# Patient Record
Sex: MALE | Race: BLACK OR AFRICAN AMERICAN | NOT HISPANIC OR LATINO | Employment: UNEMPLOYED | URBAN - METROPOLITAN AREA
[De-identification: names, ages, dates, MRNs, and addresses within clinical notes are randomized per-mention and may not be internally consistent; named-entity substitution may affect disease eponyms.]

---

## 2017-01-10 ENCOUNTER — TELEPHONE (OUTPATIENT)
Dept: CARDIOLOGY | Facility: CLINIC | Age: 37
End: 2017-01-10

## 2017-01-10 NOTE — TELEPHONE ENCOUNTER
Patient called and reminded about dse scheduled tomorrow at 1045 and to arrive npo 3 to 4 hours prior to test.

## 2017-01-11 ENCOUNTER — HOSPITAL ENCOUNTER (OUTPATIENT)
Dept: CARDIOLOGY | Facility: CLINIC | Age: 37
Discharge: HOME | End: 2017-01-11
Payer: MEDICARE

## 2017-01-11 DIAGNOSIS — Z76.82 ORGAN TRANSPLANT CANDIDATE: ICD-10-CM

## 2017-01-11 NOTE — PROGRESS NOTES
Pt here for dobutamine stress test for  kidney transplant workup. Pt states that he recently had a stress test at [facility]. Pt has final report stating that a Kadi scan stress test was done 11/30/2016 at [facility]. Yari Cotter from the kidney transplant department notified and she called [facility] to obtain records as well. Pt instructed that a stress test doesn't need to be performed since it was recently done. Pt instructed to take all records from [place] to transplant center.

## 2017-02-23 ENCOUNTER — TELEPHONE (OUTPATIENT)
Dept: TRANSPLANT | Facility: CLINIC | Age: 37
End: 2017-02-23

## 2017-02-23 NOTE — TELEPHONE ENCOUNTER
----- Message from Shea Cordero sent at 2/23/2017 10:38 AM CST -----  Contact: pt  Calling to let Yari know the info requested will be faxed today, and pt wants a call back today please call him @ # [phone].

## 2017-03-01 ENCOUNTER — TELEPHONE (OUTPATIENT)
Dept: TRANSPLANT | Facility: CLINIC | Age: 37
End: 2017-03-01

## 2017-03-01 NOTE — TELEPHONE ENCOUNTER
----- Message from Marta Cruz sent at 2/27/2017  9:58 AM CST -----  Contact: patient   Calling to get a update on his txp . Please call

## 2017-03-10 ENCOUNTER — COMMITTEE REVIEW (OUTPATIENT)
Dept: TRANSPLANT | Facility: CLINIC | Age: 37
End: 2017-03-10

## 2017-03-10 NOTE — COMMITTEE REVIEW
Native Organ Dx: HIV Nephropathy      Unable to determine transplant candidacy at this time due to need for follow up on decreased CD4 counts ( low CD4 increases the risk of infection after transplant) and Cardiology consult for interatrial septal aneursym. CD4 will need to remain >200 for at least 3 months. Dr. Miramontes will discuss with Dr. Reyes in regards to changing medication regime and CD4 counts.       Note written by Yari Cotter RN    ===============================================    I was present at the meeting and attest to the decision of the committee.

## 2017-03-10 NOTE — LETTER
March 10, 2017    Demetrio Aguiar  [address] [address] [address]          Dear Demetrio Aguiar:  MRN: [id number]    Your transplant evaluation was reviewed at the [facility] Selection Committee meeting on 3/10/2017.  It is with regret I inform you that your workup is incomplete and we are unable to determine your transplant candidacy at this time due decreased CD4 counts  and cardiology consult for interatrial septal aneursym. Your CD4 will need to remain greater than 200 for at least 3 months. You will be re-presented to the selection committee once pending studies are completed.  You will be notified of the committees decision once we review the new results.    The [facility] Selection Committee carefully considers each patients transplant candidacy using established selection criteria to determine if it is safe to proceed with transplantation for each and every person evaluated.  Although the selection committee believes your workup is incomplete and we are unable to determine your transplant candidacy, you have the right to be evaluated at other transplant centers.  You may request your [facility] records be sent to any center of your choice by contacting our Medical Records Department at [phone].    Attached is a letter from the [employer] for Organ Sharing (UNOS).  It describes the services and information offered to patients by UNOS and the Organ Procurement and Transplant Network.    Sincerely,      Lucía Munoz MD  Medical Director, Kidney & Kidney/Pancreas Transplantation    Cc: Alexa Holliday MD/[facility]/Carlos Reyes-Sacin, MD    Encl: UNOS Letter          OPTN/UNOS: Your Resource for Organ Transplant Information        If you have a question regarding your own medical care, you always should call your transplant center first. However, for general organ transplant-related information, you can call the [employer] Organ Sharing (UNOS) toll-free patient  services line at [phone].    Anyone, including potential transplant candidates, recipients, family members/friends, living donors, and/or donor family members can call this number to:    · talk about organ donation, living donation, how transplant and donation work, the donation process, transplant policies, and transplant/donor information;  · get a free patient information kit with helpful booklets, waiting list and transplant information, and a list of all transplant centers;  · ask questions about the Organ Procurement and Transplantation Network (OPTN) web site (www.optn.transplant.hrsa.gov); the UNOS Web site (www.unos.org); or the UNOS web site for living donors and transplant recipients (www.transplantliving.org);  · learn how UNOS and the OPTN can help you;  · talk about any concerns that you may have with a transplant center and how they perform    UNOS is a not-for-profit organization that provides all of the administrative services for the national OPTN under federal contract to the Health Resources and Services Administration (HRSA), an agency under the U.S. Department of Health and Human Services (HHS).     UNOS and OPTN responsibilities include:    · writing educational material for patients, the public and professionals;  · helping to make people aware of the need for donated organs and tissue;  · writing organ transplant policy with help from doctors, nurses, transplant patients/candidates, donor families, living donors, and the public;  · coordinating the organ matching and placement process;  · collecting information about every organ transplant and donation that occurs in the [place].    Remember, you should contact your transplant center directly if you have questions or concerns about your own medical care including medical records, work-up progress and test reports. [facility] is not your transplant center, and staff at [facility] will not be able to transfer you to your transplant center, so  keep your transplant centers phone number handy. But, while you research your transplant needs and learn as much as you can about transplantation and donation, we welcome your call to our toll-free patient services line at [phone].

## 2017-03-14 ENCOUNTER — TELEPHONE (OUTPATIENT)
Dept: INFECTIOUS DISEASES | Facility: CLINIC | Age: 37
End: 2017-03-14

## 2017-03-14 NOTE — TELEPHONE ENCOUNTER
ID Plan of Care note    Discussed patient with Dr. Carlos Reyes, pts Primary care HIV doctor and told him there was concern for downtrending Cd4 count and patient will not be listed at this time, but our team will continue to follow for future listing.  Given this, I have discussed with him discontinuing efavirenz and moving to an alternative agent that does not have interaction with tacrolimus.  He understands this and will do so at this time.  He also understands ideally CD4 should be > 200 x 3 months and percentage should be stable prior to listing in future.

## 2017-04-03 ENCOUNTER — OFFICE VISIT (OUTPATIENT)
Dept: CARDIOLOGY | Facility: CLINIC | Age: 37
End: 2017-04-03
Payer: MEDICARE

## 2017-04-03 ENCOUNTER — TELEPHONE (OUTPATIENT)
Dept: CARDIOLOGY | Facility: CLINIC | Age: 37
End: 2017-04-03

## 2017-04-03 VITALS
SYSTOLIC BLOOD PRESSURE: 108 MMHG | WEIGHT: 150.81 LBS | BODY MASS INDEX: 23.67 KG/M2 | DIASTOLIC BLOOD PRESSURE: 68 MMHG | HEART RATE: 93 BPM | HEIGHT: 67 IN

## 2017-04-03 DIAGNOSIS — Z01.818 PREOP EXAMINATION: Primary | ICD-10-CM

## 2017-04-03 PROCEDURE — 99204 OFFICE O/P NEW MOD 45 MIN: CPT | Mod: S$PBB,GC,, | Performed by: INTERNAL MEDICINE

## 2017-04-03 PROCEDURE — 99999 PR PBB SHADOW E&M-EST. PATIENT-LVL III: CPT | Mod: PBBFAC,GC,, | Performed by: INTERNAL MEDICINE

## 2017-04-03 PROCEDURE — 99213 OFFICE O/P EST LOW 20 MIN: CPT | Mod: PBBFAC | Performed by: INTERNAL MEDICINE

## 2017-04-03 RX ORDER — FEXOFENADINE HCL 60 MG
60 TABLET ORAL DAILY
Refills: 5 | Status: ON HOLD | COMMUNITY
Start: 2017-01-09 | End: 2017-08-26 | Stop reason: ALTCHOICE

## 2017-04-03 RX ORDER — CINACALCET HYDROCHLORIDE 60 MG/1
TABLET, COATED ORAL
Refills: 99 | Status: ON HOLD | COMMUNITY
Start: 2017-02-09 | End: 2017-08-26 | Stop reason: DRUGHIGH

## 2017-04-03 RX ORDER — ASPIRIN 81 MG/1
81 TABLET ORAL DAILY
Status: ON HOLD | COMMUNITY
End: 2017-08-26 | Stop reason: ALTCHOICE

## 2017-04-03 NOTE — PROGRESS NOTES
"Subjective:    Patient ID:  Demetrio Aguiar is a 36 y.o. 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 He underwent echocardiogram and Lexiscan at [place]. His echocardiogram showed an EF of 55% with normal wall thickness, trace MR, mild PI, and an interatrial septal aneurysm with no bubble study performed. RV normal in size. Lexiscan was negative for ischemia.    On assessment today, the patient states that he feels well and is without complaints. No cardiac history, states family has a history of HTN. Patient denies stroke. No exertional dyspnea issues. No symptoms of heart failure.    Past Medical History:   Diagnosis Date    Anemia     HIV infection     HIV nephropathy     Hyperlipidemia     TB lung, latent     tx INH 2006       Past Surgical History:   Procedure Laterality Date    EXPLORATORY LAPAROTOMY W/ BOWEL RESECTION      NO BOWEL RESECTION, UNKNOWN DETAILS, "Pancreas Infection"    peritoneal dialysis catheter placement         Family History   Problem Relation Age of Onset    No Known Problems Mother     Cancer Father     Lung cancer Father     No Known Problems Brother     Diabetes Maternal Aunt     Diabetes Maternal Uncle     Hypertension Maternal Grandmother     Hypertension Paternal Grandmother        Social History     Social History    Marital status: Single     Spouse name: N/A    Number of children: N/A    Years of education: N/A     Social History Main Topics    Smoking status: Former Smoker     Packs/day: 0.50    Smokeless tobacco: None      Comment: quit smoking 5 years ago    Alcohol use No    Drug use: Yes     Special: Cocaine, Marijuana      Comment: last use for cocaine at age 17; 2 years ago last use for THC    Sexual activity: Not Currently     Other Topics Concern    None     Social History Narrative       Review of " "patient's allergies indicates:  No Known Allergies      Review of Systems   Constitution: Negative for decreased appetite, fever, weakness and malaise/fatigue.   Cardiovascular: Negative for chest pain, dyspnea on exertion, irregular heartbeat and leg swelling.   Respiratory: Negative for cough, hemoptysis, shortness of breath and wheezing.    Endocrine: Negative for cold intolerance and heat intolerance.   Musculoskeletal: Negative for muscle weakness and myalgias.   Gastrointestinal: Negative for abdominal pain, constipation and diarrhea.   Genitourinary: Negative for bladder incontinence.   Psychiatric/Behavioral: Negative for depression.        Objective:  Vitals:    04/03/17 1005   BP: 108/68   BP Location: Right arm   Patient Position: Sitting   BP Method: Automatic   Pulse: 93   Weight: 68.4 kg (150 lb 12.7 oz)   Height: 5' 7" (1.702 m)       Physical Exam   Constitutional: He is oriented to person, place, and time. He appears well-developed and well-nourished.   HENT:   Head: Normocephalic and atraumatic.   Neck: Normal range of motion. Neck supple. No JVD present.   Cardiovascular: Normal rate, regular rhythm and normal heart sounds.  Exam reveals no gallop and no friction rub.    No murmur heard.  Pulmonary/Chest: Effort normal and breath sounds normal. No respiratory distress. He has no wheezes. He has no rales.   Abdominal: Soft. Bowel sounds are normal. There is no tenderness. There is no rebound and no guarding.   Musculoskeletal: Normal range of motion.   Neurological: He is alert and oriented to person, place, and time.     Current Outpatient Prescriptions on File Prior to Visit   Medication Sig    calcium acetate (PHOSLO) 667 mg capsule Take 2,668 mg by mouth 3 (three) times daily with meals.    efavirenz (SUSTIVA) 600 mg Tab Take 600 mg by mouth every evening.    emtricitabine (EMTRIVA) 200 mg Cap Take 200 mg by mouth twice a week. Tues and Sat    niacin 250 MG Tab Take 250 mg by mouth nightly. "    tenofovir (VIREAD) 300 mg Tab Take 300 mg by mouth once a week. Saturday    [DISCONTINUED] cinacalcet (SENSIPAR) 90 MG Tab Take 90 mg by mouth daily with breakfast.     No current facility-administered medications on file prior to visit.            Assessment and Plan:   #Preoperative Risk Assessment - Prior to Kidney Transplant. Outside hospital stress test negative. Evidence of atrial septal aneurysm on outside hospital echo, no bubble study, but RV normal in size.  - Atrial septal aneurysm should have no consequence on kidney transplant outcome, but there is some evidence that it may increase risk of stroke in the future  - Aspirin 81mg PO Daily for prophylaxis considering aneurysm, can be held if needed perioperatively  - With the above recommendations, the patient is optimized from a cardiac standpoint for his kidney transplant    #Atrial Septal Aneurysm - Asymptomatic. No evidence of RV involvement.  - Aspirin as above    Patient discussed and examined with attending physician, Dr. Peng.      Signed:    Rubén Masters MD  Cardiology Fellow, PGY-5  Pager: [phone]  4/3/2017 10:34 AM

## 2017-04-03 NOTE — MR AVS SNAPSHOT
[facility]  [address]  [facility] [address]  Phone: [phone]                  Demetrio Aguiar   4/3/2017 10:00 AM   Office Visit    Description:  Male : 1980   Provider:  Rubén Masters MD   Department:  [facility]           Reason for Visit     Pre-op Exam     interatrial septal aneurysm           Diagnoses this Visit        Comments    Preop examination    -  Primary            To Do List           Goals (5 Years of Data)     None      Follow-Up and Disposition     Return if symptoms worsen or fail to improve.    Follow-up and Disposition History      [facility]     [facility] On Call Nurse Care Line -  Assistance  Unless otherwise directed by your provider, please contact [facility], our nurse care line that is available for  assistance.     Registered nurses in the [facility] Center provide: appointment scheduling, clinical advisement, health education, and other advisory services.  Call: [phone] (toll free)               Medications           Message regarding Medications     Verify the changes and/or additions to your medication regime listed below are the same as discussed with your clinician today.  If any of these changes or additions are incorrect, please notify your healthcare provider.             Verify that the below list of medications is an accurate representation of the medications you are currently taking.  If none reported, the list may be blank. If incorrect, please contact your healthcare provider. Carry this list with you in case of emergency.           Current Medications     aspirin (ECOTRIN) 81 MG EC tablet Take 81 mg by mouth once daily.    calcium acetate (PHOSLO) 667 mg capsule Take 2,668 mg by mouth 3 (three) times daily with meals.    efavirenz (SUSTIVA) 600 mg Tab Take 600 mg by mouth every evening.    emtricitabine (EMTRIVA) 200 mg Cap Take 200 mg by mouth twice a week. Tues and Sat    niacin 250 MG Tab  "Take 250 mg by mouth nightly.    SENSIPAR 60 mg Tab TAKE 2 TABLETS ( 120MG ) BY MOUTH DAILY WITH EVENING MEAL    tenofovir (VIREAD) 300 mg Tab Take 300 mg by mouth once a week. Saturday    fexofenadine (ALLEGRA) 60 MG tablet Take 60 mg by mouth once daily.           Clinical Reference Information           Your Vitals Were     BP Pulse Height Weight BMI    108/68 (BP Location: Right arm, Patient Position: Sitting, BP Method: Automatic) 93 5' 7" (1.702 m) 68.4 kg (150 lb 12.7 oz) 23.62 kg/m2      Blood Pressure          Most Recent Value    Right Arm BP - Sitting  108/68    Reason for not completing BP on both arms  central line    BP  108/68      Allergies as of 4/3/2017     No Known Allergies      Immunizations Administered on Date of Encounter - 4/3/2017     None      Maintenance Dialysis History     Start End Type Comments Center    4/20/2004  [facility]            Current Dialysis Center Information     [facility] [address] Phone #:  [phone]    Contact:  N/A ALVIN CHATMAN  [address] Fax #:  [phone]            Transplant Information        Txp Date Organ Coordinator Care Team     Kidney Yari Cotter Current Nephrologist:  Alexa Holliday MD         Language Assistance Services     ATTENTION: Language assistance services are available, free of charge. Please call [phone].      ATENCIÓN: Si habla español, tiene a humphrey disposición servicios gratuitos de asistencia lingüística. Llame al [phone].     MARIIA Ý: N?u b?n nói Ti?ng Vi?t, có các d?ch v? h? tr? ngôn ng? mi?n phí dành cho b?n. G?i s? [phone].         [facility] complies with applicable Federal civil rights laws and does not discriminate on the basis of race, color, national origin, age, disability, or sex.        "

## 2017-04-03 NOTE — PROGRESS NOTES
I have personally taken the history and examined this patient and agree with the fellow's note as stated above. Concur the Patient is an acceptable cardiovascular risk for the anticipated transplant surgery
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
## 2017-06-12 ENCOUNTER — TELEPHONE (OUTPATIENT)
Dept: TRANSPLANT | Facility: CLINIC | Age: 37
End: 2017-06-12

## 2017-06-12 NOTE — TELEPHONE ENCOUNTER
----- Message from Marta Cruz sent at 6/12/2017 10:21 AM CDT -----  Contact: pt   Calling to speak with you about his visit ,patient states he needs to discuss a few things. Please call

## 2017-06-12 NOTE — TELEPHONE ENCOUNTER
Spoken with patient. Patient informed that we will request records from Dr. Ulloa office. Patient verbalized understanding.

## 2017-06-19 ENCOUNTER — TELEPHONE (OUTPATIENT)
Dept: TRANSPLANT | Facility: CLINIC | Age: 37
End: 2017-06-19

## 2017-06-19 NOTE — TELEPHONE ENCOUNTER
Spoke to patient, advised that we will request CD4 counts for last 2 months (March received - 148) to assess counts. Pt needs to be >200 for at least 3 months per committee and per Dr. Miramontes. Called Dr. Reyes at [phone] and phone rang unanswered. Will try again later.

## 2017-06-19 NOTE — TELEPHONE ENCOUNTER
----- Message from Mingo Beaver sent at 6/19/2017 11:20 AM CDT -----  Contact: patient   Calling to get status update please call

## 2017-06-21 ENCOUNTER — TELEPHONE (OUTPATIENT)
Dept: TRANSPLANT | Facility: CLINIC | Age: 37
End: 2017-06-21

## 2017-06-21 NOTE — TELEPHONE ENCOUNTER
Unable to contact Dr. Reyes, ID and dialysis unit closed today. Letter faxed to Dr. Holliday, Nephrologist and patient requesting labs for April - June. CD4 counts need to be at least 200 for at least 3 month.

## 2017-08-11 ENCOUNTER — TELEPHONE (OUTPATIENT)
Dept: TRANSPLANT | Facility: CLINIC | Age: 37
End: 2017-08-11

## 2017-08-11 ENCOUNTER — COMMITTEE REVIEW (OUTPATIENT)
Dept: TRANSPLANT | Facility: CLINIC | Age: 37
End: 2017-08-11

## 2017-08-11 DIAGNOSIS — Z01.818 PRE-OP EVALUATION: Primary | ICD-10-CM

## 2017-08-11 NOTE — LETTER
August 11, 2017    Alexa Holliday  [address] [facility] [address]         Dear Dr. Alexa Holliday:    Patient: Demetrio Aguiar   MR Number: [id number]   YOB: 1980     Your patient, Demetrio Aguiar, was recently discussed at the [facility] Selection Committee meeting on 8/11/2017. I am happy to inform you that Demetrio has been approved for transplantation.  He has met selection criteria for a kidney transplant related to ESRD secondary to primary diagnosis of HIV Nephropathy. Your patient will be placed on the cadaveric wait list pending final financial approval from insurance company.     We appreciate your confidence in allowing us to participate in your patients care.  If you have any questions or concerns, please do not hesitate to contact me.    Sincerely,      Lucía Munoz MD  Medical Director, Kidney & Kidney/Pancreas Transplantation    Cc:  Demetrio Aguiar/[facility]

## 2017-08-11 NOTE — COMMITTEE REVIEW
Native Organ Dx: HIV Nephropathy      SELECTION COMMITTEE NOTE    Demetrio Aguiar was presented at selection committee on 8/11/2017.  Patient met selection criteria for kidney transplant related to ESRD due to   HIV Nephropathy.  No absolute contraindications to transplant at this time.  Patient will be placed on the cadaveric wait list pending final financial approval from insurance company.  Patient will return to clinic for routine appointment in 6 months. Patient does not meet criteria for High KDPI kidney offer due to age and time on dialysis.     Will need to obtain updated PTH, clarify frequency of 2D echo monitoring of atrial septal aneurysm; and confirmation that patient is off of efavirenz.       Note written by Jasmyn Patrick RN    ===============================================  I was present at the meeting and attest to the decision of the committee.    Bouchra Javier MD

## 2017-08-11 NOTE — TELEPHONE ENCOUNTER
Spoke to patient, requested 1st sample to be drawn 8/15/17 and requested PTH level for listing from dialysis unit.

## 2017-08-15 ENCOUNTER — TELEPHONE (OUTPATIENT)
Dept: TRANSPLANT | Facility: CLINIC | Age: 37
End: 2017-08-15

## 2017-08-15 DIAGNOSIS — Z76.82 AWAITING TRANSPLANTATION OF KIDNEY: Primary | ICD-10-CM

## 2017-08-15 NOTE — TELEPHONE ENCOUNTER
----- Message from Julian Peng MD sent at 8/15/2017 12:09 AM CDT -----  No more than the usual follow up. Low dose aspirin was recommended.  ----- Message -----  From: Yari Cotter  Sent: 8/14/2017   3:29 PM  To: MD Dr. Danish Yoder,  This patient was seen by you for interatrial septal aneurysm and was cleared for kidney transplant. Per kidney selection committee, how often should this patient have a follow up echo or any other test?    Yari

## 2017-08-15 NOTE — TELEPHONE ENCOUNTER
"  KIDNEY WAIT LISTING NOTE    Date of Financial clearance to list: 17    [facility]:     Organ: Kidney  Name:       Demetrio Aguiar   : 1980          Gender:     male    MRN#: [id number]                                 State of Permanent Residence:  [address] [place], [place] [address]  Ethnicity: /Black   Race:      Black or     CLINICAL INFORMATION   Candidate Medical Urgency Status: Active  Number of Previous Kidney Transplants:   Number of Previous Solid Organ Transplants:   Is this Candidate a Prior Living Donor: no  (If yes, please generate letter to UNOS with patient's date of donation, recipient SSN, signed by Surgical Director after patient is listed in order to receive priority points).      ABO  ABO Blood Group:   A POS     ABO Confirmation: (THESE DATES MUST BE PRIOR TO THE LIST DATE AND SUPPORTED BY SEPARATE LAB REPORTS)    Internal Results    Lab Results   Component Value Date    GROUPTRH A POS 2017    GROUPTRH A POS 2016       External Results    ABO Date 1:    ABO Date 2  Are either of these ABO results based on External Labs? no  (If Yes, STOP and go to source document in Media Tab for verification).    VITALS  Height:  5' 7"   Weight:  149  (Use height from Transplant clinic visits only).  Did you enter height/weight? Yes    HLA    Class I:  Lab Results   Component Value Date    TCPM7OH 66 2016    EXUR2JG 68 2016    SEWR1HR 58 2016    MQPN5SJ XX 2016    WXMQH7QZ 4 2016    LRUGX9HM XX 2016    TIOPA2DO 10 2016    HHUCJ5IK 7 2016       Class II:  Lab Results   Component Value Date    ZMPYJW99KP 13 2016    AJSKMU31TP 14 2016    DHFESD386TB 52 2016    ZSFYXR9787 XX 2016    ZIANE3DM 9 2016    HUFYB5NA 6 2016       Tested for HLA Antibodies: Yes, antibodies detected     If result is "Positive" antibodies are detected     If result is "Negative or questionable" no " antibodies detected    Lab Results   Component Value Date    CIPRAS Positive 11/30/2016    CIIPRAS Negative 11/30/2016       DIALYSIS INFORMATION  Is patient Pre-Dialysis: No     Report GFR being used as the criteria for placement on the kidney list. If not, leave blank  GFR < or = 20 ml/min? n/a  If Yes, Specify value  ___   ml/min     Initial date GFR became 20 or less:   Is GFR obtained from an Outside lab Result? n/a  (If YES verify with source document scanned into media)    If patient on Dialysis:    Is candidate currently on dialysis for ESRD? Yes  If Yes,  Date Chronic Dialysis Started:   4/20/2004  (verify with source document in Media Tab)   Dialysis Unit Name: [address] [place] [address]                        Physician Name:  Dr. Lucía Tello  NPI#: [id number]    DIABETES INFORMATION  Primary Native Kidney Diagnosis: HIV Nephropathy  C-Peptide Value - No results found for: CPEPTIDE  Current Diabetes Status: None    FOR NON-KIDNEY DEPARTMENT USE ONLY:  Additional Organs Registered? none    Maximum Acceptable Number of HLA Mismatches  ABDR:     6      (0-6)               AB:               (0-4)  ADR:   _____  (0-4)              BDR: _____ (0-4)  A:        _____  (0-2)              B:      _____ (0-2)          DR: ______ (0-2)    Will Recipient Accept?   Accept HBcAB Positive Organ:            Yes  Accept HBV MIKE Positive Organ:        no  Accept HCV Antibody Positive Organ: no   Accept HCV MIKE Positive Organ: no  Accept KDPI > 85 Donor ?: No                        Local: No                           Import: No    ### NURSE TO VERIFY CONSENT AND MAKE ANY NECESSARY CHANGES NEEDED IN UNET AT THE TIME OF VERIFICATION ###    Unacceptible Antigens  If yes, list     Lab Results   Component Value Date    IZ9YZNT Cw9,B48,B81,B60,B7 11/30/2016    CIABCLM WEAK CW17, CW2, CW15, B8, B41, B42, CW5 11/30/2016    CIIAB Negative 11/30/2016       ### DO NOT LIST IF ANTIGEN VALUE WEAK  ###    ### DO NOT LIST ANTIGEN AS UNACCEPTABLE IF IT HAS * OR : IN THE RESULT  VALUE ###

## 2017-08-15 NOTE — LETTER
August 15, 2017    Demetrio Aguiar  [id number] [address] [address]    Dear Demetrio Aguiar:  MRN: [id number]    Congratulations! As of 8/15/2017, your name has been placed on the cadaveric kidney waiting list at [facility].  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    If you have any friends or family members interested in donating a kidney to you, please have them call the donor coordinator.  If you are a pre-dialysis patient or if you dialyze at home monthly transplant blood kits will be sent directly to you.  If you dialyze at a dialysis center the kits will be sent to your dialysis unit.      While active on the list, you must keep us notified of any changes in your health status.  Should your medical condition change and transplantation is no longer a viable option, it may be necessary to deactivate your status or to remove you from the UNOS list.    Please notify your coordinator when there are changes to your telephone number, address, insurance coverage, or dialysis unit.  If you have any alternate phone numbers that you would like us to have, please let us know.  Your Listed Transplant Coordinator will be Cristian Manzano RN.  Feel free to call your coordinator at [phone] or [phone] should you have any questions.    The [facility] Medicaid [facility] and the United Network for Organ Sharing requires that we inform any patient placed on our waiting list of information that would impact their ability to receive a transplant.  [facility]s Kidney and Kidney/Pancreas transplant program has a transplant surgeon and physician available 365 days a year, 24 hours a day, and 7 days a week to facilitate organ acceptance, procurement, and implantation, and to address urgent patient issues.  You will be notified in writing of any changes to our key personnel and of any changes to our staffing plan that would impact your ability to receive a  transplant.    Attached is a letter from the United Network for Organ Sharing (UNOS).  It describes the services and information offered to patients by UNOS and the Organ Procurement and Transplant Network.    Again, we congratulate you on your success and look forward to working with you very closely in the future.    Sincerely,    Lucía Munoz M.D.                                                Medical Director, Kidney and Kidney/Pancreas Transplant  [facility] [address] [address]  [phone]    Cc: Alexa Holliday MD/[facility]          OPTN/UNOS: Your Resource for Organ Transplant Information        If you have a question regarding your own medical care, you always should call your transplant center first. However, for general organ transplant-related information, you can call the United Network for Organ Sharing (UNOS) toll-free patient services line at [phone].    Anyone, including potential transplant candidates, recipients, family members/friends, living donors, and/or donor family members can call this number to:    · talk about organ donation, living donation, how transplant and donation work, the donation process, transplant policies, and transplant/donor information;  · get a free patient information kit with helpful booklets, waiting list and transplant information, and a list of all transplant centers;  · ask questions about the Organ Procurement and Transplantation Network (OPTN) web site (www.optn.transplant.hrsa.gov); the UNOS Web site (www.unos.org); or the UNOS web site for living donors and transplant recipients (www.transplantliving.org);  · learn how UNOS and the OPTN can help you;  · talk about any concerns that you may have with a transplant center and how they perform    UNOS is a not-for-profit organization that provides all of the administrative services for the national OPTN under federal contract to the [facility]  Resources and Services Administration (HRSA), an agency under the U.S. Department of Health and Human Services (HHS).     UNOS and OPTN responsibilities include:    · writing educational material for patients, the public and professionals;  · helping to make people aware of the need for donated organs and tissue;  · writing organ transplant policy with help from doctors, nurses, transplant patients/candidates, donor families, living donors, and the public;  · coordinating the organ matching and placement process;  · collecting information about every organ transplant and donation that occurs in the [place].    Remember, you should contact your transplant center directly if you have questions or concerns about your own medical care including medical records, work-up progress and test reports. [facility] is not your transplant center, and staff at [facility] will not be able to transfer you to your transplant center, so keep your transplant centers phone number handy. But, while you research your transplant needs and learn as much as you can about transplantation and donation, we welcome your call to our toll-free patient services line at [phone].

## 2017-08-17 DIAGNOSIS — Z76.82 AWAITING ORGAN TRANSPLANT STATUS: Primary | ICD-10-CM

## 2017-08-17 PROCEDURE — 86977 RBC SERUM PRETX INCUBJ/INHIB: CPT | Mod: 91,PO,TXP

## 2017-08-17 PROCEDURE — 86825 HLA X-MATH NON-CYTOTOXIC: CPT | Mod: 91,PO,TXP

## 2017-08-17 PROCEDURE — 86832 HLA CLASS I HIGH DEFIN QUAL: CPT | Mod: PO,TXP

## 2017-08-17 PROCEDURE — 86833 HLA CLASS II HIGH DEFIN QUAL: CPT | Mod: PO,TXP

## 2017-08-17 PROCEDURE — 86825 HLA X-MATH NON-CYTOTOXIC: CPT | Mod: PO,TXP

## 2017-08-18 ENCOUNTER — ANESTHESIA EVENT (OUTPATIENT)
Dept: SURGERY | Facility: HOSPITAL | Age: 37
DRG: 969 | End: 2017-08-18
Payer: MEDICARE

## 2017-08-18 ENCOUNTER — TELEPHONE (OUTPATIENT)
Dept: TRANSPLANT | Facility: CLINIC | Age: 37
End: 2017-08-18

## 2017-08-18 ENCOUNTER — HOSPITAL ENCOUNTER (INPATIENT)
Facility: HOSPITAL | Age: 37
LOS: 8 days | Discharge: HOME | DRG: 969 | End: 2017-08-26
Attending: TRANSPLANT SURGERY | Admitting: TRANSPLANT SURGERY
Payer: MEDICARE

## 2017-08-18 ENCOUNTER — LAB VISIT (OUTPATIENT)
Dept: LAB | Facility: HOSPITAL | Age: 37
End: 2017-08-18
Payer: MEDICARE

## 2017-08-18 ENCOUNTER — ANESTHESIA (OUTPATIENT)
Dept: SURGERY | Facility: HOSPITAL | Age: 37
DRG: 969 | End: 2017-08-18
Payer: MEDICARE

## 2017-08-18 DIAGNOSIS — B20 HIV (HUMAN IMMUNODEFICIENCY VIRUS INFECTION): Chronic | ICD-10-CM

## 2017-08-18 DIAGNOSIS — Z94.0 STATUS POST DECEASED-DONOR KIDNEY TRANSPLANTATION: Primary | ICD-10-CM

## 2017-08-18 DIAGNOSIS — D63.8 ANEMIA OF CHRONIC DISEASE: ICD-10-CM

## 2017-08-18 DIAGNOSIS — B20 HIV NEPHROPATHY: ICD-10-CM

## 2017-08-18 DIAGNOSIS — E87.20 METABOLIC ACIDOSIS: ICD-10-CM

## 2017-08-18 DIAGNOSIS — D62 ANEMIA DUE TO ACUTE BLOOD LOSS: ICD-10-CM

## 2017-08-18 DIAGNOSIS — E87.5 HYPERKALEMIA: ICD-10-CM

## 2017-08-18 DIAGNOSIS — Z76.82 AWAITING TRANSPLANTATION OF KIDNEY: ICD-10-CM

## 2017-08-18 DIAGNOSIS — Z94.9 TRANSPLANT: ICD-10-CM

## 2017-08-18 DIAGNOSIS — Z91.89 AT RISK FOR OPPORTUNISTIC INFECTIONS: ICD-10-CM

## 2017-08-18 DIAGNOSIS — Z01.818 PRE-OP EXAM: ICD-10-CM

## 2017-08-18 DIAGNOSIS — Z29.89 NEED FOR PROPHYLACTIC IMMUNOTHERAPY: ICD-10-CM

## 2017-08-18 DIAGNOSIS — E83.39 HYPOPHOSPHATEMIA: ICD-10-CM

## 2017-08-18 DIAGNOSIS — Z76.82 AWAITING ORGAN TRANSPLANT STATUS: ICD-10-CM

## 2017-08-18 DIAGNOSIS — N08 HIV NEPHROPATHY: ICD-10-CM

## 2017-08-18 DIAGNOSIS — T86.19 DELAYED GRAFT FUNCTION OF KIDNEY: ICD-10-CM

## 2017-08-18 DIAGNOSIS — N18.6 ESRD (END STAGE RENAL DISEASE): ICD-10-CM

## 2017-08-18 DIAGNOSIS — E83.51 HYPOCALCEMIA: ICD-10-CM

## 2017-08-18 DIAGNOSIS — E78.5 HYPERLIPIDEMIA, UNSPECIFIED HYPERLIPIDEMIA TYPE: Chronic | ICD-10-CM

## 2017-08-18 DIAGNOSIS — Z94.0 STATUS POST DECEASED-DONOR KIDNEY TRANSPLANTATION: ICD-10-CM

## 2017-08-18 LAB
25(OH)D3+25(OH)D2 SERPL-MCNC: 38 NG/ML
ABO + RH BLD: NORMAL
ALBUMIN SERPL BCP-MCNC: 3.9 G/DL
ALP SERPL-CCNC: 678 U/L
ALT SERPL W/O P-5'-P-CCNC: 15 U/L
ANION GAP SERPL CALC-SCNC: 10 MMOL/L
ANION GAP SERPL CALC-SCNC: 11 MMOL/L
APTT BLDCRRT: 23.8 SEC
AST SERPL-CCNC: 21 U/L
B CELL RESULTS - XM ALLO: NEGATIVE
B CELL RESULTS - XM ALLO: POSITIVE
B CELL RESULTS - XM AUTO: POSITIVE
B MCS AVERAGE - XM ALLO: 115.5
B MCS AVERAGE - XM ALLO: 75.5
B MCS AVERAGE - XM AUTO: 142.5
BASOPHILS # BLD AUTO: 0.02 K/UL
BASOPHILS NFR BLD: 0.4 %
BILIRUB SERPL-MCNC: 0.4 MG/DL
BLD GP AB SCN CELLS X3 SERPL QL: NORMAL
BUN SERPL-MCNC: 32 MG/DL
BUN SERPL-MCNC: 37 MG/DL
CALCIUM SERPL-MCNC: 8.3 MG/DL
CALCIUM SERPL-MCNC: 8.9 MG/DL
CHLORIDE SERPL-SCNC: 97 MMOL/L
CHLORIDE SERPL-SCNC: 97 MMOL/L
CHOLEST/HDLC SERPL: 5.1 {RATIO}
CLASS I ANTIBODIES - LUMINEX: NORMAL
CLASS I ANTIBODY COMMENTS - LUMINEX: NORMAL
CLASS II ANTIBODIES - LUMINEX: NEGATIVE
CO2 SERPL-SCNC: 28 MMOL/L
CO2 SERPL-SCNC: 32 MMOL/L
CPRA %: 52
CREAT SERPL-MCNC: 9.4 MG/DL
CREAT SERPL-MCNC: 9.8 MG/DL
DIFFERENTIAL METHOD: ABNORMAL
DONOR MRN: NORMAL
DONOR MRN: NORMAL
EOSINOPHIL # BLD AUTO: 0.1 K/UL
EOSINOPHIL NFR BLD: 1.9 %
ERYTHROCYTE [DISTWIDTH] IN BLOOD BY AUTOMATED COUNT: 17 %
EST. GFR  (AFRICAN AMERICAN): 7 ML/MIN/1.73 M^2
EST. GFR  (AFRICAN AMERICAN): 7.3 ML/MIN/1.73 M^2
EST. GFR  (NON AFRICAN AMERICAN): 6 ML/MIN/1.73 M^2
EST. GFR  (NON AFRICAN AMERICAN): 6.3 ML/MIN/1.73 M^2
FXMAL TESTING DATE: NORMAL
FXMAL TESTING DATE: NORMAL
FXMAU TESTING DATE: NORMAL
GLUCOSE SERPL-MCNC: 222 MG/DL
GLUCOSE SERPL-MCNC: 87 MG/DL
GLUCOSE SERPL-MCNC: 92 MG/DL (ref 70–110)
HCO3 UR-SCNC: 30.2 MMOL/L (ref 24–28)
HCT VFR BLD AUTO: 34.9 %
HCT VFR BLD CALC: 33 %PCV (ref 36–54)
HDL/CHOLESTEROL RATIO: 19.6 %
HDLC SERPL-MCNC: 189 MG/DL
HDLC SERPL-MCNC: 37 MG/DL
HGB BLD-MCNC: 11.2 G/DL
HLA FLOW CROSSMATCH (ALLO) INTERPRETATION: NORMAL
HLA FLOW CROSSMATCH (AUTO) INTERPRETATION: NORMAL
HLA VIRTUAL CROSSMATCH INTERPRETATION: NORMAL
HLVXM TESTING DATE: NORMAL
HPRA INTERPRETATION: NORMAL
INR PPP: 1
LDH SERPL L TO P-CCNC: 206 U/L
LDLC SERPL CALC-MCNC: 86.4 MG/DL
LYMPHOCYTES # BLD AUTO: 0.9 K/UL
LYMPHOCYTES NFR BLD: 17.7 %
MCH RBC QN AUTO: 29.8 PG
MCHC RBC AUTO-ENTMCNC: 32.1 G/DL
MCV RBC AUTO: 93 FL
MONOCYTES # BLD AUTO: 0.3 K/UL
MONOCYTES NFR BLD: 5.7 %
NEUTROPHILS # BLD AUTO: 3.9 K/UL
NEUTROPHILS NFR BLD: 74.1 %
NONHDLC SERPL-MCNC: 152 MG/DL
PCO2 BLDA: 37.8 MMHG (ref 35–45)
PH SMN: 7.51 [PH] (ref 7.35–7.45)
PHOSPHATE SERPL-MCNC: 2.6 MG/DL
PLATELET # BLD AUTO: 195 K/UL
PMV BLD AUTO: 10.1 FL
PO2 BLDA: 58 MMHG (ref 50–70)
POC BE: 7 MMOL/L
POC IONIZED CALCIUM: 0.95 MMOL/L (ref 1.06–1.42)
POC SATURATED O2: 92 % (ref 95–100)
POC TCO2: 31 MMOL/L (ref 23–27)
POTASSIUM BLD-SCNC: 7.5 MMOL/L (ref 3.5–5.1)
POTASSIUM SERPL-SCNC: 5.5 MMOL/L
POTASSIUM SERPL-SCNC: 5.5 MMOL/L
POTASSIUM SERPL-SCNC: 5.6 MMOL/L
PROT SERPL-MCNC: 8.8 G/DL
PROTHROMBIN TIME: 10.4 SEC
PTH-INTACT SERPL-MCNC: 208 PG/ML
RBC # BLD AUTO: 3.76 M/UL
SAMPLE: ABNORMAL
SERUM COLLECTION DT - LUMINEX CLASS I: NORMAL
SERUM COLLECTION DT - LUMINEX CLASS II: NORMAL
SERUM COLLECTION DT - XM ALLO: NORMAL
SERUM COLLECTION DT - XM ALLO: NORMAL
SERUM COLLECTION DT - XM AUTO: NORMAL
SODIUM BLD-SCNC: 138 MMOL/L (ref 136–145)
SODIUM SERPL-SCNC: 136 MMOL/L
SODIUM SERPL-SCNC: 139 MMOL/L
SPCL1 TESTING DATE: NORMAL
SPCL2 TESTING DATE: NORMAL
SPLUA TESTING DATE: NORMAL
T CELL RESULTS - XM ALLO: POSITIVE
T CELL RESULTS - XM ALLO: POSITIVE
T CELL RESULTS - XM AUTO: POSITIVE
T MCS AVERAGE - XM ALLO: 221.5
T MCS AVERAGE - XM ALLO: 223.5
T MCS AVERAGE - XM AUTO: 208.75
TRIGL SERPL-MCNC: 328 MG/DL
URATE SERPL-MCNC: 4.4 MG/DL
WBC # BLD AUTO: 5.24 K/UL

## 2017-08-18 PROCEDURE — 25000003 PHARM REV CODE 250: Mod: NTX | Performed by: INTERNAL MEDICINE

## 2017-08-18 PROCEDURE — D9220A PRA ANESTHESIA: Mod: ANES,,, | Performed by: ANESTHESIOLOGY

## 2017-08-18 PROCEDURE — 83615 LACTATE (LD) (LDH) ENZYME: CPT | Mod: NTX

## 2017-08-18 PROCEDURE — 86850 RBC ANTIBODY SCREEN: CPT | Mod: NTX

## 2017-08-18 PROCEDURE — 12000002 HC ACUTE/MED SURGE SEMI-PRIVATE ROOM: Mod: NTX

## 2017-08-18 PROCEDURE — 85610 PROTHROMBIN TIME: CPT | Mod: NTX

## 2017-08-18 PROCEDURE — 86706 HEP B SURFACE ANTIBODY: CPT | Mod: NTX

## 2017-08-18 PROCEDURE — 86900 BLOOD TYPING SEROLOGIC ABO: CPT | Mod: NTX

## 2017-08-18 PROCEDURE — 86825 HLA X-MATH NON-CYTOTOXIC: CPT | Mod: 91,PO,TXP

## 2017-08-18 PROCEDURE — 25000003 PHARM REV CODE 250: Performed by: NURSE ANESTHETIST, CERTIFIED REGISTERED

## 2017-08-18 PROCEDURE — 36556 INSERT NON-TUNNEL CV CATH: CPT | Mod: 59,,, | Performed by: ANESTHESIOLOGY

## 2017-08-18 PROCEDURE — 25000003 PHARM REV CODE 250: Mod: NTX | Performed by: NURSE PRACTITIONER

## 2017-08-18 PROCEDURE — 86826 HLA X-MATCH NONCYTOTOXC ADDL: CPT | Mod: PO,TXP

## 2017-08-18 PROCEDURE — 27201247 HC HEMODIALYSIS, SET-UP & CANCEL

## 2017-08-18 PROCEDURE — 25000003 PHARM REV CODE 250: Mod: NTX | Performed by: SURGERY

## 2017-08-18 PROCEDURE — 63600175 PHARM REV CODE 636 W HCPCS: Mod: NTX | Performed by: TRANSPLANT SURGERY

## 2017-08-18 PROCEDURE — 36000930 HC OR TIME LEV VII 1ST 15 MIN: Performed by: TRANSPLANT SURGERY

## 2017-08-18 PROCEDURE — 63600175 PHARM REV CODE 636 W HCPCS: Mod: NTX | Performed by: SURGERY

## 2017-08-18 PROCEDURE — 25000003 PHARM REV CODE 250: Mod: NTX | Performed by: TRANSPLANT SURGERY

## 2017-08-18 PROCEDURE — 63600175 PHARM REV CODE 636 W HCPCS: Performed by: TRANSPLANT SURGERY

## 2017-08-18 PROCEDURE — 27800505 HC CATH, RADIAL ARTERY KIT: Mod: NTX | Performed by: NURSE ANESTHETIST, CERTIFIED REGISTERED

## 2017-08-18 PROCEDURE — 84100 ASSAY OF PHOSPHORUS: CPT | Mod: NTX

## 2017-08-18 PROCEDURE — 85730 THROMBOPLASTIN TIME PARTIAL: CPT | Mod: NTX

## 2017-08-18 PROCEDURE — 50360 RNL ALTRNSPLJ W/O RCP NFRCT: CPT | Mod: GC,,, | Performed by: TRANSPLANT SURGERY

## 2017-08-18 PROCEDURE — 27100025 HC TUBING, SET FLUID WARMER: Mod: NTX | Performed by: NURSE ANESTHETIST, CERTIFIED REGISTERED

## 2017-08-18 PROCEDURE — 84550 ASSAY OF BLOOD/URIC ACID: CPT | Mod: NTX

## 2017-08-18 PROCEDURE — C2617 STENT, NON-COR, TEM W/O DEL: HCPCS | Performed by: TRANSPLANT SURGERY

## 2017-08-18 PROCEDURE — 99222 1ST HOSP IP/OBS MODERATE 55: CPT | Mod: NTX,,, | Performed by: NURSE PRACTITIONER

## 2017-08-18 PROCEDURE — 27000191 HC C-V MONITORING

## 2017-08-18 PROCEDURE — 93005 ELECTROCARDIOGRAM TRACING: CPT | Mod: NTX

## 2017-08-18 PROCEDURE — 37000009 HC ANESTHESIA EA ADD 15 MINS: Performed by: TRANSPLANT SURGERY

## 2017-08-18 PROCEDURE — D9220A PRA ANESTHESIA: Mod: CRNA,,, | Performed by: NURSE ANESTHETIST, CERTIFIED REGISTERED

## 2017-08-18 PROCEDURE — 71000039 HC RECOVERY, EACH ADD'L HOUR: Performed by: TRANSPLANT SURGERY

## 2017-08-18 PROCEDURE — 80061 LIPID PANEL: CPT | Mod: NTX

## 2017-08-18 PROCEDURE — 85025 COMPLETE CBC W/AUTO DIFF WBC: CPT | Mod: NTX

## 2017-08-18 PROCEDURE — 80053 COMPREHEN METABOLIC PANEL: CPT | Mod: NTX

## 2017-08-18 PROCEDURE — 86703 HIV-1/HIV-2 1 RESULT ANTBDY: CPT

## 2017-08-18 PROCEDURE — 36620 INSERTION CATHETER ARTERY: CPT | Mod: 59,,, | Performed by: ANESTHESIOLOGY

## 2017-08-18 PROCEDURE — 63600175 PHARM REV CODE 636 W HCPCS: Performed by: NURSE ANESTHETIST, CERTIFIED REGISTERED

## 2017-08-18 PROCEDURE — 87340 HEPATITIS B SURFACE AG IA: CPT

## 2017-08-18 PROCEDURE — 82306 VITAMIN D 25 HYDROXY: CPT | Mod: NTX

## 2017-08-18 PROCEDURE — 37000008 HC ANESTHESIA 1ST 15 MINUTES: Performed by: TRANSPLANT SURGERY

## 2017-08-18 PROCEDURE — 36000931 HC OR TIME LEV VII EA ADD 15 MIN: Performed by: TRANSPLANT SURGERY

## 2017-08-18 PROCEDURE — 27200676 HC TRANSDUCER MONITOR KIT DOUBLE: Mod: NTX | Performed by: NURSE ANESTHETIST, CERTIFIED REGISTERED

## 2017-08-18 PROCEDURE — 71000033 HC RECOVERY, INTIAL HOUR: Performed by: TRANSPLANT SURGERY

## 2017-08-18 PROCEDURE — 63600175 PHARM REV CODE 636 W HCPCS: Mod: NTX | Performed by: NURSE PRACTITIONER

## 2017-08-18 PROCEDURE — 93010 ELECTROCARDIOGRAM REPORT: CPT | Mod: ,,, | Performed by: INTERNAL MEDICINE

## 2017-08-18 PROCEDURE — 93010 ELECTROCARDIOGRAM REPORT: CPT | Mod: 77,,, | Performed by: INTERNAL MEDICINE

## 2017-08-18 PROCEDURE — C1729 CATH, DRAINAGE: HCPCS | Performed by: TRANSPLANT SURGERY

## 2017-08-18 PROCEDURE — 27201423 OPTIME MED/SURG SUP & DEVICES STERILE SUPPLY: Performed by: TRANSPLANT SURGERY

## 2017-08-18 PROCEDURE — 80048 BASIC METABOLIC PNL TOTAL CA: CPT

## 2017-08-18 PROCEDURE — 50605 INSERT URETERAL SUPPORT: CPT | Mod: 51,GC,, | Performed by: TRANSPLANT SURGERY

## 2017-08-18 PROCEDURE — 83970 ASSAY OF PARATHORMONE: CPT | Mod: NTX

## 2017-08-18 PROCEDURE — 36415 COLL VENOUS BLD VENIPUNCTURE: CPT | Mod: NTX

## 2017-08-18 PROCEDURE — 86826 HLA X-MATCH NONCYTOTOXC ADDL: CPT | Mod: 91,PO,TXP

## 2017-08-18 PROCEDURE — 86825 HLA X-MATH NON-CYTOTOXIC: CPT | Mod: PO,TXP

## 2017-08-18 DEVICE — STENT DOUBLE J 7FRX12CM: Type: IMPLANTABLE DEVICE | Site: URETER | Status: FUNCTIONAL

## 2017-08-18 RX ORDER — PROPOFOL 10 MG/ML
VIAL (ML) INTRAVENOUS
Status: DISCONTINUED | OUTPATIENT
Start: 2017-08-18 | End: 2017-08-19

## 2017-08-18 RX ORDER — FUROSEMIDE 10 MG/ML
80 INJECTION INTRAMUSCULAR; INTRAVENOUS ONCE
Status: DISCONTINUED | OUTPATIENT
Start: 2017-08-18 | End: 2017-08-18

## 2017-08-18 RX ORDER — ALBUMIN HUMAN 250 G/1000ML
12.5 SOLUTION INTRAVENOUS
Status: DISCONTINUED | OUTPATIENT
Start: 2017-08-18 | End: 2017-08-26 | Stop reason: HOSPADM

## 2017-08-18 RX ORDER — CISATRACURIUM BESYLATE 10 MG/ML
INJECTION, SOLUTION INTRAVENOUS
Status: DISCONTINUED | OUTPATIENT
Start: 2017-08-18 | End: 2017-08-19

## 2017-08-18 RX ORDER — ONDANSETRON 2 MG/ML
INJECTION INTRAMUSCULAR; INTRAVENOUS
Status: DISCONTINUED | OUTPATIENT
Start: 2017-08-18 | End: 2017-08-19

## 2017-08-18 RX ORDER — SODIUM CHLORIDE 9 MG/ML
INJECTION, SOLUTION INTRAVENOUS ONCE
Status: COMPLETED | OUTPATIENT
Start: 2017-08-18 | End: 2017-08-18

## 2017-08-18 RX ORDER — METHYLPREDNISOLONE SOD SUCC 125 MG
VIAL (EA) INJECTION
Status: DISCONTINUED | OUTPATIENT
Start: 2017-08-18 | End: 2017-08-19

## 2017-08-18 RX ORDER — LIDOCAINE HCL/PF 100 MG/5ML
SYRINGE (ML) INTRAVENOUS
Status: DISCONTINUED | OUTPATIENT
Start: 2017-08-18 | End: 2017-08-19

## 2017-08-18 RX ORDER — PHENYLEPHRINE HYDROCHLORIDE 10 MG/ML
INJECTION INTRAVENOUS
Status: DISCONTINUED | OUTPATIENT
Start: 2017-08-18 | End: 2017-08-19

## 2017-08-18 RX ORDER — CEFAZOLIN SODIUM 1 G/3ML
INJECTION, POWDER, FOR SOLUTION INTRAMUSCULAR; INTRAVENOUS
Status: DISCONTINUED | OUTPATIENT
Start: 2017-08-18 | End: 2017-08-19

## 2017-08-18 RX ORDER — SODIUM CHLORIDE 9 MG/ML
INJECTION, SOLUTION INTRAVENOUS
Status: DISCONTINUED | OUTPATIENT
Start: 2017-08-18 | End: 2017-08-21

## 2017-08-18 RX ORDER — ALBUTEROL SULFATE 0.83 MG/ML
10 SOLUTION RESPIRATORY (INHALATION) ONCE
Status: DISCONTINUED | OUTPATIENT
Start: 2017-08-18 | End: 2017-08-19

## 2017-08-18 RX ORDER — MIDAZOLAM HYDROCHLORIDE 1 MG/ML
INJECTION, SOLUTION INTRAMUSCULAR; INTRAVENOUS
Status: DISCONTINUED | OUTPATIENT
Start: 2017-08-18 | End: 2017-08-19

## 2017-08-18 RX ORDER — ACETAMINOPHEN 650 MG/20.3ML
650 LIQUID ORAL ONCE
Status: COMPLETED | OUTPATIENT
Start: 2017-08-18 | End: 2017-08-18

## 2017-08-18 RX ORDER — DIPHENHYDRAMINE HYDROCHLORIDE 50 MG/ML
50 INJECTION INTRAMUSCULAR; INTRAVENOUS ONCE
Status: COMPLETED | OUTPATIENT
Start: 2017-08-18 | End: 2017-08-18

## 2017-08-18 RX ORDER — HEPARIN SODIUM 5000 [USP'U]/ML
5000 INJECTION, SOLUTION INTRAVENOUS; SUBCUTANEOUS ONCE
Status: COMPLETED | OUTPATIENT
Start: 2017-08-18 | End: 2017-08-18

## 2017-08-18 RX ORDER — DEXTROSE 50 % IN WATER (D50W) INTRAVENOUS SYRINGE
Status: DISPENSED
Start: 2017-08-18 | End: 2017-08-19

## 2017-08-18 RX ORDER — FENTANYL CITRATE 50 UG/ML
INJECTION, SOLUTION INTRAMUSCULAR; INTRAVENOUS
Status: DISCONTINUED | OUTPATIENT
Start: 2017-08-18 | End: 2017-08-19

## 2017-08-18 RX ORDER — HEPARIN SODIUM 10000 [USP'U]/ML
INJECTION, SOLUTION INTRAVENOUS; SUBCUTANEOUS
Status: DISCONTINUED | OUTPATIENT
Start: 2017-08-18 | End: 2017-08-19

## 2017-08-18 RX ORDER — ROSUVASTATIN CALCIUM 20 MG/1
20 TABLET, COATED ORAL DAILY
COMMUNITY
End: 2017-09-22 | Stop reason: SDUPTHER

## 2017-08-18 RX ADMIN — CISATRACURIUM BESYLATE 4 MG: 10 INJECTION INTRAVENOUS at 11:08

## 2017-08-18 RX ADMIN — EPHEDRINE SULFATE 15 MG: 50 INJECTION, SOLUTION INTRAMUSCULAR; INTRAVENOUS; SUBCUTANEOUS at 09:08

## 2017-08-18 RX ADMIN — DESMOPRESSIN ACETATE 27.68 MCG: 4 SOLUTION INTRAVENOUS at 11:08

## 2017-08-18 RX ADMIN — METHYLPREDNISOLONE SODIUM SUCCINATE 500 MG: 125 INJECTION, POWDER, FOR SOLUTION INTRAMUSCULAR; INTRAVENOUS at 10:08

## 2017-08-18 RX ADMIN — CEFAZOLIN 2 G: 1 INJECTION, POWDER, FOR SOLUTION INTRAVENOUS at 10:08

## 2017-08-18 RX ADMIN — SODIUM CHLORIDE: 0.9 INJECTION, SOLUTION INTRAVENOUS at 09:08

## 2017-08-18 RX ADMIN — PROPOFOL 150 MG: 10 INJECTION, EMULSION INTRAVENOUS at 09:08

## 2017-08-18 RX ADMIN — ANTI-THYMOCYTE GLOBULIN (RABBIT) 100 MG: 5 INJECTION, POWDER, LYOPHILIZED, FOR SOLUTION INTRAVENOUS at 10:08

## 2017-08-18 RX ADMIN — EPHEDRINE SULFATE 5 MG: 50 INJECTION, SOLUTION INTRAMUSCULAR; INTRAVENOUS; SUBCUTANEOUS at 11:08

## 2017-08-18 RX ADMIN — DEXTROSE MONOHYDRATE 25 G: 25 INJECTION, SOLUTION INTRAVENOUS at 08:08

## 2017-08-18 RX ADMIN — MIDAZOLAM HYDROCHLORIDE 2 MG: 1 INJECTION, SOLUTION INTRAMUSCULAR; INTRAVENOUS at 09:08

## 2017-08-18 RX ADMIN — FENTANYL CITRATE 100 MCG: 50 INJECTION, SOLUTION INTRAMUSCULAR; INTRAVENOUS at 10:08

## 2017-08-18 RX ADMIN — INSULIN HUMAN 10 UNITS: 100 INJECTION, SOLUTION PARENTERAL at 08:08

## 2017-08-18 RX ADMIN — EPHEDRINE SULFATE 10 MG: 50 INJECTION, SOLUTION INTRAMUSCULAR; INTRAVENOUS; SUBCUTANEOUS at 11:08

## 2017-08-18 RX ADMIN — PHENYLEPHRINE HYDROCHLORIDE 200 MCG: 10 INJECTION INTRAVENOUS at 09:08

## 2017-08-18 RX ADMIN — LIDOCAINE HYDROCHLORIDE 100 MG: 20 INJECTION, SOLUTION INTRAVENOUS at 09:08

## 2017-08-18 RX ADMIN — ACETAMINOPHEN 650 MG: 650 SOLUTION ORAL at 10:08

## 2017-08-18 RX ADMIN — FENTANYL CITRATE 50 MCG: 50 INJECTION, SOLUTION INTRAMUSCULAR; INTRAVENOUS at 11:08

## 2017-08-18 RX ADMIN — EPHEDRINE SULFATE 10 MG: 50 INJECTION, SOLUTION INTRAMUSCULAR; INTRAVENOUS; SUBCUTANEOUS at 09:08

## 2017-08-18 RX ADMIN — CALCIUM CHLORIDE 500 MG: 100 INJECTION, SOLUTION INTRAVENOUS at 10:08

## 2017-08-18 RX ADMIN — HEPARIN SODIUM 5000 UNITS: 5000 INJECTION, SOLUTION INTRAVENOUS; SUBCUTANEOUS at 03:08

## 2017-08-18 RX ADMIN — PHENYLEPHRINE HYDROCHLORIDE 200 MCG: 10 INJECTION INTRAVENOUS at 11:08

## 2017-08-18 RX ADMIN — CALCIUM CHLORIDE 300 MG: 100 INJECTION, SOLUTION INTRAVENOUS at 11:08

## 2017-08-18 RX ADMIN — SODIUM CHLORIDE: 0.9 INJECTION, SOLUTION INTRAVENOUS at 10:08

## 2017-08-18 RX ADMIN — FENTANYL CITRATE 100 MCG: 50 INJECTION, SOLUTION INTRAMUSCULAR; INTRAVENOUS at 09:08

## 2017-08-18 RX ADMIN — DIPHENHYDRAMINE HYDROCHLORIDE 50 MG: 50 INJECTION, SOLUTION INTRAMUSCULAR; INTRAVENOUS at 10:08

## 2017-08-18 RX ADMIN — CISATRACURIUM BESYLATE 16 MG: 10 INJECTION INTRAVENOUS at 09:08

## 2017-08-18 RX ADMIN — CISATRACURIUM BESYLATE 4 MG: 10 INJECTION INTRAVENOUS at 10:08

## 2017-08-18 NOTE — HPI
37 y.o. 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   Required HD on POD 1 for hyperkalemia. US doppler revealed mildly elevated RIs but otherwise adequate flow to the kidney.  Repeat US post kidney POD #3 reviewed w increased arterial RIs to segmental and lobular arteries.  Will plan for repeat US on Thursday. Patient required repeat HD on POD #3.    Interval history: No acute events overnight. Pt went for HD yesterday 4L off. Plan for kidney biosy today. DSAs sent this AM. Drain fluid prior to removal concerning for chyle leak, triglycerides 328, will need to follow up with US in 2 weeks to assess for lymphocele.

## 2017-08-18 NOTE — PROGRESS NOTES
Admit Note     Met with patient, mother and cousin, Fe Winter of [place], [phone] to assess needs. Patient is a 37 y.o. single male, admitted for for kidney transplant.      Patient admitted from home on 8/18/2017 .  At this time, patient presents as alert and oriented x 4, pleasant, good eye contact, well groomed, recall good, concentration/judgement good, average intelligence, calm, communicative, cooperative and asking and answering questions appropriately.  At this time, patients caregivers present as alert and oriented x 4, pleasant, good eye contact, well groomed, recall good, concentration/judgement good, average intelligence, calm, communicative, cooperative and asking and answering questions appropriately.    Household/Family Systems     Patient resides alone.  Support system includes mother, cousins and friends.  Patient does not have dependents that are need of being cared for.     Patients primary caregiver is Randa Winter, mira mother, phone number [phone].  Confirmed patients contact information is [phone] (home);   Telephone Information:   Mobile [phone]   .    During admission, patient's caregiver plans to stay between home and hospital room.  Confirmed patient and patients caregivers do have access to reliable transportation.  Patient's cousin, Fe will be immediately post op caregiver as pt's mother works full time as a  and is caring for her mother who has Alzheimer's. She and pt's mother stated she will have access to transportation.  Other cousins and friends include:  Park Stiles ([place]) [phone] and Alma Rosa [place], [phone]    Cognitive Status/Learning     Patient reports reading ability as college and states patient does not have difficulty with N/A.  Patient reports patient learns best by multisensory means.   Needed: No.   Highest education level: Associate/Bachelor  Degree    Vocation/Disability   .  Working for Income: No  If no, reason not working: Disability    Patient is disabled due to ESRD since .  Prior to disability, patient  was employed as a .    Adherence     Patient reports a high level of adherence to patients health care regimen.  Adherence counseling and education provided. Patient verbalizes understanding.    Substance Use    Patient reports the following substance usage.    Tobacco: former smoker; 1/2 ppd.  Alcohol: none, patient denies any use.  Illicit Drugs/Non-prescribed Medications: pt reports past use of cocaine, ending at age 17 and Mj ending 2 yrs ago..  Patient states clear understanding of the potential impact of substance use.  Substance abstinence/cessation counseling, education and resources provided and reviewed.     Services Utilizing/ADLS    Infusion Service: Prior to admission, patient utilizing? no  Home Health: Prior to admission, patient utilizing? no  DME: Prior to admission, no  Pulmonary/Cardiac Rehab: Prior to admission, no  Dialysis:  Prior to admission, yes  Transplant Specialty Pharmacy:  Prior to admission, has not made choice at this time    Prior to admission, patient reports patient was independent with ADLS and was driving.  Patient reports patient is able to care for self at this time.  However he will require assistance post op.  Patient indicates a willingness to care for self once medically cleared to do so.    Insurance/Medications    Insured by   Payor/Plan Subscr  Sex Relation Sub. Ins. ID Effective Group Num   1. MEDICARE - ME* STEVEN MILLER 1980 Male  [id number]                                    [address]   2. GENERIC OUT O* STEVEN MILLER 1980 Male Self [id number]* 9/24/15                                    , [address], [place] [address]      Primary Insurance (for UNOS reporting): Public Insurance - Medicare FFS (Fee For Service)  Secondary Insurance (for UNOS  reporting): Public Insurance - Medicaid [place]    Patient reports patient is able to obtain and afford medications at this time and at time of discharge.    Living Will/Healthcare Power of     Patient states patient does not have a LW and/or HCPA.   provided education regarding LW and HCPA and the completion of forms.    Coping/Mental Health    Patient is coping well with the aid of  family members and sara.  Patient denies mental health difficulties.     Discharge Planning    At time of discharge, patient plans to return to [facility] apartments under the care of mother or cousins and friends.  Patients mother and family or friends will transport patient.  Per rounds today, expected discharge date has not been medically determined at this time. Patient and patients caregiver  verbalize understanding and are involved in treatment planning and discharge process.    Additional Concerns    Patient's caretaker denies additional needs and/or concerns at this time. Patient is being followed for needs, education, resources, information, emotional support, supportive counseling, and for supportive and skilled discharge plan of care.  remains available. Patient's caregiver verbalizes understanding and agreement with information reviewed,  availability and how to access available resources as needed. Patient denies additional needs and/or concerns at this time. Patient verbalizes understanding and agreement with information reviewed, social work availability, and how to access available resources as needed.

## 2017-08-18 NOTE — TELEPHONE ENCOUNTER
ON-CALL NOTE    OS# [id number]    Notified by Alberto Carlos, , that Demetrio Aguiar on list for cadaveric kidney.  Spoke with patient and identified no acute medical issues in telephone assessment.  No Current sample of blood is available for crossmatch, Pt has agreed to come in submit fresh sample. He will also remain here in the TSU waiting area.ED, TSU  Charge Nurse Lidia FONSECA and [facility] Supervisor Steven  all aware of this plan.  Patient reports no sensitizing event since last blood sample for PRA received.    Notified by Alberto Carlos that crossmatch is positive  but discussed results in Kidney Transplant Committee 8/18/17 at 8 am and was determined we would proceed with Transplant.  Patient notified of test results and verbalized understanding.  Dialysis unit notified.

## 2017-08-18 NOTE — ANESTHESIA PREPROCEDURE EVALUATION
"                                                                                                             08/18/2017  Pre-operative evaluation for Procedure(s) (LRB):  TRANSPLANT-KIDNEY (N/A)    Demetrio Aguiar is a 37 y.o. 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    LDA:   22G left forearm       Patient Active Problem List   Diagnosis    Organ transplant candidate    Pre-transplant evaluation for chronic kidney disease    HIV nephropathy    Hyperlipidemia    Anemia in ESRD (end-stage renal disease)    TB lung, latent    HIV (human immunodeficiency virus infection)    ESRD (end stage renal disease)       Review of patient's allergies indicates:  No Known Allergies     No current facility-administered medications on file prior to encounter.      Current Outpatient Prescriptions on File Prior to Encounter   Medication Sig Dispense Refill    aspirin (ECOTRIN) 81 MG EC tablet Take 81 mg by mouth once daily.      calcium acetate (PHOSLO) 667 mg capsule Take 2,668 mg by mouth 3 (three) times daily with meals.      SENSIPAR 60 mg Tab TAKE 2 TABLETS ( 120MG ) BY MOUTH DAILY WITH EVENING MEAL  99    fexofenadine (ALLEGRA) 60 MG tablet Take 60 mg by mouth once daily.  5       Past Surgical History:   Procedure Laterality Date    EXPLORATORY LAPAROTOMY W/ BOWEL RESECTION      NO BOWEL RESECTION, UNKNOWN DETAILS, "Pancreas Infection"    peritoneal dialysis catheter placement         Social History     Social History    Marital status: Single     Spouse name: N/A    Number of children: N/A    Years of education: N/A     Occupational History    Not on file.     Social History Main Topics    Smoking status: Former Smoker     Packs/day: 0.50    Smokeless tobacco: Not on file      Comment: quit smoking 5 years ago    Alcohol use No    Drug use:      Types: Cocaine, Marijuana      Comment: last use for cocaine at age 17; 2 years ago last use " for THC    Sexual activity: Not Currently     Other Topics Concern    Not on file     Social History Narrative    No narrative on file         Vital Signs Range (Last 24H):  Temp:  [36.6 °C (97.9 °F)-37.1 °C (98.7 °F)]   Pulse:  [77-82]   Resp:  [16-18]   BP: (103-119)/(59-74)   SpO2:  [98 %-100 %]       CBC:   Recent Labs      08/18/17   1439   WBC  5.24   RBC  3.76*   HGB  11.2*   HCT  34.9*   PLT  195   MCV  93   MCH  29.8   MCHC  32.1       CMP:   Recent Labs      08/18/17   1439   NA  139   K  5.6*   CL  97   CO2  32*   BUN  32*   CREATININE  9.4*   GLU  87   PHOS  2.6*   CALCIUM  8.9   ALBUMIN  3.9   PROT  8.8*   ALKPHOS  678*   ALT  15   AST  21   BILITOT  0.4       INR  Recent Labs      08/18/17   1439   INR  1.0   APTT  23.8           Diagnostic Studies:      EKG 8/18/17:  Normal sinus rhythm  Incomplete right bundle branch block  Borderline Abnormal ECG  No previous ECGs available      Anesthesia Evaluation    I have reviewed the Patient Summary Reports.    I have reviewed the Nursing Notes.   I have reviewed the Medications.     Review of Systems  Anesthesia Hx:  No problems with previous Anesthesia  History of prior surgery of interest to airway management or planning: Denies Family Hx of Anesthesia complications.   Denies Personal Hx of Anesthesia complications.   Hematology/Oncology:     Oncology Normal    -- Anemia: Hematology Comments: HIV    EENT/Dental:EENT/Dental Normal   Cardiovascular:  Cardiovascular Normal  Denies MI.    Denies Angina. Walks 4 miles without difficulty   Pulmonary:  Pulmonary Normal    Renal/:   Chronic Renal Disease, ESRD, Dialysis    Hepatic/GI:  Hepatic/GI Normal    Musculoskeletal:  Musculoskeletal Normal    Neurological:  Neurology Normal    Endocrine:  Endocrine Normal    Psych:  Psychiatric Normal           Physical Exam  General:  Well nourished    Airway/Jaw/Neck:  Airway Findings: Mouth Opening: Normal Tongue: Normal  General Airway Assessment: Adult  Mallampati:  III  Improves to III with phonation.  TM Distance: Normal, at least 6 cm     Eyes/Ears/Nose:  EYES/EARS/NOSE FINDINGS: Normal   Dental:  DENTAL FINDINGS: Normal   Chest/Lungs:  Chest/Lungs Clear    Heart/Vascular:  Heart Findings: Normal       Mental Status:  Mental Status Findings: Normal        Anesthesia Plan  Type of Anesthesia, risks & benefits discussed:  Anesthesia Type:  general  Patient's Preference:   Intra-op Monitoring Plan: arterial line, central line and standard ASA monitors  Intra-op Monitoring Plan Comments:   Post Op Pain Control Plan:   Post Op Pain Control Plan Comments:   Induction:   IV  Beta Blocker:  Patient is not currently on a Beta-Blocker (No further documentation required).       Informed Consent: Patient understands risks and agrees with Anesthesia plan.  Questions answered. Anesthesia consent signed with patient.  ASA Score: 4     Day of Surgery Review of History & Physical: I have interviewed and examined the patient. I have reviewed the patient's H&P dated:            Ready For Surgery From Anesthesia Perspective.

## 2017-08-18 NOTE — H&P
"[facility]  Kidney Transplant  H&P      Subjective:     Chief Complaint/Reason for Admission: kidney transplant surgery    History of Present Illness: Demetrio Aguiar is a 37 y.o. 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    Dialysis History: started since 4/2004 dialyzing on TTS schedule. He is anuric. Patient reports that he is tolerating dialysis well through his Left thigh AVF.   Date of Last Dialysis: 8/17/17    Native urine output per day: anuric    Previous Transplant: no    PTA Medications   Medication Sig    aspirin (ECOTRIN) 81 MG EC tablet Take 81 mg by mouth once daily.    calcium acetate (PHOSLO) 667 mg capsule Take 2,668 mg by mouth 3 (three) times daily with meals.    efavirenz (SUSTIVA) 600 mg Tab Take 600 mg by mouth every evening.    emtricitabine (EMTRIVA) 200 mg Cap Take 200 mg by mouth twice a week. Tues and Sat    fexofenadine (ALLEGRA) 60 MG tablet Take 60 mg by mouth once daily.    niacin 250 MG Tab Take 250 mg by mouth nightly.    SENSIPAR 60 mg Tab TAKE 2 TABLETS ( 120MG ) BY MOUTH DAILY WITH EVENING MEAL    tenofovir (VIREAD) 300 mg Tab Take 300 mg by mouth once a week. Saturday       Review of patient's allergies indicates:  No Known Allergies    Past Medical History:   Diagnosis Date    Anemia     HIV infection     HIV nephropathy     Hyperlipidemia     TB lung, latent     tx INH 2006     Past Surgical History:   Procedure Laterality Date    EXPLORATORY LAPAROTOMY W/ BOWEL RESECTION      NO BOWEL RESECTION, UNKNOWN DETAILS, "Pancreas Infection"    peritoneal dialysis catheter placement       Family History     Problem Relation (Age of Onset)    Cancer Father    Diabetes Maternal Aunt, Maternal Uncle    Hypertension Maternal Grandmother, Paternal Grandmother    Lung cancer Father    No Known Problems Mother, Brother        Social History Main Topics    Smoking status: Former Smoker     " "Packs/day: 0.50    Smokeless tobacco: Not on file      Comment: quit smoking 5 years ago    Alcohol use No    Drug use:      Types: Cocaine, Marijuana      Comment: last use for cocaine at age 17; 2 years ago last use for THC    Sexual activity: Not Currently        Review of Systems   Constitutional: Negative for appetite change, chills, fatigue and fever.   Respiratory: Negative for cough, chest tightness and shortness of breath.    Cardiovascular: Negative for chest pain, palpitations and leg swelling.   Gastrointestinal: Negative for abdominal distention, abdominal pain, constipation, diarrhea, nausea and vomiting.   Genitourinary: Positive for decreased urine volume. Negative for difficulty urinating, dysuria, frequency and urgency.   Musculoskeletal: Negative for back pain and neck pain.   Skin: Negative for color change, pallor, rash and wound.   Allergic/Immunologic: Positive for immunocompromised state.   Neurological: Negative for dizziness, weakness and headaches.   Psychiatric/Behavioral: Negative for confusion and sleep disturbance.   All other systems reviewed and are negative.    Objective:     Vital Signs (Most Recent):  Temp: 98.3 °F (36.8 °C) (08/18/17 1345)  Pulse: 82 (08/18/17 1345)  Resp: 16 (08/18/17 1345)  BP: 119/74 (08/18/17 1345)  SpO2: 98 % (08/18/17 1345)  Height: 5' 8" (172.7 cm)  Weight: 69.2 kg (152 lb 8 oz)  Body mass index is 23.19 kg/m².     Physical Exam   Constitutional: He is oriented to person, place, and time. He appears well-nourished.   Cardiovascular: Normal rate, regular rhythm, normal heart sounds, intact distal pulses and normal pulses.    No murmur heard.  Pulmonary/Chest: Effort normal and breath sounds normal. No respiratory distress. He has no wheezes. He has no rales.   Abdominal: Soft. Bowel sounds are normal. He exhibits no distension. There is no tenderness. There is no rebound and no guarding.   Genitourinary:   Genitourinary Comments: Anuric  Left thigh AVF " +/+   Musculoskeletal: Normal range of motion. He exhibits no edema or tenderness.   Neurological: He is alert and oriented to person, place, and time.   Skin: Skin is warm, dry and intact. No rash noted. No erythema. No pallor.   Psychiatric: He has a normal mood and affect. His speech is normal and behavior is normal.   Nursing note and vitals reviewed.      Laboratory  CBC: No results for input(s): WBC, RBC, HGB, HCT, PLT, MCV, MCH, MCHC in the last 168 hours.  CMP: No results for input(s): GLU, CALCIUM, ALBUMIN, PROT, NA, K, CO2, CL, BUN, CREATININE, ALKPHOS, ALT, AST, BILITOT in the last 168 hours.  Coagulation: No results for input(s): LABPROT, INR, APTT in the last 168 hours.    Diagnostic Results:  Labs: Reviewed  ECG: Reviewed  X-Ray: Reviewed    Assessment/Plan:     No notes have been filed under this hospital service.  Service: Transplant Kidney    Plan:  NPO   EKG. CXR, labs pending   Standard criteria donor  Surgical consents to be obtained by resident  All questions answered     The patient presents for kidney transplant.  There are no apparent contraindications to proceeding with the planned transplant.  The patient understands that the transplant could potentially be cancelled pending detailed assessment of the donor organ.  He will receive Thymoglobulin induction due to HIV infection.  A complete discussion of the transplant procedure, including risks, complications, and alternatives, as well as any donor-specific risk factors requiring specific disclosure, will be carried out by the responsible staff surgeon prior to the procedure.     Maribel Gutierrez DNP  Kidney Transplant  [facility]

## 2017-08-18 NOTE — SUBJECTIVE & OBJECTIVE
"  Subjective:     Chief Complaint/Reason for Admission: kidney transplant surgery    History of Present Illness: Demetrio Aguiar is a 37 y.o. 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    Dialysis History: started since 4/2004 dialyzing on TTS schedule. He is anuric. Patient reports that he is tolerating dialysis well through his Left thigh AVF.   Date of Last Dialysis: 8/17/17    Native urine output per day: anuric    Previous Transplant: no    PTA Medications   Medication Sig    aspirin (ECOTRIN) 81 MG EC tablet Take 81 mg by mouth once daily.    calcium acetate (PHOSLO) 667 mg capsule Take 2,668 mg by mouth 3 (three) times daily with meals.    efavirenz (SUSTIVA) 600 mg Tab Take 600 mg by mouth every evening.    emtricitabine (EMTRIVA) 200 mg Cap Take 200 mg by mouth twice a week. Tues and Sat    fexofenadine (ALLEGRA) 60 MG tablet Take 60 mg by mouth once daily.    niacin 250 MG Tab Take 250 mg by mouth nightly.    SENSIPAR 60 mg Tab TAKE 2 TABLETS ( 120MG ) BY MOUTH DAILY WITH EVENING MEAL    tenofovir (VIREAD) 300 mg Tab Take 300 mg by mouth once a week. Saturday       Review of patient's allergies indicates:  No Known Allergies    Past Medical History:   Diagnosis Date    Anemia     HIV infection     HIV nephropathy     Hyperlipidemia     TB lung, latent     tx INH 2006     Past Surgical History:   Procedure Laterality Date    EXPLORATORY LAPAROTOMY W/ BOWEL RESECTION      NO BOWEL RESECTION, UNKNOWN DETAILS, "Pancreas Infection"    peritoneal dialysis catheter placement       Family History     Problem Relation (Age of Onset)    Cancer Father    Diabetes Maternal Aunt, Maternal Uncle    Hypertension Maternal Grandmother, Paternal Grandmother    Lung cancer Father    No Known Problems Mother, Brother        Social History Main Topics    Smoking status: Former Smoker     Packs/day: 0.50    Smokeless tobacco: Not on file      " "Comment: quit smoking 5 years ago    Alcohol use No    Drug use:      Types: Cocaine, Marijuana      Comment: last use for cocaine at age 17; 2 years ago last use for THC    Sexual activity: Not Currently        Review of Systems   Constitutional: Negative for appetite change, chills, fatigue and fever.   Respiratory: Negative for cough, chest tightness and shortness of breath.    Cardiovascular: Negative for chest pain, palpitations and leg swelling.   Gastrointestinal: Negative for abdominal distention, abdominal pain, constipation, diarrhea, nausea and vomiting.   Genitourinary: Positive for decreased urine volume. Negative for difficulty urinating, dysuria, frequency and urgency.   Musculoskeletal: Negative for back pain and neck pain.   Skin: Negative for color change, pallor, rash and wound.   Allergic/Immunologic: Positive for immunocompromised state.   Neurological: Negative for dizziness, weakness and headaches.   Psychiatric/Behavioral: Negative for confusion and sleep disturbance.   All other systems reviewed and are negative.    Objective:     Vital Signs (Most Recent):  Temp: 98.3 °F (36.8 °C) (08/18/17 1345)  Pulse: 82 (08/18/17 1345)  Resp: 16 (08/18/17 1345)  BP: 119/74 (08/18/17 1345)  SpO2: 98 % (08/18/17 1345)  Height: 5' 8" (172.7 cm)  Weight: 69.2 kg (152 lb 8 oz)  Body mass index is 23.19 kg/m².     Physical Exam   Constitutional: He is oriented to person, place, and time. He appears well-nourished.   Cardiovascular: Normal rate, regular rhythm, normal heart sounds, intact distal pulses and normal pulses.    No murmur heard.  Pulmonary/Chest: Effort normal and breath sounds normal. No respiratory distress. He has no wheezes. He has no rales.   Abdominal: Soft. Bowel sounds are normal. He exhibits no distension. There is no tenderness. There is no rebound and no guarding.   Genitourinary:   Genitourinary Comments: Anuric  Left thigh AVF +/+   Musculoskeletal: Normal range of motion. He " exhibits no edema or tenderness.   Neurological: He is alert and oriented to person, place, and time.   Skin: Skin is warm, dry and intact. No rash noted. No erythema. No pallor.   Psychiatric: He has a normal mood and affect. His speech is normal and behavior is normal.   Nursing note and vitals reviewed.      Laboratory  CBC: No results for input(s): WBC, RBC, HGB, HCT, PLT, MCV, MCH, MCHC in the last 168 hours.  CMP: No results for input(s): GLU, CALCIUM, ALBUMIN, PROT, NA, K, CO2, CL, BUN, CREATININE, ALKPHOS, ALT, AST, BILITOT in the last 168 hours.  Coagulation: No results for input(s): LABPROT, INR, APTT in the last 168 hours.    Diagnostic Results:  Labs: Reviewed  ECG: Reviewed  X-Ray: Reviewed

## 2017-08-19 PROBLEM — Z94.9 TRANSPLANT: Status: ACTIVE | Noted: 2017-08-19

## 2017-08-19 PROBLEM — Z94.9 TRANSPLANT: Status: RESOLVED | Noted: 2017-08-19 | Resolved: 2017-08-19

## 2017-08-19 LAB
ALBUMIN SERPL BCP-MCNC: 2.8 G/DL
ALBUMIN SERPL BCP-MCNC: 2.9 G/DL
ALBUMIN SERPL BCP-MCNC: 3 G/DL
ALP SERPL-CCNC: 493 U/L
ALP SERPL-CCNC: 499 U/L
ALT SERPL W/O P-5'-P-CCNC: 11 U/L
ALT SERPL W/O P-5'-P-CCNC: 6 U/L
ANION GAP SERPL CALC-SCNC: 10 MMOL/L
ANION GAP SERPL CALC-SCNC: 11 MMOL/L
ANION GAP SERPL CALC-SCNC: 8 MMOL/L
ANISOCYTOSIS BLD QL SMEAR: SLIGHT
AST SERPL-CCNC: 26 U/L
AST SERPL-CCNC: 27 U/L
BASOPHILS # BLD AUTO: 0.01 K/UL
BASOPHILS # BLD AUTO: ABNORMAL K/UL
BASOPHILS NFR BLD: 0 %
BASOPHILS NFR BLD: 0.2 %
BILIRUB SERPL-MCNC: 0.2 MG/DL
BILIRUB SERPL-MCNC: 0.2 MG/DL
BUN SERPL-MCNC: 21 MG/DL
BUN SERPL-MCNC: 37 MG/DL
BUN SERPL-MCNC: 40 MG/DL
CALCIUM SERPL-MCNC: 8 MG/DL
CALCIUM SERPL-MCNC: 8.1 MG/DL
CALCIUM SERPL-MCNC: 8.2 MG/DL
CHLORIDE SERPL-SCNC: 104 MMOL/L
CHLORIDE SERPL-SCNC: 105 MMOL/L
CHLORIDE SERPL-SCNC: 95 MMOL/L
CO2 SERPL-SCNC: 20 MMOL/L
CO2 SERPL-SCNC: 20 MMOL/L
CO2 SERPL-SCNC: 29 MMOL/L
CREAT SERPL-MCNC: 10.3 MG/DL
CREAT SERPL-MCNC: 6 MG/DL
CREAT SERPL-MCNC: 9.5 MG/DL
DIFFERENTIAL METHOD: ABNORMAL
DIFFERENTIAL METHOD: ABNORMAL
EOSINOPHIL # BLD AUTO: 0.1 K/UL
EOSINOPHIL # BLD AUTO: ABNORMAL K/UL
EOSINOPHIL NFR BLD: 0 %
EOSINOPHIL NFR BLD: 1.9 %
ERYTHROCYTE [DISTWIDTH] IN BLOOD BY AUTOMATED COUNT: 17.2 %
ERYTHROCYTE [DISTWIDTH] IN BLOOD BY AUTOMATED COUNT: 17.2 %
EST. GFR  (AFRICAN AMERICAN): 12.6 ML/MIN/1.73 M^2
EST. GFR  (AFRICAN AMERICAN): 6.6 ML/MIN/1.73 M^2
EST. GFR  (AFRICAN AMERICAN): 7.2 ML/MIN/1.73 M^2
EST. GFR  (NON AFRICAN AMERICAN): 10.9 ML/MIN/1.73 M^2
EST. GFR  (NON AFRICAN AMERICAN): 5.7 ML/MIN/1.73 M^2
EST. GFR  (NON AFRICAN AMERICAN): 6.3 ML/MIN/1.73 M^2
FERRITIN SERPL-MCNC: 2477 NG/ML
GLUCOSE SERPL-MCNC: 120 MG/DL (ref 70–110)
GLUCOSE SERPL-MCNC: 147 MG/DL
GLUCOSE SERPL-MCNC: 148 MG/DL (ref 70–110)
GLUCOSE SERPL-MCNC: 157 MG/DL
GLUCOSE SERPL-MCNC: 165 MG/DL
GLUCOSE SERPL-MCNC: 80 MG/DL (ref 70–110)
HCO3 UR-SCNC: 23 MMOL/L (ref 24–28)
HCO3 UR-SCNC: 24.3 MMOL/L (ref 24–28)
HCO3 UR-SCNC: 25.6 MMOL/L (ref 24–28)
HCT VFR BLD AUTO: 26.2 %
HCT VFR BLD AUTO: 26.8 %
HCT VFR BLD AUTO: 27.4 %
HCT VFR BLD CALC: 25 %PCV (ref 36–54)
HCT VFR BLD CALC: 27 %PCV (ref 36–54)
HCT VFR BLD CALC: 27 %PCV (ref 36–54)
HGB BLD-MCNC: 8.6 G/DL
HGB BLD-MCNC: 8.7 G/DL
IRON SERPL-MCNC: 12 UG/DL
LYMPHOCYTES # BLD AUTO: 0.1 K/UL
LYMPHOCYTES # BLD AUTO: ABNORMAL K/UL
LYMPHOCYTES NFR BLD: 1 %
LYMPHOCYTES NFR BLD: 1.4 %
MAGNESIUM SERPL-MCNC: 2.5 MG/DL
MCH RBC QN AUTO: 29.6 PG
MCH RBC QN AUTO: 30.6 PG
MCHC RBC AUTO-ENTMCNC: 32.1 G/DL
MCHC RBC AUTO-ENTMCNC: 33.2 G/DL
MCV RBC AUTO: 92 FL
MCV RBC AUTO: 92 FL
MONOCYTES # BLD AUTO: 0.3 K/UL
MONOCYTES # BLD AUTO: ABNORMAL K/UL
MONOCYTES NFR BLD: 0 %
MONOCYTES NFR BLD: 4.9 %
NEUTROPHILS # BLD AUTO: 5.4 K/UL
NEUTROPHILS NFR BLD: 91.4 %
NEUTROPHILS NFR BLD: 97 %
NEUTS BAND NFR BLD MANUAL: 2 %
OVALOCYTES BLD QL SMEAR: ABNORMAL
PCO2 BLDA: 34.9 MMHG (ref 35–45)
PCO2 BLDA: 35.7 MMHG (ref 35–45)
PCO2 BLDA: 38.6 MMHG (ref 35–45)
PH SMN: 7.38 [PH] (ref 7.35–7.45)
PH SMN: 7.45 [PH] (ref 7.35–7.45)
PH SMN: 7.46 [PH] (ref 7.35–7.45)
PHOSPHATE SERPL-MCNC: 1.7 MG/DL
PHOSPHATE SERPL-MCNC: 2.1 MG/DL
PLATELET # BLD AUTO: 135 K/UL
PLATELET # BLD AUTO: 141 K/UL
PMV BLD AUTO: 9.6 FL
PMV BLD AUTO: 9.7 FL
PO2 BLDA: 106 MMHG (ref 80–100)
PO2 BLDA: 106 MMHG (ref 80–100)
PO2 BLDA: 134 MMHG (ref 80–100)
POC BE: -2 MMOL/L
POC BE: 0 MMOL/L
POC BE: 2 MMOL/L
POC IONIZED CALCIUM: 1.06 MMOL/L (ref 1.06–1.42)
POC IONIZED CALCIUM: 1.09 MMOL/L (ref 1.06–1.42)
POC IONIZED CALCIUM: 1.23 MMOL/L (ref 1.06–1.42)
POC SATURATED O2: 98 % (ref 95–100)
POC SATURATED O2: 98 % (ref 95–100)
POC SATURATED O2: 99 % (ref 95–100)
POC TCO2: 24 MMOL/L (ref 23–27)
POC TCO2: 25 MMOL/L (ref 23–27)
POC TCO2: 27 MMOL/L (ref 23–27)
POCT GLUCOSE: 140 MG/DL (ref 70–110)
POCT GLUCOSE: 184 MG/DL (ref 70–110)
POIKILOCYTOSIS BLD QL SMEAR: SLIGHT
POLYCHROMASIA BLD QL SMEAR: ABNORMAL
POTASSIUM BLD-SCNC: 3.9 MMOL/L (ref 3.5–5.1)
POTASSIUM BLD-SCNC: 4.1 MMOL/L (ref 3.5–5.1)
POTASSIUM BLD-SCNC: 4.1 MMOL/L (ref 3.5–5.1)
POTASSIUM SERPL-SCNC: 4.6 MMOL/L
POTASSIUM SERPL-SCNC: 4.7 MMOL/L
POTASSIUM SERPL-SCNC: 7.6 MMOL/L
PROT SERPL-MCNC: 6.3 G/DL
PROT SERPL-MCNC: 6.3 G/DL
RBC # BLD AUTO: 2.84 M/UL
RBC # BLD AUTO: 2.91 M/UL
SAMPLE: ABNORMAL
SATURATED IRON: 8 %
SODIUM BLD-SCNC: 135 MMOL/L (ref 136–145)
SODIUM BLD-SCNC: 138 MMOL/L (ref 136–145)
SODIUM BLD-SCNC: 140 MMOL/L (ref 136–145)
SODIUM SERPL-SCNC: 132 MMOL/L
SODIUM SERPL-SCNC: 134 MMOL/L
SODIUM SERPL-SCNC: 136 MMOL/L
TACROLIMUS BLD-MCNC: <1.5 NG/ML
TOTAL IRON BINDING CAPACITY: 148 UG/DL
TRANSFERRIN SERPL-MCNC: 100 MG/DL
WBC # BLD AUTO: 5.88 K/UL
WBC # BLD AUTO: 8.36 K/UL

## 2017-08-19 PROCEDURE — 63600175 PHARM REV CODE 636 W HCPCS: Performed by: TRANSPLANT SURGERY

## 2017-08-19 PROCEDURE — 87536 HIV-1 QUANT&REVRSE TRNSCRPJ: CPT

## 2017-08-19 PROCEDURE — 25000003 PHARM REV CODE 250: Mod: NTX | Performed by: INTERNAL MEDICINE

## 2017-08-19 PROCEDURE — 25000003 PHARM REV CODE 250: Performed by: TRANSPLANT SURGERY

## 2017-08-19 PROCEDURE — 80197 ASSAY OF TACROLIMUS: CPT

## 2017-08-19 PROCEDURE — 80100014 HC HEMODIALYSIS 1:1

## 2017-08-19 PROCEDURE — 63600175 PHARM REV CODE 636 W HCPCS: Performed by: ANESTHESIOLOGY

## 2017-08-19 PROCEDURE — 63600175 PHARM REV CODE 636 W HCPCS: Performed by: STUDENT IN AN ORGANIZED HEALTH CARE EDUCATION/TRAINING PROGRAM

## 2017-08-19 PROCEDURE — 25000003 PHARM REV CODE 250: Performed by: STUDENT IN AN ORGANIZED HEALTH CARE EDUCATION/TRAINING PROGRAM

## 2017-08-19 PROCEDURE — 85014 HEMATOCRIT: CPT

## 2017-08-19 PROCEDURE — 20600001 HC STEP DOWN PRIVATE ROOM

## 2017-08-19 PROCEDURE — 83735 ASSAY OF MAGNESIUM: CPT

## 2017-08-19 PROCEDURE — 82728 ASSAY OF FERRITIN: CPT

## 2017-08-19 PROCEDURE — 63600175 PHARM REV CODE 636 W HCPCS: Performed by: NURSE ANESTHETIST, CERTIFIED REGISTERED

## 2017-08-19 PROCEDURE — S5010 5% DEXTROSE AND 0.45% SALINE: HCPCS | Performed by: STUDENT IN AN ORGANIZED HEALTH CARE EDUCATION/TRAINING PROGRAM

## 2017-08-19 PROCEDURE — 63600175 PHARM REV CODE 636 W HCPCS: Performed by: NURSE PRACTITIONER

## 2017-08-19 PROCEDURE — 0TY00Z0 TRANSPLANTATION OF RIGHT KIDNEY, ALLOGENEIC, OPEN APPROACH: ICD-10-PCS | Performed by: TRANSPLANT SURGERY

## 2017-08-19 PROCEDURE — 80053 COMPREHEN METABOLIC PANEL: CPT | Mod: 91

## 2017-08-19 PROCEDURE — 25000003 PHARM REV CODE 250: Performed by: NURSE ANESTHETIST, CERTIFIED REGISTERED

## 2017-08-19 PROCEDURE — 63600175 PHARM REV CODE 636 W HCPCS: Performed by: INTERNAL MEDICINE

## 2017-08-19 PROCEDURE — 85027 COMPLETE CBC AUTOMATED: CPT

## 2017-08-19 PROCEDURE — 85025 COMPLETE CBC W/AUTO DIFF WBC: CPT

## 2017-08-19 PROCEDURE — 85007 BL SMEAR W/DIFF WBC COUNT: CPT

## 2017-08-19 PROCEDURE — 80100020 *HC HEMODIALYSIS 1:1 - ARF

## 2017-08-19 PROCEDURE — 80069 RENAL FUNCTION PANEL: CPT

## 2017-08-19 PROCEDURE — 84100 ASSAY OF PHOSPHORUS: CPT

## 2017-08-19 PROCEDURE — P9045 ALBUMIN (HUMAN), 5%, 250 ML: HCPCS | Performed by: STUDENT IN AN ORGANIZED HEALTH CARE EDUCATION/TRAINING PROGRAM

## 2017-08-19 PROCEDURE — 99233 SBSQ HOSP IP/OBS HIGH 50: CPT | Mod: ,,, | Performed by: INTERNAL MEDICINE

## 2017-08-19 PROCEDURE — 36415 COLL VENOUS BLD VENIPUNCTURE: CPT

## 2017-08-19 PROCEDURE — 99223 1ST HOSP IP/OBS HIGH 75: CPT | Mod: ,,, | Performed by: INTERNAL MEDICINE

## 2017-08-19 PROCEDURE — 97802 MEDICAL NUTRITION INDIV IN: CPT

## 2017-08-19 PROCEDURE — 25000003 PHARM REV CODE 250: Performed by: INTERNAL MEDICINE

## 2017-08-19 PROCEDURE — 80053 COMPREHEN METABOLIC PANEL: CPT

## 2017-08-19 PROCEDURE — 83540 ASSAY OF IRON: CPT

## 2017-08-19 PROCEDURE — 63600175 PHARM REV CODE 636 W HCPCS

## 2017-08-19 RX ORDER — IBUPROFEN 200 MG
16 TABLET ORAL
Status: DISCONTINUED | OUTPATIENT
Start: 2017-08-19 | End: 2017-08-26 | Stop reason: HOSPADM

## 2017-08-19 RX ORDER — TACROLIMUS 0.5 MG/1
0.5 CAPSULE ORAL ONCE
Status: COMPLETED | OUTPATIENT
Start: 2017-08-19 | End: 2017-08-19

## 2017-08-19 RX ORDER — ACETAMINOPHEN 160 MG/5ML
650 SOLUTION ORAL ONCE AS NEEDED
Status: DISPENSED | OUTPATIENT
Start: 2017-08-19 | End: 2017-08-19

## 2017-08-19 RX ORDER — TACROLIMUS 1 MG/1
2 CAPSULE ORAL 2 TIMES DAILY
Status: DISCONTINUED | OUTPATIENT
Start: 2017-08-19 | End: 2017-08-19

## 2017-08-19 RX ORDER — ACETAMINOPHEN 325 MG/1
650 TABLET ORAL
Status: COMPLETED | OUTPATIENT
Start: 2017-08-20 | End: 2017-08-21

## 2017-08-19 RX ORDER — ALBUMIN HUMAN 250 G/1000ML
12.5 SOLUTION INTRAVENOUS
Status: DISCONTINUED | OUTPATIENT
Start: 2017-08-19 | End: 2017-08-26 | Stop reason: HOSPADM

## 2017-08-19 RX ORDER — CEFAZOLIN SODIUM 2 G/50ML
2 SOLUTION INTRAVENOUS
Status: COMPLETED | OUTPATIENT
Start: 2017-08-19 | End: 2017-08-19

## 2017-08-19 RX ORDER — HYDROMORPHONE HYDROCHLORIDE 1 MG/ML
0.2 INJECTION, SOLUTION INTRAMUSCULAR; INTRAVENOUS; SUBCUTANEOUS EVERY 5 MIN PRN
Status: DISCONTINUED | OUTPATIENT
Start: 2017-08-19 | End: 2017-08-19 | Stop reason: HOSPADM

## 2017-08-19 RX ORDER — MYCOPHENOLATE MOFETIL 250 MG/1
1000 CAPSULE ORAL 2 TIMES DAILY
Status: DISCONTINUED | OUTPATIENT
Start: 2017-08-19 | End: 2017-08-26 | Stop reason: HOSPADM

## 2017-08-19 RX ORDER — PAPAVERINE HYDROCHLORIDE 30 MG/ML
INJECTION INTRAMUSCULAR; INTRAVENOUS
Status: DISCONTINUED | OUTPATIENT
Start: 2017-08-19 | End: 2017-08-19

## 2017-08-19 RX ORDER — BISACODYL 5 MG
10 TABLET, DELAYED RELEASE (ENTERIC COATED) ORAL NIGHTLY
Status: DISCONTINUED | OUTPATIENT
Start: 2017-08-19 | End: 2017-08-24

## 2017-08-19 RX ORDER — FUROSEMIDE 10 MG/ML
60 INJECTION INTRAMUSCULAR; INTRAVENOUS ONCE
Status: COMPLETED | OUTPATIENT
Start: 2017-08-19 | End: 2017-08-19

## 2017-08-19 RX ORDER — HYDROMORPHONE HCL IN 0.9% NACL 6 MG/30 ML
PATIENT CONTROLLED ANALGESIA SYRINGE INTRAVENOUS
Status: COMPLETED
Start: 2017-08-19 | End: 2017-08-19

## 2017-08-19 RX ORDER — IBUPROFEN 200 MG
24 TABLET ORAL
Status: DISCONTINUED | OUTPATIENT
Start: 2017-08-19 | End: 2017-08-26 | Stop reason: HOSPADM

## 2017-08-19 RX ORDER — SODIUM CHLORIDE 9 MG/ML
INJECTION, SOLUTION INTRAVENOUS
Status: DISCONTINUED | OUTPATIENT
Start: 2017-08-19 | End: 2017-08-21

## 2017-08-19 RX ORDER — METHYLPREDNISOLONE SOD SUCC 125 MG
125 VIAL (EA) INJECTION ONCE
Status: COMPLETED | OUTPATIENT
Start: 2017-08-21 | End: 2017-08-21

## 2017-08-19 RX ORDER — DEXTROSE MONOHYDRATE AND SODIUM CHLORIDE 5; .45 G/100ML; G/100ML
INJECTION, SOLUTION INTRAVENOUS CONTINUOUS
Status: DISCONTINUED | OUTPATIENT
Start: 2017-08-19 | End: 2017-08-21

## 2017-08-19 RX ORDER — VERAPAMIL HYDROCHLORIDE 2.5 MG/ML
INJECTION, SOLUTION INTRAVENOUS
Status: DISCONTINUED | OUTPATIENT
Start: 2017-08-19 | End: 2017-08-19

## 2017-08-19 RX ORDER — NYSTATIN 100000 [USP'U]/ML
500000 SUSPENSION ORAL
Status: DISCONTINUED | OUTPATIENT
Start: 2017-08-19 | End: 2017-08-26 | Stop reason: HOSPADM

## 2017-08-19 RX ORDER — EPINEPHRINE 1 MG/ML
1 INJECTION, SOLUTION INTRACARDIAC; INTRAMUSCULAR; INTRAVENOUS; SUBCUTANEOUS ONCE AS NEEDED
Status: ACTIVE | OUTPATIENT
Start: 2017-08-19 | End: 2017-08-19

## 2017-08-19 RX ORDER — NALOXONE HCL 0.4 MG/ML
0.02 VIAL (ML) INJECTION
Status: DISCONTINUED | OUTPATIENT
Start: 2017-08-19 | End: 2017-08-26 | Stop reason: HOSPADM

## 2017-08-19 RX ORDER — HYDROMORPHONE HCL IN 0.9% NACL 6 MG/30 ML
PATIENT CONTROLLED ANALGESIA SYRINGE INTRAVENOUS CONTINUOUS
Status: DISCONTINUED | OUTPATIENT
Start: 2017-08-19 | End: 2017-08-20

## 2017-08-19 RX ORDER — SODIUM CHLORIDE 9 MG/ML
INJECTION, SOLUTION INTRAVENOUS CONTINUOUS
Status: DISCONTINUED | OUTPATIENT
Start: 2017-08-19 | End: 2017-08-20

## 2017-08-19 RX ORDER — POSACONAZOLE 100 MG/1
300 TABLET, DELAYED RELEASE ORAL DAILY
Status: CANCELLED | OUTPATIENT
Start: 2017-08-20

## 2017-08-19 RX ORDER — MANNITOL 250 MG/ML
INJECTION, SOLUTION INTRAVENOUS
Status: DISCONTINUED | OUTPATIENT
Start: 2017-08-19 | End: 2017-08-19

## 2017-08-19 RX ORDER — SULFAMETHOXAZOLE AND TRIMETHOPRIM 400; 80 MG/1; MG/1
1 TABLET ORAL EVERY MORNING
Status: DISCONTINUED | OUTPATIENT
Start: 2017-08-19 | End: 2017-08-20

## 2017-08-19 RX ORDER — CEFAZOLIN SODIUM 2 G/50ML
2 SOLUTION INTRAVENOUS
Status: DISCONTINUED | OUTPATIENT
Start: 2017-08-19 | End: 2017-08-19

## 2017-08-19 RX ORDER — INSULIN ASPART 100 [IU]/ML
0-5 INJECTION, SOLUTION INTRAVENOUS; SUBCUTANEOUS
Status: DISCONTINUED | OUTPATIENT
Start: 2017-08-19 | End: 2017-08-26 | Stop reason: HOSPADM

## 2017-08-19 RX ORDER — GLUCAGON 1 MG
1 KIT INJECTION CONTINUOUS PRN
Status: DISCONTINUED | OUTPATIENT
Start: 2017-08-19 | End: 2017-08-26 | Stop reason: HOSPADM

## 2017-08-19 RX ORDER — POSACONAZOLE 100 MG/1
300 TABLET, DELAYED RELEASE ORAL 2 TIMES DAILY
Status: CANCELLED | OUTPATIENT
Start: 2017-08-19 | End: 2017-08-20

## 2017-08-19 RX ORDER — DOPAMINE HCL IN DEXTROSE 5 % 400MG/.25L
3 INFUSION BOTTLE (ML) INTRAVENOUS CONTINUOUS
Status: DISCONTINUED | OUTPATIENT
Start: 2017-08-19 | End: 2017-08-20

## 2017-08-19 RX ORDER — SODIUM CHLORIDE 9 MG/ML
INJECTION, SOLUTION INTRAVENOUS ONCE
Status: DISCONTINUED | OUTPATIENT
Start: 2017-08-19 | End: 2017-08-21

## 2017-08-19 RX ORDER — DIPHENHYDRAMINE HCL 25 MG
25 CAPSULE ORAL
Status: COMPLETED | OUTPATIENT
Start: 2017-08-20 | End: 2017-08-21

## 2017-08-19 RX ORDER — ONDANSETRON 2 MG/ML
INJECTION INTRAMUSCULAR; INTRAVENOUS
Status: DISCONTINUED | OUTPATIENT
Start: 2017-08-19 | End: 2017-08-19

## 2017-08-19 RX ORDER — VALGANCICLOVIR 450 MG/1
450 TABLET, FILM COATED ORAL EVERY MORNING
Status: DISCONTINUED | OUTPATIENT
Start: 2017-08-19 | End: 2017-08-20

## 2017-08-19 RX ORDER — FAMOTIDINE 20 MG/1
20 TABLET, FILM COATED ORAL NIGHTLY
Status: DISCONTINUED | OUTPATIENT
Start: 2017-08-19 | End: 2017-08-26 | Stop reason: HOSPADM

## 2017-08-19 RX ORDER — PREDNISONE 20 MG/1
20 TABLET ORAL DAILY
Status: DISCONTINUED | OUTPATIENT
Start: 2017-08-22 | End: 2017-08-26 | Stop reason: HOSPADM

## 2017-08-19 RX ORDER — DIPHENHYDRAMINE HCL 50 MG
50 CAPSULE ORAL ONCE AS NEEDED
Status: ACTIVE | OUTPATIENT
Start: 2017-08-19 | End: 2017-08-19

## 2017-08-19 RX ORDER — FUROSEMIDE 10 MG/ML
INJECTION INTRAMUSCULAR; INTRAVENOUS
Status: DISCONTINUED | OUTPATIENT
Start: 2017-08-19 | End: 2017-08-19

## 2017-08-19 RX ORDER — HYDROMORPHONE HYDROCHLORIDE 1 MG/ML
INJECTION, SOLUTION INTRAMUSCULAR; INTRAVENOUS; SUBCUTANEOUS
Status: COMPLETED
Start: 2017-08-19 | End: 2017-08-19

## 2017-08-19 RX ORDER — ALBUMIN HUMAN 50 G/1000ML
12.5 SOLUTION INTRAVENOUS ONCE
Status: COMPLETED | OUTPATIENT
Start: 2017-08-19 | End: 2017-08-19

## 2017-08-19 RX ORDER — TACROLIMUS 1 MG/1
1 CAPSULE ORAL 2 TIMES DAILY
Status: DISCONTINUED | OUTPATIENT
Start: 2017-08-19 | End: 2017-08-20

## 2017-08-19 RX ORDER — HEPARIN SODIUM 5000 [USP'U]/ML
5000 INJECTION, SOLUTION INTRAVENOUS; SUBCUTANEOUS EVERY 8 HOURS
Status: DISCONTINUED | OUTPATIENT
Start: 2017-08-19 | End: 2017-08-26 | Stop reason: HOSPADM

## 2017-08-19 RX ADMIN — TACROLIMUS 0.5 MG: 0.5 CAPSULE ORAL at 09:08

## 2017-08-19 RX ADMIN — HEPARIN SODIUM 5000 UNITS: 5000 INJECTION, SOLUTION INTRAVENOUS; SUBCUTANEOUS at 06:08

## 2017-08-19 RX ADMIN — NYSTATIN 500000 UNITS: 500000 SUSPENSION ORAL at 09:08

## 2017-08-19 RX ADMIN — TACROLIMUS 1 MG: 1 CAPSULE ORAL at 05:08

## 2017-08-19 RX ADMIN — CISATRACURIUM BESYLATE 2 MG: 10 INJECTION INTRAVENOUS at 01:08

## 2017-08-19 RX ADMIN — Medication: at 03:08

## 2017-08-19 RX ADMIN — HYDROMORPHONE HYDROCHLORIDE 0.2 MG: 1 INJECTION, SOLUTION INTRAMUSCULAR; INTRAVENOUS; SUBCUTANEOUS at 04:08

## 2017-08-19 RX ADMIN — MYCOPHENOLATE MOFETIL 1000 MG: 250 CAPSULE ORAL at 09:08

## 2017-08-19 RX ADMIN — CEFAZOLIN 2 G: 1 INJECTION, POWDER, FOR SOLUTION INTRAVENOUS at 02:08

## 2017-08-19 RX ADMIN — FAMOTIDINE 20 MG: 20 TABLET, FILM COATED ORAL at 09:08

## 2017-08-19 RX ADMIN — ONDANSETRON 4 MG: 2 INJECTION INTRAMUSCULAR; INTRAVENOUS at 02:08

## 2017-08-19 RX ADMIN — DOCUSATE SODIUM 100 MG: 50 CAPSULE, LIQUID FILLED ORAL at 09:08

## 2017-08-19 RX ADMIN — FAMOTIDINE 20 MG: 20 TABLET, FILM COATED ORAL at 04:08

## 2017-08-19 RX ADMIN — HEPARIN SODIUM 5000 UNITS: 5000 INJECTION, SOLUTION INTRAVENOUS; SUBCUTANEOUS at 09:08

## 2017-08-19 RX ADMIN — BISACODYL 10 MG: 5 TABLET, COATED ORAL at 09:08

## 2017-08-19 RX ADMIN — SULFAMETHOXAZOLE AND TRIMETHOPRIM 1 TABLET: 400; 80 TABLET ORAL at 09:08

## 2017-08-19 RX ADMIN — HYDROMORPHONE HYDROCHLORIDE 0.2 MG: 1 INJECTION, SOLUTION INTRAMUSCULAR; INTRAVENOUS; SUBCUTANEOUS at 05:08

## 2017-08-19 RX ADMIN — PHENYLEPHRINE HYDROCHLORIDE 100 MCG: 10 INJECTION INTRAVENOUS at 12:08

## 2017-08-19 RX ADMIN — NYSTATIN 500000 UNITS: 500000 SUSPENSION ORAL at 06:08

## 2017-08-19 RX ADMIN — SODIUM CHLORIDE 1000 ML: 0.9 INJECTION, SOLUTION INTRAVENOUS at 03:08

## 2017-08-19 RX ADMIN — FUROSEMIDE 100 MG: 10 INJECTION, SOLUTION INTRAMUSCULAR; INTRAVENOUS at 12:08

## 2017-08-19 RX ADMIN — VALGANCICLOVIR 450 MG: 450 TABLET, FILM COATED ORAL at 09:08

## 2017-08-19 RX ADMIN — ALBUMIN (HUMAN) 12.5 G: 12.5 SOLUTION INTRAVENOUS at 05:08

## 2017-08-19 RX ADMIN — THERA TABS 1 TABLET: TAB at 09:08

## 2017-08-19 RX ADMIN — FUROSEMIDE 60 MG: 10 INJECTION, SOLUTION INTRAVENOUS at 07:08

## 2017-08-19 RX ADMIN — DEXTROSE AND SODIUM CHLORIDE: 5; .45 INJECTION, SOLUTION INTRAVENOUS at 09:08

## 2017-08-19 RX ADMIN — INSULIN ASPART 2 UNITS: 100 INJECTION, SOLUTION INTRAVENOUS; SUBCUTANEOUS at 08:08

## 2017-08-19 RX ADMIN — CEFAZOLIN SODIUM 2 G: 2 SOLUTION INTRAVENOUS at 07:08

## 2017-08-19 RX ADMIN — HEPARIN SODIUM 5000 UNITS: 5000 INJECTION, SOLUTION INTRAVENOUS; SUBCUTANEOUS at 03:08

## 2017-08-19 RX ADMIN — CISATRACURIUM BESYLATE 4 MG: 10 INJECTION INTRAVENOUS at 12:08

## 2017-08-19 RX ADMIN — SODIUM CHLORIDE: 0.9 INJECTION, SOLUTION INTRAVENOUS at 12:08

## 2017-08-19 RX ADMIN — DOCUSATE SODIUM 100 MG: 50 CAPSULE, LIQUID FILLED ORAL at 05:08

## 2017-08-19 RX ADMIN — FENTANYL CITRATE 50 MCG: 50 INJECTION, SOLUTION INTRAMUSCULAR; INTRAVENOUS at 01:08

## 2017-08-19 RX ADMIN — DOCUSATE SODIUM 100 MG: 50 CAPSULE, LIQUID FILLED ORAL at 03:08

## 2017-08-19 RX ADMIN — MANNITOL 25 G: 250 INJECTION, SOLUTION INTRAVENOUS at 12:08

## 2017-08-19 RX ADMIN — POTASSIUM PHOSPHATE, MONOBASIC AND POTASSIUM PHOSPHATE, DIBASIC 15 MMOL: 224; 236 INJECTION, SOLUTION, CONCENTRATE INTRAVENOUS at 04:08

## 2017-08-19 RX ADMIN — NYSTATIN 500000 UNITS: 500000 SUSPENSION ORAL at 03:08

## 2017-08-19 RX ADMIN — FENTANYL CITRATE 50 MCG: 50 INJECTION, SOLUTION INTRAMUSCULAR; INTRAVENOUS at 12:08

## 2017-08-19 RX ADMIN — DOPAMINE HYDROCHLORIDE IN DEXTROSE 3 MCG/KG/MIN: 1.6 INJECTION, SOLUTION INTRAVENOUS at 09:08

## 2017-08-19 RX ADMIN — CEFAZOLIN SODIUM 2 G: 2 SOLUTION INTRAVENOUS at 03:08

## 2017-08-19 RX ADMIN — DEXTROSE AND SODIUM CHLORIDE: 5; .45 INJECTION, SOLUTION INTRAVENOUS at 03:08

## 2017-08-19 NOTE — PROGRESS NOTES
Nursing Transfer Note      8/19/2017     Transfer To: [id number]    Transfer via stretcher    Transfer with cardiac monitoring    Transported by RN    Medicines sent: IVF, PCA    Chart send with patient: Yes    Notified: family waiting in room    Patient reassessed at: to be done by receiving RN (date, time)

## 2017-08-19 NOTE — PROGRESS NOTES
Pre-operative Discussion Note  Kidney Transplant Surgery    Demetrio Aguiar is a 37 y.o. male admitted for kidney transplant.  I discussed the planned procedure in detail, including expected hospital course and outcomes, benefits, risks, and potential complications.  Complications discussed included death, graft failure, bleeding, infection, vascular thrombosis, and rejection.  I discussed the risks of anesthesia, as well as the potential need for re-operation.  The possibility of other complications not specifically mentioned was also discussed.  Also, I discussed the need for lifelong immunosuppression and the possibility of serious complications from immunosuppressive drugs.    The discussion included the risks that the patient will incur if he elects to not have the proposed procedure.    Relevant donor-specific risk factors were disclosed and discussed with the patient, including:       Specific PHS Increased Risk Behavior criteria for the organ donor include:  None    All questions were answered.  The patient and available family members voice understanding and agree to proceed with the transplant.    UNOS Patient Status  Note on scores:  ICU = 10 = total assistance  TSU = 20-30 = partial assistance  Outpatient admitted for transplant requiring medical care in last year = 40-50 = partial assistance  Scores 60 or higher indicate no assistance, meaning no need for medical care in last year. This would be very unusual for a transplant candidate.    UNOS Patient Status  Functional Status: 50% - Requires considerable assistance and frequent medical care  Physical Capacity: No Limitations

## 2017-08-19 NOTE — HPI
37 y.o. male with a history of ESRD due to HIV nephropathy, admitted 16 for kidney transplant; s/p  - brain death  kidney transplant on 2017, on thymoglobulin for induction, follow by maintenance immunosuppression with prednisone, tacrolimus, MMF; no major complications during/after surgery; patient feeling well today, no particular complain.     ID consulted for adjustment on HAART regimen

## 2017-08-19 NOTE — ANESTHESIA POSTPROCEDURE EVALUATION
"Anesthesia Post Evaluation    Patient: Demetrio Aguiar    Procedure(s) Performed: Procedure(s) (LRB):  TRANSPLANT-KIDNEY (N/A)    Final Anesthesia Type: general  Patient location during evaluation: PACU  Patient participation: Yes- Able to Participate  Level of consciousness: awake and alert  Post-procedure vital signs: reviewed and stable  Pain management: adequate  Airway patency: patent  PONV status at discharge: No PONV  Anesthetic complications: no      Cardiovascular status: blood pressure returned to baseline and hemodynamically stable  Respiratory status: unassisted  Hydration status: euvolemic  Follow-up not needed.        Visit Vitals  BP (!) 116/58   Pulse 96   Temp 37.3 °C (99.1 °F) (Skin)   Resp 15   Ht 5' 8" (1.727 m)   Wt 69.2 kg (152 lb 8.9 oz)   SpO2 96%   BMI 23.20 kg/m²       Pain/Johanna Score: Pain Assessment Performed: Yes (8/19/2017  5:00 AM)  Presence of Pain: complains of pain/discomfort (8/19/2017  5:00 AM)  Pain Rating Prior to Med Admin: 7 (8/19/2017  5:53 AM)  Johanna Score: 9 (8/19/2017  5:00 AM)      "

## 2017-08-19 NOTE — PROGRESS NOTES
Patient's mother and cousin at bedside. They will serve as pt's caregivers. Post transplant kidney kat given to them. Encouraged them including patient to start reading kat and write down any questions. Will continue to monitor.

## 2017-08-19 NOTE — CONSULTS
[facility]  Infectious Disease  Consult Note    Patient Name: Demetrio Aguiar  MRN: [id number]  Admission Date: 2017  Hospital Length of Stay: 1 days  Attending Physician: Gonzalo Cheng MD  Primary Care Provider: Primary Doctor No     Isolation Status: No active isolations    Patient information was obtained from patient, past medical records and ER records.      Consults  Assessment/Plan:     HIV (human immunodeficiency virus infection)    37 y.o. male with a history of ESRD due to HIV nephropathy, admitted 16 for kidney transplant; s/p  - brain death  kidney transplant on 2017, on thymoglobulin for induction, follow by maintenance immunosuppression with prednisone, tacrolimus, MMF; no major complications during/after surgery; patient feeling well today, no particular complain.     ID consulted for adjustment on HAART regimen     - will follow HIV viral load, will check CD 4 count  - likely will be started on Dolutegravir + Emtricitabine + TDF            Thank you for your consult. I will follow-up with patient. Please contact us if you have any additional questions.    Irvin Barrett MD  Infectious Disease Fellow, PGY-5  Pager: [phone]  [facility]        Subjective:     Principal Problem: Transplant    HPI: 37 y.o. male  with a history of ESRD due to HIV nephropathy, admitted 16 for kidney transplant; s/p  - brain death  kidney transplant on 2017, on thymoglobulin for induction, follow by maintenance immunosuppression with prednisone, tacrolimus, MMF; no major complications during/after surgery; patient feeling well today, no particular complain.     ID consulted for adjustment on HAART regimen       Past Medical History:   Diagnosis Date    Anemia     HIV infection     HIV nephropathy     Hyperlipidemia     TB lung, latent     tx INH        Past Surgical History:   Procedure Laterality Date    EXPLORATORY LAPAROTOMY W/  "BOWEL RESECTION      NO BOWEL RESECTION, UNKNOWN DETAILS, "Pancreas Infection"    peritoneal dialysis catheter placement         Review of patient's allergies indicates:  No Known Allergies    Medications:  Prescriptions Prior to Admission   Medication Sig    aspirin (ECOTRIN) 81 MG EC tablet Take 81 mg by mouth once daily.    calcium acetate (PHOSLO) 667 mg capsule Take 2,668 mg by mouth 3 (three) times daily with meals.    dolutegravir (TIVICAY) 50 mg Tab Take 50 mg by mouth once daily.    rilpivirine (EDURANT) 25 mg Tab Take 25 mg by mouth once daily.    rosuvastatin (CRESTOR) 20 MG tablet Take 20 mg by mouth once daily.    SENSIPAR 60 mg Tab TAKE 2 TABLETS ( 120MG ) BY MOUTH DAILY WITH EVENING MEAL    fexofenadine (ALLEGRA) 60 MG tablet Take 60 mg by mouth once daily.    [DISCONTINUED] efavirenz (SUSTIVA) 600 mg Tab Take 600 mg by mouth every evening.    [DISCONTINUED] emtricitabine (EMTRIVA) 200 mg Cap Take 200 mg by mouth twice a week. Tues and Sat    [DISCONTINUED] niacin 250 MG Tab Take 250 mg by mouth nightly.    [DISCONTINUED] tenofovir (VIREAD) 300 mg Tab Take 300 mg by mouth once a week. Saturday     Antibiotics     Start     Stop Route Frequency Ordered    08/19/17 1030  cefazolin (ANCEF) 2 gram in dextrose 5% 50 mL IVPB (premix)      08/20 0229 IV Every 8 hours (non-standard times) 08/19/17 0534    08/19/17 0800  sulfamethoxazole-trimethoprim 400-80mg per tablet 1 tablet      -- Oral Daily 08/19/17 0301        Antifungals     Start     Stop Route Frequency Ordered    08/19/17 0945  nystatin 100,000 unit/mL suspension 500,000 Units      -- MT 4 times daily after meals & nightly 08/19/17 0301        Antivirals         Stop Route Frequency     valganciclovir      -- Oral Daily             There is no immunization history on file for this patient.    Family History     Problem Relation (Age of Onset)    Cancer Father    Diabetes Maternal Aunt, Maternal Uncle    Hypertension Maternal " Grandmother, Paternal Grandmother    Lung cancer Father    No Known Problems Mother, Brother        Social History     Social History    Marital status: Single     Spouse name: N/A    Number of children: N/A    Years of education: N/A     Social History Main Topics    Smoking status: Former Smoker     Packs/day: 0.50    Smokeless tobacco: None      Comment: quit smoking 5 years ago    Alcohol use No    Drug use:      Types: Cocaine, Marijuana      Comment: last use for cocaine at age 17; 2 years ago last use for THC    Sexual activity: Not Currently     Other Topics Concern    None     Social History Narrative    None     Review of Systems   Constitutional: Negative for chills and fever.   HENT: Negative for sore throat.    Respiratory: Negative for cough, chest tightness and shortness of breath.    Cardiovascular: Negative for chest pain, palpitations and leg swelling.   Gastrointestinal: Negative for abdominal distention, abdominal pain, diarrhea, nausea and vomiting.   Genitourinary: Negative for dysuria, flank pain and urgency.   Skin: Negative for rash.   Allergic/Immunologic: Positive for immunocompromised state.   Neurological: Negative for dizziness, light-headedness and numbness.   Psychiatric/Behavioral: Negative for confusion.     Objective:     Vital Signs (Most Recent):  Temp: 98 °F (36.7 °C) (08/19/17 1120)  Pulse: 97 (08/19/17 1300)  Resp: 16 (08/19/17 1120)  BP: 112/70 (08/19/17 1300)  SpO2: 95 % (08/19/17 1100) Vital Signs (24h Range):  Temp:  [97.9 °F (36.6 °C)-99.7 °F (37.6 °C)] 98 °F (36.7 °C)  Pulse:  [] 97  Resp:  [15-26] 16  SpO2:  [94 %-100 %] 95 %  BP: ()/(57-78) 112/70  Arterial Line BP: (123-175)/(57-98) 123/57     Weight: 69.2 kg (152 lb 8.9 oz)  Body mass index is 23.2 kg/m².    Estimated Creatinine Clearance: 9.4 mL/min (based on Cr of 10.3).    Physical Exam   Constitutional: He is oriented to person, place, and time. No distress.   HENT:   Head: Normocephalic and  atraumatic.   Mouth/Throat: No oropharyngeal exudate.   Eyes: No scleral icterus.   Neck:   Left central line IJ   Cardiovascular: Normal rate, regular rhythm and normal heart sounds.  Exam reveals no gallop and no friction rub.    No murmur heard.  Pulmonary/Chest: Effort normal and breath sounds normal. No respiratory distress. He has no wheezes. He has no rales.   Abdominal: Soft. Bowel sounds are normal. He exhibits no distension. There is no tenderness. There is no rebound and no guarding.   Musculoskeletal: He exhibits no edema.   Neurological: He is alert and oriented to person, place, and time. No cranial nerve deficit.   Vitals reviewed.      Significant Labs:   Blood Culture: No results for input(s): LABBLOO in the last 4320 hours.  BMP:   Recent Labs  Lab 08/19/17  0923   *   *   K 7.6*      CO2 20*   BUN 40*   CREATININE 10.3*   CALCIUM 8.2*   MG 2.5     CBC:   Recent Labs  Lab 08/18/17  1439  08/19/17  0124 08/19/17  0301 08/19/17  0923   WBC 5.24  --   --   --  8.36   HGB 11.2*  --   --   --  8.7*   HCT 34.9*  < > 25* 27.4* 26.2*     --   --   --  141*   < > = values in this interval not displayed.  CMP:   Recent Labs  Lab 08/18/17  1439 08/18/17  2034 08/19/17  0301 08/19/17  0923    136 136 132*   K 5.6* 5.5*  5.5* 4.6 7.6*   CL 97 97 105 104   CO2 32* 28 20* 20*   GLU 87 222* 165* 157*   BUN 32* 37* 37* 40*   CREATININE 9.4* 9.8* 9.5* 10.3*   CALCIUM 8.9 8.3* 8.1* 8.2*   PROT 8.8*  --   --  6.3   ALBUMIN 3.9  --  2.9* 3.0*   BILITOT 0.4  --   --  0.2   ALKPHOS 678*  --   --  493*   AST 21  --   --  27   ALT 15  --   --  11   ANIONGAP 10 11 11 8   EGFRNONAA 6.3* 6.0* 6.3* 5.7*     Microbiology Results (last 7 days)     ** No results found for the last 168 hours. **        Urine Culture: No results for input(s): LABURIN in the last 4320 hours.  Urine Studies: No results for input(s): COLORU, APPEARANCEUA, PHUR, SPECGRAV, PROTEINUA, GLUCUA, KETONESU, BILIRUBINUA,  OCCULTUA, NITRITE, UROBILINOGEN, LEUKOCYTESUR, RBCUA, WBCUA, BACTERIA, SQUAMEPITHEL, HYALINECASTS in the last 4320 hours.    Invalid input(s): CEE    Significant Imaging: I have reviewed all pertinent imaging results/findings within the past 24 hours.

## 2017-08-19 NOTE — PROGRESS NOTES
TRANSPLANT NOTE:      ORGAN:  RIGHT KIDNEY   Disease Etiology: HIV Nephropathy  Donor Type:   - Brain Death   [facility] High Risk:  No   Donor CMV Status:  Negative  Donor HBcAB:  Negative   Donor HCV Status:  Negative     Demetrio Aguiar is a 37 y.o. male s/p    - Brain Death  kidney transplant on 2017 (Kidney) for HIV Nephropathy.   This patient will receive 3 doses of Thymoglobulin for induction.  This patients maintenance immunosuppression will include a steroid taper per protocol to 5mg daily, Prograf, and Cellcept maintenance.  Opportunistic infection prophylaxis will include Valcyte for 3 months (CMV D - , R + ), Bactrim for life, and posaconazole for 4-6 weeks.  Patient is to begin self medications upon transfer to the [facility], and I plan to meet with this patient and his/her support person prior to discharge to review the medication section of the Kidney Transplant Education Manual.  I have reviewed the pre-op medications and have restarted those, as appropriate.    Patient is HIV+, will consult ID to adjust HAART regimen

## 2017-08-19 NOTE — ASSESSMENT & PLAN NOTE
- patient received induction with THYMO given recipient HIV+; THYMO dose #2 later in the evening  - will start Prograf 1 mg BID; goal trough 8-10  - will need to closely monitor for drug-drug interactions with HAART therapy, posaconazole, and Prograf   - continue MMF 1000 mg BID  - continue steroids per protocol  - continue to monitor for toxicities from immunosuppressive medications

## 2017-08-19 NOTE — ASSESSMENT & PLAN NOTE
37 y.o. male with a history of ESRD due to HIV nephropathy, admitted 16 for kidney transplant; s/p  - brain death  kidney transplant on 2017, on thymoglobulin for induction, follow by maintenance immunosuppression with prednisone, tacrolimus, MMF; no major complications during/after surgery; patient feeling well today, no particular complain.     ID consulted for adjustment on HAART regimen     - will follow HIV viral load, will check CD 4 count  - likely will be started on Dolutegravir + Emtricitabine + TDF

## 2017-08-19 NOTE — ANESTHESIA PROCEDURE NOTES
Central Line    Diagnosis: esrd  Patient location during procedure: done in OR  Procedure start time: 8/18/2017 9:50 PM  Procedure end time: 8/18/2017 10:00 PM  Staffing  Anesthesiologist: MACIEL BAUTISTA JR  Performed: anesthesiologist   Anesthesiologist was present at the time of the procedure.  Preanesthetic Checklist  Completed: patient identified, site marked, surgical consent, pre-op evaluation, timeout performed, IV checked, risks and benefits discussed, monitors and equipment checked and anesthesia consent given  Indication  Indication: vascular access, hemodynamic monitoring, med administration     Anesthesia   general anesthesia    Central Line  Skin Prep: skin prepped with ChloraPrep, skin prep agent completely dried prior to procedure  maximum sterile barriers used during central venous catheter insertion  hand hygiene performed prior to central venous catheter insertion  Location: left internal jugular,   Catheter type: triple lumen  Catheter Size: 7 Fr  Inserted Catheter Length: 16 cm  Ultrasound: vascular probe with ultrasound  Vessel Caliber: medium, patent, compressibility normal  Needle advanced into vessel with real time Ultrasound guidance.  Guidewire confirmed in vessel.  Sterile sheath used.   Manometry: Venous cannualation confirmed by visual estimation of blood vessel pressure using manometry.  Insertion Attempts: 1   Securement:line sutured, chlorhexidine patch, sterile dressing applied and blood return through all ports     Post-Procedure  Adverse Events:none  Additional Notes  Right ij examined and vessel not suitable for attempted cannulation

## 2017-08-19 NOTE — PROGRESS NOTES
Order noted for Prograf 0.5mg @ 0800 STAT as additional dose. Spoke to Dr. Javier to clarify if she wants 0800 OR stat.   MD said pt is to get single dose of Prograf 0.5mg at 0800 this am.

## 2017-08-19 NOTE — SUBJECTIVE & OBJECTIVE
"Past Medical History:   Diagnosis Date    Anemia     HIV infection     HIV nephropathy     Hyperlipidemia     TB lung, latent     tx INH 2006       Past Surgical History:   Procedure Laterality Date    EXPLORATORY LAPAROTOMY W/ BOWEL RESECTION      NO BOWEL RESECTION, UNKNOWN DETAILS, "Pancreas Infection"    peritoneal dialysis catheter placement         Review of patient's allergies indicates:  No Known Allergies    Medications:  Prescriptions Prior to Admission   Medication Sig    aspirin (ECOTRIN) 81 MG EC tablet Take 81 mg by mouth once daily.    calcium acetate (PHOSLO) 667 mg capsule Take 2,668 mg by mouth 3 (three) times daily with meals.    dolutegravir (TIVICAY) 50 mg Tab Take 50 mg by mouth once daily.    rilpivirine (EDURANT) 25 mg Tab Take 25 mg by mouth once daily.    rosuvastatin (CRESTOR) 20 MG tablet Take 20 mg by mouth once daily.    SENSIPAR 60 mg Tab TAKE 2 TABLETS ( 120MG ) BY MOUTH DAILY WITH EVENING MEAL    fexofenadine (ALLEGRA) 60 MG tablet Take 60 mg by mouth once daily.    [DISCONTINUED] efavirenz (SUSTIVA) 600 mg Tab Take 600 mg by mouth every evening.    [DISCONTINUED] emtricitabine (EMTRIVA) 200 mg Cap Take 200 mg by mouth twice a week. Tues and Sat    [DISCONTINUED] niacin 250 MG Tab Take 250 mg by mouth nightly.    [DISCONTINUED] tenofovir (VIREAD) 300 mg Tab Take 300 mg by mouth once a week. Saturday     Antibiotics     Start     Stop Route Frequency Ordered    08/19/17 1030  cefazolin (ANCEF) 2 gram in dextrose 5% 50 mL IVPB (premix)      08/20 0229 IV Every 8 hours (non-standard times) 08/19/17 0534    08/19/17 0800  sulfamethoxazole-trimethoprim 400-80mg per tablet 1 tablet      -- Oral Daily 08/19/17 0301        Antifungals     Start     Stop Route Frequency Ordered    08/19/17 0945  nystatin 100,000 unit/mL suspension 500,000 Units      -- MT 4 times daily after meals & nightly 08/19/17 0301        Antivirals         Stop Route Frequency     valganciclovir      " -- Oral Daily             There is no immunization history on file for this patient.    Family History     Problem Relation (Age of Onset)    Cancer Father    Diabetes Maternal Aunt, Maternal Uncle    Hypertension Maternal Grandmother, Paternal Grandmother    Lung cancer Father    No Known Problems Mother, Brother        Social History     Social History    Marital status: Single     Spouse name: N/A    Number of children: N/A    Years of education: N/A     Social History Main Topics    Smoking status: Former Smoker     Packs/day: 0.50    Smokeless tobacco: None      Comment: quit smoking 5 years ago    Alcohol use No    Drug use:      Types: Cocaine, Marijuana      Comment: last use for cocaine at age 17; 2 years ago last use for THC    Sexual activity: Not Currently     Other Topics Concern    None     Social History Narrative    None     Review of Systems   Constitutional: Negative for chills and fever.   HENT: Negative for sore throat.    Respiratory: Negative for cough, chest tightness and shortness of breath.    Cardiovascular: Negative for chest pain, palpitations and leg swelling.   Gastrointestinal: Negative for abdominal distention, abdominal pain, diarrhea, nausea and vomiting.   Genitourinary: Negative for dysuria, flank pain and urgency.   Skin: Negative for rash.   Allergic/Immunologic: Positive for immunocompromised state.   Neurological: Negative for dizziness, light-headedness and numbness.   Psychiatric/Behavioral: Negative for confusion.     Objective:     Vital Signs (Most Recent):  Temp: 98 °F (36.7 °C) (08/19/17 1120)  Pulse: 97 (08/19/17 1300)  Resp: 16 (08/19/17 1120)  BP: 112/70 (08/19/17 1300)  SpO2: 95 % (08/19/17 1100) Vital Signs (24h Range):  Temp:  [97.9 °F (36.6 °C)-99.7 °F (37.6 °C)] 98 °F (36.7 °C)  Pulse:  [] 97  Resp:  [15-26] 16  SpO2:  [94 %-100 %] 95 %  BP: ()/(57-78) 112/70  Arterial Line BP: (123-175)/(57-98) 123/57     Weight: 69.2 kg (152 lb 8.9  oz)  Body mass index is 23.2 kg/m².    Estimated Creatinine Clearance: 9.4 mL/min (based on Cr of 10.3).    Physical Exam   Constitutional: He is oriented to person, place, and time. No distress.   HENT:   Head: Normocephalic and atraumatic.   Mouth/Throat: No oropharyngeal exudate.   Eyes: No scleral icterus.   Neck:   Left central line IJ   Cardiovascular: Normal rate, regular rhythm and normal heart sounds.  Exam reveals no gallop and no friction rub.    No murmur heard.  Pulmonary/Chest: Effort normal and breath sounds normal. No respiratory distress. He has no wheezes. He has no rales.   Abdominal: Soft. Bowel sounds are normal. He exhibits no distension. There is no tenderness. There is no rebound and no guarding.   Musculoskeletal: He exhibits no edema.   Neurological: He is alert and oriented to person, place, and time. No cranial nerve deficit.   Vitals reviewed.      Significant Labs:   Blood Culture: No results for input(s): LABBLOO in the last 4320 hours.  BMP:   Recent Labs  Lab 08/19/17  0923   *   *   K 7.6*      CO2 20*   BUN 40*   CREATININE 10.3*   CALCIUM 8.2*   MG 2.5     CBC:   Recent Labs  Lab 08/18/17  1439  08/19/17  0124 08/19/17  0301 08/19/17  0923   WBC 5.24  --   --   --  8.36   HGB 11.2*  --   --   --  8.7*   HCT 34.9*  < > 25* 27.4* 26.2*     --   --   --  141*   < > = values in this interval not displayed.  CMP:   Recent Labs  Lab 08/18/17  1439 08/18/17  2034 08/19/17  0301 08/19/17  0923    136 136 132*   K 5.6* 5.5*  5.5* 4.6 7.6*   CL 97 97 105 104   CO2 32* 28 20* 20*   GLU 87 222* 165* 157*   BUN 32* 37* 37* 40*   CREATININE 9.4* 9.8* 9.5* 10.3*   CALCIUM 8.9 8.3* 8.1* 8.2*   PROT 8.8*  --   --  6.3   ALBUMIN 3.9  --  2.9* 3.0*   BILITOT 0.4  --   --  0.2   ALKPHOS 678*  --   --  493*   AST 21  --   --  27   ALT 15  --   --  11   ANIONGAP 10 11 11 8   EGFRNONAA 6.3* 6.0* 6.3* 5.7*     Microbiology Results (last 7 days)     ** No results found for  the last 168 hours. **        Urine Culture: No results for input(s): LABURIN in the last 4320 hours.  Urine Studies: No results for input(s): COLORU, APPEARANCEUA, PHUR, SPECGRAV, PROTEINUA, GLUCUA, KETONESU, BILIRUBINUA, OCCULTUA, NITRITE, UROBILINOGEN, LEUKOCYTESUR, RBCUA, WBCUA, BACTERIA, SQUAMEPITHEL, HYALINECASTS in the last 4320 hours.    Invalid input(s): CEE    Significant Imaging: I have reviewed all pertinent imaging results/findings within the past 24 hours.

## 2017-08-19 NOTE — CONSULTS
[facility]  Kidney Transplant  Consult Note    Inpatient consult to Transplant Nephrology (KTM)  Consult performed by: DENI MOTA  Consult ordered by: MYKEL MCGRAW  Reason for consult: immunosuppression management in kidney transplant receipient             Subjective:     History of Present Illness:   37 y.o. 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     Interval History: Patient with nausea and pain along incision this morning. Low UOP.Potassium elevated at 7.6 this morning thus will perform HD this morning.      Past Medical and Surgical History: Mr. Aguiar has a past medical history of Anemia; HIV infection; HIV nephropathy; Hyperlipidemia; and TB lung, latent.  He has a past surgical history that includes peritoneal dialysis catheter placement and Exploratory laparotomy w/ bowel resection.    Past Social and Family History: Mr. Aguiar reports that he has quit smoking. He smoked 0.50 packs per day. He does not have any smokeless tobacco history on file. He reports that he uses drugs, including Cocaine and Marijuana. He reports that he does not drink alcohol.His family history includes Cancer in his father; Diabetes in his maternal aunt and maternal uncle; Hypertension in his maternal grandmother and paternal grandmother; Lung cancer in his father; No Known Problems in his brother and mother.     Outpatient Medications:   Prescriptions Prior to Admission   Medication Sig Dispense Refill Last Dose    aspirin (ECOTRIN) 81 MG EC tablet Take 81 mg by mouth once daily.   8/18/2017 at 0900    calcium acetate (PHOSLO) 667 mg capsule Take 2,668 mg by mouth 3 (three) times daily with meals.    8/18/2017 at 0900    dolutegravir (TIVICAY) 50 mg Tab Take 50 mg by mouth once daily.   8/18/2017 at Unknown time    rilpivirine (EDURANT) 25 mg Tab Take 25 mg by mouth once daily.   8/18/2017 at Unknown time    rosuvastatin (CRESTOR) 20 MG tablet Take 20 mg by mouth once daily.   8/18/2017 at Unknown time    SENSIPAR 60 mg Tab TAKE 2 TABLETS ( 120MG ) BY MOUTH DAILY WITH EVENING MEAL  99 8/18/2017 at Unknown time    fexofenadine (ALLEGRA) 60 MG tablet Take 60 mg by mouth once daily.  5 Unknown at Unknown time    [DISCONTINUED] efavirenz (SUSTIVA) 600 mg Tab Take 600 mg by mouth every evening.   Taking    [DISCONTINUED] emtricitabine (EMTRIVA) 200 mg Cap Take 200 mg by mouth twice a week. Tues and Sat   Taking    [DISCONTINUED] niacin 250 MG Tab Take 250 mg by mouth nightly.   Taking    [DISCONTINUED] tenofovir (VIREAD) 300 mg Tab Take 300 mg by mouth once a week. Saturday   Taking       Inpatient Medications:    sodium chloride 0.9%   Intravenous Once    [START ON 8/20/2017] acetaminophen  650 mg Oral Q24H    [START ON 8/20/2017] anti-thymo immune glob (THYMOGLOBULIN -rabbit) IVPB  1.5 mg/kg/day Intravenous Daily    bisacodyl  10 mg Oral QHS    ceFAZolin 2 g/50mL Dextrose IVPB  2 g Intravenous Q8H    [START ON 8/20/2017] diphenhydrAMINE  25 mg Oral Q24H    docusate sodium  100 mg Oral TID    famotidine  20 mg Oral QHS    heparin (porcine)  5,000 Units Subcutaneous Q8H    [START ON 8/20/2017] methylPREDNISolone (SOLU-Medrol) IVPB (doses > 250 mg)  250 mg Intravenous Once    [START ON 8/21/2017] methylPREDNISolone sodium succinate  125 mg Intravenous Once    multivitamin  1 tablet Oral Daily    mycophenolate  1,000 mg Oral BID    nystatin  500,000 Units Mouth/Throat QID (PC + HS)    [START ON 8/22/2017] predniSONE  20 mg Oral Daily    sulfamethoxazole-trimethoprim 400-80mg  1 tablet Oral Daily    tacrolimus  1 mg Oral BID    valganciclovir  450 mg Oral Daily      sodium chloride 0.9% 1,000  mL (08/19/17 0300)    dextrose 5 % and 0.45 % NaCl 50 mL/hr at 08/19/17 0322    DOPamine 3 mcg/kg/min (08/19/17 0903)    glucagon (human recombinant)      hydromorphone in 0.9 % NaCl 6 mg/30 ml       sodium chloride 0.9%, sodium chloride 0.9%, acetaminophen, albumin human 25%, albumin human 25%, dextrose 50%, [COMPLETED] dextrose 50% **AND** dextrose 50% **AND** [COMPLETED] insulin regular, diphenhydrAMINE, EPINEPHrine, glucagon (human recombinant), glucose, glucose, glucose, hydrocortisone sodium succinate, insulin aspart, naloxone      Intake/Output - Last 3 Shifts       08/17 0700 - 08/18 0659 08/18 0700 - 08/19 0659 08/19 0700 - 08/20 0659    P.O.  120 360    I.V. (mL/kg)  3945.8 (57) 351.9 (5.1)    Blood  250     Total Intake(mL/kg)  4315.8 (62.4) 711.9 (10.3)    Urine (mL/kg/hr)  33 96 (0.2)    Drains  60     Blood  200     Total Output   293 96    Net   +4022.8 +615.9                  Review of Systems   Constitutional: Positive for appetite change. Negative for activity change, chills, fatigue, fever and unexpected weight change.   HENT: Negative for hearing loss, sore throat and trouble swallowing.    Eyes: Negative for photophobia and visual disturbance.   Respiratory: Negative for cough, chest tightness, shortness of breath and wheezing.    Cardiovascular: Negative for chest pain, palpitations and leg swelling.   Gastrointestinal: Positive for abdominal pain and nausea. Negative for abdominal distention, constipation, diarrhea and vomiting.   Genitourinary: Positive for decreased urine volume. Negative for difficulty urinating, dysuria and hematuria.   Musculoskeletal: Negative for arthralgias and back pain.   Skin: Positive for wound (kidney transplant incision). Negative for rash.   Allergic/Immunologic: Positive for immunocompromised state.   Neurological: Negative for dizziness, tremors, seizures, syncope, weakness, light-headedness, numbness and headaches.   Hematological: Does not bruise/bleed  "easily.   Psychiatric/Behavioral: Negative for confusion, dysphoric mood and sleep disturbance.     Objective:     Vital Signs (Most Recent):  Temp: 98 °F (36.7 °C) (08/19/17 1120)  Pulse: 97 (08/19/17 1300)  Resp: 16 (08/19/17 1120)  BP: 112/70 (08/19/17 1300)  SpO2: 95 % (08/19/17 1100) Vital Signs (24h Range):  Temp:  [97.9 °F (36.6 °C)-99.7 °F (37.6 °C)] 98 °F (36.7 °C)  Pulse:  [] 97  Resp:  [15-26] 16  SpO2:  [94 %-100 %] 95 %  BP: ()/(57-78) 112/70  Arterial Line BP: (123-175)/(57-98) 123/57     Weight: 69.2 kg (152 lb 8.9 oz)  Height: 5' 8" (172.7 cm)  Body mass index is 23.2 kg/m².    Physical Exam   Constitutional: He is oriented to person, place, and time. He appears well-developed and well-nourished. He appears distressed (mild secondary to pain and hiccups).   HENT:   Head: Normocephalic and atraumatic.   Right Ear: External ear normal.   Left Ear: External ear normal.   Nose: Nose normal.   Mouth/Throat: No oropharyngeal exudate.   Eyes: Conjunctivae and EOM are normal. Pupils are equal, round, and reactive to light. No scleral icterus.   Neck: Normal range of motion. Neck supple. No JVD present. No tracheal deviation present. No thyromegaly present.   Cardiovascular: Normal rate, regular rhythm, normal heart sounds and intact distal pulses.  Exam reveals no gallop and no friction rub.    No murmur heard.  Pulmonary/Chest: Effort normal and breath sounds normal. No respiratory distress. He has no wheezes. He has no rales.   Abdominal: Soft. He exhibits no distension and no mass. There is tenderness (mild). There is no rebound and no guarding. No hernia.   Musculoskeletal: He exhibits no edema.   Left thigh AVG +thrill/bruit   Lymphadenopathy:     He has no cervical adenopathy.   Neurological: He is alert and oriented to person, place, and time.   Skin: Skin is warm and dry. No rash noted. He is not diaphoretic. No erythema.   Psychiatric: He has a normal mood and affect. His behavior is " normal.   Nursing note and vitals reviewed.      Significant Labs:  Component      Latest Ref Rng & Units 8/19/2017 8/19/2017 8/18/2017           9:23 AM  3:01 AM    WBC      3.90 - 12.70 K/uL 8.36  5.24   RBC      4.60 - 6.20 M/uL 2.84 (L)  3.76 (L)   Hemoglobin      14.0 - 18.0 g/dL 8.7 (L)  11.2 (L)   Hematocrit      40.0 - 54.0 % 26.2 (L) 27.4 (L) 34.9 (L)   MCV      82 - 98 fL 92  93   MCH      27.0 - 31.0 pg 30.6  29.8   MCHC      32.0 - 36.0 g/dL 33.2  32.1   RDW      11.5 - 14.5 % 17.2 (H)  17.0 (H)   Platelets      150 - 350 K/uL 141 (L)  195   MPV      9.2 - 12.9 fL 9.6  10.1   Lymph #      1.0 - 4.8 K/uL CANCELED  0.9 (L)   Mono #      0.3 - 1.0 K/uL CANCELED  0.3   Eos #      0.0 - 0.5 K/uL CANCELED  0.1   Baso #      0.00 - 0.20 K/uL CANCELED  0.02   Gran%      38.0 - 73.0 % 97.0 (H)  74.1 (H)   Lymph%      18.0 - 48.0 % 1.0 (L)  17.7 (L)   Mono%      4.0 - 15.0 % 0.0 (L)  5.7   Eosinophil%      0.0 - 8.0 % 0.0  1.9   Basophil%      0.0 - 1.9 % 0.0  0.4   BANDS      % 2.0     Aniso       Slight     Poik       Slight     Poly       Occasional     Ovalocytes       Occasional     Differential Method       Manual  Automated   Gran #      1.8 - 7.7 K/uL   3.9   Sodium      136 - 145 mmol/L 132 (L) 136 139   Potassium      3.5 - 5.1 mmol/L 7.6 (HH) 4.6 5.6 (H)   Chloride      95 - 110 mmol/L 104 105 97   CO2      23 - 29 mmol/L 20 (L) 20 (L) 32 (H)   Glucose      70 - 110 mg/dL 157 (H) 165 (H) 87   BUN, Bld      6 - 20 mg/dL 40 (H) 37 (H) 32 (H)   Creatinine      0.5 - 1.4 mg/dL 10.3 (H) 9.5 (H) 9.4 (H)   Calcium      8.7 - 10.5 mg/dL 8.2 (L) 8.1 (L) 8.9   Total Protein      6.0 - 8.4 g/dL 6.3  8.8 (H)   Albumin      3.5 - 5.2 g/dL 3.0 (L) 2.9 (L) 3.9   Total Bilirubin      0.1 - 1.0 mg/dL 0.2  0.4   Alkaline Phosphatase      55 - 135 U/L 493 (H)  678 (H)   AST      10 - 40 U/L 27  21   ALT      10 - 44 U/L 11  15   Anion Gap      8 - 16 mmol/L 8 11 10   eGFR if African American      >60 mL/min/1.73 m:2 6.6  (A) 7.2 (A) 7.3 (A)   eGFR if non African American      >60 mL/min/1.73 m:2 5.7 (A) 6.3 (A) 6.3 (A)   Phosphorus      2.7 - 4.5 mg/dL 1.7 (L) 2.1 (L) 2.6 (L)   Cholesterol      120 - 199 mg/dL   189   Triglycerides      30 - 150 mg/dL   328 (H)   HDL      40 - 75 mg/dL   37 (L)   LDL Cholesterol      63.0 - 159.0 mg/dL   86.4   HDL/Chol Ratio      20.0 - 50.0 %   19.6 (L)   Total Cholesterol/HDL Ratio      2.0 - 5.0   5.1 (H)   Non-HDL Cholesterol      mg/dL   152   Protime      9.0 - 12.5 sec   10.4   Coumadin Monitoring INR      0.8 - 1.2   1.0   aPTT      21.0 - 32.0 sec   23.8   Uric Acid      3.4 - 7.0 mg/dL   4.4   LD      110 - 260 U/L   206   PTH      9.0 - 77.0 pg/mL   208.0 (H)   Vit D, 25-Hydroxy      30 - 96 ng/mL   38   Magnesium      1.6 - 2.6 mg/dL 2.5     Tacrolimus Lvl      5.0 - 15.0 ng/mL <1.5 (L)         Diagnostics:  Renal Allograft Doppler (8/19/17):   The renal transplant in the right lower quadrant is normal in size, 10.3, with no focal abnormalities or hydronephrosis.  The bladder is not distended.  There is a small, 1.2 x 0.5 x 1.4 cm peritransplant fluid collection adjacent to the upper pole.  Cortical thickness and corticomedullary differentiation are preserved.    Duplex and color flow Doppler evaluation was performed.  The main renal arteries (two) and vein are patent with satisfactory waveforms.  The ratios of the peak systolic velocity of the main renal artery to the adjacent iliac artery are 0.7 and 0.3.  The resistive indices in the segmental arteries are mildly elevated, 0.75-0.76.    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/20/2004 (initially on PD for a few months but switched to HD after peritonitis), who is s/p DDRT (THYMO induction given recipient HIV+; KDPI 17%, WIT 29 minutes, CIT 7 hours and 2 minutes, CMV D-/R+) on 8/18/17  - low UOP   - renal doppler with adequate blood flow on 8/19/17  - given  hyperkalemia of 7.6 will perform dialysis today   - continue to monitor renal function and UOP           Delayed graft function of kidney    - hyperkalemia of 7.6 on 8/19/17 thus required dialysis           HIV nephropathy    - history of 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/20/2004 (initially on PD for a few months but switched to HD after peritonitis)          Need for prophylactic immunotherapy    - patient received induction with THYMO given recipient HIV+; THYMO dose #2 later in the evening  - will start Prograf 1 mg BID; goal trough 8-10  - will need to closely monitor for drug-drug interactions with HAART therapy, posaconazole, and Prograf   - continue MMF 1000 mg BID  - continue steroids per protocol  - continue to monitor for toxicities from immunosuppressive medications               At risk for opportunistic infections    - continue Bactrim for PJP prophylaxis for 1 year  - continue Valcyte for CMV prophylaxis for 3 months; CMV D-/R+  - continue nystatin for thrush prophylaxis for 1 month  - will start posaconazole for fungal prophylaxis once HAART therapy established per ID           HIV (human immunodeficiency virus infection)    - last CD4 count 213 (7/11/17) and HIV PCR 30 copies/mL  - transplant ID consulted and appreciate recs  - will follow-up on the HAART regimen Dr. Miramontes wants to start   - check HIV RNA PCR and CD4 count           Hyperkalemia    - low potassium diet   - dialysis today          Metabolic acidosis    - bicarb low at 20 but patient to get dialysis today   - continue to monitor           Hypophosphatemia    - will replete with IV sodium phos 15 mmol          Hypocalcemia    - calcium low thus will hold off on restarting outpatient sensipar; besides PTH only 208 from 8/18/17          Anemia of chronic disease    - Hb pre-op was 11.2 (8/18/17) and currently decreased to 8.7   - continue to monitor   - check iron panel and ferritin          Hyperlipidemia    - will need to restart  Crestor once able             Discharge Planning:  No Patient Care Coordination Note on file.      Bouchra Javier MD  Kidney Transplant  [facility]

## 2017-08-19 NOTE — TRANSFER OF CARE
"Anesthesia Transfer of Care Note    Patient: Demetrio Aguiar    Procedure(s) Performed: Procedure(s) (LRB):  TRANSPLANT-KIDNEY (N/A)    Patient location: PACU    Anesthesia Type: general    Transport from OR: Transported from OR on 6-10 L/min O2 by face mask with adequate spontaneous ventilation    Post pain: adequate analgesia    Post assessment: no apparent anesthetic complications and tolerated procedure well    Post vital signs: stable    Level of consciousness: awake, alert and oriented    Nausea/Vomiting: no nausea/vomiting    Complications: none    Transfer of care protocol was followed      Last vitals:   Visit Vitals  /67 (BP Location: Right arm, Patient Position: Lying)   Pulse 78   Temp 36.8 °C (98.2 °F) (Oral)   Resp 18   Ht 5' 8" (1.727 m)   Wt 69.2 kg (152 lb 8.9 oz)   SpO2 97%   BMI 23.20 kg/m²     "

## 2017-08-19 NOTE — ASSESSMENT & PLAN NOTE
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

## 2017-08-19 NOTE — ASSESSMENT & PLAN NOTE
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

## 2017-08-19 NOTE — ASSESSMENT & PLAN NOTE
- last CD4 count 213 (7/11/17) and HIV PCR 30 copies/mL  - transplant ID consulted and appreciate recs  - will follow-up on the HAART regimen Dr. Miramontes wants to start   - check HIV RNA PCR and CD4 count

## 2017-08-19 NOTE — ASSESSMENT & PLAN NOTE
- Hb pre-op was 11.2 (8/18/17) and currently decreased to 8.7   - continue to monitor   - check iron panel and ferritin

## 2017-08-19 NOTE — OP NOTE
Operative Report    Date of Procedure: 08/19/2017    Surgeon: Wesley Cisneros MD  First Assistant: Stephanie Sanches    Pre-operative Diagnosis: Allograft kidney for transplantation  Post-operative Diagnosis: Same    Procedure(s) Performed: Back Table Preparation of Kidney, Complex    Anesthesia: Not applicable  Estimated Blood Loss: Not applicable  Fluids Administered: Not applicable    Findings: as described below   Drains: not applicable    Preamble  Indications: This report describes only the backbench preparation of the kidney prior to transplantation.  The transplant operation itself is described in a separate report.    ABO Confirmation: Immediately following arrival of the donor organ and prior to implantation, a formal ABO confirmation was done according to hospital and [facility] policies.  I confirmed the [facility] ID number of the donor organ and the donor and recipient ABO types, directly verifying these data by comparison with the [facility] report.  This confirmation was personally done by an attending surgeon and circulating nurse, and is officially documented elsewhere.    Time-Out: A complete time out was carried out prior to the procedure, with confirmation of patient identity, correct procedure, correct operative site, appropriate antibiotic prophylaxis, review of any known allergies, and presence of all needed equipment.    Procedure in Detail  Prior to starting the operation, the right kidney  was prepared on the back table. Arterial anatomy was double. Venous anatomy was single. Ureteral anatomy was single. Back table vascular reconstruction was required, consisting of venous extension - utilizing the vena cava.  Unneeded fat was removed from the kidney, the vessels were cleaned of adherent tissue and tested for leaks, and the kidney was maintained at ice temperature in organ preservation solution until it was brought to the operative field.

## 2017-08-19 NOTE — PROGRESS NOTES
Notified Dr. Gordillo that 2nd hour urine output is 12ml again.  New order received for 250ml 5% Albumin. Will follow through and continue to monitor.

## 2017-08-19 NOTE — ANESTHESIA RELEASE NOTE
"Anesthesia Release from PACU Note    Patient: Demetrio Aguiar    Procedure(s) Performed: Procedure(s) (LRB):  TRANSPLANT-KIDNEY (N/A)    Anesthesia type: general    Post pain: Adequate analgesia    Post assessment: no apparent anesthetic complications    Last Vitals:   Visit Vitals  BP (!) 116/58   Pulse 96   Temp 37.3 °C (99.1 °F) (Skin)   Resp 15   Ht 5' 8" (1.727 m)   Wt 69.2 kg (152 lb 8.9 oz)   SpO2 96%   BMI 23.20 kg/m²       Post vital signs: stable    Level of consciousness: awake    Nausea/Vomiting: no nausea/no vomiting    Complications: none    Airway Patency: patent    Respiratory: unassisted    Cardiovascular: stable    Hydration: euvolemic  "

## 2017-08-19 NOTE — ANESTHESIA PROCEDURE NOTES
Arterial    Diagnosis: esrd    Patient location during procedure: done in OR  Procedure start time: 8/18/2017 9:35 PM  Procedure end time: 8/18/2017 9:45 PM  Staffing  Anesthesiologist: MACIEL BAUTISTA JR  Performed: anesthesiologist   Anesthesiologist was present at the time of the procedure.  Preanesthetic Checklist  Completed: patient identified, site marked, surgical consent, pre-op evaluation, timeout performed, IV checked, risks and benefits discussed, monitors and equipment checked and anesthesia consent givenArterial  Skin Prep: chlorhexidine gluconate  Local Infiltration: none  Orientation: right  Location: radial  Catheter Size: 20 G  Catheter placement by Anatomical landmarks. Heme positive aspiration all ports.Insertion Attempts: 1  Assessment  Dressing: secured with tape and tegaderm  Patient: Tolerated well

## 2017-08-19 NOTE — CONSULTS
"  [facility]  Adult Nutrition  Consult Note    SUMMARY     Recommendations    1. Continue current regular diet; encourage adequate PO intake as tolerated.   2. Add Boost Plus ONS to aid in caloric intake.   3. Post transplant nutrition education provided.   4. RD to monitor & follow-up.    Goals: PO intake >50%  Nutrition Goal Status: new  Communication of RD Recs: reviewed with RN    Reason for Assessment    Reason for Assessment: physician consult  Diagnosis: other (see comments) (S/p kidney tx)  Relevent Medical History: HIV, HLD   Interdisciplinary Rounds: did not attend     General Information Comments: S/p Kidney transplant this AM. Pt with poor appetite 2/2 nausea. Good appetite, stable wt PTA. Willing to try OS.    Nutrition Discharge Planning: Post transplant nutrition education provided. Food safety/drug interactions emphasized. General healthy diet recommended. RD name/contact information, education material left.  No other needs identified. Caregivers present.    Nutrition Prescription Ordered    Current Diet Order: Regular    Evaluation of Received Nutrients/Fluid Intake    IV Fluid (mL): 1200    Nutrition/Diet History    Patient Reported Diet/Restrictions/Preferences: general     Factors Affecting Nutritional Intake: nausea/vomiting    Labs/Tests/Procedures/Meds    Pertinent Labs Reviewed: reviewed, pertinent  Pertinent Labs Comments: BUN 37, Creat 9.5, GFR 7.2,   Pertinent Medications Reviewed: reviewed, pertinent  Pertinent Medications Comments: MVI, Prednisone, D5 @ 50 mL/hr    Physical Findings    Overall Physical Appearance: lethargic, listlessness, nourished     Oral/Mouth Cavity: WDL  Skin: other (see comments) (Incision/drain in abdomen)    Anthropometrics    Temp: 98.1 °F (36.7 °C)     Height: 5' 8" (172.7 cm)  Weight Method: Bed Scale  Weight: 69.2 kg (152 lb 8.9 oz)  Ideal Body Weight (IBW), Male: 154 lb     % Ideal Body Weight, Male (lb): 99.06 lb     BMI " (Calculated): 23.2  BMI Grade: 18.5-24.9 - normal    Estimated/Assessed Needs    Weight Used For Calorie Calculations: 69.2 kg (152 lb 8.9 oz)      Energy Calorie Requirements (kcal): 1983 kcal/d  Energy Need Method: [place]-St Jeor     Weight Used For Protein Calculations: 69.2 kg (152 lb 8.9 oz)  Protein Requirements: 83-97 g/d     Fluid Need Method: RDA Method, other (see comments) (Per MD or 1 mL/kcal)    Assessment and Plan    Increased nutrient needs related to physiological causes as evidenced by s/p kidney tx.  Status: New    Monitor and Evaluation    Food and Nutrient Intake: energy intake, food and beverage intake  Food and Nutrient Adminstration: diet order     Physical Activity and Function: nutrition-related ADLs and IADLs  Anthropometric Measurements: weight, weight change, body mass index  Biochemical Data, Medical Tests and Procedures: lipid profile, inflammatory profile, glucose/endocrine profile, gastrointestinal profile, electrolyte and renal panel  Nutrition-Focused Physical Findings: overall appearance    Nutrition Risk    Level of Risk: other (see comments) (2x/week)    Nutrition Follow-Up    RD Follow-up?: Yes

## 2017-08-19 NOTE — SUBJECTIVE & OBJECTIVE
Subjective:     History of Present Illness:   37 y.o. 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     Interval History: Patient with nausea and pain along incision this morning. Low UOP.Potassium elevated at 7.6 this morning thus will perform HD this morning.      Past Medical and Surgical History: Mr. Aguiar has a past medical history of Anemia; HIV infection; HIV nephropathy; Hyperlipidemia; and TB lung, latent.  He has a past surgical history that includes peritoneal dialysis catheter placement and Exploratory laparotomy w/ bowel resection.    Past Social and Family History: Mr. Aguiar reports that he has quit smoking. He smoked 0.50 packs per day. He does not have any smokeless tobacco history on file. He reports that he uses drugs, including Cocaine and Marijuana. He reports that he does not drink alcohol.His family history includes Cancer in his father; Diabetes in his maternal aunt and maternal uncle; Hypertension in his maternal grandmother and paternal grandmother; Lung cancer in his father; No Known Problems in his brother and mother.     Outpatient Medications:   Prescriptions Prior to Admission   Medication Sig Dispense Refill Last Dose    aspirin (ECOTRIN) 81 MG EC tablet Take 81 mg by mouth once daily.   8/18/2017 at 0900    calcium acetate (PHOSLO) 667 mg capsule Take 2,668 mg by mouth 3 (three) times daily with meals.   8/18/2017 at 0900    dolutegravir (TIVICAY) 50 mg Tab Take 50 mg by mouth once daily.   8/18/2017 at Unknown time    rilpivirine (EDURANT) 25 mg Tab Take 25 mg by mouth once daily.   8/18/2017 at Unknown time    rosuvastatin (CRESTOR) 20 MG tablet Take 20 mg by mouth once daily.    8/18/2017 at Unknown time    SENSIPAR 60 mg Tab TAKE 2 TABLETS ( 120MG ) BY MOUTH DAILY WITH EVENING MEAL  99 8/18/2017 at Unknown time    fexofenadine (ALLEGRA) 60 MG tablet Take 60 mg by mouth once daily.  5 Unknown at Unknown time    [DISCONTINUED] efavirenz (SUSTIVA) 600 mg Tab Take 600 mg by mouth every evening.   Taking    [DISCONTINUED] emtricitabine (EMTRIVA) 200 mg Cap Take 200 mg by mouth twice a week. Tues and Sat   Taking    [DISCONTINUED] niacin 250 MG Tab Take 250 mg by mouth nightly.   Taking    [DISCONTINUED] tenofovir (VIREAD) 300 mg Tab Take 300 mg by mouth once a week. Saturday   Taking       Inpatient Medications:    sodium chloride 0.9%   Intravenous Once    [START ON 8/20/2017] acetaminophen  650 mg Oral Q24H    [START ON 8/20/2017] anti-thymo immune glob (THYMOGLOBULIN -rabbit) IVPB  1.5 mg/kg/day Intravenous Daily    bisacodyl  10 mg Oral QHS    ceFAZolin 2 g/50mL Dextrose IVPB  2 g Intravenous Q8H    [START ON 8/20/2017] diphenhydrAMINE  25 mg Oral Q24H    docusate sodium  100 mg Oral TID    famotidine  20 mg Oral QHS    heparin (porcine)  5,000 Units Subcutaneous Q8H    [START ON 8/20/2017] methylPREDNISolone (SOLU-Medrol) IVPB (doses > 250 mg)  250 mg Intravenous Once    [START ON 8/21/2017] methylPREDNISolone sodium succinate  125 mg Intravenous Once    multivitamin  1 tablet Oral Daily    mycophenolate  1,000 mg Oral BID    nystatin  500,000 Units Mouth/Throat QID (PC + HS)    [START ON 8/22/2017] predniSONE  20 mg Oral Daily    sulfamethoxazole-trimethoprim 400-80mg  1 tablet Oral Daily    tacrolimus  1 mg Oral BID    valganciclovir  450 mg Oral Daily      sodium chloride 0.9% 1,000 mL (08/19/17 0300)    dextrose 5 % and 0.45 % NaCl 50 mL/hr at 08/19/17 0322    DOPamine 3 mcg/kg/min (08/19/17 0903)    glucagon (human recombinant)      hydromorphone in 0.9 % NaCl 6 mg/30 ml       sodium chloride 0.9%, sodium chloride 0.9%, acetaminophen, albumin human 25%,  albumin human 25%, dextrose 50%, [COMPLETED] dextrose 50% **AND** dextrose 50% **AND** [COMPLETED] insulin regular, diphenhydrAMINE, EPINEPHrine, glucagon (human recombinant), glucose, glucose, glucose, hydrocortisone sodium succinate, insulin aspart, naloxone      Intake/Output - Last 3 Shifts       08/17 0700 - 08/18 0659 08/18 0700 - 08/19 0659 08/19 0700 - 08/20 0659    P.O.  120 360    I.V. (mL/kg)  3945.8 (57) 351.9 (5.1)    Blood  250     Total Intake(mL/kg)  4315.8 (62.4) 711.9 (10.3)    Urine (mL/kg/hr)  33 96 (0.2)    Drains  60     Blood  200     Total Output   293 96    Net   +4022.8 +615.9                  Review of Systems   Constitutional: Positive for appetite change. Negative for activity change, chills, fatigue, fever and unexpected weight change.   HENT: Negative for hearing loss, sore throat and trouble swallowing.    Eyes: Negative for photophobia and visual disturbance.   Respiratory: Negative for cough, chest tightness, shortness of breath and wheezing.    Cardiovascular: Negative for chest pain, palpitations and leg swelling.   Gastrointestinal: Positive for abdominal pain and nausea. Negative for abdominal distention, constipation, diarrhea and vomiting.   Genitourinary: Positive for decreased urine volume. Negative for difficulty urinating, dysuria and hematuria.   Musculoskeletal: Negative for arthralgias and back pain.   Skin: Positive for wound (kidney transplant incision). Negative for rash.   Allergic/Immunologic: Positive for immunocompromised state.   Neurological: Negative for dizziness, tremors, seizures, syncope, weakness, light-headedness, numbness and headaches.   Hematological: Does not bruise/bleed easily.   Psychiatric/Behavioral: Negative for confusion, dysphoric mood and sleep disturbance.     Objective:     Vital Signs (Most Recent):  Temp: 98 °F (36.7 °C) (08/19/17 1120)  Pulse: 97 (08/19/17 1300)  Resp: 16 (08/19/17 1120)  BP: 112/70 (08/19/17 1300)  SpO2: 95 % (08/19/17  "1100) Vital Signs (24h Range):  Temp:  [97.9 °F (36.6 °C)-99.7 °F (37.6 °C)] 98 °F (36.7 °C)  Pulse:  [] 97  Resp:  [15-26] 16  SpO2:  [94 %-100 %] 95 %  BP: ()/(57-78) 112/70  Arterial Line BP: (123-175)/(57-98) 123/57     Weight: 69.2 kg (152 lb 8.9 oz)  Height: 5' 8" (172.7 cm)  Body mass index is 23.2 kg/m².    Physical Exam   Constitutional: He is oriented to person, place, and time. He appears well-developed and well-nourished. He appears distressed (mild secondary to pain and hiccups).   HENT:   Head: Normocephalic and atraumatic.   Right Ear: External ear normal.   Left Ear: External ear normal.   Nose: Nose normal.   Mouth/Throat: No oropharyngeal exudate.   Eyes: Conjunctivae and EOM are normal. Pupils are equal, round, and reactive to light. No scleral icterus.   Neck: Normal range of motion. Neck supple. No JVD present. No tracheal deviation present. No thyromegaly present.   Cardiovascular: Normal rate, regular rhythm, normal heart sounds and intact distal pulses.  Exam reveals no gallop and no friction rub.    No murmur heard.  Pulmonary/Chest: Effort normal and breath sounds normal. No respiratory distress. He has no wheezes. He has no rales.   Abdominal: Soft. He exhibits no distension and no mass. There is tenderness (mild). There is no rebound and no guarding. No hernia.   Musculoskeletal: He exhibits no edema.   Left thigh AVG +thrill/bruit   Lymphadenopathy:     He has no cervical adenopathy.   Neurological: He is alert and oriented to person, place, and time.   Skin: Skin is warm and dry. No rash noted. He is not diaphoretic. No erythema.   Psychiatric: He has a normal mood and affect. His behavior is normal.   Nursing note and vitals reviewed.      Significant Labs:  Component      Latest Ref Rng & Units 8/19/2017 8/19/2017 8/18/2017           9:23 AM  3:01 AM    WBC      3.90 - 12.70 K/uL 8.36  5.24   RBC      4.60 - 6.20 M/uL 2.84 (L)  3.76 (L)   Hemoglobin      14.0 - 18.0 g/dL " 8.7 (L)  11.2 (L)   Hematocrit      40.0 - 54.0 % 26.2 (L) 27.4 (L) 34.9 (L)   MCV      82 - 98 fL 92  93   MCH      27.0 - 31.0 pg 30.6  29.8   MCHC      32.0 - 36.0 g/dL 33.2  32.1   RDW      11.5 - 14.5 % 17.2 (H)  17.0 (H)   Platelets      150 - 350 K/uL 141 (L)  195   MPV      9.2 - 12.9 fL 9.6  10.1   Lymph #      1.0 - 4.8 K/uL CANCELED  0.9 (L)   Mono #      0.3 - 1.0 K/uL CANCELED  0.3   Eos #      0.0 - 0.5 K/uL CANCELED  0.1   Baso #      0.00 - 0.20 K/uL CANCELED  0.02   Gran%      38.0 - 73.0 % 97.0 (H)  74.1 (H)   Lymph%      18.0 - 48.0 % 1.0 (L)  17.7 (L)   Mono%      4.0 - 15.0 % 0.0 (L)  5.7   Eosinophil%      0.0 - 8.0 % 0.0  1.9   Basophil%      0.0 - 1.9 % 0.0  0.4   BANDS      % 2.0     Aniso       Slight     Poik       Slight     Poly       Occasional     Ovalocytes       Occasional     Differential Method       Manual  Automated   Gran #      1.8 - 7.7 K/uL   3.9   Sodium      136 - 145 mmol/L 132 (L) 136 139   Potassium      3.5 - 5.1 mmol/L 7.6 (HH) 4.6 5.6 (H)   Chloride      95 - 110 mmol/L 104 105 97   CO2      23 - 29 mmol/L 20 (L) 20 (L) 32 (H)   Glucose      70 - 110 mg/dL 157 (H) 165 (H) 87   BUN, Bld      6 - 20 mg/dL 40 (H) 37 (H) 32 (H)   Creatinine      0.5 - 1.4 mg/dL 10.3 (H) 9.5 (H) 9.4 (H)   Calcium      8.7 - 10.5 mg/dL 8.2 (L) 8.1 (L) 8.9   Total Protein      6.0 - 8.4 g/dL 6.3  8.8 (H)   Albumin      3.5 - 5.2 g/dL 3.0 (L) 2.9 (L) 3.9   Total Bilirubin      0.1 - 1.0 mg/dL 0.2  0.4   Alkaline Phosphatase      55 - 135 U/L 493 (H)  678 (H)   AST      10 - 40 U/L 27  21   ALT      10 - 44 U/L 11  15   Anion Gap      8 - 16 mmol/L 8 11 10   eGFR if African American      >60 mL/min/1.73 m:2 6.6 (A) 7.2 (A) 7.3 (A)   eGFR if non African American      >60 mL/min/1.73 m:2 5.7 (A) 6.3 (A) 6.3 (A)   Phosphorus      2.7 - 4.5 mg/dL 1.7 (L) 2.1 (L) 2.6 (L)   Cholesterol      120 - 199 mg/dL   189   Triglycerides      30 - 150 mg/dL   328 (H)   HDL      40 - 75 mg/dL   37 (L)   LDL  Cholesterol      63.0 - 159.0 mg/dL   86.4   HDL/Chol Ratio      20.0 - 50.0 %   19.6 (L)   Total Cholesterol/HDL Ratio      2.0 - 5.0   5.1 (H)   Non-HDL Cholesterol      mg/dL   152   Protime      9.0 - 12.5 sec   10.4   Coumadin Monitoring INR      0.8 - 1.2   1.0   aPTT      21.0 - 32.0 sec   23.8   Uric Acid      3.4 - 7.0 mg/dL   4.4   LD      110 - 260 U/L   206   PTH      9.0 - 77.0 pg/mL   208.0 (H)   Vit D, 25-Hydroxy      30 - 96 ng/mL   38   Magnesium      1.6 - 2.6 mg/dL 2.5     Tacrolimus Lvl      5.0 - 15.0 ng/mL <1.5 (L)         Diagnostics:  Renal Allograft Doppler (8/19/17):   The renal transplant in the right lower quadrant is normal in size, 10.3, with no focal abnormalities or hydronephrosis.  The bladder is not distended.  There is a small, 1.2 x 0.5 x 1.4 cm peritransplant fluid collection adjacent to the upper pole.  Cortical thickness and corticomedullary differentiation are preserved.    Duplex and color flow Doppler evaluation was performed.  The main renal arteries (two) and vein are patent with satisfactory waveforms.  The ratios of the peak systolic velocity of the main renal artery to the adjacent iliac artery are 0.7 and 0.3.  The resistive indices in the segmental arteries are mildly elevated, 0.75-0.76.

## 2017-08-19 NOTE — CONSULTS
[facility]  Infectious Disease  Consult Note    Patient Name: Demetrio Aguiar  MRN: [id number]  Admission Date: 8/18/2017  Hospital Length of Stay: 1 days  Attending Physician: Mykel Cheng MD  Primary Care Provider: Primary Doctor No     Isolation Status: No active isolations        Inpatient consult to Infectious Diseases  Consult performed by: IRVIN RASHEED  Consult ordered by: MYKEL MCGRAW  Reason for consult: kidney transplant surgery this evening, HIV+ on HAART        Consult received. Full note to follow.    Please call for questions.    Thanks,  Irvin Silver, PGY -4  ID Fellow  Pager: [phone]

## 2017-08-19 NOTE — ASSESSMENT & PLAN NOTE
- calcium low thus will hold off on restarting outpatient sensipar; besides PTH only 208 from 8/18/17

## 2017-08-19 NOTE — OP NOTE
Operative Report    Date of Procedure: 2017    Surgeons:  Surgeon(s) and Role:     * Stephanie Andujar MD - Resident - Assisting     * Wesley Cisnreos MD - Primary    Marvin De La Garza    First Assistant Attestation:  The indicated resident served as first assistant for this procedure.    Pre-operative Diagnosis: ESRD, requiring chronic dialysis secondary to HIV Nephropathy  Post-operative Diagnosis: Same    Procedure(s) Performed:   1. Back Table Preparation of Right Kidney    2.  - Brain Death Kidney transplant    Anesthesia: General endotracheal    Preamble  Indications and Patient Counseling: The patient is a 37 y.o. year-old male with end-stage kidney disease secondary to HIV Nephropathy who has been evaluated for a kidney transplant.  The procedure was thoroughly discussed with the patient, including potential risks, complications, and alternatives.  Specific complications mentioned included death, graft non-function, bleeding, infection, and rejection, as well as the possibility of other complications not specifically mentioned.    Donor Risk Factors: Prior to the operation, the patient was advised of any donor-specific risk factors requiring specific disclosure.  Factors in this case included nothing that required specific disclosure.      Specific PHS Increased Risk Behavior criteria for the organ donor include:  None    All questions were answered, the patient voiced appropriate understanding, and he agreed to proceed with the planned procedure.    ABO Confirmation: Immediately following arrival of the donor organ and prior to implantation, a formal ABO confirmation was done according to hospital and UNOS policies.  I confirmed the UNOS ID number ([id number]) of the donor organ and the donor and recipient ABO types, directly verifying these data by comparison with the UNOS Match Run report ([id number]).  This confirmation was personally done by an attending surgeon and circulating nurse, and is  officially documented elsewhere.    Time-Out: A complete time out was carried out prior to incision, with confirmation of patient identity, correct procedure, correct operative site, appropriate antibiotic prophylaxis, review of any known allergies, and presence of all needed equipment.    Procedure in Detail  Prior to starting the operation, the right kidney  was prepared on the back table. Arterial anatomy was double. Venous anatomy was single. Ureteral anatomy was single. Back table vascular reconstruction was required, consisting of venous extension.  Unneeded fat was removed from the kidney, the vessels were cleaned of adherent tissue and tested for leaks, and the kidney was maintained at ice temperature in organ preservation solution until it was brought to the operative field.     The patient was brought into the operating room and placed in a supine position on the OR table.  After the induction of general endotracheal anesthesia, lines were placed by the anesthesiologist.  The urinary bladder was catheterized and irrigated with antibiotic solution.  There was no tension on the axillae and all pressure points were padded.  Sequential compression boots were used as were Sally Huggers.  The abdomen was prepped and draped in the usual sterile fashion.  Skin was incised over the right with a knife and deepened with electrocautery.  The peritoneum and its contents were swept medially, exposing the right external iliac artery and the right external iliac vein.  Of NOte - thre was significantly Lymphatic tissue around the vessels - this lymphatic tissue was serially ligated and cut to gain access to the vessels. This was done as meticulously as possible to decrease the risk of a subsequent lymphocele. The Bookwalter retractor was used to provide exposure.  Overlying lymphatics were ligated or cauterized and the vessels were dissected free for a length compatible with anastomosis.  The kidney was brought to the OR  table at 8/18/2017 11:48 PM.  Venous control was obtained with a vascular clamp.  A venotomy was made, the vein irrigated, and an lengthened renal to right external iliac vein anastomosis was created with 5-0 polypropylene.  Arterial control was obtained with a vascular clamp.  Arteriotomy was made, the artery irrigated, and an aortic patch to right external iliac artery anastomosis was created with 6-0 polypropylene.  The kidney was unclamped and reperfused at 8/19/2017 12:17 AM.  Reperfusion quality was good. Intraoperative urine production was not observed.  After hemostasis was obtained, a Lich uretero-neocystostomy was created.  The bladder was filled and identified, opened, and the anastomosis created using 6-0 PDS.  The bladder muscle was closed over the distal ureter to create an antireflux tunnel. The bladder was muscle was fairly atrophic.  A ureteral stent was used.  With the kidney well perfused and sitting appropriately without tension on the anastomoses, viscera were replaced in their usual position.  The wound was closed after a final check for hemostasis.  Overall, the graft quality was assessed to be good. At the end of the case the needle, sponge and instrument counts were all correct.  Sterile dressings were applied and the patient was brought to the recovery room/ICU in good condition.  At the end of the case the kidney appeared pink and there was a good pulse in both the arteries and also the distal iliac.      Estimated Blood Loss: 200 mL  Fluids Administered:    Drains: 19f Eliceo drains x 1  Specimens: none    Findings:    Organ Transplanted: Right Kidney    Arterial Anatomy: double  Number of Arteries: 2  Configuration of Multiple Arteries: common patch   Venous Anatomy: single  Number of Veins: 1  Ureteral Anatomy: single  Number of Ureters: 1  Reperfusion Quality: good  Overall Graft Quality: good  Intraoperative Urine Production: no  [place]: not to be removed before 4 days.  Ureteral Stent:  Yes    Ischemic Times:   Anastomosis (warm ischemia) time: 29 minutes   Cold ischemia time: 422 minutes  Total ischemia time: 451 minutes    Donor Data:  UNOS ID:  [id number]  UNOS Match Run:  [id number]  Donor Type:   - Brain Death  Donor CMV Status:  Negative  Donor HBcAB:  Negative  Donor HCV Status:  Negative

## 2017-08-19 NOTE — ASSESSMENT & PLAN NOTE
- continue Bactrim for PJP prophylaxis for 1 year  - continue Valcyte for CMV prophylaxis for 3 months; CMV D-/R+  - continue nystatin for thrush prophylaxis for 1 month  - will start posaconazole for fungal prophylaxis once HAART therapy established per ID

## 2017-08-20 LAB
ALBUMIN SERPL BCP-MCNC: 2.8 G/DL
ALP SERPL-CCNC: 471 U/L
ALT SERPL W/O P-5'-P-CCNC: <5 U/L
ANION GAP SERPL CALC-SCNC: 7 MMOL/L
AST SERPL-CCNC: 27 U/L
BASOPHILS # BLD AUTO: 0 K/UL
BASOPHILS # BLD AUTO: 0 K/UL
BASOPHILS NFR BLD: 0 %
BASOPHILS NFR BLD: 0 %
BILIRUB SERPL-MCNC: 0.2 MG/DL
BUN SERPL-MCNC: 25 MG/DL
CALCIUM SERPL-MCNC: 8.3 MG/DL
CHLORIDE SERPL-SCNC: 94 MMOL/L
CO2 SERPL-SCNC: 32 MMOL/L
CREAT SERPL-MCNC: 7.2 MG/DL
DIFFERENTIAL METHOD: ABNORMAL
DIFFERENTIAL METHOD: ABNORMAL
EOSINOPHIL # BLD AUTO: 0 K/UL
EOSINOPHIL # BLD AUTO: 0.1 K/UL
EOSINOPHIL NFR BLD: 0 %
EOSINOPHIL NFR BLD: 2.3 %
ERYTHROCYTE [DISTWIDTH] IN BLOOD BY AUTOMATED COUNT: 17 %
ERYTHROCYTE [DISTWIDTH] IN BLOOD BY AUTOMATED COUNT: 17.1 %
EST. GFR  (AFRICAN AMERICAN): 10.1 ML/MIN/1.73 M^2
EST. GFR  (NON AFRICAN AMERICAN): 8.7 ML/MIN/1.73 M^2
GLUCOSE SERPL-MCNC: 122 MG/DL
HCT VFR BLD AUTO: 25.8 %
HCT VFR BLD AUTO: 26.5 %
HGB BLD-MCNC: 8.5 G/DL
HGB BLD-MCNC: 8.9 G/DL
LDH SERPL L TO P-CCNC: 234 U/L
LYMPHOCYTES # BLD AUTO: 0 K/UL
LYMPHOCYTES # BLD AUTO: 0.1 K/UL
LYMPHOCYTES NFR BLD: 0.6 %
LYMPHOCYTES NFR BLD: 2.3 %
MAGNESIUM SERPL-MCNC: 2.2 MG/DL
MCH RBC QN AUTO: 29.8 PG
MCH RBC QN AUTO: 30.2 PG
MCHC RBC AUTO-ENTMCNC: 32.9 G/DL
MCHC RBC AUTO-ENTMCNC: 33.6 G/DL
MCV RBC AUTO: 90 FL
MCV RBC AUTO: 91 FL
MONOCYTES # BLD AUTO: 0.1 K/UL
MONOCYTES # BLD AUTO: 0.2 K/UL
MONOCYTES NFR BLD: 0.9 %
MONOCYTES NFR BLD: 5 %
NEUTROPHILS # BLD AUTO: 4 K/UL
NEUTROPHILS # BLD AUTO: 5.3 K/UL
NEUTROPHILS NFR BLD: 90.2 %
NEUTROPHILS NFR BLD: 98.3 %
PHOSPHATE SERPL-MCNC: 4.4 MG/DL
PLATELET # BLD AUTO: 125 K/UL
PLATELET # BLD AUTO: 135 K/UL
PMV BLD AUTO: 9.3 FL
PMV BLD AUTO: 9.4 FL
POCT GLUCOSE: 122 MG/DL (ref 70–110)
POCT GLUCOSE: 133 MG/DL (ref 70–110)
POTASSIUM SERPL-SCNC: 4.7 MMOL/L
PROT SERPL-MCNC: 6.3 G/DL
RBC # BLD AUTO: 2.85 M/UL
RBC # BLD AUTO: 2.95 M/UL
SODIUM SERPL-SCNC: 133 MMOL/L
TACROLIMUS BLD-MCNC: <1.5 NG/ML
WBC # BLD AUTO: 4.43 K/UL
WBC # BLD AUTO: 5.34 K/UL

## 2017-08-20 PROCEDURE — 80197 ASSAY OF TACROLIMUS: CPT

## 2017-08-20 PROCEDURE — 86361 T CELL ABSOLUTE COUNT: CPT

## 2017-08-20 PROCEDURE — 99233 SBSQ HOSP IP/OBS HIGH 50: CPT | Mod: GC,,, | Performed by: INTERNAL MEDICINE

## 2017-08-20 PROCEDURE — 80053 COMPREHEN METABOLIC PANEL: CPT

## 2017-08-20 PROCEDURE — 25000003 PHARM REV CODE 250: Performed by: SURGERY

## 2017-08-20 PROCEDURE — 83735 ASSAY OF MAGNESIUM: CPT

## 2017-08-20 PROCEDURE — 85025 COMPLETE CBC W/AUTO DIFF WBC: CPT

## 2017-08-20 PROCEDURE — 63600175 PHARM REV CODE 636 W HCPCS: Performed by: STUDENT IN AN ORGANIZED HEALTH CARE EDUCATION/TRAINING PROGRAM

## 2017-08-20 PROCEDURE — 84100 ASSAY OF PHOSPHORUS: CPT

## 2017-08-20 PROCEDURE — 20600001 HC STEP DOWN PRIVATE ROOM

## 2017-08-20 PROCEDURE — 63600175 PHARM REV CODE 636 W HCPCS: Performed by: INTERNAL MEDICINE

## 2017-08-20 PROCEDURE — 25000003 PHARM REV CODE 250: Performed by: STUDENT IN AN ORGANIZED HEALTH CARE EDUCATION/TRAINING PROGRAM

## 2017-08-20 PROCEDURE — 83615 LACTATE (LD) (LDH) ENZYME: CPT

## 2017-08-20 PROCEDURE — S5010 5% DEXTROSE AND 0.45% SALINE: HCPCS | Performed by: STUDENT IN AN ORGANIZED HEALTH CARE EDUCATION/TRAINING PROGRAM

## 2017-08-20 RX ORDER — SULFAMETHOXAZOLE AND TRIMETHOPRIM 400; 80 MG/1; MG/1
1 TABLET ORAL
Status: DISCONTINUED | OUTPATIENT
Start: 2017-08-21 | End: 2017-08-26 | Stop reason: HOSPADM

## 2017-08-20 RX ORDER — TACROLIMUS 1 MG/1
2 CAPSULE ORAL ONCE
Status: COMPLETED | OUTPATIENT
Start: 2017-08-20 | End: 2017-08-20

## 2017-08-20 RX ORDER — HYDROMORPHONE HYDROCHLORIDE 2 MG/1
4 TABLET ORAL
Status: DISCONTINUED | OUTPATIENT
Start: 2017-08-20 | End: 2017-08-26 | Stop reason: HOSPADM

## 2017-08-20 RX ORDER — ONDANSETRON 2 MG/ML
4 INJECTION INTRAMUSCULAR; INTRAVENOUS EVERY 6 HOURS PRN
Status: DISCONTINUED | OUTPATIENT
Start: 2017-08-20 | End: 2017-08-26 | Stop reason: HOSPADM

## 2017-08-20 RX ORDER — HYDROMORPHONE HYDROCHLORIDE 2 MG/1
2 TABLET ORAL
Status: DISCONTINUED | OUTPATIENT
Start: 2017-08-20 | End: 2017-08-26 | Stop reason: HOSPADM

## 2017-08-20 RX ORDER — TACROLIMUS 1 MG/1
3 CAPSULE ORAL 2 TIMES DAILY
Status: DISCONTINUED | OUTPATIENT
Start: 2017-08-20 | End: 2017-08-21

## 2017-08-20 RX ORDER — FUROSEMIDE 10 MG/ML
100 INJECTION INTRAMUSCULAR; INTRAVENOUS ONCE
Status: COMPLETED | OUTPATIENT
Start: 2017-08-20 | End: 2017-08-20

## 2017-08-20 RX ORDER — VALGANCICLOVIR 450 MG/1
450 TABLET, FILM COATED ORAL
Status: DISCONTINUED | OUTPATIENT
Start: 2017-08-21 | End: 2017-08-26 | Stop reason: HOSPADM

## 2017-08-20 RX ADMIN — ANTI-THYMOCYTE GLOBULIN (RABBIT) 100 MG: 5 INJECTION, POWDER, LYOPHILIZED, FOR SOLUTION INTRAVENOUS at 10:08

## 2017-08-20 RX ADMIN — Medication: at 10:08

## 2017-08-20 RX ADMIN — THERA TABS 1 TABLET: TAB at 09:08

## 2017-08-20 RX ADMIN — TACROLIMUS 3 MG: 1 CAPSULE ORAL at 05:08

## 2017-08-20 RX ADMIN — NYSTATIN 500000 UNITS: 500000 SUSPENSION ORAL at 02:08

## 2017-08-20 RX ADMIN — TACROLIMUS 1 MG: 1 CAPSULE ORAL at 09:08

## 2017-08-20 RX ADMIN — DOCUSATE SODIUM 100 MG: 50 CAPSULE, LIQUID FILLED ORAL at 02:08

## 2017-08-20 RX ADMIN — HEPARIN SODIUM 5000 UNITS: 5000 INJECTION, SOLUTION INTRAVENOUS; SUBCUTANEOUS at 07:08

## 2017-08-20 RX ADMIN — DEXTROSE AND SODIUM CHLORIDE: 5; .45 INJECTION, SOLUTION INTRAVENOUS at 07:08

## 2017-08-20 RX ADMIN — ACETAMINOPHEN 650 MG: 325 TABLET ORAL at 10:08

## 2017-08-20 RX ADMIN — DOCUSATE SODIUM 100 MG: 50 CAPSULE, LIQUID FILLED ORAL at 07:08

## 2017-08-20 RX ADMIN — MYCOPHENOLATE MOFETIL 1000 MG: 250 CAPSULE ORAL at 07:08

## 2017-08-20 RX ADMIN — MYCOPHENOLATE MOFETIL 1000 MG: 250 CAPSULE ORAL at 09:08

## 2017-08-20 RX ADMIN — HYDROMORPHONE HYDROCHLORIDE 4 MG: 2 TABLET ORAL at 07:08

## 2017-08-20 RX ADMIN — HYDROMORPHONE HYDROCHLORIDE 4 MG: 2 TABLET ORAL at 03:08

## 2017-08-20 RX ADMIN — DEXTROSE 250 MG: 50 INJECTION, SOLUTION INTRAVENOUS at 09:08

## 2017-08-20 RX ADMIN — HEPARIN SODIUM 5000 UNITS: 5000 INJECTION, SOLUTION INTRAVENOUS; SUBCUTANEOUS at 02:08

## 2017-08-20 RX ADMIN — DIPHENHYDRAMINE HYDROCHLORIDE 25 MG: 25 CAPSULE ORAL at 10:08

## 2017-08-20 RX ADMIN — BISACODYL 10 MG: 5 TABLET, COATED ORAL at 07:08

## 2017-08-20 RX ADMIN — TACROLIMUS 2 MG: 1 CAPSULE ORAL at 02:08

## 2017-08-20 RX ADMIN — NYSTATIN 500000 UNITS: 500000 SUSPENSION ORAL at 07:08

## 2017-08-20 RX ADMIN — DOCUSATE SODIUM 100 MG: 50 CAPSULE, LIQUID FILLED ORAL at 06:08

## 2017-08-20 RX ADMIN — HEPARIN SODIUM 5000 UNITS: 5000 INJECTION, SOLUTION INTRAVENOUS; SUBCUTANEOUS at 06:08

## 2017-08-20 RX ADMIN — FUROSEMIDE 100 MG: 10 INJECTION, SOLUTION INTRAVENOUS at 10:08

## 2017-08-20 RX ADMIN — NYSTATIN 500000 UNITS: 500000 SUSPENSION ORAL at 09:08

## 2017-08-20 RX ADMIN — FAMOTIDINE 20 MG: 20 TABLET, FILM COATED ORAL at 07:08

## 2017-08-20 RX ADMIN — ONDANSETRON 4 MG: 2 INJECTION INTRAMUSCULAR; INTRAVENOUS at 11:08

## 2017-08-20 NOTE — SUBJECTIVE & OBJECTIVE
Subjective:     37 y.o. 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   Required HD on POD 1 for hyperkalemia. US doppler revealed mildly elevated RIs but otherwise adequate flows to the kidney.    Interval history:  No AEON. HD yesterday for 4 hours. 238 cc of urine output recorded in the last 24 hours. Dopamine gtt stopped. Given a dose of Lasix 100 this AM. Good oral intake, flatus +, no BMs yet. Will get Thymo #3 today. Prograf level is still undetectable.          Past Medical, Surgical, Family, and Social History:   Unchanged from H&P.    Review of patient's allergies indicates:  No Known Allergies    Scheduled Meds: sodium chloride 0.9%   Intravenous Once    acetaminophen  650 mg Oral Q24H    anti-thymo immune glob (THYMOGLOBULIN -rabbit) IVPB  1.5 mg/kg/day Intravenous Daily    bisacodyl  10 mg Oral QHS    diphenhydrAMINE  25 mg Oral Q24H    docusate sodium  100 mg Oral TID    famotidine  20 mg Oral QHS    furosemide  100 mg Intravenous Once    heparin (porcine)  5,000 Units Subcutaneous Q8H    [START ON 8/21/2017] methylPREDNISolone sodium succinate  125 mg Intravenous Once    multivitamin  1 tablet Oral Daily    mycophenolate  1,000 mg Oral BID    nystatin  500,000 Units Mouth/Throat QID (PC + HS)    [START ON 8/22/2017] predniSONE  20 mg Oral Daily    [START ON 8/21/2017] sulfamethoxazole-trimethoprim 400-80mg  1 tablet Oral Every Mon, Wed, Fri    tacrolimus  1 mg Oral BID    [START ON 8/21/2017] valganciclovir  450 mg Oral Every Mon, Wed, Fri     Continuous Infusions:   sodium chloride 0.9% 1,000 mL (08/19/17 0300)    dextrose 5 % and 0.45 % NaCl 50 mL/hr at 08/19/17 2145    DOPamine 3  "mcg/kg/min (08/19/17 0903)    glucagon (human recombinant)       PRN Meds:sodium chloride 0.9%, sodium chloride 0.9%, albumin human 25%, albumin human 25%, dextrose 50%, glucagon (human recombinant), glucose, glucose, glucose, HYDROmorphone, HYDROmorphone, insulin aspart, naloxone    Intake/Output - Last 3 Shifts       08/18 0700 - 08/19 0659 08/19 0700 - 08/20 0659 08/20 0700 - 08/21 0659    P.O. 120 1140     I.V. (mL/kg) 3945.8 (57) 1223.7 (17.7)     Blood 250      IV Piggyback  300     Total Intake(mL/kg) 4315.8 (62.4) 2663.7 (38.5)     Urine (mL/kg/hr) 33 238 (0.1) 25 (0.1)    Drains 60 150 (0.1)     Stool  0 (0)     Blood 200      Total Output 293 388 25    Net +4022.8 +2275.7 -25           Stool Occurrence  0 x            Review of Systems   Constitutional: Negative for chills, fatigue and fever.   HENT: Negative.    Eyes: Negative.    Respiratory: Negative for cough, shortness of breath and wheezing.    Cardiovascular: Negative for chest pain, palpitations and leg swelling.   Gastrointestinal: Positive for abdominal pain. Negative for abdominal distention, constipation, diarrhea, nausea and vomiting.   Genitourinary: Positive for decreased urine volume and hematuria. Negative for dysuria and flank pain.   Musculoskeletal: Negative for gait problem.   Skin: Positive for wound.   Allergic/Immunologic: Positive for immunocompromised state.   Neurological: Negative for tremors, light-headedness and headaches.   Psychiatric/Behavioral: Negative for behavioral problems and dysphoric mood.      Objective:     Vital Signs (Most Recent):  Temp: 98.2 °F (36.8 °C) (08/20/17 0759)  Pulse: 78 (08/20/17 1015)  Resp: 18 (08/20/17 1015)  BP: (!) 140/83 (08/20/17 1015)  SpO2: 97 % (08/20/17 1015) Vital Signs (24h Range):  Temp:  [97.4 °F (36.3 °C)-99 °F (37.2 °C)] 98.2 °F (36.8 °C)  Pulse:  [] 78  Resp:  [16-20] 18  SpO2:  [95 %-98 %] 97 %  BP: (102-140)/(67-83) 140/83     Weight: 69.2 kg (152 lb 8.9 oz)  Height: 5' 8" " (172.7 cm)  Body mass index is 23.2 kg/m².    Physical Exam   Constitutional: He is oriented to person, place, and time. He appears well-nourished.   Cardiovascular: Normal rate and regular rhythm.    Pulmonary/Chest: Effort normal and breath sounds normal. No respiratory distress. He has no rales.   Abdominal: Soft. Bowel sounds are normal. He exhibits no distension. There is tenderness.       Neurological: He is alert and oriented to person, place, and time.   Vitals reviewed.      Laboratory:  CBC:   Recent Labs  Lab 08/20/17  0545   WBC 4.43   RBC 2.85*   HGB 8.5*   HCT 25.8*   *   MCV 91   MCH 29.8   MCHC 32.9     BMP:   Recent Labs  Lab 08/20/17  0545   *   *   K 4.7   CL 94*   CO2 32*   BUN 25*   CREATININE 7.2*   CALCIUM 8.3*     Tacrolimus Lvl   Date Value Ref Range Status   08/20/2017 <1.5 (L) 5.0 - 15.0 ng/mL Final     Comment:     Testing performed by Liquid Chromatography-Tandem  Mass Spectrometry (LC-MS/MS).  This test was developed and its performance characteristics  determined by [facility], Department of Pathology  and Laboratory Medicine in a manner consistent with CLIA  requirements. It has not been cleared or approved by the [place]  Food and Drug Administration.  This test is used for clinical   purposes.  It should not be regarded as investigational or for  research.     08/19/2017 <1.5 (L) 5.0 - 15.0 ng/mL Final     Comment:     Testing performed by Liquid Chromatography-Tandem  Mass Spectrometry (LC-MS/MS).  This test was developed and its performance characteristics  determined by [facility], Department of Pathology  and Laboratory Medicine in a manner consistent with CLIA  requirements. It has not been cleared or approved by the [place]  Food and Drug Administration.  This test is used for clinical   purposes.  It should not be regarded as investigational or for  research.         Labs within the past 24 hours have been reviewed.    Diagnostic  Results:  Labs: Reviewed  US: Reviewed

## 2017-08-20 NOTE — ASSESSMENT & PLAN NOTE
- patient received induction with THYMO given recipient HIV+; THYMO dose #2 later in the evening  - started on Prograf 1 mg BID; goal trough 8-10  - will need to closely monitor for drug-drug interactions with HAART therapy, posaconazole, and Prograf   - continue MMF 1000 mg BID  - continue steroids per protocol  - continue to monitor for toxicities from immunosuppressive medications

## 2017-08-20 NOTE — ASSESSMENT & PLAN NOTE
- hyperkalemia of 7.6 on 8/19/17 thus required dialysis   - kidney starting to produce a small amount of urine.  - Cr trending up.  - no need for HD today.

## 2017-08-20 NOTE — PROGRESS NOTES
[facility]  Kidney Transplant  Progress Note      Reason for Follow-up: Reassessment of Kidney Transplant - 2017  (#1) recipient and management of immunosuppression.    ORGAN:  RIGHT KIDNEY   Donor Type:   - Brain Death   PHS Increased Risk: no   Cold Ischemia:   Induction Medications: Thymoglobulin      Subjective:     37 y.o. 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   Required HD on POD 1 for hyperkalemia. US doppler revealed mildly elevated RIs but otherwise adequate flows to the kidney.    Interval history:  No AEON. HD yesterday for 4 hours. 238 cc of urine output recorded in the last 24 hours. Dopamine gtt stopped. Given a dose of Lasix 100 this AM. Good oral intake, flatus +, no BMs yet. Will get Thymo #3 today. Prograf level is still undetectable.          Past Medical, Surgical, Family, and Social History:   Unchanged from H&P.    Review of patient's allergies indicates:  No Known Allergies    Scheduled Meds: sodium chloride 0.9%   Intravenous Once    acetaminophen  650 mg Oral Q24H    anti-thymo immune glob (THYMOGLOBULIN -rabbit) IVPB  1.5 mg/kg/day Intravenous Daily    bisacodyl  10 mg Oral QHS    diphenhydrAMINE  25 mg Oral Q24H    docusate sodium  100 mg Oral TID    famotidine  20 mg Oral QHS    furosemide  100 mg Intravenous Once    heparin (porcine)  5,000 Units Subcutaneous Q8H    [START ON 2017] methylPREDNISolone sodium succinate  125 mg Intravenous Once    multivitamin  1 tablet Oral Daily    mycophenolate  1,000 mg Oral BID    nystatin  500,000 Units Mouth/Throat QID (PC + HS)    [START ON 2017] predniSONE  20 mg Oral Daily    [START ON  8/21/2017] sulfamethoxazole-trimethoprim 400-80mg  1 tablet Oral Every Mon, Wed, Fri    tacrolimus  1 mg Oral BID    [START ON 8/21/2017] valganciclovir  450 mg Oral Every Mon, Wed, Fri     Continuous Infusions:   sodium chloride 0.9% 1,000 mL (08/19/17 0300)    dextrose 5 % and 0.45 % NaCl 50 mL/hr at 08/19/17 2145    DOPamine 3 mcg/kg/min (08/19/17 0903)    glucagon (human recombinant)       PRN Meds:sodium chloride 0.9%, sodium chloride 0.9%, albumin human 25%, albumin human 25%, dextrose 50%, glucagon (human recombinant), glucose, glucose, glucose, HYDROmorphone, HYDROmorphone, insulin aspart, naloxone    Intake/Output - Last 3 Shifts       08/18 0700 - 08/19 0659 08/19 0700 - 08/20 0659 08/20 0700 - 08/21 0659    P.O. 120 1140     I.V. (mL/kg) 3945.8 (57) 1223.7 (17.7)     Blood 250      IV Piggyback  300     Total Intake(mL/kg) 4315.8 (62.4) 2663.7 (38.5)     Urine (mL/kg/hr) 33 238 (0.1) 25 (0.1)    Drains 60 150 (0.1)     Stool  0 (0)     Blood 200      Total Output 293 388 25    Net +4022.8 +2275.7 -25           Stool Occurrence  0 x            Review of Systems   Constitutional: Negative for chills, fatigue and fever.   HENT: Negative.    Eyes: Negative.    Respiratory: Negative for cough, shortness of breath and wheezing.    Cardiovascular: Negative for chest pain, palpitations and leg swelling.   Gastrointestinal: Positive for abdominal pain. Negative for abdominal distention, constipation, diarrhea, nausea and vomiting.   Genitourinary: Positive for decreased urine volume and hematuria. Negative for dysuria and flank pain.   Musculoskeletal: Negative for gait problem.   Skin: Positive for wound.   Allergic/Immunologic: Positive for immunocompromised state.   Neurological: Negative for tremors, light-headedness and headaches.   Psychiatric/Behavioral: Negative for behavioral problems and dysphoric mood.      Objective:     Vital Signs (Most Recent):  Temp: 98.2 °F (36.8 °C) (08/20/17 0759)  Pulse:  "78 (08/20/17 1015)  Resp: 18 (08/20/17 1015)  BP: (!) 140/83 (08/20/17 1015)  SpO2: 97 % (08/20/17 1015) Vital Signs (24h Range):  Temp:  [97.4 °F (36.3 °C)-99 °F (37.2 °C)] 98.2 °F (36.8 °C)  Pulse:  [] 78  Resp:  [16-20] 18  SpO2:  [95 %-98 %] 97 %  BP: (102-140)/(67-83) 140/83     Weight: 69.2 kg (152 lb 8.9 oz)  Height: 5' 8" (172.7 cm)  Body mass index is 23.2 kg/m².    Physical Exam   Constitutional: He is oriented to person, place, and time. He appears well-nourished.   Cardiovascular: Normal rate and regular rhythm.    Pulmonary/Chest: Effort normal and breath sounds normal. No respiratory distress. He has no rales.   Abdominal: Soft. Bowel sounds are normal. He exhibits no distension. There is tenderness.       Neurological: He is alert and oriented to person, place, and time.   Vitals reviewed.      Laboratory:  CBC:   Recent Labs  Lab 08/20/17  0545   WBC 4.43   RBC 2.85*   HGB 8.5*   HCT 25.8*   *   MCV 91   MCH 29.8   MCHC 32.9     BMP:   Recent Labs  Lab 08/20/17  0545   *   *   K 4.7   CL 94*   CO2 32*   BUN 25*   CREATININE 7.2*   CALCIUM 8.3*     Tacrolimus Lvl   Date Value Ref Range Status   08/20/2017 <1.5 (L) 5.0 - 15.0 ng/mL Final     Comment:     Testing performed by Liquid Chromatography-Tandem  Mass Spectrometry (LC-MS/MS).  This test was developed and its performance characteristics  determined by [facility], Department of Pathology  and Laboratory Medicine in a manner consistent with CLIA  requirements. It has not been cleared or approved by the [place]  Food and Drug Administration.  This test is used for clinical   purposes.  It should not be regarded as investigational or for  research.     08/19/2017 <1.5 (L) 5.0 - 15.0 ng/mL Final     Comment:     Testing performed by Liquid Chromatography-Tandem  Mass Spectrometry (LC-MS/MS).  This test was developed and its performance characteristics  determined by [facility], Department of Pathology  and " Laboratory Medicine in a manner consistent with CLIA  requirements. It has not been cleared or approved by the [place]  Food and Drug Administration.  This test is used for clinical   purposes.  It should not be regarded as investigational or for  research.         Labs within the past 24 hours have been reviewed.    Diagnostic Results:  Labs: Reviewed  US: Reviewed    Assessment/Plan:     Hypocalcemia    - calcium low thus will hold off on restarting outpatient sensipar; besides PTH only 208 from 8/18/17          Anemia of chronic disease    - Hb pre-op was 11.2 (8/18/17). Currently stable at 8.5  - today hematuria noticed as well as serosanguinous drain output  - repeat CBC this afternoon  - continue to monitor   - check iron panel and ferritin          Hyperkalemia    - low potassium diet   - dialyzed yesterday.  - today WN range          Metabolic acidosis    - bicarb corrected after dialysis and today is actually slightly elevated  - continue to monitor           Hypophosphatemia    - repleted with IV sodium phos 15 mmol  - continue to monitor          Delayed graft function of kidney    - hyperkalemia of 7.6 on 8/19/17 thus required dialysis   - kidney starting to produce a small amount of urine.  - Cr trending up.  - no need for HD today.          Need for prophylactic immunotherapy    - patient received induction with THYMO given recipient HIV+; THYMO dose #2 later in the evening  - started on Prograf 1 mg BID; goal trough 8-10  - will need to closely monitor for drug-drug interactions with HAART therapy, posaconazole, and Prograf   - continue MMF 1000 mg BID  - continue steroids per protocol  - continue to monitor for toxicities from immunosuppressive medications               At risk for opportunistic infections    - continue Bactrim for PJP prophylaxis for 1 year  - continue Valcyte for CMV prophylaxis for 3 months; CMV D-/R+  - continue nystatin for thrush prophylaxis for 1 month  - will start posaconazole  for fungal prophylaxis once HAART therapy established per ID           HIV (human immunodeficiency virus infection)    - last CD4 count 213 (7/11/17) and HIV PCR 30 copies/mL  - transplant ID consulted and appreciate recs  - will change RPV to emtricitabine/TDF and cont DOL and upon discharge may transition TDF to TAF (+emtricitabine).  May hold all ARV for now until all DDIs determined and start in next few days.  - will follow-up on the HAART regimen Dr. 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 Creatinine trending up on this AM labs.  - no need for HD as of now.  - continue to monitor renal function and UOP               Discharge Planning:  No Patient Care Coordination Note on file.      Stephanie Tai MD  Kidney Transplant  [facility]

## 2017-08-20 NOTE — ASSESSMENT & PLAN NOTE
- Hb pre-op was 11.2 (8/18/17). Currently stable at 8.5  - today hematuria noticed as well as serosanguinous drain output  - repeat CBC this afternoon  - continue to monitor   - check iron panel and ferritin

## 2017-08-20 NOTE — ASSESSMENT & PLAN NOTE
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 Creatinine trending up on this AM labs.  - no need for HD as of now.  - continue to monitor renal function and UOP

## 2017-08-20 NOTE — ASSESSMENT & PLAN NOTE
- patient received induction with THYMO given recipient HIV+; THYMO dose #3 today   - increasing Prograf to 3 mg BID; goal trough 8-10  - will need to closely monitor for drug-drug interactions with HAART therapy, posaconazole, and Prograf   - continue MMF 1000 mg BID  - continue steroids per protocol  - continue to monitor for toxicities from immunosuppressive medications

## 2017-08-20 NOTE — ASSESSMENT & PLAN NOTE
- hyperkalemia of 7.6 on 8/19/17 thus required dialysis   - kidney starting to produce a small amount of urine.  - dopamine gtt stopped, given 100 lasix this AM  - Cr trending up.  - no need for HD today.

## 2017-08-21 PROBLEM — N18.6 ESRD (END STAGE RENAL DISEASE): Status: RESOLVED | Noted: 2017-08-18 | Resolved: 2017-08-21

## 2017-08-21 LAB
ALBUMIN SERPL BCP-MCNC: 2.6 G/DL
ALBUMIN SERPL BCP-MCNC: 2.7 G/DL
ANION GAP SERPL CALC-SCNC: 11 MMOL/L
ANION GAP SERPL CALC-SCNC: 13 MMOL/L
BASOPHILS # BLD AUTO: 0 K/UL
BASOPHILS # BLD AUTO: 0 K/UL
BASOPHILS NFR BLD: 0 %
BASOPHILS NFR BLD: 0 %
BUN SERPL-MCNC: 44 MG/DL
BUN SERPL-MCNC: 46 MG/DL
CALCIUM SERPL-MCNC: 8.3 MG/DL
CALCIUM SERPL-MCNC: 8.6 MG/DL
CD3+CD4+ CELLS # BLD: 7 CELLS/UL (ref 300–1400)
CD3+CD4+ CELLS NFR BLD: 9.8 % (ref 28–57)
CHLORIDE SERPL-SCNC: 93 MMOL/L
CHLORIDE SERPL-SCNC: 94 MMOL/L
CO2 SERPL-SCNC: 25 MMOL/L
CO2 SERPL-SCNC: 26 MMOL/L
CREAT SERPL-MCNC: 8.9 MG/DL
CREAT SERPL-MCNC: 9.7 MG/DL
DIFFERENTIAL METHOD: ABNORMAL
DIFFERENTIAL METHOD: ABNORMAL
EOSINOPHIL # BLD AUTO: 0 K/UL
EOSINOPHIL # BLD AUTO: 0 K/UL
EOSINOPHIL NFR BLD: 0.2 %
EOSINOPHIL NFR BLD: 0.9 %
ERYTHROCYTE [DISTWIDTH] IN BLOOD BY AUTOMATED COUNT: 16.8 %
ERYTHROCYTE [DISTWIDTH] IN BLOOD BY AUTOMATED COUNT: 16.8 %
EST. GFR  (AFRICAN AMERICAN): 7 ML/MIN/1.73 M^2
EST. GFR  (AFRICAN AMERICAN): 7.8 ML/MIN/1.73 M^2
EST. GFR  (NON AFRICAN AMERICAN): 6.1 ML/MIN/1.73 M^2
EST. GFR  (NON AFRICAN AMERICAN): 6.8 ML/MIN/1.73 M^2
GLUCOSE SERPL-MCNC: 117 MG/DL
GLUCOSE SERPL-MCNC: 117 MG/DL
HBV CORE AB SERPL QL IA: NEGATIVE
HBV SURFACE AB SER-ACNC: POSITIVE M[IU]/ML
HBV SURFACE AG SERPL QL IA: NEGATIVE
HBV SURFACE AG SERPL QL IA: NEGATIVE
HCT VFR BLD AUTO: 23.5 %
HCT VFR BLD AUTO: 24 %
HEP. B SURF AB, QUAL: POSITIVE
HEP. B SURF AB, QUANT.: 22 MIU/ML
HGB BLD-MCNC: 7.9 G/DL
HGB BLD-MCNC: 8.1 G/DL
HIV 1+2 AB+HIV1 P24 AG SERPL QL IA: ABNORMAL
LYMPHOCYTES # BLD AUTO: 0.1 K/UL
LYMPHOCYTES # BLD AUTO: 0.1 K/UL
LYMPHOCYTES NFR BLD: 1.1 %
LYMPHOCYTES NFR BLD: 2.6 %
MAGNESIUM SERPL-MCNC: 2.5 MG/DL
MCH RBC QN AUTO: 29.2 PG
MCH RBC QN AUTO: 29.7 PG
MCHC RBC AUTO-ENTMCNC: 33.6 G/DL
MCHC RBC AUTO-ENTMCNC: 33.8 G/DL
MCV RBC AUTO: 87 FL
MCV RBC AUTO: 88 FL
MONOCYTES # BLD AUTO: 0.1 K/UL
MONOCYTES # BLD AUTO: 0.2 K/UL
MONOCYTES NFR BLD: 2.2 %
MONOCYTES NFR BLD: 4.9 %
NEUTROPHILS # BLD AUTO: 4.3 K/UL
NEUTROPHILS # BLD AUTO: 4.4 K/UL
NEUTROPHILS NFR BLD: 91.4 %
NEUTROPHILS NFR BLD: 96.5 %
PHOSPHATE SERPL-MCNC: 4.3 MG/DL
PHOSPHATE SERPL-MCNC: 4.7 MG/DL
PLATELET # BLD AUTO: 128 K/UL
PLATELET # BLD AUTO: 146 K/UL
PMV BLD AUTO: 9.6 FL
PMV BLD AUTO: 9.7 FL
POCT GLUCOSE: 118 MG/DL (ref 70–110)
POCT GLUCOSE: 128 MG/DL (ref 70–110)
POCT GLUCOSE: 131 MG/DL (ref 70–110)
POCT GLUCOSE: 145 MG/DL (ref 70–110)
POCT GLUCOSE: 151 MG/DL (ref 70–110)
POCT GLUCOSE: 154 MG/DL (ref 70–110)
POTASSIUM SERPL-SCNC: 4.4 MMOL/L
POTASSIUM SERPL-SCNC: 4.6 MMOL/L
RBC # BLD AUTO: 2.66 M/UL
RBC # BLD AUTO: 2.77 M/UL
SODIUM SERPL-SCNC: 129 MMOL/L
SODIUM SERPL-SCNC: 133 MMOL/L
TACROLIMUS BLD-MCNC: <1.5 NG/ML
WBC # BLD AUTO: 4.6 K/UL
WBC # BLD AUTO: 4.69 K/UL

## 2017-08-21 PROCEDURE — 80069 RENAL FUNCTION PANEL: CPT | Mod: 91

## 2017-08-21 PROCEDURE — 80069 RENAL FUNCTION PANEL: CPT

## 2017-08-21 PROCEDURE — 25000003 PHARM REV CODE 250: Performed by: NURSE PRACTITIONER

## 2017-08-21 PROCEDURE — 99233 SBSQ HOSP IP/OBS HIGH 50: CPT | Mod: ,,, | Performed by: NURSE PRACTITIONER

## 2017-08-21 PROCEDURE — 25000003 PHARM REV CODE 250: Performed by: SURGERY

## 2017-08-21 PROCEDURE — 83735 ASSAY OF MAGNESIUM: CPT

## 2017-08-21 PROCEDURE — 63600175 PHARM REV CODE 636 W HCPCS: Performed by: INTERNAL MEDICINE

## 2017-08-21 PROCEDURE — 25000003 PHARM REV CODE 250: Performed by: STUDENT IN AN ORGANIZED HEALTH CARE EDUCATION/TRAINING PROGRAM

## 2017-08-21 PROCEDURE — 20600001 HC STEP DOWN PRIVATE ROOM

## 2017-08-21 PROCEDURE — 86706 HEP B SURFACE ANTIBODY: CPT

## 2017-08-21 PROCEDURE — S5010 5% DEXTROSE AND 0.45% SALINE: HCPCS | Performed by: STUDENT IN AN ORGANIZED HEALTH CARE EDUCATION/TRAINING PROGRAM

## 2017-08-21 PROCEDURE — 80197 ASSAY OF TACROLIMUS: CPT

## 2017-08-21 PROCEDURE — 63600175 PHARM REV CODE 636 W HCPCS: Performed by: STUDENT IN AN ORGANIZED HEALTH CARE EDUCATION/TRAINING PROGRAM

## 2017-08-21 PROCEDURE — 87340 HEPATITIS B SURFACE AG IA: CPT

## 2017-08-21 PROCEDURE — 85025 COMPLETE CBC W/AUTO DIFF WBC: CPT | Mod: 91

## 2017-08-21 PROCEDURE — 36415 COLL VENOUS BLD VENIPUNCTURE: CPT

## 2017-08-21 PROCEDURE — 86704 HEP B CORE ANTIBODY TOTAL: CPT

## 2017-08-21 RX ORDER — SODIUM CHLORIDE 9 MG/ML
INJECTION, SOLUTION INTRAVENOUS
Status: DISCONTINUED | OUTPATIENT
Start: 2017-08-21 | End: 2017-08-24

## 2017-08-21 RX ORDER — BISACODYL 10 MG
10 SUPPOSITORY, RECTAL RECTAL DAILY PRN
Status: DISCONTINUED | OUTPATIENT
Start: 2017-08-21 | End: 2017-08-26 | Stop reason: HOSPADM

## 2017-08-21 RX ORDER — TACROLIMUS 5 MG/1
5 CAPSULE ORAL 2 TIMES DAILY
Status: DISCONTINUED | OUTPATIENT
Start: 2017-08-21 | End: 2017-08-23

## 2017-08-21 RX ORDER — BISACODYL 5 MG
10 TABLET, DELAYED RELEASE (ENTERIC COATED) ORAL DAILY PRN
Qty: 60 TABLET | Refills: 5 | Status: CANCELLED | OUTPATIENT
Start: 2017-08-21

## 2017-08-21 RX ORDER — SODIUM CHLORIDE 9 MG/ML
INJECTION, SOLUTION INTRAVENOUS ONCE
Status: DISCONTINUED | OUTPATIENT
Start: 2017-08-21 | End: 2017-08-24

## 2017-08-21 RX ORDER — TACROLIMUS 1 MG/1
2 CAPSULE ORAL ONCE
Status: COMPLETED | OUTPATIENT
Start: 2017-08-21 | End: 2017-08-21

## 2017-08-21 RX ADMIN — DIPHENHYDRAMINE HYDROCHLORIDE 25 MG: 25 CAPSULE ORAL at 10:08

## 2017-08-21 RX ADMIN — DOCUSATE SODIUM 100 MG: 50 CAPSULE, LIQUID FILLED ORAL at 05:08

## 2017-08-21 RX ADMIN — MYCOPHENOLATE MOFETIL 1000 MG: 250 CAPSULE ORAL at 09:08

## 2017-08-21 RX ADMIN — HYDROMORPHONE HYDROCHLORIDE 4 MG: 2 TABLET ORAL at 12:08

## 2017-08-21 RX ADMIN — NYSTATIN 500000 UNITS: 500000 SUSPENSION ORAL at 05:08

## 2017-08-21 RX ADMIN — HYDROMORPHONE HYDROCHLORIDE 4 MG: 2 TABLET ORAL at 09:08

## 2017-08-21 RX ADMIN — TACROLIMUS 3 MG: 1 CAPSULE ORAL at 09:08

## 2017-08-21 RX ADMIN — THERA TABS 1 TABLET: TAB at 09:08

## 2017-08-21 RX ADMIN — NYSTATIN 500000 UNITS: 500000 SUSPENSION ORAL at 09:08

## 2017-08-21 RX ADMIN — SULFAMETHOXAZOLE AND TRIMETHOPRIM 1 TABLET: 400; 80 TABLET ORAL at 09:08

## 2017-08-21 RX ADMIN — DOCUSATE SODIUM 100 MG: 50 CAPSULE, LIQUID FILLED ORAL at 01:08

## 2017-08-21 RX ADMIN — HEPARIN SODIUM 5000 UNITS: 5000 INJECTION, SOLUTION INTRAVENOUS; SUBCUTANEOUS at 01:08

## 2017-08-21 RX ADMIN — ACETAMINOPHEN 650 MG: 325 TABLET ORAL at 10:08

## 2017-08-21 RX ADMIN — METHYLPREDNISOLONE SODIUM SUCCINATE 125 MG: 125 INJECTION, POWDER, FOR SOLUTION INTRAMUSCULAR; INTRAVENOUS at 10:08

## 2017-08-21 RX ADMIN — NYSTATIN 500000 UNITS: 500000 SUSPENSION ORAL at 01:08

## 2017-08-21 RX ADMIN — HYDROMORPHONE HYDROCHLORIDE 4 MG: 2 TABLET ORAL at 05:08

## 2017-08-21 RX ADMIN — VALGANCICLOVIR 450 MG: 450 TABLET, FILM COATED ORAL at 09:08

## 2017-08-21 RX ADMIN — DEXTROSE AND SODIUM CHLORIDE: 5; .45 INJECTION, SOLUTION INTRAVENOUS at 05:08

## 2017-08-21 RX ADMIN — ANTI-THYMOCYTE GLOBULIN (RABBIT) 100 MG: 5 INJECTION, POWDER, LYOPHILIZED, FOR SOLUTION INTRAVENOUS at 11:08

## 2017-08-21 RX ADMIN — TACROLIMUS 2 MG: 1 CAPSULE ORAL at 10:08

## 2017-08-21 RX ADMIN — DOCUSATE SODIUM 100 MG: 50 CAPSULE, LIQUID FILLED ORAL at 09:08

## 2017-08-21 RX ADMIN — NYSTATIN 500000 UNITS: 500000 SUSPENSION ORAL at 12:08

## 2017-08-21 RX ADMIN — BISACODYL 10 MG: 5 TABLET, COATED ORAL at 09:08

## 2017-08-21 RX ADMIN — HYDROMORPHONE HYDROCHLORIDE 4 MG: 2 TABLET ORAL at 04:08

## 2017-08-21 RX ADMIN — HEPARIN SODIUM 5000 UNITS: 5000 INJECTION, SOLUTION INTRAVENOUS; SUBCUTANEOUS at 05:08

## 2017-08-21 RX ADMIN — TACROLIMUS 5 MG: 5 CAPSULE ORAL at 05:08

## 2017-08-21 RX ADMIN — FAMOTIDINE 20 MG: 20 TABLET, FILM COATED ORAL at 09:08

## 2017-08-21 NOTE — ASSESSMENT & PLAN NOTE
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  Plan to repeat US today.  - pt dialyzed on POD 1 given hyperkalemia of 7.6  Plan to dialyze today.   - UOP was starting to  but decreased again today.    - continue to monitor renal function and UOP

## 2017-08-21 NOTE — ASSESSMENT & PLAN NOTE
- last CD4 count 213 (7/11/17) and HIV PCR 30 copies/mL  - transplant ID consulted and appreciate recs  - will change RPV to emtricitabine/TDF and cont DOL and upon discharge may transition TDF to TAF (+emtricitabine).  May hold all ARV for now until all DDIs determined and start in next few days.  - will follow-up on the HAART regimen Dr. Miramontes wants to start   - repeat HIV RNA PCR and CD4 count pending

## 2017-08-21 NOTE — PROGRESS NOTES
[facility]  Kidney Transplant  Progress Note      Reason for Follow-up: Reassessment of Kidney Transplant - 2017  (#1) recipient and management of immunosuppression.    ORGAN:  RIGHT KIDNEY   Donor Type:   - Brain Death   PHS Increased Risk: no   Cold Ischemia:   Induction Medications: Thymoglobulin      Subjective:   HPI:  37 y.o. 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     Hospital Course: Required HD on POD 1 for hyperkalemia. US doppler revealed mildly elevated RIs but otherwise adequate flows to the kidney.    Interval history: No acute events. Last HD .  Cr up and UOP decreased today.  Repeat US ordered.  Also, JESSICA and espinoza output bloody.  Will repeat CBC later today.  Prograf level remains undetected, plan for Thymo (4th dose).  Will plan for HD later today. Patient denies nausea.  Appetite good.  Abdominal pain mod managed w PRN pain meds.  Encourage ambulation.  Cont bowel regimen.  (+) flatus. No BM.          Past Medical, Surgical, Family, and Social History:   Unchanged from H&P.    Review of patient's allergies indicates:  No Known Allergies    Scheduled Meds:   sodium chloride 0.9%   Intravenous Once    bisacodyl  10 mg Oral QHS    docusate sodium  100 mg Oral TID    famotidine  20 mg Oral QHS    heparin (porcine)  5,000 Units Subcutaneous Q8H    multivitamin  1 tablet Oral Daily    mycophenolate  1,000 mg Oral BID    nystatin  500,000 Units Mouth/Throat QID (PC + HS)    [START ON 2017] predniSONE  20 mg Oral Daily    sulfamethoxazole-trimethoprim 400-80mg  1 tablet Oral Every Mon, Wed, Fri    tacrolimus  5 mg Oral BID    valganciclovir  450  mg Oral Every Mon, Wed, Fri     Continuous Infusions:   glucagon (human recombinant)       PRN Meds:sodium chloride 0.9%, albumin human 25%, albumin human 25%, bisacodyl, dextrose 50%, glucagon (human recombinant), glucose, glucose, glucose, HYDROmorphone, HYDROmorphone, insulin aspart, naloxone, ondansetron    Intake/Output - Last 3 Shifts       08/19 0700 - 08/20 0659 08/20 0700 - 08/21 0659 08/21 0700 - 08/22 0659    P.O. 1140 360 480    I.V. (mL/kg) 1223.7 (17.7)      Blood       IV Piggyback 300  200    Total Intake(mL/kg) 2663.7 (38.5) 360 (5.2) 680 (9.8)    Urine (mL/kg/hr) 238 (0.1) 155 (0.1) 50 (0.1)    Drains 150 (0.1) 70 (0) 20 (0)    Stool 0 (0)      Blood       Total Output 388 225 70    Net +2275.7 +135 +610           Stool Occurrence 0 x             Review of Systems   Constitutional: Negative for chills, fatigue and fever.   HENT: Negative.    Eyes: Negative.    Respiratory: Negative for cough, shortness of breath and wheezing.    Cardiovascular: Negative for chest pain, palpitations and leg swelling.   Gastrointestinal: Positive for abdominal pain. Negative for abdominal distention, constipation, diarrhea, nausea and vomiting.   Genitourinary: Positive for decreased urine volume and hematuria. Negative for dysuria and flank pain.   Musculoskeletal: Negative for gait problem.   Skin: Positive for wound.   Allergic/Immunologic: Positive for immunocompromised state.   Neurological: Negative for tremors, light-headedness and headaches.   Psychiatric/Behavioral: Negative for behavioral problems and dysphoric mood.      Objective:     Vital Signs (Most Recent):  Temp: 98.2 °F (36.8 °C) (08/21/17 1615)  Pulse: 69 (08/21/17 1615)  Resp: 18 (08/21/17 1615)  BP: 126/73 (08/21/17 1615)  SpO2: 97 % (08/21/17 1215) Vital Signs (24h Range):  Temp:  [97.4 °F (36.3 °C)-98.3 °F (36.8 °C)] 98.2 °F (36.8 °C)  Pulse:  [69-93] 69  Resp:  [16-20] 18  SpO2:  [96 %-100 %] 97 %  BP: (112-148)/(64-91) 126/73     Weight:  "69.2 kg (152 lb 8.9 oz)  Height: 5' 8" (172.7 cm)  Body mass index is 23.2 kg/m².    Physical Exam   Constitutional: He is oriented to person, place, and time. He appears well-developed. No distress.   HENT:   Head: Normocephalic and atraumatic.   Mouth/Throat: No oropharyngeal exudate.   Eyes: EOM are normal. Pupils are equal, round, and reactive to light. Scleral icterus is present.   Neck: Normal range of motion. Neck supple. No thyromegaly present.   Cardiovascular: Normal rate and regular rhythm.    Pulmonary/Chest: Effort normal and breath sounds normal. No respiratory distress. He has no rales.   Abdominal: He exhibits distension. There is tenderness. There is no guarding.       Genitourinary:   Genitourinary Comments: 4 day espinoza to gravity   Musculoskeletal: Normal range of motion. He exhibits no edema.   Neurological: He is alert and oriented to person, place, and time.   Skin: Skin is warm and dry. Capillary refill takes 2 to 3 seconds. He is not diaphoretic. No erythema.   Psychiatric: He has a normal mood and affect. His behavior is normal. Judgment and thought content normal.   Vitals reviewed.      Laboratory:  CBC:     Recent Labs  Lab 08/21/17  1224   WBC 4.60   RBC 2.66*   HGB 7.9*   HCT 23.5*   *   MCV 88   MCH 29.7   MCHC 33.6     BMP:     Recent Labs  Lab 08/21/17  1224   *   *   K 4.6   CL 93*   CO2 25   BUN 46*   CREATININE 9.7*   CALCIUM 8.3*     Tacrolimus Lvl   Date Value Ref Range Status   08/21/2017 <1.5 (L) 5.0 - 15.0 ng/mL Final     Comment:     Testing performed by Liquid Chromatography-Tandem  Mass Spectrometry (LC-MS/MS).  This test was developed and its performance characteristics  determined by [facility], Department of Pathology  and Laboratory Medicine in a manner consistent with CLIA  requirements. It has not been cleared or approved by the [place]  Food and Drug Administration.  This test is used for clinical   purposes.  It should not be regarded as " investigational or for  research.     08/20/2017 <1.5 (L) 5.0 - 15.0 ng/mL Final     Comment:     Testing performed by Liquid Chromatography-Tandem  Mass Spectrometry (LC-MS/MS).  This test was developed and its performance characteristics  determined by [facility], Department of Pathology  and Laboratory Medicine in a manner consistent with CLIA  requirements. It has not been cleared or approved by the [place]  Food and Drug Administration.  This test is used for clinical   purposes.  It should not be regarded as investigational or for  research.     08/19/2017 <1.5 (L) 5.0 - 15.0 ng/mL Final     Comment:     Testing performed by Liquid Chromatography-Tandem  Mass Spectrometry (LC-MS/MS).  This test was developed and its performance characteristics  determined by [facility], Department of Pathology  and Laboratory Medicine in a manner consistent with CLIA  requirements. It has not been cleared or approved by the [place]  Food and Drug Administration.  This test is used for clinical   purposes.  It should not be regarded as investigational or for  research.         Labs within the past 24 hours have been reviewed.    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  Plan to repeat US today.  - pt dialyzed on POD 1 given hyperkalemia of 7.6  Plan to dialyze today.   - UOP was starting to  but decreased again today.    - continue to monitor renal function and UOP           Delayed graft function of kidney    - hyperkalemia of 7.6 on 8/19/17 thus required dialysis   - kidney w ATN likely. Lasix 100 mg given yesterday  w no response.  - dopamine gtt stopped- no need for HD today.  - Cr up again today, plan for HD later today        Need for prophylactic immunotherapy    - patient received induction with THYMO given recipient HIV+; THYMO dose #3 8/20, still w no level, plan to given thymo #4 today.   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  May hold all ARV for now until all DDIs determined and start in next few days.  - will follow-up on the HAART regimen Dr. Miramontes wants to start   - repeat HIV RNA PCR and CD4 count pending          Hypophosphatemia    - repleted with IV sodium phos 15 mmol  - continue to monitor          At risk for opportunistic infections    - continue Bactrim for PJP prophylaxis for 1 year  - continue Valcyte for CMV prophylaxis for 3 months; CMV D-/R+  - continue nystatin for thrush prophylaxis for 1 month  - will start posaconazole for fungal prophylaxis once HAART therapy established per ID           Metabolic acidosis    - bicarb ok.  Plan for HD today  - continue to monitor           Anemia of chronic disease    - Hb pre-op was 11.2 (8/18/17). Trending down.  Type and screen in am.    - (+) hematuria & bloody JESSICA output though minimal.  Repeating US.  Repeat CBC stable.     - continue to monitor             Hyperlipidemia    -  will need to restart Crestor once able         Hyperkalemia    - low potassium diet.    - today WNL range          Hypocalcemia    - calcium low thus will hold off on restarting outpatient sensipar; besides PTH only 208 from 8/18/17              Discharge Planning:  No plan for discharge      Rhoda Penn, NP  Kidney Transplant  [facility]

## 2017-08-21 NOTE — HOSPITAL COURSE
Interval history: No acute events overnight. Pt went for HD yesterday 4L off. Plan for kidney biosy today. DSAs sent this AM. Drain fluid prior to removal concerning for chyle leak, triglycerides 328, will need to follow up with US in 2 weeks to assess for lymphocele.

## 2017-08-21 NOTE — SUBJECTIVE & OBJECTIVE
Subjective:     37 y.o. 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   Required HD on POD 1 for hyperkalemia. US doppler revealed mildly elevated RIs but otherwise adequate flows to the kidney.            Past Medical, Surgical, Family, and Social History:   Unchanged from H&P.    Review of patient's allergies indicates:  No Known Allergies    Scheduled Meds:   sodium chloride 0.9%   Intravenous Once    bisacodyl  10 mg Oral QHS    docusate sodium  100 mg Oral TID    famotidine  20 mg Oral QHS    heparin (porcine)  5,000 Units Subcutaneous Q8H    multivitamin  1 tablet Oral Daily    mycophenolate  1,000 mg Oral BID    nystatin  500,000 Units Mouth/Throat QID (PC + HS)    [START ON 8/22/2017] predniSONE  20 mg Oral Daily    sulfamethoxazole-trimethoprim 400-80mg  1 tablet Oral Every Mon, Wed, Fri    tacrolimus  5 mg Oral BID    valganciclovir  450 mg Oral Every Mon, Wed, Fri     Continuous Infusions:   glucagon (human recombinant)       PRN Meds:sodium chloride 0.9%, albumin human 25%, albumin human 25%, bisacodyl, dextrose 50%, glucagon (human recombinant), glucose, glucose, glucose, HYDROmorphone, HYDROmorphone, insulin aspart, naloxone, ondansetron    Intake/Output - Last 3 Shifts       08/19 0700 - 08/20 0659 08/20 0700 - 08/21 0659 08/21 0700 - 08/22 0659    P.O. 1140 360 480    I.V. (mL/kg) 1223.7 (17.7)      Blood       IV Piggyback 300  200    Total Intake(mL/kg) 2663.7 (38.5) 360 (5.2) 680 (9.8)    Urine (mL/kg/hr) 238 (0.1) 155 (0.1) 50 (0.1)    Drains 150 (0.1) 70 (0) 20 (0)    Stool 0 (0)      Blood       Total Output 388 225 70    Net +2275.7 +135 +610           Stool Occurrence 0 x          "    Review of Systems   Constitutional: Negative for chills, fatigue and fever.   HENT: Negative.    Eyes: Negative.    Respiratory: Negative for cough, shortness of breath and wheezing.    Cardiovascular: Negative for chest pain, palpitations and leg swelling.   Gastrointestinal: Positive for abdominal pain. Negative for abdominal distention, constipation, diarrhea, nausea and vomiting.   Genitourinary: Positive for decreased urine volume and hematuria. Negative for dysuria and flank pain.   Musculoskeletal: Negative for gait problem.   Skin: Positive for wound.   Allergic/Immunologic: Positive for immunocompromised state.   Neurological: Negative for tremors, light-headedness and headaches.   Psychiatric/Behavioral: Negative for behavioral problems and dysphoric mood.      Objective:     Vital Signs (Most Recent):  Temp: 98.2 °F (36.8 °C) (08/21/17 1615)  Pulse: 69 (08/21/17 1615)  Resp: 18 (08/21/17 1615)  BP: 126/73 (08/21/17 1615)  SpO2: 97 % (08/21/17 1215) Vital Signs (24h Range):  Temp:  [97.4 °F (36.3 °C)-98.3 °F (36.8 °C)] 98.2 °F (36.8 °C)  Pulse:  [69-93] 69  Resp:  [16-20] 18  SpO2:  [96 %-100 %] 97 %  BP: (112-148)/(64-91) 126/73     Weight: 69.2 kg (152 lb 8.9 oz)  Height: 5' 8" (172.7 cm)  Body mass index is 23.2 kg/m².    Physical Exam   Constitutional: He is oriented to person, place, and time. He appears well-developed. No distress.   HENT:   Head: Normocephalic and atraumatic.   Mouth/Throat: No oropharyngeal exudate.   Eyes: EOM are normal. Pupils are equal, round, and reactive to light. Scleral icterus is present.   Neck: Normal range of motion. Neck supple. No thyromegaly present.   Cardiovascular: Normal rate and regular rhythm.    Pulmonary/Chest: Effort normal and breath sounds normal. No respiratory distress. He has no rales.   Abdominal: He exhibits distension. There is tenderness. There is no guarding.       Genitourinary:   Genitourinary Comments: 4 day espinoza to gravity "   Musculoskeletal: Normal range of motion. He exhibits no edema.   Neurological: He is alert and oriented to person, place, and time.   Skin: Skin is warm and dry. Capillary refill takes 2 to 3 seconds. He is not diaphoretic. No erythema.   Psychiatric: He has a normal mood and affect. His behavior is normal. Judgment and thought content normal.   Vitals reviewed.      Laboratory:  CBC:     Recent Labs  Lab 08/21/17  1224   WBC 4.60   RBC 2.66*   HGB 7.9*   HCT 23.5*   *   MCV 88   MCH 29.7   MCHC 33.6     BMP:     Recent Labs  Lab 08/21/17  1224   *   *   K 4.6   CL 93*   CO2 25   BUN 46*   CREATININE 9.7*   CALCIUM 8.3*     Tacrolimus Lvl   Date Value Ref Range Status   08/21/2017 <1.5 (L) 5.0 - 15.0 ng/mL Final     Comment:     Testing performed by Liquid Chromatography-Tandem  Mass Spectrometry (LC-MS/MS).  This test was developed and its performance characteristics  determined by [facility], Department of Pathology  and Laboratory Medicine in a manner consistent with CLIA  requirements. It has not been cleared or approved by the [place]  Food and Drug Administration.  This test is used for clinical   purposes.  It should not be regarded as investigational or for  research.     08/20/2017 <1.5 (L) 5.0 - 15.0 ng/mL Final     Comment:     Testing performed by Liquid Chromatography-Tandem  Mass Spectrometry (LC-MS/MS).  This test was developed and its performance characteristics  determined by [facility], Department of Pathology  and Laboratory Medicine in a manner consistent with CLIA  requirements. It has not been cleared or approved by the [place]  Food and Drug Administration.  This test is used for clinical   purposes.  It should not be regarded as investigational or for  research.     08/19/2017 <1.5 (L) 5.0 - 15.0 ng/mL Final     Comment:     Testing performed by Liquid Chromatography-Tandem  Mass Spectrometry (LC-MS/MS).  This test was developed and its performance  characteristics  determined by [facility], Department of Pathology  and Laboratory Medicine in a manner consistent with CLIA  requirements. It has not been cleared or approved by the [place]  Food and Drug Administration.  This test is used for clinical   purposes.  It should not be regarded as investigational or for  research.         Labs within the past 24 hours have been reviewed.    Diagnostic Results:  Labs: Reviewed  US: Reviewed

## 2017-08-21 NOTE — PROGRESS NOTES
[facility]  Adult Nutrition  Progress Note    SUMMARY     Recommendations    Recommendation/Intervention: Continue regular diet, recommend liberalizing low K diet restriction, labs WNL.  Post tx diet education reinforced, all questions addressed.  RD following.     Goals: PO intake >50%  Nutrition Goal Status: goal met  Communication of RD Recs: reviewed with RN      Reason for Assessment    Reason for Assessment: RD follow-up  Diagnosis: transplant/postoperative complications (kidney tx (8/18/17))  Relevent Medical History: HIV, HLD   Interdisciplinary Rounds: attended     General Information Comments: Pt doing well, sitting in chair.  Pt previously educated on post tx diet.  Reinforced/reviewed, all questions answered.  Denies n/v/c/d. Pt ate well PTA.    Nutrition Discharge Planning: Post transplant nutrition education provided. Food safety/drug interactions emphasized. General healthy diet recommended. RD name/contact information, education material left. No other needs identified. Caregiver present.    Nutrition Prescription Ordered    Current Diet Order: Regular, low K    % Intake of Estimated Energy Needs: 75 - 100 %  % Meal Intake: 75%     Nutrition Risk Screen     Nutrition Risk Screen: no indicators present    Nutrition/Diet History    Patient Reported Diet/Restrictions/Preferences: general  Typical Food/Fluid Intake: Pt reports fair appetite, consuming % of meals.  Food Preferences: No cultural/Confucianist needs reported        Factors Affecting Nutritional Intake: decreased appetite                Labs/Tests/Procedures/Meds       Pertinent Labs Reviewed: reviewed, pertinent  Pertinent Labs Comments: Na 133, Cl 94, BUN 44, Cr 8.9, glu 117, Ca 8.6, phos 4.7  Pertinent Medications Reviewed: reviewed, pertinent  Pertinent Medications Comments: bisacodyl, docusate sodium, famotidine, lasix, heparin, prednisone, statin, tacro    Physical Findings    Overall Physical Appearance:  "nourished     Oral/Mouth Cavity: WDL  Skin: incision (adbominal )    Anthropometrics    Temp: 98.1 °F (36.7 °C)     Height: 5' 8" (172.7 cm)  Weight Method: Bed Scale  Weight: 69.2 kg (152 lb 8.9 oz)  Ideal Body Weight (IBW), Male: 154 lb     % Ideal Body Weight, Male (lb): 99.06 lb     BMI (Calculated): 23.2  BMI Grade: 18.5-24.9 - normal                            Estimated/Assessed Needs    Weight Used For Calorie Calculations: 69.2 kg (152 lb 8.9 oz)      Energy Calorie Requirements (kcal): 1983 kcal/d  Energy Need Method: [place]-St Jeor  RMR ([place]-St. Jeor Equation): 1586.5        Weight Used For Protein Calculations: 69.2 kg (152 lb 8.9 oz)  Protein Requirements: 83-97 g/d  Fluid Need Method: RDA Method, other (see comments) (Per MD or 1 mL/kcal)  RDA Method (mL): 1983         Nutrition Diagnosis:    Increased nutrient needs r/t physiological needs AEB s/p kidney tx (8/18/17)-Continues    Monitor and Evaluation    Food and Nutrient Intake: energy intake, food and beverage intake  Food and Nutrient Adminstration: diet order  Knowledge/Beliefs/Attitudes: food and nutrition knowledge/skill  Physical Activity and Function: nutrition-related ADLs and IADLs  Anthropometric Measurements: weight, weight change, body mass index  Biochemical Data, Medical Tests and Procedures: lipid profile, inflammatory profile, glucose/endocrine profile, gastrointestinal profile, electrolyte and renal panel  Nutrition-Focused Physical Findings: overall appearance    Nutrition Risk    Level of Risk:  (1 x per week)    Nutrition Follow-Up    RD Follow-up?: Yes  "

## 2017-08-21 NOTE — ASSESSMENT & PLAN NOTE
- patient received induction with THYMO given recipient HIV+; THYMO dose #3 8/20, still w no level, plan to given thymo #4 today.   - increasing Prograf to 3 mg BID; goal trough 8-10  - will need to closely monitor for drug-drug interactions with HAART therapy, posaconazole, and Prograf   - continue MMF 1000 mg BID  - continue steroids per protocol  - continue to monitor for toxicities from immunosuppressive medications

## 2017-08-21 NOTE — ASSESSMENT & PLAN NOTE
- hyperkalemia of 7.6 on 8/19/17 thus required dialysis   - kidney w ATN likely. Lasix 100 mg given yesterday w no response.  - dopamine gtt stopped- no need for HD today.  - Cr up again today, plan for HD later today

## 2017-08-22 PROBLEM — D63.8 ANEMIA OF CHRONIC DISEASE: Status: ACTIVE | Noted: 2017-08-22

## 2017-08-22 PROBLEM — E87.5 HYPERKALEMIA: Status: ACTIVE | Noted: 2017-08-22

## 2017-08-22 PROBLEM — Z91.89 AT RISK FOR OPPORTUNISTIC INFECTIONS: Status: ACTIVE | Noted: 2017-08-22

## 2017-08-22 PROBLEM — E87.20 METABOLIC ACIDOSIS: Status: ACTIVE | Noted: 2017-08-22

## 2017-08-22 PROBLEM — E83.51 HYPOCALCEMIA: Status: ACTIVE | Noted: 2017-08-22

## 2017-08-22 PROBLEM — E83.39 HYPOPHOSPHATEMIA: Status: ACTIVE | Noted: 2017-08-22

## 2017-08-22 LAB
ABO + RH BLD: NORMAL
ALBUMIN SERPL BCP-MCNC: 2.6 G/DL
ALBUMIN SERPL BCP-MCNC: 2.9 G/DL
ANION GAP SERPL CALC-SCNC: 11 MMOL/L
ANION GAP SERPL CALC-SCNC: 9 MMOL/L
BASOPHILS # BLD AUTO: 0 K/UL
BASOPHILS # BLD AUTO: 0 K/UL
BASOPHILS NFR BLD: 0 %
BASOPHILS NFR BLD: 0 %
BLD GP AB SCN CELLS X3 SERPL QL: NORMAL
BUN SERPL-MCNC: 31 MG/DL
BUN SERPL-MCNC: 32 MG/DL
CALCIUM SERPL-MCNC: 8.2 MG/DL
CALCIUM SERPL-MCNC: 8.4 MG/DL
CHLORIDE SERPL-SCNC: 100 MMOL/L
CHLORIDE SERPL-SCNC: 99 MMOL/L
CO2 SERPL-SCNC: 25 MMOL/L
CO2 SERPL-SCNC: 26 MMOL/L
CREAT SERPL-MCNC: 6 MG/DL
CREAT SERPL-MCNC: 6.9 MG/DL
DIFFERENTIAL METHOD: ABNORMAL
DIFFERENTIAL METHOD: ABNORMAL
EOSINOPHIL # BLD AUTO: 0 K/UL
EOSINOPHIL # BLD AUTO: 0.1 K/UL
EOSINOPHIL NFR BLD: 1 %
EOSINOPHIL NFR BLD: 1.7 %
ERYTHROCYTE [DISTWIDTH] IN BLOOD BY AUTOMATED COUNT: 16.7 %
ERYTHROCYTE [DISTWIDTH] IN BLOOD BY AUTOMATED COUNT: 16.8 %
EST. GFR  (AFRICAN AMERICAN): 10.6 ML/MIN/1.73 M^2
EST. GFR  (AFRICAN AMERICAN): 12.6 ML/MIN/1.73 M^2
EST. GFR  (NON AFRICAN AMERICAN): 10.9 ML/MIN/1.73 M^2
EST. GFR  (NON AFRICAN AMERICAN): 9.2 ML/MIN/1.73 M^2
GLUCOSE SERPL-MCNC: 112 MG/DL
GLUCOSE SERPL-MCNC: 137 MG/DL
HCT VFR BLD AUTO: 22.7 %
HCT VFR BLD AUTO: 22.7 %
HGB BLD-MCNC: 7.7 G/DL
HGB BLD-MCNC: 7.9 G/DL
HIV UQ DATE RECEIVED: NORMAL
HIV UQ DATE REPORTED: NORMAL
HIV1 RNA # SERPL NAA+PROBE: <40 COPIES/ML
HIV1 RNA SERPL NAA+PROBE-LOG#: <1.6 LOG (10) COPIES/ML
HIV1 RNA SERPL QL NAA+PROBE: NOT DETECTED
LYMPHOCYTES # BLD AUTO: 0.1 K/UL
LYMPHOCYTES # BLD AUTO: 0.1 K/UL
LYMPHOCYTES NFR BLD: 1.7 %
LYMPHOCYTES NFR BLD: 2.9 %
MAGNESIUM SERPL-MCNC: 2.3 MG/DL
MCH RBC QN AUTO: 30 PG
MCH RBC QN AUTO: 30.4 PG
MCHC RBC AUTO-ENTMCNC: 33.9 G/DL
MCHC RBC AUTO-ENTMCNC: 34.8 G/DL
MCV RBC AUTO: 87 FL
MCV RBC AUTO: 88 FL
MONOCYTES # BLD AUTO: 0.2 K/UL
MONOCYTES # BLD AUTO: 0.2 K/UL
MONOCYTES NFR BLD: 4.8 %
MONOCYTES NFR BLD: 5.4 %
NEUTROPHILS # BLD AUTO: 2.7 K/UL
NEUTROPHILS # BLD AUTO: 2.9 K/UL
NEUTROPHILS NFR BLD: 90.9 %
NEUTROPHILS NFR BLD: 91 %
PHOSPHATE SERPL-MCNC: 2.8 MG/DL
PHOSPHATE SERPL-MCNC: 3.3 MG/DL
PLATELET # BLD AUTO: 113 K/UL
PLATELET # BLD AUTO: 139 K/UL
PMV BLD AUTO: 9.1 FL
PMV BLD AUTO: 9.2 FL
POCT GLUCOSE: 129 MG/DL (ref 70–110)
POCT GLUCOSE: 134 MG/DL (ref 70–110)
POCT GLUCOSE: 80 MG/DL (ref 70–110)
POTASSIUM SERPL-SCNC: 3.3 MMOL/L
POTASSIUM SERPL-SCNC: 3.6 MMOL/L
RBC # BLD AUTO: 2.57 M/UL
RBC # BLD AUTO: 2.6 M/UL
SODIUM SERPL-SCNC: 135 MMOL/L
SODIUM SERPL-SCNC: 135 MMOL/L
TACROLIMUS BLD-MCNC: 6.1 NG/ML
WBC # BLD AUTO: 2.99 K/UL
WBC # BLD AUTO: 3.13 K/UL

## 2017-08-22 PROCEDURE — 99233 SBSQ HOSP IP/OBS HIGH 50: CPT | Mod: ,,, | Performed by: NURSE PRACTITIONER

## 2017-08-22 PROCEDURE — 63600175 PHARM REV CODE 636 W HCPCS: Performed by: INTERNAL MEDICINE

## 2017-08-22 PROCEDURE — 80069 RENAL FUNCTION PANEL: CPT

## 2017-08-22 PROCEDURE — 25000003 PHARM REV CODE 250: Performed by: SURGERY

## 2017-08-22 PROCEDURE — 20600001 HC STEP DOWN PRIVATE ROOM

## 2017-08-22 PROCEDURE — 94799 UNLISTED PULMONARY SVC/PX: CPT

## 2017-08-22 PROCEDURE — 80100020 *HC HEMODIALYSIS 1:1 - ARF

## 2017-08-22 PROCEDURE — 63600175 PHARM REV CODE 636 W HCPCS: Performed by: STUDENT IN AN ORGANIZED HEALTH CARE EDUCATION/TRAINING PROGRAM

## 2017-08-22 PROCEDURE — 83735 ASSAY OF MAGNESIUM: CPT

## 2017-08-22 PROCEDURE — 86900 BLOOD TYPING SEROLOGIC ABO: CPT

## 2017-08-22 PROCEDURE — 25000003 PHARM REV CODE 250: Performed by: STUDENT IN AN ORGANIZED HEALTH CARE EDUCATION/TRAINING PROGRAM

## 2017-08-22 PROCEDURE — 86920 COMPATIBILITY TEST SPIN: CPT

## 2017-08-22 PROCEDURE — 85025 COMPLETE CBC W/AUTO DIFF WBC: CPT | Mod: 91

## 2017-08-22 PROCEDURE — 86901 BLOOD TYPING SEROLOGIC RH(D): CPT

## 2017-08-22 PROCEDURE — 80197 ASSAY OF TACROLIMUS: CPT

## 2017-08-22 PROCEDURE — 80069 RENAL FUNCTION PANEL: CPT | Mod: 91

## 2017-08-22 PROCEDURE — 25000003 PHARM REV CODE 250: Performed by: NURSE PRACTITIONER

## 2017-08-22 RX ADMIN — NYSTATIN 500000 UNITS: 500000 SUSPENSION ORAL at 02:08

## 2017-08-22 RX ADMIN — TACROLIMUS 5 MG: 5 CAPSULE ORAL at 06:08

## 2017-08-22 RX ADMIN — HEPARIN SODIUM 5000 UNITS: 5000 INJECTION, SOLUTION INTRAVENOUS; SUBCUTANEOUS at 06:08

## 2017-08-22 RX ADMIN — BISACODYL 10 MG: 10 SUPPOSITORY RECTAL at 11:08

## 2017-08-22 RX ADMIN — PREDNISONE 20 MG: 20 TABLET ORAL at 09:08

## 2017-08-22 RX ADMIN — MYCOPHENOLATE MOFETIL 1000 MG: 250 CAPSULE ORAL at 09:08

## 2017-08-22 RX ADMIN — NYSTATIN 500000 UNITS: 500000 SUSPENSION ORAL at 09:08

## 2017-08-22 RX ADMIN — HYDROMORPHONE HYDROCHLORIDE 4 MG: 2 TABLET ORAL at 01:08

## 2017-08-22 RX ADMIN — HYDROMORPHONE HYDROCHLORIDE 4 MG: 2 TABLET ORAL at 09:08

## 2017-08-22 RX ADMIN — HEPARIN SODIUM 5000 UNITS: 5000 INJECTION, SOLUTION INTRAVENOUS; SUBCUTANEOUS at 02:08

## 2017-08-22 RX ADMIN — DOCUSATE SODIUM 100 MG: 50 CAPSULE, LIQUID FILLED ORAL at 02:08

## 2017-08-22 RX ADMIN — DOCUSATE SODIUM 100 MG: 50 CAPSULE, LIQUID FILLED ORAL at 09:08

## 2017-08-22 RX ADMIN — FAMOTIDINE 20 MG: 20 TABLET, FILM COATED ORAL at 09:08

## 2017-08-22 RX ADMIN — THERA TABS 1 TABLET: TAB at 09:08

## 2017-08-22 RX ADMIN — HYDROMORPHONE HYDROCHLORIDE 4 MG: 2 TABLET ORAL at 12:08

## 2017-08-22 RX ADMIN — TACROLIMUS 5 MG: 5 CAPSULE ORAL at 09:08

## 2017-08-22 RX ADMIN — DOCUSATE SODIUM 100 MG: 50 CAPSULE, LIQUID FILLED ORAL at 06:08

## 2017-08-22 RX ADMIN — HEPARIN SODIUM 5000 UNITS: 5000 INJECTION, SOLUTION INTRAVENOUS; SUBCUTANEOUS at 09:08

## 2017-08-22 RX ADMIN — HYDROMORPHONE HYDROCHLORIDE 4 MG: 2 TABLET ORAL at 06:08

## 2017-08-22 RX ADMIN — NYSTATIN 500000 UNITS: 500000 SUSPENSION ORAL at 06:08

## 2017-08-22 RX ADMIN — BISACODYL 10 MG: 5 TABLET, COATED ORAL at 09:08

## 2017-08-22 NOTE — DISCHARGE SUMMARY
Discharge Summary      Admit Date: 2017    Discharge Date and Time: 2017    Attending Physician: Britta Sharma MD    Discharge Physician: Britta Sharma MD    Principal Diagnoses: Status post -donor kidney transplantation    Other Secondary Diagnoses:   1. Status post -donor kidney transplantation    2. ESRD (end stage renal disease)    3. HIV nephropathy    4. Pre-op exam    5. Hyperkalemia    6. Anemia of chronic disease    7. At risk for opportunistic infections    8. Delayed graft function of kidney    9. HIV (human immunodeficiency virus infection)    10. Hyperlipidemia, unspecified hyperlipidemia type    11. Hypocalcemia    12. Hypophosphatemia    13. Metabolic acidosis    14. Need for prophylactic immunotherapy    15. Transplant    16. 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 Anemia due to acute blood loss        Discharged Condition: fair    Indication for Admission: ESRD (end stage renal disease) [N18.6]  HIV nephropathy [B20, N08]  ESRD (end stage renal disease) [N18.6]     Hospital Course:    37 y.o. AAM with history of polysubstance abuse in the past, HIV on HAART, latent TB s/p treatment, ESRD secondary to HIVAN on RRT since 2004 (initially on PD for a few months but switched to HD after peritonitis), who is now s/p DDRT (THYMO induction given recipient HIV+; KDPI 17%, WIT 29 minutes, CIT 7 hours and 2 minutes, CMV D-/R+) on 17. This patients maintenance immunosuppression will include a steroid taper per protocol to 5mg daily, Prograf, and Cellcept maintenance. Opportunistic infection prophylaxis will include Valcyte for 3 months (CMV D - , R + ), and Bactrim for life.  No plan for Posaconazole.  He required HD on POD 1 for hyperkalemia. POD #1 US doppler revealed mildly elevated RIs but otherwise adequate flow to the kidney.  POD #3 patient still anuric w hematuria and bloody JESSICA drain output therefore repeat US post kidney ordered.  US showed increased  arterial RIs to segmental and lobular arteries, likely ATN. US repeated Thurs with again elevated RIs.  Patient required repeat HD on POD#3- tolerated 3 L removed.  He started to make more urine POD #5 therefore HD was held.  On POD #6, UOP decreased again and Cr up.  He received HD POD 6- he tolerated 4 L removed.  HD chair has been arranged MW afternoon shift.  Given Cr remains up and HIV status increasing risk for rejection.  DSA and kidney bx completed 8/25/17 results pending. Pt tolerated HD today prior to discharge.     Patient had 4 day espinoza removed POD #4.  JESSICA drain output miminal, bloody removed POD #5.     Of note, ID was consulted for HAART recs.  No plan for anti fungal tx.  Apprec recs.     Patient denies nausea.  Appetite good.  Abdominal pain managed w PRN pain meds.  (+) flatus.  (+) BM.  Ambulating w/o difficulty.  Family supportive in care.  Discharge instructions reviewed with patient and caregiver per pharmacist and Nursing.  Patient and caregiver verbalized understanding and denies additional questions.  Patient in agreement with discharge to apt today.  Kidney transplant coordinator reviewed discharge labs and appointments.         Consults: Endocrinology and Infectious Disease    Significant Diagnostic Studies: labs:   Lab Results   Component Value Date    WBC 3.85 (L) 08/26/2017    HGB 7.6 (L) 08/26/2017    HCT 22.0 (L) 08/26/2017    MCV 85 08/26/2017     (L) 08/26/2017     CMP  Sodium   Date Value Ref Range Status   08/26/2017 132 (L) 136 - 145 mmol/L Final     Potassium   Date Value Ref Range Status   08/26/2017 3.8 3.5 - 5.1 mmol/L Final     Chloride   Date Value Ref Range Status   08/26/2017 101 95 - 110 mmol/L Final     CO2   Date Value Ref Range Status   08/26/2017 19 (L) 23 - 29 mmol/L Final     Glucose   Date Value Ref Range Status   08/26/2017 103 70 - 110 mg/dL Final     BUN, Bld   Date Value Ref Range Status   08/26/2017 44 (H) 6 - 20 mg/dL Final     Creatinine   Date  Value Ref Range Status   08/26/2017 10.6 (H) 0.5 - 1.4 mg/dL Final     Calcium   Date Value Ref Range Status   08/26/2017 8.5 (L) 8.7 - 10.5 mg/dL Final     Total Protein   Date Value Ref Range Status   08/20/2017 6.3 6.0 - 8.4 g/dL Final     Albumin   Date Value Ref Range Status   08/26/2017 2.5 (L) 3.5 - 5.2 g/dL Final     Total Bilirubin   Date Value Ref Range Status   08/20/2017 0.2 0.1 - 1.0 mg/dL Final     Comment:     For infants and newborns, interpretation of results should be based  on gestational age, weight and in agreement with clinical  observations.  Premature Infant recommended reference ranges:  Up to 24 hours.............<8.0 mg/dL  Up to 48 hours............<12.0 mg/dL  3-5 days..................<15.0 mg/dL  6-29 days.................<15.0 mg/dL       Alkaline Phosphatase   Date Value Ref Range Status   08/20/2017 471 (H) 55 - 135 U/L Final     AST   Date Value Ref Range Status   08/20/2017 27 10 - 40 U/L Final     ALT   Date Value Ref Range Status   08/20/2017 <5 (L) 10 - 44 U/L Final     Anion Gap   Date Value Ref Range Status   08/26/2017 12 8 - 16 mmol/L Final     eGFR if    Date Value Ref Range Status   08/26/2017 6.3 (A) >60 mL/min/1.73 m^2 Final     eGFR if non    Date Value Ref Range Status   08/26/2017 5.5 (A) >60 mL/min/1.73 m^2 Final     Comment:     Calculation used to obtain the estimated glomerular filtration  rate (eGFR) is the CKD-EPI equation. Since race is unknown   in our information system, the eGFR values for   -American and Non--American patients are given   for each creatinine result.       Tacrolimus Lvl   Date Value Ref Range Status   08/26/2017 9.4 5.0 - 15.0 ng/mL Final     Comment:     Testing performed by Liquid Chromatography-Tandem  Mass Spectrometry (LC-MS/MS).  This test was developed and its performance characteristics  determined by [facility], Department of Pathology  and Laboratory Medicine in a manner  consistent with CLIA  requirements. It has not been cleared or approved by the [place]  Food and Drug Administration.  This test is used for clinical   purposes.  It should not be regarded as investigational or for  research.     08/25/2017 2.9 (L) 5.0 - 15.0 ng/mL Final     Comment:     Testing performed by Liquid Chromatography-Tandem  Mass Spectrometry (LC-MS/MS).  This test was developed and its performance characteristics  determined by [facility], Department of Pathology  and Laboratory Medicine in a manner consistent with CLIA  requirements. It has not been cleared or approved by the [place]  Food and Drug Administration.  This test is used for clinical   purposes.  It should not be regarded as investigational or for  research.     08/24/2017 2.1 (L) 5.0 - 15.0 ng/mL Final     Comment:     Testing performed by Liquid Chromatography-Tandem  Mass Spectrometry (LC-MS/MS).  This test was developed and its performance characteristics  determined by [facility], Department of Pathology  and Laboratory Medicine in a manner consistent with CLIA  requirements. It has not been cleared or approved by the [place]  Food and Drug Administration.  This test is used for clinical   purposes.  It should not be regarded as investigational or for  research.     08/23/2017 <1.5 (L) 5.0 - 15.0 ng/mL Final     Comment:     Testing performed by Liquid Chromatography-Tandem  Mass Spectrometry (LC-MS/MS).  This test was developed and its performance characteristics  determined by [facility], Department of Pathology  and Laboratory Medicine in a manner consistent with CLIA  requirements. It has not been cleared or approved by the [place]  Food and Drug Administration.  This test is used for clinical   purposes.  It should not be regarded as investigational or for  research.       Microbiology: Blood Culture No results found for: LABBLOO and Urine Culture  No results found for: LABURIN and radiology: X-Ray: CXR: X-Ray  Chest 1 View (CXR):   Results for orders placed or performed during the hospital encounter of 08/18/17   X-Ray Chest 1 View    Narrative    Heart size normal.  Lungs are clear.  No pleural effusion    Impression     See above      Electronically signed by: Ernesto Jerome MD  Date:     08/18/17  Time:    15:39        Treatments: surgery: see HPI    Disposition: Home or Self Care    Patient Instructions:   Current Discharge Medication List      START taking these medications    Details   bisacodyl (DULCOLAX) 5 mg EC tablet Take 1-2 tablets (5-10 mg total) by mouth daily as needed for Constipation. Laxative  Qty: 30 tablet, Refills: 3      docusate sodium (COLACE) 100 MG capsule Take 1 capsule (100 mg total) by mouth 3 (three) times daily as needed for Constipation.  Qty: 100 capsule, Refills: 0      famotidine (PEPCID) 20 MG tablet Take 1 tablet (20 mg total) by mouth every evening.  Qty: 30 tablet, Refills: 2      ketoconazole (NIZORAL) 200 mg Tab Take 0.5 tablets (100 mg total) by mouth once daily.  Qty: 15 tablet, Refills: 5      multivitamin (THERAGRAN) tablet Take 1 tablet by mouth once daily.      mycophenolate (CELLCEPT) 250 mg Cap Take 4 capsules (1,000 mg total) by mouth 2 (two) times daily. Kidney txp 8/18/17; Z94.0  Qty: 240 capsule, Refills: 11      nystatin (MYCOSTATIN) 100,000 unit/mL suspension Take 5 mLs (500,000 Units total) by mouth 2 hours after meals and at bedtime. STOP 9/15/17  Qty: 473 mL, Refills: 0      oxycodone-acetaminophen (PERCOCET)  mg per tablet Take 0.5-1 tablets by mouth every 4 (four) hours as needed for Pain.  Qty: 40 tablet, Refills: 0      predniSONE (DELTASONE) 10 MG tablet Take 20 mg PO QD 8/22-9/21; 15 mg PO QD 9/22-10/21; 10 mg PO QD 10/22-11/21; 5 mg PO QD thereafter  Qty: 100 tablet, Refills: 1    Comments: Kidney txp 8/18/17; Z94.0      sulfamethoxazole-trimethoprim 400-80mg (BACTRIM,SEPTRA) 400-80 mg per tablet Take 1 tablet by mouth every Mon, Wed, Fri.  Qty: 30  tablet, Refills: 11    Comments: Note that the sig has been changed-- he should not stop this medication      tacrolimus (PROGRAF) 1 MG Cap Take 6 capsules (6 mg total) by mouth every 12 (twelve) hours.  Qty: 360 capsule, Refills: 11    Comments: Dose increase; Kidney txp 8/18/17; Z94.0      valganciclovir (VALCYTE) 450 mg Tab Take 1 tablet (450 mg total) by mouth every Mon, Wed, Fri. Give after HD; Stop 11/19/17  Qty: 12 tablet, Refills: 2         CONTINUE these medications which have CHANGED    Details   cinacalcet (SENSIPAR) 60 MG Tab Take 1 tablet (60 mg total) by mouth every other day.  Qty: 15 tablet, Refills: 1         CONTINUE these medications which have NOT CHANGED    Details   dolutegravir (TIVICAY) 50 mg Tab Take 50 mg by mouth once daily.      rilpivirine (EDURANT) 25 mg Tab Take 25 mg by mouth once daily.      rosuvastatin (CRESTOR) 20 MG tablet Take 20 mg by mouth once daily.         STOP taking these medications       efavirenz (SUSTIVA) 600 mg Tab Comments:   Reason for Stopping:         emtricitabine (EMTRIVA) 200 mg Cap Comments:   Reason for Stopping:         niacin 250 MG Tab Comments:   Reason for Stopping:         tenofovir (VIREAD) 300 mg Tab Comments:   Reason for Stopping:                 Discharge Procedure Orders  Diet general     Activity as tolerated     Call MD for:  temperature >100.4     Call MD for:  persistent nausea and vomiting or diarrhea     Call MD for:  severe uncontrolled pain     Call MD for:  redness, tenderness, or signs of infection (pain, swelling, redness, odor or green/yellow discharge around incision site)     Call MD for:  difficulty breathing or increased cough     Call MD for:  severe persistent headache     Call MD for:  worsening rash     Call MD for:  persistent dizziness, light-headedness, or visual disturbances     Call MD for:  increased confusion or weakness     Call MD for:   Order Comments: Any unusual problems or concerns.         Time spent caring for  patient (Greater than 1/2 spent in direct face-to-face contact): > 30 minutes

## 2017-08-22 NOTE — ASSESSMENT & PLAN NOTE
- last CD4 count 213 (7/11/17) and HIV PCR 30 copies/mL  - transplant ID consulted and appreciate recs  - will change RPV to emtricitabine/TDF and cont DOL and upon discharge may transition TDF to TAF (+emtricitabine).    - HAART regimen per Dr. Miramontes   - repeat HIV RNA PCR positive, CD4 9.8

## 2017-08-22 NOTE — SUBJECTIVE & OBJECTIVE
Subjective:     37 y.o. 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   Required HD on POD 1 for hyperkalemia. US doppler revealed mildly elevated RIs but otherwise adequate flows to the kidney.  Repeat US post kidney POD #3 reviewed w increased arterial RIs to segmental and lobular arteries.  Will plan for repeat US on Thursday. Patient required repeat HD onPOD#3 and will tentatively have HD POD #5.      Past Medical, Surgical, Family, and Social History:   Unchanged from H&P.    Review of patient's allergies indicates:  No Known Allergies    Scheduled Meds:   sodium chloride 0.9%   Intravenous Once    bisacodyl  10 mg Oral QHS    docusate sodium  100 mg Oral TID    famotidine  20 mg Oral QHS    heparin (porcine)  5,000 Units Subcutaneous Q8H    multivitamin  1 tablet Oral Daily    mycophenolate  1,000 mg Oral BID    nystatin  500,000 Units Mouth/Throat QID (PC + HS)    predniSONE  20 mg Oral Daily    sulfamethoxazole-trimethoprim 400-80mg  1 tablet Oral Every Mon, Wed, Fri    tacrolimus  5 mg Oral BID    valganciclovir  450 mg Oral Every Mon, Wed, Fri     Continuous Infusions:   glucagon (human recombinant)       PRN Meds:sodium chloride 0.9%, albumin human 25%, albumin human 25%, bisacodyl, dextrose 50%, glucagon (human recombinant), glucose, glucose, glucose, HYDROmorphone, HYDROmorphone, insulin aspart, naloxone, ondansetron    Intake/Output - Last 3 Shifts       08/20 0700 - 08/21 0659 08/21 0700 - 08/22 0659 08/22 0700 - 08/23 0659    P.O. 360 480 480    I.V. (mL/kg)  250 (3.6)     Other  500     IV Piggyback  200     Total Intake(mL/kg) 360 (5.2) 1430 (20.7) 480 (6.9)    Urine (mL/kg/hr) 155 (0.1) 50  "(0) 20 (0)    Drains 70 (0) 20 (0)     Other  3000 (1.8)     Stool  0 (0) 0 (0)    Total Output 225 3070 20    Net +135 -1640 +460           Stool Occurrence  0 x 1 x           Review of Systems   Constitutional: Negative for chills, fatigue and fever.   HENT: Negative.    Eyes: Negative.    Respiratory: Negative for cough, shortness of breath and wheezing.    Cardiovascular: Negative for chest pain, palpitations and leg swelling.   Gastrointestinal: Positive for abdominal pain. Negative for abdominal distention, constipation, diarrhea, nausea and vomiting.   Genitourinary: Positive for decreased urine volume and hematuria. Negative for dysuria and flank pain.   Musculoskeletal: Negative for gait problem.   Skin: Positive for wound.   Allergic/Immunologic: Positive for immunocompromised state.   Neurological: Negative for tremors, light-headedness and headaches.   Psychiatric/Behavioral: Negative for behavioral problems and dysphoric mood.      Objective:     Vital Signs (Most Recent):  Temp: 98.7 °F (37.1 °C) (08/22/17 1138)  Pulse: 86 (08/22/17 1138)  Resp: 18 (08/22/17 1138)  BP: 104/67 (08/22/17 1138)  SpO2: 100 % (08/22/17 1138) Vital Signs (24h Range):  Temp:  [97.8 °F (36.6 °C)-98.7 °F (37.1 °C)] 98.7 °F (37.1 °C)  Pulse:  [66-86] 86  Resp:  [18-19] 18  SpO2:  [99 %-100 %] 100 %  BP: (104-130)/(58-78) 104/67     Weight: 69.2 kg (152 lb 8.9 oz)  Height: 5' 8" (172.7 cm)  Body mass index is 23.2 kg/m².    Physical Exam   Constitutional: He is oriented to person, place, and time. He appears well-developed. No distress.   HENT:   Head: Normocephalic and atraumatic.   Mouth/Throat: No oropharyngeal exudate.   Eyes: EOM are normal. Pupils are equal, round, and reactive to light. Scleral icterus is present.   Neck: Normal range of motion. Neck supple. No thyromegaly present.   Cardiovascular: Normal rate and regular rhythm.    Pulmonary/Chest: Effort normal and breath sounds normal. No respiratory distress. He has " no rales.   Abdominal: He exhibits distension. There is tenderness. There is no guarding.       Genitourinary:   Genitourinary Comments: 4 day espinoza to gravity   Musculoskeletal: Normal range of motion. He exhibits no edema.   Neurological: He is alert and oriented to person, place, and time.   Skin: Skin is warm and dry. Capillary refill takes 2 to 3 seconds. He is not diaphoretic. No erythema.   Psychiatric: He has a normal mood and affect. His behavior is normal. Judgment and thought content normal.   Vitals reviewed.      Laboratory:  CBC:     Recent Labs  Lab 08/22/17  1012   WBC 2.99*   RBC 2.57*   HGB 7.7*   HCT 22.7*   *   MCV 88   MCH 30.0   MCHC 33.9     BMP:     Recent Labs  Lab 08/22/17  1012   *   *   K 3.6      CO2 26   BUN 32*   CREATININE 6.9*   CALCIUM 8.2*     Tacrolimus Lvl   Date Value Ref Range Status   08/22/2017 6.1 5.0 - 15.0 ng/mL Final     Comment:     Testing performed by Liquid Chromatography-Tandem  Mass Spectrometry (LC-MS/MS).  This test was developed and its performance characteristics  determined by [facility], Department of Pathology  and Laboratory Medicine in a manner consistent with CLIA  requirements. It has not been cleared or approved by the [place]  Food and Drug Administration.  This test is used for clinical   purposes.  It should not be regarded as investigational or for  research.     08/21/2017 <1.5 (L) 5.0 - 15.0 ng/mL Final     Comment:     Testing performed by Liquid Chromatography-Tandem  Mass Spectrometry (LC-MS/MS).  This test was developed and its performance characteristics  determined by [facility], Department of Pathology  and Laboratory Medicine in a manner consistent with CLIA  requirements. It has not been cleared or approved by the [place]  Food and Drug Administration.  This test is used for clinical   purposes.  It should not be regarded as investigational or for  research.     08/20/2017 <1.5 (L) 5.0 - 15.0 ng/mL  Final     Comment:     Testing performed by Liquid Chromatography-Tandem  Mass Spectrometry (LC-MS/MS).  This test was developed and its performance characteristics  determined by [facility], Department of Pathology  and Laboratory Medicine in a manner consistent with CLIA  requirements. It has not been cleared or approved by the [place]  Food and Drug Administration.  This test is used for clinical   purposes.  It should not be regarded as investigational or for  research.     08/19/2017 <1.5 (L) 5.0 - 15.0 ng/mL Final     Comment:     Testing performed by Liquid Chromatography-Tandem  Mass Spectrometry (LC-MS/MS).  This test was developed and its performance characteristics  determined by [facility], Department of Pathology  and Laboratory Medicine in a manner consistent with CLIA  requirements. It has not been cleared or approved by the [place]  Food and Drug Administration.  This test is used for clinical   purposes.  It should not be regarded as investigational or for  research.         Labs within the past 24 hours have been reviewed.    Diagnostic Results:  Labs: Reviewed  US: Reviewed

## 2017-08-22 NOTE — PROGRESS NOTES
SW met with pt and his cousin Tanna to assess any social needs. Pt denied any needs at this time. SW discuss plan to have pt go to dialysis after discharge. Pt reports he understood need and where he would be for dialysis. JUAN spoke with Brain at [facility] intake. Brain confirmed he received the information and is currently working on setting everything up for him. JUAN spoke with Gasper at [facility] on [address] and Gasper confirmed that he would do his best to get the pt in on a afternoon shift per requested by . JUAN remains available and will follow up accordingly.

## 2017-08-22 NOTE — ASSESSMENT & PLAN NOTE
- patient received induction with THYMO given recipient HIV+; THYMO dose #3 8/20, still w no level, plan to given thymo #4 8/21/17.   - increasing Prograf to 3 mg BID; goal trough 8-10  - will need to closely monitor for drug-drug interactions with HAART therapy and Prograf.  No plan for Posaconazole at this time.   - continue MMF 1000 mg BID  - continue steroids per protocol  - continue to monitor for toxicities from immunosuppressive medications

## 2017-08-22 NOTE — PROGRESS NOTES
3hr dialysis treatment completed with net UF of 2.5L. Tolerated well. Returned blood. Pulled out needles. Pressure applied for 10mins. Hemostasis achieved. Gauzed and taped. Positive thrill and bruit. Report given to MARIAN christiansen.

## 2017-08-22 NOTE — PROGRESS NOTES
Patient admitted for Kidney transplant.Transplant coordinator met with the patient on rounds to introduce self and explain the coordinator role. The post-transplant teaching book was given. Transplant Coordinator explained that she will follow the patient while in the hospital and assist with discharge.     ESRD 2/2 to HIV nephropathy  4 day alexis and JESSICA in place  Pt with DGF, looking for HD placement

## 2017-08-22 NOTE — ASSESSMENT & PLAN NOTE
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  Plan to repeat US 8/21/17 w increased arterial RIs segemental and lobular arteries.  Plan to repeat US on Thursday 8/24/17.  - pt dialyzed on POD 1 for hyperkalemia (7.6) and POD #3 for elevated Cr/anuric.   - continue to monitor renal function and UOP   - 4 day espinoza removed 8/22/17.  Eval to remove JESSICA tomorrow.

## 2017-08-22 NOTE — ASSESSMENT & PLAN NOTE
- continue Bactrim for PJP prophylaxis for 1 year  - continue Valcyte for CMV prophylaxis for 3 months; CMV D-/R+  - continue nystatin for thrush prophylaxis for 1 month  - no plan to start anti fungal

## 2017-08-22 NOTE — ASSESSMENT & PLAN NOTE
- Hb pre-op was 11.2 (8/18/17). Trended down.  Keep type and screen current.    - cont to have minimal (+) hematuria & bloody JESSICA output.  Repeat US post kidney 8/21 reviewed.  Repeat CBC stable.     - continue to monitor

## 2017-08-22 NOTE — PROGRESS NOTES
[facility]  Kidney Transplant  Progress Note      Reason for Follow-up: Reassessment of Kidney Transplant - 2017  (#1) recipient and management of immunosuppression.    ORGAN:  RIGHT KIDNEY   Donor Type:   - Brain Death   PHS Increased Risk: no   Cold Ischemia:   Induction Medications: Thymoglobulin      Subjective:   HPI:  37 y.o. 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   Required HD on POD 1 for hyperkalemia. US doppler revealed mildly elevated RIs but otherwise adequate flows to the kidney.  Repeat US post kidney POD #3 reviewed w increased arterial RIs to segmental and lobular arteries.  Will plan for repeat US on Thursday. Patient required repeat HD onPOD#3 and will tentatively have HD POD #5.    Interval History: No acute events. HD  and - tolerated HD.  Patient remains anuric.  Four day espinoza removed. Will monitor JESSICA drain output overnight.  Patient denies nausea.  Appetite good.  Abdominal pain managed w PRN pain meds.  Encourage ambulation.    Past Medical, Surgical, Family, and Social History:   Unchanged from H&P.    Review of patient's allergies indicates:  No Known Allergies    Scheduled Meds:   sodium chloride 0.9%   Intravenous Once    bisacodyl  10 mg Oral QHS    docusate sodium  100 mg Oral TID    famotidine  20 mg Oral QHS    heparin (porcine)  5,000 Units Subcutaneous Q8H    multivitamin  1 tablet Oral Daily    mycophenolate  1,000 mg Oral BID    nystatin  500,000 Units Mouth/Throat QID (PC + HS)    predniSONE  20 mg Oral Daily    sulfamethoxazole-trimethoprim 400-80mg  1 tablet Oral Every Mon, Wed, Fri    tacrolimus  5 mg Oral BID     valganciclovir  450 mg Oral Every Mon, Wed, Fri     Continuous Infusions:   glucagon (human recombinant)       PRN Meds:sodium chloride 0.9%, albumin human 25%, albumin human 25%, bisacodyl, dextrose 50%, glucagon (human recombinant), glucose, glucose, glucose, HYDROmorphone, HYDROmorphone, insulin aspart, naloxone, ondansetron    Intake/Output - Last 3 Shifts       08/20 0700 - 08/21 0659 08/21 0700 - 08/22 0659 08/22 0700 - 08/23 0659    P.O. 360 480 480    I.V. (mL/kg)  250 (3.6)     Other  500     IV Piggyback  200     Total Intake(mL/kg) 360 (5.2) 1430 (20.7) 480 (6.9)    Urine (mL/kg/hr) 155 (0.1) 50 (0) 20 (0)    Drains 70 (0) 20 (0)     Other  3000 (1.8)     Stool  0 (0) 0 (0)    Total Output 225 3070 20    Net +135 -1640 +460           Stool Occurrence  0 x 1 x           Review of Systems   Constitutional: Negative for chills, fatigue and fever.   HENT: Negative.    Eyes: Negative.    Respiratory: Negative for cough, shortness of breath and wheezing.    Cardiovascular: Negative for chest pain, palpitations and leg swelling.   Gastrointestinal: Positive for abdominal pain. Negative for abdominal distention, constipation, diarrhea, nausea and vomiting.   Genitourinary: Positive for decreased urine volume and hematuria. Negative for dysuria and flank pain.   Musculoskeletal: Negative for gait problem.   Skin: Positive for wound.   Allergic/Immunologic: Positive for immunocompromised state.   Neurological: Negative for tremors, light-headedness and headaches.   Psychiatric/Behavioral: Negative for behavioral problems and dysphoric mood.      Objective:     Vital Signs (Most Recent):  Temp: 98.7 °F (37.1 °C) (08/22/17 1138)  Pulse: 86 (08/22/17 1138)  Resp: 18 (08/22/17 1138)  BP: 104/67 (08/22/17 1138)  SpO2: 100 % (08/22/17 1138) Vital Signs (24h Range):  Temp:  [97.8 °F (36.6 °C)-98.7 °F (37.1 °C)] 98.7 °F (37.1 °C)  Pulse:  [66-86] 86  Resp:  [18-19] 18  SpO2:  [99 %-100 %] 100 %  BP: (104-130)/(58-78)  "104/67     Weight: 69.2 kg (152 lb 8.9 oz)  Height: 5' 8" (172.7 cm)  Body mass index is 23.2 kg/m².    Physical Exam   Constitutional: He is oriented to person, place, and time. He appears well-developed. No distress.   HENT:   Head: Normocephalic and atraumatic.   Mouth/Throat: No oropharyngeal exudate.   Eyes: EOM are normal. Pupils are equal, round, and reactive to light. Scleral icterus is present.   Neck: Normal range of motion. Neck supple. No thyromegaly present.   Cardiovascular: Normal rate and regular rhythm.    Pulmonary/Chest: Effort normal and breath sounds normal. No respiratory distress. He has no rales.   Abdominal: He exhibits distension. There is tenderness. There is no guarding.       Genitourinary:   Genitourinary Comments: 4 day espinoza to gravity   Musculoskeletal: Normal range of motion. He exhibits no edema.   Neurological: He is alert and oriented to person, place, and time.   Skin: Skin is warm and dry. Capillary refill takes 2 to 3 seconds. He is not diaphoretic. No erythema.   Psychiatric: He has a normal mood and affect. His behavior is normal. Judgment and thought content normal.   Vitals reviewed.      Laboratory:  CBC:     Recent Labs  Lab 08/22/17  1012   WBC 2.99*   RBC 2.57*   HGB 7.7*   HCT 22.7*   *   MCV 88   MCH 30.0   MCHC 33.9     BMP:     Recent Labs  Lab 08/22/17  1012   *   *   K 3.6      CO2 26   BUN 32*   CREATININE 6.9*   CALCIUM 8.2*     Tacrolimus Lvl   Date Value Ref Range Status   08/22/2017 6.1 5.0 - 15.0 ng/mL Final     Comment:     Testing performed by Liquid Chromatography-Tandem  Mass Spectrometry (LC-MS/MS).  This test was developed and its performance characteristics  determined by [facility], Department of Pathology  and Laboratory Medicine in a manner consistent with CLIA  requirements. It has not been cleared or approved by the [place]  Food and Drug Administration.  This test is used for clinical   purposes.  It should not " be regarded as investigational or for  research.     08/21/2017 <1.5 (L) 5.0 - 15.0 ng/mL Final     Comment:     Testing performed by Liquid Chromatography-Tandem  Mass Spectrometry (LC-MS/MS).  This test was developed and its performance characteristics  determined by [facility], Department of Pathology  and Laboratory Medicine in a manner consistent with CLIA  requirements. It has not been cleared or approved by the [place]  Food and Drug Administration.  This test is used for clinical   purposes.  It should not be regarded as investigational or for  research.     08/20/2017 <1.5 (L) 5.0 - 15.0 ng/mL Final     Comment:     Testing performed by Liquid Chromatography-Tandem  Mass Spectrometry (LC-MS/MS).  This test was developed and its performance characteristics  determined by [facility], Department of Pathology  and Laboratory Medicine in a manner consistent with CLIA  requirements. It has not been cleared or approved by the [place]  Food and Drug Administration.  This test is used for clinical   purposes.  It should not be regarded as investigational or for  research.     08/19/2017 <1.5 (L) 5.0 - 15.0 ng/mL Final     Comment:     Testing performed by Liquid Chromatography-Tandem  Mass Spectrometry (LC-MS/MS).  This test was developed and its performance characteristics  determined by [facility], Department of Pathology  and Laboratory Medicine in a manner consistent with CLIA  requirements. It has not been cleared or approved by the [place]  Food and Drug Administration.  This test is used for clinical   purposes.  It should not be regarded as investigational or for  research.         Labs within the past 24 hours have been reviewed.    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  Plan to repeat US 8/21/17 w increased arterial RIs segemental and lobular arteries.  Plan to repeat US on Thursday 8/24/17.  - pt dialyzed on POD 1 for hyperkalemia (7.6) and POD #3 for elevated Cr/anuric.   - continue to monitor renal function and UOP   - 4 day espinoza removed 8/22/17.  Eval to remove JESSICA tomorrow.          Delayed graft function of kidney    - hyperkalemia of 7.6 on 8/19/17 thus required dialysis   - kidney w ATN likely. Lasix 100 mg given 8/20 w no response.  - dopamine gtt stopped 8/20- Tolerated HD 8/22.  No need for HD today.  - Cr up again today, plan for HD tomorrow.        Need for prophylactic immunotherapy    - patient received induction with THYMO given recipient HIV+; THYMO dose #3 8/20, still w no level, plan to given thymo #4 8/21/17.   - increasing Prograf to 3 mg BID; goal trough 8-10  - will need to closely monitor for drug-drug interactions with HAART therapy and Prograf.  No plan for Posaconazole at this time.   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    - HAART regimen per Dr. Miramontes   - repeat HIV RNA PCR positive, CD4 9.8        Hypophosphatemia    - stable.  Continue to monitor          At risk for opportunistic infections    - continue Bactrim for PJP prophylaxis for 1 year  - continue  Valcyte for CMV prophylaxis for 3 months; CMV D-/R+  - continue nystatin for thrush prophylaxis for 1 month  - no plan to start anti fungal         Metabolic acidosis    - bicarb ok.  Tolerated HD 8/21/17.  Tentative plan for HD tomorrow  - continue to monitor           Anemia of chronic disease    - Hb pre-op was 11.2 (8/18/17). Trended down.  Keep type and screen current.    - cont to have minimal (+) hematuria & bloody JESSICA output.  Repeat US post kidney 8/21 reviewed.  Repeat CBC stable.     - continue to monitor         Hyperlipidemia    - will need to restart Crestor once able         Hyperkalemia    - cont low potassium diet.    - today WNL.        Hypocalcemia    - calcium low thus will hold off on restarting outpatient sensipar  -  on 8/18/17            Discharge Planning:  No plan for d/c today.  Possible discharge tomorrow or next day.  Will need to arrange outpatient HD chair.      Rhoda Penn NP  Kidney Transplant  [facility]

## 2017-08-22 NOTE — ASSESSMENT & PLAN NOTE
- hyperkalemia of 7.6 on 8/19/17 thus required dialysis   - kidney w ATN likely. Lasix 100 mg given 8/20 w no response.  - dopamine gtt stopped 8/20- Tolerated HD 8/22.  No need for HD today.  - Cr up again today, plan for HD tomorrow.

## 2017-08-23 LAB
ALBUMIN SERPL BCP-MCNC: 2.6 G/DL
ANION GAP SERPL CALC-SCNC: 9 MMOL/L
BASOPHILS # BLD AUTO: 0 K/UL
BASOPHILS NFR BLD: 0 %
BLD PROD TYP BPU: NORMAL
BLOOD UNIT EXPIRATION DATE: NORMAL
BLOOD UNIT TYPE CODE: 6200
BLOOD UNIT TYPE: NORMAL
BUN SERPL-MCNC: 45 MG/DL
CALCIUM SERPL-MCNC: 8.4 MG/DL
CHLORIDE SERPL-SCNC: 98 MMOL/L
CO2 SERPL-SCNC: 25 MMOL/L
CODING SYSTEM: NORMAL
CREAT SERPL-MCNC: 9.3 MG/DL
DIFFERENTIAL METHOD: ABNORMAL
DISPENSE STATUS: NORMAL
EOSINOPHIL # BLD AUTO: 0.1 K/UL
EOSINOPHIL NFR BLD: 1.6 %
ERYTHROCYTE [DISTWIDTH] IN BLOOD BY AUTOMATED COUNT: 16.8 %
EST. GFR  (AFRICAN AMERICAN): 7.4 ML/MIN/1.73 M^2
EST. GFR  (NON AFRICAN AMERICAN): 6.4 ML/MIN/1.73 M^2
GLUCOSE SERPL-MCNC: 94 MG/DL
HCT VFR BLD AUTO: 20.1 %
HCT VFR BLD AUTO: 20.5 %
HCT VFR BLD AUTO: 22.6 %
HGB BLD-MCNC: 6.8 G/DL
HGB BLD-MCNC: 6.9 G/DL
HGB BLD-MCNC: 7.8 G/DL
LYMPHOCYTES # BLD AUTO: 0.1 K/UL
LYMPHOCYTES NFR BLD: 2.9 %
MAGNESIUM SERPL-MCNC: 2.5 MG/DL
MCH RBC QN AUTO: 29.2 PG
MCHC RBC AUTO-ENTMCNC: 33.8 G/DL
MCV RBC AUTO: 86 FL
MONOCYTES # BLD AUTO: 0.3 K/UL
MONOCYTES NFR BLD: 9 %
NEUTROPHILS # BLD AUTO: 2.7 K/UL
NEUTROPHILS NFR BLD: 85.9 %
NUM UNITS TRANS PACKED RBC: NORMAL
PHOSPHATE SERPL-MCNC: 4.9 MG/DL
PLATELET # BLD AUTO: 112 K/UL
PMV BLD AUTO: 9.4 FL
POCT GLUCOSE: 115 MG/DL (ref 70–110)
POCT GLUCOSE: 148 MG/DL (ref 70–110)
POCT GLUCOSE: 170 MG/DL (ref 70–110)
POCT GLUCOSE: 90 MG/DL (ref 70–110)
POTASSIUM SERPL-SCNC: 4 MMOL/L
RBC # BLD AUTO: 2.33 M/UL
SODIUM SERPL-SCNC: 132 MMOL/L
TACROLIMUS BLD-MCNC: <1.5 NG/ML
WBC # BLD AUTO: 3.1 K/UL

## 2017-08-23 PROCEDURE — 85018 HEMOGLOBIN: CPT | Mod: 91

## 2017-08-23 PROCEDURE — 25000003 PHARM REV CODE 250: Performed by: NURSE PRACTITIONER

## 2017-08-23 PROCEDURE — 83735 ASSAY OF MAGNESIUM: CPT

## 2017-08-23 PROCEDURE — 63600175 PHARM REV CODE 636 W HCPCS: Performed by: INTERNAL MEDICINE

## 2017-08-23 PROCEDURE — 85014 HEMATOCRIT: CPT | Mod: 91

## 2017-08-23 PROCEDURE — P9016 RBC LEUKOCYTES REDUCED: HCPCS

## 2017-08-23 PROCEDURE — 99233 SBSQ HOSP IP/OBS HIGH 50: CPT | Mod: ,,, | Performed by: NURSE PRACTITIONER

## 2017-08-23 PROCEDURE — 63600175 PHARM REV CODE 636 W HCPCS: Performed by: STUDENT IN AN ORGANIZED HEALTH CARE EDUCATION/TRAINING PROGRAM

## 2017-08-23 PROCEDURE — 20600001 HC STEP DOWN PRIVATE ROOM

## 2017-08-23 PROCEDURE — 63600175 PHARM REV CODE 636 W HCPCS: Performed by: NURSE PRACTITIONER

## 2017-08-23 PROCEDURE — 25000003 PHARM REV CODE 250: Performed by: SURGERY

## 2017-08-23 PROCEDURE — 25000003 PHARM REV CODE 250: Performed by: STUDENT IN AN ORGANIZED HEALTH CARE EDUCATION/TRAINING PROGRAM

## 2017-08-23 PROCEDURE — 85018 HEMOGLOBIN: CPT

## 2017-08-23 PROCEDURE — 80197 ASSAY OF TACROLIMUS: CPT

## 2017-08-23 PROCEDURE — 36430 TRANSFUSION BLD/BLD COMPNT: CPT

## 2017-08-23 PROCEDURE — 85025 COMPLETE CBC W/AUTO DIFF WBC: CPT

## 2017-08-23 PROCEDURE — 80069 RENAL FUNCTION PANEL: CPT

## 2017-08-23 PROCEDURE — 85014 HEMATOCRIT: CPT

## 2017-08-23 RX ORDER — PREDNISONE 10 MG/1
TABLET ORAL
Qty: 100 TABLET | Refills: 1 | Status: SHIPPED | OUTPATIENT
Start: 2017-08-23 | End: 2017-08-23

## 2017-08-23 RX ORDER — MYCOPHENOLATE MOFETIL 250 MG/1
1000 CAPSULE ORAL 2 TIMES DAILY
Qty: 240 CAPSULE | Refills: 11 | Status: SHIPPED | OUTPATIENT
Start: 2017-08-23 | End: 2017-08-23

## 2017-08-23 RX ORDER — LIDOCAINE HYDROCHLORIDE 10 MG/ML
10 INJECTION INFILTRATION; PERINEURAL ONCE
Status: DISCONTINUED | OUTPATIENT
Start: 2017-08-23 | End: 2017-08-26 | Stop reason: HOSPADM

## 2017-08-23 RX ORDER — VALGANCICLOVIR 450 MG/1
450 TABLET, FILM COATED ORAL
Qty: 12 TABLET | Refills: 2 | Status: SHIPPED | OUTPATIENT
Start: 2017-08-23 | End: 2017-08-23

## 2017-08-23 RX ORDER — TACROLIMUS 1 MG/1
6 CAPSULE ORAL EVERY 12 HOURS
Qty: 360 CAPSULE | Refills: 11 | Status: SHIPPED | OUTPATIENT
Start: 2017-08-23 | End: 2017-08-23

## 2017-08-23 RX ORDER — NYSTATIN 100000 [USP'U]/ML
500000 SUSPENSION ORAL
Qty: 473 ML | Refills: 0 | Status: ON HOLD | OUTPATIENT
Start: 2017-08-18 | End: 2017-09-07

## 2017-08-23 RX ORDER — TACROLIMUS 1 MG/1
3 CAPSULE ORAL ONCE
Status: COMPLETED | OUTPATIENT
Start: 2017-08-23 | End: 2017-08-23

## 2017-08-23 RX ORDER — SULFAMETHOXAZOLE AND TRIMETHOPRIM 400; 80 MG/1; MG/1
1 TABLET ORAL
Qty: 12 TABLET | Refills: 11 | Status: SHIPPED | OUTPATIENT
Start: 2017-08-23 | End: 2017-08-23

## 2017-08-23 RX ORDER — VALGANCICLOVIR 450 MG/1
450 TABLET, FILM COATED ORAL
Qty: 12 TABLET | Refills: 2 | Status: ON HOLD | OUTPATIENT
Start: 2017-08-23 | End: 2017-09-14

## 2017-08-23 RX ORDER — DOCUSATE SODIUM 100 MG/1
100 CAPSULE, LIQUID FILLED ORAL 3 TIMES DAILY PRN
Qty: 100 CAPSULE | Refills: 0 | Status: SHIPPED | OUTPATIENT
Start: 2017-08-23 | End: 2017-12-27 | Stop reason: ALTCHOICE

## 2017-08-23 RX ORDER — EPINEPHRINE 1 MG/ML
1 INJECTION, SOLUTION INTRACARDIAC; INTRAMUSCULAR; INTRAVENOUS; SUBCUTANEOUS ONCE AS NEEDED
Status: DISPENSED | OUTPATIENT
Start: 2017-08-23 | End: 2017-08-23

## 2017-08-23 RX ORDER — PREDNISONE 10 MG/1
TABLET ORAL
Qty: 100 TABLET | Refills: 1 | Status: ON HOLD | OUTPATIENT
Start: 2017-08-23 | End: 2017-09-07

## 2017-08-23 RX ORDER — DIPHENHYDRAMINE HCL 50 MG
50 CAPSULE ORAL ONCE AS NEEDED
Status: ACTIVE | OUTPATIENT
Start: 2017-08-23 | End: 2017-08-23

## 2017-08-23 RX ORDER — TACROLIMUS 1 MG/1
8 CAPSULE ORAL EVERY 12 HOURS
Qty: 480 CAPSULE | Refills: 11 | Status: SHIPPED | OUTPATIENT
Start: 2017-08-23 | End: 2017-08-26

## 2017-08-23 RX ORDER — DIPHENHYDRAMINE HCL 25 MG
25 CAPSULE ORAL ONCE
Status: COMPLETED | OUTPATIENT
Start: 2017-08-23 | End: 2017-08-23

## 2017-08-23 RX ORDER — BISACODYL 5 MG
5-10 TABLET, DELAYED RELEASE (ENTERIC COATED) ORAL DAILY PRN
Qty: 30 TABLET | Refills: 3 | Status: SHIPPED | OUTPATIENT
Start: 2017-08-23 | End: 2017-12-27 | Stop reason: ALTCHOICE

## 2017-08-23 RX ORDER — MYCOPHENOLATE MOFETIL 250 MG/1
1000 CAPSULE ORAL 2 TIMES DAILY
Qty: 240 CAPSULE | Refills: 11 | Status: SHIPPED | OUTPATIENT
Start: 2017-08-23 | End: 2017-09-22 | Stop reason: SDUPTHER

## 2017-08-23 RX ORDER — ACETAMINOPHEN 325 MG/1
650 TABLET ORAL ONCE
Status: COMPLETED | OUTPATIENT
Start: 2017-08-23 | End: 2017-08-23

## 2017-08-23 RX ORDER — HYDROCODONE BITARTRATE AND ACETAMINOPHEN 500; 5 MG/1; MG/1
TABLET ORAL
Status: DISCONTINUED | OUTPATIENT
Start: 2017-08-23 | End: 2017-08-26 | Stop reason: HOSPADM

## 2017-08-23 RX ORDER — HYDROCODONE BITARTRATE AND ACETAMINOPHEN 500; 5 MG/1; MG/1
TABLET ORAL
Status: DISCONTINUED | OUTPATIENT
Start: 2017-08-23 | End: 2017-08-24

## 2017-08-23 RX ORDER — SULFAMETHOXAZOLE AND TRIMETHOPRIM 400; 80 MG/1; MG/1
1 TABLET ORAL
Qty: 12 TABLET | Refills: 11 | Status: SHIPPED | OUTPATIENT
Start: 2017-08-23 | End: 2017-08-26

## 2017-08-23 RX ORDER — FAMOTIDINE 20 MG/1
20 TABLET, FILM COATED ORAL NIGHTLY
Qty: 30 TABLET | Refills: 2 | Status: SHIPPED | OUTPATIENT
Start: 2017-08-23 | End: 2017-09-22 | Stop reason: SDUPTHER

## 2017-08-23 RX ORDER — ACETAMINOPHEN 325 MG/1
650 TABLET ORAL ONCE AS NEEDED
Status: ACTIVE | OUTPATIENT
Start: 2017-08-23 | End: 2017-08-23

## 2017-08-23 RX ADMIN — ANTI-THYMOCYTE GLOBULIN (RABBIT) 100 MG: 5 INJECTION, POWDER, LYOPHILIZED, FOR SOLUTION INTRAVENOUS at 04:08

## 2017-08-23 RX ADMIN — TACROLIMUS 3 MG: 1 CAPSULE, GELATIN COATED ORAL at 02:08

## 2017-08-23 RX ADMIN — BISACODYL 10 MG: 5 TABLET, COATED ORAL at 09:08

## 2017-08-23 RX ADMIN — NYSTATIN 500000 UNITS: 500000 SUSPENSION ORAL at 09:08

## 2017-08-23 RX ADMIN — DOCUSATE SODIUM 100 MG: 50 CAPSULE, LIQUID FILLED ORAL at 09:08

## 2017-08-23 RX ADMIN — DOCUSATE SODIUM 100 MG: 50 CAPSULE, LIQUID FILLED ORAL at 02:08

## 2017-08-23 RX ADMIN — ACETAMINOPHEN 650 MG: 325 TABLET ORAL at 03:08

## 2017-08-23 RX ADMIN — SULFAMETHOXAZOLE AND TRIMETHOPRIM 1 TABLET: 400; 80 TABLET ORAL at 09:08

## 2017-08-23 RX ADMIN — HYDROMORPHONE HYDROCHLORIDE 4 MG: 2 TABLET ORAL at 08:08

## 2017-08-23 RX ADMIN — THERA TABS 1 TABLET: TAB at 09:08

## 2017-08-23 RX ADMIN — DIPHENHYDRAMINE HYDROCHLORIDE 25 MG: 25 CAPSULE ORAL at 03:08

## 2017-08-23 RX ADMIN — TACROLIMUS 8 MG: 1 CAPSULE ORAL at 05:08

## 2017-08-23 RX ADMIN — TACROLIMUS 5 MG: 5 CAPSULE ORAL at 05:08

## 2017-08-23 RX ADMIN — METHYLPREDNISOLONE SODIUM SUCCINATE 40 MG: 40 INJECTION, POWDER, FOR SOLUTION INTRAMUSCULAR; INTRAVENOUS at 03:08

## 2017-08-23 RX ADMIN — HYDROMORPHONE HYDROCHLORIDE 4 MG: 2 TABLET ORAL at 03:08

## 2017-08-23 RX ADMIN — NYSTATIN 500000 UNITS: 500000 SUSPENSION ORAL at 05:08

## 2017-08-23 RX ADMIN — PREDNISONE 20 MG: 20 TABLET ORAL at 09:08

## 2017-08-23 RX ADMIN — FAMOTIDINE 20 MG: 20 TABLET, FILM COATED ORAL at 09:08

## 2017-08-23 RX ADMIN — HEPARIN SODIUM 5000 UNITS: 5000 INJECTION, SOLUTION INTRAVENOUS; SUBCUTANEOUS at 09:08

## 2017-08-23 RX ADMIN — HYDROMORPHONE HYDROCHLORIDE 4 MG: 2 TABLET ORAL at 07:08

## 2017-08-23 RX ADMIN — NYSTATIN 500000 UNITS: 500000 SUSPENSION ORAL at 02:08

## 2017-08-23 RX ADMIN — HEPARIN SODIUM 5000 UNITS: 5000 INJECTION, SOLUTION INTRAVENOUS; SUBCUTANEOUS at 05:08

## 2017-08-23 RX ADMIN — VALGANCICLOVIR 450 MG: 450 TABLET, FILM COATED ORAL at 09:08

## 2017-08-23 RX ADMIN — DOCUSATE SODIUM 100 MG: 50 CAPSULE, LIQUID FILLED ORAL at 05:08

## 2017-08-23 RX ADMIN — MYCOPHENOLATE MOFETIL 1000 MG: 250 CAPSULE ORAL at 09:08

## 2017-08-23 RX ADMIN — HEPARIN SODIUM 5000 UNITS: 5000 INJECTION, SOLUTION INTRAVENOUS; SUBCUTANEOUS at 02:08

## 2017-08-23 NOTE — ASSESSMENT & PLAN NOTE
- continue Bactrim for PJP prophylaxis for 1 year.  - continue Valcyte for CMV prophylaxis for 3 months; CMV D-/R+.  - continue nystatin for thrush prophylaxis for 1 month.  - no plan to start anti fungal.

## 2017-08-23 NOTE — NURSING
Plan of care reviewed on am rounds, salvatore, vss wbc 3  h/h 7.7/22.7, Cr down 6 after hemodialysis completed at 0600, scant bloody output via espinoza which was dcd at 1530, JESSICA intact with scant bloody drainage, rlq incision healing with staples intact,  effective incisional pain control with po Dilaudid, ambulated in halls several times today, tolerating diet without n/n, (+) BM today after Duculox [place] given. Cousin doing well with self meds, bedside teaching done per coordinator, patient states mom will come tomrrow when cousin returns home, coping appropriately with post op course, patient aware of probable need for continued dialysis as needed on outpatient schedule, emotional support provided.

## 2017-08-23 NOTE — PROGRESS NOTES
[facility]  Kidney Transplant  Progress Note      Reason for Follow-up: Reassessment of Kidney Transplant - 8/18/2017  (#1) recipient and management of immunosuppression.      Subjective:     37 y.o. 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   Required HD on POD 1 for hyperkalemia. US doppler revealed mildly elevated RIs but otherwise adequate flow to the kidney.  Repeat US post kidney POD #3 reviewed w increased arterial RIs to segmental and lobular arteries.  Will plan for repeat US on Thursday. Patient required repeat HD on POD #3.      Interval History: no acute events overnight. Last HD on Monday 8/21 - tolerated well. UOP increasing, up to 520 ml/24 hrs. Still not clearing. May likely need HD tomorrow. Check DSA on 8/25/17. Pt still reporting hematuria. H&H 6/38/20. Will transfuse 1 unit PRBCs. Prograf level remains undetected. Plan for additional dose of Thymo today. Denies nausea/vomiting. Good appetite. Pain controlled with current regimen. Encouraged ambulation. +BM. Monitor.       Past Medical, Surgical, Family, and Social History:   Unchanged from H&P.    Review of patient's allergies indicates:  No Known Allergies    Scheduled Meds:   sodium chloride 0.9%   Intravenous Once    bisacodyl  10 mg Oral QHS    docusate sodium  100 mg Oral TID    famotidine  20 mg Oral QHS    heparin (porcine)  5,000 Units Subcutaneous Q8H    multivitamin  1 tablet Oral Daily    mycophenolate  1,000 mg Oral BID    nystatin  500,000 Units Mouth/Throat QID (PC + HS)    predniSONE  20 mg Oral Daily    sulfamethoxazole-trimethoprim 400-80mg  1 tablet Oral Every Mon, Wed, Fri    tacrolimus  3 mg Oral Once     tacrolimus  8 mg Oral BID    valganciclovir  450 mg Oral Every Mon, Wed, Fri     Continuous Infusions:   glucagon (human recombinant)       PRN Meds:sodium chloride, sodium chloride, sodium chloride 0.9%, albumin human 25%, albumin human 25%, bisacodyl, dextrose 50%, glucagon (human recombinant), glucose, glucose, glucose, HYDROmorphone, HYDROmorphone, insulin aspart, naloxone, ondansetron    Intake/Output - Last 3 Shifts       08/21 0700 - 08/22 0659 08/22 0700 - 08/23 0659 08/23 0700 - 08/24 0659    P.O. 480 1440     I.V. (mL/kg) 250 (3.6)      Other 500      IV Piggyback 200      Total Intake(mL/kg) 1430 (20.7) 1440 (19.8)     Urine (mL/kg/hr) 50 (0) 520 (0.3)     Drains 20 (0) 40 (0)     Other 3000 (1.8)      Stool 0 (0) 0 (0)     Total Output 3070 560      Net -1640 +880             Urine Occurrence  1 x     Stool Occurrence 0 x 1 x            Review of Systems   Constitutional: Negative for activity change, appetite change, chills, fatigue and fever.   HENT: Negative for congestion and facial swelling.    Eyes: Negative for pain, discharge and visual disturbance.   Respiratory: Negative for cough, chest tightness, shortness of breath and wheezing.    Cardiovascular: Negative for chest pain, palpitations and leg swelling.   Gastrointestinal: Positive for abdominal pain (incisional). Negative for abdominal distention, constipation, diarrhea, nausea and vomiting.   Endocrine: Negative.    Genitourinary: Positive for hematuria. Negative for decreased urine volume, dysuria and flank pain.   Musculoskeletal: Negative for gait problem.   Skin: Positive for wound.   Allergic/Immunologic: Positive for immunocompromised state.   Neurological: Negative for dizziness, tremors, light-headedness and headaches.   Hematological: Negative.    Psychiatric/Behavioral: Negative for behavioral problems and dysphoric mood.      Objective:     Vital Signs (Most Recent):  Temp: 98.3 °F (36.8 °C) (08/23/17 1105)  Pulse: 97  "(08/23/17 1105)  Resp: 18 (08/23/17 1105)  BP: 123/73 (08/23/17 1105)  SpO2: 97 % (08/23/17 1105) Vital Signs (24h Range):  Temp:  [98.2 °F (36.8 °C)-98.6 °F (37 °C)] 98.3 °F (36.8 °C)  Pulse:  [76-97] 97  Resp:  [18] 18  SpO2:  [96 %-99 %] 97 %  BP: (108-136)/(64-86) 123/73     Weight: 72.8 kg (160 lb 7.9 oz)  Height: 5' 8" (172.7 cm)  Body mass index is 24.4 kg/m².    Physical Exam   Constitutional: He is oriented to person, place, and time. He appears well-developed and well-nourished. No distress.   HENT:   Head: Normocephalic and atraumatic.   Mouth/Throat: No oropharyngeal exudate.   Eyes: EOM are normal. Pupils are equal, round, and reactive to light. No scleral icterus.   Neck: Normal range of motion. Neck supple. No JVD present.   Cardiovascular: Normal rate and regular rhythm.    Pulmonary/Chest: Effort normal and breath sounds normal. No respiratory distress. He has no rales.   Abdominal: Soft. Bowel sounds are normal. He exhibits distension. There is tenderness. There is no guarding.   RLQ inc with staples intact no s/s/i  RLQ JESSICA intact with serosang drainage     Musculoskeletal: Normal range of motion. He exhibits no edema.   Neurological: He is alert and oriented to person, place, and time.   Skin: Skin is warm and dry. Capillary refill takes 2 to 3 seconds. He is not diaphoretic. No erythema.   Psychiatric: He has a normal mood and affect. His behavior is normal. Judgment and thought content normal.   Nursing note and vitals reviewed.      Laboratory:  CBC:   Recent Labs  Lab 08/23/17  0505 08/23/17  0615   WBC 3.10*  --    RBC 2.33*  --    HGB 6.8* 6.9*   HCT 20.1* 20.5*   *  --    MCV 86  --    MCH 29.2  --    MCHC 33.8  --      BMP:   Recent Labs  Lab 08/23/17  0505   GLU 94   *   K 4.0   CL 98   CO2 25   BUN 45*   CREATININE 9.3*   CALCIUM 8.4*     Labs within the past 24 hours have been reviewed.    Diagnostic Results:  US: Reviewed    Assessment/Plan:     * ESRD secondary to HIVAN " s/p DDRT 8/18/17    - history of 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/20/2004 (initially on PD for a few months but switched to HD after peritonitis), now s/p DDRT (THYMO induction given recipient HIV+; KDPI 17%, WIT 29 minutes, CIT 7 hours and 2 minutes, CMV D-/R+) on 8/18/17  - renal doppler with adequate blood flow on 8/19/17.  Plan to repeat US 8/21/17 w increased arterial RIs segemental and lobular arteries.  Plan to repeat US on Thursday 8/24/17.  - pt dialyzed on POD 1 for hyperkalemia (7.6) and POD #3 for elevated Cr/anuric.   - no HD today. UOP increased to 520 ml/24hr.  - continue to monitor renal function and UOP   - 4 day espinoza removed 8/22/17.           Delayed graft function of kidney    - hyperkalemia of 7.6 on 8/19/17 thus required dialysis.   - kidney w ATN likely. Lasix 100 mg given 8/20 w no response.  - dopamine gtt stopped 8/20- Tolerated HD 8/22.   - Cr up again today, but uop increased to 520 ml.   - no HD today. May need HD tomorrow.        At risk for opportunistic infections    - continue Bactrim for PJP prophylaxis for 1 year.  - continue Valcyte for CMV prophylaxis for 3 months; CMV D-/R+.  - continue nystatin for thrush prophylaxis for 1 month.  - no plan to start anti fungal.          Need for prophylactic immunotherapy    - patient received induction with THYMO given recipient HIV+; THYMO dose #3 8/20, still w no level,  thymo #4 8/21/17.   - level remains undetected. Thymo #5 today.  - increasing Prograf to 8 mg BID; goal trough 8-10.  - will need to closely monitor for drug-drug interactions with HAART therapy and Prograf.  No plan for Posaconazole at this time.   - continue MMF 1000 mg BID.  - continue steroids per protocol.  - continue to monitor for toxicities from immunosuppressive medications.               Anemia of chronic disease    - Hb pre-op was 11.2 (8/18/17). Trended down.  Keep type and screen current.    - cont to have (+) hematuria. JESSICA output is now more serosang.   Repeat US post kidney 8/21 reviewed.   - H&H 6.8/20.1. Transfuse 1 unit PRBCs. Repeat H&H following transfusion.  - continue to monitor.             HIV (human immunodeficiency virus infection)    - last CD4 count 213 (7/11/17) and HIV PCR 30 copies/mL.  - transplant ID consulted and appreciate recs.  - will change RPV to emtricitabine/TDF and cont DOL and upon discharge may transition TDF to TAF (+emtricitabine).    - HAART regimen per Dr. Miramontes.  - repeat HIV RNA PCR positive, CD4 9.8.          HIV nephropathy    - history of 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/20/2004 (initially on PD for a few months but switched to HD after peritonitis).          Hyperlipidemia    - will need to restart Crestor once able.        Hypocalcemia    - calcium low thus will hold off on restarting outpatient sensipar.  -  on 8/18/17.          Hyperkalemia    - cont low potassium diet.    - today WNL.          Metabolic acidosis    - bicarb ok.  Tolerated HD 8/21/17.  Tentative plan for HD tomorrow  - continue to monitor           Hypophosphatemia    - stable.  Continue to monitor              Discharge Planning: not a candidate for dc at this time.    JESSICA drain d/c'd. Site cleaned with alcohol. 1% lidocaine applied. 3-0 suture to site. Drain removed intact and without difficulty. Pt tolerated well.       Latonia Rich, [place]  Kidney Transplant  [facility]

## 2017-08-23 NOTE — ASSESSMENT & PLAN NOTE
- hyperkalemia of 7.6 on 8/19/17 thus required dialysis.   - kidney w ATN likely. Lasix 100 mg given 8/20 w no response.  - dopamine gtt stopped 8/20- Tolerated HD 8/22.   - Cr up again today, but uop increased to 520 ml.   - no HD today. May need HD tomorrow.

## 2017-08-23 NOTE — PROGRESS NOTES
Transplant Teaching Book given to patient, Demetrio Aguiar, on 8/21/2017.  During the course of the hospital stay the patient received information regarding kidney transplant. Teaching and instruction were completed.  Areas that were discussed included: how to contact the Transplant Team, the importance of measuring intake of fluids and urine output, and monitoring vital signs such as blood pressure, temperature, and daily weights.  Parameters for which to report abnormal findings were given.  Appointment were provided along with the rational for the importance of lab work and clinic visits.  A written medication list was provided.  The importance of immunosuppressive medications, their common side effects, and treatment to prevent or minimize side effects has been reviewed.  Signs and symptoms of rejection and infection along with various treatments were reviewed.  The need to avoid infection was discussed.  Wound care and special consideration regarding activities of daily living were explained.  Written and verbal teaching of the above information was given.

## 2017-08-23 NOTE — ASSESSMENT & PLAN NOTE
- patient received induction with THYMO given recipient HIV+; THYMO dose #3 8/20, still w no level,  thymo #4 8/21/17.   - level remains undetected. Thymo #5 today.  - increasing Prograf to 8 mg BID; goal trough 8-10.  - will need to closely monitor for drug-drug interactions with HAART therapy and Prograf.  No plan for Posaconazole at this time.   - continue MMF 1000 mg BID.  - continue steroids per protocol.  - continue to monitor for toxicities from immunosuppressive medications.

## 2017-08-23 NOTE — ASSESSMENT & PLAN NOTE
- Hb pre-op was 11.2 (8/18/17). Trended down.  Keep type and screen current.    - cont to have (+) hematuria. JESSICA output is now more serosang.  Repeat US post kidney 8/21 reviewed.   - H&H 6.8/20.1. Transfuse 1 unit PRBCs. Repeat H&H following transfusion.  - continue to monitor.

## 2017-08-23 NOTE — ASSESSMENT & PLAN NOTE
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  Plan to repeat US 8/21/17 w increased arterial RIs segemental and lobular arteries.  Plan to repeat US on Thursday 8/24/17.  - pt dialyzed on POD 1 for hyperkalemia (7.6) and POD #3 for elevated Cr/anuric.   - no HD today. UOP increased to 520 ml/24hr.  - continue to monitor renal function and UOP   - 4 day espinoza removed 8/22/17.

## 2017-08-23 NOTE — SUBJECTIVE & OBJECTIVE
Subjective:     37 y.o. 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   Required HD on POD 1 for hyperkalemia. US doppler revealed mildly elevated RIs but otherwise adequate flow to the kidney.  Repeat US post kidney POD #3 reviewed w increased arterial RIs to segmental and lobular arteries.  Will plan for repeat US on Thursday. Patient required repeat HD on POD #3.      Interval History: no acute events overnight. Last HD on Monday 8/21 - tolerated well. UOP increasing, up to 520 ml/24 hrs. Still not clearing. May likely need HD tomorrow. Check DSA on 8/25/17. Pt still reporting hematuria. H&H 6/38/20. Will transfuse 1 unit PRBCs. Prograf level remains undetected. Plan for additional dose of Thymo today. Denies nausea/vomiting. Good appetite. Pain controlled with current regimen. Encouraged ambulation. +BM. Monitor.       Past Medical, Surgical, Family, and Social History:   Unchanged from H&P.    Review of patient's allergies indicates:  No Known Allergies    Scheduled Meds:   sodium chloride 0.9%   Intravenous Once    bisacodyl  10 mg Oral QHS    docusate sodium  100 mg Oral TID    famotidine  20 mg Oral QHS    heparin (porcine)  5,000 Units Subcutaneous Q8H    multivitamin  1 tablet Oral Daily    mycophenolate  1,000 mg Oral BID    nystatin  500,000 Units Mouth/Throat QID (PC + HS)    predniSONE  20 mg Oral Daily    sulfamethoxazole-trimethoprim 400-80mg  1 tablet Oral Every Mon, Wed, Fri    tacrolimus  3 mg Oral Once    tacrolimus  8 mg Oral BID    valganciclovir  450 mg Oral Every Mon, Wed, Fri     Continuous Infusions:   glucagon (human recombinant)       PRN Meds:sodium chloride, sodium chloride,  sodium chloride 0.9%, albumin human 25%, albumin human 25%, bisacodyl, dextrose 50%, glucagon (human recombinant), glucose, glucose, glucose, HYDROmorphone, HYDROmorphone, insulin aspart, naloxone, ondansetron    Intake/Output - Last 3 Shifts       08/21 0700 - 08/22 0659 08/22 0700 - 08/23 0659 08/23 0700 - 08/24 0659    P.O. 480 1440     I.V. (mL/kg) 250 (3.6)      Other 500      IV Piggyback 200      Total Intake(mL/kg) 1430 (20.7) 1440 (19.8)     Urine (mL/kg/hr) 50 (0) 520 (0.3)     Drains 20 (0) 40 (0)     Other 3000 (1.8)      Stool 0 (0) 0 (0)     Total Output 3070 560      Net -1640 +880             Urine Occurrence  1 x     Stool Occurrence 0 x 1 x            Review of Systems   Constitutional: Negative for activity change, appetite change, chills, fatigue and fever.   HENT: Negative for congestion and facial swelling.    Eyes: Negative for pain, discharge and visual disturbance.   Respiratory: Negative for cough, chest tightness, shortness of breath and wheezing.    Cardiovascular: Negative for chest pain, palpitations and leg swelling.   Gastrointestinal: Positive for abdominal pain (incisional). Negative for abdominal distention, constipation, diarrhea, nausea and vomiting.   Endocrine: Negative.    Genitourinary: Positive for hematuria. Negative for decreased urine volume, dysuria and flank pain.   Musculoskeletal: Negative for gait problem.   Skin: Positive for wound.   Allergic/Immunologic: Positive for immunocompromised state.   Neurological: Negative for dizziness, tremors, light-headedness and headaches.   Hematological: Negative.    Psychiatric/Behavioral: Negative for behavioral problems and dysphoric mood.      Objective:     Vital Signs (Most Recent):  Temp: 98.3 °F (36.8 °C) (08/23/17 1105)  Pulse: 97 (08/23/17 1105)  Resp: 18 (08/23/17 1105)  BP: 123/73 (08/23/17 1105)  SpO2: 97 % (08/23/17 1105) Vital Signs (24h Range):  Temp:  [98.2 °F (36.8 °C)-98.6 °F (37 °C)] 98.3 °F (36.8 °C)  Pulse:   "[76-97] 97  Resp:  [18] 18  SpO2:  [96 %-99 %] 97 %  BP: (108-136)/(64-86) 123/73     Weight: 72.8 kg (160 lb 7.9 oz)  Height: 5' 8" (172.7 cm)  Body mass index is 24.4 kg/m².    Physical Exam   Constitutional: He is oriented to person, place, and time. He appears well-developed and well-nourished. No distress.   HENT:   Head: Normocephalic and atraumatic.   Mouth/Throat: No oropharyngeal exudate.   Eyes: EOM are normal. Pupils are equal, round, and reactive to light. No scleral icterus.   Neck: Normal range of motion. Neck supple. No JVD present.   Cardiovascular: Normal rate and regular rhythm.    Pulmonary/Chest: Effort normal and breath sounds normal. No respiratory distress. He has no rales.   Abdominal: Soft. Bowel sounds are normal. He exhibits distension. There is tenderness. There is no guarding.   RLQ inc with staples intact no s/s/i  RLQ JESSICA intact with serosang drainage     Musculoskeletal: Normal range of motion. He exhibits no edema.   Neurological: He is alert and oriented to person, place, and time.   Skin: Skin is warm and dry. Capillary refill takes 2 to 3 seconds. He is not diaphoretic. No erythema.   Psychiatric: He has a normal mood and affect. His behavior is normal. Judgment and thought content normal.   Nursing note and vitals reviewed.      Laboratory:  CBC:   Recent Labs  Lab 08/23/17  0505 08/23/17  0615   WBC 3.10*  --    RBC 2.33*  --    HGB 6.8* 6.9*   HCT 20.1* 20.5*   *  --    MCV 86  --    MCH 29.2  --    MCHC 33.8  --      BMP:   Recent Labs  Lab 08/23/17  0505   GLU 94   *   K 4.0   CL 98   CO2 25   BUN 45*   CREATININE 9.3*   CALCIUM 8.4*     Labs within the past 24 hours have been reviewed.    Diagnostic Results:  US: Reviewed  "

## 2017-08-23 NOTE — ASSESSMENT & PLAN NOTE
- last CD4 count 213 (7/11/17) and HIV PCR 30 copies/mL.  - transplant ID consulted and appreciate recs.  - will change RPV to emtricitabine/TDF and cont DOL and upon discharge may transition TDF to TAF (+emtricitabine).    - HAART regimen per Dr. Miramontes.  - repeat HIV RNA PCR positive, CD4 9.8.

## 2017-08-24 LAB
ALBUMIN SERPL BCP-MCNC: 2.7 G/DL
ANION GAP SERPL CALC-SCNC: 14 MMOL/L
BASOPHILS # BLD AUTO: 0 K/UL
BASOPHILS # BLD AUTO: 0 K/UL
BASOPHILS NFR BLD: 0 %
BASOPHILS NFR BLD: 0 %
BUN SERPL-MCNC: 54 MG/DL
CALCIUM SERPL-MCNC: 8.3 MG/DL
CHLORIDE SERPL-SCNC: 97 MMOL/L
CO2 SERPL-SCNC: 20 MMOL/L
CREAT SERPL-MCNC: 10.9 MG/DL
DIFFERENTIAL METHOD: ABNORMAL
DIFFERENTIAL METHOD: ABNORMAL
EOSINOPHIL # BLD AUTO: 0 K/UL
EOSINOPHIL # BLD AUTO: 0 K/UL
EOSINOPHIL NFR BLD: 0.7 %
EOSINOPHIL NFR BLD: 1 %
ERYTHROCYTE [DISTWIDTH] IN BLOOD BY AUTOMATED COUNT: 16.8 %
ERYTHROCYTE [DISTWIDTH] IN BLOOD BY AUTOMATED COUNT: 17 %
EST. GFR  (AFRICAN AMERICAN): 6.1 ML/MIN/1.73 M^2
EST. GFR  (NON AFRICAN AMERICAN): 5.3 ML/MIN/1.73 M^2
GLUCOSE SERPL-MCNC: 90 MG/DL
HCT VFR BLD AUTO: 21.8 %
HCT VFR BLD AUTO: 23.3 %
HCT VFR BLD AUTO: 23.5 %
HGB BLD-MCNC: 7.6 G/DL
HGB BLD-MCNC: 8.1 G/DL
HGB BLD-MCNC: 8.1 G/DL
LYMPHOCYTES # BLD AUTO: 0.1 K/UL
LYMPHOCYTES # BLD AUTO: 0.1 K/UL
LYMPHOCYTES NFR BLD: 2.6 %
LYMPHOCYTES NFR BLD: 2.9 %
MAGNESIUM SERPL-MCNC: 2.2 MG/DL
MCH RBC QN AUTO: 29.1 PG
MCH RBC QN AUTO: 29.5 PG
MCHC RBC AUTO-ENTMCNC: 34.5 G/DL
MCHC RBC AUTO-ENTMCNC: 34.8 G/DL
MCV RBC AUTO: 85 FL
MCV RBC AUTO: 85 FL
MONOCYTES # BLD AUTO: 0.2 K/UL
MONOCYTES # BLD AUTO: 0.3 K/UL
MONOCYTES NFR BLD: 8.4 %
MONOCYTES NFR BLD: 8.5 %
NEUTROPHILS # BLD AUTO: 2.4 K/UL
NEUTROPHILS # BLD AUTO: 2.7 K/UL
NEUTROPHILS NFR BLD: 87.1 %
NEUTROPHILS NFR BLD: 87.5 %
PHOSPHATE SERPL-MCNC: 4.5 MG/DL
PLATELET # BLD AUTO: 127 K/UL
PLATELET # BLD AUTO: 132 K/UL
PMV BLD AUTO: 9.5 FL
PMV BLD AUTO: 9.6 FL
POCT GLUCOSE: 113 MG/DL (ref 70–110)
POCT GLUCOSE: 151 MG/DL (ref 70–110)
POCT GLUCOSE: 89 MG/DL (ref 70–110)
POTASSIUM SERPL-SCNC: 3.8 MMOL/L
RBC # BLD AUTO: 2.75 M/UL
RBC # BLD AUTO: 2.78 M/UL
SODIUM SERPL-SCNC: 131 MMOL/L
TACROLIMUS BLD-MCNC: 2.1 NG/ML
WBC # BLD AUTO: 2.72 K/UL
WBC # BLD AUTO: 3.08 K/UL

## 2017-08-24 PROCEDURE — 20600001 HC STEP DOWN PRIVATE ROOM

## 2017-08-24 PROCEDURE — 80197 ASSAY OF TACROLIMUS: CPT

## 2017-08-24 PROCEDURE — 25000003 PHARM REV CODE 250: Performed by: SURGERY

## 2017-08-24 PROCEDURE — 63600175 PHARM REV CODE 636 W HCPCS: Performed by: NURSE PRACTITIONER

## 2017-08-24 PROCEDURE — 25000003 PHARM REV CODE 250: Performed by: NURSE PRACTITIONER

## 2017-08-24 PROCEDURE — 25000003 PHARM REV CODE 250: Performed by: INTERNAL MEDICINE

## 2017-08-24 PROCEDURE — 85014 HEMATOCRIT: CPT

## 2017-08-24 PROCEDURE — 25000003 PHARM REV CODE 250: Performed by: STUDENT IN AN ORGANIZED HEALTH CARE EDUCATION/TRAINING PROGRAM

## 2017-08-24 PROCEDURE — 80100016 HC MAINTENANCE HEMODIALYSIS

## 2017-08-24 PROCEDURE — 90935 HEMODIALYSIS ONE EVALUATION: CPT

## 2017-08-24 PROCEDURE — 83735 ASSAY OF MAGNESIUM: CPT

## 2017-08-24 PROCEDURE — 63600175 PHARM REV CODE 636 W HCPCS: Performed by: STUDENT IN AN ORGANIZED HEALTH CARE EDUCATION/TRAINING PROGRAM

## 2017-08-24 PROCEDURE — 80069 RENAL FUNCTION PANEL: CPT

## 2017-08-24 PROCEDURE — 99232 SBSQ HOSP IP/OBS MODERATE 35: CPT | Mod: ,,, | Performed by: NURSE PRACTITIONER

## 2017-08-24 PROCEDURE — 85025 COMPLETE CBC W/AUTO DIFF WBC: CPT

## 2017-08-24 PROCEDURE — 85018 HEMOGLOBIN: CPT

## 2017-08-24 RX ORDER — TACROLIMUS 1 MG/1
2 CAPSULE ORAL ONCE
Status: COMPLETED | OUTPATIENT
Start: 2017-08-24 | End: 2017-08-24

## 2017-08-24 RX ORDER — SODIUM CHLORIDE 9 MG/ML
INJECTION, SOLUTION INTRAVENOUS ONCE
Status: COMPLETED | OUTPATIENT
Start: 2017-08-24 | End: 2017-08-24

## 2017-08-24 RX ORDER — SODIUM CHLORIDE 9 MG/ML
INJECTION, SOLUTION INTRAVENOUS
Status: DISCONTINUED | OUTPATIENT
Start: 2017-08-24 | End: 2017-08-26 | Stop reason: HOSPADM

## 2017-08-24 RX ORDER — TACROLIMUS 5 MG/1
10 CAPSULE ORAL 2 TIMES DAILY
Status: DISCONTINUED | OUTPATIENT
Start: 2017-08-24 | End: 2017-08-26

## 2017-08-24 RX ADMIN — HYDROMORPHONE HYDROCHLORIDE 4 MG: 2 TABLET ORAL at 04:08

## 2017-08-24 RX ADMIN — TACROLIMUS 2 MG: 1 CAPSULE ORAL at 11:08

## 2017-08-24 RX ADMIN — HYDROMORPHONE HYDROCHLORIDE 4 MG: 2 TABLET ORAL at 11:08

## 2017-08-24 RX ADMIN — THERA TABS 1 TABLET: TAB at 09:08

## 2017-08-24 RX ADMIN — DOCUSATE SODIUM 100 MG: 50 CAPSULE, LIQUID FILLED ORAL at 05:08

## 2017-08-24 RX ADMIN — FAMOTIDINE 20 MG: 20 TABLET, FILM COATED ORAL at 08:08

## 2017-08-24 RX ADMIN — DOCUSATE SODIUM 100 MG: 50 CAPSULE, LIQUID FILLED ORAL at 08:08

## 2017-08-24 RX ADMIN — PREDNISONE 20 MG: 20 TABLET ORAL at 09:08

## 2017-08-24 RX ADMIN — NYSTATIN 500000 UNITS: 500000 SUSPENSION ORAL at 09:08

## 2017-08-24 RX ADMIN — MYCOPHENOLATE MOFETIL 1000 MG: 250 CAPSULE ORAL at 08:08

## 2017-08-24 RX ADMIN — TACROLIMUS 10 MG: 5 CAPSULE ORAL at 07:08

## 2017-08-24 RX ADMIN — TACROLIMUS 8 MG: 1 CAPSULE ORAL at 05:08

## 2017-08-24 RX ADMIN — HYDROMORPHONE HYDROCHLORIDE 4 MG: 2 TABLET ORAL at 07:08

## 2017-08-24 RX ADMIN — HEPARIN SODIUM 5000 UNITS: 5000 INJECTION, SOLUTION INTRAVENOUS; SUBCUTANEOUS at 08:08

## 2017-08-24 RX ADMIN — MYCOPHENOLATE MOFETIL 1000 MG: 250 CAPSULE ORAL at 09:08

## 2017-08-24 RX ADMIN — NYSTATIN 500000 UNITS: 500000 SUSPENSION ORAL at 08:08

## 2017-08-24 RX ADMIN — DOLUTEGRAVIR SODIUM 50 MG: 50 TABLET, FILM COATED ORAL at 07:08

## 2017-08-24 RX ADMIN — HEPARIN SODIUM 5000 UNITS: 5000 INJECTION, SOLUTION INTRAVENOUS; SUBCUTANEOUS at 05:08

## 2017-08-24 RX ADMIN — SODIUM CHLORIDE: 0.9 INJECTION, SOLUTION INTRAVENOUS at 02:08

## 2017-08-24 NOTE — SUBJECTIVE & OBJECTIVE
Subjective:     37 y.o. 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   Required HD on POD 1 for hyperkalemia. US doppler revealed mildly elevated RIs but otherwise adequate flow to the kidney.  Repeat US post kidney POD #3 reviewed w increased arterial RIs to segmental and lobular arteries.  Will plan for repeat US on Thursday. Patient required repeat HD on POD #3.      Interval History: no acute events overnight. Last HD on Monday 8/21 - tolerated well. UOP increasing, up to 520 ml/24 hrs. Still not clearing. May likely need HD tomorrow. Check DSA on 8/25/17. Pt still reporting hematuria. H&H 6/38/20. Will transfuse 1 unit PRBCs. Prograf level remains undetected. Plan for additional dose of Thymo today. Denies nausea/vomiting. Good appetite. Pain controlled with current regimen. Encouraged ambulation. +BM. Monitor.     Past Medical, Surgical, Family, and Social History:   Unchanged from H&P.    Review of patient's allergies indicates:  No Known Allergies    Scheduled Meds:   sodium chloride 0.9%   Intravenous Once    docusate sodium  100 mg Oral TID    dolutegravir  50 mg Oral Daily    famotidine  20 mg Oral QHS    heparin (porcine)  5,000 Units Subcutaneous Q8H    lidocaine HCL 10 mg/ml (1%)  10 mL Intradermal Once    multivitamin  1 tablet Oral Daily    mycophenolate  1,000 mg Oral BID    nystatin  500,000 Units Mouth/Throat QID (PC + HS)    predniSONE  20 mg Oral Daily    rilpivirine  25 mg Oral Daily    sulfamethoxazole-trimethoprim 400-80mg  1 tablet Oral Every Mon, Wed, Fri    tacrolimus  10 mg Oral BID    valganciclovir  450 mg Oral Every Mon, Wed, Fri     Continuous Infusions:   glucagon (human  recombinant)       PRN Meds:sodium chloride, sodium chloride 0.9%, albumin human 25%, albumin human 25%, bisacodyl, dextrose 50%, glucagon (human recombinant), glucose, glucose, glucose, HYDROmorphone, HYDROmorphone, insulin aspart, naloxone, ondansetron    Intake/Output - Last 3 Shifts       08/22 0700 - 08/23 0659 08/23 0700 - 08/24 0659 08/24 0700 - 08/25 0659    P.O. 1440 1080     I.V. (mL/kg)       Blood  0     Other       IV Piggyback  200     Total Intake(mL/kg) 1440 (19.8) 1280 (17.3)     Urine (mL/kg/hr) 520 (0.3) 150 (0.1)     Drains 40 (0)      Other       Stool 0 (0) 0 (0)     Total Output 560 150      Net +880 +1130             Urine Occurrence 1 x 2 x     Stool Occurrence 1 x 5 x            Review of Systems   Constitutional: Negative for activity change, appetite change, chills, fatigue and fever.   HENT: Negative for congestion and facial swelling.    Eyes: Negative for pain, discharge and visual disturbance.   Respiratory: Negative for cough, chest tightness, shortness of breath and wheezing.    Cardiovascular: Negative for chest pain, palpitations and leg swelling.   Gastrointestinal: Positive for abdominal pain (incisional). Negative for abdominal distention, constipation, diarrhea, nausea and vomiting.   Endocrine: Negative.    Genitourinary: Positive for hematuria. Negative for decreased urine volume, dysuria and flank pain.   Musculoskeletal: Negative for gait problem.   Skin: Positive for wound.   Allergic/Immunologic: Positive for immunocompromised state.   Neurological: Negative for dizziness, tremors, light-headedness and headaches.   Hematological: Negative.    Psychiatric/Behavioral: Negative for behavioral problems and dysphoric mood.      Objective:     Vital Signs (Most Recent):  Temp: 98.1 °F (36.7 °C) (08/24/17 1115)  Pulse: 78 (08/24/17 1115)  Resp: 18 (08/24/17 1115)  BP: 116/65 (08/24/17 1115)  SpO2: 99 % (08/24/17 1115) Vital Signs (24h Range):  Temp:  [97.9 °F (36.6 °C)-98.9 °F  "(37.2 °C)] 98.1 °F (36.7 °C)  Pulse:  [67-82] 78  Resp:  [18] 18  SpO2:  [97 %-100 %] 99 %  BP: (108-130)/(60-79) 116/65     Weight: 74.1 kg (163 lb 5.8 oz)  Height: 5' 8" (172.7 cm)  Body mass index is 24.84 kg/m².    Physical Exam   Constitutional: He is oriented to person, place, and time. He appears well-developed and well-nourished. No distress.   HENT:   Head: Normocephalic and atraumatic.   Mouth/Throat: No oropharyngeal exudate.   Eyes: EOM are normal. Pupils are equal, round, and reactive to light. No scleral icterus.   Neck: Normal range of motion. Neck supple. No JVD present.   Cardiovascular: Normal rate and regular rhythm.    Pulmonary/Chest: Effort normal and breath sounds normal. No respiratory distress. He has no rales.   Abdominal: Soft. Bowel sounds are normal. He exhibits distension. There is tenderness. There is no guarding.   RLQ inc with staples intact no s/s/i  RLQ JESSICA intact with serosang drainage     Musculoskeletal: Normal range of motion. He exhibits no edema.   Neurological: He is alert and oriented to person, place, and time.   Skin: Skin is warm and dry. Capillary refill takes 2 to 3 seconds. He is not diaphoretic. No erythema.   Psychiatric: He has a normal mood and affect. His behavior is normal. Judgment and thought content normal.   Nursing note and vitals reviewed.      Laboratory:  CBC:     Recent Labs  Lab 08/24/17  0509 08/24/17  1444   WBC 2.72*  --    RBC 2.75*  --    HGB 8.1* 7.6*   HCT 23.3* 21.8*   *  --    MCV 85  --    MCH 29.5  --    MCHC 34.8  --      BMP:     Recent Labs  Lab 08/24/17  0509   GLU 90   *   K 3.8   CL 97   CO2 20*   BUN 54*   CREATININE 10.9*   CALCIUM 8.3*     Labs within the past 24 hours have been reviewed.    Diagnostic Results:  US: Reviewed  "

## 2017-08-24 NOTE — PROGRESS NOTES
[facility]  Kidney Transplant  Progress Note      Reason for Follow-up: Reassessment of Kidney Transplant - 2017  (#1) recipient and management of immunosuppression.    ORGAN:  RIGHT KIDNEY   Donor Type:   - Brain Death   PHS Increased Risk: no   Cold Ischemia:   Induction Medications: Thymoglobulin      Subjective:     37 y.o. 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   Required HD on POD 1 for hyperkalemia. US doppler revealed mildly elevated RIs but otherwise adequate flow to the kidney.  Repeat US post kidney POD #3 reviewed w increased arterial RIs to segmental and lobular arteries.  Will plan for repeat US on Thursday. Patient required repeat HD on POD #3.      Interval History: no acute events overnight. Last HD on  - tolerated well. UOP increasing, up to 520 ml/24 hrs. Still not clearing. May likely need HD tomorrow. Check DSA on 17. Pt still reporting hematuria. H&H . Will transfuse 1 unit PRBCs. Prograf level remains undetected. Plan for additional dose of Thymo today. Denies nausea/vomiting. Good appetite. Pain controlled with current regimen. Encouraged ambulation. +BM. Monitor.     Past Medical, Surgical, Family, and Social History:   Unchanged from H&P.    Review of patient's allergies indicates:  No Known Allergies    Scheduled Meds:   sodium chloride 0.9%   Intravenous Once    docusate sodium  100 mg Oral TID    dolutegravir  50 mg Oral Daily    famotidine  20 mg Oral QHS    heparin (porcine)  5,000 Units Subcutaneous Q8H    lidocaine HCL 10 mg/ml (1%)  10 mL Intradermal Once    multivitamin  1 tablet Oral Daily    mycophenolate  1,000 mg  Oral BID    nystatin  500,000 Units Mouth/Throat QID (PC + HS)    predniSONE  20 mg Oral Daily    rilpivirine  25 mg Oral Daily    sulfamethoxazole-trimethoprim 400-80mg  1 tablet Oral Every Mon, Wed, Fri    tacrolimus  10 mg Oral BID    valganciclovir  450 mg Oral Every Mon, Wed, Fri     Continuous Infusions:   glucagon (human recombinant)       PRN Meds:sodium chloride, sodium chloride 0.9%, albumin human 25%, albumin human 25%, bisacodyl, dextrose 50%, glucagon (human recombinant), glucose, glucose, glucose, HYDROmorphone, HYDROmorphone, insulin aspart, naloxone, ondansetron    Intake/Output - Last 3 Shifts       08/22 0700 - 08/23 0659 08/23 0700 - 08/24 0659 08/24 0700 - 08/25 0659    P.O. 1440 1080     I.V. (mL/kg)       Blood  0     Other       IV Piggyback  200     Total Intake(mL/kg) 1440 (19.8) 1280 (17.3)     Urine (mL/kg/hr) 520 (0.3) 150 (0.1)     Drains 40 (0)      Other       Stool 0 (0) 0 (0)     Total Output 560 150      Net +880 +1130             Urine Occurrence 1 x 2 x     Stool Occurrence 1 x 5 x            Review of Systems   Constitutional: Negative for activity change, appetite change, chills, fatigue and fever.   HENT: Negative for congestion and facial swelling.    Eyes: Negative for pain, discharge and visual disturbance.   Respiratory: Negative for cough, chest tightness, shortness of breath and wheezing.    Cardiovascular: Negative for chest pain, palpitations and leg swelling.   Gastrointestinal: Positive for abdominal pain (incisional). Negative for abdominal distention, constipation, diarrhea, nausea and vomiting.   Endocrine: Negative.    Genitourinary: Positive for hematuria. Negative for decreased urine volume, dysuria and flank pain.   Musculoskeletal: Negative for gait problem.   Skin: Positive for wound.   Allergic/Immunologic: Positive for immunocompromised state.   Neurological: Negative for dizziness, tremors, light-headedness and headaches.   Hematological: Negative.   "  Psychiatric/Behavioral: Negative for behavioral problems and dysphoric mood.      Objective:     Vital Signs (Most Recent):  Temp: 98.1 °F (36.7 °C) (08/24/17 1115)  Pulse: 78 (08/24/17 1115)  Resp: 18 (08/24/17 1115)  BP: 116/65 (08/24/17 1115)  SpO2: 99 % (08/24/17 1115) Vital Signs (24h Range):  Temp:  [97.9 °F (36.6 °C)-98.9 °F (37.2 °C)] 98.1 °F (36.7 °C)  Pulse:  [67-82] 78  Resp:  [18] 18  SpO2:  [97 %-100 %] 99 %  BP: (108-130)/(60-79) 116/65     Weight: 74.1 kg (163 lb 5.8 oz)  Height: 5' 8" (172.7 cm)  Body mass index is 24.84 kg/m².    Physical Exam   Constitutional: He is oriented to person, place, and time. He appears well-developed and well-nourished. No distress.   HENT:   Head: Normocephalic and atraumatic.   Mouth/Throat: No oropharyngeal exudate.   Eyes: EOM are normal. Pupils are equal, round, and reactive to light. No scleral icterus.   Neck: Normal range of motion. Neck supple. No JVD present.   Cardiovascular: Normal rate and regular rhythm.    Pulmonary/Chest: Effort normal and breath sounds normal. No respiratory distress. He has no rales.   Abdominal: Soft. Bowel sounds are normal. He exhibits distension. There is tenderness. There is no guarding.   RLQ inc with staples intact no s/s/i  RLQ JESSICA intact with serosang drainage     Musculoskeletal: Normal range of motion. He exhibits no edema.   Neurological: He is alert and oriented to person, place, and time.   Skin: Skin is warm and dry. Capillary refill takes 2 to 3 seconds. He is not diaphoretic. No erythema.   Psychiatric: He has a normal mood and affect. His behavior is normal. Judgment and thought content normal.   Nursing note and vitals reviewed.      Laboratory:  CBC:     Recent Labs  Lab 08/24/17  0509 08/24/17  1444   WBC 2.72*  --    RBC 2.75*  --    HGB 8.1* 7.6*   HCT 23.3* 21.8*   *  --    MCV 85  --    MCH 29.5  --    MCHC 34.8  --      BMP:     Recent Labs  Lab 08/24/17  0509   GLU 90   *   K 3.8   CL 97   CO2 " 20*   BUN 54*   CREATININE 10.9*   CALCIUM 8.3*     Labs within the past 24 hours have been reviewed.    Diagnostic Results:  US: Reviewed    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/20/2004 (initially on PD for a few months but switched to HD after peritonitis), now s/p DDRT (THYMO induction given recipient HIV+; KDPI 17%, WIT 29 minutes, CIT 7 hours and 2 minutes, CMV D-/R+) on 8/18/17  - renal doppler with adequate blood flow on 8/19/17.  Plan to repeat US 8/21/17 w increased arterial RIs segemental and lobular arteries.  Plan to repeat US on Thursday 8/24/17.  - pt dialyzed on POD 1 for hyperkalemia (7.6) and POD #3 for elevated Cr/anuric.   - UOP increased to 520 ml/24hr POD #5, decreased again on POD #6 150 ml+/24 hr.  - continue to monitor renal function and UOP   - 4 day espinoza removed 8/22/17.  JESSICA drain removed POD #5  - plan for HD today.            Delayed graft function of kidney    - hyperkalemia of 7.6 on 8/19/17 thus required dialysis.   - kidney w ATN likely. Lasix 100 mg given 8/20 w no response.  - dopamine gtt stopped 8/20- Tolerated HD 8/22.   - Cr up POD #5/uop was increased to 520 ml.  UOP decreased to 150 ml+2/24hr.   - HD today.        Need for prophylactic immunotherapy    - patient received induction with THYMO given recipient HIV+; THYMO dose #3 8/20, still w no level,  thymo #4 8/21/17.   - level remains undetected. Thymo 5th dose on POD #5.  - Cont to increase Prograf BID(10mg); goal trough 8-10.  - will need to closely monitor for drug-drug interactions with HAART therapy and Prograf.  No plan for Posaconazole at this time.   - continue MMF 1000 mg BID.  - continue steroids per protocol.  - continue to monitor for toxicities from immunosuppressive medications.               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          HIV (human immunodeficiency virus infection)    - last CD4 count 213 (7/11/17) and HIV PCR 30 copies/mL.  - transplant ID consulted and appreciate recs.  - will change RPV to emtricitabine/TDF and cont DOL and upon discharge may transition TDF to TAF (+emtricitabine).    - HAART regimen per Dr. Miramontes.  - repeat HIV RNA PCR positive, CD4 9.8.          Hypophosphatemia    - stable.  Continue to monitor          At risk for opportunistic infections    - continue Bactrim for PJP prophylaxis for 1 year.  - continue Valcyte for CMV prophylaxis for 3 months; CMV D-/R+.  - continue nystatin for thrush prophylaxis for 1 month.  - no plan to start anti fungal.          Metabolic acidosis    - bicarb ok.  Tolerated HD 8/21/17.  Tentative plan for HD tomorrow  - continue to monitor           Anemia of chronic disease    - Hb pre-op was 11.2 (8/18/17). Trended down.  Keep type and screen current.    - cont to have (+) hematuria. JESSICA output is now more serosang.  Repeat US post kidney 8/21 reviewed.   - H&H 6.8/20.1. Transfuse 1 unit PRBCs 8/23 w appropriate response. Cont to monitor serial  H&H.  - continue to monitor.             Hyperlipidemia    - will need to restart Crestor once able.        Hyperkalemia    - cont low potassium diet.    - today WNL.          Hypocalcemia    - calcium low thus will hold off on restarting outpatient sensipar.  -  on 8/18/17.              Discharge Planning:  Possible discharge tomorrow    Rhoda Penn NP  Kidney Transplant  [facility]

## 2017-08-24 NOTE — PROGRESS NOTES
Maintenance HD treatment started. No complications with access to left thigh access. Lines secured and telemetry in place. No complaints of discomfort at this time.

## 2017-08-24 NOTE — ASSESSMENT & PLAN NOTE
- patient received induction with THYMO given recipient HIV+; THYMO dose #3 8/20, still w no level,  thymo #4 8/21/17.   - level remains undetected. Thymo 5th dose on POD #5.  - Cont to increase Prograf BID(10mg); goal trough 8-10.  - will need to closely monitor for drug-drug interactions with HAART therapy and Prograf.  No plan for Posaconazole at this time.   - continue MMF 1000 mg BID.  - continue steroids per protocol.  - continue to monitor for toxicities from immunosuppressive medications.

## 2017-08-24 NOTE — ASSESSMENT & PLAN NOTE
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

## 2017-08-24 NOTE — PROGRESS NOTES
SW attempted to speak with pt about stay at hospital and over all mental well being. Pt was being taken to dialysis at 1530. JUAN gave pt her card and told him to call her if he needs anything. JUAN will speak with pt again tomorrow. JUAN remains avialable.

## 2017-08-24 NOTE — PROGRESS NOTES
EDUCATION NOTE:    Met with Demetrio Aguiar and his caregivers to provide teaching re: immunosuppressant medications.  Reviewed medication section of the Kidney Transplant Education book that was provided.  Emphasized the importance of compliance, role of the blue medication card, concerns for drug interactions, and process of obtaining refills.  Counseled regarding Prograf, Cellcept , prednisone, including directions for use, monitoring, how to handle missed doses, and side effects.  He and his mother verbalized understanding and had the opportunity to ask questions.

## 2017-08-24 NOTE — ASSESSMENT & PLAN NOTE
- hyperkalemia of 7.6 on 8/19/17 thus required dialysis.   - kidney w ATN likely. Lasix 100 mg given 8/20 w no response.  - dopamine gtt stopped 8/20- Tolerated HD 8/22.   - Cr up POD #5/uop was increased to 520 ml.  UOP decreased to 150 ml+2/24hr.   - HD today.

## 2017-08-24 NOTE — ASSESSMENT & PLAN NOTE
- Hb pre-op was 11.2 (8/18/17). Trended down.  Keep type and screen current.    - cont to have (+) hematuria. JESSICA output is now more serosang.  Repeat US post kidney 8/21 reviewed.   - H&H 6.8/20.1. Transfuse 1 unit PRBCs 8/23 w appropriate response. Cont to monitor serial  H&H.  - continue to monitor.

## 2017-08-25 DIAGNOSIS — Z94.0 KIDNEY REPLACED BY TRANSPLANT: Primary | ICD-10-CM

## 2017-08-25 PROBLEM — E83.39 HYPOPHOSPHATEMIA: Status: RESOLVED | Noted: 2017-08-22 | Resolved: 2017-08-25

## 2017-08-25 PROBLEM — E87.5 HYPERKALEMIA: Status: RESOLVED | Noted: 2017-08-22 | Resolved: 2017-08-25

## 2017-08-25 PROBLEM — E87.20 METABOLIC ACIDOSIS: Status: RESOLVED | Noted: 2017-08-22 | Resolved: 2017-08-25

## 2017-08-25 PROBLEM — D62 ANEMIA DUE TO ACUTE BLOOD LOSS: Status: ACTIVE | Noted: 2017-08-22

## 2017-08-25 PROBLEM — E83.51 HYPOCALCEMIA: Status: RESOLVED | Noted: 2017-08-22 | Resolved: 2017-08-25

## 2017-08-25 LAB
ABO + RH BLD: NORMAL
ALBUMIN SERPL BCP-MCNC: 2.6 G/DL
ANION GAP SERPL CALC-SCNC: 8 MMOL/L
BASOPHILS # BLD AUTO: 0 K/UL
BASOPHILS NFR BLD: 0 %
BLD GP AB SCN CELLS X3 SERPL QL: NORMAL
BUN SERPL-MCNC: 33 MG/DL
CALCIUM SERPL-MCNC: 8.5 MG/DL
CHLORIDE SERPL-SCNC: 102 MMOL/L
CLASS I ANTIBODIES - LUMINEX: NORMAL
CLASS I ANTIBODY COMMENTS - LUMINEX: NORMAL
CLASS II ANTIBODY COMMENTS - LUMINEX: NORMAL
CO2 SERPL-SCNC: 24 MMOL/L
CREAT SERPL-MCNC: 7.9 MG/DL
DIFFERENTIAL METHOD: ABNORMAL
DSA1 TESTING DATE: NORMAL
DSA12 TESTING DATE: NORMAL
DSA2 TESTING DATE: NORMAL
EOSINOPHIL # BLD AUTO: 0.1 K/UL
EOSINOPHIL NFR BLD: 2.4 %
ERYTHROCYTE [DISTWIDTH] IN BLOOD BY AUTOMATED COUNT: 16.4 %
EST. GFR  (AFRICAN AMERICAN): 9 ML/MIN/1.73 M^2
EST. GFR  (NON AFRICAN AMERICAN): 7.8 ML/MIN/1.73 M^2
GLUCOSE SERPL-MCNC: 83 MG/DL
HCT VFR BLD AUTO: 22.8 %
HGB BLD-MCNC: 7.9 G/DL
LYMPHOCYTES # BLD AUTO: 0.1 K/UL
LYMPHOCYTES NFR BLD: 1.8 %
MAGNESIUM SERPL-MCNC: 2.4 MG/DL
MCH RBC QN AUTO: 30 PG
MCHC RBC AUTO-ENTMCNC: 34.6 G/DL
MCV RBC AUTO: 87 FL
MONOCYTES # BLD AUTO: 0.4 K/UL
MONOCYTES NFR BLD: 11.5 %
NEUTROPHILS # BLD AUTO: 2.8 K/UL
NEUTROPHILS NFR BLD: 83.4 %
PHOSPHATE SERPL-MCNC: 4.6 MG/DL
PLATELET # BLD AUTO: 122 K/UL
PMV BLD AUTO: 9.5 FL
POCT GLUCOSE: 113 MG/DL (ref 70–110)
POTASSIUM SERPL-SCNC: 4 MMOL/L
RBC # BLD AUTO: 2.63 M/UL
SERUM COLLECTION DT - LUMINEX CLASS I: NORMAL
SERUM COLLECTION DT - LUMINEX CLASS II: NORMAL
SODIUM SERPL-SCNC: 134 MMOL/L
TACROLIMUS BLD-MCNC: 2.9 NG/ML
WBC # BLD AUTO: 3.38 K/UL

## 2017-08-25 PROCEDURE — 25000003 PHARM REV CODE 250: Performed by: PHYSICIAN ASSISTANT

## 2017-08-25 PROCEDURE — 80197 ASSAY OF TACROLIMUS: CPT

## 2017-08-25 PROCEDURE — 85025 COMPLETE CBC W/AUTO DIFF WBC: CPT

## 2017-08-25 PROCEDURE — 20600001 HC STEP DOWN PRIVATE ROOM

## 2017-08-25 PROCEDURE — 81300002 HC KIDNEY TRANSPORT, GROUND 4-5 HOURS

## 2017-08-25 PROCEDURE — 86900 BLOOD TYPING SEROLOGIC ABO: CPT

## 2017-08-25 PROCEDURE — 25000003 PHARM REV CODE 250: Performed by: NURSE PRACTITIONER

## 2017-08-25 PROCEDURE — 86850 RBC ANTIBODY SCREEN: CPT

## 2017-08-25 PROCEDURE — 25000003 PHARM REV CODE 250: Performed by: SURGERY

## 2017-08-25 PROCEDURE — 83735 ASSAY OF MAGNESIUM: CPT

## 2017-08-25 PROCEDURE — 63600175 PHARM REV CODE 636 W HCPCS: Performed by: STUDENT IN AN ORGANIZED HEALTH CARE EDUCATION/TRAINING PROGRAM

## 2017-08-25 PROCEDURE — 86977 RBC SERUM PRETX INCUBJ/INHIB: CPT | Mod: 91,TXP

## 2017-08-25 PROCEDURE — 25000003 PHARM REV CODE 250: Performed by: STUDENT IN AN ORGANIZED HEALTH CARE EDUCATION/TRAINING PROGRAM

## 2017-08-25 PROCEDURE — 63600175 PHARM REV CODE 636 W HCPCS: Performed by: NURSE PRACTITIONER

## 2017-08-25 PROCEDURE — 80069 RENAL FUNCTION PANEL: CPT

## 2017-08-25 PROCEDURE — BT41ZZZ ULTRASONOGRAPHY OF RIGHT KIDNEY: ICD-10-PCS | Performed by: RADIOLOGY

## 2017-08-25 PROCEDURE — 0TB03ZX EXCISION OF RIGHT KIDNEY, PERCUTANEOUS APPROACH, DIAGNOSTIC: ICD-10-PCS | Performed by: RADIOLOGY

## 2017-08-25 PROCEDURE — 86833 HLA CLASS II HIGH DEFIN QUAL: CPT | Mod: TXP

## 2017-08-25 PROCEDURE — 99233 SBSQ HOSP IP/OBS HIGH 50: CPT | Mod: 24,,, | Performed by: PHYSICIAN ASSISTANT

## 2017-08-25 PROCEDURE — 81200001 HC KIDNEY ACQUISITION - CADAVER

## 2017-08-25 PROCEDURE — 86832 HLA CLASS I HIGH DEFIN QUAL: CPT | Mod: 91,TXP

## 2017-08-25 RX ORDER — SODIUM CHLORIDE 9 MG/ML
INJECTION, SOLUTION INTRAVENOUS
Status: CANCELLED | OUTPATIENT
Start: 2017-08-25

## 2017-08-25 RX ORDER — CINACALCET 30 MG/1
60 TABLET, FILM COATED ORAL EVERY OTHER DAY
Status: DISCONTINUED | OUTPATIENT
Start: 2017-08-26 | End: 2017-08-26 | Stop reason: HOSPADM

## 2017-08-25 RX ORDER — ROSUVASTATIN CALCIUM 5 MG/1
20 TABLET, COATED ORAL DAILY
Status: DISCONTINUED | OUTPATIENT
Start: 2017-08-25 | End: 2017-08-26 | Stop reason: HOSPADM

## 2017-08-25 RX ADMIN — Medication 100 MG: at 12:08

## 2017-08-25 RX ADMIN — NYSTATIN 500000 UNITS: 500000 SUSPENSION ORAL at 12:08

## 2017-08-25 RX ADMIN — DOCUSATE SODIUM 100 MG: 50 CAPSULE, LIQUID FILLED ORAL at 05:08

## 2017-08-25 RX ADMIN — NYSTATIN 500000 UNITS: 500000 SUSPENSION ORAL at 09:08

## 2017-08-25 RX ADMIN — HEPARIN SODIUM 5000 UNITS: 5000 INJECTION, SOLUTION INTRAVENOUS; SUBCUTANEOUS at 09:08

## 2017-08-25 RX ADMIN — SULFAMETHOXAZOLE AND TRIMETHOPRIM 1 TABLET: 400; 80 TABLET ORAL at 09:08

## 2017-08-25 RX ADMIN — HYDROMORPHONE HYDROCHLORIDE 4 MG: 2 TABLET ORAL at 06:08

## 2017-08-25 RX ADMIN — TACROLIMUS 10 MG: 5 CAPSULE ORAL at 09:08

## 2017-08-25 RX ADMIN — HYDROMORPHONE HYDROCHLORIDE 4 MG: 2 TABLET ORAL at 05:08

## 2017-08-25 RX ADMIN — TACROLIMUS 10 MG: 5 CAPSULE ORAL at 06:08

## 2017-08-25 RX ADMIN — DOCUSATE SODIUM 100 MG: 50 CAPSULE, LIQUID FILLED ORAL at 02:08

## 2017-08-25 RX ADMIN — MYCOPHENOLATE MOFETIL 1000 MG: 250 CAPSULE ORAL at 09:08

## 2017-08-25 RX ADMIN — DOCUSATE SODIUM 100 MG: 50 CAPSULE, LIQUID FILLED ORAL at 09:08

## 2017-08-25 RX ADMIN — HEPARIN SODIUM 5000 UNITS: 5000 INJECTION, SOLUTION INTRAVENOUS; SUBCUTANEOUS at 05:08

## 2017-08-25 RX ADMIN — HYDROMORPHONE HYDROCHLORIDE 4 MG: 2 TABLET ORAL at 01:08

## 2017-08-25 RX ADMIN — HYDROMORPHONE HYDROCHLORIDE 4 MG: 2 TABLET ORAL at 04:08

## 2017-08-25 RX ADMIN — HYDROMORPHONE HYDROCHLORIDE 4 MG: 2 TABLET ORAL at 07:08

## 2017-08-25 RX ADMIN — FAMOTIDINE 20 MG: 20 TABLET, FILM COATED ORAL at 09:08

## 2017-08-25 RX ADMIN — ROSUVASTATIN CALCIUM 20 MG: 5 TABLET ORAL at 12:08

## 2017-08-25 RX ADMIN — DOLUTEGRAVIR SODIUM 50 MG: 50 TABLET, FILM COATED ORAL at 09:08

## 2017-08-25 RX ADMIN — HYDROMORPHONE HYDROCHLORIDE 4 MG: 2 TABLET ORAL at 10:08

## 2017-08-25 RX ADMIN — PREDNISONE 20 MG: 20 TABLET ORAL at 09:08

## 2017-08-25 RX ADMIN — NYSTATIN 500000 UNITS: 500000 SUSPENSION ORAL at 06:08

## 2017-08-25 RX ADMIN — THERA TABS 1 TABLET: TAB at 09:08

## 2017-08-25 RX ADMIN — VALGANCICLOVIR 450 MG: 450 TABLET, FILM COATED ORAL at 09:08
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
## 2017-08-25 NOTE — ASSESSMENT & PLAN NOTE
- Has Anemia of CKD superimposed with acute blood loss  - Hb pre-op was 11.2 (8/18/17). Trended down.  Keep type and screen current.    - H&H 6.8/20.1. Transfuse 1 unit PRBCs 8/23 w appropriate response. Cont to monitor serial  H&H.  - While in DGF, plan PAPO weekly  - continue to monitor.

## 2017-08-25 NOTE — ASSESSMENT & PLAN NOTE
- Hb pre-op was 11.2 (8/18/17). Trended down.  Keep type and screen current.    - H&H 6.8/20.1. Transfuse 1 unit PRBCs 8/23 w appropriate response. Cont to monitor serial  H&H.  - continue to monitor.

## 2017-08-25 NOTE — ASSESSMENT & PLAN NOTE
- continue Bactrim for PJP prophylaxis for 1 year.  - continue Valcyte for CMV prophylaxis for 3 months; CMV D-/R+.  - continue nystatin for thrush prophylaxis for 1 month.

## 2017-08-25 NOTE — ASSESSMENT & PLAN NOTE
- From ATN. Lasix 100 mg given 8/20 w no response.  - dopamine gtt stopped 8/20    - Renal diet.   - Plan for bx today. DSAs sent   - Last HD 8/24, plan next 8/26.

## 2017-08-25 NOTE — ASSESSMENT & PLAN NOTE
- patient received induction with THYMO given recipient HIV+; THYMO dose #3 8/20, still w no level,  thymo #4 8/21/17, Thymo 5th dose on POD #5.  - Cont to increase Prograf BID(10mg); goal trough 8-10.  - will need to closely monitor for drug-drug interactions with HAART therapy and Prograf.  No plan for Posaconazole at this time.   - continue MMF 1000 mg BID.  - continue steroids per protocol.  - continue to monitor for toxicities from immunosuppressive medications.

## 2017-08-25 NOTE — ASSESSMENT & PLAN NOTE
- patient received induction with THYMO given recipient HIV+; THYMO dose #3 8/20, still w no level,  thymo #4 8/21/17, Thymo 5th dose on POD #5.  - Cont to increase Prograf BID(10mg); goal trough 8-10.  - will need to closely monitor for drug-drug interactions with HAART therapy and Prograf.  No plan for Posaconazole at this time.   - continue MMF 1000 mg BID.  - continue steroids per protocol.  -Add ketoconazole 100 mg daily 8/25 to augment subtherapeutic tacrolimus  - continue to monitor for toxicities from immunosuppressive medications.

## 2017-08-25 NOTE — ASSESSMENT & PLAN NOTE
- With DGF. 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   - 4 day espinoza removed 8/22/17.  JESSICA drain removed POD #5, drain with 328 triglycerides. (will repeat US in 2 weeks to assess for lymphocele).    - renal doppler with adequate blood flow on 8/19/17.  Plan to repeat US 8/21/17 w increased arterial RIs segemental and lobular arteries. US POD 6 elevated RIs again.   - continue to monitor renal function and UOP

## 2017-08-25 NOTE — PROCEDURES
Radiology Post-Procedure Note    Pre Op Diagnosis: Renal dysfunction status post kidney transplant     Post Op Diagnosis: Same    Procedure: Ultrasound guided renal transplant biopsy    Procedure performed by: Radha Benavides MD and aMtthieu Velarde MD    Written Informed Consent Obtained: Yes    Specimen Removed: YES 2 biopsy specimens    Estimated Blood Loss: Minimal    Findings: Moderate sedation and local anesthesia were used.    A 16-gauge Monopty biopsy device was used to remove 2 specimens from the transplant kidney under ultrasound guidance.  Tissue was evaluated by nephrology for adequacy and sent to pathology for further analysis.      The patient tolerated the procedure well and there were no complications.  Please see Imaging report for further details.    Matthieu Velarde MD  PGY-5  Department of Radiology  [phone]

## 2017-08-25 NOTE — ASSESSMENT & PLAN NOTE
- last CD4 count 213 (7/11/17) and HIV PCR 30 copies/mL.  - transplant ID consulted and appreciate recs.  - will change RPV to emtricitabine/TDF and cont DOL and upon discharge may transition TDF to TAF (+emtricitabine).   - HAART regimen per Dr. Miramontes started 8/24  - repeat HIV RNA PCR NOT detected 8/19/17, CD4% 9.8. Absolute CD4 7 (down from 232 11/30/2016)

## 2017-08-25 NOTE — PROGRESS NOTES
[facility]  Kidney Transplant  Progress Note      Reason for Follow-up: Reassessment of Kidney Transplant - 2017  (#1) recipient and management of immunosuppression.    ORGAN:  RIGHT KIDNEY   Donor Type:   - Brain Death   PHS Increased Risk: no   Cold Ischemia:   Induction Medications: Thymoglobulin      Subjective:     37 y.o. 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   Required HD on POD 1 for hyperkalemia. US doppler revealed mildly elevated RIs but otherwise adequate flow to the kidney.  Repeat US post kidney POD #3 reviewed w increased arterial RIs to segmental and lobular arteries.  Will plan for repeat US on Thursday. Patient required repeat HD on POD #3.    Interval history: No acute events overnight. Pt went for HD yesterday 4L off. Plan for kidney biosy today. DSAs sent this AM. Drain fluid prior to removal concerning for chyle leak, triglycerides 328, will need to follow up with US in 2 weeks to assess for lymphocele.     Interval History: no acute events overnight. Last HD on  - tolerated well. UOP increasing, up to 520 ml/24 hrs. Still not clearing. May likely need HD tomorrow. Check DSA on 17. Pt still reporting hematuria. H&H . Will transfuse 1 unit PRBCs. Prograf level remains undetected. Plan for additional dose of Thymo today. Denies nausea/vomiting. Good appetite. Pain controlled with current regimen. Encouraged ambulation. +BM. Monitor.     Past Medical, Surgical, Family, and Social History:   Unchanged from H&P.    Review of patient's allergies indicates:  No Known Allergies    Scheduled Meds:   [START ON 2017] cinacalcet  60 mg Oral Every  other day    docusate sodium  100 mg Oral TID    dolutegravir  50 mg Oral Daily    famotidine  20 mg Oral QHS    heparin (porcine)  5,000 Units Subcutaneous Q8H    ketoconazole  100 mg Oral Daily    lidocaine HCL 10 mg/ml (1%)  10 mL Intradermal Once    multivitamin  1 tablet Oral Daily    mycophenolate  1,000 mg Oral BID    nystatin  500,000 Units Mouth/Throat QID (PC + HS)    predniSONE  20 mg Oral Daily    rilpivirine  25 mg Oral Daily    rosuvastatin  20 mg Oral Daily    sulfamethoxazole-trimethoprim 400-80mg  1 tablet Oral Every Mon, Wed, Fri    tacrolimus  10 mg Oral BID    valganciclovir  450 mg Oral Every Mon, Wed, Fri     Continuous Infusions:   glucagon (human recombinant)       PRN Meds:sodium chloride, sodium chloride 0.9%, albumin human 25%, albumin human 25%, bisacodyl, dextrose 50%, glucagon (human recombinant), glucose, glucose, glucose, HYDROmorphone, HYDROmorphone, insulin aspart, naloxone, ondansetron    Intake/Output - Last 3 Shifts       08/23 0700 - 08/24 0659 08/24 0700 - 08/25 0659 08/25 0700 - 08/26 0659    P.O. 1080 120     Blood 0      Other  700     IV Piggyback 200      Total Intake(mL/kg) 1280 (17.3) 820 (11.5)     Urine (mL/kg/hr) 150 (0.1) 150 (0.1)     Drains       Other  4700 (2.7)     Stool 0 (0) 0 (0)     Total Output 150 4850      Net +1130 -4030             Urine Occurrence 2 x      Stool Occurrence 5 x 2 x            Review of Systems   Constitutional: Negative for activity change, appetite change, chills, fatigue and fever.   HENT: Negative for congestion and facial swelling.    Eyes: Negative for pain, discharge and visual disturbance.   Respiratory: Negative for cough, chest tightness, shortness of breath and wheezing.    Cardiovascular: Negative for chest pain, palpitations and leg swelling.   Gastrointestinal: Negative for abdominal distention, abdominal pain (incisional), constipation, diarrhea, nausea and vomiting.   Endocrine: Negative.    Genitourinary:  "Positive for hematuria. Negative for decreased urine volume, dysuria and flank pain.   Musculoskeletal: Negative for gait problem.   Skin: Positive for wound.   Allergic/Immunologic: Positive for immunocompromised state.   Neurological: Negative for dizziness, tremors, light-headedness and headaches.   Hematological: Negative.    Psychiatric/Behavioral: Negative for behavioral problems and dysphoric mood.      Objective:     Vital Signs (Most Recent):  Temp: 98.5 °F (36.9 °C) (08/25/17 1137)  Pulse: 77 (08/25/17 1137)  Resp: 18 (08/25/17 1137)  BP: 107/62 (08/25/17 1137)  SpO2: (!) 86 % (08/25/17 1137) Vital Signs (24h Range):  Temp:  [97.7 °F (36.5 °C)-98.5 °F (36.9 °C)] 98.5 °F (36.9 °C)  Pulse:  [67-91] 77  Resp:  [18-20] 18  SpO2:  [86 %-100 %] 86 %  BP: (107-128)/(58-77) 107/62     Weight: 71.4 kg (157 lb 6.5 oz)  Height: 5' 8" (172.7 cm)  Body mass index is 23.93 kg/m².    Physical Exam   Constitutional: He is oriented to person, place, and time. He appears well-developed and well-nourished. No distress.   HENT:   Head: Normocephalic and atraumatic.   Mouth/Throat: No oropharyngeal exudate.   Eyes: EOM are normal. Pupils are equal, round, and reactive to light. No scleral icterus.   Neck: Normal range of motion. Neck supple. No JVD present.   Cardiovascular: Normal rate and regular rhythm.    Pulmonary/Chest: Effort normal and breath sounds normal. No respiratory distress. He has no rales.   Abdominal: Soft. Bowel sounds are normal. He exhibits no distension. There is no tenderness. There is no guarding.   RLQ inc with staples intact no s/s/i       Musculoskeletal: Normal range of motion. He exhibits no edema.   Neurological: He is alert and oriented to person, place, and time.   Skin: Skin is warm and dry. Capillary refill takes 2 to 3 seconds. He is not diaphoretic. No erythema.   Psychiatric: He has a normal mood and affect. His behavior is normal. Judgment and thought content normal.   Nursing note and " vitals reviewed.      Laboratory:  CBC:     Recent Labs  Lab 08/25/17  0507   WBC 3.38*   RBC 2.63*   HGB 7.9*   HCT 22.8*   *   MCV 87   MCH 30.0   MCHC 34.6     BMP:     Recent Labs  Lab 08/25/17  0507   GLU 83   *   K 4.0      CO2 24   BUN 33*   CREATININE 7.9*   CALCIUM 8.5*     Labs within the past 24 hours have been reviewed.    Diagnostic Results:  US: Reviewed    Assessment/Plan:     Delayed graft function of kidney    - hyperkalemia of 7.6 on 8/19/17 thus required dialysis.   - kidney w ATN likely. Lasix 100 mg given 8/20 w no response.  - dopamine gtt stopped 8/20- Tolerated HD 8/22.   - Cr up POD #5/uop was increased to 520 ml.  Now anuric. Last HD 8/24.   - Renal diet.   - Plan for bx today. 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   - 4 day espinoza removed 8/22/17.  JESSICA drain removed POD #5, drain with 328 triglycerides. (will repeat US in 2 weeks to assess for lymphocele).    - renal doppler with adequate blood flow on 8/19/17.  Plan to repeat US 8/21/17 w increased arterial RIs segemental and lobular arteries. US POD 6 elevated RIs again.   - continue to monitor renal function and UOP           At risk for opportunistic infections    - continue Bactrim for PJP prophylaxis for 1 year.  - continue Valcyte for CMV prophylaxis for 3 months; CMV D-/R+.  - continue nystatin for thrush prophylaxis for 1 month.  - no plan to start anti fungal.          HIV (human immunodeficiency virus infection)    - last CD4 count 213 (7/11/17) and HIV PCR 30 copies/mL.  - transplant ID consulted and appreciate recs.  - will change RPV to emtricitabine/TDF and cont DOL and upon discharge may transition TDF to TAF (+emtricitabine).    - HAART regimen per Dr. Miramontes.  - repeat HIV RNA PCR positive, CD4 9.8.          Need for prophylactic immunotherapy    - patient received induction with THYMO given recipient HIV+; THYMO dose #3 8/20, still  w no level,  thymo #4 8/21/17, Thymo 5th dose on POD #5.  - Cont to increase Prograf BID(10mg); goal trough 8-10.  - will need to closely monitor for drug-drug interactions with HAART therapy and Prograf.  No plan for Posaconazole at this time.   - continue MMF 1000 mg BID.  - continue steroids per protocol.  - continue to monitor for toxicities from immunosuppressive medications.               Anemia of chronic disease    - Hb pre-op was 11.2 (8/18/17). Trended down.  Keep type and screen current.    - H&H 6.8/20.1. Transfuse 1 unit PRBCs 8/23 w appropriate response. Cont to monitor serial  H&H.  - continue to monitor.             Hyperlipidemia    - restart crestor            Discharge Planning:  No Patient Care Coordination Note on file.      Jenna M Oppenheimer, PA-C  Kidney Transplant  [facility]

## 2017-08-25 NOTE — PROGRESS NOTES
"SW met with pt at bedside to check in and see how he was feeling today. Per Pa, it was reported that pt appeared to be sad due to the kidney being sleepy and not working immediately. Pt appeared a bit upset, but reports that he " is just going through the motions and trying to stay positive." SW informed pt that he will be set up with out pt dialysis after discharge. SW confirmed that he will be going to the [facility] to receive HD.SW provided a supportive environment and encourage pt to remains positive and to please call if he has any emotional concerns. SW remains available.   "

## 2017-08-25 NOTE — SUBJECTIVE & OBJECTIVE
Subjective:     37 y.o. 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   Required HD on POD 1 for hyperkalemia. US doppler revealed mildly elevated RIs but otherwise adequate flow to the kidney.  Repeat US post kidney POD #3 reviewed w increased arterial RIs to segmental and lobular arteries.  Will plan for repeat US on Thursday. Patient required repeat HD on POD #3.    Interval history: No acute events overnight. Pt went for HD yesterday 4L off. Plan for kidney biosy today. DSAs sent this AM. Drain fluid prior to removal concerning for chyle leak, triglycerides 328, will need to follow up with US in 2 weeks to assess for lymphocele.     Interval History: no acute events overnight. Last HD on Monday 8/21 - tolerated well. UOP increasing, up to 520 ml/24 hrs. Still not clearing. May likely need HD tomorrow. Check DSA on 8/25/17. Pt still reporting hematuria. H&H 6/38/20. Will transfuse 1 unit PRBCs. Prograf level remains undetected. Plan for additional dose of Thymo today. Denies nausea/vomiting. Good appetite. Pain controlled with current regimen. Encouraged ambulation. +BM. Monitor.     Past Medical, Surgical, Family, and Social History:   Unchanged from H&P.    Review of patient's allergies indicates:  No Known Allergies    Scheduled Meds:   [START ON 8/26/2017] cinacalcet  60 mg Oral Every other day    docusate sodium  100 mg Oral TID    dolutegravir  50 mg Oral Daily    famotidine  20 mg Oral QHS    heparin (porcine)  5,000 Units Subcutaneous Q8H    ketoconazole  100 mg Oral Daily    lidocaine HCL 10 mg/ml (1%)  10 mL Intradermal Once    multivitamin  1 tablet Oral Daily    mycophenolate  1,000 mg Oral BID     nystatin  500,000 Units Mouth/Throat QID (PC + HS)    predniSONE  20 mg Oral Daily    rilpivirine  25 mg Oral Daily    rosuvastatin  20 mg Oral Daily    sulfamethoxazole-trimethoprim 400-80mg  1 tablet Oral Every Mon, Wed, Fri    tacrolimus  10 mg Oral BID    valganciclovir  450 mg Oral Every Mon, Wed, Fri     Continuous Infusions:   glucagon (human recombinant)       PRN Meds:sodium chloride, sodium chloride 0.9%, albumin human 25%, albumin human 25%, bisacodyl, dextrose 50%, glucagon (human recombinant), glucose, glucose, glucose, HYDROmorphone, HYDROmorphone, insulin aspart, naloxone, ondansetron    Intake/Output - Last 3 Shifts       08/23 0700 - 08/24 0659 08/24 0700 - 08/25 0659 08/25 0700 - 08/26 0659    P.O. 1080 120     Blood 0      Other  700     IV Piggyback 200      Total Intake(mL/kg) 1280 (17.3) 820 (11.5)     Urine (mL/kg/hr) 150 (0.1) 150 (0.1)     Drains       Other  4700 (2.7)     Stool 0 (0) 0 (0)     Total Output 150 4850      Net +1130 -4030             Urine Occurrence 2 x      Stool Occurrence 5 x 2 x            Review of Systems   Constitutional: Negative for activity change, appetite change, chills, fatigue and fever.   HENT: Negative for congestion and facial swelling.    Eyes: Negative for pain, discharge and visual disturbance.   Respiratory: Negative for cough, chest tightness, shortness of breath and wheezing.    Cardiovascular: Negative for chest pain, palpitations and leg swelling.   Gastrointestinal: Negative for abdominal distention, abdominal pain (incisional), constipation, diarrhea, nausea and vomiting.   Endocrine: Negative.    Genitourinary: Positive for hematuria. Negative for decreased urine volume, dysuria and flank pain.   Musculoskeletal: Negative for gait problem.   Skin: Positive for wound.   Allergic/Immunologic: Positive for immunocompromised state.   Neurological: Negative for dizziness, tremors, light-headedness and headaches.   Hematological: Negative.   "  Psychiatric/Behavioral: Negative for behavioral problems and dysphoric mood.      Objective:     Vital Signs (Most Recent):  Temp: 98.5 °F (36.9 °C) (08/25/17 1137)  Pulse: 77 (08/25/17 1137)  Resp: 18 (08/25/17 1137)  BP: 107/62 (08/25/17 1137)  SpO2: (!) 86 % (08/25/17 1137) Vital Signs (24h Range):  Temp:  [97.7 °F (36.5 °C)-98.5 °F (36.9 °C)] 98.5 °F (36.9 °C)  Pulse:  [67-91] 77  Resp:  [18-20] 18  SpO2:  [86 %-100 %] 86 %  BP: (107-128)/(58-77) 107/62     Weight: 71.4 kg (157 lb 6.5 oz)  Height: 5' 8" (172.7 cm)  Body mass index is 23.93 kg/m².    Physical Exam   Constitutional: He is oriented to person, place, and time. He appears well-developed and well-nourished. No distress.   HENT:   Head: Normocephalic and atraumatic.   Mouth/Throat: No oropharyngeal exudate.   Eyes: EOM are normal. Pupils are equal, round, and reactive to light. No scleral icterus.   Neck: Normal range of motion. Neck supple. No JVD present.   Cardiovascular: Normal rate and regular rhythm.    Pulmonary/Chest: Effort normal and breath sounds normal. No respiratory distress. He has no rales.   Abdominal: Soft. Bowel sounds are normal. He exhibits no distension. There is no tenderness. There is no guarding.   RLQ inc with staples intact no s/s/i       Musculoskeletal: Normal range of motion. He exhibits no edema.   Neurological: He is alert and oriented to person, place, and time.   Skin: Skin is warm and dry. Capillary refill takes 2 to 3 seconds. He is not diaphoretic. No erythema.   Psychiatric: He has a normal mood and affect. His behavior is normal. Judgment and thought content normal.   Nursing note and vitals reviewed.      Laboratory:  CBC:     Recent Labs  Lab 08/25/17  0507   WBC 3.38*   RBC 2.63*   HGB 7.9*   HCT 22.8*   *   MCV 87   MCH 30.0   MCHC 34.6     BMP:     Recent Labs  Lab 08/25/17  0507   GLU 83   *   K 4.0      CO2 24   BUN 33*   CREATININE 7.9*   CALCIUM 8.5*     Labs within the past 24 hours " have been reviewed.    Diagnostic Results:  US: Reviewed

## 2017-08-25 NOTE — ASSESSMENT & PLAN NOTE
- hyperkalemia of 7.6 on 8/19/17 thus required dialysis.   - kidney w ATN likely. Lasix 100 mg given 8/20 w no response.  - dopamine gtt stopped 8/20- Tolerated HD 8/22.   - Cr up POD #5/uop was increased to 520 ml.  Now anuric. Last HD 8/24.   - Renal diet.   - Plan for bx today. DSAs sent

## 2017-08-25 NOTE — H&P
"Inpatient Radiology Pre-procedure Note    History of Present Illness:    Demetrio Aguiar is a 37 y.o. male who presents for ultrasound guided renal transplant biopsy.    Admission H&P reviewed.    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 Authorizing Provider   aspirin (ECOTRIN) 81 MG EC tablet Take 81 mg by mouth once daily.   Yes Historical Provider, MD   dolutegravir (TIVICAY) 50 mg Tab Take 50 mg by mouth once daily.   Yes Historical Provider, MD   rilpivirine (EDURANT) 25 mg Tab Take 25 mg by mouth once daily.   Yes Historical Provider, MD   rosuvastatin (CRESTOR) 20 MG tablet Take 20 mg by mouth once daily.   Yes Historical Provider, MD   SENSIPAR 60 mg Tab TAKE 2 TABLETS ( 120MG ) BY MOUTH DAILY WITH EVENING MEAL 2/9/17  Yes Historical Provider, MD   bisacodyl (DULCOLAX) 5 mg EC tablet Take 1-2 tablets (5-10 mg total) by mouth daily as needed for Constipation. Laxative 8/23/17   Urban Singh MD   docusate sodium (COLACE) 100 MG capsule Take 1 capsule (100 mg total) by mouth 3 (three) times daily as needed for Constipation. 8/23/17   Urban Singh MD   famotidine (PEPCID) 20 MG tablet Take 1 tablet (20 mg total) by mouth every evening. 8/23/17 8/23/18  Urban Singh MD   fexofenadine (ALLEGRA) 60 MG tablet Take 60 mg by mouth once daily. 1/9/17   Historical Provider, MD "   multivitamin (THERAGRAN) tablet Take 1 tablet by mouth once daily. 8/23/17   Urban Singh MD   mycophenolate (CELLCEPT) 250 mg Cap Take 4 capsules (1,000 mg total) by mouth 2 (two) times daily. Kidney txp 8/18/17; Z94.0 8/23/17   Sayda Gross MD   nystatin (MYCOSTATIN) 100,000 unit/mL suspension Take 5 mLs (500,000 Units total) by mouth 2 hours after meals and at bedtime. STOP 9/15/17 8/18/17 9/15/17  Urban Singh MD   predniSONE (DELTASONE) 10 MG tablet Take 20 mg PO QD 8/22-9/21; 15 mg PO QD 9/22-10/21; 10 mg PO QD 10/22-11/21; 5 mg PO QD thereafter 8/23/17   Sayda Gross MD   sulfamethoxazole-trimethoprim 400-80mg (BACTRIM,SEPTRA) 400-80 mg per tablet Take 1 tablet by mouth every Mon, Wed, Fri. Stop 8/18/18 8/23/17 8/23/18  Urabn Singh MD   tacrolimus (PROGRAF) 1 MG Cap Take 8 capsules (8 mg total) by mouth every 12 (twelve) hours. 8/23/17   Sayda Gross MD   valganciclovir (VALCYTE) 450 mg Tab Take 1 tablet (450 mg total) by mouth every Mon, Wed, Fri. Give after HD; Stop 11/19/17 8/23/17 11/21/17  Urban Singh MD     Scheduled Meds:    [START ON 8/26/2017] cinacalcet  60 mg Oral Every other day    docusate sodium  100 mg Oral TID    dolutegravir  50 mg Oral Daily    famotidine  20 mg Oral QHS    heparin (porcine)  5,000 Units Subcutaneous Q8H    ketoconazole  100 mg Oral Daily    lidocaine HCL 10 mg/ml (1%)  10 mL Intradermal Once    multivitamin  1 tablet Oral Daily    mycophenolate  1,000 mg Oral BID    nystatin  500,000 Units Mouth/Throat QID (PC + HS)    predniSONE  20 mg Oral Daily    rilpivirine  25 mg Oral Daily    rosuvastatin  20 mg Oral Daily    sulfamethoxazole-trimethoprim 400-80mg  1 tablet Oral Every Mon, Wed, Fri    tacrolimus  10 mg Oral BID    valganciclovir  450 mg Oral Every Mon, Wed, Fri     Continuous Infusions:    glucagon (human recombinant)       PRN Meds:sodium chloride, sodium chloride 0.9%, albumin human 25%, albumin human 25%,  bisacodyl, dextrose 50%, glucagon (human recombinant), glucose, glucose, glucose, HYDROmorphone, HYDROmorphone, insulin aspart, naloxone, ondansetron  Anticoagulants/Antiplatelets: Heparin 5000 u sub q given last 0800 8/25/17    Allergies: Review of patient's allergies indicates:  No Known Allergies    Sedation Hx: have not been any systemic reactions    Labs:    Recent Labs  Lab 08/18/17  1439   INR 1.0       Recent Labs  Lab 08/25/17  0507   WBC 3.38*   HGB 7.9*   HCT 22.8*   MCV 87   *      Recent Labs  Lab 08/20/17  0545  08/25/17  0507   *  < > 83   *  < > 134*   K 4.7  < > 4.0   CL 94*  < > 102   CO2 32*  < > 24   BUN 25*  < > 33*   CREATININE 7.2*  < > 7.9*   CALCIUM 8.3*  < > 8.5*   MG 2.2  < > 2.4   ALT <5*  --   --    AST 27  --   --    ALBUMIN 2.8*  < > 2.6*   BILITOT 0.2  --   --    < > = values in this interval not displayed.      Vitals:  Temp: 98.5 °F (36.9 °C) (08/25/17 1137)  Pulse: 77 (08/25/17 1137)  Resp: 18 (08/25/17 1137)  BP: 107/62 (08/25/17 1137)  SpO2: (!) 86 % (08/25/17 1137)     Physical Exam:  ASA: 3  Mallampati: 1    General: no acute distress  Mental Status: alert and oriented to person, place and time  HEENT: normocephalic, atraumatic  Chest: unlabored breathing  Heart: regular heart rate  Abdomen: nondistended  Extremity: moves all extremities    Plan: ultrasound guided renal transplant biopsy  Sedation Plan: local    Matthieu Velarde MD  PGY-5  Department of Radiology  [phone]

## 2017-08-25 NOTE — PROGRESS NOTES
JUAN spoke with Alina, clinic manager at [facility], Alina reports that  approved the pt and he will be going to dialysis [facility] for a 2pm chair. JUAN remains avialable.

## 2017-08-26 VITALS
HEART RATE: 72 BPM | RESPIRATION RATE: 16 BRPM | OXYGEN SATURATION: 99 % | DIASTOLIC BLOOD PRESSURE: 58 MMHG | HEIGHT: 68 IN | SYSTOLIC BLOOD PRESSURE: 117 MMHG | BODY MASS INDEX: 23.89 KG/M2 | TEMPERATURE: 98 F | WEIGHT: 157.63 LBS

## 2017-08-26 LAB
ALBUMIN SERPL BCP-MCNC: 2.5 G/DL
ANION GAP SERPL CALC-SCNC: 12 MMOL/L
BASOPHILS # BLD AUTO: 0 K/UL
BASOPHILS NFR BLD: 0 %
BUN SERPL-MCNC: 44 MG/DL
CALCIUM SERPL-MCNC: 8.5 MG/DL
CHLORIDE SERPL-SCNC: 101 MMOL/L
CO2 SERPL-SCNC: 19 MMOL/L
CREAT SERPL-MCNC: 10.6 MG/DL
DIFFERENTIAL METHOD: ABNORMAL
EOSINOPHIL # BLD AUTO: 0.1 K/UL
EOSINOPHIL NFR BLD: 2.1 %
ERYTHROCYTE [DISTWIDTH] IN BLOOD BY AUTOMATED COUNT: 16.4 %
EST. GFR  (AFRICAN AMERICAN): 6.3 ML/MIN/1.73 M^2
EST. GFR  (NON AFRICAN AMERICAN): 5.5 ML/MIN/1.73 M^2
GLUCOSE SERPL-MCNC: 103 MG/DL
HCT VFR BLD AUTO: 22 %
HGB BLD-MCNC: 7.6 G/DL
LYMPHOCYTES # BLD AUTO: 0.1 K/UL
LYMPHOCYTES NFR BLD: 2.6 %
MAGNESIUM SERPL-MCNC: 2.3 MG/DL
MCH RBC QN AUTO: 29.3 PG
MCHC RBC AUTO-ENTMCNC: 34.5 G/DL
MCV RBC AUTO: 85 FL
MONOCYTES # BLD AUTO: 0.3 K/UL
MONOCYTES NFR BLD: 8.3 %
NEUTROPHILS # BLD AUTO: 3.3 K/UL
NEUTROPHILS NFR BLD: 85.7 %
PHOSPHATE SERPL-MCNC: 4.9 MG/DL
PLATELET # BLD AUTO: 121 K/UL
PMV BLD AUTO: 9.2 FL
POCT GLUCOSE: 128 MG/DL (ref 70–110)
POTASSIUM SERPL-SCNC: 3.8 MMOL/L
RBC # BLD AUTO: 2.59 M/UL
SODIUM SERPL-SCNC: 132 MMOL/L
TACROLIMUS BLD-MCNC: 9.4 NG/ML
WBC # BLD AUTO: 3.85 K/UL

## 2017-08-26 PROCEDURE — 25000003 PHARM REV CODE 250: Performed by: PHYSICIAN ASSISTANT

## 2017-08-26 PROCEDURE — 99239 HOSP IP/OBS DSCHRG MGMT >30: CPT | Mod: 24,,, | Performed by: NURSE PRACTITIONER

## 2017-08-26 PROCEDURE — 63600175 PHARM REV CODE 636 W HCPCS: Performed by: STUDENT IN AN ORGANIZED HEALTH CARE EDUCATION/TRAINING PROGRAM

## 2017-08-26 PROCEDURE — 90935 HEMODIALYSIS ONE EVALUATION: CPT

## 2017-08-26 PROCEDURE — 25000003 PHARM REV CODE 250: Performed by: STUDENT IN AN ORGANIZED HEALTH CARE EDUCATION/TRAINING PROGRAM

## 2017-08-26 PROCEDURE — 85025 COMPLETE CBC W/AUTO DIFF WBC: CPT

## 2017-08-26 PROCEDURE — 25000003 PHARM REV CODE 250: Performed by: INTERNAL MEDICINE

## 2017-08-26 PROCEDURE — 83735 ASSAY OF MAGNESIUM: CPT

## 2017-08-26 PROCEDURE — 25000003 PHARM REV CODE 250: Performed by: NURSE PRACTITIONER

## 2017-08-26 PROCEDURE — 63600175 PHARM REV CODE 636 W HCPCS: Performed by: NURSE PRACTITIONER

## 2017-08-26 PROCEDURE — 80069 RENAL FUNCTION PANEL: CPT

## 2017-08-26 PROCEDURE — 80197 ASSAY OF TACROLIMUS: CPT

## 2017-08-26 PROCEDURE — 25000003 PHARM REV CODE 250: Performed by: SURGERY

## 2017-08-26 RX ORDER — CINACALCET 60 MG/1
60 TABLET, FILM COATED ORAL EVERY OTHER DAY
Qty: 15 TABLET | Refills: 1 | Status: SHIPPED | OUTPATIENT
Start: 2017-08-26 | End: 2017-09-22 | Stop reason: SDUPTHER

## 2017-08-26 RX ORDER — TACROLIMUS 1 MG/1
6 CAPSULE ORAL EVERY 12 HOURS
Qty: 360 CAPSULE | Refills: 11 | Status: SHIPPED | OUTPATIENT
Start: 2017-08-26 | End: 2017-09-19 | Stop reason: SDUPTHER

## 2017-08-26 RX ORDER — KETOCONAZOLE 200 MG/1
100 TABLET ORAL DAILY
Qty: 15 TABLET | Refills: 5 | Status: ON HOLD | OUTPATIENT
Start: 2017-08-26 | End: 2017-09-14 | Stop reason: ALTCHOICE

## 2017-08-26 RX ORDER — SULFAMETHOXAZOLE AND TRIMETHOPRIM 400; 80 MG/1; MG/1
1 TABLET ORAL
Qty: 30 TABLET | Refills: 11 | Status: ON HOLD | OUTPATIENT
Start: 2017-08-28 | End: 2017-09-14

## 2017-08-26 RX ORDER — SODIUM CHLORIDE 9 MG/ML
INJECTION, SOLUTION INTRAVENOUS ONCE
Status: COMPLETED | OUTPATIENT
Start: 2017-08-26 | End: 2017-08-26

## 2017-08-26 RX ORDER — OXYCODONE AND ACETAMINOPHEN 10; 325 MG/1; MG/1
.5-1 TABLET ORAL EVERY 4 HOURS PRN
Qty: 40 TABLET | Refills: 0 | Status: SHIPPED | OUTPATIENT
Start: 2017-08-26 | End: 2017-10-04 | Stop reason: SDUPTHER

## 2017-08-26 RX ADMIN — HYDROMORPHONE HYDROCHLORIDE 4 MG: 2 TABLET ORAL at 10:08

## 2017-08-26 RX ADMIN — HYDROMORPHONE HYDROCHLORIDE 4 MG: 2 TABLET ORAL at 04:08

## 2017-08-26 RX ADMIN — Medication 100 MG: at 09:08

## 2017-08-26 RX ADMIN — HYDROMORPHONE HYDROCHLORIDE 4 MG: 2 TABLET ORAL at 07:08

## 2017-08-26 RX ADMIN — CINACALCET HYDROCHLORIDE 60 MG: 30 TABLET, COATED ORAL at 09:08

## 2017-08-26 RX ADMIN — DOCUSATE SODIUM 100 MG: 50 CAPSULE, LIQUID FILLED ORAL at 02:08

## 2017-08-26 RX ADMIN — DOCUSATE SODIUM 100 MG: 50 CAPSULE, LIQUID FILLED ORAL at 05:08

## 2017-08-26 RX ADMIN — ROSUVASTATIN CALCIUM 20 MG: 5 TABLET ORAL at 09:08

## 2017-08-26 RX ADMIN — DOLUTEGRAVIR SODIUM 50 MG: 50 TABLET, FILM COATED ORAL at 09:08

## 2017-08-26 RX ADMIN — MYCOPHENOLATE MOFETIL 1000 MG: 250 CAPSULE ORAL at 10:08

## 2017-08-26 RX ADMIN — TACROLIMUS 10 MG: 5 CAPSULE ORAL at 08:08

## 2017-08-26 RX ADMIN — PREDNISONE 20 MG: 20 TABLET ORAL at 09:08

## 2017-08-26 RX ADMIN — NYSTATIN 500000 UNITS: 500000 SUSPENSION ORAL at 02:08

## 2017-08-26 RX ADMIN — HYDROMORPHONE HYDROCHLORIDE 4 MG: 2 TABLET ORAL at 03:08

## 2017-08-26 RX ADMIN — THERA TABS 1 TABLET: TAB at 09:08

## 2017-08-26 RX ADMIN — SODIUM CHLORIDE: 0.9 INJECTION, SOLUTION INTRAVENOUS at 08:08

## 2017-08-26 RX ADMIN — HYDROMORPHONE HYDROCHLORIDE 4 MG: 2 TABLET ORAL at 01:08

## 2017-08-26 RX ADMIN — HEPARIN SODIUM 5000 UNITS: 5000 INJECTION, SOLUTION INTRAVENOUS; SUBCUTANEOUS at 05:08

## 2017-08-26 NOTE — PROGRESS NOTES
DISCHARGE NOTE:    Demetrio Aguiar is a 37 y.o. male s/p  RIGHT KIDNEY    - Brain Death  transplant on 2017 (Kidney) for ESRD secondary to HIV Nephropathy.      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  He received Thymo for induction secondary to HIV.  His course was significant for DGF and suptherapeutic tacrolimus levels.  As a result, he received an extra dose of Thymoglobulin on POD #5 (for a total of 6 mg/kg).  His home HAART regimen was resumed on POD5.  He will be discharged on HD.      Allergies: Review of patient's allergies indicates:  No Known Allergies    Patient Pharmacy: [facility]    Discharge Medications:   Demetrio Aguiar   Home Medication Instructions MINNA:[id number]    Printed on:17 8965   Medication Information                      bisacodyl (DULCOLAX) 5 mg EC tablet  Take 1-2 tablets (5-10 mg total) by mouth daily as needed for Constipation. Laxative     cinacalcet (SENSIPAR) 60 MG Tab  Take 1 tablet (60 mg total) by mouth every other day.     docusate sodium (COLACE) 100 MG capsule  Take 1 capsule (100 mg total) by mouth 3 (three) times daily as needed for Constipation.     dolutegravir (TIVICAY) 50 mg Tab  Take 50 mg by mouth once daily.     famotidine (PEPCID) 20 MG tablet  Take 1 tablet (20 mg total) by mouth every evening.     multivitamin (THERAGRAN) tablet  Take 1 tablet by mouth once daily.     mycophenolate (CELLCEPT) 250 mg Cap  Take 4 capsules (1,000 mg total) by mouth 2 (two) times daily. Kidney txp 17; Z94.0     nystatin (MYCOSTATIN) 100,000 unit/mL suspension  Take 5 mLs (500,000 Units total) by mouth 2 hours after meals  and at bedtime. STOP 9/15/17     oxycodone-acetaminophen (PERCOCET)  mg per tablet  Take 0.5-1 tablets by mouth every 4 (four) hours as needed for Pain.     predniSONE (DELTASONE) 10 MG tablet  Take 20 mg PO QD 8/22-9/21; 15 mg PO QD 9/22-10/21; 10 mg PO QD 10/22-11/21; 5 mg PO QD thereafter     rilpivirine (EDURANT) 25 mg Tab  Take 25 mg by mouth once daily.     rosuvastatin (CRESTOR) 20 MG tablet  Take 20 mg by mouth once daily.     sulfamethoxazole-trimethoprim 400-80mg (BACTRIM,SEPTRA) 400-80 mg per tablet  Take 1 tablet by mouth every Mon, Wed, Fri.     tacrolimus (PROGRAF) 1 MG Cap  Take 6 capsules (6 mg total) by mouth every 12 (twelve) hours.     valganciclovir (VALCYTE) 450 mg Tab  Take 1 tablet (450 mg total) by mouth every Mon, Wed, Fri. Give after HD; Stop 11/19/17     KETOCONAZOLE 100 MG PO QD       Pharmacy Interventions/Recommendations:  1) Transplant Immunosuppression: tHYMO iNDUCTION, Prograf, Cellcept, and prednisone taper per protocol; Ketoconazole was started 8/25 as a tacrolimus booster    2) Opportunistic Infection prophylaxis: Valcyte x 3 months, Nystatin, and Bactrim (LIFELONG)    3) Patient Counseling/Education:   Demonstrated the use of the BP cuff, thermometer.    4) Follow-Up/Discharge Needs:  None    5) Patient received prescriptions for:      E-rx's:  Ketoconazole       Printed rx's: Percocet 10/325 mg #40        6) Patient Assistance Information: none    7) The following medications have been placed on HOLD and should be restarted in the outpatient setting (when appropriate): none    Demetrio and his caregiver verbalized their understanding and had the opportunity to ask questions.

## 2017-08-28 ENCOUNTER — CLINICAL SUPPORT (OUTPATIENT)
Dept: TRANSPLANT | Facility: CLINIC | Age: 37
DRG: 673 | End: 2017-08-28
Payer: MEDICARE

## 2017-08-28 ENCOUNTER — PATIENT MESSAGE (OUTPATIENT)
Dept: TRANSPLANT | Facility: CLINIC | Age: 37
End: 2017-08-28

## 2017-08-28 ENCOUNTER — TELEPHONE (OUTPATIENT)
Dept: TRANSPLANT | Facility: CLINIC | Age: 37
End: 2017-08-28

## 2017-08-28 ENCOUNTER — OFFICE VISIT (OUTPATIENT)
Dept: TRANSPLANT | Facility: CLINIC | Age: 37
DRG: 673 | End: 2017-08-28
Payer: MEDICARE

## 2017-08-28 VITALS
HEART RATE: 80 BPM | TEMPERATURE: 98 F | BODY MASS INDEX: 25.15 KG/M2 | HEIGHT: 67 IN | DIASTOLIC BLOOD PRESSURE: 75 MMHG | SYSTOLIC BLOOD PRESSURE: 132 MMHG | WEIGHT: 160.25 LBS | RESPIRATION RATE: 16 BRPM | OXYGEN SATURATION: 100 %

## 2017-08-28 VITALS
SYSTOLIC BLOOD PRESSURE: 132 MMHG | SYSTOLIC BLOOD PRESSURE: 132 MMHG | DIASTOLIC BLOOD PRESSURE: 75 MMHG | BODY MASS INDEX: 25.15 KG/M2 | TEMPERATURE: 98 F | HEIGHT: 67 IN | OXYGEN SATURATION: 100 % | HEIGHT: 67 IN | DIASTOLIC BLOOD PRESSURE: 75 MMHG | RESPIRATION RATE: 16 BRPM | BODY MASS INDEX: 25.15 KG/M2 | TEMPERATURE: 98 F | RESPIRATION RATE: 16 BRPM | WEIGHT: 160.25 LBS | HEART RATE: 80 BPM | HEART RATE: 80 BPM | OXYGEN SATURATION: 100 % | WEIGHT: 160.25 LBS

## 2017-08-28 VITALS
TEMPERATURE: 98 F | HEART RATE: 80 BPM | HEIGHT: 67 IN | DIASTOLIC BLOOD PRESSURE: 75 MMHG | SYSTOLIC BLOOD PRESSURE: 132 MMHG | BODY MASS INDEX: 25.15 KG/M2 | RESPIRATION RATE: 16 BRPM | OXYGEN SATURATION: 100 % | WEIGHT: 160.25 LBS

## 2017-08-28 DIAGNOSIS — Z94.0 KIDNEY REPLACED BY TRANSPLANT: Primary | ICD-10-CM

## 2017-08-28 DIAGNOSIS — D84.9 IMMUNOSUPPRESSION: Chronic | ICD-10-CM

## 2017-08-28 DIAGNOSIS — Z29.89 NEED FOR PROPHYLACTIC IMMUNOTHERAPY: ICD-10-CM

## 2017-08-28 DIAGNOSIS — Z94.0 S/P KIDNEY TRANSPLANT: Primary | ICD-10-CM

## 2017-08-28 DIAGNOSIS — D63.8 ANEMIA OF CHRONIC DISEASE: ICD-10-CM

## 2017-08-28 DIAGNOSIS — D84.9 IMMUNOCOMPROMISED STATE: Chronic | ICD-10-CM

## 2017-08-28 DIAGNOSIS — B20 HIV (HUMAN IMMUNODEFICIENCY VIRUS INFECTION): Chronic | ICD-10-CM

## 2017-08-28 DIAGNOSIS — E87.20 METABOLIC ACIDOSIS: ICD-10-CM

## 2017-08-28 DIAGNOSIS — Z94.0 S/P KIDNEY TRANSPLANT: ICD-10-CM

## 2017-08-28 DIAGNOSIS — T86.19 DELAYED GRAFT FUNCTION OF KIDNEY: ICD-10-CM

## 2017-08-28 DIAGNOSIS — Z91.89 AT RISK FOR OPPORTUNISTIC INFECTIONS: ICD-10-CM

## 2017-08-28 PROBLEM — T86.11 ACUTE REJECTION OF KIDNEY TRANSPLANT: Status: ACTIVE | Noted: 2017-08-28

## 2017-08-28 PROCEDURE — 99999 PR PBB SHADOW E&M-EST. PATIENT-LVL III: CPT | Mod: PBBFAC,,,

## 2017-08-28 PROCEDURE — 99999 PR PBB SHADOW E&M-EST. PATIENT-LVL IV: CPT | Mod: PBBFAC,,,

## 2017-08-28 PROCEDURE — 99214 OFFICE O/P EST MOD 30 MIN: CPT | Mod: S$PBB,,, | Performed by: NURSE PRACTITIONER

## 2017-08-28 PROCEDURE — 99213 OFFICE O/P EST LOW 20 MIN: CPT | Mod: PBBFAC,27

## 2017-08-28 PROCEDURE — 99999 PR PBB SHADOW E&M-EST. PATIENT-LVL IV: CPT | Mod: PBBFAC,,, | Performed by: NURSE PRACTITIONER

## 2017-08-28 NOTE — PROGRESS NOTES
DISCHARGE NOTE:  Pt discharged over the weekend. Pt needed dialysis for an afternoon shift. Pt will go to [facility] for a 2pm chair MWF. [address] [place] [address]. [phone](ph) [phone](f).A closer unit was unable to accept pt due to census and staff. Pt has no other needs at this time. SW remains available.

## 2017-08-28 NOTE — PROGRESS NOTES
JUAN met with pt in clinic at the request of post-kidney coordinator, Evelina Barragan as pt notified team that he was not aware of whether or not he had been set up for acute outpatient dialysis. Pt alert and oriented x 4 and appeared to coping well at this time despite confusion about his dialysis dates and times. JUAN notified pt that he set up with [facility] for MWF at 2pm. This dialysis center is located at [address] [place] [address] [address], [place] [address] (ph#[phone] fax#[phone]. JUAN printed out instructions with map attached. Per medical record, Kidney Transplant JUAN, Flo Millard LMSW notified pt of dialysis information while pt still in the hospital last week. JUAN noticed that pt did not have caregiver with him in the room. Clinic nurse Shea reports that pt's caregiver is in the waiting area. JUAN contacted Kidney Transplant SW, Lashay Fierro LCSW to come and speak with pt further to determine if pt had any further needs and to include caregiver in conversation as pt was discharged on Saturday without SW team being notified and is from [place] and not familiar with the [facility]. Lashay reports that she will come down to speak with pt. JUAN remains available.

## 2017-08-28 NOTE — TELEPHONE ENCOUNTER
Call placed to patient in review of biopsy results. Patient currently at HD, will be admitted tonight for treatment of ABMR. Patient advised to take Prograf dose as close to 6-7pm as possible. States understanding.

## 2017-08-28 NOTE — LETTER
August 28, 2017        [place] Holliday  [address] [place] [address]  Phone: [phone]  Fax: [phone]             [facility]  [address]  [facility] [address]  Phone: [phone]   Patient: Demetrio Agiuar   MR Number: [id number]   YOB: 1980   Date of Visit: 8/28/2017       Dear Dr. [place] Holliday    Thank you for referring Demetrio Aguiar to me for evaluation. Attached you will find relevant portions of my assessment and plan of care.    If you have questions, please do not hesitate to call me. I look forward to following Demetrio Aguiar along with you.    Sincerely,    Hallie Smith NP    Enclosure    If you would like to receive this communication electronically, please contact [email or web address] or [phone] to request [employer] access.    [employer] is a tool which provides read-only access to select patient information with whom you have a relationship. Its easy to use and provides real time access to review your patients record including encounter summaries, notes, results, and demographic information.    If you feel you have received this communication in error or would no longer like to receive these types of communications, please e-mail [email or web address]

## 2017-08-28 NOTE — TELEPHONE ENCOUNTER
Biopsy (for DGF) 8/25/17: good sample with 24 glomeruli (only 1 sclerosed) and evidence just meeting criteria for ABMR. + diffuse ATI. Minimal fibrosis, no ACR or AVR, but + CD4 (>50% diffusely + stain) with mild glomerulitis and focal peritubular capillaritis. Also, biopsy shows ca-oxalate (3) and ca-phos (2) crystals.  8/25/17 DSA + for weak class I.    PLAN: admit for ABMR therapy with PLEX, then IVIG plus ongoing dialysis as needed. Would like to get pre-PLEX CD3 and repeat DSA (can draw with AM labs tomorrow).

## 2017-08-28 NOTE — PROGRESS NOTES
Kidney Post-Transplant Assessment    Referring Physician:   Current Nephrologist: [place] Holliday    ORGAN: RIGHT KIDNEY  Donor Type:  - brain death  PHS Increased Risk: no  Cold Ischemia: 422 mins  Induction Medications: thymoglobulin    Subjective:     CC:  Reassessment of renal allograft function and management of chronic immunosuppression.    HPI:  Mr. Aguiar is a 37 y.o. year old Black or  male with history of polysubstance abuse in the past, HIV on HAART, latent TB s/p treatment, ESRD secondary to HIVAN on RRT since 2004 (initially on PD for a few months but switched to HD after peritonitis), who is now s/p DDRT (THYMO induction given recipient HIV+; KDPI 17%, WIT 29 minutes, CIT 7 hours and 2 minutes, CMV D-/R+) on 17. His most recent creatinine is 10.1. He takes mycophenolate mofetil, prednisone and tacrolimus for maintenance immunosuppression. His post transplant course has been complicated by slow graft function without the need for dialysis.    Hospital Course:       He required HD on POD 1 for hyperkalemia. POD #1 US doppler revealed mildly elevated RIs but otherwise adequate flow to the kidney.  POD #3 ()--. anuric w hematuria and bloody JESSICA drain output noted,  repeat US post kidney completed, showing  increased arterial RIs to segmental and lobular arteries, likely ATN. US repeated on , which continues to show elevated RIs.  Patient required repeat HD on POD#3- 3 L removed.  He started to make more urine POD #5 therefore HD was held.  On POD #6, UOP decreased again and Cr up.  He received HD POD 6-  4 L removed.  HD arranged MWF.     DSA and kidney bx completed 17   Kidney BX results pending  DSA class I detected, B62 (2897), weak B44 (1593)     Interval HX:   Intake 16 ounces  Output 2 ounces  BP acceptable/ log       Past Medical History:   Diagnosis Date    Anemia     At risk for opportunistic infections     Delayed graft function of kidney     ESRD  "secondary to HIVAN s/p DDRT 8/18/17     HIV infection     HIV nephropathy     Hyperlipidemia     Need for prophylactic immunotherapy     S/P kidney transplant 8/18/2017 8/28/2017    TB lung, latent     tx INH 2006       Review of Systems   Constitutional: Negative for activity change, appetite change, chills, fatigue, fever and unexpected weight change.   HENT: Negative for congestion, facial swelling, postnasal drip, rhinorrhea, sinus pressure, sore throat and trouble swallowing.    Eyes: Negative for pain, redness and visual disturbance.   Respiratory: Negative for cough, chest tightness, shortness of breath and wheezing.    Cardiovascular: Negative.  Negative for chest pain, palpitations and leg swelling.   Gastrointestinal: Negative for abdominal pain, diarrhea, nausea and vomiting.   Genitourinary: Negative for dysuria, flank pain and urgency.   Musculoskeletal: Negative for gait problem, neck pain and neck stiffness.   Skin: Negative for rash.   Allergic/Immunologic: Positive for immunocompromised state. Negative for environmental allergies and food allergies.        HIV--> HAART f/u with ID   Neurological: Negative for dizziness, weakness, light-headedness and headaches.   Hematological: Does not bruise/bleed easily.   Psychiatric/Behavioral: Negative for agitation and confusion. The patient is not nervous/anxious.        Objective:   Blood pressure 132/75, pulse 80, temperature 98.2 °F (36.8 °C), temperature source Oral, resp. rate 16, height 5' 7" (1.702 m), weight 72.7 kg (160 lb 4.4 oz), SpO2 100 %.body mass index is 25.1 kg/m².    Physical Exam   Constitutional: He is oriented to person, place, and time. He appears well-developed and well-nourished.   HENT:   Head: Normocephalic.   Mouth/Throat: Oropharynx is clear and moist. No oropharyngeal exudate.   Eyes: Conjunctivae and EOM are normal. Pupils are equal, round, and reactive to light. No scleral icterus.   Neck: Normal range of motion. Neck " supple.   Cardiovascular: Normal rate, regular rhythm and normal heart sounds.    Pulmonary/Chest: Effort normal and breath sounds normal.   Abdominal: Soft. Normal appearance and bowel sounds are normal. He exhibits no distension and no mass. There is no splenomegaly or hepatomegaly. There is no tenderness. There is no rebound, no guarding, no CVA tenderness, no tenderness at McBurney's point and negative Santiago's sign.       Musculoskeletal: Normal range of motion. He exhibits no edema.        Legs:  Lymphadenopathy:     He has no cervical adenopathy.   Neurological: He is alert and oriented to person, place, and time. He exhibits normal muscle tone. Coordination normal.   Skin: Skin is warm and dry.   Psychiatric: He has a normal mood and affect. His behavior is normal.   Vitals reviewed.      Labs:  Lab Results   Component Value Date    WBC 5.50 08/28/2017    HGB 8.9 (L) 08/28/2017    HCT 26.4 (L) 08/28/2017     08/28/2017    K 4.1 08/28/2017     08/28/2017    CO2 20 (L) 08/28/2017    BUN 39 (H) 08/28/2017    CREATININE 10.1 (H) 08/28/2017    EGFRNONAA 5.8 (A) 08/28/2017    CALCIUM 8.9 08/28/2017    PHOS 4.9 (H) 08/28/2017    MG 2.5 08/28/2017    ALBUMIN 3.0 (L) 08/28/2017    AST 27 08/20/2017    ALT <5 (L) 08/20/2017    .0 (H) 08/18/2017    TACROLIMUS 6.5 08/28/2017       No results found for: EXTANC, EXTWBC, EXTSEGS, EXTPLATELETS, EXTHEMOGLOBI, EXTHEMATOCRI, EXTCREATININ, EXTSODIUM, EXTPOTASSIUM, EXTBUN, EXTCO2, EXTCALCIUM, EXTPHOSPHORU, EXTGLUCOSE, EXTALBUMIN, EXTAST, EXTALT, EXTBILITOTAL, EXTLIPASE, EXTAMYLASE    No results found for: EXTCYCLOSLVL, EXTSIROLIMUS, EXTTACROLVL, EXTPROTCRE, EXTPTHINTACT, EXTPROTEINUA, EXTWBCUA, EXTRBCUA    Labs were reviewed with the patient    Assessment:     1. S/P kidney transplant 8/18/2017    2. Need for prophylactic immunotherapy    3. Metabolic acidosis    4. Immunosuppression    5. Immunocompromised state    6. HIV (human immunodeficiency virus  infection)    7. Delayed graft function of kidney    8. At risk for opportunistic infections    9. Anemia of chronic disease        Plan:    Case discussed with Dr Tello and Dr Ginny Mills today  HGB 8.9 (L) 08/28/2017   HCT 26.4 (L) 08/28/2017     Dialysis today --U/O 2 ounces  Intake 16 ounces  Repeat labs in 2 days as scheduled    Encouraged to drink 1500 ml /day   Patient verbalized understanding    Kidney BX pending   DSA class I detected, B62 (2147), weak B44 (1593)       Follow-up:   1. CKD stage: 5  2. Immunosuppression:   Prograf trough 9.4, which is  therapeutic target 8-10. Continue  Prograf 6/6, MMF 1000 Mg BID, and Prednisone  taper  Bactrim 400/80 mg QD for PCP prophylaxis for life, Valcyte 450 mg M W F  for CMV prophylaxis   Will continue to monitor for drug toxicities    3. Allograft Function:  Sub optimal with very slight improvement.. Delta Cr 0.5, last dialysis 6/26. Continue good po hydration.     dialysis today, with repeat labs in 2 days as scheduled  Lab Results   Component Value Date    CREATININE 10.1 (H) 08/28/2017 8/28/2017  eGFR if African American >60 mL/min/1.73 m^2 6.7       8/26/2017  Creatinine 0.5 - 1.4 mg/dL 10.6     4. Hypertension management: advise low salt diet and home BP monitoring    No BP meds at this time    5. Metabolic Bone Disease/Secondary Hyperparathyroidism:stable  Will monitor PTH, CA and Vit D/guidelines  Lab Results   Component Value Date    .0 (H) 08/18/2017    CALCIUM 8.9 08/28/2017    PHOS 4.9 (H) 08/28/2017     MG 2.5 08/28/2017       6. Electrolytes:  NA, K acceptable. Bicarbonate is low  Lab Results   Component Value Date     08/28/2017    K 4.1 08/28/2017     08/28/2017    CO2 20 (L) 08/28/2017       7. Anemia: stable. No need for intervention    H/H low--> procrit today/ guidelines    Lab Results   Component Value Date    WBC 5.50 08/28/2017    HGB 8.9 (L) 08/28/2017    HCT 26.4 (L) 08/28/2017    MCV 88 08/28/2017    PLT  152 08/28/2017       8.  Cytopenias: no significant cytopenias will monitor as per our guidelines. Medicine list reviewed including potential causes of drug-induced cytopenias    9.Proteinuria: continue p/c ratio as per guidelines      10. BK virus infection screening:  will continue to monitor/ guidelines    11. Weight education: provided during the clinic visit   Body mass index is 25.1 kg/m².          12.Patient safety education regarding immunosuppression including prophylaxis posttransplant for CMV, PCP : Education provided about vaccination and prevention of infections          Follow-up:   Clinic: return to transplant clinic weekly for the first month after transplant; every 2 weeks during months 2-3; then at 6-, 9-, 12-, 18-, 24-, and 36- months post-transplant to reassess for complications from immunosuppression toxicity and monitor for rejection.  Annually thereafter.    Labs: since patient remains at high risk for rejection and drug-related complications that warrant close monitoring, labs will be ordered as follows: continue twice weekly CBC, renal panel, and drug level for first month; then same labs once weekly through 3rd month post-transplant.  Urine for UA and protein/creatinine ratio monthly.  Urine BK - PCR at 1-, 3-, 6-, 9-, 12-, 18-, 24-, and 36- months post-transplant.  Hepatic panel at 1-, 2-, 3-, 6-, 9-, 12-, 18-, 24-, and 36- months post-transplant.    Hallie Smith NP       Education:   Material provided to the patient.  Patient reminded to call with any health changes since these can be early signs of significant complications.  Also, I advised the patient to be sure any new medications or changes of old medications are discussed with either a pharmacist or physician knowledgeable with transplant to avoid rejection/drug toxicity related to significant drug interactions.

## 2017-08-28 NOTE — PROGRESS NOTES
"1ST NURSING VISIT POST DISCHARGE NOTE    1st RN appointment with Demetrio Aguiar post discharge 8/26/17 s/p kidney transplant 8/18/17, DGF.  Patient's sister accompanied him.  Patient reports constipation.  Patient says that he is sleeping well.  Incision intact with staples.  Patient is able to explain daily incision care and showering instructions.  Reviewed I&O monitoring, measuring, and recording, and the need for hydration (i.e., at least 2 liters of water daily with minimal caffeine and no grapefruit products).  Medication list and rationale were reviewed.  Patient did bring blue medication card and medication bottles for review.  Patient reports that he has not stopped Dulcolax and has continued Colace. Patient instructed to take 2 dulcolax tablets today for constipation. Patient expressed understanding of daily care including BID VS, medications, and I&O documentation.  Patient made aware of today's creatinine level: 10.2, and will received dialysis today [facility] [place] [facility] @ 2pm.  Patient aware that coordinator will review today's labs with a transplant physician and call the patient with any dose changes indicated.  Next lab appointment scheduled for 8/30/17.  First post-operative transplant team appointment with labs scheduled for today.    Using the Kidney Transplant Patient Reference Manual, the patient submitted his open book "Self-assessment of Kidney Transplant Patient Knowledge" test, which was completed in the transplant clinic this morning before 1st nursing visit.  This test includes questions regarding critical dose medications commonly used after kidney transplant, medication dosing and side effects, importance of timed lab draws, important signs and symptoms to report 24/7 immediately post-transplant as well as how to contact the transplant team 24/7.    Test Score: 23/25    After completing the test, the patient was given a copy of the Self-assessment Answer Key to reinforce accurate learning of " test content.  Patient expressed his understanding of the value of the information included in the self-assessment test.

## 2017-08-28 NOTE — PROGRESS NOTES
Clinic Note: First Return to Clinic Post-  Kidney Transplant    Demetrio Aguiar  is a 37 y.o. 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      Met with patient and his caregiver in the clinic to review current medication list.     Current Outpatient Prescriptions   Medication Sig Dispense Refill    bisacodyl (DULCOLAX) 5 mg EC tablet Take 1-2 tablets (5-10 mg total) by mouth daily as needed for Constipation. Laxative 30 tablet 3    cinacalcet (SENSIPAR) 60 MG Tab Take 1 tablet (60 mg total) by mouth every other day. 15 tablet 1    docusate sodium (COLACE) 100 MG capsule Take 1 capsule (100 mg total) by mouth 3 (three) times daily as needed for Constipation. 100 capsule 0    dolutegravir (TIVICAY) 50 mg Tab Take 50 mg by mouth once daily.      famotidine (PEPCID) 20 MG tablet Take 1 tablet (20 mg total) by mouth every evening. 30 tablet 2    ketoconazole (NIZORAL) 200 mg Tab Take 0.5 tablets (100 mg total) by mouth once daily. 15 tablet 5    multivitamin (THERAGRAN) tablet Take 1 tablet by mouth once daily.      mycophenolate (CELLCEPT) 250 mg Cap Take 4 capsules (1,000 mg total) by mouth 2 (two) times daily. Kidney txp 8/18/17; Z94.0 240 capsule 11    nystatin (MYCOSTATIN) 100,000 unit/mL suspension Take 5 mLs (500,000 Units total) by mouth 2 hours after meals and at bedtime. STOP 9/15/17 473 mL 0    oxycodone-acetaminophen (PERCOCET)  mg per tablet Take 0.5-1 tablets by mouth every 4 (four) hours as needed for Pain. 40 tablet 0    predniSONE (DELTASONE) 10 MG tablet Take 20 mg PO QD 8/22-9/21; 15 mg PO QD 9/22-10/21; 10 mg PO QD 10/22-11/21; 5 mg PO QD thereafter 100 tablet 1    rilpivirine (EDURANT) 25 mg Tab Take 25 mg by mouth once daily.      rosuvastatin (CRESTOR) 20 MG tablet Take 20 mg by mouth once daily.      sulfamethoxazole-trimethoprim 400-80mg (BACTRIM,SEPTRA) 400-80 mg per tablet Take 1 tablet by mouth every Mon, Wed, Fri. 30 tablet 11     tacrolimus (PROGRAF) 1 MG Cap Take 6 capsules (6 mg total) by mouth every 12 (twelve) hours. 360 capsule 11    valganciclovir (VALCYTE) 450 mg Tab Take 1 tablet (450 mg total) by mouth every Mon, Wed, Fri. Give after HD; Stop 11/19/17 12 tablet 2     No current facility-administered medications for this visit.          1) Discussed any concerns or problems that the patient encountered since discharge:  Patient feeling well, sleeping well, pain controlled, no complaints    2) Reconciled the EMR by having the patient verbalize their medications, dose, and frequency.  Done    3) Verified that patient had prescriptions filled after being discharged from the hospital.  Patient has all medications from [facility]    4) Reviewed vital signs and laboratory values, and evaluated the need to adjust doses of medications.  Home BP controlled, labs stable from 8/26 (last dialysis 8/26 AM), 2 ounces of urine output (none prior to transplant), CBC and FK level pending    5) Reinforced medication education conducted in the hospital, including medication indications, dosing, administration, side effects, monitoring-- including timing of immunosuppressant levels.     6) OTHER medication follow-up:    -Valcyte and Bactrim dosed post dialysis MWF --> if dialysis schedule changes to TuThSa, please adjust Bactrim and Valcyte to TuThSa in the evening    Patient received their FIRST fill of medications from [facility].  Discussed the process for obtaining refills of medications, including verifying the dose and mailing address to have medications delivered.     Demetrio and his caregivers verbalized understanding and had the opportunity to ask questions.

## 2017-08-29 ENCOUNTER — HOSPITAL ENCOUNTER (INPATIENT)
Facility: HOSPITAL | Age: 37
LOS: 16 days | Discharge: HOME | DRG: 673 | End: 2017-09-14
Attending: TRANSPLANT SURGERY | Admitting: TRANSPLANT SURGERY
Payer: MEDICARE

## 2017-08-29 DIAGNOSIS — T86.11 ACUTE REJECTION OF KIDNEY TRANSPLANT: ICD-10-CM

## 2017-08-29 DIAGNOSIS — Z79.60 LONG-TERM USE OF IMMUNOSUPPRESSANT MEDICATION: ICD-10-CM

## 2017-08-29 DIAGNOSIS — Z94.0 S/P KIDNEY TRANSPLANT: ICD-10-CM

## 2017-08-29 DIAGNOSIS — E87.1 HYPONATREMIA: ICD-10-CM

## 2017-08-29 DIAGNOSIS — Z29.89 NEED FOR PROPHYLACTIC IMMUNOTHERAPY: ICD-10-CM

## 2017-08-29 DIAGNOSIS — B20 HIV (HUMAN IMMUNODEFICIENCY VIRUS INFECTION): Chronic | ICD-10-CM

## 2017-08-29 DIAGNOSIS — E83.51 HYPOCALCEMIA: ICD-10-CM

## 2017-08-29 DIAGNOSIS — T86.19 DELAYED GRAFT FUNCTION OF KIDNEY: ICD-10-CM

## 2017-08-29 DIAGNOSIS — Z98.890 STATUS POST EXPLORATORY LAPAROTOMY: Chronic | ICD-10-CM

## 2017-08-29 DIAGNOSIS — D62 ACUTE BLOOD LOSS ANEMIA: ICD-10-CM

## 2017-08-29 DIAGNOSIS — D63.1 ANEMIA IN ESRD (END-STAGE RENAL DISEASE): Chronic | ICD-10-CM

## 2017-08-29 DIAGNOSIS — D84.9 IMMUNOCOMPROMISED STATE: Chronic | ICD-10-CM

## 2017-08-29 DIAGNOSIS — E83.39 HYPERPHOSPHATEMIA: ICD-10-CM

## 2017-08-29 DIAGNOSIS — D62 ANEMIA DUE TO ACUTE BLOOD LOSS: ICD-10-CM

## 2017-08-29 DIAGNOSIS — N18.6 ANEMIA IN ESRD (END-STAGE RENAL DISEASE): Chronic | ICD-10-CM

## 2017-08-29 DIAGNOSIS — T86.11 ANTIBODY MEDIATED REJECTION OF KIDNEY TRANSPLANT: Primary | ICD-10-CM

## 2017-08-29 DIAGNOSIS — Z91.89 AT RISK FOR OPPORTUNISTIC INFECTIONS: ICD-10-CM

## 2017-08-29 DIAGNOSIS — D84.9 IMMUNOSUPPRESSION: Chronic | ICD-10-CM

## 2017-08-29 DIAGNOSIS — R11.2 NON-INTRACTABLE VOMITING WITH NAUSEA, UNSPECIFIED VOMITING TYPE: ICD-10-CM

## 2017-08-29 LAB
ABO + RH BLD: NORMAL
ABSOLUTE CD3: 105 CELLS/UL (ref 700–2100)
ALBUMIN SERPL BCP-MCNC: 2.8 G/DL
ANION GAP SERPL CALC-SCNC: 8 MMOL/L
BASOPHILS # BLD AUTO: 0.01 K/UL
BASOPHILS NFR BLD: 0.2 %
BLD GP AB SCN CELLS X3 SERPL QL: NORMAL
BUN SERPL-MCNC: 25 MG/DL
CALCIUM SERPL-MCNC: 8.4 MG/DL
CD3%: 56.4 % (ref 55–83)
CHLORIDE SERPL-SCNC: 99 MMOL/L
CLASS I ANTIBODY COMMENTS - LUMINEX: NORMAL
CLASS II ANTIBODY COMMENTS - LUMINEX: NORMAL
CO2 SERPL-SCNC: 35 MMOL/L
CREAT SERPL-MCNC: 6.7 MG/DL
DIFFERENTIAL METHOD: ABNORMAL
DSA1 TESTING DATE: NORMAL
DSA2 TESTING DATE: NORMAL
EOSINOPHIL # BLD AUTO: 0.1 K/UL
EOSINOPHIL NFR BLD: 1.1 %
ERYTHROCYTE [DISTWIDTH] IN BLOOD BY AUTOMATED COUNT: 16.4 %
EST. GFR  (AFRICAN AMERICAN): 11 ML/MIN/1.73 M^2
EST. GFR  (NON AFRICAN AMERICAN): 9.5 ML/MIN/1.73 M^2
GLUCOSE SERPL-MCNC: 106 MG/DL
HCT VFR BLD AUTO: 23.3 %
HGB BLD-MCNC: 7.9 G/DL
LUMINEX DSA CLASS I & II SPECIFICITY INTERPRETATION: NORMAL
LYMPHOCYTES # BLD AUTO: 0.2 K/UL
LYMPHOCYTES NFR BLD: 4.6 %
MAGNESIUM SERPL-MCNC: 2.2 MG/DL
MCH RBC QN AUTO: 29.9 PG
MCHC RBC AUTO-ENTMCNC: 33.9 G/DL
MCV RBC AUTO: 88 FL
MONOCYTES # BLD AUTO: 0.2 K/UL
MONOCYTES NFR BLD: 3.7 %
NEUTROPHILS # BLD AUTO: 4 K/UL
NEUTROPHILS NFR BLD: 88.9 %
PHOSPHATE SERPL-MCNC: 4.4 MG/DL
PLATELET # BLD AUTO: 152 K/UL
PMV BLD AUTO: 9.6 FL
POTASSIUM SERPL-SCNC: 3.6 MMOL/L
RBC # BLD AUTO: 2.64 M/UL
SERUM COLLECTION DT - LUMINEX CLASS I: NORMAL
SERUM COLLECTION DT - LUMINEX CLASS II: NORMAL
SODIUM SERPL-SCNC: 142 MMOL/L
TACROLIMUS BLD-MCNC: 6.9 NG/ML
WBC # BLD AUTO: 4.55 K/UL

## 2017-08-29 PROCEDURE — 86977 RBC SERUM PRETX INCUBJ/INHIB: CPT | Mod: 91,TXP

## 2017-08-29 PROCEDURE — 80197 ASSAY OF TACROLIMUS: CPT

## 2017-08-29 PROCEDURE — 85025 COMPLETE CBC W/AUTO DIFF WBC: CPT

## 2017-08-29 PROCEDURE — P9045 ALBUMIN (HUMAN), 5%, 250 ML: HCPCS | Performed by: PATHOLOGY

## 2017-08-29 PROCEDURE — 80500 PR  LAB PATHOLOGY CONSULT-LTD: CPT | Mod: ,,, | Performed by: PATHOLOGY

## 2017-08-29 PROCEDURE — 86832 HLA CLASS I HIGH DEFIN QUAL: CPT | Mod: TXP

## 2017-08-29 PROCEDURE — 80069 RENAL FUNCTION PANEL: CPT

## 2017-08-29 PROCEDURE — A4216 STERILE WATER/SALINE, 10 ML: HCPCS | Performed by: NURSE PRACTITIONER

## 2017-08-29 PROCEDURE — 63600175 PHARM REV CODE 636 W HCPCS: Performed by: PHYSICIAN ASSISTANT

## 2017-08-29 PROCEDURE — 86833 HLA CLASS II HIGH DEFIN QUAL: CPT | Mod: 91,TXP

## 2017-08-29 PROCEDURE — 20600001 HC STEP DOWN PRIVATE ROOM

## 2017-08-29 PROCEDURE — 99223 1ST HOSP IP/OBS HIGH 75: CPT | Mod: 24,,, | Performed by: NURSE PRACTITIONER

## 2017-08-29 PROCEDURE — 63600175 PHARM REV CODE 636 W HCPCS: Performed by: NURSE PRACTITIONER

## 2017-08-29 PROCEDURE — 86900 BLOOD TYPING SEROLOGIC ABO: CPT

## 2017-08-29 PROCEDURE — 36415 COLL VENOUS BLD VENIPUNCTURE: CPT

## 2017-08-29 PROCEDURE — 36514 APHERESIS PLASMA: CPT

## 2017-08-29 PROCEDURE — 86359 T CELLS TOTAL COUNT: CPT

## 2017-08-29 PROCEDURE — 63600175 PHARM REV CODE 636 W HCPCS: Performed by: PATHOLOGY

## 2017-08-29 PROCEDURE — 6A551Z3 PHERESIS OF PLASMA, MULTIPLE: ICD-10-PCS | Performed by: PATHOLOGY

## 2017-08-29 PROCEDURE — 83735 ASSAY OF MAGNESIUM: CPT

## 2017-08-29 PROCEDURE — 99233 SBSQ HOSP IP/OBS HIGH 50: CPT | Mod: 24,,, | Performed by: NURSE PRACTITIONER

## 2017-08-29 PROCEDURE — 86901 BLOOD TYPING SEROLOGIC RH(D): CPT

## 2017-08-29 PROCEDURE — 25000003 PHARM REV CODE 250: Performed by: NURSE PRACTITIONER

## 2017-08-29 PROCEDURE — 36514 APHERESIS PLASMA: CPT | Mod: ,,, | Performed by: PATHOLOGY

## 2017-08-29 RX ORDER — OXYCODONE AND ACETAMINOPHEN 10; 325 MG/1; MG/1
1 TABLET ORAL EVERY 4 HOURS PRN
Status: DISCONTINUED | OUTPATIENT
Start: 2017-08-29 | End: 2017-09-14 | Stop reason: HOSPADM

## 2017-08-29 RX ORDER — ROSUVASTATIN CALCIUM 10 MG/1
20 TABLET, COATED ORAL DAILY
Status: DISCONTINUED | OUTPATIENT
Start: 2017-08-29 | End: 2017-09-14 | Stop reason: HOSPADM

## 2017-08-29 RX ORDER — DOCUSATE SODIUM 100 MG/1
100 CAPSULE, LIQUID FILLED ORAL 3 TIMES DAILY
Status: DISCONTINUED | OUTPATIENT
Start: 2017-08-29 | End: 2017-09-14 | Stop reason: HOSPADM

## 2017-08-29 RX ORDER — OXYCODONE AND ACETAMINOPHEN 5; 325 MG/1; MG/1
1 TABLET ORAL EVERY 4 HOURS PRN
Status: DISCONTINUED | OUTPATIENT
Start: 2017-08-29 | End: 2017-09-14 | Stop reason: HOSPADM

## 2017-08-29 RX ORDER — FAMOTIDINE 20 MG/1
20 TABLET, FILM COATED ORAL NIGHTLY
Status: DISCONTINUED | OUTPATIENT
Start: 2017-08-29 | End: 2017-09-14 | Stop reason: HOSPADM

## 2017-08-29 RX ORDER — TACROLIMUS 1 MG/1
6 CAPSULE ORAL 2 TIMES DAILY
Status: DISCONTINUED | OUTPATIENT
Start: 2017-08-29 | End: 2017-08-29

## 2017-08-29 RX ORDER — CINACALCET 30 MG/1
60 TABLET, FILM COATED ORAL EVERY OTHER DAY
Status: DISCONTINUED | OUTPATIENT
Start: 2017-08-30 | End: 2017-08-30

## 2017-08-29 RX ORDER — CINACALCET 30 MG/1
60 TABLET, FILM COATED ORAL EVERY OTHER DAY
Status: DISCONTINUED | OUTPATIENT
Start: 2017-08-29 | End: 2017-08-29

## 2017-08-29 RX ORDER — NYSTATIN 100000 [USP'U]/ML
500000 SUSPENSION ORAL
Status: DISCONTINUED | OUTPATIENT
Start: 2017-08-29 | End: 2017-09-11

## 2017-08-29 RX ORDER — VALGANCICLOVIR 450 MG/1
450 TABLET, FILM COATED ORAL
Status: DISCONTINUED | OUTPATIENT
Start: 2017-08-30 | End: 2017-09-14 | Stop reason: HOSPADM

## 2017-08-29 RX ORDER — SODIUM CHLORIDE 0.9 % (FLUSH) 0.9 %
3 SYRINGE (ML) INJECTION EVERY 8 HOURS
Status: DISCONTINUED | OUTPATIENT
Start: 2017-08-29 | End: 2017-09-14 | Stop reason: HOSPADM

## 2017-08-29 RX ORDER — HEPARIN SODIUM 5000 [USP'U]/ML
5000 INJECTION, SOLUTION INTRAVENOUS; SUBCUTANEOUS EVERY 8 HOURS
Status: DISCONTINUED | OUTPATIENT
Start: 2017-08-29 | End: 2017-09-09

## 2017-08-29 RX ORDER — BISACODYL 10 MG
10 SUPPOSITORY, RECTAL RECTAL DAILY PRN
Status: DISCONTINUED | OUTPATIENT
Start: 2017-08-29 | End: 2017-09-06

## 2017-08-29 RX ORDER — BISACODYL 5 MG
10 TABLET, DELAYED RELEASE (ENTERIC COATED) ORAL DAILY PRN
Status: DISCONTINUED | OUTPATIENT
Start: 2017-08-29 | End: 2017-09-14 | Stop reason: HOSPADM

## 2017-08-29 RX ORDER — MYCOPHENOLATE MOFETIL 250 MG/1
1000 CAPSULE ORAL 2 TIMES DAILY
Status: DISCONTINUED | OUTPATIENT
Start: 2017-08-29 | End: 2017-09-01

## 2017-08-29 RX ORDER — ALBUMIN HUMAN 50 G/1000ML
175 SOLUTION INTRAVENOUS ONCE
Status: COMPLETED | OUTPATIENT
Start: 2017-08-29 | End: 2017-08-29

## 2017-08-29 RX ORDER — PREDNISONE 20 MG/1
20 TABLET ORAL DAILY
Status: DISCONTINUED | OUTPATIENT
Start: 2017-08-29 | End: 2017-09-02

## 2017-08-29 RX ORDER — SULFAMETHOXAZOLE AND TRIMETHOPRIM 400; 80 MG/1; MG/1
1 TABLET ORAL
Status: DISCONTINUED | OUTPATIENT
Start: 2017-08-30 | End: 2017-09-14

## 2017-08-29 RX ADMIN — FAMOTIDINE 20 MG: 20 TABLET, FILM COATED ORAL at 08:08

## 2017-08-29 RX ADMIN — NYSTATIN 500000 UNITS: 500000 SUSPENSION ORAL at 09:08

## 2017-08-29 RX ADMIN — OXYCODONE HYDROCHLORIDE AND ACETAMINOPHEN 1 TABLET: 10; 325 TABLET ORAL at 10:08

## 2017-08-29 RX ADMIN — THERA TABS 1 TABLET: TAB at 09:08

## 2017-08-29 RX ADMIN — HEPARIN SODIUM 5000 UNITS: 5000 INJECTION, SOLUTION INTRAVENOUS; SUBCUTANEOUS at 05:08

## 2017-08-29 RX ADMIN — NYSTATIN 500000 UNITS: 500000 SUSPENSION ORAL at 01:08

## 2017-08-29 RX ADMIN — DOLUTEGRAVIR SODIUM 50 MG: 50 TABLET, FILM COATED ORAL at 09:08

## 2017-08-29 RX ADMIN — NYSTATIN 500000 UNITS: 500000 SUSPENSION ORAL at 06:08

## 2017-08-29 RX ADMIN — ROSUVASTATIN CALCIUM 20 MG: 10 TABLET ORAL at 09:08

## 2017-08-29 RX ADMIN — NYSTATIN 500000 UNITS: 500000 SUSPENSION ORAL at 08:08

## 2017-08-29 RX ADMIN — OXYCODONE HYDROCHLORIDE AND ACETAMINOPHEN 1 TABLET: 10; 325 TABLET ORAL at 05:08

## 2017-08-29 RX ADMIN — TACROLIMUS 6 MG: 1 CAPSULE ORAL at 07:08

## 2017-08-29 RX ADMIN — HEPARIN SODIUM 5000 UNITS: 5000 INJECTION, SOLUTION INTRAVENOUS; SUBCUTANEOUS at 01:08

## 2017-08-29 RX ADMIN — OXYCODONE HYDROCHLORIDE AND ACETAMINOPHEN 1 TABLET: 10; 325 TABLET ORAL at 02:08

## 2017-08-29 RX ADMIN — ALBUMIN (HUMAN) 175 G: 12.5 SOLUTION INTRAVENOUS at 02:08

## 2017-08-29 RX ADMIN — MYCOPHENOLATE MOFETIL 1000 MG: 250 CAPSULE ORAL at 08:08

## 2017-08-29 RX ADMIN — PREDNISONE 20 MG: 20 TABLET ORAL at 09:08

## 2017-08-29 RX ADMIN — DOCUSATE SODIUM 100 MG: 50 CAPSULE, LIQUID FILLED ORAL at 09:08

## 2017-08-29 RX ADMIN — Medication 3 ML: at 01:08

## 2017-08-29 RX ADMIN — Medication 3 ML: at 06:08

## 2017-08-29 RX ADMIN — MYCOPHENOLATE MOFETIL 1000 MG: 250 CAPSULE ORAL at 09:08

## 2017-08-29 RX ADMIN — Medication 100 MG: at 09:08

## 2017-08-29 RX ADMIN — TACROLIMUS 7 MG: 1 CAPSULE ORAL at 06:08

## 2017-08-29 RX ADMIN — HEPARIN SODIUM 5000 UNITS: 5000 INJECTION, SOLUTION INTRAVENOUS; SUBCUTANEOUS at 08:08

## 2017-08-29 NOTE — PROGRESS NOTES
[facility]  Kidney Transplant  Consult Note      Reason for Follow-up: Reassessment of Kidney Transplant - 2017  (#1) recipient and management of immunosuppression.    ORGAN:  RIGHT KIDNEY   Donor Type:   - Brain Death   PHS Increased Risk: yes   Cold Ischemia:   Induction Medications: Thymoglobulin      Subjective:     Demetrio Aguiar is a 37 y.o. 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 His maintenance immunosuppression includes a steroid taper per protocol to 5mg daily, Prograf, and Cellcept maintenance. His post op course has been complicated by DGF requiring HD, last HD 17, which prompted need for kidney biopsy on 17. He is being admitted for biopsy proven ABMR with weakly positive class I DSA's on 17. Biopsy 17: good sample with 24 glomeruli (only 1 sclerosed) and evidence of criteria for ABMR. + diffuse ATI; Minimal fibrosis, no ACR or AVR, but + CD4 (>50% diffusely + stain) with mild glomerulitis and focal peritubular capillaritis; also, biopsy shows ca-oxalate (3) and ca-phos (2) crystals. Since transplant, he has made minimal uop daily. He denies any current illnesses or sick contacts. Complains of expected post-op pain to RLQ incision. Plan is for PLEX starting later today.       Interval History:  Admitted yesterday evening for treatment of bx proven AMR and weakly pos DSA's..  Plan for PLEX today -pheresis MARIAN Stearns and Dr Beal notified.  Patient has left femoral AV fistula.  Patient has otherwise no complaints.  Abdominal pain well managed.  Incision CDI w staples.  Making  ml/urine qd.  Last HD 17.  Monitor.     Past Medical, Surgical,  Family, and Social History:   Unchanged from H&P.    Review of patient's allergies indicates:  No Known Allergies    Scheduled Meds:   [START ON 8/30/2017] cinacalcet  60 mg Oral Every other day    dolutegravir  50 mg Oral Daily    [START ON 9/4/2017] epoetin erika  10,000 Units Subcutaneous Every Mon    famotidine  20 mg Oral QHS    heparin (porcine)  5,000 Units Subcutaneous Q8H    ketoconazole  100 mg Oral Daily    multivitamin  1 tablet Oral Daily    mycophenolate  1,000 mg Oral BID    nystatin  500,000 Units Oral QID (PC + HS)    predniSONE  20 mg Oral Daily    rilpivirine  25 mg Oral Daily    rosuvastatin  20 mg Oral Daily    sodium chloride 0.9%  3 mL Intravenous Q8H    [START ON 8/30/2017] sulfamethoxazole-trimethoprim 400-80mg  1 tablet Oral Every Mon, Wed, Fri    tacrolimus  7 mg Oral BID    [START ON 8/30/2017] valganciclovir  450 mg Oral Every Mon, Wed, Fri     Continuous Infusions:     PRN Meds:oxycodone-acetaminophen, oxycodone-acetaminophen    Intake/Output - Last 3 Shifts       08/27 0700 - 08/28 0659 08/28 0700 - 08/29 0659 08/29 0700 - 08/30 0659    P.O.  180 510    Total Intake(mL/kg)  180 (2.5) 510 (7.1)    Urine (mL/kg/hr)  0 50 (0.1)    Stool  0 0 (0)    Total Output   0 50    Net   +180 +460           Stool Occurrence  0 x 0 x           Review of Systems   Constitutional: Negative for activity change, appetite change, chills, fatigue and fever.   HENT: Negative for congestion and facial swelling.    Eyes: Negative for pain, discharge and visual disturbance.   Respiratory: Negative for cough, chest tightness, shortness of breath and wheezing.    Cardiovascular: Negative for chest pain, palpitations and leg swelling.   Gastrointestinal: Positive for abdominal pain (incisional). Negative for abdominal distention, constipation, diarrhea, nausea and vomiting.   Endocrine: Negative.    Genitourinary: Negative for decreased urine volume, dysuria, flank pain and hematuria.  "  Musculoskeletal: Negative for gait problem.   Skin: Positive for wound.   Allergic/Immunologic: Positive for immunocompromised state.   Neurological: Negative for dizziness, tremors, light-headedness and headaches.   Hematological: Negative.    Psychiatric/Behavioral: Negative for behavioral problems and dysphoric mood.      Objective:     Vital Signs (Most Recent):  Temp: 98.2 °F (36.8 °C) (08/29/17 1530)  Pulse: 80 (08/29/17 1530)  Resp: 16 (08/29/17 1530)  BP: 124/75 (08/29/17 1530)  SpO2: 96 % (08/29/17 0740) Vital Signs (24h Range):  Temp:  [98.2 °F (36.8 °C)-98.8 °F (37.1 °C)] 98.2 °F (36.8 °C)  Pulse:  [76-90] 80  Resp:  [16] 16  SpO2:  [96 %-100 %] 96 %  BP: (111-133)/(68-89) 124/75     Weight: 72.1 kg (159 lb)  Height: 5' 7" (170.2 cm)  Body mass index is 24.9 kg/m².    Physical Exam   Constitutional: He is oriented to person, place, and time. He appears well-developed. No distress.   HENT:   Head: Normocephalic and atraumatic.   Mouth/Throat: No oropharyngeal exudate.   Eyes: EOM are normal. Pupils are equal, round, and reactive to light.   Neck: Normal range of motion. Neck supple. No JVD present.   Cardiovascular: Normal rate and regular rhythm.    No murmur heard.  Pulmonary/Chest: Effort normal and breath sounds normal. No respiratory distress. He has no rales.   Abdominal: Soft. Bowel sounds are normal. He exhibits no distension. There is tenderness. There is no guarding.   RLQ inc with staples intact no s/s/i  RLQ JESSICA intact with serosang drainage     Musculoskeletal: Normal range of motion. He exhibits no edema.   Neurological: He is alert and oriented to person, place, and time.   Skin: Skin is warm and dry. Capillary refill takes 2 to 3 seconds. He is not diaphoretic. No erythema.   Psychiatric: He has a normal mood and affect. His behavior is normal. Judgment and thought content normal.   Nursing note and vitals reviewed.      Laboratory:  CBC:     Recent Labs  Lab 08/29/17  0506   WBC 4.55 "   RBC 2.64*   HGB 7.9*   HCT 23.3*      MCV 88   MCH 29.9   MCHC 33.9     BMP:     Recent Labs  Lab 08/29/17  0506         K 3.6   CL 99   CO2 35*   BUN 25*   CREATININE 6.7*   CALCIUM 8.4*     Tacrolimus Lvl   Date Value Ref Range Status   08/29/2017 6.9 5.0 - 15.0 ng/mL Final     Comment:     Testing performed by Liquid Chromatography-Tandem  Mass Spectrometry (LC-MS/MS).  This test was developed and its performance characteristics  determined by [facility], Department of Pathology  and Laboratory Medicine in a manner consistent with CLIA  requirements. It has not been cleared or approved by the [place]  Food and Drug Administration.  This test is used for clinical   purposes.  It should not be regarded as investigational or for  research.     08/28/2017 6.5 5.0 - 15.0 ng/mL Final     Comment:     Testing performed by Liquid Chromatography-Tandem  Mass Spectrometry (LC-MS/MS).  This test was developed and its performance characteristics  determined by [facility], Department of Pathology  and Laboratory Medicine in a manner consistent with CLIA  requirements. It has not been cleared or approved by the [place]  Food and Drug Administration.  This test is used for clinical   purposes.  It should not be regarded as investigational or for  research.     08/26/2017 9.4 5.0 - 15.0 ng/mL Final     Comment:     Testing performed by Liquid Chromatography-Tandem  Mass Spectrometry (LC-MS/MS).  This test was developed and its performance characteristics  determined by [facility], Department of Pathology  and Laboratory Medicine in a manner consistent with CLIA  requirements. It has not been cleared or approved by the [place]  Food and Drug Administration.  This test is used for clinical   purposes.  It should not be regarded as investigational or for  research.     08/25/2017 2.9 (L) 5.0 - 15.0 ng/mL Final     Comment:     Testing performed by Liquid Chromatography-Tandem  Mass  Spectrometry (LC-MS/MS).  This test was developed and its performance characteristics  determined by [facility], Department of Pathology  and Laboratory Medicine in a manner consistent with CLIA  requirements. It has not been cleared or approved by the [place]  Food and Drug Administration.  This test is used for clinical   purposes.  It should not be regarded as investigational or for  research.         Labs within the past 24 hours have been reviewed.    Diagnostic Results:  US: Reviewed    Assessment/Plan:     * Antibody mediated rejection of kidney transplant    - Biopsy (for DGF) 17: good sample with 24 glomeruli (only 1 sclerosed) and evidence just meeting criteria for ABMR. + diffuse ATI. Minimal fibrosis, no ACR or AVR, but + CD4 (>50% diffusely + stain) with mild glomerulitis and focal peritubular capillaritis. Also, biopsy shows ca-oxalate (3) and ca-phos (2) crystals.  -17 DSA + for weak class I. Repeat pre-PLEX DSA and CD3 with AM labs- PENDING. Plan for PLEX (x3) starting later today.  Tentatively plan for IVIG after PLEX.  Any Thymo will depend on how Cr responds w PLEX. Monitor for hypocalcemia and hyponatremia.           donor kidney transplant 2017    - minimal UOP. Cr remains elevated. See ABMR.  - HD M-W-F, last HD           Delayed graft function of kidney    Requiring HD MWF.  Plan for HD tomorrow, will need to coordinate w PLEX.          Immunosuppression    - Maintenance IS with prograf, MMF, and prednisone taper. cont to check prograf level daily. Assess for toxicity and adjust level as needed          Need for prophylactic immunotherapy    See Immunosuppression.          HIV (human immunodeficiency virus infection)    - last CD4 count 7 () and HIV PCR undetected < 40 copies/ml ()  - continue HAART with dolutegravir (Tivicay) and rilpivirine (Edurant).  HIV meds to be given after PLEX- meds highly protein bond and can be removed w plasmapheresis.           At risk for opportunistic infections    - continue valcyte for CMV prophylaxis  - continue bactrim for PJP/PCP prophylaxis  - continue nystatin for fungal prophylaxis          Anemia in ESRD (end-stage renal disease)    CBC stable.                Discharge Planning:  Kb run 121.  No plan for discharge.  Will need at least PLEX x 3 days.       Rhoda Penn NP  Kidney Transplant  [facility]

## 2017-08-29 NOTE — ASSESSMENT & PLAN NOTE
- Maintenance IS with prograf, MMF, and prednisone taper. cont to check prograf level daily. Assess for toxicity and adjust level as needed

## 2017-08-29 NOTE — SUBJECTIVE & OBJECTIVE
PMH and PSH reviewed 08/29/2017 and relevant items addressed in HPI.    Review of patient's allergies indicates:  No Known Allergies    All medications reviewed 08/29/2017 and ace inhibitors not identified.    Family History     Problem Relation (Age of Onset)    Cancer Father    Diabetes Maternal Aunt, Maternal Uncle    Hypertension Maternal Grandmother, Paternal Grandmother    Lung cancer Father    No Known Problems Mother, Brother        Social History Main Topics    Smoking status: Former Smoker     Packs/day: 0.50    Smokeless tobacco: Not on file      Comment: quit smoking 5 years ago    Alcohol use No    Drug use:      Types: Cocaine, Marijuana      Comment: last use for cocaine at age 17; 2 years ago last use for THC    Sexual activity: Not Currently     Review of Systems   Gastrointestinal: Positive for abdominal pain.   Genitourinary: Positive for decreased urine volume.     Objective:     Vital Signs (Most Recent):  Temp: 98.4 °F (36.9 °C) (08/29/17 0740)  Pulse: 88 (08/29/17 0740)  Resp: 16 (08/29/17 0740)  BP: 119/78 (08/29/17 0740)  SpO2: 96 % (08/29/17 0740) Vital Signs (24h Range):  Temp:  [98.2 °F (36.8 °C)-98.4 °F (36.9 °C)] 98.4 °F (36.9 °C)  Pulse:  [79-88] 88  Resp:  [16] 16  SpO2:  [96 %-100 %] 96 %  BP: (119-133)/(68-80) 119/78     Weight: 72.6 kg (159 lb 15.1 oz)    Physical Exam   Nursing note and vitals reviewed.      Significant Labs:   CBC:   Recent Labs  Lab 08/28/17  0821 08/29/17  0506   WBC 5.50 4.55   HGB 8.9* 7.9*   HCT 26.4* 23.3*    152     CMP:   Recent Labs  Lab 08/28/17  0821 08/29/17  0506    142   K 4.1 3.6    99   CO2 20* 35*    106   BUN 39* 25*   CREATININE 10.1* 6.7*   CALCIUM 8.9 8.4*   ALBUMIN 3.0* 2.8*   ANIONGAP 15 8   EGFRNONAA 5.8* 9.5*   Antibody mediated kidney transplant rejection carries a Category I Grade IB indication for therapeutic plasma exchange via the 2016 Journal of Clinical Apheresis Guidelines (Borges J et al. Journal  of Clinical Apheresis 2016; 31:149-162.) Today's procedure will take place soon.The TPE plan is as follows: Daily for several days, and reassess with Transplant Nephrology regarding timing of Thymo or other agents which will be needed.

## 2017-08-29 NOTE — HPI
This 36 yo male received a DDonor kidney transplant on 8/18, but there was delayed graft function. He recently had a biopsy to assess the situation, and features of ABMR are present. A DSA study shows weak B62 and B44 DSA, but since the graft is in place, the MFI levels may be misleading due to antibody adsorption. The flow cytometric crossmatches were positive, but the autologous crossmatches were also positive with similar MCSs. An anamnestic response may be causing this picture, and we have been asked to begin a series of PLEX to assist in the management of this ABMR.

## 2017-08-29 NOTE — ASSESSMENT & PLAN NOTE
- Biopsy (for DGF) 8/25/17: good sample with 24 glomeruli (only 1 sclerosed) and evidence just meeting criteria for ABMR. + diffuse ATI. Minimal fibrosis, no ACR or AVR, but + CD4 (>50% diffusely + stain) with mild glomerulitis and focal peritubular capillaritis. Also, biopsy shows ca-oxalate (3) and ca-phos (2) crystals.  -8/25/17 DSA + for weak class I. Repeat pre-PLEX DSA and CD3 with AM labs. Plan for PLEX starting later today.

## 2017-08-29 NOTE — SUBJECTIVE & OBJECTIVE
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     HIV infection     HIV nephropathy     Hyperlipidemia     Need for prophylactic immunotherapy     S/P kidney transplant 8/18/2017 8/28/2017    TB lung, latent     tx INH 2006       Past Surgical History:   Procedure Laterality Date    EXPLORATORY LAPAROTOMY W/ BOWEL RESECTION      NO BOWEL RESECTION, UNKNOWN DETAILS, "Pancreas Infection"    peritoneal dialysis catheter placement         Review of patient's allergies indicates:  No Known Allergies    Family History     Problem Relation (Age of Onset)    Cancer Father    Diabetes Maternal Aunt, Maternal Uncle    Hypertension Maternal Grandmother, Paternal Grandmother    Lung cancer Father    No Known Problems Mother, Brother        Social History Main Topics    Smoking status: Former Smoker     Packs/day: 0.50    Smokeless tobacco: Not on file      Comment: quit smoking 5 years ago    Alcohol use No    Drug use:      Types: Cocaine, Marijuana      Comment: last use for cocaine at age 17; 2 years ago last use for THC    Sexual activity: Not Currently       Facility-Administered Medications Prior to Admission   Medication    epoetin erika injection 10,000 Units     PTA Medications   Medication Sig    bisacodyl (DULCOLAX) 5 mg EC tablet Take 1-2 tablets (5-10 mg total) by mouth daily as needed for Constipation. Laxative    cinacalcet (SENSIPAR) 60 MG Tab Take 1 tablet (60 mg total) by mouth every other day.    docusate sodium (COLACE) 100 MG capsule Take 1 capsule (100 mg total) by mouth 3 (three) times daily as needed for Constipation.    dolutegravir (TIVICAY) 50 mg Tab Take 50 mg by mouth once daily.    famotidine (PEPCID) 20 MG tablet Take 1 tablet (20 mg total) by mouth every evening.    ketoconazole (NIZORAL) 200 mg Tab Take 0.5 tablets (100 mg total) by mouth once " daily.    multivitamin (THERAGRAN) tablet Take 1 tablet by mouth once daily.    mycophenolate (CELLCEPT) 250 mg Cap Take 4 capsules (1,000 mg total) by mouth 2 (two) times daily. Kidney txp 8/18/17; Z94.0    nystatin (MYCOSTATIN) 100,000 unit/mL suspension Take 5 mLs (500,000 Units total) by mouth 2 hours after meals and at bedtime. STOP 9/15/17    oxycodone-acetaminophen (PERCOCET)  mg per tablet Take 0.5-1 tablets by mouth every 4 (four) hours as needed for Pain.    predniSONE (DELTASONE) 10 MG tablet Take 20 mg PO QD 8/22-9/21; 15 mg PO QD 9/22-10/21; 10 mg PO QD 10/22-11/21; 5 mg PO QD thereafter    rilpivirine (EDURANT) 25 mg Tab Take 25 mg by mouth once daily.    rosuvastatin (CRESTOR) 20 MG tablet Take 20 mg by mouth once daily.    sulfamethoxazole-trimethoprim 400-80mg (BACTRIM,SEPTRA) 400-80 mg per tablet Take 1 tablet by mouth every Mon, Wed, Fri.    tacrolimus (PROGRAF) 1 MG Cap Take 6 capsules (6 mg total) by mouth every 12 (twelve) hours.    valganciclovir (VALCYTE) 450 mg Tab Take 1 tablet (450 mg total) by mouth every Mon, Wed, Fri. Give after HD; Stop 11/19/17       Review of Systems   Constitutional: Negative for appetite change, chills, fatigue and fever.   Respiratory: Negative for cough, chest tightness and shortness of breath.    Cardiovascular: Negative for chest pain, palpitations and leg swelling.   Gastrointestinal: Positive for abdominal pain (incisional). Negative for abdominal distention, constipation, diarrhea, nausea and vomiting.   Genitourinary: Positive for decreased urine volume. Negative for difficulty urinating, dysuria, frequency and urgency.   Musculoskeletal: Negative for back pain and neck pain.   Skin: Negative for color change, pallor, rash and wound.   Allergic/Immunologic: Positive for immunocompromised state.   Neurological: Negative for dizziness, weakness and headaches.   Psychiatric/Behavioral: Negative for confusion and sleep disturbance.   All other  systems reviewed and are negative.    Objective:     Vital Signs (Most Recent):  Temp: 98.3 °F (36.8 °C) (08/29/17 0030)  Pulse: 81 (08/29/17 0030)  Resp: 16 (08/29/17 0030)  BP: 133/80 (08/29/17 0030)  SpO2: 100 % (08/29/17 0030) Vital Signs (24h Range):  Temp:  [98.2 °F (36.8 °C)-98.3 °F (36.8 °C)] 98.3 °F (36.8 °C)  Pulse:  [80-81] 81  Resp:  [16] 16  SpO2:  [100 %] 100 %  BP: (132-133)/(75-80) 133/80     Weight: 72.6 kg (159 lb 15.1 oz)  Body mass index is 25.05 kg/m².    No intake or output data in the 24 hours ending 08/29/17 0111    Physical Exam   Constitutional: He is oriented to person, place, and time. He appears well-nourished.   Cardiovascular: Normal rate, regular rhythm, normal heart sounds, intact distal pulses and normal pulses.    No murmur heard.  Pulmonary/Chest: Effort normal and breath sounds normal. No respiratory distress. He has no wheezes. He has no rales.   Abdominal: Soft. Bowel sounds are normal. He exhibits no distension. There is no tenderness. There is no rebound and no guarding.   RLQ incision ERIK with staples, CDI   Musculoskeletal: Normal range of motion. He exhibits no edema or tenderness.   Neurological: He is alert and oriented to person, place, and time.   Skin: Skin is warm, dry and intact. No rash noted. No erythema. No pallor.   Psychiatric: He has a normal mood and affect. His speech is normal and behavior is normal.   Nursing note and vitals reviewed.      Laboratory:  CBC:   Recent Labs  Lab 08/28/17  0821   WBC 5.50   RBC 3.00*   HGB 8.9*   HCT 26.4*      MCV 88   MCH 29.7   MCHC 33.7     CMP:   Recent Labs  Lab 08/28/17  0821      CALCIUM 8.9   ALBUMIN 3.0*      K 4.1   CO2 20*      BUN 39*   CREATININE 10.1*     Coagulation: No results for input(s): INR, APTT in the last 168 hours.    Invalid input(s): PT  Labs within the past 24 hours have been reviewed.    Diagnostic Results:  I have personally reviewed all pertinent imaging studies.

## 2017-08-29 NOTE — NURSING
Pt arrived via wheelchair to [facility] RM 1042.  AAOX4.  VSS.  No complaints of pain.  Pt stated he was very tired.  Plans for Plex in the morning.  Ambulating in room.  Bed is in lowest position, call bell is in reach, and pt instructed to call nurse when needing assistance.  Will continue to monitor.

## 2017-08-29 NOTE — CLINICAL REVIEW
S/p kidney transplant 8/18  HIV nephropathy  AMR  DGF HD dependent    PLEX today will plan for 6 treatments    Will plan for IVIg    Possible HD in am    CD3 and DSA ordered for today. Will Thymo according with CD3     I spoke with patient and care giver for 25 minutes    Care plan explained to them    Prograf level is pending. 6 mg po bid and ketoconazole    MMF 1000 bid and prednisone as per protocol     ID consult

## 2017-08-29 NOTE — CLINICAL REVIEW
DSA reported by HLA labs:    Class I A2 [id number], B46 [id number], B62 [id number]    Class II:     Will continue with PLEX for now

## 2017-08-29 NOTE — SUBJECTIVE & OBJECTIVE
Subjective:     Demetrio Aguiar is a 37 y.o. 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 His maintenance immunosuppression includes a steroid taper per protocol to 5mg daily, Prograf, and Cellcept maintenance. His post op course has been complicated by DGF requiring HD, last HD 8/28/17, which prompted need for kidney biopsy on 8/25/17. He is being admitted for biopsy proven ABMR with weakly positive class I DSA's on 8/25/17. Biopsy 8/25/17: good sample with 24 glomeruli (only 1 sclerosed) and evidence of criteria for ABMR. + diffuse ATI; Minimal fibrosis, no ACR or AVR, but + CD4 (>50% diffusely + stain) with mild glomerulitis and focal peritubular capillaritis; also, biopsy shows ca-oxalate (3) and ca-phos (2) crystals. Since transplant, he has made minimal uop daily. He denies any current illnesses or sick contacts. Complains of expected post-op pain to RLQ incision. Plan is for PLEX starting later today.       Interval History:  Admitted yesterday evening for treatment of bx proven AMR and weakly pos DSA's..  Plan for PLEX today -pheresis MARIAN Stearns and Dr Beal notified.  Patient has left femoral AV fistula.  Patient has otherwise no complaints.  Abdominal pain well managed.  Incision CDI w staples.  Making  ml/urine qd.  Last HD 8/28/17.  Monitor.     Past Medical, Surgical, Family, and Social History:   Unchanged from H&P.    Review of patient's allergies indicates:  No Known Allergies    Scheduled Meds:   [START ON 8/30/2017] cinacalcet  60 mg Oral Every other day    dolutegravir  50 mg Oral Daily    [START ON 9/4/2017] epoetin erika  10,000 Units Subcutaneous Every Mon    famotidine  20 mg Oral QHS     heparin (porcine)  5,000 Units Subcutaneous Q8H    ketoconazole  100 mg Oral Daily    multivitamin  1 tablet Oral Daily    mycophenolate  1,000 mg Oral BID    nystatin  500,000 Units Oral QID (PC + HS)    predniSONE  20 mg Oral Daily    rilpivirine  25 mg Oral Daily    rosuvastatin  20 mg Oral Daily    sodium chloride 0.9%  3 mL Intravenous Q8H    [START ON 8/30/2017] sulfamethoxazole-trimethoprim 400-80mg  1 tablet Oral Every Mon, Wed, Fri    tacrolimus  7 mg Oral BID    [START ON 8/30/2017] valganciclovir  450 mg Oral Every Mon, Wed, Fri     Continuous Infusions:     PRN Meds:oxycodone-acetaminophen, oxycodone-acetaminophen    Intake/Output - Last 3 Shifts       08/27 0700 - 08/28 0659 08/28 0700 - 08/29 0659 08/29 0700 - 08/30 0659    P.O.  180 510    Total Intake(mL/kg)  180 (2.5) 510 (7.1)    Urine (mL/kg/hr)  0 50 (0.1)    Stool  0 0 (0)    Total Output   0 50    Net   +180 +460           Stool Occurrence  0 x 0 x           Review of Systems   Constitutional: Negative for activity change, appetite change, chills, fatigue and fever.   HENT: Negative for congestion and facial swelling.    Eyes: Negative for pain, discharge and visual disturbance.   Respiratory: Negative for cough, chest tightness, shortness of breath and wheezing.    Cardiovascular: Negative for chest pain, palpitations and leg swelling.   Gastrointestinal: Positive for abdominal pain (incisional). Negative for abdominal distention, constipation, diarrhea, nausea and vomiting.   Endocrine: Negative.    Genitourinary: Negative for decreased urine volume, dysuria, flank pain and hematuria.   Musculoskeletal: Negative for gait problem.   Skin: Positive for wound.   Allergic/Immunologic: Positive for immunocompromised state.   Neurological: Negative for dizziness, tremors, light-headedness and headaches.   Hematological: Negative.    Psychiatric/Behavioral: Negative for behavioral problems and dysphoric mood.      Objective:     Vital  "Signs (Most Recent):  Temp: 98.2 °F (36.8 °C) (08/29/17 1530)  Pulse: 80 (08/29/17 1530)  Resp: 16 (08/29/17 1530)  BP: 124/75 (08/29/17 1530)  SpO2: 96 % (08/29/17 0740) Vital Signs (24h Range):  Temp:  [98.2 °F (36.8 °C)-98.8 °F (37.1 °C)] 98.2 °F (36.8 °C)  Pulse:  [76-90] 80  Resp:  [16] 16  SpO2:  [96 %-100 %] 96 %  BP: (111-133)/(68-89) 124/75     Weight: 72.1 kg (159 lb)  Height: 5' 7" (170.2 cm)  Body mass index is 24.9 kg/m².    Physical Exam   Constitutional: He is oriented to person, place, and time. He appears well-developed. No distress.   HENT:   Head: Normocephalic and atraumatic.   Mouth/Throat: No oropharyngeal exudate.   Eyes: EOM are normal. Pupils are equal, round, and reactive to light.   Neck: Normal range of motion. Neck supple. No JVD present.   Cardiovascular: Normal rate and regular rhythm.    No murmur heard.  Pulmonary/Chest: Effort normal and breath sounds normal. No respiratory distress. He has no rales.   Abdominal: Soft. Bowel sounds are normal. He exhibits no distension. There is tenderness. There is no guarding.   RLQ inc with staples intact no s/s/i  RLQ JESISCA intact with serosang drainage     Musculoskeletal: Normal range of motion. He exhibits no edema.   Neurological: He is alert and oriented to person, place, and time.   Skin: Skin is warm and dry. Capillary refill takes 2 to 3 seconds. He is not diaphoretic. No erythema.   Psychiatric: He has a normal mood and affect. His behavior is normal. Judgment and thought content normal.   Nursing note and vitals reviewed.      Laboratory:  CBC:     Recent Labs  Lab 08/29/17  0506   WBC 4.55   RBC 2.64*   HGB 7.9*   HCT 23.3*      MCV 88   MCH 29.9   MCHC 33.9     BMP:     Recent Labs  Lab 08/29/17  0506         K 3.6   CL 99   CO2 35*   BUN 25*   CREATININE 6.7*   CALCIUM 8.4*     Tacrolimus Lvl   Date Value Ref Range Status   08/29/2017 6.9 5.0 - 15.0 ng/mL Final     Comment:     Testing performed by Liquid " Chromatography-Tandem  Mass Spectrometry (LC-MS/MS).  This test was developed and its performance characteristics  determined by [facility], Department of Pathology  and Laboratory Medicine in a manner consistent with CLIA  requirements. It has not been cleared or approved by the [place]  Food and Drug Administration.  This test is used for clinical   purposes.  It should not be regarded as investigational or for  research.     08/28/2017 6.5 5.0 - 15.0 ng/mL Final     Comment:     Testing performed by Liquid Chromatography-Tandem  Mass Spectrometry (LC-MS/MS).  This test was developed and its performance characteristics  determined by [facility], Department of Pathology  and Laboratory Medicine in a manner consistent with CLIA  requirements. It has not been cleared or approved by the [place]  Food and Drug Administration.  This test is used for clinical   purposes.  It should not be regarded as investigational or for  research.     08/26/2017 9.4 5.0 - 15.0 ng/mL Final     Comment:     Testing performed by Liquid Chromatography-Tandem  Mass Spectrometry (LC-MS/MS).  This test was developed and its performance characteristics  determined by [facility], Department of Pathology  and Laboratory Medicine in a manner consistent with CLIA  requirements. It has not been cleared or approved by the [place]  Food and Drug Administration.  This test is used for clinical   purposes.  It should not be regarded as investigational or for  research.     08/25/2017 2.9 (L) 5.0 - 15.0 ng/mL Final     Comment:     Testing performed by Liquid Chromatography-Tandem  Mass Spectrometry (LC-MS/MS).  This test was developed and its performance characteristics  determined by [facility], Department of Pathology  and Laboratory Medicine in a manner consistent with CLIA  requirements. It has not been cleared or approved by the [place]  Food and Drug Administration.  This test is used for clinical   purposes.  It  should not be regarded as investigational or for  research.         Labs within the past 24 hours have been reviewed.    Diagnostic Results:  US: Reviewed

## 2017-08-29 NOTE — PROGRESS NOTES
Plasmapheresis tx #1 started at 1400 without difficulty. Dialysis RN at bedside to access left upper thigh fistula prior to start of treatment. Pt oriented x4.  3.5 Liter exchange.  Replacement fluids 5% Albumin. Pt stated lower back pain. 5/10  on scale. Staff nurse aware and administered PO pain med at end of treatment. Tx ended at 1450. Pt tolerated tx well. Dialysis needles removed X2, pressure applied until hemostasis maintained.  Next tx 08/30/17.

## 2017-08-29 NOTE — ASSESSMENT & PLAN NOTE
- Biopsy (for DGF) 8/25/17: good sample with 24 glomeruli (only 1 sclerosed) and evidence just meeting criteria for ABMR. + diffuse ATI. Minimal fibrosis, no ACR or AVR, but + CD4 (>50% diffusely + stain) with mild glomerulitis and focal peritubular capillaritis. Also, biopsy shows ca-oxalate (3) and ca-phos (2) crystals.  -8/25/17 DSA + for weak class I. Repeat pre-PLEX DSA and CD3 with AM labs- PENDING. Plan for PLEX (x3) starting later today.  Tentatively plan for IVIG after PLEX.  Any Thymo will depend on how Cr responds w PLEX. Monitor for hypocalcemia and hyponatremia.

## 2017-08-29 NOTE — ASSESSMENT & PLAN NOTE
- last CD4 count 7 (8/20) and HIV PCR undetected < 40 copies/ml (8/19)  - continue HAART with dolutegravir (Tivicay) and rilpivirine (Edurant)

## 2017-08-29 NOTE — ASSESSMENT & PLAN NOTE
- last CD4 count 7 (8/20) and HIV PCR undetected < 40 copies/ml (8/19)  - continue HAART with dolutegravir (Tivicay) and rilpivirine (Edurant).  HIV meds to be given after PLEX- meds highly protein bond and can be removed w plasmapheresis.

## 2017-08-29 NOTE — HPI
Demetrio Aguiar is a 37 y.o. 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 His maintenance immunosuppression includes a steroid taper per protocol to 5mg daily, Prograf, and Cellcept maintenance. His post op course has been complicated by DGF requiring HD, last HD 8/28/17, which prompted need for kidney biopsy on 8/25/17. He is being admitted for biopsy proven ABMR with weakly positive class I DSA's on 8/25/17. Biopsy 8/25/17: good sample with 24 glomeruli (only 1 sclerosed) and evidence of criteria for ABMR. + diffuse ATI; Minimal fibrosis, no ACR or AVR, but + CD4 (>50% diffusely + stain) with mild glomerulitis and focal peritubular capillaritis; also, biopsy shows ca-oxalate (3) and ca-phos (2) crystals. Since transplant, he has made minimal uop daily. He denies any current illnesses or sick contacts. Complains of expected post-op pain to RLQ incision. Has completed 4 sessions of PLEX and one dose of SM.

## 2017-08-29 NOTE — PROGRESS NOTES
Admit Note     PT IS READMITTED FROM [facility] ON 8/27/2017     Met with patient, mother and cousin, Fe Winter of [facility] [phone] to assess needs. Patient is a 37 y.o. single male, admitted for for kidney transplant.      Patient admitted from home on 8/29/2017 .  At this time, patient presents as alert and oriented x 4, pleasant, good eye contact, well groomed, recall good, concentration/judgement good, average intelligence, calm, communicative, cooperative and asking and answering questions appropriately.  At this time, patients caregivers present as alert and oriented x 4, pleasant, good eye contact, well groomed, recall good, concentration/judgement good, average intelligence, calm, communicative, cooperative and asking and answering questions appropriately.    Household/Family Systems     Patient resides alone.  Support system includes mother, cousins and friends.  Patient does not have dependents that are need of being cared for.     Patients primary caregiver is Randa Winter, patients mother, phone number [phone].  Confirmed patients contact information is [phone] (home);     PT'S MOTHER KNOWS ABOUT HIV STATUS, BUT NOT HIS COUSIN.     Telephone Information:   Mobile [phone]   .    During admission, patient's caregiver plans to stay between home and hospital room.  Confirmed patient and patients caregivers do have access to reliable transportation.  Patient's cousin, Fe will be immediately post op caregiver as pt's mother works full time as a  and is caring for her mother who has Alzheimer's. She and pt's mother stated she will have access to transportation.  Other cousins and friends include:  [place]) [phone] and [place], [phone]    Cognitive Status/Learning     Patient reports reading ability as college and states patient does not have difficulty with N/A.  Patient reports patient learns best by multisensory  means.   Needed: No.   Highest education level: Associate/Bachelor Degree    Vocation/Disability   .  Working for Income: No  If no, reason not working: Disability    Patient is disabled due to ESRD since .  Prior to disability, patient  was employed as a .    Adherence     Patient reports a high level of adherence to patients health care regimen.  Adherence counseling and education provided. Patient verbalizes understanding.    Substance Use    Patient reports the following substance usage.    Tobacco: former smoker; 1/2 ppd.  Alcohol: none, patient denies any use.  Illicit Drugs/Non-prescribed Medications: pt reports past use of cocaine, ending at age 17 and Mj ending 2 yrs ago..  Patient states clear understanding of the potential impact of substance use.  Substance abstinence/cessation counseling, education and resources provided and reviewed.     Services Utilizing/ADLS    Infusion Service: Prior to admission, patient utilizing? no  Home Health: Prior to admission, patient utilizing? no  DME: Prior to admission, no  Pulmonary/Cardiac Rehab: Prior to admission, no  Dialysis:  Prior to admission, yes  Transplant Specialty Pharmacy:  Prior to admission, has not made choice at this time    Prior to admission, patient reports patient was independent with ADLS and was driving.  Patient reports patient is able to care for self at this time.  However he will require assistance post op.  Patient indicates a willingness to care for self once medically cleared to do so.    Insurance/Medications    Insured by   Payor/Plan Subscr  Sex Relation Sub. Ins. ID Effective Group Num   1. MEDICARE - ME* STEVEN MILLER 1980 Male  [id number]                                    [address]   2. GENERIC OUT O* STEVEN MILLER 1980 Male Self [id number] 9/24/15                                    , [address], [place] [address]      Primary Insurance (for UNOS reporting): Public  Insurance - Medicare FFS (Fee For Service)  Secondary Insurance (for UNOS reporting): Public Insurance - Medicaid [place]    Patient reports patient is able to obtain and afford medications at this time and at time of discharge.    Living Will/Healthcare Power of     Patient states patient does not have a LW and/or HCPA.   provided education regarding LW and HCPA and the completion of forms.    Coping/Mental Health    Patient is coping well with the aid of  family members and sara.  Patient indicates mental health difficulties. Pt and SW spoke in depth about current anxiety about current rejection. Pt is feeling discouraged and sad about his kidney not working immediately. SW encourage pt to explore the way he jelly with stress and develop a plan of action when he is having those feelings. Pt reports that he often relies on his sara and tries to have positive thoughts. Pt also reports that he finds time to be alone when he can and reflect on positive life choices. SW commended pt to such coping skills and supports the continuation of those skills.     Discharge Planning    At time of discharge, patient plans to return to [facility] apartments under the care of mother or cousins and friends.  Patients mother and family or friends will transport patient.  Per rounds today, expected discharge date has not been medically determined at this time. Patient and patients caregiver  verbalize understanding and are involved in treatment planning and discharge process.    Additional Concerns    Patient's caretaker denies additional needs and/or concerns at this time. Patient is being followed for needs, education, resources, information, emotional support, supportive counseling, and for supportive and skilled discharge plan of care.  remains available. Patient's caregiver verbalizes understanding and agreement with information reviewed,  availability and how to access available  resources as needed. Patient denies additional needs and/or concerns at this time. Patient verbalizes understanding and agreement with information reviewed, social work availability, and how to access available resources as needed.

## 2017-08-29 NOTE — CONSULTS
[facility]  Transfusion Medicine  Consult Note    Patient Name: Demetrio Aguiar  MRN: [id number]  Admission Date: 8/29/2017  Hospital Length of Stay: 0 days  Attending Physician: David Sanders MD  Primary Care Provider: Primary Doctor No     Consults  Subjective:     Principal Problem:Antibody mediated rejection of kidney transplant    History of Present Illness:  This 38 yo male received a DDonor kidney transplant on 8/18, but there was delayed graft function. He recently had a biopsy to assess the situation, and features of ABMR are present. A DSA study shows weak B62 and B44 DSA, but since the graft is in place, the MFI levels may be misleading due to antibody adsorption. The flow cytometric crossmatches were positive, but the autologous crossmatches were also positive with similar MCSs. An anamnestic response may be causing this picture, and we have been asked to begin a series of PLEX to assist in the management of this ABMR.      Assessment/Plan:     *Kidney transplant ABMR carries a Category I Grade 1B indication for therapeutic plasma exchange via the 2016 Journal of Clinical Apheresis Guidelines (Borges J et al. Journal of Clinical Apheresis 2016; 31:149-162.) The TPE plan is as follows:  3-4 daily and reassess, probably aiming at a total of 6.    No new Assessment & Plan notes have been filed under this hospital service since the last note was generated.  Service: Transfusion Medicine      Mimi Beal MD  Transfusion Medicine  [facility]

## 2017-08-29 NOTE — CONSULTS
[facility]  Kidney Transplant  Consult Note    Inpatient consult to Kidney/Pancreas Transplant Medicine  Consult performed by: FACUNDO AHMADI  Consult ordered by: RAVI HAMILTON  Reason for consult: s/p kidney txp admitted w antibody mediated rejection      ORGAN:  RIGHT KIDNEY   Donor Type:   - Brain Death   PHS Increased Risk: yes   Cold Ischemia:   Induction Medications: Thymoglobulin        Subjective:      Demetrio Aguiar is a 37 y.o. 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 His maintenance immunosuppression includes a steroid taper per protocol to 5mg daily, Prograf, and Cellcept maintenance. His post op course has been complicated by DGF requiring HD, last HD 17, which prompted need for kidney biopsy on 17. He is being admitted for biopsy proven ABMR with weakly positive class I DSA's on 17. Biopsy 17: good sample with 24 glomeruli (only 1 sclerosed) and evidence of criteria for ABMR. + diffuse ATI; Minimal fibrosis, no ACR or AVR, but + CD4 (>50% diffusely + stain) with mild glomerulitis and focal peritubular capillaritis; also, biopsy shows ca-oxalate (3) and ca-phos (2) crystals. Since transplant, he has made minimal uop daily. He denies any current illnesses or sick contacts. Complains of expected post-op pain to RLQ incision. Plan is for PLEX starting later today.         Interval History:  Admitted yesterday evening for treatment of bx proven AMR and weakly pos DSA's..  Plan for PLEX today -pheresis MARIAN Stearns and Dr Beal notified.  Patient has left femoral AV fistula.  Patient has otherwise no complaints.  Abdominal pain well managed.  Incision CDI w  staples.  Making  ml/urine qd.  Last HD 8/28/17.  Monitor.      Past Medical, Surgical, Family, and Social History:   Unchanged from H&P.     Review of patient's allergies indicates:  No Known Allergies     Scheduled Meds:   [START ON 8/30/2017] cinacalcet  60 mg Oral Every other day    dolutegravir  50 mg Oral Daily    [START ON 9/4/2017] epoetin erika  10,000 Units Subcutaneous Every Mon    famotidine  20 mg Oral QHS    heparin (porcine)  5,000 Units Subcutaneous Q8H    ketoconazole  100 mg Oral Daily    multivitamin  1 tablet Oral Daily    mycophenolate  1,000 mg Oral BID    nystatin  500,000 Units Oral QID (PC + HS)    predniSONE  20 mg Oral Daily    rilpivirine  25 mg Oral Daily    rosuvastatin  20 mg Oral Daily    sodium chloride 0.9%  3 mL Intravenous Q8H    [START ON 8/30/2017] sulfamethoxazole-trimethoprim 400-80mg  1 tablet Oral Every Mon, Wed, Fri    tacrolimus  7 mg Oral BID    [START ON 8/30/2017] valganciclovir  450 mg Oral Every Mon, Wed, Fri      Continuous Infusions:     PRN Meds:oxycodone-acetaminophen, oxycodone-acetaminophen            Intake/Output - Last 3 Shifts        08/27 0700 - 08/28 0659 08/28 0700 - 08/29 0659 08/29 0700 - 08/30 0659     P.O.   180 510     Total Intake(mL/kg)   180 (2.5) 510 (7.1)     Urine (mL/kg/hr)   0 50 (0.1)     Stool   0 0 (0)     Total Output   0 50     Net   +180 +460                 Stool Occurrence   0 x 0 x             Review of Systems   Constitutional: Negative for activity change, appetite change, chills, fatigue and fever.   HENT: Negative for congestion and facial swelling.    Eyes: Negative for pain, discharge and visual disturbance.   Respiratory: Negative for cough, chest tightness, shortness of breath and wheezing.    Cardiovascular: Negative for chest pain, palpitations and leg swelling.   Gastrointestinal: Positive for abdominal pain (incisional). Negative for abdominal distention, constipation, diarrhea, nausea and vomiting.  "  Endocrine: Negative.    Genitourinary: Negative for decreased urine volume, dysuria, flank pain and hematuria.   Musculoskeletal: Negative for gait problem.   Skin: Positive for wound.   Allergic/Immunologic: Positive for immunocompromised state.   Neurological: Negative for dizziness, tremors, light-headedness and headaches.   Hematological: Negative.    Psychiatric/Behavioral: Negative for behavioral problems and dysphoric mood.      Objective:      Vital Signs (Most Recent):  Temp: 98.2 °F (36.8 °C) (08/29/17 1530)  Pulse: 80 (08/29/17 1530)  Resp: 16 (08/29/17 1530)  BP: 124/75 (08/29/17 1530)  SpO2: 96 % (08/29/17 0740) Vital Signs (24h Range):  Temp:  [98.2 °F (36.8 °C)-98.8 °F (37.1 °C)] 98.2 °F (36.8 °C)  Pulse:  [76-90] 80  Resp:  [16] 16  SpO2:  [96 %-100 %] 96 %  BP: (111-133)/(68-89) 124/75      Weight: 72.1 kg (159 lb)  Height: 5' 7" (170.2 cm)  Body mass index is 24.9 kg/m².     Physical Exam   Constitutional: He is oriented to person, place, and time. He appears well-developed. No distress.   HENT:   Head: Normocephalic and atraumatic.   Mouth/Throat: No oropharyngeal exudate.   Eyes: EOM are normal. Pupils are equal, round, and reactive to light.   Neck: Normal range of motion. Neck supple. No JVD present.   Cardiovascular: Normal rate and regular rhythm.    No murmur heard.  Pulmonary/Chest: Effort normal and breath sounds normal. No respiratory distress. He has no rales.   Abdominal: Soft. Bowel sounds are normal. He exhibits no distension. There is tenderness. There is no guarding.   RLQ inc with staples intact no s/s/i  RLQ JESSICA intact with serosang drainage     Musculoskeletal: Normal range of motion. He exhibits no edema.   Neurological: He is alert and oriented to person, place, and time.   Skin: Skin is warm and dry. Capillary refill takes 2 to 3 seconds. He is not diaphoretic. No erythema.   Psychiatric: He has a normal mood and affect. His behavior is normal. Judgment and thought content " normal.   Nursing note and vitals reviewed.        Laboratory:  CBC:      Recent Labs  Lab 08/29/17  0506   WBC 4.55   RBC 2.64*   HGB 7.9*   HCT 23.3*      MCV 88   MCH 29.9   MCHC 33.9      BMP:      Recent Labs  Lab 08/29/17  0506         K 3.6   CL 99   CO2 35*   BUN 25*   CREATININE 6.7*   CALCIUM 8.4*              Tacrolimus Lvl   Date Value Ref Range Status   08/29/2017 6.9 5.0 - 15.0 ng/mL Final       Comment:       Testing performed by Liquid Chromatography-Tandem  Mass Spectrometry (LC-MS/MS).  This test was developed and its performance characteristics  determined by [facility], Department of Pathology  and Laboratory Medicine in a manner consistent with CLIA  requirements. It has not been cleared or approved by the [place]  Food and Drug Administration.  This test is used for clinical   purposes.  It should not be regarded as investigational or for  research.   08/28/2017 6.5 5.0 - 15.0 ng/mL Final       Comment:       Testing performed by Liquid Chromatography-Tandem  Mass Spectrometry (LC-MS/MS).  This test was developed and its performance characteristics  determined by [facility], Department of Pathology  and Laboratory Medicine in a manner consistent with CLIA  requirements. It has not been cleared or approved by the [place]  Food and Drug Administration.  This test is used for clinical   purposes.  It should not be regarded as investigational or for  research.   08/26/2017 9.4 5.0 - 15.0 ng/mL Final       Comment:       Testing performed by Liquid Chromatography-Tandem  Mass Spectrometry (LC-MS/MS).  This test was developed and its performance characteristics  determined by [facility], Department of Pathology  and Laboratory Medicine in a manner consistent with CLIA  requirements. It has not been cleared or approved by the [place]  Food and Drug Administration.  This test is used for clinical   purposes.  It should not be regarded as investigational or  for  research.   2017 2.9 (L) 5.0 - 15.0 ng/mL Final       Comment:       Testing performed by Liquid Chromatography-Tandem  Mass Spectrometry (LC-MS/MS).  This test was developed and its performance characteristics  determined by [facility], Department of Pathology  and Laboratory Medicine in a manner consistent with CLIA  requirements. It has not been cleared or approved by the [place]  Food and Drug Administration.  This test is used for clinical   purposes.  It should not be regarded as investigational or for  research.         Labs within the past 24 hours have been reviewed.     Diagnostic Results:  US: Reviewed     Assessment/Plan:          * Antibody mediated rejection of kidney transplant     - Biopsy (for DGF) 17: good sample with 24 glomeruli (only 1 sclerosed) and evidence just meeting criteria for ABMR. + diffuse ATI. Minimal fibrosis, no ACR or AVR, but + CD4 (>50% diffusely + stain) with mild glomerulitis and focal peritubular capillaritis. Also, biopsy shows ca-oxalate (3) and ca-phos (2) crystals.  -17 DSA + for weak class I. Repeat pre-PLEX DSA and CD3 with AM labs- PENDING. Plan for PLEX (x3) starting later today.  Tentatively plan for IVIG after PLEX.  Any Thymo will depend on how Cr responds w PLEX. Monitor for hypocalcemia and hyponatremia.          donor kidney transplant 2017     - minimal UOP. Cr remains elevated. See ABMR.  - HD M-W-F, last HD           Delayed graft function of kidney     Requiring HD MWF.  Plan for HD tomorrow, will need to coordinate w PLEX.          Immunosuppression     - Maintenance IS with prograf, MMF, and prednisone taper. cont to check prograf level daily. Assess for toxicity and adjust level as needed          Need for prophylactic immunotherapy     See Immunosuppression.          HIV (human immunodeficiency virus infection)     - last CD4 count 7 () and HIV PCR undetected < 40 copies/ml ()  - continue HAART with  dolutegravir (Tivicay) and rilpivirine (Edurant).  HIV meds to be given after PLEX- meds highly protein bond and can be removed w plasmapheresis.          At risk for opportunistic infections     - continue valcyte for CMV prophylaxis  - continue bactrim for PJP/PCP prophylaxis  - continue nystatin for fungal prophylaxis          Anemia in ESRD (end-stage renal disease)     CBC stable.                  Discharge Planning:  Kb run 121.  No plan for discharge.  Will need at least PLEX x 3 days.         Rhoda Penn NP  Kidney Transplant  [facility]

## 2017-08-29 NOTE — HPI
Demetrio Aguiar is a 37 y.o. 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 His maintenance immunosuppression includes a steroid taper per protocol to 5mg daily, Prograf, and Cellcept maintenance. His post op course has been complicated by DGF requiring HD (last HD 8/28/17) which prompted need for kidney biopsy on 8/25/17. 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 Biopsy 8/25/17: good sample with 24 glomeruli (only 1 sclerosed) and evidence of criteria for ABMR. + diffuse ATI; Minimal fibrosis, no ACR or AVR, but + CD4 (>50% diffusely + stain) with mild glomerulitis and focal peritubular capillaritis; also, biopsy shows ca-oxalate (3) and ca-phos (2) crystals. Since transplant, he has made minimal uop daily. He denies any current illnesses or sick contacts. Complains of expected post-op pain to RLQ incision. Pt treated with PLEX x6, SMP x3, and IVIG x2. Kidney function improved with tx.    Interval History: pt feels well today. H&H stable. JESSICA drain with very minimal serosang drainage. Cr trending down, 3.3. No acute events overnight. Monitor.       Cr 3.5. Pt completed treatment of rejection with SMP x 3, PLEX x 6 and IVIG x 2. Use of Rituxan discussed - decision made to defer at this time, monitor with current tx, and use in future if needed.

## 2017-08-29 NOTE — ASSESSMENT & PLAN NOTE
- continue valcyte for CMV prophylaxis  - continue bactrim for PJP/PCP prophylaxis  - continue nystatin for fungal prophylaxis

## 2017-08-29 NOTE — H&P
[facility]  Kidney Transplant  History & Physical    Patient Name: Demetrio Aguiar  MRN: [id number]  Admission Date: 8/29/2017  Code Status: Full Code  Primary Care Provider: Primary Doctor No  Post-Operative Day: 11    Subjective:     History of Present Illness:  Demetrio Aguiar is a 37 y.o. 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 His maintenance immunosuppression includes a steroid taper per protocol to 5mg daily, Prograf, and Cellcept maintenance. His post op course has been complicated by DGF requiring HD, last HD 8/28/17, which prompted need for kidney biopsy on 8/25/17. He is being admitted for biopsy proven ABMR with weakly positive class I DSA's on 8/25/17. Biopsy 8/25/17: good sample with 24 glomeruli (only 1 sclerosed) and evidence of criteria for ABMR. + diffuse ATI; Minimal fibrosis, no ACR or AVR, but + CD4 (>50% diffusely + stain) with mild glomerulitis and focal peritubular capillaritis; also, biopsy shows ca-oxalate (3) and ca-phos (2) crystals. Since transplant, he has made minimal uop daily. He denies any current illnesses or sick contacts. Complains of expected post-op pain to RLQ incision. Plan is for PLEX starting later today.         Past Medical History:   Diagnosis Date    Anemia     At risk for opportunistic infections     Delayed graft function of kidney     ESRD secondary to HIVAN s/p DDRT 8/18/17     HIV infection     HIV nephropathy     Hyperlipidemia     Need for prophylactic immunotherapy     S/P kidney transplant 8/18/2017 8/28/2017    TB lung, latent     tx INH 2006       Past Surgical History:   Procedure Laterality Date    EXPLORATORY LAPAROTOMY W/ BOWEL  "RESECTION      NO BOWEL RESECTION, UNKNOWN DETAILS, "Pancreas Infection"    peritoneal dialysis catheter placement         Review of patient's allergies indicates:  No Known Allergies    Family History     Problem Relation (Age of Onset)    Cancer Father    Diabetes Maternal Aunt, Maternal Uncle    Hypertension Maternal Grandmother, Paternal Grandmother    Lung cancer Father    No Known Problems Mother, Brother        Social History Main Topics    Smoking status: Former Smoker     Packs/day: 0.50    Smokeless tobacco: Not on file      Comment: quit smoking 5 years ago    Alcohol use No    Drug use:      Types: Cocaine, Marijuana      Comment: last use for cocaine at age 17; 2 years ago last use for THC    Sexual activity: Not Currently       Facility-Administered Medications Prior to Admission   Medication    epoetin erika injection 10,000 Units     PTA Medications   Medication Sig    bisacodyl (DULCOLAX) 5 mg EC tablet Take 1-2 tablets (5-10 mg total) by mouth daily as needed for Constipation. Laxative    cinacalcet (SENSIPAR) 60 MG Tab Take 1 tablet (60 mg total) by mouth every other day.    docusate sodium (COLACE) 100 MG capsule Take 1 capsule (100 mg total) by mouth 3 (three) times daily as needed for Constipation.    dolutegravir (TIVICAY) 50 mg Tab Take 50 mg by mouth once daily.    famotidine (PEPCID) 20 MG tablet Take 1 tablet (20 mg total) by mouth every evening.    ketoconazole (NIZORAL) 200 mg Tab Take 0.5 tablets (100 mg total) by mouth once daily.    multivitamin (THERAGRAN) tablet Take 1 tablet by mouth once daily.    mycophenolate (CELLCEPT) 250 mg Cap Take 4 capsules (1,000 mg total) by mouth 2 (two) times daily. Kidney txp 8/18/17; Z94.0    nystatin (MYCOSTATIN) 100,000 unit/mL suspension Take 5 mLs (500,000 Units total) by mouth 2 hours after meals and at bedtime. STOP 9/15/17    oxycodone-acetaminophen (PERCOCET)  mg per tablet Take 0.5-1 tablets by mouth every 4 (four) " hours as needed for Pain.    predniSONE (DELTASONE) 10 MG tablet Take 20 mg PO QD 8/22-9/21; 15 mg PO QD 9/22-10/21; 10 mg PO QD 10/22-11/21; 5 mg PO QD thereafter    rilpivirine (EDURANT) 25 mg Tab Take 25 mg by mouth once daily.    rosuvastatin (CRESTOR) 20 MG tablet Take 20 mg by mouth once daily.    sulfamethoxazole-trimethoprim 400-80mg (BACTRIM,SEPTRA) 400-80 mg per tablet Take 1 tablet by mouth every Mon, Wed, Fri.    tacrolimus (PROGRAF) 1 MG Cap Take 6 capsules (6 mg total) by mouth every 12 (twelve) hours.    valganciclovir (VALCYTE) 450 mg Tab Take 1 tablet (450 mg total) by mouth every Mon, Wed, Fri. Give after HD; Stop 11/19/17       Review of Systems   Constitutional: Negative for appetite change, chills, fatigue and fever.   Respiratory: Negative for cough, chest tightness and shortness of breath.    Cardiovascular: Negative for chest pain, palpitations and leg swelling.   Gastrointestinal: Positive for abdominal pain (incisional). Negative for abdominal distention, constipation, diarrhea, nausea and vomiting.   Genitourinary: Positive for decreased urine volume. Negative for difficulty urinating, dysuria, frequency and urgency.   Musculoskeletal: Negative for back pain and neck pain.   Skin: Negative for color change, pallor, rash and wound.   Allergic/Immunologic: Positive for immunocompromised state.   Neurological: Negative for dizziness, weakness and headaches.   Psychiatric/Behavioral: Negative for confusion and sleep disturbance.   All other systems reviewed and are negative.    Objective:     Vital Signs (Most Recent):  Temp: 98.3 °F (36.8 °C) (08/29/17 0030)  Pulse: 81 (08/29/17 0030)  Resp: 16 (08/29/17 0030)  BP: 133/80 (08/29/17 0030)  SpO2: 100 % (08/29/17 0030) Vital Signs (24h Range):  Temp:  [98.2 °F (36.8 °C)-98.3 °F (36.8 °C)] 98.3 °F (36.8 °C)  Pulse:  [80-81] 81  Resp:  [16] 16  SpO2:  [100 %] 100 %  BP: (132-133)/(75-80) 133/80     Weight: 72.6 kg (159 lb 15.1 oz)  Body mass  index is 25.05 kg/m².    No intake or output data in the 24 hours ending 08/29/17 0111    Physical Exam   Constitutional: He is oriented to person, place, and time. He appears well-nourished.   Cardiovascular: Normal rate, regular rhythm, normal heart sounds, intact distal pulses and normal pulses.    No murmur heard.  Pulmonary/Chest: Effort normal and breath sounds normal. No respiratory distress. He has no wheezes. He has no rales.   Abdominal: Soft. Bowel sounds are normal. He exhibits no distension. There is no tenderness. There is no rebound and no guarding.   RLQ incision ERIK with staples, CDI   Musculoskeletal: Normal range of motion. He exhibits no edema or tenderness.   Neurological: He is alert and oriented to person, place, and time.   Skin: Skin is warm, dry and intact. No rash noted. No erythema. No pallor.   Psychiatric: He has a normal mood and affect. His speech is normal and behavior is normal.   Nursing note and vitals reviewed.      Laboratory:  CBC:   Recent Labs  Lab 08/28/17  0821   WBC 5.50   RBC 3.00*   HGB 8.9*   HCT 26.4*      MCV 88   MCH 29.7   MCHC 33.7     CMP:   Recent Labs  Lab 08/28/17  0821      CALCIUM 8.9   ALBUMIN 3.0*      K 4.1   CO2 20*      BUN 39*   CREATININE 10.1*     Coagulation: No results for input(s): INR, APTT in the last 168 hours.    Invalid input(s): PT  Labs within the past 24 hours have been reviewed.    Diagnostic Results:  I have personally reviewed all pertinent imaging studies.    Assessment/Plan:     * Antibody mediated rejection of kidney transplant    - Biopsy (for DGF) 8/25/17: good sample with 24 glomeruli (only 1 sclerosed) and evidence just meeting criteria for ABMR. + diffuse ATI. Minimal fibrosis, no ACR or AVR, but + CD4 (>50% diffusely + stain) with mild glomerulitis and focal peritubular capillaritis. Also, biopsy shows ca-oxalate (3) and ca-phos (2) crystals.  -8/25/17 DSA + for weak class I. Repeat pre-PLEX DSA and CD3  with AM labs. Plan for PLEX starting later today.        Delayed graft function of kidney    - minimal uop. Cr remains elevated. See ABMR.  - HD M-W-F, last HD          donor kidney transplant 2017    - see ABMR        HIV (human immunodeficiency virus infection)    - last CD4 count 7 () and HIV PCR undetected < 40 copies/ml ()  - continue HAART with dolutegravir (Tivicay) and rilpivirine (Edurant)        Immunosuppression    - Maintenance IS with prograf, MMF, and prednisone taper. cont to check prograf level daily. Assess for toxicity and adjust level as needed        Need for prophylactic immunotherapy    - see IS        At risk for opportunistic infections    - continue valcyte for CMV prophylaxis  - continue bactrim for PJP/PCP prophylaxis  - continue nystatin for fungal prophylaxis        Anemia in ESRD (end-stage renal disease)    - h/h stable. Monitor with daily AM labs.             Maribel Gutierrez, JUNG  Liver Transplant  [facility]

## 2017-08-30 PROBLEM — D62 ANEMIA DUE TO ACUTE BLOOD LOSS: Status: RESOLVED | Noted: 2017-08-22 | Resolved: 2017-08-30

## 2017-08-30 PROBLEM — Z79.60 LONG-TERM USE OF IMMUNOSUPPRESSANT MEDICATION: Status: ACTIVE | Noted: 2017-08-30

## 2017-08-30 PROBLEM — E87.20 METABOLIC ACIDOSIS: Status: RESOLVED | Noted: 2017-08-22 | Resolved: 2017-08-30

## 2017-08-30 PROBLEM — R11.2 NAUSEA & VOMITING: Status: ACTIVE | Noted: 2017-08-30

## 2017-08-30 LAB
ALBUMIN SERPL BCP-MCNC: 3.6 G/DL
ANION GAP SERPL CALC-SCNC: 11 MMOL/L
BASOPHILS # BLD AUTO: 0.01 K/UL
BASOPHILS NFR BLD: 0.2 %
BUN SERPL-MCNC: 37 MG/DL
CALCIUM SERPL-MCNC: 9 MG/DL
CHLORIDE SERPL-SCNC: 100 MMOL/L
CO2 SERPL-SCNC: 27 MMOL/L
CREAT SERPL-MCNC: 8.3 MG/DL
DIFFERENTIAL METHOD: ABNORMAL
EOSINOPHIL # BLD AUTO: 0.1 K/UL
EOSINOPHIL NFR BLD: 1.1 %
ERYTHROCYTE [DISTWIDTH] IN BLOOD BY AUTOMATED COUNT: 16.5 %
EST. GFR  (AFRICAN AMERICAN): 8.5 ML/MIN/1.73 M^2
EST. GFR  (NON AFRICAN AMERICAN): 7.4 ML/MIN/1.73 M^2
GLUCOSE SERPL-MCNC: 94 MG/DL
HCT VFR BLD AUTO: 22.6 %
HGB BLD-MCNC: 7.6 G/DL
LYMPHOCYTES # BLD AUTO: 0.3 K/UL
LYMPHOCYTES NFR BLD: 5.7 %
MAGNESIUM SERPL-MCNC: 2.1 MG/DL
MCH RBC QN AUTO: 29.1 PG
MCHC RBC AUTO-ENTMCNC: 33.6 G/DL
MCV RBC AUTO: 87 FL
MONOCYTES # BLD AUTO: 0.2 K/UL
MONOCYTES NFR BLD: 2.8 %
NEUTROPHILS # BLD AUTO: 4.7 K/UL
NEUTROPHILS NFR BLD: 88.5 %
PHOSPHATE SERPL-MCNC: 5.5 MG/DL
PLATELET # BLD AUTO: 158 K/UL
PMV BLD AUTO: 10.1 FL
POTASSIUM SERPL-SCNC: 3.8 MMOL/L
RBC # BLD AUTO: 2.61 M/UL
SODIUM SERPL-SCNC: 138 MMOL/L
TACROLIMUS BLD-MCNC: 5.9 NG/ML
WBC # BLD AUTO: 5.28 K/UL

## 2017-08-30 PROCEDURE — 63600175 PHARM REV CODE 636 W HCPCS: Performed by: NURSE PRACTITIONER

## 2017-08-30 PROCEDURE — 25000003 PHARM REV CODE 250: Performed by: TRANSPLANT SURGERY

## 2017-08-30 PROCEDURE — 83735 ASSAY OF MAGNESIUM: CPT

## 2017-08-30 PROCEDURE — 36415 COLL VENOUS BLD VENIPUNCTURE: CPT

## 2017-08-30 PROCEDURE — P9045 ALBUMIN (HUMAN), 5%, 250 ML: HCPCS | Performed by: PATHOLOGY

## 2017-08-30 PROCEDURE — 63600175 PHARM REV CODE 636 W HCPCS: Performed by: PHYSICIAN ASSISTANT

## 2017-08-30 PROCEDURE — 36514 APHERESIS PLASMA: CPT

## 2017-08-30 PROCEDURE — 85025 COMPLETE CBC W/AUTO DIFF WBC: CPT

## 2017-08-30 PROCEDURE — 36514 APHERESIS PLASMA: CPT | Mod: ,,, | Performed by: PATHOLOGY

## 2017-08-30 PROCEDURE — 20600001 HC STEP DOWN PRIVATE ROOM

## 2017-08-30 PROCEDURE — 99233 SBSQ HOSP IP/OBS HIGH 50: CPT | Mod: ,,, | Performed by: PHYSICIAN ASSISTANT

## 2017-08-30 PROCEDURE — 25000003 PHARM REV CODE 250: Performed by: NURSE PRACTITIONER

## 2017-08-30 PROCEDURE — 80069 RENAL FUNCTION PANEL: CPT

## 2017-08-30 PROCEDURE — 80197 ASSAY OF TACROLIMUS: CPT

## 2017-08-30 PROCEDURE — A4216 STERILE WATER/SALINE, 10 ML: HCPCS | Performed by: NURSE PRACTITIONER

## 2017-08-30 PROCEDURE — 63600175 PHARM REV CODE 636 W HCPCS: Performed by: PATHOLOGY

## 2017-08-30 RX ORDER — ALBUMIN HUMAN 50 G/1000ML
250 SOLUTION INTRAVENOUS ONCE
Status: COMPLETED | OUTPATIENT
Start: 2017-08-30 | End: 2017-08-30

## 2017-08-30 RX ORDER — ONDANSETRON 2 MG/ML
4 INJECTION INTRAMUSCULAR; INTRAVENOUS EVERY 6 HOURS PRN
Status: DISCONTINUED | OUTPATIENT
Start: 2017-08-30 | End: 2017-09-14 | Stop reason: HOSPADM

## 2017-08-30 RX ORDER — FUROSEMIDE 10 MG/ML
160 INJECTION INTRAMUSCULAR; INTRAVENOUS ONCE
Status: COMPLETED | OUTPATIENT
Start: 2017-08-30 | End: 2017-08-30

## 2017-08-30 RX ADMIN — ONDANSETRON 4 MG: 2 INJECTION INTRAMUSCULAR; INTRAVENOUS at 09:08

## 2017-08-30 RX ADMIN — MYCOPHENOLATE MOFETIL 1000 MG: 250 CAPSULE ORAL at 08:08

## 2017-08-30 RX ADMIN — THERA TABS 1 TABLET: TAB at 08:08

## 2017-08-30 RX ADMIN — OXYCODONE HYDROCHLORIDE AND ACETAMINOPHEN 1 TABLET: 10; 325 TABLET ORAL at 05:08

## 2017-08-30 RX ADMIN — DOCUSATE SODIUM 100 MG: 50 CAPSULE, LIQUID FILLED ORAL at 08:08

## 2017-08-30 RX ADMIN — ALBUMIN (HUMAN) 250 G: 12.5 SOLUTION INTRAVENOUS at 12:08

## 2017-08-30 RX ADMIN — VALGANCICLOVIR 450 MG: 450 TABLET, FILM COATED ORAL at 05:08

## 2017-08-30 RX ADMIN — HEPARIN SODIUM 5000 UNITS: 5000 INJECTION, SOLUTION INTRAVENOUS; SUBCUTANEOUS at 08:08

## 2017-08-30 RX ADMIN — DOLUTEGRAVIR SODIUM 50 MG: 50 TABLET, FILM COATED ORAL at 01:08

## 2017-08-30 RX ADMIN — ROSUVASTATIN CALCIUM 20 MG: 10 TABLET ORAL at 08:08

## 2017-08-30 RX ADMIN — DOCUSATE SODIUM 100 MG: 50 CAPSULE, LIQUID FILLED ORAL at 05:08

## 2017-08-30 RX ADMIN — Medication 3 ML: at 01:08

## 2017-08-30 RX ADMIN — TACROLIMUS 7 MG: 1 CAPSULE ORAL at 08:08

## 2017-08-30 RX ADMIN — ONDANSETRON 4 MG: 2 INJECTION INTRAMUSCULAR; INTRAVENOUS at 10:08

## 2017-08-30 RX ADMIN — FAMOTIDINE 20 MG: 20 TABLET, FILM COATED ORAL at 08:08

## 2017-08-30 RX ADMIN — NYSTATIN 500000 UNITS: 500000 SUSPENSION ORAL at 01:08

## 2017-08-30 RX ADMIN — Medication 100 MG: at 08:08

## 2017-08-30 RX ADMIN — NYSTATIN 500000 UNITS: 500000 SUSPENSION ORAL at 08:08

## 2017-08-30 RX ADMIN — HEPARIN SODIUM 5000 UNITS: 5000 INJECTION, SOLUTION INTRAVENOUS; SUBCUTANEOUS at 01:08

## 2017-08-30 RX ADMIN — NYSTATIN 500000 UNITS: 500000 SUSPENSION ORAL at 05:08

## 2017-08-30 RX ADMIN — HEPARIN SODIUM 5000 UNITS: 5000 INJECTION, SOLUTION INTRAVENOUS; SUBCUTANEOUS at 05:08

## 2017-08-30 RX ADMIN — SULFAMETHOXAZOLE AND TRIMETHOPRIM 1 TABLET: 400; 80 TABLET ORAL at 05:08

## 2017-08-30 RX ADMIN — CINACALCET HYDROCHLORIDE 60 MG: 30 TABLET, COATED ORAL at 08:08

## 2017-08-30 RX ADMIN — DOCUSATE SODIUM 100 MG: 50 CAPSULE, LIQUID FILLED ORAL at 01:08

## 2017-08-30 RX ADMIN — FUROSEMIDE 160 MG: 10 INJECTION, SOLUTION INTRAMUSCULAR; INTRAVENOUS at 11:08

## 2017-08-30 RX ADMIN — PREDNISONE 20 MG: 20 TABLET ORAL at 08:08

## 2017-08-30 RX ADMIN — TACROLIMUS 8 MG: 5 CAPSULE ORAL at 05:08

## 2017-08-30 NOTE — ASSESSMENT & PLAN NOTE
- Minimal UOP though improving.    - See ABMR and DGF.  - HD M-W-F, last HD 8/28  - Hold HD today.

## 2017-08-30 NOTE — PLAN OF CARE
Problem: Patient Care Overview  Goal: Plan of Care Review  Outcome: Ongoing (interventions implemented as appropriate)  Plasmapharesis completed today. Pt tolerated well. Plan for second plasmapharesis tomorrow followed by HD. Reinforced to wear non-skid socks when ambulating to prevent falling. Verbalized understanding. Pain to lower back decreased with Percocet. Continuing to monitor.

## 2017-08-30 NOTE — PLAN OF CARE
Problem: Patient Care Overview  Goal: Plan of Care Review  Outcome: Ongoing (interventions implemented as appropriate)  Patient aaox4. Bed kept locked, lowest position with 2x side rails  Up while in bed. nonslip footwear when out of bed. Ambulates independently and frequently. Call bell and personal items within reach. Renal diet. RLQ incision with staples, patient patient with betadine. Left thigh graft +/+, dressing cdi. Received PLEX 2/6 today, 3/6 tomorrow. Plan for IVIG after plex, possible thymo depending on Creatinine response. Regular HD schedule MWF, per PA hold dialysis today. Given lasix 160mg IV. Went for ultrasound this afternoon. 1x complaint of nausea relieved by prn medication. Standard precautions maintained.

## 2017-08-30 NOTE — PLAN OF CARE
Problem: Patient Care Overview  Goal: Plan of Care Review  Outcome: Ongoing (interventions implemented as appropriate)  AAOX4.  VSS.  Pt complaining of pain, prn pain meds administered with pain relief.  Pt to have paracentesis this morning.  Voiding per urinal.  PIV is intact and free of infection.  Ambulating in halls.  Bed is in lowest position, call bell is in reach, and pt instructed to call nurse when needing assistance.  Will continue to monitor.

## 2017-08-30 NOTE — ASSESSMENT & PLAN NOTE
- Last CD4 count 7 (8/20) and HIV PCR undetected < 40 copies/ml (8/19).  - Continue HAART with dolutegravir (Tivicay) and rilpivirine (Edurant).    - HIV meds to be given after PLEX- meds highly protein bond and can be removed w plasmapheresis.

## 2017-08-30 NOTE — CLINICAL REVIEW
Vomit this morning  ? urine output  H/h dropped this morning, will monitor for now. No evidence of bleeding Likely related with PLEX.  PLEX today  Will continue with Epogen replacement 10,000 weekly  Repeat kidney US  Labs tomorrow  Prograf level: will increase prograf to 8 bid

## 2017-08-30 NOTE — PROGRESS NOTES
Plasmapheresis tx #2 started at 1250 without difficulty. Dialysis RN at bedside to access left upper thigh fistula prior to start of treatment. Pt oriented x4.  5.0 Liter exchange.  Replacement fluids 5% Albumin. Pt stated no pain today. Tx ended at 1353. Pt tolerated tx well. Dialysis needles removed X2, pressure applied until hemostasis maintained.  Next tx 08/31/17.

## 2017-08-30 NOTE — ASSESSMENT & PLAN NOTE
- Biopsy (for DGF) 8/25/17: good sample with 24 glomeruli (only 1 sclerosed) and evidence just meeting criteria for ABMR. + diffuse ATI. Minimal fibrosis, no ACR or AVR, but + CD4 (>50% diffusely + stain) with mild glomerulitis and focal peritubular capillaritis. Also, biopsy shows ca-oxalate (3) and ca-phos (2) crystals.  -8/25/17 DSA + for weak class I. Repeat pre-PLEX Class I DSA DETECTED: A2(3894),B44(2820),B62(5178),    Class II DSA DETECTED BELOW CUTOFF: DR53(1265)  - Tolerated PLEX #1 8/29.  PLEX #2/6 today.    - Plan for IVIG after PLEX.  Any Thymo will depend on how Cr responds w PLEX. Monitor for hypocalcemia and hyponatremia.

## 2017-08-30 NOTE — PROGRESS NOTES
Patient was re-admitted to the hospital. Transplant coordinator met with the patient and explained her role in caring for the patient. The coordinator explained that she will follow the patient daily and assist with all discharge plans, including follow up lab work and surgeon/nephrology appointments. The patient expressed understanding, no questions or concerns.    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 Pt admitted for treatment of rejection. PLEX #2 today

## 2017-08-30 NOTE — PLAN OF CARE
Problem: Patient Care Overview  Goal: Plan of Care Review  Outcome: Ongoing (interventions implemented as appropriate)  AAOX4.  VSS.  Pt complaining of pain, prn pain meds administered with pain relief.  PIV is intact and free of infection.  Pt to receive 2nd dose of plex today followed by HD.  Voiding per urinal.  Cousin is at the bedside.  Bed is in lowest position, call bell is in reach, and pt instructed to call nurse when needing assistance.  Will continue to monitor.

## 2017-08-30 NOTE — ASSESSMENT & PLAN NOTE
- H/H 7.6/23 this AM.  - Fe panel with elevated ferritin and low Fe stores.  - Continue weekly epo.    - Monitor.

## 2017-08-30 NOTE — ASSESSMENT & PLAN NOTE
- Improved with Zofran x 1.  - Hold cinacalcet as pt with normal Ca and mildly elevated PTH levels.

## 2017-08-30 NOTE — PROGRESS NOTES
Pt admitted for a cadaveric kidney transplant.   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    No plan for Posaconazole.      Post-op US showed increased arterial RIs to segmental and lobular arteries.     Pt with post-op DGF, HD chair has been arranged [facility] afternoon shift.    DSA and kidney bx completed 8/25/17 results pending.      JESSICA and alexis removed  RLQ incision ERIK with staples      ID was consulted for HAART recs.    Labs MWF, RTC 8/28 to meet with coordinator/pharmD

## 2017-08-30 NOTE — SUBJECTIVE & OBJECTIVE
Subjective:     Demetrio Aguiar is a 37 y.o. 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 His maintenance immunosuppression includes a steroid taper per protocol to 5mg daily, Prograf, and Cellcept maintenance. His post op course has been complicated by DGF requiring HD (last HD 8/28/17) which prompted need for kidney biopsy on 8/25/17. 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 Biopsy 8/25/17: good sample with 24 glomeruli (only 1 sclerosed) and evidence of criteria for ABMR. + diffuse ATI; Minimal fibrosis, no ACR or AVR, but + CD4 (>50% diffusely + stain) with mild glomerulitis and focal peritubular capillaritis; also, biopsy shows ca-oxalate (3) and ca-phos (2) crystals. Since transplant, he has made minimal uop daily. He denies any current illnesses or sick contacts. Complains of expected post-op pain to RLQ incision. Plan is for PLEX 3-6 treatments.     Interval History: Pt tolerated PLEX #1 well via left femoral AV fistula.  Nausea with vomiting x 1 this AM which improved quickly with IV Zofran.  He feels this is secondary to eating quickly then taking all meds and nausea has not returned.   Patient has otherwise no complaints.  Pt's UOP has picked up slightly to 150 cc this AM- will push with Lasix and monitor.  Hold HD today.    Past Medical, Surgical, Family, and Social History:   Unchanged from H&P.    Review of patient's allergies indicates:  No Known Allergies    Scheduled Meds:   docusate sodium  100 mg Oral TID    dolutegravir  50 mg Oral Daily    [START ON 9/4/2017] epoetin erika  10,000 Units Subcutaneous Every Mon    famotidine  20 mg Oral QHS    heparin  (porcine)  5,000 Units Subcutaneous Q8H    ketoconazole  100 mg Oral Daily    multivitamin  1 tablet Oral Daily    mycophenolate  1,000 mg Oral BID    nystatin  500,000 Units Oral QID (PC + HS)    predniSONE  20 mg Oral Daily    rilpivirine  25 mg Oral Daily    rosuvastatin  20 mg Oral Daily    sodium chloride 0.9%  3 mL Intravenous Q8H    sulfamethoxazole-trimethoprim 400-80mg  1 tablet Oral Every Mon, Wed, Fri    tacrolimus  8 mg Oral BID    valganciclovir  450 mg Oral Every Mon, Wed, Fri     Continuous Infusions:     PRN Meds:bisacodyl, bisacodyl, ondansetron, oxycodone-acetaminophen, oxycodone-acetaminophen    Intake/Output - Last 3 Shifts       08/28 0700 - 08/29 0659 08/29 0700 - 08/30 0659 08/30 0700 - 08/31 0659    P.O. 180 1470 360    Blood  3757     Total Intake(mL/kg) 180 (2.5) 5227 (72.5) 360 (5)    Urine (mL/kg/hr) 0 150 (0.1) 150 (0.3)    Emesis/NG output   0 (0)    Stool 0 0 (0)     Blood  3518 (2)     Total Output 0 3668 150    Net +180 +1559 +210           Stool Occurrence 0 x 0 x     Emesis Occurrence   1 x           Review of Systems   Constitutional: Negative for activity change, appetite change, chills, fatigue and fever.   HENT: Negative for congestion and facial swelling.    Eyes: Negative for pain, discharge and visual disturbance.   Respiratory: Negative for cough, chest tightness, shortness of breath and wheezing.    Cardiovascular: Negative for chest pain, palpitations and leg swelling.   Gastrointestinal: Positive for nausea and vomiting. Negative for abdominal distention, abdominal pain, constipation and diarrhea.   Endocrine: Negative.    Genitourinary: Negative for decreased urine volume, dysuria, flank pain and hematuria.   Musculoskeletal: Negative for gait problem.   Skin: Positive for wound.   Allergic/Immunologic: Positive for immunocompromised state.   Neurological: Negative for dizziness, tremors, light-headedness and headaches.   Hematological: Negative.   "  Psychiatric/Behavioral: Negative for behavioral problems and dysphoric mood.      Objective:     Vital Signs (Most Recent):  Temp: 97.1 °F (36.2 °C) (08/30/17 1250)  Pulse: 79 (08/30/17 1346)  Resp: 17 (08/30/17 1147)  BP: 120/72 (08/30/17 1346)  SpO2: 97 % (08/30/17 1147) Vital Signs (24h Range):  Temp:  [97.1 °F (36.2 °C)-98.4 °F (36.9 °C)] 97.1 °F (36.2 °C)  Pulse:  [73-90] 79  Resp:  [16-18] 17  SpO2:  [96 %-98 %] 97 %  BP: (116-145)/(68-81) 120/72     Weight: 72.1 kg (159 lb)  Height: 5' 7" (170.2 cm)  Body mass index is 24.9 kg/m².    Physical Exam   Constitutional: He is oriented to person, place, and time. He appears well-developed. No distress.   HENT:   Head: Normocephalic and atraumatic.   Mouth/Throat: No oropharyngeal exudate.   Eyes: EOM are normal. Pupils are equal, round, and reactive to light.   Neck: Normal range of motion. Neck supple. No JVD present.   Cardiovascular: Normal rate and regular rhythm.    No murmur heard.  Pulmonary/Chest: Effort normal and breath sounds normal. No respiratory distress. He has no rales.   Abdominal: Soft. Bowel sounds are normal. He exhibits no distension. There is no tenderness. There is no guarding.   RLQ inc with staples intact CDI no SSI     Musculoskeletal: Normal range of motion. He exhibits no edema.   Neurological: He is alert and oriented to person, place, and time.   Skin: Skin is warm and dry. Capillary refill takes 2 to 3 seconds. He is not diaphoretic. No erythema.   Psychiatric: He has a normal mood and affect. His behavior is normal. Judgment and thought content normal.   Nursing note and vitals reviewed.      Laboratory:  CBC:     Recent Labs  Lab 08/30/17  0527   WBC 5.28   RBC 2.61*   HGB 7.6*   HCT 22.6*      MCV 87   MCH 29.1   MCHC 33.6     BMP:     Recent Labs  Lab 08/30/17  0527   GLU 94      K 3.8      CO2 27   BUN 37*   CREATININE 8.3*   CALCIUM 9.0     Tacrolimus Lvl   Date Value Ref Range Status   08/30/2017 5.9 5.0 - " 15.0 ng/mL Final     Comment:     Testing performed by Liquid Chromatography-Tandem  Mass Spectrometry (LC-MS/MS).  This test was developed and its performance characteristics  determined by [facility], Department of Pathology  and Laboratory Medicine in a manner consistent with CLIA  requirements. It has not been cleared or approved by the [place]  Food and Drug Administration.  This test is used for clinical   purposes.  It should not be regarded as investigational or for  research.     08/29/2017 6.9 5.0 - 15.0 ng/mL Final     Comment:     Testing performed by Liquid Chromatography-Tandem  Mass Spectrometry (LC-MS/MS).  This test was developed and its performance characteristics  determined by [facility], Department of Pathology  and Laboratory Medicine in a manner consistent with CLIA  requirements. It has not been cleared or approved by the [place]  Food and Drug Administration.  This test is used for clinical   purposes.  It should not be regarded as investigational or for  research.     08/28/2017 6.5 5.0 - 15.0 ng/mL Final     Comment:     Testing performed by Liquid Chromatography-Tandem  Mass Spectrometry (LC-MS/MS).  This test was developed and its performance characteristics  determined by [facility], Department of Pathology  and Laboratory Medicine in a manner consistent with CLIA  requirements. It has not been cleared or approved by the [place]  Food and Drug Administration.  This test is used for clinical   purposes.  It should not be regarded as investigational or for  research.     08/26/2017 9.4 5.0 - 15.0 ng/mL Final     Comment:     Testing performed by Liquid Chromatography-Tandem  Mass Spectrometry (LC-MS/MS).  This test was developed and its performance characteristics  determined by [facility], Department of Pathology  and Laboratory Medicine in a manner consistent with CLIA  requirements. It has not been cleared or approved by the [place]  Food and Drug  Administration.  This test is used for clinical   purposes.  It should not be regarded as investigational or for  research.         Labs within the past 24 hours have been reviewed.    Diagnostic Results:  US: Reviewed

## 2017-08-30 NOTE — PROGRESS NOTES
SW met with pt today to check on him after a conversation yesterday about feelings of sadness. Pt reports that he is feeling better today and trying to stay positive. Pt reports he is using the support of his friends and family to help get him through the transplant process. SW encourage pt to continue coping skills. SW remains available.

## 2017-08-30 NOTE — PROCEDURES
[facility]  Transfusion Medicine  Procedure Note    SUMMARY   Therapeutic Plasma Exchange (Apheresis)  Date/Time: 8/30/2017 4:18 PM  Performed by: EUFEMIA QUIROZ.  Authorized by: [employer]       Date of Procedure: 8/30/2017    Procedure: Plasma Exchange    Provider: Eufemia Quiroz MD     Assisting Provider: None    Pre-Procedure Diagnosis: Antibody mediated rejection of kidney transplant    Post-Procedure Diagnosis: Antibody mediated rejection of kidney transplant    Follow-up Assessment: Mr. Aguiar is 36 yo male with DDonor kidney transplant on 8/18.  After transplant, patient developed dysfunction and was found to have new DSA. A biopsy showed features of antibody mediated rejection (AMR).  We have been asked to begin a series of TPE management for his AMR kidney allograft.    Kidney transplant AMR carries a Category I Grade 1B indication for therapeutic plasma exchange via the 2016 Journal of Clinical Apheresis Guidelines (Jony ACOSTA et al. Journal of Clinical Apheresis 2016; 31:149-162.) The TPE plan is 6 daily exchanges as follows:      Antibody mediated rejection of kidney transplant     Access: Left upper thigh fistula   Number of Procedures: 6  Schedule: T, W, Th - pause for thymo infusion and restart on 9/5 Tues, followed by Th and Fri  Volume: 5.0 Liters  Replacement Fluid: Albumin  Recommended Laboratory Studies: Complete Blood Count      Interval History:  Procedure # 2 of 6 was well tolerated and without complications.  We will resume tomorrow 8/31 for next TPE. Tx # 2 and #3 will consist of 1.5 TPV plasma exchange.     Pertinent Laboratory Data:   Complete Blood Count:   Lab Results   Component Value Date    HGB 7.6 (L) 08/30/2017    HCT 22.6 (L) 08/30/2017     08/30/2017    WBC 5.28 08/30/2017     Comprehensive Metabolic Panel:   Lab Results   Component Value Date     08/30/2017    K 3.8 08/30/2017     08/30/2017    CO2 27 08/30/2017    GLU 94 08/30/2017     BUN 37 (H) 08/30/2017    CREATININE 8.3 (H) 08/30/2017    CALCIUM 9.0 08/30/2017    PROT 6.3 08/20/2017    ALBUMIN 3.6 08/30/2017    BILITOT 0.2 08/20/2017    ALKPHOS 471 (H) 08/20/2017    AST 27 08/20/2017    ALT <5 (L) 08/20/2017    ANIONGAP 11 08/30/2017    EGFRNONAA 7.4 (A) 08/30/2017       Pertinent Medications: None contraindicated    Review of patient's allergies indicates:  No Known Allergies    Anesthesia: None     Technical Procedures Used: Plasma Exchange: Volume exchanged - 5.0 Liters; Replacement fluid - Albumin; Number of procedures # 2 of 6; Date of next procedure 8/31/17.    Description of the Findings of the Procedure:     Please see Apheresis Nurse flowsheet for details.    The patient was evaluated and all clinical and laboratory data relevant to the treatment was reviewed, and a decision was made to proceed with the Apheresis procedure.    I was available to the clinical staff throughout the procedure.    Significant Surgical Tasks Conducted by the Assistant(s): Not applicable    Complications: None    Estimated Blood Loss (EBL): None    Implants: None     Specimens: None    Dylan Quiroz MD, MS  Section of Transfusion Medicine & Blood Bank  Department of Pathology and Laboratory Medicine  [facility]  [phone] (Blood Bank Offices)  08/30/2017

## 2017-08-30 NOTE — ASSESSMENT & PLAN NOTE
- Scheduled for HD MWF, last as outpt 8/28.    - Will hold off on HD today as pt made minimal urine today.    - Lasix 160 mg x 1 today.

## 2017-08-30 NOTE — ASSESSMENT & PLAN NOTE
- Maintenance IS with prograf, MMF, and prednisone taper.   - Will monitor for signs of toxic side effects, check daily Prograf troughs, and change meds accordingly.

## 2017-08-30 NOTE — PROCEDURES
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 After transplant, the patient developed dysfunction and was found to have new DSA. A biopsy showed features of antibody mkediated rejection, and we were asked to begin a series of exchanges.    Pertinent Laboratory Data:   Complete Blood Count:   Lab Results   Component Value Date    HGB 7.6 (L) 08/30/2017    HCT 22.6 (L) 08/30/2017     08/30/2017    WBC 5.28 08/30/2017     Comprehensive Metabolic Panel:   Lab Results   Component Value Date     08/30/2017    K 3.8 08/30/2017     08/30/2017    CO2 27 08/30/2017    GLU 94 08/30/2017    BUN 37 (H) 08/30/2017    CREATININE 8.3 (H) 08/30/2017    CALCIUM 9.0 08/30/2017    PROT 6.3 08/20/2017    ALBUMIN 3.6 08/30/2017    BILITOT 0.2 08/20/2017    ALKPHOS 471 (H) 08/20/2017    AST 27 08/20/2017    ALT <5 (L) 08/20/2017    ANIONGAP 11 08/30/2017    EGFRNONAA 7.4 (A) 08/30/2017       Pertinent Medications: None contraindicated    Review of patient's allergies indicates:  No Known Allergies    Anesthesia: None     Technical Procedures Used: Plasma Exchange: Volume exchanged - 3.5 Liters; Replacement fluid - Albumin; Number of procedures propbably 5, will check with Dr. Hines; Date of next procedure 8/30/17.    Description of the Findings of the Procedure:     Please see Apheresis Nurse flowsheet for details.    The patient was evaluated and all clinical and laboratory data relevant to the treatment was reviewed, and a decision was made to proceed with the Apheresis procedure.    I was available to the clinical staff  throughout the procedure.    Significant Surgical Tasks Conducted by the Assistant(s): Not applicable    Complications: None    Estimated Blood Loss (EBL): None    Implants: None     Specimens: None

## 2017-08-30 NOTE — PROGRESS NOTES
[facility]  Kidney Transplant  Progress Note      Reason for Follow-up: Reassessment of Kidney Transplant - 2017  (#1) recipient and management of immunosuppression.    ORGAN:  RIGHT KIDNEY   Donor Type:   - Brain Death     Subjective:     Demetrio Aguiar is a 37 y.o. 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 His maintenance immunosuppression includes a steroid taper per protocol to 5mg daily, Prograf, and Cellcept maintenance. His post op course has been complicated by DGF requiring HD (last HD 17) which prompted need for kidney biopsy on 17. He was then admitted for biopsy proven ABMR with weakly positive class I DSA's on 17. Biopsy 17: good sample with 24 glomeruli (only 1 sclerosed) and evidence of criteria for ABMR. + diffuse ATI; Minimal fibrosis, no ACR or AVR, but + CD4 (>50% diffusely + stain) with mild glomerulitis and focal peritubular capillaritis; also, biopsy shows ca-oxalate (3) and ca-phos (2) crystals. Since transplant, he has made minimal uop daily. He denies any current illnesses or sick contacts. Complains of expected post-op pain to RLQ incision. Plan is for PLEX 3-6 treatments.     Interval History: Pt tolerated PLEX #1 well via left femoral AV fistula.  Nausea with vomiting x 1 this AM which improved quickly with IV Zofran.  He feels this is secondary to eating quickly then taking all meds and nausea has not returned.   Patient has otherwise no complaints.  Pt's UOP has picked up slightly to 150 cc this AM- will push with Lasix and monitor.  Hold HD today.    Past Medical, Surgical, Family, and Social History:   Unchanged from H&P.    Review of  patient's allergies indicates:  No Known Allergies    Scheduled Meds:   docusate sodium  100 mg Oral TID    dolutegravir  50 mg Oral Daily    [START ON 9/4/2017] epoetin erika  10,000 Units Subcutaneous Every Mon    famotidine  20 mg Oral QHS    heparin (porcine)  5,000 Units Subcutaneous Q8H    ketoconazole  100 mg Oral Daily    multivitamin  1 tablet Oral Daily    mycophenolate  1,000 mg Oral BID    nystatin  500,000 Units Oral QID (PC + HS)    predniSONE  20 mg Oral Daily    rilpivirine  25 mg Oral Daily    rosuvastatin  20 mg Oral Daily    sodium chloride 0.9%  3 mL Intravenous Q8H    sulfamethoxazole-trimethoprim 400-80mg  1 tablet Oral Every Mon, Wed, Fri    tacrolimus  8 mg Oral BID    valganciclovir  450 mg Oral Every Mon, Wed, Fri     Continuous Infusions:     PRN Meds:bisacodyl, bisacodyl, ondansetron, oxycodone-acetaminophen, oxycodone-acetaminophen    Intake/Output - Last 3 Shifts       08/28 0700 - 08/29 0659 08/29 0700 - 08/30 0659 08/30 0700 - 08/31 0659    P.O. 180 1470 360    Blood  3757     Total Intake(mL/kg) 180 (2.5) 5227 (72.5) 360 (5)    Urine (mL/kg/hr) 0 150 (0.1) 150 (0.3)    Emesis/NG output   0 (0)    Stool 0 0 (0)     Blood  3518 (2)     Total Output 0 3668 150    Net +180 +1559 +210           Stool Occurrence 0 x 0 x     Emesis Occurrence   1 x           Review of Systems   Constitutional: Negative for activity change, appetite change, chills, fatigue and fever.   HENT: Negative for congestion and facial swelling.    Eyes: Negative for pain, discharge and visual disturbance.   Respiratory: Negative for cough, chest tightness, shortness of breath and wheezing.    Cardiovascular: Negative for chest pain, palpitations and leg swelling.   Gastrointestinal: Positive for nausea and vomiting. Negative for abdominal distention, abdominal pain, constipation and diarrhea.   Endocrine: Negative.    Genitourinary: Negative for decreased urine volume, dysuria, flank pain and  "hematuria.   Musculoskeletal: Negative for gait problem.   Skin: Positive for wound.   Allergic/Immunologic: Positive for immunocompromised state.   Neurological: Negative for dizziness, tremors, light-headedness and headaches.   Hematological: Negative.    Psychiatric/Behavioral: Negative for behavioral problems and dysphoric mood.      Objective:     Vital Signs (Most Recent):  Temp: 97.1 °F (36.2 °C) (08/30/17 1250)  Pulse: 79 (08/30/17 1346)  Resp: 17 (08/30/17 1147)  BP: 120/72 (08/30/17 1346)  SpO2: 97 % (08/30/17 1147) Vital Signs (24h Range):  Temp:  [97.1 °F (36.2 °C)-98.4 °F (36.9 °C)] 97.1 °F (36.2 °C)  Pulse:  [73-90] 79  Resp:  [16-18] 17  SpO2:  [96 %-98 %] 97 %  BP: (116-145)/(68-81) 120/72     Weight: 72.1 kg (159 lb)  Height: 5' 7" (170.2 cm)  Body mass index is 24.9 kg/m².    Physical Exam   Constitutional: He is oriented to person, place, and time. He appears well-developed. No distress.   HENT:   Head: Normocephalic and atraumatic.   Mouth/Throat: No oropharyngeal exudate.   Eyes: EOM are normal. Pupils are equal, round, and reactive to light.   Neck: Normal range of motion. Neck supple. No JVD present.   Cardiovascular: Normal rate and regular rhythm.    No murmur heard.  Pulmonary/Chest: Effort normal and breath sounds normal. No respiratory distress. He has no rales.   Abdominal: Soft. Bowel sounds are normal. He exhibits no distension. There is no tenderness. There is no guarding.   RLQ inc with staples intact CDI no SSI     Musculoskeletal: Normal range of motion. He exhibits no edema.   Neurological: He is alert and oriented to person, place, and time.   Skin: Skin is warm and dry. Capillary refill takes 2 to 3 seconds. He is not diaphoretic. No erythema.   Psychiatric: He has a normal mood and affect. His behavior is normal. Judgment and thought content normal.   Nursing note and vitals reviewed.      Laboratory:  CBC:     Recent Labs  Lab 08/30/17  0527   WBC 5.28   RBC 2.61*   HGB 7.6* "   HCT 22.6*      MCV 87   MCH 29.1   MCHC 33.6     BMP:     Recent Labs  Lab 08/30/17  0527   GLU 94      K 3.8      CO2 27   BUN 37*   CREATININE 8.3*   CALCIUM 9.0     Tacrolimus Lvl   Date Value Ref Range Status   08/30/2017 5.9 5.0 - 15.0 ng/mL Final     Comment:     Testing performed by Liquid Chromatography-Tandem  Mass Spectrometry (LC-MS/MS).  This test was developed and its performance characteristics  determined by [facility], Department of Pathology  and Laboratory Medicine in a manner consistent with CLIA  requirements. It has not been cleared or approved by the [place]  Food and Drug Administration.  This test is used for clinical   purposes.  It should not be regarded as investigational or for  research.     08/29/2017 6.9 5.0 - 15.0 ng/mL Final     Comment:     Testing performed by Liquid Chromatography-Tandem  Mass Spectrometry (LC-MS/MS).  This test was developed and its performance characteristics  determined by [facility], Department of Pathology  and Laboratory Medicine in a manner consistent with CLIA  requirements. It has not been cleared or approved by the [place]  Food and Drug Administration.  This test is used for clinical   purposes.  It should not be regarded as investigational or for  research.     08/28/2017 6.5 5.0 - 15.0 ng/mL Final     Comment:     Testing performed by Liquid Chromatography-Tandem  Mass Spectrometry (LC-MS/MS).  This test was developed and its performance characteristics  determined by [facility], Department of Pathology  and Laboratory Medicine in a manner consistent with CLIA  requirements. It has not been cleared or approved by the [place]  Food and Drug Administration.  This test is used for clinical   purposes.  It should not be regarded as investigational or for  research.     08/26/2017 9.4 5.0 - 15.0 ng/mL Final     Comment:     Testing performed by Liquid Chromatography-Tandem  Mass Spectrometry (LC-MS/MS).  This test  was developed and its performance characteristics  determined by [facility], Department of Pathology  and Laboratory Medicine in a manner consistent with CLIA  requirements. It has not been cleared or approved by the [place]  Food and Drug Administration.  This test is used for clinical   purposes.  It should not be regarded as investigational or for  research.         Labs within the past 24 hours have been reviewed.    Diagnostic Results:  US: Reviewed    Assessment/Plan:     * Antibody mediated rejection of kidney transplant    - Biopsy (for DGF) 17: good sample with 24 glomeruli (only 1 sclerosed) and evidence just meeting criteria for ABMR. + diffuse ATI. Minimal fibrosis, no ACR or AVR, but + CD4 (>50% diffusely + stain) with mild glomerulitis and focal peritubular capillaritis. Also, biopsy shows ca-oxalate (3) and ca-phos (2) crystals.  -17 DSA + for weak class I. Repeat pre-PLEX Class I DSA DETECTED: A2(3894),B44(2820),B62(5178),    Class II DSA DETECTED BELOW CUTOFF: DR53(1265)  - Tolerated PLEX #1 .  PLEX #2/6 today.    - Plan for IVIG after PLEX.  Any Thymo will depend on how Cr responds w PLEX. Monitor for hypocalcemia and hyponatremia.           donor kidney transplant 2017    - Minimal UOP though improving.    - See ABMR and DGF.  - HD M-W-F, last HD   - Hold HD today.          Delayed graft function of kidney    - Scheduled for HD MWF, last as outpt .    - Will hold off on HD today as pt made minimal urine today.    - Lasix 160 mg x 1 today.          Long-term use of immunosuppressant medication    - Maintenance IS with prograf, MMF, and prednisone taper.   - Will monitor for signs of toxic side effects, check daily Prograf troughs, and change meds accordingly.        Need for prophylactic immunotherapy              HIV (human immunodeficiency virus infection)    - Last CD4 count 7 () and HIV PCR undetected < 40 copies/ml ().  - Continue HAART with  dolutegravir (Tivicay) and rilpivirine (Edurant).    - HIV meds to be given after PLEX- meds highly protein bond and can be removed w plasmapheresis.          At risk for opportunistic infections    - Continue Valcyte for CMV prophylaxis  - Continue Bactrim for PJP/PCP prophylaxis  - Continue nystatin for fungal prophylaxis          Nausea & vomiting    - Improved with Zofran x 1.  - Hold cinacalcet as pt with normal Ca and mildly elevated PTH levels.          Anemia in ESRD (end-stage renal disease)    - H/H 7.6/23 this AM.  - Fe panel with elevated ferritin and low Fe stores.  - Continue weekly epo.    - Monitor.              Discharge Planning:  [address]      Oskar Carlin PA-C  Kidney Transplant  [facility]

## 2017-08-31 LAB
ALBUMIN SERPL BCP-MCNC: 3.8 G/DL
ANION GAP SERPL CALC-SCNC: 14 MMOL/L
BASOPHILS # BLD AUTO: 0 K/UL
BASOPHILS NFR BLD: 0 %
BUN SERPL-MCNC: 44 MG/DL
CALCIUM SERPL-MCNC: 8 MG/DL
CHLORIDE SERPL-SCNC: 100 MMOL/L
CO2 SERPL-SCNC: 21 MMOL/L
CREAT SERPL-MCNC: 9 MG/DL
DIFFERENTIAL METHOD: ABNORMAL
EOSINOPHIL # BLD AUTO: 0 K/UL
EOSINOPHIL NFR BLD: 0.4 %
ERYTHROCYTE [DISTWIDTH] IN BLOOD BY AUTOMATED COUNT: 16.3 %
EST. GFR  (AFRICAN AMERICAN): 7.7 ML/MIN/1.73 M^2
EST. GFR  (NON AFRICAN AMERICAN): 6.7 ML/MIN/1.73 M^2
GLUCOSE SERPL-MCNC: 115 MG/DL
HCT VFR BLD AUTO: 22.1 %
HGB BLD-MCNC: 7.6 G/DL
LYMPHOCYTES # BLD AUTO: 0.3 K/UL
LYMPHOCYTES NFR BLD: 3.5 %
MAGNESIUM SERPL-MCNC: 2.1 MG/DL
MCH RBC QN AUTO: 29.5 PG
MCHC RBC AUTO-ENTMCNC: 34.4 G/DL
MCV RBC AUTO: 86 FL
MONOCYTES # BLD AUTO: 0.2 K/UL
MONOCYTES NFR BLD: 2.2 %
NEUTROPHILS # BLD AUTO: 6.6 K/UL
NEUTROPHILS NFR BLD: 93.1 %
PHOSPHATE SERPL-MCNC: 5.8 MG/DL
PLATELET # BLD AUTO: 162 K/UL
PMV BLD AUTO: 10.2 FL
POTASSIUM SERPL-SCNC: 4.2 MMOL/L
RBC # BLD AUTO: 2.58 M/UL
SODIUM SERPL-SCNC: 135 MMOL/L
TACROLIMUS BLD-MCNC: 13.9 NG/ML
WBC # BLD AUTO: 7.12 K/UL

## 2017-08-31 PROCEDURE — 85025 COMPLETE CBC W/AUTO DIFF WBC: CPT

## 2017-08-31 PROCEDURE — 25000003 PHARM REV CODE 250: Performed by: NURSE PRACTITIONER

## 2017-08-31 PROCEDURE — 86352 CELL FUNCTION ASSAY W/STIM: CPT

## 2017-08-31 PROCEDURE — P9045 ALBUMIN (HUMAN), 5%, 250 ML: HCPCS | Performed by: PATHOLOGY

## 2017-08-31 PROCEDURE — 63600175 PHARM REV CODE 636 W HCPCS: Performed by: NURSE PRACTITIONER

## 2017-08-31 PROCEDURE — 36415 COLL VENOUS BLD VENIPUNCTURE: CPT

## 2017-08-31 PROCEDURE — 20600001 HC STEP DOWN PRIVATE ROOM

## 2017-08-31 PROCEDURE — 36514 APHERESIS PLASMA: CPT

## 2017-08-31 PROCEDURE — 80197 ASSAY OF TACROLIMUS: CPT

## 2017-08-31 PROCEDURE — 80069 RENAL FUNCTION PANEL: CPT

## 2017-08-31 PROCEDURE — 36514 APHERESIS PLASMA: CPT | Mod: ,,, | Performed by: PATHOLOGY

## 2017-08-31 PROCEDURE — 99233 SBSQ HOSP IP/OBS HIGH 50: CPT | Mod: ,,, | Performed by: NURSE PRACTITIONER

## 2017-08-31 PROCEDURE — A4216 STERILE WATER/SALINE, 10 ML: HCPCS | Performed by: NURSE PRACTITIONER

## 2017-08-31 PROCEDURE — 63600175 PHARM REV CODE 636 W HCPCS: Performed by: PATHOLOGY

## 2017-08-31 PROCEDURE — 63600175 PHARM REV CODE 636 W HCPCS: Performed by: PHYSICIAN ASSISTANT

## 2017-08-31 PROCEDURE — 83735 ASSAY OF MAGNESIUM: CPT

## 2017-08-31 PROCEDURE — 87536 HIV-1 QUANT&REVRSE TRNSCRPJ: CPT

## 2017-08-31 PROCEDURE — 25000003 PHARM REV CODE 250: Performed by: PATHOLOGY

## 2017-08-31 RX ORDER — METOLAZONE 2.5 MG/1
2.5 TABLET ORAL ONCE
Status: COMPLETED | OUTPATIENT
Start: 2017-08-31 | End: 2017-08-31

## 2017-08-31 RX ORDER — FUROSEMIDE 10 MG/ML
60 INJECTION INTRAMUSCULAR; INTRAVENOUS 3 TIMES DAILY
Status: DISCONTINUED | OUTPATIENT
Start: 2017-08-31 | End: 2017-09-01

## 2017-08-31 RX ORDER — SEVELAMER CARBONATE 800 MG/1
800 TABLET, FILM COATED ORAL
Status: DISCONTINUED | OUTPATIENT
Start: 2017-08-31 | End: 2017-09-05

## 2017-08-31 RX ORDER — ALBUMIN HUMAN 50 G/1000ML
250 SOLUTION INTRAVENOUS ONCE
Status: COMPLETED | OUTPATIENT
Start: 2017-08-31 | End: 2017-08-31

## 2017-08-31 RX ORDER — CALCIUM CARBONATE 500(1250)
500 TABLET ORAL ONCE
Status: COMPLETED | OUTPATIENT
Start: 2017-08-31 | End: 2017-08-31

## 2017-08-31 RX ADMIN — SEVELAMER CARBONATE 800 MG: 800 TABLET, FILM COATED ORAL at 12:08

## 2017-08-31 RX ADMIN — CALCIUM GLUCONATE 2000 MG: 94 INJECTION, SOLUTION INTRAVENOUS at 02:08

## 2017-08-31 RX ADMIN — PREDNISONE 20 MG: 20 TABLET ORAL at 08:08

## 2017-08-31 RX ADMIN — ROSUVASTATIN CALCIUM 20 MG: 10 TABLET ORAL at 08:08

## 2017-08-31 RX ADMIN — NYSTATIN 500000 UNITS: 500000 SUSPENSION ORAL at 09:08

## 2017-08-31 RX ADMIN — ALBUMIN (HUMAN) 250 G: 12.5 SOLUTION INTRAVENOUS at 01:08

## 2017-08-31 RX ADMIN — TACROLIMUS 8 MG: 5 CAPSULE ORAL at 08:08

## 2017-08-31 RX ADMIN — Medication 3 ML: at 09:08

## 2017-08-31 RX ADMIN — FUROSEMIDE 60 MG: 10 INJECTION, SOLUTION INTRAVENOUS at 10:08

## 2017-08-31 RX ADMIN — NYSTATIN 500000 UNITS: 500000 SUSPENSION ORAL at 08:08

## 2017-08-31 RX ADMIN — METOLAZONE 2.5 MG: 2.5 TABLET ORAL at 10:08

## 2017-08-31 RX ADMIN — DOCUSATE SODIUM 100 MG: 50 CAPSULE, LIQUID FILLED ORAL at 09:08

## 2017-08-31 RX ADMIN — SEVELAMER CARBONATE 800 MG: 800 TABLET, FILM COATED ORAL at 04:08

## 2017-08-31 RX ADMIN — Medication 100 MG: at 08:08

## 2017-08-31 RX ADMIN — MYCOPHENOLATE MOFETIL 1000 MG: 250 CAPSULE ORAL at 09:08

## 2017-08-31 RX ADMIN — CALCIUM 500 MG: 500 TABLET ORAL at 10:08

## 2017-08-31 RX ADMIN — ONDANSETRON 4 MG: 2 INJECTION INTRAMUSCULAR; INTRAVENOUS at 06:08

## 2017-08-31 RX ADMIN — NYSTATIN 500000 UNITS: 500000 SUSPENSION ORAL at 04:08

## 2017-08-31 RX ADMIN — FUROSEMIDE 60 MG: 10 INJECTION, SOLUTION INTRAVENOUS at 09:08

## 2017-08-31 RX ADMIN — ONDANSETRON 4 MG: 2 INJECTION INTRAMUSCULAR; INTRAVENOUS at 08:08

## 2017-08-31 RX ADMIN — DOCUSATE SODIUM 100 MG: 50 CAPSULE, LIQUID FILLED ORAL at 05:08

## 2017-08-31 RX ADMIN — DOLUTEGRAVIR SODIUM 50 MG: 50 TABLET, FILM COATED ORAL at 04:08

## 2017-08-31 RX ADMIN — MYCOPHENOLATE MOFETIL 1000 MG: 250 CAPSULE ORAL at 08:08

## 2017-08-31 RX ADMIN — THERA TABS 1 TABLET: TAB at 08:08

## 2017-08-31 RX ADMIN — HEPARIN SODIUM 5000 UNITS: 5000 INJECTION, SOLUTION INTRAVENOUS; SUBCUTANEOUS at 09:08

## 2017-08-31 RX ADMIN — FAMOTIDINE 20 MG: 20 TABLET, FILM COATED ORAL at 09:08

## 2017-08-31 RX ADMIN — HEPARIN SODIUM 5000 UNITS: 5000 INJECTION, SOLUTION INTRAVENOUS; SUBCUTANEOUS at 05:08

## 2017-08-31 NOTE — ASSESSMENT & PLAN NOTE
- H/H stable at 7.6/22.1.  - Fe panel with elevated ferritin and low Fe stores.  - Continue weekly epo.    - Monitor.

## 2017-08-31 NOTE — CLINICAL REVIEW
S/p kidney transplant 8/18/2017  AMR   mild hyponatremia  Mild hypocalcemia  DGF improved urine output  Hyperphosphatemia  Anemia with low saturation and high ferritin    Lasix 60 mg IV tid  zoroxolyn 2.5 mg today  PLEX today  Hold HD today   Prograf to he held today due to higher level on ketoconazole  renvela   Daily labs  Will review Cylex  Will d/w ID need for need for MAC prophylaxis  Single dose calcium carboanate 1,000 mg now  Ionized calcium in am

## 2017-08-31 NOTE — ASSESSMENT & PLAN NOTE
- Biopsy (for DGF) 8/25/17: good sample with 24 glomeruli (only 1 sclerosed) and evidence just meeting criteria for ABMR. + diffuse ATI. Minimal fibrosis, no ACR or AVR, but + CD4 (>50% diffusely + stain) with mild glomerulitis and focal peritubular capillaritis. Also, biopsy shows ca-oxalate (3) and ca-phos (2) crystals.  -8/25/17 DSA + for weak class I. Repeat pre-PLEX Class I DSA DETECTED: A2(3894),B44(2820),B62(5178),    Class II DSA DETECTED BELOW CUTOFF: DR53(1265)  - Tolerated PLEX #1 8/29, #2 8/30.  Plan for PLEX #3 today.  Plan fot total of 6 tentatively.    - Plan for IVIG after PLEX.  Discussed use of Thymo in immunocompromised patient w ID- HIV well managed, would be ok to give Thymo.  WILL NEED TO ASK ID IF MAC prophylaxis needed if give Thymo.  May consider SM in place of Thymo.  Cont to monitor for hypocalcemia and hyponatremia.

## 2017-08-31 NOTE — PROGRESS NOTES
Plasmapheresis tx #3 started at 1345 without difficulty. Successfully accessed left upper thigh fistula prior to start of treatment. Pt oriented x4.  5.0 Liter exchange.  Replacement fluids 5% Albumin. Pt stated no pain today. 2 gms calcium gluconate IVPB infused throughout treatment. Tx ended at 1553. Pt tolerated tx well. Dialysis needles removed X2, pressure applied until hemostasis maintained.  Next tx 09/01/17

## 2017-08-31 NOTE — PLAN OF CARE
Problem: Patient Care Overview  Goal: Plan of Care Review  Outcome: Ongoing (interventions implemented as appropriate)  AAOX4.  VSS.  Pt denies any nausea at this time.  PIV is intact and free of infection.  Plan for 3rd dose of plex in the morning and possible HD.  Bed is in lowest position, call bell is in reach, and pt instructed to call nurse when needing assistance.  Will continue to monitor.

## 2017-08-31 NOTE — SUBJECTIVE & OBJECTIVE
Subjective:     Demetrio Aguiar is a 37 y.o. 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 His maintenance immunosuppression includes a steroid taper per protocol to 5mg daily, Prograf, and Cellcept maintenance. His post op course has been complicated by DGF requiring HD (last HD 8/28/17) which prompted need for kidney biopsy on 8/25/17. 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 Biopsy 8/25/17: good sample with 24 glomeruli (only 1 sclerosed) and evidence of criteria for ABMR. + diffuse ATI; Minimal fibrosis, no ACR or AVR, but + CD4 (>50% diffusely + stain) with mild glomerulitis and focal peritubular capillaritis; also, biopsy shows ca-oxalate (3) and ca-phos (2) crystals. Since transplant, he has made minimal uop daily. He denies any current illnesses or sick contacts. Complains of expected post-op pain to RLQ incision. Plan is for PLEX 3-6 treatments.     Interval History: Pt tolerated PLEX #2 well via left femoral AV fistula yesterday.  Cont w nausea, no vomiting today.  Nausea managed w Zofran PRN.  Appetite otherwise good.  Diuresed well w lasix 160 mg.   ml/24 hr.  Plan for Lasix 60 mg tid,  No HD.  Last HD 8/29/17.  Monitor.    Past Medical, Surgical, Family, and Social History:   Unchanged from H&P.    Review of patient's allergies indicates:  No Known Allergies    Scheduled Meds:   docusate sodium  100 mg Oral TID    dolutegravir  50 mg Oral Daily    [START ON 9/4/2017] epoetin erika  10,000 Units Subcutaneous Every Mon    famotidine  20 mg Oral QHS    furosemide  60 mg Intravenous TID    heparin (porcine)  5,000 Units Subcutaneous Q8H    ketoconazole   100 mg Oral Daily    multivitamin  1 tablet Oral Daily    mycophenolate  1,000 mg Oral BID    nystatin  500,000 Units Oral QID (PC + HS)    predniSONE  20 mg Oral Daily    rilpivirine  25 mg Oral Daily    rosuvastatin  20 mg Oral Daily    sevelamer carbonate  800 mg Oral TID WM    sodium chloride 0.9%  3 mL Intravenous Q8H    sulfamethoxazole-trimethoprim 400-80mg  1 tablet Oral Every Mon, Wed, Fri    valganciclovir  450 mg Oral Every Mon, Wed, Fri     Continuous Infusions:     PRN Meds:bisacodyl, bisacodyl, ondansetron, oxycodone-acetaminophen, oxycodone-acetaminophen    Intake/Output - Last 3 Shifts       08/29 0700 - 08/30 0659 08/30 0700 - 08/31 0659 08/31 0700 - 09/01 0659    P.O. 1470 700 600    Blood 3757 5288 5444    Total Intake(mL/kg) 5227 (72.5) 5988 (83.1) 6044 (83.8)    Urine (mL/kg/hr) 150 (0.1) 575 (0.3) 400 (0.5)    Emesis/NG output  0 (0)     Stool 0 (0) 0 (0)     Blood 3518 (2) 5032 (2.9) 5111 (6.7)    Total Output 3668 5607 5511    Net +1559 +381 +533           Stool Occurrence 0 x 0 x     Emesis Occurrence  1 x            Review of Systems   Constitutional: Negative for activity change, appetite change, chills, fatigue and fever.   HENT: Negative for congestion and facial swelling.    Eyes: Negative for pain, discharge and visual disturbance.   Respiratory: Negative for cough, chest tightness, shortness of breath and wheezing.    Cardiovascular: Negative for chest pain, palpitations and leg swelling.   Gastrointestinal: Positive for nausea. Negative for abdominal distention, abdominal pain, constipation, diarrhea and vomiting.   Endocrine: Negative.    Genitourinary: Negative for decreased urine volume, dysuria, flank pain and hematuria.   Musculoskeletal: Negative for gait problem.   Skin: Positive for wound.   Allergic/Immunologic: Positive for immunocompromised state.   Neurological: Negative for dizziness, tremors, light-headedness and headaches.   Hematological: Negative.   "  Psychiatric/Behavioral: Negative for behavioral problems and dysphoric mood.      Objective:     Vital Signs (Most Recent):  Temp: 97.9 °F (36.6 °C) (08/31/17 1453)  Pulse: 76 (08/31/17 1453)  Resp: 16 (08/31/17 1318)  BP: 120/74 (08/31/17 1453)  SpO2: 97 % (08/31/17 1318) Vital Signs (24h Range):  Temp:  [97.2 °F (36.2 °C)-98.4 °F (36.9 °C)] 97.9 °F (36.6 °C)  Pulse:  [73-80] 76  Resp:  [16-18] 16  SpO2:  [95 %-99 %] 97 %  BP: (107-127)/(62-76) 120/74     Weight: 72.1 kg (159 lb)  Height: 5' 7" (170.2 cm)  Body mass index is 24.9 kg/m².    Physical Exam   Constitutional: He is oriented to person, place, and time. He appears well-developed. No distress.   HENT:   Head: Normocephalic and atraumatic.   Mouth/Throat: No oropharyngeal exudate.   Eyes: EOM are normal. Pupils are equal, round, and reactive to light.   Neck: Normal range of motion. Neck supple. No JVD present.   Cardiovascular: Normal rate and regular rhythm.    No murmur heard.  Pulmonary/Chest: Effort normal and breath sounds normal. No respiratory distress. He has no rales.   Abdominal: Soft. Bowel sounds are normal. He exhibits no distension. There is no tenderness. There is no guarding.   RLQ inc with staples intact CDI no SSI     Musculoskeletal: Normal range of motion. He exhibits no edema.   Neurological: He is alert and oriented to person, place, and time.   Skin: Skin is warm and dry. Capillary refill takes 2 to 3 seconds. He is not diaphoretic. No erythema.   Psychiatric: He has a normal mood and affect. His behavior is normal. Judgment and thought content normal.   Nursing note and vitals reviewed.      Laboratory:  CBC:     Recent Labs  Lab 08/31/17  0345   WBC 7.12   RBC 2.58*   HGB 7.6*   HCT 22.1*      MCV 86   MCH 29.5   MCHC 34.4     BMP:     Recent Labs  Lab 08/31/17  0345   *   *   K 4.2      CO2 21*   BUN 44*   CREATININE 9.0*   CALCIUM 8.0*     Tacrolimus Lvl   Date Value Ref Range Status   08/31/2017 13.9 " 5.0 - 15.0 ng/mL Final     Comment:     Testing performed by Liquid Chromatography-Tandem  Mass Spectrometry (LC-MS/MS).  This test was developed and its performance characteristics  determined by [facility], Department of Pathology  and Laboratory Medicine in a manner consistent with CLIA  requirements. It has not been cleared or approved by the [place]  Food and Drug Administration.  This test is used for clinical   purposes.  It should not be regarded as investigational or for  research.     08/30/2017 5.9 5.0 - 15.0 ng/mL Final     Comment:     Testing performed by Liquid Chromatography-Tandem  Mass Spectrometry (LC-MS/MS).  This test was developed and its performance characteristics  determined by [facility], Department of Pathology  and Laboratory Medicine in a manner consistent with CLIA  requirements. It has not been cleared or approved by the [place]  Food and Drug Administration.  This test is used for clinical   purposes.  It should not be regarded as investigational or for  research.     08/29/2017 6.9 5.0 - 15.0 ng/mL Final     Comment:     Testing performed by Liquid Chromatography-Tandem  Mass Spectrometry (LC-MS/MS).  This test was developed and its performance characteristics  determined by [facility], Department of Pathology  and Laboratory Medicine in a manner consistent with CLIA  requirements. It has not been cleared or approved by the [place]  Food and Drug Administration.  This test is used for clinical   purposes.  It should not be regarded as investigational or for  research.     08/28/2017 6.5 5.0 - 15.0 ng/mL Final     Comment:     Testing performed by Liquid Chromatography-Tandem  Mass Spectrometry (LC-MS/MS).  This test was developed and its performance characteristics  determined by [facility], Department of Pathology  and Laboratory Medicine in a manner consistent with CLIA  requirements. It has not been cleared or approved by the [place]  Food and Drug  Administration.  This test is used for clinical   purposes.  It should not be regarded as investigational or for  research.         Labs within the past 24 hours have been reviewed.    Diagnostic Results:  US: Reviewed

## 2017-08-31 NOTE — ASSESSMENT & PLAN NOTE
- Last CD4 count 7 (8/20) and HIV PCR undetected < 40 copies/ml (8/19).  - Continue HAART with dolutegravir (Tivicay) and rilpivirine (Edurant).    - HIV meds to be given after PLEX- meds highly protein bond and can be removed w plasmapheresis.  - Plan for HIV RNA and Immune cyclex to further assess immune system while considering Thymo.

## 2017-08-31 NOTE — ASSESSMENT & PLAN NOTE
- Scheduled for HD MWF, last HD outpt 8/28.    - No HD today as UOP increased overnight w Lasix.    - Lasix 60 mg tid and Zaroxolyn 2.5mg today.

## 2017-08-31 NOTE — ASSESSMENT & PLAN NOTE
- Diuresed 575 ml of urine w LASIX 160 mg- most patient has made since txp.  Plan for Lasix 60 mg tid today and cont strict I/O.     - See ABMR and DGF.  - HD M-W-F, last HD 8/28  - Hold HD today.

## 2017-08-31 NOTE — PROGRESS NOTES
[facility]  Kidney Transplant  Progress Note      Reason for Follow-up: Reassessment of Kidney Transplant - 2017  (#1) recipient and management of immunosuppression.    ORGAN:  RIGHT KIDNEY   Donor Type:   - Brain Death   PHS Increased Risk: yes   Cold Ischemia:   Induction Medications: Thymoglobulin      Subjective:     Demetrio Aguiar is a 37 y.o. 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 His maintenance immunosuppression includes a steroid taper per protocol to 5mg daily, Prograf, and Cellcept maintenance. His post op course has been complicated by DGF requiring HD (last HD 17) which prompted need for kidney biopsy on 17. He was then admitted for biopsy proven ABMR with weakly positive class I DSA's on 17. Biopsy 17: good sample with 24 glomeruli (only 1 sclerosed) and evidence of criteria for ABMR. + diffuse ATI; Minimal fibrosis, no ACR or AVR, but + CD4 (>50% diffusely + stain) with mild glomerulitis and focal peritubular capillaritis; also, biopsy shows ca-oxalate (3) and ca-phos (2) crystals. Since transplant, he has made minimal uop daily. He denies any current illnesses or sick contacts. Complains of expected post-op pain to RLQ incision. Plan is for PLEX 3-6 treatments.     Interval History: Pt tolerated PLEX #2 well via left femoral AV fistula yesterday.  Cont w nausea, no vomiting today.  Nausea managed w Zofran PRN.  Appetite otherwise good.  Diuresed well w lasix 160 mg.   ml/24 hr.  Plan for Lasix 60 mg tid,  No HD.  Last HD 17.  Monitor.    Past Medical, Surgical, Family, and Social History:   Unchanged from H&P.    Review of patient's  allergies indicates:  No Known Allergies    Scheduled Meds:   docusate sodium  100 mg Oral TID    dolutegravir  50 mg Oral Daily    [START ON 9/4/2017] epoetin erika  10,000 Units Subcutaneous Every Mon    famotidine  20 mg Oral QHS    furosemide  60 mg Intravenous TID    heparin (porcine)  5,000 Units Subcutaneous Q8H    ketoconazole  100 mg Oral Daily    multivitamin  1 tablet Oral Daily    mycophenolate  1,000 mg Oral BID    nystatin  500,000 Units Oral QID (PC + HS)    predniSONE  20 mg Oral Daily    rilpivirine  25 mg Oral Daily    rosuvastatin  20 mg Oral Daily    sevelamer carbonate  800 mg Oral TID WM    sodium chloride 0.9%  3 mL Intravenous Q8H    sulfamethoxazole-trimethoprim 400-80mg  1 tablet Oral Every Mon, Wed, Fri    valganciclovir  450 mg Oral Every Mon, Wed, Fri     Continuous Infusions:     PRN Meds:bisacodyl, bisacodyl, ondansetron, oxycodone-acetaminophen, oxycodone-acetaminophen    Intake/Output - Last 3 Shifts       08/29 0700 - 08/30 0659 08/30 0700 - 08/31 0659 08/31 0700 - 09/01 0659    P.O. 1470 700 600    Blood 3757 5288 5444    Total Intake(mL/kg) 5227 (72.5) 5988 (83.1) 6044 (83.8)    Urine (mL/kg/hr) 150 (0.1) 575 (0.3) 400 (0.5)    Emesis/NG output  0 (0)     Stool 0 (0) 0 (0)     Blood 3518 (2) 5032 (2.9) 5111 (6.7)    Total Output 3668 5607 5511    Net +1559 +381 +533           Stool Occurrence 0 x 0 x     Emesis Occurrence  1 x            Review of Systems   Constitutional: Negative for activity change, appetite change, chills, fatigue and fever.   HENT: Negative for congestion and facial swelling.    Eyes: Negative for pain, discharge and visual disturbance.   Respiratory: Negative for cough, chest tightness, shortness of breath and wheezing.    Cardiovascular: Negative for chest pain, palpitations and leg swelling.   Gastrointestinal: Positive for nausea. Negative for abdominal distention, abdominal pain, constipation, diarrhea and vomiting.   Endocrine: Negative.  "   Genitourinary: Negative for decreased urine volume, dysuria, flank pain and hematuria.   Musculoskeletal: Negative for gait problem.   Skin: Positive for wound.   Allergic/Immunologic: Positive for immunocompromised state.   Neurological: Negative for dizziness, tremors, light-headedness and headaches.   Hematological: Negative.    Psychiatric/Behavioral: Negative for behavioral problems and dysphoric mood.      Objective:     Vital Signs (Most Recent):  Temp: 97.9 °F (36.6 °C) (08/31/17 1453)  Pulse: 76 (08/31/17 1453)  Resp: 16 (08/31/17 1318)  BP: 120/74 (08/31/17 1453)  SpO2: 97 % (08/31/17 1318) Vital Signs (24h Range):  Temp:  [97.2 °F (36.2 °C)-98.4 °F (36.9 °C)] 97.9 °F (36.6 °C)  Pulse:  [73-80] 76  Resp:  [16-18] 16  SpO2:  [95 %-99 %] 97 %  BP: (107-127)/(62-76) 120/74     Weight: 72.1 kg (159 lb)  Height: 5' 7" (170.2 cm)  Body mass index is 24.9 kg/m².    Physical Exam   Constitutional: He is oriented to person, place, and time. He appears well-developed. No distress.   HENT:   Head: Normocephalic and atraumatic.   Mouth/Throat: No oropharyngeal exudate.   Eyes: EOM are normal. Pupils are equal, round, and reactive to light.   Neck: Normal range of motion. Neck supple. No JVD present.   Cardiovascular: Normal rate and regular rhythm.    No murmur heard.  Pulmonary/Chest: Effort normal and breath sounds normal. No respiratory distress. He has no rales.   Abdominal: Soft. Bowel sounds are normal. He exhibits no distension. There is no tenderness. There is no guarding.   RLQ inc with staples intact CDI no SSI     Musculoskeletal: Normal range of motion. He exhibits no edema.   Neurological: He is alert and oriented to person, place, and time.   Skin: Skin is warm and dry. Capillary refill takes 2 to 3 seconds. He is not diaphoretic. No erythema.   Psychiatric: He has a normal mood and affect. His behavior is normal. Judgment and thought content normal.   Nursing note and vitals " reviewed.      Laboratory:  CBC:     Recent Labs  Lab 08/31/17  0345   WBC 7.12   RBC 2.58*   HGB 7.6*   HCT 22.1*      MCV 86   MCH 29.5   MCHC 34.4     BMP:     Recent Labs  Lab 08/31/17  0345   *   *   K 4.2      CO2 21*   BUN 44*   CREATININE 9.0*   CALCIUM 8.0*     Tacrolimus Lvl   Date Value Ref Range Status   08/31/2017 13.9 5.0 - 15.0 ng/mL Final     Comment:     Testing performed by Liquid Chromatography-Tandem  Mass Spectrometry (LC-MS/MS).  This test was developed and its performance characteristics  determined by [facility], Department of Pathology  and Laboratory Medicine in a manner consistent with CLIA  requirements. It has not been cleared or approved by the [place]  Food and Drug Administration.  This test is used for clinical   purposes.  It should not be regarded as investigational or for  research.     08/30/2017 5.9 5.0 - 15.0 ng/mL Final     Comment:     Testing performed by Liquid Chromatography-Tandem  Mass Spectrometry (LC-MS/MS).  This test was developed and its performance characteristics  determined by [facility], Department of Pathology  and Laboratory Medicine in a manner consistent with CLIA  requirements. It has not been cleared or approved by the [place]  Food and Drug Administration.  This test is used for clinical   purposes.  It should not be regarded as investigational or for  research.     08/29/2017 6.9 5.0 - 15.0 ng/mL Final     Comment:     Testing performed by Liquid Chromatography-Tandem  Mass Spectrometry (LC-MS/MS).  This test was developed and its performance characteristics  determined by [facility], Department of Pathology  and Laboratory Medicine in a manner consistent with CLIA  requirements. It has not been cleared or approved by the [place]  Food and Drug Administration.  This test is used for clinical   purposes.  It should not be regarded as investigational or for  research.     08/28/2017 6.5 5.0 - 15.0 ng/mL Final      Comment:     Testing performed by Liquid Chromatography-Tandem  Mass Spectrometry (LC-MS/MS).  This test was developed and its performance characteristics  determined by [facility], Department of Pathology  and Laboratory Medicine in a manner consistent with CLIA  requirements. It has not been cleared or approved by the [place]  Food and Drug Administration.  This test is used for clinical   purposes.  It should not be regarded as investigational or for  research.         Labs within the past 24 hours have been reviewed.    Diagnostic Results:  US: Reviewed    Assessment/Plan:     * Acute rejection of kidney transplant    - Biopsy (for DGF) 17: good sample with 24 glomeruli (only 1 sclerosed) and evidence just meeting criteria for ABMR. + diffuse ATI. Minimal fibrosis, no ACR or AVR, but + CD4 (>50% diffusely + stain) with mild glomerulitis and focal peritubular capillaritis. Also, biopsy shows ca-oxalate (3) and ca-phos (2) crystals.  -17 DSA + for weak class I. Repeat pre-PLEX Class I DSA DETECTED: A2(3894),B44(2820),B62(5178),    Class II DSA DETECTED BELOW CUTOFF: DR53(1265)  - Tolerated PLEX #1 , #2 .  Plan for PLEX #3 today.  Plan fot total of 6 tentatively.    - Plan for IVIG after PLEX.  Discussed use of Thymo in immunocompromised patient w ID- HIV well managed, would be ok to give Thymo.  WILL NEED TO ASK ID IF MAC prophylaxis needed if give Thymo.  May consider SM in place of Thymo.  Cont to monitor for hypocalcemia and hyponatremia.           donor kidney transplant 2017    - Diuresed 575 ml of urine w LASIX 160 mg- most patient has made since txp.  Plan for Lasix 60 mg tid today and cont strict I/O.     - See ABMR and DGF.  - HD M-W-F, last HD   - Hold HD today.          Delayed graft function of kidney    - Scheduled for HD MWF, last HD outpt .    - No HD today as UOP increased overnight w Lasix.    - Lasix 60 mg tid and Zaroxolyn 2.5mg today.           Need for prophylactic immunotherapy    Chronic Prophylactic Immunosuppression- cont to check prograf level daily.  Assess for toxicity and adjust level as needed            HIV (human immunodeficiency virus infection)    - Last CD4 count 7 (8/20) and HIV PCR undetected < 40 copies/ml (8/19).  - Continue HAART with dolutegravir (Tivicay) and rilpivirine (Edurant).    - HIV meds to be given after PLEX- meds highly protein bond and can be removed w plasmapheresis.  - Plan for HIV RNA and Immune cyclex to further assess immune system while considering Thymo.          At risk for opportunistic infections    - Continue Valcyte for CMV prophylaxis  - Continue Bactrim for PJP/PCP prophylaxis  - Continue nystatin for fungal prophylaxis          Hypocalcemia    - Eloy cium decreased 9 to 8.  Oscal x1.           Hyperphosphatemia    -start Renvela.  Monitor.          Hyponatremia    Sodium trending down w PLEX.  On FR 1500 ml. Monitor.          Immunosuppression    - Chronic Prophylactic Immunosuppression- cont to check prograf level daily.  Assess for toxicity and adjust level as needed  - immune cyclex pending          Nausea & vomiting    - Improved with Zofran .           Long-term use of immunosuppressant medication    - Maintenance IS with prograf, MMF, and prednisone taper.   - Will monitor for signs of toxic side effects, check daily Prograf troughs, and change meds accordingly.        Anemia in ESRD (end-stage renal disease)    - H/H stable at 7.6/22.1.  - Fe panel with elevated ferritin and low Fe stores.  - Continue weekly epo.    - Monitor.              Discharge Planning:  Kb run 121.  No plan for discharge.      Rhoda Penn NP  Kidney Transplant  [facility]

## 2017-08-31 NOTE — ASSESSMENT & PLAN NOTE
- Chronic Prophylactic Immunosuppression- cont to check prograf level daily.  Assess for toxicity and adjust level as needed  - immune cyclex pending

## 2017-09-01 PROBLEM — E87.1 HYPONATREMIA: Status: ACTIVE | Noted: 2017-09-01

## 2017-09-01 PROBLEM — D84.9 IMMUNOSUPPRESSION: Chronic | Status: ACTIVE | Noted: 2017-09-01

## 2017-09-01 PROBLEM — E83.39 HYPERPHOSPHATEMIA: Status: ACTIVE | Noted: 2017-09-01

## 2017-09-01 LAB
ALBUMIN SERPL BCP-MCNC: 4.2 G/DL
ALLENS TEST: ABNORMAL
ANION GAP SERPL CALC-SCNC: 11 MMOL/L
BASOPHILS # BLD AUTO: 0.01 K/UL
BASOPHILS NFR BLD: 0.2 %
BUN SERPL-MCNC: 52 MG/DL
CA-I BLDV-SCNC: 1.2 MMOL/L
CALCIUM SERPL-MCNC: 8.5 MG/DL
CHLORIDE SERPL-SCNC: 103 MMOL/L
CO2 SERPL-SCNC: 21 MMOL/L
CREAT SERPL-MCNC: 9.2 MG/DL
DELSYS: ABNORMAL
DIFFERENTIAL METHOD: ABNORMAL
EOSINOPHIL # BLD AUTO: 0 K/UL
EOSINOPHIL NFR BLD: 0.6 %
ERYTHROCYTE [DISTWIDTH] IN BLOOD BY AUTOMATED COUNT: 16.5 %
EST. GFR  (AFRICAN AMERICAN): 7.5 ML/MIN/1.73 M^2
EST. GFR  (NON AFRICAN AMERICAN): 6.5 ML/MIN/1.73 M^2
GLUCOSE SERPL-MCNC: 103 MG/DL
HCO3 UR-SCNC: 21.8 MMOL/L (ref 24–28)
HCT VFR BLD AUTO: 21.8 %
HGB BLD-MCNC: 7.5 G/DL
LYMPHOCYTES # BLD AUTO: 0.3 K/UL
LYMPHOCYTES NFR BLD: 4.8 %
MAGNESIUM SERPL-MCNC: 2 MG/DL
MCH RBC QN AUTO: 29.6 PG
MCHC RBC AUTO-ENTMCNC: 34.4 G/DL
MCV RBC AUTO: 86 FL
MONOCYTES # BLD AUTO: 0.2 K/UL
MONOCYTES NFR BLD: 2.8 %
NEUTROPHILS # BLD AUTO: 5.9 K/UL
NEUTROPHILS NFR BLD: 90.8 %
PCO2 BLDA: 45.6 MMHG (ref 35–45)
PH SMN: 7.29 [PH] (ref 7.35–7.45)
PHOSPHATE SERPL-MCNC: 5.9 MG/DL
PLATELET # BLD AUTO: 164 K/UL
PMV BLD AUTO: 10.3 FL
PO2 BLDA: 29 MMHG (ref 40–60)
POC BE: -5 MMOL/L
POC SATURATED O2: 48 % (ref 95–100)
POC TCO2: 23 MMOL/L (ref 24–29)
POTASSIUM SERPL-SCNC: 4.1 MMOL/L
RBC # BLD AUTO: 2.53 M/UL
SAMPLE: ABNORMAL
SITE: ABNORMAL
SODIUM SERPL-SCNC: 135 MMOL/L
TACROLIMUS BLD-MCNC: 7.4 NG/ML
WBC # BLD AUTO: 6.46 K/UL

## 2017-09-01 PROCEDURE — 85025 COMPLETE CBC W/AUTO DIFF WBC: CPT

## 2017-09-01 PROCEDURE — 25000003 PHARM REV CODE 250: Performed by: PHYSICIAN ASSISTANT

## 2017-09-01 PROCEDURE — 63600175 PHARM REV CODE 636 W HCPCS: Performed by: INTERNAL MEDICINE

## 2017-09-01 PROCEDURE — A4216 STERILE WATER/SALINE, 10 ML: HCPCS | Performed by: NURSE PRACTITIONER

## 2017-09-01 PROCEDURE — 63600175 PHARM REV CODE 636 W HCPCS: Performed by: PHYSICIAN ASSISTANT

## 2017-09-01 PROCEDURE — 99233 SBSQ HOSP IP/OBS HIGH 50: CPT | Mod: 24,,, | Performed by: PHYSICIAN ASSISTANT

## 2017-09-01 PROCEDURE — 02HV33Z INSERTION OF INFUSION DEVICE INTO SUPERIOR VENA CAVA, PERCUTANEOUS APPROACH: ICD-10-PCS | Performed by: TRANSPLANT SURGERY

## 2017-09-01 PROCEDURE — 25000003 PHARM REV CODE 250: Performed by: NURSE PRACTITIONER

## 2017-09-01 PROCEDURE — 20600001 HC STEP DOWN PRIVATE ROOM

## 2017-09-01 PROCEDURE — 63600175 PHARM REV CODE 636 W HCPCS: Performed by: NURSE PRACTITIONER

## 2017-09-01 PROCEDURE — 36514 APHERESIS PLASMA: CPT

## 2017-09-01 PROCEDURE — 80197 ASSAY OF TACROLIMUS: CPT

## 2017-09-01 PROCEDURE — 36556 INSERT NON-TUNNEL CV CATH: CPT

## 2017-09-01 PROCEDURE — 63600175 PHARM REV CODE 636 W HCPCS: Performed by: PATHOLOGY

## 2017-09-01 PROCEDURE — P9045 ALBUMIN (HUMAN), 5%, 250 ML: HCPCS | Performed by: PATHOLOGY

## 2017-09-01 PROCEDURE — 36415 COLL VENOUS BLD VENIPUNCTURE: CPT

## 2017-09-01 PROCEDURE — 82803 BLOOD GASES ANY COMBINATION: CPT

## 2017-09-01 PROCEDURE — 80069 RENAL FUNCTION PANEL: CPT

## 2017-09-01 PROCEDURE — 82330 ASSAY OF CALCIUM: CPT

## 2017-09-01 PROCEDURE — 83735 ASSAY OF MAGNESIUM: CPT

## 2017-09-01 PROCEDURE — 36514 APHERESIS PLASMA: CPT | Mod: ,,, | Performed by: PATHOLOGY

## 2017-09-01 RX ORDER — ALBUMIN HUMAN 50 G/1000ML
250 SOLUTION INTRAVENOUS ONCE
Status: COMPLETED | OUTPATIENT
Start: 2017-09-01 | End: 2017-09-01

## 2017-09-01 RX ORDER — BISACODYL 10 MG
10 SUPPOSITORY, RECTAL RECTAL ONCE
Status: COMPLETED | OUTPATIENT
Start: 2017-09-01 | End: 2017-09-01

## 2017-09-01 RX ORDER — ACETAMINOPHEN 325 MG/1
650 TABLET ORAL ONCE AS NEEDED
Status: ACTIVE | OUTPATIENT
Start: 2017-09-01 | End: 2017-09-01

## 2017-09-01 RX ORDER — DIPHENHYDRAMINE HCL 25 MG
25 CAPSULE ORAL ONCE
Status: DISCONTINUED | OUTPATIENT
Start: 2017-09-01 | End: 2017-09-01

## 2017-09-01 RX ORDER — MYCOPHENOLATE MOFETIL 250 MG/1
1000 CAPSULE ORAL 2 TIMES DAILY
Status: DISCONTINUED | OUTPATIENT
Start: 2017-09-01 | End: 2017-09-14 | Stop reason: HOSPADM

## 2017-09-01 RX ORDER — EPINEPHRINE 1 MG/ML
1 INJECTION, SOLUTION INTRACARDIAC; INTRAMUSCULAR; INTRAVENOUS; SUBCUTANEOUS ONCE AS NEEDED
Status: ACTIVE | OUTPATIENT
Start: 2017-09-01 | End: 2017-09-01

## 2017-09-01 RX ORDER — METOLAZONE 2.5 MG/1
2.5 TABLET ORAL DAILY
Status: DISCONTINUED | OUTPATIENT
Start: 2017-09-01 | End: 2017-09-04

## 2017-09-01 RX ORDER — FUROSEMIDE 10 MG/ML
40 INJECTION INTRAMUSCULAR; INTRAVENOUS 3 TIMES DAILY
Status: DISCONTINUED | OUTPATIENT
Start: 2017-09-01 | End: 2017-09-02

## 2017-09-01 RX ORDER — ACETAMINOPHEN 325 MG/1
650 TABLET ORAL ONCE
Status: DISCONTINUED | OUTPATIENT
Start: 2017-09-01 | End: 2017-09-01

## 2017-09-01 RX ORDER — DIPHENHYDRAMINE HCL 50 MG
50 CAPSULE ORAL ONCE AS NEEDED
Status: COMPLETED | OUTPATIENT
Start: 2017-09-01 | End: 2017-09-01

## 2017-09-01 RX ADMIN — VALGANCICLOVIR 450 MG: 450 TABLET, FILM COATED ORAL at 07:09

## 2017-09-01 RX ADMIN — NYSTATIN 500000 UNITS: 500000 SUSPENSION ORAL at 01:09

## 2017-09-01 RX ADMIN — Medication 100 MG: at 08:09

## 2017-09-01 RX ADMIN — NYSTATIN 500000 UNITS: 500000 SUSPENSION ORAL at 07:09

## 2017-09-01 RX ADMIN — DOLUTEGRAVIR SODIUM 50 MG: 50 TABLET, FILM COATED ORAL at 11:09

## 2017-09-01 RX ADMIN — ALBUMIN (HUMAN) 250 G: 12.5 SOLUTION INTRAVENOUS at 06:09

## 2017-09-01 RX ADMIN — NYSTATIN 500000 UNITS: 500000 SUSPENSION ORAL at 08:09

## 2017-09-01 RX ADMIN — OXYCODONE HYDROCHLORIDE AND ACETAMINOPHEN 1 TABLET: 10; 325 TABLET ORAL at 11:09

## 2017-09-01 RX ADMIN — DIPHENHYDRAMINE HYDROCHLORIDE 50 MG: 50 CAPSULE ORAL at 09:09

## 2017-09-01 RX ADMIN — Medication 3 ML: at 09:09

## 2017-09-01 RX ADMIN — DEXTROSE: 50 INJECTION, SOLUTION INTRAVENOUS at 07:09

## 2017-09-01 RX ADMIN — SEVELAMER CARBONATE 800 MG: 800 TABLET, FILM COATED ORAL at 08:09

## 2017-09-01 RX ADMIN — OXYCODONE HYDROCHLORIDE AND ACETAMINOPHEN 1 TABLET: 10; 325 TABLET ORAL at 07:09

## 2017-09-01 RX ADMIN — ROSUVASTATIN CALCIUM 20 MG: 10 TABLET ORAL at 08:09

## 2017-09-01 RX ADMIN — SEVELAMER CARBONATE 800 MG: 800 TABLET, FILM COATED ORAL at 07:09

## 2017-09-01 RX ADMIN — PREDNISONE 20 MG: 20 TABLET ORAL at 08:09

## 2017-09-01 RX ADMIN — METOLAZONE 2.5 MG: 2.5 TABLET ORAL at 11:09

## 2017-09-01 RX ADMIN — DOCUSATE SODIUM 100 MG: 50 CAPSULE, LIQUID FILLED ORAL at 09:09

## 2017-09-01 RX ADMIN — DOCUSATE SODIUM 100 MG: 50 CAPSULE, LIQUID FILLED ORAL at 05:09

## 2017-09-01 RX ADMIN — FUROSEMIDE 40 MG: 10 INJECTION, SOLUTION INTRAVENOUS at 01:09

## 2017-09-01 RX ADMIN — BISACODYL 10 MG: 5 TABLET, COATED ORAL at 08:09

## 2017-09-01 RX ADMIN — BISACODYL 10 MG: 10 SUPPOSITORY RECTAL at 08:09

## 2017-09-01 RX ADMIN — Medication 3 ML: at 02:09

## 2017-09-01 RX ADMIN — THERA TABS 1 TABLET: TAB at 08:09

## 2017-09-01 RX ADMIN — MYCOPHENOLATE MOFETIL 1000 MG: 250 CAPSULE ORAL at 08:09

## 2017-09-01 RX ADMIN — DOCUSATE SODIUM 100 MG: 50 CAPSULE, LIQUID FILLED ORAL at 01:09

## 2017-09-01 RX ADMIN — TACROLIMUS 6 MG: 5 CAPSULE ORAL at 01:09

## 2017-09-01 RX ADMIN — TACROLIMUS 6 MG: 5 CAPSULE ORAL at 07:09

## 2017-09-01 RX ADMIN — FAMOTIDINE 20 MG: 20 TABLET, FILM COATED ORAL at 09:09

## 2017-09-01 RX ADMIN — Medication 3 ML: at 06:09

## 2017-09-01 RX ADMIN — HEPARIN SODIUM 5000 UNITS: 5000 INJECTION, SOLUTION INTRAVENOUS; SUBCUTANEOUS at 05:09

## 2017-09-01 RX ADMIN — OXYCODONE HYDROCHLORIDE AND ACETAMINOPHEN 1 TABLET: 10; 325 TABLET ORAL at 06:09

## 2017-09-01 RX ADMIN — SULFAMETHOXAZOLE AND TRIMETHOPRIM 1 TABLET: 400; 80 TABLET ORAL at 07:09

## 2017-09-01 RX ADMIN — NYSTATIN 500000 UNITS: 500000 SUSPENSION ORAL at 09:09

## 2017-09-01 RX ADMIN — ONDANSETRON 4 MG: 2 INJECTION INTRAMUSCULAR; INTRAVENOUS at 08:09

## 2017-09-01 RX ADMIN — MYCOPHENOLATE MOFETIL 1000 MG: 250 CAPSULE ORAL at 09:09

## 2017-09-01 RX ADMIN — FUROSEMIDE 60 MG: 10 INJECTION, SOLUTION INTRAVENOUS at 05:09

## 2017-09-01 RX ADMIN — FUROSEMIDE 40 MG: 10 INJECTION, SOLUTION INTRAVENOUS at 09:09

## 2017-09-01 RX ADMIN — SEVELAMER CARBONATE 800 MG: 800 TABLET, FILM COATED ORAL at 01:09

## 2017-09-01 NOTE — ASSESSMENT & PLAN NOTE
- H/H stable at 7.5/21.8  - Fe panel with elevated ferritin and low Fe stores.  - Continue weekly epo.  - Monitor.

## 2017-09-01 NOTE — PROCEDURES
"Demetrio Aguiar is a 37 y.o. male patient.    Temp: 98.3 °F (36.8 °C) (09/01/17 0745)  Pulse: 75 (09/01/17 0745)  Resp: 16 (09/01/17 0745)  BP: 111/68 (09/01/17 0745)  SpO2: 98 % (09/01/17 0745)  Weight: 81.4 kg (179 lb 7.3 oz) (09/01/17 0500)  Height: 5' 7" (170.2 cm) (08/31/17 1345)       Central Line  Date/Time: 9/1/2017 12:18 PM  Performed by: ARLETH KIRKLAND  Consent Done: Yes  Time out: Immediately prior to procedure a "time out" was called to verify the correct patient, procedure, equipment, support staff and site/side marked as required.  Indications: med administration  Anesthesia: local infiltration    Anesthesia:  Local Anesthetic: lidocaine 1% without epinephrine  Anesthetic total: 5 mL  Preparation: skin prepped with ChloraPrep  Skin prep agent dried: skin prep agent completely dried prior to procedure  Sterile barriers: all five maximum sterile barriers used - cap, mask, sterile gown, sterile gloves, and large sterile sheet  Hand hygiene: hand hygiene performed prior to central venous catheter insertion  Location details: left internal jugular  Catheter type: triple lumen  Catheter size: 7 Fr  Catheter Length: 16cm    Ultrasound guidance: yes  Vessel Caliber: largeNeedle advanced into vessel with real time Ultrasound guidance.  Guidewire confirmed in vessel.  Sterile sheath used.  Manometry: Yes  Number of attempts: 1  Assessment: placement verified by x-ray  Post-procedure: line sutured,  chlorhexidine patch and sterile dressing applied  Comments: Line placed in what appears to be azygous vein, blood gas confirms venous placement, IR consulted for repositioning of line          Arleth Kirkland  9/1/2017    "

## 2017-09-01 NOTE — PLAN OF CARE
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   Outcome: Ongoing (interventions implemented as appropriate)  -pt is free of falls/injuries during shift  -pt ambulates independently  -family at bedside and assists pt when needed  -call light is within reach     Problem: Kidney Transplant (Adult)  Goal: Signs and Symptoms of Listed Potential Problems Will be Absent, Minimized or Managed (Kidney Transplant)  Signs and symptoms of listed potential problems will be absent, minimized or managed by discharge/transition of care (reference Kidney Transplant (Adult) CPG).   Outcome: Ongoing (interventions implemented as appropriate)  -pt receiving PLEX for rejection to be followed by IVIG  -BUN and creatinine are still elevated, pt may need HD in the morning    Problem: Pain, Acute (Adult)  Goal: Identify Related Risk Factors and Signs and Symptoms  Related risk factors and signs and symptoms are identified upon initiation of Human Response Clinical Practice Guideline (CPG)   Outcome: Ongoing (interventions implemented as appropriate)  -pt has not c/o pain during shift so far

## 2017-09-01 NOTE — CONSULTS
"Radiology Consult    Demetrio Aguiar is a 37 y.o. 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 He is s/p kidney transplant and a CVC was placed today. We were consulted to check placement of this CVC.       Past Medical History:   Diagnosis Date    Anemia     At risk for opportunistic infections     Delayed graft function of kidney     ESRD secondary to HIVAN s/p DDRT 8/18/17     HIV infection     HIV nephropathy     Hyperlipidemia     Need for prophylactic immunotherapy     S/P kidney transplant 8/18/2017 8/28/2017    TB lung, latent     tx INH 2006     Past Surgical History:   Procedure Laterality Date    EXPLORATORY LAPAROTOMY W/ BOWEL RESECTION      NO BOWEL RESECTION, UNKNOWN DETAILS, "Pancreas Infection"    peritoneal dialysis catheter placement         Discussed with primary team, Dr. Kirkland.    Imaging reviewed with Radiology staff, Dr. Pat.     Procedure: CVC check with fluoroscopy.     Scheduled Meds:    acetaminophen  650 mg Oral Once    anti-thymo immune glob (THYMOGLOBULIN -rabbit) IVPB  1.5 mg/kg Intravenous Once    diphenhydrAMINE  25 mg Oral Once    docusate sodium  100 mg Oral TID    dolutegravir  50 mg Oral Daily    [START ON 9/4/2017] epoetin erika  10,000 Units Subcutaneous Every Mon    famotidine  20 mg Oral QHS    furosemide  40 mg Intravenous TID    heparin (porcine)  5,000 Units Subcutaneous Q8H    ketoconazole  100 mg Oral Daily    methylPREDNISolone (SOLU-Medrol) IVPB (doses > 250 mg)   Intravenous Once    metOLazone  2.5 mg Oral Daily    multivitamin  1 tablet Oral Daily    nystatin  500,000 Units Oral QID (PC + HS)    predniSONE  20 mg Oral Daily    rilpivirine  25 mg Oral Daily    rosuvastatin  20 mg Oral Daily    sevelamer carbonate  800 mg Oral TID WM    sodium chloride 0.9%  3 mL Intravenous Q8H    sulfamethoxazole-trimethoprim 400-80mg  1 tablet Oral " Every Mon, Wed, Fri    tacrolimus  6 mg Oral BID    valganciclovir  450 mg Oral Every Mon, Wed, Fri     Continuous Infusions:    PRN Meds:acetaminophen, bisacodyl, bisacodyl, diphenhydrAMINE, EPINEPHrine, hydrocortisone sodium succinate, ondansetron, oxycodone-acetaminophen, oxycodone-acetaminophen    Allergies: Review of patient's allergies indicates:  No Known Allergies    Labs:  No results for input(s): INR in the last 168 hours.    Invalid input(s):  PT,  PTT    Recent Labs  Lab 09/01/17  0615   WBC 6.46   HGB 7.5*   HCT 21.8*   MCV 86         Recent Labs  Lab 09/01/17  0614      *   K 4.1      CO2 21*   BUN 52*   CREATININE 9.2*   CALCIUM 8.5*   MG 2.0   ALBUMIN 4.2         Vitals (Most Recent):  Temp: 97.9 °F (36.6 °C) (09/01/17 1230)  Pulse: 74 (09/01/17 1230)  Resp: 18 (09/01/17 1230)  BP: 116/75 (09/01/17 1230)  SpO2: 99 % (09/01/17 1230)    Plan:   Procedure/consult cancelled by primary team.     Wing Tan MD  PGY - 2  Department of Radiology

## 2017-09-01 NOTE — PROCEDURES
[facility]  Transfusion Medicine  Procedure Note    SUMMARY   Therapeutic Plasma Exchange (Apheresis)  Date/Time: 8/31/2017 9:39 PM  Performed by: EUFEMIA QUIROZ.  Authorized by: EUFEMIA QUIROZ.       Date of Procedure: 8/30/2017    Procedure: Plasma Exchange    Provider: Eufemia Quiroz MD     Assisting Provider: None    Pre-Procedure Diagnosis: Acute rejection of kidney transplant    Post-Procedure Diagnosis: Acute rejection of kidney transplant    Follow-up Assessment: Mr. Aguiar is 38 yo male with DDonor kidney transplant on 8/18.  After transplant, patient developed dysfunction and was found to have new DSA. A biopsy showed features of antibody mediated rejection (AMR).  We have been asked to begin a series of TPE management for his AMR kidney allograft.    Kidney transplant AMR carries a Category I Grade 1B indication for therapeutic plasma exchange via the 2016 Journal of Clinical Apheresis Guidelines (Jony ACOSTA et al. Journal of Clinical Apheresis 2016; 31:149-162.) The TPE plan is 6 daily exchanges as follows:      Antibody mediated rejection of kidney transplant     Access: Left upper thigh fistula   Number of Procedures: 6  Schedule: T, W, Th, F - pause for thymo infusion and restart on 9/5 Tues, followed by Wed.  Volume: 5.0 Liters  Replacement Fluid: Albumin  Recommended Laboratory Studies: Complete Blood Count      Interval History:  Procedure # 3 of 6 was well tolerated and without complications.  We will resume tomorrow 9/1 for next TPE and resume on 9/5.     Pertinent Laboratory Data:   Complete Blood Count:   Lab Results   Component Value Date    HGB 7.6 (L) 08/31/2017    HCT 22.1 (L) 08/31/2017     08/31/2017    WBC 7.12 08/31/2017     Comprehensive Metabolic Panel:   Lab Results   Component Value Date     (L) 08/31/2017    K 4.2 08/31/2017     08/31/2017    CO2 21 (L) 08/31/2017     (H) 08/31/2017    BUN 44 (H) 08/31/2017    CREATININE 9.0 (H)  08/31/2017    CALCIUM 8.0 (L) 08/31/2017    PROT 6.3 08/20/2017    ALBUMIN 3.8 08/31/2017    BILITOT 0.2 08/20/2017    ALKPHOS 471 (H) 08/20/2017    AST 27 08/20/2017    ALT <5 (L) 08/20/2017    ANIONGAP 14 08/31/2017    EGFRNONAA 6.7 (A) 08/31/2017       Pertinent Medications: None contraindicated    Review of patient's allergies indicates:  No Known Allergies    Anesthesia: None     Technical Procedures Used: Plasma Exchange: Volume exchanged - 5.0 Liters; Replacement fluid - Albumin; Number of procedures # 3 of 6; Date of next procedure 9/1/17.    Description of the Findings of the Procedure:     Please see Apheresis Nurse flowsheet for details.    The patient was evaluated and all clinical and laboratory data relevant to the treatment was reviewed, and a decision was made to proceed with the Apheresis procedure.    I was available to the clinical staff throughout the procedure.    Significant Surgical Tasks Conducted by the Assistant(s): Not applicable    Complications: None    Estimated Blood Loss (EBL): None    Implants: None     Specimens: None    Dylan Quiroz MD, MS  Section of Transfusion Medicine & Blood Bank  Department of Pathology and Laboratory Medicine  [facility]  [phone] (Blood Bank Offices)  08/31/2017

## 2017-09-01 NOTE — PLAN OF CARE
Problem: Patient Care Overview  Goal: Plan of Care Review  Outcome: Ongoing (interventions implemented as appropriate)  Patient aao x4. He is up independent. Call light within reach. Plan for iv steroids x3 days. He is receiving PLEX today and then again on Sunday. Will continue to monitor.

## 2017-09-01 NOTE — PROGRESS NOTES
Plasmapheresis tx #4 started at 1805 without difficulty. Successfully accessed left upper thigh fistula prior to start of treatment. Pt oriented x4.  5.0 Liter exchange.  Replacement fluids 5% Albumin. Pt stated pain to left upper neck, 4/10 on scale, staff nurse notified. Tx ended at 1910. Pt tolerated tx well. Dialysis needles removed X2, pressure applied until hemostasis maintained.  Next tx 09/03/17.

## 2017-09-01 NOTE — SUBJECTIVE & OBJECTIVE
Subjective:     Demetrio Aguiar is a 37 y.o. 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 His maintenance immunosuppression includes a steroid taper per protocol to 5mg daily, Prograf, and Cellcept maintenance. His post op course has been complicated by DGF requiring HD (last HD 8/28/17) which prompted need for kidney biopsy on 8/25/17. 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 Biopsy 8/25/17: good sample with 24 glomeruli (only 1 sclerosed) and evidence of criteria for ABMR. + diffuse ATI; Minimal fibrosis, no ACR or AVR, but + CD4 (>50% diffusely + stain) with mild glomerulitis and focal peritubular capillaritis; also, biopsy shows ca-oxalate (3) and ca-phos (2) crystals. Since transplant, he has made minimal uop daily. He denies any current illnesses or sick contacts. Complains of expected post-op pain to RLQ incision. Plan is for PLEX 3-6 treatments.     Interval History: Pt tolerated PLEX #3 well via left femoral AV fistula yesterday.  Cont w nausea, no vomiting today.  Pt has not had a BM since Saturday- suppository given and will give enema if no results.  UOP increasing and delta Cr decreased.  Plan for PLEX 4 Sat and 5 Mon.  SM pulse x 3 starting today.    Past Medical, Surgical, Family, and Social History:   Unchanged from H&P.    Review of patient's allergies indicates:  No Known Allergies    Scheduled Meds:   docusate sodium  100 mg Oral TID    dolutegravir  50 mg Oral Daily    [START ON 9/4/2017] epoetin erika  10,000 Units Subcutaneous Every Mon    famotidine  20 mg Oral QHS    furosemide  40 mg Intravenous TID    heparin (porcine)  5,000 Units  Subcutaneous Q8H    ketoconazole  100 mg Oral Daily    methylPREDNISolone (SOLU-Medrol) IVPB (doses > 250 mg)   Intravenous Once    metOLazone  2.5 mg Oral Daily    multivitamin  1 tablet Oral Daily    nystatin  500,000 Units Oral QID (PC + HS)    predniSONE  20 mg Oral Daily    rilpivirine  25 mg Oral Daily    rosuvastatin  20 mg Oral Daily    sevelamer carbonate  800 mg Oral TID WM    sodium chloride 0.9%  3 mL Intravenous Q8H    sulfamethoxazole-trimethoprim 400-80mg  1 tablet Oral Every Mon, Wed, Fri    tacrolimus  6 mg Oral BID    valganciclovir  450 mg Oral Every Mon, Wed, Fri     Continuous Infusions:     PRN Meds:acetaminophen, bisacodyl, bisacodyl, diphenhydrAMINE, EPINEPHrine, hydrocortisone sodium succinate, ondansetron, oxycodone-acetaminophen, oxycodone-acetaminophen    Intake/Output - Last 3 Shifts       08/30 0700 - 08/31 0659 08/31 0700 - 09/01 0659 09/01 0700 - 09/02 0659    P.O. 700 1500 720    Blood 5288 5444     Total Intake(mL/kg) 5988 (83.1) 6944 (85.3) 720 (8.8)    Urine (mL/kg/hr) 575 (0.3) 1250 (0.6) 750 (1)    Emesis/NG output 0 (0)      Stool 0 (0) 0 (0) 0 (0)    Blood 5032 (2.9) 5111 (2.6)     Total Output 5607 6361 750    Net +381 +583 -30           Stool Occurrence 0 x 0 x 1 x    Emesis Occurrence 1 x             Review of Systems   Constitutional: Negative for activity change, appetite change, chills, fatigue and fever.   HENT: Negative for congestion and facial swelling.    Eyes: Negative for pain, discharge and visual disturbance.   Respiratory: Negative for cough, choking, chest tightness, shortness of breath and wheezing.    Cardiovascular: Negative for chest pain, palpitations and leg swelling.   Gastrointestinal: Positive for abdominal distention, constipation and nausea. Negative for abdominal pain, diarrhea and vomiting.   Endocrine: Negative.    Genitourinary: Negative for decreased urine volume, dysuria, flank pain and hematuria.   Musculoskeletal: Negative for  "gait problem.   Skin: Positive for wound.   Allergic/Immunologic: Positive for immunocompromised state.   Neurological: Negative for dizziness, tremors, light-headedness and headaches.   Hematological: Negative.    Psychiatric/Behavioral: Negative for behavioral problems and dysphoric mood.      Objective:     Vital Signs (Most Recent):  Temp: 97.9 °F (36.6 °C) (09/01/17 1230)  Pulse: 74 (09/01/17 1230)  Resp: 18 (09/01/17 1230)  BP: 116/75 (09/01/17 1230)  SpO2: 99 % (09/01/17 1230) Vital Signs (24h Range):  Temp:  [97.9 °F (36.6 °C)-98.4 °F (36.9 °C)] 97.9 °F (36.6 °C)  Pulse:  [73-82] 74  Resp:  [16-18] 18  SpO2:  [96 %-99 %] 99 %  BP: (109-116)/(65-75) 116/75     Weight: 81.4 kg (179 lb 7.3 oz)  Height: 5' 7" (170.2 cm)  Body mass index is 28.11 kg/m².    Physical Exam   Constitutional: He is oriented to person, place, and time. He appears well-developed. No distress.   HENT:   Head: Normocephalic and atraumatic.   Mouth/Throat: No oropharyngeal exudate.   Eyes: EOM are normal. Pupils are equal, round, and reactive to light.   Neck: Normal range of motion. Neck supple. No JVD present.   Cardiovascular: Normal rate and regular rhythm.    No murmur heard.  Pulmonary/Chest: Effort normal and breath sounds normal. No respiratory distress. He has no rales.   Abdominal: Soft. Bowel sounds are normal. He exhibits distension (tympanetic). There is no tenderness. There is no guarding.   RLQ inc with staples intact CDI no SSI     Musculoskeletal: Normal range of motion. He exhibits no edema.   Neurological: He is alert and oriented to person, place, and time.   Skin: Skin is warm and dry. Capillary refill takes 2 to 3 seconds. He is not diaphoretic. No erythema.   Psychiatric: He has a normal mood and affect. His behavior is normal. Judgment and thought content normal.   Nursing note and vitals reviewed.      Laboratory:  CBC:     Recent Labs  Lab 09/01/17  0615   WBC 6.46   RBC 2.53*   HGB 7.5*   HCT 21.8*    "   MCV 86   MCH 29.6   MCHC 34.4     BMP:     Recent Labs  Lab 09/01/17  0614      *   K 4.1      CO2 21*   BUN 52*   CREATININE 9.2*   CALCIUM 8.5*     Tacrolimus Lvl   Date Value Ref Range Status   09/01/2017 7.4 5.0 - 15.0 ng/mL Final     Comment:     Testing performed by Liquid Chromatography-Tandem  Mass Spectrometry (LC-MS/MS).  This test was developed and its performance characteristics  determined by [facility], Department of Pathology  and Laboratory Medicine in a manner consistent with CLIA  requirements. It has not been cleared or approved by the [place]  Food and Drug Administration.  This test is used for clinical   purposes.  It should not be regarded as investigational or for  research.     08/31/2017 13.9 5.0 - 15.0 ng/mL Final     Comment:     Testing performed by Liquid Chromatography-Tandem  Mass Spectrometry (LC-MS/MS).  This test was developed and its performance characteristics  determined by [facility], Department of Pathology  and Laboratory Medicine in a manner consistent with CLIA  requirements. It has not been cleared or approved by the [place]  Food and Drug Administration.  This test is used for clinical   purposes.  It should not be regarded as investigational or for  research.     08/30/2017 5.9 5.0 - 15.0 ng/mL Final     Comment:     Testing performed by Liquid Chromatography-Tandem  Mass Spectrometry (LC-MS/MS).  This test was developed and its performance characteristics  determined by [facility], Department of Pathology  and Laboratory Medicine in a manner consistent with CLIA  requirements. It has not been cleared or approved by the [place]  Food and Drug Administration.  This test is used for clinical   purposes.  It should not be regarded as investigational or for  research.     08/29/2017 6.9 5.0 - 15.0 ng/mL Final     Comment:     Testing performed by Liquid Chromatography-Tandem  Mass Spectrometry (LC-MS/MS).  This test was developed and  its performance characteristics  determined by [facility], Department of Pathology  and Laboratory Medicine in a manner consistent with CLIA  requirements. It has not been cleared or approved by the [place]  Food and Drug Administration.  This test is used for clinical   purposes.  It should not be regarded as investigational or for  research.         Labs within the past 24 hours have been reviewed.    Diagnostic Results:  US: Reviewed

## 2017-09-01 NOTE — CLINICAL REVIEW
Patient with improved urine output, delta creatinine   Received PLEX yesterday  Will do a dose of thymo today  Central line today. We spoke with resident  Will plan for PLEX in am    Lasix 40 mg Iv tid with metolazone 2.5 mg daily    Prograf level is 7.4, will do prograf 6 mg po bid    I spoke with blood bank as well to coordinate PLEX    MMF to be held tonight

## 2017-09-01 NOTE — ASSESSMENT & PLAN NOTE
- Last CD4 count 7 (8/20) and HIV PCR undetected < 40 copies/ml (8/19).  - Continue HAART with dolutegravir (Tivicay) and rilpivirine ([employer]urant).    - HIV meds to be given after PLEX- meds highly protein bond and can be removed w plasmapheresis.  - Plan for HIV RNA and Immune cyclex to further assess immune system.

## 2017-09-02 LAB
ALBUMIN SERPL BCP-MCNC: 5 G/DL
ANION GAP SERPL CALC-SCNC: 15 MMOL/L
BASOPHILS # BLD AUTO: 0 K/UL
BASOPHILS NFR BLD: 0 %
BUN SERPL-MCNC: 54 MG/DL
CA-I BLDV-SCNC: 1.17 MMOL/L
CALCIUM SERPL-MCNC: 8.8 MG/DL
CHLORIDE SERPL-SCNC: 102 MMOL/L
CO2 SERPL-SCNC: 18 MMOL/L
CREAT SERPL-MCNC: 8.5 MG/DL
DIFFERENTIAL METHOD: ABNORMAL
EOSINOPHIL # BLD AUTO: 0 K/UL
EOSINOPHIL NFR BLD: 0 %
ERYTHROCYTE [DISTWIDTH] IN BLOOD BY AUTOMATED COUNT: 16.4 %
EST. GFR  (AFRICAN AMERICAN): 8.3 ML/MIN/1.73 M^2
EST. GFR  (NON AFRICAN AMERICAN): 7.1 ML/MIN/1.73 M^2
GLUCOSE SERPL-MCNC: 235 MG/DL
HCT VFR BLD AUTO: 23.9 %
HGB BLD-MCNC: 8.1 G/DL
LYMPHOCYTES # BLD AUTO: 0.1 K/UL
LYMPHOCYTES NFR BLD: 1.3 %
MAGNESIUM SERPL-MCNC: 1.9 MG/DL
MCH RBC QN AUTO: 29.8 PG
MCHC RBC AUTO-ENTMCNC: 33.9 G/DL
MCV RBC AUTO: 88 FL
MONOCYTES # BLD AUTO: 0 K/UL
MONOCYTES NFR BLD: 0.4 %
NEUTROPHILS # BLD AUTO: 7.8 K/UL
NEUTROPHILS NFR BLD: 97.8 %
PHOSPHATE SERPL-MCNC: 5.7 MG/DL
PLATELET # BLD AUTO: 185 K/UL
PMV BLD AUTO: 10 FL
POCT GLUCOSE: 167 MG/DL (ref 70–110)
POCT GLUCOSE: 194 MG/DL (ref 70–110)
POTASSIUM SERPL-SCNC: 4.3 MMOL/L
RBC # BLD AUTO: 2.72 M/UL
SODIUM SERPL-SCNC: 135 MMOL/L
TACROLIMUS BLD-MCNC: 11.7 NG/ML
WBC # BLD AUTO: 7.97 K/UL

## 2017-09-02 PROCEDURE — 82330 ASSAY OF CALCIUM: CPT

## 2017-09-02 PROCEDURE — 63600175 PHARM REV CODE 636 W HCPCS: Performed by: NURSE PRACTITIONER

## 2017-09-02 PROCEDURE — 83735 ASSAY OF MAGNESIUM: CPT

## 2017-09-02 PROCEDURE — 80069 RENAL FUNCTION PANEL: CPT

## 2017-09-02 PROCEDURE — 63600175 PHARM REV CODE 636 W HCPCS: Performed by: PHYSICIAN ASSISTANT

## 2017-09-02 PROCEDURE — 85025 COMPLETE CBC W/AUTO DIFF WBC: CPT

## 2017-09-02 PROCEDURE — 63600175 PHARM REV CODE 636 W HCPCS: Performed by: INTERNAL MEDICINE

## 2017-09-02 PROCEDURE — 25000003 PHARM REV CODE 250: Performed by: PHYSICIAN ASSISTANT

## 2017-09-02 PROCEDURE — A4216 STERILE WATER/SALINE, 10 ML: HCPCS | Performed by: NURSE PRACTITIONER

## 2017-09-02 PROCEDURE — 25000003 PHARM REV CODE 250: Performed by: NURSE PRACTITIONER

## 2017-09-02 PROCEDURE — 99233 SBSQ HOSP IP/OBS HIGH 50: CPT | Mod: ,,, | Performed by: INTERNAL MEDICINE

## 2017-09-02 PROCEDURE — 80197 ASSAY OF TACROLIMUS: CPT

## 2017-09-02 PROCEDURE — 20600001 HC STEP DOWN PRIVATE ROOM

## 2017-09-02 RX ORDER — IBUPROFEN 200 MG
24 TABLET ORAL
Status: DISCONTINUED | OUTPATIENT
Start: 2017-09-02 | End: 2017-09-14 | Stop reason: HOSPADM

## 2017-09-02 RX ORDER — GLUCAGON 1 MG
1 KIT INJECTION
Status: DISCONTINUED | OUTPATIENT
Start: 2017-09-02 | End: 2017-09-14 | Stop reason: HOSPADM

## 2017-09-02 RX ORDER — INSULIN ASPART 100 [IU]/ML
0-5 INJECTION, SOLUTION INTRAVENOUS; SUBCUTANEOUS
Status: DISCONTINUED | OUTPATIENT
Start: 2017-09-02 | End: 2017-09-14 | Stop reason: HOSPADM

## 2017-09-02 RX ORDER — IBUPROFEN 200 MG
16 TABLET ORAL
Status: DISCONTINUED | OUTPATIENT
Start: 2017-09-02 | End: 2017-09-14 | Stop reason: HOSPADM

## 2017-09-02 RX ORDER — FUROSEMIDE 10 MG/ML
20 INJECTION INTRAMUSCULAR; INTRAVENOUS 3 TIMES DAILY
Status: DISCONTINUED | OUTPATIENT
Start: 2017-09-03 | End: 2017-09-03

## 2017-09-02 RX ADMIN — Medication 3 ML: at 05:09

## 2017-09-02 RX ADMIN — THERA TABS 1 TABLET: TAB at 09:09

## 2017-09-02 RX ADMIN — FUROSEMIDE 40 MG: 10 INJECTION, SOLUTION INTRAVENOUS at 09:09

## 2017-09-02 RX ADMIN — FUROSEMIDE 40 MG: 10 INJECTION, SOLUTION INTRAVENOUS at 05:09

## 2017-09-02 RX ADMIN — SEVELAMER CARBONATE 800 MG: 800 TABLET, FILM COATED ORAL at 05:09

## 2017-09-02 RX ADMIN — SEVELAMER CARBONATE 800 MG: 800 TABLET, FILM COATED ORAL at 09:09

## 2017-09-02 RX ADMIN — METHYLPREDNISOLONE SODIUM SUCCINATE: 1 INJECTION, POWDER, LYOPHILIZED, FOR SOLUTION INTRAMUSCULAR; INTRAVENOUS at 05:09

## 2017-09-02 RX ADMIN — SEVELAMER CARBONATE 800 MG: 800 TABLET, FILM COATED ORAL at 01:09

## 2017-09-02 RX ADMIN — DOCUSATE SODIUM 100 MG: 50 CAPSULE, LIQUID FILLED ORAL at 09:09

## 2017-09-02 RX ADMIN — FUROSEMIDE 40 MG: 10 INJECTION, SOLUTION INTRAVENOUS at 01:09

## 2017-09-02 RX ADMIN — NYSTATIN 500000 UNITS: 500000 SUSPENSION ORAL at 09:09

## 2017-09-02 RX ADMIN — Medication 100 MG: at 09:09

## 2017-09-02 RX ADMIN — OXYCODONE HYDROCHLORIDE AND ACETAMINOPHEN 1 TABLET: 5; 325 TABLET ORAL at 04:09

## 2017-09-02 RX ADMIN — TACROLIMUS 6 MG: 5 CAPSULE ORAL at 09:09

## 2017-09-02 RX ADMIN — METOLAZONE 2.5 MG: 2.5 TABLET ORAL at 09:09

## 2017-09-02 RX ADMIN — DOCUSATE SODIUM 100 MG: 50 CAPSULE, LIQUID FILLED ORAL at 01:09

## 2017-09-02 RX ADMIN — FAMOTIDINE 20 MG: 20 TABLET, FILM COATED ORAL at 09:09

## 2017-09-02 RX ADMIN — NYSTATIN 500000 UNITS: 500000 SUSPENSION ORAL at 02:09

## 2017-09-02 RX ADMIN — DOCUSATE SODIUM 100 MG: 50 CAPSULE, LIQUID FILLED ORAL at 05:09

## 2017-09-02 RX ADMIN — BISACODYL 10 MG: 5 TABLET, COATED ORAL at 10:09

## 2017-09-02 RX ADMIN — BISACODYL 10 MG: 10 SUPPOSITORY RECTAL at 04:09

## 2017-09-02 RX ADMIN — TACROLIMUS 6 MG: 5 CAPSULE ORAL at 05:09

## 2017-09-02 RX ADMIN — ONDANSETRON 4 MG: 2 INJECTION INTRAMUSCULAR; INTRAVENOUS at 01:09

## 2017-09-02 RX ADMIN — MYCOPHENOLATE MOFETIL 1000 MG: 250 CAPSULE ORAL at 09:09

## 2017-09-02 RX ADMIN — PREDNISONE 20 MG: 20 TABLET ORAL at 09:09

## 2017-09-02 RX ADMIN — HEPARIN SODIUM 5000 UNITS: 5000 INJECTION, SOLUTION INTRAVENOUS; SUBCUTANEOUS at 01:09

## 2017-09-02 RX ADMIN — ROSUVASTATIN CALCIUM 20 MG: 10 TABLET ORAL at 09:09

## 2017-09-02 RX ADMIN — NYSTATIN 500000 UNITS: 500000 SUSPENSION ORAL at 05:09

## 2017-09-02 RX ADMIN — DOLUTEGRAVIR SODIUM 50 MG: 50 TABLET, FILM COATED ORAL at 09:09

## 2017-09-02 RX ADMIN — Medication 3 ML: at 09:09

## 2017-09-02 NOTE — SUBJECTIVE & OBJECTIVE
Subjective:     Demetrio Aguiar is a 37 y.o. 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 His maintenance immunosuppression includes a steroid taper per protocol to 5mg daily, Prograf, and Cellcept maintenance. His post op course has been complicated by DGF requiring HD (last HD 8/28/17) which prompted need for kidney biopsy on 8/25/17. 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 Biopsy 8/25/17: good sample with 24 glomeruli (only 1 sclerosed) and evidence of criteria for ABMR. + diffuse ATI; Minimal fibrosis, no ACR or AVR, but + CD4 (>50% diffusely + stain) with mild glomerulitis and focal peritubular capillaritis; also, biopsy shows ca-oxalate (3) and ca-phos (2) crystals. Since transplant, he has made minimal uop daily. He denies any current illnesses or sick contacts. Complains of expected post-op pain to RLQ incision. Plan is for PLEX 3-6 treatments.     Interval History: Pt tolerated PLEX #4 well via left femoral AV fistula yesterday.  First dose of SM yesterday. UOP increasing (3.5L in the last 24 h) and Cr decreased.  Plan for SM #2 today.         Past Medical, Surgical, Family, and Social History:   Unchanged from H&P.    Review of patient's allergies indicates:  No Known Allergies    Scheduled Meds:   calcium gluconate IVPB  2,000 mg Intravenous Once    docusate sodium  100 mg Oral TID    dolutegravir  50 mg Oral Daily    [START ON 9/4/2017] epoetin erika  10,000 Units Subcutaneous Every Mon    famotidine  20 mg Oral QHS    furosemide  40 mg Intravenous TID    heparin (porcine)  5,000 Units Subcutaneous Q8H    ketoconazole  100 mg Oral Daily    metOLazone   2.5 mg Oral Daily    multivitamin  1 tablet Oral Daily    mycophenolate  1,000 mg Oral BID    nystatin  500,000 Units Oral QID (PC + HS)    predniSONE  20 mg Oral Daily    rilpivirine  25 mg Oral Daily    rosuvastatin  20 mg Oral Daily    sevelamer carbonate  800 mg Oral TID WM    sodium chloride 0.9%  3 mL Intravenous Q8H    sulfamethoxazole-trimethoprim 400-80mg  1 tablet Oral Every Mon, Wed, Fri    tacrolimus  6 mg Oral BID    valganciclovir  450 mg Oral Every Mon, Wed, Fri     Continuous Infusions:   PRN Meds:bisacodyl, bisacodyl, ondansetron, oxycodone-acetaminophen, oxycodone-acetaminophen    Intake/Output - Last 3 Shifts       08/31 0700 - 09/01 0659 09/01 0700 - 09/02 0659 09/02 0700 - 09/03 0659    P.O. 1500 1280 340    Blood 5444 5210     Total Intake(mL/kg) 6944 (85.3) 6490 (90) 340 (4.7)    Urine (mL/kg/hr) 1250 (0.6) 3550 (2.1) 825 (1.9)    Emesis/NG output   0 (0)    Other   0 (0)    Stool 0 (0) 0 (0) 0 (0)    Blood 5111 (2.6) 4968 (2.9)     Total Output 6361 8518 825    Net +583 -2028 -485           Urine Occurrence   0 x    Stool Occurrence 0 x 1 x 0 x    Emesis Occurrence   0 x           Review of Systems   Constitutional: Negative for chills, fatigue and fever.   HENT: Negative.    Eyes: Negative.    Respiratory: Negative for cough, shortness of breath and wheezing.    Cardiovascular: Negative for chest pain, palpitations and leg swelling.   Gastrointestinal: Negative for abdominal distention, abdominal pain, constipation, diarrhea, nausea and vomiting.   Genitourinary: Negative for decreased urine volume, dysuria and flank pain.   Musculoskeletal: Negative for gait problem.   Allergic/Immunologic: Positive for immunocompromised state.   Neurological: Negative for tremors, light-headedness and headaches.   Psychiatric/Behavioral: Negative for behavioral problems and dysphoric mood.      Objective:     Vital Signs (Most Recent):  Temp: 97.7 °F (36.5 °C) (09/02/17 1136)  Pulse: 80  "(09/02/17 1136)  Resp: 18 (09/02/17 1136)  BP: 130/61 (09/02/17 1136)  SpO2: 99 % (09/02/17 1136) Vital Signs (24h Range):  Temp:  [97.7 °F (36.5 °C)-98.4 °F (36.9 °C)] 97.7 °F (36.5 °C)  Pulse:  [70-81] 80  Resp:  [16-20] 18  SpO2:  [97 %-100 %] 99 %  BP: (114-130)/(61-82) 130/61     Weight: 72.1 kg (159 lb)  Height: 5' 7" (170.2 cm)  Body mass index is 24.9 kg/m².    Physical Exam   Constitutional: He is oriented to person, place, and time. He appears well-nourished.   Cardiovascular: Normal rate and regular rhythm.    Pulmonary/Chest: Effort normal and breath sounds normal. No respiratory distress. He has no rales.   Abdominal: Soft. Bowel sounds are normal. He exhibits no distension. There is no tenderness.   Neurological: He is alert and oriented to person, place, and time.   Vitals reviewed.      Laboratory:  CBC:   Recent Labs  Lab 09/02/17  0530   WBC 7.97   RBC 2.72*   HGB 8.1*   HCT 23.9*      MCV 88   MCH 29.8   MCHC 33.9     BMP:   Recent Labs  Lab 09/02/17  0530   *   *   K 4.3      CO2 18*   BUN 54*   CREATININE 8.5*   CALCIUM 8.8     Tacrolimus Lvl   Date Value Ref Range Status   09/02/2017 11.7 5.0 - 15.0 ng/mL Final     Comment:     Testing performed by Liquid Chromatography-Tandem  Mass Spectrometry (LC-MS/MS).  This test was developed and its performance characteristics  determined by [facility], Department of Pathology  and Laboratory Medicine in a manner consistent with CLIA  requirements. It has not been cleared or approved by the [place]  Food and Drug Administration.  This test is used for clinical   purposes.  It should not be regarded as investigational or for  research.     09/01/2017 7.4 5.0 - 15.0 ng/mL Final     Comment:     Testing performed by Liquid Chromatography-Tandem  Mass Spectrometry (LC-MS/MS).  This test was developed and its performance characteristics  determined by [facility], Department of Pathology  and Laboratory Medicine in " a manner consistent with CLIA  requirements. It has not been cleared or approved by the [place]  Food and Drug Administration.  This test is used for clinical   purposes.  It should not be regarded as investigational or for  research.     08/31/2017 13.9 5.0 - 15.0 ng/mL Final     Comment:     Testing performed by Liquid Chromatography-Tandem  Mass Spectrometry (LC-MS/MS).  This test was developed and its performance characteristics  determined by [facility], Department of Pathology  and Laboratory Medicine in a manner consistent with CLIA  requirements. It has not been cleared or approved by the [place]  Food and Drug Administration.  This test is used for clinical   purposes.  It should not be regarded as investigational or for  research.     08/30/2017 5.9 5.0 - 15.0 ng/mL Final     Comment:     Testing performed by Liquid Chromatography-Tandem  Mass Spectrometry (LC-MS/MS).  This test was developed and its performance characteristics  determined by [facility], Department of Pathology  and Laboratory Medicine in a manner consistent with CLIA  requirements. It has not been cleared or approved by the [place]  Food and Drug Administration.  This test is used for clinical   purposes.  It should not be regarded as investigational or for  research.       Labs within the past 24 hours have been reviewed.    Diagnostic Results:  Labs: Reviewed  X-Ray: Reviewed

## 2017-09-02 NOTE — ASSESSMENT & PLAN NOTE
- H/H stable at 8.1/23.9  - Fe panel with elevated ferritin and low Fe stores.  - Continue weekly epo.  - Monitor.

## 2017-09-02 NOTE — ASSESSMENT & PLAN NOTE
- Scheduled for HD MWF, last HD outpt 8/28.    - No HD today as UOP has significantly increased and Cr is coming down.   - Lasix 40 mg tid and Zaroxolyn 2.5mg today.

## 2017-09-02 NOTE — ASSESSMENT & PLAN NOTE
- Continue Valcyte for CMV prophylaxis  - Continue Bactrim for PCP prophylaxis  - Continue nystatin for fungal prophylaxis

## 2017-09-02 NOTE — ASSESSMENT & PLAN NOTE
- Maintenance IS with prograf, MMF, and prednisone taper.   - Of note, Cellcept initially held today when thymo planned- will restart.    - Will monitor for signs of toxic side effects, check daily Prograf troughs, and change meds accordingly.

## 2017-09-02 NOTE — PLAN OF CARE
Problem: Patient Care Overview  Goal: Plan of Care Review  Outcome: Ongoing (interventions implemented as appropriate)  Fall precautions maintained. No falls or injuries. Patient independent this shift. No distress or complaints of pain noted. Rejection treatment to continue today; PLEX 4/6 completed to be restarted next week; SM 1/3 completed - second dose to be ordered today.  RLQ incision ERIK w/ staples - pt cleaning w/ betadine. Bed locked and low. Side rails up x's two. Call bell and personal belongings in close reach.

## 2017-09-02 NOTE — ASSESSMENT & PLAN NOTE
- Scheduled for HD MWF, last HD outpt 8/28.    - No HD today as UOP again increased overnight w Lasix and tx of rejection.    - Lasix 40 mg tid and Zaroxolyn 2.5mg today.

## 2017-09-02 NOTE — PROCEDURES
[facility]  Transfusion Medicine  Procedure Note    SUMMARY   Therapeutic Plasma Exchange (Apheresis)  Date/Time: 9/1/2017 11:00 PM  Performed by: EUFEMIA QUIROZ.  Authorized by: EUFEMIA QUIROZ.       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 Aguiar is 36 yo male with DDonor kidney transplant on 8/18.  After transplant, patient developed dysfunction and was found to have new DSA. A biopsy showed features of antibody mediated rejection (AMR).  We have been asked to begin a series of TPE management for his AMR kidney allograft.    Kidney transplant AMR carries a Category I Grade 1B indication for therapeutic plasma exchange via the 2016 Journal of Clinical Apheresis Guidelines (Jony J et al. Journal of Clinical Apheresis 2016; 31:149-162.) The TPE plan is 6 daily exchanges as follows:      Antibody mediated rejection of kidney transplant     Access: Left upper thigh fistula   Number of Procedures: 6  Schedule: T, W, Th, F, Sun, T  Volume: 5.0 Liters  Replacement Fluid: Albumin  Recommended Laboratory Studies: Complete Blood Count      Interval History:  Procedure # 4 of 6 was well tolerated and without complications via left upper thigh fistula. Replacement fluids 5% Albumin. Pt stated pain to left upper neck, 4/10 on scale, staff nurse notified.  Next tx scheduled for 9/3.      Pertinent Laboratory Data:   Complete Blood Count:   Lab Results   Component Value Date    HGB 7.5 (L) 09/01/2017    HCT 21.8 (L) 09/01/2017     09/01/2017    WBC 6.46 09/01/2017     Comprehensive Metabolic Panel:   Lab Results   Component Value Date     (L) 09/01/2017    K 4.1 09/01/2017     09/01/2017    CO2 21 (L) 09/01/2017      09/01/2017    BUN 52 (H) 09/01/2017    CREATININE 9.2 (H) 09/01/2017    CALCIUM 8.5 (L) 09/01/2017    PROT 6.3 08/20/2017    ALBUMIN 4.2 09/01/2017    BILITOT 0.2 08/20/2017    ALKPHOS 471 (H) 08/20/2017    AST 27 08/20/2017    ALT <5 (L) 08/20/2017    ANIONGAP 11 09/01/2017    EGFRNONAA 6.5 (A) 09/01/2017       Pertinent Medications: None contraindicated    Review of patient's allergies indicates:  No Known Allergies    Anesthesia: None     Technical Procedures Used: Plasma Exchange: Volume exchanged - 5.0 Liters; Replacement fluid - Albumin; Number of procedures # 4 of 6; Date of next procedure 9/3/17.    Description of the Findings of the Procedure:     Please see Apheresis Nurse flowsheet for details.    The patient was evaluated and all clinical and laboratory data relevant to the treatment was reviewed, and a decision was made to proceed with the Apheresis procedure.    I was available to the clinical staff throughout the procedure.    Significant Surgical Tasks Conducted by the Assistant(s): Not applicable    Complications: None    Estimated Blood Loss (EBL): None    Implants: None     Specimens: None    Dylan Quiroz MD, MS  Section of Transfusion Medicine & Blood Bank  Department of Pathology and Laboratory Medicine  [facility]  [phone] (Blood Bank Offices)  09/01/2017

## 2017-09-02 NOTE — PROGRESS NOTES
[facility]  Kidney Transplant  Progress Note      Reason for Follow-up: Reassessment of Kidney Transplant - 2017  (#1) recipient and management of immunosuppression.    ORGAN:  RIGHT KIDNEY   Donor Type:   - Brain Death     Subjective:     Demetrio Aguiar is a 37 y.o. 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 His maintenance immunosuppression includes a steroid taper per protocol to 5mg daily, Prograf, and Cellcept maintenance. His post op course has been complicated by DGF requiring HD (last HD 17) which prompted need for kidney biopsy on 17. He was then admitted for biopsy proven ABMR with weakly positive class I DSA's on 17. Biopsy 17: good sample with 24 glomeruli (only 1 sclerosed) and evidence of criteria for ABMR. + diffuse ATI; Minimal fibrosis, no ACR or AVR, but + CD4 (>50% diffusely + stain) with mild glomerulitis and focal peritubular capillaritis; also, biopsy shows ca-oxalate (3) and ca-phos (2) crystals. Since transplant, he has made minimal uop daily. He denies any current illnesses or sick contacts. Complains of expected post-op pain to RLQ incision. Plan is for PLEX 3-6 treatments.     Interval History: Pt tolerated PLEX #3 well via left femoral AV fistula yesterday.  Cont w nausea, no vomiting today.  Pt has not had a BM since Saturday- suppository given and will give enema if no results.  UOP increasing and delta Cr decreased.  Plan for PLEX 4 Sat and 5 Mon.  SM pulse x 3 starting today.    Past Medical, Surgical, Family, and Social History:   Unchanged from H&P.    Review of patient's allergies indicates:  No Known Allergies    Scheduled Meds:    docusate sodium  100 mg Oral TID    dolutegravir  50 mg Oral Daily    [START ON 9/4/2017] epoetin erika  10,000 Units Subcutaneous Every Mon    famotidine  20 mg Oral QHS    furosemide  40 mg Intravenous TID    heparin (porcine)  5,000 Units Subcutaneous Q8H    ketoconazole  100 mg Oral Daily    methylPREDNISolone (SOLU-Medrol) IVPB (doses > 250 mg)   Intravenous Once    metOLazone  2.5 mg Oral Daily    multivitamin  1 tablet Oral Daily    nystatin  500,000 Units Oral QID (PC + HS)    predniSONE  20 mg Oral Daily    rilpivirine  25 mg Oral Daily    rosuvastatin  20 mg Oral Daily    sevelamer carbonate  800 mg Oral TID WM    sodium chloride 0.9%  3 mL Intravenous Q8H    sulfamethoxazole-trimethoprim 400-80mg  1 tablet Oral Every Mon, Wed, Fri    tacrolimus  6 mg Oral BID    valganciclovir  450 mg Oral Every Mon, Wed, Fri     Continuous Infusions:     PRN Meds:acetaminophen, bisacodyl, bisacodyl, diphenhydrAMINE, EPINEPHrine, hydrocortisone sodium succinate, ondansetron, oxycodone-acetaminophen, oxycodone-acetaminophen    Intake/Output - Last 3 Shifts       08/30 0700 - 08/31 0659 08/31 0700 - 09/01 0659 09/01 0700 - 09/02 0659    P.O. 700 1500 720    Blood 5288 5444     Total Intake(mL/kg) 5988 (83.1) 6944 (85.3) 720 (8.8)    Urine (mL/kg/hr) 575 (0.3) 1250 (0.6) 750 (1)    Emesis/NG output 0 (0)      Stool 0 (0) 0 (0) 0 (0)    Blood 5032 (2.9) 5111 (2.6)     Total Output 5607 6361 750    Net +381 +583 -30           Stool Occurrence 0 x 0 x 1 x    Emesis Occurrence 1 x             Review of Systems   Constitutional: Negative for activity change, appetite change, chills, fatigue and fever.   HENT: Negative for congestion and facial swelling.    Eyes: Negative for pain, discharge and visual disturbance.   Respiratory: Negative for cough, choking, chest tightness, shortness of breath and wheezing.    Cardiovascular: Negative for chest pain, palpitations and leg swelling.   Gastrointestinal: Positive for  "abdominal distention, constipation and nausea. Negative for abdominal pain, diarrhea and vomiting.   Endocrine: Negative.    Genitourinary: Negative for decreased urine volume, dysuria, flank pain and hematuria.   Musculoskeletal: Negative for gait problem.   Skin: Positive for wound.   Allergic/Immunologic: Positive for immunocompromised state.   Neurological: Negative for dizziness, tremors, light-headedness and headaches.   Hematological: Negative.    Psychiatric/Behavioral: Negative for behavioral problems and dysphoric mood.      Objective:     Vital Signs (Most Recent):  Temp: 97.9 °F (36.6 °C) (09/01/17 1230)  Pulse: 74 (09/01/17 1230)  Resp: 18 (09/01/17 1230)  BP: 116/75 (09/01/17 1230)  SpO2: 99 % (09/01/17 1230) Vital Signs (24h Range):  Temp:  [97.9 °F (36.6 °C)-98.4 °F (36.9 °C)] 97.9 °F (36.6 °C)  Pulse:  [73-82] 74  Resp:  [16-18] 18  SpO2:  [96 %-99 %] 99 %  BP: (109-116)/(65-75) 116/75     Weight: 81.4 kg (179 lb 7.3 oz)  Height: 5' 7" (170.2 cm)  Body mass index is 28.11 kg/m².    Physical Exam   Constitutional: He is oriented to person, place, and time. He appears well-developed. No distress.   HENT:   Head: Normocephalic and atraumatic.   Mouth/Throat: No oropharyngeal exudate.   Eyes: EOM are normal. Pupils are equal, round, and reactive to light.   Neck: Normal range of motion. Neck supple. No JVD present.   Cardiovascular: Normal rate and regular rhythm.    No murmur heard.  Pulmonary/Chest: Effort normal and breath sounds normal. No respiratory distress. He has no rales.   Abdominal: Soft. Bowel sounds are normal. He exhibits distension (tympanetic). There is no tenderness. There is no guarding.   RLQ inc with staples intact CDI no SSI     Musculoskeletal: Normal range of motion. He exhibits no edema.   Neurological: He is alert and oriented to person, place, and time.   Skin: Skin is warm and dry. Capillary refill takes 2 to 3 seconds. He is not diaphoretic. No erythema.   Psychiatric: He " has a normal mood and affect. His behavior is normal. Judgment and thought content normal.   Nursing note and vitals reviewed.      Laboratory:  CBC:     Recent Labs  Lab 09/01/17  0615   WBC 6.46   RBC 2.53*   HGB 7.5*   HCT 21.8*      MCV 86   MCH 29.6   MCHC 34.4     BMP:     Recent Labs  Lab 09/01/17  0614      *   K 4.1      CO2 21*   BUN 52*   CREATININE 9.2*   CALCIUM 8.5*     Tacrolimus Lvl   Date Value Ref Range Status   09/01/2017 7.4 5.0 - 15.0 ng/mL Final     Comment:     Testing performed by Liquid Chromatography-Tandem  Mass Spectrometry (LC-MS/MS).  This test was developed and its performance characteristics  determined by [facility], Department of Pathology  and Laboratory Medicine in a manner consistent with CLIA  requirements. It has not been cleared or approved by the [place]  Food and Drug Administration.  This test is used for clinical   purposes.  It should not be regarded as investigational or for  research.     08/31/2017 13.9 5.0 - 15.0 ng/mL Final     Comment:     Testing performed by Liquid Chromatography-Tandem  Mass Spectrometry (LC-MS/MS).  This test was developed and its performance characteristics  determined by [facility], Department of Pathology  and Laboratory Medicine in a manner consistent with CLIA  requirements. It has not been cleared or approved by the [place]  Food and Drug Administration.  This test is used for clinical   purposes.  It should not be regarded as investigational or for  research.     08/30/2017 5.9 5.0 - 15.0 ng/mL Final     Comment:     Testing performed by Liquid Chromatography-Tandem  Mass Spectrometry (LC-MS/MS).  This test was developed and its performance characteristics  determined by [facility], Department of Pathology  and Laboratory Medicine in a manner consistent with CLIA  requirements. It has not been cleared or approved by the [place]  Food and Drug Administration.  This test is used for clinical    purposes.  It should not be regarded as investigational or for  research.     2017 6.9 5.0 - 15.0 ng/mL Final     Comment:     Testing performed by Liquid Chromatography-Tandem  Mass Spectrometry (LC-MS/MS).  This test was developed and its performance characteristics  determined by [facility], Department of Pathology  and Laboratory Medicine in a manner consistent with CLIA  requirements. It has not been cleared or approved by the [place]  Food and Drug Administration.  This test is used for clinical   purposes.  It should not be regarded as investigational or for  research.         Labs within the past 24 hours have been reviewed.    Diagnostic Results:  US: Reviewed    Assessment/Plan:     * Antibody mediated rejection of kidney transplant    - Biopsy (for DGF) 17: good sample with 24 glomeruli (only 1 sclerosed) and evidence just meeting criteria for ABMR. + diffuse ATI. Minimal fibrosis, no ACR or AVR, but + CD4 (>50% diffusely + stain) with mild glomerulitis and focal peritubular capillaritis. Also, biopsy shows ca-oxalate (3) and ca-phos (2) crystals.  -17 DSA + for weak class I. Repeat pre-PLEX Class I DSA DETECTED: A2(3894),B44(2820),B62(5178),    Class II DSA DETECTED BELOW CUTOFF: DR53(1265)  - Tolerated PLEX #1 , #2 , #3 .  Plan for PLEX #4  and #5 .  Plan fot total of 6 tentatively.    - Plan for IVIG after PLEX.    - Plan for SM x 3, first dose today. Of note, original plan for thymo cancelled secondary to difficulty with line placement due to pt anatomy.  Cellcept originally held- will restart.  - UOP increasing and deltra Cr decreasing with tx.           donor kidney transplant 2017    - Allograft function improving.    - See ABMR and DGF.            Delayed graft function of kidney    - Scheduled for HD MWF, last HD outpt .    - No HD today as UOP again increased overnight w Lasix and tx of rejection.    - Lasix 40 mg tid and Zaroxolyn  2.5mg today.          Long-term use of immunosuppressant medication    - Maintenance IS with prograf, MMF, and prednisone taper.   - Of note, Cellcept initially held today when thymo planned- will restart.    - Will monitor for signs of toxic side effects, check daily Prograf troughs, and change meds accordingly.        Need for prophylactic immunotherapy    - See long term use of IS            HIV (human immunodeficiency virus infection)    - Last CD4 count 7 (8/20) and HIV PCR undetected < 40 copies/ml (8/19).  - Continue HAART with dolutegravir (Tivicay) and rilpivirine (Edurant).    - HIV meds to be given after PLEX- meds highly protein bond and can be removed w plasmapheresis.  - Plan for HIV RNA and Immune cyclex to further assess immune system.           At risk for opportunistic infections    - Continue Valcyte for CMV prophylaxis  - Continue Bactrim for PCP prophylaxis  - Continue nystatin for fungal prophylaxis          Nausea & vomiting    - Improved with Zofran.  - Pt with constipation- tx with suppository today with improvement in nausea.            Anemia in ESRD (end-stage renal disease)    - H/H stable at 7.5/21.8  - Fe panel with elevated ferritin and low Fe stores.  - Continue weekly epo.  - Monitor.          Hyperphosphatemia    - Continue Renvela.  Monitor.          Hyponatremia    - Na 135.  Monitor.          Hypocalcemia    - Ion Ca 1.2.    - Repeat in AM.             Discharge Planning: Plan for D/C next week s/p rejection tx.     Oskar Carlin PA-C  Kidney Transplant  [facility]

## 2017-09-02 NOTE — ASSESSMENT & PLAN NOTE
- Improved with Zofran.  - Pt with constipation- tx with suppository today with improvement in nausea.

## 2017-09-02 NOTE — ASSESSMENT & PLAN NOTE
- Biopsy (for DGF) 8/25/17: good sample with 24 glomeruli (only 1 sclerosed) and evidence just meeting criteria for ABMR. + diffuse ATI. Minimal fibrosis, no ACR or AVR, but + CD4 (>50% diffusely + stain) with mild glomerulitis and focal peritubular capillaritis. Also, biopsy shows ca-oxalate (3) and ca-phos (2) crystals.  -8/25/17 DSA + for weak class I. Repeat pre-PLEX Class I DSA DETECTED: A2(3894),B44(2820),B62(0678),    Class II DSA DETECTED BELOW CUTOFF: DR53(1265)  - Tolerated PLEX #1 8/29, #2 8/30, #3 8/31, #4 9?1.  Plan for PLEX #4 9/3 and #5 9/4.  Plan fot total of 6 tentatively.    - Plan for IVIG after PLEX.    - Plan for SM x 3, first dose yesterday. Of note, original plan for thymo cancelled secondary to difficulty with line placement due to pt anatomy.  Cellcept originally held- will re-start.  - UOP increasing and deltra Cr decreasing with tx.

## 2017-09-02 NOTE — PLAN OF CARE
Problem: Patient Care Overview  Goal: Plan of Care Review  Outcome: Ongoing (interventions implemented as appropriate)  Pt is AAOx3.Pt is ambulatory and independent.Pt able to perform all ADL's without assistance. Standard precautions maintained.  Pt turns independently, pt is aware of bony area and pressure reduction positions Pt remains injury and fall free, non skid footwear donned, call light within reach, personal items within reach, bed in low/locked position, pt able to voice needs all needs voiced have been met at this time.

## 2017-09-02 NOTE — ASSESSMENT & PLAN NOTE
- Maintenance IS with prograf, MMF, and prednisone taper.   - Of note, Cellcept initially held today when thymo planned- re-started yesterday   - Will monitor for signs of toxic side effects, check daily Prograf troughs, and change meds accordingly.

## 2017-09-02 NOTE — ASSESSMENT & PLAN NOTE
- Biopsy (for DGF) 8/25/17: good sample with 24 glomeruli (only 1 sclerosed) and evidence just meeting criteria for ABMR. + diffuse ATI. Minimal fibrosis, no ACR or AVR, but + CD4 (>50% diffusely + stain) with mild glomerulitis and focal peritubular capillaritis. Also, biopsy shows ca-oxalate (3) and ca-phos (2) crystals.  -8/25/17 DSA + for weak class I. Repeat pre-PLEX Class I DSA DETECTED: A2(3894),B44(2820),B62(0938),    Class II DSA DETECTED BELOW CUTOFF: DR53(1265)  - Tolerated PLEX #1 8/29, #2 8/30, #3 8/31.  Plan for PLEX #4 9/2 and #5 9/4.  Plan fot total of 6 tentatively.    - Plan for IVIG after PLEX.    - Plan for SM x 3, first dose today. Of note, original plan for thymo cancelled secondary to difficulty with line placement due to pt anatomy.  Cellcept originally held- will restart.  - UOP increasing and deltra Cr decreasing with tx.

## 2017-09-03 LAB
ABO + RH BLD: NORMAL
ALBUMIN SERPL BCP-MCNC: 4.5 G/DL
ANION GAP SERPL CALC-SCNC: 11 MMOL/L
BASOPHILS # BLD AUTO: 0 K/UL
BASOPHILS NFR BLD: 0 %
BLD GP AB SCN CELLS X3 SERPL QL: NORMAL
BLD PROD TYP BPU: NORMAL
BLOOD UNIT EXPIRATION DATE: NORMAL
BLOOD UNIT TYPE CODE: 6200
BLOOD UNIT TYPE: NORMAL
BUN SERPL-MCNC: 69 MG/DL
CALCIUM SERPL-MCNC: 8.7 MG/DL
CHLORIDE SERPL-SCNC: 102 MMOL/L
CO2 SERPL-SCNC: 23 MMOL/L
CODING SYSTEM: NORMAL
CREAT SERPL-MCNC: 7.9 MG/DL
DIFFERENTIAL METHOD: ABNORMAL
DISPENSE STATUS: NORMAL
EOSINOPHIL # BLD AUTO: 0 K/UL
EOSINOPHIL NFR BLD: 0 %
ERYTHROCYTE [DISTWIDTH] IN BLOOD BY AUTOMATED COUNT: 16.7 %
EST. GFR  (AFRICAN AMERICAN): 9 ML/MIN/1.73 M^2
EST. GFR  (NON AFRICAN AMERICAN): 7.8 ML/MIN/1.73 M^2
GLUCOSE SERPL-MCNC: 203 MG/DL
HCT VFR BLD AUTO: 20.4 %
HCT VFR BLD AUTO: 20.7 %
HGB BLD-MCNC: 6.8 G/DL
HGB BLD-MCNC: 7 G/DL
LYMPHOCYTES # BLD AUTO: 0.1 K/UL
LYMPHOCYTES NFR BLD: 1.9 %
MAGNESIUM SERPL-MCNC: 1.9 MG/DL
MCH RBC QN AUTO: 29.6 PG
MCHC RBC AUTO-ENTMCNC: 33.3 G/DL
MCV RBC AUTO: 89 FL
MONOCYTES # BLD AUTO: 0.1 K/UL
MONOCYTES NFR BLD: 1.3 %
NEUTROPHILS # BLD AUTO: 6.6 K/UL
NEUTROPHILS NFR BLD: 96.2 %
NUM UNITS TRANS PACKED RBC: NORMAL
PHOSPHATE SERPL-MCNC: 5.5 MG/DL
PLATELET # BLD AUTO: 126 K/UL
PMV BLD AUTO: 10.4 FL
POCT GLUCOSE: 186 MG/DL (ref 70–110)
POCT GLUCOSE: 202 MG/DL (ref 70–110)
POCT GLUCOSE: 218 MG/DL (ref 70–110)
POTASSIUM SERPL-SCNC: 4.3 MMOL/L
RBC # BLD AUTO: 2.3 M/UL
SODIUM SERPL-SCNC: 136 MMOL/L
TACROLIMUS BLD-MCNC: 8.4 NG/ML
WBC # BLD AUTO: 6.89 K/UL

## 2017-09-03 PROCEDURE — 85018 HEMOGLOBIN: CPT

## 2017-09-03 PROCEDURE — 80069 RENAL FUNCTION PANEL: CPT

## 2017-09-03 PROCEDURE — 85014 HEMATOCRIT: CPT

## 2017-09-03 PROCEDURE — 25000003 PHARM REV CODE 250: Performed by: PATHOLOGY

## 2017-09-03 PROCEDURE — 63600175 PHARM REV CODE 636 W HCPCS: Performed by: PATHOLOGY

## 2017-09-03 PROCEDURE — 25000003 PHARM REV CODE 250: Performed by: PHYSICIAN ASSISTANT

## 2017-09-03 PROCEDURE — 25000003 PHARM REV CODE 250: Performed by: NURSE PRACTITIONER

## 2017-09-03 PROCEDURE — 83735 ASSAY OF MAGNESIUM: CPT

## 2017-09-03 PROCEDURE — 85025 COMPLETE CBC W/AUTO DIFF WBC: CPT

## 2017-09-03 PROCEDURE — P9016 RBC LEUKOCYTES REDUCED: HCPCS

## 2017-09-03 PROCEDURE — 36514 APHERESIS PLASMA: CPT

## 2017-09-03 PROCEDURE — P9045 ALBUMIN (HUMAN), 5%, 250 ML: HCPCS | Performed by: PATHOLOGY

## 2017-09-03 PROCEDURE — 80197 ASSAY OF TACROLIMUS: CPT

## 2017-09-03 PROCEDURE — 63600175 PHARM REV CODE 636 W HCPCS: Performed by: PHYSICIAN ASSISTANT

## 2017-09-03 PROCEDURE — 86920 COMPATIBILITY TEST SPIN: CPT

## 2017-09-03 PROCEDURE — 36514 APHERESIS PLASMA: CPT | Mod: ,,, | Performed by: PATHOLOGY

## 2017-09-03 PROCEDURE — A4216 STERILE WATER/SALINE, 10 ML: HCPCS | Performed by: NURSE PRACTITIONER

## 2017-09-03 PROCEDURE — 20600001 HC STEP DOWN PRIVATE ROOM

## 2017-09-03 PROCEDURE — 99233 SBSQ HOSP IP/OBS HIGH 50: CPT | Mod: ,,, | Performed by: INTERNAL MEDICINE

## 2017-09-03 PROCEDURE — 86850 RBC ANTIBODY SCREEN: CPT

## 2017-09-03 PROCEDURE — 86900 BLOOD TYPING SEROLOGIC ABO: CPT

## 2017-09-03 PROCEDURE — 25000003 PHARM REV CODE 250: Performed by: INTERNAL MEDICINE

## 2017-09-03 PROCEDURE — 63600175 PHARM REV CODE 636 W HCPCS: Performed by: INTERNAL MEDICINE

## 2017-09-03 PROCEDURE — 63600175 PHARM REV CODE 636 W HCPCS: Performed by: NURSE PRACTITIONER

## 2017-09-03 RX ORDER — FUROSEMIDE 40 MG/1
40 TABLET ORAL EVERY 8 HOURS
Status: DISCONTINUED | OUTPATIENT
Start: 2017-09-03 | End: 2017-09-04

## 2017-09-03 RX ORDER — HYDROCODONE BITARTRATE AND ACETAMINOPHEN 500; 5 MG/1; MG/1
TABLET ORAL
Status: DISCONTINUED | OUTPATIENT
Start: 2017-09-03 | End: 2017-09-12

## 2017-09-03 RX ORDER — PREDNISONE 10 MG/1
20 TABLET ORAL DAILY
Status: DISCONTINUED | OUTPATIENT
Start: 2017-09-04 | End: 2017-09-14 | Stop reason: HOSPADM

## 2017-09-03 RX ORDER — TACROLIMUS 1 MG/1
6 CAPSULE ORAL EVERY MORNING
Status: DISCONTINUED | OUTPATIENT
Start: 2017-09-03 | End: 2017-09-12

## 2017-09-03 RX ORDER — TACROLIMUS 1 MG/1
1 CAPSULE ORAL ONCE
Status: COMPLETED | OUTPATIENT
Start: 2017-09-03 | End: 2017-09-03

## 2017-09-03 RX ORDER — TACROLIMUS 1 MG/1
7 CAPSULE ORAL EVERY MORNING
Status: DISCONTINUED | OUTPATIENT
Start: 2017-09-04 | End: 2017-09-12

## 2017-09-03 RX ORDER — ALBUMIN HUMAN 50 G/1000ML
250 SOLUTION INTRAVENOUS ONCE
Status: COMPLETED | OUTPATIENT
Start: 2017-09-03 | End: 2017-09-03

## 2017-09-03 RX ADMIN — Medication 100 MG: at 08:09

## 2017-09-03 RX ADMIN — OXYCODONE HYDROCHLORIDE AND ACETAMINOPHEN 1 TABLET: 10; 325 TABLET ORAL at 09:09

## 2017-09-03 RX ADMIN — FAMOTIDINE 20 MG: 20 TABLET, FILM COATED ORAL at 09:09

## 2017-09-03 RX ADMIN — FUROSEMIDE 40 MG: 40 TABLET ORAL at 09:09

## 2017-09-03 RX ADMIN — ALBUMIN (HUMAN) 250 G: 12.5 SOLUTION INTRAVENOUS at 06:09

## 2017-09-03 RX ADMIN — BISACODYL 10 MG: 5 TABLET, COATED ORAL at 08:09

## 2017-09-03 RX ADMIN — NYSTATIN 500000 UNITS: 500000 SUSPENSION ORAL at 05:09

## 2017-09-03 RX ADMIN — METOLAZONE 2.5 MG: 2.5 TABLET ORAL at 08:09

## 2017-09-03 RX ADMIN — TACROLIMUS 6 MG: 5 CAPSULE ORAL at 08:09

## 2017-09-03 RX ADMIN — SEVELAMER CARBONATE 800 MG: 800 TABLET, FILM COATED ORAL at 08:09

## 2017-09-03 RX ADMIN — SEVELAMER CARBONATE 800 MG: 800 TABLET, FILM COATED ORAL at 12:09

## 2017-09-03 RX ADMIN — DOLUTEGRAVIR SODIUM 50 MG: 50 TABLET, FILM COATED ORAL at 08:09

## 2017-09-03 RX ADMIN — ROSUVASTATIN CALCIUM 20 MG: 10 TABLET ORAL at 08:09

## 2017-09-03 RX ADMIN — INSULIN ASPART 2 UNITS: 100 INJECTION, SOLUTION INTRAVENOUS; SUBCUTANEOUS at 05:09

## 2017-09-03 RX ADMIN — DOCUSATE SODIUM 100 MG: 50 CAPSULE, LIQUID FILLED ORAL at 01:09

## 2017-09-03 RX ADMIN — DOCUSATE SODIUM 100 MG: 50 CAPSULE, LIQUID FILLED ORAL at 05:09

## 2017-09-03 RX ADMIN — CALCIUM GLUCONATE 2000 MG: 94 INJECTION, SOLUTION INTRAVENOUS at 06:09

## 2017-09-03 RX ADMIN — HEPARIN SODIUM 5000 UNITS: 5000 INJECTION, SOLUTION INTRAVENOUS; SUBCUTANEOUS at 01:09

## 2017-09-03 RX ADMIN — TACROLIMUS 6 MG: 5 CAPSULE ORAL at 05:09

## 2017-09-03 RX ADMIN — Medication 3 ML: at 05:09

## 2017-09-03 RX ADMIN — INSULIN ASPART 2 UNITS: 100 INJECTION, SOLUTION INTRAVENOUS; SUBCUTANEOUS at 08:09

## 2017-09-03 RX ADMIN — FUROSEMIDE 20 MG: 10 INJECTION, SOLUTION INTRAMUSCULAR; INTRAVENOUS at 05:09

## 2017-09-03 RX ADMIN — DOCUSATE SODIUM 100 MG: 50 CAPSULE, LIQUID FILLED ORAL at 09:09

## 2017-09-03 RX ADMIN — MYCOPHENOLATE MOFETIL 1000 MG: 250 CAPSULE ORAL at 08:09

## 2017-09-03 RX ADMIN — MYCOPHENOLATE MOFETIL 1000 MG: 250 CAPSULE ORAL at 09:09

## 2017-09-03 RX ADMIN — TACROLIMUS 1 MG: 1 CAPSULE, GELATIN COATED ORAL at 11:09

## 2017-09-03 RX ADMIN — Medication 3 ML: at 09:09

## 2017-09-03 RX ADMIN — DEXTROSE: 50 INJECTION, SOLUTION INTRAVENOUS at 11:09

## 2017-09-03 RX ADMIN — NYSTATIN 500000 UNITS: 500000 SUSPENSION ORAL at 01:09

## 2017-09-03 RX ADMIN — FUROSEMIDE 40 MG: 40 TABLET ORAL at 01:09

## 2017-09-03 RX ADMIN — NYSTATIN 500000 UNITS: 500000 SUSPENSION ORAL at 08:09

## 2017-09-03 RX ADMIN — THERA TABS 1 TABLET: TAB at 08:09

## 2017-09-03 RX ADMIN — SEVELAMER CARBONATE 800 MG: 800 TABLET, FILM COATED ORAL at 05:09

## 2017-09-03 NOTE — ASSESSMENT & PLAN NOTE
- Improved GFR with excellent urine output, will lower lasix to 40 mg po tid    No HD    Electrolytes are stable    I spoke with the patient and explained care plan and response to treatment    Will plan for repeat biopsy in 1 to 2 weeks

## 2017-09-03 NOTE — PLAN OF CARE
Problem: Patient Care Overview  Goal: Plan of Care Review  Outcome: Ongoing (interventions implemented as appropriate)  Fall precautions maintained. No falls or injuries. Patient oriented and independent denying any distress or complaints of pain. Rejection treatment to continue today; PLEX 4/6 completed; 5th dose scheduled for today per MD; SM 3/3 completed - third dose ordered today.  RLQ incision ERIK w/ staples - pt cleaning w/ betadine. 1 unit of PRBCs given for H/H of 7.0/20.7 Bed locked and low. Side rails up x's two. Call bell and personal belongings in close reach.

## 2017-09-03 NOTE — PROGRESS NOTES
[facility]  Kidney Transplant  Progress Note      Reason for Follow-up: Reassessment of Kidney Transplant - 2017  (#1) recipient and management of immunosuppression.    ORGAN:  RIGHT KIDNEY   Donor Type:   - Brain Death   PHS Increased Risk:    Cold Ischemia:   Induction Medications: Thymoglobulin      Subjective:     Demetrio Aguiar is a 37 y.o. 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 His maintenance immunosuppression includes a steroid taper per protocol to 5mg daily, Prograf, and Cellcept maintenance. His post op course has been complicated by DGF requiring HD (last HD 17) which prompted need for kidney biopsy on 17. He was then admitted for biopsy proven ABMR with weakly positive class I DSA's on 17. Biopsy 17: good sample with 24 glomeruli (only 1 sclerosed) and evidence of criteria for ABMR. + diffuse ATI; Minimal fibrosis, no ACR or AVR, but + CD4 (>50% diffusely + stain) with mild glomerulitis and focal peritubular capillaritis; also, biopsy shows ca-oxalate (3) and ca-phos (2) crystals. Since transplant, he has made minimal uop daily. He denies any current illnesses or sick contacts. Complains of expected post-op pain to RLQ incision. Plan is for PLEX 3-6 treatments.     Interval History: Pt tolerated PLEX #4 well via left femoral AV fistula yesterday.  First dose of SM yesterday. UOP increasing (3.5L in the last 24 h) and Cr decreased.  Plan for SM #2 today.     History of Present Illness:  History of Present Illness:   Mr. Aguiar is a 37 y.o. year old male who is status post  donor kidney transplant on 17.  His maintenance immunosuppression  consists of mycophenolate mofetil, prednisone and tacrolimus.      Interval History:  Patient is actually feeling well today  Responding well to PLEX and SM pulse  Will plan for PLEX today and Tuesday  Complete SM pulse  Then IVIG and then Rituxan    He is eating well, no diarrhea, no pain and tolerating the oral meds  His h/h dropped today, will proceed with a blood transfusion  I spoke withy blood bank staff to clarify PLEX today        Past Medical, Surgical, Family, and Social History:   Unchanged from H&P.    Review of patient's allergies indicates:  No Known Allergies    Scheduled Meds: albumin human 5%  250 g Intravenous Once    calcium gluconate IVPB  2,000 mg Intravenous Once    calcium gluconate IVPB  2,000 mg Intravenous Once    docusate sodium  100 mg Oral TID    dolutegravir  50 mg Oral Daily    [START ON 9/4/2017] epoetin erika  10,000 Units Subcutaneous Every Mon    famotidine  20 mg Oral QHS    furosemide  40 mg Oral Q8H    heparin (porcine)  5,000 Units Subcutaneous Q8H    ketoconazole  100 mg Oral Daily    metOLazone  2.5 mg Oral Daily    multivitamin  1 tablet Oral Daily    mycophenolate  1,000 mg Oral BID    nystatin  500,000 Units Oral QID (PC + HS)    [START ON 9/4/2017] predniSONE  20 mg Oral Daily    rilpivirine  25 mg Oral Daily    rosuvastatin  20 mg Oral Daily    sevelamer carbonate  800 mg Oral TID WM    sodium chloride 0.9%  3 mL Intravenous Q8H    sulfamethoxazole-trimethoprim 400-80mg  1 tablet Oral Every Mon, Wed, Fri    tacrolimus  6 mg Oral Daily    [START ON 9/4/2017] tacrolimus  7 mg Oral Daily    valganciclovir  450 mg Oral Every Mon, Wed, Fri     Continuous Infusions:   PRN Meds:sodium chloride, bisacodyl, bisacodyl, dextrose 50%, dextrose 50%, glucagon (human recombinant), glucose, glucose, insulin aspart, ondansetron, oxycodone-acetaminophen, oxycodone-acetaminophen    Intake/Output - Last 3 Shifts       09/01 0700 - 09/02 0659 09/02 0700 - 09/03 0659  "09/03 0700 - 09/04 0659    P.O. 1280 640 120    Blood 5210      Total Intake(mL/kg) 6490 (90) 640 (8.9) 120 (1.7)    Urine (mL/kg/hr) 3550 (2.1) 1825 (1.1) 925 (1.9)    Emesis/NG output  0 (0)     Other  0 (0)     Stool 0 (0) 0 (0) 0 (0)    Blood 4968 (2.9)      Total Output 8518 1825 925    Net -2028 -1185 -805           Urine Occurrence  0 x     Stool Occurrence 1 x 0 x 0 x    Emesis Occurrence  0 x            Review of Systems   Constitutional: Negative for activity change, appetite change, chills, diaphoresis, fatigue and unexpected weight change.   HENT: Negative for mouth sores, rhinorrhea, sneezing and sore throat.    Eyes: Negative for photophobia and visual disturbance.   Respiratory: Negative for cough, chest tightness, shortness of breath and wheezing.    Cardiovascular: Negative for chest pain, palpitations and leg swelling.   Gastrointestinal: Negative for abdominal pain, blood in stool, constipation, diarrhea, nausea and vomiting.   Genitourinary: Negative for difficulty urinating, dysuria, flank pain, hematuria and urgency.   Musculoskeletal: Negative for arthralgias, back pain, gait problem and joint swelling.   Skin: Negative for color change, pallor, rash and wound.   Neurological: Negative for tremors, syncope, light-headedness, numbness and headaches.   Hematological: Negative for adenopathy. Does not bruise/bleed easily.   Psychiatric/Behavioral: Negative for behavioral problems and sleep disturbance.      Objective:     Vital Signs (Most Recent):  Temp: 98.4 °F (36.9 °C) (09/03/17 1340)  Pulse: 82 (09/03/17 1340)  Resp: 18 (09/03/17 1340)  BP: 132/75 (09/03/17 1340)  SpO2: 100 % (09/03/17 1340) Vital Signs (24h Range):  Temp:  [97.7 °F (36.5 °C)-98.4 °F (36.9 °C)] 98.4 °F (36.9 °C)  Pulse:  [69-82] 82  Resp:  [16-20] 18  SpO2:  [97 %-100 %] 100 %  BP: (106-134)/(64-79) 132/75     Weight: 72.1 kg (159 lb)  Height: 5' 7" (170.2 cm)  Body mass index is 24.9 kg/m².    Physical Exam " "  Constitutional: He is oriented to person, place, and time. He appears well-developed and well-nourished. No distress.   /75   Pulse 82   Temp 98.4 °F (36.9 °C) (Oral)   Resp 18   Ht 5' 7" (1.702 m)   Wt 72.1 kg (159 lb)   SpO2 100%   BMI 24.90 kg/m²       HENT:   Head: Normocephalic and atraumatic.   Nose: Nose normal.   Eyes: Conjunctivae and EOM are normal. Pupils are equal, round, and reactive to light. No scleral icterus.   Neck: Normal range of motion. Neck supple. No JVD present.   Cardiovascular: Normal rate, regular rhythm and intact distal pulses.  Exam reveals no gallop and no friction rub.    No murmur heard.  Regular tachycardia   Pulmonary/Chest: Effort normal and breath sounds normal. No respiratory distress. He has no rales.   Abdominal: Soft. Bowel sounds are normal. He exhibits no distension and no mass. There is no tenderness. There is no guarding.   Musculoskeletal: He exhibits no edema or tenderness.   Lymphadenopathy:     He has no cervical adenopathy.   Neurological: He is alert and oriented to person, place, and time. He has normal reflexes. He displays normal reflexes. No cranial nerve deficit. Coordination normal.   Skin: Skin is warm and dry. No rash noted. He is not diaphoretic. No erythema. No pallor.   Psychiatric: He has a normal mood and affect. His behavior is normal. Judgment and thought content normal.   Nursing note and vitals reviewed.      Laboratory:  CBC:   Recent Labs  Lab 09/03/17  0530 09/03/17  0716   WBC 6.89  --    RBC 2.30*  --    HGB 6.8* 7.0*   HCT 20.4* 20.7*   *  --    MCV 89  --    MCH 29.6  --    MCHC 33.3  --      CMP:   Recent Labs  Lab 09/03/17  0530   *   CALCIUM 8.7   ALBUMIN 4.5      K 4.3   CO2 23      BUN 69*   CREATININE 7.9*     Labs within the past 24 hours have been reviewed.    Diagnostic Results:      Assessment/Plan:     * Antibody mediated rejection of kidney transplant    Continue with PLEX # 5 today PLEX " and SM pulse  Will proceed with IVIG after 6 ttos with PLEX and Rituxan later  Monitor DSA after #6 PLEX and plan for a repeat kidney biopsy 1 to 2 weeks after tto of AMR        - Biopsy (for DGF) 8/25/17: good sample with 24 glomeruli (only 1 sclerosed) and evidence just meeting criteria for ABMR. + diffuse ATI. Minimal fibrosis, no ACR or AVR, but + CD4 (>50% diffusely + stain) with mild glomerulitis and focal peritubular capillaritis. Also, biopsy shows ca-oxalate (3) and ca-phos (2) crystals.  -8/25/17 DSA + for weak class I. Repeat pre-PLEX Class I DSA DETECTED: A2(3894),B44(2820),B62(5178),    Class II DSA DETECTED BELOW CUTOFF: DR53(1265)  - Tolerated PLEX #1 8/29, #2 8/30, #3 8/31, #4 9?1.  Plan for PLEX #4 9/3 and #5 9/4.  Plan fot total of 6 tentatively.    - Plan for IVIG after PLEX.    - Plan for SM x 3, first dose yesterday. Of note, original plan for thymo cancelled secondary to difficulty with line placement due to pt anatomy.  Cellcept originally held- will re-start.  - UOP increasing and deltra Cr decreasing with tx.          Long-term use of immunosuppressant medication    - continue with prograf MMF and SM pulse  PLEX #5 today  Prograf level was adjusted        Delayed graft function of kidney    - Improved GFR with excellent urine output, will lower lasix to 40 mg po tid    No HD    Electrolytes are stable    I spoke with the patient and explained care plan and response to treatment    Will plan for repeat biopsy in 1 to 2 weeks         Anemia in ESRD (end-stage renal disease)    - h/h dropped to 6.8, will transfuse with PRB today follow h/h. Likely associated with IS and PLEX    repeat hemoglobin was 7    Will continue with Iron supplements and epogen.        HIV (human immunodeficiency virus infection)    - Last CD4 count 7 (8/20) and HIV PCR undetected < 40 copies/ml (8/19).  - Continue HAART with dolutegravir (Tivicay) and rilpivirine (Edurant).    - HIV meds to be given after PLEX- meds  highly protein bond and can be removed w plasmapheresis.  - Plan for HIV RNA and Immune cyclex to further assess immune system.           Immunosuppression    Continue with PLEX SM pulse today # 3, prednisone taper prograf and MMF    Will monitor cbc with diff daily    Urine output is improved, will monitor GFR and electrolytes          Hyperphosphatemia    - Continue Renvela.  Monitor.          Hyponatremia    - Na 135.  Monitor.          Nausea & vomiting    - Improved with Zofran.  - Pt with constipation- had a BM yesterday.         Immunocompromised state    Will continue with HAART therapy, immuno prophyaxis           donor kidney transplant 2017    - Allograft function improving.    - See ABMR and DGF.            Hypocalcemia    - Ion Ca 1.17.    - Continue to monitor        Need for prophylactic immunotherapy    - See long term use of IS            At risk for opportunistic infections    - Continue Valcyte for CMV prophylaxis  - Continue Bactrim for PCP prophylaxis  - Continue nystatin for fungal prophylaxis              Discharge Planning:  [address]      Jase Hines MD  Kidney Transplant  [facility]

## 2017-09-03 NOTE — SUBJECTIVE & OBJECTIVE
Subjective:     Demetrio Aguiar is a 37 y.o. 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 His maintenance immunosuppression includes a steroid taper per protocol to 5mg daily, Prograf, and Cellcept maintenance. His post op course has been complicated by DGF requiring HD (last HD 17) which prompted need for kidney biopsy on 17. He was then admitted for biopsy proven ABMR with weakly positive class I DSA's on 17. Biopsy 17: good sample with 24 glomeruli (only 1 sclerosed) and evidence of criteria for ABMR. + diffuse ATI; Minimal fibrosis, no ACR or AVR, but + CD4 (>50% diffusely + stain) with mild glomerulitis and focal peritubular capillaritis; also, biopsy shows ca-oxalate (3) and ca-phos (2) crystals. Since transplant, he has made minimal uop daily. He denies any current illnesses or sick contacts. Complains of expected post-op pain to RLQ incision. Plan is for PLEX 3-6 treatments.     Interval History: Pt tolerated PLEX #4 well via left femoral AV fistula yesterday.  First dose of SM yesterday. UOP increasing (3.5L in the last 24 h) and Cr decreased.  Plan for SM #2 today.     History of Present Illness:  History of Present Illness:   Mr. Aguiar is a 37 y.o. year old male who is status post  donor kidney transplant on 17.  His maintenance immunosuppression consists of mycophenolate mofetil, prednisone and tacrolimus.      Interval History:  Patient is actually feeling well today  Responding well to PLEX and SM pulse  Will plan for PLEX today and Tuesday  Complete SM pulse  Then IVIG and then Rituxan    He is eating well, no diarrhea, no pain and tolerating the oral meds  His h/h dropped  today, will proceed with a blood transfusion  I spoke withy blood bank staff to clarify PLEX today        Past Medical, Surgical, Family, and Social History:   Unchanged from H&P.    Review of patient's allergies indicates:  No Known Allergies    Scheduled Meds: albumin human 5%  250 g Intravenous Once    calcium gluconate IVPB  2,000 mg Intravenous Once    calcium gluconate IVPB  2,000 mg Intravenous Once    docusate sodium  100 mg Oral TID    dolutegravir  50 mg Oral Daily    [START ON 9/4/2017] epoetin erika  10,000 Units Subcutaneous Every Mon    famotidine  20 mg Oral QHS    furosemide  40 mg Oral Q8H    heparin (porcine)  5,000 Units Subcutaneous Q8H    ketoconazole  100 mg Oral Daily    metOLazone  2.5 mg Oral Daily    multivitamin  1 tablet Oral Daily    mycophenolate  1,000 mg Oral BID    nystatin  500,000 Units Oral QID (PC + HS)    [START ON 9/4/2017] predniSONE  20 mg Oral Daily    rilpivirine  25 mg Oral Daily    rosuvastatin  20 mg Oral Daily    sevelamer carbonate  800 mg Oral TID WM    sodium chloride 0.9%  3 mL Intravenous Q8H    sulfamethoxazole-trimethoprim 400-80mg  1 tablet Oral Every Mon, Wed, Fri    tacrolimus  6 mg Oral Daily    [START ON 9/4/2017] tacrolimus  7 mg Oral Daily    valganciclovir  450 mg Oral Every Mon, Wed, Fri     Continuous Infusions:   PRN Meds:sodium chloride, bisacodyl, bisacodyl, dextrose 50%, dextrose 50%, glucagon (human recombinant), glucose, glucose, insulin aspart, ondansetron, oxycodone-acetaminophen, oxycodone-acetaminophen    Intake/Output - Last 3 Shifts       09/01 0700 - 09/02 0659 09/02 0700 - 09/03 0659 09/03 0700 - 09/04 0659    P.O. 1280 640 120    Blood 5210      Total Intake(mL/kg) 6490 (90) 640 (8.9) 120 (1.7)    Urine (mL/kg/hr) 3550 (2.1) 1825 (1.1) 925 (1.9)    Emesis/NG output  0 (0)     Other  0 (0)     Stool 0 (0) 0 (0) 0 (0)    Blood 4968 (2.9)      Total Output 8518 1825 925    Net -2028 -1185 -825           Urine Occurrence  " 0 x     Stool Occurrence 1 x 0 x 0 x    Emesis Occurrence  0 x            Review of Systems   Constitutional: Negative for activity change, appetite change, chills, diaphoresis, fatigue and unexpected weight change.   HENT: Negative for mouth sores, rhinorrhea, sneezing and sore throat.    Eyes: Negative for photophobia and visual disturbance.   Respiratory: Negative for cough, chest tightness, shortness of breath and wheezing.    Cardiovascular: Negative for chest pain, palpitations and leg swelling.   Gastrointestinal: Negative for abdominal pain, blood in stool, constipation, diarrhea, nausea and vomiting.   Genitourinary: Negative for difficulty urinating, dysuria, flank pain, hematuria and urgency.   Musculoskeletal: Negative for arthralgias, back pain, gait problem and joint swelling.   Skin: Negative for color change, pallor, rash and wound.   Neurological: Negative for tremors, syncope, light-headedness, numbness and headaches.   Hematological: Negative for adenopathy. Does not bruise/bleed easily.   Psychiatric/Behavioral: Negative for behavioral problems and sleep disturbance.      Objective:     Vital Signs (Most Recent):  Temp: 98.4 °F (36.9 °C) (09/03/17 1340)  Pulse: 82 (09/03/17 1340)  Resp: 18 (09/03/17 1340)  BP: 132/75 (09/03/17 1340)  SpO2: 100 % (09/03/17 1340) Vital Signs (24h Range):  Temp:  [97.7 °F (36.5 °C)-98.4 °F (36.9 °C)] 98.4 °F (36.9 °C)  Pulse:  [69-82] 82  Resp:  [16-20] 18  SpO2:  [97 %-100 %] 100 %  BP: (106-134)/(64-79) 132/75     Weight: 72.1 kg (159 lb)  Height: 5' 7" (170.2 cm)  Body mass index is 24.9 kg/m².    Physical Exam   Constitutional: He is oriented to person, place, and time. He appears well-developed and well-nourished. No distress.   /75   Pulse 82   Temp 98.4 °F (36.9 °C) (Oral)   Resp 18   Ht 5' 7" (1.702 m)   Wt 72.1 kg (159 lb)   SpO2 100%   BMI 24.90 kg/m²      HENT:   Head: Normocephalic and atraumatic.   Nose: Nose normal.   Eyes: Conjunctivae " and EOM are normal. Pupils are equal, round, and reactive to light. No scleral icterus.   Neck: Normal range of motion. Neck supple. No JVD present.   Cardiovascular: Normal rate, regular rhythm and intact distal pulses.  Exam reveals no gallop and no friction rub.    No murmur heard.  Regular tachycardia   Pulmonary/Chest: Effort normal and breath sounds normal. No respiratory distress. He has no rales.   Abdominal: Soft. Bowel sounds are normal. He exhibits no distension and no mass. There is no tenderness. There is no guarding.   Musculoskeletal: He exhibits no edema or tenderness.   Lymphadenopathy:     He has no cervical adenopathy.   Neurological: He is alert and oriented to person, place, and time. He has normal reflexes. He displays normal reflexes. No cranial nerve deficit. Coordination normal.   Skin: Skin is warm and dry. No rash noted. He is not diaphoretic. No erythema. No pallor.   Psychiatric: He has a normal mood and affect. His behavior is normal. Judgment and thought content normal.   Nursing note and vitals reviewed.      Laboratory:  CBC:   Recent Labs  Lab 09/03/17  0530 09/03/17  0716   WBC 6.89  --    RBC 2.30*  --    HGB 6.8* 7.0*   HCT 20.4* 20.7*   *  --    MCV 89  --    MCH 29.6  --    MCHC 33.3  --      CMP:   Recent Labs  Lab 09/03/17  0530   *   CALCIUM 8.7   ALBUMIN 4.5      K 4.3   CO2 23      BUN 69*   CREATININE 7.9*     Labs within the past 24 hours have been reviewed.    Diagnostic Results:

## 2017-09-03 NOTE — ASSESSMENT & PLAN NOTE
- h/h dropped to 6.8, will transfuse with PRB today follow h/h. Likely associated with IS and PLEX    repeat hemoglobin was 7    Will continue with Iron supplements and epogen.

## 2017-09-03 NOTE — ASSESSMENT & PLAN NOTE
Continue with PLEX SM pulse today # 3, prednisone taper prograf and MMF    Will monitor cbc with diff daily    Urine output is improved, will monitor GFR and electrolytes

## 2017-09-03 NOTE — PLAN OF CARE
Problem: Patient Care Overview  Goal: Plan of Care Review  Outcome: Ongoing (interventions implemented as appropriate)  Pt is ambulatory and independent.Pt able to perform all ADL's without assistance. CBG monitored AC HS.  SS coverage given when appropriate.NAD noted.Standard precautions maintained.  Pt turns independently, pt is aware of bony area and pressure reduction positions Pt remains injury and fall free, non skid footwear donned, call light within reach, personal items within reach, bed in low/locked position, pt able to voice needs all needs voiced have been met at this time.

## 2017-09-03 NOTE — ASSESSMENT & PLAN NOTE
Continue with PLEX # 5 today PLEX and SM pulse  Will proceed with IVIG after 6 ttos with PLEX and Rituxan later  Monitor DSA after #6 PLEX and plan for a repeat kidney biopsy 1 to 2 weeks after tto of AMR        - Biopsy (for DGF) 8/25/17: good sample with 24 glomeruli (only 1 sclerosed) and evidence just meeting criteria for ABMR. + diffuse ATI. Minimal fibrosis, no ACR or AVR, but + CD4 (>50% diffusely + stain) with mild glomerulitis and focal peritubular capillaritis. Also, biopsy shows ca-oxalate (3) and ca-phos (2) crystals.  -8/25/17 DSA + for weak class I. Repeat pre-PLEX Class I DSA DETECTED: A2(3894),B44(2820),B62(5178),    Class II DSA DETECTED BELOW CUTOFF: DR53(1265)  - Tolerated PLEX #1 8/29, #2 8/30, #3 8/31, #4 9?1.  Plan for PLEX #4 9/3 and #5 9/4.  Plan fot total of 6 tentatively.    - Plan for IVIG after PLEX.    - Plan for SM x 3, first dose yesterday. Of note, original plan for thymo cancelled secondary to difficulty with line placement due to pt anatomy.  Cellcept originally held- will re-start.  - UOP increasing and deltra Cr decreasing with tx.

## 2017-09-04 PROBLEM — E83.39 HYPERPHOSPHATEMIA: Status: RESOLVED | Noted: 2017-09-01 | Resolved: 2017-09-04

## 2017-09-04 PROBLEM — E83.51 HYPOCALCEMIA: Status: RESOLVED | Noted: 2017-08-22 | Resolved: 2017-09-04

## 2017-09-04 PROBLEM — E87.1 HYPONATREMIA: Status: RESOLVED | Noted: 2017-09-01 | Resolved: 2017-09-04

## 2017-09-04 PROBLEM — R11.2 NAUSEA & VOMITING: Status: RESOLVED | Noted: 2017-08-30 | Resolved: 2017-09-04

## 2017-09-04 LAB
ALBUMIN SERPL BCP-MCNC: 4.7 G/DL
ANION GAP SERPL CALC-SCNC: 12 MMOL/L
BASOPHILS # BLD AUTO: 0 K/UL
BASOPHILS NFR BLD: 0 %
BUN SERPL-MCNC: 67 MG/DL
CALCIUM SERPL-MCNC: 8.8 MG/DL
CHLORIDE SERPL-SCNC: 108 MMOL/L
CO2 SERPL-SCNC: 23 MMOL/L
CREAT SERPL-MCNC: 5.9 MG/DL
DIFFERENTIAL METHOD: ABNORMAL
EOSINOPHIL # BLD AUTO: 0 K/UL
EOSINOPHIL NFR BLD: 0 %
ERYTHROCYTE [DISTWIDTH] IN BLOOD BY AUTOMATED COUNT: 16.2 %
EST. GFR  (AFRICAN AMERICAN): 12.9 ML/MIN/1.73 M^2
EST. GFR  (NON AFRICAN AMERICAN): 11.1 ML/MIN/1.73 M^2
GLUCOSE SERPL-MCNC: 136 MG/DL
HCT VFR BLD AUTO: 23.4 %
HGB BLD-MCNC: 8 G/DL
LYMPHOCYTES # BLD AUTO: 0.2 K/UL
LYMPHOCYTES NFR BLD: 3.6 %
MAGNESIUM SERPL-MCNC: 1.9 MG/DL
MCH RBC QN AUTO: 30.3 PG
MCHC RBC AUTO-ENTMCNC: 34.2 G/DL
MCV RBC AUTO: 89 FL
MONOCYTES # BLD AUTO: 0.2 K/UL
MONOCYTES NFR BLD: 2.7 %
NEUTROPHILS # BLD AUTO: 6.3 K/UL
NEUTROPHILS NFR BLD: 93.1 %
PHOSPHATE SERPL-MCNC: 4.7 MG/DL
PLATELET # BLD AUTO: 113 K/UL
PMV BLD AUTO: 10.1 FL
POCT GLUCOSE: 130 MG/DL (ref 70–110)
POCT GLUCOSE: 208 MG/DL (ref 70–110)
POCT GLUCOSE: 92 MG/DL (ref 70–110)
POTASSIUM SERPL-SCNC: 4.5 MMOL/L
RBC # BLD AUTO: 2.64 M/UL
SODIUM SERPL-SCNC: 143 MMOL/L
TACROLIMUS BLD-MCNC: 9.1 NG/ML
WBC # BLD AUTO: 6.74 K/UL

## 2017-09-04 PROCEDURE — 25000003 PHARM REV CODE 250: Performed by: NURSE PRACTITIONER

## 2017-09-04 PROCEDURE — 25000003 PHARM REV CODE 250: Performed by: INTERNAL MEDICINE

## 2017-09-04 PROCEDURE — A4216 STERILE WATER/SALINE, 10 ML: HCPCS | Performed by: NURSE PRACTITIONER

## 2017-09-04 PROCEDURE — 99233 SBSQ HOSP IP/OBS HIGH 50: CPT | Mod: ,,, | Performed by: INTERNAL MEDICINE

## 2017-09-04 PROCEDURE — 20600001 HC STEP DOWN PRIVATE ROOM

## 2017-09-04 PROCEDURE — 80197 ASSAY OF TACROLIMUS: CPT

## 2017-09-04 PROCEDURE — 63600175 PHARM REV CODE 636 W HCPCS: Performed by: NURSE PRACTITIONER

## 2017-09-04 PROCEDURE — 85025 COMPLETE CBC W/AUTO DIFF WBC: CPT

## 2017-09-04 PROCEDURE — 63600175 PHARM REV CODE 636 W HCPCS: Performed by: INTERNAL MEDICINE

## 2017-09-04 PROCEDURE — 80069 RENAL FUNCTION PANEL: CPT

## 2017-09-04 PROCEDURE — 83735 ASSAY OF MAGNESIUM: CPT

## 2017-09-04 RX ORDER — FUROSEMIDE 40 MG/1
40 TABLET ORAL 2 TIMES DAILY
Status: DISCONTINUED | OUTPATIENT
Start: 2017-09-04 | End: 2017-09-07

## 2017-09-04 RX ADMIN — TACROLIMUS 6 MG: 5 CAPSULE ORAL at 05:09

## 2017-09-04 RX ADMIN — DOCUSATE SODIUM 100 MG: 50 CAPSULE, LIQUID FILLED ORAL at 05:09

## 2017-09-04 RX ADMIN — FUROSEMIDE 40 MG: 40 TABLET ORAL at 05:09

## 2017-09-04 RX ADMIN — SULFAMETHOXAZOLE AND TRIMETHOPRIM 1 TABLET: 400; 80 TABLET ORAL at 05:09

## 2017-09-04 RX ADMIN — TACROLIMUS 7 MG: 5 CAPSULE ORAL at 09:09

## 2017-09-04 RX ADMIN — NYSTATIN 500000 UNITS: 500000 SUSPENSION ORAL at 09:09

## 2017-09-04 RX ADMIN — MYCOPHENOLATE MOFETIL 1000 MG: 250 CAPSULE ORAL at 09:09

## 2017-09-04 RX ADMIN — SEVELAMER CARBONATE 800 MG: 800 TABLET, FILM COATED ORAL at 12:09

## 2017-09-04 RX ADMIN — SEVELAMER CARBONATE 800 MG: 800 TABLET, FILM COATED ORAL at 05:09

## 2017-09-04 RX ADMIN — ROSUVASTATIN CALCIUM 20 MG: 10 TABLET ORAL at 09:09

## 2017-09-04 RX ADMIN — HEPARIN SODIUM 5000 UNITS: 5000 INJECTION, SOLUTION INTRAVENOUS; SUBCUTANEOUS at 09:09

## 2017-09-04 RX ADMIN — DOCUSATE SODIUM 100 MG: 50 CAPSULE, LIQUID FILLED ORAL at 09:09

## 2017-09-04 RX ADMIN — DOLUTEGRAVIR SODIUM 50 MG: 50 TABLET, FILM COATED ORAL at 09:09

## 2017-09-04 RX ADMIN — THERA TABS 1 TABLET: TAB at 09:09

## 2017-09-04 RX ADMIN — Medication 100 MG: at 09:09

## 2017-09-04 RX ADMIN — VALGANCICLOVIR 450 MG: 450 TABLET, FILM COATED ORAL at 05:09

## 2017-09-04 RX ADMIN — Medication 3 ML: at 05:09

## 2017-09-04 RX ADMIN — ERYTHROPOIETIN 10000 UNITS: 10000 INJECTION, SOLUTION INTRAVENOUS; SUBCUTANEOUS at 01:09

## 2017-09-04 RX ADMIN — OXYCODONE HYDROCHLORIDE AND ACETAMINOPHEN 1 TABLET: 10; 325 TABLET ORAL at 09:09

## 2017-09-04 RX ADMIN — FAMOTIDINE 20 MG: 20 TABLET, FILM COATED ORAL at 09:09

## 2017-09-04 RX ADMIN — PREDNISONE 20 MG: 20 TABLET ORAL at 09:09

## 2017-09-04 RX ADMIN — HEPARIN SODIUM 5000 UNITS: 5000 INJECTION, SOLUTION INTRAVENOUS; SUBCUTANEOUS at 01:09

## 2017-09-04 RX ADMIN — DOCUSATE SODIUM 100 MG: 50 CAPSULE, LIQUID FILLED ORAL at 01:09

## 2017-09-04 RX ADMIN — SEVELAMER CARBONATE 800 MG: 800 TABLET, FILM COATED ORAL at 09:09

## 2017-09-04 RX ADMIN — INSULIN ASPART 2 UNITS: 100 INJECTION, SOLUTION INTRAVENOUS; SUBCUTANEOUS at 06:09

## 2017-09-04 RX ADMIN — Medication 3 ML: at 01:09

## 2017-09-04 RX ADMIN — NYSTATIN 500000 UNITS: 500000 SUSPENSION ORAL at 01:09

## 2017-09-04 NOTE — PLAN OF CARE
Problem: Patient Care Overview  Goal: Plan of Care Review  Outcome: Ongoing (interventions implemented as appropriate)  Pt is AAOx3.Pt is ambulatory and independent.Pt able to perform all ADL's without assistance. CBG monitored AC HS.  SS coverage given when appropriate.NAD noted.No breakdown noted, po fluids encouraged.Standard precautions maintained.  Pt turns independently, pt is aware of bony area and pressure reduction positions Pt remains injury and fall free, non skid footwear donned, call light within reach, personal items within reach, bed in low/locked position, pt able to voice needs all needs voiced have been met at this time.

## 2017-09-04 NOTE — PLAN OF CARE
Problem: Patient Care Overview  Goal: Plan of Care Review  Patient  POD#17 kidney transplant.  Currently being treated for AMR.  #5/6 doses of PLEX received yesterday, will receive 6/6 tomorrow, tolerating well.  Patient is AAO, in good spirits.  Family at bedside attentive to patient.  Denies nausea or pain.  Creatinine 5.9 trending down,  clear yellow thus far today.  Afebrile.

## 2017-09-04 NOTE — ASSESSMENT & PLAN NOTE
Continue with PLEX SM pulse today # 3, prednisone taper prograf and MMF    Prograf level adjusted today    Same dose of MMF    Will monitor PLT count due to PLEX in am

## 2017-09-04 NOTE — PROGRESS NOTES
Left upper thigh fistula successfully accessed with Apheresis tx #5 started at 1820 without difficulty.  Pt oriented x4.  5 liter plasma exchange.  Replacement fluids 5% albumin.  Pt stated no pain.  2 grams of calcium gluconate.  Tx ended at 1930.  Needles removed, pressure applied, hemostasis achieved, pressure dressing applied, + bruit.  Pt tolerated tx well.  Next tx 09/05/2017.

## 2017-09-04 NOTE — PROCEDURES
[facility]  Transfusion Medicine  Procedure Note    SUMMARY   Therapeutic Plasma Exchange (Apheresis)  Date/Time: 9/3/2017 9:16 PM  Performed by: EUFEMIA QUIROZ.  Authorized by: EUFEMIA QUIROZ       Date of Procedure: 09/01/2017    Procedure: Plasma Exchange    Provider: Eufemia Quiroz MD     Assisting Provider: None    Pre-Procedure Diagnosis: Antibody mediated rejection of kidney transplant    Post-Procedure Diagnosis: Antibody mediated rejection of kidney transplant    Follow-up Assessment: Mr. Aguiar is 36 yo male with DDonor kidney transplant on 8/18.  After transplant, patient developed dysfunction and was found to have new DSA. A biopsy showed features of antibody mediated rejection (AMR).  We have been asked to begin a series of TPE management for his AMR kidney allograft.    Kidney transplant AMR carries a Category I Grade 1B indication for therapeutic plasma exchange via the 2016 Journal of Clinical Apheresis Guidelines (Jony J et al. Journal of Clinical Apheresis 2016; 31:149-162.) The TPE plan is 6 daily exchanges as follows:      Antibody mediated rejection of kidney transplant     Access: Left upper thigh fistula   Number of Procedures: 6  Schedule: T, W, Th, F, Sun, T  Volume: 5.0 Liters  Replacement Fluid: Albumin  Recommended Laboratory Studies: Complete Blood Count      Interval History:  Procedure # 5 of 6 was well tolerated and without complications via left upper thigh fistula. Replacement fluids 5% Albumin. Pt stated no pain. Last tx scheduled for 9/5.      Pertinent Laboratory Data:   Complete Blood Count:   Lab Results   Component Value Date    HGB 7.0 (L) 09/03/2017    HCT 20.7 (L) 09/03/2017     (L) 09/03/2017    WBC 6.89 09/03/2017     Comprehensive Metabolic Panel:   Lab Results   Component Value Date     09/03/2017    K 4.3 09/03/2017     09/03/2017    CO2 23 09/03/2017     (H) 09/03/2017    BUN 69 (H) 09/03/2017    CREATININE 7.9 (H)  09/03/2017    CALCIUM 8.7 09/03/2017    PROT 6.3 08/20/2017    ALBUMIN 4.5 09/03/2017    BILITOT 0.2 08/20/2017    ALKPHOS 471 (H) 08/20/2017    AST 27 08/20/2017    ALT <5 (L) 08/20/2017    ANIONGAP 11 09/03/2017    EGFRNONAA 7.8 (A) 09/03/2017       Pertinent Medications: None contraindicated    Review of patient's allergies indicates:  No Known Allergies    Anesthesia: None     Technical Procedures Used: Plasma Exchange: Volume exchanged - 5.0 Liters; Replacement fluid - Albumin; Number of procedures # 5 of 6; Date of next procedure 9/5/17.    Description of the Findings of the Procedure:     Please see Apheresis Nurse flowsheet for details.    The patient was evaluated and all clinical and laboratory data relevant to the treatment was reviewed, and a decision was made to proceed with the Apheresis procedure.    I was available to the clinical staff throughout the procedure.    Significant Surgical Tasks Conducted by the Assistant(s): Not applicable    Complications: None    Estimated Blood Loss (EBL): None    Implants: None     Specimens: None    Dylan Quiroz MD, MS  Section of Transfusion Medicine & Blood Bank  Department of Pathology and Laboratory Medicine  [facility]  [phone] (Blood Bank Offices)  09/03/2017

## 2017-09-04 NOTE — SUBJECTIVE & OBJECTIVE
Subjective:     Demetrio Aguiar is a 37 y.o. 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 His maintenance immunosuppression includes a steroid taper per protocol to 5mg daily, Prograf, and Cellcept maintenance. His post op course has been complicated by DGF requiring HD (last HD 17) which prompted need for kidney biopsy on 17. He was then admitted for biopsy proven ABMR with weakly positive class I DSA's on 17. Biopsy 17: good sample with 24 glomeruli (only 1 sclerosed) and evidence of criteria for ABMR. + diffuse ATI; Minimal fibrosis, no ACR or AVR, but + CD4 (>50% diffusely + stain) with mild glomerulitis and focal peritubular capillaritis; also, biopsy shows ca-oxalate (3) and ca-phos (2) crystals. Since transplant, he has made minimal uop daily. He denies any current illnesses or sick contacts. Complains of expected post-op pain to RLQ incision. Plan is for PLEX 3-6 treatments.     Interval History: Pt tolerated PLEX #4 well via left femoral AV fistula yesterday.  First dose of SM yesterday. UOP increasing (3.5L in the last 24 h) and Cr decreased.  Plan for SM #2 today.     History of Present Illness:  History of Present Illness:   Mr. Aguiar is a 37 y.o. year old male who is status post  donor kidney transplant on 17.  His maintenance immunosuppression consists of mycophenolic acid, prednisone and tacrolimus.      Interval History:  Patient received PLEX # 5 yesterday    He feels well    No issues reported today    No diarrhea    Tolerated well PLEX    VS stable        Past Medical, Surgical, Family, and Social History:   Unchanged from H&P.    Review of patient's allergies  indicates:  No Known Allergies    Scheduled Meds: calcium gluconate IVPB  2,000 mg Intravenous Once    docusate sodium  100 mg Oral TID    dolutegravir  50 mg Oral Daily    epoetin erika  10,000 Units Subcutaneous Every Mon    famotidine  20 mg Oral QHS    furosemide  40 mg Oral BID    heparin (porcine)  5,000 Units Subcutaneous Q8H    ketoconazole  100 mg Oral Daily    multivitamin  1 tablet Oral Daily    mycophenolate  1,000 mg Oral BID    nystatin  500,000 Units Oral QID (PC + HS)    predniSONE  20 mg Oral Daily    rilpivirine  25 mg Oral Daily    rosuvastatin  20 mg Oral Daily    sevelamer carbonate  800 mg Oral TID WM    sodium chloride 0.9%  3 mL Intravenous Q8H    sulfamethoxazole-trimethoprim 400-80mg  1 tablet Oral Every Mon, Wed, Fri    tacrolimus  6 mg Oral Daily    tacrolimus  7 mg Oral Daily    valganciclovir  450 mg Oral Every Mon, Wed, Fri     Continuous Infusions:   PRN Meds:sodium chloride, bisacodyl, bisacodyl, dextrose 50%, dextrose 50%, glucagon (human recombinant), glucose, glucose, insulin aspart, ondansetron, oxycodone-acetaminophen, oxycodone-acetaminophen    Intake/Output - Last 3 Shifts       09/02 0700 - 09/03 0659 09/03 0700 - 09/04 0659 09/04 0700 - 09/05 0659    P.O. 640 1080     Blood  6028     Total Intake(mL/kg) 640 (8.9) 7108 (98.6)     Urine (mL/kg/hr) 1825 (1.1) 3675 (2.1)     Emesis/NG output 0 (0)      Other 0 (0)      Stool 0 (0) 0 (0)     Blood  5354 (3.1)     Total Output 1825 9029      Net -1185 [id number]             Urine Occurrence 0 x      Stool Occurrence 0 x 1 x     Emesis Occurrence 0 x             Review of Systems   Constitutional: Negative for activity change, appetite change, chills, diaphoresis, fatigue and unexpected weight change.   HENT: Negative for mouth sores, rhinorrhea, sneezing and sore throat.    Eyes: Negative for photophobia and visual disturbance.   Respiratory: Negative for cough, chest tightness, shortness of breath and wheezing.   "  Cardiovascular: Negative for chest pain, palpitations and leg swelling.   Gastrointestinal: Negative for abdominal pain, blood in stool, constipation, diarrhea, nausea and vomiting.   Genitourinary: Negative for difficulty urinating, dysuria, flank pain, hematuria and urgency.   Musculoskeletal: Negative for arthralgias, back pain, gait problem and joint swelling.   Skin: Negative for color change, pallor, rash and wound.   Neurological: Negative for tremors, syncope, light-headedness, numbness and headaches.   Hematological: Negative for adenopathy. Does not bruise/bleed easily.   Psychiatric/Behavioral: Negative for behavioral problems and sleep disturbance.      Objective:     Vital Signs (Most Recent):  Temp: 98.4 °F (36.9 °C) (09/04/17 0745)  Pulse: 70 (09/04/17 0745)  Resp: 16 (09/04/17 0745)  BP: 138/77 (09/04/17 0745)  SpO2: 99 % (09/04/17 0745) Vital Signs (24h Range):  Temp:  [97.9 °F (36.6 °C)-98.8 °F (37.1 °C)] 98.4 °F (36.9 °C)  Pulse:  [70-82] 70  Resp:  [16-20] 16  SpO2:  [97 %-100 %] 99 %  BP: (119-138)/(64-80) 138/77     Weight: 72.1 kg (159 lb)  Height: 5' 7" (170.2 cm)  Body mass index is 24.9 kg/m².    Physical Exam   Constitutional: He is oriented to person, place, and time. He appears well-developed and well-nourished. No distress.   /77 (BP Location: Right arm, Patient Position: Lying)   Pulse 70   Temp 98.4 °F (36.9 °C) (Oral)   Resp 16   Ht 5' 7" (1.702 m)   Wt 72.1 kg (159 lb)   SpO2 99%   BMI 24.90 kg/m²      HENT:   Head: Normocephalic and atraumatic.   Nose: Nose normal.   Eyes: Conjunctivae and EOM are normal. Pupils are equal, round, and reactive to light. No scleral icterus.   Neck: Normal range of motion. Neck supple. No JVD present.   Cardiovascular: Normal rate, regular rhythm and intact distal pulses.  Exam reveals no gallop and no friction rub.    No murmur heard.  Regular tachycardia   Pulmonary/Chest: Effort normal and breath sounds normal. No respiratory " distress. He has no rales.   Abdominal: Soft. Bowel sounds are normal. He exhibits no distension and no mass. There is no tenderness. There is no guarding.   Musculoskeletal: He exhibits no edema or tenderness.   Lymphadenopathy:     He has no cervical adenopathy.   Neurological: He is alert and oriented to person, place, and time. He has normal reflexes. He displays normal reflexes. No cranial nerve deficit. Coordination normal.   Skin: Skin is warm and dry. No rash noted. He is not diaphoretic. No erythema. No pallor.   Psychiatric: He has a normal mood and affect. His behavior is normal. Judgment and thought content normal.   Nursing note and vitals reviewed.      Laboratory:  CBC:   Recent Labs  Lab 09/04/17  0530   WBC 6.74   RBC 2.64*   HGB 8.0*   HCT 23.4*   *   MCV 89   MCH 30.3   MCHC 34.2     CMP:   Recent Labs  Lab 09/04/17  0530   *   CALCIUM 8.8   ALBUMIN 4.7      K 4.5   CO2 23      BUN 67*   CREATININE 5.9*     Labs within the past 24 hours have been reviewed.    Diagnostic Results:

## 2017-09-04 NOTE — ASSESSMENT & PLAN NOTE
Patient will receive SM pulse # 3 today  PLEX in am  The will start IVIG 2gm/kg ( split in 2 doses)  Outpatient second dose and outpatient Rituxan  Will repeat DSA after done with PLEX        - Biopsy (for DGF) 8/25/17: good sample with 24 glomeruli (only 1 sclerosed) and evidence just meeting criteria for ABMR. + diffuse ATI. Minimal fibrosis, no ACR or AVR, but + CD4 (>50% diffusely + stain) with mild glomerulitis and focal peritubular capillaritis. Also, biopsy shows ca-oxalate (3) and ca-phos (2) crystals.  -8/25/17 DSA + for weak class I. Repeat pre-PLEX Class I DSA DETECTED: A2(3894),B44(2820),B62(5178),    Class II DSA DETECTED BELOW CUTOFF: DR53(1265)  - Tolerated PLEX #1 8/29, #2 8/30, #3 8/31, #4 9?1.  Plan for PLEX #4 9/3 and #5 9/4.  Plan fot total of 6 tentatively.    - Plan for IVIG after PLEX.    - Plan for SM x 3, first dose yesterday. Of note, original plan for thymo cancelled secondary to difficulty with line placement due to pt anatomy.  Cellcept originally held- will re-start.  - UOP increasing and deltra Cr decreasing with tx.

## 2017-09-04 NOTE — PROGRESS NOTES
Pt eating dinner at this time. Pt forgot to call to have sugar check. Pt ask to skip the next sugar check. Will continue to monitor.

## 2017-09-04 NOTE — PROGRESS NOTES
[facility]  Kidney Transplant  Progress Note      Reason for Follow-up: Reassessment of Kidney Transplant - 2017  (#1) recipient and management of immunosuppression.    ORGAN:  RIGHT KIDNEY   Donor Type:   - Brain Death   PHS Increased Risk:    Cold Ischemia:   Induction Medications: Thymoglobulin      Subjective:     Demetrio Aguiar is a 37 y.o. 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 His maintenance immunosuppression includes a steroid taper per protocol to 5mg daily, Prograf, and Cellcept maintenance. His post op course has been complicated by DGF requiring HD (last HD 17) which prompted need for kidney biopsy on 17. He was then admitted for biopsy proven ABMR with weakly positive class I DSA's on 17. Biopsy 17: good sample with 24 glomeruli (only 1 sclerosed) and evidence of criteria for ABMR. + diffuse ATI; Minimal fibrosis, no ACR or AVR, but + CD4 (>50% diffusely + stain) with mild glomerulitis and focal peritubular capillaritis; also, biopsy shows ca-oxalate (3) and ca-phos (2) crystals. Since transplant, he has made minimal uop daily. He denies any current illnesses or sick contacts. Complains of expected post-op pain to RLQ incision. Plan is for PLEX 3-6 treatments.     Interval History: Pt tolerated PLEX #4 well via left femoral AV fistula yesterday.  First dose of SM yesterday. UOP increasing (3.5L in the last 24 h) and Cr decreased.  Plan for SM #2 today.     History of Present Illness:  History of Present Illness:   Mr. Aguiar is a 37 y.o. year old male who is status post  donor kidney transplant on 17.  His maintenance immunosuppression  consists of mycophenolic acid, prednisone and tacrolimus.      Interval History:  Patient received PLEX # 5 yesterday    He feels well    No issues reported today    No diarrhea    Tolerated well PLEX    VS stable        Past Medical, Surgical, Family, and Social History:   Unchanged from H&P.    Review of patient's allergies indicates:  No Known Allergies    Scheduled Meds: calcium gluconate IVPB  2,000 mg Intravenous Once    docusate sodium  100 mg Oral TID    dolutegravir  50 mg Oral Daily    epoetin erika  10,000 Units Subcutaneous Every Mon    famotidine  20 mg Oral QHS    furosemide  40 mg Oral BID    heparin (porcine)  5,000 Units Subcutaneous Q8H    ketoconazole  100 mg Oral Daily    multivitamin  1 tablet Oral Daily    mycophenolate  1,000 mg Oral BID    nystatin  500,000 Units Oral QID (PC + HS)    predniSONE  20 mg Oral Daily    rilpivirine  25 mg Oral Daily    rosuvastatin  20 mg Oral Daily    sevelamer carbonate  800 mg Oral TID WM    sodium chloride 0.9%  3 mL Intravenous Q8H    sulfamethoxazole-trimethoprim 400-80mg  1 tablet Oral Every Mon, Wed, Fri    tacrolimus  6 mg Oral Daily    tacrolimus  7 mg Oral Daily    valganciclovir  450 mg Oral Every Mon, Wed, Fri     Continuous Infusions:   PRN Meds:sodium chloride, bisacodyl, bisacodyl, dextrose 50%, dextrose 50%, glucagon (human recombinant), glucose, glucose, insulin aspart, ondansetron, oxycodone-acetaminophen, oxycodone-acetaminophen    Intake/Output - Last 3 Shifts       09/02 0700 - 09/03 0659 09/03 0700 - 09/04 0659 09/04 0700 - 09/05 0659    P.O. 640 1080     Blood  6028     Total Intake(mL/kg) 640 (8.9) 7108 (98.6)     Urine (mL/kg/hr) 1825 (1.1) 3675 (2.1)     Emesis/NG output 0 (0)      Other 0 (0)      Stool 0 (0) 0 (0)     Blood  5354 (3.1)     Total Output 1825 9029      Net -1185 1927             Urine Occurrence 0 x      Stool Occurrence 0 x 1 x     Emesis Occurrence 0 x             Review of Systems  "  Constitutional: Negative for activity change, appetite change, chills, diaphoresis, fatigue and unexpected weight change.   HENT: Negative for mouth sores, rhinorrhea, sneezing and sore throat.    Eyes: Negative for photophobia and visual disturbance.   Respiratory: Negative for cough, chest tightness, shortness of breath and wheezing.    Cardiovascular: Negative for chest pain, palpitations and leg swelling.   Gastrointestinal: Negative for abdominal pain, blood in stool, constipation, diarrhea, nausea and vomiting.   Genitourinary: Negative for difficulty urinating, dysuria, flank pain, hematuria and urgency.   Musculoskeletal: Negative for arthralgias, back pain, gait problem and joint swelling.   Skin: Negative for color change, pallor, rash and wound.   Neurological: Negative for tremors, syncope, light-headedness, numbness and headaches.   Hematological: Negative for adenopathy. Does not bruise/bleed easily.   Psychiatric/Behavioral: Negative for behavioral problems and sleep disturbance.      Objective:     Vital Signs (Most Recent):  Temp: 98.4 °F (36.9 °C) (09/04/17 0745)  Pulse: 70 (09/04/17 0745)  Resp: 16 (09/04/17 0745)  BP: 138/77 (09/04/17 0745)  SpO2: 99 % (09/04/17 0745) Vital Signs (24h Range):  Temp:  [97.9 °F (36.6 °C)-98.8 °F (37.1 °C)] 98.4 °F (36.9 °C)  Pulse:  [70-82] 70  Resp:  [16-20] 16  SpO2:  [97 %-100 %] 99 %  BP: (119-138)/(64-80) 138/77     Weight: 72.1 kg (159 lb)  Height: 5' 7" (170.2 cm)  Body mass index is 24.9 kg/m².    Physical Exam   Constitutional: He is oriented to person, place, and time. He appears well-developed and well-nourished. No distress.   /77 (BP Location: Right arm, Patient Position: Lying)   Pulse 70   Temp 98.4 °F (36.9 °C) (Oral)   Resp 16   Ht 5' 7" (1.702 m)   Wt 72.1 kg (159 lb)   SpO2 99%   BMI 24.90 kg/m²       HENT:   Head: Normocephalic and atraumatic.   Nose: Nose normal.   Eyes: Conjunctivae and EOM are normal. Pupils are equal, round, " and reactive to light. No scleral icterus.   Neck: Normal range of motion. Neck supple. No JVD present.   Cardiovascular: Normal rate, regular rhythm and intact distal pulses.  Exam reveals no gallop and no friction rub.    No murmur heard.  Regular tachycardia   Pulmonary/Chest: Effort normal and breath sounds normal. No respiratory distress. He has no rales.   Abdominal: Soft. Bowel sounds are normal. He exhibits no distension and no mass. There is no tenderness. There is no guarding.   Musculoskeletal: He exhibits no edema or tenderness.   Lymphadenopathy:     He has no cervical adenopathy.   Neurological: He is alert and oriented to person, place, and time. He has normal reflexes. He displays normal reflexes. No cranial nerve deficit. Coordination normal.   Skin: Skin is warm and dry. No rash noted. He is not diaphoretic. No erythema. No pallor.   Psychiatric: He has a normal mood and affect. His behavior is normal. Judgment and thought content normal.   Nursing note and vitals reviewed.      Laboratory:  CBC:   Recent Labs  Lab 09/04/17  0530   WBC 6.74   RBC 2.64*   HGB 8.0*   HCT 23.4*   *   MCV 89   MCH 30.3   MCHC 34.2     CMP:   Recent Labs  Lab 09/04/17  0530   *   CALCIUM 8.8   ALBUMIN 4.7      K 4.5   CO2 23      BUN 67*   CREATININE 5.9*     Labs within the past 24 hours have been reviewed.    Diagnostic Results:      Assessment/Plan:     * Antibody mediated rejection of kidney transplant    Patient will receive SM pulse # 3 today  PLEX in am  The will start IVIG 2gm/kg ( split in 2 doses)  Outpatient second dose and outpatient Rituxan  Will repeat DSA after done with PLEX        - Biopsy (for DGF) 8/25/17: good sample with 24 glomeruli (only 1 sclerosed) and evidence just meeting criteria for ABMR. + diffuse ATI. Minimal fibrosis, no ACR or AVR, but + CD4 (>50% diffusely + stain) with mild glomerulitis and focal peritubular capillaritis. Also, biopsy shows ca-oxalate (3) and  ca-phos (2) crystals.  -17 DSA + for weak class I. Repeat pre-PLEX Class I DSA DETECTED: A2(3894),B44(2820),B62(0618),    Class II DSA DETECTED BELOW CUTOFF: DR53(1265)  - Tolerated PLEX #1 , #2 , #3 , #4 9?1.  Plan for PLEX #4 9/3 and #5 .  Plan fot total of 6 tentatively.    - Plan for IVIG after PLEX.    - Plan for SM x 3, first dose yesterday. Of note, original plan for thymo cancelled secondary to difficulty with line placement due to pt anatomy.  Cellcept originally held- will re-start.  - UOP increasing and deltra Cr decreasing with tx.          Long-term use of immunosuppressant medication    - continue with prograf MMF and SM pulse  PLEX #6 tomorrow  Prograf level was adjusted        Delayed graft function of kidney    - Improved GFR with excellent urine output, will lower lasix to 40 mg  Po bid, d/c Zoroxolin            Anemia in ESRD (end-stage renal disease)    - h/h stable, will monitor daily, No intervention needed today        HIV (human immunodeficiency virus infection)    - Last CD4 count 7 () and HIV PCR undetected < 40 copies/ml ().  - Continue HAART with dolutegravir (Tivicay) and rilpivirine (Edurant).    - HIV meds to be given after PLEX- meds highly protein bond and can be removed w plasmapheresis.  - Plan for HIV RNA and Immune cyclex to further assess immune system.           Immunosuppression    Continue with PLEX SM pulse today # 3, prednisone taper prograf and MMF    Prograf level adjusted today    Same dose of MMF    Will monitor PLT count due to PLEX in am        Immunocompromised state    Will continue with HAART therapy, immuno prophyaxis           donor kidney transplant 2017    - Allograft function improving.    - See ABMR and DGF.            Need for prophylactic immunotherapy    - See long term use of IS            At risk for opportunistic infections    - Continue Valcyte for CMV prophylaxis  - Continue Bactrim for PCP prophylaxis  -  Continue nystatin for fungal prophylaxis              Discharge Planning:  [address]      Jase Hines MD  Kidney Transplant  [facility]

## 2017-09-05 ENCOUNTER — TELEPHONE (OUTPATIENT)
Dept: TRANSPLANT | Facility: CLINIC | Age: 37
End: 2017-09-05

## 2017-09-05 ENCOUNTER — PATIENT MESSAGE (OUTPATIENT)
Dept: TRANSPLANT | Facility: CLINIC | Age: 37
End: 2017-09-05

## 2017-09-05 LAB
ALBUMIN SERPL BCP-MCNC: 4.6 G/DL
ANION GAP SERPL CALC-SCNC: 12 MMOL/L
BASOPHILS # BLD AUTO: 0 K/UL
BASOPHILS NFR BLD: 0 %
BUN SERPL-MCNC: 61 MG/DL
CALCIUM SERPL-MCNC: 9.2 MG/DL
CHLORIDE SERPL-SCNC: 106 MMOL/L
CO2 SERPL-SCNC: 24 MMOL/L
CREAT SERPL-MCNC: 4.4 MG/DL
DIFFERENTIAL METHOD: ABNORMAL
EOSINOPHIL # BLD AUTO: 0 K/UL
EOSINOPHIL NFR BLD: 0 %
ERYTHROCYTE [DISTWIDTH] IN BLOOD BY AUTOMATED COUNT: 16.3 %
EST. GFR  (AFRICAN AMERICAN): 18.3 ML/MIN/1.73 M^2
EST. GFR  (NON AFRICAN AMERICAN): 15.9 ML/MIN/1.73 M^2
GLUCOSE SERPL-MCNC: 105 MG/DL
HCT VFR BLD AUTO: 23.5 %
HGB BLD-MCNC: 8 G/DL
HIV UQ DATE RECEIVED: NORMAL
HIV UQ DATE REPORTED: NORMAL
HIV1 RNA # SERPL NAA+PROBE: <40 COPIES/ML
HIV1 RNA SERPL NAA+PROBE-LOG#: <1.6 LOG (10) COPIES/ML
HIV1 RNA SERPL QL NAA+PROBE: NOT DETECTED
IMMUNKNOW (STIMULATED): 78 NG/ML
LYMPHOCYTES # BLD AUTO: 0.4 K/UL
LYMPHOCYTES NFR BLD: 8 %
MAGNESIUM SERPL-MCNC: 1.9 MG/DL
MCH RBC QN AUTO: 29.6 PG
MCHC RBC AUTO-ENTMCNC: 34 G/DL
MCV RBC AUTO: 87 FL
MONOCYTES # BLD AUTO: 0.1 K/UL
MONOCYTES NFR BLD: 2.7 %
NEUTROPHILS # BLD AUTO: 4 K/UL
NEUTROPHILS NFR BLD: 88.6 %
PHOSPHATE SERPL-MCNC: 3.8 MG/DL
PLATELET # BLD AUTO: 96 K/UL
PMV BLD AUTO: 9.6 FL
POCT GLUCOSE: 147 MG/DL (ref 70–110)
POCT GLUCOSE: 189 MG/DL (ref 70–110)
POCT GLUCOSE: 198 MG/DL (ref 70–110)
POCT GLUCOSE: 99 MG/DL (ref 70–110)
POTASSIUM SERPL-SCNC: 3.9 MMOL/L
RBC # BLD AUTO: 2.7 M/UL
SODIUM SERPL-SCNC: 142 MMOL/L
TACROLIMUS BLD-MCNC: 7 NG/ML
WBC # BLD AUTO: 4.49 K/UL

## 2017-09-05 PROCEDURE — 25000003 PHARM REV CODE 250: Performed by: INTERNAL MEDICINE

## 2017-09-05 PROCEDURE — 63600175 PHARM REV CODE 636 W HCPCS: Performed by: PATHOLOGY

## 2017-09-05 PROCEDURE — 36415 COLL VENOUS BLD VENIPUNCTURE: CPT

## 2017-09-05 PROCEDURE — 25000003 PHARM REV CODE 250: Performed by: NURSE PRACTITIONER

## 2017-09-05 PROCEDURE — P9045 ALBUMIN (HUMAN), 5%, 250 ML: HCPCS | Performed by: PATHOLOGY

## 2017-09-05 PROCEDURE — 63600175 PHARM REV CODE 636 W HCPCS: Performed by: NURSE PRACTITIONER

## 2017-09-05 PROCEDURE — 85025 COMPLETE CBC W/AUTO DIFF WBC: CPT

## 2017-09-05 PROCEDURE — 99233 SBSQ HOSP IP/OBS HIGH 50: CPT | Mod: ,,, | Performed by: NURSE PRACTITIONER

## 2017-09-05 PROCEDURE — 63600175 PHARM REV CODE 636 W HCPCS: Performed by: PHYSICIAN ASSISTANT

## 2017-09-05 PROCEDURE — 20600001 HC STEP DOWN PRIVATE ROOM

## 2017-09-05 PROCEDURE — 63600175 PHARM REV CODE 636 W HCPCS: Performed by: INTERNAL MEDICINE

## 2017-09-05 PROCEDURE — 36514 APHERESIS PLASMA: CPT

## 2017-09-05 PROCEDURE — 80069 RENAL FUNCTION PANEL: CPT

## 2017-09-05 PROCEDURE — 83735 ASSAY OF MAGNESIUM: CPT

## 2017-09-05 PROCEDURE — A4216 STERILE WATER/SALINE, 10 ML: HCPCS | Performed by: NURSE PRACTITIONER

## 2017-09-05 PROCEDURE — 80197 ASSAY OF TACROLIMUS: CPT

## 2017-09-05 RX ORDER — DIPHENHYDRAMINE HCL 25 MG
25 CAPSULE ORAL ONCE
Status: COMPLETED | OUTPATIENT
Start: 2017-09-05 | End: 2017-09-05

## 2017-09-05 RX ORDER — DIPHENHYDRAMINE HCL 25 MG
25 CAPSULE ORAL ONCE
Status: DISCONTINUED | OUTPATIENT
Start: 2017-09-06 | End: 2017-09-06

## 2017-09-05 RX ORDER — ALBUMIN HUMAN 50 G/1000ML
250 SOLUTION INTRAVENOUS ONCE
Status: COMPLETED | OUTPATIENT
Start: 2017-09-05 | End: 2017-09-05

## 2017-09-05 RX ORDER — ACETAMINOPHEN 325 MG/1
650 TABLET ORAL ONCE
Status: DISCONTINUED | OUTPATIENT
Start: 2017-09-06 | End: 2017-09-06

## 2017-09-05 RX ORDER — EPINEPHRINE 1 MG/ML
0.1 INJECTION, SOLUTION INTRACARDIAC; INTRAMUSCULAR; INTRAVENOUS; SUBCUTANEOUS
Status: DISCONTINUED | OUTPATIENT
Start: 2017-09-05 | End: 2017-09-14 | Stop reason: HOSPADM

## 2017-09-05 RX ORDER — ACETAMINOPHEN 325 MG/1
650 TABLET ORAL ONCE
Status: COMPLETED | OUTPATIENT
Start: 2017-09-05 | End: 2017-09-05

## 2017-09-05 RX ADMIN — Medication 3 ML: at 01:09

## 2017-09-05 RX ADMIN — DIPHENHYDRAMINE HYDROCHLORIDE 25 MG: 25 CAPSULE ORAL at 09:09

## 2017-09-05 RX ADMIN — MYCOPHENOLATE MOFETIL 1000 MG: 250 CAPSULE ORAL at 09:09

## 2017-09-05 RX ADMIN — HUMAN IMMUNOGLOBULIN G 70 G: 20 LIQUID INTRAVENOUS at 09:09

## 2017-09-05 RX ADMIN — ALBUMIN (HUMAN) 250 G: 12.5 SOLUTION INTRAVENOUS at 05:09

## 2017-09-05 RX ADMIN — ONDANSETRON 4 MG: 2 INJECTION INTRAMUSCULAR; INTRAVENOUS at 08:09

## 2017-09-05 RX ADMIN — HEPARIN SODIUM 5000 UNITS: 5000 INJECTION, SOLUTION INTRAVENOUS; SUBCUTANEOUS at 01:09

## 2017-09-05 RX ADMIN — ACETAMINOPHEN 650 MG: 325 TABLET ORAL at 09:09

## 2017-09-05 RX ADMIN — MYCOPHENOLATE MOFETIL 1000 MG: 250 CAPSULE ORAL at 08:09

## 2017-09-05 RX ADMIN — PREDNISONE 20 MG: 20 TABLET ORAL at 08:09

## 2017-09-05 RX ADMIN — DOCUSATE SODIUM 100 MG: 50 CAPSULE, LIQUID FILLED ORAL at 01:09

## 2017-09-05 RX ADMIN — FUROSEMIDE 40 MG: 40 TABLET ORAL at 08:09

## 2017-09-05 RX ADMIN — NYSTATIN 500000 UNITS: 500000 SUSPENSION ORAL at 09:09

## 2017-09-05 RX ADMIN — FAMOTIDINE 20 MG: 20 TABLET, FILM COATED ORAL at 09:09

## 2017-09-05 RX ADMIN — THERA TABS 1 TABLET: TAB at 08:09

## 2017-09-05 RX ADMIN — Medication 3 ML: at 09:09

## 2017-09-05 RX ADMIN — NYSTATIN 500000 UNITS: 500000 SUSPENSION ORAL at 08:09

## 2017-09-05 RX ADMIN — TACROLIMUS 7 MG: 5 CAPSULE ORAL at 08:09

## 2017-09-05 RX ADMIN — HEPARIN SODIUM 5000 UNITS: 5000 INJECTION, SOLUTION INTRAVENOUS; SUBCUTANEOUS at 09:09

## 2017-09-05 RX ADMIN — DOLUTEGRAVIR SODIUM 50 MG: 50 TABLET, FILM COATED ORAL at 06:09

## 2017-09-05 RX ADMIN — Medication 100 MG: at 08:09

## 2017-09-05 RX ADMIN — SEVELAMER CARBONATE 800 MG: 800 TABLET, FILM COATED ORAL at 08:09

## 2017-09-05 RX ADMIN — OXYCODONE HYDROCHLORIDE AND ACETAMINOPHEN 1 TABLET: 10; 325 TABLET ORAL at 06:09

## 2017-09-05 RX ADMIN — ROSUVASTATIN CALCIUM 20 MG: 10 TABLET ORAL at 08:09

## 2017-09-05 RX ADMIN — DOCUSATE SODIUM 100 MG: 50 CAPSULE, LIQUID FILLED ORAL at 09:09

## 2017-09-05 RX ADMIN — FUROSEMIDE 40 MG: 40 TABLET ORAL at 05:09

## 2017-09-05 RX ADMIN — HEPARIN SODIUM 5000 UNITS: 5000 INJECTION, SOLUTION INTRAVENOUS; SUBCUTANEOUS at 06:09

## 2017-09-05 RX ADMIN — TACROLIMUS 6 MG: 5 CAPSULE ORAL at 05:09

## 2017-09-05 RX ADMIN — NYSTATIN 500000 UNITS: 500000 SUSPENSION ORAL at 05:09

## 2017-09-05 RX ADMIN — NYSTATIN 500000 UNITS: 500000 SUSPENSION ORAL at 01:09

## 2017-09-05 RX ADMIN — INSULIN ASPART 3 UNITS: 100 INJECTION, SOLUTION INTRAVENOUS; SUBCUTANEOUS at 05:09

## 2017-09-05 RX ADMIN — DOCUSATE SODIUM 100 MG: 50 CAPSULE, LIQUID FILLED ORAL at 06:09

## 2017-09-05 NOTE — ASSESSMENT & PLAN NOTE
- Biopsy (for DGF) 8/25/17: good sample with 24 glomeruli (only 1 sclerosed) and evidence just meeting criteria for ABMR. + diffuse ATI. Minimal fibrosis, no ACR or AVR, but + CD4 (>50% diffusely + stain) with mild glomerulitis and focal peritubular capillaritis. Also, biopsy shows ca-oxalate (3) and ca-phos (2) crystals.  -8/25/17 DSA + for weak class I. Repeat pre-PLEX Class I DSA DETECTED: A2(3894),B44(2820),B62(4038),    Class II DSA DETECTED BELOW CUTOFF: DR53(1265)  - Tolerated PLEX #1 8/29, #2 8/30, #3 8/31, #4 9/2 and #5 9/4.  Plan fot total of 6, last treatment today 9/5.   - Plan for IVIG after PLEX, 9/5 and 9/6.  - completed SM pulse x 3. Of note, original plan for thymo cancelled secondary to difficulty with line placement due to pt anatomy.  Cellcept originally held- will re-start.  - UOP increasing and deltra Cr decreasing with tx.

## 2017-09-05 NOTE — HOSPITAL COURSE
Wound assess without notable bleeding. RLQ inc noted with increased swelling and redness to site (09/08). Staples removed and hematoma evacuated by transplant fellow. Wound packed with wet to dry dressing.  Further increase in abdominal pain and more fullness over tx incision side, retroperitoneal hematoma suspected , USS confirmed clinical suspicion. Patient re-explored 09/09 and hematoma evacuated. No definite bleeding source found. Kidney well perfused. Post op has required 4 units blood due to low Hb <6.   Plan for blood transfusion and serial h/h.

## 2017-09-05 NOTE — TELEPHONE ENCOUNTER
Mr Atkinson,    I will forward this over to the in patient coordinator and in the in patient nephrologist to see if they can help with this.    Hallie Smith NP-C       ===View-only below this line===      ----- Message -----     From: Demetrio Aguiar     Sent: 9/5/2017 10:07 AM CDT       To: Hallie Smith NP  Subject: Prescription    Good morning, I need to get up with my coordinator Evelina Barragan or  Mini because two of meds which are my HIV meds are running low and since the hospital doesn't have 1 of them they have been using my personals and I've been presently getting them through [facility] since I've been here....and I don't know the steps to take...and yes I'm still presently in the hospital room 3753

## 2017-09-05 NOTE — SUBJECTIVE & OBJECTIVE
Subjective:     Demetrio Aguiar is a 37 y.o. 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 His maintenance immunosuppression includes a steroid taper per protocol to 5mg daily, Prograf, and Cellcept maintenance. His post op course has been complicated by DGF requiring HD (last HD 8/28/17) which prompted need for kidney biopsy on 8/25/17. 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 Biopsy 8/25/17: good sample with 24 glomeruli (only 1 sclerosed) and evidence of criteria for ABMR. + diffuse ATI; Minimal fibrosis, no ACR or AVR, but + CD4 (>50% diffusely + stain) with mild glomerulitis and focal peritubular capillaritis; also, biopsy shows ca-oxalate (3) and ca-phos (2) crystals. Since transplant, he has made minimal uop daily. He denies any current illnesses or sick contacts. Complains of expected post-op pain to RLQ incision. Plan is for PLEX 3-6 treatments.    Interval History:  Pt tolerated PLEX #5 well via left femoral AV fistula 9/3. Final PLEX treatment today followed by IVIG. Cr trending down, 4.4 from 5.9. Good UOP. Monitor.     Past Medical, Surgical, Family, and Social History:   Unchanged from H&P.    Review of patient's allergies indicates:  No Known Allergies    Scheduled Meds:   acetaminophen  650 mg Oral Once    [START ON 9/6/2017] acetaminophen  650 mg Oral Once    albumin human 5%  250 g Intravenous Once    calcium gluconate IVPB  2,000 mg Intravenous Once    diphenhydrAMINE  25 mg Oral Once    [START ON 9/6/2017] diphenhydrAMINE  25 mg Oral Once    docusate sodium  100 mg Oral TID    dolutegravir  50 mg Oral Daily    epoetin erika  10,000 Units  Subcutaneous Every Mon    famotidine  20 mg Oral QHS    furosemide  40 mg Oral BID    heparin (porcine)  5,000 Units Subcutaneous Q8H    [START ON 9/6/2017] Immune Globulin G (IGG)-PRO-IGA 10 % injection (Privigen)  60 g Intravenous Once    Immune Globulin G (IGG)-PRO-IGA 10 % injection (Privigen)  70 g Intravenous Once    ketoconazole  100 mg Oral Daily    multivitamin  1 tablet Oral Daily    mycophenolate  1,000 mg Oral BID    nystatin  500,000 Units Oral QID (PC + HS)    predniSONE  20 mg Oral Daily    rilpivirine  25 mg Oral Daily    rosuvastatin  20 mg Oral Daily    sodium chloride 0.9%  3 mL Intravenous Q8H    sulfamethoxazole-trimethoprim 400-80mg  1 tablet Oral Every Mon, Wed, Fri    tacrolimus  6 mg Oral Daily    tacrolimus  7 mg Oral Daily    valganciclovir  450 mg Oral Every Mon, Wed, Fri     Continuous Infusions:   PRN Meds:sodium chloride, bisacodyl, bisacodyl, dextrose 50%, dextrose 50%, glucagon (human recombinant), glucose, glucose, insulin aspart, ondansetron, oxycodone-acetaminophen, oxycodone-acetaminophen    Intake/Output - Last 3 Shifts       09/03 0700 - 09/04 0659 09/04 0700 - 09/05 0659 09/05 0700 - 09/06 0659    P.O. 1080 1520     Blood 6028      Total Intake(mL/kg) 7108 (98.6) 1520 (23.3)     Urine (mL/kg/hr) 3675 (2.1) 3310 (2.1)     Emesis/NG output  0 (0)     Other  0 (0)     Stool 0 (0) 1 (0)     Blood 5354 (3.1) 0 (0)     Total Output 9029 3311      Net -1921 -1791             Urine Occurrence  0 x     Stool Occurrence 1 x 0 x     Emesis Occurrence  0 x            Review of Systems   Constitutional: Negative for activity change, appetite change, chills, fatigue and fever.   HENT: Negative for congestion and facial swelling.    Eyes: Negative for pain, discharge and visual disturbance.   Respiratory: Negative for cough, choking, chest tightness, shortness of breath and wheezing.    Cardiovascular: Negative for chest pain, palpitations and leg swelling.  "  Gastrointestinal: Negative for abdominal distention, abdominal pain, constipation, diarrhea, nausea and vomiting.   Endocrine: Negative.    Genitourinary: Negative for decreased urine volume, dysuria, flank pain and hematuria.   Musculoskeletal: Negative for gait problem.   Skin: Positive for wound.   Allergic/Immunologic: Positive for immunocompromised state.   Neurological: Negative for dizziness, tremors, light-headedness and headaches.   Hematological: Negative.    Psychiatric/Behavioral: Negative for behavioral problems and dysphoric mood.      Objective:     Vital Signs (Most Recent):  Temp: 98.4 °F (36.9 °C) (09/05/17 1330)  Pulse: 77 (09/05/17 1330)  Resp: 15 (09/05/17 1330)  BP: 115/66 (09/05/17 1330)  SpO2: 98 % (09/05/17 1330) Vital Signs (24h Range):  Temp:  [98.1 °F (36.7 °C)-98.8 °F (37.1 °C)] 98.4 °F (36.9 °C)  Pulse:  [70-77] 77  Resp:  [15-18] 15  SpO2:  [98 %-100 %] 98 %  BP: (115-130)/(65-78) 115/66     Weight: 65.2 kg (143 lb 11.8 oz)  Height: 5' 7" (170.2 cm)  Body mass index is 22.51 kg/m².    Physical Exam   Constitutional: He is oriented to person, place, and time. He appears well-developed. No distress.   HENT:   Head: Normocephalic and atraumatic.   Mouth/Throat: No oropharyngeal exudate.   Eyes: EOM are normal. Pupils are equal, round, and reactive to light.   Neck: Normal range of motion. Neck supple. No JVD present.   Cardiovascular: Normal rate and regular rhythm.    No murmur heard.  Pulmonary/Chest: Effort normal and breath sounds normal. No respiratory distress. He has no rales.   Abdominal: Soft. Bowel sounds are normal. He exhibits no distension. There is no tenderness. There is no guarding.   RLQ inc with staples intact CDI no SSI     Musculoskeletal: Normal range of motion. He exhibits no edema.   Neurological: He is alert and oriented to person, place, and time.   Skin: Skin is warm and dry. Capillary refill takes 2 to 3 seconds. He is not diaphoretic. No erythema. "   Psychiatric: He has a normal mood and affect. His behavior is normal. Judgment and thought content normal.   Nursing note and vitals reviewed.      Laboratory:  CBC:   Recent Labs  Lab 09/05/17  0433   WBC 4.49   RBC 2.70*   HGB 8.0*   HCT 23.5*   PLT 96*   MCV 87   MCH 29.6   MCHC 34.0     BMP:   Recent Labs  Lab 09/05/17  0433         K 3.9      CO2 24   BUN 61*   CREATININE 4.4*   CALCIUM 9.2     Labs within the past 24 hours have been reviewed.    Diagnostic Results:  US: Reviewed

## 2017-09-05 NOTE — PROGRESS NOTES
[facility]  Kidney Transplant  Progress Note      Reason for Follow-up: Reassessment of Kidney Transplant - 8/18/2017  (#1) recipient and management of immunosuppression.      Subjective:     Demetrio Aguiar is a 37 y.o. 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 His maintenance immunosuppression includes a steroid taper per protocol to 5mg daily, Prograf, and Cellcept maintenance. His post op course has been complicated by DGF requiring HD (last HD 8/28/17) which prompted need for kidney biopsy on 8/25/17. 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 Biopsy 8/25/17: good sample with 24 glomeruli (only 1 sclerosed) and evidence of criteria for ABMR. + diffuse ATI; Minimal fibrosis, no ACR or AVR, but + CD4 (>50% diffusely + stain) with mild glomerulitis and focal peritubular capillaritis; also, biopsy shows ca-oxalate (3) and ca-phos (2) crystals. Since transplant, he has made minimal uop daily. He denies any current illnesses or sick contacts. Complains of expected post-op pain to RLQ incision. Plan is for PLEX 3-6 treatments.    Interval History:  Pt tolerated PLEX #5 well via left femoral AV fistula 9/3. Final PLEX treatment today followed by IVIG. Cr trending down, 4.4 from 5.9. Good UOP. Monitor.     Past Medical, Surgical, Family, and Social History:   Unchanged from H&P.    Review of patient's allergies indicates:  No Known Allergies    Scheduled Meds:   acetaminophen  650 mg Oral Once    [START ON 9/6/2017] acetaminophen  650 mg Oral Once    albumin human 5%  250 g Intravenous Once    calcium gluconate IVPB  2,000 mg Intravenous Once     diphenhydrAMINE  25 mg Oral Once    [START ON 9/6/2017] diphenhydrAMINE  25 mg Oral Once    docusate sodium  100 mg Oral TID    dolutegravir  50 mg Oral Daily    epoetin erika  10,000 Units Subcutaneous Every Mon    famotidine  20 mg Oral QHS    furosemide  40 mg Oral BID    heparin (porcine)  5,000 Units Subcutaneous Q8H    [START ON 9/6/2017] Immune Globulin G (IGG)-PRO-IGA 10 % injection (Privigen)  60 g Intravenous Once    Immune Globulin G (IGG)-PRO-IGA 10 % injection (Privigen)  70 g Intravenous Once    ketoconazole  100 mg Oral Daily    multivitamin  1 tablet Oral Daily    mycophenolate  1,000 mg Oral BID    nystatin  500,000 Units Oral QID (PC + HS)    predniSONE  20 mg Oral Daily    rilpivirine  25 mg Oral Daily    rosuvastatin  20 mg Oral Daily    sodium chloride 0.9%  3 mL Intravenous Q8H    sulfamethoxazole-trimethoprim 400-80mg  1 tablet Oral Every Mon, Wed, Fri    tacrolimus  6 mg Oral Daily    tacrolimus  7 mg Oral Daily    valganciclovir  450 mg Oral Every Mon, Wed, Fri     Continuous Infusions:   PRN Meds:sodium chloride, bisacodyl, bisacodyl, dextrose 50%, dextrose 50%, glucagon (human recombinant), glucose, glucose, insulin aspart, ondansetron, oxycodone-acetaminophen, oxycodone-acetaminophen    Intake/Output - Last 3 Shifts       09/03 0700 - 09/04 0659 09/04 0700 - 09/05 0659 09/05 0700 - 09/06 0659    P.O. 1080 1520     Blood 6028      Total Intake(mL/kg) 7108 (98.6) 1520 (23.3)     Urine (mL/kg/hr) 3675 (2.1) 3310 (2.1)     Emesis/NG output  0 (0)     Other  0 (0)     Stool 0 (0) 1 (0)     Blood 5354 (3.1) 0 (0)     Total Output 9029 3311      Net -1921 -1791             Urine Occurrence  0 x     Stool Occurrence 1 x 0 x     Emesis Occurrence  0 x            Review of Systems   Constitutional: Negative for activity change, appetite change, chills, fatigue and fever.   HENT: Negative for congestion and facial swelling.    Eyes: Negative for pain, discharge and visual  "disturbance.   Respiratory: Negative for cough, choking, chest tightness, shortness of breath and wheezing.    Cardiovascular: Negative for chest pain, palpitations and leg swelling.   Gastrointestinal: Negative for abdominal distention, abdominal pain, constipation, diarrhea, nausea and vomiting.   Endocrine: Negative.    Genitourinary: Negative for decreased urine volume, dysuria, flank pain and hematuria.   Musculoskeletal: Negative for gait problem.   Skin: Positive for wound.   Allergic/Immunologic: Positive for immunocompromised state.   Neurological: Negative for dizziness, tremors, light-headedness and headaches.   Hematological: Negative.    Psychiatric/Behavioral: Negative for behavioral problems and dysphoric mood.      Objective:     Vital Signs (Most Recent):  Temp: 98.4 °F (36.9 °C) (09/05/17 1330)  Pulse: 77 (09/05/17 1330)  Resp: 15 (09/05/17 1330)  BP: 115/66 (09/05/17 1330)  SpO2: 98 % (09/05/17 1330) Vital Signs (24h Range):  Temp:  [98.1 °F (36.7 °C)-98.8 °F (37.1 °C)] 98.4 °F (36.9 °C)  Pulse:  [70-77] 77  Resp:  [15-18] 15  SpO2:  [98 %-100 %] 98 %  BP: (115-130)/(65-78) 115/66     Weight: 65.2 kg (143 lb 11.8 oz)  Height: 5' 7" (170.2 cm)  Body mass index is 22.51 kg/m².    Physical Exam   Constitutional: He is oriented to person, place, and time. He appears well-developed. No distress.   HENT:   Head: Normocephalic and atraumatic.   Mouth/Throat: No oropharyngeal exudate.   Eyes: EOM are normal. Pupils are equal, round, and reactive to light.   Neck: Normal range of motion. Neck supple. No JVD present.   Cardiovascular: Normal rate and regular rhythm.    No murmur heard.  Pulmonary/Chest: Effort normal and breath sounds normal. No respiratory distress. He has no rales.   Abdominal: Soft. Bowel sounds are normal. He exhibits no distension. There is no tenderness. There is no guarding.   RLQ inc with staples intact CDI no SSI     Musculoskeletal: Normal range of motion. He exhibits no edema. "   Neurological: He is alert and oriented to person, place, and time.   Skin: Skin is warm and dry. Capillary refill takes 2 to 3 seconds. He is not diaphoretic. No erythema.   Psychiatric: He has a normal mood and affect. His behavior is normal. Judgment and thought content normal.   Nursing note and vitals reviewed.      Laboratory:  CBC:   Recent Labs  Lab 17  0433   WBC 4.49   RBC 2.70*   HGB 8.0*   HCT 23.5*   PLT 96*   MCV 87   MCH 29.6   MCHC 34.0     BMP:   Recent Labs  Lab 17  0433         K 3.9      CO2 24   BUN 61*   CREATININE 4.4*   CALCIUM 9.2     Labs within the past 24 hours have been reviewed.    Diagnostic Results:  US: Reviewed      Assessment/Plan:     * Antibody mediated rejection of kidney transplant    - Biopsy (for DGF) 17: good sample with 24 glomeruli (only 1 sclerosed) and evidence just meeting criteria for ABMR. + diffuse ATI. Minimal fibrosis, no ACR or AVR, but + CD4 (>50% diffusely + stain) with mild glomerulitis and focal peritubular capillaritis. Also, biopsy shows ca-oxalate (3) and ca-phos (2) crystals.  -17 DSA + for weak class I. Repeat pre-PLEX Class I DSA DETECTED: A2(3894),B44(2820),B62(5178),    Class II DSA DETECTED BELOW CUTOFF: DR53(1265)  - Tolerated PLEX #1 , #2 , #3 , #4  and #5 .  Plan fot total of 6, last treatment today .   - Plan for IVIG after PLEX,  and .  - completed SM pulse x 3. Of note, original plan for thymo cancelled secondary to difficulty with line placement due to pt anatomy.  Cellcept originally held- will re-start.  - UOP increasing and deltra Cr decreasing with tx.          Delayed graft function of kidney    - Improved GFR with excellent urine output. Continue lasix 40 mg bid.             donor kidney transplant 2017    - Allograft function improving.    - See ABMR and DGF.            Need for prophylactic immunotherapy    - See long term use of IS.            Long-term  use of immunosuppressant medication    - continue with prograf and MMF. Monitor labs daily and adjust dose accordingly for a therapeutic level.   - PLEX #6 today.          At risk for opportunistic infections    - Continue Valcyte for CMV prophylaxis  - Continue Bactrim for PCP prophylaxis  - Continue nystatin for fungal prophylaxis          Anemia in ESRD (end-stage renal disease)    - H&H stable. Monitor daily.  - Epoetin injection 10K units today.        Immunocompromised state    - Will continue with HAART therapy, immuno prophylaxis.          HIV (human immunodeficiency virus infection)    - Last CD4 count 7 (8/20) and HIV PCR undetected < 40 copies/ml (8/19).  - Continue HAART with dolutegravir (Tivicay) and rilpivirine (Edurant).    - HIV meds to be given after PLEX- meds highly protein bond and can be removed w plasmapheresis.  - Plan for HIV RNA and Immune cyclex to further assess immune system.               Discharge Planning: not a candidate for dc at this time.  Kb baugh 121      Latonia Rich NP  Kidney Transplant  [facility]

## 2017-09-05 NOTE — ASSESSMENT & PLAN NOTE
- continue with prograf and MMF. Monitor labs daily and adjust dose accordingly for a therapeutic level.   - PLEX #6 today.

## 2017-09-05 NOTE — PROGRESS NOTES
Plasmapheresis tx #6 started at 1730 without difficulty. Successfully accessed left upper thigh fistula prior to start of treatment. Pt oriented x4.  5.0 Liter exchange.  Replacement fluids 5% Albumin. Pt stated no pain. Tx ended at 1834. Pt tolerated tx well. Dialysis needles removed X2, pressure applied until hemostasis maintained.

## 2017-09-05 NOTE — PLAN OF CARE
Problem: Patient Care Overview  Goal: Plan of Care Review  Patient POD#18 kidney transplant; currently being treated for AMR.  Patient had received 3/3 IV steroid doses.  Will receive 6/6 PLEX today.  After PLEX patient will receive 1/2 doses of IVIG.  Patients vitals stable. BG this am 99, no ss insulin needed.  No complaints of pain or nausea today.  Independent with ambulation, ambulates in the halls.  UO good, receiving PO lasix. Left thigh ++, access for PLEX.  Will premedicate. Will receive second dose of IVIG, if tolerates first this evening.

## 2017-09-06 LAB
ALBUMIN SERPL BCP-MCNC: 4.4 G/DL
ANION GAP SERPL CALC-SCNC: 9 MMOL/L
BASOPHILS # BLD AUTO: 0 K/UL
BASOPHILS NFR BLD: 0 %
BUN SERPL-MCNC: 49 MG/DL
CALCIUM SERPL-MCNC: 8.9 MG/DL
CHLORIDE SERPL-SCNC: 108 MMOL/L
CO2 SERPL-SCNC: 22 MMOL/L
CREAT SERPL-MCNC: 3.2 MG/DL
DIFFERENTIAL METHOD: ABNORMAL
EOSINOPHIL # BLD AUTO: 0 K/UL
EOSINOPHIL NFR BLD: 0.5 %
ERYTHROCYTE [DISTWIDTH] IN BLOOD BY AUTOMATED COUNT: 16.2 %
EST. GFR  (AFRICAN AMERICAN): 26.9 ML/MIN/1.73 M^2
EST. GFR  (NON AFRICAN AMERICAN): 23.3 ML/MIN/1.73 M^2
GLUCOSE SERPL-MCNC: 101 MG/DL
HCT VFR BLD AUTO: 24 %
HGB BLD-MCNC: 8.1 G/DL
LYMPHOCYTES # BLD AUTO: 0.4 K/UL
LYMPHOCYTES NFR BLD: 6.7 %
MAGNESIUM SERPL-MCNC: 1.6 MG/DL
MCH RBC QN AUTO: 30 PG
MCHC RBC AUTO-ENTMCNC: 33.8 G/DL
MCV RBC AUTO: 89 FL
MONOCYTES # BLD AUTO: 0.2 K/UL
MONOCYTES NFR BLD: 3 %
NEUTROPHILS # BLD AUTO: 5 K/UL
NEUTROPHILS NFR BLD: 89.1 %
PHOSPHATE SERPL-MCNC: 3.7 MG/DL
PLATELET # BLD AUTO: 81 K/UL
PMV BLD AUTO: 9.7 FL
POCT GLUCOSE: 113 MG/DL (ref 70–110)
POCT GLUCOSE: 121 MG/DL (ref 70–110)
POCT GLUCOSE: 184 MG/DL (ref 70–110)
POCT GLUCOSE: 260 MG/DL (ref 70–110)
POTASSIUM SERPL-SCNC: 4 MMOL/L
RBC # BLD AUTO: 2.7 M/UL
SODIUM SERPL-SCNC: 139 MMOL/L
TACROLIMUS BLD-MCNC: 9.6 NG/ML
WBC # BLD AUTO: 5.64 K/UL

## 2017-09-06 PROCEDURE — 63600175 PHARM REV CODE 636 W HCPCS: Performed by: PHYSICIAN ASSISTANT

## 2017-09-06 PROCEDURE — 25000003 PHARM REV CODE 250: Performed by: NURSE PRACTITIONER

## 2017-09-06 PROCEDURE — 85025 COMPLETE CBC W/AUTO DIFF WBC: CPT

## 2017-09-06 PROCEDURE — 36415 COLL VENOUS BLD VENIPUNCTURE: CPT

## 2017-09-06 PROCEDURE — 63600175 PHARM REV CODE 636 W HCPCS: Performed by: INTERNAL MEDICINE

## 2017-09-06 PROCEDURE — 99233 SBSQ HOSP IP/OBS HIGH 50: CPT | Mod: 24,,, | Performed by: PHYSICIAN ASSISTANT

## 2017-09-06 PROCEDURE — 63600175 PHARM REV CODE 636 W HCPCS: Performed by: NURSE PRACTITIONER

## 2017-09-06 PROCEDURE — 36514 APHERESIS PLASMA: CPT | Mod: ,,, | Performed by: PATHOLOGY

## 2017-09-06 PROCEDURE — 25000003 PHARM REV CODE 250: Performed by: INTERNAL MEDICINE

## 2017-09-06 PROCEDURE — 25000003 PHARM REV CODE 250: Performed by: PHYSICIAN ASSISTANT

## 2017-09-06 PROCEDURE — 99233 SBSQ HOSP IP/OBS HIGH 50: CPT | Mod: ,,, | Performed by: NURSE PRACTITIONER

## 2017-09-06 PROCEDURE — A4216 STERILE WATER/SALINE, 10 ML: HCPCS | Performed by: NURSE PRACTITIONER

## 2017-09-06 PROCEDURE — 80197 ASSAY OF TACROLIMUS: CPT

## 2017-09-06 PROCEDURE — 80069 RENAL FUNCTION PANEL: CPT

## 2017-09-06 PROCEDURE — 20600001 HC STEP DOWN PRIVATE ROOM

## 2017-09-06 PROCEDURE — 83735 ASSAY OF MAGNESIUM: CPT

## 2017-09-06 RX ORDER — ACETAMINOPHEN 325 MG/1
650 TABLET ORAL ONCE
Status: COMPLETED | OUTPATIENT
Start: 2017-09-06 | End: 2017-09-06

## 2017-09-06 RX ORDER — DIPHENHYDRAMINE HCL 25 MG
25 CAPSULE ORAL ONCE
Status: COMPLETED | OUTPATIENT
Start: 2017-09-06 | End: 2017-09-06

## 2017-09-06 RX ORDER — BISACODYL 10 MG
10 SUPPOSITORY, RECTAL RECTAL ONCE
Status: COMPLETED | OUTPATIENT
Start: 2017-09-06 | End: 2017-09-06

## 2017-09-06 RX ADMIN — NYSTATIN 500000 UNITS: 500000 SUSPENSION ORAL at 02:09

## 2017-09-06 RX ADMIN — DIPHENHYDRAMINE HYDROCHLORIDE 25 MG: 25 CAPSULE ORAL at 12:09

## 2017-09-06 RX ADMIN — HUMAN IMMUNOGLOBULIN G 60 G: 20 LIQUID INTRAVENOUS at 01:09

## 2017-09-06 RX ADMIN — ONDANSETRON 4 MG: 2 INJECTION INTRAMUSCULAR; INTRAVENOUS at 03:09

## 2017-09-06 RX ADMIN — ROSUVASTATIN CALCIUM 20 MG: 10 TABLET ORAL at 09:09

## 2017-09-06 RX ADMIN — ACETAMINOPHEN 650 MG: 325 TABLET ORAL at 12:09

## 2017-09-06 RX ADMIN — NYSTATIN 500000 UNITS: 500000 SUSPENSION ORAL at 09:09

## 2017-09-06 RX ADMIN — ONDANSETRON 4 MG: 2 INJECTION INTRAMUSCULAR; INTRAVENOUS at 05:09

## 2017-09-06 RX ADMIN — FAMOTIDINE 20 MG: 20 TABLET, FILM COATED ORAL at 08:09

## 2017-09-06 RX ADMIN — MYCOPHENOLATE MOFETIL 1000 MG: 250 CAPSULE ORAL at 08:09

## 2017-09-06 RX ADMIN — DOCUSATE SODIUM 100 MG: 50 CAPSULE, LIQUID FILLED ORAL at 02:09

## 2017-09-06 RX ADMIN — NYSTATIN 500000 UNITS: 500000 SUSPENSION ORAL at 06:09

## 2017-09-06 RX ADMIN — HEPARIN SODIUM 5000 UNITS: 5000 INJECTION, SOLUTION INTRAVENOUS; SUBCUTANEOUS at 06:09

## 2017-09-06 RX ADMIN — DOCUSATE SODIUM 100 MG: 50 CAPSULE, LIQUID FILLED ORAL at 08:09

## 2017-09-06 RX ADMIN — HEPARIN SODIUM 5000 UNITS: 5000 INJECTION, SOLUTION INTRAVENOUS; SUBCUTANEOUS at 02:09

## 2017-09-06 RX ADMIN — BISACODYL 10 MG: 10 SUPPOSITORY RECTAL at 11:09

## 2017-09-06 RX ADMIN — DOLUTEGRAVIR SODIUM 50 MG: 50 TABLET, FILM COATED ORAL at 09:09

## 2017-09-06 RX ADMIN — TACROLIMUS 6 MG: 5 CAPSULE ORAL at 05:09

## 2017-09-06 RX ADMIN — SULFAMETHOXAZOLE AND TRIMETHOPRIM 1 TABLET: 400; 80 TABLET ORAL at 05:09

## 2017-09-06 RX ADMIN — OXYCODONE HYDROCHLORIDE AND ACETAMINOPHEN 1 TABLET: 10; 325 TABLET ORAL at 08:09

## 2017-09-06 RX ADMIN — VALGANCICLOVIR 450 MG: 450 TABLET, FILM COATED ORAL at 05:09

## 2017-09-06 RX ADMIN — TACROLIMUS 7 MG: 5 CAPSULE ORAL at 09:09

## 2017-09-06 RX ADMIN — NYSTATIN 500000 UNITS: 500000 SUSPENSION ORAL at 08:09

## 2017-09-06 RX ADMIN — FUROSEMIDE 40 MG: 40 TABLET ORAL at 05:09

## 2017-09-06 RX ADMIN — DOCUSATE SODIUM 100 MG: 50 CAPSULE, LIQUID FILLED ORAL at 06:09

## 2017-09-06 RX ADMIN — FUROSEMIDE 40 MG: 40 TABLET ORAL at 09:09

## 2017-09-06 RX ADMIN — Medication 3 ML: at 06:09

## 2017-09-06 RX ADMIN — Medication 100 MG: at 09:09

## 2017-09-06 RX ADMIN — THERA TABS 1 TABLET: TAB at 09:09

## 2017-09-06 RX ADMIN — HEPARIN SODIUM 5000 UNITS: 5000 INJECTION, SOLUTION INTRAVENOUS; SUBCUTANEOUS at 08:09

## 2017-09-06 RX ADMIN — MYCOPHENOLATE MOFETIL 1000 MG: 250 CAPSULE ORAL at 09:09

## 2017-09-06 RX ADMIN — PREDNISONE 20 MG: 20 TABLET ORAL at 09:09

## 2017-09-06 NOTE — SUBJECTIVE & OBJECTIVE
Subjective:       History of Present Illness:  Demetrio Aguiar is a 37 y.o. 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 His maintenance immunosuppression includes a steroid taper per protocol to 5mg daily, Prograf, and Cellcept maintenance. His post op course has been complicated by DGF requiring HD (last HD 8/28/17) which prompted need for kidney biopsy on 8/25/17. 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 Biopsy 8/25/17: good sample with 24 glomeruli (only 1 sclerosed) and evidence of criteria for ABMR. + diffuse ATI; Minimal fibrosis, no ACR or AVR, but + CD4 (>50% diffusely + stain) with mild glomerulitis and focal peritubular capillaritis; also, biopsy shows ca-oxalate (3) and ca-phos (2) crystals. Since transplant, he has made minimal uop daily. He denies any current illnesses or sick contacts. Complains of expected post-op pain to RLQ incision. Pt treated with PLEX x6, SMP x3, and planning for IVIG x2.     Interval History:   Pt tolerated PLEX #6 (final treatment) followed by IVIG well. Infusion completed early this morning. Will dose IVIG #2 this afternoon. Plan for Rituximab x1 tomorrow. Cr continuing to trend down, 3.2 from 5.9. Good UOP. Pt without BM since Monday, will order suppository. Of note, PLTs trending down, will order haptoglobin and LDH with morning labs. Will also order DSAs with morning lab. Monitor      Past Medical, Surgical, Family, and Social History:   Unchanged from H&P.    Review of patient's allergies indicates:  No Known Allergies    Scheduled Meds:   calcium gluconate IVPB  2,000 mg Intravenous Once    docusate sodium  100 mg Oral TID     dolutegravir  50 mg Oral Daily    epoetin erika  10,000 Units Subcutaneous Every Mon    famotidine  20 mg Oral QHS    furosemide  40 mg Oral BID    heparin (porcine)  5,000 Units Subcutaneous Q8H    ketoconazole  100 mg Oral Daily    multivitamin  1 tablet Oral Daily    mycophenolate  1,000 mg Oral BID    nystatin  500,000 Units Oral QID (PC + HS)    predniSONE  20 mg Oral Daily    rilpivirine  25 mg Oral Daily    rosuvastatin  20 mg Oral Daily    sodium chloride 0.9%  3 mL Intravenous Q8H    sulfamethoxazole-trimethoprim 400-80mg  1 tablet Oral Every Mon, Wed, Fri    tacrolimus  6 mg Oral Daily    tacrolimus  7 mg Oral Daily    valganciclovir  450 mg Oral Every Mon, Wed, Fri     Continuous Infusions:   PRN Meds:sodium chloride, bisacodyl, dextrose 50%, dextrose 50%, EPINEPHrine, glucagon (human recombinant), glucose, glucose, insulin aspart, ondansetron, oxycodone-acetaminophen, oxycodone-acetaminophen    Intake/Output - Last 3 Shifts       09/04 0700 - 09/05 0659 09/05 0700 - 09/06 0659 09/06 0700 - 09/07 0659    P.O. 1520 1560 420    Blood  5164     IV Piggyback  700     Total Intake(mL/kg) 1520 (23.3) 7424 (113) 420 (6.4)    Urine (mL/kg/hr) 3310 (2.1) 2850 (1.8) 800 (1.9)    Emesis/NG output 0 (0) 0 (0)     Other 0 (0) 0 (0)     Stool 1 (0) 0 (0) 0 (0)    Blood 0 (0) 4939 (3.1)     Total Output 3311 7789 800    Net -1791 -365 -639           Urine Occurrence 0 x 0 x     Stool Occurrence 0 x 0 x 0 x    Emesis Occurrence 0 x 0 x            Review of Systems   Constitutional: Negative for activity change, appetite change, chills, fatigue and fever.   HENT: Negative for congestion and facial swelling.    Eyes: Negative for pain, discharge and visual disturbance.   Respiratory: Negative for cough, choking, chest tightness, shortness of breath and wheezing.    Cardiovascular: Negative for chest pain, palpitations and leg swelling.   Gastrointestinal: Negative for abdominal distention, abdominal pain,  "constipation, diarrhea, nausea and vomiting.   Endocrine: Negative.    Genitourinary: Negative for decreased urine volume, dysuria, flank pain and hematuria.   Musculoskeletal: Negative for gait problem.   Skin: Positive for wound.   Allergic/Immunologic: Positive for immunocompromised state.   Neurological: Negative for dizziness, tremors, light-headedness and headaches.   Hematological: Negative.    Psychiatric/Behavioral: Negative for behavioral problems and dysphoric mood.      Objective:     Vital Signs (Most Recent):  Temp: 98.5 °F (36.9 °C) (09/06/17 1301)  Pulse: 75 (09/06/17 1301)  Resp: 16 (09/06/17 1301)  BP: 119/70 (09/06/17 1301)  SpO2: 100 % (09/06/17 1301) Vital Signs (24h Range):  Temp:  [97.4 °F (36.3 °C)-98.9 °F (37.2 °C)] 98.5 °F (36.9 °C)  Pulse:  [70-87] 75  Resp:  [15-20] 16  SpO2:  [96 %-100 %] 100 %  BP: ()/(62-79) 119/70     Weight: 65.7 kg (144 lb 13.5 oz)  Height: 5' 7" (170.2 cm)  Body mass index is 22.69 kg/m².    Physical Exam   Constitutional: He is oriented to person, place, and time. He appears well-developed. No distress.   HENT:   Head: Normocephalic and atraumatic.   Mouth/Throat: No oropharyngeal exudate.   Eyes: EOM are normal. Pupils are equal, round, and reactive to light.   Neck: Normal range of motion. Neck supple. No JVD present.   Cardiovascular: Normal rate and regular rhythm.    No murmur heard.  Pulmonary/Chest: Effort normal and breath sounds normal. No respiratory distress. He has no rales.   Abdominal: Soft. Bowel sounds are normal. He exhibits no distension. There is no tenderness. There is no guarding.   RLQ inc with staples intact CDI no SSI     Musculoskeletal: Normal range of motion. He exhibits no edema.   Neurological: He is alert and oriented to person, place, and time.   Skin: Skin is warm and dry. Capillary refill takes 2 to 3 seconds. He is not diaphoretic. No erythema.   Psychiatric: He has a normal mood and affect. His behavior is normal. Judgment " and thought content normal.   Nursing note and vitals reviewed.      Laboratory:  CBC:     Recent Labs  Lab 09/06/17  0442   WBC 5.64   RBC 2.70*   HGB 8.1*   HCT 24.0*   PLT 81*   MCV 89   MCH 30.0   MCHC 33.8     CMP:     Recent Labs  Lab 09/06/17  0442      CALCIUM 8.9   ALBUMIN 4.4      K 4.0   CO2 22*      BUN 49*   CREATININE 3.2*     Labs within the past 24 hours have been reviewed.    Diagnostic Results:  Labs: Reviewed

## 2017-09-06 NOTE — PLAN OF CARE
Problem: Patient Care Overview  Goal: Plan of Care Review  Outcome: Ongoing (interventions implemented as appropriate)  Pt remained free from injury during shift. VSS. Pt receiving dose 2/2 IVIG. Tolerating infusion well. Pt given 10mg percocet for incisional pain in am with moderate relief of symptoms. Suppository given in am. Pt had 1 sm BM today. Bowel regimen in place. Pt voiding per urinal with no issues. Ambulating with no problems. Appetite good. ACHS monitoring with no coverage needed. Family remains at bedside. Call light in reach. Will continue to monitor.

## 2017-09-06 NOTE — ASSESSMENT & PLAN NOTE
- continue with prograf and MMF. Monitor labs daily and adjust dose accordingly for a therapeutic level.    - pt also on ketoconazole 2/2 subtherapeutic prograf level.  - completed PLEX x6 and IVIG x1. Planning for dose #2 of IVIG today.

## 2017-09-06 NOTE — ASSESSMENT & PLAN NOTE
- H&H stable. Monitor daily.  - Epoetin injection 10K units 9/5/17.   -of note, pt with decreasing PLTs. Will send haptoglobin and LDH with morning labs.

## 2017-09-06 NOTE — PROGRESS NOTES
JUAN met with pt to discuss any needs at this time. Potential discharge tomorrow pending labs. Pt and cousin Fe were in the room. Pt inquired about information the doctor provide. Pt was concerned about his current rejection. JUAN clarified for pt that  stated that he is getting better and his rejection is subsiding. JUAN informed pt that rejection is not finite and can be treated, which is what he is in the hospital for. Pt confirmed he understood and reports that he thought rejection was a permanent situation. JUAN provided emotional support and clarified as much information about pt's care as possible. JUAN informed pt that  did mentioned she would be back to further discuss his plan of care with him after d/c. SW remains available.

## 2017-09-06 NOTE — PROGRESS NOTES
[facility]  Kidney Transplant  Progress Note      Reason for Follow-up: Reassessment of Kidney Transplant - 2017  (#1) recipient and management of immunosuppression.    ORGAN:  RIGHT KIDNEY   Donor Type:   - Brain Death   PHS Increased Risk: no   Cold Ischemia: 7 hours 2 minutes  Induction Medications: Thymoglobulin      Subjective:       History of Present Illness:  Demetrio Aguiar is a 37 y.o. 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 His maintenance immunosuppression includes a steroid taper per protocol to 5mg daily, Prograf, and Cellcept maintenance. His post op course has been complicated by DGF requiring HD (last HD 17) which prompted need for kidney biopsy on 17. He was then admitted for biopsy proven ABMR with weakly positive class I DSA's on 17. Biopsy 17: good sample with 24 glomeruli (only 1 sclerosed) and evidence of criteria for ABMR. + diffuse ATI; Minimal fibrosis, no ACR or AVR, but + CD4 (>50% diffusely + stain) with mild glomerulitis and focal peritubular capillaritis; also, biopsy shows ca-oxalate (3) and ca-phos (2) crystals. Since transplant, he has made minimal uop daily. He denies any current illnesses or sick contacts. Complains of expected post-op pain to RLQ incision. Pt treated with PLEX x6, SMP x3, and planning for IVIG x2.     Interval History:   Pt tolerated PLEX #6 (final treatment) followed by IVIG well. Infusion completed early this morning. Will dose IVIG #2 this afternoon. Plan for Rituximab x1 tomorrow. Cr continuing to trend down, 3.2 from 5.9. Good UOP. Pt without BM since Monday, will order suppository. Of note, PLTs trending  down, will order haptoglobin and LDH with morning labs. Will also order DSAs with morning lab. Monitor      Past Medical, Surgical, Family, and Social History:   Unchanged from H&P.    Review of patient's allergies indicates:  No Known Allergies    Scheduled Meds:   calcium gluconate IVPB  2,000 mg Intravenous Once    docusate sodium  100 mg Oral TID    dolutegravir  50 mg Oral Daily    epoetin erika  10,000 Units Subcutaneous Every Mon    famotidine  20 mg Oral QHS    furosemide  40 mg Oral BID    heparin (porcine)  5,000 Units Subcutaneous Q8H    ketoconazole  100 mg Oral Daily    multivitamin  1 tablet Oral Daily    mycophenolate  1,000 mg Oral BID    nystatin  500,000 Units Oral QID (PC + HS)    predniSONE  20 mg Oral Daily    rilpivirine  25 mg Oral Daily    rosuvastatin  20 mg Oral Daily    sodium chloride 0.9%  3 mL Intravenous Q8H    sulfamethoxazole-trimethoprim 400-80mg  1 tablet Oral Every Mon, Wed, Fri    tacrolimus  6 mg Oral Daily    tacrolimus  7 mg Oral Daily    valganciclovir  450 mg Oral Every Mon, Wed, Fri     Continuous Infusions:   PRN Meds:sodium chloride, bisacodyl, dextrose 50%, dextrose 50%, EPINEPHrine, glucagon (human recombinant), glucose, glucose, insulin aspart, ondansetron, oxycodone-acetaminophen, oxycodone-acetaminophen    Intake/Output - Last 3 Shifts       09/04 0700 - 09/05 0659 09/05 0700 - 09/06 0659 09/06 0700 - 09/07 0659    P.O. 1520 1560 420    Blood  5164     IV Piggyback  700     Total Intake(mL/kg) 1520 (23.3) 7424 (113) 420 (6.4)    Urine (mL/kg/hr) 3310 (2.1) 2850 (1.8) 800 (1.9)    Emesis/NG output 0 (0) 0 (0)     Other 0 (0) 0 (0)     Stool 1 (0) 0 (0) 0 (0)    Blood 0 (0) 4939 (3.1)     Total Output 3311 7789 609    Net -1791 -365 -380           Urine Occurrence 0 x 0 x     Stool Occurrence 0 x 0 x 0 x    Emesis Occurrence 0 x 0 x            Review of Systems   Constitutional: Negative for activity change, appetite change, chills, fatigue and fever.  "  HENT: Negative for congestion and facial swelling.    Eyes: Negative for pain, discharge and visual disturbance.   Respiratory: Negative for cough, choking, chest tightness, shortness of breath and wheezing.    Cardiovascular: Negative for chest pain, palpitations and leg swelling.   Gastrointestinal: Negative for abdominal distention, abdominal pain, constipation, diarrhea, nausea and vomiting.   Endocrine: Negative.    Genitourinary: Negative for decreased urine volume, dysuria, flank pain and hematuria.   Musculoskeletal: Negative for gait problem.   Skin: Positive for wound.   Allergic/Immunologic: Positive for immunocompromised state.   Neurological: Negative for dizziness, tremors, light-headedness and headaches.   Hematological: Negative.    Psychiatric/Behavioral: Negative for behavioral problems and dysphoric mood.      Objective:     Vital Signs (Most Recent):  Temp: 98.5 °F (36.9 °C) (09/06/17 1301)  Pulse: 75 (09/06/17 1301)  Resp: 16 (09/06/17 1301)  BP: 119/70 (09/06/17 1301)  SpO2: 100 % (09/06/17 1301) Vital Signs (24h Range):  Temp:  [97.4 °F (36.3 °C)-98.9 °F (37.2 °C)] 98.5 °F (36.9 °C)  Pulse:  [70-87] 75  Resp:  [15-20] 16  SpO2:  [96 %-100 %] 100 %  BP: ()/(62-79) 119/70     Weight: 65.7 kg (144 lb 13.5 oz)  Height: 5' 7" (170.2 cm)  Body mass index is 22.69 kg/m².    Physical Exam   Constitutional: He is oriented to person, place, and time. He appears well-developed. No distress.   HENT:   Head: Normocephalic and atraumatic.   Mouth/Throat: No oropharyngeal exudate.   Eyes: EOM are normal. Pupils are equal, round, and reactive to light.   Neck: Normal range of motion. Neck supple. No JVD present.   Cardiovascular: Normal rate and regular rhythm.    No murmur heard.  Pulmonary/Chest: Effort normal and breath sounds normal. No respiratory distress. He has no rales.   Abdominal: Soft. Bowel sounds are normal. He exhibits no distension. There is no tenderness. There is no guarding.   RLQ " inc with staples intact CDI no SSI     Musculoskeletal: Normal range of motion. He exhibits no edema.   Neurological: He is alert and oriented to person, place, and time.   Skin: Skin is warm and dry. Capillary refill takes 2 to 3 seconds. He is not diaphoretic. No erythema.   Psychiatric: He has a normal mood and affect. His behavior is normal. Judgment and thought content normal.   Nursing note and vitals reviewed.      Laboratory:  CBC:     Recent Labs  Lab 17  0442   WBC 5.64   RBC 2.70*   HGB 8.1*   HCT 24.0*   PLT 81*   MCV 89   MCH 30.0   MCHC 33.8     CMP:     Recent Labs  Lab 17  0442      CALCIUM 8.9   ALBUMIN 4.4      K 4.0   CO2 22*      BUN 49*   CREATININE 3.2*     Labs within the past 24 hours have been reviewed.    Diagnostic Results:  Labs: Reviewed    Assessment/Plan:     * Antibody mediated rejection of kidney transplant    - Biopsy (for DGF) 17: good sample with 24 glomeruli (only 1 sclerosed) and evidence just meeting criteria for ABMR. + diffuse ATI. Minimal fibrosis, no ACR or AVR, but + CD4 (>50% diffusely + stain) with mild glomerulitis and focal peritubular capillaritis. Also, biopsy shows ca-oxalate (3) and ca-phos (2) crystals.  -17 DSA + for weak class I. Repeat pre-PLEX Class I DSA DETECTED: A2(3894),B44(2820),B62(5178),    Class II DSA DETECTED BELOW CUTOFF: DR53(1265)  - Tolerated PLEX #1 , #2 , #3 , #4 ,  #5 , and #6 17.    - completed SMP x 3. Of note, original plan for thymo cancelled secondary to difficulty with line placement due to pt anatomy.  Cellcept originally held- will re-start.  - Tolerated IVIG #1 17. Planning for dose #2 today.   - Plan for rituximab x1 17.   - UOP increasing and deltra Cr decreasing with tx.    - Will send DSAs with morning labs.          donor kidney transplant 2017    - Allograft function improving.    - See ABMR and DGF.            Delayed graft function of kidney     - Improved GFR with excellent urine output. Continue lasix 40 mg bid.  - creatinine trending down daily, 3.2 today. UOP increasing.             Long-term use of immunosuppressant medication    - continue with prograf and MMF. Monitor labs daily and adjust dose accordingly for a therapeutic level.    - pt also on ketoconazole 2/2 subtherapeutic prograf level.  - completed PLEX x6 and IVIG x1. Planning for dose #2 of IVIG today.           Need for prophylactic immunotherapy    - See long term use of IS.            At risk for opportunistic infections    - Continue Valcyte for CMV prophylaxis  - Continue Bactrim for PCP prophylaxis  - Continue nystatin for fungal prophylaxis          Immunocompromised state    - Will continue with HAART therapy, immuno prophylaxis.          HIV (human immunodeficiency virus infection)    - Last CD4 count 7 (8/20) and HIV PCR undetected < 40 copies/ml (8/19).  - Continue HAART with dolutegravir (Tivicay) and rilpivirine (Edurant).    - HIV meds to be given after PLEX- meds highly protein bound and can be removed with plasmapheresis.  - Plan for HIV RNA and Immune cyclex to further assess immune system.           Anemia in ESRD (end-stage renal disease)    - H&H stable. Monitor daily.  - Epoetin injection 10K units 9/5/17.   -of note, pt with decreasing PLTs. Will send haptoglobin and LDH with morning labs.             Discharge Planning: not a candidate for d/c at this time. Plan for possible d/c tomorrow following rituximab treatment.   Kb run 121      Stephanie Valle PA-C  Kidney Transplant  [facility]

## 2017-09-06 NOTE — ASSESSMENT & PLAN NOTE
- Last CD4 count 7 (8/20) and HIV PCR undetected < 40 copies/ml (8/19).  - Continue HAART with dolutegravir (Tivicay) and rilpivirine (Edurant).    - HIV meds to be given after PLEX- meds highly protein bound and can be removed with plasmapheresis.  - Plan for HIV RNA and Immune cyclex to further assess immune system.

## 2017-09-06 NOTE — PROCEDURES
[facility]  Transfusion Medicine  Procedure Note    SUMMARY   Procedures    Date of Procedure: 09/05/2017    Procedure: Plasma Exchange    Provider: Dylan Quiroz MD     Assisting Provider: None    Pre-Procedure Diagnosis: Acute rejection of kidney transplant    Post-Procedure Diagnosis: Acute rejection of kidney transplant    Follow-up Assessment: Mr. Aguiar is 36 yo male with DDonor kidney transplant on 8/18.  After transplant, patient developed dysfunction and was found to have new DSA. A biopsy showed features of antibody mediated rejection (AMR).  We have been asked to begin a series of TPE management for his AMR kidney allograft.    Kidney transplant AMR carries a Category I Grade 1B indication for therapeutic plasma exchange via the 2016 Journal of Clinical Apheresis Guidelines (Borges J et al. Journal of Clinical Apheresis 2016; 31:149-162.) The TPE plan is 6 daily exchanges as follows:      Antibody mediated rejection of kidney transplant     Access: Left upper thigh fistula   Number of Procedures: 6  Schedule: T, W, Th, F, Sun, T  Volume: 5.0 Liters  Replacement Fluid: Albumin  Recommended Laboratory Studies: Complete Blood Count      Interval History:  Procedure # 6 of 6 was well tolerated and without complications via left upper thigh fistula. Replacement fluids 5% Albumin. Pt stated no pain. This completes his treatment series.     Pertinent Laboratory Data:   Complete Blood Count:   Lab Results   Component Value Date    HGB 8.0 (L) 09/05/2017    HCT 23.5 (L) 09/05/2017    PLT 96 (L) 09/05/2017    WBC 4.49 09/05/2017     Comprehensive Metabolic Panel:   Lab Results   Component Value Date     09/05/2017    K 3.9 09/05/2017     09/05/2017    CO2 24 09/05/2017     09/05/2017    BUN 61 (H) 09/05/2017    CREATININE 4.4 (H) 09/05/2017    CALCIUM 9.2 09/05/2017    PROT 6.3 08/20/2017    ALBUMIN 4.6 09/05/2017    BILITOT 0.2 08/20/2017    ALKPHOS 471 (H) 08/20/2017     AST 27 08/20/2017    ALT <5 (L) 08/20/2017    ANIONGAP 12 09/05/2017    EGFRNONAA 15.9 (A) 09/05/2017       Pertinent Medications: None contraindicated    Review of patient's allergies indicates:  No Known Allergies    Anesthesia: None     Technical Procedures Used: Plasma Exchange: Volume exchanged - 5.0 Liters; Replacement fluid - Albumin; Number of procedures # 6 of 6; Date of next procedure N/A.    Description of the Findings of the Procedure:     Please see Apheresis Nurse flowsheet for details.    The patient was evaluated and all clinical and laboratory data relevant to the treatment was reviewed, and a decision was made to proceed with the Apheresis procedure.    I was available to the clinical staff throughout the procedure.    Significant Surgical Tasks Conducted by the Assistant(s): Not applicable    Complications: None    Estimated Blood Loss (EBL): None    Implants: None     Specimens: None    Dylan Quiroz MD, MS  Section of Transfusion Medicine & Blood Bank  Department of Pathology and Laboratory Medicine  [facility]  [phone] (Blood Bank Offices)  09/06/2017

## 2017-09-06 NOTE — ASSESSMENT & PLAN NOTE
- Improved GFR with excellent urine output. Continue lasix 40 mg bid.  - creatinine trending down daily, 3.2 today. UOP increasing.

## 2017-09-06 NOTE — PLAN OF CARE
Problem: Patient Care Overview  Goal: Plan of Care Review  Outcome: Ongoing (interventions implemented as appropriate)  Pt AAOx4, VSS, afebrile.  C/o nausea with relief to prn IV zofran.  Administered 1st dose IVIG. Pt tolerated well.  Blood sugar at bedtime 198.  No SSI administered.  Urine output 1850cc clear yellow 0BM during shift.  Family remain at pt bedside.     Fall risk precautions initiated.  Pt in lowest bed position setting, lighting adjusted, pt to wear nonskid socks when ambulating, side rails up x2.  Pt remain free from falls during shift.  Pt verbalize understanding to call when needed assistance. Call light within reach. Will continue to monitor.

## 2017-09-06 NOTE — ASSESSMENT & PLAN NOTE
- Biopsy (for DGF) 8/25/17: good sample with 24 glomeruli (only 1 sclerosed) and evidence just meeting criteria for ABMR. + diffuse ATI. Minimal fibrosis, no ACR or AVR, but + CD4 (>50% diffusely + stain) with mild glomerulitis and focal peritubular capillaritis. Also, biopsy shows ca-oxalate (3) and ca-phos (2) crystals.  -8/25/17 DSA + for weak class I. Repeat pre-PLEX Class I DSA DETECTED: A2(3894),B44(2820),B62(5178),    Class II DSA DETECTED BELOW CUTOFF: DR53(1265)  - Tolerated PLEX #1 8/29, #2 8/30, #3 8/31, #4 9/2,  #5 9/4, and #6 9/5/17.    - completed SMP x 3. Of note, original plan for thymo cancelled secondary to difficulty with line placement due to pt anatomy.  Cellcept originally held- will re-start.  - Tolerated IVIG #1 9/5/17. Planning for dose #2 today.   - Plan for rituximab x1 9/7/17.   - UOP increasing and deltra Cr decreasing with tx.    - Will send DSAs with morning labs.

## 2017-09-07 LAB
ALBUMIN SERPL BCP-MCNC: 4.3 G/DL
ANION GAP SERPL CALC-SCNC: 15 MMOL/L
BASOPHILS # BLD AUTO: 0 K/UL
BASOPHILS NFR BLD: 0 %
BUN SERPL-MCNC: 50 MG/DL
CALCIUM SERPL-MCNC: 9.1 MG/DL
CHLORIDE SERPL-SCNC: 104 MMOL/L
CO2 SERPL-SCNC: 20 MMOL/L
CREAT SERPL-MCNC: 3.3 MG/DL
DIFFERENTIAL METHOD: ABNORMAL
EOSINOPHIL # BLD AUTO: 0 K/UL
EOSINOPHIL NFR BLD: 0.7 %
ERYTHROCYTE [DISTWIDTH] IN BLOOD BY AUTOMATED COUNT: 16.2 %
EST. GFR  (AFRICAN AMERICAN): 25.9 ML/MIN/1.73 M^2
EST. GFR  (NON AFRICAN AMERICAN): 22.4 ML/MIN/1.73 M^2
GLUCOSE SERPL-MCNC: 111 MG/DL
HAPTOGLOB SERPL-MCNC: 88 MG/DL
HCT VFR BLD AUTO: 24.6 %
HGB BLD-MCNC: 8.4 G/DL
LDH SERPL L TO P-CCNC: 93 U/L
LYMPHOCYTES # BLD AUTO: 0.4 K/UL
LYMPHOCYTES NFR BLD: 8.9 %
MAGNESIUM SERPL-MCNC: 1.9 MG/DL
MCH RBC QN AUTO: 30.4 PG
MCHC RBC AUTO-ENTMCNC: 34.1 G/DL
MCV RBC AUTO: 89 FL
MONOCYTES # BLD AUTO: 0.1 K/UL
MONOCYTES NFR BLD: 2.9 %
NEUTROPHILS # BLD AUTO: 3.6 K/UL
NEUTROPHILS NFR BLD: 86.5 %
PHOSPHATE SERPL-MCNC: 3.8 MG/DL
PLATELET # BLD AUTO: 82 K/UL
PMV BLD AUTO: 10.5 FL
POCT GLUCOSE: 109 MG/DL (ref 70–110)
POCT GLUCOSE: 147 MG/DL (ref 70–110)
POCT GLUCOSE: 187 MG/DL (ref 70–110)
POTASSIUM SERPL-SCNC: 3.8 MMOL/L
RBC # BLD AUTO: 2.76 M/UL
SODIUM SERPL-SCNC: 139 MMOL/L
TACROLIMUS BLD-MCNC: 8.8 NG/ML
WBC # BLD AUTO: 4.16 K/UL

## 2017-09-07 PROCEDURE — 85025 COMPLETE CBC W/AUTO DIFF WBC: CPT

## 2017-09-07 PROCEDURE — A4216 STERILE WATER/SALINE, 10 ML: HCPCS | Performed by: NURSE PRACTITIONER

## 2017-09-07 PROCEDURE — 86977 RBC SERUM PRETX INCUBJ/INHIB: CPT | Mod: 91,TXP

## 2017-09-07 PROCEDURE — 83735 ASSAY OF MAGNESIUM: CPT

## 2017-09-07 PROCEDURE — 80197 ASSAY OF TACROLIMUS: CPT

## 2017-09-07 PROCEDURE — 25000003 PHARM REV CODE 250: Performed by: NURSE PRACTITIONER

## 2017-09-07 PROCEDURE — 83010 ASSAY OF HAPTOGLOBIN QUANT: CPT

## 2017-09-07 PROCEDURE — 86833 HLA CLASS II HIGH DEFIN QUAL: CPT | Mod: 91,TXP

## 2017-09-07 PROCEDURE — 86832 HLA CLASS I HIGH DEFIN QUAL: CPT | Mod: TXP

## 2017-09-07 PROCEDURE — 25000003 PHARM REV CODE 250: Performed by: INTERNAL MEDICINE

## 2017-09-07 PROCEDURE — 36415 COLL VENOUS BLD VENIPUNCTURE: CPT

## 2017-09-07 PROCEDURE — 25000003 PHARM REV CODE 250: Performed by: PHYSICIAN ASSISTANT

## 2017-09-07 PROCEDURE — 20600001 HC STEP DOWN PRIVATE ROOM

## 2017-09-07 PROCEDURE — 63600175 PHARM REV CODE 636 W HCPCS: Performed by: INTERNAL MEDICINE

## 2017-09-07 PROCEDURE — 80069 RENAL FUNCTION PANEL: CPT

## 2017-09-07 PROCEDURE — 63600175 PHARM REV CODE 636 W HCPCS: Performed by: NURSE PRACTITIONER

## 2017-09-07 PROCEDURE — 83615 LACTATE (LD) (LDH) ENZYME: CPT

## 2017-09-07 PROCEDURE — 99233 SBSQ HOSP IP/OBS HIGH 50: CPT | Mod: 24,,, | Performed by: PHYSICIAN ASSISTANT

## 2017-09-07 RX ORDER — NYSTATIN 100000 [USP'U]/ML
500000 SUSPENSION ORAL
Qty: 473 ML | Refills: 0
Start: 2017-09-07 | End: 2017-09-14 | Stop reason: ALTCHOICE

## 2017-09-07 RX ORDER — SODIUM BICARBONATE 650 MG/1
1300 TABLET ORAL 2 TIMES DAILY
Status: DISCONTINUED | OUTPATIENT
Start: 2017-09-07 | End: 2017-09-14 | Stop reason: HOSPADM

## 2017-09-07 RX ORDER — DIPHENHYDRAMINE HCL 50 MG
50 CAPSULE ORAL ONCE
Status: CANCELLED | OUTPATIENT
Start: 2017-09-07

## 2017-09-07 RX ORDER — PREDNISONE 10 MG/1
TABLET ORAL
Qty: 100 TABLET | Refills: 1
Start: 2017-09-07 | End: 2017-09-14

## 2017-09-07 RX ORDER — ACETAMINOPHEN 325 MG/1
650 TABLET ORAL ONCE
Status: CANCELLED | OUTPATIENT
Start: 2017-09-07

## 2017-09-07 RX ADMIN — MYCOPHENOLATE MOFETIL 1000 MG: 250 CAPSULE ORAL at 09:09

## 2017-09-07 RX ADMIN — DOCUSATE SODIUM 100 MG: 50 CAPSULE, LIQUID FILLED ORAL at 06:09

## 2017-09-07 RX ADMIN — SODIUM BICARBONATE 650 MG TABLET 1300 MG: at 08:09

## 2017-09-07 RX ADMIN — THERA TABS 1 TABLET: TAB at 09:09

## 2017-09-07 RX ADMIN — INSULIN ASPART 1 UNITS: 100 INJECTION, SOLUTION INTRAVENOUS; SUBCUTANEOUS at 10:09

## 2017-09-07 RX ADMIN — FUROSEMIDE 40 MG: 40 TABLET ORAL at 09:09

## 2017-09-07 RX ADMIN — DOCUSATE SODIUM 100 MG: 50 CAPSULE, LIQUID FILLED ORAL at 08:09

## 2017-09-07 RX ADMIN — MYCOPHENOLATE MOFETIL 1000 MG: 250 CAPSULE ORAL at 08:09

## 2017-09-07 RX ADMIN — HEPARIN SODIUM 5000 UNITS: 5000 INJECTION, SOLUTION INTRAVENOUS; SUBCUTANEOUS at 01:09

## 2017-09-07 RX ADMIN — SODIUM BICARBONATE 650 MG TABLET 1300 MG: at 11:09

## 2017-09-07 RX ADMIN — Medication 100 MG: at 09:09

## 2017-09-07 RX ADMIN — HEPARIN SODIUM 5000 UNITS: 5000 INJECTION, SOLUTION INTRAVENOUS; SUBCUTANEOUS at 08:09

## 2017-09-07 RX ADMIN — TACROLIMUS 7 MG: 5 CAPSULE ORAL at 09:09

## 2017-09-07 RX ADMIN — OXYCODONE HYDROCHLORIDE AND ACETAMINOPHEN 1 TABLET: 10; 325 TABLET ORAL at 08:09

## 2017-09-07 RX ADMIN — NYSTATIN 500000 UNITS: 500000 SUSPENSION ORAL at 09:09

## 2017-09-07 RX ADMIN — FAMOTIDINE 20 MG: 20 TABLET, FILM COATED ORAL at 08:09

## 2017-09-07 RX ADMIN — HEPARIN SODIUM 5000 UNITS: 5000 INJECTION, SOLUTION INTRAVENOUS; SUBCUTANEOUS at 06:09

## 2017-09-07 RX ADMIN — NYSTATIN 500000 UNITS: 500000 SUSPENSION ORAL at 08:09

## 2017-09-07 RX ADMIN — OXYCODONE HYDROCHLORIDE AND ACETAMINOPHEN 1 TABLET: 10; 325 TABLET ORAL at 02:09

## 2017-09-07 RX ADMIN — PREDNISONE 20 MG: 20 TABLET ORAL at 09:09

## 2017-09-07 RX ADMIN — DOCUSATE SODIUM 100 MG: 50 CAPSULE, LIQUID FILLED ORAL at 01:09

## 2017-09-07 RX ADMIN — ROSUVASTATIN CALCIUM 20 MG: 10 TABLET ORAL at 09:09

## 2017-09-07 RX ADMIN — Medication 3 ML: at 02:09

## 2017-09-07 RX ADMIN — TACROLIMUS 6 MG: 5 CAPSULE ORAL at 05:09

## 2017-09-07 RX ADMIN — DOLUTEGRAVIR SODIUM 50 MG: 50 TABLET, FILM COATED ORAL at 09:09

## 2017-09-07 RX ADMIN — NYSTATIN 500000 UNITS: 500000 SUSPENSION ORAL at 01:09

## 2017-09-07 NOTE — ASSESSMENT & PLAN NOTE
- Last CD4 count 7 (8/20) and HIV PCR undetected < 40 copies/ml (8/19).  - Continue HAART with dolutegravir (Tivicay) and rilpivirine (Edurant).    - HIV meds to be given after PLEX- meds highly protein bound and can be removed with plasmapheresis.  - HIV RNA negative 9/5.  Immune cyclex 78.

## 2017-09-07 NOTE — PLAN OF CARE
Problem: Patient Care Overview  Goal: Plan of Care Review  Outcome: Ongoing (interventions implemented as appropriate)  POC reviewed w/ pt this am to include ambulation, blood glucose management, wound care, and possible discharge.  Pt doing well today.  Pt ambulating in [place] independently.  Pt's blood glucose WNL.  Pt's pain well managed, requiring very little prn pain medication for RLQ incision.  Awaiting lab results, pt likely to discharge tomorrow per primary team.

## 2017-09-07 NOTE — SUBJECTIVE & OBJECTIVE
Subjective:     Demetrio Aguiar is a 37 y.o. 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 His maintenance immunosuppression includes a steroid taper per protocol to 5mg daily, Prograf, and Cellcept maintenance. His post op course has been complicated by DGF requiring HD (last HD 17) which prompted need for kidney biopsy on 17. He was then admitted for biopsy proven ABMR with weakly positive class I DSA's on 17. Biopsy 17: good sample with 24 glomeruli (only 1 sclerosed) and evidence of criteria for ABMR. + diffuse ATI; Minimal fibrosis, no ACR or AVR, but + CD4 (>50% diffusely + stain) with mild glomerulitis and focal peritubular capillaritis; also, biopsy shows ca-oxalate (3) and ca-phos (2) crystals. Since transplant, he has made minimal uop daily. He denies any current illnesses or sick contacts. Complains of expected post-op pain to RLQ incision. Pt treated with PLEX x6, SMP x3, and planning for IVIG x2. Kidney function improving with tx.    Interval History: Pt tolerated IVIG #2 yesterday without difficulty. Cr slightly elevated today- discontinued Lasix and encouraged PO intake.  Pt will remain inpt to monitor Cr in AM.  Use of Rituxan discussed- decision made to defer at this time, monitor with current tx, and use in future if needed.    History of Present Illness:  History of Present Illness:   Mr. Aguiar is a 37 y.o. year old male who is status post  donor kidney transplant on 17.  His maintenance immunosuppression consists of azathioprine.      Interval History:      Past Medical, Surgical, Family, and Social History:   Unchanged from H&P.    Review of patient's allergies  indicates:  No Known Allergies    Scheduled Meds: calcium gluconate IVPB  2,000 mg Intravenous Once    docusate sodium  100 mg Oral TID    dolutegravir  50 mg Oral Daily    epoetin erika  10,000 Units Subcutaneous Every Mon    famotidine  20 mg Oral QHS    heparin (porcine)  5,000 Units Subcutaneous Q8H    ketoconazole  100 mg Oral Daily    multivitamin  1 tablet Oral Daily    mycophenolate  1,000 mg Oral BID    nystatin  500,000 Units Oral QID (PC + HS)    predniSONE  20 mg Oral Daily    rilpivirine  25 mg Oral Daily    rosuvastatin  20 mg Oral Daily    sodium bicarbonate  1,300 mg Oral BID    sodium chloride 0.9%  3 mL Intravenous Q8H    sulfamethoxazole-trimethoprim 400-80mg  1 tablet Oral Every Mon, Wed, Fri    tacrolimus  6 mg Oral Daily    tacrolimus  7 mg Oral Daily    valganciclovir  450 mg Oral Every Mon, Wed, Fri     Continuous Infusions:   PRN Meds:sodium chloride, bisacodyl, dextrose 50%, dextrose 50%, EPINEPHrine, glucagon (human recombinant), glucose, glucose, insulin aspart, ondansetron, oxycodone-acetaminophen, oxycodone-acetaminophen    Intake/Output - Last 3 Shifts       09/05 0700 - 09/06 0659 09/06 0700 - 09/07 0659 09/07 0700 - 09/08 0659    P.O. 1560 2040     I.V. (mL/kg)  0 (0)     Blood 5164      Other  0     IV Piggyback 700      Total Intake(mL/kg) 7424 (113) 2040 (31.1)     Urine (mL/kg/hr) 2850 (1.8) 3200 (2) 150 (0.3)    Emesis/NG output 0 (0) 0 (0)     Other 0 (0) 0 (0)     Stool 0 (0) 0 (0)     Blood 4939 (3.1) 0 (0)     Total Output 7789 3200 150    Net -365 -1160 -150           Urine Occurrence 0 x 0 x     Stool Occurrence 0 x 2 x     Emesis Occurrence 0 x 0 x            Review of Systems   Constitutional: Negative for activity change, appetite change and fatigue.   Respiratory: Negative for shortness of breath.    Cardiovascular: Negative for leg swelling.   Gastrointestinal: Negative for abdominal distention, abdominal pain, constipation and diarrhea.  "  Genitourinary: Negative for decreased urine volume and dysuria.   Musculoskeletal: Negative for arthralgias and back pain.   Psychiatric/Behavioral: Negative for sleep disturbance.      Objective:     Vital Signs (Most Recent):  Temp: 98 °F (36.7 °C) (09/07/17 1152)  Pulse: 79 (09/07/17 1152)  Resp: 16 (09/07/17 1152)  BP: 109/63 (09/07/17 1152)  SpO2: 100 % (09/07/17 1152) Vital Signs (24h Range):  Temp:  [97.9 °F (36.6 °C)-98.6 °F (37 °C)] 98 °F (36.7 °C)  Pulse:  [73-92] 79  Resp:  [15-16] 16  SpO2:  [96 %-100 %] 100 %  BP: (109-120)/(61-73) 109/63     Weight: 65.7 kg (144 lb 13.5 oz)  Height: 5' 7" (170.2 cm)  Body mass index is 22.69 kg/m².    Physical Exam   Constitutional: He appears well-developed.   Eyes: No scleral icterus.   Pulmonary/Chest: Effort normal and breath sounds normal. No respiratory distress. He has no wheezes. He has no rales.   Abdominal: Soft. Bowel sounds are normal. He exhibits no distension. There is no tenderness.   Ktx inc cdi no ssi   Musculoskeletal: He exhibits no edema.   Skin: Skin is warm and dry. He is not diaphoretic.   Psychiatric: He has a normal mood and affect. His behavior is normal. Judgment and thought content normal.   Nursing note and vitals reviewed.      Laboratory:  CBC:   Recent Labs  Lab 09/07/17  0626   WBC 4.16   RBC 2.76*   HGB 8.4*   HCT 24.6*   PLT 82*   MCV 89   MCH 30.4   MCHC 34.1     CMP:   Recent Labs  Lab 09/07/17  0626   *   CALCIUM 9.1   ALBUMIN 4.3      K 3.8   CO2 20*      BUN 50*   CREATININE 3.3*     Labs within the past 24 hours have been reviewed.    Diagnostic Results:  Labs: Reviewed  "

## 2017-09-07 NOTE — ASSESSMENT & PLAN NOTE
- Continue Prograf and Cellcept.  - Continue keto to boost tacro levels.  - Will monitor for signs of toxic side effects, check daily Prograf troughs, and change meds accordingly.

## 2017-09-07 NOTE — ASSESSMENT & PLAN NOTE
- H&H stable. Monitor daily.  - Epoetin injection 10K units 9/5/17.   - Haptoglobin and LDH with morning labs.

## 2017-09-07 NOTE — PROGRESS NOTES
[facility]  Kidney Transplant  Progress Note      Reason for Follow-up: Reassessment of Kidney Transplant - 2017  (#1) recipient and management of immunosuppression.    ORGAN:  RIGHT KIDNEY   Donor Type:   - Brain Death       Subjective:     Demetrio Aguiar is a 37 y.o. 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 His maintenance immunosuppression includes a steroid taper per protocol to 5mg daily, Prograf, and Cellcept maintenance. His post op course has been complicated by DGF requiring HD (last HD 17) which prompted need for kidney biopsy on 17. He was then admitted for biopsy proven ABMR with weakly positive class I DSA's on 17. Biopsy 17: good sample with 24 glomeruli (only 1 sclerosed) and evidence of criteria for ABMR. + diffuse ATI; Minimal fibrosis, no ACR or AVR, but + CD4 (>50% diffusely + stain) with mild glomerulitis and focal peritubular capillaritis; also, biopsy shows ca-oxalate (3) and ca-phos (2) crystals. Since transplant, he has made minimal uop daily. He denies any current illnesses or sick contacts. Complains of expected post-op pain to RLQ incision. Pt treated with PLEX x6, SMP x3, and planning for IVIG x2. Kidney function improving with tx.    Interval History: Pt tolerated IVIG #2 yesterday without difficulty. Cr slightly elevated today- discontinued Lasix and encouraged PO intake.  Pt will remain inpt to monitor Cr in AM.  Use of Rituxan discussed- decision made to defer at this time, monitor with current tx, and use in future if needed.    Past Medical, Surgical, Family, and Social History:   Unchanged from H&P.    Review of patient's allergies  indicates:  No Known Allergies    Scheduled Meds: calcium gluconate IVPB  2,000 mg Intravenous Once    docusate sodium  100 mg Oral TID    dolutegravir  50 mg Oral Daily    epoetin erika  10,000 Units Subcutaneous Every Mon    famotidine  20 mg Oral QHS    heparin (porcine)  5,000 Units Subcutaneous Q8H    ketoconazole  100 mg Oral Daily    multivitamin  1 tablet Oral Daily    mycophenolate  1,000 mg Oral BID    nystatin  500,000 Units Oral QID (PC + HS)    predniSONE  20 mg Oral Daily    rilpivirine  25 mg Oral Daily    rosuvastatin  20 mg Oral Daily    sodium bicarbonate  1,300 mg Oral BID    sodium chloride 0.9%  3 mL Intravenous Q8H    sulfamethoxazole-trimethoprim 400-80mg  1 tablet Oral Every Mon, Wed, Fri    tacrolimus  6 mg Oral Daily    tacrolimus  7 mg Oral Daily    valganciclovir  450 mg Oral Every Mon, Wed, Fri     Continuous Infusions:   PRN Meds:sodium chloride, bisacodyl, dextrose 50%, dextrose 50%, EPINEPHrine, glucagon (human recombinant), glucose, glucose, insulin aspart, ondansetron, oxycodone-acetaminophen, oxycodone-acetaminophen    Intake/Output - Last 3 Shifts       09/05 0700 - 09/06 0659 09/06 0700 - 09/07 0659 09/07 0700 - 09/08 0659    P.O. 1560 2040     I.V. (mL/kg)  0 (0)     Blood 5164      Other  0     IV Piggyback 700      Total Intake(mL/kg) 7424 (113) 2040 (31.1)     Urine (mL/kg/hr) 2850 (1.8) 3200 (2) 150 (0.3)    Emesis/NG output 0 (0) 0 (0)     Other 0 (0) 0 (0)     Stool 0 (0) 0 (0)     Blood 4939 (3.1) 0 (0)     Total Output 7789 3200 150    Net -365 -1160 -150           Urine Occurrence 0 x 0 x     Stool Occurrence 0 x 2 x     Emesis Occurrence 0 x 0 x            Review of Systems   Constitutional: Negative for activity change, appetite change and fatigue.   Respiratory: Negative for shortness of breath.    Cardiovascular: Negative for leg swelling.   Gastrointestinal: Negative for abdominal distention, abdominal pain, constipation and diarrhea.  "  Genitourinary: Negative for decreased urine volume and dysuria.   Musculoskeletal: Negative for arthralgias and back pain.   Psychiatric/Behavioral: Negative for sleep disturbance.      Objective:     Vital Signs (Most Recent):  Temp: 98 °F (36.7 °C) (09/07/17 1152)  Pulse: 79 (09/07/17 1152)  Resp: 16 (09/07/17 1152)  BP: 109/63 (09/07/17 1152)  SpO2: 100 % (09/07/17 1152) Vital Signs (24h Range):  Temp:  [97.9 °F (36.6 °C)-98.6 °F (37 °C)] 98 °F (36.7 °C)  Pulse:  [73-92] 79  Resp:  [15-16] 16  SpO2:  [96 %-100 %] 100 %  BP: (109-120)/(61-73) 109/63     Weight: 65.7 kg (144 lb 13.5 oz)  Height: 5' 7" (170.2 cm)  Body mass index is 22.69 kg/m².    Physical Exam   Constitutional: He appears well-developed.   Eyes: No scleral icterus.   Pulmonary/Chest: Effort normal and breath sounds normal. No respiratory distress. He has no wheezes. He has no rales.   Abdominal: Soft. Bowel sounds are normal. He exhibits no distension. There is no tenderness.   Ktx inc cdi no ssi   Musculoskeletal: He exhibits no edema.   Skin: Skin is warm and dry. He is not diaphoretic.   Psychiatric: He has a normal mood and affect. His behavior is normal. Judgment and thought content normal.   Nursing note and vitals reviewed.      Laboratory:  CBC:   Recent Labs  Lab 09/07/17  0626   WBC 4.16   RBC 2.76*   HGB 8.4*   HCT 24.6*   PLT 82*   MCV 89   MCH 30.4   MCHC 34.1     CMP:   Recent Labs  Lab 09/07/17  0626   *   CALCIUM 9.1   ALBUMIN 4.3      K 3.8   CO2 20*      BUN 50*   CREATININE 3.3*     Labs within the past 24 hours have been reviewed.    Diagnostic Results:  Labs: Reviewed    Assessment/Plan:     * Acute rejection of kidney transplant    - Biopsy (for DGF) 8/25/17: good sample with 24 glomeruli (only 1 sclerosed) and evidence just meeting criteria for ABMR. + diffuse ATI. Minimal fibrosis, no ACR or AVR, but + CD4 (>50% diffusely + stain) with mild glomerulitis and focal peritubular capillaritis. Also, biopsy " shows ca-oxalate (3) and ca-phos (2) crystals.  -17 DSA + for weak class I. Repeat pre-PLEX Class I DSA DETECTED: A2(3894),B44(2820),B62(0198),    Class II DSA DETECTED BELOW CUTOFF: DR53(1265)  - Tolerated PLEX #1 , #2 , #3 , #4 ,  #5 , and #6 17.    - completed SMP x 3. Of note, original plan for thymo cancelled secondary to difficulty with line placement due to pt anatomy.  Cellcept originally held- restarted.  - Tolerated IVIG #1 and 2 -.    - Plan for rituximab in future of needed- for now monitor with current therapy.   - UOP increasing and overall Cr improved.   - Repeat DSAs pending.          donor kidney transplant 2017    - Allograft function improving.    - See ABMR and DGF.  - Pt Cr inc from 3.3 <- 3.2 today.  Hold lasix and encouarge PO intake.  Repeat labs in AM.            Delayed graft function of kidney    - Improved GFR with excellent urine output.           Long-term use of immunosuppressant medication    - Continue Prograf and Cellcept.  - Continue keto to boost tacro levels.  - Will monitor for signs of toxic side effects, check daily Prograf troughs, and change meds accordingly.            Need for prophylactic immunotherapy    - See long term use of IS.            HIV (human immunodeficiency virus infection)    - Last CD4 count 7 () and HIV PCR undetected < 40 copies/ml ().  - Continue HAART with dolutegravir (Tivicay) and rilpivirine (Edurant).    - HIV meds to be given after PLEX- meds highly protein bound and can be removed with plasmapheresis.  - HIV RNA negative .  Immune cyclex 78.           At risk for opportunistic infections    - Continue Valcyte for CMV prophylaxis  - Continue Bactrim for PCP prophylaxis  - Continue nystatin for fungal prophylaxis          Anemia in ESRD (end-stage renal disease)    - H&H stable. Monitor daily.  - Epoetin injection 10K units 17.   - Haptoglobin and LDH with morning labs.          Immunocompromised state    - Will continue with HAART therapy, immuno prophylaxis.              Discharge Planning:  Kb baugh 121      Oskar Carlin PA-C  Kidney Transplant  [facility]

## 2017-09-07 NOTE — PLAN OF CARE
Problem: Patient Care Overview  Goal: Plan of Care Review  Outcome: Ongoing (interventions implemented as appropriate)  AAOx3, afebrile, without c/o pain.  BG monitored and no tx needed.  Hand hygiene practiced per protocol. Pt able to position self independently. Pt in lowest position, side rails up x2, non-skid foot wear in place, call light within reach, pt verbalized understanding to call RN when needed.   Will continue to monitor.

## 2017-09-08 LAB
ALBUMIN SERPL BCP-MCNC: 4.1 G/DL
ANION GAP SERPL CALC-SCNC: 12 MMOL/L
BASOPHILS # BLD AUTO: 0 K/UL
BASOPHILS # BLD AUTO: 0 K/UL
BASOPHILS NFR BLD: 0 %
BASOPHILS NFR BLD: 0 %
BUN SERPL-MCNC: 47 MG/DL
CALCIUM SERPL-MCNC: 9 MG/DL
CHLORIDE SERPL-SCNC: 103 MMOL/L
CO2 SERPL-SCNC: 21 MMOL/L
CREAT SERPL-MCNC: 3 MG/DL
DIFFERENTIAL METHOD: ABNORMAL
DIFFERENTIAL METHOD: ABNORMAL
EOSINOPHIL # BLD AUTO: 0 K/UL
EOSINOPHIL # BLD AUTO: 0 K/UL
EOSINOPHIL NFR BLD: 0.2 %
EOSINOPHIL NFR BLD: 0.7 %
ERYTHROCYTE [DISTWIDTH] IN BLOOD BY AUTOMATED COUNT: 15.8 %
ERYTHROCYTE [DISTWIDTH] IN BLOOD BY AUTOMATED COUNT: 15.8 %
EST. GFR  (AFRICAN AMERICAN): 29.1 ML/MIN/1.73 M^2
EST. GFR  (NON AFRICAN AMERICAN): 25.2 ML/MIN/1.73 M^2
GLUCOSE SERPL-MCNC: 108 MG/DL
HCT VFR BLD AUTO: 23.3 %
HCT VFR BLD AUTO: 25.5 %
HGB BLD-MCNC: 7.9 G/DL
HGB BLD-MCNC: 8.5 G/DL
LYMPHOCYTES # BLD AUTO: 0.2 K/UL
LYMPHOCYTES # BLD AUTO: 0.4 K/UL
LYMPHOCYTES NFR BLD: 3.5 %
LYMPHOCYTES NFR BLD: 9.5 %
MAGNESIUM SERPL-MCNC: 2 MG/DL
MCH RBC QN AUTO: 30.4 PG
MCH RBC QN AUTO: 30.5 PG
MCHC RBC AUTO-ENTMCNC: 33.3 G/DL
MCHC RBC AUTO-ENTMCNC: 33.9 G/DL
MCV RBC AUTO: 90 FL
MCV RBC AUTO: 91 FL
MONOCYTES # BLD AUTO: 0.1 K/UL
MONOCYTES # BLD AUTO: 0.2 K/UL
MONOCYTES NFR BLD: 1.6 %
MONOCYTES NFR BLD: 3.7 %
NEUTROPHILS # BLD AUTO: 3.5 K/UL
NEUTROPHILS # BLD AUTO: 5.2 K/UL
NEUTROPHILS NFR BLD: 85.4 %
NEUTROPHILS NFR BLD: 94.2 %
PHOSPHATE SERPL-MCNC: 3.2 MG/DL
PLATELET # BLD AUTO: 92 K/UL
PLATELET # BLD AUTO: 98 K/UL
PMV BLD AUTO: 10.2 FL
PMV BLD AUTO: 10.8 FL
POCT GLUCOSE: 154 MG/DL (ref 70–110)
POCT GLUCOSE: 207 MG/DL (ref 70–110)
POTASSIUM SERPL-SCNC: 3.6 MMOL/L
RBC # BLD AUTO: 2.6 M/UL
RBC # BLD AUTO: 2.79 M/UL
SODIUM SERPL-SCNC: 136 MMOL/L
TACROLIMUS BLD-MCNC: 9.4 NG/ML
WBC # BLD AUTO: 4.1 K/UL
WBC # BLD AUTO: 5.48 K/UL

## 2017-09-08 PROCEDURE — A4216 STERILE WATER/SALINE, 10 ML: HCPCS | Performed by: NURSE PRACTITIONER

## 2017-09-08 PROCEDURE — 87070 CULTURE OTHR SPECIMN AEROBIC: CPT

## 2017-09-08 PROCEDURE — 25000003 PHARM REV CODE 250: Performed by: PHYSICIAN ASSISTANT

## 2017-09-08 PROCEDURE — 25000003 PHARM REV CODE 250: Performed by: NURSE PRACTITIONER

## 2017-09-08 PROCEDURE — 85025 COMPLETE CBC W/AUTO DIFF WBC: CPT

## 2017-09-08 PROCEDURE — 80197 ASSAY OF TACROLIMUS: CPT

## 2017-09-08 PROCEDURE — 63600175 PHARM REV CODE 636 W HCPCS: Performed by: INTERNAL MEDICINE

## 2017-09-08 PROCEDURE — 63600175 PHARM REV CODE 636 W HCPCS: Performed by: NURSE PRACTITIONER

## 2017-09-08 PROCEDURE — 63600175 PHARM REV CODE 636 W HCPCS: Performed by: PHYSICIAN ASSISTANT

## 2017-09-08 PROCEDURE — 36415 COLL VENOUS BLD VENIPUNCTURE: CPT

## 2017-09-08 PROCEDURE — 20600001 HC STEP DOWN PRIVATE ROOM

## 2017-09-08 PROCEDURE — 99233 SBSQ HOSP IP/OBS HIGH 50: CPT | Mod: ,,, | Performed by: NURSE PRACTITIONER

## 2017-09-08 PROCEDURE — 83735 ASSAY OF MAGNESIUM: CPT

## 2017-09-08 PROCEDURE — 80069 RENAL FUNCTION PANEL: CPT

## 2017-09-08 PROCEDURE — 0J983ZZ DRAINAGE OF ABDOMEN SUBCUTANEOUS TISSUE AND FASCIA, PERCUTANEOUS APPROACH: ICD-10-PCS | Performed by: INTERNAL MEDICINE

## 2017-09-08 PROCEDURE — 97802 MEDICAL NUTRITION INDIV IN: CPT

## 2017-09-08 RX ORDER — AZITHROMYCIN 600 MG/1
1200 TABLET, FILM COATED ORAL
Qty: 8 TABLET | Refills: 5 | Status: SHIPPED | OUTPATIENT
Start: 2017-09-08 | End: 2017-09-22 | Stop reason: SDUPTHER

## 2017-09-08 RX ORDER — AZITHROMYCIN 600 MG/1
1200 TABLET, FILM COATED ORAL WEEKLY
Status: DISCONTINUED | OUTPATIENT
Start: 2017-09-08 | End: 2017-09-14 | Stop reason: HOSPADM

## 2017-09-08 RX ADMIN — OXYCODONE HYDROCHLORIDE AND ACETAMINOPHEN 1 TABLET: 10; 325 TABLET ORAL at 10:09

## 2017-09-08 RX ADMIN — DOCUSATE SODIUM 100 MG: 50 CAPSULE, LIQUID FILLED ORAL at 05:09

## 2017-09-08 RX ADMIN — OXYCODONE HYDROCHLORIDE AND ACETAMINOPHEN 1 TABLET: 10; 325 TABLET ORAL at 05:09

## 2017-09-08 RX ADMIN — DOCUSATE SODIUM 100 MG: 50 CAPSULE, LIQUID FILLED ORAL at 08:09

## 2017-09-08 RX ADMIN — FAMOTIDINE 20 MG: 20 TABLET, FILM COATED ORAL at 08:09

## 2017-09-08 RX ADMIN — DOCUSATE SODIUM 100 MG: 50 CAPSULE, LIQUID FILLED ORAL at 02:09

## 2017-09-08 RX ADMIN — Medication 3 ML: at 08:09

## 2017-09-08 RX ADMIN — OXYCODONE HYDROCHLORIDE AND ACETAMINOPHEN 1 TABLET: 10; 325 TABLET ORAL at 06:09

## 2017-09-08 RX ADMIN — TACROLIMUS 6 MG: 5 CAPSULE ORAL at 05:09

## 2017-09-08 RX ADMIN — ROSUVASTATIN CALCIUM 20 MG: 10 TABLET ORAL at 08:09

## 2017-09-08 RX ADMIN — Medication 3 ML: at 06:09

## 2017-09-08 RX ADMIN — NYSTATIN 500000 UNITS: 500000 SUSPENSION ORAL at 02:09

## 2017-09-08 RX ADMIN — Medication 100 MG: at 08:09

## 2017-09-08 RX ADMIN — NYSTATIN 500000 UNITS: 500000 SUSPENSION ORAL at 08:09

## 2017-09-08 RX ADMIN — DOLUTEGRAVIR SODIUM 50 MG: 50 TABLET, FILM COATED ORAL at 08:09

## 2017-09-08 RX ADMIN — PREDNISONE 20 MG: 20 TABLET ORAL at 08:09

## 2017-09-08 RX ADMIN — SODIUM BICARBONATE 650 MG TABLET 1300 MG: at 08:09

## 2017-09-08 RX ADMIN — HEPARIN SODIUM 5000 UNITS: 5000 INJECTION, SOLUTION INTRAVENOUS; SUBCUTANEOUS at 08:09

## 2017-09-08 RX ADMIN — MYCOPHENOLATE MOFETIL 1000 MG: 250 CAPSULE ORAL at 08:09

## 2017-09-08 RX ADMIN — VALGANCICLOVIR 450 MG: 450 TABLET, FILM COATED ORAL at 05:09

## 2017-09-08 RX ADMIN — TACROLIMUS 7 MG: 5 CAPSULE ORAL at 08:09

## 2017-09-08 RX ADMIN — HEPARIN SODIUM 5000 UNITS: 5000 INJECTION, SOLUTION INTRAVENOUS; SUBCUTANEOUS at 05:09

## 2017-09-08 RX ADMIN — SULFAMETHOXAZOLE AND TRIMETHOPRIM 1 TABLET: 400; 80 TABLET ORAL at 05:09

## 2017-09-08 RX ADMIN — OXYCODONE HYDROCHLORIDE AND ACETAMINOPHEN 1 TABLET: 10; 325 TABLET ORAL at 11:09

## 2017-09-08 RX ADMIN — AZITHROMYCIN 1200 MG: 600 TABLET, FILM COATED ORAL at 05:09

## 2017-09-08 RX ADMIN — Medication 3 ML: at 02:09

## 2017-09-08 RX ADMIN — ONDANSETRON 4 MG: 2 INJECTION INTRAMUSCULAR; INTRAVENOUS at 05:09

## 2017-09-08 RX ADMIN — THERA TABS 1 TABLET: TAB at 08:09

## 2017-09-08 NOTE — PROGRESS NOTES
[facility]  Kidney Transplant  Progress Note      Reason for Follow-up: Reassessment of Kidney Transplant - 2017  (#1) recipient and management of immunosuppression.    ORGAN:  RIGHT KIDNEY   Donor Type:   - Brain Death   PHS Increased Risk: no   Cold Ischemia: 7 hrs, 2 mins  Induction Medications: Thymoglobulin      Subjective:     Demetrio Aguiar is a 37 y.o. 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 His maintenance immunosuppression includes a steroid taper per protocol to 5mg daily, Prograf, and Cellcept maintenance. His post op course has been complicated by DGF requiring HD (last HD 17) which prompted need for kidney biopsy on 17. He was then admitted for biopsy proven ABMR with weakly positive class I DSA's on 17. Biopsy 17: good sample with 24 glomeruli (only 1 sclerosed) and evidence of criteria for ABMR. + diffuse ATI; Minimal fibrosis, no ACR or AVR, but + CD4 (>50% diffusely + stain) with mild glomerulitis and focal peritubular capillaritis; also, biopsy shows ca-oxalate (3) and ca-phos (2) crystals. Since transplant, he has made minimal uop daily. He denies any current illnesses or sick contacts. Complains of expected post-op pain to RLQ incision. Pt treated with PLEX x6, SMP x3, and planning for IVIG x2. Kidney function improving with tx.    Interval History: Cr 3 from 3.3. Pt completed treatment of rejection with SMP x 3, PLEX x 6 and IVIG x 2. Use of Rituxan discussed - decision made to defer at this time, monitor with current tx, and use in future if needed. Good intake and output. RLQ inc noted with increased swelling and redness to site. Staples  removed and hematoma evacuated by transplant fellow. Wound packed with wet to dry dressing. Wound care consulted. Monitor.       Past Medical, Surgical, Family, and Social History:   Unchanged from H&P.    Review of patient's allergies indicates:  No Known Allergies    Scheduled Meds:   docusate sodium  100 mg Oral TID    dolutegravir  50 mg Oral Daily    epoetin erika  10,000 Units Subcutaneous Every Mon    famotidine  20 mg Oral QHS    heparin (porcine)  5,000 Units Subcutaneous Q8H    ketoconazole  100 mg Oral Daily    multivitamin  1 tablet Oral Daily    mycophenolate  1,000 mg Oral BID    nystatin  500,000 Units Oral QID (PC + HS)    predniSONE  20 mg Oral Daily    rilpivirine  25 mg Oral Daily    rosuvastatin  20 mg Oral Daily    sodium bicarbonate  1,300 mg Oral BID    sodium chloride 0.9%  3 mL Intravenous Q8H    sulfamethoxazole-trimethoprim 400-80mg  1 tablet Oral Every Mon, Wed, Fri    tacrolimus  6 mg Oral Daily    tacrolimus  7 mg Oral Daily    valganciclovir  450 mg Oral Every Mon, Wed, Fri     Continuous Infusions:   PRN Meds:sodium chloride, bisacodyl, dextrose 50%, dextrose 50%, EPINEPHrine, glucagon (human recombinant), glucose, glucose, insulin aspart, ondansetron, oxycodone-acetaminophen, oxycodone-acetaminophen    Intake/Output - Last 3 Shifts       09/06 0700 - 09/07 0659 09/07 0700 - 09/08 0659 09/08 0700 - 09/09 0659    P.O. 2040 1660     I.V. (mL/kg) 0 (0) 0 (0)     Blood       Other 0 0     IV Piggyback       Total Intake(mL/kg) 2040 (31.1) 1660 (24.9)     Urine (mL/kg/hr) 3200 (2) 1700 (1.1)     Emesis/NG output 0 (0) 0 (0)     Other 0 (0) 0 (0)     Stool 0 (0) 0 (0)     Blood 0 (0) 0 (0)     Total Output 3200 1700      Net -1160 -40             Urine Occurrence 0 x 0 x     Stool Occurrence 2 x 1 x     Emesis Occurrence 0 x 0 x            Review of Systems   Constitutional: Negative for activity change, appetite change, chills, fatigue and fever.   HENT: Negative for  "congestion and facial swelling.    Eyes: Negative for pain, discharge and visual disturbance.   Respiratory: Negative for cough, chest tightness, shortness of breath and wheezing.    Cardiovascular: Negative for chest pain, palpitations and leg swelling.   Gastrointestinal: Negative for abdominal distention, abdominal pain, constipation and diarrhea.   Endocrine: Negative.    Genitourinary: Negative for decreased urine volume and dysuria.   Musculoskeletal: Negative for arthralgias and back pain.   Skin: Positive for wound.   Allergic/Immunologic: Positive for immunocompromised state.   Neurological: Negative for dizziness, weakness, light-headedness and headaches.   Hematological: Negative.    Psychiatric/Behavioral: Negative for agitation, decreased concentration and sleep disturbance.      Objective:     Vital Signs (Most Recent):  Temp: 98.2 °F (36.8 °C) (09/08/17 1100)  Pulse: 82 (09/08/17 1100)  Resp: 16 (09/08/17 1100)  BP: 109/65 (09/08/17 1100)  SpO2: 100 % (09/08/17 1100) Vital Signs (24h Range):  Temp:  [97.6 °F (36.4 °C)-98.4 °F (36.9 °C)] 98.2 °F (36.8 °C)  Pulse:  [75-98] 82  Resp:  [16-18] 16  SpO2:  [99 %-100 %] 100 %  BP: (109-120)/(65-73) 109/65     Weight: 66.7 kg (147 lb 0.8 oz)  Height: 5' 7" (170.2 cm)  Body mass index is 23.03 kg/m².    Physical Exam    Laboratory:  CBC:   Recent Labs  Lab 09/08/17  0426   WBC 4.10   RBC 2.60*   HGB 7.9*   HCT 23.3*   PLT 98*   MCV 90   MCH 30.4   MCHC 33.9     BMP:   Recent Labs  Lab 09/08/17  0426         K 3.6      CO2 21*   BUN 47*   CREATININE 3.0*   CALCIUM 9.0     Labs within the past 24 hours have been reviewed.    Diagnostic Results:  X-Ray: Reviewed    Assessment/Plan:     * Acute rejection of kidney transplant    - Biopsy (for DGF) 8/25/17: good sample with 24 glomeruli (only 1 sclerosed) and evidence just meeting criteria for ABMR. + diffuse ATI. Minimal fibrosis, no ACR or AVR, but + CD4 (>50% diffusely + stain) with mild " glomerulitis and focal peritubular capillaritis. Also, biopsy shows ca-oxalate (3) and ca-phos (2) crystals.  -17 DSA + for weak class I. Repeat pre-PLEX Class I DSA DETECTED: A2(3894),B44(2820),B62(5178),    Class II DSA DETECTED BELOW CUTOFF: DR53(1265)  - Tolerated PLEX #1 , #2 , #3 , #4 ,  #5 , and #6 17.    - completed SMP x 3. Of note, original plan for thymo cancelled secondary to difficulty with line placement due to pt anatomy.  Cellcept originally held- restarted.  - Tolerated IVIG #1 and 2 -.    - Plan for rituximab in future of needed- for now monitor with current therapy.   - UOP increasing and overall Cr improved.   - Repeat DSAs pending from .         Delayed graft function of kidney    - Improved GFR with excellent urine output.            donor kidney transplant 2017    - Allograft function improving.    - See ABMR and DGF.  - Pt Cr inc from 3.3 <- 3 today.  Hold lasix and encouarge PO intake.  Repeat labs in AM.            Need for prophylactic immunotherapy    - See long term use of IS.            Long-term use of immunosuppressant medication    - Continue Prograf and Cellcept.  - Continue keto to boost tacro levels.  - Will monitor for signs of toxic side effects, check daily Prograf troughs, and change meds accordingly.            At risk for opportunistic infections    - Continue Valcyte for CMV prophylaxis  - Continue Bactrim for PCP prophylaxis  - Continue nystatin for fungal prophylaxis          Anemia in ESRD (end-stage renal disease)    - H&H stable but trending down. Monitor daily.  - Epoetin injection 10K units 17.   - Haptoglobin 88 and LDH 93.         Immunocompromised state    - Will continue with HAART therapy, immuno prophylaxis.          HIV (human immunodeficiency virus infection)    - Last CD4 count 7 () and HIV PCR undetected < 40 copies/ml ().  - Start once weekly azithromycin for MAC prophylaxis, first dose today  9/8/17.   - Continue HAART with dolutegravir (Tivicay) and rilpivirine (Edurant).    - HIV meds to be given after PLEX- meds highly protein bound and can be removed with plasmapheresis.  - HIV RNA negative 9/5.  Immune cyclex 78.           Antibody mediated rejection of kidney transplant    - see rejection of kidney transplant.              Discharge Planning: not a candidate for dc at this time.  Kb baugh 121      Latonia Rich NP  Kidney Transplant  [facility]

## 2017-09-08 NOTE — NURSING
Notiffied NP Lisa of pt's compliant of incision site, redness.  NP said we will wait for morning labs.Will continue to monitor.

## 2017-09-08 NOTE — NURSING
Notified DONATO Fan of pt's concern about his incision.  PA stated she would come see the incision. Will continue to monitor.

## 2017-09-08 NOTE — ASSESSMENT & PLAN NOTE
- Biopsy (for DGF) 8/25/17: good sample with 24 glomeruli (only 1 sclerosed) and evidence just meeting criteria for ABMR. + diffuse ATI. Minimal fibrosis, no ACR or AVR, but + CD4 (>50% diffusely + stain) with mild glomerulitis and focal peritubular capillaritis. Also, biopsy shows ca-oxalate (3) and ca-phos (2) crystals.  -8/25/17 DSA + for weak class I. Repeat pre-PLEX Class I DSA DETECTED: A2(3894),B44(2820),B62(5178),    Class II DSA DETECTED BELOW CUTOFF: DR53(1265)  - Tolerated PLEX #1 8/29, #2 8/30, #3 8/31, #4 9/2,  #5 9/4, and #6 9/5/17.    - completed SMP x 3. Of note, original plan for thymo cancelled secondary to difficulty with line placement due to pt anatomy.  Cellcept originally held- restarted.  - Tolerated IVIG #1 and 2 9/5-6/17.    - Plan for rituximab in future of needed- for now monitor with current therapy.   - UOP increasing and overall Cr improved.   - Repeat DSAs pending from 9/7.

## 2017-09-08 NOTE — ASSESSMENT & PLAN NOTE
- Allograft function improving.    - See ABMR and DGF.  - Pt Cr inc from 3.3 <- 3 today.  Hold lasix and encouarge PO intake.  Repeat labs in AM.

## 2017-09-08 NOTE — ASSESSMENT & PLAN NOTE
- H&H stable but trending down. Monitor daily.  - Epoetin injection 10K units 9/5/17.   - Haptoglobin 88 and LDH 93.

## 2017-09-08 NOTE — PROGRESS NOTES
"  [facility]  Adult Nutrition  Consult Note    SUMMARY     Recommendations    1. Continue current renal diet. If PO intake decreases, liberalize diet to 2 gram sodium.   2. RD to monitor & follow-up.    Goals: PO intake >50%  Nutrition Goal Status: new  Communication of RD Recs: reviewed with RN    Reason for Assessment    Reason for Assessment: length of stay  Diagnosis: other (see comments) (Acute rejection of liver tx)  Relevent Medical History: HIV, Kidney tx (8/2017)   Interdisciplinary Rounds: did not attend     General Information Comments: Pt with good appetite, consuming % of most meals. Wt stable PTA.  Nutrition Discharge Planning: Adequate PO intake    Nutrition Prescription Ordered    Current Diet Order: Renal diet     Nutrition/Diet History    Patient Reported Diet/Restrictions/Preferences: general     Factors Affecting Nutritional Intake: other (see comments) (None)    Labs/Tests/Procedures/Meds    Pertinent Labs Reviewed: reviewed, pertinent  Pertinent Labs Comments: GFR 29.1  Pertinent Medications Comments: MVI, Prednisone    Physical Findings    Overall Physical Appearance: nourished  Oral/Mouth Cavity: WDL  Skin: intact    Anthropometrics    Height: 5' 7" (170.2 cm)  Weight Method: Bed Scale  Weight: 66.7 kg (147 lb 0.8 oz)    Ideal Body Weight (IBW), Male: 148 lb  % Ideal Body Weight, Male (lb): 99.36 lb     BMI (Calculated): 23.1  BMI Grade: 18.5-24.9 - normal    Assessment and Plan    No nutritional dx at this time.    Monitor and Evaluation    Food and Nutrient Intake: energy intake, food and beverage intake  Food and Nutrient Adminstration: diet order     Physical Activity and Function: nutrition-related ADLs and IADLs  Anthropometric Measurements: weight, weight change, body mass index  Biochemical Data, Medical Tests and Procedures: lipid profile, inflammatory profile, glucose/endocrine profile, gastrointestinal profile, electrolyte and renal " panel  Nutrition-Focused Physical Findings: overall appearance    Nutrition Risk    Level of Risk: other (see comments) (1x/week)    Nutrition Follow-Up    RD Follow-up?: Yes

## 2017-09-08 NOTE — PLAN OF CARE
Problem: Patient Care Overview  Goal: Plan of Care Review  Outcome: Ongoing (interventions implemented as appropriate)  AAOx3, afebrile, c/o pain.  Pain controlled by prn pain medication. BG monitored and tx per order. Hand hygiene practiced per protocol. Pt able to position self independently. Pt ambulates the hallways with family. Family at bedside. Pt in lowest position, non-skid foot wear in place, call light within reach, pt verbalized understanding to call RN when needed. Will continue to monitor.

## 2017-09-08 NOTE — ASSESSMENT & PLAN NOTE
- Last CD4 count 7 (8/20) and HIV PCR undetected < 40 copies/ml (8/19).  - Start once weekly azithromycin for MAC prophylaxis, first dose today 9/8/17.   - Continue HAART with dolutegravir (Tivicay) and rilpivirine (Edurant).    - HIV meds to be given after PLEX- meds highly protein bound and can be removed with plasmapheresis.  - HIV RNA negative 9/5.  Immune cyclex 78.

## 2017-09-08 NOTE — PROGRESS NOTES
PREEMPTIVE DISCHARGE NOTE:    SW met with pt to discuss current needs and potential discharge over the weekend. SW asked pt if he understood everything  told him and he confirmed he did. Pt understand the important of his new wound and the risk it can pose of exposing other that assist him in changing the dressing due to his HIV status. SW discuss the importance of privacy, but initiated the conversation about exposing other to HIV. Pt confirmed that his mother is the only person that knows about his status and that she would be assisting. Pt reports that he will take procousions and inform other to use gloves and be careful. SW provided safe environment and emotional support. SW informed pt that if he d/c over the weekend he will be going to [facility]. SW remains available.

## 2017-09-08 NOTE — PLAN OF CARE
Problem: Patient Care Overview  Goal: Plan of Care Review  Outcome: Ongoing (interventions implemented as appropriate)  - Incision care: pack with gauze, cover with ABD pad.  Change daily & PRN  - Has received SM pulse x3, PLEX x6, IVIG x2.  - Creat improved to 3 this AM, trend labs, strict I&Os  - Encourage nutrition for wound healing, turns independently  - Ambulates independently  - CBG ACHS, needs reminders to call for blood sugar monitoring.  - L thigh graft active, last HD 8/26  - Pain improved since hematoma removal this AM

## 2017-09-08 NOTE — SUBJECTIVE & OBJECTIVE
Subjective:     Demetrio Aguiar is a 37 y.o. 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 His maintenance immunosuppression includes a steroid taper per protocol to 5mg daily, Prograf, and Cellcept maintenance. His post op course has been complicated by DGF requiring HD (last HD 8/28/17) which prompted need for kidney biopsy on 8/25/17. 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 Biopsy 8/25/17: good sample with 24 glomeruli (only 1 sclerosed) and evidence of criteria for ABMR. + diffuse ATI; Minimal fibrosis, no ACR or AVR, but + CD4 (>50% diffusely + stain) with mild glomerulitis and focal peritubular capillaritis; also, biopsy shows ca-oxalate (3) and ca-phos (2) crystals. Since transplant, he has made minimal uop daily. He denies any current illnesses or sick contacts. Complains of expected post-op pain to RLQ incision. Pt treated with PLEX x6, SMP x3, and planning for IVIG x2. Kidney function improving with tx.    Interval History: Cr 3 from 3.3. Pt completed treatment of rejection with SMP x 3, PLEX x 6 and IVIG x 2. Use of Rituxan discussed - decision made to defer at this time, monitor with current tx, and use in future if needed. Good intake and output. RLQ inc noted with increased swelling and redness to site. Staples removed and hematoma evacuated by transplant fellow. Wound packed with wet to dry dressing. Wound care consulted. Monitor.       Past Medical, Surgical, Family, and Social History:   Unchanged from H&P.    Review of patient's allergies indicates:  No Known Allergies    Scheduled Meds:   docusate sodium  100 mg Oral TID    dolutegravir  50 mg Oral  Daily    epoetin erika  10,000 Units Subcutaneous Every Mon    famotidine  20 mg Oral QHS    heparin (porcine)  5,000 Units Subcutaneous Q8H    ketoconazole  100 mg Oral Daily    multivitamin  1 tablet Oral Daily    mycophenolate  1,000 mg Oral BID    nystatin  500,000 Units Oral QID (PC + HS)    predniSONE  20 mg Oral Daily    rilpivirine  25 mg Oral Daily    rosuvastatin  20 mg Oral Daily    sodium bicarbonate  1,300 mg Oral BID    sodium chloride 0.9%  3 mL Intravenous Q8H    sulfamethoxazole-trimethoprim 400-80mg  1 tablet Oral Every Mon, Wed, Fri    tacrolimus  6 mg Oral Daily    tacrolimus  7 mg Oral Daily    valganciclovir  450 mg Oral Every Mon, Wed, Fri     Continuous Infusions:   PRN Meds:sodium chloride, bisacodyl, dextrose 50%, dextrose 50%, EPINEPHrine, glucagon (human recombinant), glucose, glucose, insulin aspart, ondansetron, oxycodone-acetaminophen, oxycodone-acetaminophen    Intake/Output - Last 3 Shifts       09/06 0700 - 09/07 0659 09/07 0700 - 09/08 0659 09/08 0700 - 09/09 0659    P.O. 2040 1660     I.V. (mL/kg) 0 (0) 0 (0)     Blood       Other 0 0     IV Piggyback       Total Intake(mL/kg) 2040 (31.1) 1660 (24.9)     Urine (mL/kg/hr) 3200 (2) 1700 (1.1)     Emesis/NG output 0 (0) 0 (0)     Other 0 (0) 0 (0)     Stool 0 (0) 0 (0)     Blood 0 (0) 0 (0)     Total Output 3200 1700      Net -1160 -40             Urine Occurrence 0 x 0 x     Stool Occurrence 2 x 1 x     Emesis Occurrence 0 x 0 x            Review of Systems   Constitutional: Negative for activity change, appetite change, chills, fatigue and fever.   HENT: Negative for congestion and facial swelling.    Eyes: Negative for pain, discharge and visual disturbance.   Respiratory: Negative for cough, chest tightness, shortness of breath and wheezing.    Cardiovascular: Negative for chest pain, palpitations and leg swelling.   Gastrointestinal: Negative for abdominal distention, abdominal pain, constipation and diarrhea.  "  Endocrine: Negative.    Genitourinary: Negative for decreased urine volume and dysuria.   Musculoskeletal: Negative for arthralgias and back pain.   Skin: Positive for wound.   Allergic/Immunologic: Positive for immunocompromised state.   Neurological: Negative for dizziness, weakness, light-headedness and headaches.   Hematological: Negative.    Psychiatric/Behavioral: Negative for agitation, decreased concentration and sleep disturbance.      Objective:     Vital Signs (Most Recent):  Temp: 98.2 °F (36.8 °C) (09/08/17 1100)  Pulse: 82 (09/08/17 1100)  Resp: 16 (09/08/17 1100)  BP: 109/65 (09/08/17 1100)  SpO2: 100 % (09/08/17 1100) Vital Signs (24h Range):  Temp:  [97.6 °F (36.4 °C)-98.4 °F (36.9 °C)] 98.2 °F (36.8 °C)  Pulse:  [75-98] 82  Resp:  [16-18] 16  SpO2:  [99 %-100 %] 100 %  BP: (109-120)/(65-73) 109/65     Weight: 66.7 kg (147 lb 0.8 oz)  Height: 5' 7" (170.2 cm)  Body mass index is 23.03 kg/m².    Physical Exam    Laboratory:  CBC:   Recent Labs  Lab 09/08/17  0426   WBC 4.10   RBC 2.60*   HGB 7.9*   HCT 23.3*   PLT 98*   MCV 90   MCH 30.4   MCHC 33.9     BMP:   Recent Labs  Lab 09/08/17  0426         K 3.6      CO2 21*   BUN 47*   CREATININE 3.0*   CALCIUM 9.0     Labs within the past 24 hours have been reviewed.    Diagnostic Results:  X-Ray: Reviewed  "

## 2017-09-08 NOTE — DISCHARGE SUMMARY
Discharge Summary      Admit Date: 2017    Discharge Date and Time: 2017    Attending Physician: Wesley Cisneros MD    Discharge Physician: Wesley Cisneros MD    Principal Diagnoses: Acute rejection of kidney transplant    Other Secondary Diagnoses:   1. Antibody mediated rejection of kidney transplant    2. Acute rejection of kidney transplant    3. S/P kidney transplant 2017    4. Delayed graft function of kidney    5. HIV (human immunodeficiency virus infection)    6. Immunosuppression    7. Need for prophylactic immunotherapy    8. Anemia in ESRD (end-stage renal disease)    9. At risk for opportunistic infections    10. Long-term use of immunosuppressant medication    11. Non-intractable vomiting with nausea, unspecified vomiting type    12. Hyponatremia    13. Hyperphosphatemia    14. Hypocalcemia    15. Immunocompromised state    16.  donor kidney transplant 2017    17. Acute blood loss anemia    18. Anemia due to acute blood loss    19. Status post exploratory laparotomy        Discharged Condition: good    Indication for Admission: Acute rejection of kidney transplant [T86.11]  Acute rejection of kidney transplant [T86.11]  Acute rejection of kidney transplant [T86.11]     Hospital Course:  Demetrio Aguiar is a 37 y.o. 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 His maintenance immunosuppression includes a steroid taper per protocol to 5mg daily, Prograf, and Cellcept maintenance. His post op course has been complicated by DGF requiring HD (last HD 17) which prompted need for kidney biopsy on 17. He was then admitted for biopsy proven ABMR with weakly positive class I DSA's  on 8/25/17. Biopsy 8/25/17: good sample with 24 glomeruli (only 1 sclerosed) and evidence just meeting criteria for ABMR. + diffuse ATI; Minimal fibrosis, no ACR or AVR, but + CD4 (>50% diffusely + stain) with mild glomerulitis and focal peritubular capillaritis; also, biopsy shows ca-oxalate (3) and ca-phos (2) crystals. Repeat DSA on 8/29 with Class I DSA Detected: A2(3894, B44(2820), B62(5178), Class II DSA Detected below cutoff: DR53 (1265). Repeat DSAs pending from 9/7. Pt treated with SMP x 3 (9/1-9/3). PLEX x6 (completed 9/5) and IVIG x 2 (9/5-9/6). Kidney function was improving nicely and he was preparing for discharge in the days following treatment.    The patient's kidney incision became erythematous and edematous on 9/8. He was post op 21 and due for staples to be removed. The transplant fellow removed staples and evacuated a hematoma at the bedside. H&H decreased significantly the next day to 6.5/19.6 from 8.4/25.3. He was transfused 2 units PRBCs. A kidney US on 9/9 showed a large collection located along the anterior inferior margin of the renal transplant measuring 13.4 x 7 x 11.9. He was taken back to the OR overnight for retroperitoneal bleed and washout. H&H decreased as low as 8.7/16.3. He required additional transfusions post sx and again on 9/11. H&H has since remained stable. He has one JESSICA drain in place with very minimal serosang drainage, which was removed prior to discharge. He has a new RLQ inc with staples intact no s/s/i. RTC in approx 2 weeks for staple removal.     Mr. Aguiar is stable for discharge today. He has no complaints. Cr now slowly improving again, 2.7 from 3.3. Good UOP. (+) BM. Tolerating solids. Pain well controlled. Of note, Last CD4 count 7 (8/20). Discussed with ID, Azithromycin started once weekly (first given 9/8/17) for MAC prophylaxis. Continue HAART therapy. Mr. Aguiar will f/u with transplant clinic as scheduled per transplant coordinator. Pt/caregiver verbalized  understanding to discharge instructions.      Drain removed:  Site cleaned with alcohol, lidocaine 1% used to numb area to place prolene 3.0 suture to site.  Drain intact at time of removal.  Patient tolerated procedure well.  Will continue to monitor for any complications.    Consults: None    Significant Diagnostic Studies: labs:   Lab Results   Component Value Date    WBC 3.92 09/14/2017    HGB 9.4 (L) 09/14/2017    HCT 27.0 (L) 09/14/2017    MCV 87 09/14/2017     (L) 09/14/2017         Treatments: as above    Disposition: Home or Self Care    Patient Instructions:   Current Discharge Medication List      START taking these medications    Details   azithromycin (ZITHROMAX) 600 MG Tab Take 2 tablets (1,200 mg total) by mouth every 7 days.  Qty: 8 tablet, Refills: 5      isavuconazonium sulfate 186 mg Cap Take 2 capsules (372 mg total) by mouth once daily.  Qty: 52 capsule, Refills: 0    Comments: Mold Prophy per protocol      sodium bicarbonate 650 MG tablet Take 2 tablets (1,300 mg total) by mouth 2 (two) times daily.  Qty: 120 tablet, Refills: 11         CONTINUE these medications which have CHANGED    Details   dolutegravir (TIVICAY) 50 mg Tab Take 1 tablet (50 mg total) by mouth once daily.  Qty: 30 tablet, Refills: 5      predniSONE (DELTASONE) 10 MG tablet Take 20 mg PO QD 9/4-10/3; 15 mg PO QD 10/4-11/3; 10 mg PO QD 11/4-12/3; 5 mg PO QD thereafter  Qty: 100 tablet, Refills: 1    Comments: Kidney txp 8/18/17; Z94.0      rilpivirine (EDURANT) 25 mg Tab Take 1 tablet (25 mg total) by mouth once daily.  Qty: 30 tablet, Refills: 5      sulfamethoxazole-trimethoprim 400-80mg (BACTRIM,SEPTRA) 400-80 mg per tablet Take 1 tablet by mouth once daily.  Qty: 30 tablet, Refills: 11    Comments: Note that the sig has been changed-- he should not stop this medication      valganciclovir (VALCYTE) 450 mg Tab Take 1 tablet (450 mg total) by mouth every Mon, Wed, Fri. Stop 12/3/17  Qty: 12 tablet, Refills: 2          CONTINUE these medications which have NOT CHANGED    Details   bisacodyl (DULCOLAX) 5 mg EC tablet Take 1-2 tablets (5-10 mg total) by mouth daily as needed for Constipation. Laxative  Qty: 30 tablet, Refills: 3      cinacalcet (SENSIPAR) 60 MG Tab Take 1 tablet (60 mg total) by mouth every other day.  Qty: 15 tablet, Refills: 1      docusate sodium (COLACE) 100 MG capsule Take 1 capsule (100 mg total) by mouth 3 (three) times daily as needed for Constipation.  Qty: 100 capsule, Refills: 0      famotidine (PEPCID) 20 MG tablet Take 1 tablet (20 mg total) by mouth every evening.  Qty: 30 tablet, Refills: 2      multivitamin (THERAGRAN) tablet Take 1 tablet by mouth once daily.      mycophenolate (CELLCEPT) 250 mg Cap Take 4 capsules (1,000 mg total) by mouth 2 (two) times daily. Kidney txp 8/18/17; Z94.0  Qty: 240 capsule, Refills: 11      oxycodone-acetaminophen (PERCOCET)  mg per tablet Take 0.5-1 tablets by mouth every 4 (four) hours as needed for Pain.  Qty: 40 tablet, Refills: 0      rosuvastatin (CRESTOR) 20 MG tablet Take 20 mg by mouth once daily.      tacrolimus (PROGRAF) 1 MG Cap Take 6 capsules (6 mg total) by mouth every 12 (twelve) hours.  Qty: 360 capsule, Refills: 11    Comments: Dose increase; Kidney txp 8/18/17; Z94.0         STOP taking these medications       nystatin (MYCOSTATIN) 100,000 unit/mL suspension Comments:   Reason for Stopping:                 Discharge Procedure Orders  Diet general     Activity as tolerated     Lifting restrictions   Order Comments: Do not lift anything greater than 10lbs for at least 6 weeks from transplant date.     Call MD for:  temperature >100.4     Call MD for:  persistent nausea and vomiting or diarrhea     Call MD for:  severe uncontrolled pain     Call MD for:  redness, tenderness, or signs of infection (pain, swelling, redness, odor or green/yellow discharge around incision site)     Call MD for:  difficulty breathing or increased cough     Call MD  for:  severe persistent headache     Call MD for:  worsening rash     Call MD for:  persistent dizziness, light-headedness, or visual disturbances     Call MD for:  increased confusion or weakness     Call MD for:   Order Comments: For any concerning signs or symptoms.         Time spent caring for patient (Greater than 1/2 spent in direct face-to-face contact): > 30 minutes

## 2017-09-08 NOTE — NURSING
Dr. Chan at bedside to open RLQ incision & remove clot.    Patient tolerated fairly well.  States pressure & pain has improved in area.  Culture sent per orders.  Monitor.    1505: Saturated dressing changed.  No additional clotting noted at site.  Patient states understanding to call for dressing changes as needed.  Monitor the number of dressing changes needed.  CBC has been done as ordered previously by NP, awaiting results

## 2017-09-09 ENCOUNTER — ANESTHESIA EVENT (OUTPATIENT)
Dept: SURGERY | Facility: HOSPITAL | Age: 37
DRG: 673 | End: 2017-09-09
Payer: MEDICARE

## 2017-09-09 ENCOUNTER — ANESTHESIA (OUTPATIENT)
Dept: SURGERY | Facility: HOSPITAL | Age: 37
DRG: 673 | End: 2017-09-09
Payer: MEDICARE

## 2017-09-09 LAB
ABO + RH BLD: NORMAL
ABO + RH BLD: NORMAL
ALBUMIN SERPL BCP-MCNC: 4.1 G/DL
ALBUMIN SERPL BCP-MCNC: 4.5 G/DL
ALP SERPL-CCNC: 131 U/L
ALT SERPL W/O P-5'-P-CCNC: 14 U/L
ANION GAP SERPL CALC-SCNC: 10 MMOL/L
ANION GAP SERPL CALC-SCNC: 11 MMOL/L
ANION GAP SERPL CALC-SCNC: 12 MMOL/L
APTT BLDCRRT: 25 SEC
AST SERPL-CCNC: 20 U/L
BASOPHILS # BLD AUTO: 0.01 K/UL
BASOPHILS NFR BLD: 0.2 %
BILIRUB SERPL-MCNC: 0.9 MG/DL
BLD GP AB SCN CELLS X3 SERPL QL: NORMAL
BLD GP AB SCN CELLS X3 SERPL QL: NORMAL
BLD PROD TYP BPU: NORMAL
BLOOD UNIT EXPIRATION DATE: NORMAL
BLOOD UNIT TYPE CODE: 6200
BLOOD UNIT TYPE: NORMAL
BUN SERPL-MCNC: 46 MG/DL
BUN SERPL-MCNC: 49 MG/DL
BUN SERPL-MCNC: 49 MG/DL
CALCIUM SERPL-MCNC: 9.2 MG/DL
CALCIUM SERPL-MCNC: 9.3 MG/DL
CALCIUM SERPL-MCNC: 9.7 MG/DL
CHLORIDE SERPL-SCNC: 100 MMOL/L
CHLORIDE SERPL-SCNC: 100 MMOL/L
CHLORIDE SERPL-SCNC: 101 MMOL/L
CO2 SERPL-SCNC: 21 MMOL/L
CODING SYSTEM: NORMAL
CREAT SERPL-MCNC: 3 MG/DL
CREAT SERPL-MCNC: 3.9 MG/DL
CREAT SERPL-MCNC: 3.9 MG/DL
DIFFERENTIAL METHOD: ABNORMAL
DISPENSE STATUS: NORMAL
EOSINOPHIL # BLD AUTO: 0 K/UL
EOSINOPHIL NFR BLD: 0.7 %
ERYTHROCYTE [DISTWIDTH] IN BLOOD BY AUTOMATED COUNT: 15.8 %
EST. GFR  (AFRICAN AMERICAN): 21.2 ML/MIN/1.73 M^2
EST. GFR  (AFRICAN AMERICAN): 21.2 ML/MIN/1.73 M^2
EST. GFR  (AFRICAN AMERICAN): 29.1 ML/MIN/1.73 M^2
EST. GFR  (NON AFRICAN AMERICAN): 18.3 ML/MIN/1.73 M^2
EST. GFR  (NON AFRICAN AMERICAN): 18.3 ML/MIN/1.73 M^2
EST. GFR  (NON AFRICAN AMERICAN): 25.2 ML/MIN/1.73 M^2
GLUCOSE SERPL-MCNC: 111 MG/DL
GLUCOSE SERPL-MCNC: 148 MG/DL
GLUCOSE SERPL-MCNC: 154 MG/DL
HCT VFR BLD AUTO: 19.6 %
HCT VFR BLD AUTO: 22.4 %
HCT VFR BLD AUTO: 23.8 %
HCT VFR BLD AUTO: 25.3 %
HGB BLD-MCNC: 6.5 G/DL
HGB BLD-MCNC: 7.7 G/DL
HGB BLD-MCNC: 8.1 G/DL
HGB BLD-MCNC: 8.4 G/DL
INR PPP: 1
LYMPHOCYTES # BLD AUTO: 0.5 K/UL
LYMPHOCYTES NFR BLD: 9.4 %
MAGNESIUM SERPL-MCNC: 1.9 MG/DL
MAGNESIUM SERPL-MCNC: 2.1 MG/DL
MCH RBC QN AUTO: 30.2 PG
MCHC RBC AUTO-ENTMCNC: 33.2 G/DL
MCV RBC AUTO: 91 FL
MONOCYTES # BLD AUTO: 0.2 K/UL
MONOCYTES NFR BLD: 3.7 %
NEUTROPHILS # BLD AUTO: 4.8 K/UL
NEUTROPHILS NFR BLD: 84.9 %
NUM UNITS TRANS WBC-POOR PLATPHERESIS: NORMAL
PHOSPHATE SERPL-MCNC: 3.5 MG/DL
PHOSPHATE SERPL-MCNC: 4.3 MG/DL
PLATELET # BLD AUTO: 114 K/UL
PMV BLD AUTO: 11.2 FL
POCT GLUCOSE: 143 MG/DL (ref 70–110)
POCT GLUCOSE: 158 MG/DL (ref 70–110)
POCT GLUCOSE: 196 MG/DL (ref 70–110)
POTASSIUM SERPL-SCNC: 3.8 MMOL/L
POTASSIUM SERPL-SCNC: 5.3 MMOL/L
POTASSIUM SERPL-SCNC: 6.2 MMOL/L
PROT SERPL-MCNC: 7 G/DL
PROTHROMBIN TIME: 10.8 SEC
RBC # BLD AUTO: 2.78 M/UL
SODIUM SERPL-SCNC: 132 MMOL/L
SODIUM SERPL-SCNC: 132 MMOL/L
SODIUM SERPL-SCNC: 133 MMOL/L
TACROLIMUS BLD-MCNC: 9.1 NG/ML
TRANS ERYTHROCYTES VOL PATIENT: NORMAL ML
TRANS ERYTHROCYTES VOL PATIENT: NORMAL ML
WBC # BLD AUTO: 5.66 K/UL

## 2017-09-09 PROCEDURE — 86850 RBC ANTIBODY SCREEN: CPT

## 2017-09-09 PROCEDURE — 25000003 PHARM REV CODE 250: Performed by: STUDENT IN AN ORGANIZED HEALTH CARE EDUCATION/TRAINING PROGRAM

## 2017-09-09 PROCEDURE — 63600175 PHARM REV CODE 636 W HCPCS: Performed by: NURSE ANESTHETIST, CERTIFIED REGISTERED

## 2017-09-09 PROCEDURE — P9021 RED BLOOD CELLS UNIT: HCPCS

## 2017-09-09 PROCEDURE — 25000003 PHARM REV CODE 250: Performed by: NURSE PRACTITIONER

## 2017-09-09 PROCEDURE — 36000706: Performed by: TRANSPLANT SURGERY

## 2017-09-09 PROCEDURE — C1751 CATH, INF, PER/CENT/MIDLINE: HCPCS

## 2017-09-09 PROCEDURE — 71000039 HC RECOVERY, EACH ADD'L HOUR: Performed by: TRANSPLANT SURGERY

## 2017-09-09 PROCEDURE — 85014 HEMATOCRIT: CPT | Mod: 91

## 2017-09-09 PROCEDURE — 99233 SBSQ HOSP IP/OBS HIGH 50: CPT | Mod: 24,,, | Performed by: NURSE PRACTITIONER

## 2017-09-09 PROCEDURE — 25000003 PHARM REV CODE 250: Performed by: INTERNAL MEDICINE

## 2017-09-09 PROCEDURE — 80048 BASIC METABOLIC PNL TOTAL CA: CPT

## 2017-09-09 PROCEDURE — 35840 EXPLORE ABDOMINAL VESSELS: CPT | Mod: ,,, | Performed by: TRANSPLANT SURGERY

## 2017-09-09 PROCEDURE — 27201423 OPTIME MED/SURG SUP & DEVICES STERILE SUPPLY: Performed by: TRANSPLANT SURGERY

## 2017-09-09 PROCEDURE — 85018 HEMOGLOBIN: CPT

## 2017-09-09 PROCEDURE — 86900 BLOOD TYPING SEROLOGIC ABO: CPT

## 2017-09-09 PROCEDURE — 85610 PROTHROMBIN TIME: CPT

## 2017-09-09 PROCEDURE — 20600001 HC STEP DOWN PRIVATE ROOM

## 2017-09-09 PROCEDURE — 36000707: Performed by: TRANSPLANT SURGERY

## 2017-09-09 PROCEDURE — 37000008 HC ANESTHESIA 1ST 15 MINUTES: Performed by: TRANSPLANT SURGERY

## 2017-09-09 PROCEDURE — 36620 INSERTION CATHETER ARTERY: CPT | Mod: 59,,, | Performed by: ANESTHESIOLOGY

## 2017-09-09 PROCEDURE — 80069 RENAL FUNCTION PANEL: CPT

## 2017-09-09 PROCEDURE — 63600175 PHARM REV CODE 636 W HCPCS: Performed by: STUDENT IN AN ORGANIZED HEALTH CARE EDUCATION/TRAINING PROGRAM

## 2017-09-09 PROCEDURE — 80197 ASSAY OF TACROLIMUS: CPT

## 2017-09-09 PROCEDURE — 76937 US GUIDE VASCULAR ACCESS: CPT

## 2017-09-09 PROCEDURE — 63600175 PHARM REV CODE 636 W HCPCS: Performed by: INTERNAL MEDICINE

## 2017-09-09 PROCEDURE — 85018 HEMOGLOBIN: CPT | Mod: 91

## 2017-09-09 PROCEDURE — 86920 COMPATIBILITY TEST SPIN: CPT

## 2017-09-09 PROCEDURE — 83735 ASSAY OF MAGNESIUM: CPT

## 2017-09-09 PROCEDURE — 86850 RBC ANTIBODY SCREEN: CPT | Mod: 91

## 2017-09-09 PROCEDURE — 85025 COMPLETE CBC W/AUTO DIFF WBC: CPT

## 2017-09-09 PROCEDURE — 80053 COMPREHEN METABOLIC PANEL: CPT

## 2017-09-09 PROCEDURE — 84100 ASSAY OF PHOSPHORUS: CPT

## 2017-09-09 PROCEDURE — 85730 THROMBOPLASTIN TIME PARTIAL: CPT

## 2017-09-09 PROCEDURE — 36415 COLL VENOUS BLD VENIPUNCTURE: CPT

## 2017-09-09 PROCEDURE — 85014 HEMATOCRIT: CPT

## 2017-09-09 PROCEDURE — D9220A PRA ANESTHESIA: Mod: ANES,,, | Performed by: ANESTHESIOLOGY

## 2017-09-09 PROCEDURE — P9037 PLATE PHERES LEUKOREDU IRRAD: HCPCS

## 2017-09-09 PROCEDURE — D9220A PRA ANESTHESIA: Mod: CRNA,,, | Performed by: NURSE ANESTHETIST, CERTIFIED REGISTERED

## 2017-09-09 PROCEDURE — 63600175 PHARM REV CODE 636 W HCPCS: Performed by: PHYSICIAN ASSISTANT

## 2017-09-09 PROCEDURE — 27201040 HC RC 50 FILTER

## 2017-09-09 PROCEDURE — 25000003 PHARM REV CODE 250: Performed by: PHYSICIAN ASSISTANT

## 2017-09-09 PROCEDURE — 63600175 PHARM REV CODE 636 W HCPCS

## 2017-09-09 PROCEDURE — 83735 ASSAY OF MAGNESIUM: CPT | Mod: 91

## 2017-09-09 PROCEDURE — 25000003 PHARM REV CODE 250: Performed by: NURSE ANESTHETIST, CERTIFIED REGISTERED

## 2017-09-09 PROCEDURE — 71000033 HC RECOVERY, INTIAL HOUR: Performed by: TRANSPLANT SURGERY

## 2017-09-09 PROCEDURE — 37000009 HC ANESTHESIA EA ADD 15 MINS: Performed by: TRANSPLANT SURGERY

## 2017-09-09 PROCEDURE — C1729 CATH, DRAINAGE: HCPCS | Performed by: TRANSPLANT SURGERY

## 2017-09-09 PROCEDURE — A4216 STERILE WATER/SALINE, 10 ML: HCPCS | Performed by: NURSE PRACTITIONER

## 2017-09-09 PROCEDURE — 36430 TRANSFUSION BLD/BLD COMPNT: CPT

## 2017-09-09 PROCEDURE — 86900 BLOOD TYPING SEROLOGIC ABO: CPT | Mod: 91

## 2017-09-09 PROCEDURE — 36569 INSJ PICC 5 YR+ W/O IMAGING: CPT

## 2017-09-09 RX ORDER — PROPOFOL 10 MG/ML
VIAL (ML) INTRAVENOUS
Status: DISCONTINUED | OUTPATIENT
Start: 2017-09-09 | End: 2017-09-10

## 2017-09-09 RX ORDER — PROMETHAZINE HYDROCHLORIDE 12.5 MG/1
12.5 TABLET ORAL ONCE
Status: COMPLETED | OUTPATIENT
Start: 2017-09-09 | End: 2017-09-09

## 2017-09-09 RX ORDER — FENTANYL CITRATE 50 UG/ML
INJECTION, SOLUTION INTRAMUSCULAR; INTRAVENOUS
Status: DISCONTINUED | OUTPATIENT
Start: 2017-09-09 | End: 2017-09-10

## 2017-09-09 RX ORDER — HYDROMORPHONE HYDROCHLORIDE 1 MG/ML
0.5 INJECTION, SOLUTION INTRAMUSCULAR; INTRAVENOUS; SUBCUTANEOUS ONCE
Status: COMPLETED | OUTPATIENT
Start: 2017-09-09 | End: 2017-09-09

## 2017-09-09 RX ORDER — SODIUM CHLORIDE 9 MG/ML
INJECTION, SOLUTION INTRAVENOUS CONTINUOUS
Status: DISCONTINUED | OUTPATIENT
Start: 2017-09-09 | End: 2017-09-10

## 2017-09-09 RX ORDER — HYDROCODONE BITARTRATE AND ACETAMINOPHEN 500; 5 MG/1; MG/1
TABLET ORAL
Status: DISCONTINUED | OUTPATIENT
Start: 2017-09-09 | End: 2017-09-09

## 2017-09-09 RX ORDER — HYDROMORPHONE HYDROCHLORIDE 1 MG/ML
INJECTION, SOLUTION INTRAMUSCULAR; INTRAVENOUS; SUBCUTANEOUS
Status: COMPLETED
Start: 2017-09-09 | End: 2017-09-09

## 2017-09-09 RX ORDER — SIMETHICONE 80 MG
1 TABLET,CHEWABLE ORAL 3 TIMES DAILY PRN
Status: DISCONTINUED | OUTPATIENT
Start: 2017-09-09 | End: 2017-09-14 | Stop reason: HOSPADM

## 2017-09-09 RX ORDER — MIDAZOLAM HYDROCHLORIDE 1 MG/ML
INJECTION, SOLUTION INTRAMUSCULAR; INTRAVENOUS
Status: DISCONTINUED | OUTPATIENT
Start: 2017-09-09 | End: 2017-09-10

## 2017-09-09 RX ORDER — CISATRACURIUM BESYLATE 10 MG/ML
INJECTION, SOLUTION INTRAVENOUS
Status: DISCONTINUED | OUTPATIENT
Start: 2017-09-09 | End: 2017-09-10

## 2017-09-09 RX ORDER — HYDROMORPHONE HYDROCHLORIDE 1 MG/ML
1 INJECTION, SOLUTION INTRAMUSCULAR; INTRAVENOUS; SUBCUTANEOUS EVERY 4 HOURS PRN
Status: COMPLETED | OUTPATIENT
Start: 2017-09-09 | End: 2017-09-09

## 2017-09-09 RX ORDER — PHENYLEPHRINE HYDROCHLORIDE 10 MG/ML
INJECTION INTRAVENOUS
Status: DISCONTINUED | OUTPATIENT
Start: 2017-09-09 | End: 2017-09-10

## 2017-09-09 RX ORDER — ROCURONIUM BROMIDE 10 MG/ML
INJECTION, SOLUTION INTRAVENOUS
Status: DISCONTINUED | OUTPATIENT
Start: 2017-09-09 | End: 2017-09-10

## 2017-09-09 RX ADMIN — Medication 100 MG: at 08:09

## 2017-09-09 RX ADMIN — OXYCODONE HYDROCHLORIDE AND ACETAMINOPHEN 1 TABLET: 10; 325 TABLET ORAL at 07:09

## 2017-09-09 RX ADMIN — ROCURONIUM BROMIDE 40 MG: 10 INJECTION, SOLUTION INTRAVENOUS at 10:09

## 2017-09-09 RX ADMIN — OXYCODONE HYDROCHLORIDE AND ACETAMINOPHEN 1 TABLET: 10; 325 TABLET ORAL at 02:09

## 2017-09-09 RX ADMIN — ONDANSETRON 4 MG: 2 INJECTION INTRAMUSCULAR; INTRAVENOUS at 04:09

## 2017-09-09 RX ADMIN — SIMETHICONE CHEW TAB 80 MG 80 MG: 80 TABLET ORAL at 08:09

## 2017-09-09 RX ADMIN — HYDROMORPHONE HYDROCHLORIDE 1 MG: 1 INJECTION, SOLUTION INTRAMUSCULAR; INTRAVENOUS; SUBCUTANEOUS at 05:09

## 2017-09-09 RX ADMIN — TACROLIMUS 7 MG: 5 CAPSULE ORAL at 08:09

## 2017-09-09 RX ADMIN — SIMETHICONE CHEW TAB 80 MG 80 MG: 80 TABLET ORAL at 05:09

## 2017-09-09 RX ADMIN — PHENYLEPHRINE HYDROCHLORIDE 100 MCG: 10 INJECTION INTRAVENOUS at 11:09

## 2017-09-09 RX ADMIN — CISATRACURIUM BESYLATE 4 MG: 10 INJECTION INTRAVENOUS at 11:09

## 2017-09-09 RX ADMIN — PHENYLEPHRINE HYDROCHLORIDE 100 MCG: 10 INJECTION INTRAVENOUS at 10:09

## 2017-09-09 RX ADMIN — DOCUSATE SODIUM 100 MG: 50 CAPSULE, LIQUID FILLED ORAL at 05:09

## 2017-09-09 RX ADMIN — PROMETHAZINE HYDROCHLORIDE 12.5 MG: 12.5 TABLET ORAL at 08:09

## 2017-09-09 RX ADMIN — ONDANSETRON 4 MG: 2 INJECTION INTRAMUSCULAR; INTRAVENOUS at 11:09

## 2017-09-09 RX ADMIN — PREDNISONE 20 MG: 20 TABLET ORAL at 08:09

## 2017-09-09 RX ADMIN — DEXTROSE MONOHYDRATE 25 G: 25 INJECTION, SOLUTION INTRAVENOUS at 09:09

## 2017-09-09 RX ADMIN — NYSTATIN 500000 UNITS: 500000 SUSPENSION ORAL at 01:09

## 2017-09-09 RX ADMIN — Medication 3 ML: at 02:09

## 2017-09-09 RX ADMIN — MIDAZOLAM HYDROCHLORIDE 2 MG: 1 INJECTION, SOLUTION INTRAMUSCULAR; INTRAVENOUS at 10:09

## 2017-09-09 RX ADMIN — ROCURONIUM BROMIDE 10 MG: 10 INJECTION, SOLUTION INTRAVENOUS at 10:09

## 2017-09-09 RX ADMIN — PROPOFOL 130 MG: 10 INJECTION, EMULSION INTRAVENOUS at 10:09

## 2017-09-09 RX ADMIN — Medication 3 ML: at 05:09

## 2017-09-09 RX ADMIN — THERA TABS 1 TABLET: TAB at 08:09

## 2017-09-09 RX ADMIN — SODIUM CHLORIDE: 0.9 INJECTION, SOLUTION INTRAVENOUS at 11:09

## 2017-09-09 RX ADMIN — CISATRACURIUM BESYLATE 2 MG: 10 INJECTION INTRAVENOUS at 11:09

## 2017-09-09 RX ADMIN — FENTANYL CITRATE 50 MCG: 50 INJECTION, SOLUTION INTRAMUSCULAR; INTRAVENOUS at 10:09

## 2017-09-09 RX ADMIN — HYDROMORPHONE HYDROCHLORIDE 0.5 MG: 1 INJECTION, SOLUTION INTRAMUSCULAR; INTRAVENOUS; SUBCUTANEOUS at 09:09

## 2017-09-09 RX ADMIN — OXYCODONE HYDROCHLORIDE AND ACETAMINOPHEN 1 TABLET: 10; 325 TABLET ORAL at 08:09

## 2017-09-09 RX ADMIN — TACROLIMUS 6 MG: 5 CAPSULE ORAL at 05:09

## 2017-09-09 RX ADMIN — OXYCODONE HYDROCHLORIDE AND ACETAMINOPHEN 1 TABLET: 5; 325 TABLET ORAL at 01:09

## 2017-09-09 RX ADMIN — SODIUM CHLORIDE: 0.9 INJECTION, SOLUTION INTRAVENOUS at 09:09

## 2017-09-09 RX ADMIN — MYCOPHENOLATE MOFETIL 1000 MG: 250 CAPSULE ORAL at 08:09

## 2017-09-09 RX ADMIN — DOLUTEGRAVIR SODIUM 50 MG: 50 TABLET, FILM COATED ORAL at 08:09

## 2017-09-09 RX ADMIN — INSULIN HUMAN 10 UNITS: 100 INJECTION, SOLUTION PARENTERAL at 09:09

## 2017-09-09 RX ADMIN — SODIUM CHLORIDE 3 G: 9 INJECTION, SOLUTION INTRAVENOUS at 10:09

## 2017-09-09 RX ADMIN — SODIUM BICARBONATE 650 MG TABLET 1300 MG: at 08:09

## 2017-09-09 RX ADMIN — ROSUVASTATIN CALCIUM 20 MG: 10 TABLET ORAL at 08:09

## 2017-09-09 RX ADMIN — SODIUM CHLORIDE 1000 ML: 0.9 INJECTION, SOLUTION INTRAVENOUS at 09:09

## 2017-09-09 NOTE — SUBJECTIVE & OBJECTIVE
Subjective:     Demetrio Aguiar is a 37 y.o. 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 His maintenance immunosuppression includes a steroid taper per protocol to 5mg daily, Prograf, and Cellcept maintenance. His post op course has been complicated by DGF requiring HD (last HD 8/28/17) which prompted need for kidney biopsy on 8/25/17. 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 Biopsy 8/25/17: good sample with 24 glomeruli (only 1 sclerosed) and evidence of criteria for ABMR. + diffuse ATI; Minimal fibrosis, no ACR or AVR, but + CD4 (>50% diffusely + stain) with mild glomerulitis and focal peritubular capillaritis; also, biopsy shows ca-oxalate (3) and ca-phos (2) crystals. Since transplant, he has made minimal uop daily. He denies any current illnesses or sick contacts. Complains of expected post-op pain to RLQ incision. Pt treated with PLEX x6, SMP x3, and IVIG x2. Kidney function improving with tx.    Interval History: Cr 3 from 3.3. Pt completed treatment of rejection with SMP x 3, PLEX x 6 and IVIG x 2. Use of Rituxan discussed - decision made to defer at this time, monitor with current tx, and use in future if needed. Good intake and output. RLQ inc noted with increased swelling and redness to site. Staples removed and hematoma evacuated by transplant fellow. Wound packed with wet to dry dressing. Wound care consulted. Monitor.       Past Medical, Surgical, Family, and Social History:   Unchanged from H&P.    Review of patient's allergies indicates:  No Known Allergies    Scheduled Meds:   azithromycin  1,200 mg Oral Weekly    docusate sodium  100 mg Oral TID     dolutegravir  50 mg Oral Daily    epoetin erika  10,000 Units Subcutaneous Every Mon    famotidine  20 mg Oral QHS    heparin (porcine)  5,000 Units Subcutaneous Q8H    ketoconazole  100 mg Oral Daily    multivitamin  1 tablet Oral Daily    mycophenolate  1,000 mg Oral BID    nystatin  500,000 Units Oral QID (PC + HS)    predniSONE  20 mg Oral Daily    rilpivirine  25 mg Oral Daily    rosuvastatin  20 mg Oral Daily    sodium bicarbonate  1,300 mg Oral BID    sodium chloride 0.9%  3 mL Intravenous Q8H    sulfamethoxazole-trimethoprim 400-80mg  1 tablet Oral Every Mon, Wed, Fri    tacrolimus  6 mg Oral Daily    tacrolimus  7 mg Oral Daily    valganciclovir  450 mg Oral Every Mon, Wed, Fri     Continuous Infusions:   PRN Meds:sodium chloride, sodium chloride, bisacodyl, dextrose 50%, dextrose 50%, EPINEPHrine, glucagon (human recombinant), glucose, glucose, insulin aspart, ondansetron, oxycodone-acetaminophen, oxycodone-acetaminophen, simethicone    Intake/Output - Last 3 Shifts       09/07 0700 - 09/08 0659 09/08 0700 - 09/09 0659 09/09 0700 - 09/10 0659    P.O. 1660 1860     I.V. (mL/kg) 0 (0)      Other 0      Total Intake(mL/kg) 1660 (24.9) 1860 (27.9)     Urine (mL/kg/hr) 1700 (1.1) 2050 (1.3)     Emesis/NG output 0 (0)      Other 0 (0)      Stool 0 (0) 0 (0)     Blood 0 (0)      Total Output 1700 2050      Net -40 -190             Urine Occurrence 0 x      Stool Occurrence 1 x 0 x     Emesis Occurrence 0 x             Review of Systems   Constitutional: Positive for activity change and appetite change. Negative for chills, fatigue and fever.   HENT: Negative for congestion and facial swelling.    Eyes: Negative for pain, discharge and visual disturbance.   Respiratory: Negative for cough, chest tightness, shortness of breath and wheezing.    Cardiovascular: Negative for chest pain, palpitations and leg swelling.   Gastrointestinal: Positive for abdominal pain and nausea. Negative for abdominal  "distention, constipation and diarrhea.   Endocrine: Negative.    Genitourinary: Positive for flank pain. Negative for decreased urine volume and dysuria.   Musculoskeletal: Positive for back pain. Negative for arthralgias.   Skin: Positive for wound.   Allergic/Immunologic: Positive for immunocompromised state.   Neurological: Negative for dizziness, weakness, light-headedness and headaches.   Hematological: Negative.    Psychiatric/Behavioral: Negative for agitation, decreased concentration and sleep disturbance.      Objective:     Vital Signs (Most Recent):  Temp: 98.4 °F (36.9 °C) (09/09/17 0930)  Pulse: 78 (09/09/17 1330)  Resp: 18 (09/09/17 0953)  BP: (!) 91/50 (09/09/17 1330)  SpO2: 100 % (09/09/17 0945) Vital Signs (24h Range):  Temp:  [98.2 °F (36.8 °C)-99.2 °F (37.3 °C)] 98.4 °F (36.9 °C)  Pulse:  [74-92] 78  Resp:  [18-22] 18  SpO2:  [97 %-100 %] 100 %  BP: ()/(50-70) 91/50     Weight: 66.7 kg (147 lb 0.8 oz)  Height: 5' 7" (170.2 cm)  Body mass index is 23.03 kg/m².    Physical Exam   Constitutional: He is oriented to person, place, and time. He appears well-developed and well-nourished.   HENT:   Head: Normocephalic and atraumatic.   Eyes: Pupils are equal, round, and reactive to light.   Neck: Normal range of motion.   Cardiovascular: Normal rate, regular rhythm, normal heart sounds and intact distal pulses.    Pulmonary/Chest: Effort normal and breath sounds normal.   Abdominal: Soft. There is tenderness (RLQ & flank). There is guarding.   Musculoskeletal: He exhibits no edema.   Neurological: He is alert and oriented to person, place, and time.   Skin: Skin is warm and dry.   Nursing note and vitals reviewed.      Laboratory:  CBC:     Recent Labs  Lab 09/09/17  0448 09/09/17  1004   WBC 5.66  --    RBC 2.78*  --    HGB 8.4* 6.5*   HCT 25.3* 19.6*   *  --    MCV 91  --    MCH 30.2  --    MCHC 33.2  --      BMP:     Recent Labs  Lab 09/09/17  0448   *   *   K 3.8    "   CO2 21*   BUN 46*   CREATININE 3.0*   CALCIUM 9.7     Labs within the past 24 hours have been reviewed.    Diagnostic Results:  X-Ray: Reviewed

## 2017-09-09 NOTE — NURSING
Patient transported to /S via stretcher, accompanied by transport staff & CN.  /60 prior to transport.  Blood products should be available to administer on return to [facility]

## 2017-09-09 NOTE — NURSING
Patient transferred to TSU 8080 via bed, with all personal belongings.  2nd unit PRBC of 2 infusing at this time.  Family at bedside.  Mother (aRnda) phone # [phone] - requests to be called with any changes & patient states that is ok.  YUKO Juarez RN received report & is at bedside.

## 2017-09-09 NOTE — PROGRESS NOTES
"   09/09/17 0930   Vital Signs   Temp 98.4 °F (36.9 °C)   Pulse 85   Resp (!) 22   SpO2 97 %   BP (!) 90/54   MAP (mmHg) 69   Pt called RN re: pain unrelieved by Percocet given this AM & would like dose of breakthrough pain medication.  On RN return to room, patient sitting at side of bed, diaphoretic, c/o increased sharp pain in back, side & incision, states "I feel like I"m about to pass out"  Assisted patient to lie back to prevent falling OOB.  BP as noted, CN notifed & MD notifed.  .  Dressing lifted to check to increased bleeding, packing not moved & no increased bleeding noted (SS fluid saturating dressing).  Dressing re-secured so MD can assess.  Hct/Hgb & NS 1L bolus ordered by MD.  MD to see patient at bedside.     Patient states he has made no urine since waking this AM.  2L output noted for last 24 hrs  "

## 2017-09-09 NOTE — CONSULTS
Midline placed in right basilic vein, 23sa8mj catheter length. Lot#[id number]. Used real time ultrasound. Image recorded and saved. Remove on or before.

## 2017-09-09 NOTE — SIGNIFICANT EVENT
"Notified Dr. Garcia of patient's new onset of "sharp, stabbing 10/10" pain to right lower abdomen. Patient's RLQ wound changed 1 hour previous and no visible change (i.e bloody drainage, swelling) noted to site. Patient verbalized that the pain started after he ambulated in the [place]. Dilaudid 1 mg IVP every 4 hrs X 2 doses ordered and MD verbalized that he would come see the patient.  "

## 2017-09-09 NOTE — ASSESSMENT & PLAN NOTE
- Allograft function improving.    - See ABMR and DGF.  - Pt Cr inc from 3.3 <- 3 today.  Hold lasix and encouarge PO intake.   -significant drop in h/h, back pain, obtain stat ultrasound

## 2017-09-09 NOTE — ASSESSMENT & PLAN NOTE
- H&H stable but trending down. Monitor daily.  - Epoetin injection 10K units 9/5/17.   - Haptoglobin 88 and LDH 93.   -h/h with significant drop, transfuse, monitor serial h/h

## 2017-09-09 NOTE — PROGRESS NOTES
[facility]  Kidney Transplant  Progress Note      Reason for Follow-up: Reassessment of Kidney Transplant - 2017  (#1) recipient and management of immunosuppression.    ORGAN:  RIGHT KIDNEY   Donor Type:   - Brain Death   PHS Increased Risk: no   Cold Ischemia:   Induction Medications: Thymoglobulin      Subjective:     Demetrio Aguiar is a 37 y.o. 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 His maintenance immunosuppression includes a steroid taper per protocol to 5mg daily, Prograf, and Cellcept maintenance. His post op course has been complicated by DGF requiring HD (last HD 17) which prompted need for kidney biopsy on 17. He was then admitted for biopsy proven ABMR with weakly positive class I DSA's on 17. Biopsy 17: good sample with 24 glomeruli (only 1 sclerosed) and evidence of criteria for ABMR. + diffuse ATI; Minimal fibrosis, no ACR or AVR, but + CD4 (>50% diffusely + stain) with mild glomerulitis and focal peritubular capillaritis; also, biopsy shows ca-oxalate (3) and ca-phos (2) crystals. Since transplant, he has made minimal uop daily. He denies any current illnesses or sick contacts. Complains of expected post-op pain to RLQ incision. Pt treated with PLEX x6, SMP x3, and IVIG x2. Kidney function improving with tx.    Interval History: Cr 3 from 3.3. Pt completed treatment of rejection with SMP x 3, PLEX x 6 and IVIG x 2. Use of Rituxan discussed - decision made to defer at this time, monitor with current tx, and use in future if needed. Good intake and output. RLQ inc noted with increased swelling and redness to site. Staples removed and hematoma evacuated  by transplant fellow. Wound packed with wet to dry dressing. Wound care consulted. Monitor.       Past Medical, Surgical, Family, and Social History:   Unchanged from H&P.    Review of patient's allergies indicates:  No Known Allergies    Scheduled Meds:   azithromycin  1,200 mg Oral Weekly    docusate sodium  100 mg Oral TID    dolutegravir  50 mg Oral Daily    epoetin erika  10,000 Units Subcutaneous Every Mon    famotidine  20 mg Oral QHS    heparin (porcine)  5,000 Units Subcutaneous Q8H    ketoconazole  100 mg Oral Daily    multivitamin  1 tablet Oral Daily    mycophenolate  1,000 mg Oral BID    nystatin  500,000 Units Oral QID (PC + HS)    predniSONE  20 mg Oral Daily    rilpivirine  25 mg Oral Daily    rosuvastatin  20 mg Oral Daily    sodium bicarbonate  1,300 mg Oral BID    sodium chloride 0.9%  3 mL Intravenous Q8H    sulfamethoxazole-trimethoprim 400-80mg  1 tablet Oral Every Mon, Wed, Fri    tacrolimus  6 mg Oral Daily    tacrolimus  7 mg Oral Daily    valganciclovir  450 mg Oral Every Mon, Wed, Fri     Continuous Infusions:   PRN Meds:sodium chloride, sodium chloride, bisacodyl, dextrose 50%, dextrose 50%, EPINEPHrine, glucagon (human recombinant), glucose, glucose, insulin aspart, ondansetron, oxycodone-acetaminophen, oxycodone-acetaminophen, simethicone    Intake/Output - Last 3 Shifts       09/07 0700 - 09/08 0659 09/08 0700 - 09/09 0659 09/09 0700 - 09/10 0659    P.O. 1660 1860     I.V. (mL/kg) 0 (0)      Other 0      Total Intake(mL/kg) 1660 (24.9) 1860 (27.9)     Urine (mL/kg/hr) 1700 (1.1) 2050 (1.3)     Emesis/NG output 0 (0)      Other 0 (0)      Stool 0 (0) 0 (0)     Blood 0 (0)      Total Output 1700 2050      Net -40 -190             Urine Occurrence 0 x      Stool Occurrence 1 x 0 x     Emesis Occurrence 0 x             Review of Systems   Constitutional: Positive for activity change and appetite change. Negative for chills, fatigue and fever.   HENT: Negative for  "congestion and facial swelling.    Eyes: Negative for pain, discharge and visual disturbance.   Respiratory: Negative for cough, chest tightness, shortness of breath and wheezing.    Cardiovascular: Negative for chest pain, palpitations and leg swelling.   Gastrointestinal: Positive for abdominal pain and nausea. Negative for abdominal distention, constipation and diarrhea.   Endocrine: Negative.    Genitourinary: Positive for flank pain. Negative for decreased urine volume and dysuria.   Musculoskeletal: Positive for back pain. Negative for arthralgias.   Skin: Positive for wound.   Allergic/Immunologic: Positive for immunocompromised state.   Neurological: Negative for dizziness, weakness, light-headedness and headaches.   Hematological: Negative.    Psychiatric/Behavioral: Negative for agitation, decreased concentration and sleep disturbance.      Objective:     Vital Signs (Most Recent):  Temp: 98.4 °F (36.9 °C) (09/09/17 0930)  Pulse: 78 (09/09/17 1330)  Resp: 18 (09/09/17 0953)  BP: (!) 91/50 (09/09/17 1330)  SpO2: 100 % (09/09/17 0945) Vital Signs (24h Range):  Temp:  [98.2 °F (36.8 °C)-99.2 °F (37.3 °C)] 98.4 °F (36.9 °C)  Pulse:  [74-92] 78  Resp:  [18-22] 18  SpO2:  [97 %-100 %] 100 %  BP: ()/(50-70) 91/50     Weight: 66.7 kg (147 lb 0.8 oz)  Height: 5' 7" (170.2 cm)  Body mass index is 23.03 kg/m².    Physical Exam   Constitutional: He is oriented to person, place, and time. He appears well-developed and well-nourished.   HENT:   Head: Normocephalic and atraumatic.   Eyes: Pupils are equal, round, and reactive to light.   Neck: Normal range of motion.   Cardiovascular: Normal rate, regular rhythm, normal heart sounds and intact distal pulses.    Pulmonary/Chest: Effort normal and breath sounds normal.   Abdominal: Soft. There is tenderness (RLQ & flank). There is guarding.   Musculoskeletal: He exhibits no edema.   Neurological: He is alert and oriented to person, place, and time.   Skin: Skin is " warm and dry.   Nursing note and vitals reviewed.      Laboratory:  CBC:     Recent Labs  Lab 17  0448 17  1004   WBC 5.66  --    RBC 2.78*  --    HGB 8.4* 6.5*   HCT 25.3* 19.6*   *  --    MCV 91  --    MCH 30.2  --    MCHC 33.2  --      BMP:     Recent Labs  Lab 17  0448   *   *   K 3.8      CO2 21*   BUN 46*   CREATININE 3.0*   CALCIUM 9.7     Labs within the past 24 hours have been reviewed.    Diagnostic Results:  X-Ray: Reviewed    Assessment/Plan:     * Acute rejection of kidney transplant    - Biopsy (for DGF) 17: good sample with 24 glomeruli (only 1 sclerosed) and evidence just meeting criteria for ABMR. + diffuse ATI. Minimal fibrosis, no ACR or AVR, but + CD4 (>50% diffusely + stain) with mild glomerulitis and focal peritubular capillaritis. Also, biopsy shows ca-oxalate (3) and ca-phos (2) crystals.  -17 DSA + for weak class I. Repeat pre-PLEX Class I DSA DETECTED: A2(3894),B44(2820),B62(5178),    Class II DSA DETECTED BELOW CUTOFF: DR53(1265)  - Tolerated PLEX #1 , #2 , #3 , #4 ,  #5 , and #6 17.    - completed SMP x 3. Of note, original plan for thymo cancelled secondary to difficulty with line placement due to pt anatomy.  Cellcept originally held- restarted.  - Tolerated IVIG #1 and 2 -.    - Plan for rituximab in future of needed- for now monitor with current therapy.   - UOP increasing and overall Cr improved.   - Repeat DSAs pending from .         Anemia in ESRD (end-stage renal disease)    - H&H stable but trending down. Monitor daily.  - Epoetin injection 10K units 17.   - Haptoglobin 88 and LDH 93.   -h/h with significant drop, transfuse, monitor serial h/h        Antibody mediated rejection of kidney transplant    - see rejection of kidney transplant.           donor kidney transplant 2017    - Allograft function improving.    - See ABMR and DGF.  - Pt Cr inc from 3.3 <- 3 today.  Hold lasix  and encouarge PO intake.   -significant drop in h/h, back pain, obtain stat ultrasound          Long-term use of immunosuppressant medication    - Continue Prograf and Cellcept.  - Continue keto to boost tacro levels.  - Will monitor for signs of toxic side effects, check daily Prograf troughs, and change meds accordingly.            Need for prophylactic immunotherapy    - See long term use of IS.            Immunocompromised state    - Will continue with HAART therapy, immuno prophylaxis.          Delayed graft function of kidney    - Improved GFR with excellent urine output.           At risk for opportunistic infections    - Continue Valcyte for CMV prophylaxis  - Continue Bactrim for PCP prophylaxis  - Continue nystatin for fungal prophylaxis          HIV (human immunodeficiency virus infection)    - Last CD4 count 7 (8/20) and HIV PCR undetected < 40 copies/ml (8/19).  - Start once weekly azithromycin for MAC prophylaxis, first dose today 9/8/17.   - Continue HAART with dolutegravir (Tivicay) and rilpivirine (Edurant).    - HIV meds to be given after PLEX- meds highly protein bound and can be removed with plasmapheresis.  - HIV RNA negative 9/5.  Immune cyclex 78.               Discharge Planning:  Not candidate at this time  [address]      Sherry Gamble, [place]  Kidney Transplant  [facility]

## 2017-09-10 PROBLEM — Z98.890 STATUS POST EXPLORATORY LAPAROTOMY: Chronic | Status: ACTIVE | Noted: 2017-09-10

## 2017-09-10 PROBLEM — Z98.890 STATUS POST EXPLORATORY LAPAROTOMY: Status: ACTIVE | Noted: 2017-09-10

## 2017-09-10 LAB
ALBUMIN SERPL BCP-MCNC: 3.1 G/DL
ANION GAP SERPL CALC-SCNC: 11 MMOL/L
ANION GAP SERPL CALC-SCNC: 11 MMOL/L
ANION GAP SERPL CALC-SCNC: 6 MMOL/L
ANION GAP SERPL CALC-SCNC: 8 MMOL/L
ANION GAP SERPL CALC-SCNC: 9 MMOL/L
APTT BLDCRRT: 25.1 SEC
BUN SERPL-MCNC: 43 MG/DL
BUN SERPL-MCNC: 45 MG/DL
BUN SERPL-MCNC: 47 MG/DL
BUN SERPL-MCNC: 49 MG/DL
BUN SERPL-MCNC: 51 MG/DL
CALCIUM SERPL-MCNC: 7.8 MG/DL
CALCIUM SERPL-MCNC: 8.3 MG/DL
CALCIUM SERPL-MCNC: 8.8 MG/DL
CALCIUM SERPL-MCNC: 8.8 MG/DL
CALCIUM SERPL-MCNC: 9.2 MG/DL
CHLORIDE SERPL-SCNC: 105 MMOL/L
CHLORIDE SERPL-SCNC: 108 MMOL/L
CHLORIDE SERPL-SCNC: 111 MMOL/L
CO2 SERPL-SCNC: 20 MMOL/L
CO2 SERPL-SCNC: 20 MMOL/L
CO2 SERPL-SCNC: 21 MMOL/L
CO2 SERPL-SCNC: 22 MMOL/L
CO2 SERPL-SCNC: 23 MMOL/L
CREAT SERPL-MCNC: 3.3 MG/DL
CREAT SERPL-MCNC: 3.6 MG/DL
CREAT SERPL-MCNC: 3.7 MG/DL
CREAT SERPL-MCNC: 3.8 MG/DL
CREAT SERPL-MCNC: 4 MG/DL
EST. GFR  (AFRICAN AMERICAN): 20.6 ML/MIN/1.73 M^2
EST. GFR  (AFRICAN AMERICAN): 21.9 ML/MIN/1.73 M^2
EST. GFR  (AFRICAN AMERICAN): 22.6 ML/MIN/1.73 M^2
EST. GFR  (AFRICAN AMERICAN): 23.4 ML/MIN/1.73 M^2
EST. GFR  (AFRICAN AMERICAN): 25.9 ML/MIN/1.73 M^2
EST. GFR  (NON AFRICAN AMERICAN): 17.8 ML/MIN/1.73 M^2
EST. GFR  (NON AFRICAN AMERICAN): 18.9 ML/MIN/1.73 M^2
EST. GFR  (NON AFRICAN AMERICAN): 19.5 ML/MIN/1.73 M^2
EST. GFR  (NON AFRICAN AMERICAN): 20.2 ML/MIN/1.73 M^2
EST. GFR  (NON AFRICAN AMERICAN): 22.4 ML/MIN/1.73 M^2
GLUCOSE SERPL-MCNC: 104 MG/DL
GLUCOSE SERPL-MCNC: 118 MG/DL
GLUCOSE SERPL-MCNC: 127 MG/DL
GLUCOSE SERPL-MCNC: 131 MG/DL
GLUCOSE SERPL-MCNC: 181 MG/DL (ref 70–110)
GLUCOSE SERPL-MCNC: 98 MG/DL
HCO3 UR-SCNC: 23.5 MMOL/L (ref 24–28)
HCT VFR BLD AUTO: 16.3 %
HCT VFR BLD AUTO: 16.9 %
HCT VFR BLD AUTO: 21.1 %
HCT VFR BLD AUTO: 21.2 %
HCT VFR BLD AUTO: 22.5 %
HCT VFR BLD AUTO: 24.4 %
HCT VFR BLD CALC: 18 %PCV (ref 36–54)
HGB BLD-MCNC: 5.7 G/DL
HGB BLD-MCNC: 5.8 G/DL
HGB BLD-MCNC: 7.1 G/DL
HGB BLD-MCNC: 7.2 G/DL
HGB BLD-MCNC: 7.8 G/DL
HGB BLD-MCNC: 8 G/DL
INR PPP: 1.1
MAGNESIUM SERPL-MCNC: 1.3 MG/DL
PCO2 BLDA: 44 MMHG (ref 35–45)
PH SMN: 7.33 [PH] (ref 7.35–7.45)
PHOSPHATE SERPL-MCNC: 3.4 MG/DL
PO2 BLDA: 402 MMHG (ref 80–100)
POC BE: -2 MMOL/L
POC IONIZED CALCIUM: 1.3 MMOL/L (ref 1.06–1.42)
POC SATURATED O2: 100 % (ref 95–100)
POC TCO2: 25 MMOL/L (ref 23–27)
POCT GLUCOSE: 107 MG/DL (ref 70–110)
POCT GLUCOSE: 126 MG/DL (ref 70–110)
POCT GLUCOSE: 148 MG/DL (ref 70–110)
POTASSIUM BLD-SCNC: 4.5 MMOL/L (ref 3.5–5.1)
POTASSIUM SERPL-SCNC: 4.2 MMOL/L
POTASSIUM SERPL-SCNC: 4.5 MMOL/L
POTASSIUM SERPL-SCNC: 4.5 MMOL/L
POTASSIUM SERPL-SCNC: 4.7 MMOL/L
POTASSIUM SERPL-SCNC: 5.1 MMOL/L
PROTHROMBIN TIME: 11.2 SEC
SAMPLE: ABNORMAL
SODIUM BLD-SCNC: 134 MMOL/L (ref 136–145)
SODIUM SERPL-SCNC: 136 MMOL/L
SODIUM SERPL-SCNC: 136 MMOL/L
SODIUM SERPL-SCNC: 137 MMOL/L
SODIUM SERPL-SCNC: 137 MMOL/L
SODIUM SERPL-SCNC: 139 MMOL/L
TACROLIMUS BLD-MCNC: 6.4 NG/ML

## 2017-09-10 PROCEDURE — 25000003 PHARM REV CODE 250: Performed by: NURSE PRACTITIONER

## 2017-09-10 PROCEDURE — 25000003 PHARM REV CODE 250: Performed by: NURSE ANESTHETIST, CERTIFIED REGISTERED

## 2017-09-10 PROCEDURE — 25000003 PHARM REV CODE 250: Performed by: PHYSICIAN ASSISTANT

## 2017-09-10 PROCEDURE — 85014 HEMATOCRIT: CPT | Mod: 91

## 2017-09-10 PROCEDURE — 80048 BASIC METABOLIC PNL TOTAL CA: CPT

## 2017-09-10 PROCEDURE — 0WCG0ZZ EXTIRPATION OF MATTER FROM PERITONEAL CAVITY, OPEN APPROACH: ICD-10-PCS | Performed by: TRANSPLANT SURGERY

## 2017-09-10 PROCEDURE — 94760 N-INVAS EAR/PLS OXIMETRY 1: CPT

## 2017-09-10 PROCEDURE — A4216 STERILE WATER/SALINE, 10 ML: HCPCS | Performed by: NURSE PRACTITIONER

## 2017-09-10 PROCEDURE — 63600175 PHARM REV CODE 636 W HCPCS: Performed by: TRANSPLANT SURGERY

## 2017-09-10 PROCEDURE — 63600175 PHARM REV CODE 636 W HCPCS: Performed by: PEDIATRICS

## 2017-09-10 PROCEDURE — 63600175 PHARM REV CODE 636 W HCPCS: Performed by: INTERNAL MEDICINE

## 2017-09-10 PROCEDURE — 85610 PROTHROMBIN TIME: CPT

## 2017-09-10 PROCEDURE — 25000003 PHARM REV CODE 250: Performed by: STUDENT IN AN ORGANIZED HEALTH CARE EDUCATION/TRAINING PROGRAM

## 2017-09-10 PROCEDURE — 63600175 PHARM REV CODE 636 W HCPCS: Performed by: PHYSICIAN ASSISTANT

## 2017-09-10 PROCEDURE — 85014 HEMATOCRIT: CPT

## 2017-09-10 PROCEDURE — 83735 ASSAY OF MAGNESIUM: CPT

## 2017-09-10 PROCEDURE — 87075 CULTR BACTERIA EXCEPT BLOOD: CPT

## 2017-09-10 PROCEDURE — 36430 TRANSFUSION BLD/BLD COMPNT: CPT

## 2017-09-10 PROCEDURE — 36415 COLL VENOUS BLD VENIPUNCTURE: CPT

## 2017-09-10 PROCEDURE — 80048 BASIC METABOLIC PNL TOTAL CA: CPT | Mod: 91

## 2017-09-10 PROCEDURE — 0W3G0ZZ CONTROL BLEEDING IN PERITONEAL CAVITY, OPEN APPROACH: ICD-10-PCS | Performed by: TRANSPLANT SURGERY

## 2017-09-10 PROCEDURE — 87070 CULTURE OTHR SPECIMN AEROBIC: CPT

## 2017-09-10 PROCEDURE — 25000003 PHARM REV CODE 250: Performed by: PEDIATRICS

## 2017-09-10 PROCEDURE — 25000003 PHARM REV CODE 250: Performed by: TRANSPLANT SURGERY

## 2017-09-10 PROCEDURE — 85018 HEMOGLOBIN: CPT | Mod: 91

## 2017-09-10 PROCEDURE — 80197 ASSAY OF TACROLIMUS: CPT

## 2017-09-10 PROCEDURE — 85025 COMPLETE CBC W/AUTO DIFF WBC: CPT

## 2017-09-10 PROCEDURE — P9021 RED BLOOD CELLS UNIT: HCPCS

## 2017-09-10 PROCEDURE — 63600175 PHARM REV CODE 636 W HCPCS: Performed by: NURSE ANESTHETIST, CERTIFIED REGISTERED

## 2017-09-10 PROCEDURE — 85730 THROMBOPLASTIN TIME PARTIAL: CPT

## 2017-09-10 PROCEDURE — 87102 FUNGUS ISOLATION CULTURE: CPT

## 2017-09-10 PROCEDURE — 80069 RENAL FUNCTION PANEL: CPT

## 2017-09-10 PROCEDURE — 63600175 PHARM REV CODE 636 W HCPCS: Performed by: STUDENT IN AN ORGANIZED HEALTH CARE EDUCATION/TRAINING PROGRAM

## 2017-09-10 PROCEDURE — 99233 SBSQ HOSP IP/OBS HIGH 50: CPT | Mod: ,,, | Performed by: INTERNAL MEDICINE

## 2017-09-10 PROCEDURE — 20600001 HC STEP DOWN PRIVATE ROOM

## 2017-09-10 RX ORDER — NEOSTIGMINE METHYLSULFATE 1 MG/ML
INJECTION, SOLUTION INTRAVENOUS
Status: DISCONTINUED | OUTPATIENT
Start: 2017-09-10 | End: 2017-09-10

## 2017-09-10 RX ORDER — DIPHENHYDRAMINE HCL 25 MG
25 CAPSULE ORAL EVERY 6 HOURS PRN
Status: DISCONTINUED | OUTPATIENT
Start: 2017-09-10 | End: 2017-09-14 | Stop reason: HOSPADM

## 2017-09-10 RX ORDER — HYDROCODONE BITARTRATE AND ACETAMINOPHEN 500; 5 MG/1; MG/1
TABLET ORAL
Status: DISCONTINUED | OUTPATIENT
Start: 2017-09-10 | End: 2017-09-12

## 2017-09-10 RX ORDER — HYDROMORPHONE HYDROCHLORIDE 1 MG/ML
0.5 INJECTION, SOLUTION INTRAMUSCULAR; INTRAVENOUS; SUBCUTANEOUS ONCE
Status: COMPLETED | OUTPATIENT
Start: 2017-09-10 | End: 2017-09-10

## 2017-09-10 RX ORDER — SODIUM CHLORIDE 9 MG/ML
INJECTION, SOLUTION INTRAVENOUS CONTINUOUS
Status: DISCONTINUED | OUTPATIENT
Start: 2017-09-10 | End: 2017-09-11

## 2017-09-10 RX ORDER — DIPHENHYDRAMINE HCL 25 MG
25 CAPSULE ORAL EVERY 6 HOURS PRN
Status: DISCONTINUED | OUTPATIENT
Start: 2017-09-10 | End: 2017-09-10

## 2017-09-10 RX ORDER — DESMOPRESSIN ACETATE 4 UG/ML
1 INJECTION, SOLUTION INTRAVENOUS; SUBCUTANEOUS ONCE
Status: COMPLETED | OUTPATIENT
Start: 2017-09-10 | End: 2017-09-10

## 2017-09-10 RX ORDER — GLYCOPYRROLATE 0.2 MG/ML
INJECTION INTRAMUSCULAR; INTRAVENOUS
Status: DISCONTINUED | OUTPATIENT
Start: 2017-09-10 | End: 2017-09-10

## 2017-09-10 RX ORDER — HYDROMORPHONE HYDROCHLORIDE 1 MG/ML
0.5 INJECTION, SOLUTION INTRAMUSCULAR; INTRAVENOUS; SUBCUTANEOUS EVERY 6 HOURS PRN
Status: DISCONTINUED | OUTPATIENT
Start: 2017-09-10 | End: 2017-09-14 | Stop reason: HOSPADM

## 2017-09-10 RX ORDER — DIPHENHYDRAMINE HCL 25 MG
25 CAPSULE ORAL EVERY 4 HOURS PRN
Status: DISCONTINUED | OUTPATIENT
Start: 2017-09-10 | End: 2017-09-10

## 2017-09-10 RX ORDER — SODIUM CHLORIDE 0.9 % (FLUSH) 0.9 %
3 SYRINGE (ML) INJECTION
Status: DISCONTINUED | OUTPATIENT
Start: 2017-09-10 | End: 2017-09-10 | Stop reason: HOSPADM

## 2017-09-10 RX ORDER — FUROSEMIDE 10 MG/ML
80 INJECTION INTRAMUSCULAR; INTRAVENOUS ONCE
Status: COMPLETED | OUTPATIENT
Start: 2017-09-10 | End: 2017-09-10

## 2017-09-10 RX ADMIN — THERA TABS 1 TABLET: TAB at 08:09

## 2017-09-10 RX ADMIN — DOCUSATE SODIUM 100 MG: 50 CAPSULE, LIQUID FILLED ORAL at 02:09

## 2017-09-10 RX ADMIN — Medication 100 MG: at 08:09

## 2017-09-10 RX ADMIN — OXYCODONE HYDROCHLORIDE AND ACETAMINOPHEN 1 TABLET: 10; 325 TABLET ORAL at 01:09

## 2017-09-10 RX ADMIN — DOCUSATE SODIUM 100 MG: 50 CAPSULE, LIQUID FILLED ORAL at 08:09

## 2017-09-10 RX ADMIN — NYSTATIN 500000 UNITS: 500000 SUSPENSION ORAL at 06:09

## 2017-09-10 RX ADMIN — DOLUTEGRAVIR SODIUM 50 MG: 50 TABLET, FILM COATED ORAL at 08:09

## 2017-09-10 RX ADMIN — SODIUM CHLORIDE 1.5 G: 9 INJECTION, SOLUTION INTRAVENOUS at 08:09

## 2017-09-10 RX ADMIN — GLYCOPYRROLATE 0.4 MG: 0.2 INJECTION, SOLUTION INTRAMUSCULAR; INTRAVENOUS at 12:09

## 2017-09-10 RX ADMIN — ROSUVASTATIN CALCIUM 20 MG: 10 TABLET ORAL at 08:09

## 2017-09-10 RX ADMIN — NEOSTIGMINE METHYLSULFATE 5 MG: 1 INJECTION INTRAVENOUS at 12:09

## 2017-09-10 RX ADMIN — DESMOPRESSIN ACETATE 1 MCG: 4 SOLUTION INTRAVENOUS at 08:09

## 2017-09-10 RX ADMIN — Medication 3 ML: at 10:09

## 2017-09-10 RX ADMIN — MYCOPHENOLATE MOFETIL 1000 MG: 250 CAPSULE ORAL at 08:09

## 2017-09-10 RX ADMIN — OXYCODONE HYDROCHLORIDE AND ACETAMINOPHEN 1 TABLET: 10; 325 TABLET ORAL at 07:09

## 2017-09-10 RX ADMIN — PREDNISONE 20 MG: 20 TABLET ORAL at 08:09

## 2017-09-10 RX ADMIN — SODIUM BICARBONATE 650 MG TABLET 1300 MG: at 08:09

## 2017-09-10 RX ADMIN — OXYCODONE HYDROCHLORIDE AND ACETAMINOPHEN 1 TABLET: 10; 325 TABLET ORAL at 03:09

## 2017-09-10 RX ADMIN — ONDANSETRON 4 MG: 2 INJECTION INTRAMUSCULAR; INTRAVENOUS at 07:09

## 2017-09-10 RX ADMIN — NYSTATIN 500000 UNITS: 500000 SUSPENSION ORAL at 02:09

## 2017-09-10 RX ADMIN — ONDANSETRON 4 MG: 2 INJECTION INTRAMUSCULAR; INTRAVENOUS at 12:09

## 2017-09-10 RX ADMIN — HYDROMORPHONE HYDROCHLORIDE 0.5 MG: 1 INJECTION, SOLUTION INTRAMUSCULAR; INTRAVENOUS; SUBCUTANEOUS at 03:09

## 2017-09-10 RX ADMIN — NYSTATIN 500000 UNITS: 500000 SUSPENSION ORAL at 08:09

## 2017-09-10 RX ADMIN — CISATRACURIUM BESYLATE 2 MG: 10 INJECTION INTRAVENOUS at 12:09

## 2017-09-10 RX ADMIN — SODIUM CHLORIDE 1.5 G: 9 INJECTION, SOLUTION INTRAVENOUS at 02:09

## 2017-09-10 RX ADMIN — FUROSEMIDE 80 MG: 10 INJECTION, SOLUTION INTRAVENOUS at 11:09

## 2017-09-10 RX ADMIN — SIMETHICONE CHEW TAB 80 MG 80 MG: 80 TABLET ORAL at 03:09

## 2017-09-10 RX ADMIN — Medication 3 ML: at 02:09

## 2017-09-10 RX ADMIN — ONDANSETRON 4 MG: 2 INJECTION INTRAMUSCULAR; INTRAVENOUS at 08:09

## 2017-09-10 RX ADMIN — FAMOTIDINE 20 MG: 20 TABLET, FILM COATED ORAL at 08:09

## 2017-09-10 RX ADMIN — FENTANYL CITRATE 50 MCG: 50 INJECTION, SOLUTION INTRAMUSCULAR; INTRAVENOUS at 12:09

## 2017-09-10 RX ADMIN — HYDROMORPHONE HYDROCHLORIDE 0.5 MG: 1 INJECTION, SOLUTION INTRAMUSCULAR; INTRAVENOUS; SUBCUTANEOUS at 11:09

## 2017-09-10 RX ADMIN — SODIUM CHLORIDE: 0.9 INJECTION, SOLUTION INTRAVENOUS at 01:09

## 2017-09-10 RX ADMIN — SODIUM CHLORIDE 1.5 G: 9 INJECTION, SOLUTION INTRAVENOUS at 01:09

## 2017-09-10 RX ADMIN — SODIUM CHLORIDE: 0.9 INJECTION, SOLUTION INTRAVENOUS at 12:09

## 2017-09-10 RX ADMIN — CISATRACURIUM BESYLATE 4 MG: 10 INJECTION INTRAVENOUS at 12:09

## 2017-09-10 RX ADMIN — HYDROMORPHONE HYDROCHLORIDE 0.5 MG: 1 INJECTION, SOLUTION INTRAMUSCULAR; INTRAVENOUS; SUBCUTANEOUS at 07:09

## 2017-09-10 RX ADMIN — TACROLIMUS 6 MG: 5 CAPSULE ORAL at 06:09

## 2017-09-10 RX ADMIN — TACROLIMUS 7 MG: 5 CAPSULE ORAL at 08:09

## 2017-09-10 RX ADMIN — DIPHENHYDRAMINE HYDROCHLORIDE 25 MG: 25 CAPSULE ORAL at 06:09

## 2017-09-10 NOTE — NURSING TRANSFER
Nursing Transfer Note      9/10/2017     Transfer To: [id number]    Transfer via stretcher    Transfer with cardiac monitoring    Transported by RN x2    Medicines sent: PRBC, MIVF    Chart send with patient: Yes    Notified:family in room     Patient reassessed at: 9/10/17 0308

## 2017-09-10 NOTE — TRANSFER OF CARE
"Anesthesia Transfer of Care Note    Patient: Demetrio Aguiar    Procedure(s) Performed: Procedure(s) (LRB):  EXPLORATORY-LAPAROTOMY retroperitoneal washout (N/A)    Patient location: PACU    Anesthesia Type: general    Transport from OR: Transported from OR on 6-10 L/min O2 by face mask with adequate spontaneous ventilation    Post pain: adequate analgesia    Post assessment: no apparent anesthetic complications and tolerated procedure well    Post vital signs: stable    Level of consciousness: sedated    Nausea/Vomiting: no nausea/vomiting    Complications: none    Transfer of care protocol was followed      Last vitals:   Visit Vitals  /62   Pulse 85   Temp 36.8 °C (98.2 °F) (Axillary)   Resp 17   Ht 5' 7" (1.702 m)   Wt 66.7 kg (147 lb 0.8 oz)   SpO2 100%   BMI 23.03 kg/m²     "

## 2017-09-10 NOTE — PROGRESS NOTES
Notified transplant resident of pt;s H/H 7.7/22.4 at from 1851 draw- MD ordered to recheck in 2 hrs.

## 2017-09-10 NOTE — SUBJECTIVE & OBJECTIVE
Subjective:     Demetrio Aguiar is a 37 y.o. 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 His maintenance immunosuppression includes a steroid taper per protocol to 5mg daily, Prograf, and Cellcept maintenance. His post op course has been complicated by DGF requiring HD (last HD 8/28/17) which prompted need for kidney biopsy on 8/25/17. 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 Biopsy 8/25/17: good sample with 24 glomeruli (only 1 sclerosed) and evidence of criteria for ABMR. + diffuse ATI; Minimal fibrosis, no ACR or AVR, but + CD4 (>50% diffusely + stain) with mild glomerulitis and focal peritubular capillaritis; also, biopsy shows ca-oxalate (3) and ca-phos (2) crystals. Since transplant, he has made minimal uop daily. He denies any current illnesses or sick contacts. Complains of expected post-op pain to RLQ incision. Pt treated with PLEX x6, SMP x3, and IVIG x2. Kidney function improved with tx.    Interval History: Cr 3 from 3.3. Pt completed treatment of rejection with SMP x 3, PLEX x 6 and IVIG x 2. Use of Rituxan discussed - decision made to defer at this time, monitor with current tx, and use in future if needed. Good intake and output. RLQ inc noted with increased swelling and redness to site. Staples removed and hematoma evacuated by transplant fellow. Wound packed with wet to dry dressing.  Further increase in abdominal pain and more fullness over tx incision side, retroperitoneal hematoma suspected , USS confirmed clinical suspicion. Patient re-explored 09/09 and hematoma evacuated. No definite bleeding source found. Kidney well perfused. Post op has required 4  units blood due to low Hb <6.    History of Present Illness:  History of Present Illness:   Mr. Aguiar is a 37 y.o. year old male who is status post  donor kidney transplant on 17.  His maintenance immunosuppression consists of mycophenolate mofetil and tacrolimus.      Interval History:      Past Medical, Surgical, Family, and Social History:   Unchanged from H&P.    Review of patient's allergies indicates:  No Known Allergies    Scheduled Meds: ampicillin-sulbactam 1.5 g in sodium chloride 0.9 % 100 mL IVPB (ready to mix system)  1.5 g Intravenous Q6H    azithromycin  1,200 mg Oral Weekly    docusate sodium  100 mg Oral TID    dolutegravir  50 mg Oral Daily    epoetin erika  10,000 Units Subcutaneous Every Mon    famotidine  20 mg Oral QHS    ketoconazole  100 mg Oral Daily    multivitamin  1 tablet Oral Daily    mycophenolate  1,000 mg Oral BID    nystatin  500,000 Units Oral QID (PC + HS)    predniSONE  20 mg Oral Daily    rilpivirine  25 mg Oral Daily    rosuvastatin  20 mg Oral Daily    sodium bicarbonate  1,300 mg Oral BID    sodium chloride 0.9%  3 mL Intravenous Q8H    sulfamethoxazole-trimethoprim 400-80mg  1 tablet Oral Every Mon, Wed, Fri    tacrolimus  6 mg Oral Daily    tacrolimus  7 mg Oral Daily    valganciclovir  450 mg Oral Every Mon, Wed, Fri     Continuous Infusions:   sodium chloride 0.9% 75 mL/hr at 09/10/17 0114     PRN Meds:sodium chloride, sodium chloride, bisacodyl, dextrose 50%, dextrose 50%, EPINEPHrine, glucagon (human recombinant), glucose, glucose, HYDROmorphone, insulin aspart, ondansetron, oxycodone-acetaminophen, oxycodone-acetaminophen, simethicone    Intake/Output - Last 3 Shifts       700 -  0659 700 - 09/10 0659 09/10 07 -  0659    P.O. 1860 470 140    I.V. (mL/kg)  2447.3 (36.7)     Blood  2144.7     Other       IV Piggyback  1100     Total Intake(mL/kg) 1860 (27.9) 6161.9 (92.4) 140 (2.1)    Urine (mL/kg/hr) 2050 (1.3)  "582 (0.4) 1100 (2.3)    Emesis/NG output       Drains  170 (0.1) 110 (0.2)    Other       Stool 0 (0) 0 (0) 0 (0)    Blood       Total Output 2050 752 1210    Net -190 +5409.9 -1070           Stool Occurrence 0 x 0 x 0 x           Review of Systems   Constitutional: Negative for activity change, appetite change, chills and fever.   Respiratory: Negative for cough and shortness of breath.    Cardiovascular: Negative for chest pain and leg swelling.   Gastrointestinal: Positive for abdominal distention and abdominal pain. Negative for constipation, diarrhea and rectal pain.   Genitourinary: Negative for difficulty urinating and dysuria.   Musculoskeletal: Negative.    Skin: Negative for color change and rash.   Allergic/Immunologic: Positive for immunocompromised state.   Neurological: Negative for dizziness and light-headedness.   Psychiatric/Behavioral: Negative.    All other systems reviewed and are negative.     Objective:     Vital Signs (Most Recent):  Temp: 98.8 °F (37.1 °C) (09/10/17 1200)  Pulse: 87 (09/10/17 1400)  Resp: 18 (09/10/17 0730)  BP: (!) 153/76 (09/10/17 1400)  SpO2: 96 % (09/10/17 1400) Vital Signs (24h Range):  Temp:  [98.2 °F (36.8 °C)-99.4 °F (37.4 °C)] 98.8 °F (37.1 °C)  Pulse:  [80-99] 87  Resp:  [12-20] 18  SpO2:  [95 %-100 %] 96 %  BP: (101-153)/(62-79) 153/76     Weight: 66.7 kg (147 lb 0.8 oz)  Height: 5' 7" (170.2 cm)  Body mass index is 23.03 kg/m².    Physical Exam   Constitutional: He is oriented to person, place, and time.   Eyes: EOM are normal. Pupils are equal, round, and reactive to light.   Cardiovascular: Normal rate and regular rhythm.    Abdominal: Bowel sounds are normal. There is tenderness in the right lower quadrant. There is guarding. There is no rigidity.       Neurological: He is alert and oriented to person, place, and time.   Skin: Capillary refill takes less than 2 seconds.       Laboratory:  CBC:   Recent Labs  Lab 09/09/17  0448  09/10/17  0959   WBC 5.66  --   " --    RBC 2.78*  --   --    HGB 8.4*  < > 7.1*  7.2*   HCT 25.3*  < > 21.2*  21.1*   *  --   --    MCV 91  --   --    MCH 30.2  --   --    MCHC 33.2  --   --    < > = values in this interval not displayed.  BMP:   Recent Labs  Lab 09/10/17  0959   GLU 98  104     137   K 4.2  4.5   *  108   CO2 20*  23   BUN 43*  45*   CREATININE 3.3*  3.6*   CALCIUM 7.8*  8.3*     CMP:   Recent Labs  Lab 09/09/17  2007  09/10/17  0959   *  < > 98  104   CALCIUM 9.3  < > 7.8*  8.3*   ALBUMIN 4.1  --  3.1*   PROT 7.0  --   --    *  < > 139  137   K 5.3*  < > 4.2  4.5   CO2 21*  < > 20*  23     < > 111*  108   BUN 49*  < > 43*  45*   CREATININE 3.9*  < > 3.3*  3.6*   ALKPHOS 131  --   --    ALT 14  --   --    AST 20  --   --    BILITOT 0.9  --   --    < > = values in this interval not displayed.  Coagulation:   Recent Labs  Lab 09/10/17  0959   INR 1.1   APTT 25.1     Labs within the past 24 hours have been reviewed.    Diagnostic Results:  Labs: Reviewed  and acted upon

## 2017-09-10 NOTE — PROGRESS NOTES
Pt is AAOx3. VS WNL. Pain well controlled at this time. TEDs and SCD's donned. NS @ 100ml/hr infusing to R midline. L upper graft - -. R thigh graft + +. Pt shifted for K of 5.3. Dressing to R lower ABD CDI. ABD soft but tender. R flank hard and tender. Blood products on call to the OR. Consents signed. Awaiting MD arrival. Will continue to monitor.

## 2017-09-10 NOTE — ASSESSMENT & PLAN NOTE
- Acute blood loss since 9/8/17 when hematoma evacuated from wound.  - Re-explored for bleeding 9/9/17.  - H&H stable. Monitor q12 hrs.

## 2017-09-10 NOTE — ANESTHESIA PROCEDURE NOTES
Arterial    Diagnosis: abd bleeding  Doctor requesting consult: Umm    Patient location during procedure: done in OR  Procedure start time: 9/9/2017 10:57 PM  Timeout: 9/9/2017 10:55 PM  Staffing  Resident/CRNA: BRITTANY DODGE  Performed: resident/CRNA   Anesthesiologist was present at the time of the procedure.  Preanesthetic Checklist  Completed: patient identified, site marked, surgical consent, pre-op evaluation, timeout performed, IV checked, risks and benefits discussed, monitors and equipment checked and anesthesia consent givenArterial  Skin Prep: chlorhexidine gluconate  Local Infiltration: none  Orientation: right  Location: radial  Catheter Size: 20 G  Catheter placement by Anatomical landmarks. Heme positive aspiration all ports.Insertion Attempts: 1  Assessment  Dressing: secured with tape and tegaderm  Patient: Tolerated well

## 2017-09-10 NOTE — ANESTHESIA RELEASE NOTE
"Anesthesia Release from PACU Note    Patient: Demetrio Aguiar    Procedure(s) Performed: Procedure(s) (LRB):  EXPLORATORY-LAPAROTOMY retroperitoneal washout (N/A)    Anesthesia type: general    Post pain: Adequate analgesia    Post assessment: no apparent anesthetic complications    Last Vitals:   Visit Vitals  /62   Pulse 91   Temp 37.1 °C (98.8 °F) (Oral)   Resp 18   Ht 5' 7" (1.702 m)   Wt 66.7 kg (147 lb 0.8 oz)   SpO2 100%   BMI 23.03 kg/m²       Post vital signs: stable    Level of consciousness: awake, alert  and oriented    Nausea/Vomiting: no nausea/no vomiting    Complications: none    Airway Patency: patent    Respiratory: unassisted, spontaneous ventilation, room air    Cardiovascular: stable and blood pressure at baseline    Hydration: euvolemic  "

## 2017-09-10 NOTE — ANESTHESIA POSTPROCEDURE EVALUATION
"Anesthesia Post Evaluation    Patient: Demetrio Aguiar    Procedure(s) Performed: Procedure(s) (LRB):  EXPLORATORY-LAPAROTOMY retroperitoneal washout (N/A)    Final Anesthesia Type: general  Patient location during evaluation: PACU  Patient participation: Yes- Able to Participate  Level of consciousness: awake and alert and oriented  Post-procedure vital signs: reviewed and stable  Pain management: adequate  Airway patency: patent  PONV status at discharge: No PONV  Anesthetic complications: no      Cardiovascular status: blood pressure returned to baseline and hemodynamically stable  Respiratory status: unassisted, spontaneous ventilation and room air  Hydration status: euvolemic  Follow-up not needed.        Visit Vitals  /62   Pulse 91   Temp 37.1 °C (98.8 °F) (Oral)   Resp 18   Ht 5' 7" (1.702 m)   Wt 66.7 kg (147 lb 0.8 oz)   SpO2 100%   BMI 23.03 kg/m²       Pain/Johanna Score: Pain Assessment Performed: Yes (9/10/2017  3:00 AM)  Presence of Pain: denies (9/10/2017  3:00 AM)  Pain Rating Prior to Med Admin: 7 (9/10/2017  1:39 AM)  Pain Rating Post Med Admin: 2 (9/9/2017  9:41 PM)  Johanna Score: 9 (9/10/2017  3:00 AM)      "

## 2017-09-10 NOTE — OP NOTE
Operative Report    Date of Procedure: 9/09/2017    Surgeons:  Surgeon(s) and Role:     * Wesley Cisneros MD - Primary     * Fan Chan MD - Fellow      Pre-operative Diagnosis: Retroperitoneal hematoma s/p kidney biopsy   Post-operative Diagnosis: Same    Procedure(s) Performed:   Exploration and evacuation of retroperitoneal hematoma and obtaining hemostasis in the field    Anesthesia: General endotracheal    Preamble  Indications and Patient Counseling: Pt POD 22 from transplant and over a week out from kidney biopsy- now with  large retroperitoneal hematoma - with significant drop in function of transplanted kidney and rise in creat- HD stable - but given decrease in kidney function likely 2/2 compressive effects of hematoma- we will proceed with ex-lap and evacuation of the same.      Time-Out: A complete time out was carried out prior to incision, with confirmation of patient identity, correct procedure, correct operative site, appropriate antibiotic prophylaxis, review of any known allergies, and presence of all needed equipment.      Estimated Blood Loss: 1 liter of clotted blood evacuated  Fluids Administered:  2 L crystalooid, 1 pk of platelets  Drains:Single 15 F suzy - deep to the fascia  Specimens: nil    Procedure in Detail  Patient was brought to the OR - induced by anesthesia  And prepped and draped in usual sterile fashion. Time out was taken. The incision had already been partially opened on the floor and this was opened to the full extent of the incision. There was no bleeding from any part of this subcut area- The fascia was then opened and a large clot was immediately noticed lateral and cephalad to the superior pole of the kidney- This was evacuated in entirety and the field copiosly irrigated. There was no obvious bleeding from this lateral aspect of the kidney and upper pole - and there was no obvious bleeding from the muscle either. There was some blood that accumulated cephalad to the  kidney but again we did not identify a single obvious source for the amount of clot that we found.We did not attempt to identify the hilum and ureter or lower pole as there were significant adhesions that were noted in these areas and we also did not notice any bleeding from these areas. We used hemostatic agents including fibrillar, nu-knit and a combination of argon and electrocautery and repeated packing to achieve good hemostasis by the end of the case.  Once we were satisfied with our hemostasis we left a 15 F suzy drain and closed the fascia above it. Fascia was closed with #1prolene and in running fashion using multiple interrupted sutures as well. Of note the quality of the fascia was extremely poor.   Importantly, the kidney appeared pink and well perfused.  Patient was stable through out the case and after evacuation of the hematoma - the UOP picked up with the patient making 75 cc intraop in little less than2 hours.  The skin was then closed with staples and patient extubated and left the OR in stable condition

## 2017-09-10 NOTE — PROGRESS NOTES
Pt states 10/10 pain. Moist drainage noted to telfa island dressing. JESSICA to R lower bloody drainage noted. NS @ 75 ml/hr. Fist unit of PRBC infusing. Will continue to monitor.

## 2017-09-10 NOTE — NURSING
Notified Dr. Fan Chan of 1630 H/H of 7.8/ 22.5 after the transfusion of 1 unit of PRBCs. MD states he will continue to monitor and will hold off on transfusing any further blood products at this time. States OK to continue [id number] H/H and BMP. Will continue to monitor patient. YEE

## 2017-09-10 NOTE — ANESTHESIA PREPROCEDURE EVALUATION
2017  Pre-operative evaluation for Procedure(s) (LRB):  EXPLORATORY-LAPAROTOMY retroperitoneal washout (N/A)    Demetrio Aguiar is a 37 y.o. male     Prev airway:   Method of Intubation: Direct laryngoscopy; Inserted by: CRNA; Airway Device: Endotracheal Tube; Mask Ventilation: Easy; Intubated: Postinduction; Blade: Loera #2; Airway Device Size: 7.5; Style: Cuffed; Cuff Inflation: Minimal occlusive pressure; Inflation Amount: 4; Placement Verified By: Auscultation, Capnometry; Grade: Grade I; Complicating Factors: None; Intubation Findings: Positive EtCO2, Bilateral breath sounds, Atraumatic/Condition of teeth unchanged;  Depth of Insertion: 21; Securment: Lips; Complications: None    Patient Active Problem List   Diagnosis    Hyperlipidemia    Anemia in ESRD (end-stage renal disease)    TB lung, latent    HIV (human immunodeficiency virus infection)    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      Current Outpatient Prescriptions on File Prior to Encounter   Medication Sig Dispense Refill    bisacodyl (DULCOLAX) 5 mg EC tablet Take 1-2 tablets (5-10 mg total) by mouth daily as needed for Constipation. Laxative 30 tablet 3    cinacalcet (SENSIPAR) 60 MG Tab Take 1 tablet (60 mg total) by mouth every other day. 15 tablet 1    docusate sodium (COLACE) 100 MG capsule Take 1 capsule (100 mg total) by mouth 3 (three) times daily  "as needed for Constipation. 100 capsule 0    famotidine (PEPCID) 20 MG tablet Take 1 tablet (20 mg total) by mouth every evening. 30 tablet 2    ketoconazole (NIZORAL) 200 mg Tab Take 0.5 tablets (100 mg total) by mouth once daily. 15 tablet 5    multivitamin (THERAGRAN) tablet Take 1 tablet by mouth once daily.      mycophenolate (CELLCEPT) 250 mg Cap Take 4 capsules (1,000 mg total) by mouth 2 (two) times daily. Kidney txp 8/18/17; Z94.0 240 capsule 11    oxycodone-acetaminophen (PERCOCET)  mg per tablet Take 0.5-1 tablets by mouth every 4 (four) hours as needed for Pain. 40 tablet 0    rosuvastatin (CRESTOR) 20 MG tablet Take 20 mg by mouth once daily.      sulfamethoxazole-trimethoprim 400-80mg (BACTRIM,SEPTRA) 400-80 mg per tablet Take 1 tablet by mouth every Mon, Wed, Fri. 30 tablet 11    tacrolimus (PROGRAF) 1 MG Cap Take 6 capsules (6 mg total) by mouth every 12 (twelve) hours. 360 capsule 11    valganciclovir (VALCYTE) 450 mg Tab Take 1 tablet (450 mg total) by mouth every Mon, Wed, Fri. Give after HD; Stop 11/19/17 12 tablet 2       Past Surgical History:   Procedure Laterality Date    EXPLORATORY LAPAROTOMY W/ BOWEL RESECTION      NO BOWEL RESECTION, UNKNOWN DETAILS, "Pancreas Infection"    peritoneal dialysis catheter placement         Social History     Social History    Marital status: Single     Spouse name: N/A    Number of children: N/A    Years of education: N/A     Occupational History    Not on file.     Social History Main Topics    Smoking status: Former Smoker     Packs/day: 0.50    Smokeless tobacco: Not on file      Comment: quit smoking 5 years ago    Alcohol use No    Drug use:      Types: Cocaine, Marijuana      Comment: last use for cocaine at age 17; 2 years ago last use for THC    Sexual activity: Not Currently     Other Topics Concern    Not on file     Social History Narrative    No narrative on file         Vital Signs Range (Last 24H):  Temp:  [36.6 °C " (97.8 °F)-37.3 °C (99.2 °F)]   Pulse:  [74-98]   Resp:  [16-22]   BP: ()/(50-78)   SpO2:  [97 %-100 %]       CBC:   Recent Labs      09/08/17   1439  09/09/17 0448 09/09/17 1851 09/09/17 2007   WBC  5.48  5.66   --    --    --    RBC  2.79*  2.78*   --    --    --    HGB  8.5*  8.4*   < >  7.7*  8.1*   HCT  25.5*  25.3*   < >  22.4*  23.8*   PLT  92*  114*   --    --    --    MCV  91  91   --    --    --    MCH  30.5  30.2   --    --    --    MCHC  33.3  33.2   --    --    --     < > = values in this interval not displayed.       CMP:   Recent Labs      09/09/17 0448 09/09/17 1851 09/09/17 2007   NA  133*  132*  132*   K  3.8  6.2*  5.3*   CL  100  101  100   CO2  21*  21*  21*   BUN  46*  49*  49*   CREATININE  3.0*  3.9*  3.9*   GLU  111*  154*  148*   MG  2.1   --   1.9   PHOS  3.5   --   4.3   CALCIUM  9.7  9.2  9.3   ALBUMIN  4.5   --   4.1   PROT   --    --   7.0   ALKPHOS   --    --   131   ALT   --    --   14   AST   --    --   20   BILITOT   --    --   0.9       INR  Recent Labs      09/09/17   1120   INR  1.0   APTT  25.0       Diagnostic Studies:      EKG:  Normal sinus rhythm  Possible Left atrial enlargement  Incomplete right bundle branch block  Borderline Abnormal ECG  When compared with ECG of 18-AUG-2017 14:41,  No significant change was found    2D Echo:    Anesthesia Evaluation    I have reviewed the Patient Summary Reports.    I have reviewed the Nursing Notes.   I have reviewed the Medications.     Review of Systems  Anesthesia Hx:  No problems with previous Anesthesia  History of prior surgery of interest to airway management or planning: Denies Family Hx of Anesthesia complications.   Denies Personal Hx of Anesthesia complications.   Hematology/Oncology:     Oncology Normal    -- Anemia: Hematology Comments: HIV    EENT/Dental:EENT/Dental Normal   Cardiovascular:  Cardiovascular Normal  Denies MI.    Denies Angina. Walks 4 miles without difficulty   Pulmonary:  Pulmonary  Normal    Renal/:   Chronic Renal Disease, ESRD, Dialysis S/p transplant -  treated with PLEX x6, SMP x3, and IVIG x2 d/t concern of rejection. Kidney function improving with tx.   Hepatic/GI:  Hepatic/GI Normal    Musculoskeletal:  Musculoskeletal Normal    Neurological:  Neurology Normal    Endocrine:  Endocrine Normal    Psych:  Psychiatric Normal           Physical Exam  General:  Well nourished    Airway/Jaw/Neck:  Airway Findings: Mouth Opening: Normal Tongue: Normal  Mallampati: III  Improves to II with phonation.  TM Distance: Normal, at least 6 cm  Jaw/Neck Findings:  Neck ROM: Normal ROM      Dental:  Dental Findings: In tact    Chest/Lungs:  Chest/Lungs Findings: Clear to auscultation, Normal Respiratory Rate     Heart/Vascular:  Heart Findings: Rate: Normal  Rhythm: Regular Rhythm        Mental Status:  Mental Status Findings:  Cooperative, Alert and Oriented         Anesthesia Plan  Type of Anesthesia, risks & benefits discussed:  Anesthesia Type:  general  Patient's Preference:   Intra-op Monitoring Plan: arterial line and standard ASA monitors  Intra-op Monitoring Plan Comments:   Post Op Pain Control Plan: per primary service following discharge from PACU and IV/PO Opioids PRN  Post Op Pain Control Plan Comments:   Induction:   IV  Beta Blocker:  Patient is not currently on a Beta-Blocker (No further documentation required).       Informed Consent: Patient understands risks and agrees with Anesthesia plan.  Questions answered. Anesthesia consent signed with patient.  ASA Score: 4  emergent   Day of Surgery Review of History & Physical:    H&P update referred to the surgeon.         Ready For Surgery From Anesthesia Perspective.

## 2017-09-10 NOTE — PROGRESS NOTES
Pt POD 22 from transplant and over a week out from kidney biopsy- now with  large retroperitoneal hematoma - with significant drop in function of transplanted kidney and rise in creat- HD stable - but given decrease in kidney function likely 2/2 compressive effects of hematoma- we will proceed with ex-lap and evacuation of the same.  Patient and family was informed of the risks of the procedure.

## 2017-09-10 NOTE — PROGRESS NOTES
[facility]  Kidney Transplant  Progress Note      Reason for Follow-up: Reassessment of Kidney Transplant - 2017  (#1) recipient and management of immunosuppression.    ORGAN:  RIGHT KIDNEY   Donor Type:   - Brain Death   PHS Increased Risk: yes         Subjective:     Demetrio Aguiar is a 37 y.o. 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 His maintenance immunosuppression includes a steroid taper per protocol to 5mg daily, Prograf, and Cellcept maintenance. His post op course has been complicated by DGF requiring HD (last HD 17) which prompted need for kidney biopsy on 17. He was then admitted for biopsy proven ABMR with weakly positive class I DSA's on 17. Biopsy 17: good sample with 24 glomeruli (only 1 sclerosed) and evidence of criteria for ABMR. + diffuse ATI; Minimal fibrosis, no ACR or AVR, but + CD4 (>50% diffusely + stain) with mild glomerulitis and focal peritubular capillaritis; also, biopsy shows ca-oxalate (3) and ca-phos (2) crystals. Since transplant, he has made minimal uop daily. He denies any current illnesses or sick contacts. Complains of expected post-op pain to RLQ incision. Pt treated with PLEX x6, SMP x3, and IVIG x2. Kidney function improved with tx.    Interval History: Cr 3 from 3.3. Pt completed treatment of rejection with SMP x 3, PLEX x 6 and IVIG x 2. Use of Rituxan discussed - decision made to defer at this time, monitor with current tx, and use in future if needed. Good intake and output. RLQ inc noted with increased swelling and redness to site. Staples removed and hematoma evacuated by transplant fellow. Wound packed with wet to dry  dressing.  Further increase in abdominal pain and more fullness over tx incision side, retroperitoneal hematoma suspected , USS confirmed clinical suspicion. Patient re-explored  and hematoma evacuated. No definite bleeding source found. Kidney well perfused. Post op has required 4 units blood due to low Hb <6.    History of Present Illness:  History of Present Illness:   Mr. Aguiar is a 37 y.o. year old male who is status post  donor kidney transplant on 17.  His maintenance immunosuppression consists of mycophenolate mofetil and tacrolimus.      Interval History:      Past Medical, Surgical, Family, and Social History:   Unchanged from H&P.    Review of patient's allergies indicates:  No Known Allergies    Scheduled Meds: ampicillin-sulbactam 1.5 g in sodium chloride 0.9 % 100 mL IVPB (ready to mix system)  1.5 g Intravenous Q6H    azithromycin  1,200 mg Oral Weekly    docusate sodium  100 mg Oral TID    dolutegravir  50 mg Oral Daily    epoetin erika  10,000 Units Subcutaneous Every Mon    famotidine  20 mg Oral QHS    ketoconazole  100 mg Oral Daily    multivitamin  1 tablet Oral Daily    mycophenolate  1,000 mg Oral BID    nystatin  500,000 Units Oral QID (PC + HS)    predniSONE  20 mg Oral Daily    rilpivirine  25 mg Oral Daily    rosuvastatin  20 mg Oral Daily    sodium bicarbonate  1,300 mg Oral BID    sodium chloride 0.9%  3 mL Intravenous Q8H    sulfamethoxazole-trimethoprim 400-80mg  1 tablet Oral Every Mon, Wed, Fri    tacrolimus  6 mg Oral Daily    tacrolimus  7 mg Oral Daily    valganciclovir  450 mg Oral Every Mon, Wed, Fri     Continuous Infusions:   sodium chloride 0.9% 75 mL/hr at 09/10/17 0114     PRN Meds:sodium chloride, sodium chloride, bisacodyl, dextrose 50%, dextrose 50%, EPINEPHrine, glucagon (human recombinant), glucose, glucose, HYDROmorphone, insulin aspart, ondansetron, oxycodone-acetaminophen, oxycodone-acetaminophen, simethicone    Intake/Output -  "Last 3 Shifts       09/08 0700 - 09/09 0659 09/09 0700 - 09/10 0659 09/10 0700 - 09/11 0659    P.O. 1860 470 140    I.V. (mL/kg)  2447.3 (36.7)     Blood  2144.7     Other       IV Piggyback  1100     Total Intake(mL/kg) 1860 (27.9) 6161.9 (92.4) 140 (2.1)    Urine (mL/kg/hr) 2050 (1.3) 582 (0.4) 1100 (2.3)    Emesis/NG output       Drains  170 (0.1) 110 (0.2)    Other       Stool 0 (0) 0 (0) 0 (0)    Blood       Total Output 2050 752 1210    Net -190 +5409.9 -1070           Stool Occurrence 0 x 0 x 0 x           Review of Systems   Constitutional: Negative for activity change, appetite change, chills and fever.   Respiratory: Negative for cough and shortness of breath.    Cardiovascular: Negative for chest pain and leg swelling.   Gastrointestinal: Positive for abdominal distention and abdominal pain. Negative for constipation, diarrhea and rectal pain.   Genitourinary: Negative for difficulty urinating and dysuria.   Musculoskeletal: Negative.    Skin: Negative for color change and rash.   Allergic/Immunologic: Positive for immunocompromised state.   Neurological: Negative for dizziness and light-headedness.   Psychiatric/Behavioral: Negative.    All other systems reviewed and are negative.     Objective:     Vital Signs (Most Recent):  Temp: 98.8 °F (37.1 °C) (09/10/17 1200)  Pulse: 87 (09/10/17 1400)  Resp: 18 (09/10/17 0730)  BP: (!) 153/76 (09/10/17 1400)  SpO2: 96 % (09/10/17 1400) Vital Signs (24h Range):  Temp:  [98.2 °F (36.8 °C)-99.4 °F (37.4 °C)] 98.8 °F (37.1 °C)  Pulse:  [80-99] 87  Resp:  [12-20] 18  SpO2:  [95 %-100 %] 96 %  BP: (101-153)/(62-79) 153/76     Weight: 66.7 kg (147 lb 0.8 oz)  Height: 5' 7" (170.2 cm)  Body mass index is 23.03 kg/m².    Physical Exam   Constitutional: He is oriented to person, place, and time.   Eyes: EOM are normal. Pupils are equal, round, and reactive to light.   Cardiovascular: Normal rate and regular rhythm.    Abdominal: Bowel sounds are normal. There is tenderness " in the right lower quadrant. There is guarding. There is no rigidity.       Neurological: He is alert and oriented to person, place, and time.   Skin: Capillary refill takes less than 2 seconds.       Laboratory:  CBC:   Recent Labs  Lab 09/09/17  0448  09/10/17  0959   WBC 5.66  --   --    RBC 2.78*  --   --    HGB 8.4*  < > 7.1*  7.2*   HCT 25.3*  < > 21.2*  21.1*   *  --   --    MCV 91  --   --    MCH 30.2  --   --    MCHC 33.2  --   --    < > = values in this interval not displayed.  BMP:   Recent Labs  Lab 09/10/17  0959   GLU 98  104     137   K 4.2  4.5   *  108   CO2 20*  23   BUN 43*  45*   CREATININE 3.3*  3.6*   CALCIUM 7.8*  8.3*     CMP:   Recent Labs  Lab 09/09/17  2007  09/10/17  0959   *  < > 98  104   CALCIUM 9.3  < > 7.8*  8.3*   ALBUMIN 4.1  --  3.1*   PROT 7.0  --   --    *  < > 139  137   K 5.3*  < > 4.2  4.5   CO2 21*  < > 20*  23     < > 111*  108   BUN 49*  < > 43*  45*   CREATININE 3.9*  < > 3.3*  3.6*   ALKPHOS 131  --   --    ALT 14  --   --    AST 20  --   --    BILITOT 0.9  --   --    < > = values in this interval not displayed.  Coagulation:   Recent Labs  Lab 09/10/17  0959   INR 1.1   APTT 25.1     Labs within the past 24 hours have been reviewed.    Diagnostic Results:  Labs: Reviewed  and acted upon    Assessment/Plan:     * Acute rejection of kidney transplant    - Biopsy (for DGF) 8/25/17: good sample with 24 glomeruli (only 1 sclerosed) and evidence just meeting criteria for ABMR. + diffuse ATI. Minimal fibrosis, no ACR or AVR, but + CD4 (>50% diffusely + stain) with mild glomerulitis and focal peritubular capillaritis. Also, biopsy shows ca-oxalate (3) and ca-phos (2) crystals.  -8/25/17 DSA + for weak class I. Repeat pre-PLEX Class I DSA DETECTED: A2(3894),B44(2820),B62(5598),    Class II DSA DETECTED BELOW CUTOFF: DR53(1265)  - Tolerated PLEX #1 8/29, #2 8/30, #3 8/31, #4 9/2,  #5 9/4, and #6 9/5/17.    - completed SMP x  3. Of note, original plan for thymo cancelled secondary to difficulty with line placement due to pt anatomy.  Cellcept originally held- restarted.  - Tolerated IVIG #1 and 2 -.    - Plan for rituximab in future of needed- for now monitor with current therapy.   - UOP increasing and overall Cr improved.   - Repeat DSAs pending from .         Status post exploratory laparotomy    Re explored for retroperitoneal hematoma. Hematoma evacuated.()          Antibody mediated rejection of kidney transplant    - see rejection of kidney transplant.          Long-term use of immunosuppressant medication    - Continue Prograf and Cellcept.  - Continue keto to boost tacro levels.  - Will monitor for signs of toxic side effects, check daily Prograf troughs, and change meds accordingly.            Immunocompromised state    - Will continue with HAART therapy, immuno prophylaxis.           donor kidney transplant 2017    - Allograft function improving.    - See ABMR and DGF.  - Pt Cr inc from 3.3 <- 3 today.  Hold lasix and encouarge PO intake.   -significant drop in h/h, back pain, obtain stat ultrasound          Delayed graft function of kidney    - Improved GFR with excellent urine output.           Need for prophylactic immunotherapy    - See long term use of IS.            At risk for opportunistic infections    - Continue Valcyte for CMV prophylaxis  - Continue Bactrim for PCP prophylaxis  - Continue nystatin for fungal prophylaxis          HIV (human immunodeficiency virus infection)    - Last CD4 count 7 () and HIV PCR undetected < 40 copies/ml ().  - Start once weekly azithromycin for MAC prophylaxis, first dose today 17.   - Continue HAART with dolutegravir (Tivicay) and rilpivirine (Edurant).    - HIV meds to be given after PLEX- meds highly protein bound and can be removed with plasmapheresis.  - HIV RNA negative .  Immune cyclex 78.           Anemia in ESRD (end-stage renal  disease)    - H&H stable but trending down. Monitor daily.  - Epoetin injection 10K units 9/5/17.   - Haptoglobin 88 and LDH 93.   -h/h with significant drop, transfuse, monitor serial h/h  Post laprotomy fall in Hb, blood given            Discharge Planning:      Fan Chan MD  Kidney Transplant  [facility]

## 2017-09-10 NOTE — ASSESSMENT & PLAN NOTE
- H&H stable but trending down. Monitor daily.  - Epoetin injection 10K units 9/5/17.   - Haptoglobin 88 and LDH 93.   -h/h with significant drop, transfuse, monitor serial h/h  Post laprotomy fall in Hb, blood given

## 2017-09-11 LAB
ALBUMIN SERPL BCP-MCNC: 3.3 G/DL
ANION GAP SERPL CALC-SCNC: 10 MMOL/L
ANION GAP SERPL CALC-SCNC: 9 MMOL/L
ANION GAP SERPL CALC-SCNC: 9 MMOL/L
BACTERIA SPEC AEROBE CULT: NO GROWTH
BASOPHILS # BLD AUTO: 0 K/UL
BASOPHILS # BLD AUTO: 0.01 K/UL
BASOPHILS NFR BLD: 0 %
BASOPHILS NFR BLD: 0.2 %
BUN SERPL-MCNC: 48 MG/DL
BUN SERPL-MCNC: 48 MG/DL
BUN SERPL-MCNC: 51 MG/DL
CALCIUM SERPL-MCNC: 9 MG/DL
CALCIUM SERPL-MCNC: 9.1 MG/DL
CALCIUM SERPL-MCNC: 9.1 MG/DL
CHLORIDE SERPL-SCNC: 105 MMOL/L
CHLORIDE SERPL-SCNC: 108 MMOL/L
CHLORIDE SERPL-SCNC: 108 MMOL/L
CO2 SERPL-SCNC: 21 MMOL/L
CO2 SERPL-SCNC: 23 MMOL/L
CO2 SERPL-SCNC: 23 MMOL/L
CREAT SERPL-MCNC: 3.5 MG/DL
CREAT SERPL-MCNC: 3.5 MG/DL
CREAT SERPL-MCNC: 3.6 MG/DL
DIFFERENTIAL METHOD: ABNORMAL
DIFFERENTIAL METHOD: ABNORMAL
EOSINOPHIL # BLD AUTO: 0 K/UL
EOSINOPHIL # BLD AUTO: 0 K/UL
EOSINOPHIL NFR BLD: 0.1 %
EOSINOPHIL NFR BLD: 0.3 %
ERYTHROCYTE [DISTWIDTH] IN BLOOD BY AUTOMATED COUNT: 14.7 %
ERYTHROCYTE [DISTWIDTH] IN BLOOD BY AUTOMATED COUNT: 14.9 %
EST. GFR  (AFRICAN AMERICAN): 23.4 ML/MIN/1.73 M^2
EST. GFR  (AFRICAN AMERICAN): 24.2 ML/MIN/1.73 M^2
EST. GFR  (AFRICAN AMERICAN): 24.2 ML/MIN/1.73 M^2
EST. GFR  (NON AFRICAN AMERICAN): 20.2 ML/MIN/1.73 M^2
EST. GFR  (NON AFRICAN AMERICAN): 20.9 ML/MIN/1.73 M^2
EST. GFR  (NON AFRICAN AMERICAN): 20.9 ML/MIN/1.73 M^2
GLUCOSE SERPL-MCNC: 109 MG/DL
GLUCOSE SERPL-MCNC: 93 MG/DL
GLUCOSE SERPL-MCNC: 93 MG/DL
HCT VFR BLD AUTO: 20.8 %
HCT VFR BLD AUTO: 21.2 %
HCT VFR BLD AUTO: 21.4 %
HCT VFR BLD AUTO: 21.8 %
HCT VFR BLD AUTO: 21.9 %
HCT VFR BLD AUTO: 22.5 %
HGB BLD-MCNC: 7.1 G/DL
HGB BLD-MCNC: 7.4 G/DL
HGB BLD-MCNC: 7.5 G/DL
HGB BLD-MCNC: 7.6 G/DL
LYMPHOCYTES # BLD AUTO: 0.2 K/UL
LYMPHOCYTES # BLD AUTO: 0.2 K/UL
LYMPHOCYTES NFR BLD: 2.9 %
LYMPHOCYTES NFR BLD: 3.8 %
MAGNESIUM SERPL-MCNC: 1.9 MG/DL
MCH RBC QN AUTO: 29.7 PG
MCH RBC QN AUTO: 30.4 PG
MCHC RBC AUTO-ENTMCNC: 33.5 G/DL
MCHC RBC AUTO-ENTMCNC: 33.8 G/DL
MCV RBC AUTO: 89 FL
MCV RBC AUTO: 90 FL
MONOCYTES # BLD AUTO: 0.4 K/UL
MONOCYTES # BLD AUTO: 0.6 K/UL
MONOCYTES NFR BLD: 6.2 %
MONOCYTES NFR BLD: 9.3 %
NEUTROPHILS # BLD AUTO: 5.2 K/UL
NEUTROPHILS # BLD AUTO: 6.5 K/UL
NEUTROPHILS NFR BLD: 85.6 %
NEUTROPHILS NFR BLD: 90.2 %
PHOSPHATE SERPL-MCNC: 3.8 MG/DL
PLATELET # BLD AUTO: 74 K/UL
PLATELET # BLD AUTO: 92 K/UL
PMV BLD AUTO: 11.1 FL
PMV BLD AUTO: 9.4 FL
POCT GLUCOSE: 111 MG/DL (ref 70–110)
POCT GLUCOSE: 115 MG/DL (ref 70–110)
POCT GLUCOSE: 129 MG/DL (ref 70–110)
POCT GLUCOSE: 139 MG/DL (ref 70–110)
POCT GLUCOSE: 167 MG/DL (ref 70–110)
POTASSIUM SERPL-SCNC: 4.6 MMOL/L
RBC # BLD AUTO: 2.39 M/UL
RBC # BLD AUTO: 2.5 M/UL
SODIUM SERPL-SCNC: 136 MMOL/L
SODIUM SERPL-SCNC: 140 MMOL/L
SODIUM SERPL-SCNC: 140 MMOL/L
TACROLIMUS BLD-MCNC: 7.3 NG/ML
WBC # BLD AUTO: 6.11 K/UL
WBC # BLD AUTO: 6.89 K/UL

## 2017-09-11 PROCEDURE — 63600175 PHARM REV CODE 636 W HCPCS: Performed by: PHYSICIAN ASSISTANT

## 2017-09-11 PROCEDURE — 85025 COMPLETE CBC W/AUTO DIFF WBC: CPT

## 2017-09-11 PROCEDURE — 63600175 PHARM REV CODE 636 W HCPCS: Performed by: NURSE PRACTITIONER

## 2017-09-11 PROCEDURE — 80197 ASSAY OF TACROLIMUS: CPT

## 2017-09-11 PROCEDURE — 85018 HEMOGLOBIN: CPT

## 2017-09-11 PROCEDURE — 36415 COLL VENOUS BLD VENIPUNCTURE: CPT

## 2017-09-11 PROCEDURE — P9021 RED BLOOD CELLS UNIT: HCPCS

## 2017-09-11 PROCEDURE — 80048 BASIC METABOLIC PNL TOTAL CA: CPT

## 2017-09-11 PROCEDURE — 85014 HEMATOCRIT: CPT

## 2017-09-11 PROCEDURE — 25000003 PHARM REV CODE 250: Performed by: NURSE PRACTITIONER

## 2017-09-11 PROCEDURE — 80069 RENAL FUNCTION PANEL: CPT

## 2017-09-11 PROCEDURE — 25000003 PHARM REV CODE 250: Performed by: PHYSICIAN ASSISTANT

## 2017-09-11 PROCEDURE — 25000003 PHARM REV CODE 250: Performed by: PEDIATRICS

## 2017-09-11 PROCEDURE — 85014 HEMATOCRIT: CPT | Mod: 91

## 2017-09-11 PROCEDURE — 85018 HEMOGLOBIN: CPT | Mod: 91

## 2017-09-11 PROCEDURE — 99233 SBSQ HOSP IP/OBS HIGH 50: CPT | Mod: ,,, | Performed by: NURSE PRACTITIONER

## 2017-09-11 PROCEDURE — A4216 STERILE WATER/SALINE, 10 ML: HCPCS | Performed by: NURSE PRACTITIONER

## 2017-09-11 PROCEDURE — 83735 ASSAY OF MAGNESIUM: CPT

## 2017-09-11 PROCEDURE — 63600175 PHARM REV CODE 636 W HCPCS: Performed by: INTERNAL MEDICINE

## 2017-09-11 PROCEDURE — 20600001 HC STEP DOWN PRIVATE ROOM

## 2017-09-11 RX ORDER — HYDROCODONE BITARTRATE AND ACETAMINOPHEN 500; 5 MG/1; MG/1
TABLET ORAL
Status: DISCONTINUED | OUTPATIENT
Start: 2017-09-11 | End: 2017-09-14

## 2017-09-11 RX ADMIN — NYSTATIN 500000 UNITS: 500000 SUSPENSION ORAL at 08:09

## 2017-09-11 RX ADMIN — ERYTHROPOIETIN 10000 UNITS: 10000 INJECTION, SOLUTION INTRAVENOUS; SUBCUTANEOUS at 10:09

## 2017-09-11 RX ADMIN — DOCUSATE SODIUM 100 MG: 50 CAPSULE, LIQUID FILLED ORAL at 01:09

## 2017-09-11 RX ADMIN — OXYCODONE HYDROCHLORIDE AND ACETAMINOPHEN 1 TABLET: 10; 325 TABLET ORAL at 05:09

## 2017-09-11 RX ADMIN — MYCOPHENOLATE MOFETIL 1000 MG: 250 CAPSULE ORAL at 08:09

## 2017-09-11 RX ADMIN — MYCOPHENOLATE MOFETIL 1000 MG: 250 CAPSULE ORAL at 09:09

## 2017-09-11 RX ADMIN — TACROLIMUS 7 MG: 5 CAPSULE ORAL at 08:09

## 2017-09-11 RX ADMIN — DOCUSATE SODIUM 100 MG: 50 CAPSULE, LIQUID FILLED ORAL at 09:09

## 2017-09-11 RX ADMIN — FAMOTIDINE 20 MG: 20 TABLET, FILM COATED ORAL at 09:09

## 2017-09-11 RX ADMIN — OXYCODONE HYDROCHLORIDE AND ACETAMINOPHEN 1 TABLET: 10; 325 TABLET ORAL at 01:09

## 2017-09-11 RX ADMIN — PREDNISONE 20 MG: 20 TABLET ORAL at 08:09

## 2017-09-11 RX ADMIN — OXYCODONE HYDROCHLORIDE AND ACETAMINOPHEN 1 TABLET: 10; 325 TABLET ORAL at 08:09

## 2017-09-11 RX ADMIN — NYSTATIN 500000 UNITS: 500000 SUSPENSION ORAL at 01:09

## 2017-09-11 RX ADMIN — Medication 3 ML: at 02:09

## 2017-09-11 RX ADMIN — BISACODYL 10 MG: 5 TABLET, COATED ORAL at 02:09

## 2017-09-11 RX ADMIN — DOLUTEGRAVIR SODIUM 50 MG: 50 TABLET, FILM COATED ORAL at 08:09

## 2017-09-11 RX ADMIN — DOCUSATE SODIUM 100 MG: 50 CAPSULE, LIQUID FILLED ORAL at 05:09

## 2017-09-11 RX ADMIN — Medication 3 ML: at 10:09

## 2017-09-11 RX ADMIN — SODIUM BICARBONATE 650 MG TABLET 1300 MG: at 08:09

## 2017-09-11 RX ADMIN — Medication 100 MG: at 08:09

## 2017-09-11 RX ADMIN — Medication 3 ML: at 06:09

## 2017-09-11 RX ADMIN — THERA TABS 1 TABLET: TAB at 08:09

## 2017-09-11 RX ADMIN — OXYCODONE HYDROCHLORIDE AND ACETAMINOPHEN 1 TABLET: 10; 325 TABLET ORAL at 12:09

## 2017-09-11 RX ADMIN — VALGANCICLOVIR 450 MG: 450 TABLET, FILM COATED ORAL at 05:09

## 2017-09-11 RX ADMIN — TACROLIMUS 6 MG: 5 CAPSULE ORAL at 05:09

## 2017-09-11 RX ADMIN — HYDROMORPHONE HYDROCHLORIDE 0.5 MG: 1 INJECTION, SOLUTION INTRAMUSCULAR; INTRAVENOUS; SUBCUTANEOUS at 09:09

## 2017-09-11 RX ADMIN — SULFAMETHOXAZOLE AND TRIMETHOPRIM 1 TABLET: 400; 80 TABLET ORAL at 05:09

## 2017-09-11 RX ADMIN — HYDROMORPHONE HYDROCHLORIDE 0.5 MG: 1 INJECTION, SOLUTION INTRAMUSCULAR; INTRAVENOUS; SUBCUTANEOUS at 12:09

## 2017-09-11 RX ADMIN — DESMOPRESSIN ACETATE 23.25 MCG: 4 SOLUTION INTRAVENOUS at 12:09

## 2017-09-11 RX ADMIN — DIPHENHYDRAMINE HYDROCHLORIDE 25 MG: 25 CAPSULE ORAL at 08:09

## 2017-09-11 RX ADMIN — HYDROMORPHONE HYDROCHLORIDE 0.5 MG: 1 INJECTION, SOLUTION INTRAMUSCULAR; INTRAVENOUS; SUBCUTANEOUS at 02:09

## 2017-09-11 RX ADMIN — SODIUM BICARBONATE 650 MG TABLET 1300 MG: at 09:09

## 2017-09-11 RX ADMIN — ROSUVASTATIN CALCIUM 20 MG: 10 TABLET ORAL at 08:09

## 2017-09-11 RX ADMIN — HYDROMORPHONE HYDROCHLORIDE 0.5 MG: 1 INJECTION, SOLUTION INTRAMUSCULAR; INTRAVENOUS; SUBCUTANEOUS at 07:09

## 2017-09-11 RX ADMIN — ONDANSETRON 4 MG: 2 INJECTION INTRAMUSCULAR; INTRAVENOUS at 09:09

## 2017-09-11 RX ADMIN — DIPHENHYDRAMINE HYDROCHLORIDE 25 MG: 25 CAPSULE ORAL at 03:09

## 2017-09-11 NOTE — PROGRESS NOTES
[facility]  Kidney Transplant  Progress Note      Reason for Follow-up: Reassessment of Kidney Transplant - 8/18/2017  (#1) recipient and management of immunosuppression.      Subjective:     Demetrio Aguiar is a 37 y.o. 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 His maintenance immunosuppression includes a steroid taper per protocol to 5mg daily, Prograf, and Cellcept maintenance. His post op course has been complicated by DGF requiring HD (last HD 8/28/17) which prompted need for kidney biopsy on 8/25/17. 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 Biopsy 8/25/17: good sample with 24 glomeruli (only 1 sclerosed) and evidence of criteria for ABMR. + diffuse ATI; Minimal fibrosis, no ACR or AVR, but + CD4 (>50% diffusely + stain) with mild glomerulitis and focal peritubular capillaritis; also, biopsy shows ca-oxalate (3) and ca-phos (2) crystals. Since transplant, he has made minimal uop daily. He denies any current illnesses or sick contacts. Complains of expected post-op pain to RLQ incision. Pt treated with PLEX x6, SMP x3, and IVIG x2. Kidney function improved with tx.    Interval History: pt with decreased H&H Saturday. Kidney US showed large fluid collection. He was taken back to OR that night for retroperitoneal bleed. Last received 1 unit PRBC 9/10. H&H decreased today but stable. DDAVP ordered. New RLQ inc with staples intact. Bloody JESSICA drain with leaking noted around site. Pt feeling ok today. BP stable. Cr 3.5. Good UOP. Monitor.       Cr 3.5. Pt completed treatment of rejection with SMP x 3, PLEX x 6 and IVIG x 2. Use of Rituxan discussed -  decision made to defer at this time, monitor with current tx, and use in future if needed.       Past Medical, Surgical, Family, and Social History:   Unchanged from H&P.    Review of patient's allergies indicates:  No Known Allergies    Scheduled Meds:   azithromycin  1,200 mg Oral Weekly    docusate sodium  100 mg Oral TID    dolutegravir  50 mg Oral Daily    epoetin erika  10,000 Units Subcutaneous Every Mon    famotidine  20 mg Oral QHS    [START ON 9/12/2017] isavuconazonium sulfate  372 mg Oral Q8H    Followed by    [START ON 9/14/2017] isavuconazonium sulfate  372 mg Oral Daily    multivitamin  1 tablet Oral Daily    mycophenolate  1,000 mg Oral BID    predniSONE  20 mg Oral Daily    rilpivirine  25 mg Oral Daily    rosuvastatin  20 mg Oral Daily    sodium bicarbonate  1,300 mg Oral BID    sodium chloride 0.9%  3 mL Intravenous Q8H    sulfamethoxazole-trimethoprim 400-80mg  1 tablet Oral Every Mon, Wed, Fri    tacrolimus  6 mg Oral Daily    tacrolimus  7 mg Oral Daily    valganciclovir  450 mg Oral Every Mon, Wed, Fri     Continuous Infusions:   PRN Meds:sodium chloride, sodium chloride, bisacodyl, dextrose 50%, dextrose 50%, diphenhydrAMINE, EPINEPHrine, glucagon (human recombinant), glucose, glucose, HYDROmorphone, insulin aspart, ondansetron, oxycodone-acetaminophen, oxycodone-acetaminophen, simethicone    Intake/Output - Last 3 Shifts       09/09 0700 - 09/10 0659 09/10 0700 - 09/11 0659 09/11 0700 - 09/12 0659    P.O. 470 390 60    I.V. (mL/kg) 2447.3 (36.7) 1732.5 (22.4) 592.5 (7.6)    Blood 2144.7 268     IV Piggyback 1100 300 50    Total Intake(mL/kg) 6161.9 (92.4) 2690.5 (34.7) 702.5 (9.1)    Urine (mL/kg/hr) 582 (0.4) 2150 (1.2) 350 (0.5)    Emesis/NG output  0 (0) 0 (0)    Drains 170 (0.1) 220 (0.1) 15 (0)    Other  0 (0) 0 (0)    Stool 0 (0) 0 (0) 0 (0)    Blood  0 (0) 0 (0)    Total Output 752 2370 365    Net +5409.9 +320.5 +337.5           Urine Occurrence  0 x 0 x    Stool  "Occurrence 0 x 0 x 0 x    Emesis Occurrence  0 x 0 x           Review of Systems   Constitutional: Positive for activity change and appetite change. Negative for chills, fatigue and fever.   HENT: Negative for congestion and facial swelling.    Eyes: Negative for pain, discharge and visual disturbance.   Respiratory: Negative for cough, chest tightness, shortness of breath and wheezing.    Cardiovascular: Negative for chest pain, palpitations and leg swelling.   Gastrointestinal: Positive for abdominal pain and nausea. Negative for abdominal distention, constipation and diarrhea.   Endocrine: Negative.    Genitourinary: Positive for flank pain. Negative for decreased urine volume and dysuria.   Musculoskeletal: Positive for back pain. Negative for arthralgias.   Skin: Positive for wound.   Allergic/Immunologic: Positive for immunocompromised state.   Neurological: Negative for dizziness, weakness, light-headedness and headaches.   Hematological: Negative.    Psychiatric/Behavioral: Negative for agitation, decreased concentration and sleep disturbance.      Objective:     Vital Signs (Most Recent):  Temp: 98.2 °F (36.8 °C) (09/11/17 1211)  Pulse: 80 (09/11/17 1500)  Resp: 20 (09/11/17 1211)  BP: (!) 151/72 (09/11/17 1211)  SpO2: 96 % (09/11/17 1211) Vital Signs (24h Range):  Temp:  [97.8 °F (36.6 °C)-99 °F (37.2 °C)] 98.2 °F (36.8 °C)  Pulse:  [78-89] 80  Resp:  [16-20] 20  SpO2:  [95 %-97 %] 96 %  BP: (147-165)/(72-83) 151/72     Weight: 77.5 kg (170 lb 13.7 oz)  Height: 5' 7" (170.2 cm)  Body mass index is 26.76 kg/m².    Physical Exam   Constitutional: He is oriented to person, place, and time. He appears well-developed and well-nourished.   HENT:   Head: Normocephalic and atraumatic.   Eyes: Pupils are equal, round, and reactive to light.   Neck: Normal range of motion.   Cardiovascular: Normal rate, regular rhythm, normal heart sounds and intact distal pulses.    Pulmonary/Chest: Effort normal and breath sounds " normal.   Abdominal: Soft. There is tenderness (RLQ & flank). There is guarding.   RLQ inc with staples intact no s/s/i  RLQ JESSICA x 1 with bloody drainage   Musculoskeletal: He exhibits no edema.   Neurological: He is alert and oriented to person, place, and time.   Skin: Skin is warm and dry.   Nursing note and vitals reviewed.      Laboratory:  CBC:   Recent Labs  Lab 17  1128   WBC 6.11   RBC 2.50*   HGB 7.6*   HCT 22.5*   PLT 74*   MCV 90   MCH 30.4   MCHC 33.8     BMP:   Recent Labs  Lab 17  0805   GLU 93  93     140   K 4.6  4.6     108   CO2 23  23   BUN 48*  48*   CREATININE 3.5*  3.5*   CALCIUM 9.1  9.1     Labs within the past 24 hours have been reviewed.    Diagnostic Results:  X-Ray: Reviewed  US: Reviewed    Assessment/Plan:     * Acute rejection of kidney transplant    - Biopsy (for DGF) 17: good sample with 24 glomeruli (only 1 sclerosed) and evidence just meeting criteria for ABMR. + diffuse ATI. Minimal fibrosis, no ACR or AVR, but + CD4 (>50% diffusely + stain) with mild glomerulitis and focal peritubular capillaritis. Also, biopsy shows ca-oxalate (3) and ca-phos (2) crystals.  -17 DSA + for weak class I. Repeat pre-PLEX Class I DSA DETECTED: A2(3894),B44(2820),B62(5178),    Class II DSA DETECTED BELOW CUTOFF: DR53(1265)  - Tolerated PLEX #1 , #2 , #3 , #4 ,  #5 , and #6 17.    - completed SMP x 3. Of note, original plan for thymo cancelled secondary to difficulty with line placement due to pt anatomy.  Cellcept originally held- restarted.  - Tolerated IVIG #1 and 2 -.    - Plan for rituximab in future of needed- for now monitor with current therapy.   - UOP increasing and overall Cr improved.   - Repeat DSAs pending from .         Delayed graft function of kidney    - Improved GFR with excellent urine output.            donor kidney transplant 2017    - Allograft function improving.    - See ABMR and DGF.  - Pt  Cr 3.6 <- 3.5 today.  Hold lasix and encouarge PO intake.             Need for prophylactic immunotherapy    - See long term use of IS.            Long-term use of immunosuppressant medication    - Continue Prograf and Cellcept.  - dc keto in AM 9/12. Start Isavu.   - Will monitor for signs of toxic side effects, check daily Prograf troughs, and change meds accordingly.            Anemia due to acute blood loss    - Acute blood loss since 9/8/17 when hematoma evacuated from wound.  - Re-explored for bleeding 9/9/17.  - H&H stable. Monitor q12 hrs.          At risk for opportunistic infections    - Continue Valcyte for CMV prophylaxis.  - Continue Bactrim for PCP prophylaxis.  - Continue nystatin for fungal prophylaxis.          Anemia in ESRD (end-stage renal disease)    - Epoetin injection 10K units 9/5/17.   - Haptoglobin 88 and LDH 93.   - Post laprotomy fall in Hb, blood given 1 unit PRBC, 9/10.  - H&H decreased but stable. DDAVP today.  - Monitor H&H q12h.        Immunocompromised state    - Will continue with HAART therapy, immuno prophylaxis.          HIV (human immunodeficiency virus infection)    - Last CD4 count 7 (8/20) and HIV PCR undetected < 40 copies/ml (8/19).  - Start once weekly azithromycin for MAC prophylaxis, first dose 9/8/17.   - Continue HAART with dolutegravir (Tivicay) and rilpivirine (Edurant).    - HIV meds to be given after PLEX- meds highly protein bound and can be removed with plasmapheresis.  - HIV RNA negative 9/5.  Immune cyclex 78.           Antibody mediated rejection of kidney transplant    - see rejection of kidney transplant.          Status post exploratory laparotomy    - Re explored for retroperitoneal hematoma. Hematoma evacuated. (09/09)              Discharge Planning: not a candidate for dc at this time.  Kb baugh 121      Latonia Rich NP  Kidney Transplant  [facility]

## 2017-09-11 NOTE — ASSESSMENT & PLAN NOTE
- Epoetin injection 10K units 9/5/17.   - Haptoglobin 88 and LDH 93.   - Post laprotomy fall in Hb, blood given 1 unit PRBC, 9/10.  - H&H decreased but stable. DDAVP today.  - Monitor H&H q12h.

## 2017-09-11 NOTE — PROGRESS NOTES
SW spoke with pt over the phone. SW received email from patient regarding some information about medicaid and pt was concerned. SW informed pt that she would come by tomorrow to discuss forms with him. SW remains available.

## 2017-09-11 NOTE — ASSESSMENT & PLAN NOTE
- Continue Valcyte for CMV prophylaxis.  - Continue Bactrim for PCP prophylaxis.  - Continue nystatin for fungal prophylaxis.

## 2017-09-11 NOTE — PROGRESS NOTES
Asked to see pt for evacuation of hematoma site.   Apparently patient had been taken back to surgery over the weekend and now has an approximated surgical incision with a JESSICA drain which is oozing sanguinous drainage from around the insertion site. Call placed to Latonia jacob NP who will cancel our consult .       Radha Najera RN CWON  [id number]

## 2017-09-11 NOTE — SUBJECTIVE & OBJECTIVE
Subjective:     Demetrio Aguiar is a 37 y.o. 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 His maintenance immunosuppression includes a steroid taper per protocol to 5mg daily, Prograf, and Cellcept maintenance. His post op course has been complicated by DGF requiring HD (last HD 8/28/17) which prompted need for kidney biopsy on 8/25/17. 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 Biopsy 8/25/17: good sample with 24 glomeruli (only 1 sclerosed) and evidence of criteria for ABMR. + diffuse ATI; Minimal fibrosis, no ACR or AVR, but + CD4 (>50% diffusely + stain) with mild glomerulitis and focal peritubular capillaritis; also, biopsy shows ca-oxalate (3) and ca-phos (2) crystals. Since transplant, he has made minimal uop daily. He denies any current illnesses or sick contacts. Complains of expected post-op pain to RLQ incision. Pt treated with PLEX x6, SMP x3, and IVIG x2. Kidney function improved with tx.    Interval History: pt with decreased H&H Saturday. Kidney US showed large fluid collection. He was taken back to OR that night for retroperitoneal bleed. Last received 1 unit PRBC 9/10. H&H decreased today but stable. DDAVP ordered. New RLQ inc with staples intact. Bloody JESSICA drain with leaking noted around site. Pt feeling ok today. BP stable. Cr 3.5. Good UOP. Monitor.       Cr 3.5. Pt completed treatment of rejection with SMP x 3, PLEX x 6 and IVIG x 2. Use of Rituxan discussed - decision made to defer at this time, monitor with current tx, and use in future if needed.       Past Medical, Surgical, Family, and Social History:   Unchanged from H&P.    Review of patient's  allergies indicates:  No Known Allergies    Scheduled Meds:   azithromycin  1,200 mg Oral Weekly    docusate sodium  100 mg Oral TID    dolutegravir  50 mg Oral Daily    epoetin erika  10,000 Units Subcutaneous Every Mon    famotidine  20 mg Oral QHS    [START ON 9/12/2017] isavuconazonium sulfate  372 mg Oral Q8H    Followed by    [START ON 9/14/2017] isavuconazonium sulfate  372 mg Oral Daily    multivitamin  1 tablet Oral Daily    mycophenolate  1,000 mg Oral BID    predniSONE  20 mg Oral Daily    rilpivirine  25 mg Oral Daily    rosuvastatin  20 mg Oral Daily    sodium bicarbonate  1,300 mg Oral BID    sodium chloride 0.9%  3 mL Intravenous Q8H    sulfamethoxazole-trimethoprim 400-80mg  1 tablet Oral Every Mon, Wed, Fri    tacrolimus  6 mg Oral Daily    tacrolimus  7 mg Oral Daily    valganciclovir  450 mg Oral Every Mon, Wed, Fri     Continuous Infusions:   PRN Meds:sodium chloride, sodium chloride, bisacodyl, dextrose 50%, dextrose 50%, diphenhydrAMINE, EPINEPHrine, glucagon (human recombinant), glucose, glucose, HYDROmorphone, insulin aspart, ondansetron, oxycodone-acetaminophen, oxycodone-acetaminophen, simethicone    Intake/Output - Last 3 Shifts       09/09 0700 - 09/10 0659 09/10 0700 - 09/11 0659 09/11 0700 - 09/12 0659    P.O. 470 390 60    I.V. (mL/kg) 2447.3 (36.7) 1732.5 (22.4) 592.5 (7.6)    Blood 2144.7 268     IV Piggyback 1100 300 50    Total Intake(mL/kg) 6161.9 (92.4) 2690.5 (34.7) 702.5 (9.1)    Urine (mL/kg/hr) 582 (0.4) 2150 (1.2) 350 (0.5)    Emesis/NG output  0 (0) 0 (0)    Drains 170 (0.1) 220 (0.1) 15 (0)    Other  0 (0) 0 (0)    Stool 0 (0) 0 (0) 0 (0)    Blood  0 (0) 0 (0)    Total Output 752 2370 365    Net +5409.9 +320.5 +337.5           Urine Occurrence  0 x 0 x    Stool Occurrence 0 x 0 x 0 x    Emesis Occurrence  0 x 0 x           Review of Systems   Constitutional: Positive for activity change and appetite change. Negative for chills, fatigue and fever.   HENT:  "Negative for congestion and facial swelling.    Eyes: Negative for pain, discharge and visual disturbance.   Respiratory: Negative for cough, chest tightness, shortness of breath and wheezing.    Cardiovascular: Negative for chest pain, palpitations and leg swelling.   Gastrointestinal: Positive for abdominal pain and nausea. Negative for abdominal distention, constipation and diarrhea.   Endocrine: Negative.    Genitourinary: Positive for flank pain. Negative for decreased urine volume and dysuria.   Musculoskeletal: Positive for back pain. Negative for arthralgias.   Skin: Positive for wound.   Allergic/Immunologic: Positive for immunocompromised state.   Neurological: Negative for dizziness, weakness, light-headedness and headaches.   Hematological: Negative.    Psychiatric/Behavioral: Negative for agitation, decreased concentration and sleep disturbance.      Objective:     Vital Signs (Most Recent):  Temp: 98.2 °F (36.8 °C) (09/11/17 1211)  Pulse: 80 (09/11/17 1500)  Resp: 20 (09/11/17 1211)  BP: (!) 151/72 (09/11/17 1211)  SpO2: 96 % (09/11/17 1211) Vital Signs (24h Range):  Temp:  [97.8 °F (36.6 °C)-99 °F (37.2 °C)] 98.2 °F (36.8 °C)  Pulse:  [78-89] 80  Resp:  [16-20] 20  SpO2:  [95 %-97 %] 96 %  BP: (147-165)/(72-83) 151/72     Weight: 77.5 kg (170 lb 13.7 oz)  Height: 5' 7" (170.2 cm)  Body mass index is 26.76 kg/m².    Physical Exam   Constitutional: He is oriented to person, place, and time. He appears well-developed and well-nourished.   HENT:   Head: Normocephalic and atraumatic.   Eyes: Pupils are equal, round, and reactive to light.   Neck: Normal range of motion.   Cardiovascular: Normal rate, regular rhythm, normal heart sounds and intact distal pulses.    Pulmonary/Chest: Effort normal and breath sounds normal.   Abdominal: Soft. There is tenderness (RLQ & flank). There is guarding.   RLQ inc with staples intact no s/s/i  RLQ JESSICA x 1 with bloody drainage   Musculoskeletal: He exhibits no edema. "   Neurological: He is alert and oriented to person, place, and time.   Skin: Skin is warm and dry.   Nursing note and vitals reviewed.      Laboratory:  CBC:   Recent Labs  Lab 09/11/17  1128   WBC 6.11   RBC 2.50*   HGB 7.6*   HCT 22.5*   PLT 74*   MCV 90   MCH 30.4   MCHC 33.8     BMP:   Recent Labs  Lab 09/11/17  0805   GLU 93  93     140   K 4.6  4.6     108   CO2 23  23   BUN 48*  48*   CREATININE 3.5*  3.5*   CALCIUM 9.1  9.1     Labs within the past 24 hours have been reviewed.    Diagnostic Results:  X-Ray: Reviewed  US: Reviewed

## 2017-09-11 NOTE — PLAN OF CARE
Problem: Patient Care Overview  Goal: Plan of Care Review  Outcome: Ongoing (interventions implemented as appropriate)  AAOx3, afebrile,  c/o pain to incision. Relieved by prn pain medication. JESSICA -60 cc sanguinous drainage, espinoza in place, please see doc flowsheets for output. NS infusing per order.  Pt in lowest postion, side rails up x2, non skid footwear in place, call light within reach, pt verbalized understanding to call the nurse when needed. Pt able to reposition self independently. Will continue to monitor.

## 2017-09-11 NOTE — PROGRESS NOTES
Current H/H and BMP values reported to MD Shah, no new orders at this time. Will continue to monitor.

## 2017-09-11 NOTE — ASSESSMENT & PLAN NOTE
- Last CD4 count 7 (8/20) and HIV PCR undetected < 40 copies/ml (8/19).  - Start once weekly azithromycin for MAC prophylaxis, first dose 9/8/17.   - Continue HAART with dolutegravir (Tivicay) and rilpivirine (Edurant).    - HIV meds to be given after PLEX- meds highly protein bound and can be removed with plasmapheresis.  - HIV RNA negative 9/5.  Immune cyclex 78.

## 2017-09-11 NOTE — ASSESSMENT & PLAN NOTE
- Continue Prograf and Cellcept.  - dc keto in AM 9/12. Start Isavu.   - Will monitor for signs of toxic side effects, check daily Prograf troughs, and change meds accordingly.

## 2017-09-11 NOTE — PROGRESS NOTES
Orosco catheter removed per SAGRARIO Lincoln orders. 150 cc of clear, yellow urine drained from drainage bag prior to removal. Patient tolerated well. Urinal placed at bedside. Encouraged patient to void per urinal. NS continued at 75 ml/hr. NPO status continued. Will monitor.

## 2017-09-11 NOTE — PROGRESS NOTES
NP Latonia aware of H/H 7.1 and 21.4. No new orders given to RN at this time. Will continue to monitor.

## 2017-09-11 NOTE — PLAN OF CARE
Problem: Patient Care Overview  Goal: Plan of Care Review  AAOx3 throughout shift. Fall precautions maintained. No falls or injuries noted. Patient up with stand by assistance. Afebrile; patient with c/o pain to RLQ incision. Relieved by prn oral and IV pain medication. JESSICA -45 cc sanguinous drainage; espinoza dc'd today -urinal placed at bedside. Patient has voided thus far. Patient restarted on renal diet, IV fluids dc'd. Serial h/h being monitored last values were 7.6/22.5. Frequency changed to q12h -due again at 2000. Patient received a dose of IV desmopressin. Pt in lowest postion, side rails up x2, non skid footwear in place, call light within reach, pt verbalized understanding to call the nurse when needed. Pt able to reposition self independently and was able to bathe today. Liens changed. Will continue to monitor

## 2017-09-11 NOTE — ASSESSMENT & PLAN NOTE
- Allograft function improving.    - See ABMR and DGF.  - Pt Cr 3.6 <- 3.5 today.  Hold lasix and encouarge PO intake.

## 2017-09-12 LAB
ALBUMIN SERPL BCP-MCNC: 3.6 G/DL
ANION GAP SERPL CALC-SCNC: 10 MMOL/L
BACTERIA SPEC AEROBE CULT: NORMAL
BASOPHILS # BLD AUTO: 0 K/UL
BASOPHILS NFR BLD: 0 %
BUN SERPL-MCNC: 51 MG/DL
CALCIUM SERPL-MCNC: 9.4 MG/DL
CHLORIDE SERPL-SCNC: 105 MMOL/L
CLASS I ANTIBODY COMMENTS - LUMINEX: NORMAL
CLASS II ANTIBODY COMMENTS - LUMINEX: NORMAL
CO2 SERPL-SCNC: 22 MMOL/L
CREAT SERPL-MCNC: 3.5 MG/DL
DIFFERENTIAL METHOD: ABNORMAL
DSA1 TESTING DATE: NORMAL
DSA12 TESTING DATE: NORMAL
DSA2 TESTING DATE: NORMAL
EOSINOPHIL # BLD AUTO: 0 K/UL
EOSINOPHIL NFR BLD: 0.4 %
ERYTHROCYTE [DISTWIDTH] IN BLOOD BY AUTOMATED COUNT: 15 %
EST. GFR  (AFRICAN AMERICAN): 24.2 ML/MIN/1.73 M^2
EST. GFR  (NON AFRICAN AMERICAN): 20.9 ML/MIN/1.73 M^2
GLUCOSE SERPL-MCNC: 100 MG/DL
HCT VFR BLD AUTO: 22.3 %
HCT VFR BLD AUTO: 24.3 %
HCT VFR BLD AUTO: 24.9 %
HGB BLD-MCNC: 7.7 G/DL
HGB BLD-MCNC: 8.3 G/DL
HGB BLD-MCNC: 8.3 G/DL
LYMPHOCYTES # BLD AUTO: 0.2 K/UL
LYMPHOCYTES NFR BLD: 5.1 %
MAGNESIUM SERPL-MCNC: 2.1 MG/DL
MCH RBC QN AUTO: 29.7 PG
MCHC RBC AUTO-ENTMCNC: 33.3 G/DL
MCV RBC AUTO: 89 FL
MONOCYTES # BLD AUTO: 0.5 K/UL
MONOCYTES NFR BLD: 9.8 %
NEUTROPHILS # BLD AUTO: 4 K/UL
NEUTROPHILS NFR BLD: 83.9 %
PHOSPHATE SERPL-MCNC: 3.7 MG/DL
PLATELET # BLD AUTO: 80 K/UL
PMV BLD AUTO: 9.8 FL
POCT GLUCOSE: 119 MG/DL (ref 70–110)
POCT GLUCOSE: 134 MG/DL (ref 70–110)
POCT GLUCOSE: 138 MG/DL (ref 70–110)
POCT GLUCOSE: 182 MG/DL (ref 70–110)
POTASSIUM SERPL-SCNC: 4.8 MMOL/L
RBC # BLD AUTO: 2.79 M/UL
SERUM COLLECTION DT - LUMINEX CLASS I: NORMAL
SERUM COLLECTION DT - LUMINEX CLASS II: NORMAL
SODIUM SERPL-SCNC: 137 MMOL/L
TACROLIMUS BLD-MCNC: 18.1 NG/ML
WBC # BLD AUTO: 4.71 K/UL

## 2017-09-12 PROCEDURE — 25000003 PHARM REV CODE 250: Performed by: NURSE PRACTITIONER

## 2017-09-12 PROCEDURE — 83735 ASSAY OF MAGNESIUM: CPT

## 2017-09-12 PROCEDURE — 20600001 HC STEP DOWN PRIVATE ROOM

## 2017-09-12 PROCEDURE — 63600175 PHARM REV CODE 636 W HCPCS: Performed by: INTERNAL MEDICINE

## 2017-09-12 PROCEDURE — 99233 SBSQ HOSP IP/OBS HIGH 50: CPT | Mod: ,,, | Performed by: NURSE PRACTITIONER

## 2017-09-12 PROCEDURE — 85014 HEMATOCRIT: CPT | Mod: 91

## 2017-09-12 PROCEDURE — 85018 HEMOGLOBIN: CPT | Mod: 91

## 2017-09-12 PROCEDURE — 25000003 PHARM REV CODE 250: Performed by: PEDIATRICS

## 2017-09-12 PROCEDURE — 80197 ASSAY OF TACROLIMUS: CPT

## 2017-09-12 PROCEDURE — 80069 RENAL FUNCTION PANEL: CPT

## 2017-09-12 PROCEDURE — 25000003 PHARM REV CODE 250: Performed by: PHYSICIAN ASSISTANT

## 2017-09-12 PROCEDURE — 85025 COMPLETE CBC W/AUTO DIFF WBC: CPT

## 2017-09-12 PROCEDURE — 63600175 PHARM REV CODE 636 W HCPCS: Performed by: PHYSICIAN ASSISTANT

## 2017-09-12 PROCEDURE — 36415 COLL VENOUS BLD VENIPUNCTURE: CPT

## 2017-09-12 RX ADMIN — FAMOTIDINE 20 MG: 20 TABLET, FILM COATED ORAL at 10:09

## 2017-09-12 RX ADMIN — HYDROMORPHONE HYDROCHLORIDE 0.5 MG: 1 INJECTION, SOLUTION INTRAMUSCULAR; INTRAVENOUS; SUBCUTANEOUS at 06:09

## 2017-09-12 RX ADMIN — DIPHENHYDRAMINE HYDROCHLORIDE 25 MG: 25 CAPSULE ORAL at 05:09

## 2017-09-12 RX ADMIN — DIPHENHYDRAMINE HYDROCHLORIDE 25 MG: 25 CAPSULE ORAL at 12:09

## 2017-09-12 RX ADMIN — DIPHENHYDRAMINE HYDROCHLORIDE 25 MG: 25 CAPSULE ORAL at 10:09

## 2017-09-12 RX ADMIN — ISAVUCONAZONIUM SULFATE 372 MG: 186 CAPSULE ORAL at 10:09

## 2017-09-12 RX ADMIN — OXYCODONE HYDROCHLORIDE AND ACETAMINOPHEN 1 TABLET: 10; 325 TABLET ORAL at 09:09

## 2017-09-12 RX ADMIN — ISAVUCONAZONIUM SULFATE 372 MG: 186 CAPSULE ORAL at 05:09

## 2017-09-12 RX ADMIN — TACROLIMUS 7 MG: 5 CAPSULE ORAL at 08:09

## 2017-09-12 RX ADMIN — ONDANSETRON 4 MG: 2 INJECTION INTRAMUSCULAR; INTRAVENOUS at 10:09

## 2017-09-12 RX ADMIN — OXYCODONE HYDROCHLORIDE AND ACETAMINOPHEN 1 TABLET: 10; 325 TABLET ORAL at 05:09

## 2017-09-12 RX ADMIN — MYCOPHENOLATE MOFETIL 1000 MG: 250 CAPSULE ORAL at 08:09

## 2017-09-12 RX ADMIN — OXYCODONE HYDROCHLORIDE AND ACETAMINOPHEN 1 TABLET: 10; 325 TABLET ORAL at 12:09

## 2017-09-12 RX ADMIN — DOCUSATE SODIUM 100 MG: 50 CAPSULE, LIQUID FILLED ORAL at 05:09

## 2017-09-12 RX ADMIN — PREDNISONE 20 MG: 20 TABLET ORAL at 08:09

## 2017-09-12 RX ADMIN — HYDROMORPHONE HYDROCHLORIDE 0.5 MG: 1 INJECTION, SOLUTION INTRAMUSCULAR; INTRAVENOUS; SUBCUTANEOUS at 07:09

## 2017-09-12 RX ADMIN — DOCUSATE SODIUM 100 MG: 50 CAPSULE, LIQUID FILLED ORAL at 10:09

## 2017-09-12 RX ADMIN — THERA TABS 1 TABLET: TAB at 08:09

## 2017-09-12 RX ADMIN — BISACODYL 10 MG: 5 TABLET, COATED ORAL at 09:09

## 2017-09-12 RX ADMIN — ONDANSETRON 4 MG: 2 INJECTION INTRAMUSCULAR; INTRAVENOUS at 08:09

## 2017-09-12 RX ADMIN — HYDROMORPHONE HYDROCHLORIDE 0.5 MG: 1 INJECTION, SOLUTION INTRAMUSCULAR; INTRAVENOUS; SUBCUTANEOUS at 12:09

## 2017-09-12 RX ADMIN — DOCUSATE SODIUM 100 MG: 50 CAPSULE, LIQUID FILLED ORAL at 12:09

## 2017-09-12 RX ADMIN — SODIUM BICARBONATE 650 MG TABLET 1300 MG: at 08:09

## 2017-09-12 RX ADMIN — DOLUTEGRAVIR SODIUM 50 MG: 50 TABLET, FILM COATED ORAL at 08:09

## 2017-09-12 RX ADMIN — ISAVUCONAZONIUM SULFATE 372 MG: 186 CAPSULE ORAL at 12:09

## 2017-09-12 RX ADMIN — SODIUM BICARBONATE 650 MG TABLET 1300 MG: at 10:09

## 2017-09-12 RX ADMIN — MYCOPHENOLATE MOFETIL 1000 MG: 250 CAPSULE ORAL at 10:09

## 2017-09-12 RX ADMIN — ROSUVASTATIN CALCIUM 20 MG: 10 TABLET ORAL at 08:09

## 2017-09-12 NOTE — PLAN OF CARE
Problem: Patient Care Overview  Goal: Plan of Care Review  Outcome: Ongoing (interventions implemented as appropriate)  POC reviewed with pt. Pt verbalized understanding. Pt AAOX4, bed low locked, call light within reach, wearing nonskid socks. Pt instructed to use call light for assistance, pt verbalizes understanding.  Pt c/o incisional pain PRN pain meds given. Pt c/o of nausea once; IV Zofran given. RLQ incision with Telfa island dressing CDI. JESSICA with sanguinous drainage with 5 mL this shift. Pt remains free from injury/harm at this time. Afebrile.

## 2017-09-12 NOTE — SUBJECTIVE & OBJECTIVE
Subjective:     Demetrio Aguiar is a 37 y.o. 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 His maintenance immunosuppression includes a steroid taper per protocol to 5mg daily, Prograf, and Cellcept maintenance. His post op course has been complicated by DGF requiring HD (last HD 8/28/17) which prompted need for kidney biopsy on 8/25/17. 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 Biopsy 8/25/17: good sample with 24 glomeruli (only 1 sclerosed) and evidence of criteria for ABMR. + diffuse ATI; Minimal fibrosis, no ACR or AVR, but + CD4 (>50% diffusely + stain) with mild glomerulitis and focal peritubular capillaritis; also, biopsy shows ca-oxalate (3) and ca-phos (2) crystals. Since transplant, he has made minimal uop daily. He denies any current illnesses or sick contacts. Complains of expected post-op pain to RLQ incision. Pt treated with PLEX x6, SMP x3, and IVIG x2. Kidney function improved with tx.    Interval History: pt feels well today. H&H decreased overnight therefore 1 unit PRBCs transfused. H&H 8.3/24.9 this AM.  JESSICA drain with minimal serosang drainage. Cr 3.5 and he continues to make good uop. Pt manageable. (+) flatus, no BM yet. He is ambulating the halls. He has no complaints. Monitor.       Cr 3.5. Pt completed treatment of rejection with SMP x 3, PLEX x 6 and IVIG x 2. Use of Rituxan discussed - decision made to defer at this time, monitor with current tx, and use in future if needed.       Past Medical, Surgical, Family, and Social History:   Unchanged from H&P.    Review of patient's allergies indicates:  No Known Allergies    Scheduled Meds:   azithromycin   1,200 mg Oral Weekly    docusate sodium  100 mg Oral TID    dolutegravir  50 mg Oral Daily    epoetin erika  10,000 Units Subcutaneous Every Mon    famotidine  20 mg Oral QHS    isavuconazonium sulfate  372 mg Oral Q8H    Followed by    [START ON 9/14/2017] isavuconazonium sulfate  372 mg Oral Daily    multivitamin  1 tablet Oral Daily    mycophenolate  1,000 mg Oral BID    predniSONE  20 mg Oral Daily    rilpivirine  25 mg Oral Daily    rosuvastatin  20 mg Oral Daily    sodium bicarbonate  1,300 mg Oral BID    sodium chloride 0.9%  3 mL Intravenous Q8H    sulfamethoxazole-trimethoprim 400-80mg  1 tablet Oral Every Mon, Wed, Fri    valganciclovir  450 mg Oral Every Mon, Wed, Fri     Continuous Infusions:   PRN Meds:sodium chloride, bisacodyl, dextrose 50%, dextrose 50%, diphenhydrAMINE, EPINEPHrine, glucagon (human recombinant), glucose, glucose, HYDROmorphone, insulin aspart, ondansetron, oxycodone-acetaminophen, oxycodone-acetaminophen, simethicone    Intake/Output - Last 3 Shifts       09/10 0700 - 09/11 0659 09/11 0700 - 09/12 0659 09/12 0700 - 09/13 0659    P.O. 390 1200 480    I.V. (mL/kg) 1732.5 (22.4) 592.5 (7.6)     Blood 268      IV Piggyback 300 50     Total Intake(mL/kg) 2690.5 (34.7) 1842.5 (23.8) 480 (6.2)    Urine (mL/kg/hr) 2150 (1.2) 1150 (0.6) 450 (0.8)    Emesis/NG output 0 (0) 1 (0) 0 (0)    Drains 220 (0.1) 20 (0) 5 (0)    Other 0 (0) 0 (0) 0 (0)    Stool 0 (0) 0 (0) 0 (0)    Blood 0 (0) 0 (0) 0 (0)    Total Output 2370 1171 455    Net +320.5 +671.5 +25           Urine Occurrence 0 x 0 x     Stool Occurrence 0 x 0 x 0 x    Emesis Occurrence 0 x 0 x            Review of Systems   Constitutional: Negative for activity change, appetite change, chills, fatigue and fever.   HENT: Negative for congestion and facial swelling.    Eyes: Negative for pain, discharge and visual disturbance.   Respiratory: Negative for cough, chest tightness, shortness of breath and wheezing.   "  Cardiovascular: Negative for chest pain, palpitations and leg swelling.   Gastrointestinal: Positive for abdominal pain. Negative for abdominal distention, constipation, diarrhea and nausea.   Endocrine: Negative.    Genitourinary: Negative for decreased urine volume, dysuria and flank pain.   Musculoskeletal: Positive for back pain. Negative for arthralgias.   Skin: Positive for wound.   Allergic/Immunologic: Positive for immunocompromised state.   Neurological: Negative for dizziness, weakness, light-headedness and headaches.   Hematological: Negative.    Psychiatric/Behavioral: Negative for agitation, decreased concentration and sleep disturbance.      Objective:     Vital Signs (Most Recent):  Temp: 98.1 °F (36.7 °C) (09/12/17 1143)  Pulse: 79 (09/12/17 1143)  Resp: 18 (09/12/17 1143)  BP: (!) 144/72 (09/12/17 1213)  SpO2: 98 % (09/12/17 1143) Vital Signs (24h Range):  Temp:  [97.2 °F (36.2 °C)-98.5 °F (36.9 °C)] 98.1 °F (36.7 °C)  Pulse:  [] 79  Resp:  [18-20] 18  SpO2:  [97 %-99 %] 98 %  BP: (127-168)/(70-84) 144/72     Weight: 77.5 kg (170 lb 13.7 oz)  Height: 5' 7" (170.2 cm)  Body mass index is 26.76 kg/m².    Physical Exam   Constitutional: He is oriented to person, place, and time. He appears well-developed and well-nourished.   HENT:   Head: Normocephalic and atraumatic.   Eyes: Pupils are equal, round, and reactive to light.   Neck: Normal range of motion.   Cardiovascular: Normal rate, regular rhythm, normal heart sounds and intact distal pulses.    Pulmonary/Chest: Effort normal and breath sounds normal.   Abdominal: Soft. There is tenderness. There is no guarding.   RLQ inc with staples intact no s/s/i  RLQ JESSICA x 1 with serosang drainage   Musculoskeletal: He exhibits no edema.   Neurological: He is alert and oriented to person, place, and time.   Skin: Skin is warm and dry.   Nursing note and vitals reviewed.      Laboratory:  CBC:   Recent Labs  Lab 09/12/17  0519 09/12/17  0752   WBC 4.71  " --    RBC 2.79*  --    HGB 8.3* 7.7*   HCT 24.9* 22.3*   PLT 80*  --    MCV 89  --    MCH 29.7  --    MCHC 33.3  --      BMP:   Recent Labs  Lab 09/12/17  0519         K 4.8      CO2 22*   BUN 51*   CREATININE 3.5*   CALCIUM 9.4     Labs within the past 24 hours have been reviewed.    Diagnostic Results:  X-Ray: Reviewed  US: Reviewed

## 2017-09-12 NOTE — PROGRESS NOTES
[facility]  Kidney Transplant  Progress Note      Reason for Follow-up: Reassessment of Kidney Transplant - 8/18/2017  (#1) recipient and management of immunosuppression.      Subjective:     Demetrio Aguiar is a 37 y.o. 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 His maintenance immunosuppression includes a steroid taper per protocol to 5mg daily, Prograf, and Cellcept maintenance. His post op course has been complicated by DGF requiring HD (last HD 8/28/17) which prompted need for kidney biopsy on 8/25/17. 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 Biopsy 8/25/17: good sample with 24 glomeruli (only 1 sclerosed) and evidence of criteria for ABMR. + diffuse ATI; Minimal fibrosis, no ACR or AVR, but + CD4 (>50% diffusely + stain) with mild glomerulitis and focal peritubular capillaritis; also, biopsy shows ca-oxalate (3) and ca-phos (2) crystals. Since transplant, he has made minimal uop daily. He denies any current illnesses or sick contacts. Complains of expected post-op pain to RLQ incision. Pt treated with PLEX x6, SMP x3, and IVIG x2. Kidney function improved with tx.    Interval History: pt feels well today. H&H decreased overnight therefore 1 unit PRBCs transfused. H&H 8.3/24.9 this AM.  JESSICA drain with minimal serosang drainage. Cr 3.5 and he continues to make good uop. Pt manageable. (+) flatus, no BM yet. He is ambulating the halls. He has no complaints. Monitor.       Cr 3.5. Pt completed treatment of rejection with SMP x 3, PLEX x 6 and IVIG x 2. Use of Rituxan discussed - decision made to defer at this time, monitor with current tx, and use in  future if needed.       Past Medical, Surgical, Family, and Social History:   Unchanged from H&P.    Review of patient's allergies indicates:  No Known Allergies    Scheduled Meds:   azithromycin  1,200 mg Oral Weekly    docusate sodium  100 mg Oral TID    dolutegravir  50 mg Oral Daily    epoetin erika  10,000 Units Subcutaneous Every Mon    famotidine  20 mg Oral QHS    isavuconazonium sulfate  372 mg Oral Q8H    Followed by    [START ON 9/14/2017] isavuconazonium sulfate  372 mg Oral Daily    multivitamin  1 tablet Oral Daily    mycophenolate  1,000 mg Oral BID    predniSONE  20 mg Oral Daily    rilpivirine  25 mg Oral Daily    rosuvastatin  20 mg Oral Daily    sodium bicarbonate  1,300 mg Oral BID    sodium chloride 0.9%  3 mL Intravenous Q8H    sulfamethoxazole-trimethoprim 400-80mg  1 tablet Oral Every Mon, Wed, Fri    valganciclovir  450 mg Oral Every Mon, Wed, Fri     Continuous Infusions:   PRN Meds:sodium chloride, bisacodyl, dextrose 50%, dextrose 50%, diphenhydrAMINE, EPINEPHrine, glucagon (human recombinant), glucose, glucose, HYDROmorphone, insulin aspart, ondansetron, oxycodone-acetaminophen, oxycodone-acetaminophen, simethicone    Intake/Output - Last 3 Shifts       09/10 0700 - 09/11 0659 09/11 0700 - 09/12 0659 09/12 0700 - 09/13 0659    P.O. 390 1200 480    I.V. (mL/kg) 1732.5 (22.4) 592.5 (7.6)     Blood 268      IV Piggyback 300 50     Total Intake(mL/kg) 2690.5 (34.7) 1842.5 (23.8) 480 (6.2)    Urine (mL/kg/hr) 2150 (1.2) 1150 (0.6) 450 (0.8)    Emesis/NG output 0 (0) 1 (0) 0 (0)    Drains 220 (0.1) 20 (0) 5 (0)    Other 0 (0) 0 (0) 0 (0)    Stool 0 (0) 0 (0) 0 (0)    Blood 0 (0) 0 (0) 0 (0)    Total Output 2370 1171 455    Net +320.5 +671.5 +25           Urine Occurrence 0 x 0 x     Stool Occurrence 0 x 0 x 0 x    Emesis Occurrence 0 x 0 x            Review of Systems   Constitutional: Negative for activity change, appetite change, chills, fatigue and fever.   HENT: Negative for  "congestion and facial swelling.    Eyes: Negative for pain, discharge and visual disturbance.   Respiratory: Negative for cough, chest tightness, shortness of breath and wheezing.    Cardiovascular: Negative for chest pain, palpitations and leg swelling.   Gastrointestinal: Positive for abdominal pain. Negative for abdominal distention, constipation, diarrhea and nausea.   Endocrine: Negative.    Genitourinary: Negative for decreased urine volume, dysuria and flank pain.   Musculoskeletal: Positive for back pain. Negative for arthralgias.   Skin: Positive for wound.   Allergic/Immunologic: Positive for immunocompromised state.   Neurological: Negative for dizziness, weakness, light-headedness and headaches.   Hematological: Negative.    Psychiatric/Behavioral: Negative for agitation, decreased concentration and sleep disturbance.      Objective:     Vital Signs (Most Recent):  Temp: 98.1 °F (36.7 °C) (09/12/17 1143)  Pulse: 79 (09/12/17 1143)  Resp: 18 (09/12/17 1143)  BP: (!) 144/72 (09/12/17 1213)  SpO2: 98 % (09/12/17 1143) Vital Signs (24h Range):  Temp:  [97.2 °F (36.2 °C)-98.5 °F (36.9 °C)] 98.1 °F (36.7 °C)  Pulse:  [] 79  Resp:  [18-20] 18  SpO2:  [97 %-99 %] 98 %  BP: (127-168)/(70-84) 144/72     Weight: 77.5 kg (170 lb 13.7 oz)  Height: 5' 7" (170.2 cm)  Body mass index is 26.76 kg/m².    Physical Exam   Constitutional: He is oriented to person, place, and time. He appears well-developed and well-nourished.   HENT:   Head: Normocephalic and atraumatic.   Eyes: Pupils are equal, round, and reactive to light.   Neck: Normal range of motion.   Cardiovascular: Normal rate, regular rhythm, normal heart sounds and intact distal pulses.    Pulmonary/Chest: Effort normal and breath sounds normal.   Abdominal: Soft. There is tenderness. There is no guarding.   RLQ inc with staples intact no s/s/i  RLQ JESSICA x 1 with serosang drainage   Musculoskeletal: He exhibits no edema.   Neurological: He is alert and " oriented to person, place, and time.   Skin: Skin is warm and dry.   Nursing note and vitals reviewed.      Laboratory:  CBC:   Recent Labs  Lab 17  0519 17  0752   WBC 4.71  --    RBC 2.79*  --    HGB 8.3* 7.7*   HCT 24.9* 22.3*   PLT 80*  --    MCV 89  --    MCH 29.7  --    MCHC 33.3  --      BMP:   Recent Labs  Lab 17  0519         K 4.8      CO2 22*   BUN 51*   CREATININE 3.5*   CALCIUM 9.4     Labs within the past 24 hours have been reviewed.    Diagnostic Results:  X-Ray: Reviewed  US: Reviewed    Assessment/Plan:     * Acute rejection of kidney transplant    - Biopsy (for DGF) 17: good sample with 24 glomeruli (only 1 sclerosed) and evidence just meeting criteria for ABMR. + diffuse ATI. Minimal fibrosis, no ACR or AVR, but + CD4 (>50% diffusely + stain) with mild glomerulitis and focal peritubular capillaritis. Also, biopsy shows ca-oxalate (3) and ca-phos (2) crystals.  -17 DSA + for weak class I. Repeat pre-PLEX Class I DSA DETECTED: A2(3894),B44(2820),B62(5178),    Class II DSA DETECTED BELOW CUTOFF: DR53(1265)  - Tolerated PLEX #1 , #2 , #3 , #4 ,  #5 , and #6 17.    - completed SMP x 3. Of note, original plan for thymo cancelled secondary to difficulty with line placement due to pt anatomy.  Cellcept originally held- restarted.  - Tolerated IVIG #1 and 2 -.    - Plan for rituximab in future of needed- for now monitor with current therapy.   - UOP increasing and overall Cr improved.   - Repeat DSAs pending from .         Delayed graft function of kidney    - Improved GFR with excellent urine output.            donor kidney transplant 2017    - Allograft function improving.    - See ABMR and DGF.  - Pt Cr 3.5 today.  Hold lasix and encouarge PO intake.             Need for prophylactic immunotherapy    - See long term use of IS.            Long-term use of immunosuppressant medication    - Continue Prograf and  Cellcept.  - dc keto in AM 9/12. Isavu started 9/12.  - Will monitor for signs of toxic side effects, check daily Prograf troughs, and change meds accordingly.            Anemia due to acute blood loss    - Acute blood loss since 9/8/17 when hematoma evacuated from wound.  - Re-explored for bleeding 9/9/17.  - H&H decreased overnight. 1 unit PRBC given 9/11.  - H&H stable today. Monitor q12 hrs.          At risk for opportunistic infections    - Continue Valcyte for CMV prophylaxis.  - Continue Bactrim for PCP prophylaxis.  - Continue nystatin for fungal prophylaxis.          Anemia in ESRD (end-stage renal disease)    - Epoetin injection 10K units 9/5/17.   - Haptoglobin 88 and LDH 93.   - Post laprotomy fall in Hb, blood given 1 unit PRBC, 9/10, 9/11.  - DDAVP today 9/11.  - H&H stable today. Monitor q12h.        Immunocompromised state    - Will continue with HAART therapy, immuno prophylaxis.          HIV (human immunodeficiency virus infection)    - Last CD4 count 7 (8/20) and HIV PCR undetected < 40 copies/ml (8/19).  - Start once weekly azithromycin for MAC prophylaxis, first dose 9/8/17.   - Continue HAART with dolutegravir (Tivicay) and rilpivirine ([employer]urant).    - HIV meds to be given after PLEX- meds highly protein bound and can be removed with plasmapheresis.  - HIV RNA negative 9/5.  Immune cyclex 78.           Antibody mediated rejection of kidney transplant    - see rejection of kidney transplant.          Status post exploratory laparotomy    - Re explored for retroperitoneal hematoma. Hematoma evacuated. (09/09)              Discharge Planning: not a candidate for dc at this time.  Kb baugh 121      Latonia Rich NP  Kidney Transplant  [facility]

## 2017-09-12 NOTE — ASSESSMENT & PLAN NOTE
- Epoetin injection 10K units 9/5/17.   - Haptoglobin 88 and LDH 93.   - Post laprotomy fall in Hb, blood given 1 unit PRBC, 9/10, 9/11.  - DDAVP today 9/11.  - H&H stable today. Monitor q12h.

## 2017-09-12 NOTE — PLAN OF CARE
Problem: Patient Care Overview  Goal: Plan of Care Review  Pt AAOx4, bed low locked, call light within reach, wearing nonskid socks. Pt instructed to use call light for assistance, pt verbalizes understanding.  Pt c/o incisional pain PRN pain meds given, some relief noted. Tele NSR/[facility] AC/HS. 139, no coverage needed. RLQ incision with Telfa island dressing CDI. JESSICA with sanguinous/ bloody drainage <10mL this shift. 1 unit PRBC given, no adverse reaction noted. Pt remains free from injury/harm at this time. Afebrile. See flowsheet for full assessment/VS.

## 2017-09-12 NOTE — PROGRESS NOTES
JUAN met with pt to assist with a medicaid letter that he had received. SW read the letter and informed pt that it was a survey about a procedure he had in May. Pt completed the form and SW mailed it for the pt. Pt denied any other needs at this time.SW remains available.

## 2017-09-12 NOTE — ASSESSMENT & PLAN NOTE
- Continue Prograf and Cellcept.  - dc keto in AM 9/12. Isavu started 9/12.  - Will monitor for signs of toxic side effects, check daily Prograf troughs, and change meds accordingly.

## 2017-09-12 NOTE — ASSESSMENT & PLAN NOTE
- Allograft function improving.    - See ABMR and DGF.  - Pt Cr 3.5 today.  Hold lasix and encouarge PO intake.

## 2017-09-12 NOTE — ASSESSMENT & PLAN NOTE
- Acute blood loss since 9/8/17 when hematoma evacuated from wound.  - Re-explored for bleeding 9/9/17.  - H&H decreased overnight. 1 unit PRBC given 9/11.  - H&H stable today. Monitor q12 hrs.

## 2017-09-13 LAB
ALBUMIN SERPL BCP-MCNC: 3.4 G/DL
ANION GAP SERPL CALC-SCNC: 12 MMOL/L
BASOPHILS # BLD AUTO: 0 K/UL
BASOPHILS NFR BLD: 0 %
BLD PROD TYP BPU: NORMAL
BLOOD UNIT EXPIRATION DATE: NORMAL
BLOOD UNIT TYPE CODE: 6200
BLOOD UNIT TYPE: NORMAL
BUN SERPL-MCNC: 52 MG/DL
CALCIUM SERPL-MCNC: 9.2 MG/DL
CHLORIDE SERPL-SCNC: 105 MMOL/L
CO2 SERPL-SCNC: 20 MMOL/L
CODING SYSTEM: NORMAL
CREAT SERPL-MCNC: 3.3 MG/DL
DIFFERENTIAL METHOD: ABNORMAL
DISPENSE STATUS: NORMAL
EOSINOPHIL # BLD AUTO: 0 K/UL
EOSINOPHIL NFR BLD: 0.7 %
ERYTHROCYTE [DISTWIDTH] IN BLOOD BY AUTOMATED COUNT: 14.9 %
EST. GFR  (AFRICAN AMERICAN): 25.9 ML/MIN/1.73 M^2
EST. GFR  (NON AFRICAN AMERICAN): 22.4 ML/MIN/1.73 M^2
GLUCOSE SERPL-MCNC: 96 MG/DL
HCT VFR BLD AUTO: 24.6 %
HCT VFR BLD AUTO: 24.7 %
HCT VFR BLD AUTO: 26.8 %
HGB BLD-MCNC: 8.3 G/DL
HGB BLD-MCNC: 8.3 G/DL
HGB BLD-MCNC: 9 G/DL
LYMPHOCYTES # BLD AUTO: 0.2 K/UL
LYMPHOCYTES NFR BLD: 5.1 %
MAGNESIUM SERPL-MCNC: 2.1 MG/DL
MCH RBC QN AUTO: 29.7 PG
MCHC RBC AUTO-ENTMCNC: 33.7 G/DL
MCV RBC AUTO: 88 FL
MONOCYTES # BLD AUTO: 0.5 K/UL
MONOCYTES NFR BLD: 11.6 %
NEUTROPHILS # BLD AUTO: 3.4 K/UL
NEUTROPHILS NFR BLD: 82.1 %
PHOSPHATE SERPL-MCNC: 3.5 MG/DL
PLATELET # BLD AUTO: 93 K/UL
PMV BLD AUTO: 10 FL
POCT GLUCOSE: 106 MG/DL (ref 70–110)
POCT GLUCOSE: 107 MG/DL (ref 70–110)
POCT GLUCOSE: 113 MG/DL (ref 70–110)
POCT GLUCOSE: 139 MG/DL (ref 70–110)
POTASSIUM SERPL-SCNC: 4.7 MMOL/L
RBC # BLD AUTO: 2.79 M/UL
SODIUM SERPL-SCNC: 137 MMOL/L
TACROLIMUS BLD-MCNC: 8.1 NG/ML
TRANS ERYTHROCYTES VOL PATIENT: NORMAL ML
WBC # BLD AUTO: 4.13 K/UL

## 2017-09-13 PROCEDURE — 99233 SBSQ HOSP IP/OBS HIGH 50: CPT | Mod: ,,, | Performed by: NURSE PRACTITIONER

## 2017-09-13 PROCEDURE — 85025 COMPLETE CBC W/AUTO DIFF WBC: CPT

## 2017-09-13 PROCEDURE — 36415 COLL VENOUS BLD VENIPUNCTURE: CPT

## 2017-09-13 PROCEDURE — 80069 RENAL FUNCTION PANEL: CPT

## 2017-09-13 PROCEDURE — 85014 HEMATOCRIT: CPT

## 2017-09-13 PROCEDURE — 20600001 HC STEP DOWN PRIVATE ROOM

## 2017-09-13 PROCEDURE — 63600175 PHARM REV CODE 636 W HCPCS: Performed by: INTERNAL MEDICINE

## 2017-09-13 PROCEDURE — 25000003 PHARM REV CODE 250: Performed by: NURSE PRACTITIONER

## 2017-09-13 PROCEDURE — 63600175 PHARM REV CODE 636 W HCPCS: Performed by: PHYSICIAN ASSISTANT

## 2017-09-13 PROCEDURE — 83735 ASSAY OF MAGNESIUM: CPT

## 2017-09-13 PROCEDURE — 80197 ASSAY OF TACROLIMUS: CPT

## 2017-09-13 PROCEDURE — 85018 HEMOGLOBIN: CPT

## 2017-09-13 PROCEDURE — 25000003 PHARM REV CODE 250: Performed by: PHYSICIAN ASSISTANT

## 2017-09-13 PROCEDURE — A4216 STERILE WATER/SALINE, 10 ML: HCPCS | Performed by: NURSE PRACTITIONER

## 2017-09-13 PROCEDURE — 63600175 PHARM REV CODE 636 W HCPCS: Performed by: NURSE PRACTITIONER

## 2017-09-13 RX ORDER — TACROLIMUS 1 MG/1
4 CAPSULE ORAL 2 TIMES DAILY
Status: DISCONTINUED | OUTPATIENT
Start: 2017-09-13 | End: 2017-09-14

## 2017-09-13 RX ADMIN — FAMOTIDINE 20 MG: 20 TABLET, FILM COATED ORAL at 08:09

## 2017-09-13 RX ADMIN — OXYCODONE HYDROCHLORIDE AND ACETAMINOPHEN 1 TABLET: 10; 325 TABLET ORAL at 05:09

## 2017-09-13 RX ADMIN — ISAVUCONAZONIUM SULFATE 372 MG: 186 CAPSULE ORAL at 05:09

## 2017-09-13 RX ADMIN — HYDROMORPHONE HYDROCHLORIDE 0.5 MG: 1 INJECTION, SOLUTION INTRAMUSCULAR; INTRAVENOUS; SUBCUTANEOUS at 05:09

## 2017-09-13 RX ADMIN — MYCOPHENOLATE MOFETIL 1000 MG: 250 CAPSULE ORAL at 08:09

## 2017-09-13 RX ADMIN — Medication 3 ML: at 02:09

## 2017-09-13 RX ADMIN — THERA TABS 1 TABLET: TAB at 08:09

## 2017-09-13 RX ADMIN — VALGANCICLOVIR 450 MG: 450 TABLET, FILM COATED ORAL at 05:09

## 2017-09-13 RX ADMIN — HYDROMORPHONE HYDROCHLORIDE 0.5 MG: 1 INJECTION, SOLUTION INTRAMUSCULAR; INTRAVENOUS; SUBCUTANEOUS at 08:09

## 2017-09-13 RX ADMIN — ISAVUCONAZONIUM SULFATE 372 MG: 186 CAPSULE ORAL at 08:09

## 2017-09-13 RX ADMIN — SODIUM BICARBONATE 650 MG TABLET 1300 MG: at 08:09

## 2017-09-13 RX ADMIN — SULFAMETHOXAZOLE AND TRIMETHOPRIM 1 TABLET: 400; 80 TABLET ORAL at 05:09

## 2017-09-13 RX ADMIN — ONDANSETRON 4 MG: 2 INJECTION INTRAMUSCULAR; INTRAVENOUS at 06:09

## 2017-09-13 RX ADMIN — DOCUSATE SODIUM 100 MG: 50 CAPSULE, LIQUID FILLED ORAL at 08:09

## 2017-09-13 RX ADMIN — PREDNISONE 20 MG: 20 TABLET ORAL at 08:09

## 2017-09-13 RX ADMIN — ISAVUCONAZONIUM SULFATE 372 MG: 186 CAPSULE ORAL at 02:09

## 2017-09-13 RX ADMIN — ROSUVASTATIN CALCIUM 20 MG: 10 TABLET ORAL at 08:09

## 2017-09-13 RX ADMIN — DOCUSATE SODIUM 100 MG: 50 CAPSULE, LIQUID FILLED ORAL at 02:09

## 2017-09-13 RX ADMIN — OXYCODONE HYDROCHLORIDE AND ACETAMINOPHEN 1 TABLET: 10; 325 TABLET ORAL at 11:09

## 2017-09-13 RX ADMIN — DOCUSATE SODIUM 100 MG: 50 CAPSULE, LIQUID FILLED ORAL at 05:09

## 2017-09-13 RX ADMIN — DOLUTEGRAVIR SODIUM 50 MG: 50 TABLET, FILM COATED ORAL at 09:09

## 2017-09-13 RX ADMIN — TACROLIMUS 4 MG: 1 CAPSULE ORAL at 05:09

## 2017-09-13 NOTE — ASSESSMENT & PLAN NOTE
- Allograft function improving.    - See ABMR and DGF.  - Pt Cr 3.3 today.  Hold lasix and encouarge PO intake.

## 2017-09-13 NOTE — ASSESSMENT & PLAN NOTE
- Epoetin injection 10K units 9/5/17.   - Haptoglobin 88 and LDH 93.   - Post laprotomy fall in Hb, blood given 1 unit PRBC, 9/10, 9/11.  - DDAVP 9/11.  - H&H stable today. Monitor q12h.

## 2017-09-13 NOTE — ASSESSMENT & PLAN NOTE
- Continue Prograf and Cellcept.  - dc'd keto and started Isavu 9/12.  - Will monitor for signs of toxic side effects, check daily Prograf troughs, and change meds accordingly.

## 2017-09-13 NOTE — PLAN OF CARE
Problem: Patient Care Overview  Goal: Plan of Care Review  Outcome: Ongoing (interventions implemented as appropriate)  POC reviewed with pt. Pt verbalized understanding. Pt AAOX4, bed low locked, call light within reach, wearing nonskid socks. Pt instructed to use call light for assistance, pt verbalizes understanding.  Pt c/o incisional pain PRN pain meds given. Pt remains free from injury/harm at this time. Afebrile. VSS. Will continue to monitor.

## 2017-09-13 NOTE — SUBJECTIVE & OBJECTIVE
Subjective:     Demetrio Aguiar is a 37 y.o. 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 His maintenance immunosuppression includes a steroid taper per protocol to 5mg daily, Prograf, and Cellcept maintenance. His post op course has been complicated by DGF requiring HD (last HD 8/28/17) which prompted need for kidney biopsy on 8/25/17. 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 Biopsy 8/25/17: good sample with 24 glomeruli (only 1 sclerosed) and evidence of criteria for ABMR. + diffuse ATI; Minimal fibrosis, no ACR or AVR, but + CD4 (>50% diffusely + stain) with mild glomerulitis and focal peritubular capillaritis; also, biopsy shows ca-oxalate (3) and ca-phos (2) crystals. Since transplant, he has made minimal uop daily. He denies any current illnesses or sick contacts. Complains of expected post-op pain to RLQ incision. Pt treated with PLEX x6, SMP x3, and IVIG x2. Kidney function improved with tx.    Interval History: pt feels well today. H&H stable. JESSICA drain with very minimal serosang drainage. Cr trending down, 3.3. No acute events overnight. Monitor.       Cr 3.5. Pt completed treatment of rejection with SMP x 3, PLEX x 6 and IVIG x 2. Use of Rituxan discussed - decision made to defer at this time, monitor with current tx, and use in future if needed.       Past Medical, Surgical, Family, and Social History:   Unchanged from H&P.    Review of patient's allergies indicates:  No Known Allergies    Scheduled Meds:   azithromycin  1,200 mg Oral Weekly    docusate sodium  100 mg Oral TID    dolutegravir  50 mg Oral Daily    epoetin erika  10,000 Units Subcutaneous Every  Mon    famotidine  20 mg Oral QHS    [START ON 9/14/2017] isavuconazonium sulfate  372 mg Oral Daily    multivitamin  1 tablet Oral Daily    mycophenolate  1,000 mg Oral BID    predniSONE  20 mg Oral Daily    rilpivirine  25 mg Oral Daily    rosuvastatin  20 mg Oral Daily    sodium bicarbonate  1,300 mg Oral BID    sodium chloride 0.9%  3 mL Intravenous Q8H    sulfamethoxazole-trimethoprim 400-80mg  1 tablet Oral Every Mon, Wed, Fri    tacrolimus  4 mg Oral BID    valganciclovir  450 mg Oral Every Mon, Wed, Fri     Continuous Infusions:   PRN Meds:sodium chloride, bisacodyl, dextrose 50%, dextrose 50%, diphenhydrAMINE, EPINEPHrine, glucagon (human recombinant), glucose, glucose, HYDROmorphone, insulin aspart, ondansetron, oxycodone-acetaminophen, oxycodone-acetaminophen, simethicone    Intake/Output - Last 3 Shifts       09/11 0700 - 09/12 0659 09/12 0700 - 09/13 0659 09/13 0700 - 09/14 0659    P.O. 1200 480 720    I.V. (mL/kg) 592.5 (7.6)      Blood       IV Piggyback 50      Total Intake(mL/kg) 1842.5 (23.8) 480 (6.4) 720 (9.6)    Urine (mL/kg/hr) 1150 (0.6) 1625 (0.9) 650 (1.1)    Emesis/NG output 1 (0) 0 (0)     Drains 20 (0) 10 (0) 0 (0)    Other 0 (0) 0 (0)     Stool 0 (0) 0 (0) 0 (0)    Blood 0 (0) 0 (0)     Total Output 1171 1635 650    Net +671.5 -1155 +70           Urine Occurrence 0 x      Stool Occurrence 0 x 0 x 2 x    Emesis Occurrence 0 x             Review of Systems   Constitutional: Negative for activity change, appetite change, chills, fatigue and fever.   HENT: Negative for congestion and facial swelling.    Eyes: Negative for pain, discharge and visual disturbance.   Respiratory: Negative for cough, chest tightness, shortness of breath and wheezing.    Cardiovascular: Negative for chest pain, palpitations and leg swelling.   Gastrointestinal: Negative for abdominal distention, abdominal pain, constipation, diarrhea and nausea.   Endocrine: Negative.    Genitourinary: Negative for  "decreased urine volume, dysuria and flank pain.   Musculoskeletal: Negative for arthralgias and back pain.   Skin: Positive for wound.   Allergic/Immunologic: Positive for immunocompromised state.   Neurological: Negative for dizziness, weakness, light-headedness and headaches.   Hematological: Negative.    Psychiatric/Behavioral: Negative for agitation, decreased concentration and sleep disturbance.      Objective:     Vital Signs (Most Recent):  Temp: 98.3 °F (36.8 °C) (09/13/17 1155)  Pulse: 74 (09/13/17 1155)  Resp: 18 (09/13/17 1155)  BP: 137/82 (09/13/17 1155)  SpO2: 96 % (09/13/17 1155) Vital Signs (24h Range):  Temp:  [97.9 °F (36.6 °C)-98.6 °F (37 °C)] 98.3 °F (36.8 °C)  Pulse:  [66-77] 74  Resp:  [16-18] 18  SpO2:  [96 %-99 %] 96 %  BP: (127-148)/(60-82) 137/82     Weight: 75 kg (165 lb 5.5 oz)  Height: 5' 7" (170.2 cm)  Body mass index is 25.9 kg/m².    Physical Exam   Constitutional: He is oriented to person, place, and time. He appears well-developed and well-nourished.   HENT:   Head: Normocephalic and atraumatic.   Eyes: Pupils are equal, round, and reactive to light.   Neck: Normal range of motion.   Cardiovascular: Normal rate, regular rhythm, normal heart sounds and intact distal pulses.    Pulmonary/Chest: Effort normal and breath sounds normal.   Abdominal: Soft. There is tenderness. There is no guarding.   RLQ inc with staples intact no s/s/i  RLQ JESSICA x 1 with serosang drainage   Musculoskeletal: He exhibits no edema.   Neurological: He is alert and oriented to person, place, and time.   Skin: Skin is warm and dry.   Nursing note and vitals reviewed.      Laboratory:  CBC:   Recent Labs  Lab 09/13/17  0516 09/13/17  0836   WBC 4.13  --    RBC 2.79*  --    HGB 8.3* 8.3*   HCT 24.6* 24.7*   PLT 93*  --    MCV 88  --    MCH 29.7  --    MCHC 33.7  --      BMP:   Recent Labs  Lab 09/13/17  0516   GLU 96      K 4.7      CO2 20*   BUN 52*   CREATININE 3.3*   CALCIUM 9.2     Labs within the " past 24 hours have been reviewed.    Diagnostic Results:  X-Ray: Reviewed  US: Reviewed

## 2017-09-13 NOTE — ASSESSMENT & PLAN NOTE
- Acute blood loss since 9/8/17 when hematoma evacuated from wound.  - Re-explored for bleeding 9/9/17.  - 1 unit PRBC given last on 9/11.  - H&H stable today. Monitor q12 hrs.

## 2017-09-13 NOTE — PROGRESS NOTES
[facility]  Kidney Transplant  Progress Note      Reason for Follow-up: Reassessment of Kidney Transplant - 8/18/2017  (#1) recipient and management of immunosuppression.      Subjective:     Demetrio Aguiar is a 37 y.o. 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 His maintenance immunosuppression includes a steroid taper per protocol to 5mg daily, Prograf, and Cellcept maintenance. His post op course has been complicated by DGF requiring HD (last HD 8/28/17) which prompted need for kidney biopsy on 8/25/17. 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 Biopsy 8/25/17: good sample with 24 glomeruli (only 1 sclerosed) and evidence of criteria for ABMR. + diffuse ATI; Minimal fibrosis, no ACR or AVR, but + CD4 (>50% diffusely + stain) with mild glomerulitis and focal peritubular capillaritis; also, biopsy shows ca-oxalate (3) and ca-phos (2) crystals. Since transplant, he has made minimal uop daily. He denies any current illnesses or sick contacts. Complains of expected post-op pain to RLQ incision. Pt treated with PLEX x6, SMP x3, and IVIG x2. Kidney function improved with tx.    Interval History: pt feels well today. H&H stable. JESSICA drain with very minimal serosang drainage. Cr trending down, 3.3. No acute events overnight. Monitor.       Cr 3.5. Pt completed treatment of rejection with SMP x 3, PLEX x 6 and IVIG x 2. Use of Rituxan discussed - decision made to defer at this time, monitor with current tx, and use in future if needed.       Past Medical, Surgical, Family, and Social History:   Unchanged from H&P.    Review of patient's allergies indicates:  No  Known Allergies    Scheduled Meds:   azithromycin  1,200 mg Oral Weekly    docusate sodium  100 mg Oral TID    dolutegravir  50 mg Oral Daily    epoetin erika  10,000 Units Subcutaneous Every Mon    famotidine  20 mg Oral QHS    [START ON 9/14/2017] isavuconazonium sulfate  372 mg Oral Daily    multivitamin  1 tablet Oral Daily    mycophenolate  1,000 mg Oral BID    predniSONE  20 mg Oral Daily    rilpivirine  25 mg Oral Daily    rosuvastatin  20 mg Oral Daily    sodium bicarbonate  1,300 mg Oral BID    sodium chloride 0.9%  3 mL Intravenous Q8H    sulfamethoxazole-trimethoprim 400-80mg  1 tablet Oral Every Mon, Wed, Fri    tacrolimus  4 mg Oral BID    valganciclovir  450 mg Oral Every Mon, Wed, Fri     Continuous Infusions:   PRN Meds:sodium chloride, bisacodyl, dextrose 50%, dextrose 50%, diphenhydrAMINE, EPINEPHrine, glucagon (human recombinant), glucose, glucose, HYDROmorphone, insulin aspart, ondansetron, oxycodone-acetaminophen, oxycodone-acetaminophen, simethicone    Intake/Output - Last 3 Shifts       09/11 0700 - 09/12 0659 09/12 0700 - 09/13 0659 09/13 0700 - 09/14 0659    P.O. 1200 480 720    I.V. (mL/kg) 592.5 (7.6)      Blood       IV Piggyback 50      Total Intake(mL/kg) 1842.5 (23.8) 480 (6.4) 720 (9.6)    Urine (mL/kg/hr) 1150 (0.6) 1625 (0.9) 650 (1.1)    Emesis/NG output 1 (0) 0 (0)     Drains 20 (0) 10 (0) 0 (0)    Other 0 (0) 0 (0)     Stool 0 (0) 0 (0) 0 (0)    Blood 0 (0) 0 (0)     Total Output 1171 1635 650    Net +671.5 -1155 +70           Urine Occurrence 0 x      Stool Occurrence 0 x 0 x 2 x    Emesis Occurrence 0 x             Review of Systems   Constitutional: Negative for activity change, appetite change, chills, fatigue and fever.   HENT: Negative for congestion and facial swelling.    Eyes: Negative for pain, discharge and visual disturbance.   Respiratory: Negative for cough, chest tightness, shortness of breath and wheezing.    Cardiovascular: Negative for chest pain,  "palpitations and leg swelling.   Gastrointestinal: Negative for abdominal distention, abdominal pain, constipation, diarrhea and nausea.   Endocrine: Negative.    Genitourinary: Negative for decreased urine volume, dysuria and flank pain.   Musculoskeletal: Negative for arthralgias and back pain.   Skin: Positive for wound.   Allergic/Immunologic: Positive for immunocompromised state.   Neurological: Negative for dizziness, weakness, light-headedness and headaches.   Hematological: Negative.    Psychiatric/Behavioral: Negative for agitation, decreased concentration and sleep disturbance.      Objective:     Vital Signs (Most Recent):  Temp: 98.3 °F (36.8 °C) (09/13/17 1155)  Pulse: 74 (09/13/17 1155)  Resp: 18 (09/13/17 1155)  BP: 137/82 (09/13/17 1155)  SpO2: 96 % (09/13/17 1155) Vital Signs (24h Range):  Temp:  [97.9 °F (36.6 °C)-98.6 °F (37 °C)] 98.3 °F (36.8 °C)  Pulse:  [66-77] 74  Resp:  [16-18] 18  SpO2:  [96 %-99 %] 96 %  BP: (127-148)/(60-82) 137/82     Weight: 75 kg (165 lb 5.5 oz)  Height: 5' 7" (170.2 cm)  Body mass index is 25.9 kg/m².    Physical Exam   Constitutional: He is oriented to person, place, and time. He appears well-developed and well-nourished.   HENT:   Head: Normocephalic and atraumatic.   Eyes: Pupils are equal, round, and reactive to light.   Neck: Normal range of motion.   Cardiovascular: Normal rate, regular rhythm, normal heart sounds and intact distal pulses.    Pulmonary/Chest: Effort normal and breath sounds normal.   Abdominal: Soft. There is tenderness. There is no guarding.   RLQ inc with staples intact no s/s/i  RLQ JESSICA x 1 with serosang drainage   Musculoskeletal: He exhibits no edema.   Neurological: He is alert and oriented to person, place, and time.   Skin: Skin is warm and dry.   Nursing note and vitals reviewed.      Laboratory:  CBC:   Recent Labs  Lab 09/13/17  0516 09/13/17  0836   WBC 4.13  --    RBC 2.79*  --    HGB 8.3* 8.3*   HCT 24.6* 24.7*   PLT 93*  --    MCV " 88  --    MCH 29.7  --    MCHC 33.7  --      BMP:   Recent Labs  Lab 17  0516   GLU 96      K 4.7      CO2 20*   BUN 52*   CREATININE 3.3*   CALCIUM 9.2     Labs within the past 24 hours have been reviewed.    Diagnostic Results:  X-Ray: Reviewed  US: Reviewed    Assessment/Plan:     * Acute rejection of kidney transplant    - Biopsy (for DGF) 17: good sample with 24 glomeruli (only 1 sclerosed) and evidence just meeting criteria for ABMR. + diffuse ATI. Minimal fibrosis, no ACR or AVR, but + CD4 (>50% diffusely + stain) with mild glomerulitis and focal peritubular capillaritis. Also, biopsy shows ca-oxalate (3) and ca-phos (2) crystals.  -17 DSA + for weak class I. Repeat pre-PLEX Class I DSA DETECTED: A2(3894),B44(2820),B62(5178),    Class II DSA DETECTED BELOW CUTOFF: DR53(1265)  - Tolerated PLEX #1 , #2 , #3 , #4 ,  #5 , and #6 17.    - completed SMP x 3. Of note, original plan for thymo cancelled secondary to difficulty with line placement due to pt anatomy.  Cellcept originally held- restarted.  - Tolerated IVIG #1 and 2 -.    - Plan for rituximab in future of needed- for now monitor with current therapy.   - UOP increasing and overall Cr improved.   - Repeat DSAs pending from .         Delayed graft function of kidney    - Improving GFR with excellent urine output.            donor kidney transplant 2017    - Allograft function improving.    - See ABMR and DGF.  - Pt Cr 3.3 today.  Hold lasix and encouarge PO intake.             Need for prophylactic immunotherapy    - See long term use of IS.            Long-term use of immunosuppressant medication    - Continue Prograf and Cellcept.  - dc'd keto and started Isavu .  - Will monitor for signs of toxic side effects, check daily Prograf troughs, and change meds accordingly.            Anemia due to acute blood loss    - Acute blood loss since 17 when hematoma evacuated from  wound.  - Re-explored for bleeding 9/9/17.  - 1 unit PRBC given last on 9/11.  - H&H stable today. Monitor q12 hrs.          At risk for opportunistic infections    - Continue Valcyte for CMV prophylaxis.  - Continue Bactrim for PCP prophylaxis.  - Continue nystatin for fungal prophylaxis.          Anemia in ESRD (end-stage renal disease)    - Epoetin injection 10K units 9/5/17.   - Haptoglobin 88 and LDH 93.   - Post laprotomy fall in Hb, blood given 1 unit PRBC, 9/10, 9/11.  - DDAVP 9/11.  - H&H stable today. Monitor q12h.        Immunocompromised state    - Will continue with HAART therapy, immuno prophylaxis.          HIV (human immunodeficiency virus infection)    - Last CD4 count 7 (8/20) and HIV PCR undetected < 40 copies/ml (8/19).  - Start once weekly azithromycin for MAC prophylaxis, first dose 9/8/17.   - Continue HAART with dolutegravir (Tivicay) and rilpivirine (Edurant).    - HIV meds to be given after PLEX- meds highly protein bound and can be removed with plasmapheresis.  - HIV RNA negative 9/5.  Immune cyclex 78.           Antibody mediated rejection of kidney transplant    - see rejection of kidney transplant.          Status post exploratory laparotomy    - Re explored for retroperitoneal hematoma. Hematoma evacuated. (09/09)              Discharge Planning: not a candidate for dc at this time.  Kb baugh 121      Latonia Rich NP  Kidney Transplant  [facility]

## 2017-09-13 NOTE — PLAN OF CARE
- Pt AAOx4, VSS, AC/HS- no coverage needed, SpO- 98% on RA, Tele= NSR  - Pt RLQ incision CDI with telfa island dressing, JESSICA drain output= scant sanguineous drainage. H&H q 12 h- last level 8.3/24.3  - Pt pain level well controlled with PRN oxycodone, dilaudid, Pt cx of itching- given PRN benedryl PO  - Pt ambulated in hallway and tolerated well.   - Cr= 3.5, total urine output during shift= 600  -

## 2017-09-14 VITALS
WEIGHT: 166.25 LBS | BODY MASS INDEX: 26.09 KG/M2 | HEART RATE: 75 BPM | SYSTOLIC BLOOD PRESSURE: 142 MMHG | RESPIRATION RATE: 17 BRPM | TEMPERATURE: 98 F | HEIGHT: 67 IN | DIASTOLIC BLOOD PRESSURE: 87 MMHG | OXYGEN SATURATION: 99 %

## 2017-09-14 LAB
ALBUMIN SERPL BCP-MCNC: 3.3 G/DL
ANION GAP SERPL CALC-SCNC: 10 MMOL/L
BASOPHILS # BLD AUTO: 0.01 K/UL
BASOPHILS NFR BLD: 0.3 %
BUN SERPL-MCNC: 39 MG/DL
CALCIUM SERPL-MCNC: 9 MG/DL
CHLORIDE SERPL-SCNC: 107 MMOL/L
CO2 SERPL-SCNC: 23 MMOL/L
CREAT SERPL-MCNC: 2.7 MG/DL
DIFFERENTIAL METHOD: ABNORMAL
EOSINOPHIL # BLD AUTO: 0.1 K/UL
EOSINOPHIL NFR BLD: 2.3 %
ERYTHROCYTE [DISTWIDTH] IN BLOOD BY AUTOMATED COUNT: 14.5 %
EST. GFR  (AFRICAN AMERICAN): 33.1 ML/MIN/1.73 M^2
EST. GFR  (NON AFRICAN AMERICAN): 28.6 ML/MIN/1.73 M^2
GLUCOSE SERPL-MCNC: 92 MG/DL
HCT VFR BLD AUTO: 27 %
HGB BLD-MCNC: 9.4 G/DL
LYMPHOCYTES # BLD AUTO: 0.5 K/UL
LYMPHOCYTES NFR BLD: 12.2 %
MAGNESIUM SERPL-MCNC: 2.2 MG/DL
MCH RBC QN AUTO: 30.3 PG
MCHC RBC AUTO-ENTMCNC: 34.8 G/DL
MCV RBC AUTO: 87 FL
MONOCYTES # BLD AUTO: 0.1 K/UL
MONOCYTES NFR BLD: 3.3 %
NEUTROPHILS # BLD AUTO: 3.2 K/UL
NEUTROPHILS NFR BLD: 81.4 %
PHOSPHATE SERPL-MCNC: 2.8 MG/DL
PLATELET # BLD AUTO: 124 K/UL
PMV BLD AUTO: 10.1 FL
POCT GLUCOSE: 113 MG/DL (ref 70–110)
POCT GLUCOSE: 92 MG/DL (ref 70–110)
POTASSIUM SERPL-SCNC: 4.7 MMOL/L
RBC # BLD AUTO: 3.1 M/UL
SODIUM SERPL-SCNC: 140 MMOL/L
TACROLIMUS BLD-MCNC: 5.2 NG/ML
WBC # BLD AUTO: 3.92 K/UL

## 2017-09-14 PROCEDURE — 80069 RENAL FUNCTION PANEL: CPT

## 2017-09-14 PROCEDURE — 63600175 PHARM REV CODE 636 W HCPCS: Performed by: INTERNAL MEDICINE

## 2017-09-14 PROCEDURE — 99239 HOSP IP/OBS DSCHRG MGMT >30: CPT | Mod: 24,,, | Performed by: NURSE PRACTITIONER

## 2017-09-14 PROCEDURE — 25000003 PHARM REV CODE 250: Performed by: NURSE PRACTITIONER

## 2017-09-14 PROCEDURE — 63600175 PHARM REV CODE 636 W HCPCS: Performed by: PHYSICIAN ASSISTANT

## 2017-09-14 PROCEDURE — 80197 ASSAY OF TACROLIMUS: CPT

## 2017-09-14 PROCEDURE — 63600175 PHARM REV CODE 636 W HCPCS: Performed by: NURSE PRACTITIONER

## 2017-09-14 PROCEDURE — 85025 COMPLETE CBC W/AUTO DIFF WBC: CPT

## 2017-09-14 PROCEDURE — 83735 ASSAY OF MAGNESIUM: CPT

## 2017-09-14 PROCEDURE — 36415 COLL VENOUS BLD VENIPUNCTURE: CPT

## 2017-09-14 PROCEDURE — A4216 STERILE WATER/SALINE, 10 ML: HCPCS | Performed by: NURSE PRACTITIONER

## 2017-09-14 PROCEDURE — 25000003 PHARM REV CODE 250: Performed by: PHYSICIAN ASSISTANT

## 2017-09-14 RX ORDER — VALGANCICLOVIR 450 MG/1
450 TABLET, FILM COATED ORAL
Qty: 12 TABLET | Refills: 2 | Status: SHIPPED | OUTPATIENT
Start: 2017-09-04 | End: 2017-09-22 | Stop reason: SDUPTHER

## 2017-09-14 RX ORDER — PREDNISONE 10 MG/1
TABLET ORAL
Qty: 100 TABLET | Refills: 1
Start: 2017-09-14 | End: 2017-09-22 | Stop reason: SDUPTHER

## 2017-09-14 RX ORDER — SODIUM BICARBONATE 650 MG/1
1300 TABLET ORAL 2 TIMES DAILY
Qty: 120 TABLET | Refills: 11 | Status: SHIPPED | OUTPATIENT
Start: 2017-09-14 | End: 2017-09-22 | Stop reason: SDUPTHER

## 2017-09-14 RX ORDER — SULFAMETHOXAZOLE AND TRIMETHOPRIM 400; 80 MG/1; MG/1
1 TABLET ORAL DAILY
Status: DISCONTINUED | OUTPATIENT
Start: 2017-09-14 | End: 2017-09-14 | Stop reason: HOSPADM

## 2017-09-14 RX ORDER — SULFAMETHOXAZOLE AND TRIMETHOPRIM 400; 80 MG/1; MG/1
1 TABLET ORAL DAILY
Qty: 30 TABLET | Refills: 11 | Status: SHIPPED | OUTPATIENT
Start: 2017-09-14 | End: 2017-09-22 | Stop reason: SDUPTHER

## 2017-09-14 RX ORDER — TACROLIMUS 1 MG/1
2 CAPSULE ORAL ONCE
Status: COMPLETED | OUTPATIENT
Start: 2017-09-14 | End: 2017-09-14

## 2017-09-14 RX ORDER — ONDANSETRON 8 MG/1
8 TABLET, ORALLY DISINTEGRATING ORAL ONCE
Status: DISCONTINUED | OUTPATIENT
Start: 2017-09-14 | End: 2017-09-14

## 2017-09-14 RX ORDER — MAG HYDROX/ALUMINUM HYD/SIMETH 200-200-20
15 SUSPENSION, ORAL (FINAL DOSE FORM) ORAL ONCE
Status: COMPLETED | OUTPATIENT
Start: 2017-09-14 | End: 2017-09-14

## 2017-09-14 RX ORDER — LIDOCAINE HYDROCHLORIDE 10 MG/ML
1 INJECTION INFILTRATION; PERINEURAL ONCE
Status: COMPLETED | OUTPATIENT
Start: 2017-09-14 | End: 2017-09-14

## 2017-09-14 RX ORDER — ONDANSETRON 8 MG/1
8 TABLET, ORALLY DISINTEGRATING ORAL ONCE
Status: COMPLETED | OUTPATIENT
Start: 2017-09-14 | End: 2017-09-14

## 2017-09-14 RX ORDER — TACROLIMUS 1 MG/1
6 CAPSULE ORAL 2 TIMES DAILY
Status: DISCONTINUED | OUTPATIENT
Start: 2017-09-14 | End: 2017-09-14 | Stop reason: HOSPADM

## 2017-09-14 RX ADMIN — ROSUVASTATIN CALCIUM 20 MG: 10 TABLET ORAL at 08:09

## 2017-09-14 RX ADMIN — TACROLIMUS 4 MG: 1 CAPSULE ORAL at 08:09

## 2017-09-14 RX ADMIN — ISAVUCONAZONIUM SULFATE 372 MG: 186 CAPSULE ORAL at 02:09

## 2017-09-14 RX ADMIN — DOCUSATE SODIUM 100 MG: 50 CAPSULE, LIQUID FILLED ORAL at 01:09

## 2017-09-14 RX ADMIN — DOLUTEGRAVIR SODIUM 50 MG: 50 TABLET, FILM COATED ORAL at 08:09

## 2017-09-14 RX ADMIN — DOCUSATE SODIUM 100 MG: 50 CAPSULE, LIQUID FILLED ORAL at 05:09

## 2017-09-14 RX ADMIN — Medication 3 ML: at 02:09

## 2017-09-14 RX ADMIN — SODIUM BICARBONATE 650 MG TABLET 1300 MG: at 08:09

## 2017-09-14 RX ADMIN — SULFAMETHOXAZOLE AND TRIMETHOPRIM 1 TABLET: 400; 80 TABLET ORAL at 10:09

## 2017-09-14 RX ADMIN — LIDOCAINE HYDROCHLORIDE 1 ML: 10 INJECTION, SOLUTION INFILTRATION; PERINEURAL at 02:09

## 2017-09-14 RX ADMIN — Medication 3 ML: at 06:09

## 2017-09-14 RX ADMIN — PREDNISONE 20 MG: 20 TABLET ORAL at 08:09

## 2017-09-14 RX ADMIN — TACROLIMUS 2 MG: 1 CAPSULE ORAL at 10:09

## 2017-09-14 RX ADMIN — ALUMINUM HYDROXIDE, MAGNESIUM HYDROXIDE, AND SIMETHICONE 15 ML: 200; 200; 20 SUSPENSION ORAL at 02:09

## 2017-09-14 RX ADMIN — MYCOPHENOLATE MOFETIL 1000 MG: 250 CAPSULE ORAL at 08:09

## 2017-09-14 RX ADMIN — OXYCODONE HYDROCHLORIDE AND ACETAMINOPHEN 1 TABLET: 10; 325 TABLET ORAL at 05:09

## 2017-09-14 RX ADMIN — ONDANSETRON 8 MG: 8 TABLET, ORALLY DISINTEGRATING ORAL at 01:09

## 2017-09-14 RX ADMIN — ONDANSETRON 4 MG: 2 INJECTION INTRAMUSCULAR; INTRAVENOUS at 10:09

## 2017-09-14 RX ADMIN — THERA TABS 1 TABLET: TAB at 08:09

## 2017-09-14 NOTE — PLAN OF CARE
-Pt AAOx4, VSS, AC/HS- no coverage needed, SpO- 98% on RA, Tele= NSR  - Pt RLQ incision CDI with telfa island dressing, JESSICA drain output= scant sanguineous drainage. H&H q 12 h- last level 9.0/26.1  - Pt pain level well controlled- did not ask for pain medication throughout night.  - Cr= 3.3, total urine output during shift= 1125  - Pt complains of nausea in beginning of shift, after dinner pt states he feels much better, + BM today  - Sleeping well throughout night, family member at bedside, will continue to monitor, plan for D/C today

## 2017-09-14 NOTE — PROGRESS NOTES
Discharge instructions given to and reviewed with pt.  Midline removed without any issues.  Pt seen by pharmacy and  before discharge.  All understanding of information verbalized.  No issues noted.  Wheelchair for transport requested. Will monitor for pt to leave the unit.

## 2017-09-14 NOTE — PROGRESS NOTES
Discharge Medication Note:    Hospital Course:  Patient is s/p kidney transplant from 8/18/17 admitted for AMR.   Pt treated with SMP x 3 (9/1-9/3). PLEX x6 (completed 9/5) and IVIG x 2 (9/5-9/6). Hospital admission also complicated by a retroperitoneal bleed for which he taken back to OR.  This admission patient was started on azithromycin weekly for MAC prophylaxis and isavuconazole x4 weeks for fungal prophylaxis due to OR takeback.  Of note, ketoconazole was also stopped with the addition of isavuconazole.    Met with Demetrio Aguiar at discharge to review discharge medications and to update the blue medication card.       Demetrio Aguiar   Home Medication Instructions MINNA:[id number]    Printed on:09/14/17 0568   Medication Information                      azithromycin (ZITHROMAX) 600 MG Tab  Take 2 tablets (1,200 mg total) by mouth every 7 days.             bisacodyl (DULCOLAX) 5 mg EC tablet  Take 1-2 tablets (5-10 mg total) by mouth daily as needed for Constipation. Laxative             cinacalcet (SENSIPAR) 60 MG Tab  Take 1 tablet (60 mg total) by mouth every other day.             docusate sodium (COLACE) 100 MG capsule  Take 1 capsule (100 mg total) by mouth 3 (three) times daily as needed for Constipation.             dolutegravir (TIVICAY) 50 mg Tab  Take 1 tablet (50 mg total) by mouth once daily.             famotidine (PEPCID) 20 MG tablet  Take 1 tablet (20 mg total) by mouth every evening.             isavuconazonium sulfate 186 mg Cap  Take 2 capsules (372 mg total) by mouth once daily.             multivitamin (THERAGRAN) tablet  Take 1 tablet by mouth once daily.             mycophenolate (CELLCEPT) 250 mg Cap  Take 4 capsules (1,000 mg total) by mouth 2 (two) times daily. Kidney txp 8/18/17; Z94.0             oxycodone-acetaminophen (PERCOCET)  mg per tablet  Take 0.5-1 tablets by mouth every 4 (four) hours as needed for Pain.             predniSONE (DELTASONE) 10 MG tablet  Take 20 mg PO  QD 9/4-10/3; 15 mg PO QD 10/4-11/3; 10 mg PO QD 11/4-12/3; 5 mg PO QD thereafter             rilpivirine (EDURANT) 25 mg Tab  Take 1 tablet (25 mg total) by mouth once daily.             rosuvastatin (CRESTOR) 20 MG tablet  Take 20 mg by mouth once daily.             sodium bicarbonate 650 MG tablet  Take 2 tablets (1,300 mg total) by mouth 2 (two) times daily.             sulfamethoxazole-trimethoprim 400-80mg (BACTRIM,SEPTRA) 400-80 mg per tablet  Take 1 tablet by mouth once daily.             tacrolimus (PROGRAF) 1 MG Cap  Take 6 capsules (6 mg total) by mouth every 12 (twelve) hours.             valganciclovir (VALCYTE) 450 mg Tab  Take 1 tablet (450 mg total) by mouth every Mon, Wed, Fri. Stop 12/3/17                 Pt was provided with prescriptions for the following meds:  E-rx: Azithromycin, isavuconazole, sodium bicarbonate  Printed rx: ORx  Phone-in or faxed rx: none to none pharmacy per pt request.    The following medications have been placed on HOLD and should be restarted in the outpatient setting (when appropriate): none    Demetrio Aguiar verbalized understanding and had the opportunity to ask questions.

## 2017-09-14 NOTE — PROGRESS NOTES
DISCHARGE NOTE:  SW met with pt to discuss needs at time of discharge. Pt appeared to be in high spirits and reports he is feeling better. Pt to discharge to [facility] with caregivers. Pt's multiple caregivers to transport him back to . SW remains available.

## 2017-09-15 LAB — BACTERIA SPEC ANAEROBE CULT: NORMAL

## 2017-09-17 NOTE — PROGRESS NOTES
Kidney Post-Transplant Assessment    Referring Physician:   Current Nephrologist: Alexa Holliday    ORGAN: RIGHT KIDNEY  Donor Type:  - brain death  PHS Increased Risk: no  Cold Ischemia: 422 mins  Induction Medications: thymoglobulin    Subjective:     CC:  Reassessment of renal allograft function and management of chronic immunosuppression.    HPI:  Mr. Aguiar is a 37 y.o. year old Black or  male who received a  - brain death kidney transplant on 17. His most recent creatinine is 2.7. He takes mycophenolate mofetil, prednisone and tacrolimus for maintenance immunosuppression. His post transplant course has been complicated by delayed graft function requiring dialysis, AMR, svere bleeding requiring blood transfusion and surgical evacuation of hematoma        Hospital Course:  Demetrio Aguiar is a 37 y.o. 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 His maintenance immunosuppression includes a steroid taper per protocol to 5mg daily, Prograf, and Cellcept maintenance. His post op course has been complicated by DGF requiring HD (last HD 17) which prompted need for kidney biopsy on 17. He was then admitted for biopsy proven ABMR with weakly positive class I DSA's on 17. Biopsy 17: good sample with 24 glomeruli (only 1 sclerosed) and evidence just meeting criteria for ABMR. + diffuse ATI; Minimal fibrosis, no ACR or AVR, but + CD4 (>50% diffusely + stain) with mild glomerulitis and focal peritubular capillaritis; also, biopsy shows ca-oxalate (3) and ca-phos (2) crystals. Repeat DSA on  with Class I DSA Detected: A2(3894, B44(2820), B62(5178), Class II DSA Detected  below cutoff: DR53 (1265). Repeat DSAs pending from 9/7. Pt treated with SMP x 3 (9/1-9/3). PLEX x6 (completed 9/5) and IVIG x 2 (9/5-9/6). Kidney function was improving nicely and he was preparing for discharge in the days following treatment.     The patient's kidney incision became erythematous and edematous on 9/8. He was post op 21 and due for staples to be removed. The transplant fellow removed staples and evacuated a hematoma at the bedside. H&H decreased significantly the next day to 6.5/19.6 from 8.4/25.3. He was transfused 2 units PRBCs. A kidney US on 9/9 showed a large collection located along the anterior inferior margin of the renal transplant measuring 13.4 x 7 x 11.9. He was taken back to the OR overnight for retroperitoneal bleed and washout. H&H decreased as low as 8.7/16.3. He required additional transfusions post sx and again on 9/11. H&H has since remained stable. He has one JESSICA drain in place with very minimal serosang drainage, which was removed prior to discharge. He has a new RLQ inc with staples intact no s/s/i. RTC in approx 2 weeks for staple removal.      Mr. Aguiar is stable for discharge today. He has no complaints. Cr now slowly improving again, 2.7 from 3.3. Good UOP. (+) BM. Tolerating solids. Pain well controlled. Of note, Last CD4 count 7 (8/20). Discussed with ID, Azithromycin started once weekly (first given 9/8/17) for MAC prophylaxis. Continue HAART therapy. Mr. Aguiar will f/u with transplant clinic as scheduled per transplant coordinator. Pt/caregiver verbalized understanding to discharge instructions.      Drain removed:  Site cleaned with alcohol, lidocaine 1% used to numb area to place prolene 3.0 suture to site.  Drain intact at time of removal.  Patient tolerated procedure well.  Will continue to monitor for any complications.    patient was discharged 9/14/2017. He is at the [facility]. Her cousin is the care giver.    Scheduled for stent removal 9/21      He feels well,  "nausea some time, appetite is good but some times refers mild odynophagia       Review of Systems     Skin: no skin rash  CNS; no headaches, blurred vision, seizure, or syncope  ENT: No JVD,  Adenopathies,  nasal congestion. No oral lesions  Cardiac: No chest pain, dyspnea, claudication, edema or palpitations  Respiratory: No SOB, cough, hemoptysis   Gastro-intestinal: No diarrhea, constipation, abdominal pain, (+) nausea, vomit. No ascitis  Genitourinary: no hematuria, dysuria, frequency, frequency  Musculoskeletal: joint pain, arthritis or vasculitic changes  Psych: alert awake, oriented, No cranial nerves deficit.      Objective:     Physical Exam     BP (!) 143/85 (BP Location: Right arm, Patient Position: Standing, BP Method: Medium (Automatic))   Pulse 97   Temp 98.9 °F (37.2 °C) (Oral)   Resp 16   Ht 5' 7" (1.702 m)   Wt 68.3 kg (150 lb 9.2 oz)   SpO2 100%   BMI 23.58 kg/m²       Head: normocephalic  Neck: No JVD, cervical axillary, or femoral adenopathies  Heart: no murmurs, Normal s1 and s2, No gallops, no rubs, No murmurs  Lungs; CTA, good respiratory effort, no crackles  Abdomen: soft, non tender, no splenomegaly or hepatomegaly, no massess, no bruits. Incision healing well. There is a2 cm incision lateral to the transp[lant incision leaking 1 to 2 drops of serosanguineous fluid. No pain No tenderness . No erythema   Extremities: No edema, skin rash, joint pain. LUE AVG  SNC: awake, alert oriented. Cranial nerves are intact, no focalized, sensitivity and strength preserved      Labs:  Lab Results   Component Value Date    WBC 3.92 09/14/2017    HGB 9.4 (L) 09/14/2017    HCT 27.0 (L) 09/14/2017     09/14/2017    K 4.7 09/14/2017     09/14/2017    CO2 23 09/14/2017    BUN 39 (H) 09/14/2017    CREATININE 2.7 (H) 09/14/2017    EGFRNONAA 28.6 (A) 09/14/2017    CALCIUM 9.0 09/14/2017    PHOS 2.8 09/14/2017    MG 2.2 09/14/2017    ALBUMIN 3.3 (L) 09/14/2017    AST 20 09/09/2017    ALT 14 " 2017    .0 (H) 2017    TACROLIMUS 5.2 2017       No results found for: EXTANC, EXTWBC, EXTSEGS, EXTPLATELETS, EXTHEMOGLOBI, EXTHEMATOCRI, EXTCREATININ, EXTSODIUM, EXTPOTASSIUM, EXTBUN, EXTCO2, EXTCALCIUM, EXTPHOSPHORU, EXTGLUCOSE, EXTALBUMIN, EXTAST, EXTALT, EXTBILITOTAL, EXTLIPASE, EXTAMYLASE    No results found for: EXTCYCLOSLVL, EXTSIROLIMUS, EXTTACROLVL, EXTPROTCRE, EXTPTHINTACT, EXTPROTEINUA, EXTWBCUA, EXTRBCUA    Labs were reviewed with the patient    Assessment:     1. 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  donor kidney transplant 2017    3. Acute rejection of kidney transplant    4. HIV (human immunodeficiency virus infection)    5. Immunosuppression    6. Need for prophylactic immunotherapy    7. Long-term use of immunosuppressant medication        Plan:        DSA today    Labs today    Plan for Rituxan infusion in 1 to 2 weeks     Follow up biopsy in 1 to z2 weeks    No need for HD    Some elevated BP readings but with some orthostatics changes. Encourage PO fluid intake. Close Monitoring of BP at home. Patient only brought 5 readings since discharge since     Will have the Pharm D review the med list today    Will adjust prograf dose when level available       Continue with MMF 1000 bid      Patient is off Ketoconazole, which was discontinued due to high level. Level to be assessed today. Based on level sill decide if he needs to be back on Ketoconazole. Will d/c Pharm D       Will refer to GI for possible upper endoscopy       Labs twice weekly      1. CKD stage: s/p treatment of AMR, continue with close follow up. Rituxan infusion for consolidation of treatment of AMR Education provided in appropriate fluid intake, potassium intake. Continue with oral hydration    2. Immunosuppression:   Lab Results   Component Value Date    TACROLIMUS 5.2 2017      Will closely monitor for toxicities, education provided about adherence to medicines and need to  communicate any side effct to the transplant nurse or physician    3. Allograft Function:GFR is improving, will continue with close monitoring, will schedule kidney biopsy next week, IV Rituxan infusion, will discuss with the group  Lab Results   Component Value Date    CREATININE 2.7 (H) 09/14/2017       4. Hypertension management:as above encourage po fluid intake. Home BP monitoring   Continue with home blood pressure monitoring, low salt and healthy life discussed with the patient    5. Metabolic Bone Disease/Secondary Hyperparathyroidism: calcium and phosphorus as per our center protocol. Will monitor PTH, Vit D level, calcium      Lab Results   Component Value Date    .0 (H) 08/18/2017    CALCIUM 9.0 09/14/2017    CAION 1.17 09/02/2017    PHOS 2.8 09/14/2017       6. Electrolytes: reviewed with the patient, essentially within the normal range no need for acute changes today, will monitor as per our center guidelines     Lab Results   Component Value Date     09/14/2017    K 4.7 09/14/2017     09/14/2017    CO2 23 09/14/2017       7. Anemia:will add Iron store to the next blood work and will consider epogen  will continue monitoring as per our center guidelines. No indication for acute intervention today     Lab Results   Component Value Date    WBC 3.92 09/14/2017    HGB 9.4 (L) 09/14/2017    HCT 27.0 (L) 09/14/2017    MCV 87 09/14/2017     (L) 09/14/2017       8.Proteinuria: will continue with pr/cr ratio as per our center guidelines, will add pr/cr ratio to the labs work   No results found for: PROTEINURINE, CREATRANDUR, UTPCR     9. BK virus infection screening: will continue with urine or blood PCR as per our guidelines to prevent BK virus viremia and allograft dysfunction. Continue screening as  Per protocol   No results found for: BKVIRUSDNAUR, BKQUANTURINE, BKVIRUSLOG, BKVIRUSURINE, BKVIRUSPCRQB      10. Weight education: provided during the clinic visit   There is no height or  weight on file to calculate BMI.       11.Patient safety education regarding immunosuppression including prophylaxis posttransplant for CMV, PCP : Education provided about vaccination and prevention of infections    12.  Cytopenias: will follow cbc and BMP twice weeklyno significant cytopenias will monitor as per our guidelines. Medicine list reviewed including potential causes of drug-induced cytopenias     Lab Results   Component Value Date    WBC 3.92 09/14/2017    HGB 9.4 (L) 09/14/2017    HCT 27.0 (L) 09/14/2017    MCV 87 09/14/2017     (L) 09/14/2017       I spoke with the patient for 30 minutes. More than half dedicated to counseling and education. All questions answered                Follow-up:   Clinic: return to transplant clinic weekly for the first month after transplant; every 2 weeks during months 2-3; then at 6-, 9-, 12-, 18-, 24-, and 36- months post-transplant to reassess for complications from immunosuppression toxicity and monitor for rejection.  Annually thereafter.    Labs: since patient remains at high risk for rejection and drug-related complications that warrant close monitoring, labs will be ordered as follows: continue twice weekly CBC, renal panel, and drug level for first month; then same labs once weekly through 3rd month post-transplant.  Urine for UA and protein/creatinine ratio monthly.  Urine BK - PCR at 1-, 3-, 6-, 9-, 12-, 18-, 24-, and 36- months post-transplant.  Hepatic panel at 1-, 2-, 3-, 6-, 9-, 12-, 18-, 24-, and 36- months post-transplant.    Jase Hines MD       Education:   Material provided to the patient.  Patient reminded to call with any health changes since these can be early signs of significant complications.  Also, I advised the patient to be sure any new medications or changes of old medications are discussed with either a pharmacist or physician knowledgeable with transplant to avoid rejection/drug toxicity related to significant drug  interactions.

## 2017-09-18 ENCOUNTER — OFFICE VISIT (OUTPATIENT)
Dept: TRANSPLANT | Facility: CLINIC | Age: 37
End: 2017-09-18
Payer: MEDICARE

## 2017-09-18 ENCOUNTER — LAB VISIT (OUTPATIENT)
Dept: LAB | Facility: HOSPITAL | Age: 37
End: 2017-09-18
Payer: MEDICARE

## 2017-09-18 VITALS
HEART RATE: 97 BPM | WEIGHT: 150.56 LBS | DIASTOLIC BLOOD PRESSURE: 85 MMHG | OXYGEN SATURATION: 100 % | BODY MASS INDEX: 23.63 KG/M2 | HEIGHT: 67 IN | TEMPERATURE: 99 F | RESPIRATION RATE: 16 BRPM | SYSTOLIC BLOOD PRESSURE: 143 MMHG

## 2017-09-18 DIAGNOSIS — Z94.0 KIDNEY REPLACED BY TRANSPLANT: ICD-10-CM

## 2017-09-18 DIAGNOSIS — D84.9 IMMUNOSUPPRESSION: Chronic | ICD-10-CM

## 2017-09-18 DIAGNOSIS — Z29.89 NEED FOR PROPHYLACTIC IMMUNOTHERAPY: ICD-10-CM

## 2017-09-18 DIAGNOSIS — Z94.0 STATUS POST DECEASED-DONOR KIDNEY TRANSPLANTATION: Primary | ICD-10-CM

## 2017-09-18 DIAGNOSIS — T86.11 ACUTE REJECTION OF KIDNEY TRANSPLANT: ICD-10-CM

## 2017-09-18 DIAGNOSIS — Z79.60 LONG-TERM USE OF IMMUNOSUPPRESSANT MEDICATION: ICD-10-CM

## 2017-09-18 DIAGNOSIS — B20 HIV (HUMAN IMMUNODEFICIENCY VIRUS INFECTION): Chronic | ICD-10-CM

## 2017-09-18 DIAGNOSIS — Z94.0 S/P KIDNEY TRANSPLANT: ICD-10-CM

## 2017-09-18 LAB
ALBUMIN SERPL BCP-MCNC: 3.9 G/DL
ALBUMIN SERPL BCP-MCNC: 3.9 G/DL
ALP SERPL-CCNC: 248 U/L
ALT SERPL W/O P-5'-P-CCNC: 10 U/L
ANION GAP SERPL CALC-SCNC: 7 MMOL/L
ANISOCYTOSIS BLD QL SMEAR: SLIGHT
AST SERPL-CCNC: 19 U/L
BASOPHILS # BLD AUTO: 0.02 K/UL
BASOPHILS NFR BLD: 0.4 %
BILIRUB DIRECT SERPL-MCNC: 0.3 MG/DL
BILIRUB SERPL-MCNC: 0.8 MG/DL
BUN SERPL-MCNC: 19 MG/DL
BURR CELLS BLD QL SMEAR: ABNORMAL
CALCIUM SERPL-MCNC: 9.3 MG/DL
CHLORIDE SERPL-SCNC: 112 MMOL/L
CO2 SERPL-SCNC: 22 MMOL/L
CREAT SERPL-MCNC: 2.1 MG/DL
DIFFERENTIAL METHOD: ABNORMAL
EOSINOPHIL # BLD AUTO: 0.1 K/UL
EOSINOPHIL NFR BLD: 1.1 %
ERYTHROCYTE [DISTWIDTH] IN BLOOD BY AUTOMATED COUNT: 14.6 %
EST. GFR  (AFRICAN AMERICAN): 44.8 ML/MIN/1.73 M^2
EST. GFR  (NON AFRICAN AMERICAN): 38.8 ML/MIN/1.73 M^2
GLUCOSE SERPL-MCNC: 89 MG/DL
HCT VFR BLD AUTO: 28.6 %
HGB BLD-MCNC: 9.4 G/DL
HYPOCHROMIA BLD QL SMEAR: ABNORMAL
LYMPHOCYTES # BLD AUTO: 0.4 K/UL
LYMPHOCYTES NFR BLD: 8 %
MAGNESIUM SERPL-MCNC: 2.3 MG/DL
MCH RBC QN AUTO: 29.6 PG
MCHC RBC AUTO-ENTMCNC: 32.9 G/DL
MCV RBC AUTO: 90 FL
MONOCYTES # BLD AUTO: 0.4 K/UL
MONOCYTES NFR BLD: 8 %
NEUTROPHILS # BLD AUTO: 3.8 K/UL
NEUTROPHILS NFR BLD: 82.5 %
OVALOCYTES BLD QL SMEAR: ABNORMAL
PHOSPHATE SERPL-MCNC: 2.1 MG/DL
PLATELET # BLD AUTO: 124 K/UL
PMV BLD AUTO: ABNORMAL FL
POIKILOCYTOSIS BLD QL SMEAR: SLIGHT
POLYCHROMASIA BLD QL SMEAR: ABNORMAL
POTASSIUM SERPL-SCNC: 4.8 MMOL/L
PROT SERPL-MCNC: 7.4 G/DL
PTH-INTACT SERPL-MCNC: 328 PG/ML
RBC # BLD AUTO: 3.18 M/UL
SODIUM SERPL-SCNC: 141 MMOL/L
TACROLIMUS BLD-MCNC: 4.2 NG/ML
URATE SERPL-MCNC: 5.9 MG/DL
WBC # BLD AUTO: 4.73 K/UL

## 2017-09-18 PROCEDURE — 85025 COMPLETE CBC W/AUTO DIFF WBC: CPT

## 2017-09-18 PROCEDURE — 84075 ASSAY ALKALINE PHOSPHATASE: CPT

## 2017-09-18 PROCEDURE — 83970 ASSAY OF PARATHORMONE: CPT

## 2017-09-18 PROCEDURE — 83735 ASSAY OF MAGNESIUM: CPT

## 2017-09-18 PROCEDURE — 99215 OFFICE O/P EST HI 40 MIN: CPT | Mod: S$PBB,,, | Performed by: INTERNAL MEDICINE

## 2017-09-18 PROCEDURE — 99999 PR PBB SHADOW E&M-EST. PATIENT-LVL III: CPT | Mod: PBBFAC,,, | Performed by: INTERNAL MEDICINE

## 2017-09-18 PROCEDURE — 99213 OFFICE O/P EST LOW 20 MIN: CPT | Mod: PBBFAC | Performed by: INTERNAL MEDICINE

## 2017-09-18 PROCEDURE — 80197 ASSAY OF TACROLIMUS: CPT

## 2017-09-18 PROCEDURE — 36415 COLL VENOUS BLD VENIPUNCTURE: CPT

## 2017-09-18 PROCEDURE — 87799 DETECT AGENT NOS DNA QUANT: CPT

## 2017-09-18 PROCEDURE — 80069 RENAL FUNCTION PANEL: CPT

## 2017-09-18 PROCEDURE — 84550 ASSAY OF BLOOD/URIC ACID: CPT

## 2017-09-18 RX ORDER — SODIUM CHLORIDE 0.9 % (FLUSH) 0.9 %
10 SYRINGE (ML) INJECTION
Status: CANCELLED | OUTPATIENT
Start: 2017-09-18

## 2017-09-18 RX ORDER — ACETAMINOPHEN 325 MG/1
650 TABLET ORAL
Status: CANCELLED | OUTPATIENT
Start: 2017-09-18

## 2017-09-18 RX ORDER — HEPARIN 100 UNIT/ML
500 SYRINGE INTRAVENOUS
Status: CANCELLED | OUTPATIENT
Start: 2017-09-18

## 2017-09-18 RX ORDER — FAMOTIDINE 10 MG/ML
20 INJECTION INTRAVENOUS
Status: CANCELLED | OUTPATIENT
Start: 2017-09-18

## 2017-09-18 RX ORDER — MEPERIDINE HYDROCHLORIDE 50 MG/ML
25 INJECTION INTRAMUSCULAR; INTRAVENOUS; SUBCUTANEOUS
Status: CANCELLED | OUTPATIENT
Start: 2017-09-18

## 2017-09-18 NOTE — LETTER
September 18, 2017        Alexa Holliday  [address] [place] [id number]  Phone: [phone]  Fax: [phone]             [facility]  [address]  [facility] [address]  Phone: [phone]   Patient: Demetrio Aguiar   MR Number: [id number]   YOB: 1980   Date of Visit: 9/18/2017       Dear Dr. Alexa Holliday    Thank you for referring Demetrio Aguiar to me for evaluation. Attached you will find relevant portions of my assessment and plan of care.    If you have questions, please do not hesitate to call me. I look forward to following Demetrio Aguiar along with you.    Sincerely,    Jase Hines MD    Enclosure    If you would like to receive this communication electronically, please contact [email or web address] or [phone] to request [facility] access.    [employer] is a tool which provides read-only access to select patient information with whom you have a relationship. Its easy to use and provides real time access to review your patients record including encounter summaries, notes, results, and demographic information.    If you feel you have received this communication in error or would no longer like to receive these types of communications, please e-mail [email or web address]

## 2017-09-18 NOTE — PROGRESS NOTES
Results reviewed and the following message sent to patient via [facility]:  Please verify this tacrolimus level was collected 12 hours after your last dose AND there are no recent med changes (start, stop or change meds). If level is accurate and meds have not changed, please increase tacrolimus IR (Prograf) to 8 mg twice daily.

## 2017-09-20 LAB
BK VIRUS DNA PCR, QUANT, BLOOD: <125 COPIES/ML
BK VIRUS DNA, BLOOD: NOT DETECTED
LOG BKV COPIES/ML: <2.1 LOG (10) COPIES/ML

## 2017-09-20 RX ORDER — TACROLIMUS 1 MG/1
8 CAPSULE ORAL EVERY 12 HOURS
Qty: 360 CAPSULE | Refills: 11 | Status: SHIPPED | OUTPATIENT
Start: 2017-09-20 | End: 2017-09-20 | Stop reason: SDUPTHER

## 2017-09-20 RX ORDER — TACROLIMUS 1 MG/1
8 CAPSULE ORAL EVERY 12 HOURS
Qty: 480 CAPSULE | Refills: 11 | Status: SHIPPED | OUTPATIENT
Start: 2017-09-20 | End: 2017-09-22 | Stop reason: SDUPTHER

## 2017-09-21 ENCOUNTER — HOSPITAL ENCOUNTER (OUTPATIENT)
Dept: UROLOGY | Facility: HOSPITAL | Age: 37
Discharge: HOME | End: 2017-09-21
Attending: TRANSPLANT SURGERY
Payer: MEDICARE

## 2017-09-21 ENCOUNTER — TELEPHONE (OUTPATIENT)
Dept: TRANSPLANT | Facility: CLINIC | Age: 37
End: 2017-09-21

## 2017-09-21 ENCOUNTER — PATIENT MESSAGE (OUTPATIENT)
Dept: TRANSPLANT | Facility: CLINIC | Age: 37
End: 2017-09-21

## 2017-09-21 VITALS
RESPIRATION RATE: 16 BRPM | TEMPERATURE: 99 F | WEIGHT: 145 LBS | BODY MASS INDEX: 22.76 KG/M2 | HEIGHT: 67 IN | HEART RATE: 85 BPM | DIASTOLIC BLOOD PRESSURE: 87 MMHG | SYSTOLIC BLOOD PRESSURE: 147 MMHG

## 2017-09-21 DIAGNOSIS — Z94.0 KIDNEY REPLACED BY TRANSPLANT: ICD-10-CM

## 2017-09-21 PROCEDURE — 52310 CYSTOSCOPY AND TREATMENT: CPT | Mod: ,,, | Performed by: UROLOGY

## 2017-09-21 PROCEDURE — 52310 CYSTOSCOPY AND TREATMENT: CPT

## 2017-09-21 RX ORDER — LIDOCAINE HYDROCHLORIDE 20 MG/ML
JELLY TOPICAL
Status: COMPLETED | OUTPATIENT
Start: 2017-09-21 | End: 2017-09-21

## 2017-09-21 RX ADMIN — LIDOCAINE HYDROCHLORIDE: 20 JELLY TOPICAL at 10:09

## 2017-09-21 NOTE — INTERVAL H&P NOTE
The patient has been examined and the H&P has been reviewed:    I concur with the findings and no changes have occurred since H&P was written.     Here for cysto and ureteral stent removal from his transplant kidney.        There are no hospital problems to display for this patient.

## 2017-09-21 NOTE — TELEPHONE ENCOUNTER
I spoke with pt- reviewed lab results as well as to cut back on high K foods.  Labs 2 x a week.  Verbalized understanding.  Is wondering whether to quit PMO, but as he will be here for a few weeks, will determine once he is ready to go home.

## 2017-09-21 NOTE — PROCEDURES
Date of Procedure: 09/21/2017    Procedure:                                                                        Cystourethroscopy with Successful Removal of transplant kidney Ureteral Stent(s)                                                                                    Pre-OP Diagnosis:                                                                transplant kidney ureteral stent                                 Post-OP Diagnosis:                                                                same                              Anesthesia:                                                                       Anesthesia Administered:                                                     Intraurethral instillation of 10 mL 2% lidocaine (Xylocaine) jelly.     Findings:                                                                         Double J stent in the ureteral orifice.                                - The stent is not encrusted.                                           Description of Procedure:                                                         Informed Consent:                                                            - Risks, benefits and alternatives of procedure discussed with               patient and informed consent obtained.                                       Patient Position:                                                            - Supine. Careful padding and pressure point care performed.                 Prep and Drape:                                                              - Patient prepped and draped in usual sterile fashion using povidone         iodine (Betadine).                                                           Instruments:                                                                 - Alligator forceps.                                                         - Flexible Cystoscope: With 0 degree lens 16 Fr.                             Procedure Details:                                                            - Cystoscope passed under vision into bladder.                               - Bladder and urethra examined in their entirety with findings as            above.                                                                       - Ureteral stent visualized in bladder, grasped and removed.            Complications:                                                                    No immediate complications.                                             Post-OP Plan:                                                                    transplant kidney team

## 2017-09-21 NOTE — PATIENT INSTRUCTIONS

## 2017-09-21 NOTE — H&P (VIEW-ONLY)
[facility]  Kidney Transplant  Progress Note      Reason for Follow-up: Reassessment of Kidney Transplant - 2017  (#1) recipient and management of immunosuppression.    ORGAN:  RIGHT KIDNEY   Donor Type:   - Brain Death   PHS Increased Risk: no   Cold Ischemia: 7 hours 2 minutes  Induction Medications: Thymoglobulin      Subjective:       History of Present Illness:  Demetrio Aguiar is a 37 y.o. 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 His maintenance immunosuppression includes a steroid taper per protocol to 5mg daily, Prograf, and Cellcept maintenance. His post op course has been complicated by DGF requiring HD (last HD 17) which prompted need for kidney biopsy on 17. He was then admitted for biopsy proven ABMR with weakly positive class I DSA's on 17. Biopsy 17: good sample with 24 glomeruli (only 1 sclerosed) and evidence of criteria for ABMR. + diffuse ATI; Minimal fibrosis, no ACR or AVR, but + CD4 (>50% diffusely + stain) with mild glomerulitis and focal peritubular capillaritis; also, biopsy shows ca-oxalate (3) and ca-phos (2) crystals. Since transplant, he has made minimal uop daily. He denies any current illnesses or sick contacts. Complains of expected post-op pain to RLQ incision. Pt treated with PLEX x6, SMP x3, and planning for IVIG x2.     Interval History:   Pt tolerated PLEX #6 (final treatment) followed by IVIG well. Infusion completed early this morning. Will dose IVIG #2 this afternoon. Plan for Rituximab x1 tomorrow. Cr continuing to trend down, 3.2 from 5.9. Good UOP. Pt without BM since Monday, will order suppository. Of note, PLTs trending  down, will order haptoglobin and LDH with morning labs. Will also order DSAs with morning lab. Monitor      Past Medical, Surgical, Family, and Social History:   Unchanged from H&P.    Review of patient's allergies indicates:  No Known Allergies    Scheduled Meds:   calcium gluconate IVPB  2,000 mg Intravenous Once    docusate sodium  100 mg Oral TID    dolutegravir  50 mg Oral Daily    epoetin erika  10,000 Units Subcutaneous Every Mon    famotidine  20 mg Oral QHS    furosemide  40 mg Oral BID    heparin (porcine)  5,000 Units Subcutaneous Q8H    ketoconazole  100 mg Oral Daily    multivitamin  1 tablet Oral Daily    mycophenolate  1,000 mg Oral BID    nystatin  500,000 Units Oral QID (PC + HS)    predniSONE  20 mg Oral Daily    rilpivirine  25 mg Oral Daily    rosuvastatin  20 mg Oral Daily    sodium chloride 0.9%  3 mL Intravenous Q8H    sulfamethoxazole-trimethoprim 400-80mg  1 tablet Oral Every Mon, Wed, Fri    tacrolimus  6 mg Oral Daily    tacrolimus  7 mg Oral Daily    valganciclovir  450 mg Oral Every Mon, Wed, Fri     Continuous Infusions:   PRN Meds:sodium chloride, bisacodyl, dextrose 50%, dextrose 50%, EPINEPHrine, glucagon (human recombinant), glucose, glucose, insulin aspart, ondansetron, oxycodone-acetaminophen, oxycodone-acetaminophen    Intake/Output - Last 3 Shifts       09/04 0700 - 09/05 0659 09/05 0700 - 09/06 0659 09/06 0700 - 09/07 0659    P.O. 1520 1560 420    Blood  5164     IV Piggyback  700     Total Intake(mL/kg) 1520 (23.3) 7424 (113) 420 (6.4)    Urine (mL/kg/hr) 3310 (2.1) 2850 (1.8) 800 (1.9)    Emesis/NG output 0 (0) 0 (0)     Other 0 (0) 0 (0)     Stool 1 (0) 0 (0) 0 (0)    Blood 0 (0) 4939 (3.1)     Total Output 3311 7789 335    Net -1791 -365 -380           Urine Occurrence 0 x 0 x     Stool Occurrence 0 x 0 x 0 x    Emesis Occurrence 0 x 0 x            Review of Systems   Constitutional: Negative for activity change, appetite change, chills, fatigue and fever.  "  HENT: Negative for congestion and facial swelling.    Eyes: Negative for pain, discharge and visual disturbance.   Respiratory: Negative for cough, choking, chest tightness, shortness of breath and wheezing.    Cardiovascular: Negative for chest pain, palpitations and leg swelling.   Gastrointestinal: Negative for abdominal distention, abdominal pain, constipation, diarrhea, nausea and vomiting.   Endocrine: Negative.    Genitourinary: Negative for decreased urine volume, dysuria, flank pain and hematuria.   Musculoskeletal: Negative for gait problem.   Skin: Positive for wound.   Allergic/Immunologic: Positive for immunocompromised state.   Neurological: Negative for dizziness, tremors, light-headedness and headaches.   Hematological: Negative.    Psychiatric/Behavioral: Negative for behavioral problems and dysphoric mood.      Objective:     Vital Signs (Most Recent):  Temp: 98.5 °F (36.9 °C) (09/06/17 1301)  Pulse: 75 (09/06/17 1301)  Resp: 16 (09/06/17 1301)  BP: 119/70 (09/06/17 1301)  SpO2: 100 % (09/06/17 1301) Vital Signs (24h Range):  Temp:  [97.4 °F (36.3 °C)-98.9 °F (37.2 °C)] 98.5 °F (36.9 °C)  Pulse:  [70-87] 75  Resp:  [15-20] 16  SpO2:  [96 %-100 %] 100 %  BP: ()/(62-79) 119/70     Weight: 65.7 kg (144 lb 13.5 oz)  Height: 5' 7" (170.2 cm)  Body mass index is 22.69 kg/m².    Physical Exam   Constitutional: He is oriented to person, place, and time. He appears well-developed. No distress.   HENT:   Head: Normocephalic and atraumatic.   Mouth/Throat: No oropharyngeal exudate.   Eyes: EOM are normal. Pupils are equal, round, and reactive to light.   Neck: Normal range of motion. Neck supple. No JVD present.   Cardiovascular: Normal rate and regular rhythm.    No murmur heard.  Pulmonary/Chest: Effort normal and breath sounds normal. No respiratory distress. He has no rales.   Abdominal: Soft. Bowel sounds are normal. He exhibits no distension. There is no tenderness. There is no guarding.   RLQ " inc with staples intact CDI no SSI     Musculoskeletal: Normal range of motion. He exhibits no edema.   Neurological: He is alert and oriented to person, place, and time.   Skin: Skin is warm and dry. Capillary refill takes 2 to 3 seconds. He is not diaphoretic. No erythema.   Psychiatric: He has a normal mood and affect. His behavior is normal. Judgment and thought content normal.   Nursing note and vitals reviewed.      Laboratory:  CBC:     Recent Labs  Lab 17  0442   WBC 5.64   RBC 2.70*   HGB 8.1*   HCT 24.0*   PLT 81*   MCV 89   MCH 30.0   MCHC 33.8     CMP:     Recent Labs  Lab 17  0442      CALCIUM 8.9   ALBUMIN 4.4      K 4.0   CO2 22*      BUN 49*   CREATININE 3.2*     Labs within the past 24 hours have been reviewed.    Diagnostic Results:  Labs: Reviewed    Assessment/Plan:     * Antibody mediated rejection of kidney transplant    - Biopsy (for DGF) 17: good sample with 24 glomeruli (only 1 sclerosed) and evidence just meeting criteria for ABMR. + diffuse ATI. Minimal fibrosis, no ACR or AVR, but + CD4 (>50% diffusely + stain) with mild glomerulitis and focal peritubular capillaritis. Also, biopsy shows ca-oxalate (3) and ca-phos (2) crystals.  -17 DSA + for weak class I. Repeat pre-PLEX Class I DSA DETECTED: A2(3894),B44(2820),B62(5178),    Class II DSA DETECTED BELOW CUTOFF: DR53(1265)  - Tolerated PLEX #1 , #2 , #3 , #4 ,  #5 , and #6 17.    - completed SMP x 3. Of note, original plan for thymo cancelled secondary to difficulty with line placement due to pt anatomy.  Cellcept originally held- will re-start.  - Tolerated IVIG #1 17. Planning for dose #2 today.   - Plan for rituximab x1 17.   - UOP increasing and deltra Cr decreasing with tx.    - Will send DSAs with morning labs.          donor kidney transplant 2017    - Allograft function improving.    - See ABMR and DGF.            Delayed graft function of kidney     - Improved GFR with excellent urine output. Continue lasix 40 mg bid.  - creatinine trending down daily, 3.2 today. UOP increasing.             Long-term use of immunosuppressant medication    - continue with prograf and MMF. Monitor labs daily and adjust dose accordingly for a therapeutic level.    - pt also on ketoconazole 2/2 subtherapeutic prograf level.  - completed PLEX x6 and IVIG x1. Planning for dose #2 of IVIG today.           Need for prophylactic immunotherapy    - See long term use of IS.            At risk for opportunistic infections    - Continue Valcyte for CMV prophylaxis  - Continue Bactrim for PCP prophylaxis  - Continue nystatin for fungal prophylaxis          Immunocompromised state    - Will continue with HAART therapy, immuno prophylaxis.          HIV (human immunodeficiency virus infection)    - Last CD4 count 7 (8/20) and HIV PCR undetected < 40 copies/ml (8/19).  - Continue HAART with dolutegravir (Tivicay) and rilpivirine (Edurant).    - HIV meds to be given after PLEX- meds highly protein bound and can be removed with plasmapheresis.  - Plan for HIV RNA and Immune cyclex to further assess immune system.           Anemia in ESRD (end-stage renal disease)    - H&H stable. Monitor daily.  - Epoetin injection 10K units 9/5/17.   -of note, pt with decreasing PLTs. Will send haptoglobin and LDH with morning labs.             Discharge Planning: not a candidate for d/c at this time. Plan for possible d/c tomorrow following rituximab treatment.   Kb run 121      Stephanie Valle PA-C  Kidney Transplant  [facility]

## 2017-09-22 DIAGNOSIS — E78.5 HYPERLIPIDEMIA, UNSPECIFIED HYPERLIPIDEMIA TYPE: ICD-10-CM

## 2017-09-22 DIAGNOSIS — M89.8X9 METABOLIC BONE DISEASE: ICD-10-CM

## 2017-09-22 DIAGNOSIS — Z94.0 KIDNEY REPLACED BY TRANSPLANT: Primary | ICD-10-CM

## 2017-09-22 RX ORDER — ROSUVASTATIN CALCIUM 20 MG/1
20 TABLET, COATED ORAL DAILY
Qty: 30 TABLET | Refills: 3 | Status: SHIPPED | OUTPATIENT
Start: 2017-09-22 | End: 2017-12-22 | Stop reason: SDUPTHER

## 2017-09-22 RX ORDER — SULFAMETHOXAZOLE AND TRIMETHOPRIM 400; 80 MG/1; MG/1
1 TABLET ORAL DAILY
Qty: 30 TABLET | Refills: 11 | Status: SHIPPED | OUTPATIENT
Start: 2017-09-22 | End: 2018-01-11

## 2017-09-22 RX ORDER — MYCOPHENOLATE MOFETIL 250 MG/1
1000 CAPSULE ORAL 2 TIMES DAILY
Qty: 240 CAPSULE | Refills: 11 | Status: SHIPPED | OUTPATIENT
Start: 2017-09-22 | End: 2017-10-23 | Stop reason: SDUPTHER

## 2017-09-22 RX ORDER — SODIUM BICARBONATE 650 MG/1
1300 TABLET ORAL 2 TIMES DAILY
Qty: 120 TABLET | Refills: 11 | Status: SHIPPED | OUTPATIENT
Start: 2017-09-22 | End: 2017-09-28 | Stop reason: SDUPTHER

## 2017-09-22 RX ORDER — PREDNISONE 10 MG/1
TABLET ORAL
Qty: 100 TABLET | Refills: 1
Start: 2017-09-22 | End: 2017-10-25 | Stop reason: DRUGHIGH

## 2017-09-22 RX ORDER — VALGANCICLOVIR 450 MG/1
450 TABLET, FILM COATED ORAL
Qty: 12 TABLET | Refills: 2 | Status: SHIPPED | OUTPATIENT
Start: 2017-09-22 | End: 2017-11-22 | Stop reason: SDUPTHER

## 2017-09-22 RX ORDER — AZITHROMYCIN 600 MG/1
1200 TABLET, FILM COATED ORAL
Qty: 8 TABLET | Refills: 5 | Status: SHIPPED | OUTPATIENT
Start: 2017-09-22 | End: 2018-02-19 | Stop reason: SDUPTHER

## 2017-09-22 RX ORDER — TACROLIMUS 1 MG/1
8 CAPSULE ORAL EVERY 12 HOURS
Qty: 480 CAPSULE | Refills: 11 | Status: SHIPPED | OUTPATIENT
Start: 2017-09-22 | End: 2017-09-28 | Stop reason: SDUPTHER

## 2017-09-22 RX ORDER — CINACALCET 60 MG/1
60 TABLET, FILM COATED ORAL EVERY OTHER DAY
Qty: 15 TABLET | Refills: 3 | Status: SHIPPED | OUTPATIENT
Start: 2017-09-22 | End: 2017-12-29 | Stop reason: SDUPTHER

## 2017-09-22 RX ORDER — FAMOTIDINE 20 MG/1
20 TABLET, FILM COATED ORAL NIGHTLY
Qty: 30 TABLET | Refills: 2 | Status: SHIPPED | OUTPATIENT
Start: 2017-09-22 | End: 2017-11-22 | Stop reason: SDUPTHER

## 2017-09-25 ENCOUNTER — HOSPITAL ENCOUNTER (OUTPATIENT)
Dept: RADIOLOGY | Facility: HOSPITAL | Age: 37
Discharge: HOME | End: 2017-09-25
Attending: INTERNAL MEDICINE
Payer: MEDICARE

## 2017-09-25 ENCOUNTER — OFFICE VISIT (OUTPATIENT)
Dept: TRANSPLANT | Facility: CLINIC | Age: 37
End: 2017-09-25
Payer: MEDICARE

## 2017-09-25 VITALS
BODY MASS INDEX: 22.84 KG/M2 | WEIGHT: 145.5 LBS | OXYGEN SATURATION: 100 % | HEIGHT: 67 IN | TEMPERATURE: 98 F | RESPIRATION RATE: 16 BRPM | HEART RATE: 89 BPM | SYSTOLIC BLOOD PRESSURE: 142 MMHG | DIASTOLIC BLOOD PRESSURE: 89 MMHG

## 2017-09-25 DIAGNOSIS — N18.6 ANEMIA IN ESRD (END-STAGE RENAL DISEASE): Chronic | ICD-10-CM

## 2017-09-25 DIAGNOSIS — Z94.0 KIDNEY REPLACED BY TRANSPLANT: Primary | ICD-10-CM

## 2017-09-25 DIAGNOSIS — Z94.0 KIDNEY REPLACED BY TRANSPLANT: ICD-10-CM

## 2017-09-25 DIAGNOSIS — Z79.60 LONG-TERM USE OF IMMUNOSUPPRESSANT MEDICATION: Primary | ICD-10-CM

## 2017-09-25 DIAGNOSIS — Z94.0 S/P KIDNEY TRANSPLANT: ICD-10-CM

## 2017-09-25 DIAGNOSIS — Z98.890 STATUS POST EXPLORATORY LAPAROTOMY: Chronic | ICD-10-CM

## 2017-09-25 DIAGNOSIS — B20 HIV (HUMAN IMMUNODEFICIENCY VIRUS INFECTION): Chronic | ICD-10-CM

## 2017-09-25 DIAGNOSIS — D63.1 ANEMIA IN ESRD (END-STAGE RENAL DISEASE): Chronic | ICD-10-CM

## 2017-09-25 PROCEDURE — 76776 US EXAM K TRANSPL W/DOPPLER: CPT | Mod: 26,GC,, | Performed by: RADIOLOGY

## 2017-09-25 PROCEDURE — 99215 OFFICE O/P EST HI 40 MIN: CPT | Mod: S$PBB,,, | Performed by: INTERNAL MEDICINE

## 2017-09-25 PROCEDURE — 99999 PR PBB SHADOW E&M-EST. PATIENT-LVL V: CPT | Mod: PBBFAC,,, | Performed by: INTERNAL MEDICINE

## 2017-09-25 PROCEDURE — 99215 OFFICE O/P EST HI 40 MIN: CPT | Mod: PBBFAC,25 | Performed by: INTERNAL MEDICINE

## 2017-09-25 PROCEDURE — 76776 US EXAM K TRANSPL W/DOPPLER: CPT | Mod: TC

## 2017-09-25 RX ORDER — NIFEDIPINE 30 MG/1
30 TABLET, EXTENDED RELEASE ORAL DAILY
Qty: 30 TABLET | Refills: 11 | Status: SHIPPED | OUTPATIENT
Start: 2017-09-25 | End: 2017-09-28 | Stop reason: SDUPTHER

## 2017-09-25 NOTE — PATIENT INSTRUCTIONS
Please:    - Low potassium diet  - Sodium bicarbonate 3 tabs twice a day  - start nifedipine 30 mg daily and call Evelina for BP readings in 2-3 days  -  Increase water intake to 2-3 liter/day  - Plan for biopsy this week  - Monitor your blood pressure and aim to keep < 140/90  - Follow a low sodium diet. It is hard to do, but helps keep blood pressure controlled and prevents swelling (edema).   - Avoid nonsteroidal anti-inflamatory drugs (NSAIDS): ibuprofen (Advil, Motrin), naproxen (Aleve, Naprosyn), Indomethacin (Indocin).   - Check any new medications (over the counter or prescription) to make sure they will not affect your new kidney.

## 2017-09-25 NOTE — LETTER
September 25, 2017        Alexa Holliday  [address] [place] [id number]  Phone: [phone]  Fax: [phone]             [facility] [place]  [address]  [facility] [address]  Phone: [phone]   Patient: Demetrio Aguiar   MR Number: [id number]   YOB: 1980   Date of Visit: 9/25/2017       Dear Dr. Alexa Holliday    Thank you for referring Demetrio Aguiar to me for evaluation. Attached you will find relevant portions of my assessment and plan of care.    If you have questions, please do not hesitate to call me. I look forward to following Demetrio Aguiar along with you.    Sincerely,    Sayda Gross MD    Enclosure    If you would like to receive this communication electronically, please contact [email or web address] or [phone] to request [employer] access.    [employer] is a tool which provides read-only access to select patient information with whom you have a relationship. Its easy to use and provides real time access to review your patients record including encounter summaries, notes, results, and demographic information.    If you feel you have received this communication in error or would no longer like to receive these types of communications, please e-mail [email or web address]

## 2017-09-25 NOTE — PROGRESS NOTES
Kidney Post-Transplant Assessment    Referring Physician:   Current Nephrologist: Alexa Holliday    ORGAN: RIGHT KIDNEY  Donor Type:  - brain death  Donor Information: 37 y.o. year old Black or  male  PHS Increased Risk: no  Cold Ischemia: 422 mins  Induction Medications: thymoglobulin    Subjective:     CC:  Reassessment of renal allograft function and management of chronic immunosuppression.    Kidney History:  Demetrio Aguiar is a 37 y.o. 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 His maintenance immunosuppression includes a steroid taper per protocol to 5mg daily, Prograf, and Cellcept maintenance.     His post op course has been complicated by   - DGF requiring HD (last HD 17) on 17.   - Biopsy proven ABMR with weakly positive class I DSA's on 17. Pt treated with SMP x 3 (-9/3). PLEX x6 (completed ) and IVIG x 2 (-). Kidney function was improving  - Kidney US on 17 showed a large collection located along the anterior inferior margin of the renal transplant measuring 13.4 x 7 x 11.9. taken back to the OR for retroperitoneal bleed and washout.   - on HAART therapy. CD4 of 7. Started on Azithromycin weekly.    Interval History:  Since last visit he denies any hospitalizations or ER visits.  He states his incision has healed well, had stent removed last week. Patient's BP at home is lower while lying down (140/85) and higher while sitting up (155/100). he denies any chest pain, shortness of breath, nausea/vomiting, dysuria, frequency, urgency, or pain over the allograft.     Current Medication  Current Outpatient Prescriptions   Medication Sig Dispense Refill     azithromycin (ZITHROMAX) 600 MG Tab Take 2 tablets (1,200 mg total) by mouth every 7 days. 8 tablet 5    cinacalcet (SENSIPAR) 60 MG Tab Take 1 tablet (60 mg total) by mouth every other day. 15 tablet 3    dolutegravir (TIVICAY) 50 mg Tab Take 1 tablet (50 mg total) by mouth once daily. 30 tablet 5    famotidine (PEPCID) 20 MG tablet Take 1 tablet (20 mg total) by mouth every evening. 30 tablet 2    isavuconazonium sulfate 186 mg Cap Take 2 capsules (372 mg total) by mouth once daily. 52 capsule 0    multivitamin (THERAGRAN) tablet Take 1 tablet by mouth once daily.      mycophenolate (CELLCEPT) 250 mg Cap Take 4 capsules (1,000 mg total) by mouth 2 (two) times daily. Kidney txp 8/18/17; Z94.0 240 capsule 11    oxycodone-acetaminophen (PERCOCET)  mg per tablet Take 0.5-1 tablets by mouth every 4 (four) hours as needed for Pain. 40 tablet 0    predniSONE (DELTASONE) 10 MG tablet Take 20 mg PO QD 9/4-10/3; 15 mg PO QD 10/4-11/3; 10 mg PO QD 11/4-12/3; 5 mg PO QD thereafter 100 tablet 1    rilpivirine (EDURANT) 25 mg Tab Take 1 tablet (25 mg total) by mouth once daily. 30 tablet 5    rosuvastatin (CRESTOR) 20 MG tablet Take 1 tablet (20 mg total) by mouth once daily. 30 tablet 3    sodium bicarbonate 650 MG tablet Take 2 tablets (1,300 mg total) by mouth 2 (two) times daily. 120 tablet 11    sulfamethoxazole-trimethoprim 400-80mg (BACTRIM,SEPTRA) 400-80 mg per tablet Take 1 tablet by mouth once daily. 30 tablet 11    tacrolimus (PROGRAF) 1 MG Cap Take 8 capsules (8 mg total) by mouth every 12 (twelve) hours. 480 capsule 11    valganciclovir (VALCYTE) 450 mg Tab Take 1 tablet (450 mg total) by mouth every Mon, Wed, Fri. Stop 12/3/17 12 tablet 2    bisacodyl (DULCOLAX) 5 mg EC tablet Take 1-2 tablets (5-10 mg total) by mouth daily as needed for Constipation. Laxative 30 tablet 3    docusate sodium (COLACE) 100 MG capsule Take 1 capsule (100 mg total) by mouth 3 (three) times daily as needed for  "Constipation. 100 capsule 0    nifedipine (PROCARDIA-XL) 30 MG (OSM) 24 hr tablet Take 1 tablet (30 mg total) by mouth once daily. 30 tablet 11     Current Facility-Administered Medications   Medication Dose Route Frequency Provider Last Rate Last Dose    epoetin erika injection 10,000 Units  10,000 Units Subcutaneous Weekly Lucía Polk MD   10,000 Units at 08/28/17 1214         Review of Systems    Skin: no skin rash  CNS; no headaches, blurred vision, seizure, or syncope  ENT: No JVD,  Adenopathies,  nasal congestion. No oral lesions  Cardiac: No chest pain, dyspnea, claudication, edema or palpitations  Respiratory: No SOB, cough, hemoptysis   Gastro-intestinal: No diarrhea, constipation, abdominal pain, (+) nausea, vomit. No ascitis  Genitourinary: no hematuria, dysuria, frequency, frequency  Musculoskeletal: joint pain, arthritis or vasculitic changes  Psych: alert awake, oriented, No cranial nerves deficit.      Objective:     Blood pressure (!) 142/89, pulse 89, temperature 98.3 °F (36.8 °C), temperature source Oral, resp. rate 16, height 5' 7" (1.702 m), weight 66 kg (145 lb 8.1 oz), SpO2 100 %.  body mass index is 22.79 kg/m².    Physical Exam    Head: normocephalic  Neck: No JVD, cervical axillary, or femoral adenopathies  Heart: no murmurs, Normal s1 and s2, No gallops, no rubs, No murmurs  Lungs; CTA, good respiratory effort, no crackles  Abdomen: soft, non tender, no splenomegaly or hepatomegaly, no massess, no bruits. Incision healing well. There is a 1 cm incision lateral to the transplant incision. No pain No tenderness . No erythema   Extremities: No edema, skin rash, joint pain. LUE AVG  SNC: awake, alert oriented. Cranial nerves are intact, no focalized, sensitivity and strength preserved    Labs:  Lab Results   Component Value Date    WBC 4.86 09/25/2017    HGB 9.9 (L) 09/25/2017    HCT 30.8 (L) 09/25/2017     09/25/2017    K 5.3 (H) 09/25/2017     (H) 09/25/2017    CO2 " 19 (L) 2017    BUN 22 (H) 2017    CREATININE 2.0 (H) 2017    EGFRNONAA 41.1 (A) 2017    CALCIUM 9.4 2017    PHOS 3.0 2017    MG 2.0 2017    ALBUMIN 4.1 2017    AST 19 2017    ALT 10 2017    .0 (H) 2017    TACROLIMUS 6.6 2017         Assessment/Plan:     1. Long-term use of immunosuppressant medication    2. Status post exploratory laparotomy    3. Anemia in ESRD (end-stage renal disease)    4. HIV (human immunodeficiency virus infection)    5.  donor kidney transplant 2017        Mr. Aguiar is a 37 y.o. male with:       # History of ESRD presumed secondary to HIVAN  - s/p DDKT on 17  - DGF and ABMR, s/p Pt treated with SMP x 3 (-9/3). PLEX x6 (completed ) and IVIG x 2 (-).  - last DSA is negative  - Juan David Cr  1.8  - last Cr 2  - minimal proteinuria  - Kidney US today  - Plan for kidney biopsy this week and rituximab if rejection is still present.    # Immunosuppression:   - continue Prograf , pending level from today. If level remains low, we start ketokonazole  - continue MMF  - continue prednisone  - will monitor closely for toxicities    # Antimicrobial Prophylaxis:   - continue bactrim  for PJP prophylaxis  - continue Valcyte for CMV prophylaxis   - continue nystatin for thrush prophylaxis for 1 month       # HTN:   - BPs not well controlled   - start nifedipine 30 mg   - continue with home blood pressure monitoring and call [facility] for BP readings in 2-3 days  - low salt and healthy life discussed with the patient    # Metabolic Bone Disease/Secondary Hyperparathyroidism:  - on cinacalcet  - PTH of 328 and Ca of 9.6  - will monitor PTH, calcium, and phosphorus as per our center protocol    # Mild hyperkalemia 5.3  - on low K diet      # HIV  - on HAART medication  - Azithromycin weekly due to CD4 of 7      # Anemia of chronic disease:   - Hb low but stable  - will continue monitoring as per our center  guidelines.           Plan  - Kidney allograft US today  - Kidney biopsy this week  - check DSA, UPCR, and BK with next lab  - start nifedipine 30 mg daily  - Increase NaHCO3 to 3 tab  q12 hour  - Low K diet  - Follow Tacrolimus level from today.  If level is low, we start ketokonazole       Sayda Gross MD

## 2017-09-26 NOTE — PROGRESS NOTES
Dr. Cisneros,    Patient 's Cr is still 2. The above is the kidney US from today which still shows fluid collection 10 X 5 X 5.5 cm. Appreciate any input, Thank you!

## 2017-09-28 ENCOUNTER — LAB VISIT (OUTPATIENT)
Dept: LAB | Facility: HOSPITAL | Age: 37
End: 2017-09-28
Attending: INTERNAL MEDICINE
Payer: MEDICARE

## 2017-09-28 ENCOUNTER — PATIENT MESSAGE (OUTPATIENT)
Dept: TRANSPLANT | Facility: CLINIC | Age: 37
End: 2017-09-28

## 2017-09-28 DIAGNOSIS — M89.8X9 METABOLIC BONE DISEASE: ICD-10-CM

## 2017-09-28 DIAGNOSIS — Z94.0 KIDNEY REPLACED BY TRANSPLANT: ICD-10-CM

## 2017-09-28 DIAGNOSIS — T86.10 COMPLICATION OF TRANSPLANTED KIDNEY, UNSPECIFIED COMPLICATION: Primary | ICD-10-CM

## 2017-09-28 LAB
ALBUMIN SERPL BCP-MCNC: 4 G/DL
ANION GAP SERPL CALC-SCNC: 8 MMOL/L
BASOPHILS # BLD AUTO: 0.01 K/UL
BASOPHILS NFR BLD: 0.2 %
BUN SERPL-MCNC: 19 MG/DL
CALCIUM SERPL-MCNC: 9.7 MG/DL
CHLORIDE SERPL-SCNC: 111 MMOL/L
CO2 SERPL-SCNC: 20 MMOL/L
CREAT SERPL-MCNC: 1.8 MG/DL
DIFFERENTIAL METHOD: ABNORMAL
EOSINOPHIL # BLD AUTO: 0.1 K/UL
EOSINOPHIL NFR BLD: 0.8 %
ERYTHROCYTE [DISTWIDTH] IN BLOOD BY AUTOMATED COUNT: 14.1 %
EST. GFR  (AFRICAN AMERICAN): 54 ML/MIN/1.73 M^2
EST. GFR  (NON AFRICAN AMERICAN): 46.7 ML/MIN/1.73 M^2
GLUCOSE SERPL-MCNC: 96 MG/DL
HCT VFR BLD AUTO: 30.2 %
HGB BLD-MCNC: 10.2 G/DL
LYMPHOCYTES # BLD AUTO: 0.5 K/UL
LYMPHOCYTES NFR BLD: 8.5 %
MAGNESIUM SERPL-MCNC: 2.2 MG/DL
MCH RBC QN AUTO: 30.1 PG
MCHC RBC AUTO-ENTMCNC: 33.8 G/DL
MCV RBC AUTO: 89 FL
MONOCYTES # BLD AUTO: 0.6 K/UL
MONOCYTES NFR BLD: 9 %
NEUTROPHILS # BLD AUTO: 5.1 K/UL
NEUTROPHILS NFR BLD: 81 %
PHOSPHATE SERPL-MCNC: 3.1 MG/DL
PLATELET # BLD AUTO: 160 K/UL
PMV BLD AUTO: 10 FL
POTASSIUM SERPL-SCNC: 5.2 MMOL/L
RBC # BLD AUTO: 3.39 M/UL
SODIUM SERPL-SCNC: 139 MMOL/L
TACROLIMUS BLD-MCNC: 5.7 NG/ML
WBC # BLD AUTO: 6.23 K/UL

## 2017-09-28 PROCEDURE — 80197 ASSAY OF TACROLIMUS: CPT

## 2017-09-28 PROCEDURE — 83735 ASSAY OF MAGNESIUM: CPT

## 2017-09-28 PROCEDURE — 36415 COLL VENOUS BLD VENIPUNCTURE: CPT

## 2017-09-28 PROCEDURE — 85025 COMPLETE CBC W/AUTO DIFF WBC: CPT

## 2017-09-28 PROCEDURE — 80069 RENAL FUNCTION PANEL: CPT

## 2017-09-28 NOTE — TELEPHONE ENCOUNTER
Call placed, labs reviewed by Dr. Tello. Patient reports true 12hr Tacrolimus trough level. Will increase dose to 9mg bid. Will increase Sodium Bicard dose to 1950mg tid. Patient states understanding.

## 2017-09-28 NOTE — PROGRESS NOTES
Results reviewed and the following message sent to patient via [facility]:  You are building up too much acid in your blood (shown as a low CO2 level). I recommend sodium bicarbonate be increased to 1950 mg 3x daily (3 tabs each time). Also, tacro level is still low.  Please verify this tacrolimus level was collected 12 hours after your last dose AND there are no recent med changes (start, stop or change meds). If level is accurate and meds have not changed, please increase tacrolimus IR (Prograf) to 9 mg twice daily. Make sure your levels are drawn as close to 12 hrs after dose as possible.

## 2017-09-29 ENCOUNTER — PATIENT MESSAGE (OUTPATIENT)
Dept: TRANSPLANT | Facility: CLINIC | Age: 37
End: 2017-09-29

## 2017-09-29 DIAGNOSIS — T86.10 COMPLICATION OF TRANSPLANTED KIDNEY, UNSPECIFIED COMPLICATION: Primary | ICD-10-CM

## 2017-09-29 RX ORDER — TACROLIMUS 1 MG/1
9 CAPSULE ORAL EVERY 12 HOURS
Qty: 540 CAPSULE | Refills: 11 | Status: SHIPPED | OUTPATIENT
Start: 2017-09-29 | End: 2017-10-09 | Stop reason: SDUPTHER

## 2017-09-29 RX ORDER — NIFEDIPINE 30 MG/1
30 TABLET, EXTENDED RELEASE ORAL DAILY
Qty: 30 TABLET | Refills: 11 | Status: SHIPPED | OUTPATIENT
Start: 2017-09-29 | End: 2018-02-28 | Stop reason: SDUPTHER

## 2017-09-29 RX ORDER — SODIUM BICARBONATE 650 MG/1
1950 TABLET ORAL 3 TIMES DAILY
Qty: 270 TABLET | Refills: 11 | Status: SHIPPED | OUTPATIENT
Start: 2017-09-29 | End: 2017-09-29 | Stop reason: SDUPTHER

## 2017-09-29 RX ORDER — SODIUM BICARBONATE 650 MG/1
1950 TABLET ORAL 3 TIMES DAILY
Qty: 270 TABLET | Refills: 11 | Status: SHIPPED | OUTPATIENT
Start: 2017-09-29 | End: 2018-08-14 | Stop reason: SDUPTHER

## 2017-09-29 RX ORDER — TACROLIMUS 1 MG/1
9 CAPSULE ORAL EVERY 12 HOURS
Qty: 480 CAPSULE | Refills: 11 | Status: SHIPPED | OUTPATIENT
Start: 2017-09-29 | End: 2017-09-29 | Stop reason: SDUPTHER

## 2017-10-02 ENCOUNTER — LAB VISIT (OUTPATIENT)
Dept: LAB | Facility: HOSPITAL | Age: 37
End: 2017-10-02
Attending: INTERNAL MEDICINE
Payer: MEDICARE

## 2017-10-02 DIAGNOSIS — Z94.0 KIDNEY REPLACED BY TRANSPLANT: ICD-10-CM

## 2017-10-02 LAB
ALBUMIN SERPL BCP-MCNC: 4.2 G/DL
ANION GAP SERPL CALC-SCNC: 9 MMOL/L
BASOPHILS # BLD AUTO: 0.01 K/UL
BASOPHILS NFR BLD: 0.2 %
BUN SERPL-MCNC: 24 MG/DL
CALCIUM SERPL-MCNC: 9.4 MG/DL
CHLORIDE SERPL-SCNC: 109 MMOL/L
CO2 SERPL-SCNC: 21 MMOL/L
CREAT SERPL-MCNC: 2 MG/DL
DIFFERENTIAL METHOD: ABNORMAL
EOSINOPHIL # BLD AUTO: 0 K/UL
EOSINOPHIL NFR BLD: 0.7 %
ERYTHROCYTE [DISTWIDTH] IN BLOOD BY AUTOMATED COUNT: 13.9 %
EST. GFR  (AFRICAN AMERICAN): 47.5 ML/MIN/1.73 M^2
EST. GFR  (NON AFRICAN AMERICAN): 41.1 ML/MIN/1.73 M^2
GLUCOSE SERPL-MCNC: 106 MG/DL
HCT VFR BLD AUTO: 31.7 %
HGB BLD-MCNC: 10.7 G/DL
LYMPHOCYTES # BLD AUTO: 0.6 K/UL
LYMPHOCYTES NFR BLD: 11.2 %
MAGNESIUM SERPL-MCNC: 2.1 MG/DL
MCH RBC QN AUTO: 29.9 PG
MCHC RBC AUTO-ENTMCNC: 33.8 G/DL
MCV RBC AUTO: 89 FL
MONOCYTES # BLD AUTO: 0.5 K/UL
MONOCYTES NFR BLD: 7.9 %
NEUTROPHILS # BLD AUTO: 4.6 K/UL
NEUTROPHILS NFR BLD: 79.7 %
PHOSPHATE SERPL-MCNC: 3.8 MG/DL
PLATELET # BLD AUTO: 149 K/UL
PMV BLD AUTO: 10.3 FL
POTASSIUM SERPL-SCNC: 4.5 MMOL/L
RBC # BLD AUTO: 3.58 M/UL
SODIUM SERPL-SCNC: 139 MMOL/L
TACROLIMUS BLD-MCNC: 7 NG/ML
WBC # BLD AUTO: 5.73 K/UL

## 2017-10-02 PROCEDURE — 85025 COMPLETE CBC W/AUTO DIFF WBC: CPT

## 2017-10-02 PROCEDURE — 80069 RENAL FUNCTION PANEL: CPT

## 2017-10-02 PROCEDURE — 80197 ASSAY OF TACROLIMUS: CPT

## 2017-10-02 PROCEDURE — 83735 ASSAY OF MAGNESIUM: CPT

## 2017-10-02 PROCEDURE — 36415 COLL VENOUS BLD VENIPUNCTURE: CPT

## 2017-10-03 ENCOUNTER — PATIENT MESSAGE (OUTPATIENT)
Dept: TRANSPLANT | Facility: CLINIC | Age: 37
End: 2017-10-03

## 2017-10-03 ENCOUNTER — SURGERY (OUTPATIENT)
Age: 37
End: 2017-10-03

## 2017-10-03 ENCOUNTER — APPOINTMENT (OUTPATIENT)
Dept: INTERVENTIONAL RADIOLOGY/VASCULAR | Facility: HOSPITAL | Age: 37
End: 2017-10-03
Attending: TRANSPLANT SURGERY
Payer: MEDICARE

## 2017-10-03 PROBLEM — T86.10 COMPLICATION OF TRANSPLANTED KIDNEY: Status: ACTIVE | Noted: 2017-10-03

## 2017-10-03 PROCEDURE — 99153 MOD SED SAME PHYS/QHP EA: CPT

## 2017-10-03 PROCEDURE — 49406 IMAGE CATH FLUID PERI/RETRO: CPT | Mod: ,,, | Performed by: RADIOLOGY

## 2017-10-03 PROCEDURE — 99152 MOD SED SAME PHYS/QHP 5/>YRS: CPT | Mod: ,,, | Performed by: RADIOLOGY

## 2017-10-03 PROCEDURE — 49406 IMAGE CATH FLUID PERI/RETRO: CPT

## 2017-10-03 PROCEDURE — 99152 MOD SED SAME PHYS/QHP 5/>YRS: CPT

## 2017-10-04 ENCOUNTER — OFFICE VISIT (OUTPATIENT)
Dept: TRANSPLANT | Facility: CLINIC | Age: 37
End: 2017-10-04
Payer: MEDICARE

## 2017-10-04 VITALS
BODY MASS INDEX: 22.43 KG/M2 | RESPIRATION RATE: 16 BRPM | TEMPERATURE: 99 F | OXYGEN SATURATION: 100 % | SYSTOLIC BLOOD PRESSURE: 127 MMHG | WEIGHT: 142.88 LBS | DIASTOLIC BLOOD PRESSURE: 84 MMHG | HEIGHT: 67 IN | HEART RATE: 81 BPM

## 2017-10-04 DIAGNOSIS — Z94.0 KIDNEY REPLACED BY TRANSPLANT: Primary | ICD-10-CM

## 2017-10-04 DIAGNOSIS — Z79.60 LONG-TERM USE OF IMMUNOSUPPRESSANT MEDICATION: ICD-10-CM

## 2017-10-04 DIAGNOSIS — T86.11 KIDNEY TRANSPLANT REJECTION: ICD-10-CM

## 2017-10-04 DIAGNOSIS — N28.89: ICD-10-CM

## 2017-10-04 DIAGNOSIS — T86.19: ICD-10-CM

## 2017-10-04 DIAGNOSIS — Z51.81 ENCOUNTER FOR THERAPEUTIC DRUG MONITORING: ICD-10-CM

## 2017-10-04 DIAGNOSIS — Z94.0 STATUS POST DECEASED-DONOR KIDNEY TRANSPLANTATION: Primary | ICD-10-CM

## 2017-10-04 DIAGNOSIS — Z94.0 S/P KIDNEY TRANSPLANT: ICD-10-CM

## 2017-10-04 DIAGNOSIS — Z22.7 TB LUNG, LATENT: ICD-10-CM

## 2017-10-04 PROCEDURE — 99215 OFFICE O/P EST HI 40 MIN: CPT | Mod: 24,S$PBB,, | Performed by: TRANSPLANT SURGERY

## 2017-10-04 PROCEDURE — 99999 PR PBB SHADOW E&M-EST. PATIENT-LVL III: CPT | Mod: PBBFAC,,,

## 2017-10-04 PROCEDURE — 99213 OFFICE O/P EST LOW 20 MIN: CPT | Mod: PBBFAC

## 2017-10-04 RX ORDER — OXYCODONE AND ACETAMINOPHEN 10; 325 MG/1; MG/1
.5-1 TABLET ORAL EVERY 4 HOURS PRN
Qty: 40 TABLET | Refills: 0 | Status: SHIPPED | OUTPATIENT
Start: 2017-10-04 | End: 2017-10-04 | Stop reason: SDUPTHER

## 2017-10-04 RX ORDER — OXYCODONE AND ACETAMINOPHEN 10; 325 MG/1; MG/1
.5-1 TABLET ORAL EVERY 4 HOURS PRN
Qty: 40 TABLET | Refills: 0 | Status: SHIPPED | OUTPATIENT
Start: 2017-10-04 | End: 2017-11-08 | Stop reason: ALTCHOICE

## 2017-10-04 RX ORDER — OXYCODONE AND ACETAMINOPHEN 10; 325 MG/1; MG/1
.5-1 TABLET ORAL EVERY 4 HOURS PRN
Qty: 40 TABLET | Refills: 0 | Status: CANCELLED | OUTPATIENT
Start: 2017-10-04

## 2017-10-04 NOTE — PROGRESS NOTES
Transplant Surgery  Kidney Transplant Recipient Follow-up    Referring Physician:   Current Nephrologist: Alexa Holliday    Subjective:     Chief Complaint: Demetrio Aguiar is a 37 y.o. year old Black or  male who is status post Kidney transplant performed on 2017.    ORGAN:  RIGHT KIDNEY  Disease Etiology: HIV Nephropathy  Donor Type:   - Brain Death  Donor CMV Status:  Negative  Donor HBcAB:  Negative  Donor HCV Status:  Negative    History of Present Illness: He reports pain at drain site.  From a transplant perspective, he reports normal urination and pain controlled with medications.  Demetrio is here for management of his immunosuppression medication.  Demetrio states that his immunosuppression is being well tolerated.  Hypertension is is reportedly well controlled.    Review of Systems   Constitutional: Negative for activity change, appetite change, chills and fever.        Reportedly not drinking as much water as he should     HENT: Negative for congestion, nosebleeds, sinus pressure, sore throat and voice change.    Eyes: Negative for pain and visual disturbance.   Respiratory: Negative for cough and shortness of breath.    Cardiovascular: Negative for palpitations and leg swelling.   Gastrointestinal: Negative for abdominal distention, abdominal pain, diarrhea, nausea and vomiting.   Endocrine: Negative for cold intolerance and heat intolerance.   Genitourinary: Negative for dysuria, flank pain, frequency and hematuria.   Musculoskeletal: Negative for back pain and gait problem.   Skin: Negative for color change, pallor and wound.   Allergic/Immunologic: Negative for food allergies.   Neurological: Negative for dizziness, seizures, numbness and headaches.   Hematological: Negative for adenopathy.   Psychiatric/Behavioral: Negative for agitation, behavioral problems, hallucinations and sleep disturbance.       Objective:     Physical Exam   Constitutional: He is oriented to  person, place, and time. He appears well-developed and well-nourished.   HENT:   Head: Normocephalic and atraumatic.   Eyes: EOM are normal. Pupils are equal, round, and reactive to light.   Neck: No JVD present.   Cardiovascular: Normal rate, regular rhythm and normal heart sounds.    Pulmonary/Chest: Effort normal and breath sounds normal. No stridor.   Abdominal: Soft. He exhibits no distension. There is no tenderness.   Perc drain in place RLQ with thin dark output (liquified hematoma, drain flushed with minimal output)    Incision c/d/i w staples; removing staples today     Musculoskeletal: Normal range of motion. He exhibits no edema.   Neurological: He is alert and oriented to person, place, and time.   Skin: Skin is warm and dry.   Psychiatric: He has a normal mood and affect. His behavior is normal.     Lab Results   Component Value Date    CREATININE 2.0 (H) 10/02/2017    BUN 24 (H) 10/02/2017     Lab Results   Component Value Date    WBC 5.73 10/02/2017    HGB 10.7 (L) 10/02/2017    HCT 31.7 (L) 10/02/2017    HCT 18 (LL) 09/09/2017     (L) 10/02/2017     Lab Results   Component Value Date    TACROLIMUS 7.0 10/02/2017         Assessment and Plan:        · S/P Kidney transplant. His Cr is hanging at 2.0, if this is not improved by next week (about 1 week after perc drainage) then he should have an ultrasound and a biopsy. He has a KDPI 17% kidney and is thin body habitus and I would expect his Cr should be much lower than 2.0.    · Chronic immunosuppressive medications for rejection prophylaxis at target.  Plan: no adjustment needed.  · Continue monitoring symptoms, labs and drug levels for drug-related toxicity and side effects.  · Renal hypertension at target.    Follow-up: Patient reminded to call with any health changes, since these can be early signs of significant complications.  Also, I advised the patient to be sure any new medications or changes of old medications are discussed with either a  pharmacist, or physician knowledgeable with transplant to avoid rejection/drug toxicity related to significant drug interactions.    Vasiliy Chung Jr, MD       [facility] Patient Status  Functional Status: 80% - Normal activity with effort: some symptoms of disease  Physical Capacity: No Limitations

## 2017-10-04 NOTE — TELEPHONE ENCOUNTER
I returned pt's call.  He says [facility] cannot get his Cresimba.  Pt is [place] Medicaid and at last check, pt can only get meds from an [place] [facility].  I called [facility] and this medication needs to go to pt Specialty Pharm[facility] at [phone] because it is above a price or a different Tier.  I called [facility]- and they asked that we submit via escrip to [facility].  I added to pt profile and asked MD to send Rx. [facility] will call pt with plan.

## 2017-10-04 NOTE — PROGRESS NOTES
STAPLE REMOVAL NOTE    Staples removed from kidney transplant incision, steri-strips applied with Benzoin per Dr. Chung's order.  Patient tolerated procedure well.  Skin dry and intact.  Patient instructed to shower with back to water spray and to pat dry incision and to let the steri-strips wear off on their own.  Patient instructed to report any redness, warmth, or drainage from incision to transplant coordinators.  All questions answered.

## 2017-10-04 NOTE — TELEPHONE ENCOUNTER
----- Message from Shea Cordero sent at 10/4/2017  3:12 PM CDT -----  Contact: pt  Calling to speak with coord about his med he is calling back with some information that was requested

## 2017-10-05 ENCOUNTER — LAB VISIT (OUTPATIENT)
Dept: LAB | Facility: HOSPITAL | Age: 37
End: 2017-10-05
Attending: INTERNAL MEDICINE
Payer: MEDICARE

## 2017-10-05 DIAGNOSIS — Z94.0 KIDNEY REPLACED BY TRANSPLANT: ICD-10-CM

## 2017-10-05 LAB
ALBUMIN SERPL BCP-MCNC: 4.1 G/DL
ANION GAP SERPL CALC-SCNC: 8 MMOL/L
BASOPHILS # BLD AUTO: 0.02 K/UL
BASOPHILS NFR BLD: 0.3 %
BUN SERPL-MCNC: 23 MG/DL
CALCIUM SERPL-MCNC: 9.5 MG/DL
CHLORIDE SERPL-SCNC: 106 MMOL/L
CO2 SERPL-SCNC: 23 MMOL/L
CREAT SERPL-MCNC: 1.8 MG/DL
DIFFERENTIAL METHOD: ABNORMAL
EOSINOPHIL # BLD AUTO: 0.1 K/UL
EOSINOPHIL NFR BLD: 1.3 %
ERYTHROCYTE [DISTWIDTH] IN BLOOD BY AUTOMATED COUNT: 13.9 %
EST. GFR  (AFRICAN AMERICAN): 54 ML/MIN/1.73 M^2
EST. GFR  (NON AFRICAN AMERICAN): 46.7 ML/MIN/1.73 M^2
GLUCOSE SERPL-MCNC: 104 MG/DL
HCT VFR BLD AUTO: 32.9 %
HGB BLD-MCNC: 10.6 G/DL
LYMPHOCYTES # BLD AUTO: 0.8 K/UL
LYMPHOCYTES NFR BLD: 12.2 %
MAGNESIUM SERPL-MCNC: 2.1 MG/DL
MCH RBC QN AUTO: 29.6 PG
MCHC RBC AUTO-ENTMCNC: 32.2 G/DL
MCV RBC AUTO: 92 FL
MONOCYTES # BLD AUTO: 0.4 K/UL
MONOCYTES NFR BLD: 6 %
NEUTROPHILS # BLD AUTO: 5.5 K/UL
NEUTROPHILS NFR BLD: 79.8 %
PHOSPHATE SERPL-MCNC: 3.8 MG/DL
PLATELET # BLD AUTO: 145 K/UL
PMV BLD AUTO: 10.3 FL
POTASSIUM SERPL-SCNC: 4.5 MMOL/L
RBC # BLD AUTO: 3.58 M/UL
SODIUM SERPL-SCNC: 137 MMOL/L
TACROLIMUS BLD-MCNC: 6.8 NG/ML
WBC # BLD AUTO: 6.89 K/UL

## 2017-10-05 PROCEDURE — 80069 RENAL FUNCTION PANEL: CPT

## 2017-10-05 PROCEDURE — 80197 ASSAY OF TACROLIMUS: CPT

## 2017-10-05 PROCEDURE — 83735 ASSAY OF MAGNESIUM: CPT

## 2017-10-05 PROCEDURE — 36415 COLL VENOUS BLD VENIPUNCTURE: CPT

## 2017-10-05 PROCEDURE — 85025 COMPLETE CBC W/AUTO DIFF WBC: CPT

## 2017-10-06 ENCOUNTER — TELEPHONE (OUTPATIENT)
Dept: TRANSPLANT | Facility: CLINIC | Age: 37
End: 2017-10-06
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
## 2017-10-09 ENCOUNTER — HOSPITAL ENCOUNTER (OUTPATIENT)
Dept: RADIOLOGY | Facility: HOSPITAL | Age: 37
Discharge: HOME | End: 2017-10-09
Attending: INTERNAL MEDICINE
Payer: MEDICARE

## 2017-10-09 ENCOUNTER — TELEPHONE (OUTPATIENT)
Dept: TRANSPLANT | Facility: HOSPITAL | Age: 37
End: 2017-10-09

## 2017-10-09 ENCOUNTER — OFFICE VISIT (OUTPATIENT)
Dept: TRANSPLANT | Facility: CLINIC | Age: 37
End: 2017-10-09
Payer: MEDICARE

## 2017-10-09 ENCOUNTER — TELEPHONE (OUTPATIENT)
Dept: TRANSPLANT | Facility: CLINIC | Age: 37
End: 2017-10-09

## 2017-10-09 VITALS
BODY MASS INDEX: 22.7 KG/M2 | TEMPERATURE: 99 F | SYSTOLIC BLOOD PRESSURE: 123 MMHG | HEIGHT: 67 IN | HEART RATE: 84 BPM | DIASTOLIC BLOOD PRESSURE: 79 MMHG | OXYGEN SATURATION: 99 % | WEIGHT: 144.63 LBS | RESPIRATION RATE: 16 BRPM

## 2017-10-09 DIAGNOSIS — E78.5 HYPERLIPIDEMIA, UNSPECIFIED HYPERLIPIDEMIA TYPE: Chronic | ICD-10-CM

## 2017-10-09 DIAGNOSIS — D62 ANEMIA DUE TO ACUTE BLOOD LOSS: ICD-10-CM

## 2017-10-09 DIAGNOSIS — T86.10 COMPLICATION OF TRANSPLANTED KIDNEY, UNSPECIFIED COMPLICATION: Primary | ICD-10-CM

## 2017-10-09 DIAGNOSIS — Z29.89 NEED FOR PROPHYLACTIC IMMUNOTHERAPY: Primary | ICD-10-CM

## 2017-10-09 DIAGNOSIS — Z94.0 KIDNEY REPLACED BY TRANSPLANT: ICD-10-CM

## 2017-10-09 DIAGNOSIS — Z94.0 S/P KIDNEY TRANSPLANT: ICD-10-CM

## 2017-10-09 DIAGNOSIS — T86.10 COMPLICATION OF TRANSPLANTED KIDNEY, UNSPECIFIED COMPLICATION: ICD-10-CM

## 2017-10-09 DIAGNOSIS — B20 HIV (HUMAN IMMUNODEFICIENCY VIRUS INFECTION): Chronic | ICD-10-CM

## 2017-10-09 PROBLEM — N28.89: Status: RESOLVED | Noted: 2017-10-04 | Resolved: 2017-10-09

## 2017-10-09 PROBLEM — T86.19: Status: RESOLVED | Noted: 2017-10-04 | Resolved: 2017-10-09

## 2017-10-09 PROBLEM — Z98.890 STATUS POST EXPLORATORY LAPAROTOMY: Chronic | Status: RESOLVED | Noted: 2017-09-10 | Resolved: 2017-10-09

## 2017-10-09 PROBLEM — R11.2 NON-INTRACTABLE VOMITING WITH NAUSEA: Status: RESOLVED | Noted: 2017-08-30 | Resolved: 2017-10-09

## 2017-10-09 PROCEDURE — 99215 OFFICE O/P EST HI 40 MIN: CPT | Mod: S$PBB,,, | Performed by: INTERNAL MEDICINE

## 2017-10-09 PROCEDURE — 99215 OFFICE O/P EST HI 40 MIN: CPT | Mod: PBBFAC,25 | Performed by: INTERNAL MEDICINE

## 2017-10-09 PROCEDURE — 99999 PR PBB SHADOW E&M-EST. PATIENT-LVL V: CPT | Mod: PBBFAC,,, | Performed by: INTERNAL MEDICINE

## 2017-10-09 PROCEDURE — 76776 US EXAM K TRANSPL W/DOPPLER: CPT | Mod: TC

## 2017-10-09 PROCEDURE — 76776 US EXAM K TRANSPL W/DOPPLER: CPT | Mod: 26,GC,, | Performed by: RADIOLOGY

## 2017-10-09 RX ORDER — TACROLIMUS 1 MG/1
CAPSULE ORAL
Qty: 540 CAPSULE | Refills: 11 | Status: SHIPPED | OUTPATIENT
Start: 2017-10-09 | End: 2017-10-23 | Stop reason: SDUPTHER

## 2017-10-09 NOTE — TELEPHONE ENCOUNTER
"SW received the following forward email from MARICRUZ Millard LMSW.       "Good morning Miss Morse of course this is Stanton [phone] I was contacted concerning one of my meds(Cresemba) and could you please give me some understanding about it because I only have like two days left and  the pharmacy I'm using ([facility]) are not going to be able to fill it and they said that they  talked  to someone at [facility] possibly  a  or coordinator  and something was said about using [facility] or specialty pharmacy supposedly filling it so could you please give me a call and give some type of  understanding the about it all...please and thank you"     Both MARICRUZ Millard LMSW and SIMONE Miller LCSW have explained that pt needs to call when pt has important questions and not send emails to one SW only in case SW is out of the office or having to deal with other pt cases. SW explained that calling and leaving a message would be the best way to ensure that pt will get the help he needs. SW also explained that pt would need to contact his coordinator. JAJA Barragan RN for medical questions.  Patient verbalized understanding and agreement. SW notified pt's coordinator via Epic message.    "

## 2017-10-09 NOTE — LETTER
October 9, 2017        Alexa Holliday  [facility] [place] [address]  Phone: [phone]  Fax: [phone]             [facility]  [address]  [facility] [address]  Phone: [phone]   Patient: Demetrio Aguiar   MR Number: [id number]   YOB: 1980   Date of Visit: 10/9/2017       Dear Dr. Alexa Holliday    Thank you for referring Demetrio Aguiar to me for evaluation. Attached you will find relevant portions of my assessment and plan of care.    If you have questions, please do not hesitate to call me. I look forward to following Demetrio Aguiar along with you.    Sincerely,    Sayda Gross MD    Enclosure    If you would like to receive this communication electronically, please contact [email or web address] or [phone] to request [employer] Link access.    [employer] Link is a tool which provides read-only access to select patient information with whom you have a relationship. Its easy to use and provides real time access to review your patients record including encounter summaries, notes, results, and demographic information.    If you feel you have received this communication in error or would no longer like to receive these types of communications, please e-mail [email or web address]

## 2017-10-09 NOTE — PATIENT INSTRUCTIONS
Please:    - Prograf to 10 mg in AM and 9 mg in PM  - Kidney US today  - Plan for biopsy  - Surgery appointment on Wednesday  - Monitor your blood pressure and aim to keep < 140/90  - Follow a low sodium diet. It is hard to do, but helps keep blood pressure controlled and prevents swelling (edema).   - Avoid nonsteroidal anti-inflamatory drugs (NSAIDS): ibuprofen (Advil, Motrin), naproxen (Aleve, Naprosyn), Indomethacin (Indocin).   - Check any new medications (over the counter or prescription) to make sure they will not affect your new kidney.

## 2017-10-09 NOTE — TELEPHONE ENCOUNTER
----- Message from Leobardo Davison sent at 10/9/2017  8:24 AM CDT -----  Contact: PT  PT would like a call today regarding refilling his medication Ricardo    States he will be out very soon, he only has two more days. He would like medication to be called into [facility] pharmacy if possible, due to [facility] not being able to fill the meds.     He states he is unsure of what is going on, and would like a call today.    Please call @ [phone]

## 2017-10-10 DIAGNOSIS — Z94.0 KIDNEY REPLACED BY TRANSPLANT: Primary | ICD-10-CM

## 2017-10-10 LAB — FUNGUS SPEC CULT: NORMAL

## 2017-10-10 NOTE — PROGRESS NOTES
Kidney Post-Transplant Assessment    Referring Physician:   Current Nephrologist: Alexa Holliday    ORGAN: RIGHT KIDNEY  Donor Type:  - brain death  Donor Information: 37 y.o. year old Black or  male  PHS Increased Risk: no  Cold Ischemia: 422 mins  Induction Medications: thymoglobulin    Subjective:     CC:  Reassessment of renal allograft function and management of chronic immunosuppression.    Kidney History:  Demetrio Aguiar is a 37 y.o. 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 His maintenance immunosuppression includes a steroid taper per protocol to 5mg daily, Prograf, and Cellcept maintenance.      His post op course has been complicated by   - DGF requiring HD (last HD 17) on 17.   - Biopsy proven ABMR with weakly positive class I DSA's on 17. Pt treated with SMP x 3 (-9/3). PLEX x6 (completed ) and IVIG x 2 (-). Kidney function was improving  - Kidney US on 17 showed a large collection located along the anterior inferior margin of the renal transplant measuring 13.4 x 7 x 11.9. taken back to the OR for retroperitoneal bleed and washout.   - on HAART therapy. CD4 of 7. Started on Azithromycin weekly.  - Perirenal fluid collection, s/p drainage by IR 17     Interval History:  Since last visit he had fluid collection around renal allograft which was drained,  still has drain tube with no output since yesterday. Plan to see Tx surgery on Wednesday. Patient's BP at home is well-controlled  he denies any chest pain, shortness of breath, nausea/vomiting, dysuria, frequency, urgency, or pain over the allograft.  He needs follow-up kidney biopsy soon.       Current Medication  Current Medications         Current Medication  Current Outpatient Prescriptions   Medication Sig Dispense Refill    azithromycin (ZITHROMAX) 600 MG Tab Take 2 tablets (1,200 mg total) by mouth every 7 days. 8 tablet 5    bisacodyl (DULCOLAX) 5 mg EC tablet Take 1-2 tablets (5-10 mg total) by mouth daily as needed for Constipation. Laxative 30 tablet 3    cinacalcet (SENSIPAR) 60 MG Tab Take 1 tablet (60 mg total) by mouth every other day. 15 tablet 3    docusate sodium (COLACE) 100 MG capsule Take 1 capsule (100 mg total) by mouth 3 (three) times daily as needed for Constipation. 100 capsule 0    dolutegravir (TIVICAY) 50 mg Tab Take 1 tablet (50 mg total) by mouth once daily. 30 tablet 5    famotidine (PEPCID) 20 MG tablet Take 1 tablet (20 mg total) by mouth every evening. 30 tablet 2    isavuconazonium sulfate 186 mg Cap Take 2 capsules (372 mg total) by mouth once daily. (Patient taking differently: Take 372 mg by mouth once daily. Take for 4wks. Therapy complete 10/10/17 prophylaxis Mold.) 60 capsule 5    multivitamin (THERAGRAN) tablet Take 1 tablet by mouth once daily.      mycophenolate (CELLCEPT) 250 mg Cap Take 4 capsules (1,000 mg total) by mouth 2 (two) times daily. Kidney txp 8/18/17; Z94.0 240 capsule 11    nifedipine (PROCARDIA-XL) 30 MG (OSM) 24 hr tablet Take 1 tablet (30 mg total) by mouth once daily. 30 tablet 11    oxycodone-acetaminophen (PERCOCET)  mg per tablet Take 0.5-1 tablets by mouth every 4 (four) hours as needed for Pain. 40 tablet 0    predniSONE (DELTASONE) 10 MG tablet Take 20 mg PO QD 9/4-10/3; 15 mg PO QD 10/4-11/3; 10 mg PO QD 11/4-12/3; 5 mg PO QD thereafter 100 tablet 1    rilpivirine (EDURANT) 25 mg Tab Take 1 tablet (25 mg total) by mouth once daily. 30 tablet 5    rosuvastatin (CRESTOR) 20 MG tablet Take 1 tablet (20 mg total) by mouth once daily. 30 tablet 3    sodium bicarbonate 650 MG tablet Take 3 tablets (1,950 mg total) by  "mouth 3 (three) times daily. 270 tablet 11    sulfamethoxazole-trimethoprim 400-80mg (BACTRIM,SEPTRA) 400-80 mg per tablet Take 1 tablet by mouth once daily. 30 tablet 11    tacrolimus (PROGRAF) 1 MG Cap Prograf 10 mg in AM and 9 mg in PM. Z94.0 Kidney Transplant. 540 capsule 11    valganciclovir (VALCYTE) 450 mg Tab Take 1 tablet (450 mg total) by mouth every Mon, Wed, Fri. Stop 12/3/17 12 tablet 2     No current facility-administered medications for this visit.          Review of Systems  Skin: no skin rash  CNS; no headaches, blurred vision, seizure, or syncope  ENT: No JVD,  Adenopathies,  nasal congestion. No oral lesions  Cardiac: No chest pain, dyspnea, claudication, edema or palpitations  Respiratory: No SOB, cough, hemoptysis   Gastro-intestinal: No diarrhea, constipation, abdominal pain, (+) nausea, vomit. No ascitis  Genitourinary: no hematuria, dysuria, frequency, frequency  Musculoskeletal: joint pain, arthritis or vasculitic changes  Psych: alert awake, oriented, No cranial nerves deficit.           Objective:     Blood pressure 123/79, pulse 84, temperature 98.5 °F (36.9 °C), temperature source Oral, resp. rate 16, height 5' 7" (1.702 m), weight 65.6 kg (144 lb 10 oz), SpO2 99 %.  body mass index is 22.65 kg/m².    Physical Exam     Head: normocephalic  Neck: No JVD, cervical axillary, or femoral adenopathies  Heart: no murmurs, Normal s1 and s2, No gallops, no rubs, No murmurs  Lungs; CTA, good respiratory effort, no crackles  Abdomen: soft, non tender, no splenomegaly or hepatomegaly, no massess, no bruits. Incision healing well. Has drain tube, in place, with no output since yesterday  Extremities: No edema, skin rash, joint pain. LUE AVG  SNC: awake, alert oriented. Cranial nerves are intact, no focalized, sensitivity and strength preserved    Labs:  Lab Results   Component Value Date    WBC 6.66 10/09/2017    HGB 10.3 (L) 10/09/2017    HCT 31.9 (L) 10/09/2017     10/09/2017    K 4.7 " 10/09/2017     10/09/2017    CO2 22 (L) 10/09/2017    BUN 27 (H) 10/09/2017    CREATININE 1.8 (H) 10/09/2017    EGFRNONAA 46.7 (A) 10/09/2017    CALCIUM 9.0 10/09/2017    PHOS 4.5 10/09/2017    MG 2.2 10/09/2017    ALBUMIN 4.0 10/09/2017    AST 19 2017    ALT 10 2017    .0 (H) 2017    TACROLIMUS 6.5 10/09/2017       No results found for: EXTANC, EXTWBC, EXTSEGS, EXTPLATELETS, EXTHEMOGLOBI, EXTHEMATOCRI, EXTCREATININ, EXTSODIUM, EXTPOTASSIUM, EXTBUN, EXTCO2, EXTCALCIUM, EXTPHOSPHORU, EXTGLUCOSE, EXTALBUMIN, EXTAST, EXTALT, EXTBILITOTAL, EXTLIPASE, EXTAMYLASE    No results found for: EXTCYCLOSLVL, EXTSIROLIMUS, EXTTACROLVL, EXTPROTCRE, EXTPTHINTACT, EXTPROTEINUA, EXTWBCUA, EXTRBCUA    Labs were reviewed with the patient    Assessment/Plan:     1. Need for prophylactic immunotherapy    2. Kidney replaced by transplant    3. Anemia due to acute blood loss    4. HIV (human immunodeficiency virus infection)    5.  donor kidney transplant 2017    6. Hyperlipidemia, unspecified hyperlipidemia type        Mr. Aguiar is a 37 y.o. male with:     # History of ESRD presumed secondary to HIVAN  - s/p DDKT on 17  - DGF and ABMR, s/p Pt treated with SMP x 3 (-9/3). PLEX x6 (completed ) and IVIG x 2 (-).  - last DSA is negative  - Juan David Cr  1.8  - minimal proteinuria  - had kidney fluid collection, s/p aspiration on 17, still has drain tube. Has no output in last 24 hours. Will follow with surgery  - Kidney US today  - Plan for kidney biopsy  If fluid collection is resolved (will discuss with transplant surgery)      # Immunosuppression:   - Increased Prograf to 10/9 mg.  If level remains low on Thursday, we start ketokonazole  - continue MMF  - continue prednisone  - will monitor closely for toxicities     # Antimicrobial Prophylaxis:   - continue bactrim  for PJP prophylaxis  - continue Valcyte for CMV prophylaxis   - continue nystatin for thrush prophylaxis for 1  month       # HTN:   - BPs not well controlled   - on  nifedipine 30 mg and metoprolol  - continue with home blood pressure monitoring and call Evelina for BP readings in 2-3 days  - low salt and healthy life discussed with the patient     # Metabolic Bone Disease/Secondary Hyperparathyroidism:  - on cinacalcet  - PTH of 328 and Ca of 9.6  - will monitor PTH, calcium, and phosphorus as per our center protocol     # Mild hyperkalemia : resolved  - on low K diet        # HIV  - on HAART medication  - Azithromycin weekly due to CD4 of 7  - Plan to follow with his HIV clinic     # Anemia of chronic disease:   - Hb low but stable  - will continue monitoring as per our center guidelines.               Plan  - Kidney allograft US today  - Kidney biopsy this week if no significant fluid collection around kidney and okay with Tx surgery (will discuss at group meeting)  - Follow Tacrolimus level on Thursday.  If level is low, we start ketokonazole

## 2017-10-11 ENCOUNTER — OFFICE VISIT (OUTPATIENT)
Dept: TRANSPLANT | Facility: CLINIC | Age: 37
End: 2017-10-11
Payer: MEDICARE

## 2017-10-11 VITALS
TEMPERATURE: 99 F | BODY MASS INDEX: 22.81 KG/M2 | RESPIRATION RATE: 18 BRPM | HEIGHT: 67 IN | OXYGEN SATURATION: 100 % | HEART RATE: 99 BPM | SYSTOLIC BLOOD PRESSURE: 119 MMHG | WEIGHT: 145.31 LBS | DIASTOLIC BLOOD PRESSURE: 75 MMHG

## 2017-10-11 DIAGNOSIS — Z94.0 S/P KIDNEY TRANSPLANT: ICD-10-CM

## 2017-10-11 DIAGNOSIS — Z94.0 STATUS POST DECEASED-DONOR KIDNEY TRANSPLANTATION: ICD-10-CM

## 2017-10-11 DIAGNOSIS — Z79.60 LONG-TERM USE OF IMMUNOSUPPRESSANT MEDICATION: Primary | ICD-10-CM

## 2017-10-11 DIAGNOSIS — Z94.0 KIDNEY REPLACED BY TRANSPLANT: ICD-10-CM

## 2017-10-11 DIAGNOSIS — Z29.89 NEED FOR PROPHYLACTIC IMMUNOTHERAPY: ICD-10-CM

## 2017-10-11 DIAGNOSIS — Z51.81 ENCOUNTER FOR THERAPEUTIC DRUG MONITORING: ICD-10-CM

## 2017-10-11 DIAGNOSIS — D84.9 IMMUNOSUPPRESSION: Chronic | ICD-10-CM

## 2017-10-11 PROCEDURE — 99214 OFFICE O/P EST MOD 30 MIN: CPT | Mod: PBBFAC | Performed by: TRANSPLANT SURGERY

## 2017-10-11 PROCEDURE — 99999 PR PBB SHADOW E&M-EST. PATIENT-LVL IV: CPT | Mod: PBBFAC,,, | Performed by: TRANSPLANT SURGERY

## 2017-10-11 PROCEDURE — 99214 OFFICE O/P EST MOD 30 MIN: CPT | Mod: 24,S$PBB,, | Performed by: TRANSPLANT SURGERY

## 2017-10-11 NOTE — LETTER
October 11, 2017        Alexa Holliday  [facility] [place] [address]  Phone: [phone]  Fax: [phone]             [facility]  [address]  [facility] [address]  Phone: [phone]   Patient: Demetrio Aguiar   MR Number: [id number]   YOB: 1980   Date of Visit: 10/11/2017       Dear Dr. Alexa Holliday    Thank you for referring Demetrio Aguiar to me for evaluation. Attached you will find relevant portions of my assessment and plan of care.    If you have questions, please do not hesitate to call me. I look forward to following Demetrio Aguiar along with you.    Sincerely,    Britta Sharma MD    Enclosure    If you would like to receive this communication electronically, please contact [email or web address] or [phone] to request [employer] access.    [employer] is a tool which provides read-only access to select patient information with whom you have a relationship. Its easy to use and provides real time access to review your patients record including encounter summaries, notes, results, and demographic information.    If you feel you have received this communication in error or would no longer like to receive these types of communications, please e-mail [email or web address]

## 2017-10-11 NOTE — PROGRESS NOTES
Transplant Surgery  Kidney Transplant Recipient Follow-up    Referring Physician:   Current Nephrologist: Alexa Holliday    Subjective:     Chief Complaint: Demetrio Aguiar is a 37 y.o. year old Black or  male who is status post Kidney transplant performed on 2017.    ORGAN:  RIGHT KIDNEY  Disease Etiology: HIV Nephropathy  Donor Type:   - Brain Death  Donor CMV Status:  Negative  Donor HBcAB:  Negative  Donor HCV Status:  Negative    History of Present Illness: He reports no concerns.  From a transplant perspective, he reports normal urination.  Demetrio is here for management of his immunosuppression medication.  Demetrio states that his immunosuppression is being well tolerated.  Hypertension is not present.    Review of Systems   Constitutional: Negative for activity change, appetite change, chills and fever.   Respiratory: Negative for cough and shortness of breath.    Cardiovascular: Negative for chest pain and leg swelling.   Gastrointestinal: Negative for constipation, diarrhea and rectal pain.   Genitourinary: Negative for difficulty urinating and dysuria.   Musculoskeletal: Negative.    Skin: Negative for color change and rash.   Allergic/Immunologic: Positive for immunocompromised state.   Neurological: Negative for dizziness and light-headedness.   Psychiatric/Behavioral: Negative.    All other systems reviewed and are negative.      Objective:     Physical Exam   Constitutional: He is oriented to person, place, and time. He appears well-developed and well-nourished. No distress.   HENT:   Mouth/Throat: No oropharyngeal exudate.   Eyes: Pupils are equal, round, and reactive to light.   Neck: Neck supple. No thyromegaly present.   Cardiovascular: Normal rate, regular rhythm, normal heart sounds and intact distal pulses.    No murmur heard.  Pulmonary/Chest: Effort normal and breath sounds normal. No respiratory distress. He has no wheezes. He has no rales. He exhibits no  tenderness.   Abdominal: Soft. Bowel sounds are normal. He exhibits no distension and no mass. There is no tenderness. There is no rebound and no guarding. No hernia.       Musculoskeletal: He exhibits no edema.   Neurological: He is alert and oriented to person, place, and time.   Skin: Skin is dry. No rash noted. He is not diaphoretic.   Psychiatric: He has a normal mood and affect.   Nursing note and vitals reviewed.    Lab Results   Component Value Date    CREATININE 1.8 (H) 10/09/2017    BUN 27 (H) 10/09/2017     Lab Results   Component Value Date    WBC 6.66 10/09/2017    HGB 10.3 (L) 10/09/2017    HCT 31.9 (L) 10/09/2017    HCT 18 (LL) 09/09/2017     (L) 10/09/2017     Lab Results   Component Value Date    TACROLIMUS 6.5 10/09/2017         Assessment and Plan:        · S/P Kidney transplant.  · Chronic immunosuppressive medications for rejection prophylaxis subtherapeutic.  Plan:  Dr Gross would like the bloods to be repeated tomorrow and make adjustments then.  · Continue monitoring symptoms, labs and drug levels for drug-related toxicity and side effects.  · Renal hypertension at target.  · Repeat Ultrasound shows diminution in the size of collection. As drain was non functional, it was removed  · Ultrasound kidney Transplant next week.    Follow-up: Patient reminded to call with any health changes, since these can be early signs of significant complications.  Also, I advised the patient to be sure any new medications or changes of old medications are discussed with either a pharmacist, or physician knowledgeable with transplant to avoid rejection/drug toxicity related to significant drug interactions.    Fan Chan MD       [facility] Patient Status  Functional Status: 90% - Able to carry on normal activity: minor symptoms of disease  Physical Capacity: No Limitations     I have reviewed and concur with the resident's history, physical, assessment, and plan.  I have personally interviewed and  examined the patient at bedside.      Britta Sharma MD, PhD

## 2017-10-12 ENCOUNTER — LAB VISIT (OUTPATIENT)
Dept: LAB | Facility: HOSPITAL | Age: 37
End: 2017-10-12
Attending: INTERNAL MEDICINE
Payer: MEDICARE

## 2017-10-12 DIAGNOSIS — Z94.0 KIDNEY REPLACED BY TRANSPLANT: ICD-10-CM

## 2017-10-12 LAB
ALBUMIN SERPL BCP-MCNC: 3.8 G/DL
ANION GAP SERPL CALC-SCNC: 7 MMOL/L
BASOPHILS # BLD AUTO: 0.01 K/UL
BASOPHILS NFR BLD: 0.1 %
BUN SERPL-MCNC: 16 MG/DL
CALCIUM SERPL-MCNC: 9.4 MG/DL
CHLORIDE SERPL-SCNC: 109 MMOL/L
CLASS I ANTIBODY COMMENTS - LUMINEX: NORMAL
CLASS II ANTIBODY COMMENTS - LUMINEX: NORMAL
CO2 SERPL-SCNC: 23 MMOL/L
CREAT SERPL-MCNC: 1.8 MG/DL
DIFFERENTIAL METHOD: ABNORMAL
DSA1 TESTING DATE: NORMAL
DSA12 TESTING DATE: NORMAL
DSA2 TESTING DATE: NORMAL
EOSINOPHIL # BLD AUTO: 0.1 K/UL
EOSINOPHIL NFR BLD: 0.7 %
ERYTHROCYTE [DISTWIDTH] IN BLOOD BY AUTOMATED COUNT: 13.9 %
EST. GFR  (AFRICAN AMERICAN): 54 ML/MIN/1.73 M^2
EST. GFR  (NON AFRICAN AMERICAN): 46.7 ML/MIN/1.73 M^2
GLUCOSE SERPL-MCNC: 102 MG/DL
HCT VFR BLD AUTO: 31.2 %
HGB BLD-MCNC: 10.3 G/DL
LYMPHOCYTES # BLD AUTO: 0.5 K/UL
LYMPHOCYTES NFR BLD: 7.9 %
MAGNESIUM SERPL-MCNC: 2 MG/DL
MCH RBC QN AUTO: 29.6 PG
MCHC RBC AUTO-ENTMCNC: 33 G/DL
MCV RBC AUTO: 90 FL
MONOCYTES # BLD AUTO: 0.3 K/UL
MONOCYTES NFR BLD: 5.1 %
NEUTROPHILS # BLD AUTO: 5.7 K/UL
NEUTROPHILS NFR BLD: 85.9 %
PHOSPHATE SERPL-MCNC: 3.7 MG/DL
PLATELET # BLD AUTO: 108 K/UL
PMV BLD AUTO: 10.6 FL
POTASSIUM SERPL-SCNC: 5 MMOL/L
RBC # BLD AUTO: 3.48 M/UL
SERUM COLLECTION DT - LUMINEX CLASS I: NORMAL
SERUM COLLECTION DT - LUMINEX CLASS II: NORMAL
SODIUM SERPL-SCNC: 139 MMOL/L
TACROLIMUS BLD-MCNC: 6.2 NG/ML
WBC # BLD AUTO: 6.69 K/UL

## 2017-10-12 PROCEDURE — 83735 ASSAY OF MAGNESIUM: CPT

## 2017-10-12 PROCEDURE — 86977 RBC SERUM PRETX INCUBJ/INHIB: CPT | Mod: 91,PO,TXP

## 2017-10-12 PROCEDURE — 85025 COMPLETE CBC W/AUTO DIFF WBC: CPT

## 2017-10-12 PROCEDURE — 86833 HLA CLASS II HIGH DEFIN QUAL: CPT | Mod: PO,TXP

## 2017-10-12 PROCEDURE — 80197 ASSAY OF TACROLIMUS: CPT

## 2017-10-12 PROCEDURE — 80069 RENAL FUNCTION PANEL: CPT

## 2017-10-12 PROCEDURE — 86832 HLA CLASS I HIGH DEFIN QUAL: CPT | Mod: 91,PO,TXP

## 2017-10-12 PROCEDURE — 87799 DETECT AGENT NOS DNA QUANT: CPT

## 2017-10-12 PROCEDURE — 36415 COLL VENOUS BLD VENIPUNCTURE: CPT

## 2017-10-13 LAB
BK VIRUS DNA PCR, QUANT, BLOOD: <125 COPIES/ML
BK VIRUS DNA, BLOOD: NOT DETECTED
CMV DNA SERPL NAA+PROBE-ACNC: NORMAL IU/ML
LOG BKV COPIES/ML: <2.1 LOG (10) COPIES/ML

## 2017-10-16 ENCOUNTER — LAB VISIT (OUTPATIENT)
Dept: LAB | Facility: HOSPITAL | Age: 37
End: 2017-10-16
Attending: INTERNAL MEDICINE
Payer: MEDICARE

## 2017-10-16 DIAGNOSIS — N17.9 AKI (ACUTE KIDNEY INJURY): Primary | ICD-10-CM

## 2017-10-16 DIAGNOSIS — Z94.0 KIDNEY REPLACED BY TRANSPLANT: ICD-10-CM

## 2017-10-16 LAB
ALBUMIN SERPL BCP-MCNC: 4 G/DL
ALBUMIN SERPL BCP-MCNC: 4 G/DL
ALP SERPL-CCNC: 268 U/L
ALT SERPL W/O P-5'-P-CCNC: 11 U/L
ANION GAP SERPL CALC-SCNC: 6 MMOL/L
AST SERPL-CCNC: 14 U/L
BASOPHILS # BLD AUTO: 0.02 K/UL
BASOPHILS NFR BLD: 0.2 %
BILIRUB DIRECT SERPL-MCNC: 0.2 MG/DL
BILIRUB SERPL-MCNC: 0.3 MG/DL
BUN SERPL-MCNC: 23 MG/DL
CALCIUM SERPL-MCNC: 9.4 MG/DL
CHLORIDE SERPL-SCNC: 108 MMOL/L
CO2 SERPL-SCNC: 22 MMOL/L
CREAT SERPL-MCNC: 1.8 MG/DL
DIFFERENTIAL METHOD: ABNORMAL
EOSINOPHIL # BLD AUTO: 0.1 K/UL
EOSINOPHIL NFR BLD: 1.1 %
ERYTHROCYTE [DISTWIDTH] IN BLOOD BY AUTOMATED COUNT: 13.9 %
EST. GFR  (AFRICAN AMERICAN): 54 ML/MIN/1.73 M^2
EST. GFR  (NON AFRICAN AMERICAN): 46.7 ML/MIN/1.73 M^2
GLUCOSE SERPL-MCNC: 106 MG/DL
HCT VFR BLD AUTO: 32.3 %
HGB BLD-MCNC: 10.3 G/DL
IMM GRANULOCYTES # BLD AUTO: 0.04 K/UL
IMM GRANULOCYTES NFR BLD AUTO: 0.4 %
LYMPHOCYTES # BLD AUTO: 0.8 K/UL
LYMPHOCYTES NFR BLD: 8.6 %
MAGNESIUM SERPL-MCNC: 1.8 MG/DL
MCH RBC QN AUTO: 29.4 PG
MCHC RBC AUTO-ENTMCNC: 31.9 G/DL
MCV RBC AUTO: 92 FL
MONOCYTES # BLD AUTO: 0.6 K/UL
MONOCYTES NFR BLD: 6.5 %
NEUTROPHILS # BLD AUTO: 7.7 K/UL
NEUTROPHILS NFR BLD: 83.2 %
NRBC BLD-RTO: 0 /100 WBC
PHOSPHATE SERPL-MCNC: 4.1 MG/DL
PLATELET # BLD AUTO: 130 K/UL
PMV BLD AUTO: 11 FL
POTASSIUM SERPL-SCNC: 5.3 MMOL/L
PROT SERPL-MCNC: 7.3 G/DL
RBC # BLD AUTO: 3.5 M/UL
SODIUM SERPL-SCNC: 136 MMOL/L
TACROLIMUS BLD-MCNC: 5.4 NG/ML
WBC # BLD AUTO: 9.29 K/UL

## 2017-10-16 PROCEDURE — 80197 ASSAY OF TACROLIMUS: CPT

## 2017-10-16 PROCEDURE — 84075 ASSAY ALKALINE PHOSPHATASE: CPT

## 2017-10-16 PROCEDURE — 80069 RENAL FUNCTION PANEL: CPT

## 2017-10-16 PROCEDURE — 36415 COLL VENOUS BLD VENIPUNCTURE: CPT

## 2017-10-16 PROCEDURE — 85025 COMPLETE CBC W/AUTO DIFF WBC: CPT

## 2017-10-16 PROCEDURE — 83735 ASSAY OF MAGNESIUM: CPT

## 2017-10-17 ENCOUNTER — HOSPITAL ENCOUNTER (OUTPATIENT)
Facility: HOSPITAL | Age: 37
Discharge: HOME | End: 2017-10-17
Attending: INTERNAL MEDICINE | Admitting: RADIOLOGY
Payer: MEDICARE

## 2017-10-17 ENCOUNTER — TELEPHONE (OUTPATIENT)
Dept: PHARMACY | Facility: CLINIC | Age: 37
End: 2017-10-17

## 2017-10-17 ENCOUNTER — SURGERY (OUTPATIENT)
Age: 37
End: 2017-10-17

## 2017-10-17 ENCOUNTER — TELEPHONE (OUTPATIENT)
Dept: TRANSPLANT | Facility: CLINIC | Age: 37
End: 2017-10-17

## 2017-10-17 VITALS
TEMPERATURE: 98 F | OXYGEN SATURATION: 100 % | RESPIRATION RATE: 16 BRPM | HEIGHT: 67 IN | WEIGHT: 140 LBS | SYSTOLIC BLOOD PRESSURE: 117 MMHG | BODY MASS INDEX: 21.97 KG/M2 | HEART RATE: 86 BPM | DIASTOLIC BLOOD PRESSURE: 85 MMHG

## 2017-10-17 DIAGNOSIS — T86.19 UNEXPLAINED EPISODE OF DYSFUNCTION OF KIDNEY TRANSPLANT: ICD-10-CM

## 2017-10-17 DIAGNOSIS — N17.9 AKI (ACUTE KIDNEY INJURY): ICD-10-CM

## 2017-10-17 PROCEDURE — 25000003 PHARM REV CODE 250: Performed by: STUDENT IN AN ORGANIZED HEALTH CARE EDUCATION/TRAINING PROGRAM

## 2017-10-17 PROCEDURE — 63600175 PHARM REV CODE 636 W HCPCS: Performed by: STUDENT IN AN ORGANIZED HEALTH CARE EDUCATION/TRAINING PROGRAM

## 2017-10-17 RX ORDER — SODIUM CHLORIDE 9 MG/ML
INJECTION, SOLUTION INTRAVENOUS CONTINUOUS
Status: DISCONTINUED | OUTPATIENT
Start: 2017-10-17 | End: 2017-10-17 | Stop reason: HOSPADM

## 2017-10-17 RX ORDER — SODIUM POLYSTYRENE SULFONATE 4.1 MEQ/G
POWDER, FOR SUSPENSION ORAL; RECTAL
Qty: 453.6 G | Refills: 1 | Status: SHIPPED | OUTPATIENT
Start: 2017-10-17 | End: 2018-02-28 | Stop reason: SDUPTHER

## 2017-10-17 RX ORDER — KETOCONAZOLE 200 MG/1
100 TABLET ORAL DAILY
Qty: 15 TABLET | Refills: 11 | Status: SHIPPED | OUTPATIENT
Start: 2017-10-17 | End: 2017-10-20 | Stop reason: SDUPTHER

## 2017-10-17 RX ADMIN — DESMOPRESSIN ACETATE 9.52 MCG: 4 SOLUTION INTRAVENOUS at 01:10

## 2017-10-17 NOTE — TELEPHONE ENCOUNTER
Low Prograf level after completion of isavuconazonium sulfate. Will start Ketoconazole 100mg daily. Hyperkalemia, low K+ diet and Kayexalate 30g daily X2 doses. Increase po fluid intake. Patient states understanding.

## 2017-10-17 NOTE — H&P
"Radiology History & Physical      SUBJECTIVE:     Chief Complaint: Concern for transplant dysfunction    History of Present Illness:  Demetrio Aguiar is a 37 y.o. 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 Hematoma evacuated.(09/09)    Status post exploratory laparotomy 9/10/2017    Re explored for retroperitoneal hematoma. Hematoma evacuated.(09/09)    TB lung, latent     tx INH 2006     Past Surgical History:   Procedure Laterality Date    EXPLORATORY LAPAROTOMY W/ BOWEL RESECTION      NO BOWEL RESECTION, UNKNOWN DETAILS, "Pancreas Infection"    KIDNEY TRANSPLANT      peritoneal dialysis catheter placement         Home Meds:   Prior to Admission medications    Medication Sig Start Date End Date Taking? Authorizing Provider   bisacodyl (DULCOLAX) 5 mg EC tablet Take 1-2 tablets (5-10 mg total) by mouth daily as needed for Constipation. Laxative 8/23/17  Yes Urban Singh MD   cinacalcet (SENSIPAR) 60 MG Tab Take 1 tablet (60 mg total) by mouth every other day. 9/22/17  Yes Jase Hines MD   docusate sodium (COLACE) 100 MG capsule Take 1 capsule (100 mg total) by mouth 3 (three) times daily as needed for Constipation. 8/23/17  Yes Urban Singh MD   dolutegravir (TIVICAY) 50 mg Tab Take 1 tablet (50 mg total) by mouth once daily. 9/29/17  Yes Sayda Gross MD   famotidine (PEPCID) 20 MG tablet Take 1 tablet (20 mg total) by mouth every evening. 9/22/17 9/22/18 Yes Jase Hines MD   multivitamin (THERAGRAN) tablet Take 1 tablet by mouth once daily. 9/22/17  Yes Jase NICHOLS" MD [place]   mycophenolate (CELLCEPT) 250 mg Cap Take 4 capsules (1,000 mg total) by mouth 2 (two) times daily. Kidney txp 8/18/17; Z94.0 9/22/17  Yes Jase Hines MD   nifedipine (PROCARDIA-XL) 30 MG (OSM) 24 hr tablet Take 1 tablet (30 mg total) by mouth once daily. 9/29/17 9/29/18 Yes Sayda Gross MD   oxycodone-acetaminophen (PERCOCET)  mg per tablet Take 0.5-1 tablets by mouth every 4 (four) hours as needed for Pain. 10/4/17  Yes Vasiliy Chung Jr., MD   predniSONE (DELTASONE) 10 MG tablet Take 20 mg PO QD 9/4-10/3; 15 mg PO QD 10/4-11/3; 10 mg PO QD 11/4-12/3; 5 mg PO QD thereafter 9/22/17  Yes Jase Hines MD   rilpivirine (EDURANT) 25 mg Tab Take 1 tablet (25 mg total) by mouth once daily. 9/29/17  Yes Sayda Gross MD   rosuvastatin (CRESTOR) 20 MG tablet Take 1 tablet (20 mg total) by mouth once daily. 9/22/17  Yes Jase Hines MD   sodium bicarbonate 650 MG tablet Take 3 tablets (1,950 mg total) by mouth 3 (three) times daily. 9/29/17 9/29/18 Yes Lucía Polk MD   sulfamethoxazole-trimethoprim 400-80mg (BACTRIM,SEPTRA) 400-80 mg per tablet Take 1 tablet by mouth once daily. 9/22/17  Yes Jase Hines MD   tacrolimus (PROGRAF) 1 MG Cap Prograf 10 mg in AM and 9 mg in PM. Z94.0 Kidney Transplant. 10/9/17  Yes Sayda Gross MD   valganciclovir (VALCYTE) 450 mg Tab Take 1 tablet (450 mg total) by mouth every Mon, Wed, Fri. Stop 12/3/17 9/22/17 12/21/17 Yes Jase Hines MD   azithromycin (ZITHROMAX) 600 MG Tab Take 2 tablets (1,200 mg total) by mouth every 7 days. 9/22/17   Jase Hines MD     Anticoagulants/Antiplatelets: no anticoagulation    Allergies: Review of patient's allergies indicates:  No Known Allergies  Sedation History:  no adverse reactions    Review of Systems:   Hematological: no known coagulopathies  Respiratory: no shortness of breath  Cardiovascular: no chest pain  Gastrointestinal: no abdominal pain  Genito-Urinary: no  dysuria  Musculoskeletal: negative  Neurological: no TIA or stroke symptoms         OBJECTIVE:     Vital Signs (Most Recent)  Temp: 98.5 °F (36.9 °C) (10/17/17 1142)  Pulse: 88 (10/17/17 1142)  Resp: 16 (10/17/17 1142)  BP: 118/71 (10/17/17 1143)  SpO2: 100 % (10/17/17 1142)    Physical Exam:  ASA: 2  Mallampati: 2    General: no acute distress  Mental Status: alert and oriented to person, place and time  HEENT: normocephalic, atraumatic  Chest: unlabored breathing  Heart: regular heart rate  Abdomen: nondistended  Extremity: moves all extremities    Laboratory  Lab Results   Component Value Date    INR 1.0 10/17/2017       Lab Results   Component Value Date    WBC 9.29 10/16/2017    HGB 10.3 (L) 10/16/2017    HCT 32.3 (L) 10/16/2017    MCV 92 10/16/2017     (L) 10/16/2017      Lab Results   Component Value Date     (H) 10/17/2017     10/17/2017    K 5.4 (H) 10/17/2017     10/17/2017    CO2 24 10/17/2017    BUN 22 (H) 10/17/2017    CREATININE 1.6 (H) 10/17/2017    CALCIUM 9.5 10/17/2017    MG 1.8 10/16/2017    ALT 11 10/16/2017    AST 14 10/16/2017    ALBUMIN 4.0 10/17/2017    BILITOT 0.3 10/16/2017    BILIDIR 0.2 10/16/2017       ASSESSMENT/PLAN:     Sedation Plan: none  Patient will undergo US guided kidney transplant biopsy.    Onel Han MD  Radiology

## 2017-10-17 NOTE — DISCHARGE SUMMARY
Radiology Discharge Summary      Hospital Course: No complications    Admit Date: 10/17/2017  Discharge Date: 10/17/2017     Instructions Given to Patient: Yes  Diet: Resume prior diet  Activity: activity as tolerated    Description of Condition on Discharge: Stable  Vital Signs (Most Recent): Temp: 98.5 °F (36.9 °C) (10/17/17 1142)  Pulse: 88 (10/17/17 1142)  Resp: 16 (10/17/17 1142)  BP: 118/71 (10/17/17 1143)  SpO2: 100 % (10/17/17 1142)    Discharge Disposition: Home    Discharge Diagnosis: s/p kidney transplant biopsy    Followup: No dedicated follow up with radiology is required. Patient instructed to call if there is any complication, post procedural pain, or signs of infection. Return to PCP for results, as previously scheduled.      Onel Han MD  Radiology

## 2017-10-17 NOTE — PLAN OF CARE
D/C instructions reviewed with patient. No questions verbalized. Handouts provided. Pt VSS, awake, alert, denies pain or nausea, tolerating PO liquids without difficulty. Bandaid clean, dry, intact no drainage. Voided 200mL clear yellow urine. Ready for discharge with friend at bedside.

## 2017-10-17 NOTE — PROCEDURES
Radiology Post-Procedure Note    Pre Op Diagnosis: Renal dysfunction    Post Op Diagnosis: Same    Procedure: Ultrasound guided renal biopsy    Procedure performed by: Radha Benavides MD      Written Informed Consent Obtained: Yes    Specimen Removed: YES 3    Estimated Blood Loss: Minimal    Findings: Moderate sedation and local anesthesia were used.    A 16-gauge Monopty biopsy device was used to remove 3 specimens from the right lower quadrant kidney transplant under ultrasound guidance.  Tissue was evaluated by nephrology for adequacy and sent to pathology for further analysis.      The patient tolerated the procedure well and there were no complications.  Please see Imaging report for further details.    Onel Han MD  Radiology

## 2017-10-19 ENCOUNTER — PATIENT MESSAGE (OUTPATIENT)
Dept: TRANSPLANT | Facility: CLINIC | Age: 37
End: 2017-10-19

## 2017-10-19 ENCOUNTER — TELEPHONE (OUTPATIENT)
Dept: TRANSPLANT | Facility: CLINIC | Age: 37
End: 2017-10-19

## 2017-10-19 ENCOUNTER — HOSPITAL ENCOUNTER (OUTPATIENT)
Dept: RADIOLOGY | Facility: HOSPITAL | Age: 37
Discharge: HOME | End: 2017-10-19
Attending: INTERNAL MEDICINE
Payer: MEDICARE

## 2017-10-19 DIAGNOSIS — T86.10 COMPLICATION OF TRANSPLANTED KIDNEY, UNSPECIFIED COMPLICATION: ICD-10-CM

## 2017-10-19 PROCEDURE — 76776 US EXAM K TRANSPL W/DOPPLER: CPT | Mod: 26,GC,, | Performed by: RADIOLOGY

## 2017-10-19 PROCEDURE — 76776 US EXAM K TRANSPL W/DOPPLER: CPT | Mod: TC

## 2017-10-19 NOTE — TELEPHONE ENCOUNTER
----- Message from Jase Hines MD sent at 10/18/2017  5:06 PM CDT -----  Biopsy    14 glomeruli, <5% fibrosis, no MC changes, No ACR or AMR, C4d negative

## 2017-10-20 ENCOUNTER — LAB VISIT (OUTPATIENT)
Dept: LAB | Facility: HOSPITAL | Age: 37
End: 2017-10-20
Attending: INTERNAL MEDICINE
Payer: MEDICARE

## 2017-10-20 DIAGNOSIS — Z94.0 KIDNEY REPLACED BY TRANSPLANT: ICD-10-CM

## 2017-10-20 LAB
ALBUMIN SERPL BCP-MCNC: 3.8 G/DL
ANION GAP SERPL CALC-SCNC: 9 MMOL/L
BASOPHILS # BLD AUTO: 0.01 K/UL
BASOPHILS NFR BLD: 0.1 %
BUN SERPL-MCNC: 22 MG/DL
CALCIUM SERPL-MCNC: 9.7 MG/DL
CHLORIDE SERPL-SCNC: 107 MMOL/L
CO2 SERPL-SCNC: 22 MMOL/L
CREAT SERPL-MCNC: 1.7 MG/DL
DIFFERENTIAL METHOD: ABNORMAL
EOSINOPHIL # BLD AUTO: 0.1 K/UL
EOSINOPHIL NFR BLD: 1 %
ERYTHROCYTE [DISTWIDTH] IN BLOOD BY AUTOMATED COUNT: 13.8 %
EST. GFR  (AFRICAN AMERICAN): 57.9 ML/MIN/1.73 M^2
EST. GFR  (NON AFRICAN AMERICAN): 50 ML/MIN/1.73 M^2
GLUCOSE SERPL-MCNC: 112 MG/DL
HCT VFR BLD AUTO: 30.2 %
HGB BLD-MCNC: 9.8 G/DL
IMM GRANULOCYTES # BLD AUTO: 0.02 K/UL
IMM GRANULOCYTES NFR BLD AUTO: 0.3 %
LYMPHOCYTES # BLD AUTO: 0.7 K/UL
LYMPHOCYTES NFR BLD: 10.6 %
MAGNESIUM SERPL-MCNC: 1.9 MG/DL
MCH RBC QN AUTO: 29.1 PG
MCHC RBC AUTO-ENTMCNC: 32.5 G/DL
MCV RBC AUTO: 90 FL
MONOCYTES # BLD AUTO: 0.4 K/UL
MONOCYTES NFR BLD: 6.4 %
NEUTROPHILS # BLD AUTO: 5.5 K/UL
NEUTROPHILS NFR BLD: 81.6 %
NRBC BLD-RTO: 0 /100 WBC
PHOSPHATE SERPL-MCNC: 4.1 MG/DL
PLATELET # BLD AUTO: 132 K/UL
PMV BLD AUTO: 9.9 FL
POTASSIUM SERPL-SCNC: 4.6 MMOL/L
RBC # BLD AUTO: 3.37 M/UL
SODIUM SERPL-SCNC: 138 MMOL/L
TACROLIMUS BLD-MCNC: 7.4 NG/ML
WBC # BLD AUTO: 6.77 K/UL

## 2017-10-20 PROCEDURE — 80197 ASSAY OF TACROLIMUS: CPT

## 2017-10-20 PROCEDURE — 83735 ASSAY OF MAGNESIUM: CPT

## 2017-10-20 PROCEDURE — 36415 COLL VENOUS BLD VENIPUNCTURE: CPT

## 2017-10-20 PROCEDURE — 86352 CELL FUNCTION ASSAY W/STIM: CPT

## 2017-10-20 PROCEDURE — 80069 RENAL FUNCTION PANEL: CPT

## 2017-10-20 PROCEDURE — 85025 COMPLETE CBC W/AUTO DIFF WBC: CPT

## 2017-10-20 RX ORDER — KETOCONAZOLE 200 MG/1
100 TABLET ORAL DAILY
Qty: 15 TABLET | Refills: 11 | Status: SHIPPED | OUTPATIENT
Start: 2017-10-20 | End: 2018-08-13 | Stop reason: SDUPTHER

## 2017-10-20 RX ORDER — PREDNISONE 10 MG/1
TABLET ORAL
Qty: 60 TABLET | Refills: 0 | Status: SHIPPED | OUTPATIENT
Start: 2017-10-20 | End: 2017-11-22 | Stop reason: SDUPTHER

## 2017-10-23 ENCOUNTER — LAB VISIT (OUTPATIENT)
Dept: LAB | Facility: HOSPITAL | Age: 37
End: 2017-10-23
Attending: INTERNAL MEDICINE
Payer: MEDICARE

## 2017-10-23 ENCOUNTER — TELEPHONE (OUTPATIENT)
Dept: TRANSPLANT | Facility: CLINIC | Age: 37
End: 2017-10-23

## 2017-10-23 ENCOUNTER — OFFICE VISIT (OUTPATIENT)
Dept: TRANSPLANT | Facility: CLINIC | Age: 37
End: 2017-10-23
Payer: MEDICARE

## 2017-10-23 VITALS
BODY MASS INDEX: 22.39 KG/M2 | WEIGHT: 142.63 LBS | HEART RATE: 89 BPM | OXYGEN SATURATION: 100 % | RESPIRATION RATE: 16 BRPM | SYSTOLIC BLOOD PRESSURE: 123 MMHG | TEMPERATURE: 98 F | HEIGHT: 67 IN | DIASTOLIC BLOOD PRESSURE: 76 MMHG

## 2017-10-23 DIAGNOSIS — K11.1 PAROTID GLAND ENLARGEMENT: ICD-10-CM

## 2017-10-23 DIAGNOSIS — Z94.0 KIDNEY REPLACED BY TRANSPLANT: Primary | ICD-10-CM

## 2017-10-23 DIAGNOSIS — H65.92 FLUID LEVEL BEHIND TYMPANIC MEMBRANE OF LEFT EAR: ICD-10-CM

## 2017-10-23 DIAGNOSIS — Z94.0 KIDNEY REPLACED BY TRANSPLANT: ICD-10-CM

## 2017-10-23 DIAGNOSIS — B20 HIV (HUMAN IMMUNODEFICIENCY VIRUS INFECTION): ICD-10-CM

## 2017-10-23 LAB
ALBUMIN SERPL BCP-MCNC: 3.9 G/DL
ANION GAP SERPL CALC-SCNC: 9 MMOL/L
BASOPHILS # BLD AUTO: 0.02 K/UL
BASOPHILS NFR BLD: 0.3 %
BUN SERPL-MCNC: 28 MG/DL
CALCIUM SERPL-MCNC: 9.4 MG/DL
CHLORIDE SERPL-SCNC: 110 MMOL/L
CO2 SERPL-SCNC: 20 MMOL/L
CREAT SERPL-MCNC: 2 MG/DL
DIFFERENTIAL METHOD: ABNORMAL
EOSINOPHIL # BLD AUTO: 0.1 K/UL
EOSINOPHIL NFR BLD: 1.1 %
ERYTHROCYTE [DISTWIDTH] IN BLOOD BY AUTOMATED COUNT: 13.5 %
EST. GFR  (AFRICAN AMERICAN): 47.5 ML/MIN/1.73 M^2
EST. GFR  (NON AFRICAN AMERICAN): 41.1 ML/MIN/1.73 M^2
GLUCOSE SERPL-MCNC: 109 MG/DL
HCT VFR BLD AUTO: 28.7 %
HGB BLD-MCNC: 9.3 G/DL
IMM GRANULOCYTES # BLD AUTO: 0.02 K/UL
IMM GRANULOCYTES NFR BLD AUTO: 0.3 %
IMMUNKNOW (STIMULATED): 514 NG/ML
LYMPHOCYTES # BLD AUTO: 0.6 K/UL
LYMPHOCYTES NFR BLD: 9 %
MAGNESIUM SERPL-MCNC: 1.9 MG/DL
MCH RBC QN AUTO: 29.1 PG
MCHC RBC AUTO-ENTMCNC: 32.4 G/DL
MCV RBC AUTO: 90 FL
MONOCYTES # BLD AUTO: 0.4 K/UL
MONOCYTES NFR BLD: 6.2 %
NEUTROPHILS # BLD AUTO: 5.4 K/UL
NEUTROPHILS NFR BLD: 83.1 %
NRBC BLD-RTO: 0 /100 WBC
PHOSPHATE SERPL-MCNC: 4 MG/DL
PLATELET # BLD AUTO: 144 K/UL
PMV BLD AUTO: 10.8 FL
POTASSIUM SERPL-SCNC: 4.4 MMOL/L
RBC # BLD AUTO: 3.2 M/UL
SODIUM SERPL-SCNC: 139 MMOL/L
TACROLIMUS BLD-MCNC: 5.7 NG/ML
WBC # BLD AUTO: 6.46 K/UL

## 2017-10-23 PROCEDURE — 36415 COLL VENOUS BLD VENIPUNCTURE: CPT

## 2017-10-23 PROCEDURE — 99999 PR PBB SHADOW E&M-EST. PATIENT-LVL IV: CPT | Mod: PBBFAC,,, | Performed by: INTERNAL MEDICINE

## 2017-10-23 PROCEDURE — 80197 ASSAY OF TACROLIMUS: CPT

## 2017-10-23 PROCEDURE — 83735 ASSAY OF MAGNESIUM: CPT

## 2017-10-23 PROCEDURE — 80069 RENAL FUNCTION PANEL: CPT

## 2017-10-23 PROCEDURE — 99215 OFFICE O/P EST HI 40 MIN: CPT | Mod: S$PBB,,, | Performed by: INTERNAL MEDICINE

## 2017-10-23 PROCEDURE — 85025 COMPLETE CBC W/AUTO DIFF WBC: CPT

## 2017-10-23 PROCEDURE — 99214 OFFICE O/P EST MOD 30 MIN: CPT | Mod: PBBFAC | Performed by: INTERNAL MEDICINE

## 2017-10-23 RX ORDER — TACROLIMUS 1 MG/1
CAPSULE ORAL
Qty: 600 CAPSULE | Refills: 11 | Status: SHIPPED | OUTPATIENT
Start: 2017-10-23 | End: 2017-10-24 | Stop reason: SDUPTHER

## 2017-10-23 RX ORDER — KETOCONAZOLE 200 MG/1
200 TABLET ORAL DAILY
Qty: 30 TABLET | Refills: 11 | Status: CANCELLED | OUTPATIENT
Start: 2017-10-23

## 2017-10-23 RX ORDER — MYCOPHENOLATE MOFETIL 250 MG/1
1500 CAPSULE ORAL 2 TIMES DAILY
Qty: 360 CAPSULE | Refills: 11 | Status: SHIPPED | OUTPATIENT
Start: 2017-10-23 | End: 2017-10-25 | Stop reason: DRUGHIGH

## 2017-10-23 NOTE — PROGRESS NOTES
Kidney Post-Transplant Assessment    Referring Physician:   Current Nephrologist: Alexa Holliday    ORGAN: RIGHT KIDNEY  Donor Type:  - brain death  Donor Information: 37 y.o. year old Black or  male  PHS Increased Risk: no  Cold Ischemia: 422 mins  Induction Medications: thymoglobulin    Subjective:     CC:  Reassessment of renal allograft function and management of chronic immunosuppression.    Kidney History:  Demetrio Aguiar is a 37 y.o. 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 His maintenance immunosuppression includes a steroid taper per protocol to 5mg daily (currently on 15 mg daily), Prograf, and Cellcept maintenance. Ketoconazole was added a week ago to help increase prograf level.     His post op course has been complicated by   - DGF requiring HD (last HD 17) on 17.   - Biopsy proven ABMR with weakly positive class I DSA's on 17. Pt treated with SMP x 3 (-9/3). PLEX x6 (completed ) and IVIG x 2 (-). Kidney function was improving  - Kidney US on 17 showed a large collection located along the anterior inferior margin of the renal transplant measuring 13.4 x 7 x 11.9. taken back to the OR for retroperitoneal bleed and washout.   -Biopsy 10/17/17 (LEFTY): 14 glomeruli, <5% fibrosis, no MC changes, No ACR or AMR, C4d negative  - on HAART therapy. CD4 of 7. Started on Azithromycin weekly.  - Perirenal fluid collection, s/p drainage by IR 17     Interval History: Since last visit pt has had renal biopsy and renal US that shows stable fluid collection around transplanted kidney. Biopsy results are listed as in process in epic but per pt's transplant  "coordinator's note "14 glomeruli, <5% fibrosis, no MC changes, No ACR or AMR, C4d negative." Patient's BP at home is well-controlled  he denies any chest pain, shortness of breath, nausea/vomiting, dysuria, frequency, urgency, or pain over the allograft.  Pt did complain of bilateral swollen parotid glands that comes and goes, no exacerbating or alleviating factors. Did state that he frequently has fluid build up in left ear and has had to see a physician to have it drained previously. Denies ear pain, no pain at parotids, no fevers or chills. Pt's prograf level is actually lower after starting ketoconazole. Per pharmacist ketoconazole can have interactions with rilpivirine.  Baseline Cr 1.8-2.0.       Current Medication  Current Outpatient Prescriptions   Medication Sig Dispense Refill    azithromycin (ZITHROMAX) 600 MG Tab Take 2 tablets (1,200 mg total) by mouth every 7 days. 8 tablet 5    cinacalcet (SENSIPAR) 60 MG Tab Take 1 tablet (60 mg total) by mouth every other day. 15 tablet 3    dolutegravir (TIVICAY) 50 mg Tab Take 1 tablet (50 mg total) by mouth once daily. 30 tablet 5    famotidine (PEPCID) 20 MG tablet Take 1 tablet (20 mg total) by mouth every evening. 30 tablet 2    ketoconazole (NIZORAL) 200 mg Tab Take 0.5 tablets (100 mg total) by mouth once daily. 15 tablet 11    multivitamin (THERAGRAN) tablet Take 1 tablet by mouth once daily.      mycophenolate (CELLCEPT) 250 mg Cap Take 4 capsules (1,000 mg total) by mouth 2 (two) times daily. Kidney txp 8/18/17; Z94.0 240 capsule 11    nifedipine (PROCARDIA-XL) 30 MG (OSM) 24 hr tablet Take 1 tablet (30 mg total) by mouth once daily. 30 tablet 11    predniSONE (DELTASONE) 10 MG tablet Take 20 mg PO QD 9/4-10/3; 15 mg PO QD 10/4-11/3; 10 mg PO QD 11/4-12/3; 5 mg PO QD thereafter 100 tablet 1    predniSONE (DELTASONE) 10 MG tablet 2 TABS DAILY 8/22-9/21; 1.5 TABS DAYS 9/22-10/21; 1 TAB DAYS 10/22-11/21; THEN 0.5 TAB DAILY 60 tablet 0    " rilpivirine (EDURANT) 25 mg Tab Take 1 tablet (25 mg total) by mouth once daily. 30 tablet 5    rosuvastatin (CRESTOR) 20 MG tablet Take 1 tablet (20 mg total) by mouth once daily. 30 tablet 3    sodium bicarbonate 650 MG tablet Take 3 tablets (1,950 mg total) by mouth 3 (three) times daily. 270 tablet 11    sulfamethoxazole-trimethoprim 400-80mg (BACTRIM,SEPTRA) 400-80 mg per tablet Take 1 tablet by mouth once daily. 30 tablet 11    tacrolimus (PROGRAF) 1 MG Cap Prograf 10 mg in AM and 9 mg in PM. Z94.0 Kidney Transplant. 540 capsule 11    valganciclovir (VALCYTE) 450 mg Tab Take 1 tablet (450 mg total) by mouth every Mon, Wed, Fri. Stop 12/3/17 12 tablet 2    bisacodyl (DULCOLAX) 5 mg EC tablet Take 1-2 tablets (5-10 mg total) by mouth daily as needed for Constipation. Laxative 30 tablet 3    docusate sodium (COLACE) 100 MG capsule Take 1 capsule (100 mg total) by mouth 3 (three) times daily as needed for Constipation. 100 capsule 0    oxycodone-acetaminophen (PERCOCET)  mg per tablet Take 0.5-1 tablets by mouth every 4 (four) hours as needed for Pain. 40 tablet 0    sodium polystyrene (KAYEXALATE) powder Take 30g by mouth daily X2 doses, then as directed. 453.6 g 1     No current facility-administered medications for this visit.          Review of Systems   Constitutional: Negative for chills and fever.   HENT: Negative for congestion, ear pain, hearing loss and sore throat.         Pt stated that he has bilateral swelling in the parotid area. Left ear fluid collection as above.   Eyes: Negative for pain and visual disturbance.   Respiratory: Negative for cough and shortness of breath.    Cardiovascular: Negative for chest pain and palpitations.   Gastrointestinal: Negative for constipation, diarrhea, nausea and vomiting.   Endocrine: Negative for polydipsia and polyuria.   Genitourinary: Negative for dysuria and hematuria.   Musculoskeletal: Negative for joint swelling and myalgias.   Skin:  "Negative for color change and rash.   Neurological: Negative for dizziness and light-headedness.   Hematological: Negative for adenopathy. Does not bruise/bleed easily.   Psychiatric/Behavioral: Negative for dysphoric mood. The patient is not nervous/anxious.        Objective:     Blood pressure 123/76, pulse 89, temperature 98.1 °F (36.7 °C), temperature source Oral, resp. rate 16, height 5' 7" (1.702 m), weight 64.7 kg (142 lb 10.2 oz), SpO2 100 %.  body mass index is 22.34 kg/m².    Physical Exam   Constitutional: He is oriented to person, place, and time. He appears well-developed and well-nourished.   HENT:   Head: Normocephalic and atraumatic.   Eyes: Conjunctivae are normal. No scleral icterus.   Neck: Normal range of motion. Neck supple. No JVD present.   Cardiovascular: Normal rate and regular rhythm.    Pulmonary/Chest: Effort normal and breath sounds normal. He has no wheezes. He has no rales.   Abdominal: Soft. Bowel sounds are normal. He exhibits no distension. There is no tenderness.   Musculoskeletal: He exhibits no edema or deformity.   Neurological: He is alert and oriented to person, place, and time.   Skin: Skin is warm and dry.   Psychiatric: He has a normal mood and affect. His behavior is normal.   Nursing note and vitals reviewed.       Labs:  Lab Results   Component Value Date    WBC 6.46 10/23/2017    HGB 9.3 (L) 10/23/2017    HCT 28.7 (L) 10/23/2017     10/23/2017    K 4.4 10/23/2017     10/23/2017    CO2 20 (L) 10/23/2017    BUN 28 (H) 10/23/2017    CREATININE 2.0 (H) 10/23/2017    EGFRNONAA 41.1 (A) 10/23/2017    CALCIUM 9.4 10/23/2017    PHOS 4.0 10/23/2017    MG 1.9 10/23/2017    ALBUMIN 3.9 10/23/2017    AST 14 10/16/2017    ALT 11 10/16/2017    UTPCR 0.20 10/16/2017    .0 (H) 09/18/2017    TACROLIMUS 5.7 10/23/2017       No results found for: EXTANC, EXTWBC, EXTSEGS, EXTPLATELETS, EXTHEMOGLOBI, EXTHEMATOCRI, EXTCREATININ, EXTSODIUM, EXTPOTASSIUM, EXTBUN, EXTCO2, " "EXTCALCIUM, EXTPHOSPHORU, EXTGLUCOSE, EXTALBUMIN, EXTAST, EXTALT, EXTBILITOTAL, EXTLIPASE, EXTAMYLASE    No results found for: EXTCYCLOSLVL, EXTSIROLIMUS, EXTTACROLVL, EXTPROTCRE, EXTPTHINTACT, EXTPROTEINUA, EXTWBCUA, EXTRBCUA    Labs were reviewed with the patient    Assessment/Plan:     No diagnosis found.    Mr. Aguiar is a 37 y.o. male with:     # History of ESRD presumed secondary to HIVAN  - s/p DDKT on 8/18/17  - DGF and ABMR, s/p Pt treated with SMP x 3 (9/1-9/3). PLEX x6 (completed 9/5) and IVIG x 2 (9/5-9/6).  - last DSA is negative  - BL Cr appears to be 1.8-2.0 at 2.0 today.  - minimal proteinuria  - had kidney fluid collection, s/p aspiration on 9/28/17. Fluid collection stable on last US.  - Kidney biopsy from 10/17 listed as pending, but per pt's transplant coordinator's note "14 glomeruli, <5% fibrosis, no MC changes, No ACR or AMR, C4d negative."      # Immunosuppression:   - Increased Prograf to 10 and 10 mg.  Started ketokonazole last week with prograf level lower today.  - Increase MMF to 1500 mg BID  - continue prednisone taper  - will monitor closely for toxicities and medication side effects     # Antimicrobial Prophylaxis:   - continue bactrim  for PJP prophylaxis  - continue Valcyte for CMV prophylaxis     # HTN:   - BPs not well controlled   - on  nifedipine 30 mg and metoprolol  - Well controlled with nifedipine     # Metabolic Bone Disease/Secondary Hyperparathyroidism:  - on cinacalcet  - PTH of 328 and Ca of 9.4  - will monitor PTH, calcium, and phosphorus as per our center protocol     # Mild hyperkalemia : resolved  - on low K diet  - states that he only had to take kayexalate 2 days and is not on it long term     # HIV  - on HAART medication  - Azithromycin weekly due to CD4 of 7  - Refer to ID for input with possible HIV medication interactions with pt's IS therapy.     # Anemia of chronic disease:   - Hb low but stable  - will continue monitoring as per our center guidelines. "          Plan  - Increased prograf and cellcept  - Follow Tacrolimus level on Thursday. On Ketoconazole.  - Referral to ID for input on possible medication interactions between pt's HAART and IS meds  - Referral to ENT for bilateral parotid swelling and left middle ear effusion     STAFF: Seen and agree with  Dr. Kilpatrick as documented in his attached note.  I have verified the history and examined the patient.  I concur with the Assessment and plan as outlined.      I am concerned about interaction between ketoconazole and HAART therapy, as well as subtherapeutic tacro level. Out of concern for rejection, will increase MMF to 1500 mg BID and also increase tacrolimus to 10/10, although I do not expect such a small change to meaningfully change tac level.  Jhonny continue to monitor immuknow cylex every 2 weeks.  It is reassuring a recent biopsy showed no rejection. Will follow closely.  Will reach out to Dr. Miramontes (ID) and see if he has any recommendations regarding the doses of HAART and ketoconazole.

## 2017-10-23 NOTE — TELEPHONE ENCOUNTER
----- Message from Marta Cruz sent at 10/23/2017 10:34 AM CDT -----  Contact: patient   Patient calling to speak with you about him going home.         Please call  [phone]

## 2017-10-23 NOTE — LETTER
October 23, 2017        Alexa Holliday  [facility] [address]  Phone: [phone]  Fax: [phone]             [facility]  [address]  [facility] [address]  Phone: [phone]   Patient: Demetrio Aguiar   MR Number: [id number]   YOB: 1980   Date of Visit: 10/23/2017       Dear Dr. Alexa Holliday    Thank you for referring Demetrio Aguiar to me for evaluation. Attached you will find relevant portions of my assessment and plan of care.    If you have questions, please do not hesitate to call me. I look forward to following Demetrio Aguiar along with you.    Sincerely,    Lucía Polk MD    Enclosure    If you would like to receive this communication electronically, please contact [email or web address] or [phone] to request [email or web address] access.    [email or web address] is a tool which provides read-only access to select patient information with whom you have a relationship. Its easy to use and provides real time access to review your patients record including encounter summaries, notes, results, and demographic information.    If you feel you have received this communication in error or would no longer like to receive these types of communications, please e-mail [email or web address]

## 2017-10-23 NOTE — Clinical Note
Eric Vanessa, I cannot get a therapeutic tacro level on Demetrio, despite his whopping dose of 19 mg tacrolimus daily + ketoconazole 100 mg daily. I was planning to increase keto to 200 mg, but always check with pharmacy-- who then pointed out the keto can raise the level of rilpivirine!  I am concerned of his higher risk of rejection with low tac levels and high cylex, but also that I don't interefere with his HIV management.  We have requested you see him in your clinic, since he is still staying in [facility] (usually follows with Dr. Carlos Reyes); I am anxious to hear what you recommend. Thanks in advance.

## 2017-10-23 NOTE — PROGRESS NOTES
Results reviewed and the following message sent to patient via [facility]: Med changes as per clinic visit. Continue to follow immuknow cylex every 2 weeks to assess net immunosuppression.

## 2017-10-23 NOTE — TELEPHONE ENCOUNTER
Patient call returned, mailbox full, cannot leave message. Will review plan for follow up with patient in clinic this afternoon.

## 2017-10-24 ENCOUNTER — PATIENT MESSAGE (OUTPATIENT)
Dept: TRANSPLANT | Facility: CLINIC | Age: 37
End: 2017-10-24

## 2017-10-24 ENCOUNTER — PATIENT MESSAGE (OUTPATIENT)
Dept: OTOLARYNGOLOGY | Facility: CLINIC | Age: 37
End: 2017-10-24

## 2017-10-24 DIAGNOSIS — Z94.0 KIDNEY REPLACED BY TRANSPLANT: ICD-10-CM

## 2017-10-24 RX ORDER — MYCOPHENOLATE MOFETIL 500 MG/1
1500 TABLET ORAL 2 TIMES DAILY
Qty: 180 TABLET | Refills: 11 | Status: SHIPPED | OUTPATIENT
Start: 2017-10-24 | End: 2017-12-29

## 2017-10-24 RX ORDER — TACROLIMUS 5 MG/1
CAPSULE ORAL
Qty: 120 CAPSULE | Refills: 11 | Status: SHIPPED | OUTPATIENT
Start: 2017-10-24 | End: 2017-12-22 | Stop reason: SDUPTHER

## 2017-10-25 ENCOUNTER — OFFICE VISIT (OUTPATIENT)
Dept: INTERNAL MEDICINE | Facility: CLINIC | Age: 37
End: 2017-10-25
Payer: MEDICARE

## 2017-10-25 VITALS
DIASTOLIC BLOOD PRESSURE: 71 MMHG | WEIGHT: 144.63 LBS | SYSTOLIC BLOOD PRESSURE: 106 MMHG | HEIGHT: 67 IN | HEART RATE: 85 BPM | BODY MASS INDEX: 22.7 KG/M2

## 2017-10-25 DIAGNOSIS — H92.12 EAR DRAINAGE, LEFT: Primary | ICD-10-CM

## 2017-10-25 PROCEDURE — 99213 OFFICE O/P EST LOW 20 MIN: CPT | Mod: S$PBB,,, | Performed by: NURSE PRACTITIONER

## 2017-10-25 PROCEDURE — 99999 PR PBB SHADOW E&M-EST. PATIENT-LVL V: CPT | Mod: PBBFAC,,, | Performed by: NURSE PRACTITIONER

## 2017-10-25 PROCEDURE — 87077 CULTURE AEROBIC IDENTIFY: CPT

## 2017-10-25 PROCEDURE — 87070 CULTURE OTHR SPECIMN AEROBIC: CPT

## 2017-10-25 PROCEDURE — 99215 OFFICE O/P EST HI 40 MIN: CPT | Mod: PBBFAC | Performed by: NURSE PRACTITIONER

## 2017-10-25 PROCEDURE — 87186 SC STD MICRODIL/AGAR DIL: CPT

## 2017-10-25 NOTE — PROGRESS NOTES
"Subjective:       Patient ID: Demetrio Aguiar is a 37 y.o. male.    Chief Complaint: Ear Drainage    Pt is new to this clinic and to this provider  Pt here c/o ear drainage from left ear' for a while'.  Pt states that his transplant doctor looked in his ear 2 days ago and wanted him evaluated.  Pt states that he has had this in the past in [place] and saw an ENT who drained it, no meds.  Pt denies any fever or chills, no n/v/d.  Pt is a kidney transplant pt staying at [facility], had transplant 8/18/2017 and is still here due to ' his counts are not right'.   Denies cough, rhinorrhea, congestion or sore throat.        Ear Drainage    Associated symptoms include ear discharge. Pertinent negatives include no coughing, headaches, hearing loss, rash, rhinorrhea or sore throat.     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 Hematoma evacuated.(09/09)    Status post exploratory laparotomy 9/10/2017    Re explored for retroperitoneal hematoma. Hematoma evacuated.(09/09)    TB lung, latent     tx INH 2006     Past Surgical History:   Procedure Laterality Date    EXPLORATORY LAPAROTOMY W/ BOWEL RESECTION      NO BOWEL RESECTION, UNKNOWN DETAILS, "Pancreas Infection"    KIDNEY TRANSPLANT      peritoneal dialysis catheter placement       Social History     Social History Narrative    No narrative on file     Family History   Problem Relation Age of Onset    No Known Problems Mother     Cancer Father     Lung cancer Father     No Known Problems Brother     Diabetes Maternal Aunt     Diabetes Maternal Uncle     Hypertension " "Maternal Grandmother     Hypertension Paternal Grandmother      Vitals:    10/25/17 1143   BP: 106/71   Pulse: 85   Weight: 65.6 kg (144 lb 10 oz)   Height: 5' 7" (1.702 m)   PainSc:   6   PainLoc: Ear     Outpatient Encounter Prescriptions as of 10/25/2017   Medication Sig Dispense Refill    azithromycin (ZITHROMAX) 600 MG Tab Take 2 tablets (1,200 mg total) by mouth every 7 days. 8 tablet 5    bisacodyl (DULCOLAX) 5 mg EC tablet Take 1-2 tablets (5-10 mg total) by mouth daily as needed for Constipation. Laxative 30 tablet 3    cinacalcet (SENSIPAR) 60 MG Tab Take 1 tablet (60 mg total) by mouth every other day. 15 tablet 3    docusate sodium (COLACE) 100 MG capsule Take 1 capsule (100 mg total) by mouth 3 (three) times daily as needed for Constipation. 100 capsule 0    dolutegravir (TIVICAY) 50 mg Tab Take 1 tablet (50 mg total) by mouth once daily. 30 tablet 5    famotidine (PEPCID) 20 MG tablet Take 1 tablet (20 mg total) by mouth every evening. 30 tablet 2    ketoconazole (NIZORAL) 200 mg Tab Take 0.5 tablets (100 mg total) by mouth once daily. 15 tablet 11    multivitamin (THERAGRAN) tablet Take 1 tablet by mouth once daily.      mycophenolate (CELLCEPT) 250 mg Cap Take 6 capsules (1,500 mg total) by mouth 2 (two) times daily. Kidney txp 8/18/17; Z94.0 360 capsule 11    mycophenolate (CELLCEPT) 500 mg Tab Take 3 tablets (1,500 mg total) by mouth 2 (two) times daily. Z94.0 kidney Transplant 8/18/17 [place] Medicaid. 180 tablet 11    nifedipine (PROCARDIA-XL) 30 MG (OSM) 24 hr tablet Take 1 tablet (30 mg total) by mouth once daily. 30 tablet 11    oxycodone-acetaminophen (PERCOCET)  mg per tablet Take 0.5-1 tablets by mouth every 4 (four) hours as needed for Pain. 40 tablet 0    predniSONE (DELTASONE) 10 MG tablet Take 20 mg PO QD 9/4-10/3; 15 mg PO QD 10/4-11/3; 10 mg PO QD 11/4-12/3; 5 mg PO QD thereafter 100 tablet 1    predniSONE (DELTASONE) 10 MG tablet 2 TABS DAILY 8/22-9/21; 1.5 TABS " "DAYS 9/22-10/21; 1 TAB DAYS 10/22-11/21; THEN 0.5 TAB DAILY 60 tablet 0    rilpivirine (EDURANT) 25 mg Tab Take 1 tablet (25 mg total) by mouth once daily. 30 tablet 5    rosuvastatin (CRESTOR) 20 MG tablet Take 1 tablet (20 mg total) by mouth once daily. 30 tablet 3    sodium bicarbonate 650 MG tablet Take 3 tablets (1,950 mg total) by mouth 3 (three) times daily. 270 tablet 11    sodium polystyrene (KAYEXALATE) powder Take 30g by mouth daily X2 doses, then as directed. 453.6 g 1    sulfamethoxazole-trimethoprim 400-80mg (BACTRIM,SEPTRA) 400-80 mg per tablet Take 1 tablet by mouth once daily. 30 tablet 11    tacrolimus (PROGRAF) 5 MG Cap Prograf 10 mg by mouth twice daily. Z94.0 Kidney Transplant. 120 capsule 11    valganciclovir (VALCYTE) 450 mg Tab Take 1 tablet (450 mg total) by mouth every Mon, Wed, Fri. Stop 12/3/17 12 tablet 2     No facility-administered encounter medications on file as of 10/25/2017.      Wt Readings from Last 3 Encounters:   10/25/17 65.6 kg (144 lb 10 oz)   10/23/17 64.7 kg (142 lb 10.2 oz)   10/17/17 63.5 kg (140 lb)     Last 3 sets of Vitals    Vitals - 1 value per visit 10/17/2017 10/23/2017 10/25/2017   SYSTOLIC 117 123 106   DIASTOLIC 85 76 71   PULSE 86 89 85   TEMPERATURE 98 98.1 -   RESPIRATIONS 16 16 -   SPO2 100 100 -   Weight (lb) 140 142.64 144.62   Weight (kg) 63.504 64.7 65.6   HEIGHT 5' 7" 5' 7" 5' 7"   BODY MASS INDEX 21.93 22.34 22.65   VISIT REPORT - - -   Pain Score  - 0 6   Some recent data might be hidden     No results found for: CBC  Review of Systems   Constitutional: Negative for chills, diaphoresis, fatigue and fever.   HENT: Positive for ear discharge. Negative for congestion, dental problem, drooling, ear pain, facial swelling, hearing loss, postnasal drip, rhinorrhea, sinus pain, sinus pressure, sore throat, tinnitus, trouble swallowing and voice change.    Respiratory: Negative for cough and shortness of breath.    Cardiovascular: Negative for chest " pain.   Musculoskeletal: Negative for arthralgias and myalgias.   Skin: Negative for rash.   Neurological: Negative for headaches.   Psychiatric/Behavioral: Negative for sleep disturbance.       Objective:      Physical Exam   Constitutional: He appears well-developed and well-nourished. No distress.   HENT:   Head: Normocephalic and atraumatic.   Right Ear: External ear normal.   Left Ear: Hearing, external ear and ear canal normal. There is drainage.   Nose: Nose normal.   Mouth/Throat: Oropharynx is clear and moist. No oropharyngeal exudate.   Left TM appears intact I cannot see a definite rupture.    Small amount greenish fluid noted to ear canal up against TM  Culture obtained.  No swelling of ear canal noted     Eyes: Conjunctivae are normal. Pupils are equal, round, and reactive to light.   Neck: Normal range of motion.   Pulmonary/Chest: Effort normal.   Lymphadenopathy:        Head (left side): Preauricular adenopathy present.     He has no cervical adenopathy.   Skin: Skin is warm and dry. Capillary refill takes less than 2 seconds. No rash noted. He is not diaphoretic. No erythema. No pallor.   Psychiatric: He has a normal mood and affect. His behavior is normal. Judgment and thought content normal.   Nursing note and vitals reviewed.      Assessment:       1. Ear drainage, left        Plan:       Demetrio was seen today for ear drainage.    Diagnoses and all orders for this visit:    Ear drainage, left  -     CULTURE, AEROBIC  (SPECIFY SOURCE)      Patient Instructions   I will call you with your culture results  Keep water out of ear

## 2017-10-26 ENCOUNTER — PATIENT MESSAGE (OUTPATIENT)
Dept: TRANSPLANT | Facility: CLINIC | Age: 37
End: 2017-10-26

## 2017-10-26 ENCOUNTER — LAB VISIT (OUTPATIENT)
Dept: LAB | Facility: HOSPITAL | Age: 37
End: 2017-10-26
Attending: INTERNAL MEDICINE
Payer: MEDICARE

## 2017-10-26 DIAGNOSIS — Z94.0 KIDNEY REPLACED BY TRANSPLANT: ICD-10-CM

## 2017-10-26 LAB
ALBUMIN SERPL BCP-MCNC: 3.6 G/DL
ANION GAP SERPL CALC-SCNC: 8 MMOL/L
BASOPHILS # BLD AUTO: 0.02 K/UL
BASOPHILS NFR BLD: 0.3 %
BUN SERPL-MCNC: 22 MG/DL
CALCIUM SERPL-MCNC: 9.3 MG/DL
CHLORIDE SERPL-SCNC: 107 MMOL/L
CO2 SERPL-SCNC: 23 MMOL/L
CREAT SERPL-MCNC: 1.8 MG/DL
DIFFERENTIAL METHOD: ABNORMAL
EOSINOPHIL # BLD AUTO: 0.1 K/UL
EOSINOPHIL NFR BLD: 1.4 %
ERYTHROCYTE [DISTWIDTH] IN BLOOD BY AUTOMATED COUNT: 13.2 %
EST. GFR  (AFRICAN AMERICAN): 54 ML/MIN/1.73 M^2
EST. GFR  (NON AFRICAN AMERICAN): 46.7 ML/MIN/1.73 M^2
GLUCOSE SERPL-MCNC: 97 MG/DL
HCT VFR BLD AUTO: 29.8 %
HGB BLD-MCNC: 9.7 G/DL
IMM GRANULOCYTES # BLD AUTO: 0.03 K/UL
IMM GRANULOCYTES NFR BLD AUTO: 0.5 %
LYMPHOCYTES # BLD AUTO: 0.6 K/UL
LYMPHOCYTES NFR BLD: 9.5 %
MCH RBC QN AUTO: 28.6 PG
MCHC RBC AUTO-ENTMCNC: 32.6 G/DL
MCV RBC AUTO: 88 FL
MONOCYTES # BLD AUTO: 0.4 K/UL
MONOCYTES NFR BLD: 6.4 %
NEUTROPHILS # BLD AUTO: 4.8 K/UL
NEUTROPHILS NFR BLD: 81.9 %
NRBC BLD-RTO: 0 /100 WBC
PHOSPHATE SERPL-MCNC: 4.1 MG/DL
PLATELET # BLD AUTO: 152 K/UL
PMV BLD AUTO: 10.8 FL
POTASSIUM SERPL-SCNC: 4.6 MMOL/L
RBC # BLD AUTO: 3.39 M/UL
SODIUM SERPL-SCNC: 138 MMOL/L
TACROLIMUS BLD-MCNC: 8 NG/ML
WBC # BLD AUTO: 5.8 K/UL

## 2017-10-26 PROCEDURE — 36415 COLL VENOUS BLD VENIPUNCTURE: CPT

## 2017-10-26 PROCEDURE — 80197 ASSAY OF TACROLIMUS: CPT

## 2017-10-26 PROCEDURE — 85025 COMPLETE CBC W/AUTO DIFF WBC: CPT

## 2017-10-26 PROCEDURE — 80069 RENAL FUNCTION PANEL: CPT

## 2017-10-27 ENCOUNTER — TELEPHONE (OUTPATIENT)
Dept: FAMILY MEDICINE | Facility: CLINIC | Age: 37
End: 2017-10-27

## 2017-10-27 DIAGNOSIS — H60.92 OTITIS EXTERNA OF LEFT EAR, UNSPECIFIED CHRONICITY, UNSPECIFIED TYPE: Primary | ICD-10-CM

## 2017-10-27 RX ORDER — OFLOXACIN 3 MG/ML
5 SOLUTION AURICULAR (OTIC) DAILY
Qty: 5 ML | Refills: 0 | Status: SHIPPED | OUTPATIENT
Start: 2017-10-27 | End: 2017-11-08 | Stop reason: ALTCHOICE

## 2017-10-27 NOTE — TELEPHONE ENCOUNTER
Spoke to pt discussed preliminary ear culture results script sent to pharmacy for Floxin Otic gtts.  Discussed with pt proper use of eardrops and keeping water out of his ear.  Pt verb understanding of all and intent to comply

## 2017-10-29 LAB — BACTERIA SPEC AEROBE CULT: NORMAL

## 2017-11-02 ENCOUNTER — DOCUMENTATION ONLY (OUTPATIENT)
Dept: TRANSPLANT | Facility: CLINIC | Age: 37
End: 2017-11-02

## 2017-11-02 ENCOUNTER — TELEPHONE (OUTPATIENT)
Dept: TRANSPLANT | Facility: CLINIC | Age: 37
End: 2017-11-02

## 2017-11-02 LAB
EXT ALBUMIN: 4.5
EXT BUN: 18
EXT CALCIUM: 9.5
EXT CHLORIDE: 106
EXT CO2: 24
EXT CREATININE: 1.8 MG/DL
EXT EOSINOPHIL%: 0.9
EXT GFR MDRD AF AMER: 55
EXT GLUCOSE: 108
EXT HEMATOCRIT: 31.9
EXT HEMOGLOBIN: 10.8
EXT LYMPH%: 9.5
EXT MAGNESIUM: 1.8
EXT MONOCYTES%: 4.8
EXT PHOSPHORUS: 4.2
EXT PLATELETS: 163
EXT POTASSIUM: 4.2
EXT SEGS%: 84.2
EXT SODIUM: 140 MMOL/L
EXT TACROLIMUS LVL: 8.2
EXT WBC: 6.1

## 2017-11-06 ENCOUNTER — PATIENT MESSAGE (OUTPATIENT)
Dept: TRANSPLANT | Facility: CLINIC | Age: 37
End: 2017-11-06

## 2017-11-08 ENCOUNTER — OFFICE VISIT (OUTPATIENT)
Dept: TRANSPLANT | Facility: CLINIC | Age: 37
End: 2017-11-08
Payer: MEDICARE

## 2017-11-08 ENCOUNTER — OFFICE VISIT (OUTPATIENT)
Dept: OPHTHALMOLOGY | Facility: CLINIC | Age: 37
End: 2017-11-08
Payer: MEDICARE

## 2017-11-08 VITALS
DIASTOLIC BLOOD PRESSURE: 85 MMHG | HEART RATE: 91 BPM | TEMPERATURE: 99 F | HEIGHT: 67 IN | WEIGHT: 141.75 LBS | OXYGEN SATURATION: 100 % | RESPIRATION RATE: 21 BRPM | SYSTOLIC BLOOD PRESSURE: 119 MMHG | BODY MASS INDEX: 22.25 KG/M2

## 2017-11-08 DIAGNOSIS — D84.9 IMMUNOCOMPROMISED STATE: Primary | Chronic | ICD-10-CM

## 2017-11-08 DIAGNOSIS — T86.11 ANTIBODY MEDIATED REJECTION OF KIDNEY TRANSPLANT: ICD-10-CM

## 2017-11-08 DIAGNOSIS — Z29.89 NEED FOR PROPHYLACTIC IMMUNOTHERAPY: ICD-10-CM

## 2017-11-08 DIAGNOSIS — Z94.0 S/P KIDNEY TRANSPLANT: ICD-10-CM

## 2017-11-08 DIAGNOSIS — T86.11 ACUTE REJECTION OF KIDNEY TRANSPLANT: ICD-10-CM

## 2017-11-08 DIAGNOSIS — H04.123 INSUFFICIENCY OF TEAR FILM OF BOTH EYES: ICD-10-CM

## 2017-11-08 DIAGNOSIS — B20 HIV (HUMAN IMMUNODEFICIENCY VIRUS INFECTION): Chronic | ICD-10-CM

## 2017-11-08 DIAGNOSIS — D84.9 IMMUNOSUPPRESSION: Chronic | ICD-10-CM

## 2017-11-08 DIAGNOSIS — H10.811 PINGUECULITIS, RIGHT EYE: ICD-10-CM

## 2017-11-08 DIAGNOSIS — Z91.89 AT RISK FOR OPPORTUNISTIC INFECTIONS: ICD-10-CM

## 2017-11-08 PROCEDURE — 99999 PR PBB SHADOW E&M-EST. PATIENT-LVL III: CPT | Mod: PBBFAC,,, | Performed by: OPHTHALMOLOGY

## 2017-11-08 PROCEDURE — 99215 OFFICE O/P EST HI 40 MIN: CPT | Mod: S$PBB,,, | Performed by: INTERNAL MEDICINE

## 2017-11-08 PROCEDURE — 99999 PR PBB SHADOW E&M-EST. PATIENT-LVL III: CPT | Mod: PBBFAC,,, | Performed by: INTERNAL MEDICINE

## 2017-11-08 PROCEDURE — 99213 OFFICE O/P EST LOW 20 MIN: CPT | Mod: PBBFAC,27 | Performed by: OPHTHALMOLOGY

## 2017-11-08 PROCEDURE — 99213 OFFICE O/P EST LOW 20 MIN: CPT | Mod: PBBFAC | Performed by: INTERNAL MEDICINE

## 2017-11-08 PROCEDURE — 92002 INTRM OPH EXAM NEW PATIENT: CPT | Mod: S$PBB,,, | Performed by: OPHTHALMOLOGY

## 2017-11-08 NOTE — LETTER
November 8, 2017        Alexa Holliday  [facility] [place] [address]  Phone: [phone]  Fax: [phone]             [facility]  [address]  [facility] [address]  Phone: [phone]   Patient: Demetrio Aguiar   MR Number: [id number]   YOB: 1980   Date of Visit: 11/8/2017       Dear Dr. Alexa Holliday    Thank you for referring Demetrio Aguiar to me for evaluation. Attached you will find relevant portions of my assessment and plan of care.    If you have questions, please do not hesitate to call me. I look forward to following Demetrio Aguiar along with you.    Sincerely,    Jase Hines MD    Enclosure    If you would like to receive this communication electronically, please contact [email or web address] or [phone] to request [employer] Link access.    [employer] is a tool which provides read-only access to select patient information with whom you have a relationship. Its easy to use and provides real time access to review your patients record including encounter summaries, notes, results, and demographic information.    If you feel you have received this communication in error or would no longer like to receive these types of communications, please e-mail [email or web address]

## 2017-11-08 NOTE — PROGRESS NOTES
Kidney Post-Transplant Assessment    Referring Physician:   Current Nephrologist: Alexa Holliday    ORGAN: RIGHT KIDNEY  Donor Type:  - brain death  PHS Increased Risk: no  Cold Ischemia: 422 mins  Induction Medications: thymoglobulin    Subjective:     CC:  Reassessment of renal allograft function and management of chronic immunosuppression.    HPI:  Mr. Aguiar is a 37 y.o. year old Black or  male who received a  - brain death kidney transplant on 17.  He has CKD stage 2 - GFR 60-89 and his last creatinine is 1.6. He takes mycophenolic acid, prednisone and tacrolimus for maintenance immunosuppression. He denies any recent hospitalizations or ER visits since his previous clinic visit.    patient refers right red eye in the last 2 days, after using neutrogena face wash and accidentally rubbing his eye. No pain but still red after 2 days    ENT appointment next week 11/15 due left ear pain    Good appetite.    No recent admissions to the hospital    Last kidney biopsy 10/18/2017 negative for rejection          Current Outpatient Prescriptions on File Prior to Visit   Medication Sig Dispense Refill    azithromycin (ZITHROMAX) 600 MG Tab Take 2 tablets (1,200 mg total) by mouth every 7 days. 8 tablet 5    bisacodyl (DULCOLAX) 5 mg EC tablet Take 1-2 tablets (5-10 mg total) by mouth daily as needed for Constipation. Laxative 30 tablet 3    cinacalcet (SENSIPAR) 60 MG Tab Take 1 tablet (60 mg total) by mouth every other day. 15 tablet 3    docusate sodium (COLACE) 100 MG capsule Take 1 capsule (100 mg total) by mouth 3 (three) times daily as needed for Constipation. 100 capsule 0    dolutegravir (TIVICAY) 50 mg Tab Take 1 tablet (50 mg total) by mouth once daily. 30 tablet 5    famotidine (PEPCID) 20 MG tablet Take 1 tablet (20 mg total) by mouth every evening. 30 tablet 2    ketoconazole (NIZORAL) 200 mg Tab Take 0.5 tablets (100 mg total) by mouth once daily. 15 tablet 11     multivitamin (THERAGRAN) tablet Take 1 tablet by mouth once daily.      mycophenolate (CELLCEPT) 500 mg Tab Take 3 tablets (1,500 mg total) by mouth 2 (two) times daily. Z94.0 kidney Transplant 8/18/17 [place] Medicaid. 180 tablet 11    nifedipine (PROCARDIA-XL) 30 MG (OSM) 24 hr tablet Take 1 tablet (30 mg total) by mouth once daily. 30 tablet 11    ofloxacin (FLOXIN) 0.3 % otic solution Place 5 drops into the left ear once daily. 5 mL 0    oxycodone-acetaminophen (PERCOCET)  mg per tablet Take 0.5-1 tablets by mouth every 4 (four) hours as needed for Pain. 40 tablet 0    predniSONE (DELTASONE) 10 MG tablet 2 TABS DAILY 8/22-9/21; 1.5 TABS DAYS 9/22-10/21; 1 TAB DAYS 10/22-11/21; THEN 0.5 TAB DAILY 60 tablet 0    rilpivirine (EDURANT) 25 mg Tab Take 1 tablet (25 mg total) by mouth once daily. 30 tablet 5    rosuvastatin (CRESTOR) 20 MG tablet Take 1 tablet (20 mg total) by mouth once daily. 30 tablet 3    sodium bicarbonate 650 MG tablet Take 3 tablets (1,950 mg total) by mouth 3 (three) times daily. 270 tablet 11    sodium polystyrene (KAYEXALATE) powder Take 30g by mouth daily X2 doses, then as directed. 453.6 g 1    sulfamethoxazole-trimethoprim 400-80mg (BACTRIM,SEPTRA) 400-80 mg per tablet Take 1 tablet by mouth once daily. 30 tablet 11    tacrolimus (PROGRAF) 5 MG Cap Prograf 10 mg by mouth twice daily. Z94.0 Kidney Transplant. 120 capsule 11    valganciclovir (VALCYTE) 450 mg Tab Take 1 tablet (450 mg total) by mouth every Mon, Wed, Fri. Stop 12/3/17 12 tablet 2     No current facility-administered medications on file prior to visit.         Review of Systems     Refers right red eye (see above)  Left ear pain to see ENT next week   Skin: no skin rash  CNS; no headaches, blurred vision, seizure, or syncope  ENT: No JVD,  Adenopathies,  nasal congestion. No oral lesions  Cardiac: No chest pain, dyspnea, claudication, edema or palpitations  Respiratory: No SOB, cough, hemoptysis  "  Gastro-intestinal: No diarrhea, constipation, abdominal pain, nausea, vomit. No ascitis  Genitourinary: no hematuria, dysuria, frequency, frequency  Musculoskeletal: joint pain, arthritis or vasculitic changes  Psych: alert awake, oriented, No cranial nerves deficit.      Objective:         Physical Exam       /85 (BP Location: Right arm, Patient Position: Sitting, BP Method: Medium (Automatic))   Pulse 91   Temp 99.2 °F (37.3 °C) (Oral)   Resp (!) 21   Ht 5' 7" (1.702 m)   Wt 64.3 kg (141 lb 12.1 oz)   SpO2 100%   BMI 22.20 kg/m²     Head: normocephalic  Neck: No JVD, cervical axillary, or femoral adenopathies  Heart: no murmurs, Normal s1 and s2, No gallops, no rubs, No murmurs  Lungs; CTA, good respiratory effort, no crackles  Abdomen: soft, non tender, no splenomegaly or hepatomegaly, no massess, no bruits  Extremities: No edema, skin rash, joint pain  SNC: awake, alert oriented. Cranial nerves are intact, no focalized.      Labs:  Lab Results   Component Value Date    WBC 5.80 10/26/2017    HGB 9.7 (L) 10/26/2017    HCT 29.8 (L) 10/26/2017     10/26/2017    K 4.6 10/26/2017     10/26/2017    CO2 23 10/26/2017    BUN 22 (H) 10/26/2017    CREATININE 1.8 (H) 10/26/2017    EGFRNONAA 46.7 (A) 10/26/2017    CALCIUM 9.3 10/26/2017    PHOS 4.1 10/26/2017    MG 1.9 10/23/2017    ALBUMIN 3.6 10/26/2017    AST 14 10/16/2017    ALT 11 10/16/2017    UTPCR 0.20 10/16/2017    .0 (H) 09/18/2017    TACROLIMUS 8.0 10/26/2017       Lab Results   Component Value Date    EXTWBC 6.1 10/30/2017    EXTSEGS 84.2 10/30/2017    EXTPLATELETS 163 10/30/2017    EXTHEMOGLOBI 10.8 10/30/2017    EXTHEMATOCRI 31.9 10/30/2017    EXTCREATININ 1.8 10/30/2017    EXTSODIUM 140 10/30/2017    EXTPOTASSIUM 4.2 10/30/2017    EXTBUN 18 10/30/2017    EXTCO2 24 10/30/2017    EXTCALCIUM 9.5 10/30/2017    EXTPHOSPHORU 4.2 10/30/2017    EXTGLUCOSE 108 10/30/2017    EXTALBUMIN 4.5 10/30/2017       Lab Results   Component Value " Date    EXTTACROLVL 8.2 10/30/2017       Labs were reviewed with the patient.    Assessment:     1. Immunocompromised state    2. HIV (human immunodeficiency virus infection)    3. Acute rejection of kidney transplant    4.  donor kidney transplant 2017    5. Antibody mediated rejection of kidney transplant    6. Immunosuppression    7. At risk for opportunistic infections    8. Need for prophylactic immunotherapy        Plan:     I called Opthalmology clinic, they will try to see the patient today.    No change with prograf dose continue with MMF 1000 bid    Patient to see ENT next week        1. CKD stage:stage 3 with stable creatinine, today 1.6, last biopsy negative for rejection. Education provided in appropriate fluid intake, potassium intake. Continue with oral hydration    2. Immunosuppression: same dose of prograf and MMF, continue with close follow up   Lab Results   Component Value Date    TACROLIMUS 8.0 10/26/2017      Will closely monitor for toxicities, education provided about adherence to medicines and need to communicate any side effct to the transplant nurse or physician    3. Allograft Function:creatinine down to 1.6, continue with close follow up No change indicated   Lab Results   Component Value Date    CREATININE 1.8 (H) 10/26/2017       4. Hypertension management:  Well controlled Continue with home blood pressure monitoring, low salt and healthy life discussed with the patient    5. Metabolic Bone Disease/Secondary Hyperparathyroidism: calcium and phosphorus as per our center protocol. Will monitor PTH, Vit D level, calcium      Lab Results   Component Value Date    .0 (H) 2017    CALCIUM 9.3 10/26/2017    CAION 1.17 2017    PHOS 4.1 10/26/2017       6. Electrolytes: reviewed with the patient, essentially within the normal range no need for acute changes today, will monitor as per our center guidelines     Lab Results   Component Value Date      10/26/2017    K 4.6 10/26/2017     10/26/2017    CO2 23 10/26/2017       7. Anemia:last h/h is actually above 10 gm/dL. No nee for Epogen  will continue monitoring as per our center guidelines. No indication for acute intervention today     Lab Results   Component Value Date    WBC 5.80 10/26/2017    HGB 9.7 (L) 10/26/2017    HCT 29.8 (L) 10/26/2017    MCV 88 10/26/2017     10/26/2017       8.Proteinuria: will continue with pr/cr ratio as per our center guidelines  Lab Results   Component Value Date    PROTEINURINE 43 (H) 10/16/2017    CREATRANDUR 212.0 10/16/2017    UTPCR 0.20 10/16/2017        9. BK virus infection screening: will continue with urine or blood PCR as per our guidelines to prevent BK virus viremia and allograft dysfunction  Lab Results   Component Value Date    BKVIRUSPCRQB <125 10/12/2017         10. Weight education: provided during the clinic visit   There is no height or weight on file to calculate BMI.       11.Patient safety education regarding immunosuppression including prophylaxis posttransplant for CMV, PCP : Education provided about vaccination and prevention of infections    12.  Cytopenias: no significant cytopenias will monitor as per our guidelines. Medicine list reviewed including potential causes of drug-induced cytopenias     Lab Results   Component Value Date    WBC 5.80 10/26/2017    HGB 9.7 (L) 10/26/2017    HCT 29.8 (L) 10/26/2017    MCV 88 10/26/2017     10/26/2017       I spoke with the patient for 30 minutes. More than half dedicated to counseling and education. All questions answered            Follow-up:   Clinic: return to transplant clinic weekly for the first month after transplant; every 2 weeks during months 2-3; then at 6-, 9-, 12-, 18-, 24-, and 36- months post-transplant to reassess for complications from immunosuppression toxicity and monitor for rejection.  Annually thereafter.    Labs: since patient remains at high risk for rejection and  drug-related complications that warrant close monitoring, labs will be ordered as follows: continue twice weekly CBC, renal panel, and drug level for first month; then same labs once weekly through 3rd month post-transplant.  Urine for UA and protein/creatinine ratio monthly.  Urine BK - PCR at 1-, 3-, 6-, 9-, 12-, 18-, 24-, and 36- months post-transplant.  Hepatic panel at 1-, 2-, 3-, 6-, 9-, 12-, 18-, 24-, and 36- months post-transplant.    Jase Hines MD       Education:   Material provided to the patient.  Patient reminded to call with any health changes since these can be early signs of significant complications.  Also, I advised the patient to be sure any new medications or changes of old medications are discussed with either a pharmacist or physician knowledgeable with transplant to avoid rejection/drug toxicity related to significant drug interactions.    UNOS Patient Status  Functional Status: 100% - Normal, no complaints, no evidence of disease  Physical Capacity: No Limitations

## 2017-11-08 NOTE — PROGRESS NOTES
HPI     Triage pt (Nephrology)  Kidney transplant-08/18/2017  Patient states OD red x 2 days.  No itchy or burning. Vision seem fine.    I have personally interviewed the patient, reviewed the history and   examined the patient and agree with the technician's exam.    Eye Drops: OTC visine(gets red out) 2x OD    Last edited by Devan Gallego MD on 11/8/2017  3:48 PM. (History)            Assessment /Plan     For exam results, see Encounter Report.    Pingueculitis, right eye    Insufficiency of tear film of both eyes      Artificial tears as desired. Return as needed.

## 2017-11-10 ENCOUNTER — DOCUMENTATION ONLY (OUTPATIENT)
Dept: TRANSPLANT | Facility: CLINIC | Age: 37
End: 2017-11-10

## 2017-11-10 LAB
EXT ALBUMIN: 4.6
EXT BUN: 19
EXT CALCIUM: 9.5
EXT CHLORIDE: 107
EXT CO2: 23
EXT CREATININE: 1.67 MG/DL
EXT EOSINOPHIL%: 0.8
EXT GFR MDRD AF AMER: 60
EXT GLUCOSE: 115
EXT HEMATOCRIT: 32.6
EXT HEMOGLOBIN: 10.9
EXT LYMPH%: 6.6
EXT MAGNESIUM: 1.8
EXT MONOCYTES%: 5.9
EXT PHOSPHORUS: 4.2
EXT PLATELETS: 135
EXT POTASSIUM: 4.8
EXT SEGS%: 86.1
EXT SODIUM: 141 MMOL/L
EXT TACROLIMUS LVL: 8.2
EXT WBC: 6.1

## 2017-11-12 ENCOUNTER — PATIENT MESSAGE (OUTPATIENT)
Dept: TRANSPLANT | Facility: CLINIC | Age: 37
End: 2017-11-12

## 2017-11-13 ENCOUNTER — TELEPHONE (OUTPATIENT)
Dept: PHARMACY | Facility: CLINIC | Age: 37
End: 2017-11-13

## 2017-11-15 ENCOUNTER — LAB VISIT (OUTPATIENT)
Dept: LAB | Facility: HOSPITAL | Age: 37
End: 2017-11-15
Attending: INTERNAL MEDICINE
Payer: MEDICARE

## 2017-11-15 ENCOUNTER — OFFICE VISIT (OUTPATIENT)
Dept: OPTOMETRY | Facility: CLINIC | Age: 37
End: 2017-11-15
Payer: MEDICARE

## 2017-11-15 ENCOUNTER — CLINICAL SUPPORT (OUTPATIENT)
Dept: AUDIOLOGY | Facility: CLINIC | Age: 37
End: 2017-11-15
Payer: MEDICARE

## 2017-11-15 ENCOUNTER — OFFICE VISIT (OUTPATIENT)
Dept: OTOLARYNGOLOGY | Facility: CLINIC | Age: 37
End: 2017-11-15
Payer: MEDICARE

## 2017-11-15 VITALS — BODY MASS INDEX: 23.08 KG/M2 | WEIGHT: 147.06 LBS | HEIGHT: 67 IN

## 2017-11-15 DIAGNOSIS — H18.30 CORNEAL EPITHELIAL DEFECT: Primary | ICD-10-CM

## 2017-11-15 DIAGNOSIS — Z94.0 KIDNEY REPLACED BY TRANSPLANT: ICD-10-CM

## 2017-11-15 DIAGNOSIS — H90.12 CONDUCTIVE HEARING LOSS OF LEFT EAR WITH UNRESTRICTED HEARING OF RIGHT EAR: Primary | ICD-10-CM

## 2017-11-15 DIAGNOSIS — H60.8X2 CHRONIC ECZEMATOUS OTITIS EXTERNA OF LEFT EAR: Primary | ICD-10-CM

## 2017-11-15 DIAGNOSIS — B20 HIV (HUMAN IMMUNODEFICIENCY VIRUS INFECTION): ICD-10-CM

## 2017-11-15 LAB
ALBUMIN SERPL BCP-MCNC: 3.9 G/DL
ALBUMIN SERPL BCP-MCNC: 3.9 G/DL
ALP SERPL-CCNC: 217 U/L
ALT SERPL W/O P-5'-P-CCNC: 12 U/L
ANION GAP SERPL CALC-SCNC: 9 MMOL/L
AST SERPL-CCNC: 13 U/L
BASOPHILS # BLD AUTO: 0.02 K/UL
BASOPHILS NFR BLD: 0.3 %
BILIRUB DIRECT SERPL-MCNC: 0.2 MG/DL
BILIRUB SERPL-MCNC: 0.4 MG/DL
BUN SERPL-MCNC: 21 MG/DL
CALCIUM SERPL-MCNC: 9.3 MG/DL
CD3+CD4+ CELLS # BLD: 59 CELLS/UL (ref 300–1400)
CD3+CD4+ CELLS NFR BLD: 13.7 % (ref 28–57)
CHLORIDE SERPL-SCNC: 111 MMOL/L
CHOLEST SERPL-MCNC: 114 MG/DL
CHOLEST/HDLC SERPL: 3.2 {RATIO}
CO2 SERPL-SCNC: 21 MMOL/L
CREAT SERPL-MCNC: 2 MG/DL
DIFFERENTIAL METHOD: ABNORMAL
EOSINOPHIL # BLD AUTO: 0.1 K/UL
EOSINOPHIL NFR BLD: 0.9 %
ERYTHROCYTE [DISTWIDTH] IN BLOOD BY AUTOMATED COUNT: 13.4 %
EST. GFR  (AFRICAN AMERICAN): 47.5 ML/MIN/1.73 M^2
EST. GFR  (NON AFRICAN AMERICAN): 41.1 ML/MIN/1.73 M^2
GLUCOSE SERPL-MCNC: 88 MG/DL
HCT VFR BLD AUTO: 30.6 %
HDLC SERPL-MCNC: 36 MG/DL
HDLC SERPL: 31.6 %
HGB BLD-MCNC: 9.8 G/DL
IMM GRANULOCYTES # BLD AUTO: 0.02 K/UL
IMM GRANULOCYTES NFR BLD AUTO: 0.3 %
LDLC SERPL CALC-MCNC: 51.8 MG/DL
LYMPHOCYTES # BLD AUTO: 0.5 K/UL
LYMPHOCYTES NFR BLD: 6.9 %
MAGNESIUM SERPL-MCNC: 1.6 MG/DL
MCH RBC QN AUTO: 29.4 PG
MCHC RBC AUTO-ENTMCNC: 32 G/DL
MCV RBC AUTO: 92 FL
MONOCYTES # BLD AUTO: 0.4 K/UL
MONOCYTES NFR BLD: 5.8 %
NEUTROPHILS # BLD AUTO: 5.9 K/UL
NEUTROPHILS NFR BLD: 85.8 %
NONHDLC SERPL-MCNC: 78 MG/DL
NRBC BLD-RTO: 0 /100 WBC
PHOSPHATE SERPL-MCNC: 4.3 MG/DL
PLATELET # BLD AUTO: 119 K/UL
PMV BLD AUTO: 10.5 FL
POTASSIUM SERPL-SCNC: 4.8 MMOL/L
PROT SERPL-MCNC: 6.7 G/DL
RBC # BLD AUTO: 3.33 M/UL
SODIUM SERPL-SCNC: 141 MMOL/L
TACROLIMUS BLD-MCNC: 8.6 NG/ML
TRIGL SERPL-MCNC: 131 MG/DL
WBC # BLD AUTO: 6.85 K/UL

## 2017-11-15 PROCEDURE — 99213 OFFICE O/P EST LOW 20 MIN: CPT | Mod: PBBFAC,25 | Performed by: OTOLARYNGOLOGY

## 2017-11-15 PROCEDURE — 83735 ASSAY OF MAGNESIUM: CPT

## 2017-11-15 PROCEDURE — 85025 COMPLETE CBC W/AUTO DIFF WBC: CPT

## 2017-11-15 PROCEDURE — 99999 PR PBB SHADOW E&M-EST. PATIENT-LVL III: CPT | Mod: PBBFAC,,, | Performed by: OTOLARYNGOLOGY

## 2017-11-15 PROCEDURE — 92504 EAR MICROSCOPY EXAMINATION: CPT | Mod: PBBFAC | Performed by: OTOLARYNGOLOGY

## 2017-11-15 PROCEDURE — 82247 BILIRUBIN TOTAL: CPT

## 2017-11-15 PROCEDURE — 86352 CELL FUNCTION ASSAY W/STIM: CPT

## 2017-11-15 PROCEDURE — 86361 T CELL ABSOLUTE COUNT: CPT

## 2017-11-15 PROCEDURE — 99213 OFFICE O/P EST LOW 20 MIN: CPT | Mod: PBBFAC,27 | Performed by: OPTOMETRIST

## 2017-11-15 PROCEDURE — 99999 PR PBB SHADOW E&M-EST. PATIENT-LVL III: CPT | Mod: PBBFAC,,, | Performed by: OPTOMETRIST

## 2017-11-15 PROCEDURE — 80069 RENAL FUNCTION PANEL: CPT

## 2017-11-15 PROCEDURE — 92567 TYMPANOMETRY: CPT | Mod: PBBFAC | Performed by: AUDIOLOGIST-HEARING AID FITTER

## 2017-11-15 PROCEDURE — 92504 EAR MICROSCOPY EXAMINATION: CPT | Mod: S$PBB,,, | Performed by: OTOLARYNGOLOGY

## 2017-11-15 PROCEDURE — 99203 OFFICE O/P NEW LOW 30 MIN: CPT | Mod: S$PBB,,, | Performed by: OTOLARYNGOLOGY

## 2017-11-15 PROCEDURE — 87799 DETECT AGENT NOS DNA QUANT: CPT

## 2017-11-15 PROCEDURE — 92012 INTRM OPH EXAM EST PATIENT: CPT | Mod: S$PBB,,, | Performed by: OPTOMETRIST

## 2017-11-15 PROCEDURE — 84075 ASSAY ALKALINE PHOSPHATASE: CPT

## 2017-11-15 PROCEDURE — 92557 COMPREHENSIVE HEARING TEST: CPT | Mod: PBBFAC | Performed by: AUDIOLOGIST-HEARING AID FITTER

## 2017-11-15 PROCEDURE — 80197 ASSAY OF TACROLIMUS: CPT

## 2017-11-15 PROCEDURE — 80061 LIPID PANEL: CPT

## 2017-11-15 RX ORDER — BETAMETHASONE VALERATE 0.1 %
LOTION (ML) TOPICAL 2 TIMES DAILY
Qty: 60 ML | Refills: 2 | Status: SHIPPED | OUTPATIENT
Start: 2017-11-15 | End: 2018-08-17 | Stop reason: SDUPTHER

## 2017-11-15 RX ORDER — OFLOXACIN 3 MG/ML
1 SOLUTION/ DROPS OPHTHALMIC 4 TIMES DAILY
Qty: 5 ML | Refills: 0 | Status: SHIPPED | OUTPATIENT
Start: 2017-11-15 | End: 2017-11-22

## 2017-11-15 NOTE — PROGRESS NOTES
Demetrio Aguiar was seen in the clinic today for a hearing evaluation. Mr. Aguiar reported left ear pain/swlling and drainage.      Audiological testing revealed hearing within normal limits in the right ear and a mild high frequency conductive hearing loss in the left ear. A speech reception threshold was obtained at 0 dBHL in the right ear and 5 dBHL in the left ear. Speech discrimination was 100%, bilaterally.    Tympanometry revealed Type A tympanograms in both ears.    Recommendations:  1. Otologic evaluation  2. Annual hearing evaluation

## 2017-11-15 NOTE — PROGRESS NOTES
Subjective:       Patient ID: Demetrio Aguiar is a 37 y.o. male.    Chief Complaint: Otalgia    HPI: Ref Dr Polk.    3 mo Hx of L ear itching/ DC.No prior ear proc.    C/O neck/ parotid swelling.    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    Past Surgical History: Patient has a past surgical history that includes peritoneal dialysis catheter placement; Exploratory laparotomy w/ bowel resection; and Kidney transplant.    Social History: Patient reports that he has quit smoking. His smoking use included Cigarettes. He smoked 0.50 packs per day. He has never used smokeless tobacco. He reports that he does not drink alcohol or use drugs.    Family History: family history includes Cancer in his father; Diabetes in his maternal aunt and maternal uncle; Hypertension in his maternal grandmother and paternal grandmother; Lung cancer in his father; No Known Problems in his brother and mother.    Medications:   Current Outpatient Prescriptions   Medication Sig    azithromycin (ZITHROMAX) 600 MG Tab Take 2 tablets (1,200 mg total) by mouth every 7 days.    bisacodyl (DULCOLAX) 5 mg EC tablet Take 1-2 tablets (5-10 mg total) by mouth daily as needed for Constipation. Laxative    cinacalcet (SENSIPAR) 60 MG Tab Take 1 tablet (60 mg total) by mouth every other day.    docusate sodium (COLACE) 100 MG capsule Take 1 capsule (100 mg total) by mouth 3 (three) times daily as needed for Constipation.    dolutegravir (TIVICAY) 50 mg Tab Take 1 tablet (50 mg total) by mouth once daily.    famotidine (PEPCID) 20 MG tablet Take 1 tablet (20  mg total) by mouth every evening.    ketoconazole (NIZORAL) 200 mg Tab Take 0.5 tablets (100 mg total) by mouth once daily.    multivitamin (THERAGRAN) tablet Take 1 tablet by mouth once daily.    mycophenolate (CELLCEPT) 500 mg Tab Take 3 tablets (1,500 mg total) by mouth 2 (two) times daily. Z94.0 kidney Transplant 8/18/17 [place] [employer].    nifedipine (PROCARDIA-XL) 30 MG (OSM) 24 hr tablet Take 1 tablet (30 mg total) by mouth once daily.    predniSONE (DELTASONE) 10 MG tablet 2 TABS DAILY 8/22-9/21; 1.5 TABS DAYS 9/22-10/21; 1 TAB DAYS 10/22-11/21; THEN 0.5 TAB DAILY    rilpivirine (EDURANT) 25 mg Tab Take 1 tablet (25 mg total) by mouth once daily.    rosuvastatin (CRESTOR) 20 MG tablet Take 1 tablet (20 mg total) by mouth once daily.    sodium bicarbonate 650 MG tablet Take 3 tablets (1,950 mg total) by mouth 3 (three) times daily.    sodium polystyrene (KAYEXALATE) powder Take 30g by mouth daily X2 doses, then as directed.    sulfamethoxazole-trimethoprim 400-80mg (BACTRIM,SEPTRA) 400-80 mg per tablet Take 1 tablet by mouth once daily.    tacrolimus (PROGRAF) 5 MG Cap Prograf 10 mg by mouth twice daily. Z94.0 Kidney Transplant.    valganciclovir (VALCYTE) 450 mg Tab Take 1 tablet (450 mg total) by mouth every Mon, Wed, Fri. Stop 12/3/17    betamethasone valerate 0.1% (VALISONE) 0.1 % Lotn Apply topically 2 (two) times daily. qtts 3-4 AS bid.     No current facility-administered medications for this visit.        Allergies: Patient has No Known Allergies.      Review of Systems   Constitutional: Negative for activity change, appetite change, chills, diaphoresis, fatigue, fever and unexpected weight change.   HENT: Positive for ear discharge and ear pain. Negative for congestion, facial swelling, hearing loss, nosebleeds, postnasal drip, rhinorrhea, sinus pressure, sneezing, sore throat, tinnitus, trouble swallowing and voice change.    Eyes: Negative for photophobia, pain, discharge, redness and  visual disturbance.   Respiratory: Negative for cough, chest tightness, shortness of breath and wheezing.    Cardiovascular: Negative for chest pain and palpitations.   Gastrointestinal: Negative for abdominal pain, constipation, diarrhea and nausea.   Genitourinary: Negative for dysuria and frequency.   Musculoskeletal: Negative for arthralgias, back pain, gait problem, joint swelling, myalgias, neck pain and neck stiffness.   Skin: Negative for color change, pallor and rash.   Neurological: Negative for dizziness, tremors, seizures, syncope, facial asymmetry, speech difficulty, weakness, light-headedness, numbness and headaches.   Hematological: Negative for adenopathy. Does not bruise/bleed easily.   Psychiatric/Behavioral: Negative for agitation, confusion, decreased concentration, dysphoric mood and sleep disturbance. The patient is not nervous/anxious and is not hyperactive.        Objective:      Physical Exam   Constitutional: He is oriented to person, place, and time. He appears well-developed and well-nourished. He is cooperative.  Non-toxic appearance. He does not have a sickly appearance. He does not appear ill. No distress.   HENT:   Head: Normocephalic and atraumatic. Not macrocephalic and not microcephalic. Head is without raccoon's eyes, without Nicholson's sign, without abrasion, without contusion, without laceration, without right periorbital erythema and without left periorbital erythema. Hair is normal.   Right Ear: Ear canal normal. No lacerations. No drainage, swelling or tenderness. No foreign bodies. No mastoid tenderness. Tympanic membrane is not injected, not scarred, not perforated, not erythematous, not retracted and not bulging. Tympanic membrane mobility is normal. No middle ear effusion. No hemotympanum. No decreased hearing is noted.   Left Ear: Ear canal normal. No lacerations. No drainage, swelling or tenderness. No foreign bodies. No mastoid tenderness. Tympanic membrane is not  injected, not scarred, not perforated, not erythematous, not retracted and not bulging. Tympanic membrane mobility is normal.  No middle ear effusion. No hemotympanum. No decreased hearing is noted.   Ears:    Nose: No mucosal edema, rhinorrhea, nose lacerations, sinus tenderness, nasal deformity, septal deviation or nasal septal hematoma. No epistaxis.  No foreign bodies. Right sinus exhibits no maxillary sinus tenderness. Left sinus exhibits no maxillary sinus tenderness.       Procedure note:    The patient was brought to the minor procedure room and placed in the supine position. The video microscope was brought onto the field. The ear canal, tympanic membrane and other structures were visualized and demonstrated to the patient and family. The patient tolerated the procedure well and there were no complications.    Has L ALBERTO/ myringitis Post TM.    ME clear.       Eyes: Conjunctivae, EOM and lids are normal. Pupils are equal, round, and reactive to light.   Neck: Normal range of motion. Neck supple. No JVD present. No tracheal tenderness and no muscular tenderness present. No neck rigidity. No tracheal deviation, no edema, no erythema and normal range of motion present. No thyroid mass and no thyromegaly present.   Cardiovascular: Normal rate and regular rhythm.    Pulmonary/Chest: Effort normal. No accessory muscle usage or stridor. No apnea, no tachypnea and no bradypnea. No respiratory distress.   Abdominal: Soft. Normal appearance.   Musculoskeletal: Normal range of motion.   Lymphadenopathy:        Head (right side): No submental, no submandibular, no tonsillar, no preauricular and no posterior auricular adenopathy present.        Head (left side): No submental, no submandibular, no tonsillar, no preauricular and no posterior auricular adenopathy present.     He has no cervical adenopathy.        Right cervical: No superficial cervical, no deep cervical and no posterior cervical adenopathy present.        Left cervical: No superficial cervical, no deep cervical and no posterior cervical adenopathy present.   Neurological: He is alert and oriented to person, place, and time. He displays no atrophy and no tremor. No cranial nerve deficit or sensory deficit. He exhibits normal muscle tone. He displays no seizure activity.   Skin: Skin is warm, dry and intact. No abrasion, no bruising, no burn, no ecchymosis, no laceration, no lesion and no rash noted. He is not diaphoretic. No erythema. No pallor.   Psychiatric: He has a normal mood and affect. His behavior is normal. Judgment and thought content normal. His speech is not rapid and/or pressured and not slurred. Cognition and memory are normal. He is communicative.   Nursing note and vitals reviewed.              Assessment:       1. Chronic eczematous otitis externa of left ear        Plan:         Demetrio was seen today for otalgia.    Diagnoses and all orders for this visit:    Chronic eczematous otitis externa of left ear    Other orders  -     betamethasone valerate 0.1% (VALISONE) 0.1 % Lotn; Apply topically 2 (two) times daily. qtts 3-4 AS bid.

## 2017-11-15 NOTE — LETTER
November 15, 2017      Lucía Polk MD  [address]  [facility] [address]           [facility] - O[facility]hin[facility]  [address]  [facility] [address]  Phone: [phone]  Fax: [phone]          Patient: Demetrio Aguiar   MR Number: [id number]   YOB: 1980   Date of Visit: 11/15/2017       Dear Dr. Lucía Polk:    Thank you for referring Demetrio Aguiar to me for evaluation. Attached you will find relevant portions of my assessment and plan of care.    If you have questions, please do not hesitate to call me. I look forward to following Demetrio Aguiar along with you.    Sincerely,    Tanvir Stringer MD    Enclosure  CC:  No Recipients    If you would like to receive this communication electronically, please contact [email or web address] or [phone] to request more information on [employer] access.    For providers and/or their staff who would like to refer a patient to [facility], please contact us through our one-stop-shop provider referral line, [facility], at [phone].    If you feel you have received this communication in error or would no longer like to receive these types of communications, please e-mail [email or web address]

## 2017-11-15 NOTE — PATIENT INSTRUCTIONS
Please use the antibiotic drops in the right eye: 1 drop, 4 times per day, for 7-10 days.  I will call you in 1 week to check on you, or please let me know if anything worsens.    If you have any problems after hours or over the weekend, please call the [facility]  at [phone] and ask that the on-call Ophthalmologist be paged.

## 2017-11-15 NOTE — PROGRESS NOTES
HPI     Mr. Demetrio Aguiar is here for an urgent visit.    He saw Dr. Gallego last week on 11/08/17 for redness OD that started on   11/06/17; no pain or irritation at that time. He was diagnosed with   pingueculitis and dry eyes. He has been using Systane TID OU as instructed   with good compliance, but symptoms have wosened within the last few days.   He has developed worsening redness, photophobia, tearing, crusting of lids   in the AM, blurred vision, and mild itchiness OD. OS asymptomatic.  Pt denies any recent symptoms of upper respiratory infection or exposure   to people with red eyes. Pt does not wear contact lenses.    +Drops: Systane Ultra TID OU  -Pain: no pain just irritation   +Tearing OD  +Sensitivity to sunlight OD  -Flashes  -Floaters    OCULAR HISTORY  Last Eye Exam 11/08/17 with Dr. Gallego   (-)eye surgery   Pingueculitis OD (11/08/17)  Dry eyes    FAMILY HISTORY  (-)Glaucoma none         Last edited by Megha Hahn, OD on 11/15/2017  2:57 PM. (History)            Assessment /Plan     For exam results, see Encounter Report.    Corneal epithelial defect   Multiple paracentral epithelial defects OD; cause of eye pain, redness, light sensitivity.   Start Ocuflox QID OD x 7-10 days. Pt opts for phone f/u in 1 week instead of scheduling appointment (he lives far away). RTC immediately if symptoms worsen.    -     ofloxacin (OCUFLOX) 0.3 % ophthalmic solution; Place 1 drop into the right eye 4 (four) times daily.  Dispense: 5 mL; Refill: 0      RTC prn

## 2017-11-17 ENCOUNTER — TELEPHONE (OUTPATIENT)
Dept: TRANSPLANT | Facility: CLINIC | Age: 37
End: 2017-11-17

## 2017-11-17 LAB
BK VIRUS DNA PCR, QUANT, BLOOD: <125 COPIES/ML
BK VIRUS DNA, BLOOD: NOT DETECTED
IMMUNKNOW (STIMULATED): 269 NG/ML
LOG BKV COPIES/ML: <2.1 LOG (10) COPIES/ML

## 2017-11-20 ENCOUNTER — PATIENT MESSAGE (OUTPATIENT)
Dept: TRANSPLANT | Facility: CLINIC | Age: 37
End: 2017-11-20

## 2017-11-21 ENCOUNTER — PATIENT MESSAGE (OUTPATIENT)
Dept: OPTOMETRY | Facility: CLINIC | Age: 37
End: 2017-11-21

## 2017-11-22 ENCOUNTER — TELEPHONE (OUTPATIENT)
Dept: OPTOMETRY | Facility: CLINIC | Age: 37
End: 2017-11-22

## 2017-11-22 DIAGNOSIS — Z94.0 KIDNEY REPLACED BY TRANSPLANT: ICD-10-CM

## 2017-11-22 RX ORDER — VALGANCICLOVIR 450 MG/1
TABLET, FILM COATED ORAL
Qty: 12 TABLET | Refills: 0 | Status: SHIPPED | OUTPATIENT
Start: 2017-11-22 | End: 2017-12-27 | Stop reason: ALTCHOICE

## 2017-11-22 RX ORDER — PREDNISONE 10 MG/1
TABLET ORAL
Qty: 30 TABLET | Refills: 0 | Status: SHIPPED | OUTPATIENT
Start: 2017-11-22 | End: 2017-11-27 | Stop reason: DRUGHIGH

## 2017-11-22 RX ORDER — FAMOTIDINE 20 MG/1
TABLET, FILM COATED ORAL
Qty: 30 TABLET | Refills: 0 | Status: SHIPPED | OUTPATIENT
Start: 2017-11-22 | End: 2017-11-27 | Stop reason: SDUPTHER

## 2017-11-22 NOTE — TELEPHONE ENCOUNTER
----- Message from Megha Hahn OD sent at 11/22/2017  8:45 AM CST -----  Regarding: phone f/u  Hello,     I saw this patient last week for redness and pain of OD and started him on Ocuflox drops. Please call to ask how he is feeling and schedule a follow-up visit if needed.     Thank you,   Megha Hahn OD

## 2017-11-27 ENCOUNTER — OFFICE VISIT (OUTPATIENT)
Dept: OPTOMETRY | Facility: CLINIC | Age: 37
End: 2017-11-27
Payer: MEDICARE

## 2017-11-27 ENCOUNTER — LAB VISIT (OUTPATIENT)
Dept: LAB | Facility: HOSPITAL | Age: 37
End: 2017-11-27
Attending: INTERNAL MEDICINE
Payer: MEDICARE

## 2017-11-27 ENCOUNTER — DOCUMENTATION ONLY (OUTPATIENT)
Dept: TRANSPLANT | Facility: CLINIC | Age: 37
End: 2017-11-27

## 2017-11-27 ENCOUNTER — OFFICE VISIT (OUTPATIENT)
Dept: TRANSPLANT | Facility: CLINIC | Age: 37
End: 2017-11-27
Payer: MEDICARE

## 2017-11-27 ENCOUNTER — TELEPHONE (OUTPATIENT)
Dept: OPHTHALMOLOGY | Facility: CLINIC | Age: 37
End: 2017-11-27

## 2017-11-27 ENCOUNTER — OFFICE VISIT (OUTPATIENT)
Dept: INFECTIOUS DISEASES | Facility: CLINIC | Age: 37
End: 2017-11-27
Payer: MEDICARE

## 2017-11-27 ENCOUNTER — OFFICE VISIT (OUTPATIENT)
Dept: OPHTHALMOLOGY | Facility: CLINIC | Age: 37
End: 2017-11-27
Payer: MEDICARE

## 2017-11-27 VITALS
TEMPERATURE: 99 F | SYSTOLIC BLOOD PRESSURE: 118 MMHG | BODY MASS INDEX: 22.49 KG/M2 | HEIGHT: 67 IN | DIASTOLIC BLOOD PRESSURE: 66 MMHG | WEIGHT: 143.31 LBS | HEART RATE: 94 BPM

## 2017-11-27 VITALS
TEMPERATURE: 97 F | HEART RATE: 91 BPM | RESPIRATION RATE: 16 BRPM | SYSTOLIC BLOOD PRESSURE: 133 MMHG | HEIGHT: 67 IN | DIASTOLIC BLOOD PRESSURE: 70 MMHG | BODY MASS INDEX: 22.49 KG/M2 | WEIGHT: 143.31 LBS | OXYGEN SATURATION: 99 %

## 2017-11-27 DIAGNOSIS — D84.9 IMMUNOCOMPROMISED STATE: Chronic | ICD-10-CM

## 2017-11-27 DIAGNOSIS — H18.30 CORNEAL EPITHELIAL DEFECT: Primary | ICD-10-CM

## 2017-11-27 DIAGNOSIS — Z94.0 KIDNEY REPLACED BY TRANSPLANT: ICD-10-CM

## 2017-11-27 DIAGNOSIS — Z79.60 LONG-TERM USE OF IMMUNOSUPPRESSANT MEDICATION: ICD-10-CM

## 2017-11-27 DIAGNOSIS — B20 HIV (HUMAN IMMUNODEFICIENCY VIRUS INFECTION): Primary | Chronic | ICD-10-CM

## 2017-11-27 DIAGNOSIS — Z94.0 S/P KIDNEY TRANSPLANT: ICD-10-CM

## 2017-11-27 DIAGNOSIS — B20 HIV (HUMAN IMMUNODEFICIENCY VIRUS INFECTION): Chronic | ICD-10-CM

## 2017-11-27 DIAGNOSIS — D84.9 IMMUNOSUPPRESSION: Chronic | ICD-10-CM

## 2017-11-27 DIAGNOSIS — S05.01XD CORNEAL ABRASION, RIGHT, SUBSEQUENT ENCOUNTER: ICD-10-CM

## 2017-11-27 DIAGNOSIS — Z94.0 STATUS POST DECEASED-DONOR KIDNEY TRANSPLANTATION: Primary | ICD-10-CM

## 2017-11-27 PROBLEM — D63.1 ANEMIA IN STAGE 3 CHRONIC KIDNEY DISEASE: Chronic | Status: ACTIVE | Noted: 2017-08-22

## 2017-11-27 PROBLEM — N18.30 ANEMIA IN STAGE 3 CHRONIC KIDNEY DISEASE: Chronic | Status: ACTIVE | Noted: 2017-08-22

## 2017-11-27 LAB
ALBUMIN SERPL BCP-MCNC: 4.1 G/DL
ANION GAP SERPL CALC-SCNC: 9 MMOL/L
BASOPHILS # BLD AUTO: 0.02 K/UL
BASOPHILS NFR BLD: 0.3 %
BUN SERPL-MCNC: 22 MG/DL
CALCIUM SERPL-MCNC: 9.4 MG/DL
CHLORIDE SERPL-SCNC: 109 MMOL/L
CO2 SERPL-SCNC: 21 MMOL/L
CREAT SERPL-MCNC: 2 MG/DL
DIFFERENTIAL METHOD: ABNORMAL
EOSINOPHIL # BLD AUTO: 0.1 K/UL
EOSINOPHIL NFR BLD: 1.1 %
ERYTHROCYTE [DISTWIDTH] IN BLOOD BY AUTOMATED COUNT: 13.2 %
EST. GFR  (AFRICAN AMERICAN): 47.5 ML/MIN/1.73 M^2
EST. GFR  (NON AFRICAN AMERICAN): 41.1 ML/MIN/1.73 M^2
EXT ALBUMIN: NORMAL
EXT ALKALINE PHOSPHATASE: 176
EXT ALT: 16
EXT AST: 19
EXT BILIRUBIN DIRECT: 0.1 MG/DL
EXT BILIRUBIN TOTAL: 0.5
EXT BK VIRUS DNA QN PCR: NORMAL
EXT BUN: 20
EXT CALCIUM: 10
EXT CHLORIDE: 106
EXT CO2: 23
EXT CREATININE: 1.89 MG/DL
EXT EOSINOPHIL%: 1.5
EXT GFR MDRD AF AMER: 52
EXT GLUCOSE UA: NEGATIVE
EXT GLUCOSE: 113
EXT HEMATOCRIT: 36.6
EXT HEMOGLOBIN: 12.1
EXT LYMPH%: 2.5
EXT MAGNESIUM: 1.8
EXT MONOCYTES%: 5.3
EXT NITRITES UA: NEGATIVE
EXT PHOSPHORUS: 4.2
EXT PLATELETS: 140
EXT POTASSIUM: 4.9
EXT PROT/CREAT RATIO UR: 0.16
EXT PROTEIN TOTAL: 7.6
EXT PROTEIN UA: NEGATIVE
EXT RBC UA: NEGATIVE
EXT SEGS%: 90.5
EXT SODIUM: 140 MMOL/L
EXT TACROLIMUS LVL: 8.5
EXT WBC UA: NEGATIVE
EXT WBC: 9.6
GLUCOSE SERPL-MCNC: 93 MG/DL
HCT VFR BLD AUTO: 30.5 %
HGB BLD-MCNC: 9.7 G/DL
IMM GRANULOCYTES # BLD AUTO: 0.02 K/UL
IMM GRANULOCYTES NFR BLD AUTO: 0.3 %
LYMPHOCYTES # BLD AUTO: 0.4 K/UL
LYMPHOCYTES NFR BLD: 5.6 %
MAGNESIUM SERPL-MCNC: 2.2 MG/DL
MCH RBC QN AUTO: 29 PG
MCHC RBC AUTO-ENTMCNC: 31.8 G/DL
MCV RBC AUTO: 91 FL
MONOCYTES # BLD AUTO: 0.3 K/UL
MONOCYTES NFR BLD: 5.1 %
NEUTROPHILS # BLD AUTO: 5.5 K/UL
NEUTROPHILS NFR BLD: 87.6 %
NRBC BLD-RTO: 0 /100 WBC
PHOSPHATE SERPL-MCNC: 4.4 MG/DL
PLATELET # BLD AUTO: 146 K/UL
PMV BLD AUTO: 10.4 FL
POTASSIUM SERPL-SCNC: 5.3 MMOL/L
RBC # BLD AUTO: 3.35 M/UL
SODIUM SERPL-SCNC: 139 MMOL/L
TACROLIMUS BLD-MCNC: 7.6 NG/ML
WBC # BLD AUTO: 6.26 K/UL

## 2017-11-27 PROCEDURE — 99214 OFFICE O/P EST MOD 30 MIN: CPT | Mod: PBBFAC,27 | Performed by: INTERNAL MEDICINE

## 2017-11-27 PROCEDURE — 99499 UNLISTED E&M SERVICE: CPT | Mod: S$PBB,,, | Performed by: OPTOMETRIST

## 2017-11-27 PROCEDURE — 83735 ASSAY OF MAGNESIUM: CPT

## 2017-11-27 PROCEDURE — 99999 PR PBB SHADOW E&M-EST. PATIENT-LVL V: CPT | Mod: PBBFAC,,, | Performed by: NURSE PRACTITIONER

## 2017-11-27 PROCEDURE — 99212 OFFICE O/P EST SF 10 MIN: CPT | Mod: PBBFAC | Performed by: OPTOMETRIST

## 2017-11-27 PROCEDURE — 99999 PR PBB SHADOW E&M-EST. PATIENT-LVL III: CPT | Mod: PBBFAC,,, | Performed by: OPHTHALMOLOGY

## 2017-11-27 PROCEDURE — 80069 RENAL FUNCTION PANEL: CPT

## 2017-11-27 PROCEDURE — 92012 INTRM OPH EXAM EST PATIENT: CPT | Mod: S$PBB,,, | Performed by: OPHTHALMOLOGY

## 2017-11-27 PROCEDURE — 99214 OFFICE O/P EST MOD 30 MIN: CPT | Mod: S$PBB,,, | Performed by: NURSE PRACTITIONER

## 2017-11-27 PROCEDURE — 99213 OFFICE O/P EST LOW 20 MIN: CPT | Mod: PBBFAC,27 | Performed by: OPHTHALMOLOGY

## 2017-11-27 PROCEDURE — 99999 PR PBB SHADOW E&M-EST. PATIENT-LVL II: CPT | Mod: PBBFAC,,, | Performed by: OPTOMETRIST

## 2017-11-27 PROCEDURE — 86352 CELL FUNCTION ASSAY W/STIM: CPT

## 2017-11-27 PROCEDURE — 99215 OFFICE O/P EST HI 40 MIN: CPT | Mod: S$PBB,,, | Performed by: INTERNAL MEDICINE

## 2017-11-27 PROCEDURE — 80197 ASSAY OF TACROLIMUS: CPT

## 2017-11-27 PROCEDURE — 99999 PR PBB SHADOW E&M-EST. PATIENT-LVL IV: CPT | Mod: PBBFAC,,, | Performed by: INTERNAL MEDICINE

## 2017-11-27 PROCEDURE — 87536 HIV-1 QUANT&REVRSE TRNSCRPJ: CPT

## 2017-11-27 PROCEDURE — 36415 COLL VENOUS BLD VENIPUNCTURE: CPT

## 2017-11-27 PROCEDURE — 85025 COMPLETE CBC W/AUTO DIFF WBC: CPT

## 2017-11-27 PROCEDURE — 99215 OFFICE O/P EST HI 40 MIN: CPT | Mod: PBBFAC,27 | Performed by: NURSE PRACTITIONER

## 2017-11-27 RX ORDER — PREDNISONE 5 MG/1
5 TABLET ORAL DAILY
Qty: 30 TABLET | Refills: 11 | Status: SHIPPED | OUTPATIENT
Start: 2017-11-27 | End: 2017-12-07

## 2017-11-27 RX ORDER — FAMOTIDINE 20 MG/1
TABLET, FILM COATED ORAL
Qty: 30 TABLET | Refills: 11 | Status: SHIPPED | OUTPATIENT
Start: 2017-11-27 | End: 2018-08-14 | Stop reason: SDUPTHER

## 2017-11-27 NOTE — PATIENT INSTRUCTIONS
Bandage contact lens today.   Appointment for follow up later this week in [place] if possible.

## 2017-11-27 NOTE — LETTER
November 27, 2017        Alexa Holliday  [facility] [place] [place] [address]  Phone: [phone]  Fax: [phone]             [facility]  [address]  [facility] [address]  Phone: [phone]   Patient: Demetrio Aguiar   MR Number: [id number]   YOB: 1980   Date of Visit: 11/27/2017       Dear Dr. Alexa Holliday    Thank you for referring Demetrio Aguiar to me for evaluation. Attached you will find relevant portions of my assessment and plan of care.    If you have questions, please do not hesitate to call me. I look forward to following Demetrio Aguiar along with you.    Sincerely,    Hallie Smith, NP    Enclosure    If you would like to receive this communication electronically, please contact [email or web address] or [phone] to request [employer] Link access.    [employer] is a tool which provides read-only access to select patient information with whom you have a relationship. Its easy to use and provides real time access to review your patients record including encounter summaries, notes, results, and demographic information.    If you feel you have received this communication in error or would no longer like to receive these types of communications, please e-mail [email or web address]

## 2017-11-27 NOTE — PROGRESS NOTES
HPI     Mr. Demetrio Aguiar is here for an urgent visit.    He was last seen about 1.5 weeks ago on 11/15/17 with Dr. Hahn for Corneal   epithelial defect OD, was prescribed Ocuflox QID OD which he ran out of 3   days ago.     Pt says that redness, blurry vision, and photophobia have improved but are   still present. Pt still c/o of irration, tearing, itchiness, and   photophobia in sunlight, and crusting upon waking OD.  He denies any recent eye injury or foreign body. No previous occurrences.   OS asymptomatic.     -Drops:   -Pain: no pain just irritation   +Tearing OD  +Sensitivity to sunlight OD  -Flashes  -Floaters    OCULAR HISTORY  Last Eye Exam 11/15/17 with Dr. Hahn   (-)eye surgery   Pingueculitis OD (11/08/17)  Dry eyes  Corneal epithelial defect OD     FAMILY HISTORY  (-)Glaucoma none         Last edited by Megha Hahn, OD on 11/27/2017 10:26 AM. (History)            Assessment /Plan     For exam results, see Encounter Report.    Corneal epithelial defect   Persistent vs last visit visit, but different appearance. Unknown etiology. Minimal improvement with Ocuflox QID OD x about 9 days.    Pt commutes 300 miles to [facility] and is unable to return tomorrow with cornea specialist. Recommended pt see ophthalmology for 2nd opinion instead of prescribing medication empirically which may worsen condition; pt agrees.       RTC later today for 2nd opinion with ophthalmology

## 2017-11-27 NOTE — LETTER
November 29, 2017      Lucía Polk MD  [address]  [facility] [address]           [facility] - Infectious Diseases  [address]  [facility] [address]  Phone: [phone]  Fax: [phone]          Patient: Demetrio Aguiar   MR Number: [id number]   YOB: 1980   Date of Visit: 11/27/2017       Dear Dr. Lucía Polk:    Thank you for referring Demetrio Aguiar to me for evaluation. Attached you will find relevant portions of my assessment and plan of care.    If you have questions, please do not hesitate to call me. I look forward to following Demetrio Aguiar along with you.    Sincerely,    Otf Miramontes MD    Enclosure  CC:  No Recipients    If you would like to receive this communication electronically, please contact [email or web address] or [phone] to request more information on [employer] Link access.    For providers and/or their staff who would like to refer a patient to [facility], please contact us through our one-stop-shop provider referral line, [facility], at [phone].    If you feel you have received this communication in error or would no longer like to receive these types of communications, please e-mail [email or web address]

## 2017-11-27 NOTE — PROGRESS NOTES
Infectious Diseases Clinic Note    Subjective:       Patient ID: Demetrio Aguiar is a 37 y.o. male.    Chief Complaint: HIV Positive/AIDS and Kidney replaced by transplant    HPI     36 y/o M h/o suspected HIV nephropathy c/b ESRD on HD admitted for KT s/p DBDKT 8/18/17 (CMV D-/R+, thymo induction, mmf/tacro) Did well post op with DGF requiring HD (last 8/28/17), biopsy proven ABMR with weakly positive  DSAs treated with SMP x 3 and PLEX x 6 and IVIG x 2 last 9/6 c/b RP hematoma s/p washout 9/9.  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 Hematoma evacuated.(09/09)    Status post exploratory laparotomy 9/10/2017    Re explored for retroperitoneal hematoma. Hematoma evacuated.(09/09)    TB lung, latent     tx INH 2006       Social History     Social History    Marital status: Single     Spouse name: N/A    Number of children: N/A    Years of education: N/A     Occupational History    Not on file.     Social History Main Topics    Smoking status: Former Smoker     Packs/day: 0.50     Types: Cigarettes    Smokeless tobacco: Never Used      Comment: quit smoking 5 years ago    Alcohol use No    Drug use: No      Comment: last use for cocaine at age 17; 2 years ago last use for THC    Sexual activity: Not Currently     Other Topics Concern    Not on file     Social History Narrative     No narrative on file         Current Outpatient Prescriptions:     azithromycin (ZITHROMAX) 600 MG Tab, Take 2 tablets (1,200 mg total) by mouth every 7 days., Disp: 8 tablet, Rfl: 5    betamethasone valerate 0.1% (VALISONE) 0.1 % Lotn, Apply topically 2 (two) times daily. qtts 3-4 AS bid., Disp: 60 mL, Rfl: 2    cinacalcet (SENSIPAR) 60 MG Tab, Take 1 tablet (60 mg total) by mouth every other day., Disp: 15 tablet, Rfl: 3    dolutegravir (TIVICAY) 50 mg Tab, Take 1 tablet (50 mg total) by mouth once daily., Disp: 30 tablet, Rfl: 5    famotidine (PEPCID) 20 MG tablet, TAKE 1 TABLET (20MG TOTAL) BY MOUTH EACH EVENING, Disp: 30 tablet, Rfl: 11    ketoconazole (NIZORAL) 200 mg Tab, Take 0.5 tablets (100 mg total) by mouth once daily., Disp: 15 tablet, Rfl: 11    multivitamin (THERAGRAN) tablet, Take 1 tablet by mouth once daily., Disp: , Rfl:     mycophenolate (CELLCEPT) 500 mg Tab, Take 3 tablets (1,500 mg total) by mouth 2 (two) times daily. Z94.0 kidney Transplant 8/18/17 [place] Medicaid., Disp: 180 tablet, Rfl: 11    nifedipine (PROCARDIA-XL) 30 MG (OSM) 24 hr tablet, Take 1 tablet (30 mg total) by mouth once daily., Disp: 30 tablet, Rfl: 11    predniSONE (DELTASONE) 5 MG tablet, Take 1 tablet (5 mg total) by mouth once daily., Disp: 30 tablet, Rfl: 11    rilpivirine (EDURANT) 25 mg Tab, Take 1 tablet (25 mg total) by mouth once daily., Disp: 30 tablet, Rfl: 5    rosuvastatin (CRESTOR) 20 MG tablet, Take 1 tablet (20 mg total) by mouth once daily., Disp: 30 tablet, Rfl: 3    sodium bicarbonate 650 MG tablet, Take 3 tablets (1,950 mg total) by mouth 3 (three) times daily., Disp: 270 tablet, Rfl: 11    sulfamethoxazole-trimethoprim 400-80mg (BACTRIM,SEPTRA) 400-80 mg per tablet, Take 1 tablet by mouth once daily., Disp: 30 tablet, Rfl: 11    tacrolimus (PROGRAF) 5 MG Cap, Prograf 10 mg by mouth twice daily. Z94.0 Kidney Transplant., Disp: 120 capsule, Rfl: 11    valGANciclovir (VALCYTE) 450 mg  Tab, TAKE 1 TABLET (450MG) BY MOUTH EVERY MONDAY, WEDNESDAY, AND FRIDAY. STOP 12/03/17., Disp: 12 tablet, Rfl: 0    bisacodyl (DULCOLAX) 5 mg EC tablet, Take 1-2 tablets (5-10 mg total) by mouth daily as needed for Constipation. Laxative, Disp: 30 tablet, Rfl: 3    docusate sodium (COLACE) 100 MG capsule, Take 1 capsule (100 mg total) by mouth 3 (three) times daily as needed for Constipation., Disp: 100 capsule, Rfl: 0    sodium polystyrene (KAYEXALATE) powder, Take 30g by mouth daily X2 doses, then as directed., Disp: 453.6 g, Rfl: 1    Review of Systems   Constitutional: Negative for activity change, appetite change, chills, fatigue and fever.   HENT: Positive for congestion and sinus pressure. Negative for dental problem and mouth sores.    Eyes: Negative for pain, redness and visual disturbance.   Respiratory: Negative for cough, shortness of breath and wheezing.    Cardiovascular: Negative for chest pain and leg swelling.   Gastrointestinal: Positive for diarrhea. Negative for abdominal distention, abdominal pain, nausea and vomiting.   Endocrine: Negative for polyuria.   Genitourinary: Negative for decreased urine volume, dysuria and frequency.   Musculoskeletal: Negative for joint swelling.   Skin: Negative for color change.   Allergic/Immunologic: Negative for food allergies.   Neurological: Negative for dizziness, weakness and headaches.   Hematological: Negative for adenopathy.   Psychiatric/Behavioral: Negative for agitation and confusion. The patient is not nervous/anxious.            Objective:      Vitals:    11/27/17 1500   BP: 118/66   Pulse: 94   Temp: 98.5 °F (36.9 °C)     Physical Exam   Constitutional: He is oriented to person, place, and time. He appears well-developed and well-nourished.   HENT:   Head: Normocephalic and atraumatic.   Mouth/Throat: Oropharynx is clear and moist.   Eyes: Conjunctivae are normal.   Neck: Neck supple.   Cardiovascular: Normal rate, regular rhythm and normal  heart sounds.    No murmur heard.  Pulmonary/Chest: Effort normal and breath sounds normal. No respiratory distress. He has no wheezes.   Abdominal: Soft. Bowel sounds are normal. He exhibits no distension. There is no tenderness.   Musculoskeletal: Normal range of motion. He exhibits no edema or tenderness.   Lymphadenopathy:     He has no cervical adenopathy.   Neurological: He is alert and oriented to person, place, and time. Coordination normal.   Skin: Skin is warm and dry. No rash noted.   Psychiatric: He has a normal mood and affect. His behavior is normal.           Assessment/Plan:       No diagnosis found.       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  Has had trouble maintaining tacrolimus level and has been on ketoconazole  -called Dr. Reyes (patients primary care doctor) about switching patient to descovy/dolutegravir given that rilpivirine is decreasing ketoconazole levels.  Discussed at length with KT as well to determine next best course of action  -Recheck VL today  - continue tmp/smx for life  - continue weekly azithro per protocol  - continue other ppx drugs per KT protocol

## 2017-11-27 NOTE — PROGRESS NOTES
Results reviewed and the following message sent to patient via [facility]: Please continue plenty of fluids and a low potassium diet.

## 2017-11-27 NOTE — TELEPHONE ENCOUNTER
Faxed notes to [facility](Rob,Al) patient has an appt on Thursday @1:45 for bandage contact follow up.ajt

## 2017-11-27 NOTE — PROGRESS NOTES
Kidney Post-Transplant Assessment    Referring Physician:   Current Nephrologist: Alexa Holliday    ORGAN: RIGHT KIDNEY  Donor Type:  - brain death  PHS Increased Risk: no  Cold Ischemia: 422 mins  Induction Medications: thymoglobulin    Subjective:     CC:  Reassessment of renal allograft function and management of chronic immunosuppression.    HPI:  Mr. Aguiar is a 37 y.o. year old Black or  male who received a  - brain death kidney transplant on 17.  He has CKD stage 3 - GFR 30-59 and his baseline creatinine is between 1.8. He takes mycophenolate mofetil and tacrolimus for maintenance immunosuppression. He denies any recent hospitalizations or ER visits since his previous clinic visit.    F/u with opthalmology today for right eye irritation. He was given a clear lens to wear and referred to a specialist near his home.     Reports diarrhea for the past  Week, 2-3 / day    Overall feels well.  Denies chest pain, SOB, leg pain, abdominal pain or LUTs.      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 Hematoma evacuated.()    Status post exploratory laparotomy 9/10/2017    Re explored for retroperitoneal hematoma. Hematoma evacuated.()    TB lung, latent     tx INH        Review of Systems   Constitutional: Negative for activity change, appetite change, chills, fatigue, fever and unexpected weight change.   HENT: Negative for congestion, facial swelling, postnasal drip, rhinorrhea, sinus pressure, sore throat and trouble swallowing.    Eyes: Negative for pain, redness and visual  "disturbance.        Right eye irritation     Respiratory: Negative for cough, chest tightness, shortness of breath and wheezing.    Cardiovascular: Negative.  Negative for chest pain, palpitations and leg swelling.   Gastrointestinal: Positive for diarrhea. Negative for abdominal pain, nausea and vomiting.   Genitourinary: Negative for dysuria, flank pain and urgency.   Musculoskeletal: Negative for gait problem, neck pain and neck stiffness.   Skin: Negative for rash.   Allergic/Immunologic: Positive for immunocompromised state. Negative for environmental allergies and food allergies.        HAART   Neurological: Negative for dizziness, weakness, light-headedness and headaches.   Psychiatric/Behavioral: Negative for agitation and confusion. The patient is not nervous/anxious.        Objective:     Blood pressure 133/70, pulse 91, temperature 97.4 °F (36.3 °C), temperature source Oral, resp. rate 16, height 5' 7" (1.702 m), weight 65 kg (143 lb 4.8 oz), SpO2 99 %.body mass index is 22.44 kg/m².    Physical Exam   Constitutional: He is oriented to person, place, and time. He appears well-developed and well-nourished.   HENT:   Head: Normocephalic.   Mouth/Throat: Oropharynx is clear and moist. No oropharyngeal exudate.   Eyes: Conjunctivae and EOM are normal. Pupils are equal, round, and reactive to light. No scleral icterus.   Neck: Normal range of motion. Neck supple.   Cardiovascular: Normal rate, regular rhythm and normal heart sounds.    Pulmonary/Chest: Effort normal and breath sounds normal.   Abdominal: Soft. Normal appearance and bowel sounds are normal. He exhibits no distension and no mass. There is no splenomegaly or hepatomegaly. There is no tenderness. There is no rebound, no guarding, no CVA tenderness, no tenderness at McBurney's point and negative Santiago's sign.       Musculoskeletal: Normal range of motion. He exhibits no edema.        Legs:  Lymphadenopathy:     He has no cervical adenopathy. "   Neurological: He is alert and oriented to person, place, and time. He exhibits normal muscle tone. Coordination normal.   Skin: Skin is warm and dry.   Psychiatric: He has a normal mood and affect. His behavior is normal.   Vitals reviewed.      Labs:  Lab Results   Component Value Date    WBC 6.26 2017    HGB 9.7 (L) 2017    HCT 30.5 (L) 2017     2017    K 5.3 (H) 2017     2017    CO2 21 (L) 2017    BUN 22 (H) 2017    CREATININE 2.0 (H) 2017    EGFRNONAA 41.1 (A) 2017    CALCIUM 9.4 2017    PHOS 4.4 2017    MG 2.2 2017    ALBUMIN 4.1 2017    AST 13 11/15/2017    ALT 12 11/15/2017    UTPCR Unable to calculate 11/15/2017    .0 (H) 2017    TACROLIMUS 7.6 2017       Lab Results   Component Value Date    EXTWBC 9.6 2017    EXTSEGS 90.5 2017    EXTPLATELETS 140 2017    EXTHEMOGLOBI 12.1 2017    EXTHEMATOCRI 36.6 2017    EXTCREATININ 1.89 2017    EXTSODIUM 140 2017    EXTPOTASSIUM 4.9 2017    EXTBUN 20 2017    EXTCO2 23 2017    EXTCALCIUM 10.0 2017    EXTPHOSPHORU 4.2 2017    EXTGLUCOSE 113 2017    EXTALBUMIN 4..8 2017    EXTAST 19 2017    EXTALT 16 2017    EXTBILITOTAL 0.50 2017       Lab Results   Component Value Date    EXTTACROLVL 8.5 2017    EXTPROTCRE 0.16 2017    EXTPROTEINUA negative 2017    EXTWBCUA negative 2017    EXTRBCUA negative 2017       Labs were reviewed with the patient.    Assessment:     1. 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 Immunocompromised state    3. Immunosuppression    4. Long-term use of immunosuppressant medication    5. HIV (human immunodeficiency virus infection)    6.  donor kidney transplant 2017        Plan:   Stool studies  diarrhea-->CMV pcr      Trough 7.6--> target prograf trough 8-10--> Previous troughs were  acceptable on the same dose, will repeat trough.   Repeat renal FX panel     CD4 59--> scheduled to f/u with ID today    Follow-up:   1. CKD stage: 3 stable    2. Immunosuppression:   Prograf trough 7.6, which is sub  therapeutic target 8-10. Previous troughs were acceptable on the same dose, will repeat trough.  Continue Prograf 10/10, MMF  Mg BID, and Prednisone 5 mg QD. Will continue to monitor for drug toxicities    3. Allograft Function: Stable. Continue good po hydration.     sCr slightly naveed than baseline , but probably d/t diarrhea. Will get repeat labs   Lab Results   Component Value Date    CREATININE 2.0 (H) 11/27/2017       4. Hypertension management: controlled, advise low salt diet and home BP monitoring    Nifedipine 30 mg QD     5. Metabolic Bone Disease/Secondary Hyperparathyroidism:stable  Will monitor PTH, CA and Vit D/guidelines,    Lab Results   Component Value Date    .0 (H) 09/18/2017    CALCIUM 9.4 11/27/2017    CAION 1.17 09/02/2017    PHOS 4.4 11/27/2017   Sensipar 60 mg QD as prescribed       6. Electrolytes:  Will monitor /guidelines  Lab Results   Component Value Date     11/27/2017    K 5.3 (H) 11/27/2017     11/27/2017    CO2 21 (L) 11/27/2017   CO2 slightly low--continue Sodium bicarb 1950 mg TID as prescribed     7. Anemia: stable. No need for intervention    Will monitor /guidelines  Lab Results   Component Value Date    WBC 6.26 11/27/2017    HGB 9.7 (L) 11/27/2017    HCT 30.5 (L) 11/27/2017    MCV 91 11/27/2017     (L) 11/27/2017       8.  Cytopenias: no significant cytopenias will monitor as per our guidelines. Medicine list reviewed including potential causes of drug-induced cytopenias    9.Proteinuria: continue p/c ratio as per guidelines     EXTPROTCRE 0.16 11/20/2017     10. BK virus infection screening:  will continue to monitor/ guidelines  11/20/2017  EXT BK Virus DNA QN PCR   non detected        11. Weight education: provided during the clinic  visit   Body mass index is 22.44 kg/m².          12.Patient safety education regarding immunosuppression including prophylaxis posttransplant for CMV, PCP : Education provided about vaccination and prevention of infections            Follow-up:   Clinic: return to transplant clinic weekly for the first month after transplant; every 2 weeks during months 2-3; then at 6-, 9-, 12-, 18-, 24-, and 36- months post-transplant to reassess for complications from immunosuppression toxicity and monitor for rejection.  Annually thereafter.    Labs: since patient remains at high risk for rejection and drug-related complications that warrant close monitoring, labs will be ordered as follows: continue twice weekly CBC, renal panel, and drug level for first month; then same labs once weekly through 3rd month post-transplant.  Urine for UA and protein/creatinine ratio monthly.  Urine BK - PCR at 1-, 3-, 6-, 9-, 12-, 18-, 24-, and 36- months post-transplant.  Hepatic panel at 1-, 2-, 3-, 6-, 9-, 12-, 18-, 24-, and 36- months post-transplant.    Hallie Smith NP       Education:   Material provided to the patient.  Patient reminded to call with any health changes since these can be early signs of significant complications.  Also, I advised the patient to be sure any new medications or changes of old medications are discussed with either a pharmacist or physician knowledgeable with transplant to avoid rejection/drug toxicity related to significant drug interactions.    UNOS Patient Status  Functional Status: 80% - Normal activity with effort: some symptoms of disease  Physical Capacity: No Limitations

## 2017-11-27 NOTE — PROGRESS NOTES
HPI     Eye Problem    Additional comments: Corneal epithelial defect           Comments   DLS:11/27/2017 Jovani  11/08/2017 Lashonda  Patient seen by Dr. Hahn today corneal epithelial defect which referred to   Ophthalmology dept.  Pt states OD tearing and itchy x 2 weeks (very light sensitivity)  Little pain, but very irritated  Eye Drops:Ocuflox QID OD(ran out x 3 days ago)    I have personally interviewed the patient, reviewed the history and   examined the patient and agree with the technician's exam.         Last edited by Devan Gallego MD on 11/27/2017 11:43 AM. (History)            Assessment /Plan     For exam results, see Encounter Report.    Corneal abrasion, right, subsequent encounter      The lesion does not look infected. I ordered a bandage contact lens for his right eye. I will try to have him set up for follow up at the eye clinic at the [facility] this week. If not possible, he will return here for follow up.

## 2017-11-29 LAB
CMV DNA SERPL NAA+PROBE-ACNC: <137 IU/ML
IMMUNKNOW (STIMULATED): 369 NG/ML

## 2017-11-30 LAB
HIV UQ DATE RECEIVED: NORMAL
HIV UQ DATE REPORTED: NORMAL
HIV1 RNA # SERPL NAA+PROBE: <40 COPIES/ML
HIV1 RNA SERPL NAA+PROBE-LOG#: <1.6 LOG (10) COPIES/ML
HIV1 RNA SERPL QL NAA+PROBE: NOT DETECTED

## 2017-12-04 ENCOUNTER — PATIENT MESSAGE (OUTPATIENT)
Dept: INFECTIOUS DISEASES | Facility: CLINIC | Age: 37
End: 2017-12-04

## 2017-12-04 ENCOUNTER — TELEPHONE (OUTPATIENT)
Dept: TRANSPLANT | Facility: CLINIC | Age: 37
End: 2017-12-04

## 2017-12-04 NOTE — TELEPHONE ENCOUNTER
Spoke to pt, he reports that he had labs drawn this morning at hospital lab and was told that the immune panel could not be drawn there, they do not have the correct tube.  He is going to his local ID Doctor today, he will ask if they can draw it at their lab.

## 2017-12-04 NOTE — TELEPHONE ENCOUNTER
----- Message from Donavan Miller sent at 12/4/2017  8:47 AM CST -----  Contact: patient   Patient called for lab results and also have other medical question and would like a call           Please call         Thanks!!!!

## 2017-12-05 ENCOUNTER — PATIENT MESSAGE (OUTPATIENT)
Dept: TRANSPLANT | Facility: CLINIC | Age: 37
End: 2017-12-05

## 2017-12-06 ENCOUNTER — PATIENT MESSAGE (OUTPATIENT)
Dept: TRANSPLANT | Facility: CLINIC | Age: 37
End: 2017-12-06

## 2017-12-06 ENCOUNTER — TELEPHONE (OUTPATIENT)
Dept: TRANSPLANT | Facility: CLINIC | Age: 37
End: 2017-12-06

## 2017-12-06 LAB
BACTERIA SPEC CULT: NORMAL
BACTERIA SPEC CULT: NORMAL
C DIFF TOX A+B STL QL IA: POSITIVE
CAMPYLOBACTER STL CULT: NORMAL
CRYPTOSP AG STL QL IA: NEGATIVE
E COLI SXT STL QL IA: NEGATIVE
HEMOCCULT STL QL IA: NEGATIVE
SALM + SHIG STL CULT: NORMAL
WBC STL QL MICRO: NORMAL
WBC STL QL MICRO: NORMAL

## 2017-12-06 NOTE — TELEPHONE ENCOUNTER
Call placed, labs reviewed by Dr. Gross. Patient will resubmit stool C Diff toxin PCR prior to treatment.

## 2017-12-06 NOTE — PROGRESS NOTES
Results reviewed and the following message sent to patient via [facility]: Unclear if toxin positive means you have c diff infection; will discuss furtehr to determine if retesting needed. Let us know if your diarrhea persists.

## 2017-12-11 LAB — CD4 ATP IMMUNE RESPONSE BLD-MCNC: NORMAL NG/ML ATP

## 2017-12-12 ENCOUNTER — DOCUMENTATION ONLY (OUTPATIENT)
Dept: TRANSPLANT | Facility: CLINIC | Age: 37
End: 2017-12-12

## 2017-12-12 LAB
EXT BUN: 20
EXT CALCIUM: 10.1
EXT CHLORIDE: 106
EXT CO2: 25
EXT CREATININE: 1.8 MG/DL
EXT GFR MDRD NON AF AMER: 55
EXT GLUCOSE: 105
EXT HEMATOCRIT: 33.6
EXT HEMOGLOBIN: 10.8
EXT IMMUNKNOW (STIMULATED): NORMAL
EXT PHOSPHORUS: 4.2
EXT PLATELETS: 154
EXT POTASSIUM: 4.6
EXT SODIUM: 137 MMOL/L
EXT TACROLIMUS LVL: 6.9
EXT WBC: 7
Lab: NOT DETECTED

## 2017-12-14 ENCOUNTER — TELEPHONE (OUTPATIENT)
Dept: PHARMACY | Facility: CLINIC | Age: 37
End: 2017-12-14

## 2017-12-15 ENCOUNTER — PATIENT MESSAGE (OUTPATIENT)
Dept: OPHTHALMOLOGY | Facility: CLINIC | Age: 37
End: 2017-12-15

## 2017-12-15 ENCOUNTER — PATIENT MESSAGE (OUTPATIENT)
Dept: OPTOMETRY | Facility: CLINIC | Age: 37
End: 2017-12-15

## 2017-12-15 LAB — CD4 ATP IMMUNE RESPONSE BLD-MCNC: NORMAL NG/ML ATP

## 2017-12-17 LAB — C DIFF TOX A+B STL QL IA: NEGATIVE

## 2017-12-18 ENCOUNTER — TELEPHONE (OUTPATIENT)
Dept: PHARMACY | Facility: CLINIC | Age: 37
End: 2017-12-18

## 2017-12-18 ENCOUNTER — PATIENT MESSAGE (OUTPATIENT)
Dept: TRANSPLANT | Facility: CLINIC | Age: 37
End: 2017-12-18

## 2017-12-19 ENCOUNTER — PATIENT MESSAGE (OUTPATIENT)
Dept: INFECTIOUS DISEASES | Facility: CLINIC | Age: 37
End: 2017-12-19

## 2017-12-19 ENCOUNTER — TELEPHONE (OUTPATIENT)
Dept: PHARMACY | Facility: CLINIC | Age: 37
End: 2017-12-19

## 2017-12-19 ENCOUNTER — TELEPHONE (OUTPATIENT)
Dept: TRANSPLANT | Facility: CLINIC | Age: 37
End: 2017-12-19

## 2017-12-19 LAB
AMBIG ABBREV BMP8 DEFAULT: NORMAL
BASOPHILS # BLD AUTO: 0 X10E3/UL (ref 0–0.2)
BASOPHILS NFR BLD AUTO: 0 %
BUN SERPL-MCNC: 27 MG/DL (ref 6–20)
BUN/CREAT SERPL: 12 (ref 9–20)
CALCIUM SERPL-MCNC: 9.7 MG/DL (ref 8.7–10.2)
CHLORIDE SERPL-SCNC: 105 MMOL/L (ref 96–106)
CO2 SERPL-SCNC: 20 MMOL/L (ref 18–29)
CREAT SERPL-MCNC: 2.19 MG/DL (ref 0.76–1.27)
EGFR IF AFRICAN AMERICAN: 43 ML/MIN/1.73
EOSINOPHIL # BLD AUTO: 0.2 X10E3/UL (ref 0–0.4)
EOSINOPHIL NFR BLD AUTO: 5 %
ERYTHROCYTE [DISTWIDTH] IN BLOOD BY AUTOMATED COUNT: 13.7 % (ref 12.3–15.4)
EST. GFR  (NON AFRICAN AMERICAN): 37 ML/MIN/1.73
GLUCOSE SERPL-MCNC: 96 MG/DL (ref 65–99)
HCT VFR BLD AUTO: 29.5 % (ref 37.5–51)
HGB BLD-MCNC: 9.6 G/DL (ref 13–17.7)
IMM GRANULOCYTES # BLD: 0 X10E3/UL (ref 0–0.1)
IMM GRANULOCYTES NFR BLD: 0 %
LYMPHOCYTES # BLD AUTO: 0.2 X10E3/UL (ref 0.7–3.1)
LYMPHOCYTES NFR BLD AUTO: 5 %
MAGNESIUM SERPL-MCNC: 2 MG/DL (ref 1.6–2.3)
MCH RBC QN AUTO: 28.1 PG (ref 26.6–33)
MCHC RBC AUTO-ENTMCNC: 32.5 G/DL (ref 31.5–35.7)
MCV RBC AUTO: 86 FL (ref 79–97)
MONOCYTES # BLD AUTO: 0.5 X10E3/UL (ref 0.1–0.9)
MONOCYTES NFR BLD AUTO: 14 %
NEUTROPHILS # BLD AUTO: 2.5 X10E3/UL (ref 1.4–7)
NEUTROPHILS NFR BLD AUTO: 76 %
PHOSPHATE SERPL-MCNC: 4.4 MG/DL (ref 2.5–4.5)
PLATELET # BLD AUTO: 137 X10E3/UL (ref 150–379)
POTASSIUM SERPL-SCNC: 5.6 MMOL/L (ref 3.5–5.2)
RBC # BLD AUTO: 3.42 X10E6/UL (ref 4.14–5.8)
SODIUM SERPL-SCNC: 141 MMOL/L (ref 134–144)
TACROLIMUS BLD-MCNC: 12.3 NG/ML (ref 2–20)
WBC # BLD AUTO: 3.4 X10E3/UL (ref 3.4–10.8)

## 2017-12-19 NOTE — TELEPHONE ENCOUNTER
SW received message to call patient about potential assistance with housing. SW informed pt that we do not offer housing options for regular clinic appointments. Pt confirmed understanding. SW provided pt with a list of hotels in the area that may be able to give medical rates for patients. SW emailed list to patient. SW remains available.

## 2017-12-20 LAB — CD4 ATP IMMUNE RESPONSE BLD-MCNC: 138 NG/ML ATP

## 2017-12-21 ENCOUNTER — PATIENT MESSAGE (OUTPATIENT)
Dept: TRANSPLANT | Facility: CLINIC | Age: 37
End: 2017-12-21

## 2017-12-21 ENCOUNTER — DOCUMENTATION ONLY (OUTPATIENT)
Dept: TRANSPLANT | Facility: CLINIC | Age: 37
End: 2017-12-21

## 2017-12-21 DIAGNOSIS — Z94.0 KIDNEY REPLACED BY TRANSPLANT: ICD-10-CM

## 2017-12-21 LAB
EXT ANC: NORMAL
EXT BUN: 27
EXT CALCIUM: 9.7
EXT CHLORIDE: 105
EXT CO2: 20
EXT CREATININE: 2.19 MG/DL
EXT EOSINOPHIL%: 5
EXT GFR MDRD AF AMER: 43
EXT GLUCOSE: 96
EXT HEMATOCRIT: 29.5
EXT HEMOGLOBIN: 9.6
EXT LYMPH%: 5
EXT MAGNESIUM: 2
EXT MONOCYTES%: 14
EXT PHOSPHORUS: 4.4
EXT PLATELETS: 137
EXT POTASSIUM: 5.6
EXT SEGS%: 76
EXT SODIUM: 141 MMOL/L
EXT STOOL CDIFF: NEGATIVE
EXT TACROLIMUS LVL: 12.3
EXT WBC: 3.4

## 2017-12-22 DIAGNOSIS — E78.5 HYPERLIPIDEMIA, UNSPECIFIED HYPERLIPIDEMIA TYPE: ICD-10-CM

## 2017-12-22 DIAGNOSIS — Z94.0 KIDNEY REPLACED BY TRANSPLANT: ICD-10-CM

## 2017-12-22 RX ORDER — TACROLIMUS 1 MG/1
9 CAPSULE ORAL DAILY
Qty: 270 CAPSULE | Refills: 11 | Status: SHIPPED | OUTPATIENT
Start: 2017-12-22 | End: 2018-01-11 | Stop reason: SDUPTHER

## 2017-12-22 RX ORDER — ROSUVASTATIN CALCIUM 20 MG/1
TABLET, COATED ORAL
Qty: 30 TABLET | Refills: 0 | Status: SHIPPED | OUTPATIENT
Start: 2017-12-22 | End: 2018-01-19 | Stop reason: SDUPTHER

## 2017-12-22 NOTE — PROGRESS NOTES
I talked to the patient. He will hold tac tonight and tomorrow AM and will start tac 9 mg BID. Please check the level next Tuesday. Thank you!

## 2017-12-22 NOTE — TELEPHONE ENCOUNTER
14day Prograf voucher will be issued to patient tomorrow. Patient will come in to [facility] for .

## 2017-12-22 NOTE — TELEPHONE ENCOUNTER
Call placed, to patient. Prograf dose held last night and this AM. Patient currently only has 5mg caps, dose decreased to 9mg. Will pursue getting 1mg caps from local pharmacy.

## 2017-12-26 ENCOUNTER — OFFICE VISIT (OUTPATIENT)
Dept: OPHTHALMOLOGY | Facility: CLINIC | Age: 37
End: 2017-12-26
Attending: OPHTHALMOLOGY
Payer: MEDICARE

## 2017-12-26 ENCOUNTER — PATIENT MESSAGE (OUTPATIENT)
Dept: TRANSPLANT | Facility: CLINIC | Age: 37
End: 2017-12-26

## 2017-12-26 ENCOUNTER — LAB VISIT (OUTPATIENT)
Dept: LAB | Facility: HOSPITAL | Age: 37
End: 2017-12-26
Attending: INTERNAL MEDICINE
Payer: MEDICARE

## 2017-12-26 DIAGNOSIS — H16.001 CORNEAL EROSION OF RIGHT EYE: Primary | ICD-10-CM

## 2017-12-26 DIAGNOSIS — Z94.0 KIDNEY REPLACED BY TRANSPLANT: ICD-10-CM

## 2017-12-26 LAB
ALBUMIN SERPL BCP-MCNC: 4.3 G/DL
ANION GAP SERPL CALC-SCNC: 11 MMOL/L
BASOPHILS # BLD AUTO: 0.02 K/UL
BASOPHILS NFR BLD: 0.4 %
BUN SERPL-MCNC: 21 MG/DL
CALCIUM SERPL-MCNC: 10.3 MG/DL
CHLORIDE SERPL-SCNC: 104 MMOL/L
CO2 SERPL-SCNC: 22 MMOL/L
CREAT SERPL-MCNC: 2.3 MG/DL
DIFFERENTIAL METHOD: ABNORMAL
EOSINOPHIL # BLD AUTO: 0.2 K/UL
EOSINOPHIL NFR BLD: 3.3 %
ERYTHROCYTE [DISTWIDTH] IN BLOOD BY AUTOMATED COUNT: 12.3 %
EST. GFR  (AFRICAN AMERICAN): 40.1 ML/MIN/1.73 M^2
EST. GFR  (NON AFRICAN AMERICAN): 34.7 ML/MIN/1.73 M^2
GLUCOSE SERPL-MCNC: 104 MG/DL
HCT VFR BLD AUTO: 34.6 %
HGB BLD-MCNC: 10.9 G/DL
IMM GRANULOCYTES # BLD AUTO: 0.12 K/UL
IMM GRANULOCYTES NFR BLD AUTO: 2.5 %
LYMPHOCYTES # BLD AUTO: 0.3 K/UL
LYMPHOCYTES NFR BLD: 5.5 %
MAGNESIUM SERPL-MCNC: 1.8 MG/DL
MCH RBC QN AUTO: 27.5 PG
MCHC RBC AUTO-ENTMCNC: 31.5 G/DL
MCV RBC AUTO: 87 FL
MONOCYTES # BLD AUTO: 0.7 K/UL
MONOCYTES NFR BLD: 14.9 %
NEUTROPHILS # BLD AUTO: 3.6 K/UL
NEUTROPHILS NFR BLD: 73.4 %
NRBC BLD-RTO: 0 /100 WBC
PHOSPHATE SERPL-MCNC: 3.5 MG/DL
PLATELET # BLD AUTO: 136 K/UL
PMV BLD AUTO: 11 FL
POTASSIUM SERPL-SCNC: 5.2 MMOL/L
RBC # BLD AUTO: 3.97 M/UL
SODIUM SERPL-SCNC: 137 MMOL/L
TACROLIMUS BLD-MCNC: 7.9 NG/ML
WBC # BLD AUTO: 4.89 K/UL

## 2017-12-26 PROCEDURE — 80197 ASSAY OF TACROLIMUS: CPT

## 2017-12-26 PROCEDURE — 36415 COLL VENOUS BLD VENIPUNCTURE: CPT

## 2017-12-26 PROCEDURE — 86352 CELL FUNCTION ASSAY W/STIM: CPT

## 2017-12-26 PROCEDURE — 99212 OFFICE O/P EST SF 10 MIN: CPT | Mod: PBBFAC | Performed by: OPHTHALMOLOGY

## 2017-12-26 PROCEDURE — 92014 COMPRE OPH EXAM EST PT 1/>: CPT | Mod: S$PBB,,, | Performed by: OPHTHALMOLOGY

## 2017-12-26 PROCEDURE — 85025 COMPLETE CBC W/AUTO DIFF WBC: CPT

## 2017-12-26 PROCEDURE — 99999 PR PBB SHADOW E&M-EST. PATIENT-LVL II: CPT | Mod: PBBFAC,,, | Performed by: OPHTHALMOLOGY

## 2017-12-26 PROCEDURE — 80069 RENAL FUNCTION PANEL: CPT

## 2017-12-26 PROCEDURE — 83735 ASSAY OF MAGNESIUM: CPT

## 2017-12-26 RX ORDER — FLUOROMETHOLONE 1 MG/ML
SUSPENSION/ DROPS OPHTHALMIC
Refills: 0 | COMMUNITY
Start: 2017-12-15 | End: 2017-12-27 | Stop reason: SDUPTHER

## 2017-12-26 RX ORDER — EMTRICITABINE AND TENOFOVIR ALAFENAMIDE 200; 25 MG/1; MG/1
1 TABLET ORAL DAILY
COMMUNITY
Start: 2017-12-19 | End: 2018-08-14 | Stop reason: SDUPTHER

## 2017-12-26 RX ORDER — FLUOROMETHOLONE 1 MG/ML
1 SUSPENSION/ DROPS OPHTHALMIC 3 TIMES DAILY
Qty: 10 ML | Refills: 3 | Status: SHIPPED | OUTPATIENT
Start: 2017-12-26 | End: 2018-01-05

## 2017-12-26 RX ORDER — BESIFLOXACIN 6 MG/ML
SUSPENSION OPHTHALMIC
Refills: 3 | COMMUNITY
Start: 2017-12-15 | End: 2018-02-28

## 2017-12-26 NOTE — PROGRESS NOTES
HPI     Eye Problem    Additional comments: Right Eye.            Comments   38 y/o male presents for evaluation of OD per Dr Gallego.   PT states he has been trouble with OD for over a month. One week it will   be red, then another week watering and itching, then the next week blurry.    Hx of HIV and transplant.        Last edited by Heide Alberto on 12/26/2017 11:33 AM. (History)            Assessment /Plan     For exam results, see Encounter Report.    Corneal erosion of right eye      Linear corneal erosion in interpalpebral region OD only  Upper and lower punctal area of lids inflammed, edematous but not tender or infected.  Ongoing issue for 1 month.  Unclear etiology, exposure vs something related to transplant meds  COnt FML/Besivance

## 2017-12-26 NOTE — PROGRESS NOTES
Results reviewed and the following message sent to patient via [facility]: Your creatinine is up a bit; please hydrate well and repeat later this week. Stay on a low potassium diet as well.

## 2017-12-27 ENCOUNTER — OFFICE VISIT (OUTPATIENT)
Dept: TRANSPLANT | Facility: CLINIC | Age: 37
End: 2017-12-27
Payer: MEDICARE

## 2017-12-27 ENCOUNTER — LAB VISIT (OUTPATIENT)
Dept: LAB | Facility: HOSPITAL | Age: 37
End: 2017-12-27
Attending: INTERNAL MEDICINE
Payer: MEDICARE

## 2017-12-27 VITALS
WEIGHT: 142.19 LBS | BODY MASS INDEX: 22.32 KG/M2 | DIASTOLIC BLOOD PRESSURE: 65 MMHG | HEIGHT: 67 IN | RESPIRATION RATE: 20 BRPM | OXYGEN SATURATION: 100 % | SYSTOLIC BLOOD PRESSURE: 105 MMHG | TEMPERATURE: 99 F | HEART RATE: 98 BPM

## 2017-12-27 DIAGNOSIS — Z94.0 KIDNEY REPLACED BY TRANSPLANT: ICD-10-CM

## 2017-12-27 DIAGNOSIS — D64.9 ANEMIA, UNSPECIFIED TYPE: ICD-10-CM

## 2017-12-27 DIAGNOSIS — Z94.0 KIDNEY REPLACED BY TRANSPLANT: Primary | ICD-10-CM

## 2017-12-27 DIAGNOSIS — Z94.0 S/P KIDNEY TRANSPLANT: ICD-10-CM

## 2017-12-27 DIAGNOSIS — T86.10 COMPLICATION OF TRANSPLANTED KIDNEY, UNSPECIFIED COMPLICATION: ICD-10-CM

## 2017-12-27 DIAGNOSIS — Z79.60 LONG-TERM USE OF IMMUNOSUPPRESSANT MEDICATION: Primary | ICD-10-CM

## 2017-12-27 DIAGNOSIS — E55.9 VITAMIN D DEFICIENCY: ICD-10-CM

## 2017-12-27 DIAGNOSIS — N18.30 CKD (CHRONIC KIDNEY DISEASE) STAGE 3, GFR 30-59 ML/MIN: ICD-10-CM

## 2017-12-27 DIAGNOSIS — R19.7 DIARRHEA, UNSPECIFIED TYPE: Primary | ICD-10-CM

## 2017-12-27 DIAGNOSIS — B20 HIV (HUMAN IMMUNODEFICIENCY VIRUS INFECTION): Chronic | ICD-10-CM

## 2017-12-27 PROCEDURE — 87799 DETECT AGENT NOS DNA QUANT: CPT

## 2017-12-27 PROCEDURE — 99214 OFFICE O/P EST MOD 30 MIN: CPT | Mod: PBBFAC | Performed by: INTERNAL MEDICINE

## 2017-12-27 PROCEDURE — 99999 PR PBB SHADOW E&M-EST. PATIENT-LVL IV: CPT | Mod: PBBFAC,,, | Performed by: INTERNAL MEDICINE

## 2017-12-27 PROCEDURE — 99215 OFFICE O/P EST HI 40 MIN: CPT | Mod: S$PBB,,, | Performed by: INTERNAL MEDICINE

## 2017-12-27 NOTE — PROGRESS NOTES
Kidney Post-Transplant Assessment    Referring Physician:   Current Nephrologist: Alexa Holliday    ORGAN: RIGHT KIDNEY  Donor Type:  - brain death   PHS Increased Risk: no  Cold Ischemia: 422 mins  Induction Medications: thymoglobulin      Subjective:     CC:  Reassessment of renal allograft function and management of chronic immunosuppression.    Kidney History:  Demetrio Aguiar is a 37 y.o. 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 His maintenance immunosuppression includes a steroid taper per protocol to 5mg daily, Prograf, and Cellcept maintenance.      His post transplant course  - DGF requiring HD (last HD 17) on 17.   - Biopsy proven ABMR with weakly positive class I DSA's on 17. Pt treated with SMP x 3 (-9/3). PLEX x 6 (completed ) and IVIG x 2 (-). Kidney function was improving  - Kidney US on 17 showed a large collection located along the anterior inferior margin of the renal transplant measuring 13.4 x 7 x 11.9. taken back to the OR for retroperitoneal bleed and washout.   - on HAART therapy. Started on Azithromycin weekly.  - Perirenal fluid collection, s/p drainage by IR 17  - Follow up kidney biopsy 10/17/17: 14 glom, MINIMAL INTERSTITIAL LYMPHOCYTIC INFILTRATE WITH RARE TUBULITIS, NOT MEETING CRITERIA FOR ACUTE CELLULAR REJECTION     Interval History:  Patient's BP at home is well-controlled  he denies any chest pain, shortness of breath, nausea/vomiting, dysuria, frequency, urgency, or pain over the allograft.  He still has diarrhea for last couple weeks, had positive c diff antigen outside, was not treated. He also has right eyes irritation and mild swelling for which he follows with opthalmology.  He needs follow-up kidney biopsy        Medications:   Current Outpatient Prescriptions   Medication Sig Dispense Refill    azithromycin (ZITHROMAX) 600 MG Tab Take 2 tablets (1,200 mg total) by mouth every 7 days. 8 tablet 5    besifloxacin (BESIVANCE) 0.6 % DrpS Place 1 drop into the right eye 3 (three) times daily. 5 mL 3    BESIVANCE 0.6 % DrpS 1 DROP(S) IN RIGHT EYE THREE TIMES A DAY  3    cinacalcet (SENSIPAR) 60 MG Tab Take 1 tablet (60 mg total) by mouth every other day. 15 tablet 3    DESCOVY 200-25 mg Tab Take 1 tablet by mouth once daily.       dolutegravir (TIVICAY) 50 mg Tab Take 1 tablet (50 mg total) by mouth once daily. 30 tablet 5    famotidine (PEPCID) 20 MG tablet TAKE 1 TABLET (20MG TOTAL) BY MOUTH EACH EVENING 30 tablet 11    fluorometholone 0.1% (FML) 0.1 % DrpS Place 1 drop into the right eye 3 (three) times daily. 10 mL 3    ketoconazole (NIZORAL) 200 mg Tab Take 0.5 tablets (100 mg total) by mouth once daily. 15 tablet 11    multivitamin (THERAGRAN) tablet Take 1 tablet by mouth once daily.      mycophenolate (CELLCEPT) 500 mg Tab Take 3 tablets (1,500 mg total) by mouth 2 (two) times daily. Z94.0 kidney Transplant 8/18/17 [place] [employer]. 180 tablet 11    rosuvastatin (CRESTOR) 20 MG tablet TAKE 1 TABLET (20MG) BY MOUTH ONCE DAILY 30 tablet 0    sodium bicarbonate 650 MG tablet Take 3 tablets (1,950 mg total) by mouth 3 (three) times daily. 270 tablet 11    sulfamethoxazole-trimethoprim 400-80mg (BACTRIM,SEPTRA) 400-80 mg per tablet Take 1 tablet by mouth once daily. 30 tablet 11    tacrolimus (PROGRAF) 1 MG Cap Take 9 capsules (9 mg total) by mouth once daily. Z94.0 Kidney Transplant 8/18/2017 270 capsule 11    betamethasone valerate 0.1% (VALISONE) 0.1 % Lotn Apply topically 2 (two) times daily. qtts 3-4 AS bid. 60 mL 2    nifedipine (PROCARDIA-XL) 30 MG (OSM) 24 hr tablet Take 1 tablet (30 mg total) by mouth once daily. 30 tablet 11    sodium polystyrene (KAYEXALATE) powder Take 30g by mouth daily X2  "doses, then as directed. 453.6 g 1     No current facility-administered medications for this visit.            Review of Systems     Constitutional: Negative for activity change, appetite change, chills, fatigue  HENT: Negative for congestion, facial swelling, postnasal drip, rhinorrhea, sinus pressure, sore throat and trouble swallowing.    Eyes: Right eye irritation     Respiratory: Negative for cough, chest tightness, shortness of breath and wheezing.    Cardiovascular: Negative.  Negative for chest pain, palpitations and leg swelling.   Gastrointestinal: Positive for diarrhea. Negative for abdominal pain, nausea and vomiting.   Genitourinary: Negative for dysuria, flank pain and urgency.   Musculoskeletal: Negative for gait problem, neck pain and neck stiffness.   Skin: Negative for rash.   Allergic/Immunologic: Positive for immunocompromised state. On HAART   Neurological: Negative for dizziness, weakness, light-headedness and headaches.         Objective:     Blood pressure 105/65, pulse 98, temperature 98.5 °F (36.9 °C), temperature source Oral, resp. rate 20, height 5' 7" (1.702 m), weight 64.5 kg (142 lb 3.2 oz), SpO2 100 %.  body mass index is 22.27 kg/m².    Physical Exam     Head: normocephalic  Neck: No JVD, cervical axillary, or femoral adenopathies  Heart: no murmurs, Normal s1 and s2, No gallops, no rubs, No murmurs  Lungs; CTA, good respiratory effort, no crackles  Abdomen: soft, non tender, no splenomegaly or hepatomegaly, no massess, no bruits. Incision healing well. Has drain tube, in place, with no output since yesterday  Extremities: No edema, skin rash, joint pain. LUE AVG  SNC: awake, alert oriented. Cranial nerves are intact, no focalized, sensitivity and strength preserved    Labs:  Lab Results   Component Value Date    WBC 4.89 12/26/2017    HGB 10.9 (L) 12/26/2017    HCT 34.6 (L) 12/26/2017     12/26/2017    K 5.2 (H) 12/26/2017     12/26/2017    CO2 22 (L) 12/26/2017    BUN " 21 (H) 2017    CREATININE 2.3 (H) 2017    EGFRNONAA 34.7 (A) 2017    EGFRIFAFRICA 43 (L) 2017    CALCIUM 10.3 2017    PHOS 3.5 2017    MG 1.8 2017    ALBUMIN 4.3 2017    AST 13 11/15/2017    ALT 12 11/15/2017    UTPCR Unable to calculate 11/15/2017    .0 (H) 2017    TACROLIMUS 7.9 2017       Lab Results   Component Value Date    EXTANC  12/15/2017      Comment:      repeat stool collection.    EXTWBC 3.4 2017    EXTSEGS 76 2017    EXTPLATELETS 137 2017    EXTHEMOGLOBI 9.6 2017    EXTHEMATOCRI 29.5 2017    EXTCREATININ 2.19 2017    EXTSODIUM 141 2017    EXTPOTASSIUM 5.6 2017    EXTBUN 27 2017    EXTCO2 20 2017    EXTCALCIUM 9.7 2017    EXTPHOSPHORU 4.4 2017    EXTGLUCOSE 96 2017    EXTALBUMIN 4..8 2017    EXTAST 19 2017    EXTALT 16 2017    EXTBILITOTAL 0.50 2017       Lab Results   Component Value Date    EXTTACROLVL 12.3 (H) 2017    EXTPROTCRE 0.16 2017    EXTPROTEINUA negative 2017    EXTWBCUA negative 2017    EXTRBCUA negative 2017       Labs were reviewed with the patient.    Assessment/Plan:     1. Long-term use of immunosuppressant medication    2.  donor kidney transplant 2017    3. HIV (human immunodeficiency virus infection)    4. CKD (chronic kidney disease) stage 3, GFR 30-59 ml/min        Mr. Aguiar is a 37 y.o. male with:     # History of ESRD presumed secondary to HIVAN  - s/p DDKT on 17  - DGF and ABMR on 17, s/p Pt treated with SMP x 3 (-9/3). PLEX x 6 (completed ) and IVIG x 2 (-). The follow up biopsy did not meet criteria for ACR on 10/17/17  - last DSA is negative  - recent Cr 2.3 mg/dl , uptrending slowly  - minimal proteinuria  - Plan for kidney biopsy    # Immunosuppression:   - on Prograf and ketoconazole  - continue MMF  - continue prednisone  - will monitor  closely for toxicities     # Antimicrobial Prophylaxis:   - continue bactrim  for PJP prophylaxis    # HTN:   - BPs not well controlled   - on nifedipine 30 mg and metoprolol  - low salt and healthy life discussed with the patient     # Metabolic Bone Disease/Secondary Hyperparathyroidism:  - increase cinacalcet to 60 mg daily  - PTH of 443 and Ca of 10.3  - will monitor PTH, calcium, and phosphorus as per our center protocol     # Mild hyperkalemia : resolved  - on low K diet     # HIV  - on HAART medication  - Azithromycin weekly due to CD4 of 7  - Follow with his HIV clinic     # Anemia:  - Hb low   - will obtain comprehensive work-up  - Epogen 04436 units today         # Diarrhea  - c diff antuigen and PCR today  - encouraged to increase his fluid intake          Plan:  - Anemia: comprehensive anemia work-up  - Epogen 20000 X 1 time today  - Stool study today  - DSA, BK, UPCR, PTH, and Vitamin D  - Kidney Biopsy on Jan 8, 2018

## 2017-12-27 NOTE — Clinical Note
Plan: - Anemia: comprehensive anemia work-up - Epogen 20000 X 1 time today - Stool study today - DSA, BK, UPCR, PTH, and Vitamin D - Kidney Biopsy on Jan 8, 2018

## 2017-12-27 NOTE — LETTER
December 27, 2017        Alexa Holliday  [facility] [address]  Phone: [phone]  Fax: [phone]             [facility]  [address]  [facility] [address]  Phone: [phone]   Patient: Demetrio gAuiar   MR Number: [id number]   YOB: 1980   Date of Visit: 12/27/2017       Dear Dr. Alexa Holliday    Thank you for referring Demetrio Aguiar to me for evaluation. Attached you will find relevant portions of my assessment and plan of care.    If you have questions, please do not hesitate to call me. I look forward to following Demetrio Aguiar along with you.    Sincerely,    Sayda Gross MD    Enclosure    If you would like to receive this communication electronically, please contact [email or web address] or [phone] to request [employer] access.    [employer] is a tool which provides read-only access to select patient information with whom you have a relationship. Its easy to use and provides real time access to review your patients record including encounter summaries, notes, results, and demographic information.    If you feel you have received this communication in error or would no longer like to receive these types of communications, please e-mail [email or web address]

## 2017-12-28 PROBLEM — N18.30 CKD (CHRONIC KIDNEY DISEASE) STAGE 3, GFR 30-59 ML/MIN: Status: ACTIVE | Noted: 2017-12-28

## 2017-12-28 LAB
BK VIRUS DNA PCR, QUANT, BLOOD: <125 COPIES/ML
BK VIRUS DNA, BLOOD: NOT DETECTED
CMV DNA SERPL NAA+PROBE-ACNC: 583 IU/ML
IMMUNKNOW (STIMULATED): 148 NG/ML
LOG BKV COPIES/ML: <2.1 LOG (10) COPIES/ML

## 2017-12-29 DIAGNOSIS — M89.8X9 METABOLIC BONE DISEASE: ICD-10-CM

## 2017-12-29 DIAGNOSIS — Z94.0 KIDNEY REPLACED BY TRANSPLANT: ICD-10-CM

## 2017-12-29 DIAGNOSIS — E78.5 HYPERLIPIDEMIA, UNSPECIFIED HYPERLIPIDEMIA TYPE: ICD-10-CM

## 2017-12-29 PROBLEM — B25.9 CYTOMEGALOVIRUS (CMV) VIREMIA: Status: ACTIVE | Noted: 2017-12-29

## 2017-12-29 PROBLEM — A04.72 C. DIFFICILE COLITIS: Status: ACTIVE | Noted: 2017-12-29

## 2017-12-29 RX ORDER — CINACALCET 60 MG/1
60 TABLET, FILM COATED ORAL
Qty: 30 TABLET | Refills: 6 | Status: SHIPPED | OUTPATIENT
Start: 2017-12-29 | End: 2018-08-13 | Stop reason: SDUPTHER

## 2017-12-29 RX ORDER — VALGANCICLOVIR 450 MG/1
450 TABLET, FILM COATED ORAL 2 TIMES DAILY
Qty: 60 TABLET | Refills: 2 | Status: SHIPPED | OUTPATIENT
Start: 2017-12-29 | End: 2018-02-19 | Stop reason: SDUPTHER

## 2017-12-29 RX ORDER — METRONIDAZOLE 500 MG/1
500 TABLET ORAL EVERY 8 HOURS
Qty: 42 TABLET | Refills: 0 | Status: SHIPPED | OUTPATIENT
Start: 2017-12-29 | End: 2018-01-12

## 2017-12-29 RX ORDER — CINACALCET 60 MG/1
60 TABLET, FILM COATED ORAL
Qty: 30 TABLET | Refills: 6 | Status: SHIPPED | OUTPATIENT
Start: 2017-12-29 | End: 2017-12-29 | Stop reason: SDUPTHER

## 2017-12-29 NOTE — TELEPHONE ENCOUNTER
Eloy placed, labs reviewed by Dr. Gross. Patient will STOP Cellcept, start Metranazole 500mg tid [id number]  for C Diff.,  Will increase Sensipar to 60mg daily. Will continue Epogen 10K units weekly. 1st dose given in clinic, will try to arrange for local administration.

## 2017-12-29 NOTE — PROGRESS NOTES
CMV viremia: please start valcyte 450 mg BID (therapeutic dose) and check CMV PCR weekly. Will try to keep Tac level lower than standard level. Thank you!

## 2017-12-29 NOTE — TELEPHONE ENCOUNTER
----- Message from Sayda Gross MD sent at 12/29/2017 11:25 AM CST -----  CMV viremia: please start valcyte 450 mg BID (therapeutic dose) and check CMV PCR weekly. Will try to keep Tac level lower than standard level. Thank you!

## 2017-12-30 LAB
ALBUMIN SERPL-MCNC: 4.3 G/DL (ref 3.5–5.5)
BASOPHILS # BLD AUTO: 0 X10E3/UL (ref 0–0.2)
BASOPHILS NFR BLD AUTO: 0 %
BUN SERPL-MCNC: 22 MG/DL (ref 6–20)
BUN/CREAT SERPL: 11 (ref 9–20)
CALCIUM SERPL-MCNC: 9.5 MG/DL (ref 8.7–10.2)
CHLORIDE SERPL-SCNC: 99 MMOL/L (ref 96–106)
CO2 SERPL-SCNC: 22 MMOL/L (ref 18–29)
CREAT SERPL-MCNC: 2.08 MG/DL (ref 0.76–1.27)
CREAT UR-MCNC: 84.5 MG/DL
EGFR IF AFRICAN AMERICAN: 45 ML/MIN/1.73
EOSINOPHIL # BLD AUTO: 0.2 X10E3/UL (ref 0–0.4)
EOSINOPHIL NFR BLD AUTO: 5 %
ERYTHROCYTE [DISTWIDTH] IN BLOOD BY AUTOMATED COUNT: 13.8 % (ref 12.3–15.4)
EST. GFR  (NON AFRICAN AMERICAN): 39 ML/MIN/1.73
GLUCOSE SERPL-MCNC: 109 MG/DL (ref 65–99)
HCT VFR BLD AUTO: 31 % (ref 37.5–51)
HGB BLD-MCNC: 10.3 G/DL (ref 13–17.7)
IMM GRANULOCYTES # BLD: 0 X10E3/UL (ref 0–0.1)
IMM GRANULOCYTES NFR BLD: 1 %
LYMPHOCYTES # BLD AUTO: 0.5 X10E3/UL (ref 0.7–3.1)
LYMPHOCYTES NFR BLD AUTO: 16 %
MAGNESIUM SERPL-MCNC: 2 MG/DL (ref 1.6–2.3)
MCH RBC QN AUTO: 28.5 PG (ref 26.6–33)
MCHC RBC AUTO-ENTMCNC: 33.2 G/DL (ref 31.5–35.7)
MCV RBC AUTO: 86 FL (ref 79–97)
MONOCYTES # BLD AUTO: 0.1 X10E3/UL (ref 0.1–0.9)
MONOCYTES NFR BLD AUTO: 2 %
NEUTROPHILS # BLD AUTO: 2.4 X10E3/UL (ref 1.4–7)
NEUTROPHILS NFR BLD AUTO: 76 %
PHOSPHATE SERPL-MCNC: 4.4 MG/DL (ref 2.5–4.5)
PLATELET # BLD AUTO: 137 X10E3/UL (ref 150–379)
POTASSIUM SERPL-SCNC: 4.3 MMOL/L (ref 3.5–5.2)
PROT UR-MCNC: 19.9 MG/DL
PROT/CREAT UR: 236 MG/G CREAT (ref 0–200)
RBC # BLD AUTO: 3.62 X10E6/UL (ref 4.14–5.8)
SODIUM SERPL-SCNC: 136 MMOL/L (ref 134–144)
TACROLIMUS BLD-MCNC: 6.8 NG/ML (ref 2–20)
WBC # BLD AUTO: 3.2 X10E3/UL (ref 3.4–10.8)

## 2018-01-02 ENCOUNTER — PATIENT MESSAGE (OUTPATIENT)
Dept: TRANSPLANT | Facility: CLINIC | Age: 38
End: 2018-01-02

## 2018-01-02 DIAGNOSIS — Z94.0 KIDNEY REPLACED BY TRANSPLANT: Primary | ICD-10-CM

## 2018-01-02 LAB
EXT ALBUMIN: 4.3
EXT ANC: NORMAL
EXT BUN: 22
EXT CALCIUM: 9.5
EXT CHLORIDE: 99
EXT CO2: 22
EXT CREATININE: 2.08 MG/DL
EXT EOSINOPHIL%: 5
EXT GFR MDRD AF AMER: 45
EXT GLUCOSE: 109
EXT HEMATOCRIT: 31
EXT HEMOGLOBIN: 10.3
EXT LYMPH%: 16
EXT MAGNESIUM: 2
EXT MONOCYTES%: 2
EXT PHOSPHORUS: 4.4
EXT PLATELETS: 137
EXT POTASSIUM: 4.3
EXT PROT/CREAT RATIO UR: 0.23
EXT SEGS%: 76
EXT SODIUM: 136 MMOL/L
EXT TACROLIMUS LVL: 6.8
EXT WBC: 3.2

## 2018-01-04 ENCOUNTER — CLINICAL SUPPORT (OUTPATIENT)
Dept: TRANSPLANT | Facility: CLINIC | Age: 38
End: 2018-01-04
Payer: MEDICARE

## 2018-01-04 ENCOUNTER — LAB VISIT (OUTPATIENT)
Dept: LAB | Facility: HOSPITAL | Age: 38
End: 2018-01-04
Attending: INTERNAL MEDICINE
Payer: MEDICARE

## 2018-01-04 VITALS
BODY MASS INDEX: 22.56 KG/M2 | WEIGHT: 143.75 LBS | RESPIRATION RATE: 16 BRPM | HEIGHT: 67 IN | OXYGEN SATURATION: 100 % | SYSTOLIC BLOOD PRESSURE: 117 MMHG | HEART RATE: 93 BPM | TEMPERATURE: 97 F | DIASTOLIC BLOOD PRESSURE: 77 MMHG

## 2018-01-04 DIAGNOSIS — Z94.0 KIDNEY REPLACED BY TRANSPLANT: ICD-10-CM

## 2018-01-04 DIAGNOSIS — Z94.0 S/P KIDNEY TRANSPLANT: ICD-10-CM

## 2018-01-04 DIAGNOSIS — T86.12 KIDNEY TRANSPLANT FAILURE: Primary | ICD-10-CM

## 2018-01-04 LAB
ALBUMIN SERPL BCP-MCNC: 3.7 G/DL
ANION GAP SERPL CALC-SCNC: 7 MMOL/L
BASOPHILS # BLD AUTO: 0.02 K/UL
BASOPHILS NFR BLD: 0.6 %
BUN SERPL-MCNC: 26 MG/DL
CALCIUM SERPL-MCNC: 8.8 MG/DL
CHLORIDE SERPL-SCNC: 107 MMOL/L
CO2 SERPL-SCNC: 26 MMOL/L
CREAT SERPL-MCNC: 2.3 MG/DL
DIFFERENTIAL METHOD: ABNORMAL
EOSINOPHIL # BLD AUTO: 0.2 K/UL
EOSINOPHIL NFR BLD: 6.8 %
ERYTHROCYTE [DISTWIDTH] IN BLOOD BY AUTOMATED COUNT: 13.1 %
EST. GFR  (AFRICAN AMERICAN): 40.1 ML/MIN/1.73 M^2
EST. GFR  (NON AFRICAN AMERICAN): 34.7 ML/MIN/1.73 M^2
GLUCOSE SERPL-MCNC: 105 MG/DL
HCT VFR BLD AUTO: 30 %
HGB BLD-MCNC: 9.3 G/DL
IMM GRANULOCYTES # BLD AUTO: 0.07 K/UL
IMM GRANULOCYTES NFR BLD AUTO: 2.3 %
LYMPHOCYTES # BLD AUTO: 0.3 K/UL
LYMPHOCYTES NFR BLD: 10 %
MAGNESIUM SERPL-MCNC: 1.7 MG/DL
MCH RBC QN AUTO: 28.1 PG
MCHC RBC AUTO-ENTMCNC: 31 G/DL
MCV RBC AUTO: 91 FL
MONOCYTES # BLD AUTO: 0.3 K/UL
MONOCYTES NFR BLD: 9.4 %
NEUTROPHILS # BLD AUTO: 2.2 K/UL
NEUTROPHILS NFR BLD: 70.9 %
NRBC BLD-RTO: 0 /100 WBC
PHOSPHATE SERPL-MCNC: 4.4 MG/DL
PLATELET # BLD AUTO: 170 K/UL
PMV BLD AUTO: 10.5 FL
POTASSIUM SERPL-SCNC: 4.7 MMOL/L
RBC # BLD AUTO: 3.31 M/UL
SODIUM SERPL-SCNC: 140 MMOL/L
TACROLIMUS BLD-MCNC: 7.4 NG/ML
WBC # BLD AUTO: 3.09 K/UL

## 2018-01-04 PROCEDURE — 83735 ASSAY OF MAGNESIUM: CPT

## 2018-01-04 PROCEDURE — 36415 COLL VENOUS BLD VENIPUNCTURE: CPT

## 2018-01-04 PROCEDURE — 85025 COMPLETE CBC W/AUTO DIFF WBC: CPT

## 2018-01-04 PROCEDURE — 99213 OFFICE O/P EST LOW 20 MIN: CPT | Mod: PBBFAC

## 2018-01-04 PROCEDURE — 80197 ASSAY OF TACROLIMUS: CPT

## 2018-01-04 PROCEDURE — 80069 RENAL FUNCTION PANEL: CPT

## 2018-01-04 PROCEDURE — 99999 PR PBB SHADOW E&M-EST. PATIENT-LVL III: CPT | Mod: PBBFAC,,,

## 2018-01-04 PROCEDURE — 87799 DETECT AGENT NOS DNA QUANT: CPT

## 2018-01-04 NOTE — PROGRESS NOTES
Results reviewed and the following message sent to patient via [facility]: Creatinine still staying up-await repeat biopsy results.

## 2018-01-05 LAB
BK VIRUS DNA PCR, QUANT, BLOOD: <125 COPIES/ML
BK VIRUS DNA, BLOOD: NOT DETECTED
CMV DNA SERPL NAA+PROBE-ACNC: 549 IU/ML
LOG BKV COPIES/ML: <2.1 LOG (10) COPIES/ML

## 2018-01-08 ENCOUNTER — TELEPHONE (OUTPATIENT)
Dept: TRANSPLANT | Facility: CLINIC | Age: 38
End: 2018-01-08

## 2018-01-08 NOTE — PROGRESS NOTES
Results reviewed and the following message sent to patient via [facility]: Continue present treatment, including biopsy. R/S biopsy for immediately after c diff therapy completed. Continue Valcyte for low level CMV.

## 2018-01-10 ENCOUNTER — PATIENT MESSAGE (OUTPATIENT)
Dept: TRANSPLANT | Facility: CLINIC | Age: 38
End: 2018-01-10

## 2018-01-10 ENCOUNTER — TELEPHONE (OUTPATIENT)
Dept: PHARMACY | Facility: HOSPITAL | Age: 38
End: 2018-01-10

## 2018-01-11 ENCOUNTER — CLINICAL SUPPORT (OUTPATIENT)
Dept: TRANSPLANT | Facility: CLINIC | Age: 38
End: 2018-01-11
Payer: MEDICARE

## 2018-01-11 ENCOUNTER — LAB VISIT (OUTPATIENT)
Dept: LAB | Facility: HOSPITAL | Age: 38
End: 2018-01-11
Attending: INTERNAL MEDICINE
Payer: MEDICARE

## 2018-01-11 ENCOUNTER — PATIENT MESSAGE (OUTPATIENT)
Dept: TRANSPLANT | Facility: CLINIC | Age: 38
End: 2018-01-11

## 2018-01-11 VITALS
BODY MASS INDEX: 22.77 KG/M2 | HEIGHT: 67 IN | DIASTOLIC BLOOD PRESSURE: 89 MMHG | WEIGHT: 145.06 LBS | TEMPERATURE: 99 F | HEART RATE: 79 BPM | RESPIRATION RATE: 16 BRPM | OXYGEN SATURATION: 100 % | SYSTOLIC BLOOD PRESSURE: 131 MMHG

## 2018-01-11 DIAGNOSIS — Z94.0 S/P KIDNEY TRANSPLANT: ICD-10-CM

## 2018-01-11 DIAGNOSIS — Z94.0 KIDNEY REPLACED BY TRANSPLANT: ICD-10-CM

## 2018-01-11 DIAGNOSIS — Z79.2 LONG-TERM CURRENT USE OF ANTIBIOTICS FOR PREVENTION OF RECURRENT INFECTION: ICD-10-CM

## 2018-01-11 DIAGNOSIS — N17.9 AKI (ACUTE KIDNEY INJURY): Primary | ICD-10-CM

## 2018-01-11 DIAGNOSIS — T86.12 KIDNEY TRANSPLANT FAILURE: Primary | ICD-10-CM

## 2018-01-11 LAB
ALBUMIN SERPL BCP-MCNC: 3.7 G/DL
ALBUMIN SERPL-MCNC: 4.6 G/DL (ref 3.5–5.5)
ANION GAP SERPL CALC-SCNC: 5 MMOL/L
BASOPHILS # BLD AUTO: 0.04 K/UL
BASOPHILS NFR BLD: 1.9 %
BUN SERPL-MCNC: 19 MG/DL (ref 6–20)
BUN SERPL-MCNC: 21 MG/DL
BUN/CREAT SERPL: 9 (ref 9–20)
CALCIUM SERPL-MCNC: 9 MG/DL
CALCIUM SERPL-MCNC: 9.1 MG/DL (ref 8.7–10.2)
CHLORIDE SERPL-SCNC: 101 MMOL/L (ref 96–106)
CHLORIDE SERPL-SCNC: 110 MMOL/L
CMV DNA SERPL NAA+PROBE-ACNC: 359 IU/ML
CMV DNA SERPL NAA+PROBE-LOG IU: 2.56 LOG10 IU/ML
CO2 SERPL-SCNC: 20 MMOL/L (ref 18–29)
CO2 SERPL-SCNC: 25 MMOL/L
CREAT SERPL-MCNC: 1.9 MG/DL
CREAT SERPL-MCNC: 2.04 MG/DL (ref 0.76–1.27)
DIFFERENTIAL METHOD: ABNORMAL
EGFR IF AFRICAN AMERICAN: 46 ML/MIN/1.73
EOSINOPHIL # BLD AUTO: 0.1 K/UL
EOSINOPHIL NFR BLD: 5.2 %
ERYTHROCYTE [DISTWIDTH] IN BLOOD BY AUTOMATED COUNT: 13.6 %
EST. GFR  (AFRICAN AMERICAN): 50.6 ML/MIN/1.73 M^2
EST. GFR  (NON AFRICAN AMERICAN): 40 ML/MIN/1.73
EST. GFR  (NON AFRICAN AMERICAN): 43.7 ML/MIN/1.73 M^2
GLUCOSE SERPL-MCNC: 92 MG/DL (ref 65–99)
GLUCOSE SERPL-MCNC: 95 MG/DL
HCT VFR BLD AUTO: 30.7 %
HGB BLD-MCNC: 9.9 G/DL
IMM GRANULOCYTES # BLD AUTO: 0.03 K/UL
IMM GRANULOCYTES NFR BLD AUTO: 1.4 %
LYMPHOCYTES # BLD AUTO: 0.6 K/UL
LYMPHOCYTES NFR BLD: 28.1 %
MAGNESIUM SERPL-MCNC: 1.5 MG/DL
MCH RBC QN AUTO: 28 PG
MCHC RBC AUTO-ENTMCNC: 32.2 G/DL
MCV RBC AUTO: 87 FL
MONOCYTES # BLD AUTO: 0.1 K/UL
MONOCYTES NFR BLD: 4.3 %
NEUTROPHILS # BLD AUTO: 1.2 K/UL
NEUTROPHILS NFR BLD: 59.1 %
NRBC BLD-RTO: 0 /100 WBC
PHOSPHATE SERPL-MCNC: 4.4 MG/DL (ref 2.5–4.5)
PHOSPHATE SERPL-MCNC: 4.8 MG/DL
PLATELET # BLD AUTO: 167 K/UL
PMV BLD AUTO: 10.3 FL
POTASSIUM SERPL-SCNC: 5.3 MMOL/L (ref 3.5–5.2)
POTASSIUM SERPL-SCNC: 5.4 MMOL/L
RBC # BLD AUTO: 3.54 M/UL
SODIUM SERPL-SCNC: 139 MMOL/L (ref 134–144)
SODIUM SERPL-SCNC: 140 MMOL/L
TACROLIMUS BLD-MCNC: 6.7 NG/ML
TACROLIMUS BLD-MCNC: 7.3 NG/ML (ref 2–20)
WBC # BLD AUTO: 2.1 K/UL

## 2018-01-11 PROCEDURE — 85025 COMPLETE CBC W/AUTO DIFF WBC: CPT

## 2018-01-11 PROCEDURE — 86352 CELL FUNCTION ASSAY W/STIM: CPT

## 2018-01-11 PROCEDURE — 83735 ASSAY OF MAGNESIUM: CPT

## 2018-01-11 PROCEDURE — 80069 RENAL FUNCTION PANEL: CPT

## 2018-01-11 PROCEDURE — 99213 OFFICE O/P EST LOW 20 MIN: CPT | Mod: PBBFAC

## 2018-01-11 PROCEDURE — 99999 PR PBB SHADOW E&M-EST. PATIENT-LVL III: CPT | Mod: PBBFAC,,,

## 2018-01-11 PROCEDURE — 80197 ASSAY OF TACROLIMUS: CPT

## 2018-01-11 PROCEDURE — 36415 COLL VENOUS BLD VENIPUNCTURE: CPT

## 2018-01-11 RX ORDER — ATOVAQUONE 750 MG/5ML
1500 SUSPENSION ORAL DAILY
Qty: 300 ML | Refills: 7 | Status: CANCELLED | OUTPATIENT
Start: 2018-01-11 | End: 2018-08-18

## 2018-01-11 NOTE — TELEPHONE ENCOUNTER
DOCUMENTATION ONLY  FYI  Descovy does not require a prior authorization through the patient's insurance.    Copay: $3.70    Patient Assistance IS NOT required    Forwarded to Clinical Pharmacist for consult and shipment  VIELKA

## 2018-01-11 NOTE — TELEPHONE ENCOUNTER
Initial consultation for Descovy declined by patient at .  Descovy is a transfer in from another pharmacy.  Patient is treatment experienced and has been on Descovy with no issues.   Advised to call [facility] and provider if any issues arise.

## 2018-01-11 NOTE — TELEPHONE ENCOUNTER
[facility] received a prescription for Descovy and we will contact their insurance company to find out if the medication is covered. We will update patient of status as more information is received. feel free to give us a call with  any questions at [phone].

## 2018-01-11 NOTE — PROGRESS NOTES
Results reviewed and the following message sent to patient via [facility]: Please increase tacrolimus to 10 mg twice daily.   [Will ask pharmacist to review neutropenia and for DDI. He may benefit from increasing ketoconazole to 200 mg daily.]

## 2018-01-11 NOTE — PROGRESS NOTES
Results reviewed and the following message sent to patient via [facility]: Kidney function is better! White count is lower but still in safe range. No need for filgrastim injection today. (ANC 1239). However, stop bactrim and let's explore with pharmacist alternative (pentam vs atovaquone based on cost and ability to get inhalation therapy). Please also alert you ID doctor about low white count FYI.

## 2018-01-12 ENCOUNTER — PATIENT MESSAGE (OUTPATIENT)
Dept: TRANSPLANT | Facility: CLINIC | Age: 38
End: 2018-01-12

## 2018-01-12 DIAGNOSIS — Z94.0 KIDNEY REPLACED BY TRANSPLANT: Primary | ICD-10-CM

## 2018-01-12 RX ORDER — TACROLIMUS 5 MG/1
10 CAPSULE ORAL EVERY 12 HOURS
Qty: 120 CAPSULE | Refills: 11 | Status: SHIPPED | OUTPATIENT
Start: 2018-01-12 | End: 2018-01-19 | Stop reason: SDUPTHER

## 2018-01-12 RX ORDER — TACROLIMUS 1 MG/1
10 CAPSULE ORAL EVERY 12 HOURS
Qty: 600 CAPSULE | Refills: 11 | Status: SHIPPED | OUTPATIENT
Start: 2018-01-12 | End: 2018-01-12 | Stop reason: DRUGHIGH

## 2018-01-12 RX ORDER — ATOVAQUONE 750 MG/5ML
1500 SUSPENSION ORAL DAILY
Qty: 300 ML | Refills: 0 | Status: SHIPPED | OUTPATIENT
Start: 2018-01-12 | End: 2018-01-24 | Stop reason: SDUPTHER

## 2018-01-12 NOTE — TELEPHONE ENCOUNTER
Pt requested tacro 5 mg caps sent to [facility] as he is now on 10 mg po bid.  I sent rx to MD for approval.

## 2018-01-12 NOTE — TELEPHONE ENCOUNTER
I left a detailed message re 1) Labs and biopsy Monday  2) I sent replacement for Bactrim rx, Mepron,  to [facility] to be picked up on Monday  3) I left contact numbers for pt to return my call

## 2018-01-15 ENCOUNTER — HOSPITAL ENCOUNTER (OUTPATIENT)
Facility: HOSPITAL | Age: 38
Discharge: HOME | End: 2018-01-15
Attending: EMERGENCY MEDICINE | Admitting: EMERGENCY MEDICINE
Payer: MEDICARE

## 2018-01-15 ENCOUNTER — TELEPHONE (OUTPATIENT)
Dept: PHARMACY | Facility: CLINIC | Age: 38
End: 2018-01-15

## 2018-01-15 ENCOUNTER — ANESTHESIA (OUTPATIENT)
Dept: SURGERY | Facility: HOSPITAL | Age: 38
End: 2018-01-15

## 2018-01-15 ENCOUNTER — ANESTHESIA EVENT (OUTPATIENT)
Dept: SURGERY | Facility: HOSPITAL | Age: 38
End: 2018-01-15

## 2018-01-15 ENCOUNTER — SURGERY (OUTPATIENT)
Age: 38
End: 2018-01-15

## 2018-01-15 VITALS
HEART RATE: 66 BPM | OXYGEN SATURATION: 100 % | WEIGHT: 145 LBS | BODY MASS INDEX: 22.76 KG/M2 | SYSTOLIC BLOOD PRESSURE: 136 MMHG | RESPIRATION RATE: 16 BRPM | TEMPERATURE: 99 F | DIASTOLIC BLOOD PRESSURE: 86 MMHG | HEIGHT: 67 IN

## 2018-01-15 DIAGNOSIS — Z94.0 S/P KIDNEY TRANSPLANT: ICD-10-CM

## 2018-01-15 DIAGNOSIS — N17.9 AKI (ACUTE KIDNEY INJURY): ICD-10-CM

## 2018-01-15 LAB
CMV DNA SERPL NAA+PROBE-ACNC: <137 IU/ML
IMMUNKNOW (STIMULATED): 68 NG/ML

## 2018-01-15 PROCEDURE — 63600175 PHARM REV CODE 636 W HCPCS: Mod: JG | Performed by: RADIOLOGY

## 2018-01-15 PROCEDURE — 25000003 PHARM REV CODE 250: Performed by: RADIOLOGY

## 2018-01-15 RX ORDER — MIDAZOLAM HYDROCHLORIDE 1 MG/ML
2 INJECTION INTRAMUSCULAR; INTRAVENOUS ONCE
Status: DISCONTINUED | OUTPATIENT
Start: 2018-01-15 | End: 2018-01-15 | Stop reason: HOSPADM

## 2018-01-15 RX ORDER — FENTANYL CITRATE 50 UG/ML
100 INJECTION, SOLUTION INTRAMUSCULAR; INTRAVENOUS ONCE
Status: DISCONTINUED | OUTPATIENT
Start: 2018-01-15 | End: 2018-01-15 | Stop reason: HOSPADM

## 2018-01-15 RX ORDER — SODIUM CHLORIDE 9 MG/ML
500 INJECTION, SOLUTION INTRAVENOUS ONCE
Status: DISCONTINUED | OUTPATIENT
Start: 2018-01-15 | End: 2018-01-15 | Stop reason: HOSPADM

## 2018-01-15 RX ADMIN — DESMOPRESSIN ACETATE 6.58 MCG: 4 SOLUTION INTRAVENOUS at 03:01

## 2018-01-15 NOTE — H&P
"Radiology History & Physical      SUBJECTIVE:     Chief Complaint: elevated serum creatinine in renal transplant    History of Present Illness:  Demetrio Aguiar is a 37 y.o. male who presents for renal transplant biopsy.  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 Hematoma evacuated.(09/09)    Status post exploratory laparotomy 9/10/2017    Re explored for retroperitoneal hematoma. Hematoma evacuated.(09/09)    TB lung, latent     tx INH 2006     Past Surgical History:   Procedure Laterality Date    EXPLORATORY LAPAROTOMY W/ BOWEL RESECTION      NO BOWEL RESECTION, UNKNOWN DETAILS, "Pancreas Infection"    KIDNEY TRANSPLANT      peritoneal dialysis catheter placement         Home Meds:   Prior to Admission medications    Medication Sig Start Date End Date Taking? Authorizing Provider   BESIVANCE 0.6 % DrpS 1 DROP(S) IN RIGHT EYE THREE TIMES A DAY 12/15/17  Yes Historical Provider, MD   cinacalcet (SENSIPAR) 60 MG Tab Take 1 tablet (60 mg total) by mouth daily with breakfast. 12/29/17  Yes Sayda Gross MD   DESCOVY 200-25 mg Tab Take 1 tablet by mouth once daily.  12/19/17  Yes Historical Provider, MD   dolutegravir (TIVICAY) 50 mg Tab Take 1 tablet (50 mg total) by mouth once daily. 9/29/17  Yes Sayda Gross MD   famotidine (PEPCID) 20 MG tablet TAKE 1 TABLET (20MG TOTAL) BY MOUTH EACH EVENING 11/27/17  Yes Hallie Smith NP   ketoconazole (NIZORAL) 200 mg Tab Take 0.5 tablets (100 mg total) by mouth once daily. 10/20/17  Yes Sayda Gross MD   multivitamin (THERAGRAN) tablet Take 1 tablet by mouth once " daily. 9/22/17  Yes Jase Hines MD   nifedipine (PROCARDIA-XL) 30 MG (OSM) 24 hr tablet Take 1 tablet (30 mg total) by mouth once daily. 9/29/17 9/29/18 Yes Sayda Gross MD   rosuvastatin (CRESTOR) 20 MG tablet TAKE 1 TABLET (20MG) BY MOUTH ONCE DAILY 12/22/17  Yes Jase Hines MD   sodium bicarbonate 650 MG tablet Take 3 tablets (1,950 mg total) by mouth 3 (three) times daily. 9/29/17 9/29/18 Yes Lucía Polk MD   tacrolimus (PROGRAF) 5 MG Cap Take 2 capsules (10 mg total) by mouth every 12 (twelve) hours. 1/12/18 1/12/19 Yes Sayda Gross MD   valGANciclovir (VALCYTE) 450 mg Tab Take 1 tablet (450 mg total) by mouth 2 (two) times daily. 12/29/17 12/29/18 Yes Sayda Gross MD   atovaquone (MEPRON) 750 mg/5 mL Susp Take 10 mLs (1,500 mg total) by mouth once daily. 1/12/18 2/11/18  Sayda Gross MD   azithromycin (ZITHROMAX) 600 MG Tab Take 2 tablets (1,200 mg total) by mouth every 7 days. 9/22/17   Jase Hines MD   betamethasone valerate 0.1% (VALISONE) 0.1 % Lotn Apply topically 2 (two) times daily. qtts 3-4 AS bid. 11/15/17 11/29/17  Tanvir Stringer MD   sodium polystyrene (KAYEXALATE) powder Take 30g by mouth daily X2 doses, then as directed. 10/17/17   Sayda Gross MD     Anticoagulants/Antiplatelets: no anticoagulation    Allergies: Review of patient's allergies indicates:  No Known Allergies  Sedation History:  no adverse reactions    Review of Systems:   Hematological: no known coagulopathies  Respiratory: no shortness of breath  Cardiovascular: no chest pain  Gastrointestinal: no abdominal pain  Genito-Urinary: no dysuria  Musculoskeletal: negative  Neurological: no TIA or stroke symptoms         OBJECTIVE:     Vital Signs (Most Recent)  Temp: 98.9 °F (37.2 °C) (01/15/18 1331)  Pulse: 79 (01/15/18 1331)  Resp: 16 (01/15/18 1331)  BP: 127/81 (01/15/18 1331)  SpO2: 100 % (01/15/18 1331)    Physical Exam:  ASA: 2  Mallampati: 2    General: no acute  distress  Mental Status: alert and oriented to person, place and time  HEENT: normocephalic, atraumatic  Chest: unlabored breathing  Heart: regular heart rate  Abdomen: nondistended  Extremity: moves all extremities    Laboratory  Lab Results   Component Value Date    INR 1.0 01/15/2018       Lab Results   Component Value Date    WBC 2.01 (L) 01/15/2018    HGB 10.8 (L) 01/15/2018    HCT 33.8 (L) 01/15/2018    MCV 89 01/15/2018     01/15/2018      Lab Results   Component Value Date    GLU 87 01/15/2018     01/15/2018    K 4.9 01/15/2018     01/15/2018    CO2 25 01/15/2018    BUN 22 (H) 01/15/2018    CREATININE 1.8 (H) 01/15/2018    CALCIUM 9.1 01/15/2018    MG 1.9 01/15/2018    ALT 12 11/15/2017    AST 13 11/15/2017    ALBUMIN 3.9 01/15/2018    BILITOT 0.4 11/15/2017    BILIDIR 0.2 11/15/2017       ASSESSMENT/PLAN:     Sedation Plan: local  Patient will undergo renal transplant biopsy.    Augusot Ann MD  Resident  Department of Radiology  Pager: [phone]

## 2018-01-15 NOTE — DISCHARGE SUMMARY
Radiology Discharge Summary      Hospital Course: No complications    Admit Date: 1/15/2018  Discharge Date: 01/15/2018     Instructions Given to Patient: Yes  Diet: Resume prior diet  Activity: activity as tolerated    Description of Condition on Discharge: Stable  Vital Signs (Most Recent): Temp: 98.9 °F (37.2 °C) (01/15/18 1331)  Pulse: 79 (01/15/18 1331)  Resp: 16 (01/15/18 1331)  BP: 127/81 (01/15/18 1331)  SpO2: 100 % (01/15/18 1331)    Discharge Disposition: Home    Discharge Diagnosis: renal transplant dysfunction     Follow-up: Follow up with ordering physician for results. Ordering physician and results will dictate further clinical action.      Augusto Ann MD  Resident  Department of Radiology  Pager: [phone]

## 2018-01-15 NOTE — TELEPHONE ENCOUNTER
Patient picked up Tivicay.  Patient denies any missed or late doses and states that he is feeling well and adherent with therapy.  Patient seems well adjusted and able to cope.  Patient counseled on new medication (atovaquone) for prophylaxis of PCP (transitioning from smx/tmp).  Patient advised on proper dosage, storage, and administration.  Patient provided with dose measuring device from outpatient pharmacy (001) - patient to .      RHODA Rivers.Ph.  Clinical Pharmacist  [facility]  Phone: [phone]

## 2018-01-15 NOTE — PROCEDURES
Radiology Post-Procedure Note    Pre Op Diagnosis: Renal dysfunction    Post Op Diagnosis: Same    Procedure: Ultrasound guided renal transplant biopsy    Procedure performed by: Radha Benavides MD      Written Informed Consent Obtained: Yes    Specimen Removed: YES 4 core biopsy samples    Estimated Blood Loss: Minimal    Findings: local anesthesia was used    A 16-gauge Monopty biopsy device was used to remove 4 specimens from the right lower quadrant transplant kidney under ultrasound guidance.  Tissue was evaluated by radiology for adequacy and sent to pathology for further analysis.      The patient tolerated the procedure well and there were no complications.  Please see Imaging report for further details.    Augusto Ann MD  Resident  Department of Radiology  Pager: [phone]

## 2018-01-16 NOTE — PLAN OF CARE
Problem: Patient Care Overview  Goal: Plan of Care Review  Outcome: Outcome(s) achieved Date Met: 01/15/18  Awake and alert. VSS. Denies pain or nausea. Tolerating liquids well. Voiding well. DC instructions given to patient and family and they verbalize understanding.

## 2018-01-16 NOTE — DISCHARGE INSTRUCTIONS
Discharge Instructions for Kidney Biopsy  You had a procedure called a kidney biopsy. Your healthcare provider used a special needle to remove a small piece of tissue from your kidney to examine it for signs of damage and disease. A kidney biopsy is ordered after other tests have shown that there may be a problem with your kidney. Kidney biopsies are also performed when kidney disease is suspected and to rule out cancer.  Home care  · Rest for 24 hours to 48 hours. Get up only to use the bathroom.  · Dont drive for 24 hours to 48 hours after the procedure.  · Dont shower for 24 hours after the biopsy. If you wish, you may wash yourself with a sponge or washcloth. When you are able to shower, dont scrub the site. Gently wash the area and pat it dry.  · Remove the bandage covering the biopsy site 24 hours to 48 hours after the procedure.  · Dont lift anything heavier than 10 pounds for 3 days to 4 days after the procedure.  · Ask your healthcare provider when you can return to work. Be sure to tell your healthcare provider if your job involves heavy lifting.  · If you normally take blood thinner medicines (anticoagulants or antiplatelet medicines) and you stopped taking them a few days before your procedure, ask your healthcare provider when to start taking them again.  When to seek medical care  Call your healthcare provider right away if you have any of the following:  · Blood in your urine  · Exhaustion or extreme weakness  · Dizziness or lightheadedness  · Sudden or increased shortness of breath  · Sudden chest pain  · Fever of 100.4°F (38°C) or higher, or chills  · Increasing redness, tenderness, or swelling at the biopsy site  · Opening of or drainage or bleeding from the biopsy site  · Increasing pain, with or without activity   Date Last Reviewed: 2/1/2017  © 2017-8814 [employer]. [address] [place] [address], [place] [address]. All rights reserved. This information is not intended as a  substitute for professional medical care. Always follow your healthcare professional's instructions.

## 2018-01-19 DIAGNOSIS — E78.5 HYPERLIPIDEMIA, UNSPECIFIED HYPERLIPIDEMIA TYPE: ICD-10-CM

## 2018-01-19 DIAGNOSIS — Z94.0 KIDNEY REPLACED BY TRANSPLANT: ICD-10-CM

## 2018-01-19 RX ORDER — TACROLIMUS 5 MG/1
5 CAPSULE ORAL EVERY 12 HOURS
Qty: 60 CAPSULE | Refills: 11 | Status: SHIPPED | OUTPATIENT
Start: 2018-01-19 | End: 2018-01-24 | Stop reason: DRUGHIGH

## 2018-01-19 RX ORDER — ROSUVASTATIN CALCIUM 20 MG/1
TABLET, COATED ORAL
Qty: 30 TABLET | Refills: 0 | Status: SHIPPED | OUTPATIENT
Start: 2018-01-19 | End: 2018-02-19 | Stop reason: SDUPTHER

## 2018-01-19 RX ORDER — TACROLIMUS 1 MG/1
5 CAPSULE ORAL EVERY 12 HOURS
Qty: 300 CAPSULE | Refills: 11 | Status: SHIPPED | OUTPATIENT
Start: 2018-01-19 | End: 2018-01-24 | Stop reason: SDUPTHER

## 2018-01-19 NOTE — LETTER
To: Armida Poon M.D.  [facility]  Director, Division of Renal/Electron Microscopy Pathology Laboratory  [address] [address]  Phone: [phone]    Fax: [phone]    Requesting Physician: Lucía Tello M.D.  NPI #: [id number] E-mail: [email or web address] Phone: [phone]     Fax: [phone] Pager/Cell: [phone]    Facility Name: [facility]  Facility Address: [address] [place] [address]    Request: Renal Transplant Biopsy Consultation  PLEASE NOTE: If specimen does not arrive for this biopsy consultation request, please contact the [facility] at [phone].    Patient Name: Demetrio Aguiar  Age: 37 y.o. : 1980 Sex: male   Race: Black or  SSN:  MRN: [id number]   IP/OP: inpatient    Primary Disease: HIV Nephropathy  Date of Transplant: 2017  Transplant Biopsy Date/Time: 01/15/2018, a.m.  Clinical Diagnosis:AMR    Labs:  Creatinine   Date Value Ref Range Status   01/15/2018 1.8 (H) 0.5 - 1.4 mg/dL Final   2018 1.9 (H) 0.5 - 1.4 mg/dL Final   2018 2.3 (H) 0.5 - 1.4 mg/dL Final   2017 2.08 (H) 0.76 - 1.27 mg/dL Final   2017 2.3 (H) 0.5 - 1.4 mg/dL Final   2017 2.19 (H) 0.76 - 1.27 mg/dL Final   2017 2.0 (H) 0.5 - 1.4 mg/dL Final   11/15/2017 2.0 (H) 0.5 - 1.4 mg/dL Final   10/26/2017 1.8 (H) 0.5 - 1.4 mg/dL Final   10/23/2017 2.0 (H) 0.5 - 1.4 mg/dL Final     Prot/Creat Ratio, Ur   Date Value Ref Range Status   11/15/2017 Unable to calculate 0.00 - 0.20 Final   10/16/2017 0.20 0.00 - 0.20 Final     Most Recent BK level: neg  Most Recent CNI/Rapa level:  Tac 9.0    Problem List:  Patient Active Problem List   Diagnosis    Hyperlipidemia    TB lung, latent    HIV (human immunodeficiency virus infection)    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    At risk for opportunistic infections    Need for  prophylactic immunotherapy    Delayed graft function of kidney    Anemia in stage 3 chronic kidney disease     donor kidney transplant 2017    Immunocompromised state    Acute rejection of kidney transplant    Long-term use of immunosuppressant medication    Immunosuppression    Antibody mediated rejection of kidney transplant    Complication of transplanted kidney    Unexplained episode of dysfunction of kidney transplant    Pingueculitis, right eye    Insufficiency of tear film of both eyes    Corneal abrasion, right, subsequent encounter    CKD (chronic kidney disease) stage 3, GFR 30-59 ml/min    C. difficile colitis    Cytomegalovirus (CMV) viremia     Medications:  Current Outpatient Prescriptions   Medication Sig    rosuvastatin (CRESTOR) 20 MG tablet TAKE 1 TABLET (20MG) BY MOUTH ONCE DAILY    atovaquone (MEPRON) 750 mg/5 mL Susp Take 10 mLs (1,500 mg total) by mouth once daily.    azithromycin (ZITHROMAX) 600 MG Tab Take 2 tablets (1,200 mg total) by mouth every 7 days.    BESIVANCE 0.6 % DrpS 1 DROP(S) IN RIGHT EYE THREE TIMES A DAY    betamethasone valerate 0.1% (VALISONE) 0.1 % Lotn Apply topically 2 (two) times daily. qtts 3-4 AS bid.    cinacalcet (SENSIPAR) 60 MG Tab Take 1 tablet (60 mg total) by mouth daily with breakfast.    DESCOVY 200-25 mg Tab Take 1 tablet by mouth once daily.     dolutegravir (TIVICAY) 50 mg Tab Take 1 tablet (50 mg total) by mouth once daily.    famotidine (PEPCID) 20 MG tablet TAKE 1 TABLET (20MG TOTAL) BY MOUTH EACH EVENING    ketoconazole (NIZORAL) 200 mg Tab Take 0.5 tablets (100 mg total) by mouth once daily.    multivitamin (THERAGRAN) tablet Take 1 tablet by mouth once daily.    nifedipine (PROCARDIA-XL) 30 MG (OSM) 24 hr tablet Take 1 tablet (30 mg total) by mouth once daily.    sodium bicarbonate 650 MG tablet Take 3 tablets (1,950 mg total) by mouth 3 (three) times daily.    sodium polystyrene (KAYEXALATE) powder Take 30g  by mouth daily X2 doses, then as directed.    tacrolimus (PROGRAF) 5 MG Cap Take 2 capsules (10 mg total) by mouth every 12 (twelve) hours.    valGANciclovir (VALCYTE) 450 mg Tab Take 1 tablet (450 mg total) by mouth 2 (two) times daily.     Current Facility-Administered Medications   Medication    epoetin erika injection 10,000 Units    epoetin erika injection 10,000 Units     RN submitting data and to be called if questions regarding this document: Francisca Yañez on behalf of Evelina Barragan, [phone]     Kidney Post-Transplant Assessment    Referring Physician:   Current Nephrologist: Alexa Holliday    ORGAN: RIGHT KIDNEY  Donor Type:  - brain death   PHS Increased Risk: no  Cold Ischemia: 422 mins  Induction Medications: thymoglobulin      Subjective:     CC:  Reassessment of renal allograft function and management of chronic immunosuppression.    Kidney History:  Demetrio Aguiar is a 37 y.o. 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 His maintenance immunosuppression includes a steroid taper per protocol to 5mg daily, Prograf, and Cellcept maintenance.      His post transplant course  - DGF requiring HD (last HD 17) on 17.   - Biopsy proven ABMR with weakly positive class I DSA's on 17. Pt treated with SMP x 3 (-9/3). PLEX x 6 (completed ) and IVIG x 2 (-). Kidney function was improving  - Kidney US on 17 showed a large collection located along the anterior inferior margin of the renal transplant measuring 13.4 x 7 x 11.9. taken back to the OR for retroperitoneal bleed and washout.   - on HAART therapy. Started on Azithromycin weekly.  - Perirenal fluid collection, s/p drainage by IR 17  - Follow up kidney biopsy 10/17/17: 14 glom, MINIMAL  INTERSTITIAL LYMPHOCYTIC INFILTRATE WITH RARE TUBULITIS, NOT MEETING CRITERIA FOR ACUTE CELLULAR REJECTION     Interval History:  Patient's BP at home is well-controlled  he denies any chest pain, shortness of breath, nausea/vomiting, dysuria, frequency, urgency, or pain over the allograft.  He still has diarrhea for last couple weeks, had positive c diff antigen outside, was not treated. He also has right eyes irritation and mild swelling for which he follows with opthalmology.  He needs follow-up kidney biopsy       Medications:   Current Outpatient Prescriptions   Medication Sig Dispense Refill    rosuvastatin (CRESTOR) 20 MG tablet TAKE 1 TABLET (20MG) BY MOUTH ONCE DAILY 30 tablet 0    atovaquone (MEPRON) 750 mg/5 mL Susp Take 10 mLs (1,500 mg total) by mouth once daily. 300 mL 0    azithromycin (ZITHROMAX) 600 MG Tab Take 2 tablets (1,200 mg total) by mouth every 7 days. 8 tablet 5    BESIVANCE 0.6 % DrpS 1 DROP(S) IN RIGHT EYE THREE TIMES A DAY  3    betamethasone valerate 0.1% (VALISONE) 0.1 % Lotn Apply topically 2 (two) times daily. qtts 3-4 AS bid. 60 mL 2    cinacalcet (SENSIPAR) 60 MG Tab Take 1 tablet (60 mg total) by mouth daily with breakfast. 30 tablet 6    DESCOVY 200-25 mg Tab Take 1 tablet by mouth once daily.       dolutegravir (TIVICAY) 50 mg Tab Take 1 tablet (50 mg total) by mouth once daily. 30 tablet 5    famotidine (PEPCID) 20 MG tablet TAKE 1 TABLET (20MG TOTAL) BY MOUTH EACH EVENING 30 tablet 11    ketoconazole (NIZORAL) 200 mg Tab Take 0.5 tablets (100 mg total) by mouth once daily. 15 tablet 11    multivitamin (THERAGRAN) tablet Take 1 tablet by mouth once daily.      nifedipine (PROCARDIA-XL) 30 MG (OSM) 24 hr tablet Take 1 tablet (30 mg total) by mouth once daily. 30 tablet 11    sodium bicarbonate 650 MG tablet Take 3 tablets (1,950 mg total) by mouth 3 (three) times daily. 270 tablet 11    sodium polystyrene (KAYEXALATE) powder Take 30g by mouth daily X2 doses, then  as directed. 453.6 g 1    tacrolimus (PROGRAF) 5 MG Cap Take 2 capsules (10 mg total) by mouth every 12 (twelve) hours. 120 capsule 11    valGANciclovir (VALCYTE) 450 mg Tab Take 1 tablet (450 mg total) by mouth 2 (two) times daily. 60 tablet 2     Current Facility-Administered Medications   Medication Dose Route Frequency Provider Last Rate Last Dose    epoetin erika injection 10,000 Units  10,000 Units Subcutaneous Weekly Sayda Gross MD   10,000 Units at 01/04/18 1035    epoetin erika injection 10,000 Units  10,000 Units Subcutaneous Weekly Sayda Gross MD   10,000 Units at 01/11/18 1222           Review of Systems     Constitutional: Negative for activity change, appetite change, chills, fatigue  HENT: Negative for congestion, facial swelling, postnasal drip, rhinorrhea, sinus pressure, sore throat and trouble swallowing.    Eyes: Right eye irritation     Respiratory: Negative for cough, chest tightness, shortness of breath and wheezing.    Cardiovascular: Negative.  Negative for chest pain, palpitations and leg swelling.   Gastrointestinal: Positive for diarrhea. Negative for abdominal pain, nausea and vomiting.   Genitourinary: Negative for dysuria, flank pain and urgency.   Musculoskeletal: Negative for gait problem, neck pain and neck stiffness.   Skin: Negative for rash.   Allergic/Immunologic: Positive for immunocompromised state. On HAART   Neurological: Negative for dizziness, weakness, light-headedness and headaches.         Objective:     There were no vitals taken for this visit.  body mass index is unknown because there is no height or weight on file.    Physical Exam     Head: normocephalic  Neck: No JVD, cervical axillary, or femoral adenopathies  Heart: no murmurs, Normal s1 and s2, No gallops, no rubs, No murmurs  Lungs; CTA, good respiratory effort, no crackles  Abdomen: soft, non tender, no splenomegaly or hepatomegaly, no massess, no bruits. Incision healing well. Has drain tube,  in place, with no output since yesterday  Extremities: No edema, skin rash, joint pain. LUE AVG  SNC: awake, alert oriented. Cranial nerves are intact, no focalized, sensitivity and strength preserved    Labs:  Lab Results   Component Value Date    WBC 2.01 (L) 01/15/2018    HGB 10.8 (L) 01/15/2018    HCT 33.8 (L) 01/15/2018     01/15/2018    K 4.9 01/15/2018     01/15/2018    CO2 25 01/15/2018    BUN 22 (H) 01/15/2018    CREATININE 1.8 (H) 01/15/2018    EGFRNONAA 46.7 (A) 01/15/2018    EGFRIFAFRICA 45 (L) 12/28/2017    CALCIUM 9.1 01/15/2018    PHOS 4.9 (H) 01/15/2018    MG 1.9 01/15/2018    ALBUMIN 3.9 01/15/2018    AST 13 11/15/2017    ALT 12 11/15/2017    UTPCR Unable to calculate 11/15/2017    .0 (H) 12/27/2017    TACROLIMUS 9.0 01/15/2018       Lab Results   Component Value Date    EXTANC  12/28/2017      Comment:      C diff+, Leukopenia, new CMV +    EXTWBC 3.2 12/28/2017    EXTSEGS 76 12/28/2017    EXTPLATELETS 137 12/28/2017    EXTHEMOGLOBI 10.3 12/28/2017    EXTHEMATOCRI 31.0 12/28/2017    EXTCREATININ 2.08 12/28/2017    EXTSODIUM 136 12/28/2017    EXTPOTASSIUM 4.3 12/28/2017    EXTBUN 22 12/28/2017    EXTCO2 22 12/28/2017    EXTCALCIUM 9.5 12/28/2017    EXTPHOSPHORU 4.4 12/28/2017    EXTGLUCOSE 109 12/28/2017    EXTALBUMIN 4.3 12/28/2017    EXTAST 19 11/20/2017    EXTALT 16 11/20/2017    EXTBILITOTAL 0.50 11/20/2017       Lab Results   Component Value Date    EXTTACROLVL 6.8 12/28/2017    EXTPROTCRE 0.23 12/28/2017    EXTPROTEINUA negative 11/20/2017    EXTWBCUA negative 11/20/2017    EXTRBCUA negative 11/20/2017       Labs were reviewed with the patient.    Assessment/Plan:     1. Kidney replaced by transplant    2. Hyperlipidemia, unspecified hyperlipidemia type        Mr. Aguiar is a 37 y.o. male with:     # History of ESRD presumed secondary to HIVAN  - s/p DDKT on 8/18/17  - DGF and ABMR on 9/1/17, s/p Pt treated with SMP x 3 (9/1-9/3). PLEX x 6 (completed 9/5) and IVIG x 2  (9/5-9/6). The follow up biopsy did not meet criteria for ACR on 10/17/17  - last DSA is negative  - recent Cr 2.3 mg/dl , uptrending slowly  - minimal proteinuria  - Plan for kidney biopsy    # Immunosuppression:   - on Prograf and ketoconazole  - continue MMF  - continue prednisone  - will monitor closely for toxicities     # Antimicrobial Prophylaxis:   - continue bactrim  for PJP prophylaxis    # HTN:   - BPs not well controlled   - on nifedipine 30 mg and metoprolol  - low salt and healthy life discussed with the patient     # Metabolic Bone Disease/Secondary Hyperparathyroidism:  - increase cinacalcet to 60 mg daily  - PTH of 443 and Ca of 10.3  - will monitor PTH, calcium, and phosphorus as per our center protocol     # Mild hyperkalemia : resolved  - on low K diet     # HIV  - on HAART medication  - Azithromycin weekly due to CD4 of 7  - Follow with his HIV clinic     # Anemia:  - Hb low   - will obtain comprehensive work-up  - Epogen 32124 units today         # Diarrhea  - c diff antuigen and PCR today  - encouraged to increase his fluid intake          Plan:  - Anemia: comprehensive anemia work-up  - Epogen 20000 X 1 time today  - Stool study today  - DSA, BK, UPCR, PTH, and Vitamin D  - Kidney Biopsy on Jan 8, 2018  Most Recent Note:

## 2018-01-19 NOTE — TELEPHONE ENCOUNTER
----- Message from Eva Maharaj sent at 1/19/2018  9:28 AM CST -----  Contact: [place] dept of Pathology  [place] is requesting History and Physical and Lab work on the pt    Fax# [phone] Ph# [phone]    Thanks

## 2018-01-24 ENCOUNTER — OFFICE VISIT (OUTPATIENT)
Dept: TRANSPLANT | Facility: CLINIC | Age: 38
End: 2018-01-24
Payer: MEDICARE

## 2018-01-24 ENCOUNTER — OFFICE VISIT (OUTPATIENT)
Dept: OPHTHALMOLOGY | Facility: CLINIC | Age: 38
End: 2018-01-24
Payer: MEDICARE

## 2018-01-24 ENCOUNTER — LAB VISIT (OUTPATIENT)
Dept: LAB | Facility: HOSPITAL | Age: 38
End: 2018-01-24
Attending: INTERNAL MEDICINE
Payer: MEDICARE

## 2018-01-24 VITALS
WEIGHT: 145.5 LBS | RESPIRATION RATE: 16 BRPM | OXYGEN SATURATION: 100 % | HEIGHT: 67 IN | TEMPERATURE: 99 F | DIASTOLIC BLOOD PRESSURE: 85 MMHG | BODY MASS INDEX: 22.84 KG/M2 | HEART RATE: 73 BPM | SYSTOLIC BLOOD PRESSURE: 146 MMHG

## 2018-01-24 DIAGNOSIS — N18.30 CKD (CHRONIC KIDNEY DISEASE) STAGE 3, GFR 30-59 ML/MIN: ICD-10-CM

## 2018-01-24 DIAGNOSIS — N18.30 ANEMIA IN STAGE 3 CHRONIC KIDNEY DISEASE: Chronic | ICD-10-CM

## 2018-01-24 DIAGNOSIS — D64.9 ANEMIA, UNSPECIFIED TYPE: ICD-10-CM

## 2018-01-24 DIAGNOSIS — D63.1 ANEMIA IN STAGE 3 CHRONIC KIDNEY DISEASE: Chronic | ICD-10-CM

## 2018-01-24 DIAGNOSIS — Z94.0 S/P KIDNEY TRANSPLANT: Primary | ICD-10-CM

## 2018-01-24 DIAGNOSIS — Z94.0 KIDNEY REPLACED BY TRANSPLANT: ICD-10-CM

## 2018-01-24 DIAGNOSIS — Z91.89 AT RISK FOR OPPORTUNISTIC INFECTIONS: ICD-10-CM

## 2018-01-24 DIAGNOSIS — D84.9 IMMUNOCOMPROMISED STATE: Chronic | ICD-10-CM

## 2018-01-24 DIAGNOSIS — E78.5 HYPERLIPIDEMIA, UNSPECIFIED HYPERLIPIDEMIA TYPE: ICD-10-CM

## 2018-01-24 DIAGNOSIS — Z79.2 LONG-TERM CURRENT USE OF ANTIBIOTICS FOR PREVENTION OF RECURRENT INFECTION: ICD-10-CM

## 2018-01-24 DIAGNOSIS — D84.9 IMMUNOSUPPRESSION: Chronic | ICD-10-CM

## 2018-01-24 DIAGNOSIS — H16.001 CORNEAL EROSION OF RIGHT EYE: Primary | ICD-10-CM

## 2018-01-24 DIAGNOSIS — Z29.89 NEED FOR PROPHYLACTIC IMMUNOTHERAPY: ICD-10-CM

## 2018-01-24 DIAGNOSIS — Z79.60 LONG-TERM USE OF IMMUNOSUPPRESSANT MEDICATION: ICD-10-CM

## 2018-01-24 LAB
ALBUMIN SERPL BCP-MCNC: 3.8 G/DL
ANION GAP SERPL CALC-SCNC: 6 MMOL/L
BASOPHILS # BLD AUTO: 0.03 K/UL
BASOPHILS NFR BLD: 1.2 %
BUN SERPL-MCNC: 21 MG/DL
CALCIUM SERPL-MCNC: 9.1 MG/DL
CHLORIDE SERPL-SCNC: 110 MMOL/L
CO2 SERPL-SCNC: 24 MMOL/L
CREAT SERPL-MCNC: 1.8 MG/DL
DIFFERENTIAL METHOD: ABNORMAL
EOSINOPHIL # BLD AUTO: 0 K/UL
EOSINOPHIL NFR BLD: 1.7 %
ERYTHROCYTE [DISTWIDTH] IN BLOOD BY AUTOMATED COUNT: 13.6 %
EST. GFR  (AFRICAN AMERICAN): 54 ML/MIN/1.73 M^2
EST. GFR  (NON AFRICAN AMERICAN): 46.7 ML/MIN/1.73 M^2
FERRITIN SERPL-MCNC: 1614 NG/ML
GLUCOSE SERPL-MCNC: 93 MG/DL
HCT VFR BLD AUTO: 35 %
HGB BLD-MCNC: 11.3 G/DL
IMM GRANULOCYTES # BLD AUTO: 0.02 K/UL
IMM GRANULOCYTES NFR BLD AUTO: 0.8 %
IRON SERPL-MCNC: 81 UG/DL
LYMPHOCYTES # BLD AUTO: 0.7 K/UL
LYMPHOCYTES NFR BLD: 28.9 %
MAGNESIUM SERPL-MCNC: 1.9 MG/DL
MCH RBC QN AUTO: 28.8 PG
MCHC RBC AUTO-ENTMCNC: 32.3 G/DL
MCV RBC AUTO: 89 FL
MONOCYTES # BLD AUTO: 0.1 K/UL
MONOCYTES NFR BLD: 3.3 %
NEUTROPHILS # BLD AUTO: 1.6 K/UL
NEUTROPHILS NFR BLD: 64.1 %
NRBC BLD-RTO: 0 /100 WBC
PHOSPHATE SERPL-MCNC: 4.5 MG/DL
PLATELET # BLD AUTO: 124 K/UL
PMV BLD AUTO: 10.4 FL
POTASSIUM SERPL-SCNC: 5.3 MMOL/L
RBC # BLD AUTO: 3.93 M/UL
SATURATED IRON: 33 %
SODIUM SERPL-SCNC: 140 MMOL/L
TACROLIMUS BLD-MCNC: 8.3 NG/ML
TOTAL IRON BINDING CAPACITY: 246 UG/DL
TRANSFERRIN SERPL-MCNC: 166 MG/DL
WBC # BLD AUTO: 2.42 K/UL

## 2018-01-24 PROCEDURE — 99999 PR PBB SHADOW E&M-EST. PATIENT-LVL II: CPT | Mod: PBBFAC,,, | Performed by: OPHTHALMOLOGY

## 2018-01-24 PROCEDURE — 86352 CELL FUNCTION ASSAY W/STIM: CPT

## 2018-01-24 PROCEDURE — 80069 RENAL FUNCTION PANEL: CPT

## 2018-01-24 PROCEDURE — 80197 ASSAY OF TACROLIMUS: CPT

## 2018-01-24 PROCEDURE — 83735 ASSAY OF MAGNESIUM: CPT

## 2018-01-24 PROCEDURE — 92014 COMPRE OPH EXAM EST PT 1/>: CPT | Mod: S$PBB,,, | Performed by: OPHTHALMOLOGY

## 2018-01-24 PROCEDURE — 82728 ASSAY OF FERRITIN: CPT

## 2018-01-24 PROCEDURE — 83540 ASSAY OF IRON: CPT

## 2018-01-24 PROCEDURE — 99999 PR PBB SHADOW E&M-EST. PATIENT-LVL V: CPT | Mod: PBBFAC,,, | Performed by: NURSE PRACTITIONER

## 2018-01-24 PROCEDURE — 99215 OFFICE O/P EST HI 40 MIN: CPT | Mod: S$PBB,,, | Performed by: NURSE PRACTITIONER

## 2018-01-24 PROCEDURE — 85025 COMPLETE CBC W/AUTO DIFF WBC: CPT

## 2018-01-24 PROCEDURE — 99215 OFFICE O/P EST HI 40 MIN: CPT | Mod: PBBFAC | Performed by: NURSE PRACTITIONER

## 2018-01-24 PROCEDURE — 99212 OFFICE O/P EST SF 10 MIN: CPT | Mod: PBBFAC,27 | Performed by: OPHTHALMOLOGY

## 2018-01-24 RX ORDER — ATOVAQUONE 750 MG/5ML
1500 SUSPENSION ORAL DAILY
Qty: 300 ML | Refills: 6 | Status: SHIPPED | OUTPATIENT
Start: 2018-01-24 | End: 2018-06-07 | Stop reason: SDUPTHER

## 2018-01-24 RX ORDER — FLUOROMETHOLONE 1 MG/ML
1 SUSPENSION/ DROPS OPHTHALMIC 3 TIMES DAILY
Qty: 10 ML | Refills: 3 | Status: SHIPPED | OUTPATIENT
Start: 2018-01-24 | End: 2018-01-26 | Stop reason: SDUPTHER

## 2018-01-24 RX ORDER — TACROLIMUS 1 MG/1
CAPSULE ORAL
Qty: 120 CAPSULE | Refills: 11 | Status: SHIPPED | OUTPATIENT
Start: 2018-01-24 | End: 2018-02-01 | Stop reason: SDUPTHER

## 2018-01-24 RX ORDER — TACROLIMUS 1 MG/1
5 CAPSULE ORAL DAILY
Qty: 120 CAPSULE | Refills: 11 | Status: SHIPPED | OUTPATIENT
Start: 2018-01-24 | End: 2018-01-24

## 2018-01-24 RX ORDER — TACROLIMUS 5 MG/1
CAPSULE ORAL
Qty: 90 CAPSULE | Refills: 11 | Status: SHIPPED | OUTPATIENT
Start: 2018-01-24 | End: 2018-02-01 | Stop reason: SDUPTHER

## 2018-01-24 NOTE — PROGRESS NOTES
No need for iron supplement, patient has anemia of chronic disease. Pending CMV, CBC and Tac level. Thank you!

## 2018-01-24 NOTE — LETTER
January 24, 2018        Alexa Holliday  [facility] [place] [address]  Phone: [phone]  Fax: [phone]             [facility]  [address]  [facility] [address]  Phone: [phone]   Patient: Demetrio Aguiar   MR Number: [id number]   YOB: 1980   Date of Visit: 1/24/2018       Dear Dr. Alexa Holliday    Thank you for referring Demetrio Aguiar to me for evaluation. Attached you will find relevant portions of my assessment and plan of care.    If you have questions, please do not hesitate to call me. I look forward to following Demetrio Aguiar along with you.    Sincerely,    Hallie Smith, NP    Enclosure    If you would like to receive this communication electronically, please contact [email or web address] or [phone] to request [employer] access.    [employer] is a tool which provides read-only access to select patient information with whom you have a relationship. Its easy to use and provides real time access to review your patients record including encounter summaries, notes, results, and demographic information.    If you feel you have received this communication in error or would no longer like to receive these types of communications, please e-mail [email or web address]

## 2018-01-24 NOTE — PROGRESS NOTES
HPI     Eye Problem    Additional comments: 1 month check            Comments   Pt presents for 1 month corneal erosion check OD.     FML bid OD  Besivance bid OD       Last edited by Heide Alberto on 1/24/2018  4:03 PM. (History)            Assessment /Plan     For exam results, see Encounter Report.    Corneal erosion of right eye      Much improved with just mild pinpoint stromal haze  Gtts PRN

## 2018-01-24 NOTE — PROGRESS NOTES
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 Hematoma evacuated.(09/09)    Status post exploratory laparotomy 9/10/2017    Re explored for retroperitoneal hematoma. Hematoma evacuated.(09/09)    TB lung, latent     tx INH 2006       LABS: 1/24/18 K 5.3, 1/11 K 5.4      NUTRITION HX/INTERVENTION: Pt states he has been trying to limit high K foods in his diet.  -Discussed low K diet guidelines with pt and encouraged adherence.  Provided with "Potassium Content of Foods" list.      Patient voiced understanding of education & goals. Contact information was provided & will f/u as needed at clinic visits.         "

## 2018-01-24 NOTE — PROGRESS NOTES
Kidney Post-Transplant Assessment    Referring Physician:   Current Nephrologist: Alexa Holliday    ORGAN: RIGHT KIDNEY  Donor Type:  - brain death  PHS Increased Risk: no  Cold Ischemia: 422 mins  Induction Medications: thymoglobulin    Subjective:     CC:  Reassessment of renal allograft function and management of chronic immunosuppression.    HPI:  Mr. Aguiar is a 37 y.o. year old Black or  male who received a  - brain death kidney transplant on 17.  He has CKD stage 3 - GFR 30-59 and his baseline creatinine is between  1.8-2.1. He takes mycophenolate mofetil (which is currently on hold d/t recent c-diff infection and neutropenia) and tacrolimus for maintenance immunosuppression. He denies any recent hospitalizations or ER visits since his previous clinic visit.    Interval HX:  Overall feels well. No health concerns today.   Denies chest pain, SOB, leg pain, abdominal pain or LUTs.  Diarrhea has resolved. F/U with ID every 3-4 months. LOV 2017  Drinking adequate amt of water, no problems w/ UOP      Kidney History:  Demetrio Aguiar is a 37 y.o. 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 His maintenance immunosuppression includes a steroid taper per protocol to 5mg daily, Prograf, and Cellcept maintenance.      His post transplant course  - DGF requiring HD (last HD 17) on 17.   - Biopsy proven ABMR with weakly positive class I DSA's on 17. Pt treated with SMP x 3 (-9/3). PLEX x 6 (completed ) and IVIG x 2 (-). Kidney function was improving  - Kidney US on 17 showed a large collection located along the anterior inferior margin of the renal transplant measuring 13.4 x 7 x 11.9. taken back to the OR for retroperitoneal  bleed and washout.   - on HAART therapy. Started on Azithromycin weekly.  - Perirenal fluid collection, s/p drainage by IR 9/28/17  - Follow up kidney biopsy 10/17/17: 14 glom, MINIMAL INTERSTITIAL LYMPHOCYTIC INFILTRATE WITH RARE TUBULITIS, NOT MEETING CRITERIA FOR ACUTE CELLULAR REJECTION     Past Medical History:   Diagnosis Date    Anemia     At risk for opportunistic infections     Delayed graft function of kidney     ESRD secondary to HIVAN s/p DDRT 8/18/17     HIV infection     HIV nephropathy     Hyperlipidemia     Hypertension     Need for prophylactic immunotherapy     S/P kidney transplant 8/18/2017 8/28/2017    Status post exploratory laparotomy 9/10/2017    Re explored for retroperitoneal hematoma. Hematoma evacuated.(09/09)    Status post exploratory laparotomy 9/10/2017    Re explored for retroperitoneal hematoma. Hematoma evacuated.(09/09)    TB lung, latent     tx INH 2006       Review of Systems   Constitutional: Negative for activity change, appetite change, chills, fatigue, fever and unexpected weight change.   HENT: Negative for congestion, facial swelling, postnasal drip, rhinorrhea, sinus pressure, sore throat and trouble swallowing.    Eyes: Negative for pain, redness and visual disturbance.   Respiratory: Negative for cough, chest tightness, shortness of breath and wheezing.    Cardiovascular: Negative.  Negative for chest pain, palpitations and leg swelling.   Gastrointestinal: Negative for abdominal pain, diarrhea, nausea and vomiting.        Reports diarrhea has resolved    Genitourinary: Negative for dysuria, flank pain and urgency.   Musculoskeletal: Negative for gait problem, neck pain and neck stiffness.   Skin: Negative for rash.   Allergic/Immunologic: Positive for immunocompromised state. Negative for environmental allergies and food allergies.   Neurological: Negative for dizziness, weakness, light-headedness and headaches.   Psychiatric/Behavioral: Negative for  "agitation and confusion. The patient is not nervous/anxious.        Objective:     Blood pressure (!) 146/85, pulse 73, temperature 98.5 °F (36.9 °C), temperature source Oral, resp. rate 16, height 5' 7" (1.702 m), weight 66 kg (145 lb 8.1 oz), SpO2 100 %.body mass index is 22.79 kg/m².    Physical Exam   Constitutional: He is oriented to person, place, and time. He appears well-developed and well-nourished.   HENT:   Head: Normocephalic.   Mouth/Throat: Oropharynx is clear and moist. No oropharyngeal exudate.   Eyes: Conjunctivae and EOM are normal. Pupils are equal, round, and reactive to light. No scleral icterus.   Neck: Normal range of motion. Neck supple.   Cardiovascular: Normal rate, regular rhythm and normal heart sounds.    Pulmonary/Chest: Effort normal and breath sounds normal.   Abdominal: Soft. Normal appearance and bowel sounds are normal. He exhibits no distension and no mass. There is no splenomegaly or hepatomegaly. There is no tenderness. There is no rebound, no guarding, no CVA tenderness, no tenderness at McBurney's point and negative Santiago's sign.       Musculoskeletal: Normal range of motion. He exhibits no edema.        Legs:  Lymphadenopathy:     He has no cervical adenopathy.   Neurological: He is alert and oriented to person, place, and time. He exhibits normal muscle tone. Coordination normal.   Skin: Skin is warm and dry.   Psychiatric: He has a normal mood and affect. His behavior is normal.   Vitals reviewed.      Labs:  Lab Results   Component Value Date    WBC 2.42 (L) 01/24/2018    HGB 11.3 (L) 01/24/2018    HCT 35.0 (L) 01/24/2018     01/24/2018    K 5.3 (H) 01/24/2018     01/24/2018    CO2 24 01/24/2018    BUN 21 (H) 01/24/2018    CREATININE 1.8 (H) 01/24/2018    EGFRNONAA 46.7 (A) 01/24/2018    EGFRIFAFRICA 45 (L) 12/28/2017    CALCIUM 9.1 01/24/2018    PHOS 4.5 01/24/2018    MG 1.9 01/24/2018    ALBUMIN 3.8 01/24/2018    AST 13 11/15/2017    ALT 12 11/15/2017    " UTPCR Unable to calculate 11/15/2017    .0 (H) 2017    TACROLIMUS 8.3 2018       Lab Results   Component Value Date    EXTANC  2017      Comment:      C diff+, Leukopenia, new CMV +    EXTWBC 3.2 2017    EXTSEGS 76 2017    EXTPLATELETS 137 2017    EXTHEMOGLOBI 10.3 2017    EXTHEMATOCRI 31.0 2017    EXTCREATININ 2.08 2017    EXTSODIUM 136 2017    EXTPOTASSIUM 4.3 2017    EXTBUN 22 2017    EXTCO2 22 2017    EXTCALCIUM 9.5 2017    EXTPHOSPHORU 4.4 2017    EXTGLUCOSE 109 2017    EXTALBUMIN 4.3 2017    EXTAST 19 2017    EXTALT 16 2017    EXTBILITOTAL 0.50 2017       Lab Results   Component Value Date    EXTTACROLVL 6.8 2017    EXTPROTCRE 0.23 2017    EXTPROTEINUA negative 2017    EXTWBCUA negative 2017    EXTRBCUA negative 2017       Labs were reviewed with the patient.    Assessment:     1.  donor kidney transplant 2017    2. Immunocompromised state    3. Immunosuppression    4. Long-term use of immunosuppressant medication    5. Need for prophylactic immunotherapy    6. CKD (chronic kidney disease) stage 3, GFR 30-59 ml/min    7. At risk for opportunistic infections    8. Anemia in stage 3 chronic kidney disease        Plan:      Trough 8.3-->Decrease prograf dose 10/9, target 5-7  Low K+ diet encouraged   Dietary consult today     Follow-up:   1. CKD stage: 3 stable    2. Immunosuppression:   Prograf trough 8.3, which is  Supra therapeutic target 5-7. Decrease Prograf 10/9, MMF on hold d/t recent c-diff and neutropenia.   Atovaquone 1500 mg Qd for PCP prophylaxis , Ketoconazole 100 mg QD, Valcyte 450 mg BID for CMV prophylaxis. Will continue to monitor for drug toxicities  On HAART TX  Lab Results   Component Value Date    RX7SJEGY 13.7 (L) 11/15/2017     2018 Cylex pending   1/15/2018  ImmunKnow (Stimulated) 226 - 524 ng/mL 54         3.  Allograft Function: Stable. Continue good po hydration.       Lab Results   Component Value Date    CREATININE 1.8 (H) 01/24/2018        4. Hypertension management: advise low salt diet and home BP monitoring    Nifedipine 30 mg QD     5. Metabolic Bone Disease/Secondary Hyperparathyroidism:stable  Will monitor PTH, CA and Vit D/guidelines,    Lab Results   Component Value Date    .0 (H) 12/27/2017    CALCIUM 9.1 01/24/2018    CAION 1.17 09/02/2017    PHOS 4.5 01/24/2018   Sensipar 60 mg QD      6. Electrolytes:  Will monitor /guidelines  Lab Results   Component Value Date     01/24/2018    K 5.3 (H) 01/24/2018     01/24/2018    CO2 24 01/24/2018   Sodium bicarb 1950 mg TID  Low K+ diet encouraged    7. Anemia: stable.      Will monitor /guidelines  Lab Results   Component Value Date    WBC 2.42 (L) 01/24/2018    HGB 11.3 (L) 01/24/2018    HCT 35.0 (L) 01/24/2018    MCV 89 01/24/2018     (L) 01/24/2018       8.  Cytopenias: no significant cytopenias will monitor as per our guidelines. Medicine list reviewed including potential causes of drug-induced cytopenias  ANC 1600, no intervention needed, will continue to monitor/ guidelines     9.Proteinuria: continue p/c ratio as per guidelines    EXTPROTCRE 0.23 12/28/2017     10. BK and CMV virus infection screening:  will continue to monitor/ guidelines  1/24/2018 CMV pending     11. Weight education: provided during the clinic visit   Body mass index is 22.79 kg/m².          12.Patient safety education regarding immunosuppression including prophylaxis posttransplant for CMV, PCP : Education provided about vaccination and prevention of infections          Follow-up:   Clinic: return to transplant clinic weekly for the first month after transplant; every 2 weeks during months 2-3; then at 6-, 9-, 12-, 18-, 24-, and 36- months post-transplant to reassess for complications from immunosuppression toxicity and monitor for rejection.  Annually  thereafter.    Labs: since patient remains at high risk for rejection and drug-related complications that warrant close monitoring, labs will be ordered as follows: continue twice weekly CBC, renal panel, and drug level for first month; then same labs once weekly through 3rd month post-transplant.  Urine for UA and protein/creatinine ratio monthly.  Urine BK - PCR at 1-, 3-, 6-, 9-, 12-, 18-, 24-, and 36- months post-transplant.  Hepatic panel at 1-, 2-, 3-, 6-, 9-, 12-, 18-, 24-, and 36- months post-transplant.    Hallie Smith NP       Education:   Material provided to the patient.  Patient reminded to call with any health changes since these can be early signs of significant complications.  Also, I advised the patient to be sure any new medications or changes of old medications are discussed with either a pharmacist or physician knowledgeable with transplant to avoid rejection/drug toxicity related to significant drug interactions.    UNOS Patient Status  Functional Status: 80% - Normal activity with effort: some symptoms of disease  Physical Capacity: No Limitations

## 2018-01-25 ENCOUNTER — PATIENT MESSAGE (OUTPATIENT)
Dept: OPHTHALMOLOGY | Facility: CLINIC | Age: 38
End: 2018-01-25

## 2018-01-25 LAB — CMV DNA SERPL NAA+PROBE-ACNC: NORMAL IU/ML

## 2018-01-25 NOTE — ASSESSMENT & PLAN NOTE
- Continue Valcyte for CMV prophylaxis  - Continue Bactrim for PCP prophylaxis  - Continue nystatin for fungal prophylaxis     I have personally seen and examined this patient.  I have fully participated in the care of this patient. I have reviewed all pertinent clinical information, including history, physical exam, plan and the Resident’s note and agree except as noted.

## 2018-01-26 ENCOUNTER — PATIENT MESSAGE (OUTPATIENT)
Dept: TRANSPLANT | Facility: CLINIC | Age: 38
End: 2018-01-26

## 2018-01-26 LAB — IMMUNKNOW (STIMULATED): 54 NG/ML

## 2018-01-26 RX ORDER — FLUOROMETHOLONE 1 MG/ML
1 SUSPENSION/ DROPS OPHTHALMIC 3 TIMES DAILY
Qty: 10 ML | Refills: 3 | Status: SHIPPED | OUTPATIENT
Start: 2018-01-26 | End: 2018-02-05

## 2018-01-29 ENCOUNTER — PATIENT MESSAGE (OUTPATIENT)
Dept: TRANSPLANT | Facility: CLINIC | Age: 38
End: 2018-01-29

## 2018-02-01 DIAGNOSIS — E78.5 HYPERLIPIDEMIA, UNSPECIFIED HYPERLIPIDEMIA TYPE: ICD-10-CM

## 2018-02-01 DIAGNOSIS — D84.9 IMMUNOSUPPRESSION: Chronic | ICD-10-CM

## 2018-02-01 DIAGNOSIS — D84.9 IMMUNOCOMPROMISED STATE: Chronic | ICD-10-CM

## 2018-02-01 DIAGNOSIS — Z94.0 KIDNEY REPLACED BY TRANSPLANT: ICD-10-CM

## 2018-02-01 DIAGNOSIS — Z79.60 LONG-TERM USE OF IMMUNOSUPPRESSANT MEDICATION: ICD-10-CM

## 2018-02-01 DIAGNOSIS — Z94.0 S/P KIDNEY TRANSPLANT: ICD-10-CM

## 2018-02-01 RX ORDER — TACROLIMUS 5 MG/1
CAPSULE ORAL
Qty: 60 CAPSULE | Refills: 11 | Status: SHIPPED | OUTPATIENT
Start: 2018-02-01 | End: 2018-02-19 | Stop reason: SDUPTHER

## 2018-02-01 RX ORDER — TACROLIMUS 1 MG/1
CAPSULE ORAL
Qty: 270 CAPSULE | Refills: 11 | Status: SHIPPED | OUTPATIENT
Start: 2018-02-01 | End: 2018-02-19 | Stop reason: SDUPTHER

## 2018-02-01 NOTE — TELEPHONE ENCOUNTER
Call received from [facility]. Shortage of Prograf. New script requested for 5mg and 1mg. [place] Medicaid approved Provider requested.

## 2018-02-02 ENCOUNTER — PATIENT MESSAGE (OUTPATIENT)
Dept: TRANSPLANT | Facility: CLINIC | Age: 38
End: 2018-02-02

## 2018-02-07 ENCOUNTER — DOCUMENTATION ONLY (OUTPATIENT)
Dept: TRANSPLANT | Facility: CLINIC | Age: 38
End: 2018-02-07

## 2018-02-07 LAB
EXT ALBUMIN: 4.6
EXT BUN: 24
EXT CALCIUM: 9.2
EXT CHLORIDE: 104
EXT CMV DNA QUANT. BY PCR: NORMAL
EXT CO2: 23
EXT CREATININE: 1.95 MG/DL
EXT EOSINOPHIL%: 0.7
EXT GFR MDRD AF AMER: 50
EXT GFR MDRD NON AF AMER: 41
EXT GLUCOSE: 123
EXT HEMATOCRIT: 38.5
EXT HEMOGLOBIN: 12.6
EXT LYMPH%: 21.9
EXT MAGNESIUM: 1.8
EXT MONOCYTES%: 1.8
EXT PHOSPHORUS: 4.5
EXT PLATELETS: 149
EXT POTASSIUM: 5.1
EXT SEGS%: 74.7
EXT SODIUM: 136 MMOL/L
EXT TACROLIMUS LVL: 9.1
EXT WBC: 3.3

## 2018-02-08 ENCOUNTER — DOCUMENTATION ONLY (OUTPATIENT)
Dept: TRANSPLANT | Facility: CLINIC | Age: 38
End: 2018-02-08

## 2018-02-08 LAB
ALBUMIN SERPL-MCNC: 4.5 G/DL (ref 3.5–5.5)
ALBUMIN/GLOB SERPL: 1.8 {RATIO} (ref 1.2–2.2)
ALP SERPL-CCNC: 183 IU/L (ref 39–117)
ALT SERPL-CCNC: 27 IU/L (ref 0–44)
AST SERPL-CCNC: 22 IU/L (ref 0–40)
BASOPHILS # BLD AUTO: 0 X10E3/UL (ref 0–0.2)
BASOPHILS NFR BLD AUTO: 1 %
BILIRUB SERPL-MCNC: 0.4 MG/DL (ref 0–1.2)
BUN SERPL-MCNC: 25 MG/DL (ref 6–20)
BUN/CREAT SERPL: 12 (ref 9–20)
CALCIUM SERPL-MCNC: 9.4 MG/DL (ref 8.7–10.2)
CHLORIDE SERPL-SCNC: 102 MMOL/L (ref 96–106)
CMV DNA SPEC QL NAA+PROBE: NEGATIVE
CO2 SERPL-SCNC: 21 MMOL/L (ref 18–29)
CREAT SERPL-MCNC: 2.08 MG/DL (ref 0.76–1.27)
EOSINOPHIL # BLD AUTO: 0 X10E3/UL (ref 0–0.4)
EOSINOPHIL NFR BLD AUTO: 1 %
ERYTHROCYTE [DISTWIDTH] IN BLOOD BY AUTOMATED COUNT: 15.7 % (ref 12.3–15.4)
GFR SERPLBLD CREATININE-BSD FMLA CKD-EPI: 39 ML/MIN/1.73
GFR SERPLBLD CREATININE-BSD FMLA CKD-EPI: 45 ML/MIN/1.73
GLOBULIN SER CALC-MCNC: 2.5 G/DL (ref 1.5–4.5)
GLUCOSE SERPL-MCNC: 107 MG/DL (ref 65–99)
HCT VFR BLD AUTO: 39.2 % (ref 37.5–51)
HGB BLD-MCNC: 12.5 G/DL (ref 13–17.7)
IMM GRANULOCYTES # BLD: 0 X10E3/UL (ref 0–0.1)
IMM GRANULOCYTES NFR BLD: 0 %
LYMPHOCYTES # BLD AUTO: 0.8 X10E3/UL (ref 0.7–3.1)
LYMPHOCYTES NFR BLD AUTO: 26 %
MAGNESIUM SERPL-MCNC: 1.9 MG/DL (ref 1.6–2.3)
MCH RBC QN AUTO: 28.4 PG (ref 26.6–33)
MCHC RBC AUTO-ENTMCNC: 31.9 G/DL (ref 31.5–35.7)
MCV RBC AUTO: 89 FL (ref 79–97)
MONOCYTES # BLD AUTO: 0.1 X10E3/UL (ref 0.1–0.9)
MONOCYTES NFR BLD AUTO: 2 %
NEUTROPHILS # BLD AUTO: 2 X10E3/UL (ref 1.4–7)
NEUTROPHILS NFR BLD AUTO: 70 %
PHOSPHATE SERPL-MCNC: 5.2 MG/DL (ref 2.5–4.5)
PLATELET # BLD AUTO: 144 X10E3/UL (ref 150–379)
POTASSIUM SERPL-SCNC: 5.8 MMOL/L (ref 3.5–5.2)
PROT SERPL-MCNC: 7 G/DL (ref 6–8.5)
RBC # BLD AUTO: 4.4 X10E6/UL (ref 4.14–5.8)
SODIUM SERPL-SCNC: 142 MMOL/L (ref 134–144)
TACROLIMUS BLD-MCNC: 10.6 NG/ML (ref 2–20)
WBC # BLD AUTO: 2.9 X10E3/UL (ref 3.4–10.8)

## 2018-02-09 ENCOUNTER — PATIENT MESSAGE (OUTPATIENT)
Dept: TRANSPLANT | Facility: CLINIC | Age: 38
End: 2018-02-09

## 2018-02-15 ENCOUNTER — DOCUMENTATION ONLY (OUTPATIENT)
Dept: TRANSPLANT | Facility: CLINIC | Age: 38
End: 2018-02-15

## 2018-02-16 ENCOUNTER — DOCUMENTATION ONLY (OUTPATIENT)
Dept: TRANSPLANT | Facility: CLINIC | Age: 38
End: 2018-02-16

## 2018-02-16 LAB
EXT ALBUMIN: 4.5
EXT ALKALINE PHOSPHATASE: 183
EXT ALT: 27
EXT AST: 22
EXT BILIRUBIN TOTAL: 0.4
EXT BUN: 25
EXT CALCIUM: 9.4
EXT CHLORIDE: 102
EXT CMV DNA QUANT. BY PCR: ABNORMAL
EXT CO2: 21
EXT CREATININE: 2.08 MG/DL
EXT EOSINOPHIL%: 1
EXT GFR MDRD AF AMER: 45
EXT GFR MDRD NON AF AMER: 39
EXT GLUCOSE: 107
EXT HEMATOCRIT: 39.2
EXT HEMOGLOBIN: 12.5
EXT LYMPH%: 26
EXT MAGNESIUM: 1.9
EXT MONOCYTES%: 2
EXT PHOSPHORUS: 5.2
EXT PLATELETS: 144
EXT POTASSIUM: 5.8
EXT PROTEIN TOTAL: 7
EXT SEGS%: 70
EXT SODIUM: 142 MMOL/L
EXT TACROLIMUS LVL: 10.6
EXT WBC: 2.9

## 2018-02-19 ENCOUNTER — DOCUMENTATION ONLY (OUTPATIENT)
Dept: TRANSPLANT | Facility: CLINIC | Age: 38
End: 2018-02-19

## 2018-02-19 DIAGNOSIS — D84.9 IMMUNOSUPPRESSION: Chronic | ICD-10-CM

## 2018-02-19 DIAGNOSIS — Z79.60 LONG-TERM USE OF IMMUNOSUPPRESSANT MEDICATION: ICD-10-CM

## 2018-02-19 DIAGNOSIS — Z94.0 KIDNEY REPLACED BY TRANSPLANT: ICD-10-CM

## 2018-02-19 DIAGNOSIS — D84.9 IMMUNOCOMPROMISED STATE: Chronic | ICD-10-CM

## 2018-02-19 DIAGNOSIS — Z94.0 S/P KIDNEY TRANSPLANT: ICD-10-CM

## 2018-02-19 DIAGNOSIS — E78.5 HYPERLIPIDEMIA, UNSPECIFIED HYPERLIPIDEMIA TYPE: ICD-10-CM

## 2018-02-19 DIAGNOSIS — M89.8X9 METABOLIC BONE DISEASE: ICD-10-CM

## 2018-02-19 LAB
EXT BK VIRUS DNA QUANT, PCR, URINE: NEGATIVE
EXT CMV DNA QUANT. BY PCR: NEGATIVE

## 2018-02-19 RX ORDER — AZITHROMYCIN 600 MG/1
TABLET, FILM COATED ORAL
Qty: 8 TABLET | Refills: 0 | Status: SHIPPED | OUTPATIENT
Start: 2018-02-19 | End: 2018-02-28 | Stop reason: ALTCHOICE

## 2018-02-19 RX ORDER — ROSUVASTATIN CALCIUM 20 MG/1
TABLET, COATED ORAL
Qty: 30 TABLET | Refills: 0 | Status: SHIPPED | OUTPATIENT
Start: 2018-02-19 | End: 2018-03-29 | Stop reason: SDUPTHER

## 2018-02-19 RX ORDER — TACROLIMUS 5 MG/1
CAPSULE ORAL
Qty: 60 CAPSULE | Refills: 11 | Status: SHIPPED | OUTPATIENT
Start: 2018-02-19 | End: 2018-08-13 | Stop reason: SDUPTHER

## 2018-02-19 RX ORDER — VALGANCICLOVIR 450 MG/1
TABLET, FILM COATED ORAL
Qty: 60 TABLET | Refills: 0 | Status: SHIPPED | OUTPATIENT
Start: 2018-02-19 | End: 2018-05-14 | Stop reason: ALTCHOICE

## 2018-02-19 RX ORDER — TACROLIMUS 1 MG/1
CAPSULE ORAL
Qty: 240 CAPSULE | Refills: 11 | Status: SHIPPED | OUTPATIENT
Start: 2018-02-19 | End: 2018-08-13 | Stop reason: SDUPTHER

## 2018-02-19 NOTE — PROGRESS NOTES
Results reviewed and the following message sent to patient via [facility]:  Assuming this tacrolimus level was collected 12 hours after your last dose AND there are no recent med changes (start, stop or change meds); Please decrease Prograf/tacrolimus IR to 9 mg twice daily.

## 2018-02-23 ENCOUNTER — DOCUMENTATION ONLY (OUTPATIENT)
Dept: TRANSPLANT | Facility: CLINIC | Age: 38
End: 2018-02-23

## 2018-02-23 LAB
EXT ALBUMIN: 4.4
EXT ALBUMIN: 4.6
EXT ALKALINE PHOSPHATASE: 164
EXT ALT: 28
EXT AST: 27
EXT BILIRUBIN DIRECT: 0.1 MG/DL
EXT BILIRUBIN TOTAL: 1
EXT BK VIRUS DNA QUANT, PCR, URINE: ABNORMAL
EXT BUN: 21
EXT BUN: 24
EXT CALCIUM: 8.9
EXT CALCIUM: 9.2
EXT CHLORIDE: 105
EXT CHLORIDE: 106
EXT CMV DNA QUANT. BY PCR: NEGATIVE
EXT CMV DNA QUANT. BY PCR: NEGATIVE
EXT CO2: 23
EXT CO2: 25
EXT CREATININE: 2 MG/DL
EXT CREATININE: 2 MG/DL
EXT EOSINOPHIL%: 0.9
EXT EOSINOPHIL%: 1.1
EXT GFR MDRD AF AMER: 46
EXT GFR MDRD AF AMER: 48
EXT GLUCOSE UA: ABNORMAL
EXT GLUCOSE: 106
EXT GLUCOSE: 107
EXT HEMATOCRIT: 34.4
EXT HEMATOCRIT: 37.3
EXT HEMOGLOBIN: 11.6
EXT HEMOGLOBIN: 12.5
EXT LYMPH%: 18.2
EXT LYMPH%: 20.2
EXT MAGNESIUM: 1.7
EXT MAGNESIUM: 1.7
EXT MONOCYTES%: 2.4
EXT MONOCYTES%: 3.8
EXT NITRITES UA: ABNORMAL
EXT PHOSPHORUS: 4.7
EXT PHOSPHORUS: 5.2
EXT PLATELETS: 135
EXT PLATELETS: 140
EXT POTASSIUM: 4.9
EXT POTASSIUM: 5
EXT PROTEIN TOTAL: 7.4
EXT PROTEIN UA: ABNORMAL
EXT RBC UA: NEGATIVE
EXT SEGS%: 75.7
EXT SEGS%: 75.8
EXT SODIUM: 135 MMOL/L
EXT SODIUM: 136 MMOL/L
EXT TACROLIMUS LVL: 12.7
EXT TACROLIMUS LVL: 6.6
EXT WBC UA: NEGATIVE
EXT WBC: 3.8
EXT WBC: 4.7

## 2018-02-27 ENCOUNTER — DOCUMENTATION ONLY (OUTPATIENT)
Dept: TRANSPLANT | Facility: CLINIC | Age: 38
End: 2018-02-27

## 2018-02-28 ENCOUNTER — OFFICE VISIT (OUTPATIENT)
Dept: TRANSPLANT | Facility: CLINIC | Age: 38
End: 2018-02-28
Payer: MEDICARE

## 2018-02-28 ENCOUNTER — LAB VISIT (OUTPATIENT)
Dept: LAB | Facility: HOSPITAL | Age: 38
End: 2018-02-28
Attending: INTERNAL MEDICINE
Payer: MEDICARE

## 2018-02-28 VITALS
BODY MASS INDEX: 23.67 KG/M2 | OXYGEN SATURATION: 100 % | HEIGHT: 67 IN | WEIGHT: 150.81 LBS | TEMPERATURE: 99 F | HEART RATE: 74 BPM | DIASTOLIC BLOOD PRESSURE: 99 MMHG | SYSTOLIC BLOOD PRESSURE: 151 MMHG | RESPIRATION RATE: 19 BRPM

## 2018-02-28 DIAGNOSIS — Z94.0 KIDNEY REPLACED BY TRANSPLANT: ICD-10-CM

## 2018-02-28 DIAGNOSIS — B20 HIV (HUMAN IMMUNODEFICIENCY VIRUS INFECTION): Chronic | ICD-10-CM

## 2018-02-28 DIAGNOSIS — D84.9 IMMUNOSUPPRESSION: Chronic | ICD-10-CM

## 2018-02-28 DIAGNOSIS — N18.30 CKD (CHRONIC KIDNEY DISEASE) STAGE 3, GFR 30-59 ML/MIN: ICD-10-CM

## 2018-02-28 DIAGNOSIS — Z91.89 AT RISK FOR OPPORTUNISTIC INFECTIONS: ICD-10-CM

## 2018-02-28 DIAGNOSIS — Z94.0 S/P KIDNEY TRANSPLANT: Primary | ICD-10-CM

## 2018-02-28 DIAGNOSIS — M89.8X9 METABOLIC BONE DISEASE: ICD-10-CM

## 2018-02-28 DIAGNOSIS — E78.5 HYPERLIPIDEMIA, UNSPECIFIED HYPERLIPIDEMIA TYPE: ICD-10-CM

## 2018-02-28 DIAGNOSIS — Z29.89 NEED FOR PROPHYLACTIC IMMUNOTHERAPY: ICD-10-CM

## 2018-02-28 DIAGNOSIS — D84.9 IMMUNOCOMPROMISED STATE: Chronic | ICD-10-CM

## 2018-02-28 DIAGNOSIS — Z94.0 STATUS POST DECEASED-DONOR KIDNEY TRANSPLANTATION: ICD-10-CM

## 2018-02-28 PROCEDURE — 36415 COLL VENOUS BLD VENIPUNCTURE: CPT

## 2018-02-28 PROCEDURE — 99213 OFFICE O/P EST LOW 20 MIN: CPT | Mod: PBBFAC | Performed by: NURSE PRACTITIONER

## 2018-02-28 PROCEDURE — 86352 CELL FUNCTION ASSAY W/STIM: CPT

## 2018-02-28 PROCEDURE — 99214 OFFICE O/P EST MOD 30 MIN: CPT | Mod: S$PBB,,, | Performed by: NURSE PRACTITIONER

## 2018-02-28 PROCEDURE — 99999 PR PBB SHADOW E&M-EST. PATIENT-LVL III: CPT | Mod: PBBFAC,,, | Performed by: NURSE PRACTITIONER

## 2018-02-28 RX ORDER — MYCOPHENOLATE MOFETIL 250 MG/1
250 CAPSULE ORAL 2 TIMES DAILY
Qty: 60 CAPSULE | Refills: 11 | Status: SHIPPED | OUTPATIENT
Start: 2018-02-28 | End: 2018-05-14 | Stop reason: DRUGHIGH

## 2018-02-28 RX ORDER — PREDNISONE 5 MG/1
5 TABLET ORAL DAILY
COMMUNITY
End: 2018-08-13 | Stop reason: SDUPTHER

## 2018-02-28 RX ORDER — AZITHROMYCIN 600 MG/1
TABLET, FILM COATED ORAL
Qty: 8 TABLET | Refills: 11
Start: 2018-02-28 | End: 2018-03-29 | Stop reason: SDUPTHER

## 2018-02-28 RX ORDER — SODIUM POLYSTYRENE SULFONATE 4.1 MEQ/G
POWDER, FOR SUSPENSION ORAL; RECTAL
Qty: 453.6 G | Refills: 1 | Status: SHIPPED | OUTPATIENT
Start: 2018-02-28 | End: 2018-03-01 | Stop reason: SDUPTHER

## 2018-02-28 RX ORDER — NIFEDIPINE 30 MG/1
30 TABLET, EXTENDED RELEASE ORAL 2 TIMES DAILY
Qty: 60 TABLET | Refills: 11 | Status: SHIPPED | OUTPATIENT
Start: 2018-02-28 | End: 2018-03-01 | Stop reason: SDUPTHER

## 2018-02-28 NOTE — PATIENT INSTRUCTIONS
Nifedipine 30 mg BID. OK to hold PM dose if BP < 120/80   Valcyte 450 mg QD  Start Cellcept 250 mg every 12 hours

## 2018-02-28 NOTE — PROGRESS NOTES
Kidney Post-Transplant Assessment    Referring Physician:   Current Nephrologist: Alexa Holliday    ORGAN: RIGHT KIDNEY  Donor Type:  - brain death  PHS Increased Risk: no  Cold Ischemia: 422 mins  Induction Medications: thymoglobulin    Subjective:     CC:  Reassessment of renal allograft function and management of chronic immunosuppression.     HPI:  Mr. Aguiar is a 37 y.o. year old Black or  male who received a  - brain death kidney transplant on 17.  He has CKD stage 3 - GFR 30-59 and his baseline creatinine is between  1.8-2.1. He takes mycophenolate mofetil (which is currently on hold d/t recent c-diff infection and neutropenia) and tacrolimus for maintenance immunosuppression. He denies any recent hospitalizations or ER visits since his previous clinic visit.    Interval HX:  Overall feels well.  Denies chest pain, SOB, leg pain, abdominal pain or LUTs.  Patient reports BP has been elevated at home , in the 150s/90s        Kidney History:  Demetrio Aguiar is a 37 y.o. 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       His post transplant course  - DGF requiring HD (last HD 17) on 17.   - Biopsy proven ABMR with weakly positive class I DSA's on 17. Pt treated with SMP x 3 (-9/3). PLEX x 6 (completed ) and IVIG x 2 (-). Kidney function was improving  - Kidney US on 17 showed a large collection located along the anterior inferior margin of the renal transplant measuring 13.4 x 7 x 11.9. taken back to the OR for retroperitoneal bleed and washout.   - on HAART therapy. Started on Azithromycin weekly.  - Perirenal fluid collection, s/p drainage by IR 17  - Follow up kidney biopsy 10/17/17: 14 glom, MINIMAL INTERSTITIAL  "LYMPHOCYTIC INFILTRATE WITH RARE TUBULITIS, NOT MEETING CRITERIA FOR ACUTE CELLULAR REJECTION      Review of Systems   Constitutional: Negative for activity change, appetite change, chills, fatigue, fever and unexpected weight change.   HENT: Negative for congestion, facial swelling, postnasal drip, rhinorrhea, sinus pressure, sore throat and trouble swallowing.    Eyes: Negative for pain, redness and visual disturbance.   Respiratory: Negative for cough, chest tightness, shortness of breath and wheezing.    Cardiovascular: Negative.  Negative for chest pain, palpitations and leg swelling.   Gastrointestinal: Negative for abdominal pain, diarrhea, nausea and vomiting.   Genitourinary: Negative for dysuria, flank pain and urgency.   Musculoskeletal: Negative for gait problem, neck pain and neck stiffness.   Skin: Negative for rash.   Allergic/Immunologic: Positive for immunocompromised state. Negative for environmental allergies and food allergies.   Neurological: Negative for dizziness, weakness, light-headedness and headaches.   Psychiatric/Behavioral: Negative for agitation and confusion. The patient is not nervous/anxious.        Objective:     Blood pressure (!) 151/99, pulse 74, temperature 98.5 °F (36.9 °C), temperature source Oral, resp. rate 19, height 5' 7" (1.702 m), weight 68.4 kg (150 lb 12.7 oz), SpO2 100 %.body mass index is 23.62 kg/m².    Physical Exam    Labs:  Lab Results   Component Value Date    WBC 2.9 (L) 02/05/2018    HGB 12.5 (L) 02/05/2018    HCT 39.2 02/05/2018     02/05/2018    K 5.8 (H) 02/05/2018     02/05/2018    CO2 21 02/05/2018    BUN 25 (H) 02/05/2018    CREATININE 2.08 (H) 02/05/2018    EGFRNONAA 39 (L) 02/05/2018    EGFRIFAFRICA 45 (L) 02/05/2018    CALCIUM 9.4 02/05/2018    PHOS 5.2 (H) 02/05/2018    MG 1.9 02/05/2018    ALBUMIN 4.5 02/05/2018    AST 22 02/05/2018    ALT 27 02/05/2018    UTPCR Unable to calculate 11/15/2017    .0 (H) 12/27/2017    TACROLIMUS " 10.6 2018       Lab Results   Component Value Date    EXTANC  2017      Comment:      C diff+, Leukopenia, new CMV +    EXTWBC 4.7 2018    EXTSEGS 75.8 2018    EXTPLATELETS 140 2018    EXTHEMOGLOBI 12.5 2018    EXTHEMATOCRI 37.3 2018    EXTCREATININ 2.0 2018    EXTSODIUM 136 2018    EXTPOTASSIUM 5.0 2018    EXTBUN 24 2018    EXTCO2 25 2018    EXTCALCIUM 9.2 2018    EXTPHOSPHORU 5.2 (H) 2018    EXTGLUCOSE 107 2018    EXTALBUMIN 4.6 2018    EXTAST 27 2018    EXTALT 28 2018    EXTBILITOTAL 1.0 2018       Lab Results   Component Value Date    EXTTACROLVL 12.7 (H) 2018    EXTPROTCRE 0.23 2017    EXTPROTEINUA neg 2018    EXTWBCUA negative 2018    EXTRBCUA negative 2018       Labs were reviewed with the patient.    Assessment:     1.  donor kidney transplant 2017    2. Immunocompromised state    3. Immunosuppression    4. Need for prophylactic immunotherapy    5. At risk for opportunistic infections    6. HIV (human immunodeficiency virus infection)    7. 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    8. CKD (chronic kidney disease) stage 3, GFR 30-59 ml/min        Plan:    repeat prograf trough   Nifedipine 30 mg BID. OK to hold PM dose if BP < 120/80   CMV (-) --> decrease dose to Valcyte 450 mg QD  Start Cellcept 250 mg every 12 hours  Repeat labs 2 weeks and then again in 2 weeks    Patient reports taking Prednisone 5 mg daily       Follow-up:   1. CKD stage: 3 stable    2. Immunosuppression:   Prograf trough 12.7, which is  Supra therapeutic target 5-7. Prograf  Dose recently decreased to 9/ on , MMF  250 mg BID . Prednisone 5 mg daily.   Atovaquone 1500 mg Qd for PCP prophylaxis , Ketoconazole 100 mg QD, Valcyte 450 mg QD for CMV prophylaxis. Will continue to monitor for drug toxicities. Azithromycin 600 mg on   For MAC prophylaxis.     HIV : On HAART  TX--f/u with ID as scheduled         Lab Results   Component Value Date     [id number] 13.7 (L) 11/15/2017   11/15/2017  Absolute CD4 300 - 1400 cells/ul 59       11/27/2017  HIV-1 RNA Not detected Not detected      2/28/2017 cylex pending   1/24/2018 Cylex    ImmunKnow (Stimulated) 226 - 524 ng/mL 54          3. Allograft Function: Stable. Continue good po hydration.            EXTCREATININ 2.0 02/19/2018        4. Hypertension management: advise low salt diet and home BP monitoring    Dose increased to Nifedipine 30 mg BID. OK to hold PM dose if BP < 120/80      5. Metabolic Bone Disease/Secondary Hyperparathyroidism:stable  Will monitor PTH, CA and Vit D/guidelines,     EXTCALCIUM 9.2 02/19/2018   EXTPHOSPHORU 5.2 (H) 02/19/2018   Phos slightly high--> low phos diet encouraged     Lab Results   Component Value Date    .0 (H) 12/27/2017   Continue Sensipar 60 mg QD        6. Electrolytes:  Will monitor /guidelines   EXTSODIUM 136 02/19/2018   EXTPOTASSIUM 5.0 02/19/2018   EXTBUN 24 02/19/2018   EXTCO2 25 02/19/2018   Sodium bicarb 1950 mg TID  K+ acceptable--. Continue Low K+ diet       7. Anemia: stable.      Will monitor /guidelines, no intervention needed  EXTWBC 4.7 02/19/2018   EXTSEGS 75.8 02/19/2018   EXTPLATELETS 140 02/19/2018   EXTHEMOGLOBI 12.5 02/19/2018   EXTHEMATOCRI 37.3 02/19/2018        8.  Cytopenias: no significant cytopenias will monitor as per our guidelines. Medicine list reviewed including potential causes of drug-induced cytopenias   Improved--> ANC 3562.60    9.Proteinuria: continue p/c ratio as per guidelines    EXTPROTCRE 0.23 12/28/2017      10. BK and CMV virus infection screening:  will continue to monitor/ guidelines   2/5/2018 CMV negative      2/19/2018  EXT CMV DNA QUANT. BY PCR  negative        11. Weight education: provided during the clinic visit   Body mass index is 23.62 kg/m².      Follow-up:   Clinic: return to transplant clinic weekly for the first month after  transplant; every 2 weeks during months 2-3; then at 6-, 9-, 12-, 18-, 24-, and 36- months post-transplant to reassess for complications from immunosuppression toxicity and monitor for rejection.  Annually thereafter.    Labs: since patient remains at high risk for rejection and drug-related complications that warrant close monitoring, labs will be ordered as follows: continue twice weekly CBC, renal panel, and drug level for first month; then same labs once weekly through 3rd month post-transplant.  Urine for UA and protein/creatinine ratio monthly.  Urine BK - PCR at 1-, 3-, 6-, 9-, 12-, 18-, 24-, and 36- months post-transplant.  Hepatic panel at 1-, 2-, 3-, 6-, 9-, 12-, 18-, 24-, and 36- months post-transplant.    Hallie Smith NP       Education:   Material provided to the patient.  Patient reminded to call with any health changes since these can be early signs of significant complications.  Also, I advised the patient to be sure any new medications or changes of old medications are discussed with either a pharmacist or physician knowledgeable with transplant to avoid rejection/drug toxicity related to significant drug interactions.    UNOS Patient Status  Functional Status: 80% - Normal activity with effort: some symptoms of disease  Physical Capacity: No Limitations

## 2018-02-28 NOTE — LETTER
February 28, 2018        Alexa Holliday  [facility] [place] [address]  Phone: [phone]  Fax: [phone]             [facility]  [address]  [facility] [address]  Phone: [phone]   Patient: Demetrio Aguiar   MR Number: [id number]   YOB: 1980   Date of Visit: 2/28/2018       Dear Dr. Alexa Holliday    Thank you for referring Demetrio Aguiar to me for evaluation. Attached you will find relevant portions of my assessment and plan of care.    If you have questions, please do not hesitate to call me. I look forward to following Demetrio Aguiar along with you.    Sincerely,    Hallie Smith, NP    Enclosure    If you would like to receive this communication electronically, please contact [email or web address] or [phone] to request [employer] Link access.    [employer] is a tool which provides read-only access to select patient information with whom you have a relationship. Its easy to use and provides real time access to review your patients record including encounter summaries, notes, results, and demographic information.    If you feel you have received this communication in error or would no longer like to receive these types of communications, please e-mail [email or web address]

## 2018-03-01 ENCOUNTER — PATIENT MESSAGE (OUTPATIENT)
Dept: TRANSPLANT | Facility: CLINIC | Age: 38
End: 2018-03-01

## 2018-03-01 DIAGNOSIS — E87.5 HYPERKALEMIA: Primary | ICD-10-CM

## 2018-03-01 RX ORDER — SODIUM POLYSTYRENE SULFONATE 4.1 MEQ/G
POWDER, FOR SUSPENSION ORAL; RECTAL
Qty: 453.6 G | Refills: 3 | Status: SHIPPED | OUTPATIENT
Start: 2018-03-01 | End: 2018-08-18 | Stop reason: SDUPTHER

## 2018-03-01 RX ORDER — NIFEDIPINE 30 MG/1
30 TABLET, EXTENDED RELEASE ORAL 2 TIMES DAILY
Qty: 60 TABLET | Refills: 11 | Status: SHIPPED | OUTPATIENT
Start: 2018-03-01 | End: 2018-08-13

## 2018-03-02 LAB — IMMUNKNOW (STIMULATED): 150 NG/ML

## 2018-03-05 ENCOUNTER — PATIENT MESSAGE (OUTPATIENT)
Dept: ADMINISTRATIVE | Facility: OTHER | Age: 38
End: 2018-03-05

## 2018-03-05 ENCOUNTER — TELEPHONE (OUTPATIENT)
Dept: PHARMACY | Facility: CLINIC | Age: 38
End: 2018-03-05

## 2018-03-05 NOTE — TELEPHONE ENCOUNTER
Cotacting patient via mychart to ensure continued access to ART after transfer to pharmacy in [place] last month.    Ernesto Sullivan, RHODA.Ph.  Clinical Pharmacist  [facility]  Phone: [phone]

## 2018-03-06 ENCOUNTER — DOCUMENTATION ONLY (OUTPATIENT)
Dept: TRANSPLANT | Facility: CLINIC | Age: 38
End: 2018-03-06

## 2018-03-06 LAB
EXT ALBUMIN: 4.8
EXT ANC: NORMAL
EXT BUN: 29
EXT CALCIUM: 9.4
EXT CHLORIDE: 106
EXT CMV DNA QUANT. BY PCR: NEGATIVE
EXT CO2: 24
EXT CREATININE: 2.08 MG/DL
EXT EOSINOPHIL%: 0.7
EXT GFR MDRD AF AMER: 46
EXT GLUCOSE: 113
EXT HEMATOCRIT: 38.4
EXT HEMOGLOBIN: 12.6
EXT LYMPH%: 12.4
EXT MAGNESIUM: 1.8
EXT MONOCYTES%: 2.2
EXT PHOSPHORUS: 4.8
EXT PLATELETS: 149
EXT POTASSIUM: 4.8
EXT SEGS%: 83.8
EXT SODIUM: 139 MMOL/L
EXT TACROLIMUS LVL: 7.5
EXT WBC: 7.9

## 2018-03-19 ENCOUNTER — PATIENT MESSAGE (OUTPATIENT)
Dept: TRANSPLANT | Facility: CLINIC | Age: 38
End: 2018-03-19

## 2018-03-27 ENCOUNTER — DOCUMENTATION ONLY (OUTPATIENT)
Dept: TRANSPLANT | Facility: CLINIC | Age: 38
End: 2018-03-27

## 2018-03-27 LAB
EXT ALBUMIN: 4.6
EXT BUN: 25
EXT CALCIUM: 9
EXT CHLORIDE: 106
EXT CO2: 24
EXT CREATININE: 2 MG/DL
EXT EOSINOPHIL%: 0.7
EXT GFR MDRD AF AMER: 48
EXT GLUCOSE: 105
EXT HEMATOCRIT: 35.9
EXT HEMOGLOBIN: 12.2
EXT MAGNESIUM: 1.8
EXT MONOCYTES%: 6.4
EXT PHOSPHORUS: 4.8
EXT PLATELETS: 133
EXT POTASSIUM: 4.8
EXT SEGS%: 76.5
EXT SODIUM: 137 MMOL/L
EXT TACROLIMUS LVL: 9.3
EXT WBC: 4.5

## 2018-03-29 ENCOUNTER — PATIENT MESSAGE (OUTPATIENT)
Dept: TRANSPLANT | Facility: CLINIC | Age: 38
End: 2018-03-29

## 2018-03-29 DIAGNOSIS — Z94.0 KIDNEY REPLACED BY TRANSPLANT: ICD-10-CM

## 2018-03-29 DIAGNOSIS — M89.8X9 METABOLIC BONE DISEASE: ICD-10-CM

## 2018-03-29 DIAGNOSIS — E78.5 HYPERLIPIDEMIA, UNSPECIFIED HYPERLIPIDEMIA TYPE: ICD-10-CM

## 2018-03-29 RX ORDER — AZITHROMYCIN 600 MG/1
TABLET, FILM COATED ORAL
Qty: 8 TABLET | Refills: 0 | Status: SHIPPED | OUTPATIENT
Start: 2018-03-29 | End: 2018-04-16 | Stop reason: SDUPTHER

## 2018-03-29 RX ORDER — ROSUVASTATIN CALCIUM 20 MG/1
TABLET, COATED ORAL
Qty: 30 TABLET | Refills: 0 | Status: SHIPPED | OUTPATIENT
Start: 2018-03-29 | End: 2018-04-16 | Stop reason: SDUPTHER

## 2018-03-29 RX ORDER — VALGANCICLOVIR 450 MG/1
TABLET, FILM COATED ORAL
Qty: 60 TABLET | Refills: 0 | OUTPATIENT
Start: 2018-03-29

## 2018-03-29 NOTE — TELEPHONE ENCOUNTER
CMV has been negative. Rx request for Valcyte decline--advise him he can stop med.  Repeat CMV PCR a week please after he stops Valcyte, please.  Thank you.

## 2018-04-04 ENCOUNTER — DOCUMENTATION ONLY (OUTPATIENT)
Dept: TRANSPLANT | Facility: CLINIC | Age: 38
End: 2018-04-04

## 2018-04-04 LAB
EXT ALBUMIN: 4.6
EXT ALKALINE PHOSPHATASE: NORMAL
EXT ALT: NORMAL
EXT AMYLASE: NORMAL
EXT ANC: NORMAL
EXT AST: NORMAL
EXT BACTERIA UA: NORMAL
EXT BANDS%: NORMAL
EXT BILIRUBIN DIRECT: NORMAL MG/DL
EXT BILIRUBIN TOTAL: NORMAL
EXT BK VIRUS DNA QN PCR: NORMAL
EXT BK VIRUS DNA QUANT, PCR, URINE: NORMAL
EXT BUN: 24
EXT C PEPTIDE: NORMAL
EXT CALCIUM: 9
EXT CHLORIDE: 102
EXT CHOLESTEROL (LIPID PANEL): NORMAL
EXT CHOLESTEROL: NORMAL
EXT CMV DNA QUANT. BY PCR: NORMAL
EXT CO2: 25
EXT CREATININE: 2.04 MG/DL
EXT CYCLOSPORINE LVL: NORMAL
EXT EBV DNA BY PCR: NORMAL
EXT EBV DNA-COPIES/ML: NORMAL
EXT EOSINOPHIL%: 1.7
EXT FERRITIN: NORMAL
EXT GFR MDRD AF AMER: 47
EXT GFR MDRD NON AF AMER: NORMAL
EXT GLUCOSE UA: NORMAL
EXT GLUCOSE: 106
EXT HBV DNA QUANT PCR: NORMAL
EXT HCV QUANT: NORMAL
EXT HDL: NORMAL
EXT HEMATOCRIT: 36
EXT HEMOGLOBIN A1C: NORMAL
EXT HEMOGLOBIN: 12.3
EXT HEP B S AG: NORMAL
EXT HIV: NORMAL
EXT IMMUNKNOW (STIMULATED): NORMAL
EXT INR: NORMAL
EXT IRON SATURATION: NORMAL
EXT LDH, TOTAL: NORMAL
EXT LDL CHOLESTEROL: NORMAL
EXT LIPASE: NORMAL
EXT LYMPH%: 18.1
EXT MAGNESIUM: 1.8
EXT MONOCYTES%: 6.3
EXT NITRITES UA: NORMAL
EXT PHOSPHORUS: 4.9
EXT PLATELETS: 137
EXT POTASSIUM: 5
EXT PROT/CREAT RATIO UR: NORMAL
EXT PROTEIN TOTAL: NORMAL
EXT PROTEIN UA: NORMAL
EXT PT: NORMAL
EXT PTH, INTACT: NORMAL
EXT RBC UA: NORMAL
EXT SEGS%: 73.1
EXT SERUM IRON: NORMAL
EXT SIROLIMUS LVL: NORMAL
EXT SODIUM: 134 MMOL/L
EXT STOOL CDIFF: NORMAL
EXT STOOL CMV: NORMAL
EXT STOOL CULTURE: NORMAL
EXT STOOL OCP: NORMAL
EXT TACROLIMUS LVL: NORMAL
EXT TIBC: NORMAL
EXT TRIGLYCERIDES: NORMAL
EXT UNSATURATED IRON BINDING CAP.: NORMAL
EXT URIC ACID: NORMAL
EXT URINE CULTURE: NORMAL
EXT VIT D 25 HYDROXY: NORMAL
EXT WBC UA: NORMAL
EXT WBC: 4.4

## 2018-04-05 ENCOUNTER — DOCUMENTATION ONLY (OUTPATIENT)
Dept: TRANSPLANT | Facility: CLINIC | Age: 38
End: 2018-04-05

## 2018-04-16 DIAGNOSIS — Z94.0 KIDNEY REPLACED BY TRANSPLANT: ICD-10-CM

## 2018-04-16 DIAGNOSIS — M89.8X9 METABOLIC BONE DISEASE: ICD-10-CM

## 2018-04-16 DIAGNOSIS — E78.5 HYPERLIPIDEMIA, UNSPECIFIED HYPERLIPIDEMIA TYPE: ICD-10-CM

## 2018-04-16 RX ORDER — ROSUVASTATIN CALCIUM 20 MG/1
TABLET, COATED ORAL
Qty: 30 TABLET | Refills: 0 | Status: SHIPPED | OUTPATIENT
Start: 2018-04-16 | End: 2018-05-14 | Stop reason: SDUPTHER

## 2018-04-16 RX ORDER — AZITHROMYCIN 600 MG/1
TABLET, FILM COATED ORAL
Qty: 8 TABLET | Refills: 0 | Status: SHIPPED | OUTPATIENT
Start: 2018-04-16 | End: 2018-05-14 | Stop reason: SDUPTHER

## 2018-04-25 ENCOUNTER — PATIENT MESSAGE (OUTPATIENT)
Dept: TRANSPLANT | Facility: CLINIC | Age: 38
End: 2018-04-25

## 2018-05-14 ENCOUNTER — LAB VISIT (OUTPATIENT)
Dept: LAB | Facility: HOSPITAL | Age: 38
End: 2018-05-14
Attending: INTERNAL MEDICINE
Payer: MEDICARE

## 2018-05-14 ENCOUNTER — CLINICAL SUPPORT (OUTPATIENT)
Dept: TRANSPLANT | Facility: CLINIC | Age: 38
End: 2018-05-14
Payer: MEDICARE

## 2018-05-14 ENCOUNTER — OFFICE VISIT (OUTPATIENT)
Dept: TRANSPLANT | Facility: CLINIC | Age: 38
End: 2018-05-14
Payer: MEDICARE

## 2018-05-14 VITALS
HEART RATE: 79 BPM | DIASTOLIC BLOOD PRESSURE: 87 MMHG | HEIGHT: 67 IN | TEMPERATURE: 98 F | SYSTOLIC BLOOD PRESSURE: 152 MMHG | RESPIRATION RATE: 18 BRPM | OXYGEN SATURATION: 100 % | BODY MASS INDEX: 25.12 KG/M2 | WEIGHT: 160.06 LBS

## 2018-05-14 VITALS
DIASTOLIC BLOOD PRESSURE: 97 MMHG | HEART RATE: 79 BPM | TEMPERATURE: 98 F | OXYGEN SATURATION: 100 % | HEIGHT: 67 IN | BODY MASS INDEX: 25.12 KG/M2 | RESPIRATION RATE: 18 BRPM | WEIGHT: 160.06 LBS | SYSTOLIC BLOOD PRESSURE: 152 MMHG

## 2018-05-14 DIAGNOSIS — Z94.0 S/P KIDNEY TRANSPLANT: Primary | ICD-10-CM

## 2018-05-14 DIAGNOSIS — T86.12 KIDNEY TRANSPLANT FAILURE: Primary | ICD-10-CM

## 2018-05-14 DIAGNOSIS — B20 HIV (HUMAN IMMUNODEFICIENCY VIRUS INFECTION): Chronic | ICD-10-CM

## 2018-05-14 DIAGNOSIS — E78.5 HYPERLIPIDEMIA, UNSPECIFIED HYPERLIPIDEMIA TYPE: ICD-10-CM

## 2018-05-14 DIAGNOSIS — Z29.89 NEED FOR PROPHYLACTIC IMMUNOTHERAPY: ICD-10-CM

## 2018-05-14 DIAGNOSIS — Z94.0 S/P KIDNEY TRANSPLANT: ICD-10-CM

## 2018-05-14 DIAGNOSIS — Z79.60 LONG-TERM USE OF IMMUNOSUPPRESSANT MEDICATION: ICD-10-CM

## 2018-05-14 DIAGNOSIS — D63.1 ANEMIA IN STAGE 3 CHRONIC KIDNEY DISEASE: Chronic | ICD-10-CM

## 2018-05-14 DIAGNOSIS — Z94.0 KIDNEY REPLACED BY TRANSPLANT: ICD-10-CM

## 2018-05-14 DIAGNOSIS — D84.9 IMMUNOSUPPRESSION: Chronic | ICD-10-CM

## 2018-05-14 DIAGNOSIS — N18.30 ANEMIA IN STAGE 3 CHRONIC KIDNEY DISEASE: Chronic | ICD-10-CM

## 2018-05-14 DIAGNOSIS — N18.30 CKD (CHRONIC KIDNEY DISEASE) STAGE 3, GFR 30-59 ML/MIN: ICD-10-CM

## 2018-05-14 DIAGNOSIS — D84.9 IMMUNOCOMPROMISED STATE: Chronic | ICD-10-CM

## 2018-05-14 DIAGNOSIS — M89.8X9 METABOLIC BONE DISEASE: ICD-10-CM

## 2018-05-14 LAB
ALBUMIN SERPL BCP-MCNC: 4.4 G/DL
ALBUMIN SERPL BCP-MCNC: 4.4 G/DL
ALP SERPL-CCNC: 152 U/L
ALT SERPL W/O P-5'-P-CCNC: 15 U/L
ANION GAP SERPL CALC-SCNC: 9 MMOL/L
AST SERPL-CCNC: 18 U/L
BASOPHILS # BLD AUTO: 0.01 K/UL
BASOPHILS NFR BLD: 0.5 %
BILIRUB DIRECT SERPL-MCNC: 0.4 MG/DL
BILIRUB SERPL-MCNC: 0.9 MG/DL
BUN SERPL-MCNC: 30 MG/DL
CALCIUM SERPL-MCNC: 9.3 MG/DL
CHLORIDE SERPL-SCNC: 107 MMOL/L
CO2 SERPL-SCNC: 24 MMOL/L
CREAT SERPL-MCNC: 2.5 MG/DL
DIFFERENTIAL METHOD: ABNORMAL
EOSINOPHIL # BLD AUTO: 0 K/UL
EOSINOPHIL NFR BLD: 1.9 %
ERYTHROCYTE [DISTWIDTH] IN BLOOD BY AUTOMATED COUNT: 13.8 %
EST. GFR  (AFRICAN AMERICAN): 36 ML/MIN/1.73 M^2
EST. GFR  (NON AFRICAN AMERICAN): 31.2 ML/MIN/1.73 M^2
GLUCOSE SERPL-MCNC: 96 MG/DL
HCT VFR BLD AUTO: 30.2 %
HGB BLD-MCNC: 9.8 G/DL
IMM GRANULOCYTES # BLD AUTO: 0.04 K/UL
IMM GRANULOCYTES NFR BLD AUTO: 1.9 %
LYMPHOCYTES # BLD AUTO: 0.6 K/UL
LYMPHOCYTES NFR BLD: 28.7 %
MAGNESIUM SERPL-MCNC: 1.9 MG/DL
MCH RBC QN AUTO: 30.9 PG
MCHC RBC AUTO-ENTMCNC: 32.5 G/DL
MCV RBC AUTO: 95 FL
MONOCYTES # BLD AUTO: 0.3 K/UL
MONOCYTES NFR BLD: 14.4 %
NEUTROPHILS # BLD AUTO: 1.1 K/UL
NEUTROPHILS NFR BLD: 52.6 %
NRBC BLD-RTO: 0 /100 WBC
PHOSPHATE SERPL-MCNC: 4.6 MG/DL
PLATELET # BLD AUTO: 132 K/UL
PMV BLD AUTO: 10.7 FL
POTASSIUM SERPL-SCNC: 5.4 MMOL/L
PROT SERPL-MCNC: 7.2 G/DL
RBC # BLD AUTO: 3.17 M/UL
SODIUM SERPL-SCNC: 140 MMOL/L
TACROLIMUS BLD-MCNC: 9.8 NG/ML
WBC # BLD AUTO: 2.09 K/UL

## 2018-05-14 PROCEDURE — 99999 PR PBB SHADOW E&M-EST. PATIENT-LVL V: CPT | Mod: PBBFAC,,, | Performed by: NURSE PRACTITIONER

## 2018-05-14 PROCEDURE — 99999 PR PBB SHADOW E&M-EST. PATIENT-LVL III: CPT | Mod: PBBFAC,,,

## 2018-05-14 PROCEDURE — 80069 RENAL FUNCTION PANEL: CPT

## 2018-05-14 PROCEDURE — 84075 ASSAY ALKALINE PHOSPHATASE: CPT

## 2018-05-14 PROCEDURE — 36415 COLL VENOUS BLD VENIPUNCTURE: CPT

## 2018-05-14 PROCEDURE — 85025 COMPLETE CBC W/AUTO DIFF WBC: CPT

## 2018-05-14 PROCEDURE — 96372 THER/PROPH/DIAG INJ SC/IM: CPT | Mod: PBBFAC

## 2018-05-14 PROCEDURE — 87799 DETECT AGENT NOS DNA QUANT: CPT

## 2018-05-14 PROCEDURE — 86352 CELL FUNCTION ASSAY W/STIM: CPT

## 2018-05-14 PROCEDURE — 99213 OFFICE O/P EST LOW 20 MIN: CPT | Mod: PBBFAC,25

## 2018-05-14 PROCEDURE — 99214 OFFICE O/P EST MOD 30 MIN: CPT | Mod: S$PBB,,, | Performed by: NURSE PRACTITIONER

## 2018-05-14 PROCEDURE — 80197 ASSAY OF TACROLIMUS: CPT

## 2018-05-14 PROCEDURE — 99215 OFFICE O/P EST HI 40 MIN: CPT | Mod: PBBFAC,27,25 | Performed by: NURSE PRACTITIONER

## 2018-05-14 PROCEDURE — 83735 ASSAY OF MAGNESIUM: CPT

## 2018-05-14 RX ORDER — AZITHROMYCIN 600 MG/1
TABLET, FILM COATED ORAL
Qty: 8 TABLET | Refills: 0 | Status: SHIPPED | OUTPATIENT
Start: 2018-05-14 | End: 2018-06-07 | Stop reason: SDUPTHER

## 2018-05-14 RX ORDER — ROSUVASTATIN CALCIUM 20 MG/1
TABLET, COATED ORAL
Qty: 30 TABLET | Refills: 0 | Status: SHIPPED | OUTPATIENT
Start: 2018-05-14 | End: 2018-06-11 | Stop reason: SDUPTHER

## 2018-05-14 RX ADMIN — FILGRASTIM 480 MCG: 480 INJECTION, SOLUTION INTRAVENOUS; SUBCUTANEOUS at 10:05

## 2018-05-14 NOTE — LETTER
May 14, 2018        Alexa Holliday  [facility] [address]  Phone: [phone]  Fax: [phone]             [facility]  [address]  [facility] [address]  Phone: [phone]   Patient: Demetrio Aguiar   MR Number: [id number]   YOB: 1980   Date of Visit: 5/14/2018       Dear Dr. Alexa Holliday    Thank you for referring Demetrio Aguiar to me for evaluation. Attached you will find relevant portions of my assessment and plan of care.    If you have questions, please do not hesitate to call me. I look forward to following Demetrio Aguiar along with you.    Sincerely,    Hallie Smith, NP    Enclosure    If you would like to receive this communication electronically, please contact [email or web address] or [phone] to request [employer] Link access.    [employer] Link is a tool which provides read-only access to select patient information with whom you have a relationship. Its easy to use and provides real time access to review your patients record including encounter summaries, notes, results, and demographic information.    If you feel you have received this communication in error or would no longer like to receive these types of communications, please e-mail [email or web address]

## 2018-05-14 NOTE — PROGRESS NOTES
Kidney Post-Transplant Assessment    Referring Physician:   Current Nephrologist: Alexa Holliday    ORGAN: RIGHT KIDNEY  Donor Type:  - brain death  PHS Increased Risk: no  Cold Ischemia: 422 mins  Induction Medications: thymoglobulin    Subjective:     CC:  Reassessment of renal allograft function and management of chronic immunosuppression.    HPI:  Mr. Aguiar is a 38 y.o. year old Black or  male who received a  - brain death kidney transplant on 17.  He has CKD stage 3 - GFR 30-59 and his baseline creatinine is between 1.8-2.1. He takes mycophenolate mofetil, prednisone and tacrolimus for maintenance immunosuppression. He denies any recent hospitalizations or ER visits since his previous clinic visit.    Sinus infection last month and was TX w/ An antibiotic. While  Taking the antibiotic he had loose stools. He continues to have soft but formed stools. Sinus infection has resolved.   Overall feels well. No health concerns today.   Denies chest pain, SOB, leg pain, abdominal pain or LUTs.  He is f/u w ID and brought labs from his last visit to review.       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 Hematoma evacuated.()    Status post exploratory laparotomy 9/10/2017    Re explored for retroperitoneal hematoma. Hematoma evacuated.()    TB lung, latent     tx INH        Review of Systems   Constitutional: Negative for activity change, appetite change, chills, fatigue, fever and unexpected weight change.   HENT: Negative for congestion, facial swelling, postnasal drip,  "rhinorrhea, sinus pressure, sore throat and trouble swallowing.    Eyes: Negative for pain, redness and visual disturbance.   Respiratory: Negative for cough, chest tightness, shortness of breath and wheezing.    Cardiovascular: Negative.  Negative for chest pain, palpitations and leg swelling.   Gastrointestinal: Negative for abdominal pain, diarrhea, nausea and vomiting.        Loose but formed stools   Genitourinary: Negative for dysuria, flank pain and urgency.   Musculoskeletal: Negative for gait problem, neck pain and neck stiffness.   Skin: Negative for rash.   Allergic/Immunologic: Positive for immunocompromised state. Negative for environmental allergies and food allergies.   Neurological: Negative for dizziness, weakness, light-headedness and headaches.   Psychiatric/Behavioral: Negative for agitation and confusion. The patient is not nervous/anxious.        Objective:     Blood pressure (!) 152/97, pulse 79, temperature 97.8 °F (36.6 °C), temperature source Oral, resp. rate 18, height 5' 7" (1.702 m), weight 72.6 kg (160 lb 0.9 oz), SpO2 100 %.body mass index is 25.07 kg/m².    Physical Exam   Musculoskeletal:        Legs:      Labs:  Lab Results   Component Value Date    WBC 2.09 (L) 05/14/2018    HGB 9.8 (L) 05/14/2018    HCT 30.2 (L) 05/14/2018     02/05/2018    K 5.8 (H) 02/05/2018     02/05/2018    CO2 21 02/05/2018    BUN 25 (H) 02/05/2018    CREATININE 2.08 (H) 02/05/2018    EGFRNONAA 39 (L) 02/05/2018    EGFRIFAFRICA 45 (L) 02/05/2018    CALCIUM 9.4 02/05/2018    PHOS 5.2 (H) 02/05/2018    MG 1.9 02/05/2018    ALBUMIN 4.5 02/05/2018    AST 22 02/05/2018    ALT 27 02/05/2018    UTPCR Unable to calculate 11/15/2017    .0 (H) 12/27/2017    TACROLIMUS 10.6 02/05/2018       Lab Results   Component Value Date    EXTANC  03/04/2018      Comment:      Cellcept restarted    EXTWBC 4.4 04/02/2018    EXTSEGS 73.1 04/02/2018    EXTPLATELETS 137 04/02/2018    EXTHEMOGLOBI 12.3 04/02/2018    " EXTHEMATOCRI 36 2018    EXTCREATININ 2.04 2018    EXTSODIUM 134 2018    EXTPOTASSIUM 5.0 2018    EXTBUN 24 2018    EXTCO2 25 2018    EXTCALCIUM 9.0 2018    EXTPHOSPHORU 4.9 2018    EXTGLUCOSE 106 2018    EXTALBUMIN 4.6 2018    EXTAST 27 2018    EXTALT 28 2018    EXTBILITOTAL 1.0 2018       Lab Results   Component Value Date    EXTTACROLVL *p* 2018    EXTPROTCRE 0.23 2017    EXTPROTEINUA neg 2018    EXTWBCUA negative 2018    EXTRBCUA negative 2018       Labs were reviewed with the patient.    Assessment:     1.  donor kidney transplant 2017    2. Need for prophylactic immunotherapy    3. Immunocompromised state    4. Immunosuppression    5. HIV (human immunodeficiency virus infection)    6. CKD (chronic kidney disease) stage 3, GFR 30-59 ml/min    7. Long-term use of immunosuppressant medication    8. Anemia in stage 3 chronic kidney disease        Plan:   Endocrinology referral -->  A1C 6.9  Add CMV to labs   hold MMF , CBC weekly   Stop Valcyte    Neupogen today/ guidelines , get CBC Thursday       Follow-up:   1. CKD stage: 3  stable    2. Immunosuppression:   Prograf trough pending , therapeutic target 5-7. Continue Prograf , MMF Mg BID, and Prednisone 5 mg QD  Atovaquone 1500 mg Qd for PCP prophylaxis, Ketoconazole 100 mg QD, azithromycin 1200 mg every 7 days  Will continue to monitor for drug toxicities         2018  7mo 2wk   EXT Tacrolimus Lvl *p*      2018  6mo 1wk   ImmunKnow (Stimulated) 226 - 524 ng/mL 150 (A)          HIV--> HAART TX as prescribed  CD4( outside labs) --> 77 continue  azithromycin 1200 mg every 7 days -->MGMT deferred to  ID    3. Allograft Function: Stable. Continue good po hydration.    Lab Results   Component Value Date    CREATININE 2.08 (H) 2018  7mo 2wk   EXT GFR MDRD AF AMER 47     4. Hypertension management: advise low salt diet  and home BP monitoring    Nifedipine 30 mg QD     5. Metabolic Bone Disease/Secondary Hyperparathyroidism:stable  Will monitor PTH, CA and Vit D/guidelines,     Sensipar 60 mg QD   4/2/2018  7mo 2wk   EXT Magnesium 1.8     Lab Results   Component Value Date    .0 (H) 12/27/2017    CALCIUM 9.4 02/05/2018    CAION 1.17 09/02/2017    PHOS 5.2 (H) 02/05/2018       6. Electrolytes:  Will monitor /guidelines  Lab Results   Component Value Date     02/05/2018    K 5.8 (H) 02/05/2018     02/05/2018    CO2 21 02/05/2018   Sodium bicarb 1950 mg TID      7. Anemia: stable. No need for intervention    Will monitor /guidelines  Lab Results   Component Value Date    WBC 2.09 (L) 05/14/2018    HGB 9.8 (L) 05/14/2018    HCT 30.2 (L) 05/14/2018    MCV 95 05/14/2018     (L) 05/14/2018       8.  Cytopenias: no significant cytopenias will monitor as per our guidelines. Medicine list reviewed including potential causes of drug-induced cytopenias  ANC 1099--hold MMF , CBC weekly   Neupogen today/ guidelines , get CBC Thursday    9.Proteinuria: continue p/c ratio as per guidelines     12/28/2017  4mo 1wk   EXT PROT/CREAT Ratio UR 0.23     10. BK and CMV virus infection screening:  will continue to monitor/ guidelines   2/19/2018  6mo 0wk   EXT BK Virus DNA Quant, PCR, Urine *P*      2/19/2018  6mo 0wk   EXT CMV DNA QUANT. BY PCR negative     11. Weight education: provided during the clinic visit   Body mass index is 25.07 kg/m².       12.Patient safety education regarding immunosuppression including prophylaxis posttransplant for CMV, PCP : Education provided about vaccination and prevention of infections       Follow-up:   Clinic: return to transplant clinic weekly for the first month after transplant; every 2 weeks during months 2-3; then at 6-, 9-, 12-, 18-, 24-, and 36- months post-transplant to reassess for complications from immunosuppression toxicity and monitor for rejection.  Annually  thereafter.    Labs: since patient remains at high risk for rejection and drug-related complications that warrant close monitoring, labs will be ordered as follows: continue twice weekly CBC, renal panel, and drug level for first month; then same labs once weekly through 3rd month post-transplant.  Urine for UA and protein/creatinine ratio monthly.  Urine BK - PCR at 1-, 3-, 6-, 9-, 12-, 18-, 24-, and 36- months post-transplant.  Hepatic panel at 1-, 2-, 3-, 6-, 9-, 12-, 18-, 24-, and 36- months post-transplant.    Hallie Smith NP       Education:   Material provided to the patient.  Patient reminded to call with any health changes since these can be early signs of significant complications.  Also, I advised the patient to be sure any new medications or changes of old medications are discussed with either a pharmacist or physician knowledgeable with transplant to avoid rejection/drug toxicity related to significant drug interactions.    UNOS Patient Status  Functional Status: 80% - Normal activity with effort: some symptoms of disease  Physical Capacity: No Limitations

## 2018-05-14 NOTE — PROGRESS NOTES
Results reviewed and the following message sent to patient via [facility]: Your creatinine is up -- Please increase fluid intake and recheck renal panel in next few days.

## 2018-05-15 LAB
BKV DNA SERPL NAA+PROBE-ACNC: <125 COPIES/ML
BKV DNA SERPL NAA+PROBE-LOG#: <2.1 LOG (10) COPIES/ML
BKV DNA SERPL QL NAA+PROBE: NOT DETECTED

## 2018-05-15 NOTE — TELEPHONE ENCOUNTER
Call placed to patient in review of a [facility] message. Patient complaining of back pain following a Neupogen injection on yesterday. Patient requesting pain medication advised to take Tylenol and call back if pain has not improved in 48hrs.

## 2018-05-16 LAB
CMV DNA SERPL NAA+PROBE-ACNC: NORMAL IU/ML
IMMUNKNOW (STIMULATED): 46 NG/ML

## 2018-05-17 ENCOUNTER — PATIENT MESSAGE (OUTPATIENT)
Dept: TRANSPLANT | Facility: CLINIC | Age: 38
End: 2018-05-17

## 2018-05-21 LAB
EXT ANC: 1974
EXT EOSINOPHIL%: 1.1
EXT HEMATOCRIT: 31.3
EXT HEMOGLOBIN: 10.6
EXT LYMPH%: 30.3
EXT MONOCYTES%: 20.3
EXT PLATELETS: 145
EXT SEGS%: 47.2
EXT WBC: 4.2

## 2018-05-22 ENCOUNTER — DOCUMENTATION ONLY (OUTPATIENT)
Dept: TRANSPLANT | Facility: CLINIC | Age: 38
End: 2018-05-22

## 2018-05-22 LAB
EXT ANC: NORMAL
EXT BUN: 29
EXT CALCIUM: 9.3
EXT CHLORIDE: 107
EXT CO2: 26
EXT CREATININE: 2.55 MG/DL
EXT EOSINOPHIL%: 1.5
EXT GFR MDRD AF AMER: 36
EXT GFR MDRD NON AF AMER: 30
EXT GLUCOSE: 98
EXT HEMATOCRIT: 30.2
EXT HEMOGLOBIN: 10.4
EXT LYMPH%: 13.5
EXT MONOCYTES%: 7.8
EXT PLATELETS: 110
EXT POTASSIUM: 4.8
EXT SEGS%: 76.5
EXT SODIUM: 139 MMOL/L
EXT WBC: 5.4

## 2018-05-23 NOTE — PROGRESS NOTES
Results reviewed, and action is needed: Please increase fluid intake and recheck renal panel in next few days for elevated creatinine.

## 2018-05-25 ENCOUNTER — DOCUMENTATION ONLY (OUTPATIENT)
Dept: TRANSPLANT | Facility: CLINIC | Age: 38
End: 2018-05-25

## 2018-05-25 ENCOUNTER — TELEPHONE (OUTPATIENT)
Dept: TRANSPLANT | Facility: CLINIC | Age: 38
End: 2018-05-25

## 2018-05-25 NOTE — TELEPHONE ENCOUNTER
----- Message from Lucía Polk MD sent at 5/25/2018  3:23 PM CDT -----  Available results reviewed and require no immediate action.

## 2018-05-25 NOTE — TELEPHONE ENCOUNTER
Results reviewed with patient . RP not completed on 5/21/18.Patient  To repeat fasting labs on 5/28/18.

## 2018-05-31 ENCOUNTER — DOCUMENTATION ONLY (OUTPATIENT)
Dept: TRANSPLANT | Facility: CLINIC | Age: 38
End: 2018-05-31

## 2018-05-31 LAB
EXT ALBUMIN: 4.6
EXT BUN: 26
EXT CALCIUM: 9.4
EXT CHLORIDE: 108
EXT CO2: 19
EXT EOSINOPHIL%: 2.2
EXT GLUCOSE: 101
EXT HEMATOCRIT: 28.8
EXT HEMOGLOBIN: 9.8
EXT LYMPH%: 22.3
EXT MAGNESIUM: 1.7
EXT MONOCYTES%: 16.9
EXT PHOSPHORUS: 4.2
EXT PLATELETS: 92
EXT POTASSIUM: 5
EXT SEGS%: 56.6
EXT SODIUM: 138 MMOL/L
EXT TACROLIMUS LVL: 7.2
EXT WBC: 3.3

## 2018-06-01 ENCOUNTER — DOCUMENTATION ONLY (OUTPATIENT)
Dept: TRANSPLANT | Facility: CLINIC | Age: 38
End: 2018-06-01

## 2018-06-04 ENCOUNTER — TELEPHONE (OUTPATIENT)
Dept: TRANSPLANT | Facility: CLINIC | Age: 38
End: 2018-06-04

## 2018-06-04 NOTE — ASSESSMENT & PLAN NOTE
- Biopsy (for DGF) 8/25/17: good sample with 24 glomeruli (only 1 sclerosed) and evidence just meeting criteria for ABMR. + diffuse ATI. Minimal fibrosis, no ACR or AVR, but + CD4 (>50% diffusely + stain) with mild glomerulitis and focal peritubular capillaritis. Also, biopsy shows ca-oxalate (3) and ca-phos (2) crystals.  -8/25/17 DSA + for weak class I. Repeat pre-PLEX Class I DSA DETECTED: A2(3894),B44(2820),B62(5178),    Class II DSA DETECTED BELOW CUTOFF: DR53(1265)  - Tolerated PLEX #1 8/29, #2 8/30, #3 8/31, #4 9/2,  #5 9/4, and #6 9/5/17.    - completed SMP x 3. Of note, original plan for thymo cancelled secondary to difficulty with line placement due to pt anatomy.  Cellcept originally held- restarted.  - Tolerated IVIG #1 and 2 9/5-6/17.    - Plan for rituximab in future of needed- for now monitor with current therapy.   - UOP increasing and overall Cr improved.   - Repeat DSAs pending.    Principal Discharge DX:	Abdominal pain

## 2018-06-06 ENCOUNTER — PATIENT MESSAGE (OUTPATIENT)
Dept: TRANSPLANT | Facility: CLINIC | Age: 38
End: 2018-06-06

## 2018-06-06 ENCOUNTER — TELEPHONE (OUTPATIENT)
Dept: TRANSPLANT | Facility: CLINIC | Age: 38
End: 2018-06-06

## 2018-06-06 NOTE — TELEPHONE ENCOUNTER
Call received from [facility]. Prior Auth required for Procardia XL. [facility] called, Prior Auth initiated.

## 2018-06-07 DIAGNOSIS — M89.8X9 METABOLIC BONE DISEASE: ICD-10-CM

## 2018-06-07 DIAGNOSIS — Z94.0 KIDNEY REPLACED BY TRANSPLANT: ICD-10-CM

## 2018-06-07 DIAGNOSIS — Z79.2 LONG-TERM CURRENT USE OF ANTIBIOTICS FOR PREVENTION OF RECURRENT INFECTION: ICD-10-CM

## 2018-06-07 DIAGNOSIS — E78.5 HYPERLIPIDEMIA, UNSPECIFIED HYPERLIPIDEMIA TYPE: ICD-10-CM

## 2018-06-07 RX ORDER — AZITHROMYCIN 600 MG/1
TABLET, FILM COATED ORAL
Qty: 8 TABLET | Refills: 0 | Status: SHIPPED | OUTPATIENT
Start: 2018-06-07 | End: 2018-07-11 | Stop reason: SDUPTHER

## 2018-06-07 RX ORDER — ATOVAQUONE 750 MG/5ML
SUSPENSION ORAL
Qty: 210 ML | Refills: 3 | Status: SHIPPED | OUTPATIENT
Start: 2018-06-07 | End: 2018-08-14 | Stop reason: SDUPTHER

## 2018-06-07 RX ORDER — PREDNISONE 10 MG/1
TABLET ORAL
Qty: 15 TABLET | Refills: 0 | Status: SHIPPED | OUTPATIENT
Start: 2018-06-07 | End: 2018-06-19 | Stop reason: SDUPTHER

## 2018-06-11 DIAGNOSIS — E78.5 HYPERLIPIDEMIA, UNSPECIFIED HYPERLIPIDEMIA TYPE: ICD-10-CM

## 2018-06-11 DIAGNOSIS — Z94.0 KIDNEY REPLACED BY TRANSPLANT: ICD-10-CM

## 2018-06-11 RX ORDER — ROSUVASTATIN CALCIUM 20 MG/1
TABLET, COATED ORAL
Qty: 30 TABLET | Refills: 0 | Status: SHIPPED | OUTPATIENT
Start: 2018-06-11 | End: 2018-07-11 | Stop reason: SDUPTHER

## 2018-06-12 ENCOUNTER — DOCUMENTATION ONLY (OUTPATIENT)
Dept: TRANSPLANT | Facility: CLINIC | Age: 38
End: 2018-06-12

## 2018-06-12 DIAGNOSIS — R73.9 HYPERGLYCEMIA: Primary | ICD-10-CM

## 2018-06-12 LAB
EXT ALBUMIN: 4.8
EXT BUN: 28
EXT CALCIUM: 9.7
EXT CHLORIDE: 106
EXT CO2: 26
EXT CREATININE: 2.3 MG/DL
EXT EOSINOPHIL%: 3.3
EXT GFR MDRD AF AMER: 41
EXT GFR MDRD NON AF AMER: 34
EXT GLUCOSE: 103
EXT HEMATOCRIT: 33.1
EXT HEMOGLOBIN: 11.1
EXT LYMPH%: 25.1
EXT MAGNESIUM: 1.8
EXT MONOCYTES%: 15.2
EXT PHOSPHORUS: 4.4
EXT PLATELETS: 156
EXT POTASSIUM: 5.2
EXT SEGS%: 55.3
EXT SODIUM: 137 MMOL/L
EXT TACROLIMUS LVL: 10
EXT WBC: 4.1

## 2018-06-13 ENCOUNTER — DOCUMENTATION ONLY (OUTPATIENT)
Dept: TRANSPLANT | Facility: CLINIC | Age: 38
End: 2018-06-13

## 2018-06-13 LAB
EXT ALBUMIN: 4.6
EXT BUN: 27
EXT CALCIUM: 9.2
EXT CHLORIDE: 107
EXT CO2: 25
EXT CREATININE: 2.27 MG/DL
EXT EOSINOPHIL%: 3.4
EXT GFR MDRD AF AMER: 42
EXT GFR MDRD NON AF AMER: 34
EXT GLUCOSE: 102
EXT HEMATOCRIT: 32.4
EXT HEMOGLOBIN: 11
EXT LYMPH%: 23.5
EXT MAGNESIUM: 1.8
EXT MONOCYTES%: 14.7
EXT PHOSPHORUS: 5
EXT PLATELETS: 144
EXT POTASSIUM: 4.9
EXT SEGS%: 57.8
EXT SODIUM: 139 MMOL/L
EXT TACROLIMUS LVL: 7
EXT WBC: 4

## 2018-06-13 NOTE — PROGRESS NOTES
Prograf higher than goal but prior lab ok --> arrange for repeat labs. Cr also slightly elevated --> encourage hydration

## 2018-06-14 ENCOUNTER — TELEPHONE (OUTPATIENT)
Dept: TRANSPLANT | Facility: CLINIC | Age: 38
End: 2018-06-14

## 2018-06-14 ENCOUNTER — DOCUMENTATION ONLY (OUTPATIENT)
Dept: TRANSPLANT | Facility: CLINIC | Age: 38
End: 2018-06-14

## 2018-06-14 DIAGNOSIS — N17.9 AKI (ACUTE KIDNEY INJURY): Primary | ICD-10-CM

## 2018-06-14 NOTE — TELEPHONE ENCOUNTER
----- Message from Jase Hines MD sent at 6/14/2018  2:05 PM CDT -----  I just sent a message to address anti-proliferative agent    Let's plan for a kidney US next week please    Hydration    Repeat labs next week

## 2018-06-14 NOTE — TELEPHONE ENCOUNTER
----- Message from Jase Hines MD sent at 6/14/2018  1:46 PM CDT -----  Let's start Myfortic 180 tid. His leukopenia seems stable. Ask the patient for significant diarrhea. We can try this for 2 weeks, repeat cbc and see if he can tolerate it. I am worried that he had a rejection and is OFF MMF    thanks    Jase

## 2018-06-14 NOTE — PROGRESS NOTES
I just sent a message to address anti-proliferative agent    Let's plan for a kidney US next week please    Hydration    Repeat labs next week

## 2018-06-14 NOTE — TELEPHONE ENCOUNTER
Call placed to patient in review of outside labs received. Creatinine elevated at 2.2. Patient off MMF. Encouraged to increase PO hydration and repeat labs including cylex next week. Patient agreeable.

## 2018-06-15 RX ORDER — MYCOPHENOLIC ACID 180 MG/1
180 TABLET, DELAYED RELEASE ORAL 3 TIMES DAILY
Qty: 90 TABLET | Refills: 11 | Status: SHIPPED | OUTPATIENT
Start: 2018-06-15 | End: 2018-08-14 | Stop reason: SDUPTHER

## 2018-06-19 RX ORDER — PREDNISONE 10 MG/1
TABLET ORAL
Qty: 15 TABLET | Refills: 0 | Status: SHIPPED | OUTPATIENT
Start: 2018-06-19 | End: 2018-07-11 | Stop reason: SDUPTHER

## 2018-06-20 ENCOUNTER — TELEPHONE (OUTPATIENT)
Dept: TRANSPLANT | Facility: CLINIC | Age: 38
End: 2018-06-20

## 2018-06-20 ENCOUNTER — HOSPITAL ENCOUNTER (OUTPATIENT)
Dept: RADIOLOGY | Facility: HOSPITAL | Age: 38
Discharge: HOME | End: 2018-06-20
Attending: INTERNAL MEDICINE
Payer: MEDICARE

## 2018-06-20 ENCOUNTER — PATIENT MESSAGE (OUTPATIENT)
Dept: TRANSPLANT | Facility: CLINIC | Age: 38
End: 2018-06-20

## 2018-06-20 DIAGNOSIS — N25.81 SECONDARY HYPERPARATHYROIDISM: ICD-10-CM

## 2018-06-20 DIAGNOSIS — M89.8X9 METABOLIC BONE DISEASE: Primary | ICD-10-CM

## 2018-06-20 DIAGNOSIS — Z94.0 STATUS POST KIDNEY TRANSPLANT: ICD-10-CM

## 2018-06-20 DIAGNOSIS — N17.9 AKI (ACUTE KIDNEY INJURY): ICD-10-CM

## 2018-06-20 DIAGNOSIS — Z48.298 AFTERCARE FOLLOWING ORGAN TRANSPLANT: ICD-10-CM

## 2018-06-20 PROCEDURE — 76776 US EXAM K TRANSPL W/DOPPLER: CPT | Mod: TC

## 2018-06-20 PROCEDURE — 76776 US EXAM K TRANSPL W/DOPPLER: CPT | Mod: 26,,, | Performed by: RADIOLOGY

## 2018-06-20 NOTE — TELEPHONE ENCOUNTER
"----- Message from Jase Hines MD sent at 6/20/2018 12:01 PM CDT -----      I reviewed labs in the past when you were out. 2 kidney biopsies with negative DSA in the past. Creatinine trending up in the last few months. Kidney US was negative with some "subtle" decrease perfusion. I was wondering if this FK toxicity. Suggest to get DSA, possible repeat biopsy. No surer we can do too much with Myfortic due to his current leucopenia  "

## 2018-06-20 NOTE — TELEPHONE ENCOUNTER
Agree with recommendation from Dr. Hines as to repeat DSA  With next labs.  Also obtain Immuknow Cylex.

## 2018-06-21 ENCOUNTER — PATIENT MESSAGE (OUTPATIENT)
Dept: TRANSPLANT | Facility: CLINIC | Age: 38
End: 2018-06-21

## 2018-06-21 NOTE — TELEPHONE ENCOUNTER
Next labs scheduled fro July 9 at outside hospital  I asked pt whether he can come to [facility] for next draw so that we can get the DSA.

## 2018-07-09 ENCOUNTER — LAB VISIT (OUTPATIENT)
Dept: LAB | Facility: HOSPITAL | Age: 38
End: 2018-07-09
Attending: INTERNAL MEDICINE
Payer: MEDICARE

## 2018-07-09 DIAGNOSIS — Z94.0 KIDNEY REPLACED BY TRANSPLANT: ICD-10-CM

## 2018-07-09 DIAGNOSIS — Z48.298 AFTERCARE FOLLOWING ORGAN TRANSPLANT: ICD-10-CM

## 2018-07-09 DIAGNOSIS — Z94.0 STATUS POST KIDNEY TRANSPLANT: ICD-10-CM

## 2018-07-09 DIAGNOSIS — M89.8X9 METABOLIC BONE DISEASE: ICD-10-CM

## 2018-07-09 LAB
ALBUMIN SERPL BCP-MCNC: 4.6 G/DL
ANION GAP SERPL CALC-SCNC: 10 MMOL/L
BASOPHILS # BLD AUTO: 0.02 K/UL
BASOPHILS NFR BLD: 0.5 %
BUN SERPL-MCNC: 29 MG/DL
CALCIUM SERPL-MCNC: 9.6 MG/DL
CHLORIDE SERPL-SCNC: 105 MMOL/L
CLASS I ANTIBODIES - LUMINEX: NORMAL
CLASS I ANTIBODY COMMENTS - LUMINEX: NORMAL
CLASS II ANTIBODIES - LUMINEX: NORMAL
CLASS II ANTIBODY COMMENTS - LUMINEX: NORMAL
CO2 SERPL-SCNC: 24 MMOL/L
CREAT SERPL-MCNC: 2.5 MG/DL
DIFFERENTIAL METHOD: ABNORMAL
DSA1 TESTING DATE: NORMAL
DSA12 TESTING DATE: NORMAL
DSA2 TESTING DATE: NORMAL
EOSINOPHIL # BLD AUTO: 0.1 K/UL
EOSINOPHIL NFR BLD: 2.7 %
ERYTHROCYTE [DISTWIDTH] IN BLOOD BY AUTOMATED COUNT: 12.3 %
EST. GFR  (AFRICAN AMERICAN): 36 ML/MIN/1.73 M^2
EST. GFR  (NON AFRICAN AMERICAN): 31.2 ML/MIN/1.73 M^2
GLUCOSE SERPL-MCNC: 85 MG/DL
HCT VFR BLD AUTO: 33.2 %
HGB BLD-MCNC: 10.8 G/DL
IMM GRANULOCYTES # BLD AUTO: 0.01 K/UL
IMM GRANULOCYTES NFR BLD AUTO: 0.2 %
LYMPHOCYTES # BLD AUTO: 0.8 K/UL
LYMPHOCYTES NFR BLD: 18.8 %
MAGNESIUM SERPL-MCNC: 2 MG/DL
MCH RBC QN AUTO: 31 PG
MCHC RBC AUTO-ENTMCNC: 32.5 G/DL
MCV RBC AUTO: 95 FL
MONOCYTES # BLD AUTO: 0.5 K/UL
MONOCYTES NFR BLD: 12.1 %
NEUTROPHILS # BLD AUTO: 2.7 K/UL
NEUTROPHILS NFR BLD: 65.7 %
NRBC BLD-RTO: 0 /100 WBC
PHOSPHATE SERPL-MCNC: 4.9 MG/DL
PLATELET # BLD AUTO: 133 K/UL
PMV BLD AUTO: 10.2 FL
POTASSIUM SERPL-SCNC: 5.5 MMOL/L
RBC # BLD AUTO: 3.48 M/UL
SERUM COLLECTION DT - LUMINEX CLASS I: NORMAL
SERUM COLLECTION DT - LUMINEX CLASS II: NORMAL
SODIUM SERPL-SCNC: 139 MMOL/L
TACROLIMUS BLD-MCNC: 7.1 NG/ML
WBC # BLD AUTO: 4.05 K/UL

## 2018-07-09 PROCEDURE — 80069 RENAL FUNCTION PANEL: CPT | Mod: NTX

## 2018-07-09 PROCEDURE — 80197 ASSAY OF TACROLIMUS: CPT

## 2018-07-09 PROCEDURE — 86833 HLA CLASS II HIGH DEFIN QUAL: CPT | Mod: 91,PO,NTX

## 2018-07-09 PROCEDURE — 83735 ASSAY OF MAGNESIUM: CPT | Mod: NTX

## 2018-07-09 PROCEDURE — 36415 COLL VENOUS BLD VENIPUNCTURE: CPT

## 2018-07-09 PROCEDURE — 85025 COMPLETE CBC W/AUTO DIFF WBC: CPT | Mod: NTX

## 2018-07-09 PROCEDURE — 86977 RBC SERUM PRETX INCUBJ/INHIB: CPT | Mod: PO,TXP

## 2018-07-09 PROCEDURE — 86832 HLA CLASS I HIGH DEFIN QUAL: CPT | Mod: PO,TXP

## 2018-07-09 PROCEDURE — 86352 CELL FUNCTION ASSAY W/STIM: CPT | Mod: NTX

## 2018-07-11 ENCOUNTER — PATIENT MESSAGE (OUTPATIENT)
Dept: TRANSPLANT | Facility: CLINIC | Age: 38
End: 2018-07-11

## 2018-07-11 DIAGNOSIS — M89.8X9 METABOLIC BONE DISEASE: ICD-10-CM

## 2018-07-11 DIAGNOSIS — Z94.0 KIDNEY REPLACED BY TRANSPLANT: ICD-10-CM

## 2018-07-11 DIAGNOSIS — D84.9 IMMUNOCOMPROMISED STATE: Primary | Chronic | ICD-10-CM

## 2018-07-11 DIAGNOSIS — Z94.0 S/P KIDNEY TRANSPLANT: ICD-10-CM

## 2018-07-11 DIAGNOSIS — E78.5 HYPERLIPIDEMIA, UNSPECIFIED HYPERLIPIDEMIA TYPE: ICD-10-CM

## 2018-07-11 DIAGNOSIS — B20 HIV (HUMAN IMMUNODEFICIENCY VIRUS INFECTION): Chronic | ICD-10-CM

## 2018-07-11 DIAGNOSIS — Z91.89 AT RISK FOR OPPORTUNISTIC INFECTIONS: ICD-10-CM

## 2018-07-11 LAB — IMMUNKNOW (STIMULATED): 134 NG/ML

## 2018-07-11 RX ORDER — AZITHROMYCIN 600 MG/1
TABLET, FILM COATED ORAL
Qty: 8 TABLET | Refills: 0 | OUTPATIENT
Start: 2018-07-11

## 2018-07-11 RX ORDER — PREDNISONE 10 MG/1
TABLET ORAL
Qty: 15 TABLET | Refills: 0 | Status: SHIPPED | OUTPATIENT
Start: 2018-07-11 | End: 2018-08-13 | Stop reason: SDUPTHER

## 2018-07-11 RX ORDER — AZITHROMYCIN 600 MG/1
TABLET, FILM COATED ORAL
Qty: 8 TABLET | Refills: 5 | Status: SHIPPED | OUTPATIENT
Start: 2018-07-11 | End: 2018-08-14 | Stop reason: SDUPTHER

## 2018-07-11 RX ORDER — ROSUVASTATIN CALCIUM 20 MG/1
TABLET, COATED ORAL
Qty: 30 TABLET | Refills: 0 | Status: SHIPPED | OUTPATIENT
Start: 2018-07-11 | End: 2018-08-14 | Stop reason: SDUPTHER

## 2018-07-12 ENCOUNTER — PATIENT MESSAGE (OUTPATIENT)
Dept: TRANSPLANT | Facility: CLINIC | Age: 38
End: 2018-07-12

## 2018-07-30 DIAGNOSIS — M89.8X9 METABOLIC BONE DISEASE: ICD-10-CM

## 2018-07-30 DIAGNOSIS — Z94.0 KIDNEY REPLACED BY TRANSPLANT: ICD-10-CM

## 2018-07-30 RX ORDER — SODIUM BICARBONATE 650 MG/1
1950 TABLET ORAL 3 TIMES DAILY
Qty: 270 TABLET | Refills: 0 | Status: SHIPPED | OUTPATIENT
Start: 2018-07-30 | End: 2018-08-13 | Stop reason: SDUPTHER

## 2018-08-07 ENCOUNTER — TELEPHONE (OUTPATIENT)
Dept: TRANSPLANT | Facility: CLINIC | Age: 38
End: 2018-08-07

## 2018-08-07 DIAGNOSIS — M89.8X9 METABOLIC BONE DISEASE: ICD-10-CM

## 2018-08-07 DIAGNOSIS — Z94.0 KIDNEY REPLACED BY TRANSPLANT: ICD-10-CM

## 2018-08-07 DIAGNOSIS — E78.5 HYPERLIPIDEMIA, UNSPECIFIED HYPERLIPIDEMIA TYPE: ICD-10-CM

## 2018-08-07 NOTE — TELEPHONE ENCOUNTER
----- Message from Nelida Pradhan sent at 8/7/2018 10:55 AM CDT -----  Contact: Nessa  Pharmacy Calling    Reason for call: R  Pharmacy Name: [facility]  Prescription Name:cinacalcet (SENSIPAR) 60 MG Tab  Phone Number: [phone]/fax [phone]  Additional Information:

## 2018-08-09 ENCOUNTER — DOCUMENTATION ONLY (OUTPATIENT)
Dept: TRANSPLANT | Facility: CLINIC | Age: 38
End: 2018-08-09

## 2018-08-09 LAB
EXT ALBUMIN: 4.9
EXT ALKALINE PHOSPHATASE: 146
EXT ALT: 14
EXT AST: 17
EXT BILIRUBIN DIRECT: 0.1 MG/DL
EXT BILIRUBIN TOTAL: 0.6
EXT BK VIRUS DNA QN PCR: ABNORMAL
EXT CHOLESTEROL: 109
EXT EOSINOPHIL%: 2.2
EXT GLUCOSE UA: ABNORMAL
EXT HDL: 35
EXT HEMATOCRIT: 32.9
EXT HEMOGLOBIN: 11.2
EXT IMMUNKNOW (STIMULATED): ABNORMAL
EXT LDL CHOLESTEROL: 43
EXT LYMPH%: 21.8
EXT MAGNESIUM: 1.8
EXT MONOCYTES%: 13.1
EXT NITRITES UA: ABNORMAL
EXT PHOSPHORUS: 5.1
EXT PLATELETS: 148
EXT PROT/CREAT RATIO UR: 0.12
EXT PROTEIN TOTAL: 7.5
EXT PROTEIN UA: ABNORMAL
EXT SEGS%: 61.4
EXT TACROLIMUS LVL: 7.3
EXT TRIGLYCERIDES: 147
EXT WBC: 3.7

## 2018-08-13 ENCOUNTER — OFFICE VISIT (OUTPATIENT)
Dept: TRANSPLANT | Facility: CLINIC | Age: 38
End: 2018-08-13
Payer: MEDICARE

## 2018-08-13 ENCOUNTER — LAB VISIT (OUTPATIENT)
Dept: LAB | Facility: HOSPITAL | Age: 38
End: 2018-08-13
Attending: INTERNAL MEDICINE
Payer: MEDICARE

## 2018-08-13 VITALS
TEMPERATURE: 98 F | HEIGHT: 68 IN | BODY MASS INDEX: 24.92 KG/M2 | HEART RATE: 75 BPM | SYSTOLIC BLOOD PRESSURE: 156 MMHG | WEIGHT: 164.44 LBS | DIASTOLIC BLOOD PRESSURE: 93 MMHG | OXYGEN SATURATION: 100 % | RESPIRATION RATE: 16 BRPM

## 2018-08-13 DIAGNOSIS — D84.9 IMMUNOSUPPRESSION: Chronic | ICD-10-CM

## 2018-08-13 DIAGNOSIS — Z94.0 KIDNEY REPLACED BY TRANSPLANT: ICD-10-CM

## 2018-08-13 DIAGNOSIS — Z79.60 LONG-TERM USE OF IMMUNOSUPPRESSANT MEDICATION: ICD-10-CM

## 2018-08-13 DIAGNOSIS — Z94.0 S/P KIDNEY TRANSPLANT: ICD-10-CM

## 2018-08-13 DIAGNOSIS — E78.5 HYPERLIPIDEMIA, UNSPECIFIED HYPERLIPIDEMIA TYPE: ICD-10-CM

## 2018-08-13 DIAGNOSIS — D84.9 IMMUNOCOMPROMISED STATE: Chronic | ICD-10-CM

## 2018-08-13 DIAGNOSIS — M89.8X9 METABOLIC BONE DISEASE: ICD-10-CM

## 2018-08-13 LAB
ALBUMIN SERPL BCP-MCNC: 4.2 G/DL
ANION GAP SERPL CALC-SCNC: 11 MMOL/L
BUN SERPL-MCNC: 38 MG/DL
CALCIUM SERPL-MCNC: 9.3 MG/DL
CHLORIDE SERPL-SCNC: 106 MMOL/L
CO2 SERPL-SCNC: 23 MMOL/L
CREAT SERPL-MCNC: 2.6 MG/DL
EST. GFR  (AFRICAN AMERICAN): 34.4 ML/MIN/1.73 M^2
EST. GFR  (NON AFRICAN AMERICAN): 29.7 ML/MIN/1.73 M^2
GLUCOSE SERPL-MCNC: 108 MG/DL
PHOSPHATE SERPL-MCNC: 5 MG/DL
POTASSIUM SERPL-SCNC: 5.2 MMOL/L
SODIUM SERPL-SCNC: 140 MMOL/L

## 2018-08-13 PROCEDURE — 99999 PR PBB SHADOW E&M-EST. PATIENT-LVL IV: CPT | Mod: PBBFAC,,, | Performed by: INTERNAL MEDICINE

## 2018-08-13 PROCEDURE — 99215 OFFICE O/P EST HI 40 MIN: CPT | Mod: S$PBB,,, | Performed by: INTERNAL MEDICINE

## 2018-08-13 PROCEDURE — 36415 COLL VENOUS BLD VENIPUNCTURE: CPT

## 2018-08-13 PROCEDURE — 86352 CELL FUNCTION ASSAY W/STIM: CPT

## 2018-08-13 PROCEDURE — 80069 RENAL FUNCTION PANEL: CPT

## 2018-08-13 PROCEDURE — 99214 OFFICE O/P EST MOD 30 MIN: CPT | Mod: PBBFAC | Performed by: INTERNAL MEDICINE

## 2018-08-13 RX ORDER — CINACALCET 60 MG/1
60 TABLET, FILM COATED ORAL
Qty: 30 TABLET | Refills: 6 | Status: SHIPPED | OUTPATIENT
Start: 2018-08-13 | End: 2018-08-14 | Stop reason: SDUPTHER

## 2018-08-13 RX ORDER — TACROLIMUS 1 MG/1
CAPSULE ORAL
Qty: 240 CAPSULE | Refills: 11 | Status: SHIPPED | OUTPATIENT
Start: 2018-08-13 | End: 2018-08-14 | Stop reason: SDUPTHER

## 2018-08-13 RX ORDER — NIFEDIPINE 30 MG/1
TABLET, EXTENDED RELEASE ORAL
Qty: 90 TABLET | Refills: 11 | Status: SHIPPED | OUTPATIENT
Start: 2018-08-13 | End: 2018-08-14 | Stop reason: SDUPTHER

## 2018-08-13 RX ORDER — TACROLIMUS 5 MG/1
CAPSULE ORAL
Qty: 60 CAPSULE | Refills: 11 | Status: SHIPPED | OUTPATIENT
Start: 2018-08-13 | End: 2018-08-14 | Stop reason: SDUPTHER

## 2018-08-13 RX ORDER — KETOCONAZOLE 200 MG/1
100 TABLET ORAL DAILY
Qty: 15 TABLET | Refills: 11 | Status: SHIPPED | OUTPATIENT
Start: 2018-08-13 | End: 2018-08-14 | Stop reason: SDUPTHER

## 2018-08-13 RX ORDER — PREDNISONE 5 MG/1
5 TABLET ORAL DAILY
Qty: 30 TABLET | Refills: 11 | Status: SHIPPED | OUTPATIENT
Start: 2018-08-13 | End: 2018-08-14 | Stop reason: SDUPTHER

## 2018-08-13 NOTE — PROGRESS NOTES
Results reviewed and the following message sent to patient via [facility]:  Creatinine has not improved compared to previous labs, but will try to push fluids and await repeat labs on Thursday if there is no improvement at that point, will proceed with biopsy, DSA & allosure-same plan as per clinic visit.

## 2018-08-13 NOTE — PROGRESS NOTES
Kidney Post-Transplant Assessment    Referring Physician:   Current Nephrologist: Alexa Holliday    ORGAN: RIGHT KIDNEY  Donor Type:  - brain death  Donor Information: 38 y.o. year old Black or  male  PHS Increased Risk: no  Cold Ischemia: 422 mins  Induction Medications: thymoglobulin    Subjective:     CC:  Reassessment of renal allograft function and management of chronic immunosuppression.    Kidney History:  Demetrio Aguiar is a 37 y.o. 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 His maintenance immunosuppression includes a steroid taper per protocol to 5mg daily (currently on 15 mg daily), Prograf, and Cellcept maintenance. Ketoconazole was added a week ago to help increase prograf level.     His post op course has been complicated by   - DGF requiring HD (last HD 17) on 17.   - Biopsy proven ABMR with weakly positive class I DSA's on 17. Pt treated with SMP x 3 (-9/3). PLEX x6 (completed ) and IVIG x 2 (-). Kidney function was improving  - Kidney US on 17 showed a large collection located along the anterior inferior margin of the renal transplant measuring 13.4 x 7 x 11.9. taken back to the OR for retroperitoneal bleed and washout.   -Biopsy 10/17/17 (LEFTY): 14 glomeruli, <5% fibrosis, no MC changes, No ACR or AMR, C4d negative  - on HAART therapy. CD4 of 7. Started on Azithromycin weekly.  - Perirenal fluid collection, s/p drainage by IR 17     -Biopsy 1/15/18: No ACR, AVR, ABMR, CD4 neg. Less than 10 % fibrosis.   -Biopsy (for DGF) 17: good sample with 24 glomeruli (only 1 sclerosed) and evidence just meeting criteria for ABMR. + diffuse ATI. Minimal  fibrosis, no ACR or AVR, but + CD4 (>50% diffusely + stain) with mild glomerulitis and focal peritubular capillaritis. Also, + ca-oxalate (3) and ca-phos (2) crystals.  -7/9/18 WEAK DSA DETECTED: CW5(3733)     PCP PROPHYLAXIS: Bactrim LIFELONG--> changed to Mepron ~ 6/2018  CMV PROPHYLAXIS: Valcyte until 11/19/17  FUNGAL PROPHYLAXIS: Nystatin until 9/15/17    Demetrio is concerned today about his creatinine being up a little bit.  He reports he has been feeling well.  In fact, he has started to exercise regularly for the last several months and notes he sweats a lot.  He has been drinking about 6 bottles of water daily.  He also notes a rash has developed on his right testicle which is irritated, but not associated with any discharge.  He continues to follow with Dr. Tani pereyra for his HIV, and believes his last viral load was remaining undetectable.  With regards to his hypertension, he notes his blood pressure readings are consistently high with readings like the ones today in clinic. He denies any kidney-related complaints, such as pain over allograft, flank pain, dysuria, frequency, or other LUTS.   He is also concerning his blood sugars have been running a little bit high.  He has been checking them at home at the request of his ID physician and they usually run 120 to 140s.    Current Medication  Current Outpatient Medications   Medication Sig Dispense Refill    atovaquone (MEPRON) 750 mg/5 mL Susp TAKE 10ML (1500MG) BY MOUTH ONCE DAILY 210 mL 3    azithromycin (ZITHROMAX) 600 MG Tab TAKE 2 TABLETS BY MOUTH EVERY 7 DAYS 8 tablet 5    cinacalcet (SENSIPAR) 60 MG Tab Take 1 tablet (60 mg total) by mouth daily with breakfast. 30 tablet 6    DESCOVY 200-25 mg Tab Take 1 tablet by mouth once daily.       dolutegravir (TIVICAY) 50 mg Tab Take 1 tablet (50 mg total) by mouth once daily. 30 tablet 5    famotidine (PEPCID) 20 MG tablet TAKE 1 TABLET (20MG TOTAL) BY MOUTH EACH EVENING 30 tablet 11     ketoconazole (NIZORAL) 200 mg Tab Take 0.5 tablets (100 mg total) by mouth once daily. 15 tablet 11    multivitamin (THERAGRAN) tablet Take 1 tablet by mouth once daily.      mycophenolate (MYFORTIC) 180 MG TbEC Take 1 tablet (180 mg total) by mouth 3 (three) times daily. Z94.0 Kidney Transplant 8/18/2017 90 tablet 11    NIFEdipine (PROCARDIA-XL) 30 MG (OSM) 24 hr tablet Take 1 tablet (30 mg total) by mouth 2 (two) times daily. 60 tablet 11    predniSONE (DELTASONE) 5 MG tablet Take 5 mg by mouth once daily.      rosuvastatin (CRESTOR) 20 MG tablet TAKE 1 TABLET (20MG) BY MOUTH ONCE DAILY 30 tablet 0    sodium bicarbonate 650 MG tablet Take 3 tablets (1,950 mg total) by mouth 3 (three) times daily. 270 tablet 11    tacrolimus (PROGRAF) 1 MG Cap Take 4mg in the AM and 4mg in the PM (along with 5mg caps). BY MOUTH. Total dose 9mg in AM and 9mg in PM. Z94.0 240 capsule 11    tacrolimus (PROGRAF) 5 MG Cap Take 1cap (5mg) q12hrs along with (1mg caps). BY MOUTH. Total dose 9mg in AM and 9mg in PM. Z94.0. 60 capsule 11    betamethasone valerate 0.1% (VALISONE) 0.1 % Lotn Apply topically 2 (two) times daily. qtts 3-4 AS bid. 60 mL 2    sodium polystyrene (KAYEXALATE) powder Take 30g by mouth daily, as directed. 453.6 g 3     No current facility-administered medications for this visit.      Review of Systems   Constitutional: Negative for fever and unexpected weight change.   Respiratory: Negative for shortness of breath.    Cardiovascular: Negative for chest pain and leg swelling.   Gastrointestinal: Negative for abdominal pain, nausea and vomiting.   Genitourinary: Negative for decreased urine volume and difficulty urinating.   Skin: Negative for rash.        Rash on right testicle   Allergic/Immunologic: Positive for immunocompromised state.   Neurological: Positive for tremors.     Objective:     Blood pressure (!) 156/93, pulse 75, temperature 98.3 °F (36.8 °C), temperature source Oral, resp. rate 16, height  "5' 8" (1.727 m), weight 74.6 kg (164 lb 7.4 oz), SpO2 100 %.  body mass index is 25.01 kg/m².    Physical Exam   Constitutional: He is oriented to person, place, and time. He appears well-developed and well-nourished.   HENT:   Head: Normocephalic and atraumatic.   Eyes: Conjunctivae are normal. No scleral icterus.   Neck: Normal range of motion. Neck supple. No JVD present.   Cardiovascular: Normal rate and regular rhythm.   Pulmonary/Chest: Effort normal and breath sounds normal. He has no wheezes. He has no rales.   Abdominal: Soft. Bowel sounds are normal. He exhibits no distension. There is no tenderness.   Musculoskeletal: He exhibits no edema or deformity.   Neurological: He is alert and oriented to person, place, and time.   Skin: Skin is warm and dry.   Psychiatric: He has a normal mood and affect. His behavior is normal.   Nursing note and vitals reviewed.     Labs:  Lab Results   Component Value Date    WBC 4.05 07/09/2018    HGB 10.8 (L) 07/09/2018    HCT 33.2 (L) 07/09/2018     08/13/2018    K 5.2 (H) 08/13/2018     08/13/2018    CO2 23 08/13/2018    BUN 38 (H) 08/13/2018    CREATININE 2.6 (H) 08/13/2018    EGFRNONAA 29.7 (A) 08/13/2018    EGFRIFAFRICA 45 (L) 02/05/2018    CALCIUM 9.3 08/13/2018    PHOS 5.0 (H) 08/13/2018    MG 2.0 07/09/2018    ALBUMIN 4.2 08/13/2018    AST 18 05/14/2018    ALT 15 05/14/2018    UTPCR 0.13 05/14/2018    .0 (H) 12/27/2017    TACROLIMUS 7.1 07/09/2018     Lab Results   Component Value Date    EXTANC 1,974 05/21/2018    EXTWBC 3.7 (L) 08/06/2018    EXTSEGS 61.4 08/06/2018    EXTPLATELETS 148 08/06/2018    EXTHEMOGLOBI 11.2 08/06/2018    EXTHEMATOCRI 32.9 08/06/2018    EXTCREATININ 2.27 06/11/2018    EXTSODIUM 139 06/11/2018    EXTPOTASSIUM 4.9 06/11/2018    EXTBUN 27 06/11/2018    EXTCO2 25 06/11/2018    EXTCALCIUM 9.2 06/11/2018    EXTPHOSPHORU 5.1 (H) 08/06/2018    EXTGLUCOSE 102 06/11/2018    EXTALBUMIN 4.9 08/06/2018    EXTAST 17 08/06/2018    EXTALT " 14 2018    EXTBILITOTAL 0.60 2018       Lab Results   Component Value Date    EXTTACROLVL 7.3 2018    EXTPROTCRE 0.122 2018    EXTPROTEINUA NEG 2018    EXTWBCUA negative 2018    EXTRBCUA negative 2018       Labs were reviewed with the patient    Assessment/Plan:     1.  donor kidney transplant 2017    2. Immunocompromised state    3. Immunosuppression    4. Long-term use of immunosuppressant medication    5. Kidney replaced by transplant    6. Hyperlipidemia, unspecified hyperlipidemia type    7. Metabolic bone disease      CKD3 s/p kidney transplantation, worsening over last several months.  I suspect volume depletion based on his exercise, sweating, and only drinking 6 bottles of water daily.  I have asked him to increase his p.o. fluids and have repeat labs done locally:  CBC, renal function panel, tacrolimus, PTH.  If his labs show creatinine remains elevated above his baseline of 2, I recommend a biopsy with DSA and Allosure. He is agreeable to this plan. Continue to monitor renal function and electrolytes, HTN, secondary hyperparathyroidism and other issues related to underlying ESRD.      Continue tacrolimus-based immunosuppression.  Will continue to watch signs, symptoms and levels for side effects and drug toxicity. Refill IS and ketoconazole today.    Opportunistic infection prophylaxis:    - Was on bactrim for PJP prophylaxis; now on atovaquone, but I am unclear as to why changed-need to clarify.  Per ID needs to remain on prophylaxis lifelong  - continue Valcyte for CMV prophylaxis     Renal hypertension  -increase nifedipine to 30 mg q.a.m. and 60 mg q.p.m..  New prescription given.    HIV  -Remains on HAART therapy per ID (Dr. Carlos Reyes). He reports his viral load remains undetectable.  A copy of his ID providers note and labs should be obtained for completeness.

## 2018-08-14 ENCOUNTER — PATIENT MESSAGE (OUTPATIENT)
Dept: TRANSPLANT | Facility: CLINIC | Age: 38
End: 2018-08-14

## 2018-08-14 DIAGNOSIS — Z94.0 KIDNEY REPLACED BY TRANSPLANT: ICD-10-CM

## 2018-08-14 DIAGNOSIS — M89.8X9 METABOLIC BONE DISEASE: ICD-10-CM

## 2018-08-14 DIAGNOSIS — E78.5 HYPERLIPIDEMIA, UNSPECIFIED HYPERLIPIDEMIA TYPE: ICD-10-CM

## 2018-08-14 DIAGNOSIS — Z94.0 S/P KIDNEY TRANSPLANT: ICD-10-CM

## 2018-08-14 DIAGNOSIS — Z79.2 LONG-TERM CURRENT USE OF ANTIBIOTICS FOR PREVENTION OF RECURRENT INFECTION: ICD-10-CM

## 2018-08-14 DIAGNOSIS — D84.9 IMMUNOCOMPROMISED STATE: Chronic | ICD-10-CM

## 2018-08-14 DIAGNOSIS — Z79.60 LONG-TERM USE OF IMMUNOSUPPRESSANT MEDICATION: ICD-10-CM

## 2018-08-14 DIAGNOSIS — D84.9 IMMUNOSUPPRESSION: Chronic | ICD-10-CM

## 2018-08-14 RX ORDER — SODIUM BICARBONATE 650 MG/1
1950 TABLET ORAL 3 TIMES DAILY
Qty: 270 TABLET | Refills: 11 | Status: SHIPPED | OUTPATIENT
Start: 2018-08-14 | End: 2018-08-17 | Stop reason: SDUPTHER

## 2018-08-14 RX ORDER — EMTRICITABINE AND TENOFOVIR ALAFENAMIDE 200; 25 MG/1; MG/1
1 TABLET ORAL DAILY
Qty: 30 TABLET | Refills: 5 | Status: SHIPPED | OUTPATIENT
Start: 2018-08-14 | End: 2018-08-17 | Stop reason: SDUPTHER

## 2018-08-14 RX ORDER — ATOVAQUONE 750 MG/5ML
SUSPENSION ORAL
Qty: 210 ML | Refills: 3 | Status: SHIPPED | OUTPATIENT
Start: 2018-08-14 | End: 2018-08-17 | Stop reason: ALTCHOICE

## 2018-08-14 RX ORDER — TACROLIMUS 5 MG/1
CAPSULE ORAL
Qty: 60 CAPSULE | Refills: 11 | Status: SHIPPED | OUTPATIENT
Start: 2018-08-14 | End: 2018-08-17 | Stop reason: SDUPTHER

## 2018-08-14 RX ORDER — AZITHROMYCIN 600 MG/1
TABLET, FILM COATED ORAL
Qty: 8 TABLET | Refills: 5 | Status: SHIPPED | OUTPATIENT
Start: 2018-08-14 | End: 2018-08-17 | Stop reason: SDUPTHER

## 2018-08-14 RX ORDER — FAMOTIDINE 20 MG/1
TABLET, FILM COATED ORAL
Qty: 30 TABLET | Refills: 11 | Status: SHIPPED | OUTPATIENT
Start: 2018-08-14 | End: 2018-08-17 | Stop reason: SDUPTHER

## 2018-08-14 RX ORDER — MYCOPHENOLIC ACID 180 MG/1
180 TABLET, DELAYED RELEASE ORAL 3 TIMES DAILY
Qty: 90 TABLET | Refills: 11 | Status: SHIPPED | OUTPATIENT
Start: 2018-08-14 | End: 2018-08-17 | Stop reason: SDUPTHER

## 2018-08-14 RX ORDER — ROSUVASTATIN CALCIUM 20 MG/1
20 TABLET, COATED ORAL DAILY
Qty: 30 TABLET | Refills: 0 | Status: SHIPPED | OUTPATIENT
Start: 2018-08-14 | End: 2018-08-17 | Stop reason: SDUPTHER

## 2018-08-14 RX ORDER — PREDNISONE 5 MG/1
5 TABLET ORAL DAILY
Qty: 30 TABLET | Refills: 11 | Status: SHIPPED | OUTPATIENT
Start: 2018-08-14 | End: 2018-08-17 | Stop reason: SDUPTHER

## 2018-08-14 RX ORDER — KETOCONAZOLE 200 MG/1
100 TABLET ORAL DAILY
Qty: 15 TABLET | Refills: 11 | Status: SHIPPED | OUTPATIENT
Start: 2018-08-14 | End: 2018-08-17 | Stop reason: SDUPTHER

## 2018-08-14 RX ORDER — TACROLIMUS 1 MG/1
CAPSULE ORAL
Qty: 240 CAPSULE | Refills: 11 | Status: SHIPPED | OUTPATIENT
Start: 2018-08-14 | End: 2018-08-17 | Stop reason: SDUPTHER

## 2018-08-14 RX ORDER — NIFEDIPINE 30 MG/1
TABLET, EXTENDED RELEASE ORAL
Qty: 90 TABLET | Refills: 11 | Status: SHIPPED | OUTPATIENT
Start: 2018-08-14 | End: 2018-08-17 | Stop reason: SDUPTHER

## 2018-08-14 RX ORDER — CINACALCET 60 MG/1
60 TABLET, FILM COATED ORAL
Qty: 30 TABLET | Refills: 6 | Status: SHIPPED | OUTPATIENT
Start: 2018-08-14 | End: 2018-08-17 | Stop reason: SDUPTHER

## 2018-08-15 LAB — IMMUNKNOW (STIMULATED): 40 NG/ML

## 2018-08-16 DIAGNOSIS — Z94.0 KIDNEY REPLACED BY TRANSPLANT: ICD-10-CM

## 2018-08-16 DIAGNOSIS — M89.8X9 METABOLIC BONE DISEASE: ICD-10-CM

## 2018-08-16 DIAGNOSIS — D84.9 IMMUNOCOMPROMISED STATE: Chronic | ICD-10-CM

## 2018-08-16 DIAGNOSIS — Z79.60 LONG-TERM USE OF IMMUNOSUPPRESSANT MEDICATION: ICD-10-CM

## 2018-08-16 DIAGNOSIS — Z94.0 S/P KIDNEY TRANSPLANT: ICD-10-CM

## 2018-08-16 DIAGNOSIS — E78.5 HYPERLIPIDEMIA, UNSPECIFIED HYPERLIPIDEMIA TYPE: ICD-10-CM

## 2018-08-16 DIAGNOSIS — D84.9 IMMUNOSUPPRESSION: Chronic | ICD-10-CM

## 2018-08-17 ENCOUNTER — TELEPHONE (OUTPATIENT)
Dept: TRANSPLANT | Facility: CLINIC | Age: 38
End: 2018-08-17

## 2018-08-17 DIAGNOSIS — Z94.0 KIDNEY REPLACED BY TRANSPLANT: ICD-10-CM

## 2018-08-17 DIAGNOSIS — M89.8X9 METABOLIC BONE DISEASE: ICD-10-CM

## 2018-08-17 DIAGNOSIS — E78.5 HYPERLIPIDEMIA, UNSPECIFIED HYPERLIPIDEMIA TYPE: ICD-10-CM

## 2018-08-17 RX ORDER — PREDNISONE 5 MG/1
5 TABLET ORAL DAILY
Qty: 30 TABLET | Refills: 11 | Status: SHIPPED | OUTPATIENT
Start: 2018-08-17 | End: 2018-08-18 | Stop reason: SDUPTHER

## 2018-08-17 RX ORDER — ROSUVASTATIN CALCIUM 20 MG/1
20 TABLET, COATED ORAL DAILY
Qty: 30 TABLET | Refills: 0 | Status: SHIPPED | OUTPATIENT
Start: 2018-08-17 | End: 2018-08-23 | Stop reason: SDUPTHER

## 2018-08-17 RX ORDER — AZITHROMYCIN 600 MG/1
TABLET, FILM COATED ORAL
Qty: 8 TABLET | Refills: 5 | Status: SHIPPED | OUTPATIENT
Start: 2018-08-17 | End: 2018-08-18 | Stop reason: SDUPTHER

## 2018-08-17 RX ORDER — CINACALCET 60 MG/1
60 TABLET, FILM COATED ORAL
Qty: 30 TABLET | Refills: 6 | Status: SHIPPED | OUTPATIENT
Start: 2018-08-17 | End: 2018-08-23 | Stop reason: SDUPTHER

## 2018-08-17 RX ORDER — MYCOPHENOLIC ACID 180 MG/1
180 TABLET, DELAYED RELEASE ORAL 3 TIMES DAILY
Qty: 90 TABLET | Refills: 11 | Status: SHIPPED | OUTPATIENT
Start: 2018-08-17 | End: 2018-08-18 | Stop reason: SDUPTHER

## 2018-08-17 RX ORDER — TACROLIMUS 5 MG/1
CAPSULE ORAL
Qty: 60 CAPSULE | Refills: 11 | Status: SHIPPED | OUTPATIENT
Start: 2018-08-17 | End: 2018-08-18 | Stop reason: SDUPTHER

## 2018-08-17 RX ORDER — EMTRICITABINE AND TENOFOVIR ALAFENAMIDE 200; 25 MG/1; MG/1
1 TABLET ORAL DAILY
Qty: 30 TABLET | Refills: 5 | Status: SHIPPED | OUTPATIENT
Start: 2018-08-17 | End: 2018-08-23 | Stop reason: SDUPTHER

## 2018-08-17 RX ORDER — TACROLIMUS 1 MG/1
CAPSULE ORAL
Qty: 240 CAPSULE | Refills: 11 | Status: SHIPPED | OUTPATIENT
Start: 2018-08-17 | End: 2018-08-18 | Stop reason: SDUPTHER

## 2018-08-17 RX ORDER — FAMOTIDINE 20 MG/1
TABLET, FILM COATED ORAL
Qty: 30 TABLET | Refills: 11 | Status: SHIPPED | OUTPATIENT
Start: 2018-08-17 | End: 2018-08-18 | Stop reason: SDUPTHER

## 2018-08-17 RX ORDER — SODIUM BICARBONATE 650 MG/1
1950 TABLET ORAL 3 TIMES DAILY
Qty: 270 TABLET | Refills: 11 | Status: SHIPPED | OUTPATIENT
Start: 2018-08-17 | End: 2018-08-18 | Stop reason: SDUPTHER

## 2018-08-17 RX ORDER — BETAMETHASONE VALERATE 0.1 %
LOTION (ML) TOPICAL 2 TIMES DAILY
Qty: 60 ML | Refills: 2 | Status: SHIPPED | OUTPATIENT
Start: 2018-08-17 | End: 2019-02-27

## 2018-08-17 RX ORDER — KETOCONAZOLE 200 MG/1
100 TABLET ORAL DAILY
Qty: 15 TABLET | Refills: 11 | Status: SHIPPED | OUTPATIENT
Start: 2018-08-17 | End: 2018-08-18 | Stop reason: SDUPTHER

## 2018-08-17 RX ORDER — AZITHROMYCIN 600 MG/1
TABLET, FILM COATED ORAL
Qty: 8 TABLET | Refills: 5 | Status: CANCELLED | OUTPATIENT
Start: 2018-08-17

## 2018-08-17 RX ORDER — NIFEDIPINE 30 MG/1
TABLET, EXTENDED RELEASE ORAL
Qty: 90 TABLET | Refills: 11 | Status: SHIPPED | OUTPATIENT
Start: 2018-08-17 | End: 2018-08-18 | Stop reason: SDUPTHER

## 2018-08-17 NOTE — TELEPHONE ENCOUNTER
Call received from patient. Unable to refill meds, script sent by non [place] Medicaid approved Physician. Will resend under a different Provider.

## 2018-08-18 DIAGNOSIS — M89.8X9 METABOLIC BONE DISEASE: ICD-10-CM

## 2018-08-18 DIAGNOSIS — Z79.60 LONG-TERM USE OF IMMUNOSUPPRESSANT MEDICATION: ICD-10-CM

## 2018-08-18 DIAGNOSIS — E87.5 HYPERKALEMIA: ICD-10-CM

## 2018-08-18 DIAGNOSIS — D84.9 IMMUNOSUPPRESSION: Chronic | ICD-10-CM

## 2018-08-18 DIAGNOSIS — E78.5 HYPERLIPIDEMIA, UNSPECIFIED HYPERLIPIDEMIA TYPE: ICD-10-CM

## 2018-08-18 DIAGNOSIS — Z94.0 KIDNEY REPLACED BY TRANSPLANT: ICD-10-CM

## 2018-08-18 DIAGNOSIS — Z94.0 S/P KIDNEY TRANSPLANT: ICD-10-CM

## 2018-08-18 DIAGNOSIS — D84.9 IMMUNOCOMPROMISED STATE: Chronic | ICD-10-CM

## 2018-08-18 RX ORDER — PREDNISONE 5 MG/1
5 TABLET ORAL DAILY
Qty: 30 TABLET | Refills: 11 | Status: SHIPPED | OUTPATIENT
Start: 2018-08-18 | End: 2018-08-23 | Stop reason: SDUPTHER

## 2018-08-18 RX ORDER — SODIUM BICARBONATE 650 MG/1
1950 TABLET ORAL 3 TIMES DAILY
Qty: 270 TABLET | Refills: 11 | Status: SHIPPED | OUTPATIENT
Start: 2018-08-18 | End: 2018-08-23 | Stop reason: SDUPTHER

## 2018-08-18 RX ORDER — KETOCONAZOLE 200 MG/1
100 TABLET ORAL DAILY
Qty: 15 TABLET | Refills: 11 | Status: SHIPPED | OUTPATIENT
Start: 2018-08-18 | End: 2018-08-23 | Stop reason: SDUPTHER

## 2018-08-18 RX ORDER — MYCOPHENOLIC ACID 180 MG/1
180 TABLET, DELAYED RELEASE ORAL 3 TIMES DAILY
Qty: 90 TABLET | Refills: 11 | Status: SHIPPED | OUTPATIENT
Start: 2018-08-18 | End: 2018-08-23 | Stop reason: SDUPTHER

## 2018-08-18 RX ORDER — FAMOTIDINE 20 MG/1
TABLET, FILM COATED ORAL
Qty: 30 TABLET | Refills: 11 | Status: SHIPPED | OUTPATIENT
Start: 2018-08-18 | End: 2018-08-23 | Stop reason: SDUPTHER

## 2018-08-18 RX ORDER — TACROLIMUS 5 MG/1
CAPSULE ORAL
Qty: 60 CAPSULE | Refills: 11 | Status: SHIPPED | OUTPATIENT
Start: 2018-08-18 | End: 2018-08-23 | Stop reason: SDUPTHER

## 2018-08-18 RX ORDER — AZITHROMYCIN 600 MG/1
TABLET, FILM COATED ORAL
Qty: 8 TABLET | Refills: 5 | Status: SHIPPED | OUTPATIENT
Start: 2018-08-18 | End: 2018-08-23 | Stop reason: SDUPTHER

## 2018-08-18 RX ORDER — TACROLIMUS 1 MG/1
CAPSULE ORAL
Qty: 240 CAPSULE | Refills: 11 | Status: SHIPPED | OUTPATIENT
Start: 2018-08-18 | End: 2018-08-23 | Stop reason: SDUPTHER

## 2018-08-18 RX ORDER — NIFEDIPINE 30 MG/1
TABLET, EXTENDED RELEASE ORAL
Qty: 90 TABLET | Refills: 11 | Status: SHIPPED | OUTPATIENT
Start: 2018-08-18 | End: 2018-08-23 | Stop reason: SDUPTHER

## 2018-08-18 RX ORDER — SODIUM POLYSTYRENE SULFONATE 4.1 MEQ/G
POWDER, FOR SUSPENSION ORAL; RECTAL
Qty: 453.6 G | Refills: 3 | Status: ON HOLD | OUTPATIENT
Start: 2018-08-18 | End: 2019-07-22 | Stop reason: HOSPADM

## 2018-08-20 ENCOUNTER — DOCUMENTATION ONLY (OUTPATIENT)
Dept: TRANSPLANT | Facility: CLINIC | Age: 38
End: 2018-08-20

## 2018-08-20 LAB
EXT BUN: 29
EXT CALCIUM: 9.6
EXT CHLORIDE: 105
EXT CO2: 25
EXT CREATININE: 2.17 MG/DL
EXT EOSINOPHIL%: 2.6
EXT GFR MDRD AF AMER: 44
EXT GFR MDRD NON AF AMER: 36
EXT GLUCOSE: 105
EXT HEMATOCRIT: 32.2
EXT HEMOGLOBIN: 11
EXT LYMPH%: 26.5
EXT MONOCYTES%: 16.2
EXT PLATELETS: 141
EXT POTASSIUM: 5.4
EXT PTH, INTACT: 170
EXT SEGS%: 53.8
EXT SODIUM: 137 MMOL/L
EXT TACROLIMUS LVL: 7.7
EXT WBC: 3.6

## 2018-08-23 DIAGNOSIS — D84.9 IMMUNOCOMPROMISED STATE: Chronic | ICD-10-CM

## 2018-08-23 DIAGNOSIS — Z94.0 S/P KIDNEY TRANSPLANT: ICD-10-CM

## 2018-08-23 DIAGNOSIS — Z94.0 KIDNEY REPLACED BY TRANSPLANT: ICD-10-CM

## 2018-08-23 DIAGNOSIS — M89.8X9 METABOLIC BONE DISEASE: ICD-10-CM

## 2018-08-23 DIAGNOSIS — D84.9 IMMUNOSUPPRESSION: Chronic | ICD-10-CM

## 2018-08-23 DIAGNOSIS — Z79.60 LONG-TERM USE OF IMMUNOSUPPRESSANT MEDICATION: ICD-10-CM

## 2018-08-23 DIAGNOSIS — E78.5 HYPERLIPIDEMIA, UNSPECIFIED HYPERLIPIDEMIA TYPE: ICD-10-CM

## 2018-08-23 RX ORDER — EMTRICITABINE AND TENOFOVIR ALAFENAMIDE 200; 25 MG/1; MG/1
1 TABLET ORAL DAILY
Qty: 30 TABLET | Refills: 5 | Status: ON HOLD | OUTPATIENT
Start: 2018-08-23 | End: 2019-07-22 | Stop reason: HOSPADM

## 2018-08-23 RX ORDER — FAMOTIDINE 20 MG/1
TABLET, FILM COATED ORAL
Qty: 30 TABLET | Refills: 11 | Status: SHIPPED | OUTPATIENT
Start: 2018-08-23 | End: 2019-06-24 | Stop reason: SDUPTHER

## 2018-08-23 RX ORDER — PREDNISONE 5 MG/1
5 TABLET ORAL DAILY
Qty: 30 TABLET | Refills: 11 | Status: SHIPPED | OUTPATIENT
Start: 2018-08-23 | End: 2018-12-12 | Stop reason: SDUPTHER

## 2018-08-23 RX ORDER — AZITHROMYCIN 600 MG/1
TABLET, FILM COATED ORAL
Qty: 8 TABLET | Refills: 5 | Status: SHIPPED | OUTPATIENT
Start: 2018-08-23 | End: 2019-02-26 | Stop reason: SDUPTHER

## 2018-08-23 RX ORDER — KETOCONAZOLE 200 MG/1
100 TABLET ORAL DAILY
Qty: 15 TABLET | Refills: 11 | Status: SHIPPED | OUTPATIENT
Start: 2018-08-23 | End: 2019-06-24 | Stop reason: SDUPTHER

## 2018-08-23 RX ORDER — CINACALCET 60 MG/1
60 TABLET, FILM COATED ORAL
Qty: 30 TABLET | Refills: 6 | Status: SHIPPED | OUTPATIENT
Start: 2018-08-23 | End: 2019-03-11 | Stop reason: SDUPTHER

## 2018-08-23 RX ORDER — NIFEDIPINE 30 MG/1
TABLET, EXTENDED RELEASE ORAL
Qty: 90 TABLET | Refills: 11 | Status: SHIPPED | OUTPATIENT
Start: 2018-08-23 | End: 2019-10-07

## 2018-08-23 RX ORDER — ROSUVASTATIN CALCIUM 20 MG/1
20 TABLET, COATED ORAL DAILY
Qty: 30 TABLET | Refills: 0 | Status: ON HOLD | OUTPATIENT
Start: 2018-08-23 | End: 2019-12-17 | Stop reason: SDUPTHER

## 2018-08-23 RX ORDER — TACROLIMUS 5 MG/1
CAPSULE ORAL
Qty: 60 CAPSULE | Refills: 11 | Status: SHIPPED | OUTPATIENT
Start: 2018-08-23 | End: 2018-12-12 | Stop reason: SDUPTHER

## 2018-08-23 RX ORDER — SODIUM BICARBONATE 650 MG/1
1950 TABLET ORAL 3 TIMES DAILY
Qty: 270 TABLET | Refills: 11 | Status: SHIPPED | OUTPATIENT
Start: 2018-08-23 | End: 2020-01-08

## 2018-08-23 RX ORDER — TACROLIMUS 1 MG/1
CAPSULE ORAL
Qty: 240 CAPSULE | Refills: 11 | Status: SHIPPED | OUTPATIENT
Start: 2018-08-23 | End: 2018-12-12 | Stop reason: SDUPTHER

## 2018-08-23 RX ORDER — MYCOPHENOLIC ACID 180 MG/1
180 TABLET, DELAYED RELEASE ORAL 3 TIMES DAILY
Qty: 90 TABLET | Refills: 11 | Status: SHIPPED | OUTPATIENT
Start: 2018-08-23 | End: 2018-12-12 | Stop reason: SDUPTHER

## 2018-08-27 DIAGNOSIS — T86.10 COMPLICATION OF TRANSPLANTED KIDNEY, UNSPECIFIED COMPLICATION: ICD-10-CM

## 2018-08-27 DIAGNOSIS — N17.9 AKI (ACUTE KIDNEY INJURY): Primary | ICD-10-CM

## 2018-08-28 ENCOUNTER — HOSPITAL ENCOUNTER (OUTPATIENT)
Dept: RADIOLOGY | Facility: HOSPITAL | Age: 38
Discharge: HOME | End: 2018-08-28
Attending: INTERNAL MEDICINE | Admitting: INTERNAL MEDICINE
Payer: MEDICARE

## 2018-08-28 ENCOUNTER — HOSPITAL ENCOUNTER (OUTPATIENT)
Facility: HOSPITAL | Age: 38
Discharge: HOME | End: 2018-08-28
Attending: INTERNAL MEDICINE | Admitting: INTERNAL MEDICINE
Payer: MEDICARE

## 2018-08-28 VITALS
RESPIRATION RATE: 18 BRPM | SYSTOLIC BLOOD PRESSURE: 115 MMHG | BODY MASS INDEX: 24.92 KG/M2 | WEIGHT: 164.44 LBS | OXYGEN SATURATION: 100 % | HEART RATE: 81 BPM | DIASTOLIC BLOOD PRESSURE: 72 MMHG | HEIGHT: 68 IN | TEMPERATURE: 98 F

## 2018-08-28 DIAGNOSIS — N17.9 AKI (ACUTE KIDNEY INJURY): ICD-10-CM

## 2018-08-28 LAB
ALBUMIN SERPL BCP-MCNC: 3.9 G/DL
ALP SERPL-CCNC: 130 U/L
ALT SERPL W/O P-5'-P-CCNC: 18 U/L
ANION GAP SERPL CALC-SCNC: 6 MMOL/L
AST SERPL-CCNC: 17 U/L
BILIRUB SERPL-MCNC: 0.6 MG/DL
BUN SERPL-MCNC: 24 MG/DL
CALCIUM SERPL-MCNC: 8.2 MG/DL
CHLORIDE SERPL-SCNC: 106 MMOL/L
CO2 SERPL-SCNC: 23 MMOL/L
CREAT SERPL-MCNC: 2.2 MG/DL
ERYTHROCYTE [DISTWIDTH] IN BLOOD BY AUTOMATED COUNT: 12.9 %
EST. GFR  (AFRICAN AMERICAN): 42.1 ML/MIN/1.73 M^2
EST. GFR  (NON AFRICAN AMERICAN): 36.4 ML/MIN/1.73 M^2
GLUCOSE SERPL-MCNC: 96 MG/DL
HCT VFR BLD AUTO: 28.5 %
HGB BLD-MCNC: 9.3 G/DL
MCH RBC QN AUTO: 29.9 PG
MCHC RBC AUTO-ENTMCNC: 32.6 G/DL
MCV RBC AUTO: 92 FL
PLATELET # BLD AUTO: 135 K/UL
PMV BLD AUTO: 9.9 FL
POTASSIUM SERPL-SCNC: 4.8 MMOL/L
PROT SERPL-MCNC: 6.5 G/DL
RBC # BLD AUTO: 3.11 M/UL
SODIUM SERPL-SCNC: 135 MMOL/L
WBC # BLD AUTO: 3.73 K/UL

## 2018-08-28 PROCEDURE — 50200 RENAL BIOPSY PERQ: CPT | Mod: RT,,, | Performed by: RADIOLOGY

## 2018-08-28 PROCEDURE — 99152 MOD SED SAME PHYS/QHP 5/>YRS: CPT | Mod: TC

## 2018-08-28 PROCEDURE — 99152 MOD SED SAME PHYS/QHP 5/>YRS: CPT | Mod: ,,, | Performed by: RADIOLOGY

## 2018-08-28 PROCEDURE — 63600175 PHARM REV CODE 636 W HCPCS: Mod: JG | Performed by: STUDENT IN AN ORGANIZED HEALTH CARE EDUCATION/TRAINING PROGRAM

## 2018-08-28 PROCEDURE — 25000003 PHARM REV CODE 250: Mod: NTX | Performed by: INTERNAL MEDICINE

## 2018-08-28 PROCEDURE — 80053 COMPREHEN METABOLIC PANEL: CPT | Mod: NTX

## 2018-08-28 PROCEDURE — 25000003 PHARM REV CODE 250: Performed by: STUDENT IN AN ORGANIZED HEALTH CARE EDUCATION/TRAINING PROGRAM

## 2018-08-28 PROCEDURE — 76942 ECHO GUIDE FOR BIOPSY: CPT | Mod: 26,RT,, | Performed by: RADIOLOGY

## 2018-08-28 PROCEDURE — 27200937 US TRANSPLANT KIDNEY BIOPSY: Mod: NTX

## 2018-08-28 PROCEDURE — 85027 COMPLETE CBC AUTOMATED: CPT

## 2018-08-28 RX ORDER — SODIUM CHLORIDE 9 MG/ML
INJECTION, SOLUTION INTRAVENOUS CONTINUOUS
Status: DISCONTINUED | OUTPATIENT
Start: 2018-08-28 | End: 2018-08-28 | Stop reason: HOSPADM

## 2018-08-28 RX ORDER — LIDOCAINE HYDROCHLORIDE 10 MG/ML
1 INJECTION, SOLUTION EPIDURAL; INFILTRATION; INTRACAUDAL; PERINEURAL ONCE
Status: COMPLETED | OUTPATIENT
Start: 2018-08-28 | End: 2018-08-28

## 2018-08-28 RX ADMIN — SODIUM CHLORIDE: 0.9 INJECTION, SOLUTION INTRAVENOUS at 01:08

## 2018-08-28 RX ADMIN — DESMOPRESSIN ACETATE 11.19 MCG: 4 SOLUTION INTRAVENOUS at 02:08

## 2018-08-28 RX ADMIN — LIDOCAINE HYDROCHLORIDE 0.1 MG: 10 INJECTION, SOLUTION EPIDURAL; INFILTRATION; INTRACAUDAL; PERINEURAL at 01:08

## 2018-08-28 NOTE — PLAN OF CARE
Patient states they are ready to be discharged. Instructions and prescription given to patient and family. Both verbalize understanding. Patient tolerating po liquids with no difficulty. Patient states pain is at a tolerable level for them. Anesthesia consent and surgical consent in chart upon patient's discharge from [facility].   DIARRHEA/VOMITING/ABDOMINAL PAIN

## 2018-08-28 NOTE — DISCHARGE SUMMARY
Radiology Discharge Summary      Hospital Course: No complications    Admit Date: 8/28/2018  Discharge Date: 08/28/2018     Instructions Given to Patient: Yes  Diet: Resume prior diet  Activity: activity as tolerated    Description of Condition on Discharge: Stable  Vital Signs (Most Recent): Temp: 98.1 °F (36.7 °C) (08/28/18 1530)  Pulse: 81 (08/28/18 1530)  Resp: 18 (08/28/18 1530)  BP: 115/72 (08/28/18 1530)  SpO2: 100 % (08/28/18 1530)    Discharge Disposition: Home    Discharge Diagnosis: Transplant kidney dysfunction       Follow-up: Dr. Felicitas Carrillo Ma  Radiology

## 2018-08-28 NOTE — H&P
"Radiology History & Physical      SUBJECTIVE:     Chief Complaint: Status post kidney transplant with elevated creatinine and arterial resistive indices.     History of Present Illness:  Demetrio Aguiar is a 38 y.o. male who presents for ultrasound guided transplant kidney biopsy.    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 Hematoma evacuated.(09/09)    Status post exploratory laparotomy 9/10/2017    Re explored for retroperitoneal hematoma. Hematoma evacuated.(09/09)    TB lung, latent     tx INH 2006     Past Surgical History:   Procedure Laterality Date    EXPLORATORY LAPAROTOMY W/ BOWEL RESECTION      NO BOWEL RESECTION, UNKNOWN DETAILS, "Pancreas Infection"    KIDNEY TRANSPLANT      peritoneal dialysis catheter placement         Home Meds:   Prior to Admission medications    Medication Sig Start Date End Date Taking? Authorizing Provider   cinacalcet (SENSIPAR) 60 MG Tab Take 1 tablet (60 mg total) by mouth daily with breakfast. 8/23/18  Yes Jase Hines MD   DESCOVY 200-25 mg Tab Take 1 tablet by mouth once daily. 8/23/18  Yes Jase Hnies MD   dolutegravir (TIVICAY) 50 mg Tab Take 1 tablet (50 mg total) by mouth once daily. 8/23/18  Yes Jase Hines MD   famotidine (PEPCID) 20 MG tablet TAKE 1 TABLET (20MG TOTAL) BY MOUTH EACH EVENING 8/23/18  Yes Jase Hines MD   ketoconazole (NIZORAL) 200 mg Tab Take 0.5 tablets (100 mg total) by mouth once daily. 8/23/18  Yes Jase Hines MD   multivitamin (THERAGRAN) tablet Take 1 tablet by mouth once daily. 8/18/18  Yes Sayda Gross MD   mycophenolate " (MYFORTIC) 180 MG TbEC Take 1 tablet (180 mg total) by mouth 3 (three) times daily. Z94.0 Kidney Transplant 8/18/2017 8/23/18 8/23/19 Yes Jase Hines MD   NIFEdipine (PROCARDIA-XL) 30 MG (OSM) 24 hr tablet Take 1 tablet (30 mg total) by mouth every morning AND 2 tablets (60 mg total) every evening. 8/23/18 8/23/19 Yes Jase Hines MD   predniSONE (DELTASONE) 5 MG tablet Take 1 tablet (5 mg total) by mouth once daily. 8/23/18  Yes Jase Hines MD   rosuvastatin (CRESTOR) 20 MG tablet Take 1 tablet (20 mg total) by mouth once daily. 8/23/18  Yes Jase Hines MD   sodium bicarbonate 650 MG tablet Take 3 tablets (1,950 mg total) by mouth 3 (three) times daily. 8/23/18 8/23/19 Yes Jase Hines MD   sodium polystyrene (KAYEXALATE) powder Take 30g by mouth daily, as directed. 8/18/18  Yes Sayda Gross MD   tacrolimus (PROGRAF) 1 MG Cap Take 4mg in the AM and 4mg in the PM (along with 5mg caps). BY MOUTH. Total dose 9mg in AM and 9mg in PM. Z94.0 8/23/18  Yes Jase Hines MD   tacrolimus (PROGRAF) 5 MG Cap Take 1cap (5mg) q12hrs along with (1mg caps). BY MOUTH. Total dose 9mg in AM and 9mg in PM. Z94.0. 8/23/18  Yes Jase Hines MD   azithromycin (ZITHROMAX) 600 MG Tab TAKE 2 TABLETS BY MOUTH EVERY 7 DAYS 8/23/18   Jase Hines MD   betamethasone valerate 0.1% (VALISONE) 0.1 % Lotn Apply topically 2 (two) times daily. qtts 3-4 AS bid. for 14 days 8/17/18 8/31/18  Jase Hines MD     Anticoagulants/Antiplatelets: no anticoagulation    Allergies: Review of patient's allergies indicates:  No Known Allergies  Sedation History:  no adverse reactions    Review of Systems:   Hematological: no known coagulopathies  Respiratory: no shortness of breath  Cardiovascular: no chest pain  Gastrointestinal: no abdominal pain  Genito-Urinary: no dysuria  Musculoskeletal: negative  Neurological: no TIA or stroke symptoms         OBJECTIVE:     Vital Signs (Most Recent)  Temp: 97.9 °F (36.6 °C)  (08/28/18 1330)  Pulse: 71 (08/28/18 1330)  Resp: 17 (08/28/18 1330)  BP: 116/80 (08/28/18 1330)  SpO2: 100 % (08/28/18 1330)    Physical Exam:    General: no acute distress  Mental Status: alert and oriented to person, place and time  HEENT: normocephalic, atraumatic  Chest: unlabored breathing  Heart: regular heart rate  Abdomen: nondistended  Extremity: moves all extremities    Laboratory  Lab Results   Component Value Date    INR 1.0 08/28/2018       Lab Results   Component Value Date    WBC 3.73 (L) 08/28/2018    HGB 9.3 (L) 08/28/2018    HCT 28.5 (L) 08/28/2018    MCV 92 08/28/2018     (L) 08/28/2018      Lab Results   Component Value Date    GLU 96 08/28/2018     (L) 08/28/2018    K 4.8 08/28/2018     08/28/2018    CO2 23 08/28/2018    BUN 24 (H) 08/28/2018    CREATININE 2.2 (H) 08/28/2018    CALCIUM 8.2 (L) 08/28/2018    MG 1.8 08/28/2018    ALT 18 08/28/2018    AST 17 08/28/2018    ALBUMIN 3.9 08/28/2018    BILITOT 0.6 08/28/2018    BILIDIR 0.4 (H) 05/14/2018       ASSESSMENT/PLAN:     Sedation Plan: Local only.  Patient will undergo ultrasound guided transplant kidney biopsy.    Gerardo Blank  Radiology  PGY-3

## 2018-08-28 NOTE — DISCHARGE INSTRUCTIONS
Discharge Instructions for Kidney Biopsy  You had a procedure called a kidney biopsy. Your healthcare provider used a special needle to remove a small piece of tissue from your kidney to examine it for signs of damage and disease. A kidney biopsy is ordered after other tests have shown that there may be a problem with your kidney. Kidney biopsies are also performed when kidney disease is suspected and to rule out cancer.  Home care  · Rest for 24 hours to 48 hours. Get up only to use the bathroom.  · Dont drive for 24 hours to 48 hours after the procedure.  · Dont shower for 24 hours after the biopsy. If you wish, you may wash yourself with a sponge or washcloth. When you are able to shower, dont scrub the site. Gently wash the area and pat it dry.  · Remove the bandage covering the biopsy site 24 hours to 48 hours after the procedure.  · Dont lift anything heavier than 10 pounds for 3 days to 4 days after the procedure.  · Ask your healthcare provider when you can return to work. Be sure to tell your healthcare provider if your job involves heavy lifting.  · If you normally take blood thinner medicines (anticoagulants or antiplatelet medicines) and you stopped taking them a few days before your procedure, ask your healthcare provider when to start taking them again.  When to seek medical care  Call your healthcare provider right away if you have any of the following:  · Blood in your urine  · Exhaustion or extreme weakness  · Dizziness or lightheadedness  · Sudden or increased shortness of breath  · Sudden chest pain  · Fever of 100.4°F (38°C) or higher, or chills  · Increasing redness, tenderness, or swelling at the biopsy site  · Opening of or drainage or bleeding from the biopsy site  · Increasing pain, with or without activity   Date Last Reviewed: 2/1/2017  © 8345-2534 [employer]. [address] [place] [address], [place] [address]. All rights reserved. This information is not intended as a  substitute for professional medical care. Always follow your healthcare professional's instructions.

## 2018-08-28 NOTE — PROCEDURES
Radiology Post-Procedure Note    Pre Op Diagnosis: Transplant kidney dysfunction    Post Op Diagnosis: Same    Procedure: Ultrasound guided transplant kidney biopsy    Procedure performed by: Radha Benavides MD      Written Informed Consent Obtained: Yes    Specimen Removed: YES 2    Estimated Blood Loss: Minimal    Findings: Local anesthesia was used.    A 16-gauge Monopty biopsy device was used to remove 2 specimens from the transplant kidney in the right lower quadrant under ultrasound guidance.  Tissue was evaluated for adequacy and sent to pathology for further analysis.      The patient tolerated the procedure well and there were no complications.  Please see Imaging report for further details.    Gerardo Blank  Radiology

## 2018-08-28 NOTE — TELEPHONE ENCOUNTER
Notified patient of Dr Tello's directions:    Lucía Polk MD  [facility] Post-Kidney Transplant Clinical             The following message sent to patient via [facility]: Please take extra kayexalate today- 60 grams x 2 doses 6 hrs apart.

## 2018-08-30 ENCOUNTER — DOCUMENTATION ONLY (OUTPATIENT)
Dept: TRANSPLANT | Facility: CLINIC | Age: 38
End: 2018-08-30

## 2018-08-30 ENCOUNTER — PATIENT MESSAGE (OUTPATIENT)
Dept: TRANSPLANT | Facility: CLINIC | Age: 38
End: 2018-08-30

## 2018-08-30 LAB
EXT ANC: NORMAL
EXT BUN: 27
EXT CALCIUM: 9
EXT CHLORIDE: 98
EXT CO2: 23
EXT CREATININE: 2.02 MG/DL
EXT GFR MDRD AF AMER: 48
EXT GLUCOSE: 98
EXT PHOSPHORUS: 4.4
EXT POTASSIUM: 4.8
EXT SODIUM: 129 MMOL/L

## 2018-08-31 ENCOUNTER — TELEPHONE (OUTPATIENT)
Dept: TRANSPLANT | Facility: CLINIC | Age: 38
End: 2018-08-31

## 2018-08-31 DIAGNOSIS — R79.9 ABNORMAL FINDING OF BLOOD CHEMISTRY: ICD-10-CM

## 2018-08-31 DIAGNOSIS — E87.1 HYPONATREMIA: Primary | ICD-10-CM

## 2018-08-31 NOTE — TELEPHONE ENCOUNTER
Dizzines  Hyponatremia.  Saline replacement completed; Na 127 this am creat 1.6 bp wnl no orthostatic changes.i spoke with Dr jernigan and she will call him directly.

## 2018-08-31 NOTE — TELEPHONE ENCOUNTER
----- Message from Lucía Mckeon sent at 8/31/2018  9:09 AM CDT -----  Contact: Lv Juan  Needs Advice    Reason for call:    Pt is in Er and they want to give a update on patient   Communication Preference:  Omer Menezes [phone]  Additional Information:

## 2018-09-05 ENCOUNTER — PATIENT MESSAGE (OUTPATIENT)
Dept: TRANSPLANT | Facility: CLINIC | Age: 38
End: 2018-09-05

## 2018-09-10 ENCOUNTER — LAB VISIT (OUTPATIENT)
Dept: LAB | Facility: HOSPITAL | Age: 38
End: 2018-09-10
Attending: INTERNAL MEDICINE
Payer: MEDICARE

## 2018-09-10 ENCOUNTER — OFFICE VISIT (OUTPATIENT)
Dept: TRANSPLANT | Facility: CLINIC | Age: 38
End: 2018-09-10
Payer: MEDICARE

## 2018-09-10 VITALS
OXYGEN SATURATION: 100 % | TEMPERATURE: 98 F | DIASTOLIC BLOOD PRESSURE: 95 MMHG | HEIGHT: 68 IN | HEART RATE: 79 BPM | BODY MASS INDEX: 24.63 KG/M2 | WEIGHT: 162.5 LBS | SYSTOLIC BLOOD PRESSURE: 142 MMHG | RESPIRATION RATE: 18 BRPM

## 2018-09-10 DIAGNOSIS — Z94.0 S/P KIDNEY TRANSPLANT: Primary | ICD-10-CM

## 2018-09-10 DIAGNOSIS — E87.1 HYPONATREMIA: ICD-10-CM

## 2018-09-10 DIAGNOSIS — D63.1 ANEMIA IN STAGE 3 CHRONIC KIDNEY DISEASE: Chronic | ICD-10-CM

## 2018-09-10 DIAGNOSIS — N18.30 CKD (CHRONIC KIDNEY DISEASE) STAGE 3, GFR 30-59 ML/MIN: ICD-10-CM

## 2018-09-10 DIAGNOSIS — Z29.89 NEED FOR PROPHYLACTIC IMMUNOTHERAPY: ICD-10-CM

## 2018-09-10 DIAGNOSIS — Z79.60 LONG-TERM USE OF IMMUNOSUPPRESSANT MEDICATION: ICD-10-CM

## 2018-09-10 DIAGNOSIS — H04.123 INSUFFICIENCY OF TEAR FILM OF BOTH EYES: ICD-10-CM

## 2018-09-10 DIAGNOSIS — N18.30 ANEMIA IN STAGE 3 CHRONIC KIDNEY DISEASE: Chronic | ICD-10-CM

## 2018-09-10 DIAGNOSIS — D84.9 IMMUNOCOMPROMISED STATE: Chronic | ICD-10-CM

## 2018-09-10 DIAGNOSIS — Z91.89 AT RISK FOR OPPORTUNISTIC INFECTIONS: ICD-10-CM

## 2018-09-10 DIAGNOSIS — Z94.0 KIDNEY REPLACED BY TRANSPLANT: ICD-10-CM

## 2018-09-10 DIAGNOSIS — B20 HIV (HUMAN IMMUNODEFICIENCY VIRUS INFECTION): Chronic | ICD-10-CM

## 2018-09-10 LAB
ALBUMIN SERPL BCP-MCNC: 4.3 G/DL
ANION GAP SERPL CALC-SCNC: 9 MMOL/L
BASOPHILS # BLD AUTO: 0.03 K/UL
BASOPHILS NFR BLD: 0.8 %
BUN SERPL-MCNC: 33 MG/DL
CALCIUM SERPL-MCNC: 9.4 MG/DL
CHLORIDE SERPL-SCNC: 107 MMOL/L
CHOLEST SERPL-MCNC: 113 MG/DL
CHOLEST/HDLC SERPL: 3.6 {RATIO}
CO2 SERPL-SCNC: 24 MMOL/L
CREAT SERPL-MCNC: 2.6 MG/DL
DIFFERENTIAL METHOD: ABNORMAL
EOSINOPHIL # BLD AUTO: 0.1 K/UL
EOSINOPHIL NFR BLD: 3 %
ERYTHROCYTE [DISTWIDTH] IN BLOOD BY AUTOMATED COUNT: 12.7 %
EST. GFR  (AFRICAN AMERICAN): 34.4 ML/MIN/1.73 M^2
EST. GFR  (NON AFRICAN AMERICAN): 29.7 ML/MIN/1.73 M^2
GLUCOSE SERPL-MCNC: 100 MG/DL
HCT VFR BLD AUTO: 29.1 %
HDLC SERPL-MCNC: 31 MG/DL
HDLC SERPL: 27.4 %
HGB BLD-MCNC: 9.4 G/DL
IMM GRANULOCYTES # BLD AUTO: 0.02 K/UL
IMM GRANULOCYTES NFR BLD AUTO: 0.5 %
LDLC SERPL CALC-MCNC: 48.2 MG/DL
LYMPHOCYTES # BLD AUTO: 1 K/UL
LYMPHOCYTES NFR BLD: 26.1 %
MAGNESIUM SERPL-MCNC: 1.8 MG/DL
MCH RBC QN AUTO: 29.7 PG
MCHC RBC AUTO-ENTMCNC: 32.3 G/DL
MCV RBC AUTO: 92 FL
MONOCYTES # BLD AUTO: 0.5 K/UL
MONOCYTES NFR BLD: 13.6 %
NEUTROPHILS # BLD AUTO: 2.1 K/UL
NEUTROPHILS NFR BLD: 56 %
NONHDLC SERPL-MCNC: 82 MG/DL
NRBC BLD-RTO: 0 /100 WBC
PHOSPHATE SERPL-MCNC: 5 MG/DL
PLATELET # BLD AUTO: 158 K/UL
PMV BLD AUTO: 10.5 FL
POTASSIUM SERPL-SCNC: 4.9 MMOL/L
RBC # BLD AUTO: 3.17 M/UL
SODIUM SERPL-SCNC: 140 MMOL/L
TACROLIMUS BLD-MCNC: 6.7 NG/ML
TRIGL SERPL-MCNC: 169 MG/DL
WBC # BLD AUTO: 3.68 K/UL

## 2018-09-10 PROCEDURE — 99999 PR PBB SHADOW E&M-EST. PATIENT-LVL V: CPT | Mod: PBBFAC,,, | Performed by: NURSE PRACTITIONER

## 2018-09-10 PROCEDURE — 99215 OFFICE O/P EST HI 40 MIN: CPT | Mod: PBBFAC | Performed by: NURSE PRACTITIONER

## 2018-09-10 PROCEDURE — 83735 ASSAY OF MAGNESIUM: CPT

## 2018-09-10 PROCEDURE — 80197 ASSAY OF TACROLIMUS: CPT

## 2018-09-10 PROCEDURE — 80069 RENAL FUNCTION PANEL: CPT

## 2018-09-10 PROCEDURE — 80061 LIPID PANEL: CPT

## 2018-09-10 PROCEDURE — 36415 COLL VENOUS BLD VENIPUNCTURE: CPT

## 2018-09-10 PROCEDURE — 87799 DETECT AGENT NOS DNA QUANT: CPT

## 2018-09-10 PROCEDURE — 85025 COMPLETE CBC W/AUTO DIFF WBC: CPT

## 2018-09-10 PROCEDURE — 99215 OFFICE O/P EST HI 40 MIN: CPT | Mod: S$PBB,,, | Performed by: NURSE PRACTITIONER

## 2018-09-10 RX ORDER — ATOVAQUONE 750 MG/5ML
750 SUSPENSION ORAL DAILY
COMMUNITY
End: 2018-09-11 | Stop reason: ALTCHOICE

## 2018-09-10 NOTE — PROGRESS NOTES
Kidney Post-Transplant Assessment    Referring Physician:   Current Nephrologist: Alexa Holliday    ORGAN: RIGHT KIDNEY  Donor Type:  - brain death  PHS Increased Risk: no  Cold Ischemia: 422 mins  Induction Medications: thymoglobulin    Subjective:     CC:  Reassessment of renal allograft function and management of chronic immunosuppression.    HPI:  Mr. Aguiar is a 38 y.o. 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 He  received a  - brain death kidney transplant on 17.  He has CKD stage 3 - GFR 30-59 and his baseline creatinine is between 1.8-2.1. He takes mycophenolic acid, prednisone and tacrolimus for maintenance immunosuppression.  Recent hospitalizations or ER visits since his previous clinic visit.  [facility] , 2018 for Hyponatremia . Reports he has not been drinking as much water d/t being hyponatremic. Overall feels well.  Denies chest pain, SOB, leg pain, abdominal pain or LUTs. Does report noticing a drop of blood to his boxers , and has followed up with his ID doctor concerning this. It was felt to be related to a genital wart. He denies hematuria.   U/A was collected today, results pending.       2018 --allograft kidney  BX for sCR > 2.0   FINAL PATHOLOGIC DIAGNOSIS  [place] DIAGNOSIS:  KIDNEY (PERCUTANEOUS TRANSPLANT BIOPSY): ACUTE TUBULAR INJURY (SEE COMMENT).  2018 Allosure 0.33    Pertinent HX:   His post op course has been complicated by   - DGF requiring HD (last HD 17) on 17.   - Biopsy proven ABMR with weakly positive class I DSA's on 17. Pt treated with SMP x 3 (-9/3). PLEX x6 (completed ) and IVIG x 2 (-). Kidney function was improving  - Kidney US on 17 showed a large collection located along the anterior  inferior margin of the renal transplant measuring 13.4 x 7 x 11.9. taken back to the OR for retroperitoneal bleed and washout.   -Biopsy 10/17/17 (LEFTY): 14 glomeruli, <5% fibrosis, no MC changes, No ACR or AMR, C4d negative  - on HAART therapy. CD4 of 7. Started on Azithromycin weekly.  - Perirenal fluid collection, s/p drainage by IR 9/28/17     -Biopsy 1/15/18: No ACR, AVR, ABMR, CD4 neg. Less than 10 % fibrosis.   -Biopsy (for DGF) 8/25/17: good sample with 24 glomeruli (only 1 sclerosed) and evidence just meeting criteria for ABMR. + diffuse ATI. Minimal fibrosis, no ACR or AVR, but + CD4 (>50% diffusely + stain) with mild glomerulitis and focal peritubular capillaritis. Also, + ca-oxalate (3) and ca-phos (2) crystals.  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 Hematoma evacuated.(09/09)    Status post exploratory laparotomy 9/10/2017    Re explored for retroperitoneal hematoma. Hematoma evacuated.(09/09)    TB lung, latent     tx INH 2006       Review of Systems   Constitutional: Negative for activity change, appetite change, chills, fatigue, fever and unexpected weight change.   HENT: Negative for congestion, facial swelling, postnasal drip, rhinorrhea, sinus pressure, sore throat and trouble swallowing.    Eyes: Negative for pain, redness and visual disturbance.   Respiratory: Negative for cough, chest tightness, shortness of breath and wheezing.    Cardiovascular: Positive for leg swelling. Negative for  "chest pain and palpitations.        RLE edema --Was evaluated at [place], [place] by US . Reports no blood clot.        Gastrointestinal: Negative for abdominal pain, diarrhea, nausea and vomiting.   Genitourinary: Negative for dysuria, flank pain and urgency.   Musculoskeletal: Negative for gait problem, neck pain and neck stiffness.   Skin: Negative for rash.   Allergic/Immunologic: Positive for immunocompromised state. Negative for environmental allergies and food allergies.   Neurological: Negative for dizziness, weakness, light-headedness and headaches.   Psychiatric/Behavioral: Negative for agitation and confusion. The patient is not nervous/anxious.        Objective:     Blood pressure (!) 142/95, pulse 79, temperature 97.8 °F (36.6 °C), temperature source Oral, resp. rate 18, height 5' 7.95" (1.726 m), weight 73.7 kg (162 lb 7.7 oz), SpO2 100 %.body mass index is 24.74 kg/m².    Physical Exam   Constitutional: He is oriented to person, place, and time. He appears well-developed and well-nourished.   HENT:   Head: Normocephalic.   Mouth/Throat: Oropharynx is clear and moist. No oropharyngeal exudate.   Eyes: Conjunctivae and EOM are normal. Pupils are equal, round, and reactive to light. No scleral icterus.   Neck: Normal range of motion. Neck supple.   Cardiovascular: Normal rate, regular rhythm and normal heart sounds.   Pulmonary/Chest: Effort normal and breath sounds normal.   Abdominal: Soft. Normal appearance and bowel sounds are normal. He exhibits no distension and no mass. There is no splenomegaly or hepatomegaly. There is no tenderness. There is no rebound, no guarding, no CVA tenderness, no tenderness at McBurney's point and negative Santiago's sign.       Musculoskeletal: Normal range of motion. He exhibits edema.   RLE --trace peripheral edema noted knee to ankle   Lymphadenopathy:     He has no cervical adenopathy.   Neurological: He is alert and oriented to person, place, and " time. He exhibits normal muscle tone. Coordination normal.   Skin: Skin is warm and dry.   Psychiatric: He has a normal mood and affect. His behavior is normal.   Vitals reviewed.      Labs:  Lab Results   Component Value Date    WBC 3.68 (L) 09/10/2018    HGB 9.4 (L) 09/10/2018    HCT 29.1 (L) 09/10/2018     09/10/2018    K 4.9 09/10/2018     09/10/2018    CO2 24 09/10/2018    BUN 33 (H) 09/10/2018    CREATININE 2.6 (H) 09/10/2018    EGFRNONAA 29.7 (A) 09/10/2018    EGFRIFAFRICA 45 (L) 2018    CALCIUM 9.4 09/10/2018    PHOS 5.0 (H) 09/10/2018    MG 1.8 09/10/2018    ALBUMIN 4.3 09/10/2018    AST 17 2018    ALT 18 2018    UTPCR 0.13 2018    .0 (H) 2017    TACROLIMUS 6.6 2018       Lab Results   Component Value Date    EXTANC  2018      Comment:      Stat Renal panel after Kayexolate.    EXTWBC 3.6 2018    EXTSEGS 53.8 2018    EXTPLATELETS 141 2018    EXTHEMOGLOBI 11.0 2018    EXTHEMATOCRI 32.2 2018    EXTCREATININ 2.02 2018    EXTSODIUM 129 2018    EXTPOTASSIUM 4.8 2018    EXTBUN 27 2018    EXTCO2 23 2018    EXTCALCIUM 9.0 2018    EXTPHOSPHORU 4.4 2018    EXTGLUCOSE 98 2018    EXTALBUMIN 4.9 2018    EXTAST 17 2018    EXTALT 14 2018    EXTBILITOTAL 0.60 2018       Lab Results   Component Value Date    EXTTACROLVL 7.7 2018    EXTPROTCRE 0.122 2018    EXTPTHINTACT 170 2018    EXTPROTEINUA NEG 2018    EXTWBCUA negative 2018    EXTRBCUA negative 2018       Labs were reviewed with the patient.    Assessment:     1.  donor kidney transplant 2017    2. Need for prophylactic immunotherapy    3. Immunocompromised state    4. At risk for opportunistic infections    5. Anemia in stage 3 chronic kidney disease    6. CKD (chronic kidney disease) stage 3, GFR 30-59 ml/min    7. HIV (human immunodeficiency virus infection)     8. Insufficiency of tear film of both eyes    9. Long-term use of immunosuppressant medication    10. Hyponatremia        Plan:    repeat renal fx panel 2 weeks     Follow-up:   1. CKD stage: 3   2. Immunosuppression:   Prograf trough 6.6, which is  therapeutic target 5-7. Continue  Prograf 4/4 , MyFortic 180 Mg BID, and Prednisone 5 mg QD, Atovaquone 1500 mg Qd for PCP prophylaxis -lifelong, Ketoconazole 100 mg QD  Will continue to monitor for drug toxicities    HAART--HIV, -->MGMT deferred to  ID      8/28/2018  1yr 0mo   ImmunKnow (Stimulated) 226 - 524 ng/mL 92 (A)     allosure 8/29/2018 0.33    3. Allograft Function:  Continue good po hydration.    Encouraged to drink 2l of water /day  Lab Results   Component Value Date    CREATININE 2.6 (H) 09/10/2018       9/10/2018  1yr 0mo   eGFR if African American >60 mL/min/1.73 m^2 34.4 (A)       4. Hypertension management: advise low salt diet and home BP monitoring    Nifedipine 30 mg in the AM and 60 mg in the Pm      5. Metabolic Bone Disease/Secondary Hyperparathyroidism:stable  Will monitor PTH, CA and Vit D/guidelines,    Lab Results   Component Value Date    .0 (H) 12/27/2017    CALCIUM 9.4 09/10/2018    CAION 1.17 09/02/2017    PHOS 5.0 (H) 09/10/2018       9/10/2018  1yr 0mo   Magnesium 1.6 - 2.6 mg/dL 1.8   Sensipar 60 mg QD      6. Electrolytes:  Will monitor /guidelines  Recent hyponatremia, which is now stable  Lab Results   Component Value Date     09/10/2018    K 4.9 09/10/2018     09/10/2018    CO2 24 09/10/2018   Sodium bicarb 1950 mg TID     7. Anemia: stable. No need for intervention    Will monitor /guidelines  Lab Results   Component Value Date    WBC 3.68 (L) 09/10/2018    HGB 9.4 (L) 09/10/2018    HCT 29.1 (L) 09/10/2018    MCV 92 09/10/2018     09/10/2018       8.  Cytopenias: no significant cytopenias will monitor as per our guidelines. Medicine list reviewed including potential causes of drug-induced  cytopenias    9.Proteinuria: continue p/c ratio as per guidelines        8/6/2018  11mo 2wk   EXT PROT/CREAT Ratio UR 0.122     10. BK virus infection screening:  will continue to monitor/ guidelines      8/6/2018  11mo 2wk   EXT BK Virus DNA QN PCR NEG     11. Weight education: provided during the clinic visit   Body mass index is 24.74 kg/m².          12.Patient safety education regarding immunosuppression including prophylaxis posttransplant for CMV, PCP : Education provided about vaccination and prevention of infections            Follow-up:   Clinic: return to transplant clinic weekly for the first month after transplant; every 2 weeks during months 2-3; then at 6-, 9-, 12-, 18-, 24-, and 36- months post-transplant to reassess for complications from immunosuppression toxicity and monitor for rejection.  Annually thereafter.    Labs: since patient remains at high risk for rejection and drug-related complications that warrant close monitoring, labs will be ordered as follows: continue twice weekly CBC, renal panel, and drug level for first month; then same labs once weekly through 3rd month post-transplant.  Urine for UA and protein/creatinine ratio monthly.  Urine BK - PCR at 1-, 3-, 6-, 9-, 12-, 18-, 24-, and 36- months post-transplant.  Hepatic panel at 1-, 2-, 3-, 6-, 9-, 12-, 18-, 24-, and 36- months post-transplant.    Hallie Smith NP       Education:   Material provided to the patient.  Patient reminded to call with any health changes since these can be early signs of significant complications.  Also, I advised the patient to be sure any new medications or changes of old medications are discussed with either a pharmacist or physician knowledgeable with transplant to avoid rejection/drug toxicity related to significant drug interactions.    UNOS Patient Status  Functional Status: 80% - Normal activity with effort: some symptoms of disease  Physical Capacity: No Limitations

## 2018-09-10 NOTE — LETTER
September 10, 2018        Alexa Holliday  [facility] [address]  Phone: [phone]  Fax: [phone]             [facility]- Transplant  [address]  [facility] [address]  Phone: [phone]   Patient: Demetrio Aguiar   MR Number: [id number]   YOB: 1980   Date of Visit: 9/10/2018       Dear Dr. Alexa Holliday    Thank you for referring Demetrio Aguiar to me for evaluation. Attached you will find relevant portions of my assessment and plan of care.    If you have questions, please do not hesitate to call me. I look forward to following Demetrio Aguiar along with you.    Sincerely,    Hallie Smith, NP    Enclosure    If you would like to receive this communication electronically, please contact [email or web address] or [phone] to request [employer] access.    [employer] is a tool which provides read-only access to select patient information with whom you have a relationship. Its easy to use and provides real time access to review your patients record including encounter summaries, notes, results, and demographic information.    If you feel you have received this communication in error or would no longer like to receive these types of communications, please e-mail [email or web address]

## 2018-09-11 ENCOUNTER — PATIENT MESSAGE (OUTPATIENT)
Dept: TRANSPLANT | Facility: CLINIC | Age: 38
End: 2018-09-11

## 2018-09-11 NOTE — TELEPHONE ENCOUNTER
----- Message from Lucía Polk MD sent at 9/11/2018  1:56 PM CDT -----  Contact: Patient  Good catch. I wonder why it was stopped. I see ALL over his chart he should continue atovaquone....   If his ID doc wants to try dapsone, that's fine. Looks like K is too high for bactrim.    ----- Message -----  From: Evelina Barragan RN  Sent: 9/11/2018   1:23 PM  To: Lucía Polk MD    This Patient would have been on Bactrim for life, should we continue Atovaquone? It was recently d/c since he is now 1yr post transplant.   Nena,  Evelina  ----- Message -----  From: Evelina Barragan RN  Sent: 9/11/2018  12:09 PM  To: Evelina Barragan RN        ----- Message -----  From: Liudmila Cotter  Sent: 9/11/2018  11:54 AM  To: [facility] Post-Kidney Transplant Clinical    Needs Advice    Reason for call: Wished to discuss if he should continue to take atovaquone (MEPRON) 750 mg/5 mL.        Communication Preference: [phone]    Additional Information: n/a

## 2018-09-11 NOTE — TELEPHONE ENCOUNTER
Call placed, will continue Atovaquone for life. Patient will discuss use of Dapsone with his ID Physician.

## 2018-09-12 ENCOUNTER — TELEPHONE (OUTPATIENT)
Dept: TRANSPLANT | Facility: CLINIC | Age: 38
End: 2018-09-12

## 2018-09-12 RX ORDER — ATOVAQUONE 750 MG/5ML
750 SUSPENSION ORAL DAILY
Qty: 210 ML | Refills: 9 | Status: SHIPPED | OUTPATIENT
Start: 2018-09-12 | End: 2019-06-24 | Stop reason: SDUPTHER

## 2018-09-12 NOTE — TELEPHONE ENCOUNTER
----- Message from Evelina Barragan RN sent at 9/11/2018  3:05 PM CDT -----  Contact: Patient  Please review Biopsy results from 8/28/18. Allosure was negative, weak + class II DSA, creatinine remains elevated over 2.0. Acute tubular injury increased to 40-50%, was 10% on last biopsy 1/2018.  evelina  ----- Message -----  From: Lucía Polk MD  Sent: 9/11/2018   1:56 PM  To: Evelina Barragan RN    Good catch. I wonder why it was stopped. I see ALL over his chart he should continue atovaquone....   If his ID doc wants to try dapsone, that's fine. Looks like K is too high for bactrim.    ----- Message -----  From: Evelina Barragan RN  Sent: 9/11/2018   1:23 PM  To: Lucía Polk MD    This Patient would have been on Bactrim for life, should we continue Atovaquone? It was recently d/c since he is now 1yr post transplant.   Thanks,  Evelina  ----- Message -----  From: Evelina Barragan RN  Sent: 9/11/2018  12:09 PM  To: Evelina Barragan RN        ----- Message -----  From: Liudmila Cotter  Sent: 9/11/2018  11:54 AM  To: [facility] Post-Kidney Transplant Clinical    Needs Advice    Reason for call: Wished to discuss if he should continue to take atovaquone (MEPRON) 750 mg/5 mL.        Communication Preference: [phone]    Additional Information: n/a

## 2018-09-12 NOTE — TELEPHONE ENCOUNTER
Unfortunately, I am not clear what is causing his acute tubular injury.  This is often associated with a drug toxicity, hypertension, severe volume depletion.  Often times, no etiology is found.

## 2018-09-13 ENCOUNTER — TELEPHONE (OUTPATIENT)
Dept: TRANSPLANT | Facility: CLINIC | Age: 38
End: 2018-09-13

## 2018-09-13 DIAGNOSIS — Z94.0 KIDNEY REPLACED BY TRANSPLANT: ICD-10-CM

## 2018-09-13 NOTE — TELEPHONE ENCOUNTER
Call placed to patient. Patient experiencing some issues with shipments of medication from [facility]. Patient reports that he spoke to a manager today, and shipment should arrive tomorrow. Patient will keep meds with current Pharmacy at this time.

## 2018-09-13 NOTE — TELEPHONE ENCOUNTER
----- Message from Nelida Pradhan sent at 9/13/2018 10:09 AM CDT -----  Rx Refill/Request     Is this a Refill or New Rx:  New  Rx Name and Strength:  atovaquone (MEPRON) 750 mg/5 mL Susp and mycophenolate (MYFORTIC) 180 MG TbEC  Preferred Pharmacy with phone number: [facility] [phone]  Communication Preference: [phone]  Additional Information: pt wants to transfer all medications to [facility] but needs refill on these two right now/ contact pt because he complaints

## 2018-09-24 LAB
EXT ALBUMIN: 4.6
EXT BUN: 29
EXT CALCIUM: 9.8
EXT CHLORIDE: 106
EXT CO2: 24
EXT CREATININE: 2.56 MG/DL (ref 0.6–1.2)
EXT EOSINOPHIL%: 3
EXT GFR MDRD AF AMER: 36
EXT HEMATOCRIT: 29.6
EXT HEMOGLOBIN: 9.9
EXT LYMPH%: 21.9
EXT MAGNESIUM: 1.9
EXT MONOCYTES%: 13.8
EXT PHOSPHORUS: 3.9
EXT PLATELETS: 165
EXT POTASSIUM: 5.1
EXT SODIUM: 138 MMOL/L
EXT TACROLIMUS LVL: 5.4
EXT WBC: 3.6

## 2018-09-26 ENCOUNTER — PATIENT MESSAGE (OUTPATIENT)
Dept: TRANSPLANT | Facility: CLINIC | Age: 38
End: 2018-09-26

## 2018-09-28 ENCOUNTER — DOCUMENTATION ONLY (OUTPATIENT)
Dept: TRANSPLANT | Facility: CLINIC | Age: 38
End: 2018-09-28

## 2018-09-28 ENCOUNTER — PATIENT MESSAGE (OUTPATIENT)
Dept: TRANSPLANT | Facility: CLINIC | Age: 38
End: 2018-09-28

## 2018-10-08 ENCOUNTER — DOCUMENTATION ONLY (OUTPATIENT)
Dept: TRANSPLANT | Facility: CLINIC | Age: 38
End: 2018-10-08

## 2018-10-08 LAB
EXT BUN: 30
EXT CALCIUM: 9.7
EXT CHLORIDE: 107
EXT CO2: 25
EXT CREATININE: 2.46 MG/DL
EXT GFR MDRD AF AMER: 31
EXT POTASSIUM: 5.1
EXT SODIUM: 140 MMOL/L

## 2018-10-09 ENCOUNTER — PATIENT MESSAGE (OUTPATIENT)
Dept: TRANSPLANT | Facility: CLINIC | Age: 38
End: 2018-10-09

## 2018-10-11 ENCOUNTER — DOCUMENTATION ONLY (OUTPATIENT)
Dept: TRANSPLANT | Facility: CLINIC | Age: 38
End: 2018-10-11

## 2018-10-11 ENCOUNTER — PATIENT MESSAGE (OUTPATIENT)
Dept: TRANSPLANT | Facility: CLINIC | Age: 38
End: 2018-10-11

## 2018-10-11 LAB
EXT BUN: 30
EXT CALCIUM: 9.7
EXT CHLORIDE: 107
EXT CO2: 25
EXT CREATININE: 2.46 MG/DL
EXT GFR MDRD AF AMER: 38
EXT GLUCOSE: 101
EXT POTASSIUM: 5.1
EXT SODIUM: 140 MMOL/L

## 2018-11-15 ENCOUNTER — DOCUMENTATION ONLY (OUTPATIENT)
Dept: TRANSPLANT | Facility: CLINIC | Age: 38
End: 2018-11-15

## 2018-11-15 LAB
EXT ALBUMIN: 4.5
EXT BUN: 28
EXT CALCIUM: 9.6
EXT CHLORIDE: 106
EXT CO2: 24
EXT CREATININE: 2.35 MG/DL
EXT EOSINOPHIL%: 2.6
EXT GFR MDRD AF AMER: 40
EXT GLUCOSE: 111
EXT HEMATOCRIT: 27.3
EXT HEMOGLOBIN: 9.3
EXT LYMPH%: 21
EXT MAGNESIUM: 1.8
EXT MONOCYTES%: 14.1
EXT PHOSPHORUS: 4.3
EXT PLATELETS: 171
EXT POTASSIUM: 4.9
EXT SEGS%: 61.6
EXT SODIUM: 139 MMOL/L
EXT TACROLIMUS LVL: 6.5
EXT WBC: 4.4

## 2018-11-16 ENCOUNTER — PATIENT MESSAGE (OUTPATIENT)
Dept: TRANSPLANT | Facility: CLINIC | Age: 38
End: 2018-11-16

## 2018-12-03 ENCOUNTER — PATIENT MESSAGE (OUTPATIENT)
Dept: TRANSPLANT | Facility: CLINIC | Age: 38
End: 2018-12-03

## 2018-12-06 ENCOUNTER — TELEPHONE (OUTPATIENT)
Dept: TRANSPLANT | Facility: CLINIC | Age: 38
End: 2018-12-06

## 2018-12-07 NOTE — TELEPHONE ENCOUNTER
Call received from [facility]. Jillian in Dr. Young off reports patient interested in establishing care, request most recent consult notes and operative report. Records sent.

## 2018-12-12 DIAGNOSIS — D84.9 IMMUNOSUPPRESSION: Chronic | ICD-10-CM

## 2018-12-12 DIAGNOSIS — Z94.0 S/P KIDNEY TRANSPLANT: ICD-10-CM

## 2018-12-12 DIAGNOSIS — E78.5 HYPERLIPIDEMIA, UNSPECIFIED HYPERLIPIDEMIA TYPE: ICD-10-CM

## 2018-12-12 DIAGNOSIS — D84.9 IMMUNOCOMPROMISED STATE: Chronic | ICD-10-CM

## 2018-12-12 DIAGNOSIS — Z94.0 KIDNEY REPLACED BY TRANSPLANT: ICD-10-CM

## 2018-12-12 DIAGNOSIS — Z79.60 LONG-TERM USE OF IMMUNOSUPPRESSANT MEDICATION: ICD-10-CM

## 2018-12-12 RX ORDER — TACROLIMUS 1 MG/1
CAPSULE ORAL
Qty: 240 CAPSULE | Refills: 11 | Status: ON HOLD | OUTPATIENT
Start: 2018-12-12 | End: 2019-07-22 | Stop reason: HOSPADM

## 2018-12-12 RX ORDER — PREDNISONE 5 MG/1
5 TABLET ORAL DAILY
Qty: 30 TABLET | Refills: 11 | Status: SHIPPED | OUTPATIENT
Start: 2018-12-12 | End: 2019-11-05 | Stop reason: SDUPTHER

## 2018-12-12 RX ORDER — MYCOPHENOLIC ACID 180 MG/1
180 TABLET, DELAYED RELEASE ORAL 3 TIMES DAILY
Qty: 90 TABLET | Refills: 11 | Status: ON HOLD | OUTPATIENT
Start: 2018-12-12 | End: 2019-07-22 | Stop reason: HOSPADM

## 2018-12-12 RX ORDER — TACROLIMUS 5 MG/1
CAPSULE ORAL
Qty: 60 CAPSULE | Refills: 11 | Status: ON HOLD | OUTPATIENT
Start: 2018-12-12 | End: 2019-07-22 | Stop reason: HOSPADM

## 2019-01-24 ENCOUNTER — PATIENT MESSAGE (OUTPATIENT)
Dept: TRANSPLANT | Facility: CLINIC | Age: 39
End: 2019-01-24

## 2019-01-24 ENCOUNTER — NURSE TRIAGE (OUTPATIENT)
Dept: ADMINISTRATIVE | Facility: CLINIC | Age: 39
End: 2019-01-24

## 2019-01-24 NOTE — TELEPHONE ENCOUNTER
Patient called to report the following:     -ID told him that his blood levels are low and may need blood   -he was advised to contact [facility] or report to ER  -patient is tired, napping more, fatigue   -small nose bleed yesterday   -denies shortness of breath, chest pain, fever, dizziness, bleeding from urine / stool   -Kidney Txp 8/18/17    -advised to report to ED within 4 hours     Reason for Disposition   [1] MODERATE weakness (i.e., interferes with work, school, normal activities) AND [2] cause unknown  (Exceptions: weakness with acute minor illness, or weakness from poor fluid intake)    Protocols used: ST WEAKNESS (GENERALIZED) AND FATIGUE-A-AH

## 2019-01-25 NOTE — TELEPHONE ENCOUNTER
Patient states he is presently admitted to [facility] for anemia and hyperkalemia.

## 2019-02-13 ENCOUNTER — DOCUMENTATION ONLY (OUTPATIENT)
Dept: TRANSPLANT | Facility: CLINIC | Age: 39
End: 2019-02-13

## 2019-02-13 LAB
EXT ALBUMIN: 4.6
EXT ALKALINE PHOSPHATASE: 88
EXT ALT: 8
EXT AST: 14
EXT BILIRUBIN DIRECT: 0.1 MG/DL
EXT BILIRUBIN TOTAL: 0.5
EXT BK VIRUS DNA QN PCR: NORMAL
EXT BUN: 30
EXT CALCIUM: 9.5
EXT CHLORIDE: 106
EXT CO2: 24
EXT CREATININE: 2.33 MG/DL
EXT EOSINOPHIL%: 2.4
EXT GFR MDRD AF AMER: 40
EXT GLUCOSE: 118
EXT HEMATOCRIT: 27.5
EXT HEMOGLOBIN: 8.9
EXT LYMPH%: 18.4
EXT MAGNESIUM: 1.8
EXT MONOCYTES%: 10.4
EXT PHOSPHORUS: 4.2
EXT PLATELETS: 169
EXT POTASSIUM: 5.3
EXT PROT/CREAT RATIO UR: 0.11
EXT PROTEIN TOTAL: 7.3
EXT SEGS%: 68.5
EXT SODIUM: 137 MMOL/L
EXT TACROLIMUS LVL: 5.6
EXT WBC: 4.9

## 2019-02-15 ENCOUNTER — PATIENT MESSAGE (OUTPATIENT)
Dept: TRANSPLANT | Facility: CLINIC | Age: 39
End: 2019-02-15

## 2019-02-26 DIAGNOSIS — Z94.0 KIDNEY REPLACED BY TRANSPLANT: ICD-10-CM

## 2019-02-26 DIAGNOSIS — E78.5 HYPERLIPIDEMIA, UNSPECIFIED HYPERLIPIDEMIA TYPE: ICD-10-CM

## 2019-02-26 DIAGNOSIS — M89.8X9 METABOLIC BONE DISEASE: ICD-10-CM

## 2019-02-27 ENCOUNTER — LAB VISIT (OUTPATIENT)
Dept: LAB | Facility: HOSPITAL | Age: 39
End: 2019-02-27
Attending: INTERNAL MEDICINE
Payer: MEDICARE

## 2019-02-27 ENCOUNTER — OFFICE VISIT (OUTPATIENT)
Dept: TRANSPLANT | Facility: CLINIC | Age: 39
End: 2019-02-27
Payer: MEDICARE

## 2019-02-27 VITALS
RESPIRATION RATE: 16 BRPM | HEART RATE: 75 BPM | BODY MASS INDEX: 23.65 KG/M2 | WEIGHT: 156.06 LBS | SYSTOLIC BLOOD PRESSURE: 157 MMHG | HEIGHT: 68 IN | DIASTOLIC BLOOD PRESSURE: 89 MMHG | TEMPERATURE: 98 F | OXYGEN SATURATION: 100 %

## 2019-02-27 DIAGNOSIS — Z94.0 STATUS POST KIDNEY TRANSPLANT: ICD-10-CM

## 2019-02-27 DIAGNOSIS — Z94.0 S/P KIDNEY TRANSPLANT: ICD-10-CM

## 2019-02-27 DIAGNOSIS — T86.10 COMPLICATION OF TRANSPLANTED KIDNEY, UNSPECIFIED COMPLICATION: ICD-10-CM

## 2019-02-27 DIAGNOSIS — D84.9 IMMUNOCOMPROMISED STATE: Chronic | ICD-10-CM

## 2019-02-27 DIAGNOSIS — D84.9 IMMUNOSUPPRESSION: Chronic | ICD-10-CM

## 2019-02-27 DIAGNOSIS — Z94.0 STATUS POST DECEASED-DONOR KIDNEY TRANSPLANTATION: Primary | ICD-10-CM

## 2019-02-27 DIAGNOSIS — Z29.89 NEED FOR PROPHYLACTIC IMMUNOTHERAPY: ICD-10-CM

## 2019-02-27 DIAGNOSIS — D63.1 ANEMIA IN STAGE 3 CHRONIC KIDNEY DISEASE: Chronic | ICD-10-CM

## 2019-02-27 DIAGNOSIS — N18.30 ANEMIA IN STAGE 3 CHRONIC KIDNEY DISEASE: Chronic | ICD-10-CM

## 2019-02-27 DIAGNOSIS — M89.8X9 METABOLIC BONE DISEASE: ICD-10-CM

## 2019-02-27 DIAGNOSIS — T86.11 ANTIBODY MEDIATED REJECTION OF KIDNEY TRANSPLANT: ICD-10-CM

## 2019-02-27 DIAGNOSIS — Z94.0 KIDNEY REPLACED BY TRANSPLANT: ICD-10-CM

## 2019-02-27 DIAGNOSIS — Z94.0 KIDNEY REPLACED BY TRANSPLANT: Primary | ICD-10-CM

## 2019-02-27 DIAGNOSIS — Z79.60 LONG-TERM USE OF IMMUNOSUPPRESSANT MEDICATION: ICD-10-CM

## 2019-02-27 DIAGNOSIS — Z48.298 AFTERCARE FOLLOWING ORGAN TRANSPLANT: ICD-10-CM

## 2019-02-27 DIAGNOSIS — B20 HIV (HUMAN IMMUNODEFICIENCY VIRUS INFECTION): Chronic | ICD-10-CM

## 2019-02-27 PROCEDURE — 99214 OFFICE O/P EST MOD 30 MIN: CPT | Mod: PBBFAC | Performed by: NURSE PRACTITIONER

## 2019-02-27 PROCEDURE — 99999 PR PBB SHADOW E&M-EST. PATIENT-LVL IV: CPT | Mod: PBBFAC,,, | Performed by: NURSE PRACTITIONER

## 2019-02-27 PROCEDURE — 99999 PR PBB SHADOW E&M-EST. PATIENT-LVL IV: ICD-10-PCS | Mod: PBBFAC,,, | Performed by: NURSE PRACTITIONER

## 2019-02-27 PROCEDURE — 99215 OFFICE O/P EST HI 40 MIN: CPT | Mod: S$PBB,,, | Performed by: NURSE PRACTITIONER

## 2019-02-27 PROCEDURE — 86832 HLA CLASS I HIGH DEFIN QUAL: CPT | Mod: PO

## 2019-02-27 PROCEDURE — 86352 CELL FUNCTION ASSAY W/STIM: CPT

## 2019-02-27 PROCEDURE — 36415 COLL VENOUS BLD VENIPUNCTURE: CPT

## 2019-02-27 PROCEDURE — 86833 HLA CLASS II HIGH DEFIN QUAL: CPT | Mod: PO

## 2019-02-27 PROCEDURE — 99215 PR OFFICE/OUTPT VISIT, EST, LEVL V, 40-54 MIN: ICD-10-PCS | Mod: S$PBB,,, | Performed by: NURSE PRACTITIONER

## 2019-02-27 PROCEDURE — 86977 RBC SERUM PRETX INCUBJ/INHIB: CPT | Mod: 59,PO

## 2019-02-27 RX ORDER — AZITHROMYCIN 600 MG/1
TABLET, FILM COATED ORAL
Qty: 8 TABLET | Refills: 1 | Status: SHIPPED | OUTPATIENT
Start: 2019-02-27 | End: 2019-05-16 | Stop reason: SDUPTHER

## 2019-02-27 RX ORDER — CARVEDILOL 6.25 MG/1
6.25 TABLET ORAL 2 TIMES DAILY WITH MEALS
Refills: 2 | Status: ON HOLD | COMMUNITY
Start: 2018-12-03 | End: 2019-07-22 | Stop reason: SDUPTHER

## 2019-02-27 NOTE — PROGRESS NOTES
Kidney Post-Transplant Assessment    Referring Physician:   Current Nephrologist: Alexa Holliday    ORGAN: RIGHT KIDNEY  Donor Type:  - brain death  PHS Increased Risk: no  Cold Ischemia: 422 mins  Induction Medications: thymoglobulin    Subjective:     CC:  Reassessment of renal allograft function and management of chronic immunosuppression.    HPI:  Mr. Aguiar is a 38 y.o. 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 He  received a  - brain death kidney transplant on 17.  He has CKD stage 3 - GFR 30-59 and his baseline creatinine is between 1.8-2.1. He takes mycophenolic acid, prednisone and tacrolimus for maintenance immunosuppression.  No Recent hospitalizations or ER visits since his previous clinic visit.      2018 --allograft kidney  BX for sCR > 2.0   FINAL PATHOLOGIC DIAGNOSIS  [place] DIAGNOSIS:  KIDNEY (PERCUTANEOUS TRANSPLANT BIOPSY): ACUTE TUBULAR INJURY (SEE COMMENT).  2018 Allosure 0.33    Pertinent HX:   His post op course has been complicated by   - DGF requiring HD (last HD 17) on 17.   - Biopsy proven ABMR with weakly positive class I DSA's on 17. Pt treated with SMP x 3 (-9/3). PLEX x6 (completed ) and IVIG x 2 (-). Kidney function was improving  - Kidney US on 17 showed a large collection located along the anterior inferior margin of the renal transplant measuring 13.4 x 7 x 11.9. taken back to the OR for retroperitoneal bleed and washout.   -Biopsy 10/17/17 (LEFTY): 14 glomeruli, <5% fibrosis, no MC changes, No ACR or AMR, C4d negative  - on HAART therapy. CD4 of 7. Started on Azithromycin weekly.  - Perirenal fluid collection, s/p drainage by IR 17     -Biopsy 1/15/18: No ACR, AVR, ABMR, CD4 neg. Less than 10 % fibrosis.    -Biopsy (for DGF) 8/25/17: good sample with 24 glomeruli (only 1 sclerosed) and evidence just meeting criteria for ABMR. + diffuse ATI. Minimal fibrosis, no ACR or AVR, but + CD4 (>50% diffusely + stain) with mild glomerulitis and focal peritubular capillaritis. Also, + ca-oxalate (3) and ca-phos (2) crystals.  -7/9/18 WEAK DSA DETECTED: CW5(5303)      PCP PROPHYLAXIS: Bactrim LIFELONG--> changed to Mepron ~ 6/2018        Interval HX:  Pt is scheduled to f/u with nephrologist in 4/2019 at [facility]    He has been f/u with PCP and ID , LOV last week   Continues to have RLE edema/ swelling with a swollen LN in the groin.11/2018  He went to the ED and had a RLE US  At [facility]. He is being followed by ID/ DR C. Reyes      Past Medical History:   Diagnosis Date    Anemia     At risk for opportunistic infections     Delayed graft function of kidney     ESRD secondary to HIVAN s/p DDRT 8/18/17     HIV infection     HIV nephropathy     Hyperlipidemia     Hypertension     Need for prophylactic immunotherapy     S/P kidney transplant 8/18/2017 8/28/2017    Status post exploratory laparotomy 9/10/2017    Re explored for retroperitoneal hematoma. Hematoma evacuated.(09/09)    Status post exploratory laparotomy 9/10/2017    Re explored for retroperitoneal hematoma. Hematoma evacuated.(09/09)    TB lung, latent     tx INH 2006       Review of Systems   Constitutional: Positive for fatigue. Negative for activity change, appetite change, chills, fever and unexpected weight change.   HENT: Negative for congestion, facial swelling, postnasal drip, rhinorrhea, sinus pressure, sore throat and trouble swallowing.    Eyes: Negative for pain, redness and visual disturbance.   Respiratory: Negative for cough, chest tightness, shortness of breath and wheezing.    Cardiovascular: Positive for leg swelling. Negative for chest pain and palpitations.           RLE swelling    Gastrointestinal:  "Negative for abdominal pain, diarrhea, nausea and vomiting.   Genitourinary: Negative for dysuria, flank pain and urgency.   Musculoskeletal: Negative for gait problem, neck pain and neck stiffness.   Skin: Negative for rash.   Allergic/Immunologic: Positive for immunocompromised state. Negative for environmental allergies and food allergies.   Neurological: Negative for dizziness, weakness, light-headedness and headaches.   Psychiatric/Behavioral: Negative for agitation and confusion. The patient is not nervous/anxious.        Objective:     Blood pressure (!) 157/89, pulse 75, temperature 98.4 °F (36.9 °C), temperature source Oral, resp. rate 16, height 5' 8" (1.727 m), weight 70.8 kg (156 lb 1.4 oz), SpO2 100 %.body mass index is 23.73 kg/m².    Physical Exam   Constitutional: He is oriented to person, place, and time. He appears well-developed and well-nourished.   HENT:   Head: Normocephalic.   Mouth/Throat: Oropharynx is clear and moist. No oropharyngeal exudate.   Eyes: Conjunctivae and EOM are normal. Pupils are equal, round, and reactive to light. No scleral icterus.   Neck: Normal range of motion. Neck supple.   Cardiovascular: Normal rate, regular rhythm and normal heart sounds.   Pulmonary/Chest: Effort normal and breath sounds normal.   Abdominal: Soft. Normal appearance and bowel sounds are normal. He exhibits no distension and no mass. There is no splenomegaly or hepatomegaly. There is no tenderness. There is no rebound, no guarding, no CVA tenderness, no tenderness at McBurney's point and negative Santiago's sign.       Musculoskeletal: Normal range of motion. He exhibits edema.    RLE edema +1   Lymphadenopathy:     He has no cervical adenopathy.   Neurological: He is alert and oriented to person, place, and time. He exhibits normal muscle tone. Coordination normal.   Skin: Skin is warm and dry.   Psychiatric: He has a normal mood and affect. His behavior is normal.   Vitals reviewed.      Labs:  Lab " Results   Component Value Date    WBC 3.68 (L) 09/10/2018    HGB 9.4 (L) 09/10/2018    HCT 29.1 (L) 09/10/2018     09/10/2018    K 4.9 09/10/2018     09/10/2018    CO2 24 09/10/2018    BUN 33 (H) 09/10/2018    CREATININE 2.6 (H) 09/10/2018    EGFRNONAA 29.7 (A) 09/10/2018    EGFRIFAFRICA 45 (L) 2018    CALCIUM 9.4 09/10/2018    PHOS 5.0 (H) 09/10/2018    MG 1.8 09/10/2018    ALBUMIN 4.3 09/10/2018    AST 17 2018    ALT 18 2018    UTPCR 0.13 2018    .0 (H) 2017    TACROLIMUS 6.7 09/10/2018       Lab Results   Component Value Date    EXTANC  2018      Comment:      Stat Renal panel after Kayexolate.    EXTWBC 4.9 2019    EXTSEGS 68.5 2019    EXTPLATELETS 169 2019    EXTHEMOGLOBI 8.9 2019    EXTHEMATOCRI 27.5 2019    EXTCREATININ 2.33 2019    EXTSODIUM 137 2019    EXTPOTASSIUM 5.3 2019    EXTBUN 30 2019    EXTCO2 24 2019    EXTCALCIUM 9.5 2019    EXTPHOSPHORU 4.2 2019    EXTGLUCOSE 118 2019    EXTALBUMIN 4.6 2019    EXTAST 14 2019    EXTALT 8 2019    EXTBILITOTAL 0.50 2019       Lab Results   Component Value Date    EXTTACROLVL 5.6 2019    EXTPROTCRE 0.11 2019    EXTPTHINTACT 170 2018    EXTPROTEINUA NEG 2018    EXTWBCUA negative 2018    EXTRBCUA negative 2018       Labs were reviewed with the patient.    Assessment:     1. 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  donor kidney transplant 2017    3. Immunocompromised state    4. Need for prophylactic immunotherapy    5. Antibody mediated rejection of kidney transplant    6. Anemia in stage 3 chronic kidney disease    7. Long-term use of immunosuppressant medication    8. Immunosuppression    9. HIV (human immunodeficiency virus infection)        Plan:     cylex, DSA and allosure today, results pending    Fax labs to dialysis/nephrologist concerning  Mgmt  anemia  General nephrology f/u 4/2019  Discussed with CARMELITA Palacios PharmD --> continue atovaquone --lifelong PCP prophylaxis   Continue azithromycin MAC prophylaxis. Get CD4  From the past 6 months. Forward to MARICRUZ Palacios and Dr Miramontes / ID for comment c/o continuation of azithromycin prophylaxis     Follow-up:   1. CKD stage: 3   2. Immunosuppression:   Prograf trough 6.6, which is  therapeutic target 4-7. Continue Prograf 4/4 , MyFortic 180 Mg BID, and Prednisone 5 mg QD, Atovaquone 1500 mg Qd for PCP prophylaxis -lifelong, azithromycin for MAC prophylaxis Ketoconazole 100 mg QD  Will continue to monitor for drug toxicities    HAART--HIV, -->MGMT deferred to  ID          3. Allograft Function:  Continue good po hydration.    Encouraged to drink 2l of water /day         2/5/2019  1yr 5mo   EXT Creatinine mg/dL 2.33       2/5/2019  1yr 5mo   EXT GFR MDRD AF AMER 40         4. Hypertension management: advise low salt diet and home BP monitoring    Nifedipine 30 mg in the AM and 60 mg in the Pm      5. Metabolic Bone Disease/Secondary Hyperparathyroidism:stable  Will monitor PTH, CA and Vit D/guidelines,  2/5/2019      Sensipar 60 mg QD      6. Electrolytes:  Will monitor /guidelines  Recent hyponatremia, which is now stable     2/5/2019     Sodium bicarb 1950 mg TID       7. Anemia: stable. No need for intervention    Will monitor /guidelines   2/5/2019 --Fax labs to dialysis/nephrologist concerning  Mgmt anemia        8.  Cytopenias: no significant cytopenias will monitor as per our guidelines. Medicine list reviewed including potential causes of drug-induced cytopenias    9.Proteinuria: continue p/c ratio as per guidelines          2/5/2019  1yr 5mo   EXT PROT/CREAT Ratio UR 0.11       10. BK virus infection screening:  will continue to monitor/ guidelines        2/5/2019  1yr 5mo   EXT BK Virus DNA QN PCR neg       11. Weight education: provided during the clinic visit   Body mass index is 23.73 kg/m².          12.Patient  safety education regarding immunosuppression including prophylaxis posttransplant for CMV, PCP : Education provided about vaccination and prevention of infections            Follow-up:   Clinic: return to transplant clinic weekly for the first month after transplant; every 2 weeks during months 2-3; then at 6-, 9-, 12-, 18-, 24-, and 36- months post-transplant to reassess for complications from immunosuppression toxicity and monitor for rejection.  Annually thereafter.    Labs: since patient remains at high risk for rejection and drug-related complications that warrant close monitoring, labs will be ordered as follows: continue twice weekly CBC, renal panel, and drug level for first month; then same labs once weekly through 3rd month post-transplant.  Urine for UA and protein/creatinine ratio monthly.  Urine BK - PCR at 1-, 3-, 6-, 9-, 12-, 18-, 24-, and 36- months post-transplant.  Hepatic panel at 1-, 2-, 3-, 6-, 9-, 12-, 18-, 24-, and 36- months post-transplant.    Hallie Smith NP       Education:   Material provided to the patient.  Patient reminded to call with any health changes since these can be early signs of significant complications.  Also, I advised the patient to be sure any new medications or changes of old medications are discussed with either a pharmacist or physician knowledgeable with transplant to avoid rejection/drug toxicity related to significant drug interactions.    UNOS Patient Status  Functional Status: 80% - Normal activity with effort: some symptoms of disease  Physical Capacity: No Limitations

## 2019-02-27 NOTE — PATIENT INSTRUCTIONS
continue atovaquone --lifelong PCP prophylaxis   Continue azithromycin MAC prophylaxis until further notice

## 2019-02-27 NOTE — LETTER
February 27, 2019        Alexa Holliday  [facility] [address]  Phone: [phone]  Fax: [phone]             [facility]  [address]  [facility] [address]  Phone: [phone]   Patient: Demetrio Aguiar   MR Number: [id number]   YOB: 1980   Date of Visit: 2/27/2019       Dear Dr. Alexa Holliday    Thank you for referring Demetrio Aguiar to me for evaluation. Attached you will find relevant portions of my assessment and plan of care.    If you have questions, please do not hesitate to call me. I look forward to following Demetrio Aguiar along with you.    Sincerely,    Hallie Smith, NP    Enclosure    If you would like to receive this communication electronically, please contact [email or web address] or [phone] to request [employer] access.    [employer] is a tool which provides read-only access to select patient information with whom you have a relationship. Its easy to use and provides real time access to review your patients record including encounter summaries, notes, results, and demographic information.    If you feel you have received this communication in error or would no longer like to receive these types of communications, please e-mail [email or web address]

## 2019-02-28 ENCOUNTER — PATIENT MESSAGE (OUTPATIENT)
Dept: TRANSPLANT | Facility: CLINIC | Age: 39
End: 2019-02-28

## 2019-03-01 LAB
IMMUNKNOW (STIMULATED): 113 NG/ML
MISCELLANEOUS TEST NAME: NORMAL
REFERENCE LAB: NORMAL
SPECIMEN TYPE: NORMAL
TEST RESULT: NORMAL

## 2019-03-06 LAB
CLASS I ANTIBODY COMMENTS - LUMINEX: NORMAL
CLASS II ANTIBODY COMMENTS - LUMINEX: NORMAL
DSA1 TESTING DATE: NORMAL
DSA12 TESTING DATE: NORMAL
DSA2 TESTING DATE: NORMAL
SERUM COLLECTION DT - LUMINEX CLASS I: NORMAL
SERUM COLLECTION DT - LUMINEX CLASS II: NORMAL

## 2019-03-07 ENCOUNTER — DOCUMENTATION ONLY (OUTPATIENT)
Dept: TRANSPLANT | Facility: CLINIC | Age: 39
End: 2019-03-07

## 2019-03-07 LAB — EXT ALLOSURE: 0.73

## 2019-03-07 NOTE — PROGRESS NOTES
What is the trend of the allosure. I am checking Dr Tello in basket, will make sure she reviews this next week and also to order a follow up allosure

## 2019-03-11 ENCOUNTER — PATIENT MESSAGE (OUTPATIENT)
Dept: TRANSPLANT | Facility: CLINIC | Age: 39
End: 2019-03-11

## 2019-03-11 DIAGNOSIS — M89.8X9 METABOLIC BONE DISEASE: ICD-10-CM

## 2019-03-11 DIAGNOSIS — Z94.0 KIDNEY REPLACED BY TRANSPLANT: ICD-10-CM

## 2019-03-11 DIAGNOSIS — E78.5 HYPERLIPIDEMIA, UNSPECIFIED HYPERLIPIDEMIA TYPE: ICD-10-CM

## 2019-03-11 RX ORDER — CINACALCET 60 MG/1
60 TABLET, FILM COATED ORAL
Qty: 30 TABLET | Refills: 0 | Status: ON HOLD | OUTPATIENT
Start: 2019-03-11 | End: 2019-12-17 | Stop reason: SDUPTHER

## 2019-04-15 DIAGNOSIS — Z94.0 KIDNEY REPLACED BY TRANSPLANT: ICD-10-CM

## 2019-04-15 DIAGNOSIS — M89.8X9 METABOLIC BONE DISEASE: ICD-10-CM

## 2019-04-15 DIAGNOSIS — E78.5 HYPERLIPIDEMIA, UNSPECIFIED HYPERLIPIDEMIA TYPE: ICD-10-CM

## 2019-04-15 RX ORDER — CINACALCET HYDROCHLORIDE 60 MG/1
TABLET, COATED ORAL
Qty: 30 TABLET | Refills: 0 | OUTPATIENT
Start: 2019-04-15

## 2019-05-16 ENCOUNTER — PATIENT MESSAGE (OUTPATIENT)
Dept: TRANSPLANT | Facility: CLINIC | Age: 39
End: 2019-05-16

## 2019-05-16 DIAGNOSIS — M89.8X9 METABOLIC BONE DISEASE: ICD-10-CM

## 2019-05-16 DIAGNOSIS — E78.5 HYPERLIPIDEMIA, UNSPECIFIED HYPERLIPIDEMIA TYPE: ICD-10-CM

## 2019-05-16 DIAGNOSIS — Z94.0 KIDNEY REPLACED BY TRANSPLANT: ICD-10-CM

## 2019-05-16 RX ORDER — AZITHROMYCIN 600 MG/1
TABLET, FILM COATED ORAL
Qty: 8 TABLET | Refills: 0 | Status: ON HOLD | OUTPATIENT
Start: 2019-05-16 | End: 2019-07-22 | Stop reason: HOSPADM

## 2019-06-03 ENCOUNTER — DOCUMENTATION ONLY (OUTPATIENT)
Dept: TRANSPLANT | Facility: CLINIC | Age: 39
End: 2019-06-03

## 2019-06-03 LAB — EXT ALLOSURE: 0.6

## 2019-06-04 ENCOUNTER — PATIENT MESSAGE (OUTPATIENT)
Dept: TRANSPLANT | Facility: CLINIC | Age: 39
End: 2019-06-04

## 2019-06-21 ENCOUNTER — PATIENT MESSAGE (OUTPATIENT)
Dept: TRANSPLANT | Facility: CLINIC | Age: 39
End: 2019-06-21

## 2019-06-24 DIAGNOSIS — Z94.0 KIDNEY REPLACED BY TRANSPLANT: ICD-10-CM

## 2019-06-24 RX ORDER — KETOCONAZOLE 200 MG/1
100 TABLET ORAL DAILY
Qty: 15 TABLET | Refills: 11 | Status: ON HOLD | OUTPATIENT
Start: 2019-06-24 | End: 2019-07-22 | Stop reason: HOSPADM

## 2019-06-24 RX ORDER — ATOVAQUONE 750 MG/5ML
750 SUSPENSION ORAL DAILY
Qty: 210 ML | Refills: 11 | Status: ON HOLD | OUTPATIENT
Start: 2019-06-24 | End: 2019-11-19 | Stop reason: HOSPADM

## 2019-06-24 RX ORDER — FAMOTIDINE 20 MG/1
TABLET, FILM COATED ORAL
Qty: 30 TABLET | Refills: 11 | Status: ON HOLD | OUTPATIENT
Start: 2019-06-24 | End: 2020-01-01 | Stop reason: HOSPADM

## 2019-07-16 ENCOUNTER — PATIENT MESSAGE (OUTPATIENT)
Dept: TRANSPLANT | Facility: CLINIC | Age: 39
End: 2019-07-16

## 2019-07-16 NOTE — TELEPHONE ENCOUNTER
Call placed to patient in review of My Chart message. Patient reports R Leg swelling and pain associated with swollen lymph nodes secondary to HIV pathology. Patient reports that his creatinine increased to 3.1 and was advised to notify Transplant. Patient currently follows with ID locally, agreeable to report to ED for evaluation.

## 2019-07-17 ENCOUNTER — PATIENT MESSAGE (OUTPATIENT)
Dept: TRANSPLANT | Facility: CLINIC | Age: 39
End: 2019-07-17

## 2019-07-17 ENCOUNTER — HOSPITAL ENCOUNTER (INPATIENT)
Facility: HOSPITAL | Age: 39
LOS: 5 days | Discharge: HOME | DRG: 683 | End: 2019-07-22
Attending: EMERGENCY MEDICINE | Admitting: HOSPITALIST
Payer: MEDICARE

## 2019-07-17 DIAGNOSIS — D63.1 ANEMIA IN STAGE 3 CHRONIC KIDNEY DISEASE: Chronic | ICD-10-CM

## 2019-07-17 DIAGNOSIS — N17.9 ACUTE KIDNEY INJURY SUPERIMPOSED ON CHRONIC KIDNEY DISEASE: ICD-10-CM

## 2019-07-17 DIAGNOSIS — C46.9 KAPOSI SARCOMA: ICD-10-CM

## 2019-07-17 DIAGNOSIS — B20 HIV (HUMAN IMMUNODEFICIENCY VIRUS INFECTION): Chronic | ICD-10-CM

## 2019-07-17 DIAGNOSIS — C77.9 REGIONAL LYMPH NODE METASTASIS PRESENT: ICD-10-CM

## 2019-07-17 DIAGNOSIS — Z94.0 KIDNEY REPLACED BY TRANSPLANT: ICD-10-CM

## 2019-07-17 DIAGNOSIS — N18.9 ACUTE KIDNEY INJURY SUPERIMPOSED ON CHRONIC KIDNEY DISEASE: ICD-10-CM

## 2019-07-17 DIAGNOSIS — R07.9 CHEST PAIN: ICD-10-CM

## 2019-07-17 DIAGNOSIS — N18.30 ANEMIA IN STAGE 3 CHRONIC KIDNEY DISEASE: Chronic | ICD-10-CM

## 2019-07-17 DIAGNOSIS — M79.89 RIGHT LEG SWELLING: ICD-10-CM

## 2019-07-17 DIAGNOSIS — Z91.89 AT RISK FOR OPPORTUNISTIC INFECTIONS: ICD-10-CM

## 2019-07-17 DIAGNOSIS — E87.5 HYPERKALEMIA: Primary | ICD-10-CM

## 2019-07-17 DIAGNOSIS — Z79.60 LONG-TERM USE OF IMMUNOSUPPRESSANT MEDICATION: ICD-10-CM

## 2019-07-17 DIAGNOSIS — M89.8X9 METABOLIC BONE DISEASE: ICD-10-CM

## 2019-07-17 DIAGNOSIS — N17.9 AKI (ACUTE KIDNEY INJURY): ICD-10-CM

## 2019-07-17 PROBLEM — R59.0 LYMPHADENOPATHY, INGUINAL: Status: ACTIVE | Noted: 2019-07-17

## 2019-07-17 LAB
ALBUMIN SERPL BCP-MCNC: 4 G/DL (ref 3.5–5.2)
ALP SERPL-CCNC: 72 U/L (ref 55–135)
ALT SERPL W/O P-5'-P-CCNC: 8 U/L (ref 10–44)
AMPHET+METHAMPHET UR QL: NEGATIVE
ANION GAP SERPL CALC-SCNC: 10 MMOL/L (ref 8–16)
ANION GAP SERPL CALC-SCNC: 8 MMOL/L (ref 8–16)
ANISOCYTOSIS BLD QL SMEAR: SLIGHT
AST SERPL-CCNC: 18 U/L (ref 10–40)
BARBITURATES UR QL SCN>200 NG/ML: NEGATIVE
BASOPHILS # BLD AUTO: ABNORMAL K/UL (ref 0–0.2)
BASOPHILS NFR BLD: 0 % (ref 0–1.9)
BENZODIAZ UR QL SCN>200 NG/ML: NEGATIVE
BILIRUB SERPL-MCNC: 0.6 MG/DL (ref 0.1–1)
BILIRUB UR QL STRIP: NEGATIVE
BUN SERPL-MCNC: 27 MG/DL (ref 6–20)
BUN SERPL-MCNC: 31 MG/DL (ref 6–20)
BUN SERPL-MCNC: 32 MG/DL (ref 6–30)
BUN SERPL-MCNC: 33 MG/DL (ref 6–30)
BZE UR QL SCN: NEGATIVE
CALCIUM SERPL-MCNC: 7 MG/DL (ref 8.7–10.5)
CALCIUM SERPL-MCNC: 9.1 MG/DL (ref 8.7–10.5)
CANNABINOIDS UR QL SCN: NEGATIVE
CHLORIDE SERPL-SCNC: 105 MMOL/L (ref 95–110)
CHLORIDE SERPL-SCNC: 106 MMOL/L (ref 95–110)
CHLORIDE SERPL-SCNC: 107 MMOL/L (ref 95–110)
CHLORIDE SERPL-SCNC: 114 MMOL/L (ref 95–110)
CLARITY UR REFRACT.AUTO: CLEAR
CO2 SERPL-SCNC: 15 MMOL/L (ref 23–29)
CO2 SERPL-SCNC: 21 MMOL/L (ref 23–29)
COLOR UR AUTO: NORMAL
CREAT SERPL-MCNC: 2.4 MG/DL (ref 0.5–1.4)
CREAT SERPL-MCNC: 3.2 MG/DL (ref 0.5–1.4)
CREAT SERPL-MCNC: 3.3 MG/DL (ref 0.5–1.4)
CREAT SERPL-MCNC: 3.4 MG/DL (ref 0.5–1.4)
CREAT UR-MCNC: 38 MG/DL (ref 23–375)
CREAT UR-MCNC: 38 MG/DL (ref 23–375)
DACRYOCYTES BLD QL SMEAR: ABNORMAL
DIFFERENTIAL METHOD: ABNORMAL
EOSINOPHIL # BLD AUTO: ABNORMAL K/UL (ref 0–0.5)
EOSINOPHIL NFR BLD: 5 % (ref 0–8)
ERYTHROCYTE [DISTWIDTH] IN BLOOD BY AUTOMATED COUNT: 15.6 % (ref 11.5–14.5)
EST. GFR  (AFRICAN AMERICAN): 26.6 ML/MIN/1.73 M^2
EST. GFR  (AFRICAN AMERICAN): 37.6 ML/MIN/1.73 M^2
EST. GFR  (NON AFRICAN AMERICAN): 23 ML/MIN/1.73 M^2
EST. GFR  (NON AFRICAN AMERICAN): 32.5 ML/MIN/1.73 M^2
ETHANOL UR-MCNC: <10 MG/DL
FERRITIN SERPL-MCNC: 1056 NG/ML (ref 20–300)
GLUCOSE SERPL-MCNC: 117 MG/DL (ref 70–110)
GLUCOSE SERPL-MCNC: 123 MG/DL (ref 70–110)
GLUCOSE SERPL-MCNC: 66 MG/DL (ref 70–110)
GLUCOSE SERPL-MCNC: 96 MG/DL (ref 70–110)
GLUCOSE UR QL STRIP: NEGATIVE
HCT VFR BLD AUTO: 22.8 % (ref 40–54)
HCT VFR BLD CALC: 21 %PCV (ref 36–54)
HCT VFR BLD CALC: 21 %PCV (ref 36–54)
HCYS SERPL-SCNC: 14.9 UMOL/L (ref 4–16.5)
HGB BLD-MCNC: 7.2 G/DL (ref 14–18)
HGB UR QL STRIP: NEGATIVE
HYPOCHROMIA BLD QL SMEAR: ABNORMAL
IMM GRANULOCYTES # BLD AUTO: ABNORMAL K/UL (ref 0–0.04)
IMM GRANULOCYTES NFR BLD AUTO: ABNORMAL % (ref 0–0.5)
IRON SERPL-MCNC: 26 UG/DL (ref 45–160)
KETONES UR QL STRIP: NEGATIVE
LACTATE SERPL-SCNC: 1.1 MMOL/L (ref 0.5–2.2)
LEUKOCYTE ESTERASE UR QL STRIP: NEGATIVE
LYMPHOCYTES # BLD AUTO: ABNORMAL K/UL (ref 1–4.8)
LYMPHOCYTES NFR BLD: 13 % (ref 18–48)
MCH RBC QN AUTO: 27.9 PG (ref 27–31)
MCHC RBC AUTO-ENTMCNC: 31.6 G/DL (ref 32–36)
MCV RBC AUTO: 88 FL (ref 82–98)
METHADONE UR QL SCN>300 NG/ML: NEGATIVE
MONOCYTES # BLD AUTO: ABNORMAL K/UL (ref 0.3–1)
MONOCYTES NFR BLD: 17 % (ref 4–15)
NEUTROPHILS NFR BLD: 61 % (ref 38–73)
NEUTS BAND NFR BLD MANUAL: 4 %
NITRITE UR QL STRIP: NEGATIVE
NRBC BLD-RTO: 0 /100 WBC
OPIATES UR QL SCN: NEGATIVE
OVALOCYTES BLD QL SMEAR: ABNORMAL
PCP UR QL SCN>25 NG/ML: NEGATIVE
PH UR STRIP: 7 [PH] (ref 5–8)
PLATELET # BLD AUTO: 174 K/UL (ref 150–350)
PLATELET BLD QL SMEAR: ABNORMAL
PMV BLD AUTO: 11.8 FL (ref 9.2–12.9)
POC IONIZED CALCIUM: 1.11 MMOL/L (ref 1.06–1.42)
POC IONIZED CALCIUM: 1.16 MMOL/L (ref 1.06–1.42)
POC TCO2 (MEASURED): 21 MMOL/L (ref 23–29)
POC TCO2 (MEASURED): 22 MMOL/L (ref 23–29)
POCT GLUCOSE: 103 MG/DL (ref 70–110)
POCT GLUCOSE: 114 MG/DL (ref 70–110)
POCT GLUCOSE: 118 MG/DL (ref 70–110)
POCT GLUCOSE: 127 MG/DL (ref 70–110)
POCT GLUCOSE: 73 MG/DL (ref 70–110)
POIKILOCYTOSIS BLD QL SMEAR: SLIGHT
POLYCHROMASIA BLD QL SMEAR: ABNORMAL
POTASSIUM BLD-SCNC: 6.3 MMOL/L (ref 3.5–5.1)
POTASSIUM BLD-SCNC: 6.5 MMOL/L (ref 3.5–5.1)
POTASSIUM SERPL-SCNC: 5.6 MMOL/L (ref 3.5–5.1)
POTASSIUM SERPL-SCNC: 6 MMOL/L (ref 3.5–5.1)
POTASSIUM SERPL-SCNC: 6.4 MMOL/L (ref 3.5–5.1)
PROT SERPL-MCNC: 6.8 G/DL (ref 6–8.4)
PROT UR QL STRIP: NEGATIVE
RBC # BLD AUTO: 2.58 M/UL (ref 4.6–6.2)
RETICS/RBC NFR AUTO: 2.7 % (ref 0.4–2)
SAMPLE: ABNORMAL
SAMPLE: ABNORMAL
SATURATED IRON: 12 % (ref 20–50)
SODIUM BLD-SCNC: 137 MMOL/L (ref 136–145)
SODIUM BLD-SCNC: 138 MMOL/L (ref 136–145)
SODIUM SERPL-SCNC: 137 MMOL/L (ref 136–145)
SODIUM SERPL-SCNC: 138 MMOL/L (ref 136–145)
SODIUM UR-SCNC: 61 MMOL/L (ref 20–250)
SP GR UR STRIP: 1 (ref 1–1.03)
TOTAL IRON BINDING CAPACITY: 226 UG/DL (ref 250–450)
TOXICOLOGY INFORMATION: NORMAL
TRANSFERRIN SERPL-MCNC: 153 MG/DL (ref 200–375)
URN SPEC COLLECT METH UR: NORMAL
WBC # BLD AUTO: 4.7 K/UL (ref 3.9–12.7)

## 2019-07-17 PROCEDURE — 96366 THER/PROPH/DIAG IV INF ADDON: CPT

## 2019-07-17 PROCEDURE — 63600175 PHARM REV CODE 636 W HCPCS: Performed by: HOSPITALIST

## 2019-07-17 PROCEDURE — 83540 ASSAY OF IRON: CPT

## 2019-07-17 PROCEDURE — 82728 ASSAY OF FERRITIN: CPT

## 2019-07-17 PROCEDURE — 25000242 PHARM REV CODE 250 ALT 637 W/ HCPCS: Performed by: HOSPITALIST

## 2019-07-17 PROCEDURE — 96368 THER/DIAG CONCURRENT INF: CPT

## 2019-07-17 PROCEDURE — 82272 OCCULT BLD FECES 1-3 TESTS: CPT

## 2019-07-17 PROCEDURE — 83090 ASSAY OF HOMOCYSTEINE: CPT

## 2019-07-17 PROCEDURE — 80048 BASIC METABOLIC PNL TOTAL CA: CPT

## 2019-07-17 PROCEDURE — 87536 HIV-1 QUANT&REVRSE TRNSCRPJ: CPT

## 2019-07-17 PROCEDURE — 80307 DRUG TEST PRSMV CHEM ANLYZR: CPT

## 2019-07-17 PROCEDURE — 86359 T CELLS TOTAL COUNT: CPT

## 2019-07-17 PROCEDURE — 25000003 PHARM REV CODE 250: Performed by: EMERGENCY MEDICINE

## 2019-07-17 PROCEDURE — 80053 COMPREHEN METABOLIC PANEL: CPT

## 2019-07-17 PROCEDURE — 99285 EMERGENCY DEPT VISIT HI MDM: CPT | Mod: 25

## 2019-07-17 PROCEDURE — 81003 URINALYSIS AUTO W/O SCOPE: CPT

## 2019-07-17 PROCEDURE — 93005 ELECTROCARDIOGRAM TRACING: CPT

## 2019-07-17 PROCEDURE — 96375 TX/PRO/DX INJ NEW DRUG ADDON: CPT

## 2019-07-17 PROCEDURE — 82962 GLUCOSE BLOOD TEST: CPT

## 2019-07-17 PROCEDURE — 99223 1ST HOSP IP/OBS HIGH 75: CPT | Mod: AI,,, | Performed by: HOSPITALIST

## 2019-07-17 PROCEDURE — 99291 PR CRITICAL CARE, E/M 30-74 MINUTES: ICD-10-PCS | Mod: ,,, | Performed by: EMERGENCY MEDICINE

## 2019-07-17 PROCEDURE — 94761 N-INVAS EAR/PLS OXIMETRY MLT: CPT

## 2019-07-17 PROCEDURE — 85027 COMPLETE CBC AUTOMATED: CPT

## 2019-07-17 PROCEDURE — 99223 1ST HOSP IP/OBS HIGH 75: CPT | Mod: GC,,, | Performed by: INTERNAL MEDICINE

## 2019-07-17 PROCEDURE — 93010 ELECTROCARDIOGRAM REPORT: CPT | Mod: ,,, | Performed by: INTERNAL MEDICINE

## 2019-07-17 PROCEDURE — 96365 THER/PROPH/DIAG IV INF INIT: CPT

## 2019-07-17 PROCEDURE — 83921 ORGANIC ACID SINGLE QUANT: CPT

## 2019-07-17 PROCEDURE — 85045 AUTOMATED RETICULOCYTE COUNT: CPT

## 2019-07-17 PROCEDURE — 96361 HYDRATE IV INFUSION ADD-ON: CPT

## 2019-07-17 PROCEDURE — 99291 CRITICAL CARE FIRST HOUR: CPT | Mod: ,,, | Performed by: EMERGENCY MEDICINE

## 2019-07-17 PROCEDURE — 25000003 PHARM REV CODE 250: Performed by: HOSPITALIST

## 2019-07-17 PROCEDURE — 94640 AIRWAY INHALATION TREATMENT: CPT

## 2019-07-17 PROCEDURE — 99223 PR INITIAL HOSPITAL CARE,LEVL III: ICD-10-PCS | Mod: GC,,, | Performed by: INTERNAL MEDICINE

## 2019-07-17 PROCEDURE — 63600175 PHARM REV CODE 636 W HCPCS: Performed by: EMERGENCY MEDICINE

## 2019-07-17 PROCEDURE — 84300 ASSAY OF URINE SODIUM: CPT

## 2019-07-17 PROCEDURE — 86360 T CELL ABSOLUTE COUNT/RATIO: CPT

## 2019-07-17 PROCEDURE — 84132 ASSAY OF SERUM POTASSIUM: CPT

## 2019-07-17 PROCEDURE — 25000242 PHARM REV CODE 250 ALT 637 W/ HCPCS: Performed by: EMERGENCY MEDICINE

## 2019-07-17 PROCEDURE — 12000002 HC ACUTE/MED SURGE SEMI-PRIVATE ROOM

## 2019-07-17 PROCEDURE — 99223 PR INITIAL HOSPITAL CARE,LEVL III: ICD-10-PCS | Mod: AI,,, | Performed by: HOSPITALIST

## 2019-07-17 PROCEDURE — 11000001 HC ACUTE MED/SURG PRIVATE ROOM

## 2019-07-17 PROCEDURE — 85007 BL SMEAR W/DIFF WBC COUNT: CPT

## 2019-07-17 PROCEDURE — 87040 BLOOD CULTURE FOR BACTERIA: CPT | Mod: 59

## 2019-07-17 PROCEDURE — 93010 EKG 12-LEAD: ICD-10-PCS | Mod: ,,, | Performed by: INTERNAL MEDICINE

## 2019-07-17 PROCEDURE — 83605 ASSAY OF LACTIC ACID: CPT

## 2019-07-17 RX ORDER — CARVEDILOL 12.5 MG/1
12.5 TABLET ORAL 2 TIMES DAILY WITH MEALS
Status: DISCONTINUED | OUTPATIENT
Start: 2019-07-18 | End: 2019-07-22 | Stop reason: HOSPADM

## 2019-07-17 RX ORDER — SODIUM CHLORIDE 0.9 % (FLUSH) 0.9 %
10 SYRINGE (ML) INJECTION
Status: DISCONTINUED | OUTPATIENT
Start: 2019-07-17 | End: 2019-07-22 | Stop reason: HOSPADM

## 2019-07-17 RX ORDER — ATOVAQUONE 750 MG/5ML
750 SUSPENSION ORAL DAILY
Status: DISCONTINUED | OUTPATIENT
Start: 2019-07-17 | End: 2019-07-22 | Stop reason: HOSPADM

## 2019-07-17 RX ORDER — TACROLIMUS 1 MG/1
4 CAPSULE ORAL 2 TIMES DAILY
Status: DISCONTINUED | OUTPATIENT
Start: 2019-07-17 | End: 2019-07-17

## 2019-07-17 RX ORDER — NIFEDIPINE 30 MG/1
30 TABLET, EXTENDED RELEASE ORAL DAILY
Status: DISCONTINUED | OUTPATIENT
Start: 2019-07-18 | End: 2019-07-22 | Stop reason: HOSPADM

## 2019-07-17 RX ORDER — ALBUTEROL SULFATE 2.5 MG/.5ML
10 SOLUTION RESPIRATORY (INHALATION)
Status: COMPLETED | OUTPATIENT
Start: 2019-07-17 | End: 2019-07-17

## 2019-07-17 RX ORDER — DEXTROSE 50 % IN WATER (D50W) INTRAVENOUS SYRINGE
25
Status: DISCONTINUED | OUTPATIENT
Start: 2019-07-17 | End: 2019-07-17

## 2019-07-17 RX ORDER — PREDNISONE 5 MG/1
5 TABLET ORAL DAILY
Status: DISCONTINUED | OUTPATIENT
Start: 2019-07-18 | End: 2019-07-22 | Stop reason: HOSPADM

## 2019-07-17 RX ORDER — ALBUTEROL SULFATE 2.5 MG/.5ML
15 SOLUTION RESPIRATORY (INHALATION)
Status: COMPLETED | OUTPATIENT
Start: 2019-07-17 | End: 2019-07-17

## 2019-07-17 RX ORDER — MYCOPHENOLIC ACID 180 MG/1
180 TABLET, DELAYED RELEASE ORAL 3 TIMES DAILY
Status: DISCONTINUED | OUTPATIENT
Start: 2019-07-17 | End: 2019-07-17

## 2019-07-17 RX ORDER — ACETAMINOPHEN 325 MG/1
650 TABLET ORAL EVERY 6 HOURS PRN
Status: DISCONTINUED | OUTPATIENT
Start: 2019-07-17 | End: 2019-07-22 | Stop reason: HOSPADM

## 2019-07-17 RX ORDER — IBUPROFEN 200 MG
16 TABLET ORAL
Status: DISCONTINUED | OUTPATIENT
Start: 2019-07-17 | End: 2019-07-22 | Stop reason: HOSPADM

## 2019-07-17 RX ORDER — SODIUM CHLORIDE 0.9 % (FLUSH) 0.9 %
10 SYRINGE (ML) INJECTION
Status: DISCONTINUED | OUTPATIENT
Start: 2019-07-17 | End: 2019-07-17

## 2019-07-17 RX ORDER — ATOVAQUONE 750 MG/5ML
750 SUSPENSION ORAL DAILY
Status: DISCONTINUED | OUTPATIENT
Start: 2019-07-18 | End: 2019-07-17

## 2019-07-17 RX ORDER — FAMOTIDINE 20 MG/1
20 TABLET, FILM COATED ORAL DAILY
Status: DISCONTINUED | OUTPATIENT
Start: 2019-07-18 | End: 2019-07-22 | Stop reason: HOSPADM

## 2019-07-17 RX ORDER — ALBUTEROL SULFATE 2.5 MG/.5ML
10 SOLUTION RESPIRATORY (INHALATION) ONCE
Status: COMPLETED | OUTPATIENT
Start: 2019-07-17 | End: 2019-07-17

## 2019-07-17 RX ORDER — TACROLIMUS 5 MG/1
5 CAPSULE ORAL 2 TIMES DAILY
Status: DISCONTINUED | OUTPATIENT
Start: 2019-07-17 | End: 2019-07-20

## 2019-07-17 RX ORDER — SODIUM BICARBONATE 650 MG/1
1950 TABLET ORAL 3 TIMES DAILY
Status: DISCONTINUED | OUTPATIENT
Start: 2019-07-17 | End: 2019-07-22 | Stop reason: HOSPADM

## 2019-07-17 RX ORDER — CINACALCET 30 MG/1
60 TABLET, FILM COATED ORAL
Status: DISCONTINUED | OUTPATIENT
Start: 2019-07-18 | End: 2019-07-22 | Stop reason: HOSPADM

## 2019-07-17 RX ORDER — MYCOPHENOLIC ACID 180 MG/1
180 TABLET, DELAYED RELEASE ORAL 2 TIMES DAILY
Status: DISCONTINUED | OUTPATIENT
Start: 2019-07-17 | End: 2019-07-18

## 2019-07-17 RX ORDER — SODIUM POLYSTYRENE SULFONATE 4.1 MEQ/G
15 POWDER, FOR SUSPENSION ORAL; RECTAL ONCE
Status: COMPLETED | OUTPATIENT
Start: 2019-07-17 | End: 2019-07-17

## 2019-07-17 RX ORDER — GLUCAGON 1 MG
1 KIT INJECTION
Status: DISCONTINUED | OUTPATIENT
Start: 2019-07-17 | End: 2019-07-22 | Stop reason: HOSPADM

## 2019-07-17 RX ORDER — ROSUVASTATIN CALCIUM 10 MG/1
10 TABLET, COATED ORAL DAILY
Status: DISCONTINUED | OUTPATIENT
Start: 2019-07-18 | End: 2019-07-22 | Stop reason: HOSPADM

## 2019-07-17 RX ORDER — IBUPROFEN 200 MG
24 TABLET ORAL
Status: DISCONTINUED | OUTPATIENT
Start: 2019-07-17 | End: 2019-07-22 | Stop reason: HOSPADM

## 2019-07-17 RX ORDER — SODIUM POLYSTYRENE SULFONATE 4.1 MEQ/G
15 POWDER, FOR SUSPENSION ORAL; RECTAL
Status: COMPLETED | OUTPATIENT
Start: 2019-07-17 | End: 2019-07-17

## 2019-07-17 RX ORDER — CARVEDILOL 3.12 MG/1
6.25 TABLET ORAL 2 TIMES DAILY WITH MEALS
Status: DISCONTINUED | OUTPATIENT
Start: 2019-07-17 | End: 2019-07-17

## 2019-07-17 RX ORDER — AZITHROMYCIN 600 MG/1
600 TABLET, FILM COATED ORAL WEEKLY
Status: DISCONTINUED | OUTPATIENT
Start: 2019-07-17 | End: 2019-07-17

## 2019-07-17 RX ADMIN — DEXTROSE 250 ML: 10 SOLUTION INTRAVENOUS at 03:07

## 2019-07-17 RX ADMIN — ALBUTEROL SULFATE 10 MG: 2.5 SOLUTION RESPIRATORY (INHALATION) at 09:07

## 2019-07-17 RX ADMIN — INSULIN HUMAN 10 UNITS: 100 INJECTION, SOLUTION PARENTERAL at 01:07

## 2019-07-17 RX ADMIN — SODIUM BICARBONATE 650 MG TABLET 1950 MG: at 08:07

## 2019-07-17 RX ADMIN — ALBUTEROL SULFATE 15 MG: 2.5 SOLUTION RESPIRATORY (INHALATION) at 04:07

## 2019-07-17 RX ADMIN — MYCOPHENILIC ACID 180 MG: 180 TABLET, DELAYED RELEASE ORAL at 09:07

## 2019-07-17 RX ADMIN — ATOVAQUONE 750 MG: 750 SUSPENSION ORAL at 08:07

## 2019-07-17 RX ADMIN — TACROLIMUS 5 MG: 5 CAPSULE ORAL at 07:07

## 2019-07-17 RX ADMIN — SODIUM CHLORIDE 2000 ML: 0.9 INJECTION, SOLUTION INTRAVENOUS at 02:07

## 2019-07-17 RX ADMIN — CALCIUM GLUCONATE 1 G: 98 INJECTION, SOLUTION INTRAVENOUS at 03:07

## 2019-07-17 RX ADMIN — ALBUTEROL SULFATE 10 MG: 2.5 SOLUTION RESPIRATORY (INHALATION) at 01:07

## 2019-07-17 RX ADMIN — DEXTROSE 250 ML: 10 SOLUTION INTRAVENOUS at 01:07

## 2019-07-17 RX ADMIN — SODIUM POLYSTYRENE SULFONATE 15 G: 1 POWDER ORAL; RECTAL at 07:07

## 2019-07-17 RX ADMIN — SODIUM POLYSTYRENE SULFONATE 15 G: 1 POWDER ORAL; RECTAL at 05:07

## 2019-07-17 NOTE — ED NOTES
Ron Cedillo attempted to ambulate pt to bedside commode. Pt currently receiving nebulizer tx. Will go back shortly.

## 2019-07-17 NOTE — ED NOTES
Pt. To US on tele box 77027 in ED per jed from med team T. 2nd Liter bolus ongoing. Will continue to monitor frequently.

## 2019-07-17 NOTE — ED TRIAGE NOTES
Leg Swelling (liver transplant patient have R sided leg swelling x 1 month, pain reported to leg, pt denies numbness or tingling to R foot)

## 2019-07-17 NOTE — ASSESSMENT & PLAN NOTE
Anemia with Hb 7.2, baseline around 9-10, no evidence of overt bleeding   Likely related to CKD  Consider anemia work up and EPO injection if indicated

## 2019-07-17 NOTE — LETTER
July 22, 2019               [facility]  [address]  [place]  [address]  Phone: [phone]  Fax: [phone] July 22, 2019     Patient: Demetrio Aguiar   YOB: 1980       To Whom It May Concern:    Demetrio Aguiar was admitted to the hospital on 7/17/2019  9:19 AM and discharged on 07/22/2019.  He may return with no restrictions.  If you have any questions or concerns, please don't hesitate to call my office at [phone].      Sincerely,        Tae Qiu MD  Department of Hospital Medicine

## 2019-07-17 NOTE — ED NOTES
Patient identifiers verified and correct for Demetrio Aguiar  LOC: The patient is awake, alert and aware of environment with an appropriate affect, the patient is oriented x 3 and speaking appropriately.   APPEARANCE: Patient appears comfortable and in no acute distress, patient is clean and well groomed.  SKIN: The skin is warm and dry, color consistent with ethnicity, patient has normal skin turgor and moist mucus membranes, skin intact, no breakdown or bruising noted.   MUSCULOSKELETAL: Patient moving all extremities spontaneously, swelling noted to the right leg x1 month  RESPIRATORY: Airway is open and patent, respirations are spontaneous, patient has a normal effort and rate, no accessory muscle use noted, pt placed on continuous pulse ox with O2 sats noted at 97% on room air.  CARDIAC: Pt placed on cardiac monitor. Patient has a normal rate and regular rhythm, no edema noted, capillary refill < 3 seconds.   GASTRO: Soft and non tender to palpation, no distention noted, normoactive bowel sounds present in all four quadrants. Pt states bowel movements have been regular.  : Pt denies any pain or frequency with urination.  NEURO: Pt opens eyes spontaneously, behavior appropriate to situation, follows commands, facial expression symmetrical, bilateral hand grasp equal and even, purposeful motor response noted, normal sensation in all extremities when touched with a finger.

## 2019-07-17 NOTE — SUBJECTIVE & OBJECTIVE
Subjective:     History of Present Illness:   Mr. Aguiar is 40 yo male with HIV on HAART, HTN,  donor kidney transplant in 2017 secondary to HIVAN on prograf, MMF and prednisone with CKD III with sCr aroung 2.2-2.5. His care is usually at [facility] in [place], [place]. He presents today with recurrent right groin swelling and right LE swelling that has been going on for the past few months but in the past month it has worsened with increasing pain while ambulating and discoloration of skin around the right tibial region. Reports loosing weight along with loss of appetite/decreased PO intake.     He reports the swelling has been an ongoing issue and has not been addressed by his providers in [place] despite brinigingt it up multiple times. He recently had CT of the Abdomen and biopsy of the groin there pending results. In addition to skin changes, he also has developed wart like skin lesions in the scrotum. He is compliant with his HIV and immunosuppression meds. No CD4 or viral count on file but states his viral load has been undetected for years. He sees ID every three months. Reports Prograf dose reduced to 5 mg twice daily from 9 mg BID. Otherwise no recent significant medication adjustment.     Currently denies fever, chills, n/v, HA, necks stiffness, abdominal pain, diarrhea, urinary symptoms. In the ED vitals are stable. Basic labs show anemia with Hb of 7.2 and LEFTY with sCr of 3.2 with hyperkalemia. LE US with LAP.     Intake/Output - Last 3 Shifts       07/15 0700 -  0659  07 -  0659  07 -  0659    IV Piggyback   1700    Total Intake(mL/kg)   1700 (25)    Net   +1700                  Review of Systems   Constitutional: Negative for chills, fatigue and fever.   HENT: Negative for congestion, sore throat and voice change.    Respiratory: Negative for cough, chest tightness and shortness of breath.    Cardiovascular: Positive for leg swelling. Negative  "for chest pain and palpitations.   Gastrointestinal: Negative for abdominal distention, abdominal pain, anal bleeding, blood in stool, diarrhea, nausea and vomiting.   Genitourinary: Negative for dysuria, flank pain, frequency, hematuria and urgency.   Musculoskeletal: Negative for arthralgias and back pain.   Neurological: Negative for dizziness, syncope, light-headedness and headaches.   Psychiatric/Behavioral: Negative for agitation, confusion and decreased concentration.     Objective:     Vital Signs (Most Recent):  Temp: 97.4 °F (36.3 °C) (07/17/19 0908)  Pulse: 73 (07/17/19 1700)  Resp: 19 (07/17/19 1700)  BP: (!) 145/78 (07/17/19 1700)  SpO2: 100 % (07/17/19 1700) Vital Signs (24h Range):  Temp:  [97.4 °F (36.3 °C)] 97.4 °F (36.3 °C)  Pulse:  [67-78] 73  Resp:  [10-23] 19  SpO2:  [97 %-100 %] 100 %  BP: (113-145)/(62-91) 145/78     Weight: 68 kg (150 lb)  Height: 5' 8" (172.7 cm)  Body mass index is 22.81 kg/m².    Physical Exam   Constitutional: He is oriented to person, place, and time. He appears well-developed and well-nourished.   HENT:   Head: Normocephalic and atraumatic.   Eyes: Pupils are equal, round, and reactive to light. EOM are normal. Right eye exhibits no discharge. Left eye exhibits no discharge.   Neck: Normal range of motion. Neck supple. No JVD present.   Abdominal: Soft.   Abdomina scars  Brown skin discoloration some flat,some raised lesions, non-tender    Genitourinary: Rectum normal.   Genitourinary Comments: Multiple wart like lesions in the scrotum    Musculoskeletal: He exhibits edema. He exhibits no tenderness or deformity.   Right thigh and LE swelling, palpable nodular swelling in the right groin    Right LE shin with raised and flat brown lesions, non-tender spanning the front and back of the leg    Neurological: He is alert and oriented to person, place, and time. No cranial nerve deficit.   Skin: Skin is warm. Capillary refill takes less than 2 seconds. Rash noted. No " erythema.       Significant Labs:  CBC:   Recent Labs   Lab 07/17/19  1016 07/17/19  1233 07/17/19  1541   WBC 4.70  --   --    RBC 2.58*  --   --    HGB 7.2*  --   --    HCT 22.8* 21* 21*     --   --    MCV 88  --   --    MCH 27.9  --   --    MCHC 31.6*  --   --      BMP:   Recent Labs   Lab 07/17/19  1016 07/17/19  1226   *  --      --    K 6.0* 6.4*     --    CO2 21*  --    BUN 31*  --    CREATININE 3.2*  --    CALCIUM 9.1  --        Diagnostics:  None

## 2019-07-17 NOTE — CONSULTS
[facility]  Kidney Transplant  Consult Note    Consults      Subjective:     History of Present Illness:   Mr. Aguiar is 40 yo male with HIV on HAART, HTN,  donor kidney transplant in 2017 secondary to HIVAN on prograf, MMF and prednisone with CKD III with sCr aroung 2.2-2.5. His care is usually at [facility] in [place], [place]. He presents today with recurrent right groin swelling and right LE swelling that has been going on for the past few months but in the past month it has worsened with increasing pain while ambulating and discoloration of skin around the right tibial region. Reports loosing weight along with loss of appetite/decreased PO intake.     He reports the swelling has been an ongoing issue and has not been addressed by his providers in [place] despite brinigingt it up multiple times. He recently had CT of the Abdomen and biopsy of the groin there pending results. In addition to skin changes, he also has developed wart like skin lesions in the scrotum. He is compliant with his HIV and immunosuppression meds. No CD4 or viral count on file but states his viral load has been undetected for years. He sees ID every three months. Reports Prograf dose reduced to 5 mg twice daily from 9 mg BID. Otherwise no recent significant medication adjustment.     Currently denies fever, chills, n/v, HA, necks stiffness, abdominal pain, diarrhea, urinary symptoms. In the ED vitals are stable. Basic labs show anemia with Hb of 7.2 and LEFTY with sCr of 3.2 with hyperkalemia. LE US with LAP.     Intake/Output - Last 3 Shifts       07/15 0700 -  0659 700 -  0659  07 -  0659    IV Piggyback   1700    Total Intake(mL/kg)   1700 (25)    Net   +1700                  Review of Systems   Constitutional: Negative for chills, fatigue and fever.   HENT: Negative for congestion, sore throat and voice change.    Respiratory: Negative for cough, chest tightness and  "shortness of breath.    Cardiovascular: Positive for leg swelling. Negative for chest pain and palpitations.   Gastrointestinal: Negative for abdominal distention, abdominal pain, anal bleeding, blood in stool, diarrhea, nausea and vomiting.   Genitourinary: Negative for dysuria, flank pain, frequency, hematuria and urgency.   Musculoskeletal: Negative for arthralgias and back pain.   Neurological: Negative for dizziness, syncope, light-headedness and headaches.   Psychiatric/Behavioral: Negative for agitation, confusion and decreased concentration.     Objective:     Vital Signs (Most Recent):  Temp: 97.4 °F (36.3 °C) (07/17/19 0908)  Pulse: 73 (07/17/19 1700)  Resp: 19 (07/17/19 1700)  BP: (!) 145/78 (07/17/19 1700)  SpO2: 100 % (07/17/19 1700) Vital Signs (24h Range):  Temp:  [97.4 °F (36.3 °C)] 97.4 °F (36.3 °C)  Pulse:  [67-78] 73  Resp:  [10-23] 19  SpO2:  [97 %-100 %] 100 %  BP: (113-145)/(62-91) 145/78     Weight: 68 kg (150 lb)  Height: 5' 8" (172.7 cm)  Body mass index is 22.81 kg/m².    Physical Exam   Constitutional: He is oriented to person, place, and time. He appears well-developed and well-nourished.   HENT:   Head: Normocephalic and atraumatic.   Eyes: Pupils are equal, round, and reactive to light. EOM are normal. Right eye exhibits no discharge. Left eye exhibits no discharge.   Neck: Normal range of motion. Neck supple. No JVD present.   Abdominal: Soft.   Abdomina scars  Brown skin discoloration some flat,some raised lesions, non-tender    Genitourinary: Rectum normal.   Genitourinary Comments: Multiple wart like lesions in the scrotum    Musculoskeletal: He exhibits edema. He exhibits no tenderness or deformity.   Right thigh and LE swelling, palpable nodular swelling in the right groin    Right LE shin with raised and flat brown lesions, non-tender spanning the front and back of the leg    Neurological: He is alert and oriented to person, place, and time. No cranial nerve deficit.   Skin: Skin " is warm. Capillary refill takes less than 2 seconds. Rash noted. No erythema.       Significant Labs:  CBC:   Recent Labs   Lab 19  1016 19  1233 19  1541   WBC 4.70  --   --    RBC 2.58*  --   --    HGB 7.2*  --   --    HCT 22.8* 21* 21*     --   --    MCV 88  --   --    MCH 27.9  --   --    MCHC 31.6*  --   --      BMP:   Recent Labs   Lab 19  1016 19  1226   *  --      --    K 6.0* 6.4*     --    CO2 21*  --    BUN 31*  --    CREATININE 3.2*  --    CALCIUM 9.1  --        Diagnostics:  None    Assessment/Plan:     * Acute kidney injury superimposed on chronic kidney disease stage III  LEFTY with hyperkalemia   Baseline sCr 2.2-2.5, currently 3.2. Likely pre-renal from decreased PO intake. Also considered CNI toxicity,   Check prograf level in the AM  Renal bx in 2018 with out evidence of ACR  IVF as needed  Avoid contrast/NSAIDs/nephrotoxins   Transplant renal US - ordered     Lymphadenopathy, inguinal  Right inguinal lymphadenopathy with LE swelling concerning for infection vs malignancy  OSH CT abdomen and biopsy per patient - unable to access records for now - please get OSH documents  Consider repeat CT abdomen with out contrast  Recommend ID consult and biopsy of skin lesion of LE   Please see  donor kidney transplant      donor kidney transplant 2017  38 yo male with kidney transplant in 2017 on immunosuppression now with LEFTY.     Assessment :  -Baseline sCr 2.2-2.5 likely new baseline, previously around 1.8-2.1, 3.2 on admit   -Decreased PO intake, also on CNI- prograf  -Hyperkalemia with LEFTY - LEFTY likely pre-renal vs CNI toxicity   -Renal biopsy in - no evidence for ACR, ACR, ABMR  -Right groin lymph adenopathy- concerning for infection  vs malignancy given history, unilaterality and duration. Differential includes TB, Kaposi's, PTLD.    -Per patient, reports recent CT abdo and biopsy at OSH - no access of records      Recommendation:  -Continue home dose Immunosuppression management ( Has recent adjustment of Prograf - takes 5 mg every 12 hours)   -Prograf level in the AM - please draw 12 hours after last dose  -Transplant renal US (ordered)   -IVF as needed - LEFTY with hyperkalemia in the setting of decreased PO intake.   -Recommend ID consult for lymphadenopathy in HIV pt  -Recommend biopsy of skin lesion in the right LE  -Consider CT abdo/Pelvis with out contrast for better visualization of LAP.   -Will try to get record of biopsy and CT results from [facility] in [place]     Anemia in stage 3 chronic kidney disease  Anemia with Hb 7.2, baseline around 9-10, no evidence of overt bleeding   Likely related to CKD  Consider anemia work up and EPO injection if indicated     Alfredito Simpson MD  Kidney Transplant  [facility]

## 2019-07-17 NOTE — ASSESSMENT & PLAN NOTE
LEFTY with hyperkalemia   Baseline sCr 2.2-2.5, currently 3.2. Likely pre-renal from decreased PO intake. Also considered CNI toxicity,   Check prograf level in the AM  Renal bx in 08/2018 with out evidence of ACR  IVF as needed  Avoid contrast/NSAIDs/nephrotoxins   Transplant renal US - ordered

## 2019-07-17 NOTE — HPI
Mr. Aguiar is 38 yo male with HIV on HAART, HTN,  donor kidney transplant in 2017 secondary to HIVAN on prograf, MMF and prednisone with CKD III with sCr aroung 2.2-2.5. His care is usually at [facility]. [place]. He presents today with recurrent right groin swelling and right LE swelling that has been going on for the past few months but in the past month it worsened with increasing pain while ambulating and discoloration of skin around the right tibial region. Reports loosing weight along with loss of appetite/decreased PO intake.     He reports the swelling has been on going issue and has not been addressed by his providers in [place] despite he brought it up multiple times. He recently had CT of the Abdomen and biopsy of the groin there pending results. In addition to skin changes, he also has developed wart like skin lesions in the scrotum. He is compliant with his HIV and immunosuppression meds. No CD4 or viral count on file but states his viral load has been undetected for years. He sees ID every three months. Reports Prograf dose reduced to 5 mg twice daily from 9 mg BID. Otherwise no recent significant medication adjustment.     Currently denies fever, chills, n/v, HA, necks stiffness, abdominal pain, diarrhea, urinary symptoms. In the ED vitals are stable. Basic labs show anemia with Hb of 7.2 and LEFTY with sCr of 3.2 with hyperkalemia. LE US with LAP.

## 2019-07-17 NOTE — ED PROVIDER NOTES
Encounter Date: 7/17/2019    SCRIBE #1 NOTE: I, Lissa Turner, am scribing for, and in the presence of,  Dr. White. I have scribed the following portions of the note - Other sections scribed: HPI, ROS, PE.       History     Chief Complaint   Patient presents with    Leg Swelling     liver transplant patient have R sided leg swelling x 1 month, pain reported to leg, pt denies numbness or tingling to R foot     Patient is a 39 year old male with PMHx of HIV infection, ESRD, and HTN who presents to the ED s/p kidney transplant on 8/18/17 who is complaining of right leg swelling onset 6 months, but worsening over the past month. He explains that he also has a 3-4cm nodule on the right side of his groin that he has had for many years; however, he believes that it has worsened over the last 8-9 months.  His nodule had subsided after he had a kidney transplant. Is followed by Dr. Mckenzie with ID. Endorses lesions on his RLE that are occasionally painful, itchy, and appeared 2 months ago. Denies urinary changes, abd pain, fever, chills, diaphoresis, CP, SOB, nausea or vomiting. Endorses occasional nocturia, fatigue. Said his viral load is undetectable. Unsure about his CD4 level, but states that he gets checked every 3 months.The patient is from [place] and receives care from [facility].      The history is provided by the patient and medical records.     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 Hematoma evacuated.(09/09)    Status post exploratory laparotomy  "9/10/2017    Re explored for retroperitoneal hematoma. Hematoma evacuated.(09/09)    TB lung, latent     tx INH 2006     Past Surgical History:   Procedure Laterality Date    BIOPSY Tranplanted Kidney N/A 8/28/2018    Performed by [facility] at [facility] OR [address] [place] [address]    BIOPSY Tranplanted Kidney N/A 1/15/2018    Performed by [facility] at [facility] OR [address] [place] [address]    BIOPSY Tranplanted Kidney N/A 10/17/2017    Performed by [facility] at [facility] OR [address] [place] [address]    WSFDSM-IXARIY-FK N/A 10/3/2017    Performed by [facility] at [facility] OR [address] [place] [address]    EXPLORATORY LAPAROTOMY W/ BOWEL RESECTION      NO BOWEL RESECTION, UNKNOWN DETAILS, "Pancreas Infection"    EXPLORATORY-LAPAROTOMY retroperitoneal washout N/A 9/9/2017    Performed by Wesley Cisneros MD at [facility] OR [address] [place] [address]    KIDNEY TRANSPLANT      peritoneal dialysis catheter placement      TRANSPLANT-KIDNEY N/A 8/18/2017    Performed by Wesley Cisneros [facility] at [facility] OR [address] [place] [address]     Family History   Problem Relation Age of Onset    No Known Problems Mother     Cancer Father     Lung cancer Father     No Known Problems Brother     Diabetes Maternal Aunt     Diabetes Maternal Uncle     Hypertension Maternal Grandmother     Hypertension Paternal Grandmother      Social History     Tobacco Use    Smoking status: Former Smoker     Packs/day: 0.50     Types: Cigarettes    Smokeless tobacco: Never Used    Tobacco comment: quit smoking 5 years ago   Substance Use Topics    Alcohol use: No    Drug use: No     Types: Cocaine, Marijuana     Comment: last use for cocaine at age 17; 2 years ago last use for THC     Review of Systems   Constitutional: Positive for fatigue. Negative for chills and fever.   HENT: Negative for mouth sores, nosebleeds, sore throat and trouble swallowing.    Eyes: Negative for visual disturbance.        Neg vision changes   Respiratory: Negative for cough and shortness of breath.    Cardiovascular: Positive for " leg swelling (R ). Negative for chest pain.   Gastrointestinal: Negative for abdominal pain, anal bleeding, blood in stool, nausea and vomiting.        Neg changes in stool   Genitourinary: Negative for dysuria, flank pain, penile pain, penile swelling, scrotal swelling and testicular pain.        Groin nodule present on R side.    Musculoskeletal: Negative for arthralgias, joint swelling and myalgias.   Skin: Positive for rash.   Allergic/Immunologic: Positive for immunocompromised state.   Neurological: Negative for dizziness, light-headedness and headaches.   Hematological: Does not bruise/bleed easily.   Psychiatric/Behavioral: Negative for confusion.       Physical Exam     Initial Vitals [07/17/19 0908]   BP Pulse Resp Temp SpO2   126/72 78 18 97.4 °F (36.3 °C) 100 %      MAP       --         Physical Exam    Nursing note and vitals reviewed.  Constitutional: He appears well-developed and well-nourished. He is not diaphoretic. No distress.   HENT:   Head: Normocephalic and atraumatic.   Nose: Nose normal.   Eyes: Conjunctivae and EOM are normal. Pupils are equal, round, and reactive to light.   Neck: Normal range of motion. Neck supple.   Cardiovascular: Normal rate and regular rhythm. Exam reveals no gallop and no friction rub.    No murmur heard.  2-3+ pitting edema extending from mid-thigh to foot.   Pulmonary/Chest: Breath sounds normal. No respiratory distress. He has no wheezes. He has no rhonchi. He has no rales. He exhibits no tenderness.   Abdominal: Soft. Bowel sounds are normal. He exhibits no distension. There is no tenderness.   Old kidney transplant scar, non-tender on RLQ.   Musculoskeletal: Normal range of motion. He exhibits edema. He exhibits no tenderness.   Neurological: He is alert and oriented to person, place, and time. He has normal strength.   Skin: Skin is warm and dry. Rash noted. Rash is papular.   4 cm induration non-tender induration over right medial thigh/inguinal region.  Multiple non-tender purple papules across RLE.    Psychiatric: He has a normal mood and affect. His behavior is normal. Thought content normal.         ED Course   Procedures  Labs Reviewed   CBC W/ AUTO DIFFERENTIAL - Abnormal; Notable for the following components:       Result Value    RBC 2.58 (*)     Hemoglobin 7.2 (*)     Hematocrit 22.8 (*)     Mean Corpuscular Hemoglobin Conc 31.6 (*)     RDW 15.6 (*)     Lymph% 13.0 (*)     Mono% 17.0 (*)     All other components within normal limits   COMPREHENSIVE METABOLIC PANEL - Abnormal; Notable for the following components:    Potassium 6.0 (*)     CO2 21 (*)     Glucose 117 (*)     BUN, Bld 31 (*)     Creatinine 3.2 (*)     ALT 8 (*)     eGFR if  26.6 (*)     eGFR if non  23.0 (*)     All other components within normal limits   POTASSIUM - Abnormal; Notable for the following components:    Potassium 6.4 (*)     All other components within normal limits   BASIC METABOLIC PANEL - Abnormal; Notable for the following components:    Potassium 5.6 (*)     Chloride 114 (*)     CO2 15 (*)     BUN, Bld 27 (*)     Creatinine 2.4 (*)     Calcium 7.0 (*)     eGFR if  37.6 (*)     eGFR if non  32.5 (*)     All other components within normal limits   RETICULOCYTES - Abnormal; Notable for the following components:    Retic 2.7 (*)     All other components within normal limits    Narrative:     add on [facility] #[id number] & MMA #[id number] per Uzair Vicente @ 18:24    07/17/2019  (lavender used - shared)   IRON AND TIBC - Abnormal; Notable for the following components:    Iron 26 (*)     Transferrin 153 (*)     TIBC 226 (*)     Saturated Iron 12 (*)     All other components within normal limits    Narrative:     add on [facility] #[id number] & MMA #[id number] per Uzair Vicente @ 18:24    07/17/2019  (lavender used - shared)   FERRITIN - Abnormal; Notable for the following components:    Ferritin 1,056 (*)     All other components  within normal limits    Narrative:     add on [facility] #[id number] & MMA #[id number] per Uzair Vicente @ 18:24    07/17/2019  (lavender used - shared)   ISTAT PROCEDURE - Abnormal; Notable for the following components:    POC Glucose 123 (*)     POC BUN 32 (*)     POC Creatinine 3.4 (*)     POC Potassium 6.3 (*)     POC TCO2 (MEASURED) 21 (*)     POC Hematocrit 21 (*)     All other components within normal limits   POCT GLUCOSE - Abnormal; Notable for the following components:    POCT Glucose 114 (*)     All other components within normal limits   ISTAT PROCEDURE - Abnormal; Notable for the following components:    POC Glucose 66 (*)     POC BUN 33 (*)     POC Creatinine 3.3 (*)     POC Potassium 6.5 (*)     POC TCO2 (MEASURED) 22 (*)     POC Hematocrit 21 (*)     All other components within normal limits   POCT GLUCOSE - Abnormal; Notable for the following components:    POCT Glucose 118 (*)     All other components within normal limits   POCT GLUCOSE - Abnormal; Notable for the following components:    POCT Glucose 127 (*)     All other components within normal limits   LACTIC ACID, PLASMA   URINALYSIS, REFLEX TO URINE CULTURE    Narrative:     Preferred Collection Type->Urine, Clean Catch   SODIUM, URINE, RANDOM   CREATININE, URINE, RANDOM   SODIUM, URINE, RANDOM    Narrative:     ADD ON URINE SODIUM AND CREATININE PER DR ASHWINI JARQUIN  07/17/2019    17:55   Preferred Collection Type->Urine, Clean Catch   CREATININE, URINE, RANDOM    Narrative:     ADD ON URINE SODIUM AND CREATININE PER DR ASHWINI JARQUIN  07/17/2019    17:55   Preferred Collection Type->Urine, Clean Catch   FERRITIN   IRON AND TIBC   TRANSFERRIN   RETICULOCYTES   METHYLMALONIC ACID, SERUM   HOMOCYSTEINE, SERUM   HOMOCYSTEINE, SERUM   TOXICOLOGY SCREEN, URINE, RANDOM (COMPLIANCE)   TOXICOLOGY SCREEN, URINE, RANDOM (COMPLIANCE)    Narrative:     ADD ON URINE SODIUM AND CREATININE PER DR ASHWINI JARQUIN  07/17/2019    17:55   Preferred Collection  Type->Urine, Clean Catch  add on [facility] #[id number] per Susanna White MD @ 18:53  07/17/2019    POCT GLUCOSE   POCT GLUCOSE        ECG Results          EKG 12-lead (Final result)  Result time 07/18/19 06:23:18    Final result by Interface, Lab In [facility] (07/18/19 06:23:18)                 Narrative:    Test Reason : (Not Selected)    Vent. Rate : 074 BPM     Atrial Rate : 074 BPM     P-R Int : 168 ms          QRS Dur : 098 ms      QT Int : 396 ms       P-R-T Axes : 063 034 068 degrees     QTc Int : 439 ms    Sinus rhythm with frequent Premature ventricular complexes  Incomplete right bundle branch block  Abnormal ECG  When compared with ECG of 17-JUL-2019 14:58,  No significant change was found  Confirmed by Hector Mejias MD ([id number]) on 7/18/2019 6:23:08 AM    Referred By: [email or web address]   SELF           Confirmed By:Hector Mejias MD                             Repeat EKG 12-lead (Final result)  Result time 07/18/19 06:22:47    Final result by Interface, Lab In [facility] (07/18/19 06:22:47)                 Narrative:    Test Reason : E87.5,    Vent. Rate : 077 BPM     Atrial Rate : 077 BPM     P-R Int : 162 ms          QRS Dur : 098 ms      QT Int : 386 ms       P-R-T Axes : 066 040 065 degrees     QTc Int : 436 ms    Baseline wander  Sinus rhythm with frequent Premature ventricular complexes  Incomplete right bundle branch block  Otherwise normal ECG  When compared with ECG of 18-AUG-2017 20:39,  Premature ventricular complexes are now Present  Confirmed by Hector Mejias MD ([id number]) on 7/18/2019 6:22:38 AM    Referred By: [email or web address]   SELF           Confirmed By:Hector Mejias MD                            Imaging Results                      US Lower Extremity Veins Bilateral (Final result)  Result time 07/17/19 12:06:57    Final result by Onel Mathew MD (07/17/19 12:06:57)                 Impression:      No evidence of deep venous thrombosis in either lower extremity.    Lymphadenopathy in the right groin as above.   "Follow-up/further evaluation is clinically indicated.    Electronically signed by resident: Gasper Gutierrez  Date:    07/17/2019  Time:    11:44    Electronically signed by: Onel Mathew MD  Date:    07/17/2019  Time:    12:06             Narrative:    EXAMINATION:  US LOWER EXTREMITY VEINS BILATERAL    CLINICAL HISTORY:  Other specified soft tissue disorders    TECHNIQUE:  Duplex and color flow Doppler and dynamic compression was performed of the bilateral lower extremity veins was performed.    COMPARISON:  No relevant prior imaging for comparison.    FINDINGS:  Right thigh veins: The common femoral, femoral, popliteal, upper greater saphenous, and deep femoral veins are patent and free of thrombus. The veins are normally compressible and have normal phasic flow and augmentation response.    Right calf veins: The visualized calf veins are patent.    Left thigh veins: The common femoral, femoral, popliteal, upper greater saphenous, and deep femoral veins are patent and free of thrombus. The veins are normally compressible and have normal phasic flow and augmentation response.    Left calf veins: The visualized calf veins are patent.    Miscellaneous: There are two heterogeneous, solid lesions in the right groin, measuring 4.3 x 4.3 x 2.1 cm and 2.9 x 1.4 x 1.5 cm.  These lesions are well-defined and demonstrate hypervascularity, favored to represent lymphadenopathy.                                 Medical Decision Making:   History:   Old Medical Records: I decided to obtain old medical records.  Old Records Summarized: records from previous admission(s) and records from clinic visits.       <> Summary of Records:   Per review of records patient has chronic right lower extremity edema with swollen lymph node in the groin since February 2019.  Patient is concerned today because lymph node has increased with increased leg swelling and creatinine worsening to 3.1: "lymph node has got swollen even more to my whole leg " "be swollen and it's making my creatininego up I don't know what Dr. Reyes is going to say this time but last time he said it was nothing but it will go away in time.... I was wondering could you mention this to Dr. Miramontes and he may possibly come up with something or collaborate with Dr. Reyes (I'm babbling)but I'm very concerned and I feel like something needs to be done because my creatinine has gone up to 3 and oh a Dr. Butler had a ct scan and a biopsy done and [facility] in [place] or  got all that in motion"  Initial Assessment:   39-year-old male with past medical history of HIV on HAART, latent lung TB, CKD 3  (Cr 1.8-2.1) with anemia, kidney transplant on 8/2017, chronic right lower extremity edema, secondary hyperparathyroidism.  Reports his chronic RLE edema has increased over the past month. Right groin nodule biopsied in [place] but was referred to ID.   On exam patient is well appearing, however has large nontender right inguinal lymph node, is significant right lower extremity edema with purple papules that may be Kaposi, although patient reports compliance with HAART.  Denies chest pain or shortness of breath concerning for CHF.  Differential Diagnosis:   AIDS, lymphoma, lymphedema, reactive lymphadenopathy, Kaposi sarcoma, dermatitis, anemia, electrolyte imbalance, CHF less likely given unilaterality  Clinical Tests:   Lab Tests: Ordered and Reviewed  Radiological Study: Ordered and Reviewed  Medical Tests: Ordered and Reviewed  ED Management:  Labs, bilateral DVT ultrasound, planned admission    Pt with elevated potassium 2/2 renal failure, will give insulin/dextrose and albuterol.  Review EKG shows no peaked T-waves.     2:58 PM  No peaked T-waves upon initial EKG evaluation however upon repeat evaluation pt appears to be having PVCs, will give calcium gluconate.  Patient signed out to Dr. Johnson    [facility] Complexity Points:   Problem Points:  1.New problem, with additional ED work-up planned - " Hyperkalemia  2.New problem, with additional ED work-up planned - RLE swelling    Data Points:  Review or order clinical lab tests, Review or order radiology test, Review or order medicine test (PFTs, EKG, cardiac echo or catheterization), Decision to obtain old records (in the EHR), Review and summarization of old records and Obtaining history from     Risk:  High Risk      Other:   I have discussed this case with another health care provider.            Scribe Attestation:   Scribe #1: I performed the above scribed service and the documentation accurately describes the services I performed. I attest to the accuracy of the note.    Attending Attestation:         Attending Critical Care:   Critical Care Times:   Direct Patient Care (initial evaluation, reassessments, and time considering the case)................................................................15 minutes.   Additional History from reviewing old medical records or taking additional history from the family, EMS, PCP, etc.......................10 minutes.   Ordering, Reviewing, and Interpreting Diagnostic Studies...............................................................................................................5 minutes.   Documentation..................................................................................................................................................................................5 minutes.   Consultation with other Physicians. .................................................................................................................................................5 minutes.   ==============================================================  · Total Critical Care Time - exclusive of procedural time: 40 minutes.  ==============================================================  Critical care was necessary to treat or prevent imminent or life-threatening deterioration of the following conditions: metabolic  crisis.   The following critical care procedures were done by me (see procedure notes): pulse oximetry.   Critical care was time spent personally by me on the following activities: obtaining history from patient or relative, examination of patient, review of old charts, ordering lab, x-rays, and/or EKG, development of treatment plan with patient or relative, ordering and performing treatments and interventions, evaluation of patient's response to treatment, interpretation of cardiac measurements and re-evaluation of patient's conition.   Critical Care Condition: life-threatening                  Clinical Impression:       ICD-10-CM ICD-9-CM   1. Hyperkalemia E87.5 276.7   2. Right leg swelling M79.89 729.81   3. LEFTY (acute kidney injury) N17.9 584.9   4. Chest pain R07.9 786.50   5. Acute kidney injury superimposed on chronic kidney disease N17.9 866.00                                Susanna White MD  07/25/19 0544

## 2019-07-17 NOTE — MEDICAL/APP STUDENT
History     Chief Complaint   Patient presents with    Leg Swelling     liver transplant patient have R sided leg swelling x 1 month, pain reported to leg, pt denies numbness or tingling to R foot     Demetrio Aguiar is a 39 y.o m with PMHx of HTN, HIV+ on HAART and s/p right kidney transplant in 2017 who is presenting to the ED for a 1 month history of right leg swelling. Patient reports that his leg has been gradually getting worse over the past month but has been intermittently swollen over the past 6-7 months. Swelling does go down with elevation and has been using compression socks to help. He associates the leg swelling with a right groin nodule that he's had for 8-9 months. It was biopsied at the time but patient only remembers that he was told to go to ID for treatment. Patient also reports skin changes with multiple dark purple/brown papules around the left shin. He reports there is no pain, just general discomfort with the leg and is still able to ambulate. He denies any fever, chills, night sweats, N/V, bowel/bladder changes, CP or SOB.         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 Hematoma evacuated.(09/09)    Status post exploratory laparotomy 9/10/2017    Re explored for retroperitoneal hematoma. Hematoma evacuated.(09/09)    TB lung, latent     tx INH 2006       Past Surgical History:   Procedure Laterality Date    BIOPSY Tranplanted Kidney N/A 8/28/2018    Performed by [facility] Diagnostic Provider at [facility] OR 2ND FLR    BIOPSY Tranplanted Kidney N/A 1/15/2018    Performed by [facility] Diagnostic Provider at [facility]  "OR 2ND FLR    BIOPSY Tranplanted Kidney N/A 10/17/2017    Performed by [facility] Diagnostic Provider at [facility] OR 2ND FLR    [id number] N/A 10/3/2017    [facility]    EXPLORATORY LAPAROTOMY W/ BOWEL RESECTION      NO BOWEL RESECTION, UNKNOWN DETAILS, "Pancreas Infection"    EXPLORATORY-LAPAROTOMY retroperitoneal washout N/A 9/9/2017    [facility]    KIDNEY TRANSPLANT      peritoneal dialysis catheter placement      TRANSPLANT-KIDNEY N/A 8/18/2017    [facility]       Family History   Problem Relation Age of Onset    No Known Problems Mother     Cancer Father     Lung cancer Father     No Known Problems Brother     Diabetes Maternal Aunt     Diabetes Maternal Uncle     Hypertension Maternal Grandmother     Hypertension Paternal Grandmother        Social History     Tobacco Use    Smoking status: Former Smoker     Packs/day: 0.50     Types: Cigarettes    Smokeless tobacco: Never Used    Tobacco comment: quit smoking 5 years ago   Substance Use Topics    Alcohol use: No    Drug use: No     Types: Cocaine, Marijuana     Comment: last use for cocaine at age 17; 2 years ago last use for THC       Review of Systems   Constitutional: Negative for activity change, chills, diaphoresis and fever.   HENT: Negative for congestion and rhinorrhea.    Eyes: Negative for visual disturbance.   Respiratory: Negative for chest tightness and shortness of breath.    Cardiovascular: Positive for leg swelling (Right left swelling up to thigh). Negative for chest pain and palpitations.   Gastrointestinal: Negative for abdominal distention, abdominal pain, blood in stool, constipation, diarrhea, nausea and vomiting.   Genitourinary: Negative for difficulty urinating, dysuria and hematuria.   Musculoskeletal: Negative for arthralgias and myalgias.   Skin: Positive for color change (Mutliple purple " "papules of the right shin).   Neurological: Negative for dizziness, weakness, light-headedness and headaches.       Physical Exam   /72   Pulse 78   Temp 97.4 °F (36.3 °C) (Oral)   Resp 18   Ht 5' 8" (1.727 m)   Wt 68 kg (150 lb)   SpO2 100%   BMI 22.81 kg/m²     Physical Exam    Constitutional: He appears well-developed and well-nourished. No distress.   HENT:   Head: Normocephalic and atraumatic.   Eyes: Conjunctivae are normal.   Cardiovascular: Normal rate, regular rhythm and normal heart sounds.   Pulmonary/Chest: Breath sounds normal. No respiratory distress.   Abdominal: Soft. He exhibits no distension. There is no tenderness.   Musculoskeletal: He exhibits edema. He exhibits no tenderness.   Neurological: He is alert and oriented to person, place, and time.   Skin: Skin is warm and dry. Rash noted. Rash is papular.              ED Course   Demetrio Aguiar is a 39 y.o m with PMHx of HTN, HIV+ on HAART and s/p right kidney transplant in 2017 who is presenting to the ED for a 1 month history of right leg swelling.    - Order CBC, CMP, blood culture  - Right leg doppler  - Consult kidney transplant for further management   - Request records from [facility] in [place]  - pages KTS at 12:15pm, paged again at 12:35pm. Called Transplant surgery at 12:46 and was redirected to the on-call Transplant NP. Since he is >1yr out from surgery, he is referred       "

## 2019-07-17 NOTE — ASSESSMENT & PLAN NOTE
Right inguinal lymphadenopathy with LE swelling concerning for infection vs malignancy  OSH CT abdomen and biopsy per patient - unable to access records for now - please get OSH documents  Consider repeat CT abdomen with out contrast  Recommend ID consult and biopsy of skin lesion of LE   Please see  donor kidney transplant

## 2019-07-17 NOTE — H&P
Hospital Medicine  History and Physical Exam    Team: Networked reference to record PCT  Uzair Vicente MD  Admit Date: 7/17/2019  Principal Problem:  Acute kidney injury superimposed on chronic kidney disease   Patient information was obtained from patient and ER records.   Primary care Physician: Primary Doctor No  Code status: Full Code    CC:  Bilateral lower extremity edema    HPI:   Demetrio Aguiar is a 39 y.o. 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 HTN, and HLD who presents to [facility] [place] [place] complaining of progressively worsening bilateral lower extremity edema.    Patient reports appearance of a right groin lymph node approximately 10 months ago with progressively worsening RLE edema. Over the past month, he has noticed the appearance of hyperpigmented papules on the RLE that if now appeared on his RLQ of the abdomen.  He denies pruritus, pain, fluctuance, erythema, and serous/purulent drainage from any of these lesions. He reports receiving a biopsy of his right groin lymph node 1 week ago at [facility] in [place], [place] - he is unsure of the biopsy results.  He also reports some subjective fevers, but denies night sweats.  He reports approximately 10 lb unintentional weight loss over the past 1 month.  He endorses good urine output and denies dysuria or change in urine color or consistency.  He reports compliance with his ART daily and he is currently taking atovaquone and ketoconazole for prophylaxis.  He reports his ID physician (Dr. Reyes) recently discontinuing his azithromycin prophylaxis.  He has also been  compliant with his immunotherapy for right kidney transplant but notes that his tacrolimus dose was recently decreased to 5 mg b.i.d. He denies chest pain, palpitations, orthopnea, and PND.  He does endorse mild SOB and some increased weakness for the past few weeks.  He denies hemoptysis and BRBPR, but does note some bowel movements that sound concerning for melena.    In the ED, he was noted to have LEFTY, creatinine:  3.2 as well as hyperkalemia, potassium:  6.4.  He was given albuterol nebulizers, insulin/glucose IV, and Kayexalate to treat hyperkalemia.  He was also bolused 2 L normal saline given suspicion of prerenal etiology for acute kidney injury.    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    Past Surgical History: Patient has a past surgical history that includes peritoneal dialysis catheter placement; Exploratory laparotomy w/ bowel resection; Kidney transplant; and Biopsy (N/A, 8/28/2018).    Social History: Patient reports that he has quit smoking. His smoking use included cigarettes. He smoked 0.50 packs per day. He has never used smokeless tobacco. He reports that he does not drink alcohol or use drugs.    Family History: family history includes Cancer in his father; Diabetes in his maternal aunt and maternal uncle; Hypertension in his maternal grandmother and paternal grandmother; Lung cancer in his father; No Known Problems in his brother and mother.    Medications: reviewed     Allergies: Patient has No Known Allergies.    ROS  Pain Scale:  0 /10   Constitutional:  Negative fever or chills  Respiratory:  no cough, positive shortness of breath  Cardiovascular: no chest pain or palpitations  Gastrointestinal: no nausea or vomiting, no abdominal pain or change in bowel habits  Genitourinary: no hematuria or dysuria  Integument/Breast:  Positive rash, negative pruritis  Hematologic/Lymphatic: no easy bruising, positive right groin lymphadenopathy and RLE edema  Musculoskeletal: no arthralgias or myalgias  Neurological: no seizures or tremors  Behavioral/Psych: no depression or anxiety    PEx  Temp:  [97.4 °F (36.3 °C)]   Pulse:  [67-78]   Resp:  [10-23]   BP: (113-145)/(62-91)   SpO2:  [97 %-100 %]   Body mass index is 22.81 kg/m².     Intake/Output Summary (Last 24 hours) at 7/17/2019 1743  Last data filed at 7/17/2019 1618  Gross per 24 hour   Intake 1700 ml   Output --   Net 1700 ml     General appearance: no distress, speaking in full sentences  Mental status: Alert and oriented x 3  HEENT:  conjunctivae/corneas clear, PERRL  Pulm:   normal respiratory effort, CTA B, no c/w/r  Card: RRR, S1, S2 normal, no murmur  Abd: soft, NT, mild distention, BS present; no masses, no organomegaly  Ext:  2+ pitting edema of RLE, trace pitting edema of LLE  Pulses: 2+, symmetric  Skin:  Multiple hyperpigmented papules on RLE and RLQ of abdomen with no associated pruritus, TTP, fluctuance, or drainage   Neuro: CN II-XII grossly intact, no focal numbness or weakness, normal strength and tone     Recent Results (from the past 24 hour(s))   Blood culture #1 **CANNOT BE ORDERED STAT**    Collection Time: 07/17/19  9:59 AM   Result Value Ref Range    Blood Culture, Routine No Growth to date    CBC auto differential    Collection Time: 07/17/19 10:16 AM   Result Value Ref Range    WBC 4.70 3.90 - 12.70 K/uL    RBC 2.58 (L) 4.60 - 6.20 M/uL    Hemoglobin 7.2 (L) 14.0 - 18.0 g/dL    Hematocrit 22.8 (L) 40.0 - 54.0 %    Mean Corpuscular Volume 88 82 - 98 fL    Mean Corpuscular Hemoglobin 27.9 27.0 - 31.0 pg    Mean Corpuscular Hemoglobin  Conc 31.6 (L) 32.0 - 36.0 g/dL    RDW 15.6 (H) 11.5 - 14.5 %    Platelets 174 150 - 350 K/uL    MPV 11.8 9.2 - 12.9 fL    Immature Granulocytes CANCELED 0.0 - 0.5 %    Immature Grans (Abs) CANCELED 0.00 - 0.04 K/uL    Lymph # CANCELED 1.0 - 4.8 K/uL    Mono # CANCELED 0.3 - 1.0 K/uL    Eos # CANCELED 0.0 - 0.5 K/uL    Baso # CANCELED 0.00 - 0.20 K/uL    nRBC 0 0 /100 WBC    Gran% 61.0 38.0 - 73.0 %    Lymph% 13.0 (L) 18.0 - 48.0 %    Mono% 17.0 (H) 4.0 - 15.0 %    Eosinophil% 5.0 0.0 - 8.0 %    Basophil% 0.0 0.0 - 1.9 %    Bands 4.0 %    Platelet Estimate Appears normal     Aniso Slight     Poik Slight     Poly Occasional     Hypo Occasional     Ovalocytes Occasional     Tear Drop Cells Occasional     Differential Method Manual    Comprehensive metabolic panel    Collection Time: 07/17/19 10:16 AM   Result Value Ref Range    Sodium 138 136 - 145 mmol/L    Potassium 6.0 (H) 3.5 - 5.1 mmol/L    Chloride 107 95 - 110 mmol/L    CO2 21 (L) 23 - 29 mmol/L    Glucose 117 (H) 70 - 110 mg/dL    BUN, Bld 31 (H) 6 - 20 mg/dL    Creatinine 3.2 (H) 0.5 - 1.4 mg/dL    Calcium 9.1 8.7 - 10.5 mg/dL    Total Protein 6.8 6.0 - 8.4 g/dL    Albumin 4.0 3.5 - 5.2 g/dL    Total Bilirubin 0.6 0.1 - 1.0 mg/dL    Alkaline Phosphatase 72 55 - 135 U/L    AST 18 10 - 40 U/L    ALT 8 (L) 10 - 44 U/L    Anion Gap 10 8 - 16 mmol/L    eGFR if African American 26.6 (A) >60 mL/min/1.73 m^2    eGFR if non  23.0 (A) >60 mL/min/1.73 m^2   Blood culture #2 **CANNOT BE ORDERED STAT**    Collection Time: 07/17/19 10:16 AM   Result Value Ref Range    Blood Culture, Routine No Growth to date    Urinalysis, Reflex to Urine Culture Urine, Clean Catch    Collection Time: 07/17/19 10:17 AM   Result Value Ref Range    Specimen UA Urine, Clean Catch     Color, UA Straw Yellow, Straw, Yvette    Appearance, UA Clear Clear    pH, UA 7.0 5.0 - 8.0    Specific Gravity, UA 1.005 1.005 - 1.030    Protein, UA Negative Negative    Glucose, UA Negative  Negative    Ketones, UA Negative Negative    Bilirubin (UA) Negative Negative    Occult Blood UA Negative Negative    Nitrite, UA Negative Negative    Leukocytes, UA Negative Negative   Lactic acid, plasma    Collection Time: 19 12:26 PM   Result Value Ref Range    Lactate (Lactic Acid) 1.1 0.5 - 2.2 mmol/L   Potassium    Collection Time: 19 12:26 PM   Result Value Ref Range    Potassium 6.4 (HH) 3.5 - 5.1 mmol/L   ISTAT PROCEDURE    Collection Time: 19 12:33 PM   Result Value Ref Range    POC Glucose 123 (H) 70 - 110 mg/dL    POC BUN 32 (H) 6 - 30 mg/dL    POC Creatinine 3.4 (H) 0.5 - 1.4 mg/dL    POC Sodium 137 136 - 145 mmol/L    POC Potassium 6.3 (HH) 3.5 - 5.1 mmol/L    POC Chloride 106 95 - 110 mmol/L    POC TCO2 (MEASURED) 21 (L) 23 - 29 mmol/L    POC Ionized Calcium 1.11 1.06 - 1.42 mmol/L    POC Hematocrit 21 (L) 36 - 54 %PCV    Sample ELIOT    POCT glucose    Collection Time: 19  1:39 PM   Result Value Ref Range    POCT Glucose 114 (H) 70 - 110 mg/dL   POCT glucose    Collection Time: 19  2:28 PM   Result Value Ref Range    POCT Glucose 73 70 - 110 mg/dL   ISTAT PROCEDURE    Collection Time: 19  3:41 PM   Result Value Ref Range    POC Glucose 66 (L) 70 - 110 mg/dL    POC BUN 33 (H) 6 - 30 mg/dL    POC Creatinine 3.3 (H) 0.5 - 1.4 mg/dL    POC Sodium 138 136 - 145 mmol/L    POC Potassium 6.5 (HH) 3.5 - 5.1 mmol/L    POC Chloride 105 95 - 110 mmol/L    POC TCO2 (MEASURED) 22 (L) 23 - 29 mmol/L    POC Ionized Calcium 1.16 1.06 - 1.42 mmol/L    POC Hematocrit 21 (L) 36 - 54 %PCV    Sample ELIOT    POCT glucose    Collection Time: 19  5:20 PM   Result Value Ref Range    POCT Glucose 103 70 - 110 mg/dL       Active Hospital Problems    Diagnosis  POA    *Acute kidney injury superimposed on chronic kidney disease stage III [N17.9, N18.9]  Yes    Hyperkalemia [E87.5]  Yes     donor kidney transplant 2017 [Z94.0]  Not Applicable     -Biopsy (for DGF) 17:  good sample with 24 glomeruli (only 1 sclerosed) and evidence just meeting criteria for ABMR. + diffuse ATI. Minimal fibrosis, no ACR or AVR, but + CD4 (>50% diffusely + stain) with mild glomerulitis and focal peritubular capillaritis. Also, biopsy shows ca-oxalate (3) and ca-phos (2) crystals.  -8/25/17 DSA + for weak class I.    Biopsy (LEFTY) 10/17/2017 14 glomeruli, <5% fibrosis, no MC changes, No ACR or AMR, C4d negative    Biopsy (LEFTY) 1/15/18:  No ACR, AVR, ABMR, CD4 neg. Less than 10 % fibrosis.        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     Assessment and Plan:  Demetrio Aguiar is a 39 y.o. 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 HTN, and HLD who presents to [facility] complaining of progressively worsening bilateral lower extremity edema. Admitted to Hospital Medicine for LEFTY on CKD stage III and hyperkalemia.    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   · IV hydration with 2 L normal saline bolused    · For hyperkalemia:  Given insulin/glucose IV, albuterol nebulizers, and Kayexalate x2  · BMP q.6 hours for now to trend hyperkalemia  · Continuous telemetry while hyperkalemic  · Follow-up urine sodium and creatinine to calculate FeNa  · Follow-up renal ultrasound  · Avoid nephrotoxic medications, renally dose all medications  · Anemia of chronic inflammation slightly worsened.  Baseline hemoglobin:  9 0.4-10.5.  No current signs of active bleeding.  Follow up anemia workup:  Iron panel, ferritin, transferrin, homocystine, methylmalonic acid, and reticulocyte count (calculate reticulocyte index).  May require IV iron while inpatient  · Cinacalet 60 mg daily   · Sodium bicarbonate 1,950 t.i.d.  · Tacrolimus 5 mg b.i.d. follow-up a.m. tacrolimus level  · Mycophenolate 180 mg b.i.d.  · Prednisone 5 mg daily and famotidine 20 mg daily  · KTM consulted, appreciate assistance    RLE swelling and Right Groin LAD  Hyperpigmented papules of RLE and RLQ, concern for Kaposi Sarcoma  · Lower extremity ultrasound negative for acute DVT.  Low suspicion for cellulitis as no associated erythema with hyperpigmented papules.  At this point, I am concerned for possible Kaposi Sarcoma given appearance of hyperpigmented papules and distribution in patient with HIV and history of renal transplant on long-term immunosuppression  · Will need to follow up pathology of right groin lymph node, recently performed at [facility] in [facility] in the a.m.  · Follow-up fecal occult blood and chest x-ray for staging purposes, especially given worsening anemia  · Follow-up repeat CD4/8 ratio and HIV RNA viral load  · Follow-up CT abdomen/pelvis for further characterization and staging  · Dermatology consult for a.m. for further evaluation    HIV on  ART  Multiple prophylactic antibiotics/anti-fungals (MAC: Azithromycin, PCP: Atovaquone, and Ketoconazole)  Hx of Latent TB s/p treatment  · Continue ART:  Dolutegravir 50 mg daily and emtricitabine - tenofovir 200-25 mg daily  · Follow-up CD4/CD8 ratio and HIV RNA viral load  · Continue atovaquone 750 mg daily and ketoconazole 100 mg daily for antibiotic prophylaxis  · Patient reports azithromycin recently stopped by outpatient ID physician  · Continue multivitamin daily  · Transplant ID consulted, appreciate assistance    HTN  · Continue nifedipine 30 mg daily and carvedilol 12.5 mg b.i.d.  · Goal BP less than 130/80    HLD  · Decreased home Crestor to 10 mg daily given LEFTY    Hx of Polysubstance Abuse  · Monitor closely  · Follow-up urine drug screen      DVT PPx:  No chemical prophylaxis, low risk VTE  Diet:  Renal  --  Disposition:  Ongoing, pending medical stability  Follow Up:  Infectious Disease, Nephrology, and PCP      Uzair Vicente M.D.  [facility] Medicine Staff  [facility]

## 2019-07-17 NOTE — ASSESSMENT & PLAN NOTE
40 yo male with kidney transplant in 2017 on immunosuppression now with LEFTY.     Assessment :  -Baseline sCr 2.2-2.5 likely new baseline, previously around 1.8-2.1, 3.2 on admit   -Decreased PO intake, also on CNI- prograf  -Hyperkalemia with LEFTY - LEFTY likely pre-renal vs CNI toxicity   -Renal biopsy in 8/218- no evidence for ACR, ACR, ABMR  -Right groin lymph adenopathy- concerning for infection  vs malignancy given history, unilaterality and duration. Differential includes TB, Kaposi's, PTLD.    -Per patient, reports recent CT abdo and biopsy at OSH - no access of records     Recommendation:  -Continue home dose Immunosuppression management ( Has recent adjustment of Prograf - takes 5 mg every 12 hours)   -Prograf level in the AM - please draw 12 hours after last dose  -Transplant renal US (ordered)   -IVF as needed - LEFTY with hyperkalemia in the setting of decreased PO intake.   -Recommend ID consult for lymphadenopathy in HIV pt  -Recommend biopsy of skin lesion in the right LE  -Consider CT abdo/Pelvis with out contrast for better visualization of LAP.   -Will try to get record of biopsy and CT results from [facility] in [place]

## 2019-07-18 PROBLEM — I15.0 RENOVASCULAR HYPERTENSION: Status: ACTIVE | Noted: 2019-07-18

## 2019-07-18 PROBLEM — C46.9 KAPOSI SARCOMA: Status: ACTIVE | Noted: 2019-07-18

## 2019-07-18 LAB
ABSOLUTE CD3: 427 CELLS/UL (ref 700–2100)
ABSOLUTE CD8: 340 CELLS/UL (ref 200–900)
ANION GAP SERPL CALC-SCNC: 10 MMOL/L (ref 8–16)
ANION GAP SERPL CALC-SCNC: 8 MMOL/L (ref 8–16)
ANION GAP SERPL CALC-SCNC: 8 MMOL/L (ref 8–16)
ANION GAP SERPL CALC-SCNC: 9 MMOL/L (ref 8–16)
BUN SERPL-MCNC: 26 MG/DL (ref 6–20)
BUN SERPL-MCNC: 27 MG/DL (ref 6–20)
BUN SERPL-MCNC: 29 MG/DL (ref 6–20)
BUN SERPL-MCNC: 30 MG/DL (ref 6–20)
CALCIUM SERPL-MCNC: 8.5 MG/DL (ref 8.7–10.5)
CALCIUM SERPL-MCNC: 8.7 MG/DL (ref 8.7–10.5)
CALCIUM SERPL-MCNC: 8.8 MG/DL (ref 8.7–10.5)
CALCIUM SERPL-MCNC: 9.4 MG/DL (ref 8.7–10.5)
CD3%: 79.6 % (ref 55–83)
CD3+CD4+ CELLS # BLD: 74 CELLS/UL (ref 300–1400)
CD3+CD4+ CELLS NFR BLD: 13.8 % (ref 28–57)
CD4/CD8 RATIO: 0.22 (ref 0.9–3.6)
CD8 % SUPPRESSOR T CELL: 63.4 % (ref 10–39)
CHLORIDE SERPL-SCNC: 107 MMOL/L (ref 95–110)
CHLORIDE SERPL-SCNC: 108 MMOL/L (ref 95–110)
CHLORIDE SERPL-SCNC: 109 MMOL/L (ref 95–110)
CHLORIDE SERPL-SCNC: 109 MMOL/L (ref 95–110)
CO2 SERPL-SCNC: 20 MMOL/L (ref 23–29)
CO2 SERPL-SCNC: 20 MMOL/L (ref 23–29)
CO2 SERPL-SCNC: 22 MMOL/L (ref 23–29)
CO2 SERPL-SCNC: 23 MMOL/L (ref 23–29)
CREAT SERPL-MCNC: 2.8 MG/DL (ref 0.5–1.4)
CREAT SERPL-MCNC: 2.9 MG/DL (ref 0.5–1.4)
EST. GFR  (AFRICAN AMERICAN): 29.9 ML/MIN/1.73 M^2
EST. GFR  (AFRICAN AMERICAN): 31.2 ML/MIN/1.73 M^2
EST. GFR  (NON AFRICAN AMERICAN): 25.9 ML/MIN/1.73 M^2
EST. GFR  (NON AFRICAN AMERICAN): 27 ML/MIN/1.73 M^2
GLUCOSE SERPL-MCNC: 121 MG/DL (ref 70–110)
GLUCOSE SERPL-MCNC: 122 MG/DL (ref 70–110)
GLUCOSE SERPL-MCNC: 125 MG/DL (ref 70–110)
GLUCOSE SERPL-MCNC: 130 MG/DL (ref 70–110)
MAGNESIUM SERPL-MCNC: 1.7 MG/DL (ref 1.6–2.6)
OB PNL STL: NEGATIVE
PHOSPHATE SERPL-MCNC: 3.9 MG/DL (ref 2.7–4.5)
POCT GLUCOSE: 107 MG/DL (ref 70–110)
POCT GLUCOSE: 131 MG/DL (ref 70–110)
POTASSIUM SERPL-SCNC: 6 MMOL/L (ref 3.5–5.1)
POTASSIUM SERPL-SCNC: 6 MMOL/L (ref 3.5–5.1)
POTASSIUM SERPL-SCNC: 6.1 MMOL/L (ref 3.5–5.1)
POTASSIUM SERPL-SCNC: 6.1 MMOL/L (ref 3.5–5.1)
SODIUM SERPL-SCNC: 137 MMOL/L (ref 136–145)
SODIUM SERPL-SCNC: 138 MMOL/L (ref 136–145)
SODIUM SERPL-SCNC: 138 MMOL/L (ref 136–145)
SODIUM SERPL-SCNC: 140 MMOL/L (ref 136–145)
TACROLIMUS BLD-MCNC: 4.4 NG/ML (ref 5–15)

## 2019-07-18 PROCEDURE — 63600175 PHARM REV CODE 636 W HCPCS: Performed by: HOSPITALIST

## 2019-07-18 PROCEDURE — 99233 PR SUBSEQUENT HOSPITAL CARE,LEVL III: ICD-10-PCS | Mod: ,,, | Performed by: HOSPITALIST

## 2019-07-18 PROCEDURE — 25000003 PHARM REV CODE 250: Performed by: HOSPITALIST

## 2019-07-18 PROCEDURE — 99223 1ST HOSP IP/OBS HIGH 75: CPT | Mod: ,,, | Performed by: INTERNAL MEDICINE

## 2019-07-18 PROCEDURE — 93010 EKG 12-LEAD: ICD-10-PCS | Mod: ,,, | Performed by: INTERNAL MEDICINE

## 2019-07-18 PROCEDURE — 11000001 HC ACUTE MED/SURG PRIVATE ROOM

## 2019-07-18 PROCEDURE — 99223 PR INITIAL HOSPITAL CARE,LEVL III: ICD-10-PCS | Mod: ,,, | Performed by: INTERNAL MEDICINE

## 2019-07-18 PROCEDURE — 99233 SBSQ HOSP IP/OBS HIGH 50: CPT | Mod: GC,,, | Performed by: INTERNAL MEDICINE

## 2019-07-18 PROCEDURE — 94761 N-INVAS EAR/PLS OXIMETRY MLT: CPT

## 2019-07-18 PROCEDURE — 87385 HISTOPLASMA CAPSUL AG IA: CPT

## 2019-07-18 PROCEDURE — 84100 ASSAY OF PHOSPHORUS: CPT

## 2019-07-18 PROCEDURE — 80197 ASSAY OF TACROLIMUS: CPT

## 2019-07-18 PROCEDURE — 87449 NOS EACH ORGANISM AG IA: CPT

## 2019-07-18 PROCEDURE — 93010 ELECTROCARDIOGRAM REPORT: CPT | Mod: ,,, | Performed by: INTERNAL MEDICINE

## 2019-07-18 PROCEDURE — 99233 PR SUBSEQUENT HOSPITAL CARE,LEVL III: ICD-10-PCS | Mod: GC,,, | Performed by: INTERNAL MEDICINE

## 2019-07-18 PROCEDURE — 93005 ELECTROCARDIOGRAM TRACING: CPT

## 2019-07-18 PROCEDURE — 83735 ASSAY OF MAGNESIUM: CPT

## 2019-07-18 PROCEDURE — 80048 BASIC METABOLIC PNL TOTAL CA: CPT | Mod: 91

## 2019-07-18 PROCEDURE — 25000242 PHARM REV CODE 250 ALT 637 W/ HCPCS: Performed by: HOSPITALIST

## 2019-07-18 PROCEDURE — 99233 SBSQ HOSP IP/OBS HIGH 50: CPT | Mod: ,,, | Performed by: HOSPITALIST

## 2019-07-18 PROCEDURE — 36415 COLL VENOUS BLD VENIPUNCTURE: CPT

## 2019-07-18 PROCEDURE — 94640 AIRWAY INHALATION TREATMENT: CPT

## 2019-07-18 RX ORDER — TENOFOVIR DISOPROXIL FUMARATE 300 MG/1
300 TABLET, FILM COATED ORAL DAILY
Status: DISCONTINUED | OUTPATIENT
Start: 2019-07-19 | End: 2019-07-19

## 2019-07-18 RX ORDER — SODIUM POLYSTYRENE SULFONATE 4.1 MEQ/G
60 POWDER, FOR SUSPENSION ORAL; RECTAL
Status: COMPLETED | OUTPATIENT
Start: 2019-07-18 | End: 2019-07-18

## 2019-07-18 RX ORDER — SODIUM POLYSTYRENE SULFONATE 4.1 MEQ/G
30 POWDER, FOR SUSPENSION ORAL; RECTAL
Status: COMPLETED | OUTPATIENT
Start: 2019-07-18 | End: 2019-07-18

## 2019-07-18 RX ORDER — SODIUM BICARBONATE 650 MG/1
TABLET ORAL
Qty: 270 TABLET | Refills: 11 | OUTPATIENT
Start: 2019-07-18

## 2019-07-18 RX ORDER — SODIUM CHLORIDE 9 MG/ML
INJECTION, SOLUTION INTRAVENOUS CONTINUOUS
Status: ACTIVE | OUTPATIENT
Start: 2019-07-18 | End: 2019-07-19

## 2019-07-18 RX ORDER — FUROSEMIDE 10 MG/ML
80 INJECTION INTRAMUSCULAR; INTRAVENOUS ONCE
Status: COMPLETED | OUTPATIENT
Start: 2019-07-18 | End: 2019-07-18

## 2019-07-18 RX ORDER — EMTRICITABINE 200 MG/1
200 CAPSULE ORAL DAILY
Status: DISCONTINUED | OUTPATIENT
Start: 2019-07-19 | End: 2019-07-19

## 2019-07-18 RX ORDER — NIFEDIPINE 30 MG/1
TABLET, EXTENDED RELEASE ORAL
Qty: 90 TABLET | Refills: 11 | OUTPATIENT
Start: 2019-07-18

## 2019-07-18 RX ORDER — ALBUTEROL SULFATE 2.5 MG/.5ML
10 SOLUTION RESPIRATORY (INHALATION) ONCE
Status: COMPLETED | OUTPATIENT
Start: 2019-07-18 | End: 2019-07-18

## 2019-07-18 RX ADMIN — CARVEDILOL 12.5 MG: 12.5 TABLET, FILM COATED ORAL at 05:07

## 2019-07-18 RX ADMIN — SODIUM BICARBONATE 650 MG TABLET 1950 MG: at 11:07

## 2019-07-18 RX ADMIN — SODIUM POLYSTYRENE SULFONATE 60 G: 1 POWDER ORAL; RECTAL at 05:07

## 2019-07-18 RX ADMIN — DOLUTEGRAVIR SODIUM 50 MG: 50 TABLET, FILM COATED ORAL at 08:07

## 2019-07-18 RX ADMIN — CARVEDILOL 12.5 MG: 12.5 TABLET, FILM COATED ORAL at 08:07

## 2019-07-18 RX ADMIN — SODIUM POLYSTYRENE SULFONATE 30 G: 1 POWDER ORAL; RECTAL at 03:07

## 2019-07-18 RX ADMIN — SODIUM BICARBONATE 650 MG TABLET 1950 MG: at 08:07

## 2019-07-18 RX ADMIN — SODIUM CHLORIDE: 0.9 INJECTION, SOLUTION INTRAVENOUS at 05:07

## 2019-07-18 RX ADMIN — NIFEDIPINE 30 MG: 30 TABLET, FILM COATED, EXTENDED RELEASE ORAL at 08:07

## 2019-07-18 RX ADMIN — ROSUVASTATIN CALCIUM 10 MG: 10 TABLET, FILM COATED ORAL at 08:07

## 2019-07-18 RX ADMIN — ACETAMINOPHEN 650 MG: 325 TABLET ORAL at 06:07

## 2019-07-18 RX ADMIN — CINACALCET HYDROCHLORIDE 60 MG: 30 TABLET, FILM COATED ORAL at 10:07

## 2019-07-18 RX ADMIN — SODIUM POLYSTYRENE SULFONATE 60 G: 1 POWDER ORAL; RECTAL at 11:07

## 2019-07-18 RX ADMIN — THERA TABS 1 TABLET: TAB at 08:07

## 2019-07-18 RX ADMIN — SODIUM BICARBONATE 650 MG TABLET 1950 MG: at 03:07

## 2019-07-18 RX ADMIN — MYCOPHENILIC ACID 180 MG: 180 TABLET, DELAYED RELEASE ORAL at 08:07

## 2019-07-18 RX ADMIN — TACROLIMUS 5 MG: 5 CAPSULE ORAL at 05:07

## 2019-07-18 RX ADMIN — SODIUM POLYSTYRENE SULFONATE 30 G: 1 POWDER ORAL; RECTAL at 09:07

## 2019-07-18 RX ADMIN — FAMOTIDINE 20 MG: 20 TABLET, FILM COATED ORAL at 09:07

## 2019-07-18 RX ADMIN — TACROLIMUS 5 MG: 5 CAPSULE ORAL at 08:07

## 2019-07-18 RX ADMIN — EMTRICITABINE AND TENOFOVIR ALAFENAMIDE 1 TABLET: 200; 25 TABLET ORAL at 09:07

## 2019-07-18 RX ADMIN — ATOVAQUONE 750 MG: 750 SUSPENSION ORAL at 08:07

## 2019-07-18 RX ADMIN — FUROSEMIDE 80 MG: 10 INJECTION, SOLUTION INTRAMUSCULAR; INTRAVENOUS at 05:07

## 2019-07-18 RX ADMIN — PREDNISONE 5 MG: 5 TABLET ORAL at 08:07

## 2019-07-18 RX ADMIN — ALBUTEROL SULFATE 10 MG: 2.5 SOLUTION RESPIRATORY (INHALATION) at 08:07

## 2019-07-18 RX ADMIN — KETOCONAZOLE 100 MG: 200 TABLET ORAL at 09:07

## 2019-07-18 NOTE — CONSULTS
[facility]  Dermatology  Consult Note    Patient Name: Demetrio Aguiar  MRN: [id number]  Admission Date: 7/17/2019  Hospital Length of Stay: 1 days  Attending Physician: Tae Qiu, *  Primary Care Provider: Primary Doctor No     Inpatient consult to Dermatology  Consult performed by: Enrique Cabrera MD  Consult ordered by: Uzair Vicente MD        Subjective:     Principal Problem:Acute kidney injury superimposed on chronic kidney disease    HPI:  Demetrio Aguiar is a 39 y.o. 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 HTN, and HLD who presents to [facility] complaining of progressively worsening bilateral lower extremity edema.     Dermatology was consulted for the red-violaceous papules on the patient's lower extremities that were concerning for Kaposi Sarcoma. Patient notes that he developed edema in his right lower extremity in the past 2 months. During which time, he developed nodules in his right groin and brawny violaceous papules on the right lower leg and abdomen which have been spreading over the last couple of months but isolated to the right side of his body. They have been asymptomatic.     In the meantime, patient had a biopsy of his right groin which was consistent with Kaposi's sarcoma.     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 Hematoma evacuated.(09/09)    Status post exploratory laparotomy 9/10/2017    Re explored for retroperitoneal hematoma. Hematoma evacuated.(09/09)    TB lung, latent     tx INH 2006       Past Surgical History:   Procedure Laterality Date    BIOPSY Tranplanted Kidney N/A 8/28/2018    Performed by [facility] [place] [address] [place] [address]    BIOPSY Tranplanted Kidney N/A 1/15/2018    Performed by [facility] [place] [address] [place] [address]    BIOPSY Tranplanted Kidney N/A 10/17/2017    Performed by [facility] [place] [address] [place] [address]    [id number] N/A 10/3/2017    Performed by [facility] at [facility] [place] [address] [place] [address]    EXPLORATORY LAPAROTOMY W/ BOWEL RESECTION      NO BOWEL RESECTION, UNKNOWN DETAILS, "Pancreas Infection"    EXPLORATORY-LAPAROTOMY retroperitoneal washout N/A 9/9/2017    Performed by Wesley Cisneros MD at [facility] OR [address] [place] [address]    KIDNEY TRANSPLANT      peritoneal dialysis catheter placement      TRANSPLANT-KIDNEY N/A 8/18/2017    Performed by Wesley Cisneros [facility] at [facility] OR [address] [place] [address]     Family History     Problem Relation (Age of Onset)    Cancer Father    Diabetes Maternal Aunt, Maternal Uncle    Hypertension Maternal Grandmother, Paternal Grandmother    Lung cancer Father    No Known Problems Mother, Brother        Tobacco Use    Smoking status: Former Smoker     Packs/day: 0.50     Types: Cigarettes    Smokeless tobacco: Never Used    Tobacco comment: quit smoking 5 years ago   Substance and Sexual Activity    Alcohol use: No    Drug use: No     Types: Cocaine, Marijuana     Comment: last use for cocaine at age 17; 2 years ago last use for THC    Sexual activity: Not Currently       Review of patient's allergies indicates:  No Known Allergies    Medications:  Continuous Infusions:  Scheduled Meds:   atovaquone  750 mg Oral Daily    " carvedilol  12.5 mg Oral BID WM    cinacalcet  60 mg Oral Daily with breakfast    dolutegravir  50 mg Oral Daily    emtricitabine-tenofovir alafen  1 tablet Oral Daily    famotidine  20 mg Oral Daily    ketoconazole  100 mg Oral Daily    multivitamin  1 tablet Oral Daily    NIFEdipine  30 mg Oral Daily    predniSONE  5 mg Oral Daily    rosuvastatin  10 mg Oral Daily    sodium bicarbonate  1,950 mg Oral TID    sodium polystyrene  30 g Oral Q2H    tacrolimus  5 mg Oral BID     PRN Meds:acetaminophen, Dextrose 10% Bolus, Dextrose 10% Bolus, glucagon (human recombinant), glucose, glucose, sodium chloride 0.9%    Review of Systems   Constitutional: Negative for fever and chills.   Cardiovascular: Positive for pedal edema.   Hematologic/Lymphatic: Positive for adenopathy.     Objective:     Vital Signs (Most Recent):  Temp: 98.2 °F (36.8 °C) (07/18/19 1129)  Pulse: 67 (07/18/19 1204)  Resp: 18 (07/18/19 1129)  BP: 128/79 (07/18/19 1129)  SpO2: 98 % (07/18/19 1129) Vital Signs (24h Range):  Temp:  [96.7 °F (35.9 °C)-98.2 °F (36.8 °C)] 98.2 °F (36.8 °C)  Pulse:  [66-91] 67  Resp:  [10-23] 18  SpO2:  [96 %-100 %] 98 %  BP: (117-169)/(70-93) 128/79     Weight: 71.5 kg (157 lb 10.1 oz)  Body mass index is 23.97 kg/m².    Physical Exam   Constitutional: He appears well-developed and well-nourished. No distress.   Neurological: He is alert and oriented to person, place, and time. He is not disoriented.   Psychiatric: He has a normal mood and affect.   Skin:                              Assessment/Plan:     Kaposi sarcoma  Demetrio Aguiar is a 39 y.o. 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 HTN, and HLD who presents to [facility] complaining of progressively worsening bilateral lower extremity edema.     Dermatology was consulted regarding reddish -violaceous lesions on the patients lower extremity and abdomen. In the interim, biopsy of the patient's right groin showed that he has Kaposi's sarcoma.     - We think the lesions on the patient's right leg and abdomen are consistent with Kaposi's sarcoma  - Will defer skin biopsy for now since we have pathological diagnosis from another site. But, if biopsy is warranted for staging or other purposes, will gladly do so.         Thank you for your consult. I will sign off. Please contact us if you have any additional questions.    Enrique Cabrera MD  Dermatology  [facility]

## 2019-07-18 NOTE — SUBJECTIVE & OBJECTIVE
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 Hematoma evacuated.(09/09)    Status post exploratory laparotomy 9/10/2017    Re explored for retroperitoneal hematoma. Hematoma evacuated.(09/09)    TB lung, latent     tx INH 2006       Past Surgical History:   Procedure Laterality Date    BIOPSY Tranplanted Kidney N/A 8/28/2018    Performed by [facility] at [facility] [place] [address] [place] [address]    BIOPSY Tranplanted Kidney N/A 1/15/2018    Performed by [facility] [place] [address] [place] [address]    BIOPSY Tranplanted Kidney N/A 10/17/2017    Performed by [facility] [place] [address] [place] [address]    [id number] N/A 10/3/2017    Performed by [facility] [place] [address] [place] [address]    EXPLORATORY LAPAROTOMY W/ BOWEL RESECTION      NO BOWEL RESECTION, UNKNOWN DETAILS, "Pancreas Infection"    EXPLORATORY-LAPAROTOMY retroperitoneal washout N/A 9/9/2017    Performed by [facility] C[facility]os [facility] at [facility] OR [address] [place] [address]    KIDNEY TRANSPLANT      peritoneal dialysis catheter placement      TRANSPLANT-KIDNEY N/A 8/18/2017    Performed by [facility] at [facility] OR [address] [place] [address]       Review of patient's allergies indicates:  No Known Allergies    Medications:  Medications Prior to Admission   Medication Sig    atovaquone (MEPRON) 750 mg/5 mL Susp Take 5 mLs (750 mg total) by mouth once daily.    carvedilol (COREG) 6.25 MG tablet Take 6.25 mg by mouth 2 (two) times daily with meals.    cinacalcet (SENSIPAR) 60 MG Tab Take 1 tablet (60 mg total) by mouth daily with breakfast.    DESCOVY 200-25 mg Tab Take 1 tablet by mouth once daily.    " dolutegravir (TIVICAY) 50 mg Tab Take 1 tablet (50 mg total) by mouth once daily.    famotidine (PEPCID) 20 MG tablet TAKE 1 TABLET (20MG TOTAL) BY MOUTH EACH EVENING    ketoconazole (NIZORAL) 200 mg Tab Take 0.5 tablets (100 mg total) by mouth once daily.    multivitamin (THERAGRAN) tablet Take 1 tablet by mouth once daily.    mycophenolate (MYFORTIC) 180 MG TbEC Take 1 tablet (180 mg total) by mouth 3 (three) times daily. Z94.0 Kidney Transplant 8/18/2017    NIFEdipine (PROCARDIA-XL) 30 MG (OSM) 24 hr tablet Take 1 tablet (30 mg total) by mouth every morning AND 2 tablets (60 mg total) every evening.    predniSONE (DELTASONE) 5 MG tablet Take 1 tablet (5 mg total) by mouth once daily.    rosuvastatin (CRESTOR) 20 MG tablet Take 1 tablet (20 mg total) by mouth once daily.    sodium bicarbonate 650 MG tablet Take 3 tablets (1,950 mg total) by mouth 3 (three) times daily.    tacrolimus (PROGRAF) 1 MG Cap Take 4mg in the AM and 4mg in the PM (along with 5mg caps). BY MOUTH. Total dose 9mg in AM and 9mg in PM. Z94.0    tacrolimus (PROGRAF) 5 MG Cap Take 1cap (5mg) q12hrs along with (1mg caps). BY MOUTH. Total dose 9mg in AM and 9mg in PM. Z94.0.    azithromycin (ZITHROMAX) 600 MG Tab TAKE 2 TABLETS BY MOUTH EVERY 7 DAYS    sodium polystyrene (KAYEXALATE) 15 gram/60 mL Susp Please take extra kayexalate today- 60 grams x 2 doses 6 hrs apart.    sodium polystyrene (KAYEXALATE) powder Take 30g by mouth daily, as directed.     Antibiotics (From admission, onward)    None        Antifungals (From admission, onward)    Start     Stop Route Frequency Ordered    07/18/19 0900  ketoconazole split tablet 100 mg      -- Oral Daily 07/17/19 1741        Antivirals (From admission, onward)        Stop Route Frequency     emtricitabine-tenofovir alafen      -- Oral Daily     dolutegravir      -- Oral Daily             There is no immunization history on file for this patient.    Family History     Problem Relation (Age of  Onset)    Cancer Father    Diabetes Maternal Aunt, Maternal Uncle    Hypertension Maternal Grandmother, Paternal Grandmother    Lung cancer Father    No Known Problems Mother, Brother        Social History     Socioeconomic History    Marital status: Single     Spouse name: Not on file    Number of children: Not on file    Years of education: Not on file    Highest education level: Not on file   Occupational History    Not on file   Social Needs    Financial resource strain: Not on file    Food insecurity:     Worry: Not on file     Inability: Not on file    Transportation needs:     Medical: Not on file     Non-medical: Not on file   Tobacco Use    Smoking status: Former Smoker     Packs/day: 0.50     Types: Cigarettes    Smokeless tobacco: Never Used    Tobacco comment: quit smoking 5 years ago   Substance and Sexual Activity    Alcohol use: No    Drug use: No     Types: Cocaine, Marijuana     Comment: last use for cocaine at age 17; 2 years ago last use for THC    Sexual activity: Not Currently   Lifestyle    Physical activity:     Days per week: Not on file     Minutes per session: Not on file    Stress: Not on file   Relationships    Social connections:     Talks on phone: Not on file     Gets together: Not on file     Attends Shinto service: Not on file     Active member of club or organization: Not on file     Attends meetings of clubs or organizations: Not on file     Relationship status: Not on file   Other Topics Concern    Not on file   Social History Narrative    Not on file     Review of Systems   Constitutional: Positive for activity change. Negative for appetite change, chills and fever.   HENT: Negative for congestion.    Eyes: Negative for pain and itching.   Respiratory: Negative for cough, chest tightness and shortness of breath.    Cardiovascular: Negative for chest pain, palpitations and leg swelling.   Gastrointestinal: Positive for diarrhea and nausea. Negative for  abdominal pain.   Endocrine: Negative for polyphagia and polyuria.   Genitourinary: Negative for dysuria and frequency.   Musculoskeletal: Negative for back pain.   Skin: Positive for color change and rash.   Neurological: Negative for numbness and headaches.   Hematological: Positive for adenopathy. Bruises/bleeds easily.   Psychiatric/Behavioral: Negative for agitation and behavioral problems.     Objective:     Vital Signs (Most Recent):  Temp: 98.3 °F (36.8 °C) (07/18/19 1553)  Pulse: 80 (07/18/19 1605)  Resp: 18 (07/18/19 1553)  BP: (!) 159/72 (07/18/19 1553)  SpO2: 100 % (07/18/19 1553) Vital Signs (24h Range):  Temp:  [96.7 °F (35.9 °C)-98.3 °F (36.8 °C)] 98.3 °F (36.8 °C)  Pulse:  [66-91] 80  Resp:  [16-21] 18  SpO2:  [96 %-100 %] 100 %  BP: (128-169)/(72-93) 159/72     Weight: 71.5 kg (157 lb 10.1 oz)  Body mass index is 23.97 kg/m².    Estimated Creatinine Clearance: 32.8 mL/min (A) (based on SCr of 2.9 mg/dL (H)).    Physical Exam   Constitutional: He is oriented to person, place, and time. He appears well-developed.   HENT:   Head: Normocephalic.   Cardiovascular: Normal rate, regular rhythm and normal heart sounds.   No murmur heard.  Pulmonary/Chest: Effort normal and breath sounds normal.   Abdominal: Soft. Bowel sounds are normal. He exhibits no distension. There is no tenderness.   See image     Genitourinary:   Genitourinary Comments: See image   Musculoskeletal: Normal range of motion.   Neurological: He is alert and oriented to person, place, and time.   Psychiatric: He has a normal mood and affect. His behavior is normal.               Significant Labs:   Blood Culture:   Recent Labs   Lab 07/17/19  0959 07/17/19  1016   LABBLOO No Growth to date  No Growth to date No Growth to date  No Growth to date     BMP:   Recent Labs   Lab 07/18/19  0212 07/18/19  1534   *  125* 130*     137 138   K 6.1*  6.1* 6.0*     109 107   CO2 20*  20* 23   BUN 29*  30* 27*   CREATININE  2.8*  2.8* 2.9*   CALCIUM 8.5*  8.8 9.4   MG 1.7  --      CBC:   Recent Labs   Lab 07/17/19  1016 07/17/19  1233 07/17/19  1541   WBC 4.70  --   --    HGB 7.2*  --   --    HCT 22.8* 21* 21*     --   --      CMP:   Recent Labs   Lab 07/17/19  1016  07/17/19  2026 07/18/19  0212 07/18/19  1534     --  137 138  137 138   K 6.0*   < > 5.6* 6.1*  6.1* 6.0*     --  114* 109  109 107   CO2 21*  --  15* 20*  20* 23   *  --  96 122*  125* 130*   BUN 31*  --  27* 29*  30* 27*   CREATININE 3.2*  --  2.4* 2.8*  2.8* 2.9*   CALCIUM 9.1  --  7.0* 8.5*  8.8 9.4   PROT 6.8  --   --   --   --    ALBUMIN 4.0  --   --   --   --    BILITOT 0.6  --   --   --   --    ALKPHOS 72  --   --   --   --    AST 18  --   --   --   --    ALT 8*  --   --   --   --    ANIONGAP 10  --  8 9  8 8   EGFRNONAA 23.0*  --  32.5* 27.0*  27.0* 25.9*    < > = values in this interval not displayed.     Hepatitis Panel: No results for input(s): HEPBSAG, HEPAIGM, HEPCAB in the last 48 hours.    Invalid input(s): HAPBIGM  HIV 1/2 Antibodies: No results for input(s): LPQ60STEP in the last 48 hours.  Quantiferon: No results for input(s): NIL, TBAG, TBAGNIL, MITOGENNIL, TBGOLD in the last 48 hours.  Respiratory Culture: No results for input(s): GSRESP, RESPIRATORYC in the last 4320 hours.  Urine Culture: No results for input(s): LABURIN in the last 4320 hours.  Urine Studies:   Recent Labs   Lab 07/17/19  1017   COLORU Straw   APPEARANCEUA Clear   PHUR 7.0   SPECGRAV 1.005   PROTEINUA Negative   GLUCUA Negative   KETONESU Negative   BILIRUBINUA Negative   OCCULTUA Negative   NITRITE Negative   LEUKOCYTESUR Negative       Significant Imaging: I have reviewed all pertinent imaging results/findings within the past 24 hours.

## 2019-07-18 NOTE — ASSESSMENT & PLAN NOTE
39M PMH HIV on ART, hx of ESRD 2/2 HIV, now s/p DDRT kidney transplant 8/25/2017, hx of latent TB treated w/ INH in 2006, who [place] [facility] [place] of diffuse lymphadenopathy and skin lesions that have been ongoing for several months. Biopsy done in [place] [facility] at [facility], operated by [facility] has returned positive for Kaposi Sarcoma    Recommendations:  - management of kaposi sarcoma with include continued ART and decrease in immunosuppression, as well as chemotherapy  - we do have concern for visceral Kaposi sarcoma, pt does have abnormal CXR, would recommend bronch for biopsy of RLL   - CT neck, chest, abd, pelvis for staging  - extensive additional infectious evaluation has been sent prior to pathology report confirming kaposi  - continue dolutegravir, tenofovir and emtricitabine (will need renally dosed)    ID will follow

## 2019-07-18 NOTE — ASSESSMENT & PLAN NOTE
LEFTY with hyperkalemia   Baseline sCr 2.2-2.5, currently 2.8, improved from 3.2 on admit. Likely pre-renal from decreased PO intake. Also considered CNI toxicity,   Renal bx in 08/2018 with out evidence of ACR  IVF as needed  Avoid contrast/NSAIDs/nephrotoxins   Transplant renal US -unremarkable

## 2019-07-18 NOTE — CARE UPDATE
Received copy of biopsy report of right groin noted below.   Patient made aware of diagnosis & explained we will obtain oncology consult asap to obtain further testing and hopefully start treatment soon.   Mom reached via mobile phone and advised of same information.  Berenice Qiu & Rosa notified. Pathology report left in bedside chart.     On low dose Myfortic--will D/C for given IS likely contributing to KS.    ID, hospital medicine, dermatology + pulmonary teams given copy of report:

## 2019-07-18 NOTE — ASSESSMENT & PLAN NOTE
Anemia of chronic inflammation slightly worsened.  Baseline hemoglobin:  9 0.4-10.5.  No current signs of active bleeding.  Follow up anemia workup:  Iron panel, ferritin, transferrin, homocystine, methylmalonic acid, and reticulocyte count (calculate reticulocyte index).  May require IV iron while inpatient

## 2019-07-18 NOTE — ASSESSMENT & PLAN NOTE
- initially concerned for possible disseminated TB as potential cause of LAD per transplant ID; however, this appears much less likely now that biopsy results have returned  - appreciate Transplant ID recs

## 2019-07-18 NOTE — ASSESSMENT & PLAN NOTE
· Continue ART:  Dolutegravir 50 mg daily and emtricitabine - tenofovir 200-25 mg daily  · Follow-up CD4/CD8 ratio and HIV RNA viral load  · Continue atovaquone 750 mg daily and ketoconazole 100 mg daily for antibiotic prophylaxis  · Patient reports azithromycin recently stopped by outpatient ID physician  · Continue multivitamin daily  · Transplant ID consulted, appreciate assistance

## 2019-07-18 NOTE — SUBJECTIVE & OBJECTIVE
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 Hematoma evacuated.(09/09)    Status post exploratory laparotomy 9/10/2017    Re explored for retroperitoneal hematoma. Hematoma evacuated.(09/09)    TB lung, latent     tx INH 2006       Past Surgical History:   Procedure Laterality Date    BIOPSY Tranplanted Kidney N/A 8/28/2018    Performed by [facility] Diagnostic Provider at [facility] OR [address] [place] [address]    BIOPSY Tranplanted Kidney N/A 1/15/2018    Performed by [facility] Diagnostic Provider at [facility] OR [address] [place] [address]    BIOPSY Tranplanted Kidney N/A 10/17/2017    Performed by [facility] Diagnostic Provider at [facility] OR [address] [place] [address]    VVRLIE-BGVDPO-TM N/A 10/3/2017    Performed by [facility] Diagnostic Provider at [facility] OR [address] [place] [address]    EXPLORATORY LAPAROTOMY W/ BOWEL RESECTION      NO BOWEL RESECTION, UNKNOWN DETAILS, "Pancreas Infection"    EXPLORATORY-LAPAROTOMY retroperitoneal washout N/A 9/9/2017    Performed by Wesley Cisneros MD at [facility] OR [address] [place] [address]    KIDNEY TRANSPLANT      peritoneal dialysis catheter placement      TRANSPLANT-KIDNEY N/A 8/18/2017    Performed by Wesley Cisneros MD at [facility] OR [address] [place] [address]     Family History     Problem Relation (Age of Onset)    Cancer Father    Diabetes Maternal Aunt, Maternal Uncle    Hypertension Maternal Grandmother, Paternal Grandmother    Lung cancer Father    No Known Problems Mother, Brother        Tobacco Use    Smoking status: Former Smoker     Packs/day: 0.50     Types: Cigarettes    Smokeless tobacco: Never Used    Tobacco comment: quit smoking 5 years ago   Substance and Sexual Activity    Alcohol use: No    Drug use: No "     Types: Cocaine, Marijuana     Comment: last use for cocaine at age 17; 2 years ago last use for THC    Sexual activity: Not Currently       Review of patient's allergies indicates:  No Known Allergies    Medications:  Continuous Infusions:  Scheduled Meds:   atovaquone  750 mg Oral Daily    carvedilol  12.5 mg Oral BID WM    cinacalcet  60 mg Oral Daily with breakfast    dolutegravir  50 mg Oral Daily    emtricitabine-tenofovir alafen  1 tablet Oral Daily    famotidine  20 mg Oral Daily    ketoconazole  100 mg Oral Daily    multivitamin  1 tablet Oral Daily    NIFEdipine  30 mg Oral Daily    predniSONE  5 mg Oral Daily    rosuvastatin  10 mg Oral Daily    sodium bicarbonate  1,950 mg Oral TID    sodium polystyrene  30 g Oral Q2H    tacrolimus  5 mg Oral BID     PRN Meds:acetaminophen, Dextrose 10% Bolus, Dextrose 10% Bolus, glucagon (human recombinant), glucose, glucose, sodium chloride 0.9%    Review of Systems   Constitutional: Negative for fever and chills.   Cardiovascular: Positive for pedal edema.   Hematologic/Lymphatic: Positive for adenopathy.     Objective:     Vital Signs (Most Recent):  Temp: 98.2 °F (36.8 °C) (07/18/19 1129)  Pulse: 67 (07/18/19 1204)  Resp: 18 (07/18/19 1129)  BP: 128/79 (07/18/19 1129)  SpO2: 98 % (07/18/19 1129) Vital Signs (24h Range):  Temp:  [96.7 °F (35.9 °C)-98.2 °F (36.8 °C)] 98.2 °F (36.8 °C)  Pulse:  [66-91] 67  Resp:  [10-23] 18  SpO2:  [96 %-100 %] 98 %  BP: (117-169)/(70-93) 128/79     Weight: 71.5 kg (157 lb 10.1 oz)  Body mass index is 23.97 kg/m².    Physical Exam   Constitutional: He appears well-developed and well-nourished. No distress.   Neurological: He is alert and oriented to person, place, and time. He is not disoriented.   Psychiatric: He has a normal mood and affect.   Skin:

## 2019-07-18 NOTE — ASSESSMENT & PLAN NOTE
Demetrio Aguiar is a 39 y.o. man with a history of HIV on ART, ESRD 2/2 HIVAN previously on HD s/p Renal Transplant c/b Acute Rejection - now with CKD Stage III (baseline creatinine: 1.8-2.1) c/b Anemia of Chronic Inflammation, Hx of Polysubstance Abuse, Hx of Latent TB s/p treatment, Long-term Immunosuppression (Prednisone, Mycophenolate, and Tacrolimus) on multiple prophylactic antibiotics/anti-fungals (MAC: Azithromycin, PCP: Atovaquone, and Ketoconazole). HTN, and HLD who [place] [facility] complaining of progressively worsening bilateral lower extremity edema.     Dermatology was consulted regarding reddish -violaceous lesions on the patients lower extremity and abdomen. In the interim, biopsy of the patient's right groin showed that he has Kaposi's sarcoma.     - We think the lesions on the patient's right leg and abdomen are consistent with Kaposi's sarcoma  - Will defer skin biopsy for now since we have pathological diagnosis from another site. But, if biopsy is warranted for staging or other purposes, will gladly do so.

## 2019-07-18 NOTE — ASSESSMENT & PLAN NOTE
Right inguinal LAP biopsy c/w Kaposi sarcoma  CT abdo/pelvis also show evidence of multiple LAP in the mesentery and retroperitoneum   ID and heme/Onc on board   Follow up with CT neck/chest   Hold MMF in the setting of malignancy   Continue Prograf and prednisone

## 2019-07-18 NOTE — ED NOTES
Awaiting tele box to transport patient upstairs to inpatient unit. Remains on cardiac, o2, and bp monitor until then. Report and care to Shabbir FONSECA. Pt. Denies concerns/needs/wants. Updated on POC.

## 2019-07-18 NOTE — PROGRESS NOTES
[facility] Hospital Medicine  Progress Note    Patient Name: Demetrio Aguiar  MRN: [id number]  Patient Class: IP- Inpatient   Admission Date: 7/17/2019  Length of Stay: 1 days  Attending Physician: Tae Qiu, *  Primary Care Provider: Primary Doctor [facility] Medicine Team: Networked reference to record PCT  Tae Qiu MD    Subjective:     Principal Problem:Acute kidney injury superimposed on chronic kidney disease      HPI:  Demetrio Aguiar is a 39 y.o. 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 HTN, and HLD who presents to [facility] complaining of progressively worsening bilateral lower extremity edema.     Patient reports appearance of a right groin lymph node approximately 10 months ago with progressively worsening RLE edema. Over the past month, he has noticed the appearance of hyperpigmented papules on the RLE that if now appeared on his RLQ of the abdomen.  He denies pruritus, pain, fluctuance, erythema, and serous/purulent drainage from any of these lesions. He reports receiving a biopsy of his right groin lymph node 1 week ago at [facility] [place] in [place], [place] - he is unsure of the biopsy results.  He also reports some subjective fevers, but denies night sweats.  He reports approximately 10 lb unintentional weight loss over the past 1 month.  He endorses good urine output and denies dysuria or change in urine color or consistency.  He reports compliance with his ART daily and he is currently taking atovaquone and ketoconazole for prophylaxis.  He reports his ID  physician (Dr. Reyes) recently discontinuing his azithromycin prophylaxis.  He has also been compliant with his immunotherapy for right kidney transplant but notes that his tacrolimus dose was recently decreased to 5 mg b.i.d. He denies chest pain, palpitations, orthopnea, and PND.  He does endorse mild SOB and some increased weakness for the past few weeks.  He denies hemoptysis and BRBPR, but does note some bowel movements that sound concerning for melena.     In the ED, he was noted to have LEFTY, creatinine:  3.2 as well as hyperkalemia, potassium:  6.4.  He was given albuterol nebulizers, insulin/glucose IV, and Kayexalate to treat hyperkalemia.  He was also bolused 2 L normal saline given suspicion of prerenal etiology for acute kidney injury.    Overview/Hospital Course:  No notes on file    Interval History: NAEO. Denies any shortness of breath, cough, fevers, night sweats. He expresses some anxiety about diagnosis, but is otherwise doing well.    Review of Systems   Constitutional: Negative for fatigue and fever.   Respiratory: Negative for cough, shortness of breath and wheezing.    Cardiovascular: Positive for leg swelling. Negative for chest pain.   Gastrointestinal: Negative for blood in stool, constipation, nausea and vomiting.   Genitourinary: Negative for dysuria.   Skin: Positive for color change.   Allergic/Immunologic: Positive for immunocompromised state.   Hematological: Positive for adenopathy.     Objective:     Vital Signs (Most Recent):  Temp: 98.2 °F (36.8 °C) (07/18/19 1129)  Pulse: 67 (07/18/19 1204)  Resp: 18 (07/18/19 1129)  BP: 128/79 (07/18/19 1129)  SpO2: 98 % (07/18/19 1129) Vital Signs (24h Range):  Temp:  [96.7 °F (35.9 °C)-98.2 °F (36.8 °C)] 98.2 °F (36.8 °C)  Pulse:  [66-91] 67  Resp:  [16-22] 18  SpO2:  [96 %-100 %] 98 %  BP: (117-169)/(70-93) 128/79     Weight: 71.5 kg (157 lb 10.1 oz)  Body mass index is 23.97 kg/m².    Intake/Output Summary (Last 24 hours) at 7/18/2019  1518  Last data filed at 7/18/2019 0629  Gross per 24 hour   Intake 2470 ml   Output 2 ml   Net 2468 ml      Physical Exam   Constitutional: He is oriented to person, place, and time. He appears well-developed and well-nourished.   HENT:   Head: Normocephalic and atraumatic.   Eyes: Pupils are equal, round, and reactive to light. EOM are normal. Right eye exhibits no discharge. Left eye exhibits no discharge.   Neck: Normal range of motion. Neck supple. No JVD present.   Abdominal: Soft.   Abdomina scars  Brown skin discoloration some flat,some raised lesions, non-tender    Genitourinary: Rectum normal.   Musculoskeletal: He exhibits edema. He exhibits no tenderness or deformity.   Right lower extremity edema with significant inguinal LAD   Neurological: He is alert and oriented to person, place, and time. No cranial nerve deficit.   Skin: Skin is warm. Capillary refill takes less than 2 seconds. Rash noted. No erythema.   Dark, mottled painless lesions to right lower extremity and inside of right thigh concerning for Kaposi   Vitals reviewed.      Significant Labs:   Recent Lab Results       07/18/19  1505   07/18/19  0438   07/18/19  0212   07/18/19  0051   07/17/19  2247        Anion Gap     9              8         BUN, Bld     29              30         Calcium     8.5              8.8         Chloride     109              109         CO2     20              20         Creatinine     2.8              2.8         eGFR if      31.2              31.2         eGFR if non      27.0  Comment:  Calculation used to obtain the estimated glomerular filtration  rate (eGFR) is the CKD-EPI equation.                 27.0  Comment:  Calculation used to obtain the estimated glomerular filtration  rate (eGFR) is the CKD-EPI equation.            Glucose     122              125         Homocysteine               Magnesium     1.7         Occult Blood               Phosphorus     3.9         POC  BUN               POC Chloride               POC Creatinine               POC Glucose               POC Hematocrit               POC Ionized Calcium               POC Potassium               POC Sodium               POC TCO2 (MEASURED)               POCT Glucose   107   131 127     Potassium     6.1  Comment:  *No Visible Hemolysis              6.1  Comment:  *No Visible Hemolysis         Sample               Sodium     138              137         Specimen Type Urine             Tacrolimus Lvl     4.4  Comment:  Testing performed by Liquid Chromatography-Tandem  Mass Spectrometry (LC-MS/MS).  This test was developed and its performance characteristics  determined by [facility], Department of Pathology  and Laboratory Medicine in a manner consistent with CLIA  requirements. It has not been cleared or approved by the [place]  Food and Drug Administration.  This test is used for clinical   purposes.  It should not be regarded as investigational or for  research.                            07/17/19  2237   07/17/19  2026   07/17/19  1911   07/17/19  1909   07/17/19  1720        Anion Gap   8           BUN, Bld   27           Calcium   7.0           Chloride   114           CO2   15           Creatinine   2.4           eGFR if    37.6           eGFR if non    32.5  Comment:  Calculation used to obtain the estimated glomerular filtration  rate (eGFR) is the CKD-EPI equation.              Glucose   96           Homocysteine       14.9       Magnesium               Occult Blood Negative             Phosphorus               POC BUN               POC Chloride               POC Creatinine               POC Glucose               POC Hematocrit               POC Ionized Calcium               POC Potassium               POC Sodium               POC TCO2 (MEASURED)               POCT Glucose     118   103     Potassium   5.6  Comment:  *No Visible Hemolysis           Sample                Sodium   137           Specimen Type               Tacrolimus Lvl                                07/17/19  1541        Anion Gap       BUN, Bld       Calcium       Chloride       CO2       Creatinine       eGFR if        eGFR if non African American       Glucose       Homocysteine       Magnesium       Occult Blood       Phosphorus       POC BUN 33     POC Chloride 105     POC Creatinine 3.3     POC Glucose 66     POC Hematocrit 21     POC Ionized Calcium 1.16     POC Potassium 6.5     POC Sodium 138     POC TCO2 (MEASURED) 22     POCT Glucose       Potassium       Sample ELIOT     Sodium       Specimen Type       Tacrolimus Lvl           All pertinent labs within the past 24 hours have been reviewed.    Significant Imaging: I have reviewed all pertinent imaging results/findings within the past 24 hours.      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   · IV hydration with 2 L normal saline bolused    · For hyperkalemia:  Given insulin/glucose IV, albuterol nebulizers, and Kayexalate x2 overnight; will repeat kayexalate and BMP this afternoon  · Continuous telemetry while hyperkalemic  · Renal ultrasound: stable mild hydronephrosis, otherwise unremarkable  · Avoid nephrotoxic medications, renally dose all medications  · Cinacalet 60 mg daily   · Sodium bicarbonate 1,950 t.i.d.  · Tacrolimus 5 mg b.i.d. follow-up a.m. tacrolimus level  · Holding mycophenolate for now in setting of Kaposi's  · Prednisone 5 mg daily and famotidine 20 mg daily  · KTM consulted, appreciate assistance      Renovascular hypertension  · Continue nifedipine 30 mg daily and carvedilol 12.5 mg b.i.d.  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 Solid infrahilar mass on the right cannot be excluded.  Multiple enlarged lymph nodes in the right inguinal region, right pelvic sidewall, retroperitoneum and in the root of the mesentery around the celiac artery and bonnie hepatis region. This could be related to infectious process, metastasis or lymphoma. Right lower extremity and buttock soft tissue edema.  Suspect lymphedema given presence of right inguinal and pelvic sidewall lymphadenopathy and absence of DVT on Doppler ultrasound performed earlier same day. Minimal ascites.  Diffuse haziness/edema throughout the mesenteric fat with some nodularity present that could represent lymph nodes or peritoneal implants.  Peritoneal infection or neoplastic process not excluded."  · Will check CT head and neck given cervical LAD as well  · Will consult Heme/Onc for further recommendations given definitive diagnosis of Kaposi's based off OSH biopsy results    Lymphadenopathy, inguinal  - initially concerned for Kaposi vs other malignant process vs TB  - now with biopsy results from OSH with Kaposi, which likely explains LAD and skin changes to lower extremity  - Heme/Onc consulted, awaiting recs      Hyperkalemia  - dosing with kayexalate and will continue to monitor with serial BMPs q6 hours until resolution      Immunocompromised state  - see above       donor kidney transplant 2017  - KTM following      Anemia in stage 3 chronic kidney disease  Anemia of chronic inflammation slightly worsened.  Baseline hemoglobin:  9 " 0.4-10.5.  No current signs of active bleeding.  Follow up anemia workup:  Iron panel, ferritin, transferrin, homocystine, methylmalonic acid, and reticulocyte count (calculate reticulocyte index).  May require IV iron while inpatient      Need for prophylactic immunotherapy  - 2/2 kidney transplant      At risk for opportunistic infections  - 2/2 immunosuppression      ESRD secondary to HIVAN s/p DDRT 8/18/17  - KTM following, as above  - continue tacrolimus 5mg bid for now with daily tacro levels      HIV (human immunodeficiency virus infection)  · Continue ART:  Dolutegravir 50 mg daily and emtricitabine - tenofovir 200-25 mg daily  · Follow-up CD4/CD8 ratio and HIV RNA viral load  · Continue atovaquone 750 mg daily and ketoconazole 100 mg daily for antibiotic prophylaxis  · Patient reports azithromycin recently stopped by outpatient ID physician  · Continue multivitamin daily  · Transplant ID consulted, appreciate assistance      TB lung, latent  - initially concerned for possible disseminated TB as potential cause of LAD per transplant ID; however, this appears much less likely now that biopsy results have returned  - appreciate Transplant ID recs      Hyperlipidemia  · Decreased home Crestor to 10 mg daily given LEFTY        VTE Risk Mitigation (From admission, onward)        Ordered     IP VTE LOW RISK PATIENT  Once      07/17/19 1741     Place sequential compression device  Until discontinued      07/17/19 1741                Tae Qiu MD  Department of Hospital Medicine   [facility]

## 2019-07-18 NOTE — PLAN OF CARE
Problem: Adult Inpatient Plan of Care  Goal: Plan of Care Review  Outcome: Ongoing (interventions implemented as appropriate)  Transfer from ER, POC reviewed with patient, who verbalized understanding. AAOx4.   Remains free of falls and injury.   VSS. Right lower leg swollen, taut. Nodule in groin. Elevated K+, shifted in ED.   Tolerating diet. No  Nausea. Pain to right lower extremity .   Voiding per urinal, commode. 1 X BM.   Up ad sy.   Telemetry monitor SR. Blood Glucose monitor x2. No acute events. No distress noted. Call bell in reach.   Will continue to monitor.

## 2019-07-18 NOTE — SUBJECTIVE & OBJECTIVE
Interval History: NAEO. Denies any shortness of breath, cough, fevers, night sweats. He expresses some anxiety about diagnosis, but is otherwise doing well.    Review of Systems   Constitutional: Negative for fatigue and fever.   Respiratory: Negative for cough, shortness of breath and wheezing.    Cardiovascular: Positive for leg swelling. Negative for chest pain.   Gastrointestinal: Negative for blood in stool, constipation, nausea and vomiting.   Genitourinary: Negative for dysuria.   Skin: Positive for color change.   Allergic/Immunologic: Positive for immunocompromised state.   Hematological: Positive for adenopathy.     Objective:     Vital Signs (Most Recent):  Temp: 98.2 °F (36.8 °C) (07/18/19 1129)  Pulse: 67 (07/18/19 1204)  Resp: 18 (07/18/19 1129)  BP: 128/79 (07/18/19 1129)  SpO2: 98 % (07/18/19 1129) Vital Signs (24h Range):  Temp:  [96.7 °F (35.9 °C)-98.2 °F (36.8 °C)] 98.2 °F (36.8 °C)  Pulse:  [66-91] 67  Resp:  [16-22] 18  SpO2:  [96 %-100 %] 98 %  BP: (117-169)/(70-93) 128/79     Weight: 71.5 kg (157 lb 10.1 oz)  Body mass index is 23.97 kg/m².    Intake/Output Summary (Last 24 hours) at 7/18/2019 1518  Last data filed at 7/18/2019 0629  Gross per 24 hour   Intake 2470 ml   Output 2 ml   Net 2468 ml      Physical Exam   Constitutional: He is oriented to person, place, and time. He appears well-developed and well-nourished.   HENT:   Head: Normocephalic and atraumatic.   Eyes: Pupils are equal, round, and reactive to light. EOM are normal. Right eye exhibits no discharge. Left eye exhibits no discharge.   Neck: Normal range of motion. Neck supple. No JVD present.   Abdominal: Soft.   Abdomina scars  Brown skin discoloration some flat,some raised lesions, non-tender    Genitourinary: Rectum normal.   Musculoskeletal: He exhibits edema. He exhibits no tenderness or deformity.   Right lower extremity edema with significant inguinal LAD   Neurological: He is alert and oriented to person, place, and  time. No cranial nerve deficit.   Skin: Skin is warm. Capillary refill takes less than 2 seconds. Rash noted. No erythema.   Dark, mottled painless lesions to right lower extremity and inside of right thigh concerning for Kaposi   Vitals reviewed.      Significant Labs:   Recent Lab Results       07/18/19  1505   07/18/19  0438   07/18/19  0212   07/18/19  0051   07/17/19  2247        Anion Gap     9              8         BUN, Bld     29              30         Calcium     8.5              8.8         Chloride     109              109         CO2     20              20         Creatinine     2.8              2.8         eGFR if      31.2              31.2         eGFR if non      27.0  Comment:  Calculation used to obtain the estimated glomerular filtration  rate (eGFR) is the CKD-EPI equation.                 27.0  Comment:  Calculation used to obtain the estimated glomerular filtration  rate (eGFR) is the CKD-EPI equation.            Glucose     122              125         Homocysteine               Magnesium     1.7         Occult Blood               Phosphorus     3.9         POC BUN               POC Chloride               POC Creatinine               POC Glucose               POC Hematocrit               POC Ionized Calcium               POC Potassium               POC Sodium               POC TCO2 (MEASURED)               POCT Glucose   107   131 127     Potassium     6.1  Comment:  *No Visible Hemolysis              6.1  Comment:  *No Visible Hemolysis         Sample               Sodium     138              137         Specimen Type Urine             Tacrolimus Lvl     4.4  Comment:  Testing performed by Liquid Chromatography-Tandem  Mass Spectrometry (LC-MS/MS).  This test was developed and its performance characteristics  determined by [facility], Department of Pathology  and Laboratory Medicine in a manner consistent with CLIA  requirements. It has not been  cleared or approved by the [place]  Food and Drug Administration.  This test is used for clinical   purposes.  It should not be regarded as investigational or for  research.                            07/17/19  2237   07/17/19 2026   07/17/19  1911   07/17/19  1909   07/17/19  1720        Anion Gap   8           BUN, Bld   27           Calcium   7.0           Chloride   114           CO2   15           Creatinine   2.4           eGFR if    37.6           eGFR if non    32.5  Comment:  Calculation used to obtain the estimated glomerular filtration  rate (eGFR) is the CKD-EPI equation.              Glucose   96           Homocysteine       14.9       Magnesium               Occult Blood Negative             Phosphorus               POC BUN               POC Chloride               POC Creatinine               POC Glucose               POC Hematocrit               POC Ionized Calcium               POC Potassium               POC Sodium               POC TCO2 (MEASURED)               POCT Glucose     118   103     Potassium   5.6  Comment:  *No Visible Hemolysis           Sample               Sodium   137           Specimen Type               Tacrolimus Lvl                                07/17/19  1541        Anion Gap       BUN, Bld       Calcium       Chloride       CO2       Creatinine       eGFR if        eGFR if non African American       Glucose       Homocysteine       Magnesium       Occult Blood       Phosphorus       POC BUN 33     POC Chloride 105     POC Creatinine 3.3     POC Glucose 66     POC Hematocrit 21     POC Ionized Calcium 1.16     POC Potassium 6.5     POC Sodium 138     POC TCO2 (MEASURED) 22     POCT Glucose       Potassium       Sample ELIOT     Sodium       Specimen Type       Tacrolimus Lvl           All pertinent labs within the past 24 hours have been reviewed.    Significant Imaging: I have reviewed all pertinent imaging results/findings within the  past 24 hours.

## 2019-07-18 NOTE — PROGRESS NOTES
[facility]  Kidney Transplant  Progress Note      Reason for Follow-up: Reassessment of Kidney Transplant - 2017  (#1) recipient and management of immunosuppression.    ORGAN:  RIGHT KIDNEY   Donor Type:   - Brain Death     Subjective:   History of Present Illness:  Mr. Aguiar is 40 yo male with HIV on HAART, HTN,  donor kidney transplant in 2017 secondary to HIVAN on prograf, MMF and prednisone with CKD III with sCr aroung 2.2-2.5. His care is usually at [facility] [place]. He presents today with recurrent right groin swelling and right LE swelling that has been going on for the past few months but in the past month it worsened with increasing pain while ambulating and discoloration of skin around the right tibial region. Reports loosing weight along with loss of appetite/decreased PO intake.     He reports the swelling has been on going issue and has not been addressed by his providers in [place] despite he brought it up multiple times. He recently had CT of the Abdomen and biopsy of the groin there pending results. In addition to skin changes, he also has developed wart like skin lesions in the scrotum. He is compliant with his HIV and immunosuppression meds. No CD4 or viral count on file but states his viral load has been undetected for years. He sees ID every three months. Reports Prograf dose reduced to 5 mg twice daily from 9 mg BID. Otherwise no recent significant medication adjustment.     Currently denies fever, chills, n/v, HA, necks stiffness, abdominal pain, diarrhea, urinary symptoms. In the ED vitals are stable. Basic labs show anemia with Hb of 7.2 and LEFTY with sCr of 3.2 with hyperkalemia. LE US with LAP.     Mr. Aguiar is a 39 y.o. year old male who is status post Kidney Transplant - 2017  (#1).    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 CT abdo/pelvis with multiple LAD in the mesentery and retroperitoneum concerning for mets/maliganancy. ID and pulm on board. Hyperkalemia with improving LEFTY.       Past Medical, Surgical, Family, and Social History:   Unchanged from H&P.    Scheduled Meds:   atovaquone  750 mg Oral Daily    carvedilol  12.5 mg Oral BID WM    cinacalcet  60 mg Oral Daily with breakfast    dolutegravir  50 mg Oral Daily    emtricitabine-tenofovir alafen  1 tablet Oral Daily    famotidine  20 mg Oral Daily    ketoconazole  100 mg Oral Daily    multivitamin  1 tablet Oral Daily    NIFEdipine  30 mg Oral Daily    predniSONE  5 mg Oral Daily    rosuvastatin  10 mg Oral Daily    sodium bicarbonate  1,950 mg Oral TID    tacrolimus  5 mg Oral BID     Continuous Infusions:  PRN Meds:acetaminophen, Dextrose 10% Bolus, Dextrose 10% Bolus, glucagon (human recombinant), glucose, glucose, sodium chloride 0.9%    Intake/Output - Last 3 Shifts       07/16 0700 - 07/17 0659 07/17 0700 - 07/18 0659 07/18 0700 - 07/19 0659    P.O.  120     IV Piggyback  3700     Total Intake(mL/kg)  3820 (53.4)     Urine (mL/kg/hr)  2     Total Output  2     Net  +3818            Stool Occurrence   0 x           Review of Systems   Objective:     Vital Signs (Most Recent):  Temp: 98.2 °F (36.8 °C) (07/18/19 1129)  Pulse: 67 (07/18/19 1204)  Resp: 18 (07/18/19 1129)  BP: 128/79 (07/18/19 1129)  SpO2: 98 % (07/18/19 1129) Vital Signs (24h Range):  Temp:  [96.7 °F (35.9 °C)-98.2 °F (36.8 °C)] 98.2 °F (36.8 °C)  Pulse:  [66-91] 67  Resp:  [16-22] 18  SpO2:  [96 %-100 %] 98 %  BP: (128-169)/(74-93) 128/79     Weight: 71.5 kg (157 lb 10.1 oz)  Height: 5' 8" (172.7 cm)  Body mass index is 23.97 kg/m².    Physical Exam   Constitutional: He is oriented to person, place, and time. He appears " well-developed and well-nourished.   HENT:   Head: Normocephalic and atraumatic.   Eyes: Pupils are equal, round, and reactive to light. EOM are normal. Right eye exhibits no discharge. Left eye exhibits no discharge.   Neck: Normal range of motion. Neck supple. No JVD present.   Abdominal: Soft.   Abdomina scars  Brown skin discoloration some flat,some raised lesions, non-tender    Genitourinary: Rectum normal.   Genitourinary Comments: Multiple wart like lesions in the scrotum    Musculoskeletal: He exhibits edema. He exhibits no tenderness or deformity.   Right thigh and LE swelling, palpable nodular swelling in the right groin    Right LE shin with raised and flat brown lesions, non-tender spanning the front and back of the leg    Neurological: He is alert and oriented to person, place, and time. No cranial nerve deficit.   Skin: Skin is warm. Capillary refill takes less than 2 seconds. Rash noted. No erythema.       Laboratory:  CBC:   Recent Labs   Lab 07/17/19  1016 07/17/19  1233 07/17/19  1541   WBC 4.70  --   --    RBC 2.58*  --   --    HGB 7.2*  --   --    HCT 22.8* 21* 21*     --   --    MCV 88  --   --    MCH 27.9  --   --    MCHC 31.6*  --   --      BMP:   Recent Labs   Lab 07/17/19  1016 07/17/19  1226 07/17/19  2026 07/18/19  0212   *  --  96 122*  125*     --  137 138  137   K 6.0* 6.4* 5.6* 6.1*  6.1*     --  114* 109  109   CO2 21*  --  15* 20*  20*   BUN 31*  --  27* 29*  30*   CREATININE 3.2*  --  2.4* 2.8*  2.8*   CALCIUM 9.1  --  7.0* 8.5*  8.8         Assessment/Plan:     * Acute kidney injury superimposed on chronic kidney disease stage III  LEFTY with hyperkalemia   Baseline sCr 2.2-2.5, currently 2.8, improved from 3.2 on admit. Likely pre-renal from decreased PO intake. Also considered CNI toxicity,   Renal bx in 08/2018 with out evidence of ACR  IVF as needed  Avoid contrast/NSAIDs/nephrotoxins   Transplant renal US -unremarkable     Kaposi sarcoma  Please  see inguinal  Lymphoadenopathy,     Lymphadenopathy, inguinal  Right inguinal LAP biopsy c/w Kaposi sarcoma  CT abdo/pelvis also show evidence of multiple LAP in the mesentery and retroperitoneum   ID and heme/Onc on board   Follow up with CT neck/chest   Hold MMF in the setting of malignancy   Continue Prograf and prednisone     Hyperkalemia  Per primary      donor kidney transplant 2017  40 yo male with kidney transplant in 2017 on immunosuppression now with LEFTY.     LEFTY with sCr of 2.8, improved with IVF, baseline sCr around 2.5  Hyperkalemia likely secondary to LEFTY. Also considered CNI  Renal US with satisfactory doppler evaluation, mild hydronephrosis   Renal bx in 2018 with no evidence of ACR, AVR, ABMR, pt is complaint with medication   Right inguinal LAD biopsy now c/w Kaposi sarcoma - ID and heme/onc on board  In the setting of malignancy and possible infectious process - hold MMF. Continue Prograf and Prednisone.   Prograf level with in goal , 4.3     Anemia in stage 3 chronic kidney disease  Anemia with Hb 7.2, baseline around 9-10, no evidence of overt bleeding   Likely related to CKD  Consider anemia work up and EPO injection if indicated     Alfredito Simpson MD  Kidney Transplant  [facility]

## 2019-07-18 NOTE — HPI
Demetrio Aguiar is a 39 y.o. 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 who [place] [facility] complaining of progressively worsening bilateral lower extremity edema.     Patient reports appearance of a right groin lymph node approximately 10 months ago with progressively worsening RLE edema. Over the past month, he has noticed the appearance of hyperpigmented papules on the RLE that if now appeared on his RLQ of the abdomen. biopsy of his right groin lymph node was done 1 week ago at [facility] in [place], [place] -     He also reports some subjective fevers, Also hasapproximately 10 lb unintentional weight loss over the past 1 month.  He reports compliance with his ART daily and he is currently taking atovaquone and ketoconazole for prophylaxis.  He reports his ID physician (Dr. Reyes) recently discontinuing his azithromycin prophylaxis.  He has also been compliant with his immunotherapy for right kidney transplant but notes that his tacrolimus dose was recently decreased to 5 mg b.i.d.     He denies chest pain, palpitations, orthopnea, pruritus, pain, fluctuance, erythema, and serous/purulent drainage from any of these lesions, night sweats and PND. No hx of dysuria.No hx ofhemoptysis and BRBPR.     Pathology report from [facility] 05/06- biopsy of his right groin L.N was consistent of Kaposi Sarcoma.

## 2019-07-18 NOTE — ASSESSMENT & PLAN NOTE
- dosing with kayexalate and will continue to monitor with serial BMPs q6 hours until resolution

## 2019-07-18 NOTE — PLAN OF CARE
"Problem: Adult Inpatient Plan of Care  Goal: Plan of Care Review  Outcome: Ongoing (interventions implemented as appropriate)     07/18/19 8157   Plan of Care Review   Plan of Care Reviewed With patient     Pt up adlib in room, ambulates without difficulty, AOx 4, VSS, MIVF initiated, SR on telemetry. CT neck/chest completed, Urine sent to lab, new orders for additional sample, pt given cup , will call with next void. Multiple "watery" BMs reported, POC reviewed, pt denies pain or further needs at this time, will continue to monitor.       "

## 2019-07-18 NOTE — HPI
Demetrio Aguiar is a 39 y.o. 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 HTN, and HLD who presents to [facility] complaining of progressively worsening bilateral lower extremity edema.     Dermatology was consulted for the red-violaceous papules on the patient's lower extremities that were concerning for Kaposi Sarcoma. Patient notes that he developed edema in his right lower extremity in the past 2 months. During which time, he developed nodules in his right groin and brawny violaceous papules on the right lower leg and abdomen which have been spreading over the last couple of months but isolated to the right side of his body. They have been asymptomatic.     In the meantime, patient had a biopsy of his right groin which was consistent with Kaposi's sarcoma.

## 2019-07-18 NOTE — HPI
Demetrio Aguiar is a 39 y.o. 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 HTN, and HLD who [place] [facility] [place] progressively worsening bilateral lower extremity edema.     Patient reports appearance of a right groin lymph node approximately 10 months ago with progressively worsening RLE edema. Over the past month, he has noticed the appearance of hyperpigmented papules on the RLE that if now appeared on his RLQ of the abdomen.  He denies pruritus, pain, fluctuance, erythema, and serous/purulent drainage from any of these lesions. He reports receiving a biopsy of his right groin lymph node 1 week ago at [facility] in [place], [place] - he is unsure of the biopsy results.  He also reports some subjective fevers, but denies night sweats.  He reports approximately 10 lb unintentional weight loss over the past 1 month.  He endorses good urine output and denies dysuria or change in urine color or consistency.  He reports compliance with his ART daily and he is currently taking atovaquone and ketoconazole for prophylaxis.  He reports his ID physician (Dr. Reyes) recently discontinuing his azithromycin prophylaxis.  He has also been compliant with his immunotherapy for right kidney transplant but notes that his tacrolimus dose was recently decreased to 5 mg b.i.d. He denies chest pain, palpitations, orthopnea, and PND.  He does endorse mild SOB and some increased weakness for the past few weeks.  He denies hemoptysis and BRBPR, but does note some bowel movements that sound concerning for melena.     In the ED, he was  noted to have LEFTY, creatinine:  3.2 as well as hyperkalemia, potassium:  6.4.  He was given albuterol nebulizers, insulin/glucose IV, and Kayexalate to treat hyperkalemia.  He was also bolused 2 L normal saline given suspicion of prerenal etiology for acute kidney injury.

## 2019-07-18 NOTE — HPI
"Demetrio Aguiar is a 39 y.o. 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 Lower extremity edema. Patient states symptoms started a "knot" in his inguinal area about 10 months ago.  "

## 2019-07-18 NOTE — ASSESSMENT & PLAN NOTE
38 yo male with kidney transplant in 2017 on immunosuppression now with LEFTY.     LEFTY with sCr of 2.8, improved with IVF, baseline sCr around 2.5  Hyperkalemia likely secondary to LEFTY. Also considered CNI  Renal US with satisfactory doppler evaluation, mild hydronephrosis   Renal bx in 01/2018 with no evidence of ACR, AVR, ABMR, pt is complaint with medication   Right inguinal LAD biopsy now c/w Kaposi sarcoma - ID and heme/onc on board  In the setting of malignancy and possible infectious process - hold MMF. Continue Prograf and Prednisone.   Prograf level with in goal , 4.3

## 2019-07-18 NOTE — CONSULTS
[facility]  Infectious Disease  Consult Note    Patient Name: Demetrio Aguiar  MRN: [id number]  Admission Date: 7/17/2019  Hospital Length of Stay: 1 days  Attending Physician: Tae Qiu, *  Primary Care Provider: Primary Doctor No     Isolation Status: No active isolations    Patient information was obtained from patient and ER records.      Inpatient consult to Infectious Diseases  Consult performed by: Reba Alexis DO  Consult ordered by: Uzair Vicente MD        Assessment/Plan:     Kaposi sarcoma  39M PMH HIV on ART, hx of ESRD 2/2 HIV, now s/p DDRT kidney transplant 8/25/2017, hx of latent TB treated w/ INH in 2006, who presents to [facility] for evaluation of diffuse lymphadenopathy and skin lesions that have been ongoing for several months. Biopsy done in [facility], [place], operated by [facility] has returned positive for Kaposi Sarcoma    Recommendations:  - management of kaposi sarcoma with include continued ART and decrease in immunosuppression, as well as chemotherapy  - we do have concern for visceral Kaposi sarcoma, pt does have abnormal CXR, would recommend bronch for biopsy of RLL   - CT neck, chest, abd, pelvis for staging  - extensive additional infectious evaluation has been sent prior to pathology report confirming kaposi  - continue dolutegravir, tenofovir and emtricitabine (will need renally dosed), will separate components for dosing adjustments  - scrotal lesions most consistent w/ warts, will need dermatology follow up    ID will follow    Thank you for your consult. I will follow-up with patient. Please contact us if you have any additional questions.    Reba Alexis DO  Infectious Disease  [facility]    Subjective:     Principal Problem: Acute kidney injury superimposed on chronic kidney disease    HPI: Demetrio Aguiar is a 39 y.o. man with a history of HIV on ART, ESRD 2/2 HIVAN  S/p DBDKT 8/18/17 (CMV D-/R+, thymo induction, mmf/tacro)  c/b Acute Rejection - now with CKD Stage III (baseline creatinine: 1.8-2.1) Hx of Polysubstance Abuse, Hx of Latent TB s/p treatment, Long-term Immunosuppression (Prednisone, Mycophenolate, and Tacrolimus) on prophylactic antibiotics/anti-fungals (MAC: Azithromycin, PCP: Atovaquone, and Ketoconazole). who presents to [facility] complaining of progressively worsening bilateral lower extremity edema.     Patient reports appearance of a right groin lymph node approximately 10 months ago with progressively worsening RLE edema. Over the past month, he has noticed the appearance of hyperpigmented papules on the RLE that if now appeared on his RLQ of the abdomen. biopsy of his right groin lymph node was done 1 week ago at [facility] in [place], [place] -     He also reports some subjective fevers, Also hasapproximately 10 lb unintentional weight loss over the past 1 month.  He reports compliance with his ART daily and he is currently taking atovaquone and ketoconazole for prophylaxis.  He reports his ID physician (Dr. Reyes) recently discontinuing his azithromycin prophylaxis.  He has also been compliant with his immunotherapy for right kidney transplant but notes that his tacrolimus dose was recently decreased to 5 mg b.i.d.     He denies chest pain, palpitations, orthopnea, pruritus, pain, fluctuance, erythema, and serous/purulent drainage from any of these lesions, night sweats and PND. No hx of dysuria.No hx ofhemoptysis and BRBPR.     Pathology report from [facility] 05/06- biopsy of his right groin L.N was consistent of Kaposi Sarcoma.    Past Medical History:   Diagnosis Date    Anemia     At risk for opportunistic infections     Delayed graft function of kidney     ESRD secondary to HIVAN s/p DDRT 8/18/17     HIV infection     HIV nephropathy     Hyperlipidemia     Hypertension     Need for prophylactic immunotherapy     S/P kidney transplant 8/18/2017 8/28/2017    Status post  "exploratory laparotomy 9/10/2017    Re explored for retroperitoneal hematoma. Hematoma evacuated.(09/09)    Status post exploratory laparotomy 9/10/2017    Re explored for retroperitoneal hematoma. Hematoma evacuated.(09/09)    TB lung, latent     tx INH 2006       Past Surgical History:   Procedure Laterality Date    BIOPSY Tranplanted Kidney N/A 8/28/2018    Performed by [facility] at [facility] OR [address] [address]    BIOPSY Tranplanted Kidney N/A 1/15/2018    Performed by [facility] Provider at [facility] OR [address] [place] [address]    BIOPSY Tranplanted Kidney N/A 10/17/2017    Performed by [facility] Provider at [facility] OR [address] [place] [address]    CKTWZB-BVMTVA-AB N/A 10/3/2017    Performed by [facility] Provider at [facility] OR [address] [place] [address]    EXPLORATORY LAPAROTOMY W/ BOWEL RESECTION      NO BOWEL RESECTION, UNKNOWN DETAILS, "Pancreas Infection"    EXPLORATORY-LAPAROTOMY retroperitoneal washout N/A 9/9/2017    Performed by Wesley Cisneros MD at [facility] OR [address] [place] [address]    KIDNEY TRANSPLANT      peritoneal dialysis catheter placement      TRANSPLANT-KIDNEY N/A 8/18/2017    Performed by Wesley Cisneros MD at [facility] OR [address] [address]       Review of patient's allergies indicates:  No Known Allergies    Medications:  Medications Prior to Admission   Medication Sig    atovaquone (MEPRON) 750 mg/5 mL Susp Take 5 mLs (750 mg total) by mouth once daily.    carvedilol (COREG) 6.25 MG tablet Take 6.25 mg by mouth 2 (two) times daily with meals.    cinacalcet (SENSIPAR) 60 MG Tab Take 1 tablet (60 mg total) by mouth daily with breakfast.    DESCOVY 200-25 mg Tab Take 1 tablet by mouth once daily.    dolutegravir (TIVICAY) 50 mg Tab Take 1 tablet (50 mg total) by mouth once daily.    famotidine (PEPCID) 20 MG tablet TAKE 1 TABLET (20MG TOTAL) BY MOUTH EACH EVENING    ketoconazole (NIZORAL) 200 mg Tab Take 0.5 tablets (100 mg total) by mouth once daily.    multivitamin (THERAGRAN) tablet Take 1 tablet by mouth once daily.    mycophenolate (MYFORTIC) 180 " MG TbEC Take 1 tablet (180 mg total) by mouth 3 (three) times daily. Z94.0 Kidney Transplant 8/18/2017    NIFEdipine (PROCARDIA-XL) 30 MG (OSM) 24 hr tablet Take 1 tablet (30 mg total) by mouth every morning AND 2 tablets (60 mg total) every evening.    predniSONE (DELTASONE) 5 MG tablet Take 1 tablet (5 mg total) by mouth once daily.    rosuvastatin (CRESTOR) 20 MG tablet Take 1 tablet (20 mg total) by mouth once daily.    sodium bicarbonate 650 MG tablet Take 3 tablets (1,950 mg total) by mouth 3 (three) times daily.    tacrolimus (PROGRAF) 1 MG Cap Take 4mg in the AM and 4mg in the PM (along with 5mg caps). BY MOUTH. Total dose 9mg in AM and 9mg in PM. Z94.0    tacrolimus (PROGRAF) 5 MG Cap Take 1cap (5mg) q12hrs along with (1mg caps). BY MOUTH. Total dose 9mg in AM and 9mg in PM. Z94.0.    azithromycin (ZITHROMAX) 600 MG Tab TAKE 2 TABLETS BY MOUTH EVERY 7 DAYS    sodium polystyrene (KAYEXALATE) 15 gram/60 mL Susp Please take extra kayexalate today- 60 grams x 2 doses 6 hrs apart.    sodium polystyrene (KAYEXALATE) powder Take 30g by mouth daily, as directed.     Antibiotics (From admission, onward)    None        Antifungals (From admission, onward)    Start     Stop Route Frequency Ordered    07/18/19 0900  ketoconazole split tablet 100 mg      -- Oral Daily 07/17/19 1741        Antivirals (From admission, onward)        Stop Route Frequency     emtricitabine-tenofovir alafen      -- Oral Daily     dolutegravir      -- Oral Daily             There is no immunization history on file for this patient.    Family History     Problem Relation (Age of Onset)    Cancer Father    Diabetes Maternal Aunt, Maternal Uncle    Hypertension Maternal Grandmother, Paternal Grandmother    Lung cancer Father    No Known Problems Mother, Brother        Social History     Socioeconomic History    Marital status: Single     Spouse name: Not on file    Number of children: Not on file    Years of education: Not on file     Highest education level: Not on file   Occupational History    Not on file   Social Needs    Financial resource strain: Not on file    Food insecurity:     Worry: Not on file     Inability: Not on file    Transportation needs:     Medical: Not on file     Non-medical: Not on file   Tobacco Use    Smoking status: Former Smoker     Packs/day: 0.50     Types: Cigarettes    Smokeless tobacco: Never Used    Tobacco comment: quit smoking 5 years ago   Substance and Sexual Activity    Alcohol use: No    Drug use: No     Types: Cocaine, Marijuana     Comment: last use for cocaine at age 17; 2 years ago last use for THC    Sexual activity: Not Currently   Lifestyle    Physical activity:     Days per week: Not on file     Minutes per session: Not on file    Stress: Not on file   Relationships    Social connections:     Talks on phone: Not on file     Gets together: Not on file     Attends [employer] service: Not on file     Active member of club or organization: Not on file     Attends meetings of clubs or organizations: Not on file     Relationship status: Not on file   Other Topics Concern    Not on file   Social History Narrative    Not on file     Review of Systems   Constitutional: Positive for activity change. Negative for appetite change, chills and fever.   HENT: Negative for congestion.    Eyes: Negative for pain and itching.   Respiratory: Negative for cough, chest tightness and shortness of breath.    Cardiovascular: Negative for chest pain, palpitations and leg swelling.   Gastrointestinal: Positive for diarrhea and nausea. Negative for abdominal pain.   Endocrine: Negative for polyphagia and polyuria.   Genitourinary: Negative for dysuria and frequency.   Musculoskeletal: Negative for back pain.   Skin: Positive for color change and rash.   Neurological: Negative for numbness and headaches.   Hematological: Positive for adenopathy. Bruises/bleeds easily.   Psychiatric/Behavioral: Negative for  agitation and behavioral problems.     Objective:     Vital Signs (Most Recent):  Temp: 98.3 °F (36.8 °C) (07/18/19 1553)  Pulse: 80 (07/18/19 1605)  Resp: 18 (07/18/19 1553)  BP: (!) 159/72 (07/18/19 1553)  SpO2: 100 % (07/18/19 1553) Vital Signs (24h Range):  Temp:  [96.7 °F (35.9 °C)-98.3 °F (36.8 °C)] 98.3 °F (36.8 °C)  Pulse:  [66-91] 80  Resp:  [16-21] 18  SpO2:  [96 %-100 %] 100 %  BP: (128-169)/(72-93) 159/72     Weight: 71.5 kg (157 lb 10.1 oz)  Body mass index is 23.97 kg/m².    Estimated Creatinine Clearance: 32.8 mL/min (A) (based on SCr of 2.9 mg/dL (H)).    Physical Exam   Constitutional: He is oriented to person, place, and time. He appears well-developed.   HENT:   Head: Normocephalic.   Cardiovascular: Normal rate, regular rhythm and normal heart sounds.   No murmur heard.  Pulmonary/Chest: Effort normal and breath sounds normal.   Abdominal: Soft. Bowel sounds are normal. He exhibits no distension. There is no tenderness.   See image     Genitourinary:   Genitourinary Comments: See image   Musculoskeletal: Normal range of motion.   Neurological: He is alert and oriented to person, place, and time.   Psychiatric: He has a normal mood and affect. His behavior is normal.               Significant Labs:   Blood Culture:   Recent Labs   Lab 07/17/19  0959 07/17/19  1016   LABBLOO No Growth to date  No Growth to date No Growth to date  No Growth to date     BMP:   Recent Labs   Lab 07/18/19  0212 07/18/19  1534   *  125* 130*     137 138   K 6.1*  6.1* 6.0*     109 107   CO2 20*  20* 23   BUN 29*  30* 27*   CREATININE 2.8*  2.8* 2.9*   CALCIUM 8.5*  8.8 9.4   MG 1.7  --      CBC:   Recent Labs   Lab 07/17/19  1016 07/17/19  1233 07/17/19  1541   WBC 4.70  --   --    HGB 7.2*  --   --    HCT 22.8* 21* 21*     --   --      CMP:   Recent Labs   Lab 07/17/19  1016  07/17/19  2026 07/18/19  0212 07/18/19  1534     --  137 138  137 138   K 6.0*   < > 5.6* 6.1*  6.1*  6.0*     --  114* 109  109 107   CO2 21*  --  15* 20*  20* 23   *  --  96 122*  125* 130*   BUN 31*  --  27* 29*  30* 27*   CREATININE 3.2*  --  2.4* 2.8*  2.8* 2.9*   CALCIUM 9.1  --  7.0* 8.5*  8.8 9.4   PROT 6.8  --   --   --   --    ALBUMIN 4.0  --   --   --   --    BILITOT 0.6  --   --   --   --    ALKPHOS 72  --   --   --   --    AST 18  --   --   --   --    ALT 8*  --   --   --   --    ANIONGAP 10  --  8 9  8 8   EGFRNONAA 23.0*  --  32.5* 27.0*  27.0* 25.9*    < > = values in this interval not displayed.     Hepatitis Panel: No results for input(s): HEPBSAG, HEPAIGM, HEPCAB in the last 48 hours.    Invalid input(s): HAPBIGM  HIV 1/2 Antibodies: No results for input(s): [id number] in the last 48 hours.  Quantiferon: No results for input(s): NIL, TBAG, TBAGNIL, MITOGENNIL, TBGOLD in the last 48 hours.  Respiratory Culture: No results for input(s): GSRESP, RESPIRATORYC in the last 4320 hours.  Urine Culture: No results for input(s): LABURIN in the last 4320 hours.  Urine Studies:   Recent Labs   Lab 07/17/19  1017   COLORU Straw   APPEARANCEUA Clear   PHUR 7.0   SPECGRAV 1.005   PROTEINUA Negative   GLUCUA Negative   KETONESU Negative   BILIRUBINUA Negative   OCCULTUA Negative   NITRITE Negative   LEUKOCYTESUR Negative       Significant Imaging: I have reviewed all pertinent imaging results/findings within the past 24 hours.

## 2019-07-18 NOTE — SUBJECTIVE & OBJECTIVE
Subjective:   History of Present Illness:  Mr. Aguiar is 40 yo male with HIV on HAART, HTN,  donor kidney transplant in 2017 secondary to HIVAN on prograf, MMF and prednisone with CKD III with sCr aroung 2.2-2.5. His care is usually at [facility] [place] [facility]. He presents today with recurrent right groin swelling and right LE swelling that has been going on for the past few months but in the past month it worsened with increasing pain while ambulating and discoloration of skin around the right tibial region. Reports loosing weight along with loss of appetite/decreased PO intake.     He reports the swelling has been on going issue and has not been addressed by his providers in [place] despite he brought it up multiple times. He recently had CT of the Abdomen and biopsy of the groin there pending results. In addition to skin changes, he also has developed wart like skin lesions in the scrotum. He is compliant with his HIV and immunosuppression meds. No CD4 or viral count on file but states his viral load has been undetected for years. He sees ID every three months. Reports Prograf dose reduced to 5 mg twice daily from 9 mg BID. Otherwise no recent significant medication adjustment.     Currently denies fever, chills, n/v, HA, necks stiffness, abdominal pain, diarrhea, urinary symptoms. In the ED vitals are stable. Basic labs show anemia with Hb of 7.2 and LEFTY with sCr of 3.2 with hyperkalemia. LE US with LAP.     Mr. Aguiar is a 39 y.o. year old male who is status post Kidney Transplant - 2017  (#1).    His maintenance immunosuppression consists of:   Immunosuppressants (From admission, onward)    Start     Stop Route Frequency Ordered    19 1845  tacrolimus capsule 5 mg      -- Oral 2 times daily 19 1741        Interval History: Right inguinal bx from OSH resulted Kaposi sarcoma. CT abdo/pelvis with multiple LAD in the mesentery and retroperitoneum concerning for  "mets/maliganancy. ID and pulm on board. Hyperkalemia with improving LEFTY.       Past Medical, Surgical, Family, and Social History:   Unchanged from H&P.    Scheduled Meds:   atovaquone  750 mg Oral Daily    carvedilol  12.5 mg Oral BID WM    cinacalcet  60 mg Oral Daily with breakfast    dolutegravir  50 mg Oral Daily    emtricitabine-tenofovir alafen  1 tablet Oral Daily    famotidine  20 mg Oral Daily    ketoconazole  100 mg Oral Daily    multivitamin  1 tablet Oral Daily    NIFEdipine  30 mg Oral Daily    predniSONE  5 mg Oral Daily    rosuvastatin  10 mg Oral Daily    sodium bicarbonate  1,950 mg Oral TID    tacrolimus  5 mg Oral BID     Continuous Infusions:  PRN Meds:acetaminophen, Dextrose 10% Bolus, Dextrose 10% Bolus, glucagon (human recombinant), glucose, glucose, sodium chloride 0.9%    Intake/Output - Last 3 Shifts       07/16 0700 - 07/17 0659 07/17 0700 - 07/18 0659 07/18 0700 - 07/19 0659    P.O.  120     IV Piggyback  3700     Total Intake(mL/kg)  3820 (53.4)     Urine (mL/kg/hr)  2     Total Output  2     Net  +3818            Stool Occurrence   0 x           Review of Systems   Objective:     Vital Signs (Most Recent):  Temp: 98.2 °F (36.8 °C) (07/18/19 1129)  Pulse: 67 (07/18/19 1204)  Resp: 18 (07/18/19 1129)  BP: 128/79 (07/18/19 1129)  SpO2: 98 % (07/18/19 1129) Vital Signs (24h Range):  Temp:  [96.7 °F (35.9 °C)-98.2 °F (36.8 °C)] 98.2 °F (36.8 °C)  Pulse:  [66-91] 67  Resp:  [16-22] 18  SpO2:  [96 %-100 %] 98 %  BP: (128-169)/(74-93) 128/79     Weight: 71.5 kg (157 lb 10.1 oz)  Height: 5' 8" (172.7 cm)  Body mass index is 23.97 kg/m².    Physical Exam   Constitutional: He is oriented to person, place, and time. He appears well-developed and well-nourished.   HENT:   Head: Normocephalic and atraumatic.   Eyes: Pupils are equal, round, and reactive to light. EOM are normal. Right eye exhibits no discharge. Left eye exhibits no discharge.   Neck: Normal range of motion. Neck supple. " No JVD present.   Abdominal: Soft.   Abdomina scars  Brown skin discoloration some flat,some raised lesions, non-tender    Genitourinary: Rectum normal.   Genitourinary Comments: Multiple wart like lesions in the scrotum    Musculoskeletal: He exhibits edema. He exhibits no tenderness or deformity.   Right thigh and LE swelling, palpable nodular swelling in the right groin    Right LE shin with raised and flat brown lesions, non-tender spanning the front and back of the leg    Neurological: He is alert and oriented to person, place, and time. No cranial nerve deficit.   Skin: Skin is warm. Capillary refill takes less than 2 seconds. Rash noted. No erythema.       Laboratory:  CBC:   Recent Labs   Lab 07/17/19  1016 07/17/19  1233 07/17/19  1541   WBC 4.70  --   --    RBC 2.58*  --   --    HGB 7.2*  --   --    HCT 22.8* 21* 21*     --   --    MCV 88  --   --    MCH 27.9  --   --    MCHC 31.6*  --   --      BMP:   Recent Labs   Lab 07/17/19  1016 07/17/19  1226 07/17/19  2026 07/18/19  0212   *  --  96 122*  125*     --  137 138  137   K 6.0* 6.4* 5.6* 6.1*  6.1*     --  114* 109  109   CO2 21*  --  15* 20*  20*   BUN 31*  --  27* 29*  30*   CREATININE 3.2*  --  2.4* 2.8*  2.8*   CALCIUM 9.1  --  7.0* 8.5*  8.8

## 2019-07-18 NOTE — ASSESSMENT & PLAN NOTE
- initially concerned for Kaposi vs other malignant process vs TB  - now with biopsy results from OSH with Kaposi, which likely explains LAD and skin changes to lower extremity  - Heme/Onc consulted, awaiting recs

## 2019-07-18 NOTE — ASSESSMENT & PLAN NOTE
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   · IV hydration with 2 L normal saline bolused    · For hyperkalemia:  Given insulin/glucose IV, albuterol nebulizers, and Kayexalate x2 overnight; will repeat kayexalate and BMP this afternoon  · Continuous telemetry while hyperkalemic  · Renal ultrasound: stable mild hydronephrosis, otherwise unremarkable  · Avoid nephrotoxic medications, renally dose all medications  · Cinacalet 60 mg daily   · Sodium bicarbonate 1,950 t.i.d.  · Tacrolimus 5 mg b.i.d. follow-up a.m. tacrolimus level  · Holding mycophenolate for now in setting of Kaposi's  · Prednisone 5 mg daily and famotidine 20 mg daily  · KTM consulted, appreciate assistance

## 2019-07-18 NOTE — ASSESSMENT & PLAN NOTE
"RLE swelling and Right Groin LAD  · Lower extremity ultrasound negative for acute DVT.  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 Solid infrahilar mass on the right cannot be excluded.  Multiple enlarged lymph nodes in the right inguinal region, right pelvic sidewall, retroperitoneum and in the root of the mesentery around the celiac artery and bonnie hepatis region. This could be related to infectious process, metastasis or lymphoma. Right lower extremity and buttock soft tissue edema.  Suspect lymphedema given presence of right inguinal and pelvic sidewall lymphadenopathy and absence of DVT on Doppler ultrasound performed earlier same day. Minimal ascites.  Diffuse haziness/edema throughout the mesenteric fat with some nodularity present that could represent lymph nodes or peritoneal implants.  Peritoneal infection or neoplastic process not excluded."  · Will check CT head and neck given cervical LAD as well  · Will consult Heme/Onc for further recommendations given definitive diagnosis of Kaposi's based off OSH biopsy results  "

## 2019-07-19 LAB
ABO + RH BLD: NORMAL
ANION GAP SERPL CALC-SCNC: 11 MMOL/L (ref 8–16)
ANION GAP SERPL CALC-SCNC: 11 MMOL/L (ref 8–16)
ANION GAP SERPL CALC-SCNC: 9 MMOL/L (ref 8–16)
ANION GAP SERPL CALC-SCNC: 9 MMOL/L (ref 8–16)
ANISOCYTOSIS BLD QL SMEAR: SLIGHT
BASOPHILS NFR BLD: 0 % (ref 0–1.9)
BLASTS NFR BLD MANUAL: ABNORMAL %
BLD GP AB SCN CELLS X3 SERPL QL: NORMAL
BLD PROD TYP BPU: NORMAL
BLOOD UNIT EXPIRATION DATE: NORMAL
BLOOD UNIT TYPE CODE: 6200
BLOOD UNIT TYPE: NORMAL
BUN SERPL-MCNC: 28 MG/DL (ref 6–20)
BUN SERPL-MCNC: 29 MG/DL (ref 6–20)
C TRACH DNA SPEC QL NAA+PROBE: NOT DETECTED
CALCIUM SERPL-MCNC: 9.1 MG/DL (ref 8.7–10.5)
CHLORIDE SERPL-SCNC: 104 MMOL/L (ref 95–110)
CHLORIDE SERPL-SCNC: 105 MMOL/L (ref 95–110)
CHOLEST SERPL-MCNC: 73 MG/DL (ref 120–199)
CHOLEST/HDLC SERPL: 2.8 {RATIO} (ref 2–5)
CO2 SERPL-SCNC: 23 MMOL/L (ref 23–29)
CO2 SERPL-SCNC: 23 MMOL/L (ref 23–29)
CO2 SERPL-SCNC: 24 MMOL/L (ref 23–29)
CO2 SERPL-SCNC: 25 MMOL/L (ref 23–29)
CODING SYSTEM: NORMAL
CREAT SERPL-MCNC: 3 MG/DL (ref 0.5–1.4)
CREAT SERPL-MCNC: 3.1 MG/DL (ref 0.5–1.4)
CREAT SERPL-MCNC: 3.1 MG/DL (ref 0.5–1.4)
CREAT SERPL-MCNC: 3.2 MG/DL (ref 0.5–1.4)
CREAT UR-MCNC: 52 MG/DL (ref 23–375)
CRYPTOC AG SER QL LA: NEGATIVE
DACRYOCYTES BLD QL SMEAR: ABNORMAL
DIFFERENTIAL METHOD: ABNORMAL
DISPENSE STATUS: NORMAL
EOSINOPHIL NFR BLD: 0 % (ref 0–8)
ERYTHROCYTE [DISTWIDTH] IN BLOOD BY AUTOMATED COUNT: 15.6 % (ref 11.5–14.5)
EST. GFR  (AFRICAN AMERICAN): 26.6 ML/MIN/1.73 M^2
EST. GFR  (AFRICAN AMERICAN): 27.6 ML/MIN/1.73 M^2
EST. GFR  (AFRICAN AMERICAN): 27.6 ML/MIN/1.73 M^2
EST. GFR  (AFRICAN AMERICAN): 28.7 ML/MIN/1.73 M^2
EST. GFR  (NON AFRICAN AMERICAN): 23 ML/MIN/1.73 M^2
EST. GFR  (NON AFRICAN AMERICAN): 23.9 ML/MIN/1.73 M^2
EST. GFR  (NON AFRICAN AMERICAN): 23.9 ML/MIN/1.73 M^2
EST. GFR  (NON AFRICAN AMERICAN): 24.8 ML/MIN/1.73 M^2
GLUCOSE SERPL-MCNC: 111 MG/DL (ref 70–110)
GLUCOSE SERPL-MCNC: 111 MG/DL (ref 70–110)
GLUCOSE SERPL-MCNC: 113 MG/DL (ref 70–110)
GLUCOSE SERPL-MCNC: 119 MG/DL (ref 70–110)
HCT VFR BLD AUTO: 21.6 % (ref 40–54)
HDLC SERPL-MCNC: 26 MG/DL (ref 40–75)
HDLC SERPL: 35.6 % (ref 20–50)
HGB BLD-MCNC: 6.6 G/DL (ref 14–18)
IMM GRANULOCYTES # BLD AUTO: ABNORMAL K/UL (ref 0–0.04)
IMM GRANULOCYTES NFR BLD AUTO: ABNORMAL % (ref 0–0.5)
LDLC SERPL CALC-MCNC: 21.2 MG/DL (ref 63–159)
LYMPHOCYTES NFR BLD: 13 % (ref 18–48)
MAGNESIUM SERPL-MCNC: 1.4 MG/DL (ref 1.6–2.6)
MCH RBC QN AUTO: 27.5 PG (ref 27–31)
MCHC RBC AUTO-ENTMCNC: 30.6 G/DL (ref 32–36)
MCV RBC AUTO: 90 FL (ref 82–98)
MONOCYTES NFR BLD: 7 % (ref 4–15)
N GONORRHOEA DNA SPEC QL NAA+PROBE: NOT DETECTED
NEUTROPHILS NFR BLD: 80 % (ref 38–73)
NONHDLC SERPL-MCNC: 47 MG/DL
NRBC BLD-RTO: 0 /100 WBC
PHOSPHATE SERPL-MCNC: 4.9 MG/DL (ref 2.7–4.5)
PLATELET # BLD AUTO: 171 K/UL (ref 150–350)
PLATELET BLD QL SMEAR: ABNORMAL
PMV BLD AUTO: 11.1 FL (ref 9.2–12.9)
POIKILOCYTOSIS BLD QL SMEAR: SLIGHT
POTASSIUM SERPL-SCNC: 4.9 MMOL/L (ref 3.5–5.1)
POTASSIUM SERPL-SCNC: 5 MMOL/L (ref 3.5–5.1)
POTASSIUM SERPL-SCNC: 5 MMOL/L (ref 3.5–5.1)
POTASSIUM SERPL-SCNC: 5.3 MMOL/L (ref 3.5–5.1)
PROT UR-MCNC: 8 MG/DL (ref 0–15)
PROT/CREAT UR: 0.15 MG/G{CREAT} (ref 0–0.2)
RBC # BLD AUTO: 2.4 M/UL (ref 4.6–6.2)
RPR SER QL: NORMAL
SCHISTOCYTES BLD QL SMEAR: PRESENT
SODIUM SERPL-SCNC: 138 MMOL/L (ref 136–145)
TACROLIMUS BLD-MCNC: 2.5 NG/ML (ref 5–15)
TRANS ERYTHROCYTES VOL PATIENT: NORMAL ML
TRIGL SERPL-MCNC: 129 MG/DL (ref 30–150)
WBC # BLD AUTO: 3.44 K/UL (ref 3.9–12.7)

## 2019-07-19 PROCEDURE — P9021 RED BLOOD CELLS UNIT: HCPCS

## 2019-07-19 PROCEDURE — 80048 BASIC METABOLIC PNL TOTAL CA: CPT | Mod: 91

## 2019-07-19 PROCEDURE — 84100 ASSAY OF PHOSPHORUS: CPT

## 2019-07-19 PROCEDURE — 25000003 PHARM REV CODE 250: Performed by: INTERNAL MEDICINE

## 2019-07-19 PROCEDURE — 63600175 PHARM REV CODE 636 W HCPCS: Performed by: HOSPITALIST

## 2019-07-19 PROCEDURE — 99233 SBSQ HOSP IP/OBS HIGH 50: CPT | Mod: ,,, | Performed by: HOSPITALIST

## 2019-07-19 PROCEDURE — 27201040 HC RC 50 FILTER

## 2019-07-19 PROCEDURE — 94761 N-INVAS EAR/PLS OXIMETRY MLT: CPT

## 2019-07-19 PROCEDURE — 86592 SYPHILIS TEST NON-TREP QUAL: CPT

## 2019-07-19 PROCEDURE — 83735 ASSAY OF MAGNESIUM: CPT

## 2019-07-19 PROCEDURE — 85027 COMPLETE CBC AUTOMATED: CPT

## 2019-07-19 PROCEDURE — 86850 RBC ANTIBODY SCREEN: CPT

## 2019-07-19 PROCEDURE — 99233 SBSQ HOSP IP/OBS HIGH 50: CPT | Mod: GC,,, | Performed by: INTERNAL MEDICINE

## 2019-07-19 PROCEDURE — 86635 COCCIDIOIDES ANTIBODY: CPT

## 2019-07-19 PROCEDURE — 99233 PR SUBSEQUENT HOSPITAL CARE,LEVL III: ICD-10-PCS | Mod: ,,, | Performed by: INTERNAL MEDICINE

## 2019-07-19 PROCEDURE — 99233 PR SUBSEQUENT HOSPITAL CARE,LEVL III: ICD-10-PCS | Mod: GC,,, | Performed by: INTERNAL MEDICINE

## 2019-07-19 PROCEDURE — 25000003 PHARM REV CODE 250: Performed by: HOSPITALIST

## 2019-07-19 PROCEDURE — 25000003 PHARM REV CODE 250: Performed by: NURSE PRACTITIONER

## 2019-07-19 PROCEDURE — 99233 SBSQ HOSP IP/OBS HIGH 50: CPT | Mod: ,,, | Performed by: INTERNAL MEDICINE

## 2019-07-19 PROCEDURE — 99233 PR SUBSEQUENT HOSPITAL CARE,LEVL III: ICD-10-PCS | Mod: ,,, | Performed by: HOSPITALIST

## 2019-07-19 PROCEDURE — 85007 BL SMEAR W/DIFF WBC COUNT: CPT

## 2019-07-19 PROCEDURE — 86920 COMPATIBILITY TEST SPIN: CPT

## 2019-07-19 PROCEDURE — 86780 TREPONEMA PALLIDUM: CPT

## 2019-07-19 PROCEDURE — 87385 HISTOPLASMA CAPSUL AG IA: CPT

## 2019-07-19 PROCEDURE — 80048 BASIC METABOLIC PNL TOTAL CA: CPT

## 2019-07-19 PROCEDURE — 80197 ASSAY OF TACROLIMUS: CPT

## 2019-07-19 PROCEDURE — 11000001 HC ACUTE MED/SURG PRIVATE ROOM

## 2019-07-19 PROCEDURE — 87449 NOS EACH ORGANISM AG IA: CPT

## 2019-07-19 PROCEDURE — 84156 ASSAY OF PROTEIN URINE: CPT

## 2019-07-19 PROCEDURE — 36415 COLL VENOUS BLD VENIPUNCTURE: CPT

## 2019-07-19 PROCEDURE — 86403 PARTICLE AGGLUT ANTBDY SCRN: CPT

## 2019-07-19 PROCEDURE — 87491 CHLMYD TRACH DNA AMP PROBE: CPT

## 2019-07-19 PROCEDURE — 80061 LIPID PANEL: CPT

## 2019-07-19 PROCEDURE — 86352 CELL FUNCTION ASSAY W/STIM: CPT

## 2019-07-19 RX ORDER — OXYCODONE HYDROCHLORIDE 5 MG/1
5 TABLET ORAL ONCE
Status: COMPLETED | OUTPATIENT
Start: 2019-07-19 | End: 2019-07-19

## 2019-07-19 RX ORDER — TENOFOVIR DISOPROXIL FUMARATE 300 MG/1
300 TABLET, FILM COATED ORAL
Status: DISCONTINUED | OUTPATIENT
Start: 2019-07-21 | End: 2019-07-22 | Stop reason: HOSPADM

## 2019-07-19 RX ORDER — HYDROCODONE BITARTRATE AND ACETAMINOPHEN 500; 5 MG/1; MG/1
TABLET ORAL
Status: DISCONTINUED | OUTPATIENT
Start: 2019-07-19 | End: 2019-07-22 | Stop reason: HOSPADM

## 2019-07-19 RX ORDER — OXYCODONE AND ACETAMINOPHEN 5; 325 MG/1; MG/1
1 TABLET ORAL EVERY 4 HOURS PRN
Status: DISCONTINUED | OUTPATIENT
Start: 2019-07-19 | End: 2019-07-22 | Stop reason: HOSPADM

## 2019-07-19 RX ORDER — EMTRICITABINE 200 MG/1
200 CAPSULE ORAL
Status: DISCONTINUED | OUTPATIENT
Start: 2019-07-21 | End: 2019-07-22 | Stop reason: HOSPADM

## 2019-07-19 RX ORDER — SIROLIMUS 0.5 MG/1
1 TABLET, FILM COATED ORAL EVERY MORNING
Status: DISCONTINUED | OUTPATIENT
Start: 2019-07-20 | End: 2019-07-21

## 2019-07-19 RX ORDER — SIROLIMUS 1 MG/1
1 TABLET, FILM COATED ORAL DAILY
Qty: 30 TABLET | Refills: 11 | Status: SHIPPED | OUTPATIENT
Start: 2019-07-19 | End: 2019-07-22 | Stop reason: SDUPTHER

## 2019-07-19 RX ORDER — SIROLIMUS 0.5 MG/1
3 TABLET, FILM COATED ORAL ONCE
Status: COMPLETED | OUTPATIENT
Start: 2019-07-19 | End: 2019-07-19

## 2019-07-19 RX ADMIN — NIFEDIPINE 30 MG: 30 TABLET, FILM COATED, EXTENDED RELEASE ORAL at 09:07

## 2019-07-19 RX ADMIN — TENOFOVIR DISPROXIL FUMARATE 300 MG: 300 TABLET ORAL at 09:07

## 2019-07-19 RX ADMIN — CARVEDILOL 12.5 MG: 12.5 TABLET, FILM COATED ORAL at 09:07

## 2019-07-19 RX ADMIN — CARVEDILOL 12.5 MG: 12.5 TABLET, FILM COATED ORAL at 05:07

## 2019-07-19 RX ADMIN — SODIUM BICARBONATE 650 MG TABLET 1950 MG: at 08:07

## 2019-07-19 RX ADMIN — FAMOTIDINE 20 MG: 20 TABLET, FILM COATED ORAL at 09:07

## 2019-07-19 RX ADMIN — THERA TABS 1 TABLET: TAB at 09:07

## 2019-07-19 RX ADMIN — KETOCONAZOLE 100 MG: 200 TABLET ORAL at 11:07

## 2019-07-19 RX ADMIN — EMTRICITABINE 200 MG: 200 CAPSULE ORAL at 09:07

## 2019-07-19 RX ADMIN — CINACALCET HYDROCHLORIDE 60 MG: 30 TABLET, FILM COATED ORAL at 09:07

## 2019-07-19 RX ADMIN — SIROLIMUS 3 MG: 0.5 TABLET, SUGAR COATED ORAL at 03:07

## 2019-07-19 RX ADMIN — SODIUM BICARBONATE 650 MG TABLET 1950 MG: at 09:07

## 2019-07-19 RX ADMIN — TACROLIMUS 5 MG: 5 CAPSULE ORAL at 09:07

## 2019-07-19 RX ADMIN — OXYCODONE HYDROCHLORIDE AND ACETAMINOPHEN 1 TABLET: 5; 325 TABLET ORAL at 07:07

## 2019-07-19 RX ADMIN — ATOVAQUONE 750 MG: 750 SUSPENSION ORAL at 09:07

## 2019-07-19 RX ADMIN — PREDNISONE 5 MG: 5 TABLET ORAL at 09:07

## 2019-07-19 RX ADMIN — SODIUM BICARBONATE 650 MG TABLET 1950 MG: at 03:07

## 2019-07-19 RX ADMIN — ROSUVASTATIN CALCIUM 10 MG: 10 TABLET, FILM COATED ORAL at 09:07

## 2019-07-19 RX ADMIN — TACROLIMUS 5 MG: 5 CAPSULE ORAL at 05:07

## 2019-07-19 RX ADMIN — DOLUTEGRAVIR SODIUM 50 MG: 50 TABLET, FILM COATED ORAL at 09:07

## 2019-07-19 RX ADMIN — OXYCODONE HYDROCHLORIDE 5 MG: 5 TABLET ORAL at 01:07

## 2019-07-19 NOTE — ASSESSMENT & PLAN NOTE
· Continue ART at renal dose:  Dolutegravir 50 mg daily, emtricitabine 200mg q48 hrs, and tenofovir 300mg q48 hrs  · Follow-up CD4/CD8 ratio and HIV RNA viral load  · Continue atovaquone 750 mg daily and ketoconazole 100 mg daily for antibiotic prophylaxis  · Patient reports azithromycin recently stopped by outpatient ID physician  · Continue multivitamin daily  · Transplant ID consulted, appreciate assistance

## 2019-07-19 NOTE — ASSESSMENT & PLAN NOTE
"RLE swelling and Right Groin LAD  · Lower extremity ultrasound negative for acute DVT.  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 Solid infrahilar mass on the right cannot be excluded.  Multiple enlarged lymph nodes in the right inguinal region, right pelvic sidewall, retroperitoneum and in the root of the mesentery around the celiac artery and bonnie hepatis region. This could be related to infectious process, metastasis or lymphoma. Right lower extremity and buttock soft tissue edema.  Suspect lymphedema given presence of right inguinal and pelvic sidewall lymphadenopathy and absence of DVT on Doppler ultrasound performed earlier same day. Minimal ascites.  Diffuse haziness/edema throughout the mesenteric fat with some nodularity present that could represent lymph nodes or peritoneal implants.  Peritoneal infection or neoplastic process not excluded."  · Will check CT head and neck given cervical LAD as well  · Heme/Onc following: currently plan for outpatient PET and close f/u, will need systemic chemotherapy given extent of disease  "

## 2019-07-19 NOTE — PROGRESS NOTES
[facility] Hospital Medicine  Progress Note    Patient Name: Demetrio Aguiar  MRN: [id number]  Patient Class: IP- Inpatient   Admission Date: 7/17/2019  Length of Stay: 2 days  Attending Physician: Tae Qiu, *  Primary Care Provider: Primary Doctor [facility] Medicine Team: [facility]OS[facility] MED R Tae Qiu MD    Subjective:     Principal Problem:Acute kidney injury superimposed on chronic kidney disease      HPI:  Demetrio Aguiar is a 39 y.o. 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 HTN, and HLD who presents to [facility] complaining of progressively worsening bilateral lower extremity edema.     Patient reports appearance of a right groin lymph node approximately 10 months ago with progressively worsening RLE edema. Over the past month, he has noticed the appearance of hyperpigmented papules on the RLE that if now appeared on his RLQ of the abdomen.  He denies pruritus, pain, fluctuance, erythema, and serous/purulent drainage from any of these lesions. He reports receiving a biopsy of his right groin lymph node 1 week ago at [facility] [place] in [place], [place] - he is unsure of the biopsy results.  He also reports some subjective fevers, but denies night sweats.  He reports approximately 10 lb unintentional weight loss over the past 1 month.  He endorses good urine output and denies dysuria or change in urine color or consistency.  He reports compliance with his ART daily and he is currently taking atovaquone and ketoconazole for prophylaxis.  He reports his ID physician (Dr. Reyes)  recently discontinuing his azithromycin prophylaxis.  He has also been compliant with his immunotherapy for right kidney transplant but notes that his tacrolimus dose was recently decreased to 5 mg b.i.d. He denies chest pain, palpitations, orthopnea, and PND.  He does endorse mild SOB and some increased weakness for the past few weeks.  He denies hemoptysis and BRBPR, but does note some bowel movements that sound concerning for melena.     In the ED, he was noted to have LEFTY, creatinine:  3.2 as well as hyperkalemia, potassium:  6.4.  He was given albuterol nebulizers, insulin/glucose IV, and Kayexalate to treat hyperkalemia.  He was also bolused 2 L normal saline given suspicion of prerenal etiology for acute kidney injury.    Overview/Hospital Course:  No notes on file    Interval History: NAEO. Denies any shortness of breath, cough, fevers, night sweats. States he had some discomfort of right leg overnight which was relieved with pain medication. Otherwise, feels well albeit a bit fatigued. Denies any bleeding although Hb dropped overnight (likely dilutional in setting of IVF administration). Will transfuse today.    Review of Systems   Constitutional: Negative for fatigue and fever.   Respiratory: Negative for cough, shortness of breath and wheezing.    Cardiovascular: Positive for leg swelling. Negative for chest pain.   Gastrointestinal: Negative for blood in stool, constipation, nausea and vomiting.   Genitourinary: Negative for dysuria.   Skin: Positive for color change.   Allergic/Immunologic: Positive for immunocompromised state.   Hematological: Positive for adenopathy.     Objective:     Vital Signs (Most Recent):  Temp: 98.2 °F (36.8 °C) (07/19/19 1508)  Pulse: 75 (07/19/19 1508)  Resp: 18 (07/19/19 1508)  BP: 125/70 (07/19/19 1508)  SpO2: 98 % (07/19/19 1508) Vital Signs (24h Range):  Temp:  [97 °F (36.1 °C)-98.8 °F (37.1 °C)] 98.2 °F (36.8 °C)  Pulse:  [69-82] 75  Resp:  [18] 18  SpO2:  [95 %-100 %]  98 %  BP: (120-161)/(70-85) 125/70     Weight: 71.5 kg (157 lb 10.1 oz)  Body mass index is 23.97 kg/m².    Intake/Output Summary (Last 24 hours) at 7/19/2019 1536  Last data filed at 7/19/2019 1508  Gross per 24 hour   Intake 840.42 ml   Output 575 ml   Net 265.42 ml      Physical Exam   Constitutional: He is oriented to person, place, and time. He appears well-developed and well-nourished.   HENT:   Head: Normocephalic and atraumatic.   Eyes: Pupils are equal, round, and reactive to light. EOM are normal. Right eye exhibits no discharge. Left eye exhibits no discharge.   Neck: Normal range of motion. Neck supple. No JVD present.   Abdominal: Soft.   Abdomina scars  Brown skin discoloration some flat,some raised lesions, non-tender    Genitourinary: Rectum normal.   Musculoskeletal: He exhibits edema. He exhibits no tenderness or deformity.   Right lower extremity edema with significant inguinal LAD   Neurological: He is alert and oriented to person, place, and time. No cranial nerve deficit.   Skin: Skin is warm. Capillary refill takes less than 2 seconds. Rash noted. No erythema.   Dark, mottled painless lesions to right lower extremity and inside of right thigh concerning for Kaposi   Vitals reviewed.      Significant Labs:   Recent Lab Results       07/19/19  1233   07/19/19  1121   07/19/19  0847   07/19/19  0739   07/19/19  0415        Unit Blood Type Code       6200[P]       Unit Expiration       [id number][P]       Unit Blood Type       A POS[P]       Anion Gap 9             Aniso         Slight     Basophil%         0.0     Blasts         Test Not Performed  Comment:  Corrected result; previously reported as 1.0 on 07/19/2019 at 08:35.[C]     BUN, Bld 28             Calcium 9.1             Chlamydia, Amplified DNA               Chloride 104             CO2 25             CODING SYSTEM       [id number][P]       Creatinine 3.2             Creatinine, Random Ur   52.0  Comment:  The random urine reference  ranges provided were established   for 24 hour urine collections.  No reference ranges exist for  random urine specimens.  Correlate clinically.             Cryptococcal Ag, Blood               Differential Method         Manual     DISPENSE STATUS       ISSUED[P]       eGFR if  26.6             eGFR if non  23.0  Comment:  Calculation used to obtain the estimated glomerular filtration  rate (eGFR) is the CKD-EPI equation.                Eosinophil%         0.0     Glucose 119             Gran%         80.0  Comment:  Corrected result; previously reported as 79.0 on 07/19/2019 at 08:35.[C]     Group & Rh     A POS         Hematocrit         21.6     Hemoglobin         6.6     Immature Grans (Abs)         CANCELED  Comment:  Mild elevation in immature granulocytes is non specific and   can be seen in a variety of conditions including stress response,   acute inflammation, trauma and pregnancy. Correlation with other   laboratory and clinical findings is essential.    Result canceled by the ancillary.       Immature Granulocytes         CANCELED  Comment:  Result canceled by the ancillary.     INDIRECT FRANTZ     NEG         Lymph%         13.0     Magnesium               MCH         27.5     MCHC         30.6     MCV         90     Mono%         7.0     MPV         11.1     N gonorrhoeae, amplified DNA               nRBC         0     Phosphorus               Platelet Estimate         Appears normal     Platelets         171     Poik         Slight     Potassium 4.9             Product Code       [id number][P]       Prot/Creat Ratio, Ur   0.15           Protein, Urine Random   8  Comment:  The random urine reference ranges provided were established   for 24 hour urine collections.  No reference ranges exist for  random urine specimens.  Correlate clinically.             RBC         2.40     RDW         15.6     RPR               Schistocytes         Present     Sodium 138              Tacrolimus Lvl         2.5  Comment:  Testing performed by Liquid Chromatography-Tandem  Mass Spectrometry (LC-MS/MS).  This test was developed and its performance characteristics  determined by [facility], Department of Pathology  and Laboratory Medicine in a manner consistent with CLIA  requirements. It has not been cleared or approved by the [place]  Food and Drug Administration.  This test is used for clinical   purposes.  It should not be regarded as investigational or for  research.       Tear Drop Cells         Occasional     UNIT NUMBER       [id number][P]       WBC         3.44                      07/19/19  0414   07/19/19  0140   07/19/19  0009   07/18/19  1956        Unit Blood Type Code             Unit Expiration             Unit Blood Type             Anion Gap 11   9 10      11           Aniso             Basophil%             Blasts             BUN, Bld 29   29 26      29           Calcium 9.1   9.1 8.7      9.1           Chlamydia, Amplified DNA   Not Detected         Chloride 104   105 108      104           CO2 23   24 22      23           CODING SYSTEM             Creatinine 3.1   3.0 2.8      3.1           Creatinine, Random Ur             Cryptococcal Ag, Blood Negative           Differential Method             DISPENSE STATUS             eGFR if  27.6   28.7 31.2      27.6           eGFR if non  23.9  Comment:  Calculation used to obtain the estimated glomerular filtration  rate (eGFR) is the CKD-EPI equation.      24.8  Comment:  Calculation used to obtain the estimated glomerular filtration  rate (eGFR) is the CKD-EPI equation.    27.0  Comment:  Calculation used to obtain the estimated glomerular filtration  rate (eGFR) is the CKD-EPI equation.         23.9  Comment:  Calculation used to obtain the estimated glomerular filtration  rate (eGFR) is the CKD-EPI equation.              Eosinophil%             Glucose 111   113 121      111            Gran%             Group & Rh             Hematocrit             Hemoglobin             Immature Grans (Abs)             Immature Granulocytes             INDIRECT FRANTZ             Lymph%             Magnesium 1.4           MCH             MCHC             MCV             Mono%             MPV             N gonorrhoeae, amplified DNA   Not Detected         nRBC             Phosphorus 4.9           Platelet Estimate             Platelets             Poik             Potassium 5.0   5.3  Comment:  *No Visible Hemolysis 6.0  Comment:  *No Visible Hemolysis      5.0           Product Code             Prot/Creat Ratio, Ur             Protein, Urine Random             RBC             RDW             RPR Non-reactive           Schistocytes             Sodium 138   138 140      138           Tacrolimus Lvl             Tear Drop Cells             UNIT NUMBER             WBC                 All pertinent labs within the past 24 hours have been reviewed.    Significant Imaging: I have reviewed all pertinent imaging results/findings within the past 24 hours.      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   · IV hydration with 2 L normal saline bolused    · For hyperkalemia:  given multiple doses of kayexalate with improvement this am, will continue to monitor closely  · Continuous telemetry while hyperkalemic  · Renal ultrasound: stable mild hydronephrosis, otherwise unremarkable  · Avoid nephrotoxic medications, renally dose all medications  · Cinacalet 60 mg daily   · Sodium bicarbonate 1,950 t.i.d.  · Tacrolimus 5 mg b.i.d. follow-up a.m. tacrolimus level  · Holding mycophenolate for now in setting of Kaposi's;  "add on sirolimus 1mg daily with 1x 3mg dose  · Prednisone 5 mg daily and famotidine 20 mg daily  · KTM consulted, appreciate assistance      Renovascular hypertension  · Continue nifedipine 30 mg daily and carvedilol 12.5 mg b.i.d.  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 Solid infrahilar mass on the right cannot be excluded.  Multiple enlarged lymph nodes in the right inguinal region, right pelvic sidewall, retroperitoneum and in the root of the mesentery around the celiac artery and bonnie hepatis region. This could be related to infectious process, metastasis or lymphoma. Right lower extremity and buttock soft tissue edema.  Suspect lymphedema given presence of right inguinal and pelvic sidewall lymphadenopathy and absence of DVT on Doppler ultrasound performed earlier same day. Minimal ascites.  Diffuse haziness/edema throughout the mesenteric fat with some nodularity present that could represent lymph nodes or peritoneal implants.  Peritoneal infection or neoplastic process not excluded."  · Will check CT head and neck given cervical LAD as well  · Heme/Onc following: currently plan for outpatient PET and close f/u, will need systemic chemotherapy given extent of disease    Lymphadenopathy, inguinal  - initially concerned for Kaposi vs other malignant process vs TB  - now with biopsy results from OSH with Kaposi, which likely explains LAD and skin changes to lower extremity  - Heme/Onc consulted, awaiting recs      Hyperkalemia  - dosing with kayexalate and will continue to monitor with serial BMPs " q6 hours until resolution      Immunocompromised state  - see above       donor kidney transplant 2017  - KTM following      Anemia in stage 3 chronic kidney disease  Anemia of chronic inflammation slightly worsened.  Baseline hemoglobin:  9 0.4-10.5.  No current signs of active bleeding.  - transfuse 1u PRBC today  - repeat CBC in am  - no evidence of active bleeding      Need for prophylactic immunotherapy  - 2/2 kidney transplant      At risk for opportunistic infections  - 2/2 immunosuppression      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  - KTM following, as above  - continue tacrolimus 5mg bid for now with daily tacro levels  - stopped myfortic and added sirolimus 1mg daily with 1x 3mg dose      HIV (human immunodeficiency virus infection)  · Continue ART  at renal dose:  Dolutegravir 50 mg daily, emtricitabine 200mg q48 hrs, and tenofovir 300mg q48 hrs  · Follow-up CD4/CD8 ratio and HIV RNA viral load  · Continue atovaquone 750 mg daily and ketoconazole 100 mg daily for antibiotic prophylaxis  · Patient reports azithromycin recently stopped by outpatient ID physician  · Continue multivitamin daily  · Transplant ID consulted, appreciate assistance      TB lung, latent  - initially concerned for possible disseminated TB as potential cause of LAD per transplant ID; however, this appears much less likely now that biopsy results have returned  - appreciate Transplant ID recs      Hyperlipidemia  · Decreased home Crestor to 10 mg daily given LEFTY        VTE Risk Mitigation (From admission, onward)        Ordered     IP VTE LOW RISK PATIENT  Once      19     Place sequential compression device  Until discontinued      19                Tae Qiu MD  Department of Hospital Medicine   [facility]

## 2019-07-19 NOTE — CONSULTS
Consult Note    Consults  SUBJECTIVE:     History of Present Illness:  Patient is a 39 y.o. 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 HTN, and HLD who presents to [facility] complaining of progressively worsening bilateral lower extremity edema. Th pt states complaince with HIV and prophylaxis meds. We are consulted for new finding of kaposi sarcoma.     The pt has been have R groin lymph node swelling for about 10 months at this point and states that he has noted skin lesions in RLE with edema over the past few weeks, followed by lesions in RLQ of his abdomen. He had not had pruritius from these lesions in his abdomen.  This edema has not allowed him to move around with prior mobility. The pt had lsot 10 pounds in the past month.    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 Hematoma evacuated.(09/09)    Status post exploratory laparotomy 9/10/2017    Re explored for retroperitoneal hematoma. Hematoma evacuated.(09/09)    TB lung, latent     tx INH 2006  "    Past Surgical History:   Procedure Laterality Date    BIOPSY Tranplanted Kidney N/A 8/28/2018    [facility]    BIOPSY Tranplanted Kidney N/A 1/15/2018    Per[facility]    BIOPSY Tranplanted Kidney N/A 10/17/2017    Performed by [facility] Provider at [facility] OR [address] [place] [address]    [id number] N/A 10/3/2017    Performed by [facility] Diagnostic Provider at [facility] OR [address] [place] [address]    EXPLORATORY LAPAROTOMY W/ BOWEL RESECTION      NO BOWEL RESECTION, UNKNOWN DETAILS, "Pancreas Infection"    EXPLORATORY-LAPAROTOMY retroperitoneal washout N/A 9/9/2017    Performed by Wesley Cisneros MD at [facility] OR [address] [place] [address]    KIDNEY TRANSPLANT      peritoneal dialysis catheter placement      TRANSPLANT-KIDNEY N/A 8/18/2017    Performed by Wesley Cisneros MD at [facility] OR [address] [place] [address]     Family History   Problem Relation Age of Onset    No Known Problems Mother     Cancer Father     Lung cancer Father     No Known Problems Brother     Diabetes Maternal Aunt     Diabetes Maternal Uncle     Hypertension Maternal Grandmother     Hypertension Paternal Grandmother      Social History     Tobacco Use    Smoking status: Former Smoker     Packs/day: 0.50     Types: Cigarettes    Smokeless tobacco: Never Used    Tobacco comment: quit smoking 5 years ago   Substance Use Topics    Alcohol use: No    Drug use: No     Types: Cocaine, Marijuana     Comment: last use for cocaine at age 17; 2 years ago last use for THC     Review of Systems   Constitutional: Positive for weight loss. Negative for fever.   Respiratory: Negative for shortness of breath.    Cardiovascular: Positive for leg swelling. Negative for chest pain.   Gastrointestinal: Negative for abdominal pain.   Skin: Positive for rash.   Neurological: Negative for loss of consciousness and headaches.     OBJECTIVE:     Vital Signs:  Temp:  [97 °F (36.1 °C)-98.3 °F (36.8 °C)]   Pulse:  [66-91]   Resp:  " [16-18]   BP: (128-161)/(72-89)   SpO2:  [97 %-100 %]     Physical Exam   Constitutional: He is oriented to person, place, and time. He appears well-developed. No distress.   Thin male, dialysis access sites in R upper arm and L leg   HENT:   Head: Normocephalic and atraumatic.   Mouth/Throat: No oropharyngeal exudate.   Eyes: Pupils are equal, round, and reactive to light. EOM are normal. No scleral icterus.   Neck: Normal range of motion. Neck supple.   Swelling in bilatearl neck but no adenopathy   Cardiovascular: Normal rate, regular rhythm and normal heart sounds. Exam reveals no gallop and no friction rub.   No murmur heard.  Pulmonary/Chest: Effort normal and breath sounds normal. No respiratory distress. He has no wheezes. He has no rales.   Abdominal: Soft. Bowel sounds are normal. He exhibits no distension. There is no tenderness. There is no guarding.   Musculoskeletal: Normal range of motion. He exhibits no edema.   Neurological: He is alert and oriented to person, place, and time. No cranial nerve deficit.   Skin:   R inguinal adneopathy, node of 3-4 cm; also dark purplish lesions of RLE and also RLQ of abdomen     Laboratory:  CBC:   Recent Labs   Lab 07/17/19  1016  07/17/19  1541   WBC 4.70  --   --    RBC 2.58*  --   --    HGB 7.2*  --   --    HCT 22.8*   < > 21*     --   --    MCV 88  --   --    MCH 27.9  --   --    MCHC 31.6*  --   --     < > = values in this interval not displayed.     BMP:   Recent Labs   Lab 07/18/19  0212 07/18/19  1534   *  125* 130*     137 138   K 6.1*  6.1* 6.0*     109 107   CO2 20*  20* 23   BUN 29*  30* 27*   CREATININE 2.8*  2.8* 2.9*   CALCIUM 8.5*  8.8 9.4   MG 1.7  --      CMP:   Recent Labs   Lab 07/17/19  1016  07/18/19  1534   *   < > 130*   CALCIUM 9.1   < > 9.4   ALBUMIN 4.0  --   --    PROT 6.8  --   --       < > 138   K 6.0*   < > 6.0*   CO2 21*   < > 23      < > 107   BUN 31*   < > 27*   CREATININE 3.2*    < > 2.9*   ALKPHOS 72  --   --    ALT 8*  --   --    AST 18  --   --    BILITOT 0.6  --   --     < > = values in this interval not displayed.     LFTs:   Recent Labs   Lab 07/17/19  1016   ALT 8*   AST 18   ALKPHOS 72   BILITOT 0.6   PROT 6.8   ALBUMIN 4.0     Coagulation: No results for input(s): LABPROT, INR, APTT in the last 168 hours.  Cardiac markers: No results for input(s): CKMB, CPKMB, TROPONINT, TROPONINI, MYOGLOBIN in the last 168 hours.  ABGs: No results for input(s): PH, PCO2, PO2, HCO3, POCSATURATED, BE in the last 168 hours.  Microbiology Results (last 7 days)     Procedure Component Value Units Date/Time    Blood culture #1 **CANNOT BE ORDERED STAT** [[id number]] Collected:  07/17/19 0959    Order Status:  Completed Specimen:  Blood from Peripheral, Hand, Left Updated:  07/18/19 1212     Blood Culture, Routine No Growth to date      No Growth to date    Blood culture #2 **CANNOT BE ORDERED STAT** [[id number]] Collected:  07/17/19 1016    Order Status:  Completed Specimen:  Blood from Peripheral, Antecubital, Right Updated:  07/18/19 1212     Blood Culture, Routine No Growth to date      No Growth to date    AFB Culture & Smear [[id number]]     Order Status:  Canceled Specimen:  Sputum, Induced     AFB Culture & Smear [[id number]]     Order Status:  Canceled Specimen:  Sputum from Abdomen         Specimen (12h ago, onward)    None        Recent Labs   Lab 07/17/19  1017   COLORU Straw   SPECGRAV 1.005   PHUR 7.0   PROTEINUA Negative   NITRITE Negative   LEUKOCYTESUR Negative       Diagnostic Results:    CT Neck Chest WO contrast pending     EXAMINATION:  CT ABDOMEN PELVIS WITHOUT CONTRAST    CLINICAL HISTORY:  inguinal LAD concern for disseminate infection vs malignancy;    TECHNIQUE:  Low dose axial images, sagittal and coronal reformations were obtained from the lung bases to the pubic symphysis.  Oral contrast was not administered.    COMPARISON:  None    FINDINGS:  Lung bases:Small right-sided pleural  effusion with associated compressive atelectasis.  Airspace consolidation in the right lower lobe with adjacent patchy opacification, measuring 4.8 x 3.8 cm.  Bandlike opacity in the left lung base.    Heart/pericardium: Heart is normal in size.  No pericardial effusion.    Abdominal wall: Right lower extremity and buttocks soft tissue edema.  Multiple enlarged lymph nodes in the right inguinal region, largest measuring 3.3 cm (series 2, image 85).    Liver: Normal in size and contour.  No focal lesion.    Gallbladder/bile ducts: No radiopaque gallstone.  No intra or extrahepatic biliary ductal dilatation.    Spleen, pancreas and adrenal glands are unremarkable.    Kidneys/ureters: Native kidneys are atrophic.  Bilateral renal cysts in the native kidneys.  No hydronephrosis or ureteral dilatation.  Punctate calcifications bilaterally..    Postsurgical changes of kidney transplant in the right lower quadrant.  No hydronephrosis or ureteral dilatation.  No nephrolithiasis.    Urinary bladder: Distended with smooth contour.    Reproductive: Unremarkable.    Bowel/mesentery: No evidence of bowel obstruction or inflammation.  Nonspecific mesenteric edema.    Peritoneum/retroperitoneum: Minimal free fluid around the spleen and along the left paracolic gutter.  Diffuse edema/haziness throughout the mesenteric fat with some nodularity present.  Right pelvic sidewall lymphadenopathy.  Mesenteric adenopathy mid abdomen mesenteric root around the celiac artery and retroperitoneal periaortic adenopathy, largest measuring 2.1 cm on the left (series 2, image 26) and 2.2 cm on the right (series 2, image 25).  However, limited evaluation due to absence of IV and oral contrast.    Vasculature: Aorta is normal in course and caliber without aneurysmal dilatation.  Endovascular stent in the left iliac artery and left groin region.    Bones: Degenerative change of the spine.  Bridging anterior disc osteophyte complex at L4-L5. Diffuse  osteosclerosis possibly related to renal osteodystrophy.      Impression       Airspace consolidation in the right lower lobe with peripheral patchy opacification, possibly representing pneumonia.  Solid infrahilar mass on the right cannot be excluded.    Multiple enlarged lymph nodes in the right inguinal region, right pelvic sidewall, retroperitoneum and in the root of the mesentery around the celiac artery and bonnie hepatis region, as detailed above.  Limited evaluation of the enlarged lymph nodes in the abdomen in the absence of IV and oral contrast.  This could be related to infectious process, metastasis or lymphoma.    Atrophic native kidneys contains cysts.  Postsurgical changes of kidney transplant with right lower quadrant allograft.    Right lower extremity and buttock soft tissue edema.  Suspect lymphedema given presence of right inguinal and pelvic sidewall lymphadenopathy and absence of DVT on Doppler ultrasound performed earlier same day.    Minimal ascites.  Diffuse haziness/edema throughout the mesenteric fat with some nodularity present that could represent lymph nodes or peritoneal implants.  Peritoneal infection or neoplastic process not excluded.    Additional findings as above.    This report was flagged in Epic as abnormal.    Electronically signed by resident: Jose Monroe  Date: 07/17/2019  Time: 22:03    Electronically signed by: Gino Landry MD  Date: 07/17/2019  Time: 22:49         ASSESSMENT/PLAN:     Plan:     Kaposi sarcoma  - multiple large nodes noted in R inguinal region, R pelvic side wall, retroperitoneum and mesentery around celiac artery; no contrast used in this CT abd/pelvis given renal transplant and current rejection  - CT neck chest w/o contrast pending   - will plan for a PET-CT as an outpatient   - also will attempt to review slides  - unfortunately, nothing topical in this setting would work given lymph node involvement, could possibly start with an agent such as  Taxol    Discussed with his mom too over the phone.    Discussed with Dr. Avina. Will plan to follow-up in clinic in next 1.5 - 2 weeks.    Shelby Aguilar MD  Hematology/Oncology Fellow, PGY IV  [facility]         I have reviewed the notes, assessments, and/or procedures performed by the housestaff, as above.  I have personally interviewed and examined the patient at the beside, and rounded with the housestaff. I concur with her/his assessment and plan and the documentation of Demetrio Aguiar.  I, Dr. Stephan Avina, personally spent more than 60 mins during this encounter, greater than 50% was spent in direct counseling and/or coordination of care.     Stephan Avina M.D., M.S., F.A.C.P.  Hematology/Oncology Attending  [facility]

## 2019-07-19 NOTE — SUBJECTIVE & OBJECTIVE
Interval History: no events over night    Review of Systems   Constitutional: Positive for activity change. Negative for appetite change, chills and fever.   HENT: Negative for congestion.    Eyes: Negative for pain and itching.   Respiratory: Negative for cough, chest tightness and shortness of breath.    Cardiovascular: Negative for chest pain, palpitations and leg swelling.   Gastrointestinal: Negative for abdominal pain, diarrhea and nausea.   Endocrine: Negative for polyphagia and polyuria.   Genitourinary: Negative for dysuria and frequency.   Musculoskeletal: Negative for back pain.   Skin: Positive for rash.   Neurological: Negative for numbness and headaches.   Hematological: Positive for adenopathy.   Psychiatric/Behavioral: Negative for agitation and behavioral problems.     Objective:     Vital Signs (Most Recent):  Temp: 98.8 °F (37.1 °C) (07/19/19 1315)  Pulse: 71 (07/19/19 1315)  Resp: 18 (07/19/19 1315)  BP: (!) 149/76 (07/19/19 1315)  SpO2: 96 % (07/19/19 1315) Vital Signs (24h Range):  Temp:  [97 °F (36.1 °C)-98.8 °F (37.1 °C)] 98.8 °F (37.1 °C)  Pulse:  [69-82] 71  Resp:  [18] 18  SpO2:  [95 %-100 %] 96 %  BP: (120-161)/(70-85) 149/76     Weight: 71.5 kg (157 lb 10.1 oz)  Body mass index is 23.97 kg/m².    Estimated Creatinine Clearance: 29.7 mL/min (A) (based on SCr of 3.2 mg/dL (H)).    Physical Exam   Constitutional: He is oriented to person, place, and time. He appears well-developed.   HENT:   Head: Normocephalic.   Cardiovascular: Normal rate, regular rhythm and normal heart sounds.   No murmur heard.  Pulmonary/Chest: Effort normal and breath sounds normal.   Abdominal: Soft. Bowel sounds are normal. He exhibits no distension. There is no tenderness.   Musculoskeletal: Normal range of motion. He exhibits edema (Right LE.).   Lymphadenopathy:     He has cervical adenopathy.        Right cervical: Superficial cervical adenopathy present.     He has axillary adenopathy.   Neurological: He is  alert and oriented to person, place, and time.   Skin: Rash (hyperpigmented rash) noted.   Psychiatric: He has a normal mood and affect. His behavior is normal.       Significant Labs:   Blood Culture:   Recent Labs   Lab 07/17/19  0959 07/17/19  1016   LABBLOO No Growth to date  No Growth to date  No Growth to date No Growth to date  No Growth to date  No Growth to date     BMP:   Recent Labs   Lab 07/19/19  0414 07/19/19  1233   *  111* 119*     138 138   K 5.0  5.0 4.9     104 104   CO2 23  23 25   BUN 29*  29* 28*   CREATININE 3.1*  3.1* 3.2*   CALCIUM 9.1  9.1 9.1   MG 1.4*  --      C4 Count: No results for input(s): C4 in the last 48 hours.  CBC:   Recent Labs   Lab 07/17/19  1541 07/19/19  0415   WBC  --  3.44*   HGB  --  6.6*   HCT 21* 21.6*   PLT  --  171     CMP:   Recent Labs   Lab 07/19/19  0009 07/19/19  0414 07/19/19  1233    138  138 138   K 5.3* 5.0  5.0 4.9    104  104 104   CO2 24 23  23 25   * 111*  111* 119*   BUN 29* 29*  29* 28*   CREATININE 3.0* 3.1*  3.1* 3.2*   CALCIUM 9.1 9.1  9.1 9.1   ANIONGAP 9 11  11 9   EGFRNONAA 24.8* 23.9*  23.9* 23.0*     Genital Culture: No results for input(s): LABGENI in the last 4320 hours.  Hepatitis Panel: No results for input(s): HEPBSAG, HEPAIGM, HEPCAB in the last 48 hours.    Invalid input(s): HAPBIGM  Lactic Acid: No results for input(s): LACTATE in the last 48 hours.    Significant Imaging: I have reviewed all pertinent imaging results/findings within the past 24 hours.

## 2019-07-19 NOTE — ASSESSMENT & PLAN NOTE
Anemia of chronic inflammation slightly worsened.  Baseline hemoglobin:  9 0.4-10.5.  No current signs of active bleeding.  - transfuse 1u PRBC today  - repeat CBC in am  - no evidence of active bleeding

## 2019-07-19 NOTE — ASSESSMENT & PLAN NOTE
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   · IV hydration with 2 L normal saline bolused    · For hyperkalemia:  given multiple doses of kayexalate with improvement this am, will continue to monitor closely  · Continuous telemetry while hyperkalemic  · Renal ultrasound: stable mild hydronephrosis, otherwise unremarkable  · Avoid nephrotoxic medications, renally dose all medications  · Cinacalet 60 mg daily   · Sodium bicarbonate 1,950 t.i.d.  · Tacrolimus 5 mg b.i.d. follow-up a.m. tacrolimus level  · Holding mycophenolate for now in setting of Kaposi's; add on sirolimus 1mg daily with 1x 3mg dose  · Prednisone 5 mg daily and famotidine 20 mg daily  · KTM consulted, appreciate assistance

## 2019-07-19 NOTE — ASSESSMENT & PLAN NOTE
- KTM following, as above  - continue tacrolimus 5mg bid for now with daily tacro levels  - stopped myfortic and added sirolimus 1mg daily with 1x 3mg dose

## 2019-07-19 NOTE — PROGRESS NOTES
[facility]  Infectious Disease  Progress Note    Patient Name: Demetrio Aguiar  MRN: [id number]  Admission Date: 7/17/2019  Length of Stay: 2 days  Attending Physician: Tae Qiu, *  Primary Care Provider: Primary Doctor No    Isolation Status: No active isolations  Assessment/Plan:      Kaposi sarcoma  39M PMH HIV on ART, hx of ESRD 2/2 HIV, now s/p DDRT kidney transplant 8/25/2017, hx of latent TB treated w/ INH in 2006, who [place] [facility] [place] of diffuse lymphadenopathy and skin lesions that have been ongoing for several months. Biopsy done in [facility] has returned positive for Kaposi Sarcoma    Recommendations:    - management of kaposi sarcoma with include continued ART and decrease in immunosuppression, as well as chemotherapy  - we do have concern for visceral Kaposi sarcoma, pt does have abnormal CXR, would recommend bronch for biopsy of RLL   - For complete w/u EGD and coloscopy are recommended.  - extensive additional infectious evaluation has been sent prior to pathology report confirming kaposi  - continue dolutegravir, tenofovir and emtricitabine.  - would switch tacrolimus to Sirolimus.            Thank you for your consult. I will follow-up with patient. Please contact us if you have any additional questions.    Eboni Denney MD  Infectious Disease  [facility]    Subjective:     Principal Problem:Acute kidney injury superimposed on chronic kidney disease    HPI: Demetrio Aguiar is a 39 y.o. 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 who [facility] complaining of progressively worsening bilateral lower extremity edema.     Patient reports appearance of a right groin lymph node approximately 10 months ago with progressively worsening RLE edema. Over the past month, he has noticed the appearance of hyperpigmented papules on the RLE that if now appeared on his RLQ of the abdomen. biopsy of his right groin lymph node was done 1 week ago at [facility], operated by [facility] in [place], [place] -     He also reports some subjective fevers, Also hasapproximately 10 lb unintentional weight loss over the past 1 month.  He reports compliance with his ART daily and he is currently taking atovaquone and ketoconazole for prophylaxis.  He reports his ID physician (Dr. Reyes) recently discontinuing his azithromycin prophylaxis.  He has also been compliant with his immunotherapy for right kidney transplant but notes that his tacrolimus dose was recently decreased to 5 mg b.i.d.     He denies chest pain, palpitations, orthopnea, pruritus, pain, fluctuance, erythema, and serous/purulent drainage from any of these lesions, night sweats and PND. No hx of dysuria.No hx ofhemoptysis and BRBPR.     Pathology report from [facility] 05/06- biopsy of his right groin L.N was consistent of Kaposi Sarcoma.  Interval History: no events over night    Review of Systems   Constitutional: Positive for activity change. Negative for appetite change, chills and fever.   HENT: Negative for congestion.    Eyes: Negative for pain and itching.   Respiratory: Negative for cough, chest tightness and shortness of breath.    Cardiovascular: Negative for chest pain, palpitations and leg swelling.   Gastrointestinal: Negative for abdominal pain, diarrhea and nausea.   Endocrine: Negative for polyphagia and polyuria.   Genitourinary: Negative for dysuria and frequency.   Musculoskeletal: Negative for back pain.   Skin: Positive for rash.   Neurological: Negative for numbness and headaches.    Hematological: Positive for adenopathy.   Psychiatric/Behavioral: Negative for agitation and behavioral problems.     Objective:     Vital Signs (Most Recent):  Temp: 98.8 °F (37.1 °C) (07/19/19 1315)  Pulse: 71 (07/19/19 1315)  Resp: 18 (07/19/19 1315)  BP: (!) 149/76 (07/19/19 1315)  SpO2: 96 % (07/19/19 1315) Vital Signs (24h Range):  Temp:  [97 °F (36.1 °C)-98.8 °F (37.1 °C)] 98.8 °F (37.1 °C)  Pulse:  [69-82] 71  Resp:  [18] 18  SpO2:  [95 %-100 %] 96 %  BP: (120-161)/(70-85) 149/76     Weight: 71.5 kg (157 lb 10.1 oz)  Body mass index is 23.97 kg/m².    Estimated Creatinine Clearance: 29.7 mL/min (A) (based on SCr of 3.2 mg/dL (H)).    Physical Exam   Constitutional: He is oriented to person, place, and time. He appears well-developed.   HENT:   Head: Normocephalic.   Cardiovascular: Normal rate, regular rhythm and normal heart sounds.   No murmur heard.  Pulmonary/Chest: Effort normal and breath sounds normal.   Abdominal: Soft. Bowel sounds are normal. He exhibits no distension. There is no tenderness.   Musculoskeletal: Normal range of motion. He exhibits edema (Right LE.).   Lymphadenopathy:     He has cervical adenopathy.        Right cervical: Superficial cervical adenopathy present.     He has axillary adenopathy.   Neurological: He is alert and oriented to person, place, and time.   Skin: Rash (hyperpigmented rash) noted.   Psychiatric: He has a normal mood and affect. His behavior is normal.       Significant Labs:   Blood Culture:   Recent Labs   Lab 07/17/19  0959 07/17/19  1016   LABBLOO No Growth to date  No Growth to date  No Growth to date No Growth to date  No Growth to date  No Growth to date     BMP:   Recent Labs   Lab 07/19/19  0414 07/19/19  1233   *  111* 119*     138 138   K 5.0  5.0 4.9     104 104   CO2 23  23 25   BUN 29*  29* 28*   CREATININE 3.1*  3.1* 3.2*   CALCIUM 9.1  9.1 9.1   MG 1.4*  --      C4 Count: No results for input(s): C4 in the last 48  hours.  CBC:   Recent Labs   Lab 07/17/19  1541 07/19/19  0415   WBC  --  3.44*   HGB  --  6.6*   HCT 21* 21.6*   PLT  --  171     CMP:   Recent Labs   Lab 07/19/19  0009 07/19/19  0414 07/19/19  1233    138  138 138   K 5.3* 5.0  5.0 4.9    104  104 104   CO2 24 23  23 25   * 111*  111* 119*   BUN 29* 29*  29* 28*   CREATININE 3.0* 3.1*  3.1* 3.2*   CALCIUM 9.1 9.1  9.1 9.1   ANIONGAP 9 11  11 9   EGFRNONAA 24.8* 23.9*  23.9* 23.0*     Genital Culture: No results for input(s): LABGENI in the last 4320 hours.  Hepatitis Panel: No results for input(s): HEPBSAG, HEPAIGM, HEPCAB in the last 48 hours.    Invalid input(s): HAPBIGM  Lactic Acid: No results for input(s): LACTATE in the last 48 hours.    Significant Imaging: I have reviewed all pertinent imaging results/findings within the past 24 hours.

## 2019-07-19 NOTE — PLAN OF CARE
Problem: Infection  Goal: Infection Symptom Resolution  Outcome: Ongoing (interventions implemented as appropriate)  Meds given as ordered tolerated well. C/o Lower leg pain PRN pain meds given as needed. No signs or symptoms of distress/discomfort noted. Ambulates independently. Bath taken. AOx4. Vitals stable. Will continue to monitor.

## 2019-07-19 NOTE — ASSESSMENT & PLAN NOTE
39M PMH HIV on ART, hx of ESRD 2/2 HIV, now s/p DDRT kidney transplant 8/25/2017, hx of latent TB treated w/ INH in 2006, who presents to [facility] for evaluation of diffuse lymphadenopathy and skin lesions that have been ongoing for several months. Biopsy done in [facility] has returned positive for Kaposi Sarcoma    Recommendations:    - management of kaposi sarcoma with include continued ART and decrease in immunosuppression, as well as chemotherapy  - we do have concern for visceral Kaposi sarcoma, pt does have abnormal CXR, would recommend bronch for biopsy of RLL   - For complete w/u EGD and coloscopy are recommended.  - extensive additional infectious evaluation has been sent prior to pathology report confirming kaposi  - continue dolutegravir, tenofovir and emtricitabine.  - would switch tacrolimus to Sirolimus.

## 2019-07-19 NOTE — PLAN OF CARE
07/19/19 1252   Discharge Assessment   Assessment Type Discharge Planning Assessment   Confirmed/corrected address and phone number on facesheet? Yes   Assessment information obtained from? Patient   Expected Length of Stay (days) 3   Communicated expected length of stay with patient/caregiver yes   Prior to hospitilization cognitive status: Alert/Oriented   Prior to hospitalization functional status: Independent   Current cognitive status: Alert/Oriented   Current Functional Status: Independent   Lives With alone   Able to Return to Prior Arrangements yes   Who are your caregiver(s) and their phone number(s)? Randa [phone]   Patient's perception of discharge disposition home or selfcare   Readmission Within the Last 30 Days no previous admission in last 30 days   Patient currently being followed by outpatient case management? No   Patient currently receives any other outside agency services? No   Equipment Currently Used at Home none   Do you have any problems affording any of your prescribed medications? No   Is the patient taking medications as prescribed? yes   Does the patient have transportation home? Yes   Transportation Anticipated family or friend will provide   Does the patient receive services at the [facility]? No   Discharge Plan A Home   Discharge Plan B Home Health   DME Needed Upon Discharge  none   Patient/Family in Agreement with Plan yes

## 2019-07-19 NOTE — SUBJECTIVE & OBJECTIVE
Interval History: NAEO. Denies any shortness of breath, cough, fevers, night sweats. States he had some discomfort of right leg overnight which was relieved with pain medication. Otherwise, feels well albeit a bit fatigued. Denies any bleeding although Hb dropped overnight (likely dilutional in setting of IVF administration). Will transfuse today.    Review of Systems   Constitutional: Negative for fatigue and fever.   Respiratory: Negative for cough, shortness of breath and wheezing.    Cardiovascular: Positive for leg swelling. Negative for chest pain.   Gastrointestinal: Negative for blood in stool, constipation, nausea and vomiting.   Genitourinary: Negative for dysuria.   Skin: Positive for color change.   Allergic/Immunologic: Positive for immunocompromised state.   Hematological: Positive for adenopathy.     Objective:     Vital Signs (Most Recent):  Temp: 98.2 °F (36.8 °C) (07/19/19 1508)  Pulse: 75 (07/19/19 1508)  Resp: 18 (07/19/19 1508)  BP: 125/70 (07/19/19 1508)  SpO2: 98 % (07/19/19 1508) Vital Signs (24h Range):  Temp:  [97 °F (36.1 °C)-98.8 °F (37.1 °C)] 98.2 °F (36.8 °C)  Pulse:  [69-82] 75  Resp:  [18] 18  SpO2:  [95 %-100 %] 98 %  BP: (120-161)/(70-85) 125/70     Weight: 71.5 kg (157 lb 10.1 oz)  Body mass index is 23.97 kg/m².    Intake/Output Summary (Last 24 hours) at 7/19/2019 1536  Last data filed at 7/19/2019 1508  Gross per 24 hour   Intake 840.42 ml   Output 575 ml   Net 265.42 ml      Physical Exam   Constitutional: He is oriented to person, place, and time. He appears well-developed and well-nourished.   HENT:   Head: Normocephalic and atraumatic.   Eyes: Pupils are equal, round, and reactive to light. EOM are normal. Right eye exhibits no discharge. Left eye exhibits no discharge.   Neck: Normal range of motion. Neck supple. No JVD present.   Abdominal: Soft.   Abdomina scars  Brown skin discoloration some flat,some raised lesions, non-tender    Genitourinary: Rectum normal.    Musculoskeletal: He exhibits edema. He exhibits no tenderness or deformity.   Right lower extremity edema with significant inguinal LAD   Neurological: He is alert and oriented to person, place, and time. No cranial nerve deficit.   Skin: Skin is warm. Capillary refill takes less than 2 seconds. Rash noted. No erythema.   Dark, mottled painless lesions to right lower extremity and inside of right thigh concerning for Kaposi   Vitals reviewed.      Significant Labs:   Recent Lab Results       07/19/19  1233   07/19/19  1121   07/19/19  0847   07/19/19  0739   07/19/19  0415        Unit Blood Type Code       6200[P]       Unit Expiration       [id number][P]       Unit Blood Type       A POS[P]       Anion Gap 9             Aniso         Slight     Basophil%         0.0     Blasts         Test Not Performed  Comment:  Corrected result; previously reported as 1.0 on 07/19/2019 at 08:35.[C]     BUN, Bld 28             Calcium 9.1             Chlamydia, Amplified DNA               Chloride 104             CO2 25             CODING SYSTEM       IPOG574[P]       Creatinine 3.2             Creatinine, Random Ur   52.0  Comment:  The random urine reference ranges provided were established   for 24 hour urine collections.  No reference ranges exist for  random urine specimens.  Correlate clinically.             Cryptococcal Ag, Blood               Differential Method         Manual     DISPENSE STATUS       ISSUED[P]       eGFR if  26.6             eGFR if non  23.0  Comment:  Calculation used to obtain the estimated glomerular filtration  rate (eGFR) is the CKD-EPI equation.                Eosinophil%         0.0     Glucose 119             Gran%         80.0  Comment:  Corrected result; previously reported as 79.0 on 07/19/2019 at 08:35.[C]     Group & Rh     A POS         Hematocrit         21.6     Hemoglobin         6.6     Immature Grans (Abs)         CANCELED  Comment:  Mild elevation  in immature granulocytes is non specific and   can be seen in a variety of conditions including stress response,   acute inflammation, trauma and pregnancy. Correlation with other   laboratory and clinical findings is essential.    Result canceled by the ancillary.       Immature Granulocytes         CANCELED  Comment:  Result canceled by the ancillary.     INDIRECT FRANTZ     NEG         Lymph%         13.0     Magnesium               MCH         27.5     MCHC         30.6     MCV         90     Mono%         7.0     MPV         11.1     N gonorrhoeae, amplified DNA               nRBC         0     Phosphorus               Platelet Estimate         Appears normal     Platelets         171     Poik         Slight     Potassium 4.9             Product Code       [id number][P]       Prot/Creat Ratio, Ur   0.15           Protein, Urine Random   8  Comment:  The random urine reference ranges provided were established   for 24 hour urine collections.  No reference ranges exist for  random urine specimens.  Correlate clinically.             RBC         2.40     RDW         15.6     RPR               Schistocytes         Present     Sodium 138             Tacrolimus Lvl         2.5  Comment:  Testing performed by Liquid Chromatography-Tandem  Mass Spectrometry (LC-MS/MS).  This test was developed and its performance characteristics  determined by [facility], Department of Pathology  and Laboratory Medicine in a manner consistent with CLIA  requirements. It has not been cleared or approved by the [place]  Food and Drug Administration.  This test is used for clinical   purposes.  It should not be regarded as investigational or for  research.       Tear Drop Cells         Occasional     UNIT NUMBER       [id number][P]       WBC         3.44                      07/19/19  0414   07/19/19  0140   07/19/19  0009   07/18/19  1956        Unit Blood Type Code             Unit Expiration             Unit Blood Type              Anion Gap 11   9 10      11           Aniso             Basophil%             Blasts             BUN, Bld 29   29 26      29           Calcium 9.1   9.1 8.7      9.1           Chlamydia, Amplified DNA   Not Detected         Chloride 104   105 108      104           CO2 23   24 22      23           CODING SYSTEM             Creatinine 3.1   3.0 2.8      3.1           Creatinine, Random Ur             Cryptococcal Ag, Blood Negative           Differential Method             DISPENSE STATUS             eGFR if  27.6   28.7 31.2      27.6           eGFR if non  23.9  Comment:  Calculation used to obtain the estimated glomerular filtration  rate (eGFR) is the CKD-EPI equation.      24.8  Comment:  Calculation used to obtain the estimated glomerular filtration  rate (eGFR) is the CKD-EPI equation.    27.0  Comment:  Calculation used to obtain the estimated glomerular filtration  rate (eGFR) is the CKD-EPI equation.         23.9  Comment:  Calculation used to obtain the estimated glomerular filtration  rate (eGFR) is the CKD-EPI equation.              Eosinophil%             Glucose 111   113 121      111           Gran%             Group & Rh             Hematocrit             Hemoglobin             Immature Grans (Abs)             Immature Granulocytes             INDIRECT FRANTZ             Lymph%             Magnesium 1.4           MCH             MCHC             MCV             Mono%             MPV             N gonorrhoeae, amplified DNA   Not Detected         nRBC             Phosphorus 4.9           Platelet Estimate             Platelets             Poik             Potassium 5.0   5.3  Comment:  *No Visible Hemolysis 6.0  Comment:  *No Visible Hemolysis      5.0           Product Code             Prot/Creat Ratio, Ur             Protein, Urine Random             RBC             RDW             RPR Non-reactive           Schistocytes             Sodium 138   138 140      138            Tacrolimus Lvl             Tear Drop Cells             UNIT NUMBER             WBC                 All pertinent labs within the past 24 hours have been reviewed.    Significant Imaging: I have reviewed all pertinent imaging results/findings within the past 24 hours.

## 2019-07-20 PROBLEM — Z29.89 PROPHYLACTIC IMMUNOTHERAPY: Status: ACTIVE | Noted: 2017-08-28

## 2019-07-20 LAB
ANION GAP SERPL CALC-SCNC: 12 MMOL/L (ref 8–16)
ANION GAP SERPL CALC-SCNC: 13 MMOL/L (ref 8–16)
ANION GAP SERPL CALC-SCNC: 9 MMOL/L (ref 8–16)
ANISOCYTOSIS BLD QL SMEAR: SLIGHT
BASOPHILS NFR BLD: 0 % (ref 0–1.9)
BLD PROD TYP BPU: NORMAL
BLOOD UNIT EXPIRATION DATE: NORMAL
BLOOD UNIT TYPE CODE: 6200
BLOOD UNIT TYPE: NORMAL
BUN SERPL-MCNC: 31 MG/DL (ref 6–20)
BUN SERPL-MCNC: 32 MG/DL (ref 6–20)
BUN SERPL-MCNC: 32 MG/DL (ref 6–20)
CALCIUM SERPL-MCNC: 8.5 MG/DL (ref 8.7–10.5)
CALCIUM SERPL-MCNC: 8.7 MG/DL (ref 8.7–10.5)
CALCIUM SERPL-MCNC: 9 MG/DL (ref 8.7–10.5)
CHLORIDE SERPL-SCNC: 101 MMOL/L (ref 95–110)
CHLORIDE SERPL-SCNC: 103 MMOL/L (ref 95–110)
CHLORIDE SERPL-SCNC: 104 MMOL/L (ref 95–110)
CMV DNA SERPL NAA+PROBE-ACNC: NORMAL IU/ML
CO2 SERPL-SCNC: 19 MMOL/L (ref 23–29)
CO2 SERPL-SCNC: 23 MMOL/L (ref 23–29)
CO2 SERPL-SCNC: 23 MMOL/L (ref 23–29)
CODING SYSTEM: NORMAL
CREAT SERPL-MCNC: 2.9 MG/DL (ref 0.5–1.4)
CREAT SERPL-MCNC: 3.1 MG/DL (ref 0.5–1.4)
CREAT SERPL-MCNC: 3.1 MG/DL (ref 0.5–1.4)
DACRYOCYTES BLD QL SMEAR: ABNORMAL
DIFFERENTIAL METHOD: ABNORMAL
DISPENSE STATUS: NORMAL
EOSINOPHIL NFR BLD: 2 % (ref 0–8)
ERYTHROCYTE [DISTWIDTH] IN BLOOD BY AUTOMATED COUNT: 15.4 % (ref 11.5–14.5)
EST. GFR  (AFRICAN AMERICAN): 27.6 ML/MIN/1.73 M^2
EST. GFR  (AFRICAN AMERICAN): 27.6 ML/MIN/1.73 M^2
EST. GFR  (AFRICAN AMERICAN): 29.9 ML/MIN/1.73 M^2
EST. GFR  (NON AFRICAN AMERICAN): 23.9 ML/MIN/1.73 M^2
EST. GFR  (NON AFRICAN AMERICAN): 23.9 ML/MIN/1.73 M^2
EST. GFR  (NON AFRICAN AMERICAN): 25.9 ML/MIN/1.73 M^2
GLUCOSE SERPL-MCNC: 105 MG/DL (ref 70–110)
GLUCOSE SERPL-MCNC: 112 MG/DL (ref 70–110)
GLUCOSE SERPL-MCNC: 97 MG/DL (ref 70–110)
HAPTOGLOB SERPL-MCNC: 182 MG/DL (ref 30–250)
HCT VFR BLD AUTO: 22.1 % (ref 40–54)
HGB BLD-MCNC: 7 G/DL (ref 14–18)
HISTOPLASMA AG VALUE: 0 NG/ML
HISTOPLASMA ANTIGEN URINE: NEGATIVE
HYPOCHROMIA BLD QL SMEAR: ABNORMAL
IMM GRANULOCYTES # BLD AUTO: ABNORMAL K/UL (ref 0–0.04)
IMM GRANULOCYTES NFR BLD AUTO: ABNORMAL % (ref 0–0.5)
LDH SERPL L TO P-CCNC: 273 U/L (ref 110–260)
LYMPHOCYTES NFR BLD: 13 % (ref 18–48)
MAGNESIUM SERPL-MCNC: 1.4 MG/DL (ref 1.6–2.6)
MCH RBC QN AUTO: 27.8 PG (ref 27–31)
MCHC RBC AUTO-ENTMCNC: 31.7 G/DL (ref 32–36)
MCV RBC AUTO: 88 FL (ref 82–98)
MONOCYTES NFR BLD: 9 % (ref 4–15)
NEUTROPHILS NFR BLD: 74 % (ref 38–73)
NEUTS BAND NFR BLD MANUAL: 2 %
NRBC BLD-RTO: 0 /100 WBC
NUM UNITS TRANS PACKED RBC: NORMAL
OVALOCYTES BLD QL SMEAR: ABNORMAL
PHOSPHATE SERPL-MCNC: 4.9 MG/DL (ref 2.7–4.5)
PLATELET # BLD AUTO: 150 K/UL (ref 150–350)
PLATELET BLD QL SMEAR: ABNORMAL
PMV BLD AUTO: 11 FL (ref 9.2–12.9)
POCT GLUCOSE: 108 MG/DL (ref 70–110)
POCT GLUCOSE: 141 MG/DL (ref 70–110)
POIKILOCYTOSIS BLD QL SMEAR: SLIGHT
POLYCHROMASIA BLD QL SMEAR: ABNORMAL
POTASSIUM SERPL-SCNC: 5.1 MMOL/L (ref 3.5–5.1)
POTASSIUM SERPL-SCNC: 5.5 MMOL/L (ref 3.5–5.1)
POTASSIUM SERPL-SCNC: 5.8 MMOL/L (ref 3.5–5.1)
RBC # BLD AUTO: 2.52 M/UL (ref 4.6–6.2)
RETICS/RBC NFR AUTO: 2.5 % (ref 0.4–2)
SIROLIMUS BLD-MCNC: 7.3 NG/ML (ref 4–20)
SODIUM SERPL-SCNC: 135 MMOL/L (ref 136–145)
SODIUM SERPL-SCNC: 136 MMOL/L (ref 136–145)
SODIUM SERPL-SCNC: 136 MMOL/L (ref 136–145)
TACROLIMUS BLD-MCNC: 4.8 NG/ML (ref 5–15)
WBC # BLD AUTO: 3.95 K/UL (ref 3.9–12.7)

## 2019-07-20 PROCEDURE — 80048 BASIC METABOLIC PNL TOTAL CA: CPT | Mod: 91

## 2019-07-20 PROCEDURE — 80195 ASSAY OF SIROLIMUS: CPT

## 2019-07-20 PROCEDURE — 36415 COLL VENOUS BLD VENIPUNCTURE: CPT

## 2019-07-20 PROCEDURE — 84100 ASSAY OF PHOSPHORUS: CPT

## 2019-07-20 PROCEDURE — 63600175 PHARM REV CODE 636 W HCPCS: Performed by: HOSPITALIST

## 2019-07-20 PROCEDURE — 25000003 PHARM REV CODE 250: Performed by: NURSE PRACTITIONER

## 2019-07-20 PROCEDURE — 36430 TRANSFUSION BLD/BLD COMPNT: CPT

## 2019-07-20 PROCEDURE — P9016 RBC LEUKOCYTES REDUCED: HCPCS

## 2019-07-20 PROCEDURE — 80197 ASSAY OF TACROLIMUS: CPT

## 2019-07-20 PROCEDURE — 80048 BASIC METABOLIC PNL TOTAL CA: CPT

## 2019-07-20 PROCEDURE — 85027 COMPLETE CBC AUTOMATED: CPT

## 2019-07-20 PROCEDURE — 85007 BL SMEAR W/DIFF WBC COUNT: CPT

## 2019-07-20 PROCEDURE — 83735 ASSAY OF MAGNESIUM: CPT

## 2019-07-20 PROCEDURE — 83615 LACTATE (LD) (LDH) ENZYME: CPT

## 2019-07-20 PROCEDURE — 85045 AUTOMATED RETICULOCYTE COUNT: CPT

## 2019-07-20 PROCEDURE — 99233 SBSQ HOSP IP/OBS HIGH 50: CPT | Mod: ,,, | Performed by: INTERNAL MEDICINE

## 2019-07-20 PROCEDURE — 25000003 PHARM REV CODE 250: Performed by: HOSPITALIST

## 2019-07-20 PROCEDURE — 11000001 HC ACUTE MED/SURG PRIVATE ROOM

## 2019-07-20 PROCEDURE — 99233 SBSQ HOSP IP/OBS HIGH 50: CPT | Mod: ,,, | Performed by: HOSPITALIST

## 2019-07-20 PROCEDURE — 99233 SBSQ HOSP IP/OBS HIGH 50: CPT | Mod: GC,,, | Performed by: INTERNAL MEDICINE

## 2019-07-20 PROCEDURE — 99233 PR SUBSEQUENT HOSPITAL CARE,LEVL III: ICD-10-PCS | Mod: ,,, | Performed by: HOSPITALIST

## 2019-07-20 PROCEDURE — 99233 PR SUBSEQUENT HOSPITAL CARE,LEVL III: ICD-10-PCS | Mod: ,,, | Performed by: INTERNAL MEDICINE

## 2019-07-20 PROCEDURE — 99233 PR SUBSEQUENT HOSPITAL CARE,LEVL III: ICD-10-PCS | Mod: GC,,, | Performed by: INTERNAL MEDICINE

## 2019-07-20 PROCEDURE — 83010 ASSAY OF HAPTOGLOBIN QUANT: CPT

## 2019-07-20 RX ORDER — BISACODYL 10 MG
10 SUPPOSITORY, RECTAL RECTAL DAILY PRN
Status: DISCONTINUED | OUTPATIENT
Start: 2019-07-20 | End: 2019-07-22 | Stop reason: HOSPADM

## 2019-07-20 RX ORDER — SODIUM POLYSTYRENE SULFONATE 4.1 MEQ/G
60 POWDER, FOR SUSPENSION ORAL; RECTAL
Status: DISCONTINUED | OUTPATIENT
Start: 2019-07-20 | End: 2019-07-20

## 2019-07-20 RX ORDER — SODIUM POLYSTYRENE SULFONATE 4.1 MEQ/G
60 POWDER, FOR SUSPENSION ORAL; RECTAL ONCE
Status: COMPLETED | OUTPATIENT
Start: 2019-07-20 | End: 2019-07-20

## 2019-07-20 RX ORDER — TACROLIMUS 0.5 MG/1
2 CAPSULE ORAL 2 TIMES DAILY
Status: DISCONTINUED | OUTPATIENT
Start: 2019-07-20 | End: 2019-07-20

## 2019-07-20 RX ORDER — FUROSEMIDE 10 MG/ML
40 INJECTION INTRAMUSCULAR; INTRAVENOUS ONCE
Status: COMPLETED | OUTPATIENT
Start: 2019-07-20 | End: 2019-07-20

## 2019-07-20 RX ADMIN — ATOVAQUONE 750 MG: 750 SUSPENSION ORAL at 08:07

## 2019-07-20 RX ADMIN — TACROLIMUS 2 MG: 0.5 CAPSULE ORAL at 05:07

## 2019-07-20 RX ADMIN — KETOCONAZOLE 100 MG: 200 TABLET ORAL at 10:07

## 2019-07-20 RX ADMIN — CINACALCET HYDROCHLORIDE 60 MG: 30 TABLET, FILM COATED ORAL at 10:07

## 2019-07-20 RX ADMIN — CARVEDILOL 12.5 MG: 12.5 TABLET, FILM COATED ORAL at 08:07

## 2019-07-20 RX ADMIN — BISACODYL 10 MG RECTAL SUPPOSITORY 10 MG: at 08:07

## 2019-07-20 RX ADMIN — FUROSEMIDE 40 MG: 10 INJECTION, SOLUTION INTRAMUSCULAR; INTRAVENOUS at 03:07

## 2019-07-20 RX ADMIN — SODIUM POLYSTYRENE SULFONATE 60 G: 1 POWDER ORAL; RECTAL at 10:07

## 2019-07-20 RX ADMIN — ROSUVASTATIN CALCIUM 10 MG: 10 TABLET, FILM COATED ORAL at 08:07

## 2019-07-20 RX ADMIN — CARVEDILOL 12.5 MG: 12.5 TABLET, FILM COATED ORAL at 05:07

## 2019-07-20 RX ADMIN — SODIUM BICARBONATE 650 MG TABLET 1950 MG: at 08:07

## 2019-07-20 RX ADMIN — SODIUM POLYSTYRENE SULFONATE 60 G: 1 POWDER ORAL; RECTAL at 04:07

## 2019-07-20 RX ADMIN — PREDNISONE 5 MG: 5 TABLET ORAL at 08:07

## 2019-07-20 RX ADMIN — SIROLIMUS 1 MG: 0.5 TABLET, SUGAR COATED ORAL at 08:07

## 2019-07-20 RX ADMIN — TACROLIMUS 5 MG: 5 CAPSULE ORAL at 08:07

## 2019-07-20 RX ADMIN — DOLUTEGRAVIR SODIUM 50 MG: 50 TABLET, FILM COATED ORAL at 08:07

## 2019-07-20 RX ADMIN — OXYCODONE HYDROCHLORIDE AND ACETAMINOPHEN 1 TABLET: 5; 325 TABLET ORAL at 01:07

## 2019-07-20 RX ADMIN — THERA TABS 1 TABLET: TAB at 08:07

## 2019-07-20 RX ADMIN — NIFEDIPINE 30 MG: 30 TABLET, FILM COATED, EXTENDED RELEASE ORAL at 08:07

## 2019-07-20 RX ADMIN — SODIUM BICARBONATE 650 MG TABLET 1950 MG: at 02:07

## 2019-07-20 RX ADMIN — FAMOTIDINE 20 MG: 20 TABLET, FILM COATED ORAL at 08:07

## 2019-07-20 RX ADMIN — OXYCODONE HYDROCHLORIDE AND ACETAMINOPHEN 1 TABLET: 5; 325 TABLET ORAL at 08:07

## 2019-07-20 NOTE — ASSESSMENT & PLAN NOTE
Anemia of chronic inflammation slightly worsened.  Baseline hemoglobin:  9 0.4-10.5.  No current signs of active bleeding.  - likely due to LEFTY on CKD3 and immunosuppression/chronic inflammation  - transfused 1u PRBC on 7/19, will transfuse additional 1U PRBC today  - repeat CBC in am; check hemolysis labs  - no evidence of active bleeding

## 2019-07-20 NOTE — SUBJECTIVE & OBJECTIVE
Interval History: no events over night    Review of Systems   Constitutional: Negative for appetite change, chills and fever.   HENT: Negative for congestion.    Eyes: Negative for pain and itching.   Respiratory: Negative for cough, chest tightness and shortness of breath.    Cardiovascular: Negative for chest pain, palpitations and leg swelling.   Gastrointestinal: Negative for abdominal pain, diarrhea and nausea.   Endocrine: Negative for polyphagia and polyuria.   Genitourinary: Negative for dysuria and frequency.   Musculoskeletal: Negative for back pain.   Skin: Positive for rash.   Neurological: Negative for numbness and headaches.   Hematological: Positive for adenopathy.   Psychiatric/Behavioral: Negative for agitation and behavioral problems.     Objective:     Vital Signs (Most Recent):  Temp: 96.3 °F (35.7 °C) (07/20/19 0402)  Pulse: 69 (07/20/19 0402)  Resp: 18 (07/20/19 0402)  BP: 126/75 (07/20/19 0402)  SpO2: 95 % (07/20/19 0402) Vital Signs (24h Range):  Temp:  [96.3 °F (35.7 °C)-98.8 °F (37.1 °C)] 96.3 °F (35.7 °C)  Pulse:  [62-84] 69  Resp:  [17-18] 18  SpO2:  [95 %-98 %] 95 %  BP: (120-149)/(70-82) 126/75     Weight: 71.5 kg (157 lb 10.1 oz)  Body mass index is 23.97 kg/m².    Estimated Creatinine Clearance: 32.8 mL/min (A) (based on SCr of 2.9 mg/dL (H)).    Physical Exam   Constitutional: He is oriented to person, place, and time. He appears well-developed.   HENT:   Head: Normocephalic.   Cardiovascular: Normal rate, regular rhythm and normal heart sounds.   No murmur heard.  Pulmonary/Chest: Effort normal and breath sounds normal.   Abdominal: Soft. Bowel sounds are normal. He exhibits no distension. There is no tenderness.   Musculoskeletal: Normal range of motion. He exhibits edema (Right LE.).   Lymphadenopathy:     He has cervical adenopathy.        Right cervical: Superficial cervical adenopathy present.     He has axillary adenopathy.   Neurological: He is alert and oriented to person,  place, and time.   Skin: Rash (hyperpigmented rash) noted.   Psychiatric: He has a normal mood and affect. His behavior is normal.       Significant Labs:   Blood Culture:   Recent Labs   Lab 07/17/19  0959 07/17/19  1016   LABBLOO No Growth to date  No Growth to date  No Growth to date No Growth to date  No Growth to date  No Growth to date     BMP:   Recent Labs   Lab 07/20/19  0325   GLU 97      K 5.8*      CO2 23   BUN 32*   CREATININE 2.9*   CALCIUM 8.5*   MG 1.4*     C4 Count: No results for input(s): C4 in the last 48 hours.  CBC:   Recent Labs   Lab 07/19/19  0415 07/20/19  0325   WBC 3.44* 3.95   HGB 6.6* 7.0*   HCT 21.6* 22.1*    150     CMP:   Recent Labs   Lab 07/19/19  0414 07/19/19  1233 07/20/19  0325     138 138 136   K 5.0  5.0 4.9 5.8*     104 104 104   CO2 23  23 25 23   *  111* 119* 97   BUN 29*  29* 28* 32*   CREATININE 3.1*  3.1* 3.2* 2.9*   CALCIUM 9.1  9.1 9.1 8.5*   ANIONGAP 11  11 9 9   EGFRNONAA 23.9*  23.9* 23.0* 25.9*     Genital Culture: No results for input(s): LABGENI in the last 4320 hours.  Hepatitis Panel: No results for input(s): HEPBSAG, HEPAIGM, HEPCAB in the last 48 hours.    Invalid input(s): HAPBIGM  Lactic Acid: No results for input(s): LACTATE in the last 48 hours.    Significant Imaging: I have reviewed all pertinent imaging results/findings within the past 24 hours.

## 2019-07-20 NOTE — ASSESSMENT & PLAN NOTE
"RLE swelling and Right Groin LAD  · Lower extremity ultrasound negative for acute DVT.  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 Solid infrahilar mass on the right cannot be excluded.  Multiple enlarged lymph nodes in the right inguinal region, right pelvic sidewall, retroperitoneum and in the root of the mesentery around the celiac artery and bonnie hepatis region. This could be related to infectious process, metastasis or lymphoma. Right lower extremity and buttock soft tissue edema.  Suspect lymphedema given presence of right inguinal and pelvic sidewall lymphadenopathy and absence of DVT on Doppler ultrasound performed earlier same day. Minimal ascites.  Diffuse haziness/edema throughout the mesenteric fat with some nodularity present that could represent lymph nodes or peritoneal implants.  Peritoneal infection or neoplastic process not excluded."  · CT head and neck with cervical LAD and edema, concern for SVC thrombus but would need US to confirm  · US w/o thrombosis  · Heme/Onc following: currently plan for outpatient PET and close f/u, will need systemic chemotherapy given extent of disease  "

## 2019-07-20 NOTE — ASSESSMENT & PLAN NOTE
- KTM following, as above  - continue tacrolimus 2mg bid for now with daily tacro levels; transitioning to sirolimus  - continue sirolimus 1mg daily  - stopped myfortic in setting of Kaposi sarcoma

## 2019-07-20 NOTE — PROGRESS NOTES
[facility]  Infectious Disease  Progress Note    Patient Name: Demetrio Aguiar  MRN: [id number]  Admission Date: 7/17/2019  Length of Stay: 3 days  Attending Physician: Tae Qiu, *  Primary Care Provider: Primary Doctor No    Isolation Status: No active isolations  Assessment/Plan:      Kaposi sarcoma  39M PMH HIV on ART, hx of ESRD 2/2 HIV, now s/p DDRT kidney transplant 8/25/2017, hx of latent TB treated w/ INH in 2006, who presents to [facility] for evaluation of diffuse lymphadenopathy and skin lesions that have been ongoing for several months. Biopsy done in [facility] has returned positive for Kaposi Sarcoma    Recommendations:    - management of kaposi sarcoma with include continued ART and decrease in immunosuppression, as well as chemotherapy  - extensive additional infectious evaluation has been sent prior to pathology report confirming kaposi  - continue dolutegravir, tenofovir and emtricitabine.  - would switch tacrolimus to Sirolimus.              Thank you for your consult. I will sign off. Please contact us if you have any additional questions.    Eboni Denney MD  Infectious Disease  [facility]    Subjective:     Principal Problem:Acute kidney injury superimposed on chronic kidney disease    HPI: Demetrio Aguiar is a 39 y.o. 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 who presents to [facility] complaining of progressively worsening bilateral lower extremity edema.     Patient reports appearance of a right groin lymph node approximately 10 months ago with progressively  worsening RLE edema. Over the past month, he has noticed the appearance of hyperpigmented papules on the RLE that if now appeared on his RLQ of the abdomen. biopsy of his right groin lymph node was done 1 week ago at [facility] in [place], [place] -     He also reports some subjective fevers, Also hasapproximately 10 lb unintentional weight loss over the past 1 month.  He reports compliance with his ART daily and he is currently taking atovaquone and ketoconazole for prophylaxis.  He reports his ID physician (Dr. Reyes) recently discontinuing his azithromycin prophylaxis.  He has also been compliant with his immunotherapy for right kidney transplant but notes that his tacrolimus dose was recently decreased to 5 mg b.i.d.     He denies chest pain, palpitations, orthopnea, pruritus, pain, fluctuance, erythema, and serous/purulent drainage from any of these lesions, night sweats and PND. No hx of dysuria.No hx ofhemoptysis and BRBPR.     Pathology report from [facility] 05/06- biopsy of his right groin L.N was consistent of Kaposi Sarcoma.  Interval History: no events over night    Review of Systems   Constitutional: Negative for appetite change, chills and fever.   HENT: Negative for congestion.    Eyes: Negative for pain and itching.   Respiratory: Negative for cough, chest tightness and shortness of breath.    Cardiovascular: Negative for chest pain, palpitations and leg swelling.   Gastrointestinal: Negative for abdominal pain, diarrhea and nausea.   Endocrine: Negative for polyphagia and polyuria.   Genitourinary: Negative for dysuria and frequency.   Musculoskeletal: Negative for back pain.   Skin: Positive for rash.   Neurological: Negative for numbness and headaches.   Hematological: Positive for adenopathy.   Psychiatric/Behavioral: Negative for agitation and behavioral problems.     Objective:     Vital Signs (Most Recent):  Temp: 96.3 °F (35.7 °C) (07/20/19 0402)  Pulse: 69  (07/20/19 0402)  Resp: 18 (07/20/19 0402)  BP: 126/75 (07/20/19 0402)  SpO2: 95 % (07/20/19 0402) Vital Signs (24h Range):  Temp:  [96.3 °F (35.7 °C)-98.8 °F (37.1 °C)] 96.3 °F (35.7 °C)  Pulse:  [62-84] 69  Resp:  [17-18] 18  SpO2:  [95 %-98 %] 95 %  BP: (120-149)/(70-82) 126/75     Weight: 71.5 kg (157 lb 10.1 oz)  Body mass index is 23.97 kg/m².    Estimated Creatinine Clearance: 32.8 mL/min (A) (based on SCr of 2.9 mg/dL (H)).    Physical Exam   Constitutional: He is oriented to person, place, and time. He appears well-developed.   HENT:   Head: Normocephalic.   Cardiovascular: Normal rate, regular rhythm and normal heart sounds.   No murmur heard.  Pulmonary/Chest: Effort normal and breath sounds normal.   Abdominal: Soft. Bowel sounds are normal. He exhibits no distension. There is no tenderness.   Musculoskeletal: Normal range of motion. He exhibits edema (Right LE.).   Lymphadenopathy:     He has cervical adenopathy.        Right cervical: Superficial cervical adenopathy present.     He has axillary adenopathy.   Neurological: He is alert and oriented to person, place, and time.   Skin: Rash (hyperpigmented rash) noted.   Psychiatric: He has a normal mood and affect. His behavior is normal.       Significant Labs:   Blood Culture:   Recent Labs   Lab 07/17/19  0959 07/17/19  1016   LABBLOO No Growth to date  No Growth to date  No Growth to date No Growth to date  No Growth to date  No Growth to date     BMP:   Recent Labs   Lab 07/20/19  0325   GLU 97      K 5.8*      CO2 23   BUN 32*   CREATININE 2.9*   CALCIUM 8.5*   MG 1.4*     C4 Count: No results for input(s): C4 in the last 48 hours.  CBC:   Recent Labs   Lab 07/19/19  0415 07/20/19  0325   WBC 3.44* 3.95   HGB 6.6* 7.0*   HCT 21.6* 22.1*    150     CMP:   Recent Labs   Lab 07/19/19  0414 07/19/19  1233 07/20/19  0325     138 138 136   K 5.0  5.0 4.9 5.8*     104 104 104   CO2 23  23 25 23   *  111* 119*  97   BUN 29*  29* 28* 32*   CREATININE 3.1*  3.1* 3.2* 2.9*   CALCIUM 9.1  9.1 9.1 8.5*   ANIONGAP 11  11 9 9   EGFRNONAA 23.9*  23.9* 23.0* 25.9*     Genital Culture: No results for input(s): LABGENI in the last 4320 hours.  Hepatitis Panel: No results for input(s): HEPBSAG, HEPAIGM, HEPCAB in the last 48 hours.    Invalid input(s): HAPBIGM  Lactic Acid: No results for input(s): LACTATE in the last 48 hours.    Significant Imaging: I have reviewed all pertinent imaging results/findings within the past 24 hours.

## 2019-07-20 NOTE — ASSESSMENT & PLAN NOTE
39M PMH HIV on ART, hx of ESRD 2/2 HIV, now s/p DDRT kidney transplant 8/25/2017, hx of latent TB treated w/ INH in 2006, who presents to [facility] for evaluation of diffuse lymphadenopathy and skin lesions that have been ongoing for several months. Biopsy done in [facility] has returned positive for Kaposi Sarcoma    Recommendations:    - management of kaposi sarcoma with include continued ART and decrease in immunosuppression, as well as chemotherapy  - extensive additional infectious evaluation has been sent prior to pathology report confirming kaposi  - continue dolutegravir, tenofovir and emtricitabine.  - would switch tacrolimus to Sirolimus.

## 2019-07-20 NOTE — PROGRESS NOTES
[facility]  Kidney Transplant  Progress Note      Reason for Follow-up: Reassessment of Kidney Transplant - 2017  (#1) recipient and management of immunosuppression.    ORGAN:  RIGHT KIDNEY   Donor Type:   - Brain Death   PHS Increased Risk: no   Cold Ischemia:   Induction Medications: Thymoglobulin      Subjective:   History of Present Illness:  Mr. Aguiar is 40 yo male with HIV on HAART, HTN,  donor kidney transplant in 2017 secondary to HIVAN on prograf, MMF and prednisone with CKD III with sCr aroung 2.2-2.5. His care is usually at [facility]. [place]. He presents today with recurrent right groin swelling and right LE swelling that has been going on for the past few months but in the past month it worsened with increasing pain while ambulating and discoloration of skin around the right tibial region. Reports loosing weight along with loss of appetite/decreased PO intake.     He reports the swelling has been on going issue and has not been addressed by his providers in [place] despite he brought it up multiple times. He recently had CT of the Abdomen and biopsy of the groin there pending results. In addition to skin changes, he also has developed wart like skin lesions in the scrotum. He is compliant with his HIV and immunosuppression meds. No CD4 or viral count on file but states his viral load has been undetected for years. He sees ID every three months. Reports Prograf dose reduced to 5 mg twice daily from 9 mg BID. Otherwise no recent significant medication adjustment.     Currently denies fever, chills, n/v, HA, necks stiffness, abdominal pain, diarrhea, urinary symptoms. In the ED vitals are stable. Basic labs show anemia with Hb of 7.2 and LEFTY with sCr of 3.2 with hyperkalemia. LE US with LAP.     Mr. Aguiar is a 39 y.o. year old male who is status post Kidney Transplant - 2017  (#1).    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 [facility] Hem/Onc has evalauted patient with recs for outpatient PET Scan and outpatient f/u in 1-2 weeks. Renal function has not recovered, sCr remains stable nut elevated 3.2 mg/dL.   CT abdo/pelvis with multiple LAD in the mesentery and retroperitoneum concerning for mets/maliganancy. ID and pulm on board. Hyperkalemia with improving LEFTY. He needs to resume his home Patiromer.       Past Medical, Surgical, Family, and Social History:   Unchanged from H&P.    Scheduled Meds:   atovaquone  750 mg Oral Daily    carvedilol  12.5 mg Oral BID WM    cinacalcet  60 mg Oral Daily with breakfast    dolutegravir  50 mg Oral Daily    [START ON 7/21/2019] emtricitabine  200 mg Oral Q48H    famotidine  20 mg Oral Daily    ketoconazole  100 mg Oral Daily    multivitamin  1 tablet Oral Daily    NIFEdipine  30 mg Oral Daily    predniSONE  5 mg Oral Daily    rosuvastatin  10 mg Oral Daily    sirolimus  1 mg Oral Daily    sodium bicarbonate  1,950 mg Oral TID    tacrolimus  5 mg Oral BID    [START ON 7/21/2019] tenofovir  300 mg Oral Q48H     Continuous Infusions:  PRN Meds:sodium chloride, acetaminophen, Dextrose 10% Bolus, Dextrose 10% Bolus, glucagon (human recombinant), glucose, glucose, oxyCODONE-acetaminophen, sodium chloride 0.9%    Intake/Output - Last 3 Shifts       07/18 0700 - 07/19 0659 07/19 0700 - 07/20 0659    P.O.  480    Blood  360.4    Total Intake(mL/kg)  840.4 (11.8)    Urine (mL/kg/hr)  575 (0.3)    Total Output  575    Net  +265.4          Urine Occurrence  1 x    Stool Occurrence 0 x            Review of Systems     Objective:     Vital Signs (Most Recent):  Temp: 97.2 °F (36.2 °C)  "(07/19/19 2334)  Pulse: 68 (07/19/19 2334)  Resp: 17 (07/19/19 2334)  BP: (!) 144/81 (07/19/19 2334)  SpO2: 95 % (07/19/19 2334) Vital Signs (24h Range):  Temp:  [96.7 °F (35.9 °C)-98.8 °F (37.1 °C)] 97.2 °F (36.2 °C)  Pulse:  [62-84] 68  Resp:  [17-18] 17  SpO2:  [95 %-98 %] 95 %  BP: (120-149)/(70-82) 144/81     Weight: 71.5 kg (157 lb 10.1 oz)  Height: 5' 8" (172.7 cm)  Body mass index is 23.97 kg/m².    Physical Exam   Constitutional: He is oriented to person, place, and time. He appears well-developed and well-nourished.   HENT:   Head: Normocephalic and atraumatic.   Eyes: Pupils are equal, round, and reactive to light. EOM are normal. Right eye exhibits no discharge. Left eye exhibits no discharge.   Neck: Normal range of motion. Neck supple. No JVD present.   Abdominal: Soft.   Abdomina scars  Brown skin discoloration some flat,some raised lesions, non-tender    Genitourinary: Rectum normal.   Genitourinary Comments: Multiple wart like lesions in the scrotum    Musculoskeletal: He exhibits edema. He exhibits no tenderness or deformity.   Right thigh and LE swelling, palpable nodular swelling in the right groin    Right LE shin with raised and flat brown lesions, non-tender spanning the front and back of the leg    Neurological: He is alert and oriented to person, place, and time. No cranial nerve deficit.   Skin: Skin is warm. Capillary refill takes less than 2 seconds. Rash noted. No erythema.       Laboratory:  CBC:   Recent Labs   Lab 07/17/19  1016 07/17/19  1233 07/17/19  1541 07/19/19  0415   WBC 4.70  --   --  3.44*   RBC 2.58*  --   --  2.40*   HGB 7.2*  --   --  6.6*   HCT 22.8* 21* 21* 21.6*     --   --  171   MCV 88  --   --  90   MCH 27.9  --   --  27.5   MCHC 31.6*  --   --  30.6*     BMP:   Recent Labs   Lab 07/19/19  0009 07/19/19  0414 07/19/19  1233   * 111*  111* 119*    138  138 138   K 5.3* 5.0  5.0 4.9    104  104 104   CO2 24 23  23 25   BUN 29* 29*  29* " 28*   CREATININE 3.0* 3.1*  3.1* 3.2*   CALCIUM 9.1 9.1  9.1 9.1         Assessment/Plan:     * Acute kidney injury superimposed on chronic kidney disease stage III  LEFTY with hyperkalemia unclear etiology not a clinical pattern for ATN maybe progression of disease?  Baseline sCr 2.2-2.5, Stable sCr 3.2 mg/dL. Likely pre-renal not improved with V fluids. Also considered CNI toxicity, vs progressoin of disease  Renal bx in 2018 with out evidence of ACR  He is drinking and eating on his own  Avoid contrast/NSAIDs/nephrotoxins   Transplant renal US -unremarkable        donor kidney transplant 2017  38 yo male with kidney transplant in 2017 on immunosuppression now with LEFTY.     LEFTY with initial initially improved to sCr of 2.8,but since then sCr has stabilized at 3.1-3.2 mg/dL.  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 Continue Prograf and Prednisone.       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  Please see DDKTx       Need for prophylactic immunotherapy  Please see prophylactic immmunotherapy      HIV (human immunodeficiency virus infection)  As per ID   Suspect AiDS in setting of Kapossi and CD4 < 100      Anemia in stage 3 chronic kidney disease  Drop in Hg to 6.6 g , baseline around 9-10, no evidence of overt bleeding   Transfusing 1 ut PRBC  Fe deficient TSAT < 30 and  Ferritin barely just above 1000  Likely related to CKD and anemia        Kaposi sarcoma  Please see inguinal  Lymphoadenopathy,       Hyperkalemia  Needs to resume his Patiromer  In the meantime continue with Kayexalate powder.as needed      Complication of transplanted kidney  Please see Kaposi       Lymphadenopathy, inguinal  Right inguinal LAP biopsy c/w Kaposi sarcoma  CT abdo/pelvis also show evidence of multiple LAP in the mesentery and retroperitoneum   ID and heme/Onc on board   Follow up with CT neck/chest   Hold MMF in the setting of malignancy   Continue Prograf and prednisone           Discharge Planning:  As per primary team but form KTM standpoint of view he can f/u with [facility] as outpatient    Harjeet Boswell MD  Kidney Transplant  [facility]

## 2019-07-20 NOTE — SUBJECTIVE & OBJECTIVE
Interval History: NAEO. Denies any shortness of breath, cough, fevers, night sweats. Tolerated blood transfusion yesterday but still borderline low today with hyperkalemia. Will give kayexalate and transfuse add'l 1U PRBC.    Review of Systems   Constitutional: Negative for fatigue and fever.   Respiratory: Negative for cough, shortness of breath and wheezing.    Cardiovascular: Positive for leg swelling. Negative for chest pain.   Gastrointestinal: Negative for blood in stool, constipation, nausea and vomiting.   Genitourinary: Negative for dysuria.   Skin: Positive for color change.   Allergic/Immunologic: Positive for immunocompromised state.   Hematological: Positive for adenopathy.     Objective:     Vital Signs (Most Recent):  Temp: 96.6 °F (35.9 °C) (07/20/19 1013)  Pulse: 72 (07/20/19 1137)  Resp: 18 (07/20/19 1013)  BP: 121/69 (07/20/19 1013)  SpO2: (!) 92 % (07/20/19 1013) Vital Signs (24h Range):  Temp:  [96.3 °F (35.7 °C)-98.8 °F (37.1 °C)] 96.6 °F (35.9 °C)  Pulse:  [62-84] 72  Resp:  [16-18] 18  SpO2:  [92 %-100 %] 92 %  BP: (114-167)/(67-91) 121/69     Weight: 71.5 kg (157 lb 10.1 oz)  Body mass index is 23.97 kg/m².    Intake/Output Summary (Last 24 hours) at 7/20/2019 1238  Last data filed at 7/20/2019 0957  Gross per 24 hour   Intake 950.42 ml   Output 250 ml   Net 700.42 ml      Physical Exam   Constitutional: He is oriented to person, place, and time. He appears well-developed and well-nourished.   HENT:   Head: Normocephalic and atraumatic.   Eyes: Pupils are equal, round, and reactive to light. EOM are normal. Right eye exhibits no discharge. Left eye exhibits no discharge.   Neck: Normal range of motion. Neck supple. No JVD present.   Abdominal: Soft.   Abdomina scars  Brown skin discoloration some flat,some raised lesions, non-tender    Genitourinary: Rectum normal.   Musculoskeletal: He exhibits edema. He exhibits no tenderness or deformity.   Right lower extremity edema with significant  inguinal LAD   Neurological: He is alert and oriented to person, place, and time. No cranial nerve deficit.   Skin: Skin is warm. Capillary refill takes less than 2 seconds. Rash noted. No erythema.   Dark, mottled painless lesions to right lower extremity and inside of right thigh c/w Kaposi   Vitals reviewed.      Significant Labs:   Recent Lab Results       07/20/19  0325   07/19/19  2231        Anion Gap 9       Aniso Slight       BANDS 2.0       Basophil% 0.0       BUN, Bld 32       Calcium 8.5       Chloride 104       Cholesterol   73  Comment:  The National Cholesterol Education Program (NCEP) has set the  following guidelines (reference ranges) for Cholesterol:  Optimal.....................<200 mg/dL  Borderline High.............200-239 mg/dL  High........................> or = 240 mg/dL       CO2 23       Creatinine 2.9       Differential Method Manual       eGFR if African American 29.9       eGFR if non  25.9  Comment:  Calculation used to obtain the estimated glomerular filtration  rate (eGFR) is the CKD-EPI equation.          Eosinophil% 2.0       Glucose 97       Gran% 74.0       HDL   26  Comment:  The National Cholesterol Education Program (NCEP) has set the  following guidelines (reference values) for HDL Cholesterol:  Low...............<40 mg/dL  Optimal...........>60 mg/dL       Hdl/Cholesterol Ratio   35.6     Hematocrit 22.1       Hemoglobin 7.0       Hypo Moderate       Immature Grans (Abs) CANCELED  Comment:  Mild elevation in immature granulocytes is non specific and   can be seen in a variety of conditions including stress response,   acute inflammation, trauma and pregnancy. Correlation with other   laboratory and clinical findings is essential.    Result canceled by the ancillary.         Immature Granulocytes CANCELED  Comment:  Result canceled by the ancillary.       LDL Cholesterol External   21.2  Comment:  The National Cholesterol Education Program (NCEP) has set  the  following guidelines (reference values) for LDL Cholesterol:  Optimal.......................<130 mg/dL  Borderline High...............130-159 mg/dL  High..........................160-189 mg/dL  Very High.....................>190 mg/dL       Lymph% 13.0       Magnesium 1.4       MCH 27.8       MCHC 31.7       MCV 88       Mono% 9.0       MPV 11.0       Non-HDL Cholesterol   47  Comment:  Risk category and Non-HDL cholesterol goals:  Coronary heart disease (CHD)or equivalent (10-year risk of CHD >20%):  Non-HDL cholesterol goal     <130 mg/dL  Two or more CHD risk factors and 10-year risk of CHD <= 20%:  Non-HDL cholesterol goal     <160 mg/dL  0 to 1 CHD risk factor:  Non-HDL cholesterol goal     <190 mg/dL       nRBC 0       Ovalocytes Occasional       Phosphorus 4.9       Platelet Estimate Appears normal       Platelets 150       Poik Slight       Poly Occasional       Potassium 5.8  Comment:  *No Visible Hemolysis       RBC 2.52       RDW 15.4       Retic 2.5       Sirolimus Lvl 7.3  Comment:  Sirolimus therapeutic range for Kidney Transplant: 4.0 - 15.0 ug/ml.  Testing performed by Liquid Chromatography-Tandem  Mass Spectrometry (LC-MS/MS).  This test was developed and its performance characteristics  determined by [facility], Department of Pathology  and Laboratory Medicine in a manner consistent with CLIA  requirements. It has not been cleared or approved by the [place]  Food and Drug Administration.  This test is used for clinical  purposes.  It should not be regarded as investigational or for  research.         Sodium 136       Tacrolimus Lvl 4.8  Comment:  Testing performed by Liquid Chromatography-Tandem  Mass Spectrometry (LC-MS/MS).  This test was developed and its performance characteristics  determined by [facility], Department of Pathology  and Laboratory Medicine in a manner consistent with CLIA  requirements. It has not been cleared or approved by the [place]  Food and Drug  Administration.  This test is used for clinical   purposes.  It should not be regarded as investigational or for  research.         Tear Drop Cells Occasional       Total Cholesterol/HDL Ratio   2.8     Triglycerides   129  Comment:  The National Cholesterol Education Program (NCEP) has set the  following guidelines (reference values) for triglycerides:  Normal......................<150 mg/dL  Borderline High.............150-199 mg/dL  High........................200-499 mg/dL       WBC 3.95           All pertinent labs within the past 24 hours have been reviewed.    Significant Imaging: I have reviewed all pertinent imaging results/findings within the past 24 hours.

## 2019-07-20 NOTE — ASSESSMENT & PLAN NOTE
- induction with THYMO and solumderol  - continue Prograf 5 mg BID, but will transition to Sirolimus but will bridge with Prograf  - Start 3 mg Sirolimus today and 1 mg daily  - Follow Levels of sirolimus daily  - FK Level daily until undetected   - continue to hold Cellcept  - continue prednisone 5 mg

## 2019-07-20 NOTE — ASSESSMENT & PLAN NOTE
Drop in Hg to 6.6 g , baseline around 9-10, no evidence of overt bleeding   Transfusing 1 ut PRBC  Fe deficient TSAT < 30 and Ferritin barely just above 1000  Likely related to CKD and anemia

## 2019-07-20 NOTE — PLAN OF CARE
Problem: Adult Inpatient Plan of Care  Goal: Plan of Care Review  Outcome: Ongoing (interventions implemented as appropriate)     07/20/19 1533   Plan of Care Review   Plan of Care Reviewed With patient   Progress no change     Pt aaox4. V/s stable. 1 unit rbc adm. Pt tolerated well. No complaints of pain. Will continue to monitor and interventions as appropriate.

## 2019-07-20 NOTE — PLAN OF CARE
Problem: Adult Inpatient Plan of Care  Goal: Plan of Care Review  Outcome: Ongoing (interventions implemented as appropriate)  Patient awake and alert. Vss. Bed low and in locked position. Telemetry intact. Receiving pain medication as needed.

## 2019-07-20 NOTE — ASSESSMENT & PLAN NOTE
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   · IV hydration with 2 L normal saline bolused    · For hyperkalemia:  given multiple doses of kayexalate with improvement yesterday although still hyperK today, will continue to monitor closely and redose kayexalate  · Continuous telemetry while hyperkalemic  · Renal ultrasound: stable mild hydronephrosis, otherwise unremarkable  · Avoid nephrotoxic medications, renally dose all medications  · Cinacalet 60 mg daily   · Sodium bicarbonate 1,950 t.i.d.  · Tacrolimus 2 mg b.i.d. And daily levels; transitioning to sirolimus 1mg daily  · Holding mycophenolate for now in setting of Kaposi's  · Prednisone 5 mg daily and famotidine 20 mg daily  · KTM consulted, appreciate assistance

## 2019-07-20 NOTE — PROGRESS NOTES
[facility] Hospital Medicine  Progress Note    Patient Name: Demetrio Aguiar  MRN: [id number]  Patient Class: IP- Inpatient   Admission Date: 7/17/2019  Length of Stay: 3 days  Attending Physician: Tae Qiu, *  Primary Care Provider: Primary Doctor [facility] Medicine Team: [facility] [place] [place] HOSP MED R Tae Qiu MD    Subjective:     Principal Problem:Acute kidney injury superimposed on chronic kidney disease      HPI:  Demetrio Aguiar is a 39 y.o. 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 HTN, and HLD who presents to [facility] [place] [place] complaining of progressively worsening bilateral lower extremity edema.     Patient reports appearance of a right groin lymph node approximately 10 months ago with progressively worsening RLE edema. Over the past month, he has noticed the appearance of hyperpigmented papules on the RLE that if now appeared on his RLQ of the abdomen.  He denies pruritus, pain, fluctuance, erythema, and serous/purulent drainage from any of these lesions. He reports receiving a biopsy of his right groin lymph node 1 week ago at [facility] in [place], [place] - he is unsure of the biopsy results.  He also reports some subjective fevers, but denies night sweats.  He reports approximately 10 lb unintentional weight loss over the past 1 month.  He endorses good urine output and denies dysuria or change in urine color or consistency.  He reports compliance with his ART daily and he is currently taking atovaquone and ketoconazole for prophylaxis.  He reports his ID physician (Dr. Reyes)  recently discontinuing his azithromycin prophylaxis.  He has also been compliant with his immunotherapy for right kidney transplant but notes that his tacrolimus dose was recently decreased to 5 mg b.i.d. He denies chest pain, palpitations, orthopnea, and PND.  He does endorse mild SOB and some increased weakness for the past few weeks.  He denies hemoptysis and BRBPR, but does note some bowel movements that sound concerning for melena.     In the ED, he was noted to have LEFTY, creatinine:  3.2 as well as hyperkalemia, potassium:  6.4.  He was given albuterol nebulizers, insulin/glucose IV, and Kayexalate to treat hyperkalemia.  He was also bolused 2 L normal saline given suspicion of prerenal etiology for acute kidney injury.    Overview/Hospital Course:  No notes on file    Interval History: NAEO. Denies any shortness of breath, cough, fevers, night sweats. Tolerated blood transfusion yesterday but still borderline low today with hyperkalemia. Will give kayexalate and transfuse add'l 1U PRBC.    Review of Systems   Constitutional: Negative for fatigue and fever.   Respiratory: Negative for cough, shortness of breath and wheezing.    Cardiovascular: Positive for leg swelling. Negative for chest pain.   Gastrointestinal: Negative for blood in stool, constipation, nausea and vomiting.   Genitourinary: Negative for dysuria.   Skin: Positive for color change.   Allergic/Immunologic: Positive for immunocompromised state.   Hematological: Positive for adenopathy.     Objective:     Vital Signs (Most Recent):  Temp: 96.6 °F (35.9 °C) (07/20/19 1013)  Pulse: 72 (07/20/19 1137)  Resp: 18 (07/20/19 1013)  BP: 121/69 (07/20/19 1013)  SpO2: (!) 92 % (07/20/19 1013) Vital Signs (24h Range):  Temp:  [96.3 °F (35.7 °C)-98.8 °F (37.1 °C)] 96.6 °F (35.9 °C)  Pulse:  [62-84] 72  Resp:  [16-18] 18  SpO2:  [92 %-100 %] 92 %  BP: (114-167)/(67-91) 121/69     Weight: 71.5 kg (157 lb 10.1 oz)  Body mass index is 23.97  kg/m².    Intake/Output Summary (Last 24 hours) at 7/20/2019 1238  Last data filed at 7/20/2019 0957  Gross per 24 hour   Intake 950.42 ml   Output 250 ml   Net 700.42 ml      Physical Exam   Constitutional: He is oriented to person, place, and time. He appears well-developed and well-nourished.   HENT:   Head: Normocephalic and atraumatic.   Eyes: Pupils are equal, round, and reactive to light. EOM are normal. Right eye exhibits no discharge. Left eye exhibits no discharge.   Neck: Normal range of motion. Neck supple. No JVD present.   Abdominal: Soft.   Abdomina scars  Brown skin discoloration some flat,some raised lesions, non-tender    Genitourinary: Rectum normal.   Musculoskeletal: He exhibits edema. He exhibits no tenderness or deformity.   Right lower extremity edema with significant inguinal LAD   Neurological: He is alert and oriented to person, place, and time. No cranial nerve deficit.   Skin: Skin is warm. Capillary refill takes less than 2 seconds. Rash noted. No erythema.   Dark, mottled painless lesions to right lower extremity and inside of right thigh c/w Kaposi   Vitals reviewed.      Significant Labs:   Recent Lab Results       07/20/19  0325   07/19/19  2231        Anion Gap 9       Aniso Slight       BANDS 2.0       Basophil% 0.0       BUN, Bld 32       Calcium 8.5       Chloride 104       Cholesterol   73  Comment:  The National Cholesterol Education Program (NCEP) has set the  following guidelines (reference ranges) for Cholesterol:  Optimal.....................<200 mg/dL  Borderline High.............200-239 mg/dL  High........................> or = 240 mg/dL       CO2 23       Creatinine 2.9       Differential Method Manual       eGFR if African American 29.9       eGFR if non  25.9  Comment:  Calculation used to obtain the estimated glomerular filtration  rate (eGFR) is the CKD-EPI equation.          Eosinophil% 2.0       Glucose 97       Gran% 74.0       HDL    26  Comment:  The National Cholesterol Education Program (NCEP) has set the  following guidelines (reference values) for HDL Cholesterol:  Low...............<40 mg/dL  Optimal...........>60 mg/dL       Hdl/Cholesterol Ratio   35.6     Hematocrit 22.1       Hemoglobin 7.0       Hypo Moderate       Immature Grans (Abs) CANCELED  Comment:  Mild elevation in immature granulocytes is non specific and   can be seen in a variety of conditions including stress response,   acute inflammation, trauma and pregnancy. Correlation with other   laboratory and clinical findings is essential.    Result canceled by the ancillary.         Immature Granulocytes CANCELED  Comment:  Result canceled by the ancillary.       LDL Cholesterol External   21.2  Comment:  The National Cholesterol Education Program (NCEP) has set the  following guidelines (reference values) for LDL Cholesterol:  Optimal.......................<130 mg/dL  Borderline High...............130-159 mg/dL  High..........................160-189 mg/dL  Very High.....................>190 mg/dL       Lymph% 13.0       Magnesium 1.4       MCH 27.8       MCHC 31.7       MCV 88       Mono% 9.0       MPV 11.0       Non-HDL Cholesterol   47  Comment:  Risk category and Non-HDL cholesterol goals:  Coronary heart disease (CHD)or equivalent (10-year risk of CHD >20%):  Non-HDL cholesterol goal     <130 mg/dL  Two or more CHD risk factors and 10-year risk of CHD <= 20%:  Non-HDL cholesterol goal     <160 mg/dL  0 to 1 CHD risk factor:  Non-HDL cholesterol goal     <190 mg/dL       nRBC 0       Ovalocytes Occasional       Phosphorus 4.9       Platelet Estimate Appears normal       Platelets 150       Poik Slight       Poly Occasional       Potassium 5.8  Comment:  *No Visible Hemolysis       RBC 2.52       RDW 15.4       Retic 2.5       Sirolimus Lvl 7.3  Comment:  Sirolimus therapeutic range for Kidney Transplant: 4.0 - 15.0 ug/ml.  Testing performed by Liquid  Chromatography-Tandem  Mass Spectrometry (LC-MS/MS).  This test was developed and its performance characteristics  determined by [facility], Department of Pathology  and Laboratory Medicine in a manner consistent with CLIA  requirements. It has not been cleared or approved by the [place]  Food and Drug Administration.  This test is used for clinical  purposes.  It should not be regarded as investigational or for  research.         Sodium 136       Tacrolimus Lvl 4.8  Comment:  Testing performed by Liquid Chromatography-Tandem  Mass Spectrometry (LC-MS/MS).  This test was developed and its performance characteristics  determined by [facility], Department of Pathology  and Laboratory Medicine in a manner consistent with CLIA  requirements. It has not been cleared or approved by the [place]  Food and Drug Administration.  This test is used for clinical   purposes.  It should not be regarded as investigational or for  research.         Tear Drop Cells Occasional       Total Cholesterol/HDL Ratio   2.8     Triglycerides   129  Comment:  The National Cholesterol Education Program (NCEP) has set the  following guidelines (reference values) for triglycerides:  Normal......................<150 mg/dL  Borderline High.............150-199 mg/dL  High........................200-499 mg/dL       WBC 3.95           All pertinent labs within the past 24 hours have been reviewed.    Significant Imaging: I have reviewed all pertinent imaging results/findings within the past 24 hours.      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   · IV hydration with 2 L normal saline bolused    · For hyperkalemia:  given  "multiple doses of kayexalate with improvement yesterday although still hyperK today, will continue to monitor closely and redose kayexalate  · Continuous telemetry while hyperkalemic  · Renal ultrasound: stable mild hydronephrosis, otherwise unremarkable  · Avoid nephrotoxic medications, renally dose all medications  · Cinacalet 60 mg daily   · Sodium bicarbonate 1,950 t.i.d.  · Tacrolimus 2 mg b.i.d. And daily levels; transitioning to sirolimus 1mg daily  · Holding mycophenolate for now in setting of Kaposi's  · Prednisone 5 mg daily and famotidine 20 mg daily  · KTM consulted, appreciate assistance      Renovascular hypertension  · Continue nifedipine 30 mg daily and carvedilol 12.5 mg b.i.d.  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 Solid infrahilar mass on the right cannot be excluded.  Multiple enlarged lymph nodes in the right inguinal region, right pelvic sidewall, retroperitoneum and in the root of the mesentery around the celiac artery and bonnie hepatis region. This could be related to infectious process, metastasis or lymphoma. Right lower extremity and buttock soft tissue edema.  Suspect lymphedema given presence of right inguinal and pelvic sidewall lymphadenopathy and absence of DVT on Doppler ultrasound performed earlier same day. Minimal ascites.  Diffuse haziness/edema throughout the mesenteric fat with some nodularity present that could represent lymph nodes or peritoneal implants.  Peritoneal infection or neoplastic process not excluded."  · CT head and neck with cervical LAD and " edema, concern for SVC thrombus but would need US to confirm  · US w/o thrombosis  · Heme/Onc following: currently plan for outpatient PET and close f/u, will need systemic chemotherapy given extent of disease    Lymphadenopathy, inguinal  - initially concerned for Kaposi vs other malignant process vs TB  - now with biopsy results from OSH with Kaposi, which likely explains LAD and skin changes to lower extremity  - Heme/Onc consulted, awaiting recs      Hyperkalemia  - dosing with kayexalate and will continue to monitor with serial BMPs q6 hours until resolution      Prophylactic immunotherapy  - see above       donor kidney transplant 2017  - KTM following      Anemia in stage 3 chronic kidney disease  Anemia of chronic inflammation slightly worsened.  Baseline hemoglobin:  9 0.4-10.5.  No current signs of active bleeding.  - likely due to LEFTY on CKD3 and immunosuppression/chronic inflammation  - transfused 1u PRBC on , will transfuse additional 1U PRBC today  - repeat CBC in am; check hemolysis labs  - no evidence of active bleeding      Need for prophylactic immunotherapy  - 2/2 kidney transplant      At risk for opportunistic infections  - 2/2 immunosuppression      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  - KTM following, as above  - continue tacrolimus 2mg bid for now with daily tacro levels; transitioning to sirolimus  - continue sirolimus 1mg daily  - stopped myfortic in setting of Kaposi sarcoma      HIV (human immunodeficiency virus infection)  · Continue ART at renal dose:  Dolutegravir 50 mg daily, emtricitabine 200mg q48 hrs, and tenofovir 300mg q48 hrs  · Follow-up CD4/CD8 ratio and HIV RNA viral load  · Continue atovaquone 750 mg daily and ketoconazole 100 mg daily for antibiotic prophylaxis  · Patient reports azithromycin recently stopped by outpatient ID physician  · Continue multivitamin daily  · Transplant ID consulted, appreciate assistance      TB lung, latent  - initially  concerned for possible disseminated TB as potential cause of LAD per transplant ID; however, this appears much less likely now that biopsy results have returned  - appreciate Transplant ID recs      Hyperlipidemia  · Decreased home Crestor to 10 mg daily given LEFTY        VTE Risk Mitigation (From admission, onward)        Ordered     IP VTE LOW RISK PATIENT  Once      07/17/19 1741     Place sequential compression device  Until discontinued      07/17/19 1741                Tae Qiu MD  Department of Hospital Medicine   [facility]

## 2019-07-20 NOTE — ASSESSMENT & PLAN NOTE
40 yo male with kidney transplant in 2017 on immunosuppression now with LEFTY.     LEFTY with initial initially improved to sCr of 2.8,but since then sCr has stabilized at 3.1-3.2 mg/dL.  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 Continue Prograf and Prednisone.

## 2019-07-20 NOTE — SUBJECTIVE & OBJECTIVE
Subjective:   History of Present Illness:  Mr. Aguiar is 40 yo male with HIV on HAART, HTN,  donor kidney transplant in 2017 secondary to HIVAN on prograf, MMF and prednisone with CKD III with sCr aroung 2.2-2.5. His care is usually at [facility]. He presents today with recurrent right groin swelling and right LE swelling that has been going on for the past few months but in the past month it worsened with increasing pain while ambulating and discoloration of skin around the right tibial region. Reports loosing weight along with loss of appetite/decreased PO intake.     He reports the swelling has been on going issue and has not been addressed by his providers in [place] despite he brought it up multiple times. He recently had CT of the Abdomen and biopsy of the groin there pending results. In addition to skin changes, he also has developed wart like skin lesions in the scrotum. He is compliant with his HIV and immunosuppression meds. No CD4 or viral count on file but states his viral load has been undetected for years. He sees ID every three months. Reports Prograf dose reduced to 5 mg twice daily from 9 mg BID. Otherwise no recent significant medication adjustment.     Currently denies fever, chills, n/v, HA, necks stiffness, abdominal pain, diarrhea, urinary symptoms. In the ED vitals are stable. Basic labs show anemia with Hb of 7.2 and LEFTY with sCr of 3.2 with hyperkalemia. LE US with LAP.     Mr. Aguiar is a 39 y.o. year old male who is status post Kidney Transplant - 2017  (#1).    His maintenance immunosuppression consists of:   Immunosuppressants (From admission, onward)    Start     Stop Route Frequency Ordered    19 0800  sirolimus tablet 1 mg      -- Oral Daily 19 1314    19 1845  tacrolimus capsule 5 mg      -- Oral 2 times daily 19 1741        Interval History: Right inguinal bx from OSH resulted Kaposi sarcoma. [facility] Hem/Onc has  evalauted patient with recs for outpatient PET Scan and outpatient f/u in 1-2 weeks. Renal function has not recovered, sCr remains stable nut elevated 3.2 mg/dL.   CT abdo/pelvis with multiple LAD in the mesentery and retroperitoneum concerning for mets/maliganancy. ID and pulm on board. Hyperkalemia with improving LEFTY. He needs to resume his home Patiromer.       Past Medical, Surgical, Family, and Social History:   Unchanged from H&P.    Scheduled Meds:   atovaquone  750 mg Oral Daily    carvedilol  12.5 mg Oral BID WM    cinacalcet  60 mg Oral Daily with breakfast    dolutegravir  50 mg Oral Daily    [START ON 7/21/2019] emtricitabine  200 mg Oral Q48H    famotidine  20 mg Oral Daily    ketoconazole  100 mg Oral Daily    multivitamin  1 tablet Oral Daily    NIFEdipine  30 mg Oral Daily    predniSONE  5 mg Oral Daily    rosuvastatin  10 mg Oral Daily    sirolimus  1 mg Oral Daily    sodium bicarbonate  1,950 mg Oral TID    tacrolimus  5 mg Oral BID    [START ON 7/21/2019] tenofovir  300 mg Oral Q48H     Continuous Infusions:  PRN Meds:sodium chloride, acetaminophen, Dextrose 10% Bolus, Dextrose 10% Bolus, glucagon (human recombinant), glucose, glucose, oxyCODONE-acetaminophen, sodium chloride 0.9%    Intake/Output - Last 3 Shifts       07/18 0700 - 07/19 0659 07/19 0700 - 07/20 0659    P.O.  480    Blood  360.4    Total Intake(mL/kg)  840.4 (11.8)    Urine (mL/kg/hr)  575 (0.3)    Total Output  575    Net  +265.4          Urine Occurrence  1 x    Stool Occurrence 0 x            Review of Systems     Objective:     Vital Signs (Most Recent):  Temp: 97.2 °F (36.2 °C) (07/19/19 2334)  Pulse: 68 (07/19/19 2334)  Resp: 17 (07/19/19 2334)  BP: (!) 144/81 (07/19/19 2334)  SpO2: 95 % (07/19/19 2334) Vital Signs (24h Range):  Temp:  [96.7 °F (35.9 °C)-98.8 °F (37.1 °C)] 97.2 °F (36.2 °C)  Pulse:  [62-84] 68  Resp:  [17-18] 17  SpO2:  [95 %-98 %] 95 %  BP: (120-149)/(70-82) 144/81     Weight: 71.5 kg (157 lb  "10.1 oz)  Height: 5' 8" (172.7 cm)  Body mass index is 23.97 kg/m².    Physical Exam   Constitutional: He is oriented to person, place, and time. He appears well-developed and well-nourished.   HENT:   Head: Normocephalic and atraumatic.   Eyes: Pupils are equal, round, and reactive to light. EOM are normal. Right eye exhibits no discharge. Left eye exhibits no discharge.   Neck: Normal range of motion. Neck supple. No JVD present.   Abdominal: Soft.   Abdomina scars  Brown skin discoloration some flat,some raised lesions, non-tender    Genitourinary: Rectum normal.   Genitourinary Comments: Multiple wart like lesions in the scrotum    Musculoskeletal: He exhibits edema. He exhibits no tenderness or deformity.   Right thigh and LE swelling, palpable nodular swelling in the right groin    Right LE shin with raised and flat brown lesions, non-tender spanning the front and back of the leg    Neurological: He is alert and oriented to person, place, and time. No cranial nerve deficit.   Skin: Skin is warm. Capillary refill takes less than 2 seconds. Rash noted. No erythema.       Laboratory:  CBC:   Recent Labs   Lab 07/17/19  1016 07/17/19  1233 07/17/19  1541 07/19/19  0415   WBC 4.70  --   --  3.44*   RBC 2.58*  --   --  2.40*   HGB 7.2*  --   --  6.6*   HCT 22.8* 21* 21* 21.6*     --   --  171   MCV 88  --   --  90   MCH 27.9  --   --  27.5   MCHC 31.6*  --   --  30.6*     BMP:   Recent Labs   Lab 07/19/19  0009 07/19/19  0414 07/19/19  1233   * 111*  111* 119*    138  138 138   K 5.3* 5.0  5.0 4.9    104  104 104   CO2 24 23  23 25   BUN 29* 29*  29* 28*   CREATININE 3.0* 3.1*  3.1* 3.2*   CALCIUM 9.1 9.1  9.1 9.1       "

## 2019-07-20 NOTE — ASSESSMENT & PLAN NOTE
LEFTY with hyperkalemia unclear etiology not a clinical pattern for ATN maybe progression of disease?  Baseline sCr 2.2-2.5, Stable sCr 3.2 mg/dL. Likely pre-renal not improved with V fluids. Also considered CNI toxicity, vs progressoin of disease  Renal bx in 08/2018 with out evidence of ACR  He is drinking and eating on his own  Avoid contrast/NSAIDs/nephrotoxins   Transplant renal US -unremarkable

## 2019-07-21 LAB
ANION GAP SERPL CALC-SCNC: 10 MMOL/L (ref 8–16)
ANION GAP SERPL CALC-SCNC: 11 MMOL/L (ref 8–16)
ANION GAP SERPL CALC-SCNC: 13 MMOL/L (ref 8–16)
BASOPHILS # BLD AUTO: 0.02 K/UL (ref 0–0.2)
BASOPHILS NFR BLD: 0.5 % (ref 0–1.9)
BUN SERPL-MCNC: 36 MG/DL (ref 6–20)
BUN SERPL-MCNC: 38 MG/DL (ref 6–20)
BUN SERPL-MCNC: 39 MG/DL (ref 6–20)
CALCIUM SERPL-MCNC: 8.6 MG/DL (ref 8.7–10.5)
CALCIUM SERPL-MCNC: 8.7 MG/DL (ref 8.7–10.5)
CALCIUM SERPL-MCNC: 9 MG/DL (ref 8.7–10.5)
CHLORIDE SERPL-SCNC: 100 MMOL/L (ref 95–110)
CHLORIDE SERPL-SCNC: 99 MMOL/L (ref 95–110)
CHLORIDE SERPL-SCNC: 99 MMOL/L (ref 95–110)
CO2 SERPL-SCNC: 24 MMOL/L (ref 23–29)
CO2 SERPL-SCNC: 25 MMOL/L (ref 23–29)
CO2 SERPL-SCNC: 26 MMOL/L (ref 23–29)
CREAT SERPL-MCNC: 3.2 MG/DL (ref 0.5–1.4)
CREAT SERPL-MCNC: 3.3 MG/DL (ref 0.5–1.4)
CREAT SERPL-MCNC: 3.4 MG/DL (ref 0.5–1.4)
DIFFERENTIAL METHOD: ABNORMAL
EOSINOPHIL # BLD AUTO: 0.1 K/UL (ref 0–0.5)
EOSINOPHIL NFR BLD: 1.9 % (ref 0–8)
ERYTHROCYTE [DISTWIDTH] IN BLOOD BY AUTOMATED COUNT: 15.3 % (ref 11.5–14.5)
EST. GFR  (AFRICAN AMERICAN): 24.7 ML/MIN/1.73 M^2
EST. GFR  (AFRICAN AMERICAN): 25.6 ML/MIN/1.73 M^2
EST. GFR  (AFRICAN AMERICAN): 26.6 ML/MIN/1.73 M^2
EST. GFR  (NON AFRICAN AMERICAN): 21.3 ML/MIN/1.73 M^2
EST. GFR  (NON AFRICAN AMERICAN): 22.1 ML/MIN/1.73 M^2
EST. GFR  (NON AFRICAN AMERICAN): 23 ML/MIN/1.73 M^2
GLUCOSE SERPL-MCNC: 101 MG/DL (ref 70–110)
GLUCOSE SERPL-MCNC: 132 MG/DL (ref 70–110)
GLUCOSE SERPL-MCNC: 99 MG/DL (ref 70–110)
HCT VFR BLD AUTO: 26 % (ref 40–54)
HGB BLD-MCNC: 8.1 G/DL (ref 14–18)
IMM GRANULOCYTES # BLD AUTO: 0.17 K/UL (ref 0–0.04)
IMM GRANULOCYTES NFR BLD AUTO: 4.6 % (ref 0–0.5)
LYMPHOCYTES # BLD AUTO: 0.6 K/UL (ref 1–4.8)
LYMPHOCYTES NFR BLD: 17 % (ref 18–48)
MAGNESIUM SERPL-MCNC: 1.4 MG/DL (ref 1.6–2.6)
MCH RBC QN AUTO: 28 PG (ref 27–31)
MCHC RBC AUTO-ENTMCNC: 31.2 G/DL (ref 32–36)
MCV RBC AUTO: 90 FL (ref 82–98)
MONOCYTES # BLD AUTO: 0.5 K/UL (ref 0.3–1)
MONOCYTES NFR BLD: 13.5 % (ref 4–15)
NEUTROPHILS # BLD AUTO: 2.3 K/UL (ref 1.8–7.7)
NEUTROPHILS NFR BLD: 62.5 % (ref 38–73)
NRBC BLD-RTO: 0 /100 WBC
PHOSPHATE SERPL-MCNC: 5.8 MG/DL (ref 2.7–4.5)
PLATELET # BLD AUTO: 138 K/UL (ref 150–350)
PMV BLD AUTO: 10.5 FL (ref 9.2–12.9)
POTASSIUM SERPL-SCNC: 4.9 MMOL/L (ref 3.5–5.1)
POTASSIUM SERPL-SCNC: 5 MMOL/L (ref 3.5–5.1)
POTASSIUM SERPL-SCNC: 5 MMOL/L (ref 3.5–5.1)
RBC # BLD AUTO: 2.89 M/UL (ref 4.6–6.2)
SIROLIMUS BLD-MCNC: 2.2 NG/ML (ref 4–20)
SODIUM SERPL-SCNC: 135 MMOL/L (ref 136–145)
SODIUM SERPL-SCNC: 136 MMOL/L (ref 136–145)
SODIUM SERPL-SCNC: 136 MMOL/L (ref 136–145)
TACROLIMUS BLD-MCNC: 2.9 NG/ML (ref 5–15)
WBC # BLD AUTO: 3.71 K/UL (ref 3.9–12.7)

## 2019-07-21 PROCEDURE — 99233 PR SUBSEQUENT HOSPITAL CARE,LEVL III: ICD-10-PCS | Mod: ,,, | Performed by: HOSPITALIST

## 2019-07-21 PROCEDURE — 85025 COMPLETE CBC W/AUTO DIFF WBC: CPT

## 2019-07-21 PROCEDURE — 63600175 PHARM REV CODE 636 W HCPCS: Performed by: INTERNAL MEDICINE

## 2019-07-21 PROCEDURE — 63600175 PHARM REV CODE 636 W HCPCS: Performed by: HOSPITALIST

## 2019-07-21 PROCEDURE — 25000003 PHARM REV CODE 250: Performed by: HOSPITALIST

## 2019-07-21 PROCEDURE — 99233 SBSQ HOSP IP/OBS HIGH 50: CPT | Mod: GC,,, | Performed by: INTERNAL MEDICINE

## 2019-07-21 PROCEDURE — 36415 COLL VENOUS BLD VENIPUNCTURE: CPT

## 2019-07-21 PROCEDURE — 99233 PR SUBSEQUENT HOSPITAL CARE,LEVL III: ICD-10-PCS | Mod: GC,,, | Performed by: INTERNAL MEDICINE

## 2019-07-21 PROCEDURE — 11000001 HC ACUTE MED/SURG PRIVATE ROOM

## 2019-07-21 PROCEDURE — 80048 BASIC METABOLIC PNL TOTAL CA: CPT | Mod: 91

## 2019-07-21 PROCEDURE — 83735 ASSAY OF MAGNESIUM: CPT

## 2019-07-21 PROCEDURE — 80197 ASSAY OF TACROLIMUS: CPT

## 2019-07-21 PROCEDURE — 99233 SBSQ HOSP IP/OBS HIGH 50: CPT | Mod: ,,, | Performed by: HOSPITALIST

## 2019-07-21 PROCEDURE — 84100 ASSAY OF PHOSPHORUS: CPT

## 2019-07-21 PROCEDURE — 80195 ASSAY OF SIROLIMUS: CPT

## 2019-07-21 PROCEDURE — 25000003 PHARM REV CODE 250: Performed by: INTERNAL MEDICINE

## 2019-07-21 RX ORDER — SIROLIMUS 1 MG/1
3 TABLET, FILM COATED ORAL EVERY MORNING
Status: DISCONTINUED | OUTPATIENT
Start: 2019-07-22 | End: 2019-07-22 | Stop reason: HOSPADM

## 2019-07-21 RX ORDER — SODIUM POLYSTYRENE SULFONATE 4.1 MEQ/G
60 POWDER, FOR SUSPENSION ORAL; RECTAL ONCE
Status: COMPLETED | OUTPATIENT
Start: 2019-07-21 | End: 2019-07-21

## 2019-07-21 RX ORDER — SEVELAMER CARBONATE 800 MG/1
1600 TABLET, FILM COATED ORAL
Status: DISCONTINUED | OUTPATIENT
Start: 2019-07-21 | End: 2019-07-22 | Stop reason: HOSPADM

## 2019-07-21 RX ORDER — SIROLIMUS 0.5 MG/1
2 TABLET, FILM COATED ORAL ONCE
Status: COMPLETED | OUTPATIENT
Start: 2019-07-21 | End: 2019-07-21

## 2019-07-21 RX ADMIN — OXYCODONE HYDROCHLORIDE AND ACETAMINOPHEN 1 TABLET: 5; 325 TABLET ORAL at 02:07

## 2019-07-21 RX ADMIN — SODIUM BICARBONATE 650 MG TABLET 1950 MG: at 08:07

## 2019-07-21 RX ADMIN — TENOFOVIR DISPROXIL FUMARATE 300 MG: 300 TABLET ORAL at 10:07

## 2019-07-21 RX ADMIN — OXYCODONE HYDROCHLORIDE AND ACETAMINOPHEN 1 TABLET: 5; 325 TABLET ORAL at 03:07

## 2019-07-21 RX ADMIN — DOLUTEGRAVIR SODIUM 50 MG: 50 TABLET, FILM COATED ORAL at 10:07

## 2019-07-21 RX ADMIN — SODIUM BICARBONATE 650 MG TABLET 1950 MG: at 10:07

## 2019-07-21 RX ADMIN — NIFEDIPINE 30 MG: 30 TABLET, FILM COATED, EXTENDED RELEASE ORAL at 10:07

## 2019-07-21 RX ADMIN — THERA TABS 1 TABLET: TAB at 10:07

## 2019-07-21 RX ADMIN — SEVELAMER CARBONATE 1600 MG: 800 TABLET, FILM COATED ORAL at 05:07

## 2019-07-21 RX ADMIN — SODIUM BICARBONATE 650 MG TABLET 1950 MG: at 02:07

## 2019-07-21 RX ADMIN — OXYCODONE HYDROCHLORIDE AND ACETAMINOPHEN 1 TABLET: 5; 325 TABLET ORAL at 11:07

## 2019-07-21 RX ADMIN — EMTRICITABINE 200 MG: 200 CAPSULE ORAL at 10:07

## 2019-07-21 RX ADMIN — PREDNISONE 5 MG: 5 TABLET ORAL at 10:07

## 2019-07-21 RX ADMIN — ROSUVASTATIN CALCIUM 10 MG: 10 TABLET, FILM COATED ORAL at 10:07

## 2019-07-21 RX ADMIN — ATOVAQUONE 750 MG: 750 SUSPENSION ORAL at 10:07

## 2019-07-21 RX ADMIN — SODIUM POLYSTYRENE SULFONATE 60 G: 1 POWDER ORAL; RECTAL at 10:07

## 2019-07-21 RX ADMIN — CARVEDILOL 12.5 MG: 12.5 TABLET, FILM COATED ORAL at 10:07

## 2019-07-21 RX ADMIN — MAGNESIUM SULFATE HEPTAHYDRATE 1 G: 500 INJECTION, SOLUTION INTRAMUSCULAR; INTRAVENOUS at 10:07

## 2019-07-21 RX ADMIN — FAMOTIDINE 20 MG: 20 TABLET, FILM COATED ORAL at 10:07

## 2019-07-21 RX ADMIN — SIROLIMUS 2 MG: 0.5 TABLET, SUGAR COATED ORAL at 10:07

## 2019-07-21 RX ADMIN — CARVEDILOL 12.5 MG: 12.5 TABLET, FILM COATED ORAL at 05:07

## 2019-07-21 NOTE — ASSESSMENT & PLAN NOTE
Drop in Hg to 6.6 g , baseline around 9-10, no evidence of overt bleeding   Transfusing 1 ut PRBC with not as expected response this am 7.0   Hemolysis labs ordered by primary team  Fe deficient TSAT < 30 and Ferritin barely just above 1000 likely related to malignancy  Likely related to CKD and malignancy as well as HIV

## 2019-07-21 NOTE — ASSESSMENT & PLAN NOTE
· Continue ART at renal dose:  Dolutegravir 50 mg daily, emtricitabine 200mg q48 hrs, and tenofovir 300mg q48 hrs  · Follow-up CD4/CD8 ratio and HIV RNA viral load  · Continue atovaquone 750 mg daily; holding ketoconazole 100 mg daily in setting of hyperkalemia  · Patient reports azithromycin recently stopped by outpatient ID physician  · Continue multivitamin daily  · Transplant ID consulted, appreciate assistance  · Will need very close f/u with outpatient ID doctor for possible further dose adjustments if renal function continues to decline

## 2019-07-21 NOTE — SUBJECTIVE & OBJECTIVE
Interval History: NAEO. Denies any shortness of breath, cough, fevers, night sweats. S/p blood transfusion yesterday with good response. Had BM, tolerating kayexalate. Will need to increase home dose of veltassa. Anticipate discharge tomorrow with timely PET and Heme/Onc f/u.    Review of Systems   Constitutional: Negative for fatigue and fever.   Respiratory: Negative for cough, shortness of breath and wheezing.    Cardiovascular: Positive for leg swelling. Negative for chest pain.   Gastrointestinal: Negative for blood in stool, constipation, nausea and vomiting.   Genitourinary: Negative for dysuria.   Skin: Positive for color change.   Allergic/Immunologic: Positive for immunocompromised state.   Hematological: Positive for adenopathy.     Objective:     Vital Signs (Most Recent):  Temp: 97.5 °F (36.4 °C) (07/21/19 1126)  Pulse: 71 (07/21/19 1126)  Resp: 18 (07/21/19 1126)  BP: (!) 146/86 (07/21/19 1126)  SpO2: 95 % (07/21/19 1126) Vital Signs (24h Range):  Temp:  [96.1 °F (35.6 °C)-98.5 °F (36.9 °C)] 97.5 °F (36.4 °C)  Pulse:  [66-83] 71  Resp:  [18-20] 18  SpO2:  [93 %-98 %] 95 %  BP: (132-164)/(75-96) 146/86     Weight: 71.5 kg (157 lb 10.1 oz)  Body mass index is 23.97 kg/m².    Intake/Output Summary (Last 24 hours) at 7/21/2019 1243  Last data filed at 7/20/2019 1300  Gross per 24 hour   Intake 240 ml   Output --   Net 240 ml      Physical Exam   Constitutional: He is oriented to person, place, and time. He appears well-developed and well-nourished.   HENT:   Head: Normocephalic and atraumatic.   Eyes: Pupils are equal, round, and reactive to light. EOM are normal. Right eye exhibits no discharge. Left eye exhibits no discharge.   Neck: Normal range of motion. Neck supple. No JVD present.   Abdominal: Soft.   Abdomina scars  Brown skin discoloration some flat,some raised lesions, non-tender    Genitourinary: Rectum normal.   Musculoskeletal: He exhibits edema. He exhibits no tenderness or deformity.   Right  lower extremity edema with significant inguinal LAD   Neurological: He is alert and oriented to person, place, and time. No cranial nerve deficit.   Skin: Skin is warm. Capillary refill takes less than 2 seconds. Rash noted. No erythema.   Dark, mottled painless lesions to right lower extremity and inside of right thigh c/w Kaposi   Vitals reviewed.      Significant Labs:   Recent Lab Results       07/21/19  0812   07/21/19  0606   07/20/19  2350   07/20/19  1748   07/20/19  1332        Anion Gap 11 13 10 12 13     Baso #   0.02           Basophil%   0.5           BUN, Bld 38 39 36 32 31     Calcium 9.0 8.7 8.6 8.7 9.0     Chloride 100 99 99 101 103     CO2 25 24 26 23 19     Creatinine 3.4 3.2 3.3 3.1 3.1     Differential Method   Automated           eGFR if African American 24.7 26.6 25.6 27.6 27.6     eGFR if non  21.3  Comment:  Calculation used to obtain the estimated glomerular filtration  rate (eGFR) is the CKD-EPI equation.    23.0  Comment:  Calculation used to obtain the estimated glomerular filtration  rate (eGFR) is the CKD-EPI equation.    22.1  Comment:  Calculation used to obtain the estimated glomerular filtration  rate (eGFR) is the CKD-EPI equation.    23.9  Comment:  Calculation used to obtain the estimated glomerular filtration  rate (eGFR) is the CKD-EPI equation.    23.9  Comment:  Calculation used to obtain the estimated glomerular filtration  rate (eGFR) is the CKD-EPI equation.        Eos #   0.1           Eosinophil%   1.9           Glucose 99 101 132 112 105     Gran # (ANC)   2.3           Gran%   62.5           Haptoglobin         182     Hematocrit   26.0           Hemoglobin   8.1           Immature Grans (Abs)   0.17  Comment:  Mild elevation in immature granulocytes is non specific and   can be seen in a variety of conditions including stress response,   acute inflammation, trauma and pregnancy. Correlation with other   laboratory and clinical findings is essential.              Immature Granulocytes   4.6           LD         273  Comment:  Results are increased in hemolyzed samples.  *Result may be interfered by visible hemolysis       Lymph #   0.6           Lymph%   17.0           Magnesium   1.4           MCH   28.0           MCHC   31.2           MCV   90           Mono #   0.5           Mono%   13.5           MPV   10.5           nRBC   0           Phosphorus   5.8           Platelets   138           Potassium 4.9 5.0 5.0 5.5  Comment:  *No Visible Hemolysis 5.1     RBC   2.89           RDW   15.3           Sirolimus Lvl   2.2  Comment:  Sirolimus therapeutic range for Kidney Transplant: 4.0 - 15.0 ug/ml.  Testing performed by Liquid Chromatography-Tandem  Mass Spectrometry (LC-MS/MS).  This test was developed and its performance characteristics  determined by [facility], Department of Pathology  and Laboratory Medicine in a manner consistent with CLIA  requirements. It has not been cleared or approved by the [place]  Food and Drug Administration.  This test is used for clinical  purposes.  It should not be regarded as investigational or for  research.             Sodium 136 136 135 136 135     Tacrolimus Lvl   2.9  Comment:  Testing performed by Liquid Chromatography-Tandem  Mass Spectrometry (LC-MS/MS).  This test was developed and its performance characteristics  determined by [facility], Department of Pathology  and Laboratory Medicine in a manner consistent with CLIA  requirements. It has not been cleared or approved by the [place]  Food and Drug Administration.  This test is used for clinical   purposes.  It should not be regarded as investigational or for  research.             WBC   3.71                              All pertinent labs within the past 24 hours have been reviewed.    Significant Imaging: I have reviewed all pertinent imaging results/findings within the past 24 hours.

## 2019-07-21 NOTE — PROGRESS NOTES
[facility] Hospital Medicine  Progress Note    Patient Name: Demetrio Aguiar  MRN: [id number]  Patient Class: IP- Inpatient   Admission Date: 7/17/2019  Length of Stay: 4 days  Attending Physician: Tae Qiu, *  Primary Care Provider: [facility] Medicine Team: [facility] HOSP MED R Tae Qiu MD    Subjective:     Principal Problem:Acute kidney injury superimposed on chronic kidney disease      HPI:  Demetrio Aguiar is a 39 y.o. 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 HTN, and HLD who presents to [facility] complaining of progressively worsening bilateral lower extremity edema.     Patient reports appearance of a right groin lymph node approximately 10 months ago with progressively worsening RLE edema. Over the past month, he has noticed the appearance of hyperpigmented papules on the RLE that if now appeared on his RLQ of the abdomen.  He denies pruritus, pain, fluctuance, erythema, and serous/purulent drainage from any of these lesions. He reports receiving a biopsy of his right groin lymph node 1 week ago at [facility] in [place], [place] - he is unsure of the biopsy results.  He also reports some subjective fevers, but denies night sweats.  He reports approximately 10 lb unintentional weight loss over the past 1 month.  He endorses good urine output and denies dysuria or change in urine color or consistency.  He reports compliance with his ART daily and he is currently taking atovaquone and ketoconazole for prophylaxis.  He reports his ID physician (Dr. Reyes)  recently discontinuing his azithromycin prophylaxis.  He has also been compliant with his immunotherapy for right kidney transplant but notes that his tacrolimus dose was recently decreased to 5 mg b.i.d. He denies chest pain, palpitations, orthopnea, and PND.  He does endorse mild SOB and some increased weakness for the past few weeks.  He denies hemoptysis and BRBPR, but does note some bowel movements that sound concerning for melena.     In the ED, he was noted to have LEFTY, creatinine:  3.2 as well as hyperkalemia, potassium:  6.4.  He was given albuterol nebulizers, insulin/glucose IV, and Kayexalate to treat hyperkalemia.  He was also bolused 2 L normal saline given suspicion of prerenal etiology for acute kidney injury.    Overview/Hospital Course:  No notes on file    Interval History: NAEO. Denies any shortness of breath, cough, fevers, night sweats. S/p blood transfusion yesterday with good response. Had BM, tolerating kayexalate. Will need to increase home dose of veltassa. Anticipate discharge tomorrow with timely PET and Heme/Onc f/u.    Review of Systems   Constitutional: Negative for fatigue and fever.   Respiratory: Negative for cough, shortness of breath and wheezing.    Cardiovascular: Positive for leg swelling. Negative for chest pain.   Gastrointestinal: Negative for blood in stool, constipation, nausea and vomiting.   Genitourinary: Negative for dysuria.   Skin: Positive for color change.   Allergic/Immunologic: Positive for immunocompromised state.   Hematological: Positive for adenopathy.     Objective:     Vital Signs (Most Recent):  Temp: 97.5 °F (36.4 °C) (07/21/19 1126)  Pulse: 71 (07/21/19 1126)  Resp: 18 (07/21/19 1126)  BP: (!) 146/86 (07/21/19 1126)  SpO2: 95 % (07/21/19 1126) Vital Signs (24h Range):  Temp:  [96.1 °F (35.6 °C)-98.5 °F (36.9 °C)] 97.5 °F (36.4 °C)  Pulse:  [66-83] 71  Resp:  [18-20] 18  SpO2:  [93 %-98 %] 95 %  BP: (132-164)/(75-96) 146/86     Weight: 71.5 kg (157 lb  10.1 oz)  Body mass index is 23.97 kg/m².    Intake/Output Summary (Last 24 hours) at 7/21/2019 1243  Last data filed at 7/20/2019 1300  Gross per 24 hour   Intake 240 ml   Output --   Net 240 ml      Physical Exam   Constitutional: He is oriented to person, place, and time. He appears well-developed and well-nourished.   HENT:   Head: Normocephalic and atraumatic.   Eyes: Pupils are equal, round, and reactive to light. EOM are normal. Right eye exhibits no discharge. Left eye exhibits no discharge.   Neck: Normal range of motion. Neck supple. No JVD present.   Abdominal: Soft.   Abdomina scars  Brown skin discoloration some flat,some raised lesions, non-tender    Genitourinary: Rectum normal.   Musculoskeletal: He exhibits edema. He exhibits no tenderness or deformity.   Right lower extremity edema with significant inguinal LAD   Neurological: He is alert and oriented to person, place, and time. No cranial nerve deficit.   Skin: Skin is warm. Capillary refill takes less than 2 seconds. Rash noted. No erythema.   Dark, mottled painless lesions to right lower extremity and inside of right thigh c/w Kaposi   Vitals reviewed.      Significant Labs:   Recent Lab Results       07/21/19  0812   07/21/19  0606   07/20/19  2350   07/20/19  1748   07/20/19  1332        Anion Gap 11 13 10 12 13     Baso #   0.02           Basophil%   0.5           BUN, Bld 38 39 36 32 31     Calcium 9.0 8.7 8.6 8.7 9.0     Chloride 100 99 99 101 103     CO2 25 24 26 23 19     Creatinine 3.4 3.2 3.3 3.1 3.1     Differential Method   Automated           eGFR if African American 24.7 26.6 25.6 27.6 27.6     eGFR if non  21.3  Comment:  Calculation used to obtain the estimated glomerular filtration  rate (eGFR) is the CKD-EPI equation.    23.0  Comment:  Calculation used to obtain the estimated glomerular filtration  rate (eGFR) is the CKD-EPI equation.    22.1  Comment:  Calculation used to obtain the estimated glomerular  filtration  rate (eGFR) is the CKD-EPI equation.    23.9  Comment:  Calculation used to obtain the estimated glomerular filtration  rate (eGFR) is the CKD-EPI equation.    23.9  Comment:  Calculation used to obtain the estimated glomerular filtration  rate (eGFR) is the CKD-EPI equation.        Eos #   0.1           Eosinophil%   1.9           Glucose 99 101 132 112 105     Gran # (ANC)   2.3           Gran%   62.5           Haptoglobin         182     Hematocrit   26.0           Hemoglobin   8.1           Immature Grans (Abs)   0.17  Comment:  Mild elevation in immature granulocytes is non specific and   can be seen in a variety of conditions including stress response,   acute inflammation, trauma and pregnancy. Correlation with other   laboratory and clinical findings is essential.             Immature Granulocytes   4.6           LD         273  Comment:  Results are increased in hemolyzed samples.  *Result may be interfered by visible hemolysis       Lymph #   0.6           Lymph%   17.0           Magnesium   1.4           MCH   28.0           MCHC   31.2           MCV   90           Mono #   0.5           Mono%   13.5           MPV   10.5           nRBC   0           Phosphorus   5.8           Platelets   138           Potassium 4.9 5.0 5.0 5.5  Comment:  *No Visible Hemolysis 5.1     RBC   2.89           RDW   15.3           Sirolimus Lvl   2.2  Comment:  Sirolimus therapeutic range for Kidney Transplant: 4.0 - 15.0 ug/ml.  Testing performed by Liquid Chromatography-Tandem  Mass Spectrometry (LC-MS/MS).  This test was developed and its performance characteristics  determined by [facility], Department of Pathology  and Laboratory Medicine in a manner consistent with CLIA  requirements. It has not been cleared or approved by the [place]  Food and Drug Administration.  This test is used for clinical  purposes.  It should not be regarded as investigational or for  research.             Sodium 136 136 135  136 135     Tacrolimus Lvl   2.9  Comment:  Testing performed by Liquid Chromatography-Tandem  Mass Spectrometry (LC-MS/MS).  This test was developed and its performance characteristics  determined by [facility], Department of Pathology  and Laboratory Medicine in a manner consistent with CLIA  requirements. It has not been cleared or approved by the [place]  Food and Drug Administration.  This test is used for clinical   purposes.  It should not be regarded as investigational or for  research.             WBC   3.71                              All pertinent labs within the past 24 hours have been reviewed.    Significant Imaging: I have reviewed all pertinent imaging results/findings within the past 24 hours.      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   · IV hydration with 2 L normal saline bolused    · For hyperkalemia:  given multiple doses of kayexalate with improvement, will need increased dose of veltassa as outpatient (3pk daily)  · Continuous telemetry while hyperkalemic  · Renal ultrasound: stable mild hydronephrosis, otherwise unremarkable  · Avoid nephrotoxic medications, renally dose all medications  · Cinacalet 60 mg daily   · Sodium bicarbonate 1,950 t.i.d.  · Add on sevelamer 1600 tid  · Transitioning to sirolimus 3mg daily today; stop tacrolimus  · Holding mycophenolate for now in setting of Kaposi's  · Prednisone 5 mg daily and famotidine 20 mg daily  · KTM consulted, appreciate assistance      Renovascular hypertension  · Continue nifedipine 30 mg daily and carvedilol 12.5 mg b.i.d.  · Goal BP less than 130/80      Kaposi sarcoma  RLE swelling and Right Groin LAD  · Lower  "extremity ultrasound negative for acute DVT.  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 Solid infrahilar mass on the right cannot be excluded.  Multiple enlarged lymph nodes in the right inguinal region, right pelvic sidewall, retroperitoneum and in the root of the mesentery around the celiac artery and bonnie hepatis region. This could be related to infectious process, metastasis or lymphoma. Right lower extremity and buttock soft tissue edema.  Suspect lymphedema given presence of right inguinal and pelvic sidewall lymphadenopathy and absence of DVT on Doppler ultrasound performed earlier same day. Minimal ascites.  Diffuse haziness/edema throughout the mesenteric fat with some nodularity present that could represent lymph nodes or peritoneal implants.  Peritoneal infection or neoplastic process not excluded."  · CT head and neck with cervical LAD and edema, concern for SVC thrombus but would need US to confirm  · US w/o thrombosis  · Heme/Onc following: currently plan for outpatient PET and close f/u, will need systemic chemotherapy given extent of disease    Lymphadenopathy, inguinal  - initially concerned for Kaposi vs other malignant process vs TB  - now with biopsy results from OSH with Kaposi, which likely explains LAD and skin changes to lower extremity  - Heme/Onc consulted: will need outpatient PET and close f/u      Hyperkalemia  - dosing with kayexalate and will continue to monitor with serial BMPs      Prophylactic immunotherapy  - see above       donor kidney transplant 2017  - KTM following      Anemia in stage 3 chronic kidney disease  Anemia of chronic inflammation slightly worsened.  Baseline hemoglobin:  9 " 0.4-10.5.  No current signs of active bleeding.  - likely due to LEFTY on CKD3 and immunosuppression/chronic inflammation  - transfused 1u PRBC on 7/19, and additional 1U PRBC on 7/20 with appropriate response  - repeat CBC in am; hemolysis labs wnl  - no evidence of active bleeding      Need for prophylactic immunotherapy  - 2/2 kidney transplant      At risk for opportunistic infections  - 2/2 immunosuppression      HIV (human immunodeficiency virus infection)  · Continue ART at renal dose:  Dolutegravir 50 mg daily, emtricitabine 200mg q48 hrs, and tenofovir 300mg q48 hrs  · Follow-up CD4/CD8 ratio and HIV RNA viral load  · Continue atovaquone 750 mg daily; holding ketoconazole 100 mg daily in setting of hyperkalemia  · Patient reports azithromycin recently stopped by outpatient ID physician  · Continue multivitamin daily  · Transplant ID consulted, appreciate assistance  · Will need very close f/u with outpatient ID doctor for possible further dose adjustments if renal function continues to decline      TB lung, latent  - initially concerned for possible disseminated TB as potential cause of LAD per transplant ID; however, this appears much less likely now that biopsy results have returned  - appreciate Transplant ID recs      Hyperlipidemia  · Decreased home Crestor to 10 mg daily given LEFTY        VTE Risk Mitigation (From admission, onward)        Ordered     IP VTE LOW RISK PATIENT  Once      07/17/19 1741     Place sequential compression device  Until discontinued      07/17/19 1741                Tae Qiu MD  Department of Hospital Medicine   [facility]

## 2019-07-21 NOTE — ASSESSMENT & PLAN NOTE
- induction with THYMO and solumderol  - decrease Prograf 2 mg BID,   - Increase Sirolimus 3 mg daily  - Follow Levels of sirolimus daily, will adjust dose as needed  - Tacrolimus Level daily until undetected   - Stop Ketoconazole which can be making his Hyperkalemia worse  - continue to hold Cellcept in setting of malignancy  - continue prednisone 5 mg

## 2019-07-21 NOTE — PROGRESS NOTES
[facility]  Kidney Transplant  Progress Note      Reason for Follow-up: Reassessment of Kidney Transplant - 2017  (#1) recipient and management of immunosuppression.    ORGAN:  RIGHT KIDNEY   Donor Type:   - Brain Death   PHS Increased Risk: no   Cold Ischemia:   Induction Medications: Thymoglobulin      Subjective:   History of Present Illness:  Mr. Aguiar is 40 yo male with HIV on HAART, HTN,  donor kidney transplant in 2017 secondary to HIVAN on prograf, MMF and prednisone with CKD III with sCr aroung 2.2-2.5. His care is usually at [facility]. [place]. He presents today with recurrent right groin swelling and right LE swelling that has been going on for the past few months but in the past month it worsened with increasing pain while ambulating and discoloration of skin around the right tibial region. Reports loosing weight along with loss of appetite/decreased PO intake.     He reports the swelling has been on going issue and has not been addressed by his providers in [place] despite he brought it up multiple times. He recently had CT of the Abdomen and biopsy of the groin there pending results. In addition to skin changes, he also has developed wart like skin lesions in the scrotum. He is compliant with his HIV and immunosuppression meds. No CD4 or viral count on file but states his viral load has been undetected for years. He sees ID every three months. Reports Prograf dose reduced to 5 mg twice daily from 9 mg BID. Otherwise no recent significant medication adjustment.     Currently denies fever, chills, n/v, HA, necks stiffness, abdominal pain, diarrhea, urinary symptoms. In the ED vitals are stable. Basic labs show anemia with Hb of 7.2 and LEFTY with sCr of 3.2 with hyperkalemia. LE US with LAP.     Mr. Aguiar is a 39 y.o. year old male who is status post Kidney Transplant - 2017  (#1).    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 [facility] Hem/Onc has evalauted patient with recs for outpatient PET Scan and outpatient f/u in 1-2 weeks. Anemia w/u in place as labs for hemolysis have been ordered per primary team. He was transfused again 1 ut of PRBC's this am. Renal function has not recovered, sCr remains stable nut elevated 3.1 mg/dL. CT abdo/pelvis with multiple LAD in the mesentery and retroperitoneum concerning for mets/maliganancy. ID and pulm on board. Hyperkalemia persist will recommend to continue with Kayexalate powder for now and after transfusion consider a dose of Lasix of 40 mg IV x 1. He needs to resume his home Patiromer but only has 3 envelopes from home and we don't have it in formulary.     Past Medical, Surgical, Family, and Social History:   Unchanged from H&P.    Scheduled Meds:   atovaquone  750 mg Oral Daily    carvedilol  12.5 mg Oral BID WM    cinacalcet  60 mg Oral Daily with breakfast    dolutegravir  50 mg Oral Daily    [START ON 7/21/2019] emtricitabine  200 mg Oral Q48H    famotidine  20 mg Oral Daily    ketoconazole  100 mg Oral Daily    multivitamin  1 tablet Oral Daily    NIFEdipine  30 mg Oral Daily    predniSONE  5 mg Oral Daily    rosuvastatin  10 mg Oral Daily    sirolimus  1 mg Oral Daily    sodium bicarbonate  1,950 mg Oral TID    tacrolimus  2 mg Oral BID    [START ON 7/21/2019] tenofovir  300 mg Oral Q48H     Continuous Infusions:  PRN Meds:sodium chloride, acetaminophen, bisacodyl, Dextrose 10% Bolus, Dextrose 10% Bolus, glucagon (human recombinant), glucose, glucose, oxyCODONE-acetaminophen, sodium chloride 0.9%    Intake/Output - Last 3 Shifts       07/18 0700 - 07/19 0655  "07/19 0700 - 07/20 0659 07/20 0700 - 07/21 0659    P.O.  480 720    Blood  360.4 350    IV Piggyback       Total Intake(mL/kg)  840.4 (11.8) 1070 (15)    Urine (mL/kg/hr)  575 (0.3)     Total Output  575     Net  +265.4 +1070           Urine Occurrence  1 x     Stool Occurrence 0 x             Review of Systems   Constitutional: Positive for activity change, appetite change, fatigue and unexpected weight change. Negative for fever.   HENT: Negative for facial swelling, hearing loss and tinnitus.    Eyes: Negative for visual disturbance.   Respiratory: Negative for chest tightness and shortness of breath.    Cardiovascular: Positive for leg swelling. Negative for chest pain.   Gastrointestinal: Negative for abdominal pain and nausea.   Genitourinary: Negative for difficulty urinating, dysuria and flank pain.   Musculoskeletal: Negative for arthralgias and myalgias.   Skin: Positive for rash. Negative for color change.   Allergic/Immunologic: Positive for immunocompromised state.   Neurological: Positive for weakness. Negative for dizziness, syncope, light-headedness and headaches.   Hematological: Positive for adenopathy. Bruises/bleeds easily.        Night sweats   Psychiatric/Behavioral: Negative for behavioral problems and confusion.      Objective:     Vital Signs (Most Recent):  Temp: 97.6 °F (36.4 °C) (07/20/19 1910)  Pulse: 73 (07/20/19 1925)  Resp: 18 (07/20/19 1910)  BP: (!) 153/85 (07/20/19 1910)  SpO2: 98 % (07/20/19 1910) Vital Signs (24h Range):  Temp:  [96.3 °F (35.7 °C)-98.5 °F (36.9 °C)] 97.6 °F (36.4 °C)  Pulse:  [67-83] 73  Resp:  [16-18] 18  SpO2:  [92 %-100 %] 98 %  BP: (114-167)/(67-91) 153/85     Weight: 71.5 kg (157 lb 10.1 oz)  Height: 5' 8" (172.7 cm)  Body mass index is 23.97 kg/m².    Physical Exam   Constitutional: He is oriented to person, place, and time. He appears well-developed and well-nourished.   HENT:   Head: Normocephalic and atraumatic.   Eyes: Pupils are equal, round, and " reactive to light. EOM are normal. Right eye exhibits no discharge. Left eye exhibits no discharge.   Neck: Normal range of motion. Neck supple. No JVD present.   Abdominal: Soft.   Abdomina scars  Brown skin discoloration some flat,some raised lesions, non-tender    Genitourinary: Rectum normal.   Genitourinary Comments: Multiple wart like lesions in the scrotum    Musculoskeletal: He exhibits edema. He exhibits no tenderness or deformity.   Right thigh and LE swelling, palpable nodular swelling in the right groin    Right LE shin with raised and flat brown lesions, non-tender spanning the front and back of the leg    Neurological: He is alert and oriented to person, place, and time. No cranial nerve deficit.   Skin: Skin is warm. Capillary refill takes less than 2 seconds. Rash noted. No erythema.       Laboratory:  CBC:   Recent Labs   Lab 07/17/19  1016  07/17/19  1541 07/19/19  0415 07/20/19  0325   WBC 4.70  --   --  3.44* 3.95   RBC 2.58*  --   --  2.40* 2.52*   HGB 7.2*  --   --  6.6* 7.0*   HCT 22.8*   < > 21* 21.6* 22.1*     --   --  171 150   MCV 88  --   --  90 88   MCH 27.9  --   --  27.5 27.8   MCHC 31.6*  --   --  30.6* 31.7*    < > = values in this interval not displayed.     BMP:   Recent Labs   Lab 07/20/19  0325 07/20/19  1332 07/20/19  1748   GLU 97 105 112*    135* 136   K 5.8* 5.1 5.5*    103 101   CO2 23 19* 23   BUN 32* 31* 32*   CREATININE 2.9* 3.1* 3.1*   CALCIUM 8.5* 9.0 8.7         Assessment/Plan:     * Acute kidney injury superimposed on chronic kidney disease stage III  LEFTY with hyperkalemia unclear etiology not a clinical pattern for ATN maybe progression of disease?  Baseline sCr 2.2-2.5, Stable sCr 3-1-3.2 mg/dL. Likely not pre-renal not improved with IV fluids, now stopped. No evidence of CNI toxicity, vs progressoin of disease  Renal bx in 08/2018 with out evidence of ACR  He is drinking and eating on his own  Avoid contrast/NSAIDs/nephrotoxins   Transplant  renal US -unremarkable        donor kidney transplant 2017  40 yo male with kidney transplant in 2017 on immunosuppression now with LEFTY.     LEFTY with initial initially improved to sCr of 2.8,but since then sCr has stabilized at 3.1-3.2 mg/dL.  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 Continue with Kayexalate for now  Renal US with satisfactory doppler evaluation, mild hydronephrosis   Renal bx in 2018 with no evidence of ACR, AVR, ABMR, pt is complaint with medication   Right inguinal LAD biopsy now c/w Kaposi sarcoma - ID and heme/onc on board  In the setting of malignancy - hold MMF. Continue Prograf and Prednisone.       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  Please see DDKTx       Need for prophylactic immunotherapy  Please see prophylactic immmunotherapy      HIV (human immunodeficiency virus infection)  As per ID   Suspect AiDS in setting of Kapossi and CD4 < 100      Anemia in stage 3 chronic kidney disease  Drop in Hg to 6.6 g , baseline around 9-10, no evidence of overt bleeding   Transfusing 1 ut PRBC with not as expected response this am 7.0   Hemolysis labs ordered by primary team  Fe deficient TSAT < 30 and Ferritin barely just above 1000 likely related to malignancy  Likely related to CKD and malignancy as well as HIV        Kaposi sarcoma  Please see inguinal  Lymphoadenopathy,       Hyperkalemia  Needs to resume his Patiromer  In the meantime continue with Kayexalate powder.as needed  DC Ketoconalzole  Consider singel dose of LAsix  40 MG IV x 1      Complication of transplanted kidney  Please see Kaposi       Lymphadenopathy, inguinal  Right inguinal LAP biopsy c/w Kaposi sarcoma  CT abdo/pelvis also show evidence of multiple LAP in the mesentery and retroperitoneum   ID and heme/Onc on board   Follow up with CT neck/chest   Hold MMF in the setting of malignancy   Continue Prograf and prednisone           Discharge Planning:  From KTM stand point he can follow up in clinic nelly guan while in hospital    Harjeet Boswell MD  Kidney Transplant  [facility]

## 2019-07-21 NOTE — ASSESSMENT & PLAN NOTE
Needs to resume his Patiromer  In the meantime continue with Kayexalate powder.as needed  DC Ketoconalzole  Consider singel dose of LAsix 40 MG IV x 1

## 2019-07-21 NOTE — ASSESSMENT & PLAN NOTE
40 yo male with kidney transplant in 2017 on immunosuppression now with LEFTY.     LEFTY with initial initially improved to sCr of 2.8,but since then sCr has stabilized at 3.1-3.2 mg/dL.  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 Continue with Kayexalate for now  Renal US with satisfactory doppler evaluation, mild hydronephrosis   Renal bx in 01/2018 with no evidence of ACR, AVR, ABMR, pt is complaint with medication   Right inguinal LAD biopsy now c/w Kaposi sarcoma - ID and heme/onc on board  In the setting of malignancy - hold MMF. Continue Prograf and Prednisone.

## 2019-07-21 NOTE — ASSESSMENT & PLAN NOTE
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   · IV hydration with 2 L normal saline bolused    · For hyperkalemia:  given multiple doses of kayexalate with improvement, will need increased dose of veltassa as outpatient (3pk daily)  · Continuous telemetry while hyperkalemic  · Renal ultrasound: stable mild hydronephrosis, otherwise unremarkable  · Avoid nephrotoxic medications, renally dose all medications  · Cinacalet 60 mg daily   · Sodium bicarbonate 1,950 t.i.d.  · Add on sevelamer 1600 tid  · Transitioning to sirolimus 3mg daily today; stop tacrolimus  · Holding mycophenolate for now in setting of Kaposi's  · Prednisone 5 mg daily and famotidine 20 mg daily  · KTM consulted, appreciate assistance

## 2019-07-21 NOTE — PLAN OF CARE
Problem: Adult Inpatient Plan of Care  Goal: Plan of Care Review  Outcome: Ongoing (interventions implemented as appropriate)  Patient awake and alert. Vss. Patient had trouble trying to go to restroom after drinking Kayexalate. MD notified and instructed to give suppository. Patient stated he had some relief

## 2019-07-21 NOTE — ASSESSMENT & PLAN NOTE
Anemia of chronic inflammation slightly worsened.  Baseline hemoglobin:  9 0.4-10.5.  No current signs of active bleeding.  - likely due to LEFTY on CKD3 and immunosuppression/chronic inflammation  - transfused 1u PRBC on 7/19, and additional 1U PRBC on 7/20 with appropriate response  - repeat CBC in am; hemolysis labs wnl  - no evidence of active bleeding     Nasal fracture

## 2019-07-21 NOTE — PLAN OF CARE
Problem: Adult Inpatient Plan of Care  Goal: Plan of Care Review  Outcome: Ongoing (interventions implemented as appropriate)  Patient is AAOx4. Vital signs stable. No falls throughout shift. Patient ambulates independently. Percocet administered for complaint of leg pain.

## 2019-07-21 NOTE — ASSESSMENT & PLAN NOTE
- initially concerned for Kaposi vs other malignant process vs TB  - now with biopsy results from OSH with Kaposi, which likely explains LAD and skin changes to lower extremity  - Heme/Onc consulted: will need outpatient PET and close f/u

## 2019-07-21 NOTE — ASSESSMENT & PLAN NOTE
- induction with THYMO and solumderol  - decrease Prograf 2 mg BID, Tonight Prograf was stopped as he is on Ketocoanzole  - Continue Sirolimus 1 mg daily  - Follow Levels of sirolimus daily, will adjust dose as needed  - FK Level daily until undetected   - Stop Ketoconazole which can be making his Hyperkalemia worse  - continue to hold Cellcept in setting of malignancy  - continue prednisone 5 mg

## 2019-07-21 NOTE — ASSESSMENT & PLAN NOTE
LEFTY with hyperkalemia unclear etiology not a clinical pattern for ATN maybe progression of disease?  Baseline sCr 2.2-2.5, Stable sCr 3-1-3.2 mg/dL. Likely not pre-renal not improved with IV fluids, now stopped. No evidence of CNI toxicity, vs progressoin of disease  Renal bx in 08/2018 with out evidence of ACR  He is drinking and eating on his own  Avoid contrast/NSAIDs/nephrotoxins   Transplant renal US -unremarkable

## 2019-07-21 NOTE — SUBJECTIVE & OBJECTIVE
Subjective:   History of Present Illness:  Mr. Aguiar is 40 yo male with HIV on HAART, HTN,  donor kidney transplant in 2017 secondary to HIVAN on prograf, MMF and prednisone with CKD III with sCr aroung 2.2-2.5. His care is usually at [facility]. He presents today with recurrent right groin swelling and right LE swelling that has been going on for the past few months but in the past month it worsened with increasing pain while ambulating and discoloration of skin around the right tibial region. Reports loosing weight along with loss of appetite/decreased PO intake.     He reports the swelling has been on going issue and has not been addressed by his providers in [place] despite he brought it up multiple times. He recently had CT of the Abdomen and biopsy of the groin there pending results. In addition to skin changes, he also has developed wart like skin lesions in the scrotum. He is compliant with his HIV and immunosuppression meds. No CD4 or viral count on file but states his viral load has been undetected for years. He sees ID every three months. Reports Prograf dose reduced to 5 mg twice daily from 9 mg BID. Otherwise no recent significant medication adjustment.     Currently denies fever, chills, n/v, HA, necks stiffness, abdominal pain, diarrhea, urinary symptoms. In the ED vitals are stable. Basic labs show anemia with Hb of 7.2 and LEFTY with sCr of 3.2 with hyperkalemia. LE US with LAP.     Mr. Aguiar is a 39 y.o. year old male who is status post Kidney Transplant - 2017  (#1).    His maintenance immunosuppression consists of:   Immunosuppressants (From admission, onward)    Start     Stop Route Frequency Ordered    19 1800  tacrolimus capsule 2 mg      -- Oral 2 times daily 19 1215    19 0800  sirolimus tablet 1 mg      -- Oral Daily 19 1314        Interval History: Right inguinal bx from OSH resulted Kaposi sarcoma. [facility] Hem/Onc has  evalauted patient with recs for outpatient PET Scan and outpatient f/u in 1-2 weeks. Anemia w/u in place as labs for hemolysis have been ordered per primary team. He was transfused again 1 ut of PRBC's this am. Renal function has not recovered, sCr remains stable nut elevated 3.1 mg/dL. CT abdo/pelvis with multiple LAD in the mesentery and retroperitoneum concerning for mets/maliganancy. ID and pulm on board. Hyperkalemia persist will recommend to continue with Kayexalate powder for now and after transfusion consider a dose of Lasix of 40 mg IV x 1. He needs to resume his home Patiromer but only has 3 envelopes from home and we don't have it in formulary.     Past Medical, Surgical, Family, and Social History:   Unchanged from H&P.    Scheduled Meds:   atovaquone  750 mg Oral Daily    carvedilol  12.5 mg Oral BID WM    cinacalcet  60 mg Oral Daily with breakfast    dolutegravir  50 mg Oral Daily    [START ON 7/21/2019] emtricitabine  200 mg Oral Q48H    famotidine  20 mg Oral Daily    ketoconazole  100 mg Oral Daily    multivitamin  1 tablet Oral Daily    NIFEdipine  30 mg Oral Daily    predniSONE  5 mg Oral Daily    rosuvastatin  10 mg Oral Daily    sirolimus  1 mg Oral Daily    sodium bicarbonate  1,950 mg Oral TID    tacrolimus  2 mg Oral BID    [START ON 7/21/2019] tenofovir  300 mg Oral Q48H     Continuous Infusions:  PRN Meds:sodium chloride, acetaminophen, bisacodyl, Dextrose 10% Bolus, Dextrose 10% Bolus, glucagon (human recombinant), glucose, glucose, oxyCODONE-acetaminophen, sodium chloride 0.9%    Intake/Output - Last 3 Shifts       07/18 0700 - 07/19 0659 07/19 0700 - 07/20 0659 07/20 0700 - 07/21 0659    P.O.  480 720    Blood  360.4 350    IV Piggyback       Total Intake(mL/kg)  840.4 (11.8) 1070 (15)    Urine (mL/kg/hr)  575 (0.3)     Total Output  575     Net  +265.4 +1070           Urine Occurrence  1 x     Stool Occurrence 0 x             Review of Systems   Constitutional: Positive  "for activity change, appetite change, fatigue and unexpected weight change. Negative for fever.   HENT: Negative for facial swelling, hearing loss and tinnitus.    Eyes: Negative for visual disturbance.   Respiratory: Negative for chest tightness and shortness of breath.    Cardiovascular: Positive for leg swelling. Negative for chest pain.   Gastrointestinal: Negative for abdominal pain and nausea.   Genitourinary: Negative for difficulty urinating, dysuria and flank pain.   Musculoskeletal: Negative for arthralgias and myalgias.   Skin: Positive for rash. Negative for color change.   Allergic/Immunologic: Positive for immunocompromised state.   Neurological: Positive for weakness. Negative for dizziness, syncope, light-headedness and headaches.   Hematological: Positive for adenopathy. Bruises/bleeds easily.        Night sweats   Psychiatric/Behavioral: Negative for behavioral problems and confusion.      Objective:     Vital Signs (Most Recent):  Temp: 97.6 °F (36.4 °C) (07/20/19 1910)  Pulse: 73 (07/20/19 1925)  Resp: 18 (07/20/19 1910)  BP: (!) 153/85 (07/20/19 1910)  SpO2: 98 % (07/20/19 1910) Vital Signs (24h Range):  Temp:  [96.3 °F (35.7 °C)-98.5 °F (36.9 °C)] 97.6 °F (36.4 °C)  Pulse:  [67-83] 73  Resp:  [16-18] 18  SpO2:  [92 %-100 %] 98 %  BP: (114-167)/(67-91) 153/85     Weight: 71.5 kg (157 lb 10.1 oz)  Height: 5' 8" (172.7 cm)  Body mass index is 23.97 kg/m².    Physical Exam   Constitutional: He is oriented to person, place, and time. He appears well-developed and well-nourished.   HENT:   Head: Normocephalic and atraumatic.   Eyes: Pupils are equal, round, and reactive to light. EOM are normal. Right eye exhibits no discharge. Left eye exhibits no discharge.   Neck: Normal range of motion. Neck supple. No JVD present.   Abdominal: Soft.   Abdomina scars  Brown skin discoloration some flat,some raised lesions, non-tender    Genitourinary: Rectum normal.   Genitourinary Comments: Multiple wart like " lesions in the scrotum    Musculoskeletal: He exhibits edema. He exhibits no tenderness or deformity.   Right thigh and LE swelling, palpable nodular swelling in the right groin    Right LE shin with raised and flat brown lesions, non-tender spanning the front and back of the leg    Neurological: He is alert and oriented to person, place, and time. No cranial nerve deficit.   Skin: Skin is warm. Capillary refill takes less than 2 seconds. Rash noted. No erythema.       Laboratory:  CBC:   Recent Labs   Lab 07/17/19  1016  07/17/19  1541 07/19/19  0415 07/20/19  0325   WBC 4.70  --   --  3.44* 3.95   RBC 2.58*  --   --  2.40* 2.52*   HGB 7.2*  --   --  6.6* 7.0*   HCT 22.8*   < > 21* 21.6* 22.1*     --   --  171 150   MCV 88  --   --  90 88   MCH 27.9  --   --  27.5 27.8   MCHC 31.6*  --   --  30.6* 31.7*    < > = values in this interval not displayed.     BMP:   Recent Labs   Lab 07/20/19  0325 07/20/19  1332 07/20/19  1748   GLU 97 105 112*    135* 136   K 5.8* 5.1 5.5*    103 101   CO2 23 19* 23   BUN 32* 31* 32*   CREATININE 2.9* 3.1* 3.1*   CALCIUM 8.5* 9.0 8.7

## 2019-07-22 VITALS
HEART RATE: 66 BPM | RESPIRATION RATE: 16 BRPM | WEIGHT: 157.63 LBS | OXYGEN SATURATION: 98 % | HEIGHT: 68 IN | DIASTOLIC BLOOD PRESSURE: 82 MMHG | TEMPERATURE: 98 F | BODY MASS INDEX: 23.89 KG/M2 | SYSTOLIC BLOOD PRESSURE: 140 MMHG

## 2019-07-22 LAB
1,3 BETA GLUCAN SPEC-MCNC: <31 PG/ML
ANION GAP SERPL CALC-SCNC: 11 MMOL/L (ref 8–16)
BACTERIA BLD CULT: NORMAL
BACTERIA BLD CULT: NORMAL
BASOPHILS # BLD AUTO: 0.02 K/UL (ref 0–0.2)
BASOPHILS NFR BLD: 0.6 % (ref 0–1.9)
BLASTOMYCES AG INTERP: NEGATIVE
BLASTOMYCES AG RESULT: NORMAL NG/ML
BLASTOMYCES AG SPECIMEN: NORMAL
BUN SERPL-MCNC: 38 MG/DL (ref 6–20)
CALCIUM SERPL-MCNC: 9.3 MG/DL (ref 8.7–10.5)
CHLORIDE SERPL-SCNC: 101 MMOL/L (ref 95–110)
CO2 SERPL-SCNC: 24 MMOL/L (ref 23–29)
CREAT SERPL-MCNC: 3.1 MG/DL (ref 0.5–1.4)
DIFFERENTIAL METHOD: ABNORMAL
EOSINOPHIL # BLD AUTO: 0 K/UL (ref 0–0.5)
EOSINOPHIL NFR BLD: 0.8 % (ref 0–8)
ERYTHROCYTE [DISTWIDTH] IN BLOOD BY AUTOMATED COUNT: 15.2 % (ref 11.5–14.5)
EST. GFR  (AFRICAN AMERICAN): 27.6 ML/MIN/1.73 M^2
EST. GFR  (NON AFRICAN AMERICAN): 23.9 ML/MIN/1.73 M^2
GLUCOSE SERPL-MCNC: 115 MG/DL (ref 70–110)
HCT VFR BLD AUTO: 24.8 % (ref 40–54)
HGB BLD-MCNC: 7.8 G/DL (ref 14–18)
HIV UQ DATE RECEIVED: NORMAL
HIV UQ DATE REPORTED: NORMAL
HIV1 RNA # SERPL NAA+PROBE: <40 COPIES/ML
HIV1 RNA SERPL NAA+PROBE-LOG#: <1.6 LOG (10) COPIES/ML
HIV1 RNA SERPL QL NAA+PROBE: NOT DETECTED
IMM GRANULOCYTES # BLD AUTO: 0.1 K/UL (ref 0–0.04)
IMM GRANULOCYTES NFR BLD AUTO: 2.8 % (ref 0–0.5)
LYMPHOCYTES # BLD AUTO: 0.5 K/UL (ref 1–4.8)
LYMPHOCYTES NFR BLD: 13.2 % (ref 18–48)
MAGNESIUM SERPL-MCNC: 1.6 MG/DL (ref 1.6–2.6)
MCH RBC QN AUTO: 27.6 PG (ref 27–31)
MCHC RBC AUTO-ENTMCNC: 31.5 G/DL (ref 32–36)
MCV RBC AUTO: 88 FL (ref 82–98)
MONOCYTES # BLD AUTO: 0.5 K/UL (ref 0.3–1)
MONOCYTES NFR BLD: 12.9 % (ref 4–15)
NEUTROPHILS # BLD AUTO: 2.5 K/UL (ref 1.8–7.7)
NEUTROPHILS NFR BLD: 69.7 % (ref 38–73)
NRBC BLD-RTO: 0 /100 WBC
PHOSPHATE SERPL-MCNC: 5.5 MG/DL (ref 2.7–4.5)
PLATELET # BLD AUTO: 146 K/UL (ref 150–350)
PMV BLD AUTO: 11.2 FL (ref 9.2–12.9)
POTASSIUM SERPL-SCNC: 5 MMOL/L (ref 3.5–5.1)
RBC # BLD AUTO: 2.83 M/UL (ref 4.6–6.2)
SIROLIMUS BLD-MCNC: 3.2 NG/ML (ref 4–20)
SODIUM SERPL-SCNC: 136 MMOL/L (ref 136–145)
T PALLIDUM AB SER QL IF: NORMAL
TACROLIMUS BLD-MCNC: <1.5 NG/ML (ref 5–15)
WBC # BLD AUTO: 3.57 K/UL (ref 3.9–12.7)

## 2019-07-22 PROCEDURE — 25000003 PHARM REV CODE 250: Performed by: HOSPITALIST

## 2019-07-22 PROCEDURE — 84100 ASSAY OF PHOSPHORUS: CPT

## 2019-07-22 PROCEDURE — 99233 SBSQ HOSP IP/OBS HIGH 50: CPT | Mod: ,,, | Performed by: INTERNAL MEDICINE

## 2019-07-22 PROCEDURE — 99239 PR HOSPITAL DISCHARGE DAY,>30 MIN: ICD-10-PCS | Mod: ,,, | Performed by: HOSPITALIST

## 2019-07-22 PROCEDURE — 83735 ASSAY OF MAGNESIUM: CPT

## 2019-07-22 PROCEDURE — 63600175 PHARM REV CODE 636 W HCPCS: Performed by: INTERNAL MEDICINE

## 2019-07-22 PROCEDURE — 36415 COLL VENOUS BLD VENIPUNCTURE: CPT

## 2019-07-22 PROCEDURE — 80048 BASIC METABOLIC PNL TOTAL CA: CPT

## 2019-07-22 PROCEDURE — 63600175 PHARM REV CODE 636 W HCPCS: Performed by: HOSPITALIST

## 2019-07-22 PROCEDURE — 80197 ASSAY OF TACROLIMUS: CPT

## 2019-07-22 PROCEDURE — 85025 COMPLETE CBC W/AUTO DIFF WBC: CPT

## 2019-07-22 PROCEDURE — 99233 PR SUBSEQUENT HOSPITAL CARE,LEVL III: ICD-10-PCS | Mod: ,,, | Performed by: INTERNAL MEDICINE

## 2019-07-22 PROCEDURE — 80195 ASSAY OF SIROLIMUS: CPT

## 2019-07-22 PROCEDURE — 99239 HOSP IP/OBS DSCHRG MGMT >30: CPT | Mod: ,,, | Performed by: HOSPITALIST

## 2019-07-22 RX ORDER — SODIUM POLYSTYRENE SULFONATE 4.1 MEQ/G
30 POWDER, FOR SUSPENSION ORAL; RECTAL DAILY
Qty: 900 G | Refills: 0 | Status: CANCELLED | OUTPATIENT
Start: 2019-07-22 | End: 2019-08-21

## 2019-07-22 RX ORDER — TENOFOVIR DISOPROXIL FUMARATE 300 MG/1
300 TABLET, FILM COATED ORAL
Qty: 15 TABLET | Refills: 11 | Status: SHIPPED | OUTPATIENT
Start: 2019-07-23 | End: 2019-07-22 | Stop reason: HOSPADM

## 2019-07-22 RX ORDER — SEVELAMER CARBONATE 800 MG/1
1600 TABLET, FILM COATED ORAL
Qty: 180 TABLET | Refills: 11 | Status: SHIPPED | OUTPATIENT
Start: 2019-07-22 | End: 2019-08-02

## 2019-07-22 RX ORDER — SIROLIMUS 1 MG/1
3 TABLET, FILM COATED ORAL DAILY
Qty: 90 TABLET | Refills: 11 | Status: SHIPPED | OUTPATIENT
Start: 2019-07-22 | End: 2019-08-01

## 2019-07-22 RX ORDER — CARVEDILOL 6.25 MG/1
12.5 TABLET ORAL 2 TIMES DAILY WITH MEALS
Qty: 120 TABLET | Refills: 11 | Status: ON HOLD | OUTPATIENT
Start: 2019-07-22 | End: 2019-11-19 | Stop reason: HOSPADM

## 2019-07-22 RX ORDER — EMTRICITABINE 200 MG/1
200 CAPSULE ORAL
Qty: 15 CAPSULE | Refills: 11 | Status: SHIPPED | OUTPATIENT
Start: 2019-07-23 | End: 2019-07-22 | Stop reason: HOSPADM

## 2019-07-22 RX ADMIN — CINACALCET HYDROCHLORIDE 60 MG: 30 TABLET, FILM COATED ORAL at 12:07

## 2019-07-22 RX ADMIN — CARVEDILOL 12.5 MG: 12.5 TABLET, FILM COATED ORAL at 10:07

## 2019-07-22 RX ADMIN — PREDNISONE 5 MG: 5 TABLET ORAL at 10:07

## 2019-07-22 RX ADMIN — SODIUM BICARBONATE 650 MG TABLET 1950 MG: at 10:07

## 2019-07-22 RX ADMIN — DOLUTEGRAVIR SODIUM 50 MG: 50 TABLET, FILM COATED ORAL at 10:07

## 2019-07-22 RX ADMIN — ATOVAQUONE 750 MG: 750 SUSPENSION ORAL at 10:07

## 2019-07-22 RX ADMIN — FAMOTIDINE 20 MG: 20 TABLET, FILM COATED ORAL at 10:07

## 2019-07-22 RX ADMIN — THERA TABS 1 TABLET: TAB at 10:07

## 2019-07-22 RX ADMIN — SEVELAMER CARBONATE 1600 MG: 800 TABLET, FILM COATED ORAL at 10:07

## 2019-07-22 RX ADMIN — SODIUM BICARBONATE 650 MG TABLET 1950 MG: at 03:07

## 2019-07-22 RX ADMIN — NIFEDIPINE 30 MG: 30 TABLET, FILM COATED, EXTENDED RELEASE ORAL at 10:07

## 2019-07-22 RX ADMIN — SEVELAMER CARBONATE 1600 MG: 800 TABLET, FILM COATED ORAL at 12:07

## 2019-07-22 RX ADMIN — SIROLIMUS 3 MG: 1 TABLET ORAL at 10:07

## 2019-07-22 RX ADMIN — ROSUVASTATIN CALCIUM 10 MG: 10 TABLET, FILM COATED ORAL at 10:07

## 2019-07-22 NOTE — HOSPITAL COURSE
Mr. Aguiar presented with hyperkalemia and LEFTY on CKD3 of transplanted kidney. Additionally, he had right inguinal LAD and skin changes c/w Kaposi. 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    He had potassium shifted on admission with dosing of kayexalate. Hyperkalemia resolved with these interventions, although he will need scheduled veltassa as outpatient (approved by pharmacy prior to discharge). Ketoconazole prophylaxis held as well given hyperkalemia while in house. Immunosuppression was adjusted in light of Kaposi diagnosis with transition from tacrolimus to sirolimus (now on 3mg daily, also approved by pharmacy) and discontinuation of myfortic. Heme/Onc consulted and recommend outpatient PET CT with close follow up and likely systemic chemotherapy to follow (which they will arrange).    Otherwise, transplant ID was consulted while in house and have decreased dose of home ART based on decline in renal function. He will need close f/u with outpatient ID to ensure proper dosing if renal function continues to decline.    LEFTY was stable at time of discharge. Unclear if this may partially represent visceral involvement of Kaposi's, but there is little that can be done if this does represent rejection since further immunosuppression would be harmful in the setting of Kaposi's sarcoma. He will need close f/u with Transplant Nephrology as an outpatient.    Vitals:    07/22/19 1521   BP: (!) 140/82   Pulse: 66   Resp: 16   Temp: 98.1 °F (36.7 °C)     Physical Exam   Constitutional: He is oriented to person, place, and time. He appears well-developed and well-nourished.   HENT:   Head: Normocephalic and atraumatic.   Eyes: Pupils are equal, round, and reactive to light. EOM are normal. Right eye exhibits no discharge. Left eye exhibits no discharge.   Neck: Normal range of motion. Neck supple. No JVD present.   Abdominal: Soft.   Abdomina scars  Brown skin discoloration some flat,some  raised lesions, non-tender    Genitourinary: Rectum normal.   Musculoskeletal: He exhibits edema. He exhibits no tenderness or deformity.   Right lower extremity edema with significant inguinal LAD   Neurological: He is alert and oriented to person, place, and time. No cranial nerve deficit.   Skin: Skin is warm. Capillary refill takes less than 2 seconds. Rash noted. No erythema.   Dark, mottled painless lesions to right lower extremity and inside of right thigh c/w Kaposi   Vitals reviewed.

## 2019-07-22 NOTE — PROGRESS NOTES
[facility]  Kidney Transplant  Progress Note      Reason for Follow-up: Reassessment of Kidney Transplant - 2017  (#1) recipient and management of immunosuppression.    ORGAN:  RIGHT KIDNEY   Donor Type:   - Brain Death         Subjective:   History of Present Illness:  Mr. Aguiar is 40 yo male with HIV on HAART, HTN,  donor kidney transplant in 2017 secondary to HIVAN on prograf, MMF and prednisone with CKD III with sCr aroung 2.2-2.5. His care is usually at [facility] [place]. He presents today with recurrent right groin swelling and right LE swelling that has been going on for the past few months but in the past month it worsened with increasing pain while ambulating and discoloration of skin around the right tibial region. Reports loosing weight along with loss of appetite/decreased PO intake.     He reports the swelling has been on going issue and has not been addressed by his providers in [place] despite he brought it up multiple times. He recently had CT of the Abdomen and biopsy of the groin there pending results. In addition to skin changes, he also has developed wart like skin lesions in the scrotum. He is compliant with his HIV and immunosuppression meds. No CD4 or viral count on file but states his viral load has been undetected for years. He sees ID every three months. Reports Prograf dose reduced to 5 mg twice daily from 9 mg BID. Otherwise no recent significant medication adjustment.     Currently denies fever, chills, n/v, HA, necks stiffness, abdominal pain, diarrhea, urinary symptoms. In the ED vitals are stable. Basic labs show anemia with Hb of 7.2 and LEFTY with sCr of 3.2 with hyperkalemia. LE US with LAP.     Mr. Aguiar is a 39 y.o. year old male who is status post Kidney Transplant - 2017  (#1).    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 [facility] Hem/Onc has evalauted patient with recs for outpatient PET Scan and outpatient f/u in 1-2 weeks. Anemia w/u in place as labs for hemolysis, negative for hemolysis retic count low. He was transfused again 1 ut of PRBC's this am. Renal function has not recovered, sCr remains stable nut elevated 3.1 mg/dL. CT abdo/pelvis with multiple LAD in the mesentery and retroperitoneum concerning for mets/maliganancy. ID and pulm on board. Hyperkalemia improved recommend to continue with Kayexalate powder while inpatient, will talk to Specialty pharmacy for Veltassa 25.2 g enveloped once a day. Consider a dose of Lasix of 40 mg IV x 1. .     Past Medical, Surgical, Family, and Social History:   Unchanged from H&P.    Scheduled Meds:   atovaquone  750 mg Oral Daily    carvedilol  12.5 mg Oral BID WM    cinacalcet  60 mg Oral Daily with breakfast    dolutegravir  50 mg Oral Daily    emtricitabine  200 mg Oral Q48H    famotidine  20 mg Oral Daily    multivitamin  1 tablet Oral Daily    NIFEdipine  30 mg Oral Daily    predniSONE  5 mg Oral Daily    rosuvastatin  10 mg Oral Daily    sevelamer carbonate  1,600 mg Oral TID WM    [START ON 7/22/2019] sirolimus  3 mg Oral Daily    sodium bicarbonate  1,950 mg Oral TID    tenofovir  300 mg Oral Q48H     Continuous Infusions:  PRN Meds:sodium chloride, acetaminophen, bisacodyl, Dextrose 10% Bolus, Dextrose 10% Bolus, glucagon (human recombinant), glucose, glucose, oxyCODONE-acetaminophen, sodium chloride 0.9%    Intake/Output - Last 3 Shifts       07/19 0700 - 07/20 0659 07/20 0700 - 07/21 0659 07/21 0700 - 07/22 0659    P.O. 480 720     Blood 360.4 350     Total Intake(mL/kg) 840.4 (11.8) 1070 (15)     Urine (mL/kg/hr) 575 (0.3)      Total Output 575      Net +265.4 +1070            Urine Occurrence 1 x      Stool  "Occurrence   0 x           Review of Systems   Constitutional: Positive for activity change, appetite change, fatigue and unexpected weight change. Negative for fever.   HENT: Negative for facial swelling, hearing loss and tinnitus.    Eyes: Negative for visual disturbance.   Respiratory: Negative for chest tightness and shortness of breath.    Cardiovascular: Positive for leg swelling. Negative for chest pain.   Gastrointestinal: Negative for abdominal pain and nausea.   Genitourinary: Negative for difficulty urinating, dysuria and flank pain.   Musculoskeletal: Negative for arthralgias and myalgias.   Skin: Positive for rash. Negative for color change.   Allergic/Immunologic: Positive for immunocompromised state.   Neurological: Positive for weakness. Negative for dizziness, syncope, light-headedness and headaches.   Hematological: Positive for adenopathy. Bruises/bleeds easily.        Night sweats   Psychiatric/Behavioral: Negative for behavioral problems and confusion.      Objective:     Vital Signs (Most Recent):  Temp: 97.4 °F (36.3 °C) (07/21/19 1930)  Pulse: 86 (07/21/19 1930)  Resp: 18 (07/21/19 1930)  BP: 123/75 (07/21/19 1930)  SpO2: 98 % (07/21/19 1930) Vital Signs (24h Range):  Temp:  [96.1 °F (35.6 °C)-97.5 °F (36.4 °C)] 97.4 °F (36.3 °C)  Pulse:  [66-93] 86  Resp:  [18-20] 18  SpO2:  [93 %-100 %] 98 %  BP: (123-164)/(71-96) 123/75     Weight: 71.5 kg (157 lb 10.1 oz)  Height: 5' 8" (172.7 cm)  Body mass index is 23.97 kg/m².    Physical Exam   Constitutional: He is oriented to person, place, and time. He appears well-developed and well-nourished.   HENT:   Head: Normocephalic and atraumatic.   Eyes: Pupils are equal, round, and reactive to light. EOM are normal. Right eye exhibits no discharge. Left eye exhibits no discharge.   Neck: Normal range of motion. Neck supple. No JVD present.   Abdominal: Soft.   Abdomina scars  Brown skin discoloration some flat,some raised lesions, non-tender  "   Genitourinary: Rectum normal.   Genitourinary Comments: Multiple wart like lesions in the scrotum    Musculoskeletal: He exhibits edema. He exhibits no tenderness or deformity.   Right thigh and LE swelling, palpable nodular swelling in the right groin    Right LE shin with raised and flat brown lesions, non-tender spanning the front and back of the leg    Neurological: He is alert and oriented to person, place, and time. No cranial nerve deficit.   Skin: Skin is warm. Capillary refill takes less than 2 seconds. Rash noted. No erythema.       Laboratory:  CBC:   Recent Labs   Lab 19  0415 19  0325 19  0606   WBC 3.44* 3.95 3.71*   RBC 2.40* 2.52* 2.89*   HGB 6.6* 7.0* 8.1*   HCT 21.6* 22.1* 26.0*    150 138*   MCV 90 88 90   MCH 27.5 27.8 28.0   MCHC 30.6* 31.7* 31.2*     BMP:   Recent Labs   Lab 19  2350 19  0606 19  0812   * 101 99   * 136 136   K 5.0 5.0 4.9   CL 99 99 100   CO2 26 24 25   BUN 36* 39* 38*   CREATININE 3.3* 3.2* 3.4*   CALCIUM 8.6* 8.7 9.0         Assessment/Plan:     * Acute kidney injury superimposed on chronic kidney disease stage III  LEFTY with hyperkalemia unclear etiology not a clinical pattern for ATN maybe progression of disease?  Baseline sCr 2.2-2.5, Stable sCr 3-1-3.2 mg/dL. Likely not pre-renal not improved with IV fluids, now stopped. No evidence of CNI toxicity, This could be potentially progression of disease and maybe allograft infiltration from malignancy. Will attempt to optimize his allograft function but may need RRT in future  Renal bx in 2018 with out evidence of ACR  He is drinking and eating on his own  Avoid contrast/NSAIDs/nephrotoxins   Transplant renal US -unremarkable   From KTM stand point will follow up with outpatient labs and in clinic        donor kidney transplant 2017  Renal US with satisfactory doppler evaluation, mild hydronephrosis   Renal bx in 2018 with no evidence of ACR, AVR,  ABMR, pt is complaint with medication   Right inguinal LAD biopsy now c/w Kaposi sarcoma - ID and heme/onc on board  In the setting of malignancy - hold MMF. Continue Prograf and Prednisone.       Prophylactic immunotherapy  - induction with THYMO and solumderol  - Continue Prograf 2 mg BID,   - Increase Sirolimus 3 mg daily  - Follow Levels of sirolimus daily, will adjust dose as needed  - Tacrolimus Level daily until undetected   - Stop Ketoconazole which can be making his Hyperkalemia worse  - continue to hold Cellcept in setting of malignancy  - continue prednisone 5 mg         Need for prophylactic immunotherapy  Please see prophylactic immmunotherapy      HIV (human immunodeficiency virus infection)  As per ID   Suspect AiDS in setting of Kapossi and CD4 < 100      Anemia in stage 3 chronic kidney disease  Drop in Hg to 6.6 g , baseline around 9-10, no evidence of overt bleeding   Transfusing 1 ut PRBC with not as expected response this am 7.0   Hemolysis labs ordered by primary team  Fe deficient TSAT < 30 and Ferritin barely just above 1000 likely related to malignancy  Likely related to CKD and malignancy as well as HIV        Kaposi sarcoma  Please see inguinal  Lymphoadenopathy,       Hyperkalemia  Needs to resume his Patiromer       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, Blood transfusion and  Continue with Kayexalate powder for now  In the meantime Will discuss with Specialty Pharmacy to obtain Patiromer 25.2 g daily  DC Ketoconalzole  Consider singel dose of LAsix 40 MG IV x 1 if Hyperkalemia persist      Complication of transplanted kidney  Please see Kaposi       Lymphadenopathy, inguinal  Right inguinal LAP biopsy c/w Kaposi sarcoma  CT abdo/pelvis also show evidence of multiple LAP in the mesentery and retroperitoneum   ID and heme/Onc on board   PET scan as outpatient with close f/u with Hem/Onc  Hold MMF in the setting of malignancy   Continue Prograf and prednisone              Discharge Planning:  Will plna for tomorrow and plan for Scheduling PET scan prior to DC with close follow up with Hem/Onc    Harjeet Boswell MD  Kidney Transplant  [facility]

## 2019-07-22 NOTE — ASSESSMENT & PLAN NOTE
38 yo male with kidney transplant in 2017 on immunosuppression now with LEFTY.     LEFTY with initial initially improved to sCr of 2.8 mg/dL,but sCr has stabilized at 3.1-3.2 mg/dL.  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 Continue with Kayexalate for now and Patiromer as out patient  Renal US with satisfactory doppler evaluation, mild hydronephrosis   Renal bx in 01/2018 with no evidence of ACR, AVR, ABMR, pt is complaint with medication   Right inguinal LAD biopsy now c/w Kaposi sarcoma - ID and heme/onc on board  In the setting of malignancy - hold MMF. Continue Prograf and Prednisone.

## 2019-07-22 NOTE — ASSESSMENT & PLAN NOTE
Drop in Hg to 6.6 g , baseline around 9-10, no evidence of overt bleeding   S/p 2 ut PRBC's with good response, will defer to Hem/Onc for management   No evidence of Hemolysis and Retic count los  Fe deficient TSAT < 30 and Ferritin barely just above 1000 likely related to malignancy  Likely related to CKD and malignancy as well as HIV

## 2019-07-22 NOTE — SUBJECTIVE & OBJECTIVE
Subjective:   History of Present Illness:  Mr. Aguiar is 40 yo male with HIV on HAART, HTN,  donor kidney transplant in 2017 secondary to HIVAN on prograf, MMF and prednisone with CKD III with sCr aroung 2.2-2.5. His care is usually at [facility]. He presents today with recurrent right groin swelling and right LE swelling that has been going on for the past few months but in the past month it worsened with increasing pain while ambulating and discoloration of skin around the right tibial region. Reports loosing weight along with loss of appetite/decreased PO intake.     He reports the swelling has been on going issue and has not been addressed by his providers in [place] despite he brought it up multiple times. He recently had CT of the Abdomen and biopsy of the groin there pending results. In addition to skin changes, he also has developed wart like skin lesions in the scrotum. He is compliant with his HIV and immunosuppression meds. No CD4 or viral count on file but states his viral load has been undetected for years. He sees ID every three months. Reports Prograf dose reduced to 5 mg twice daily from 9 mg BID. Otherwise no recent significant medication adjustment.     Currently denies fever, chills, n/v, HA, necks stiffness, abdominal pain, diarrhea, urinary symptoms. In the ED vitals are stable. Basic labs show anemia with Hb of 7.2 and LEFTY with sCr of 3.2 with hyperkalemia. LE US with LAP.     Mr. Aguiar is a 39 y.o. year old male who is status post Kidney Transplant - 2017  (#1).    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 [facility], Hem/Onc has evalauted patient with recs for outpatient PET Scan and outpatient f/u in 1-2  weeks. Anemia w/u in place as labs for hemolysis, negative for hemolysis retic count low. He was transfused again 1 ut of PRBC's this am. Renal function has not recovered, sCr remains stable nut elevated 3.1 mg/dL. CT abdo/pelvis with multiple LAD in the mesentery and retroperitoneum concerning for mets/maliganancy. ID and pulm on board. Hyperkalemia improved recommend to continue with Kayexalate powder while inpatient, will talk to Specialty pharmacy for Veltassa 25.2 g enveloped once a day. Consider a dose of Lasix of 40 mg IV x 1. .     Past Medical, Surgical, Family, and Social History:   Unchanged from H&P.    Scheduled Meds:   atovaquone  750 mg Oral Daily    carvedilol  12.5 mg Oral BID WM    cinacalcet  60 mg Oral Daily with breakfast    dolutegravir  50 mg Oral Daily    emtricitabine  200 mg Oral Q48H    famotidine  20 mg Oral Daily    multivitamin  1 tablet Oral Daily    NIFEdipine  30 mg Oral Daily    predniSONE  5 mg Oral Daily    rosuvastatin  10 mg Oral Daily    sevelamer carbonate  1,600 mg Oral TID WM    [START ON 7/22/2019] sirolimus  3 mg Oral Daily    sodium bicarbonate  1,950 mg Oral TID    tenofovir  300 mg Oral Q48H     Continuous Infusions:  PRN Meds:sodium chloride, acetaminophen, bisacodyl, Dextrose 10% Bolus, Dextrose 10% Bolus, glucagon (human recombinant), glucose, glucose, oxyCODONE-acetaminophen, sodium chloride 0.9%    Intake/Output - Last 3 Shifts       07/19 0700 - 07/20 0659 07/20 0700 - 07/21 0659 07/21 0700 - 07/22 0659    P.O. 480 720     Blood 360.4 350     Total Intake(mL/kg) 840.4 (11.8) 1070 (15)     Urine (mL/kg/hr) 575 (0.3)      Total Output 575      Net +265.4 +1070            Urine Occurrence 1 x      Stool Occurrence   0 x           Review of Systems   Constitutional: Positive for activity change, appetite change, fatigue and unexpected weight change. Negative for fever.   HENT: Negative for facial swelling, hearing loss and tinnitus.    Eyes: Negative for  "visual disturbance.   Respiratory: Negative for chest tightness and shortness of breath.    Cardiovascular: Positive for leg swelling. Negative for chest pain.   Gastrointestinal: Negative for abdominal pain and nausea.   Genitourinary: Negative for difficulty urinating, dysuria and flank pain.   Musculoskeletal: Negative for arthralgias and myalgias.   Skin: Positive for rash. Negative for color change.   Allergic/Immunologic: Positive for immunocompromised state.   Neurological: Positive for weakness. Negative for dizziness, syncope, light-headedness and headaches.   Hematological: Positive for adenopathy. Bruises/bleeds easily.        Night sweats   Psychiatric/Behavioral: Negative for behavioral problems and confusion.      Objective:     Vital Signs (Most Recent):  Temp: 97.4 °F (36.3 °C) (07/21/19 1930)  Pulse: 86 (07/21/19 1930)  Resp: 18 (07/21/19 1930)  BP: 123/75 (07/21/19 1930)  SpO2: 98 % (07/21/19 1930) Vital Signs (24h Range):  Temp:  [96.1 °F (35.6 °C)-97.5 °F (36.4 °C)] 97.4 °F (36.3 °C)  Pulse:  [66-93] 86  Resp:  [18-20] 18  SpO2:  [93 %-100 %] 98 %  BP: (123-164)/(71-96) 123/75     Weight: 71.5 kg (157 lb 10.1 oz)  Height: 5' 8" (172.7 cm)  Body mass index is 23.97 kg/m².    Physical Exam   Constitutional: He is oriented to person, place, and time. He appears well-developed and well-nourished.   HENT:   Head: Normocephalic and atraumatic.   Eyes: Pupils are equal, round, and reactive to light. EOM are normal. Right eye exhibits no discharge. Left eye exhibits no discharge.   Neck: Normal range of motion. Neck supple. No JVD present.   Abdominal: Soft.   Abdomina scars  Brown skin discoloration some flat,some raised lesions, non-tender    Genitourinary: Rectum normal.   Genitourinary Comments: Multiple wart like lesions in the scrotum    Musculoskeletal: He exhibits edema. He exhibits no tenderness or deformity.   Right thigh and LE swelling, palpable nodular swelling in the right groin    Right LE " shin with raised and flat brown lesions, non-tender spanning the front and back of the leg    Neurological: He is alert and oriented to person, place, and time. No cranial nerve deficit.   Skin: Skin is warm. Capillary refill takes less than 2 seconds. Rash noted. No erythema.       Laboratory:  CBC:   Recent Labs   Lab 07/19/19  0415 07/20/19  0325 07/21/19  0606   WBC 3.44* 3.95 3.71*   RBC 2.40* 2.52* 2.89*   HGB 6.6* 7.0* 8.1*   HCT 21.6* 22.1* 26.0*    150 138*   MCV 90 88 90   MCH 27.5 27.8 28.0   MCHC 30.6* 31.7* 31.2*     BMP:   Recent Labs   Lab 07/20/19  2350 07/21/19  0606 07/21/19  0812   * 101 99   * 136 136   K 5.0 5.0 4.9   CL 99 99 100   CO2 26 24 25   BUN 36* 39* 38*   CREATININE 3.3* 3.2* 3.4*   CALCIUM 8.6* 8.7 9.0

## 2019-07-22 NOTE — PLAN OF CARE
07/22/19 1414   Discharge Reassessment   Assessment Type Discharge Planning Reassessment   Provided patient/caregiver education on the expected discharge date and the discharge plan Yes   Do you have any problems affording any of your prescribed medications? No   Discharge Plan A Home with family   Discharge Plan B Home   DME Needed Upon Discharge  none   Patient choice form signed by patient/caregiver Yes   Anticipated Discharge Disposition Home   Can the patient answer the patient profile reliably? Yes, cognitively intact   How does the patient rate their overall health at the present time? Good   Describe the patient's ability to walk at the present time. Minor restrictions or changes   How often would a person be available to care for the patient? Often   Number of comorbid conditions (as recorded on the chart) Four   During the past month, has the patient often been bothered by feeling down, depressed or hopeless? No   During the past month, has the patient often been bothered by little interest or pleasure in doing things? No   Post-Acute Status   Post-Acute Authorization Other   Other Status No Post-Acute Service Needs

## 2019-07-22 NOTE — ASSESSMENT & PLAN NOTE
Needs to resume his Patiromer, dose adjusted to 25.2 g daily labs will be monitor as outpatient. Rx filled by Specialty pharmacy  In the meantime continue with Kayexalate powder while in patient  DC Ketoconalzole

## 2019-07-22 NOTE — ASSESSMENT & PLAN NOTE
- induction with THYMO and solumderol  - Continue Prograf 2 mg BID,   - Continue Sirolimus 3 mg daily  - Follow Levels of sirolimus as outpatient twice a week in Transplant clinic.  - Tacrolimus Level twice a week in Transplant clinic    - Stop Ketoconazole which can be making his Hyperkalemia worse  - continue to hold Cellcept in setting of malignancy  - continue prednisone 5 mg

## 2019-07-22 NOTE — ASSESSMENT & PLAN NOTE
Anemia of chronic inflammation slightly worsened.  Baseline hemoglobin:  9 0.4-10.5.  No current signs of active bleeding.  - likely due to LEFTY on CKD3 and immunosuppression/chronic inflammation  - transfused 1u PRBC on 7/19, and additional 1U PRBC on 7/20 with appropriate response  - hemolysis labs wnl  - no evidence of active bleeding  - repeat outpatient CBC in 1 week

## 2019-07-22 NOTE — PROGRESS NOTES
[facility]  Kidney Transplant  Progress Note      Reason for Follow-up: Reassessment of Kidney Transplant - 2017  (#1) recipient and management of immunosuppression.    ORGAN:  RIGHT KIDNEY   Donor Type:   - Brain Death         Subjective:   History of Present Illness:  Mr. Aguiar is 40 yo male with HIV on HAART, HTN,  donor kidney transplant in 2017 secondary to HIVAN on prograf, MMF and prednisone with CKD III with sCr aroung 2.2-2.5. His care is usually at [facility] [place]. He presents today with recurrent right groin swelling and right LE swelling that has been going on for the past few months but in the past month it worsened with increasing pain while ambulating and discoloration of skin around the right tibial region. Reports loosing weight along with loss of appetite/decreased PO intake.     He reports the swelling has been on going issue and has not been addressed by his providers in [place] despite he brought it up multiple times. He recently had CT of the Abdomen and biopsy of the groin there pending results. In addition to skin changes, he also has developed wart like skin lesions in the scrotum. He is compliant with his HIV and immunosuppression meds. No CD4 or viral count on file but states his viral load has been undetected for years. He sees ID every three months. Reports Prograf dose reduced to 5 mg twice daily from 9 mg BID. Otherwise no recent significant medication adjustment.     Currently denies fever, chills, n/v, HA, necks stiffness, abdominal pain, diarrhea, urinary symptoms. In the ED vitals are stable. Basic labs show anemia with Hb of 7.2 and LEFTY with sCr of 3.2 with hyperkalemia. LE US with LAP.     Mr. Aguiar is a 39 y.o. year old male who is status post Kidney Transplant - 2017  (#1).    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 [facility] Hem/Onc has evalauted patient with recs for outpatient PET Scan and outpatient f/u in 1-2 weeks, appointments made by Hem/Onc. Anemia, Hg is stable s/p 2 units of PRBC's no evidence of Hemolysis.  Renal function has not recovered, sCr remains stable but elevated from his baslein ~ 3.1 mg/dL. CT abdo/pelvis with multiple LAD in the mesentery and retroperitoneum concerning for mets/maliganancy. ID and pulm on board. Hyperkalemia improved recommend to continue with Kayexalate powder while inpatient. Specialty pharmacy has Rx for Veltassa 25.2 g enveloped once a day and insurance has approved it. K is stable.  Past Medical, Surgical, Family, and Social History:   Unchanged from H&P.    Scheduled Meds:   atovaquone  750 mg Oral Daily    carvedilol  12.5 mg Oral BID WM    cinacalcet  60 mg Oral Daily with breakfast    dolutegravir  50 mg Oral Daily    emtricitabine  200 mg Oral Q48H    famotidine  20 mg Oral Daily    multivitamin  1 tablet Oral Daily    NIFEdipine  30 mg Oral Daily    predniSONE  5 mg Oral Daily    rosuvastatin  10 mg Oral Daily    sevelamer carbonate  1,600 mg Oral TID WM    sirolimus  3 mg Oral Daily    sodium bicarbonate  1,950 mg Oral TID    tenofovir  300 mg Oral Q48H     Continuous Infusions:  PRN Meds:sodium chloride, acetaminophen, bisacodyl, Dextrose 10% Bolus, Dextrose 10% Bolus, glucagon (human recombinant), glucose, glucose, oxyCODONE-acetaminophen, sodium chloride 0.9%    Intake/Output - Last 3 Shifts       07/20 0700 - 07/21 0659 07/21 0700 - 07/22 0659 07/22 0700 - 07/23 0659    P.O. 720      Blood 350      Total Intake(mL/kg) 1070 (15)      Urine (mL/kg/hr)       Total Output       Net +1070             Stool Occurrence  0 x            Review of Systems   Constitutional: Positive for activity change, appetite  "change, fatigue and unexpected weight change. Negative for fever.   HENT: Negative for facial swelling, hearing loss and tinnitus.    Eyes: Negative for visual disturbance.   Respiratory: Negative for chest tightness and shortness of breath.    Cardiovascular: Positive for leg swelling. Negative for chest pain.   Gastrointestinal: Negative for abdominal pain and nausea.   Genitourinary: Negative for difficulty urinating, dysuria and flank pain.   Musculoskeletal: Negative for arthralgias and myalgias.   Skin: Positive for rash. Negative for color change.   Allergic/Immunologic: Positive for immunocompromised state.   Neurological: Positive for weakness. Negative for dizziness, syncope, light-headedness and headaches.   Hematological: Positive for adenopathy. Bruises/bleeds easily.        Night sweats   Psychiatric/Behavioral: Negative for behavioral problems and confusion.      Objective:     Vital Signs (Most Recent):  Temp: 98.1 °F (36.7 °C) (07/22/19 1521)  Pulse: 66 (07/22/19 1521)  Resp: 16 (07/22/19 1521)  BP: (!) 140/82 (07/22/19 1521)  SpO2: 98 % (07/22/19 1521) Vital Signs (24h Range):  Temp:  [96.6 °F (35.9 °C)-98.1 °F (36.7 °C)] 98.1 °F (36.7 °C)  Pulse:  [64-93] 66  Resp:  [14-18] 16  SpO2:  [92 %-100 %] 98 %  BP: (123-149)/(75-87) 140/82     Weight: 71.5 kg (157 lb 10.1 oz)  Height: 5' 8" (172.7 cm)  Body mass index is 23.97 kg/m².    Physical Exam   Constitutional: He is oriented to person, place, and time. He appears well-developed and well-nourished.   HENT:   Head: Normocephalic and atraumatic.   Eyes: Pupils are equal, round, and reactive to light. EOM are normal. Right eye exhibits no discharge. Left eye exhibits no discharge.   Neck: Normal range of motion. Neck supple. No JVD present.   Abdominal: Soft.   Abdomina scars  Brown skin discoloration some flat,some raised lesions, non-tender    Genitourinary: Rectum normal.   Genitourinary Comments: Multiple wart like lesions in the scrotum  "   Musculoskeletal: He exhibits edema. He exhibits no tenderness or deformity.   Right thigh and LE swelling, palpable nodular swelling in the right groin    Right LE shin with raised and flat brown lesions, non-tender spanning the front and back of the leg    Neurological: He is alert and oriented to person, place, and time. No cranial nerve deficit.   Skin: Skin is warm. Capillary refill takes less than 2 seconds. Rash noted. No erythema.       Laboratory:  CBC:   Recent Labs   Lab 19  0325 19  0619  0325   WBC 3.95 3.71* 3.57*   RBC 2.52* 2.89* 2.83*   HGB 7.0* 8.1* 7.8*   HCT 22.1* 26.0* 24.8*    138* 146*   MCV 88 90 88   MCH 27.8 28.0 27.6   MCHC 31.7* 31.2* 31.5*     BMP:   Recent Labs   Lab 19  0606 19  0812 19  0325    99 115*    136 136   K 5.0 4.9 5.0   CL 99 100 101   CO2 24 25 24   BUN 39* 38* 38*   CREATININE 3.2* 3.4* 3.1*   CALCIUM 8.7 9.0 9.3         Assessment/Plan:     * Acute kidney injury superimposed on chronic kidney disease stage III  LEFTY with hyperkalemia unclear etiology not a clinical pattern for iATN maybe progression of disease?Will hold on for Bx as he is not a candidate for treatment in setting of his KS and Immunosuppresion.  Baseline sCr 2.2-2.5, Stable sCr 3-1-3.2 mg/dL. Likely not pre-renal not improved with IV fluids, now stopped. No evidence of CNI toxicity, progressoin of disease CD4 count < 100 and could also be infiltration of allograft in setting of close proximity of skin lesions.   Renal bx in 2018 with out evidence of ACR  Avoid contrast/NSAIDs/nephrotoxins   Transplant renal US -unremarkable        donor kidney transplant 2017  38 yo male with kidney transplant in 2017 on immunosuppression now with LEFTY.     LEFTY with initial initially improved to sCr of 2.8 mg/dL,but sCr has stabilized at 3.1-3.2 mg/dL.  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 Continue with  Kayexalate for now and Patiromer as out patient  Renal US with satisfactory doppler evaluation, mild hydronephrosis   Renal bx in 01/2018 with no evidence of ACR, AVR, ABMR, pt is complaint with medication   Right inguinal LAD biopsy now c/w Kaposi sarcoma - ID and heme/onc on board  In the setting of malignancy - hold MMF. Continue Prograf and Prednisone.       Prophylactic immunotherapy  - induction with THYMO and solumderol  - Continue Prograf 2 mg BID,   - Continue Sirolimus 3 mg daily  - Follow Levels of sirolimus as outpatient twice a week in Transplant clinic.  - Tacrolimus Level twice a week in Transplant clinic    - Stop Ketoconazole which can be making his Hyperkalemia worse  - continue to hold Cellcept in setting of malignancy  - continue prednisone 5 mg         Need for prophylactic immunotherapy  Please see prophylactic immmunotherapy      HIV (human immunodeficiency virus infection)  As per ID   Suspect AiDS in setting of Kapossi and CD4 < 100      Anemia in stage 3 chronic kidney disease  Drop in Hg to 6.6 g , baseline around 9-10, no evidence of overt bleeding   S/p 2 ut PRBC's with good response, will defer to Hem/Onc for management   No evidence of Hemolysis and Retic count los  Fe deficient TSAT < 30 and Ferritin barely just above 1000 likely related to malignancy  Likely related to CKD and malignancy as well as HIV        Kaposi sarcoma  Please see inguinal  Lymphoadenopathy,       Hyperkalemia  Needs to resume his Patiromer, dose adjusted to 25.2 g daily labs will be monitor as outpatient. Rx filled by Specialty pharmacy  In the meantime continue with Kayexalate powder while in patient  DC Ketoconalzole      Complication of transplanted kidney  Please see Kaposi Sarcome      Lymphadenopathy, inguinal  Right inguinal LAP biopsy c/w Kaposi sarcoma  CT abdo/pelvis also show evidence of multiple LAP in the mesentery and retroperitoneum   ID and heme/Onc on board   Follow up with CT neck/chest   Hold  MMF in the setting of malignancy   Continue Prograf and prednisone           Discharge Planning:  From KTM stand point OK tp Dc will follow up in clinic    Harjeet Boswell MD  Kidney Transplant  [facility]

## 2019-07-22 NOTE — ASSESSMENT & PLAN NOTE
LEFTY with hyperkalemia unclear etiology not a clinical pattern for iATN maybe progression of disease?Will hold on for Bx as he is not a candidate for treatment in setting of his KS and Immunosuppresion.  Baseline sCr 2.2-2.5, Stable sCr 3-1-3.2 mg/dL. Likely not pre-renal not improved with IV fluids, now stopped. No evidence of CNI toxicity, progressoin of disease CD4 count < 100 and could also be infiltration of allograft in setting of close proximity of skin lesions.   Renal bx in 08/2018 with out evidence of ACR  Avoid contrast/NSAIDs/nephrotoxins   Transplant renal US -unremarkable

## 2019-07-22 NOTE — ASSESSMENT & PLAN NOTE
- dosing with kayexalate while in house  - held ketoconazole  - will add on veltassa 25.2g daily for control as outpatient (approved by pharmacy)

## 2019-07-22 NOTE — DISCHARGE SUMMARY
[facility] Hospital Medicine  Discharge Summary      Patient Name: Demetrio Aguiar  MRN: [id number]  Admission Date: 7/17/2019  Hospital Length of Stay: 5 days  Discharge Date and Time:  07/22/2019 4:12 PM  Attending Physician: Tae Qiu, *   Discharging Provider: Tae Qiu MD  Primary Care Provider: [facility] Medicine Team: [facility] HOSP MED R Tae Qiu MD    HPI:   Demetrio Aguiar is a 39 y.o. 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 HTN, and HLD who presents to [facility] complaining of progressively worsening bilateral lower extremity edema.     Patient reports appearance of a right groin lymph node approximately 10 months ago with progressively worsening RLE edema. Over the past month, he has noticed the appearance of hyperpigmented papules on the RLE that if now appeared on his RLQ of the abdomen.  He denies pruritus, pain, fluctuance, erythema, and serous/purulent drainage from any of these lesions. He reports receiving a biopsy of his right groin lymph node 1 week ago at [facility] in [place], [place] - he is unsure of the biopsy results.  He also reports some subjective fevers, but denies night sweats.  He reports approximately 10 lb unintentional weight loss over the past 1 month.  He endorses good urine output and denies dysuria or change in urine color or consistency.  He reports compliance with his ART daily and he is currently taking atovaquone and ketoconazole for prophylaxis.  He reports his ID physician (Dr. Reyes) recently discontinuing  his azithromycin prophylaxis.  He has also been compliant with his immunotherapy for right kidney transplant but notes that his tacrolimus dose was recently decreased to 5 mg b.i.d. He denies chest pain, palpitations, orthopnea, and PND.  He does endorse mild SOB and some increased weakness for the past few weeks.  He denies hemoptysis and BRBPR, but does note some bowel movements that sound concerning for melena.     In the ED, he was noted to have LEFTY, creatinine:  3.2 as well as hyperkalemia, potassium:  6.4.  He was given albuterol nebulizers, insulin/glucose IV, and Kayexalate to treat hyperkalemia.  He was also bolused 2 L normal saline given suspicion of prerenal etiology for acute kidney injury.    * No surgery found *      Hospital Course:   Mr. Aguiar presented with hyperkalemia and LEFTY on CKD3 of transplanted kidney. Additionally, he had right inguinal LAD and skin changes c/w Kaposi. 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    He had potassium shifted on admission with dosing of kayexalate. Hyperkalemia resolved with these interventions, although he will need scheduled veltassa as outpatient (approved by pharmacy prior to discharge). Ketoconazole prophylaxis held as well given hyperkalemia while in house. Immunosuppression was adjusted in light of Kaposi diagnosis with transition from tacrolimus to sirolimus (now on 3mg daily, also approved by pharmacy) and discontinuation of myfortic. Heme/Onc consulted and recommend outpatient PET CT with close follow up and likely systemic chemotherapy to follow (which they will arrange).    Otherwise, transplant ID was consulted while in house and recommended continuing PTA ART. He will need close f/u with outpatient ID to ensure proper dosing if renal function continues to decline.    LEFTY was stable at time of discharge. Unclear if this may partially represent visceral involvement of Kaposi's, but there is little that can be done  if this does represent rejection since further immunosuppression would be harmful in the setting of Kaposi's sarcoma. He will need close f/u with Transplant Nephrology as an outpatient.    Vitals:    07/22/19 1521   BP: (!) 140/82   Pulse: 66   Resp: 16   Temp: 98.1 °F (36.7 °C)     Physical Exam   Constitutional: He is oriented to person, place, and time. He appears well-developed and well-nourished.   HENT:   Head: Normocephalic and atraumatic.   Eyes: Pupils are equal, round, and reactive to light. EOM are normal. Right eye exhibits no discharge. Left eye exhibits no discharge.   Neck: Normal range of motion. Neck supple. No JVD present.   Abdominal: Soft.   Abdomina scars  Brown skin discoloration some flat,some raised lesions, non-tender    Genitourinary: Rectum normal.   Musculoskeletal: He exhibits edema. He exhibits no tenderness or deformity.   Right lower extremity edema with significant inguinal LAD   Neurological: He is alert and oriented to person, place, and time. No cranial nerve deficit.   Skin: Skin is warm. Capillary refill takes less than 2 seconds. Rash noted. No erythema.   Dark, mottled painless lesions to right lower extremity and inside of right thigh c/w Kaposi   Vitals reviewed.      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   · IV hydration with 2 L normal saline bolus on arrival   · Renal ultrasound: stable mild hydronephrosis, otherwise unremarkable  · Avoid nephrotoxic medications, renally dose all medications, particularly ART  · Currently, renal injury appears stable; it is unclear if this represents rejection vs visceral Kaposi's but in either case, we cannot increase immunosuppression as this would worsen underlying malignancy. He may eventually require dialysis if transplant fails and renal function will need close monitoring as outpatient (have ordered RFP and sirolimus level for next week)  · Cinacalet 60 mg daily   · Sodium bicarbonate 1,950 t.i.d.  · Added on sevelamer 1600 tid  · Transitioned to sirolimus 3mg daily (approved by specialty pharmacy); stop tacrolimus  · Holding mycophenolate for now in setting of Kaposi's  · Prednisone 5 mg daily and famotidine 20 mg daily  · For hyperkalemia:  given multiple doses of kayexalate with improvement while in house; will need increased dose of veltassa as outpatient (25.2g daily), which has been approved by specialty pharmacy prior to discharge  · KTM consulted, appreciate assistance      Renovascular hypertension  · Continue nifedipine 30 mg daily and carvedilol 12.5 mg b.i.d.  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 Solid infrahilar mass on the right cannot be excluded.  Multiple enlarged lymph nodes in the right inguinal region, right pelvic sidewall, retroperitoneum and in the root of the mesentery around the celiac " "artery and bonnie hepatis region. This could be related to infectious process, metastasis or lymphoma. Right lower extremity and buttock soft tissue edema.  Suspect lymphedema given presence of right inguinal and pelvic sidewall lymphadenopathy and absence of DVT on Doppler ultrasound performed earlier same day. Minimal ascites.  Diffuse haziness/edema throughout the mesenteric fat with some nodularity present that could represent lymph nodes or peritoneal implants.  Peritoneal infection or neoplastic process not excluded."  · CT head and neck with cervical LAD and edema, concern for SVC thrombus but would need US to confirm  · US w/o thrombosis  · Heme/Onc following: currently plan for outpatient PET and close f/u, will need systemic chemotherapy given extent of disease    Lymphadenopathy, inguinal  - initially concerned for Kaposi vs other malignant process vs TB  - now with biopsy results from OSH with Kaposi, which likely explains LAD and skin changes to lower extremity  - Heme/Onc consulted: will need outpatient PET and close f/u      Hyperkalemia  - dosing with kayexalate while in house  - held ketoconazole  - will add on veltassa 25.2g daily for control as outpatient (approved by pharmacy)      Complication of transplanted kidney        Prophylactic immunotherapy  - see above       donor kidney transplant 2017  - KTM following      Anemia in stage 3 chronic kidney disease  Anemia of chronic inflammation slightly worsened.  Baseline hemoglobin:  9 0.4-10.5.  No current signs of active bleeding.  - likely due to LEFTY on CKD3 and immunosuppression/chronic inflammation  - transfused 1u PRBC on , and additional 1U PRBC on  with appropriate response  - hemolysis labs wnl  - no evidence of active bleeding  - repeat outpatient CBC in 1 week      Need for prophylactic immunotherapy  - 2/2 kidney transplant      At risk for opportunistic infections  - 2/2 immunosuppression      HIV (human " immunodeficiency virus infection)  · Continue ART:  Dolutegravir 50 mg daily, continue home Descovy 200-25 daily  · Follow-up CD4/CD8 ratio and HIV RNA viral load  · Continue atovaquone 750 mg daily; holding ketoconazole 100 mg daily in setting of hyperkalemia  · Patient reports azithromycin recently stopped by outpatient ID physician  · Continue multivitamin daily  · Transplant ID consulted, appreciate assistance  · Will need very close f/u with outpatient ID doctor for possible further ART dose adjustments if renal function continues to decline      TB lung, latent  - initially concerned for possible disseminated TB as potential cause of LAD per transplant ID; however, this appears much less likely now that biopsy results have returned  - appreciate Transplant ID recs      Hyperlipidemia  · Decreased home Crestor to 10 mg daily given LEFTY        Final Active Diagnoses:    Diagnosis Date Noted POA    PRINCIPAL PROBLEM:  Acute kidney injury superimposed on chronic kidney disease stage III [N17.9, N18.9] 2017 Yes    Kaposi sarcoma [C46.9] 2019 Yes    Renovascular hypertension [I15.0] 2019 Yes    Regional lymph node metastasis present [C77.9]  Yes    Hyperkalemia [E87.5] 2019 Yes    Lymphadenopathy, inguinal [R59.0] 2019 Yes    Complication of transplanted kidney [T86.10] 10/03/2017 Yes     donor kidney transplant 2017 [Z94.0] 2017 Not Applicable    Prophylactic immunotherapy [Z29.8] 2017 Not Applicable    At risk for opportunistic infections [Z91.89] 2017 Yes    Anemia in stage 3 chronic kidney disease [N18.3, D63.1] 2017 Yes     Chronic    Need for prophylactic immunotherapy [Z29.8]  Not Applicable    Hyperlipidemia [E78.5] 2016 Yes     Chronic    TB lung, latent [R76.11] 2016 Yes     Chronic    HIV (human immunodeficiency virus infection) [B20] 2016 Yes     Chronic      Problems Resolved During this Admission:     Diagnosis Date Noted Date Resolved POA    ESRD secondary to HIVAN s/p DDRT 8/18/17 [Z94.0]  07/21/2019 Not Applicable       Discharged Condition: fair    Disposition: Home or Self Care    Follow Up:  Follow-up Information     Lucía Polk MD.    Specialties:  Transplant, Nephrology  Contact information:  [address]  [facility] [address]  [phone]             [facility] HEMATOLOGY/ONCOLOGY.    Specialty:  Hematology and Oncology  Contact information:  [address]  [facility] [address]  [phone]           [facility] INFECTIOUS DISEASE.    Specialty:  Infectious Diseases  Contact information:  [facility]  [facility] [id number] [phone]               Patient Instructions:      RENAL FUNCTION PANEL   Standing Status: Future Standing Exp. Date: 09/19/20     SIROLIMUS LEVEL   Standing Status: Future Standing Exp. Date: 09/19/20     CBC W/ AUTO DIFFERENTIAL   Standing Status: Future Standing Exp. Date: 09/19/20     Ambulatory Referral to Hematology / Oncology   Referral Priority: Urgent Referral Type: Consultation   Referral Reason: Specialty Services Required   Requested Specialty: Hematology and Oncology   Number of Visits Requested: 1     Diet renal     Notify your health care provider if you experience any of the following:  temperature >100.4     Notify your health care provider if you experience any of the following:  persistent dizziness, light-headedness, or visual disturbances     Notify your health care provider if you experience any of the following:  severe uncontrolled pain     Notify your health care provider if you experience any of the following:  persistent nausea and vomiting or diarrhea     Activity as tolerated       Significant Diagnostic Studies: Labs:   CMP   Recent Labs   Lab 07/21/19  0606 07/21/19  0812 07/22/19  0325    136 136   K 5.0 4.9 5.0   CL 99 100 101   CO2 24 25 24    99 115*   BUN 39* 38* 38*   CREATININE 3.2* 3.4*  3.1*   CALCIUM 8.7 9.0 9.3   ANIONGAP 13 11 11   ESTGFRAFRICA 26.6* 24.7* 27.6*   EGFRNONAA 23.0* 21.3* 23.9*   , CBC   Recent Labs   Lab 07/21/19  0606 07/22/19  0325   WBC 3.71* 3.57*   HGB 8.1* 7.8*   HCT 26.0* 24.8*   * 146*   , INR   Lab Results   Component Value Date    INR 1.0 08/28/2018    INR 1.0 01/15/2018    INR 1.0 10/17/2017   , Lipid Panel   Lab Results   Component Value Date    CHOL 73 (L) 07/19/2019    HDL 26 (L) 07/19/2019    LDLCALC 21.2 (L) 07/19/2019    TRIG 129 07/19/2019    CHOLHDL 35.6 07/19/2019   , Troponin No results for input(s): TROPONINI in the last 168 hours., A1C: No results for input(s): HGBA1C in the last 4320 hours. and All labs within the past 24 hours have been reviewed  Microbiology:   Blood Culture   Lab Results   Component Value Date    LABBLOO No growth after 5 days. 07/17/2019     Radiology: CT scan: CT ABDOMEN PELVIS WITH CONTRAST: No results found for this visit on 07/17/19.    Pending Diagnostic Studies:     Procedure Component Value Units Date/Time    Fungal Immunodiffusion - Blood [[id number]] Collected:  07/19/19 0414    Order Status:  Sent Lab Status:  In process Updated:  07/19/19 0526    Specimen:  Blood     Histoplasma antigen, serum [[id number]] Collected:  07/19/19 0414    Order Status:  Sent Lab Status:  In process Updated:  07/19/19 0537    Specimen:  Blood     ImmuKnow Cylex [[id number]] Collected:  07/19/19 0415    Order Status:  Sent Lab Status:  In process Updated:  07/19/19 0531    Specimen:  Blood          Medications:  Reconciled Home Medications:      Medication List      START taking these medications    sevelamer carbonate 800 mg Tab  Commonly known as:  RENVELA  Take 2 tablets (1,600 mg total) by mouth 3 (three) times daily with meals.     sirolimus 1 MG Tab  Commonly known as:  RAPAMUNE  Take 3 tablets (3 mg total) by mouth once daily.     VELTASSA 25.2 gram Pwpk  Generic drug:  patiromer calcium sorbitex  Take 1 packet (25.2 g total) by mouth  once daily.        CHANGE how you take these medications    carvedilol 6.25 MG tablet  Commonly known as:  COREG  Take 2 tablets (12.5 mg total) by mouth 2 (two) times daily with meals.  What changed:  how much to take        CONTINUE taking these medications    atovaquone 750 mg/5 mL Susp  Commonly known as:  MEPRON  Take 5 mLs (750 mg total) by mouth once daily.     cinacalcet 60 MG Tab  Commonly known as:  SENSIPAR  Take 1 tablet (60 mg total) by mouth daily with breakfast.     dolutegravir 50 mg Tab  Commonly known as:  TIVICAY  Take 1 tablet (50 mg total) by mouth once daily.     emtricitabine-tenofovir alafen 200-25 mg Tab  Commonly known as:  DESCOVY  Take 1 tablet by mouth once daily.     famotidine 20 MG tablet  Commonly known as:  PEPCID  TAKE 1 TABLET (20MG TOTAL) BY MOUTH EACH EVENING     multivitamin tablet  Commonly known as:  THERAGRAN  Take 1 tablet by mouth once daily.     NIFEdipine 30 MG (OSM) 24 hr tablet  Commonly known as:  PROCARDIA-XL  Take 1 tablet (30 mg total) by mouth every morning AND 2 tablets (60 mg total) every evening.     predniSONE 5 MG tablet  Commonly known as:  DELTASONE  Take 1 tablet (5 mg total) by mouth once daily.     rosuvastatin 20 MG tablet  Commonly known as:  CRESTOR  Take 1 tablet (20 mg total) by mouth once daily.     sodium bicarbonate 650 MG tablet  Take 3 tablets (1,950 mg total) by mouth 3 (three) times daily.        STOP taking these medications    azithromycin 600 MG Tab  Commonly known as:  ZITHROMAX     ketoconazole 200 mg Tab  Commonly known as:  NIZORAL     mycophenolate 180 MG Tbec  Commonly known as:  MYFORTIC     sodium polystyrene 15 gram/60 mL Susp  Commonly known as:  KAYEXALATE     sodium polystyrene powder  Commonly known as:  KAYEXALATE     tacrolimus 1 MG Cap  Commonly known as:  PROGRAF     tacrolimus 5 MG Cap  Commonly known as:  PROGRAF            Indwelling Lines/Drains at time of discharge:   Lines/Drains/Airways     Drain                  Hemodialysis AV Fistula 07/19/19 0823 Left upper arm 3 days                Time spent on the discharge of patient: 45 minutes  Patient was seen and examined on the date of discharge and determined to be suitable for discharge.         Tae Qiu MD  Department of Hospital Medicine  [facility]

## 2019-07-22 NOTE — PLAN OF CARE
Problem: Adult Inpatient Plan of Care  Goal: Plan of Care Review  Outcome: Ongoing (interventions implemented as appropriate)     07/20/19 1533 07/21/19 1703   Plan of Care Review   Plan of Care Reviewed With  --  patient   Progress no change  --      Pt aaox4. V/s stable. Iv access d/c prior to discharge. Discharge teaching presented to pt. Understanding verbalized. Pt ambulated off unit w/ family. No complaints at this time.

## 2019-07-22 NOTE — SUBJECTIVE & OBJECTIVE
Subjective:   History of Present Illness:  Mr. Aguiar is 38 yo male with HIV on HAART, HTN,  donor kidney transplant in 2017 secondary to HIVAN on prograf, MMF and prednisone with CKD III with sCr aroung 2.2-2.5. His care is usually at [facility]. He presents today with recurrent right groin swelling and right LE swelling that has been going on for the past few months but in the past month it worsened with increasing pain while ambulating and discoloration of skin around the right tibial region. Reports loosing weight along with loss of appetite/decreased PO intake.     He reports the swelling has been on going issue and has not been addressed by his providers in [place] despite he brought it up multiple times. He recently had CT of the Abdomen and biopsy of the groin there pending results. In addition to skin changes, he also has developed wart like skin lesions in the scrotum. He is compliant with his HIV and immunosuppression meds. No CD4 or viral count on file but states his viral load has been undetected for years. He sees ID every three months. Reports Prograf dose reduced to 5 mg twice daily from 9 mg BID. Otherwise no recent significant medication adjustment.     Currently denies fever, chills, n/v, HA, necks stiffness, abdominal pain, diarrhea, urinary symptoms. In the ED vitals are stable. Basic labs show anemia with Hb of 7.2 and LEFTY with sCr of 3.2 with hyperkalemia. LE US with LAP.     Mr. Aguiar is a 39 y.o. year old male who is status post Kidney Transplant - 2017  (#1).    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 BINH, Hem/Onc has evalauted patient with recs for outpatient PET Scan and outpatient f/u in 1-2  weeks, appointments made by Hem/Onc. Anemia, Hg is stable s/p 2 units of PRBC's no evidence of Hemolysis.  Renal function has not recovered, sCr remains stable but elevated from his baslein ~ 3.1 mg/dL. CT abdo/pelvis with multiple LAD in the mesentery and retroperitoneum concerning for mets/maliganancy. ID and pulm on board. Hyperkalemia improved recommend to continue with Kayexalate powder while inpatient. Specialty pharmacy has Rx for Veltassa 25.2 g enveloped once a day and insurance has approved it. K is stable.  Past Medical, Surgical, Family, and Social History:   Unchanged from H&P.    Scheduled Meds:   atovaquone  750 mg Oral Daily    carvedilol  12.5 mg Oral BID WM    cinacalcet  60 mg Oral Daily with breakfast    dolutegravir  50 mg Oral Daily    emtricitabine  200 mg Oral Q48H    famotidine  20 mg Oral Daily    multivitamin  1 tablet Oral Daily    NIFEdipine  30 mg Oral Daily    predniSONE  5 mg Oral Daily    rosuvastatin  10 mg Oral Daily    sevelamer carbonate  1,600 mg Oral TID WM    sirolimus  3 mg Oral Daily    sodium bicarbonate  1,950 mg Oral TID    tenofovir  300 mg Oral Q48H     Continuous Infusions:  PRN Meds:sodium chloride, acetaminophen, bisacodyl, Dextrose 10% Bolus, Dextrose 10% Bolus, glucagon (human recombinant), glucose, glucose, oxyCODONE-acetaminophen, sodium chloride 0.9%    Intake/Output - Last 3 Shifts       07/20 0700 - 07/21 0659 07/21 0700 - 07/22 0659 07/22 0700 - 07/23 0659    P.O. 720      Blood 350      Total Intake(mL/kg) 1070 (15)      Urine (mL/kg/hr)       Total Output       Net +1070             Stool Occurrence  0 x            Review of Systems   Constitutional: Positive for activity change, appetite change, fatigue and unexpected weight change. Negative for fever.   HENT: Negative for facial swelling, hearing loss and tinnitus.    Eyes: Negative for visual disturbance.   Respiratory: Negative for chest tightness and shortness of breath.   "  Cardiovascular: Positive for leg swelling. Negative for chest pain.   Gastrointestinal: Negative for abdominal pain and nausea.   Genitourinary: Negative for difficulty urinating, dysuria and flank pain.   Musculoskeletal: Negative for arthralgias and myalgias.   Skin: Positive for rash. Negative for color change.   Allergic/Immunologic: Positive for immunocompromised state.   Neurological: Positive for weakness. Negative for dizziness, syncope, light-headedness and headaches.   Hematological: Positive for adenopathy. Bruises/bleeds easily.        Night sweats   Psychiatric/Behavioral: Negative for behavioral problems and confusion.      Objective:     Vital Signs (Most Recent):  Temp: 98.1 °F (36.7 °C) (07/22/19 1521)  Pulse: 66 (07/22/19 1521)  Resp: 16 (07/22/19 1521)  BP: (!) 140/82 (07/22/19 1521)  SpO2: 98 % (07/22/19 1521) Vital Signs (24h Range):  Temp:  [96.6 °F (35.9 °C)-98.1 °F (36.7 °C)] 98.1 °F (36.7 °C)  Pulse:  [64-93] 66  Resp:  [14-18] 16  SpO2:  [92 %-100 %] 98 %  BP: (123-149)/(75-87) 140/82     Weight: 71.5 kg (157 lb 10.1 oz)  Height: 5' 8" (172.7 cm)  Body mass index is 23.97 kg/m².    Physical Exam   Constitutional: He is oriented to person, place, and time. He appears well-developed and well-nourished.   HENT:   Head: Normocephalic and atraumatic.   Eyes: Pupils are equal, round, and reactive to light. EOM are normal. Right eye exhibits no discharge. Left eye exhibits no discharge.   Neck: Normal range of motion. Neck supple. No JVD present.   Abdominal: Soft.   Abdomina scars  Brown skin discoloration some flat,some raised lesions, non-tender    Genitourinary: Rectum normal.   Genitourinary Comments: Multiple wart like lesions in the scrotum    Musculoskeletal: He exhibits edema. He exhibits no tenderness or deformity.   Right thigh and LE swelling, palpable nodular swelling in the right groin    Right LE shin with raised and flat brown lesions, non-tender spanning the front and back of " the leg    Neurological: He is alert and oriented to person, place, and time. No cranial nerve deficit.   Skin: Skin is warm. Capillary refill takes less than 2 seconds. Rash noted. No erythema.       Laboratory:  CBC:   Recent Labs   Lab 07/20/19 0325 07/21/19 0606 07/22/19 0325   WBC 3.95 3.71* 3.57*   RBC 2.52* 2.89* 2.83*   HGB 7.0* 8.1* 7.8*   HCT 22.1* 26.0* 24.8*    138* 146*   MCV 88 90 88   MCH 27.8 28.0 27.6   MCHC 31.7* 31.2* 31.5*     BMP:   Recent Labs   Lab 07/21/19 0606 07/21/19 0812 07/22/19 0325    99 115*    136 136   K 5.0 4.9 5.0   CL 99 100 101   CO2 24 25 24   BUN 39* 38* 38*   CREATININE 3.2* 3.4* 3.1*   CALCIUM 8.7 9.0 9.3

## 2019-07-22 NOTE — ASSESSMENT & PLAN NOTE
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   · IV hydration with 2 L normal saline bolus on arrival   · Renal ultrasound: stable mild hydronephrosis, otherwise unremarkable  · Avoid nephrotoxic medications, renally dose all medications, particularly ART  · Currently, renal injury appears stable; it is unclear if this represents rejection vs visceral Kaposi's but in either case, we cannot increase immunosuppression as this would worsen underlying malignancy. He may eventually require dialysis if transplant fails and renal function will need close monitoring as outpatient (have ordered RFP and sirolimus level for next week)  · Cinacalet 60 mg daily   · Sodium bicarbonate 1,950 t.i.d.  · Added on sevelamer 1600 tid  · Transitioned to sirolimus 3mg daily (approved by specialty pharmacy); stop tacrolimus  · Holding mycophenolate for now in setting of Kaposi's  · Prednisone 5 mg daily and famotidine 20 mg daily  · For hyperkalemia:  given multiple doses of kayexalate with improvement while in house; will need increased dose of veltassa as outpatient (25.2g daily), which has been approved by specialty pharmacy prior to discharge  · KTM consulted, appreciate assistance

## 2019-07-23 ENCOUNTER — TELEPHONE (OUTPATIENT)
Dept: PHARMACY | Facility: CLINIC | Age: 39
End: 2019-07-23

## 2019-07-23 ENCOUNTER — TELEPHONE (OUTPATIENT)
Dept: TRANSPLANT | Facility: CLINIC | Age: 39
End: 2019-07-23

## 2019-07-23 ENCOUNTER — PATIENT MESSAGE (OUTPATIENT)
Dept: TRANSPLANT | Facility: CLINIC | Age: 39
End: 2019-07-23

## 2019-07-23 ENCOUNTER — PATIENT MESSAGE (OUTPATIENT)
Dept: INFECTIOUS DISEASES | Facility: CLINIC | Age: 39
End: 2019-07-23

## 2019-07-23 LAB
ASPERGILLUS AB SER QL ID: NORMAL
B DERMAT AB SER QL ID: NORMAL
C IMMITIS AB SER QL ID: NORMAL
H CAPSUL AB SER QL ID: NORMAL
HISTOPLASMA AG INTERP.: NEGATIVE
HISTOPLASMA RESULT: NORMAL NG/ML
METHYLMALONATE SERPL-SCNC: 0.41 UMOL/L

## 2019-07-23 NOTE — TELEPHONE ENCOUNTER
Veltassa refill and dose increase confirmed (1st fill at [facility]). $0.00 copay- 004. Bedside delivered on  to [facility] Rm 606. Confirmed 2 patient identifiers - name and .     Mr. Aguiar has been on Veltassa in the past; however, he was admitted for hyperkalemia and required dose increase to Veltassa 25.2 g daily. Patient declined consultation due to being on Veltassa for an extended period. Pt reports they are not having any side effects so far. No missed doses, no new medications, no new allergies or health conditions reported at this time. All questions answered and addressed to patients satisfaction. Pt verbalized understanding.  He is aware [facility] will f/u for refills. Patient lives in [place] and will confirm if rx needs to be transferred at time of refill due to [facility] not being licensed to ship to [place].

## 2019-07-23 NOTE — TELEPHONE ENCOUNTER
Just released from hospital with new diagnosis of Kaposi sarcoma.  Experiencing chronic and severe hyperkalemia along with worsening renal function.    Continue labs 2x week: renal panel & sirolimus level 2x week until K and SRL level stable. Stop checking tacrolimus levels please.

## 2019-07-23 NOTE — TELEPHONE ENCOUNTER
----- Message from Harjeet Boswell MD sent at 7/22/2019  6:50 PM CDT -----  Mr Demetrio Aguiar was dc today with Patiromer for hyperkalemia 25.2 g daily. Dr Munoz would like labs foe him Monday and Thursday. He is transitioning from Prograf to Sirolimus and will need Levels for both. Thank you for your help.  Ernst

## 2019-07-23 NOTE — TELEPHONE ENCOUNTER
----- Message from Harjeet Boswell MD sent at 7/22/2019  6:50 PM CDT -----  Mr Demetrio Aguiar was dc today with Patiromer for hyperkalemia 25.2 g daily. Dr Munoz would like labs foe him Monday and Thursday. He is transitioning from Prograf to Sirolimus and will need Levels for both. Thank you for your help.  Ernst    Notified pt, lab order faxed to [facility] for mon/thurs labs

## 2019-07-24 ENCOUNTER — TELEPHONE (OUTPATIENT)
Dept: HEMATOLOGY/ONCOLOGY | Facility: CLINIC | Age: 39
End: 2019-07-24

## 2019-07-24 LAB — IMMUNKNOW (STIMULATED): 33 NG/ML (ref 226–524)

## 2019-07-26 ENCOUNTER — TELEPHONE (OUTPATIENT)
Dept: HEMATOLOGY/ONCOLOGY | Facility: CLINIC | Age: 39
End: 2019-07-26

## 2019-07-26 ENCOUNTER — DOCUMENTATION ONLY (OUTPATIENT)
Dept: TRANSPLANT | Facility: CLINIC | Age: 39
End: 2019-07-26

## 2019-07-26 ENCOUNTER — PATIENT MESSAGE (OUTPATIENT)
Dept: HEMATOLOGY/ONCOLOGY | Facility: CLINIC | Age: 39
End: 2019-07-26

## 2019-07-26 DIAGNOSIS — C41.9 BONE SARCOMA: ICD-10-CM

## 2019-07-26 DIAGNOSIS — C46.9 KAPOSI SARCOMA: Primary | ICD-10-CM

## 2019-07-26 DIAGNOSIS — C46.0 KAPOSI'S SARCOMA OF SKIN: ICD-10-CM

## 2019-07-26 LAB
EXT BUN: 39 (ref 5–22)
EXT CALCIUM: 9.2 (ref 8.7–10.5)
EXT CHLORIDE: 100 (ref 101–111)
EXT CO2: 26 (ref 20–31)
EXT CREATININE: 3.33 MG/DL (ref 0.6–1.2)
EXT EOSINOPHIL%: 3.1 (ref 0.8–0.5)
EXT GFR MDRD AF AMER: 27
EXT HEMATOCRIT: 25.1 (ref 36.7–47.1)
EXT HEMOGLOBIN: 8.3 (ref 12.5–14.5)
EXT LYMPH%: 11.4 (ref 15.2–43.3)
EXT MONOCYTES%: 10.5 (ref 5.5–1.1)
EXT PLATELETS: 145 (ref 152–348)
EXT POTASSIUM: 4.1 (ref 3.2–5.2)
EXT SIROLIMUS LVL: 3.6
EXT SODIUM: 134 MMOL/L (ref 134–144)
EXT WBC: 4.9 (ref 3.8–11.8)

## 2019-07-29 ENCOUNTER — DOCUMENTATION ONLY (OUTPATIENT)
Dept: TRANSPLANT | Facility: CLINIC | Age: 39
End: 2019-07-29

## 2019-07-29 LAB
EXT ALBUMIN: 4 (ref 3.5–5.5)
EXT BUN: 33 (ref 5–22)
EXT BUN: 39 (ref 5–22)
EXT CALCIUM: 9 (ref 8.7–10.5)
EXT CALCIUM: 9.2 (ref 8.7–10.5)
EXT CHLORIDE: 100 (ref 101–111)
EXT CHLORIDE: 102 (ref 101–111)
EXT CO2: 26 (ref 20–31)
EXT CO2: 28 (ref 20–31)
EXT CREATININE: 2.87 MG/DL (ref 0.6–1.2)
EXT CREATININE: 3.33 MG/DL (ref 0.6–1.2)
EXT EOSINOPHIL%: 3.1 (ref 0.8–8.1)
EXT EOSINOPHIL%: 3.1 (ref 0.8–8.1)
EXT GFR MDRD AF AMER: 27 (ref 60–999)
EXT GFR MDRD NON AF AMER: 32 (ref 60–999)
EXT GLUCOSE: 114 (ref 65–110)
EXT GLUCOSE: 121 (ref 65–110)
EXT HEMATOCRIT: 24.5 (ref 36.7–47.1)
EXT HEMATOCRIT: 25.1 (ref 36.7–47.1)
EXT HEMOGLOBIN: 8 (ref 12.5–14.5)
EXT HEMOGLOBIN: 8.3 (ref 12.5–14.5)
EXT LYMPH%: 11.4 (ref 15.2–43.3)
EXT LYMPH%: 16 (ref 15.2–43.3)
EXT MAGNESIUM: 2 (ref 1.7–2.8)
EXT MONOCYTES%: 10.5 (ref 5.5–13.7)
EXT MONOCYTES%: 10.6 (ref 5.5–13.7)
EXT PHOSPHORUS: 3.5 (ref 2.5–4.5)
EXT PLATELETS: 119 (ref 152–348)
EXT PLATELETS: 145 (ref 152–348)
EXT POTASSIUM: 3.8 (ref 3.2–5.2)
EXT POTASSIUM: 4.1 (ref 3.2–5.2)
EXT SEGS%: 69.2 (ref 43.5–73.5)
EXT SEGS%: 73.4 (ref 43.5–73.5)
EXT SIROLIMUS LVL: ABNORMAL
EXT SODIUM: 134 MMOL/L (ref 134–144)
EXT SODIUM: 137 MMOL/L (ref 134–144)
EXT TACROLIMUS LVL: 3.6 (ref 3–20)
EXT WBC: 4.3 (ref 3.8–11.8)
EXT WBC: 4.9 (ref 3.8–11.8)

## 2019-07-31 ENCOUNTER — HOSPITAL ENCOUNTER (OUTPATIENT)
Dept: RADIOLOGY | Facility: HOSPITAL | Age: 39
Discharge: HOME | End: 2019-07-31
Attending: STUDENT IN AN ORGANIZED HEALTH CARE EDUCATION/TRAINING PROGRAM
Payer: MEDICARE

## 2019-07-31 ENCOUNTER — INITIAL CONSULT (OUTPATIENT)
Dept: HEMATOLOGY/ONCOLOGY | Facility: CLINIC | Age: 39
End: 2019-07-31
Payer: MEDICARE

## 2019-07-31 VITALS
HEIGHT: 69 IN | TEMPERATURE: 98 F | DIASTOLIC BLOOD PRESSURE: 86 MMHG | HEART RATE: 74 BPM | WEIGHT: 165.81 LBS | SYSTOLIC BLOOD PRESSURE: 161 MMHG | OXYGEN SATURATION: 100 % | BODY MASS INDEX: 24.56 KG/M2

## 2019-07-31 DIAGNOSIS — C46.9 KAPOSI SARCOMA: Primary | ICD-10-CM

## 2019-07-31 DIAGNOSIS — C41.9 BONE SARCOMA: ICD-10-CM

## 2019-07-31 DIAGNOSIS — C77.9 REGIONAL LYMPH NODE METASTASIS PRESENT: ICD-10-CM

## 2019-07-31 DIAGNOSIS — T86.11 ANTIBODY MEDIATED REJECTION OF KIDNEY TRANSPLANT: ICD-10-CM

## 2019-07-31 DIAGNOSIS — I15.0 RENOVASCULAR HYPERTENSION: ICD-10-CM

## 2019-07-31 LAB — POCT GLUCOSE: 91 MG/DL (ref 70–110)

## 2019-07-31 PROCEDURE — 78816 PET IMAGE W/CT FULL BODY: CPT | Mod: 26,PI,, | Performed by: RADIOLOGY

## 2019-07-31 PROCEDURE — 99999 PR PBB SHADOW E&M-EST. PATIENT-LVL IV: CPT | Mod: PBBFAC,GC,,

## 2019-07-31 PROCEDURE — 99215 OFFICE O/P EST HI 40 MIN: CPT | Mod: S$PBB,GC,,

## 2019-07-31 PROCEDURE — 99214 OFFICE O/P EST MOD 30 MIN: CPT | Mod: PBBFAC,25

## 2019-07-31 PROCEDURE — 99999 PR PBB SHADOW E&M-EST. PATIENT-LVL IV: ICD-10-PCS | Mod: PBBFAC,GC,,

## 2019-07-31 PROCEDURE — 78816 PET IMAGE W/CT FULL BODY: CPT | Mod: TC,PI

## 2019-07-31 PROCEDURE — 78816 NM PET CT WHOLE BODY: ICD-10-PCS | Mod: 26,PI,, | Performed by: RADIOLOGY

## 2019-07-31 PROCEDURE — 99215 PR OFFICE/OUTPT VISIT, EST, LEVL V, 40-54 MIN: ICD-10-PCS | Mod: S$PBB,GC,,

## 2019-07-31 NOTE — Clinical Note
Asad fang, Mr. Aguiar needs some help with some things:1. Needs to have a pre anthracycline TTE done 2. Needs to be scheduled for a bone marrow in clinic (bm labs ordered)3. Needs to have auth for doxorubicin treatment plan4. General surgery appt for port placement - trying to touch base with his transplant physician to find out exactly where they want to place the port at this time d/t his hx of multi failures in the past5. Needs cardiology appt with Dr. Borjas6. Follow up with me in 2 weeks to go over chemo with an lab visit prior for cbc/cmpThanks and let me know if you need anything else from meMike

## 2019-07-31 NOTE — PLAN OF CARE
START ON PATHWAY REGIMEN - Sarcoma    [id number]        Liposomal doxorubicin (Doxil(R))           Additional Orders: LVEF assessment prior to initiation of doxorubicin   and as clinically indicated. Max recommended cumulative doxorubicin dose = 450   mg/m2.    **Always confirm dose/schedule in your pharmacy ordering system**    Administration Notes: Will need prior port placement in the setting of previous   ESRD s/p renal transplant in chronic rejection with CKDIII and newly placed and   possibly failing left femoral graft without permanent access. Will also need   TTE/MUGA - cards onc.    Patient Characteristics:  Other Sarcoma Subtypes, Unresectable / Metastatic, Kaposi Sarcoma  Histology/Anatomic Site: Kaposi Sarcoma  Intent of Therapy:  Curative Intent, Discussed with Patient

## 2019-07-31 NOTE — PROGRESS NOTES
[facility]  Hematology/Medical Oncology Clinic      PATIENT: Demetrio Aguiar  MRN: [id number]  DATE: 7/31/2019    Diagnosis:   1. Kaposi sarcoma      Chief Complaint: Consult for newly diagnosed Kaposi's sarcoma    Oncologic History:   Treatment:  NA    Pathology:  7/11/19  Spindled cell lesion with slit-like spaces, minimal pleomorphism, and extravasated RBCs. Postive for CD34, 31 and HHV-8, negative for s-100, SMA and pancytokeratin. Findings consistent with Kaposi sarcoma.     Imaging:   CT Neck/Chest 7/18/19  Right perihilar consolidation with right hilar fullness, and right pleural effusion.  Diagnostic considerations include pneumonia and malignancy.    Multiple subcentimeter mediastinal lymph nodes.    Calcification within the SVC and left brachiocephalic vein may represent chronic thrombosis. Further evaluation with ultrasound may be helpful.    Multiple subcentimeter cervical lymph nodes, predominantly on the right, with associated right greater than left neck edema, possibly sequela of venous outflow occlusion.    Possible enlarged right axillary lymph node with internal heterogeneity which may represent necrosis.  Further evaluation with contrast enhanced examination or ultrasound would be helpful.    Additional findings above.    This report was flagged in Epic as abnormal.    CT A/P 7/18/19  Airspace consolidation in the right lower lobe with peripheral patchy opacification, possibly representing pneumonia.  Solid infrahilar mass on the right cannot be excluded.    Multiple enlarged lymph nodes in the right inguinal region, right pelvic sidewall, retroperitoneum and in the root of the mesentery around the celiac artery and bonnie hepatis region, as detailed above.  Limited evaluation of the enlarged lymph nodes in the abdomen in the absence of IV and oral contrast.  This could be related to infectious process, metastasis or lymphoma.    Atrophic native kidneys  contains cysts.  Postsurgical changes of kidney transplant with right lower quadrant allograft.    Right lower extremity and buttock soft tissue edema.  Suspect lymphedema given presence of right inguinal and pelvic sidewall lymphadenopathy and absence of DVT on Doppler ultrasound performed earlier same day.    Minimal ascites.  Diffuse haziness/edema throughout the mesenteric fat with some nodularity present that could represent lymph nodes or peritoneal implants.  Peritoneal infection or neoplastic process not excluded.    Other MDs:  Neph Transplant: [facility]     Subjective:   Initial History: Mr. Aguiar is a 39 y.o. male who presents to oncology clinic for recent diagnosis of kaposi's sarcoma during recent admission to [facility].     PMHx of HIV on ART, ESRD previous on iHD s/p transplant with hx of rejection (on chronic immunosuppression), anemia of chronic disease, latent TB s/p treatment, HTN and HLD.    Was admitted to [facility] recently ([place]) where he was complaining of right inguinal/groin swelling for about 9-10 months that was associated with some RLE and abdominal lesions. He also noted unintentional weight loss. Biopsy of this swelling of the right groin was done at [facility] in [place] which revealed HHV-8 and kaposis sarcoma.    Was transferred here and underwent further risk stratifications during his inpatient stay, including imaging. Imaging reveal multiple large nodes in the right inguinal region, right pelvic side wall, retroperitoneum and mesentery around the celiac artery. He later had CT of the neck/chest which showed right perihilar consolidation with consideration for pna vs malignancy, multiple subcm mediastinal LNs, chronic SVC thrombosis, multiple subCM cervical LNs and possible enlarged right axillary LN with internal heterogeneity which may represent necrosis.     HIV viral load was negative in the hospital. CMP revealing CKDIII in transplanted kidney. CBC showing mild  "pancytopenia.     Initial thought process, pending further work up was taxol based chemotherapy. He is here today after obtaining his PET/CT this morning.    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 Hematoma evacuated.(09/09)    Status post exploratory laparotomy 9/10/2017    Re explored for retroperitoneal hematoma. Hematoma evacuated.(09/09)    TB lung, latent     tx INH 2006     Past Surgical History:   Procedure Laterality Date    BIOPSY Tranplanted Kidney N/A 8/28/2018    Performed by [facility] Diagnostic Provider at [facility] OR [address] [place] [address]    BIOPSY Tranplanted Kidney N/A 1/15/2018    Performed by [facility] Diagnostic Provider at [facility] OR [address] [place] [address]    BIOPSY Tranplanted Kidney N/A 10/17/2017    Performed by [facility] Diagnostic Provider at [facility] OR [address] [place] [address]    XCEEBO-WGUJAA-EB N/A 10/3/2017    Performed by [facility] Diagnostic Provider at [facility] OR [address] [place] [address]    EXPLORATORY LAPAROTOMY W/ BOWEL RESECTION      NO BOWEL RESECTION, UNKNOWN DETAILS, "Pancreas Infection"    EXPLORATORY-LAPAROTOMY retroperitoneal washout N/A 9/9/2017    Performed by Wesley Cisneros MD at [facility] OR [address] [place] [address]    KIDNEY TRANSPLANT      peritoneal dialysis catheter placement      TRANSPLANT-KIDNEY N/A 8/18/2017    Performed by Wesley Cisneros MD at [facility] OR [address] [place] [address]     Family History   Problem Relation Age of Onset    Hyperlipidemia Mother     Cancer Father         lung    Lung cancer Father     No Known Problems Brother     Diabetes Maternal Aunt     Diabetes Maternal Uncle     Hypertension Maternal Grandmother     Hypertension Paternal Grandmother     Heart disease " Paternal Grandmother       reports that he has quit smoking. His smoking use included cigarettes. He smoked 0.50 packs per day. He has never used smokeless tobacco. He reports that he does not drink alcohol or use drugs.  Review of patient's allergies indicates:  No Known Allergies  Current Outpatient Medications   Medication Sig Dispense Refill    atovaquone (MEPRON) 750 mg/5 mL Susp Take 5 mLs (750 mg total) by mouth once daily. 210 mL 11    carvedilol (COREG) 6.25 MG tablet Take 2 tablets (12.5 mg total) by mouth 2 (two) times daily with meals. 120 tablet 11    cinacalcet (SENSIPAR) 60 MG Tab Take 1 tablet (60 mg total) by mouth daily with breakfast. 30 tablet 0    dolutegravir (TIVICAY) 50 mg Tab Take 1 tablet (50 mg total) by mouth once daily. 30 tablet 5    emtricitabine-tenofovir alafen (DESCOVY) 200-25 mg Tab Take 1 tablet by mouth once daily.      famotidine (PEPCID) 20 MG tablet TAKE 1 TABLET (20MG TOTAL) BY MOUTH EACH EVENING 30 tablet 11    multivitamin (THERAGRAN) tablet Take 1 tablet by mouth once daily.      NIFEdipine (PROCARDIA-XL) 30 MG (OSM) 24 hr tablet Take 1 tablet (30 mg total) by mouth every morning AND 2 tablets (60 mg total) every evening. 90 tablet 11    patiromer calcium sorbitex (VELTASSA) 25.2 gram PwPk Take 1 packet (25.2 g total) by mouth once daily. 30 packet 10    predniSONE (DELTASONE) 5 MG tablet Take 1 tablet (5 mg total) by mouth once daily. 30 tablet 11    rosuvastatin (CRESTOR) 20 MG tablet Take 1 tablet (20 mg total) by mouth once daily. 30 tablet 0    sevelamer carbonate (RENVELA) 800 mg Tab Take 2 tablets (1,600 mg total) by mouth 3 (three) times daily with meals. 180 tablet 11    sirolimus (RAPAMUNE) 1 MG Tab Take 3 tablets (3 mg total) by mouth once daily. 90 tablet 11    sodium bicarbonate 650 MG tablet Take 3 tablets (1,950 mg total) by mouth 3 (three) times daily. 270 tablet 11     No current facility-administered medications for this visit.      Review  "of Systems   Constitutional: Positive for fatigue. Negative for activity change, chills, fever and unexpected weight change.   HENT: Negative for hearing loss and sore throat.    Eyes: Negative for discharge and visual disturbance.   Respiratory: Negative for chest tightness and shortness of breath.    Cardiovascular: Positive for leg swelling. Negative for chest pain.   Gastrointestinal: Negative for abdominal distention, abdominal pain, diarrhea and nausea.   Endocrine: Negative for cold intolerance.   Genitourinary: Negative for decreased urine volume and difficulty urinating.   Allergic/Immunologic: Positive for immunocompromised state.   Hematological: Positive for adenopathy.     ECOG Performance Status: 0   Objective:      Vitals:   Vitals:    07/31/19 0853   BP: (!) 161/86   BP Location: Right arm   Patient Position: Sitting   BP Method: Large (Automatic)   Pulse: 74   Temp: 97.8 °F (36.6 °C)   TempSrc: Oral   SpO2: 100%   Weight: 75.2 kg (165 lb 12.6 oz)   Height: 5' 9" (1.753 m)     BMI: Body mass index is 24.48 kg/m².    Physical Exam   Constitutional: He is oriented to person, place, and time.   Young AA male, appears well and nervous   HENT:   Head: Normocephalic and atraumatic.   Eyes: Pupils are equal, round, and reactive to light. EOM are normal.   Cardiovascular: Normal rate and regular rhythm.   Pulmonary/Chest: Effort normal and breath sounds normal.   Abdominal: Bowel sounds are normal. He exhibits mass. There is tenderness. There is no rebound and no guarding.   Right lower quadrant swelling and mass palpated, slightly tender to palpation   Genitourinary: Penis normal.   Musculoskeletal:   +2 RLE edema to the abdomen, RUE/LUE fistulas with soft bruit, LLE with graft in place and soft bruit   Lymphadenopathy:   Large RLE inguinal node palpated, left wnl. No cervical axillary LAD palpated.    Neurological: He is alert and oriented to person, place, and time.   Skin: Skin is warm and dry. Capillary " refill takes less than 2 seconds.   Psychiatric: He has a normal mood and affect. His behavior is normal. Thought content normal.   Vitals reviewed.    Laboratory Data:  Hospital Outpatient Visit on 07/31/2019   Component Date Value Ref Range Status    POCT Glucose 07/31/2019 91  70 - 110 mg/dL Final   Documentation Only on 07/29/2019   Component Date Value Ref Range Status    EXT WBC 07/29/2019 4.3  3.8 - 11.8 Preliminary    EXT SEGS% 07/29/2019 69.2  43.5 - 73.5 Preliminary    EXT Lymph% 07/29/2019 16.0  15.2 - 43.3 Preliminary    EXT MONOCYTES% 07/29/2019 10.6  5.5 - 13.7 Preliminary    EXT Eosinophil% 07/29/2019 3.1  0.8 - 8.1 Preliminary    EXT Platelets 07/29/2019 119* 152 - 348 Preliminary    EXT Hemoglobin 07/29/2019 8.0* 12.5 - 14.5 Preliminary    EXT Hematocrit 07/29/2019 24.5* 36.7 - 47.1 Preliminary    EXT Sodium 07/29/2019 137  134 - 144 mmol/L Preliminary    EXT Potassium 07/29/2019 3.8  3.2 - 5.2 Preliminary    EXT Chloride 07/29/2019 102  101 - 111 Preliminary    EXT CO2 07/29/2019 28  20 - 31 Preliminary    EXT BUN 07/29/2019 33* 5 - 22 Preliminary    EXT Creatinine 07/29/2019 2.87* 0.60 - 1.20 mg/dL Preliminary    EXT GFR MDRD NON AF AMER 07/29/2019 32* 60 - 999 Preliminary    EXT Calcium 07/29/2019 9.0  8.7 - 10.5 Preliminary    EXT Phosphorus 07/29/2019 3.5  2.5 - 4.5 Preliminary    EXT Glucose 07/29/2019 114* 65 - 110 Preliminary    EXT Magnesium 07/29/2019 2.0  1.7 - 2.8 Preliminary    EXT Albumin 07/29/2019 4.0  3.5 - 5.5 Preliminary   Documentation Only on 07/29/2019   Component Date Value Ref Range Status    EXT WBC 07/25/2019 4.9  3.8 - 11.8 Preliminary    EXT SEGS% 07/25/2019 73.4  43.5 - 73.5 Preliminary    EXT Lymph% 07/25/2019 11.4* 15.2 - 43.3 Preliminary    EXT MONOCYTES% 07/25/2019 10.5  5.5 - 13.7 Preliminary    EXT Eosinophil% 07/25/2019 3.1  0.8 - 8.1 Preliminary    EXT Platelets 07/25/2019 145* 152 - 348 Preliminary    EXT Hemoglobin 07/25/2019 8.3*  12.5 - 14.5 Preliminary    EXT Hematocrit 07/25/2019 25.1* 36.7 - 47.1 Preliminary    EXT Sodium 07/25/2019 134  134 - 144 mmol/L Preliminary    EXT Potassium 07/25/2019 4.1  3.2 - 5.2 Preliminary    EXT Chloride 07/25/2019 100* 101 - 111 Preliminary    EXT CO2 07/25/2019 26  20 - 31 Preliminary    EXT BUN 07/25/2019 39* 5 - 22 Preliminary    EXT Creatinine 07/25/2019 3.33* 0.60 - 1.20 mg/dL Preliminary    EXT GFR MDRD AF AMER 07/25/2019 27* 60 - 999 Preliminary    EXT Calcium 07/25/2019 9.2  8.7 - 10.5 Preliminary    EXT Glucose 07/25/2019 121* 65 - 110 Preliminary    EXT Tacrolimus Lvl 07/25/2019 3.6  3.0 - 20.0 Preliminary   Documentation Only on 07/26/2019   Component Date Value Ref Range Status    EXT WBC 07/25/2019 4.9  3.8 - 11.8 Final    EXT Lymph% 07/25/2019 11.4* 15.2 - 43.3 Final    EXT MONOCYTES% 07/25/2019 10.5* 5.5 - 1.1 Final    EXT Eosinophil% 07/25/2019 3.1* 0.8 - 0.5 Final    EXT Platelets 07/25/2019 145* 152 - 348 Final    EXT Hemoglobin 07/25/2019 8.3* 12.5 - 14.5 Final    EXT Hematocrit 07/25/2019 25.1* 36.7 - 47.1 Final    EXT Sodium 07/25/2019 134  134 - 144 mmol/L Final    EXT Potassium 07/25/2019 4.1  3.2 - 5.2 Final    EXT Chloride 07/25/2019 100* 101 - 111 Final    EXT CO2 07/25/2019 26  20 - 31 Final    EXT BUN 07/25/2019 39* 5 - 22 Final    EXT Creatinine 07/25/2019 3.33* 0.6 - 1.2 mg/dL Final    EXT GFR MDRD AF AMER 07/25/2019 27   Final    EXT Calcium 07/25/2019 9.2  8.7 - 10.5 Final    EXT Sirolimus Lvl 07/25/2019 3.6   Final       Assessment:       1. Kaposi sarcoma         Plan:     Mr. Aguiar is here today for consultation after being diagnosed with Kaposi in the hospital earlier this month. He does have a hx of HIV though is compliant with his ART medications and most recent VL was undetectable. However, he is on immunosuppression as he is s/p kidney transplant which is likely the cause for his immunocompromised stated. CT scans done in the hospital lead  me to believe this is going to be metastatic as distant adenopathy was seen. His PET this morning will be read later today to confirm, though the treatment will not change. I am very worried about this treatment pushing him over the edge with regards to his kidney function. Doxil is not classically a nephrotoxic agent but can cause cardiotoxicity which in theory could lead to decreased CO and decreased renal blood flow, leading to iATN. Other option would be taxol though that is much mor nephrotoxic and has less evidence. He will need further workup including bmbx and a TTE prior to doxil. We went over the side effects of chemotherapy and regimen that will consist of outpatient treatment every 3 weeks in the infusion suite. He will need a port placed and it seems like he has had multiple in the past and is running out of places to put long-term vascular access. He apparently has failed RUE and LUE fistulas and a new and potentially failed LLE graft.     -follow up with final PET though this will likely not change therapy plan and is just to aid in re-staging in the future  -bone marrow in the clinic next week with routine bm labs  -will bring back next week after bmbx and imaging to consent for chemo  -plan for 4-6 cycles of doxorubicin every 21 days; put in chemo auth today  -TTE/MUGA prior to chemo  -set up with cards/onc for monitoring  -reach out to Dr. Tello to see if she has a preference of port location, set up with general surgery to placement  -will restage after 3-4 cycle  -see back in 1 week, after bmbx and imaging, to further discuss plan    Case discussed with Dr. Chandler.    Matthieu Amador MD  Hematology/Medical Oncology Fellow  [phone] (cell)  [phone] (page)

## 2019-08-01 ENCOUNTER — PATIENT MESSAGE (OUTPATIENT)
Dept: TRANSPLANT | Facility: CLINIC | Age: 39
End: 2019-08-01

## 2019-08-01 DIAGNOSIS — Z94.0 KIDNEY REPLACED BY TRANSPLANT: Primary | ICD-10-CM

## 2019-08-01 RX ORDER — SIROLIMUS 1 MG/1
5 TABLET, FILM COATED ORAL DAILY
Qty: 150 TABLET | Refills: 11 | Status: SHIPPED | OUTPATIENT
Start: 2019-08-01 | End: 2019-10-03

## 2019-08-02 RX ORDER — SEVELAMER CARBONATE 800 MG/1
800 TABLET, FILM COATED ORAL
Qty: 90 TABLET | Refills: 11 | Status: ON HOLD | OUTPATIENT
Start: 2019-08-02 | End: 2019-11-19 | Stop reason: SDUPTHER

## 2019-08-02 NOTE — TELEPHONE ENCOUNTER
----- Message from Carlos Alberto Roberts PharmD sent at 8/2/2019  9:11 AM CDT -----  Regarding: RE: med clarification  I would drop his dose to 800 mg (1 tablet) PO with each meal and continue to monitor.     Carlos Alberto Barrett  ----- Message -----  From: Evelina Barragan RN  Sent: 8/1/2019   5:25 PM  To: Lucía Polk MD, #  Subject: med clarification                                Just clarifying: Still needs Renvela?   Evelina

## 2019-08-05 ENCOUNTER — PATIENT MESSAGE (OUTPATIENT)
Dept: TRANSPLANT | Facility: CLINIC | Age: 39
End: 2019-08-05

## 2019-08-05 ENCOUNTER — TELEPHONE (OUTPATIENT)
Dept: TRANSPLANT | Facility: CLINIC | Age: 39
End: 2019-08-05

## 2019-08-05 ENCOUNTER — TELEPHONE (OUTPATIENT)
Dept: INFECTIOUS DISEASES | Facility: CLINIC | Age: 39
End: 2019-08-05

## 2019-08-05 RX ORDER — SIROLIMUS 0.5 MG/1
5 TABLET, FILM COATED ORAL DAILY
Qty: 30 TABLET | Refills: 0 | Status: SHIPPED | OUTPATIENT
Start: 2019-08-05 | End: 2019-08-08

## 2019-08-05 NOTE — TELEPHONE ENCOUNTER
Call placed to Patient in review of Sirolimus level.  Patient reports that he did not take any Sirolimus today. He is waiting for delivery from mailorder pharmacy. Patient is in [place]. Local [facility] called, 2day supply would be $176. Medication not currently stocked at [facility]. Patient will call [facility] to verify delivery ETA.

## 2019-08-07 ENCOUNTER — OFFICE VISIT (OUTPATIENT)
Dept: SURGERY | Facility: CLINIC | Age: 39
End: 2019-08-07
Payer: MEDICARE

## 2019-08-07 ENCOUNTER — OFFICE VISIT (OUTPATIENT)
Dept: CARDIOLOGY | Facility: CLINIC | Age: 39
End: 2019-08-07
Payer: MEDICARE

## 2019-08-07 ENCOUNTER — OFFICE VISIT (OUTPATIENT)
Dept: HEMATOLOGY/ONCOLOGY | Facility: CLINIC | Age: 39
End: 2019-08-07
Payer: MEDICARE

## 2019-08-07 ENCOUNTER — PATIENT MESSAGE (OUTPATIENT)
Dept: TRANSPLANT | Facility: CLINIC | Age: 39
End: 2019-08-07

## 2019-08-07 ENCOUNTER — HOSPITAL ENCOUNTER (OUTPATIENT)
Dept: CARDIOLOGY | Facility: CLINIC | Age: 39
Discharge: HOME | End: 2019-08-07
Attending: INTERNAL MEDICINE
Payer: MEDICARE

## 2019-08-07 ENCOUNTER — TELEPHONE (OUTPATIENT)
Dept: SURGERY | Facility: CLINIC | Age: 39
End: 2019-08-07

## 2019-08-07 ENCOUNTER — PATIENT MESSAGE (OUTPATIENT)
Dept: CARDIOLOGY | Facility: CLINIC | Age: 39
End: 2019-08-07

## 2019-08-07 VITALS
HEIGHT: 68 IN | TEMPERATURE: 98 F | DIASTOLIC BLOOD PRESSURE: 82 MMHG | WEIGHT: 178.38 LBS | SYSTOLIC BLOOD PRESSURE: 169 MMHG | HEART RATE: 72 BPM | BODY MASS INDEX: 27.03 KG/M2

## 2019-08-07 VITALS
HEART RATE: 73 BPM | OXYGEN SATURATION: 100 % | RESPIRATION RATE: 16 BRPM | DIASTOLIC BLOOD PRESSURE: 87 MMHG | TEMPERATURE: 98 F | SYSTOLIC BLOOD PRESSURE: 152 MMHG

## 2019-08-07 VITALS
DIASTOLIC BLOOD PRESSURE: 84 MMHG | HEIGHT: 68 IN | OXYGEN SATURATION: 97 % | HEART RATE: 76 BPM | SYSTOLIC BLOOD PRESSURE: 132 MMHG | BODY MASS INDEX: 26.66 KG/M2 | WEIGHT: 175.94 LBS

## 2019-08-07 VITALS
HEIGHT: 68 IN | WEIGHT: 178 LBS | HEART RATE: 87 BPM | SYSTOLIC BLOOD PRESSURE: 169 MMHG | BODY MASS INDEX: 26.98 KG/M2 | DIASTOLIC BLOOD PRESSURE: 82 MMHG

## 2019-08-07 DIAGNOSIS — E87.5 HYPERKALEMIA: ICD-10-CM

## 2019-08-07 DIAGNOSIS — Z94.0 S/P KIDNEY TRANSPLANT: ICD-10-CM

## 2019-08-07 DIAGNOSIS — Z22.7 TB LUNG, LATENT: ICD-10-CM

## 2019-08-07 DIAGNOSIS — D63.1 ANEMIA IN STAGE 3 CHRONIC KIDNEY DISEASE: Chronic | ICD-10-CM

## 2019-08-07 DIAGNOSIS — Z79.60 LONG-TERM USE OF IMMUNOSUPPRESSANT MEDICATION: ICD-10-CM

## 2019-08-07 DIAGNOSIS — E78.5 HYPERLIPIDEMIA, UNSPECIFIED HYPERLIPIDEMIA TYPE: Chronic | ICD-10-CM

## 2019-08-07 DIAGNOSIS — N18.30 ANEMIA IN STAGE 3 CHRONIC KIDNEY DISEASE: Chronic | ICD-10-CM

## 2019-08-07 DIAGNOSIS — C46.9 KAPOSI SARCOMA: Primary | ICD-10-CM

## 2019-08-07 DIAGNOSIS — R60.1 GENERALIZED EDEMA: ICD-10-CM

## 2019-08-07 DIAGNOSIS — C77.9 REGIONAL LYMPH NODE METASTASIS PRESENT: ICD-10-CM

## 2019-08-07 DIAGNOSIS — B20 HIV (HUMAN IMMUNODEFICIENCY VIRUS INFECTION): ICD-10-CM

## 2019-08-07 DIAGNOSIS — E78.5 HYPERLIPIDEMIA, UNSPECIFIED HYPERLIPIDEMIA TYPE: ICD-10-CM

## 2019-08-07 DIAGNOSIS — C46.9 KAPOSI SARCOMA: ICD-10-CM

## 2019-08-07 DIAGNOSIS — T86.11 ANTIBODY MEDIATED REJECTION OF KIDNEY TRANSPLANT: ICD-10-CM

## 2019-08-07 LAB
ASCENDING AORTA: 2.79 CM
AV INDEX (PROSTH): 0.85
AV MEAN GRADIENT: 4 MMHG
AV PEAK GRADIENT: 8 MMHG
AV VALVE AREA: 3.1 CM2
AV VELOCITY RATIO: 0.96
BONE MARROW IRON STAIN COMMENT: NORMAL
BONE MARROW WRIGHT STAIN COMMENT: NORMAL
BSA FOR ECHO PROCEDURE: 1.97 M2
CV ECHO LV RWT: 0.26 CM
DOP CALC AO PEAK VEL: 1.41 M/S
DOP CALC AO VTI: 30.48 CM
DOP CALC LVOT AREA: 3.7 CM2
DOP CALC LVOT DIAMETER: 2.16 CM
DOP CALC LVOT PEAK VEL: 1.35 M/S
DOP CALC LVOT STROKE VOLUME: 94.42 CM3
DOP CALCLVOT PEAK VEL VTI: 25.78 CM
E WAVE DECELERATION TIME: 196.79 MSEC
E/A RATIO: 0.95
E/E' RATIO: 9.76 M/S
ECHO LV POSTERIOR WALL: 0.78 CM (ref 0.6–1.1)
FRACTIONAL SHORTENING: 29 % (ref 28–44)
INTERVENTRICULAR SEPTUM: 0.69 CM (ref 0.6–1.1)
IVRT: 0.11 MSEC
LA MAJOR: 5.99 CM
LA MINOR: 5.97 CM
LA WIDTH: 5.06 CM
LEFT ATRIUM SIZE: 3.94 CM
LEFT ATRIUM VOLUME INDEX: 52.1 ML/M2
LEFT ATRIUM VOLUME: 101.34 CM3
LEFT INTERNAL DIMENSION IN SYSTOLE: 4.32 CM (ref 2.1–4)
LEFT VENTRICLE DIASTOLIC VOLUME INDEX: 94.2 ML/M2
LEFT VENTRICLE DIASTOLIC VOLUME: 183.23 ML
LEFT VENTRICLE MASS INDEX: 87 G/M2
LEFT VENTRICLE SYSTOLIC VOLUME INDEX: 43.2 ML/M2
LEFT VENTRICLE SYSTOLIC VOLUME: 83.99 ML
LEFT VENTRICULAR INTERNAL DIMENSION IN DIASTOLE: 6.05 CM (ref 3.5–6)
LEFT VENTRICULAR MASS: 170.17 G
LV LATERAL E/E' RATIO: 8.3 M/S
LV SEPTAL E/E' RATIO: 11.86 M/S
MV PEAK A VEL: 0.87 M/S
MV PEAK E VEL: 0.83 M/S
PISA TR MAX VEL: 2.81 M/S
PULM VEIN S/D RATIO: 0.81
PV PEAK D VEL: 0.57 M/S
PV PEAK S VEL: 0.46 M/S
RA MAJOR: 5.46 CM
RA PRESSURE: 3 MMHG
RA WIDTH: 4.22 CM
RIGHT VENTRICULAR END-DIASTOLIC DIMENSION: 4.06 CM
RV TISSUE DOPPLER FREE WALL SYSTOLIC VELOCITY 1 (APICAL 4 CHAMBER VIEW): 15.03 CM/S
SINUS: 3.35 CM
STJ: 2.9 CM
TDI LATERAL: 0.1 M/S
TDI SEPTAL: 0.07 M/S
TDI: 0.09 M/S
TR MAX PG: 32 MMHG
TRICUSPID ANNULAR PLANE SYSTOLIC EXCURSION: 2.97 CM
TV REST PULMONARY ARTERY PRESSURE: 35 MMHG

## 2019-08-07 PROCEDURE — 88305 TISSUE EXAM BY PATHOLOGIST: CPT | Mod: 26,,, | Performed by: PATHOLOGY

## 2019-08-07 PROCEDURE — 38220 DX BONE MARROW ASPIRATIONS: CPT | Mod: PBBFAC | Performed by: NURSE PRACTITIONER

## 2019-08-07 PROCEDURE — 88311 DECALCIFY TISSUE: CPT | Mod: 26,,, | Performed by: PATHOLOGY

## 2019-08-07 PROCEDURE — 38222 DX BONE MARROW BX & ASPIR: CPT | Mod: PBBFAC | Performed by: NURSE PRACTITIONER

## 2019-08-07 PROCEDURE — 88342 TISSUE SPECIMEN TO PATHOLOGY, BONE MARROW ASPIRATION/BIOPSY PROCEDURE: ICD-10-PCS | Mod: 26,59,, | Performed by: PATHOLOGY

## 2019-08-07 PROCEDURE — 99999 PR PBB SHADOW E&M-EST. PATIENT-LVL IV: ICD-10-PCS | Mod: PBBFAC,,, | Performed by: INTERNAL MEDICINE

## 2019-08-07 PROCEDURE — 88341 IMHCHEM/IMCYTCHM EA ADD ANTB: CPT | Mod: 26,,, | Performed by: PATHOLOGY

## 2019-08-07 PROCEDURE — 99214 PR OFFICE/OUTPT VISIT, EST, LEVL IV, 30-39 MIN: ICD-10-PCS | Mod: S$PBB,,, | Performed by: INTERNAL MEDICINE

## 2019-08-07 PROCEDURE — 99999 PR PBB SHADOW E&M-EST. PATIENT-LVL IV: ICD-10-PCS | Mod: PBBFAC,,, | Performed by: SURGERY

## 2019-08-07 PROCEDURE — 85097 TISSUE SPECIMEN TO PATHOLOGY, BONE MARROW ASPIRATION/BIOPSY PROCEDURE: ICD-10-PCS | Mod: ,,, | Performed by: PATHOLOGY

## 2019-08-07 PROCEDURE — 88305 TISSUE SPECIMEN TO PATHOLOGY, BONE MARROW ASPIRATION/BIOPSY PROCEDURE: ICD-10-PCS | Mod: 26,,, | Performed by: PATHOLOGY

## 2019-08-07 PROCEDURE — 99999 PR PBB SHADOW E&M-EST. PATIENT-LVL III: ICD-10-PCS | Mod: PBBFAC,,, | Performed by: NURSE PRACTITIONER

## 2019-08-07 PROCEDURE — 88184 FLOWCYTOMETRY/ TC 1 MARKER: CPT | Performed by: PATHOLOGY

## 2019-08-07 PROCEDURE — 99214 OFFICE O/P EST MOD 30 MIN: CPT | Mod: PBBFAC,27 | Performed by: INTERNAL MEDICINE

## 2019-08-07 PROCEDURE — 99214 OFFICE O/P EST MOD 30 MIN: CPT | Mod: PBBFAC,25,27 | Performed by: SURGERY

## 2019-08-07 PROCEDURE — 88313 SPECIAL STAINS GROUP 2: CPT | Mod: 26,,, | Performed by: PATHOLOGY

## 2019-08-07 PROCEDURE — 93306 TRANSTHORACIC ECHO (TTE) COMPLETE (CUPID ONLY): ICD-10-PCS | Mod: 26,S$PBB,, | Performed by: INTERNAL MEDICINE

## 2019-08-07 PROCEDURE — 88365 INSITU HYBRIDIZATION (FISH): CPT | Mod: 26,,, | Performed by: PATHOLOGY

## 2019-08-07 PROCEDURE — 99499 UNLISTED E&M SERVICE: CPT | Mod: S$PBB,,, | Performed by: NURSE PRACTITIONER

## 2019-08-07 PROCEDURE — 88185 FLOWCYTOMETRY/TC ADD-ON: CPT | Mod: 59 | Performed by: PATHOLOGY

## 2019-08-07 PROCEDURE — 99999 PR PBB SHADOW E&M-EST. PATIENT-LVL IV: CPT | Mod: PBBFAC,,, | Performed by: SURGERY

## 2019-08-07 PROCEDURE — 88313 TISSUE SPECIMEN TO PATHOLOGY, BONE MARROW ASPIRATION/BIOPSY PROCEDURE: ICD-10-PCS | Mod: 26,,, | Performed by: PATHOLOGY

## 2019-08-07 PROCEDURE — 99214 OFFICE O/P EST MOD 30 MIN: CPT | Mod: S$PBB,ICN,, | Performed by: SURGERY

## 2019-08-07 PROCEDURE — 99214 OFFICE O/P EST MOD 30 MIN: CPT | Mod: S$PBB,,, | Performed by: INTERNAL MEDICINE

## 2019-08-07 PROCEDURE — 88342 IMHCHEM/IMCYTCHM 1ST ANTB: CPT | Mod: 26,59,, | Performed by: PATHOLOGY

## 2019-08-07 PROCEDURE — 99999 PR PBB SHADOW E&M-EST. PATIENT-LVL III: CPT | Mod: PBBFAC,,, | Performed by: NURSE PRACTITIONER

## 2019-08-07 PROCEDURE — 88313 SPECIAL STAINS GROUP 2: CPT

## 2019-08-07 PROCEDURE — 99999 PR PBB SHADOW E&M-EST. PATIENT-LVL IV: CPT | Mod: PBBFAC,,, | Performed by: INTERNAL MEDICINE

## 2019-08-07 PROCEDURE — 99499 NO LOS: ICD-10-PCS | Mod: S$PBB,,, | Performed by: NURSE PRACTITIONER

## 2019-08-07 PROCEDURE — 85097 BONE MARROW INTERPRETATION: CPT | Mod: ,,, | Performed by: PATHOLOGY

## 2019-08-07 PROCEDURE — 38222 DX BONE MARROW BX & ASPIR: CPT | Mod: S$PBB,LT,, | Performed by: NURSE PRACTITIONER

## 2019-08-07 PROCEDURE — 88305 TISSUE EXAM BY PATHOLOGIST: CPT | Mod: 59 | Performed by: PATHOLOGY

## 2019-08-07 PROCEDURE — 88311 TISSUE SPECIMEN TO PATHOLOGY, BONE MARROW ASPIRATION/BIOPSY PROCEDURE: ICD-10-PCS | Mod: 26,,, | Performed by: PATHOLOGY

## 2019-08-07 PROCEDURE — 88365 TISSUE SPECIMEN TO PATHOLOGY, BONE MARROW ASPIRATION/BIOPSY PROCEDURE: ICD-10-PCS | Mod: 26,,, | Performed by: PATHOLOGY

## 2019-08-07 PROCEDURE — 88341 TISSUE SPECIMEN TO PATHOLOGY, BONE MARROW ASPIRATION/BIOPSY PROCEDURE: ICD-10-PCS | Mod: 26,,, | Performed by: PATHOLOGY

## 2019-08-07 PROCEDURE — 88189 PR  FLOWCYTOMETRY/READ, 16 & > MARKERS: ICD-10-PCS | Mod: ,,, | Performed by: PATHOLOGY

## 2019-08-07 PROCEDURE — 38222 PR BONE MARROW BIOPSY(IES) W/ASPIRATION(S); DIAGNOSTIC: ICD-10-PCS | Mod: S$PBB,LT,, | Performed by: NURSE PRACTITIONER

## 2019-08-07 PROCEDURE — 88342 IMHCHEM/IMCYTCHM 1ST ANTB: CPT | Mod: 59 | Performed by: PATHOLOGY

## 2019-08-07 PROCEDURE — 88189 FLOWCYTOMETRY/READ 16 & >: CPT | Mod: ,,, | Performed by: PATHOLOGY

## 2019-08-07 PROCEDURE — 99214 PR OFFICE/OUTPT VISIT, EST, LEVL IV, 30-39 MIN: ICD-10-PCS | Mod: S$PBB,ICN,, | Performed by: SURGERY

## 2019-08-07 PROCEDURE — 93306 TTE W/DOPPLER COMPLETE: CPT | Mod: PBBFAC | Performed by: INTERNAL MEDICINE

## 2019-08-07 PROCEDURE — 99213 OFFICE O/P EST LOW 20 MIN: CPT | Mod: PBBFAC | Performed by: NURSE PRACTITIONER

## 2019-08-07 RX ORDER — MYCOPHENOLIC ACID 180 MG/1
180 TABLET, DELAYED RELEASE ORAL
COMMUNITY
End: 2019-08-07

## 2019-08-07 RX ORDER — CLINDAMYCIN AND BENZOYL PEROXIDE 10; 50 MG/G; MG/G
1 GEL TOPICAL DAILY
Refills: 1 | COMMUNITY
Start: 2019-06-06 | End: 2019-08-26

## 2019-08-07 RX ORDER — TACROLIMUS 1 MG/1
4 CAPSULE ORAL
COMMUNITY
End: 2019-08-16 | Stop reason: CLARIF

## 2019-08-07 RX ORDER — KETOCONAZOLE 200 MG/1
100 TABLET ORAL
COMMUNITY
End: 2019-08-26

## 2019-08-07 RX ORDER — TACROLIMUS 5 MG/1
5 CAPSULE ORAL
COMMUNITY
End: 2019-08-16 | Stop reason: CLARIF

## 2019-08-07 RX ORDER — LIDOCAINE HYDROCHLORIDE 10 MG/ML
1 INJECTION, SOLUTION EPIDURAL; INFILTRATION; INTRACAUDAL; PERINEURAL ONCE
Status: CANCELLED | OUTPATIENT
Start: 2019-08-07 | End: 2019-08-07

## 2019-08-07 RX ORDER — FUROSEMIDE 20 MG/1
20 TABLET ORAL DAILY
Qty: 45 TABLET | Refills: 11 | Status: SHIPPED | OUTPATIENT
Start: 2019-08-07 | End: 2019-08-26

## 2019-08-07 RX ORDER — SODIUM CHLORIDE 9 MG/ML
INJECTION, SOLUTION INTRAVENOUS CONTINUOUS
Status: CANCELLED | OUTPATIENT
Start: 2019-08-07

## 2019-08-07 RX ORDER — AZITHROMYCIN 600 MG/1
1200 TABLET, FILM COATED ORAL
COMMUNITY
End: 2019-08-16 | Stop reason: CLARIF

## 2019-08-07 NOTE — PROGRESS NOTES
Patient presents today for bone marrow biopsy. Vital signs reviewed. Procedure explained, consent obtained. See procedure note.    Lidia Buchanan NP  Hematology/Oncology/BMT

## 2019-08-07 NOTE — PROGRESS NOTES
Chart has been dictated using voice recognition software.  It is not been reviewed carefully for any transcriptional errors due to this technology.   Subjective:   Patient ID:  Demetrio Aguiar is a 39 y.o. male who presents for evaluation of Hyperlipidemia      HPI: Patient s/p renal transplant 2017, HIV positive with Kaposi sarcoma recently diagnosed, hypertension, and hyperlipidemia.  Patient to have doxarubicin therapy and presents for evaluation prior to chemo.      Patient denies any chest discomfort on exertion or at rest.  Patient denies any dyspnea at rest or on exertion, orthopnea, or PND. Has some edema right leg > left due to lymphadenopathy. Patient denies any palpitations, lightheadedness, or syncope.     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 Hematoma evacuated.(09/09)    Status post exploratory laparotomy 9/10/2017    Re explored for retroperitoneal hematoma. Hematoma evacuated.(09/09)    TB lung, latent     tx INH 2006       Past Surgical History:   Procedure Laterality Date    BIOPSY Tranplanted Kidney N/A 8/28/2018    [facility]    BIOPSY Tranplanted Kidney N/A 1/15/2018    [facility]    BIOPSY Tranplanted Kidney N/A 10/17/2017    [facility]    PMOAOX-TPTMHX-ST N/A 10/3/2017    Performed by [facility] Diagnostic Provider at [facility] OR [address] [place] [address]     "EXPLORATORY LAPAROTOMY W/ BOWEL RESECTION      NO BOWEL RESECTION, UNKNOWN DETAILS, "Pancreas Infection"    EXPLORATORY-LAPAROTOMY retroperitoneal washout N/A 9/9/2017    Performed by Wesley Cisneros MD at [facility] OR [address] [place] [address]    KIDNEY TRANSPLANT      peritoneal dialysis catheter placement      TRANSPLANT-KIDNEY N/A 8/18/2017    Performed by [facility] at [facility] OR [address] [facility]       Social History     Tobacco Use    Smoking status: Former Smoker     Packs/day: 0.50     Types: Cigarettes    Smokeless tobacco: Never Used    Tobacco comment: quit smoking 5 years ago   Substance Use Topics    Alcohol use: No    Drug use: No     Types: Cocaine, Marijuana     Comment: last use for cocaine at age 17; 2 years ago last use for THC       Outpatient Medications Prior to Visit   Medication Sig Dispense Refill    atovaquone (MEPRON) 750 mg/5 mL Susp Take 5 mLs (750 mg total) by mouth once daily. 210 mL 11    carvedilol (COREG) 6.25 MG tablet Take 2 tablets (12.5 mg total) by mouth 2 (two) times daily with meals. 120 tablet 11    cinacalcet (SENSIPAR) 60 MG Tab Take 1 tablet (60 mg total) by mouth daily with breakfast. 30 tablet 0    dolutegravir (TIVICAY) 50 mg Tab Take 1 tablet (50 mg total) by mouth once daily. 30 tablet 5    emtricitabine-tenofovir alafen (DESCOVY) 200-25 mg Tab Take 1 tablet by mouth once daily.      famotidine (PEPCID) 20 MG tablet TAKE 1 TABLET (20MG TOTAL) BY MOUTH EACH EVENING 30 tablet 11    multivitamin (THERAGRAN) tablet Take 1 tablet by mouth once daily.      NIFEdipine (PROCARDIA-XL) 30 MG (OSM) 24 hr tablet Take 1 tablet (30 mg total) by mouth every morning AND 2 tablets (60 mg total) every evening. 90 tablet 11    patiromer calcium sorbitex (VELTASSA) 25.2 gram PwPk Take 1 packet (25.2 g total) by mouth once daily. 30 packet 10    predniSONE (DELTASONE) 5 MG tablet Take 1 tablet (5 mg total) by mouth once daily. 30 tablet 11    sevelamer carbonate (RENVELA) 800 mg Tab Take 1 " "tablet (800 mg total) by mouth 3 (three) times daily with meals. 90 tablet 11    sirolimus (RAPAMUNE) 1 MG Tab Take 5 tablets (5 mg total) by mouth once daily. 150 tablet 11    sirolimus 0.5 mg Tab Take 10 tablets (5 mg total) by mouth once daily. ICD-10 Z94.0, Renal Transplant 8/18/2017, Hosp d/c 8/26/2017. for 3 days 30 tablet 0    sodium bicarbonate 650 MG tablet Take 3 tablets (1,950 mg total) by mouth 3 (three) times daily. 270 tablet 11    rosuvastatin (CRESTOR) 20 MG tablet Take 1 tablet (20 mg total) by mouth once daily. 30 tablet 0     No facility-administered medications prior to visit.        Review of patient's allergies indicates:  No Known Allergies    Review of Systems   Constitution: Negative for weight gain and weight loss.   HENT: Negative for nosebleeds.    Eyes: Negative for vision loss in left eye and vision loss in right eye.   Cardiovascular: Negative for claudication.        As above   Respiratory: Negative for hemoptysis, shortness of breath, snoring, sputum production and wheezing.    Endocrine: Negative for polydipsia and polyuria.   Hematologic/Lymphatic: Does not bruise/bleed easily.   Musculoskeletal: Negative for myalgias.   Gastrointestinal: Negative for change in bowel habit, hematemesis, hematochezia, melena, nausea and vomiting.   Genitourinary: Negative for hematuria.   Neurological: Negative for focal weakness and numbness.      Objective:   Physical Exam   Constitutional: He is oriented to person, place, and time. He appears well-developed and well-nourished.   /84   Pulse 76   Ht 5' 8" (1.727 m)   Wt 79.8 kg (175 lb 14.8 oz)   SpO2 97%   BMI 26.75 kg/m²    Eyes: Pupils are equal, round, and reactive to light.   Neck: Neck supple. No JVD present. Carotid bruit is not present. No thyromegaly present.   Cardiovascular: Normal rate, regular rhythm and intact distal pulses.  No extrasystoles are present. PMI is not displaced. Exam reveals gallop and S4. Exam reveals " no friction rub.   No murmur heard.  Pulmonary/Chest: Effort normal. He has no wheezes. He has rhonchi (diffuse, clear slightly with cough). He has no rales.   Abdominal: Soft. Bowel sounds are normal. There is no hepatosplenomegaly. There is no tenderness.   Musculoskeletal: He exhibits edema (4+ edema in RLE to groin; 2-3+ edema in LLE to knee; 1+ presacral edema).   Neurological: He is alert and oriented to person, place, and time. He has normal strength.   Skin: No cyanosis. Nails show no clubbing.       Lab Results   Component Value Date    WBC 3.49 (L) 08/07/2019    HGB 7.6 (L) 08/07/2019    HCT 26.6 (L) 08/07/2019    MCV 93 08/07/2019     (L) 08/07/2019       Lab Results   Component Value Date     08/07/2019    K 4.4 08/07/2019    BUN 37 (H) 08/07/2019    CREATININE 3.2 (H) 08/07/2019     08/07/2019    CHOL 73 (L) 07/19/2019    HDL 26 (L) 07/19/2019    LDLCALC 21.2 (L) 07/19/2019    TRIG 129 07/19/2019    CHOLHDL 35.6 07/19/2019    HGB 7.6 (L) 08/07/2019    HCT 26.6 (L) 08/07/2019    HCT 21 (L) 07/17/2019     (L) 08/07/2019    INR 1.0 08/28/2018       Assessment:     1. Kaposi sarcoma    2. Generalized edema    3. Regional lymph node metastasis present    4. Antibody mediated rejection of kidney transplant    5. Hyperkalemia    6. Anemia in stage 3 chronic kidney disease    7. Hyperlipidemia, unspecified hyperlipidemia type       Patient with HIV positive and Kaposi sarcoma (AIDS) presents for cardiovascular evaluation prior to chemotherapy.  The patient has no cardiac symptoms other than peripheral edema which may be due to his failing kidney and to lymphadenopathy from the sarcoma.  Patient will need an echocardiogram with LV strain as well as troponin and BNP to follow his cardiac function before and after chemotherapy.  Additionally, given the patient may be fluid overloaded from renal dysfunction, he will be started on furosemide with the anticipation of losing approximately 2  lb of weight per day.  The patient does not lose weight, then he should have his furosemide increased.  However, the patient is to see renal transplantation later today who can decide on diuretic regimen.   Instructed patient to weigh himself and record weight every day.  Patient was advised that he should be losing 2 lb per day or 8 lb in a week.  If weight increases by 3 pounds in 1 day or by 5 pounds in 2 days or over a week, increase diuretic until weight has returned to prior value..  Unless there are intervening problems, patient should return for re-evaluation in 6 weeks.       Plan:     Demetrio was seen today for hyperlipidemia.    Diagnoses and all orders for this visit:    Kaposi sarcoma  -     Transthoracic echo (TTE) 2D with Color Flow; Future; Expected date: 08/07/2019  -     Troponin I; Future; Expected date: 08/07/2019  -     Brain natriuretic peptide; Future; Expected date: 08/07/2019    Generalized edema  -     Cancel: Transthoracic echo (TTE) 2D with Color Flow; Future; Expected date: 08/07/2019  -     Transthoracic echo (TTE) 2D with Color Flow; Future; Expected date: 08/07/2019  -     Troponin I; Future; Expected date: 08/07/2019  -     Brain natriuretic peptide; Future; Expected date: 08/07/2019    Regional lymph node metastasis present    Antibody mediated rejection of kidney transplant    Hyperkalemia    Anemia in stage 3 chronic kidney disease    Hyperlipidemia, unspecified hyperlipidemia type    Other orders  -     furosemide (LASIX) 20 MG tablet; Take 1 tablet (20 mg total) by mouth once daily.          Hector Mejias MD  Consultative Cardiology

## 2019-08-07 NOTE — PROCEDURES
Bone marrow  Date/Time: 8/7/2019 10:30 AM  Performed by: Lidia Buchanan NP  Authorized by: Lidia Buchanan NP     Consent Done?:  Yes (Written)  Aspiration?: Yes    Biopsy?: Yes        PROCEDURE NOTE:  Date of Procedure: 08/07/2019  Bone Marrow Biopsy and Aspiration  Indication: Kaposi sarcoma  Consent: Informed consent was obtained from patient.  Timeout: Done and documented.  Position: prone  Site: Left posterior illiac crest.  Prep: Betadine.  Needle used: 11 gauge Jamshidi needle.  Anesthetic: 2% lidocaine 8 cc.  Biopsy: The biopsy needle was introduced into the marrow cavity and an aspirate was obtained without complications and sent for flow cytometry, DNA/RNA hold, and cytogenetics. Core biopsy obtained without difficulty and sent for routine histologic examination.  Complications: None.  Disposition: The patient was discharged home per anesthesia protocol.  Blood loss: Minimal.     Lidia Buchanan NP  Hematology/Oncology/BMT

## 2019-08-07 NOTE — LETTER
August 7, 2019      Matthieu Amador MD  [address]  [facility] [address]           [facility] - Card[facility]  [address]  [facility] [address]  Phone: [phone]          Patient: Demetrio Aguiar   MR Number: [id number]   YOB: 1980   Date of Visit: 8/7/2019       Dear Dr. Matthieu Amador:    Thank you for referring Demetrio Aguiar to me for evaluation. Attached you will find relevant portions of my assessment and plan of care.    If you have questions, please do not hesitate to call me. I look forward to following Demetrio Aguiar along with you.    Sincerely,    Hector Mejias MD    Enclosure  CC:  No Recipients    If you would like to receive this communication electronically, please contact [email or web address] or [phone] to request more information on [employer] Link access.    For providers and/or their staff who would like to refer a patient to [facility], please contact us through our one-stop-shop provider referral line, [facility] , at [phone].    If you feel you have received this communication in error or would no longer like to receive these types of communications, please e-mail [email or web address]

## 2019-08-07 NOTE — LETTER
August 9, 2019      Matthieu Amador MD  [address] [facility] [address]           [facility] - General Surgery  [address] [facility]  [facility] [address]  Phone: [phone]          Patient: Demetrio Aguiar   MR Number: [id number]   YOB: 1980   Date of Visit: 8/7/2019       Dear Dr. Matthieu Amador:    Thank you for referring Demetrio Aguiar to me for evaluation. Attached you will find relevant portions of my assessment and plan of care.    If you have questions, please do not hesitate to call me. I look forward to following Demetrio Aguiar along with you.    Sincerely,    Megha Monreal MD    Enclosure  CC:  MD Lidia Murphy NP Catherine Staffeld Coit, MD    If you would like to receive this communication electronically, please contact [email or web address] or [phone] to request more information on [employer] Link access.    For providers and/or their staff who would like to refer a patient to [facility], please contact us through our one-stop-shop provider referral line, [facility], at [phone].    If you feel you have received this communication in error or would no longer like to receive these types of communications, please e-mail [email or web address]

## 2019-08-07 NOTE — MEDICAL/APP STUDENT
General Surgery  History & Physical    SUBJECTIVE:     History of Present Illness:  Mr. Aguiar is a 39yr old male with a notable PMHx for HIV infection, kidney transplant w/ acute graft rejection (2017), latent TB infection, hypertension, hyperlipidemia, and Kaposi sarcoma who present for a port placement evaluation. He originally presented to [facility] in [place], [place] for persistent RLE edema that became progressively worse.  He was found to have notable R inguinal lymphadenopathy and also had lesions on his right lower leg which were biopsied.  Biopsy report revealed Kaposi Sarcoma.  He was transferred to [facility] where he had a CT abdomen/pelvis in the hospital showing multiple large lymph nodes in the right inguinal region, right pelvic side wall, retroperitoneum, and mesentery around the celiac artery.  A CT neck/chest further showed mediastinal, cervical, a and potential right axillary lymphadenopathy.   Mr. Aguiar was subsequently scheduled with HEM/ONC and had his first visit 7/31/2019.  He is scheduled to start chemotherapy 8/19/2019 with Doxorubicin, 4-6 cycles every 21 days.  He recently had a cardiology consultation to evaluate his cardiovascular health prior to starting Doxorubicin.  He is scheduled for a TTE later today and a troponin and BNP lab test.  He also had a bone marrow biopsy today per HEM/ONC recommendations.      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 Hematoma evacuated.(09/09)    Status post exploratory laparotomy 9/10/2017    Re explored for retroperitoneal hematoma. Hematoma  "evacuated.(09/09)    TB lung, latent     tx INH 2006     Past Surgical History:   Procedure Laterality Date    BIOPSY Tranplanted Kidney N/A 8/28/2018    Performed by [facility] Diagnostic Provider at [facility] OR [address] [place] [address]    BIOPSY Tranplanted Kidney N/A 1/15/2018    Performed by [facility] Diagnostic Provider at [facility] OR [address] [place] [address]    BIOPSY Tranplanted Kidney N/A 10/17/2017    Performed by [facility] Provider at [facility] OR [address] [place] [address]    DBDIEJ-ITVDSG-TU N/A 10/3/2017    Performed by [facility] Diagnostic Provider at [facility] OR [address] [place] [address]    EXPLORATORY LAPAROTOMY W/ BOWEL RESECTION      NO BOWEL RESECTION, UNKNOWN DETAILS, "Pancreas Infection"    EXPLORATORY-LAPAROTOMY retroperitoneal washout N/A 9/9/2017    Performed by [facility] at [facility] OR [address] [place] [address]    KIDNEY TRANSPLANT      peritoneal dialysis catheter placement      TRANSPLANT-KIDNEY N/A 8/18/2017    Performed by [facility] at [facility] OR [address] [place] [address]     Current Outpatient Medications on File Prior to Visit   Medication Sig Dispense Refill    atovaquone (MEPRON) 750 mg/5 mL Susp Take 5 mLs (750 mg total) by mouth once daily. 210 mL 11    azithromycin (ZITHROMAX) 600 MG Tab 1,200 mg.      carvedilol (COREG) 6.25 MG tablet Take 2 tablets (12.5 mg total) by mouth 2 (two) times daily with meals. 120 tablet 11    cinacalcet (SENSIPAR) 60 MG Tab Take 1 tablet (60 mg total) by mouth daily with breakfast. 30 tablet 0    clindamycin-benzoyl peroxide (BENZACLIN) gel 1 application once daily. Apply to affected area  1    dolutegravir (TIVICAY) 50 mg Tab Take 1 tablet (50 mg total) by mouth once daily. 30 tablet 5    emtricitabine-tenofovir alafen (DESCOVY) 200-25 mg Tab Take 1 tablet by mouth once daily.      famotidine (PEPCID) 20 MG tablet TAKE 1 TABLET (20MG TOTAL) BY MOUTH EACH EVENING 30 tablet 11    furosemide (LASIX) 20 MG tablet Take 1 tablet (20 mg total) by mouth once daily. 45 tablet 11    ketoconazole (NIZORAL) 200 mg Tab Take 100 mg by mouth.      multivitamin " (THERAGRAN) tablet Take 1 tablet by mouth once daily.      mycophenolate (MYFORTIC) 180 MG TbEC Take 180 mg by mouth.      NIFEdipine (PROCARDIA-XL) 30 MG (OSM) 24 hr tablet Take 1 tablet (30 mg total) by mouth every morning AND 2 tablets (60 mg total) every evening. 90 tablet 11    patiromer calcium sorbitex (VELTASSA) 25.2 gram PwPk Take 1 packet (25.2 g total) by mouth once daily. 30 packet 10    predniSONE (DELTASONE) 5 MG tablet Take 1 tablet (5 mg total) by mouth once daily. 30 tablet 11    rosuvastatin (CRESTOR) 20 MG tablet Take 1 tablet (20 mg total) by mouth once daily. 30 tablet 0    sevelamer carbonate (RENVELA) 800 mg Tab Take 1 tablet (800 mg total) by mouth 3 (three) times daily with meals. 90 tablet 11    sirolimus (RAPAMUNE) 1 MG Tab Take 5 tablets (5 mg total) by mouth once daily. 150 tablet 11    sirolimus 0.5 mg Tab Take 10 tablets (5 mg total) by mouth once daily. ICD-10 Z94.0, Renal Transplant 8/18/2017, Hosp d/c 8/26/2017. for 3 days 30 tablet 0    sodium bicarbonate 650 MG tablet Take 3 tablets (1,950 mg total) by mouth 3 (three) times daily. 270 tablet 11    tacrolimus (PROGRAF) 1 MG Cap Take 4 mg by mouth.      tacrolimus (PROGRAF) 5 MG Cap Take 5 mg by mouth.       No current facility-administered medications on file prior to visit.      Review of patient's allergies indicates:  No Known Allergies    Social History     Tobacco Use    Smoking status: Former Smoker     Packs/day: 0.50     Types: Cigarettes    Smokeless tobacco: Never Used    Tobacco comment: quit smoking 5 years ago   Substance Use Topics    Alcohol use: No    Drug use: No     Types: Cocaine, Marijuana     Comment: last use for cocaine at age 17; 2 years ago last use for THC     Family History   Problem Relation Age of Onset    Hyperlipidemia Mother     Cancer Father         lung    Lung cancer Father     No Known Problems Brother     Diabetes Maternal Aunt     Diabetes Maternal Uncle     Hypertension  "Maternal Grandmother     Hypertension Paternal Grandmother     Heart disease Paternal Grandmother        Review of Systems   Constitutional: Negative for chills and fever.        Admits to recent weight gain attributed to his edema   HENT: Negative for congestion, ear pain and sore throat.    Eyes: Negative for blurred vision and double vision.   Respiratory: Negative for cough, hemoptysis and shortness of breath.    Cardiovascular: Positive for leg swelling. Negative for chest pain and palpitations.   Gastrointestinal: Positive for abdominal pain and constipation. Negative for blood in stool and vomiting.   Genitourinary: Negative for dysuria, frequency and urgency.   Skin: Negative for itching.        Admits to lesions on his RLE and his abdomen   Neurological: Negative for dizziness, sensory change and weakness.   Endo/Heme/Allergies: Does not bruise/bleed easily.   Psychiatric/Behavioral: Negative for depression and suicidal ideas.          OBJECTIVE:     Vitals:    08/07/19 1253   BP: (!) 169/82   Pulse: 72   Temp: 97.9 °F (36.6 °C)   Weight: 80.9 kg (178 lb 5.6 oz)   Height: 5' 8" (1.727 m)       Physical Exam   Constitutional: He is oriented to person, place, and time. He appears well-developed and well-nourished.   HENT:   Head: Normocephalic and atraumatic.   Eyes: Pupils are equal, round, and reactive to light.   Neck: Normal range of motion.   Bilateral cervical lymphadenopathy   Cardiovascular: Normal rate and regular rhythm. Exam reveals gallop.   Previous catheter placement scars on both upper chest regions   Pulmonary/Chest: Effort normal.   Positive for diffuse rhales   Abdominal: Bowel sounds are normal. He exhibits distension. There is tenderness. There is no rebound and no guarding.   Positive R inguinal lymphadenopathy   Musculoskeletal: Normal range of motion. He exhibits edema.   Positive for tenderness in the RLE   Neurological: He is alert and oriented to person, place, and time.   Skin: " Skin is warm and dry.   Rash on his anterior RLE and along the RLQ of his abdomen.  Surgical scars noted on his abdomen   Psychiatric: Judgment and thought content normal.       ASSESSMENT/PLAN:   Mr. Aguiar is a 39 year old male with a PMHx for HIV, kidney transplant with acute graft rejection, latent TB infection, hypertension, hyperlipidemia, and Kaposi sarcoma.  He was informed of the risk of placing a port, including but not limited to: death, obstruction, bleeding, sepsis, and local structure injury.  He was informed any of these complications may require additional surgery. Mr. Aguiar expressed understanding of the risks and benefits of performing the procedure and wishes to proceed.  He signed the consent form and will be scheduled 8/19/2019 for the port placement to coincide with his first chemotherapy treatment.      Cristóbal Thompson, MS3

## 2019-08-07 NOTE — Clinical Note
Thank you for referring Demetrio Aguiar for pre chemotherapy evaluation. Please see my note for details of this encounter. If you have any questions, please contact me.  Thank you again for the referral.

## 2019-08-07 NOTE — PATIENT INSTRUCTIONS
Weigh yourself first thing in the morning after emptying your bladder (urinating) and record weight every day.  Over the next week, your weight should decrease by about 2 pounds every day. After a week, if weight increases by 3 pounds in 1 day or by 5 pounds in 2 days or over a week, increase diuretic until weight has returned to prior value.

## 2019-08-08 ENCOUNTER — TELEPHONE (OUTPATIENT)
Dept: CARDIOLOGY | Facility: CLINIC | Age: 39
End: 2019-08-08

## 2019-08-08 ENCOUNTER — PATIENT MESSAGE (OUTPATIENT)
Dept: CARDIOLOGY | Facility: CLINIC | Age: 39
End: 2019-08-08

## 2019-08-08 ENCOUNTER — DOCUMENTATION ONLY (OUTPATIENT)
Dept: HEMATOLOGY/ONCOLOGY | Facility: CLINIC | Age: 39
End: 2019-08-08

## 2019-08-08 NOTE — PROGRESS NOTES
[facility] reservation requests made at the request of patient for his upcoming hematology appointments.  HL requests made for one-night 8/13 and one-night on 8/18.

## 2019-08-08 NOTE — TELEPHONE ENCOUNTER
----- Message from Isi Montana sent at 8/8/2019  4:21 PM CDT -----  Contact: Pt called   Pt need the dosage changed on medication furosemide (LASIX) 20 MG tablet and send to [facility]/pharmacy #[id number] - [place], [address] [phone] (Phone)  [phone] (Fax). Lv 8/7/19 Dr. Mejias. Thank you.

## 2019-08-08 NOTE — TELEPHONE ENCOUNTER
Patient advised as per Dr. Mejias to cont taking Lasix 20mg daily and to call back Monday if no changes. Patient verbalized understanding. Reports weight yesterday 176lb and today 176lb at home.

## 2019-08-09 LAB
BODY SITE - BONE MARROW: NORMAL
CLINICAL DIAGNOSIS - BONE MARROW: NORMAL
DNA/RNA EXTRACT AND HOLD RESULT: NORMAL
DNA/RNA EXTRACTION: NORMAL
EXHR SPECIMEN TYPE: NORMAL
FLOW CYTOMETRY ANTIBODIES ANALYZED - BONE MARROW: NORMAL
FLOW CYTOMETRY COMMENT - BONE MARROW: NORMAL
FLOW CYTOMETRY INTERPRETATION - BONE MARROW: NORMAL

## 2019-08-09 NOTE — PROGRESS NOTES
History & Physical    SUBJECTIVE:     History of Present Illness:  Mr. 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  Imaging does reveal cervical and axillary lymphadenopathy.  He is scheduled to start doxorubicin therapy on 08/19/2019.  He has had multiple prior central lines bilaterally.  He is unsure if these were subclavian or internal jugular.  He denies any clavicular fractures or axillary lymph node dissections.  He is right-hand dominant.    Chief Complaint   Patient presents with    Cancer    Vascular Access Problem     Review of patient's allergies indicates:  No Known Allergies    Current Outpatient Medications   Medication Sig Dispense Refill    atovaquone (MEPRON) 750 mg/5 mL Susp Take 5 mLs (750 mg total) by mouth once daily. 210 mL 11    azithromycin (ZITHROMAX) 600 MG Tab 1,200 mg.      carvedilol (COREG) 6.25 MG tablet Take 2 tablets (12.5 mg total) by mouth 2 (two) times daily with meals. 120 tablet 11    cinacalcet (SENSIPAR) 60 MG Tab Take 1 tablet (60 mg total) by mouth daily with breakfast. 30 tablet 0    clindamycin-benzoyl peroxide (BENZACLIN) gel 1 application once daily. Apply to affected area  1    dolutegravir (TIVICAY) 50 mg Tab Take 1 tablet (50 mg total) by mouth once daily. 30 tablet 5    emtricitabine-tenofovir alafen (DESCOVY) 200-25 mg Tab Take 1 tablet by mouth once daily.      famotidine (PEPCID) 20 MG tablet TAKE 1 TABLET (20MG TOTAL) BY MOUTH EACH EVENING 30 tablet 11    furosemide (LASIX) 20 MG tablet Take 1 tablet (20 mg total) by mouth once daily. 45 tablet 11    ketoconazole (NIZORAL) 200 mg Tab Take 100 mg by mouth.      multivitamin (THERAGRAN) tablet Take 1 tablet by mouth once daily.      NIFEdipine (PROCARDIA-XL) 30 MG (OSM) 24 hr tablet Take 1 tablet (30 mg total) by mouth every morning  AND 2 tablets (60 mg total) every evening. 90 tablet 11    patiromer calcium sorbitex (VELTASSA) 25.2 gram PwPk Take 1 packet (25.2 g total) by mouth once daily. 30 packet 10    predniSONE (DELTASONE) 5 MG tablet Take 1 tablet (5 mg total) by mouth once daily. 30 tablet 11    rosuvastatin (CRESTOR) 20 MG tablet Take 1 tablet (20 mg total) by mouth once daily. 30 tablet 0    sevelamer carbonate (RENVELA) 800 mg Tab Take 1 tablet (800 mg total) by mouth 3 (three) times daily with meals. 90 tablet 11    sirolimus (RAPAMUNE) 1 MG Tab Take 5 tablets (5 mg total) by mouth once daily. 150 tablet 11    sodium bicarbonate 650 MG tablet Take 3 tablets (1,950 mg total) by mouth 3 (three) times daily. 270 tablet 11    tacrolimus (PROGRAF) 1 MG Cap Take 4 mg by mouth.      tacrolimus (PROGRAF) 5 MG Cap Take 5 mg by mouth.       No current facility-administered medications for this visit.      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 Hematoma evacuated.(09/09)    Status post exploratory laparotomy 9/10/2017    Re explored for retroperitoneal hematoma. Hematoma evacuated.(09/09)    TB lung, latent     tx INH 2006     Past Surgical History:   Procedure Laterality Date    BIOPSY Tranplanted Kidney N/A 8/28/2018    Performed by [facility] at [facility] OR 2ND FLR    BIOPSY Tranplanted Kidney N/A 1/15/2018    Performed by [facility] Provider at [facility] OR 2ND [facility]LR    BIOPSY Tranplanted Kidney N/A 10/17/2017    Performed by [facility] Provider at [facility] OR 2ND [facility]LR    CMXRCL-EDQFVB-PR N/A 10/3/2017    Performed by [facility]  "Diagnostic Provider at [facility] OR 2ND FLR    EXPLORATORY LAPAROTOMY W/ BOWEL RESECTION      NO BOWEL RESECTION, UNKNOWN DETAILS, "Pancreas Infection"    EXPLORATORY-LAPAROTOMY retroperitoneal washout N/A 9/9/2017    Performed by [facility] at [facility] OR 2ND FLR    KIDNEY TRANSPLANT      peritoneal dialysis catheter placement      TRANSPLANT-KIDNEY N/A 8/18/2017    Performed by [facility] OR 2ND FLR     Family History   Problem Relation Age of Onset    Hyperlipidemia Mother     Cancer Father         lung    Lung cancer Father     No Known Problems Brother     Diabetes Maternal Aunt     Diabetes Maternal Uncle     Hypertension Maternal Grandmother     Hypertension Paternal Grandmother     Heart disease Paternal Grandmother      Social History     Tobacco Use    Smoking status: Former Smoker     Packs/day: 0.50     Types: Cigarettes    Smokeless tobacco: Never Used    Tobacco comment: quit smoking 5 years ago   Substance Use Topics    Alcohol use: No    Drug use: No     Types: Cocaine, Marijuana     Comment: last use for cocaine at age 17; 2 years ago last use for THC      Review of Systems:  Constitutional: Negative for chills and fever. + unintentional weight gain due to fluid  HENT: Negative for congestion, ear pain and sore throat.    Eyes: Negative for blurred vision and double vision.   Respiratory: Negative for cough, hemoptysis and shortness of breath.    Cardiovascular: Positive for leg swelling. Negative for chest pain and palpitations.   Gastrointestinal: Positive for abdominal pain and constipation. Negative for blood in stool and vomiting.   Genitourinary: Negative for dysuria, frequency and urgency.   Skin: Negative for itching. + RLE and abdominal discolored lesions.  Neurological: Negative for dizziness, sensory change and weakness.   Endo/Heme/Allergies: Does not bruise/bleed easily.   Psychiatric/Behavioral: Negative for depression and suicidal ideas. " "    OBJECTIVE:     Vital Signs (Most Recent)  Temp: 97.9 °F (36.6 °C) (08/07/19 1253)  Pulse: 72 (08/07/19 1253)  BP: (!) 169/82 (08/07/19 1253)  5' 8" (1.727 m)  80.9 kg (178 lb 5.6 oz)     Physical Exam:  Physical Exam   Constitutional: He is oriented to person, place, and time. No distress.   Generalized edema   HENT:   Head: Normocephalic and atraumatic.   Eyes: Conjunctivae are normal. No scleral icterus.   Neck: Neck supple.   Cardiovascular: Normal rate and regular rhythm.   No murmur heard.  Pulmonary/Chest: Effort normal. No respiratory distress. He has rales.   Abdominal: Soft. He exhibits distension. He exhibits no mass. There is no tenderness. There is no guarding.   Musculoskeletal: Normal range of motion. He exhibits edema (L>R).   Lymphadenopathy:     He has cervical adenopathy.   Neurological: He is alert and oriented to person, place, and time. No cranial nerve deficit.   Skin: Skin is warm and dry. Rash noted.   Psychiatric: His behavior is normal. Judgment and thought content normal.   Depressed mood   Vitals reviewed.    Laboratory  CBC: Reviewed  CMP: Reviewed  ; HIV RNA undetectable    Diagnostic Results:  CT: Reviewed  CT personally reviewed, bilateral cervical LAD    ASSESSMENT/PLAN:   Mr. Aguiar is a 39 year-old man with Kaposi sarcoma in the setting of HIV (undetectable) and chronic immunosuppression s/p DDKT in 2017 who presents for port-a-cath placement for upcoming chemotherapy. We discussed the risks, benefits, and alternatives to Port-A-Cath placement, the risks including but not limited to bleeding, infection, pneumothorax, catheter associated thrombus, cardiac tamponade, malpositioned, kink, or malfunctioning catheter, guidewire our catheter loss intraoperatively which may require interventional retrieval, and the technical and convalescent aspects of placement under ultrasound fluoroscopic guidance.  All questions were answered to his satisfaction and he has elected to " proceed.  Informed consent was obtained.  Given his multiple prior central lines, we also discussed that he may have stenoses of 1 or more of his vessels, in which case we will determine intraoperatively with his replace this on his left or right internal jugular or subclavian vein. He expressed understanding.  Given that he lives in a mobile, we will plan to place this in the morning of 08/19/2019, after which she will receive chemotherapy at 2:00 p.m..  I did discuss that if a complication were to arise, this would delay his care, however he accepts the risk.  We will plan to leave this accessed for immediate use.  Was a pleasure meeting  Stefania and thank you for this consultation.    Megha Monreal  8/7/19

## 2019-08-12 ENCOUNTER — TELEPHONE (OUTPATIENT)
Dept: CARDIOLOGY | Facility: CLINIC | Age: 39
End: 2019-08-12

## 2019-08-12 NOTE — TELEPHONE ENCOUNTER
As per Dr. Mejias patient to increase to Lasix 20mg 2 tablets daily. Patient made aware. Denied further questions or concerns.

## 2019-08-12 NOTE — TELEPHONE ENCOUNTER
----- Message from Cynthia Carey sent at 8/12/2019  3:37 PM CDT -----  Contact: pt [phone]  Pt says that Dr. Mejias wants to know how the pt Lasix 20 mg is working for him. The pt says he is not retaining fluid, but his weight is still the same.  LOV 8/7/19 Dr. Mejias  [facility]/pharmacy #[id number] - [place], [address] [phone] (Phone)  [phone] (Fax)    Thanks

## 2019-08-12 NOTE — TELEPHONE ENCOUNTER
Patient reports weight on 8/8 176lb .   Weight today 173lb. Reports R sided swelling from rib to the groin and Lower leg has not improved and is unchanged since last OV and has noticed L foot swelling starting 2-3 days now. Denies shortness of breath. Currently taking Lasix 20mg qd.  Message routed to Dr. Mejias for review    Patient #[phone]

## 2019-08-14 ENCOUNTER — LAB VISIT (OUTPATIENT)
Dept: LAB | Facility: HOSPITAL | Age: 39
End: 2019-08-14
Attending: INTERNAL MEDICINE
Payer: MEDICARE

## 2019-08-14 ENCOUNTER — OFFICE VISIT (OUTPATIENT)
Dept: HEMATOLOGY/ONCOLOGY | Facility: CLINIC | Age: 39
End: 2019-08-14
Payer: MEDICARE

## 2019-08-14 VITALS
BODY MASS INDEX: 27.13 KG/M2 | WEIGHT: 179 LBS | SYSTOLIC BLOOD PRESSURE: 166 MMHG | TEMPERATURE: 98 F | RESPIRATION RATE: 18 BRPM | HEART RATE: 67 BPM | DIASTOLIC BLOOD PRESSURE: 87 MMHG | HEIGHT: 68 IN

## 2019-08-14 DIAGNOSIS — Z94.0 STATUS POST KIDNEY TRANSPLANT: ICD-10-CM

## 2019-08-14 DIAGNOSIS — C46.9 KAPOSI SARCOMA: Primary | ICD-10-CM

## 2019-08-14 DIAGNOSIS — Z48.298 AFTERCARE FOLLOWING ORGAN TRANSPLANT: ICD-10-CM

## 2019-08-14 DIAGNOSIS — C77.9 REGIONAL LYMPH NODE METASTASIS PRESENT: ICD-10-CM

## 2019-08-14 DIAGNOSIS — M89.8X9 METABOLIC BONE DISEASE: ICD-10-CM

## 2019-08-14 DIAGNOSIS — C46.9 KAPOSI SARCOMA: ICD-10-CM

## 2019-08-14 DIAGNOSIS — Z94.0 KIDNEY REPLACED BY TRANSPLANT: ICD-10-CM

## 2019-08-14 LAB
ALBUMIN SERPL BCP-MCNC: 3.4 G/DL (ref 3.5–5.2)
ALBUMIN SERPL BCP-MCNC: 3.4 G/DL (ref 3.5–5.2)
ALP SERPL-CCNC: 69 U/L (ref 55–135)
ALT SERPL W/O P-5'-P-CCNC: 10 U/L (ref 10–44)
ANION GAP SERPL CALC-SCNC: 9 MMOL/L (ref 8–16)
ANION GAP SERPL CALC-SCNC: 9 MMOL/L (ref 8–16)
AST SERPL-CCNC: 18 U/L (ref 10–40)
BASOPHILS # BLD AUTO: 0.04 K/UL (ref 0–0.2)
BASOPHILS NFR BLD: 1.3 % (ref 0–1.9)
BILIRUB SERPL-MCNC: 0.4 MG/DL (ref 0.1–1)
BUN SERPL-MCNC: 32 MG/DL (ref 6–20)
BUN SERPL-MCNC: 32 MG/DL (ref 6–20)
CALCIUM SERPL-MCNC: 9 MG/DL (ref 8.7–10.5)
CALCIUM SERPL-MCNC: 9 MG/DL (ref 8.7–10.5)
CHLORIDE SERPL-SCNC: 108 MMOL/L (ref 95–110)
CHLORIDE SERPL-SCNC: 108 MMOL/L (ref 95–110)
CO2 SERPL-SCNC: 26 MMOL/L (ref 23–29)
CO2 SERPL-SCNC: 26 MMOL/L (ref 23–29)
CREAT SERPL-MCNC: 3.5 MG/DL (ref 0.5–1.4)
CREAT SERPL-MCNC: 3.5 MG/DL (ref 0.5–1.4)
DIFFERENTIAL METHOD: ABNORMAL
EOSINOPHIL # BLD AUTO: 0.1 K/UL (ref 0–0.5)
EOSINOPHIL NFR BLD: 3.6 % (ref 0–8)
ERYTHROCYTE [DISTWIDTH] IN BLOOD BY AUTOMATED COUNT: 14.7 % (ref 11.5–14.5)
ERYTHROCYTE [DISTWIDTH] IN BLOOD BY AUTOMATED COUNT: 14.7 % (ref 11.5–14.5)
EST. GFR  (AFRICAN AMERICAN): 23.8 ML/MIN/1.73 M^2
EST. GFR  (AFRICAN AMERICAN): 23.8 ML/MIN/1.73 M^2
EST. GFR  (NON AFRICAN AMERICAN): 20.6 ML/MIN/1.73 M^2
EST. GFR  (NON AFRICAN AMERICAN): 20.6 ML/MIN/1.73 M^2
GLUCOSE SERPL-MCNC: 95 MG/DL (ref 70–110)
GLUCOSE SERPL-MCNC: 95 MG/DL (ref 70–110)
HCT VFR BLD AUTO: 20.7 % (ref 40–54)
HCT VFR BLD AUTO: 20.7 % (ref 40–54)
HGB BLD-MCNC: 6.2 G/DL (ref 14–18)
HGB BLD-MCNC: 6.2 G/DL (ref 14–18)
IMM GRANULOCYTES # BLD AUTO: 0 K/UL (ref 0–0.04)
IMM GRANULOCYTES # BLD AUTO: 0 K/UL (ref 0–0.04)
IMM GRANULOCYTES NFR BLD AUTO: 0 % (ref 0–0.5)
LYMPHOCYTES # BLD AUTO: 1.2 K/UL (ref 1–4.8)
LYMPHOCYTES NFR BLD: 39.9 % (ref 18–48)
MAGNESIUM SERPL-MCNC: 1.9 MG/DL (ref 1.6–2.6)
MCH RBC QN AUTO: 26.6 PG (ref 27–31)
MCH RBC QN AUTO: 26.6 PG (ref 27–31)
MCHC RBC AUTO-ENTMCNC: 30 G/DL (ref 32–36)
MCHC RBC AUTO-ENTMCNC: 30 G/DL (ref 32–36)
MCV RBC AUTO: 89 FL (ref 82–98)
MCV RBC AUTO: 89 FL (ref 82–98)
MONOCYTES # BLD AUTO: 0.4 K/UL (ref 0.3–1)
MONOCYTES NFR BLD: 14.2 % (ref 4–15)
NEUTROPHILS # BLD AUTO: 1.2 K/UL (ref 1.8–7.7)
NEUTROPHILS # BLD AUTO: 1.2 K/UL (ref 1.8–7.7)
NEUTROPHILS NFR BLD: 41 % (ref 38–73)
NRBC BLD-RTO: 0 /100 WBC
PHOSPHATE SERPL-MCNC: 4.4 MG/DL (ref 2.7–4.5)
PLATELET # BLD AUTO: 120 K/UL (ref 150–350)
PLATELET # BLD AUTO: 120 K/UL (ref 150–350)
PMV BLD AUTO: 10.6 FL (ref 9.2–12.9)
PMV BLD AUTO: 10.6 FL (ref 9.2–12.9)
POTASSIUM SERPL-SCNC: 4.6 MMOL/L (ref 3.5–5.1)
POTASSIUM SERPL-SCNC: 4.6 MMOL/L (ref 3.5–5.1)
PROT SERPL-MCNC: 6 G/DL (ref 6–8.4)
RBC # BLD AUTO: 2.33 M/UL (ref 4.6–6.2)
RBC # BLD AUTO: 2.33 M/UL (ref 4.6–6.2)
SIROLIMUS BLD-MCNC: 3.6 NG/ML (ref 4–20)
SODIUM SERPL-SCNC: 143 MMOL/L (ref 136–145)
SODIUM SERPL-SCNC: 143 MMOL/L (ref 136–145)
TACROLIMUS BLD-MCNC: <1.5 NG/ML (ref 5–15)
WBC # BLD AUTO: 3.03 K/UL (ref 3.9–12.7)
WBC # BLD AUTO: 3.03 K/UL (ref 3.9–12.7)

## 2019-08-14 PROCEDURE — 99215 OFFICE O/P EST HI 40 MIN: CPT | Mod: S$PBB,GC,,

## 2019-08-14 PROCEDURE — 85025 COMPLETE CBC W/AUTO DIFF WBC: CPT

## 2019-08-14 PROCEDURE — 80195 ASSAY OF SIROLIMUS: CPT

## 2019-08-14 PROCEDURE — 99999 PR PBB SHADOW E&M-EST. PATIENT-LVL V: ICD-10-PCS | Mod: PBBFAC,GC,,

## 2019-08-14 PROCEDURE — 80053 COMPREHEN METABOLIC PANEL: CPT

## 2019-08-14 PROCEDURE — 99215 PR OFFICE/OUTPT VISIT, EST, LEVL V, 40-54 MIN: ICD-10-PCS | Mod: S$PBB,GC,,

## 2019-08-14 PROCEDURE — 99999 PR PBB SHADOW E&M-EST. PATIENT-LVL V: CPT | Mod: PBBFAC,GC,,

## 2019-08-14 PROCEDURE — 36415 COLL VENOUS BLD VENIPUNCTURE: CPT

## 2019-08-14 PROCEDURE — 80197 ASSAY OF TACROLIMUS: CPT

## 2019-08-14 PROCEDURE — 99215 OFFICE O/P EST HI 40 MIN: CPT | Mod: PBBFAC

## 2019-08-14 PROCEDURE — 83735 ASSAY OF MAGNESIUM: CPT

## 2019-08-14 PROCEDURE — 80069 RENAL FUNCTION PANEL: CPT

## 2019-08-14 RX ORDER — HEPARIN 100 UNIT/ML
500 SYRINGE INTRAVENOUS
Status: CANCELLED | OUTPATIENT
Start: 2019-08-19

## 2019-08-14 RX ORDER — SODIUM CHLORIDE 0.9 % (FLUSH) 0.9 %
10 SYRINGE (ML) INJECTION
Status: CANCELLED | OUTPATIENT
Start: 2019-08-19

## 2019-08-14 NOTE — Clinical Note
Asad Another thing for mr. Romo needs a lab visit prior to chemo next week with a CBC/CMP for his renal function and hgb being abnormal, prior to starting chem.Jordyn

## 2019-08-14 NOTE — PROGRESS NOTES
[facility]  Hematology/Medical Oncology Clinic      PATIENT: Demetrio Aguiar  MRN: [id number]  DATE: 8/14/2019    Diagnosis:   1. Kaposi sarcoma      Chief Complaint: Consult for newly diagnosed Kaposi's sarcoma    Oncologic History:   Treatment:  NA    Pathology:  7/11/19  Spindled cell lesion with slit-like spaces, minimal pleomorphism, and extravasated RBCs. Postive for CD34, 31 and HHV-8, negative for s-100, SMA and pancytokeratin. Findings consistent with Kaposi sarcoma.     Imaging:   PET 7/31/19  Nodular increased uptake within the subcutaneous and soft tissue of the right knee and significant associated soft tissue edema with moderate right knee joint effusion. Lymphadenopathy with increased uptake noted within the level 1 right axillary, mesenteric, and right inguinal regions.  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  Marixa lymphoma could have a similar appearance.    Persistent airspace consolidation within the right lower lobe which appears similar in overall size and configuration when compared to prior examination and demonstrates mild uptake.  This likely represent infectious/inflammatory process however low-grade neoplasm not excluded.    CT Neck/Chest 7/18/19  Right perihilar consolidation with right hilar fullness, and right pleural effusion.  Diagnostic considerations include pneumonia and malignancy.    Multiple subcentimeter mediastinal lymph nodes.    Calcification within the SVC and left brachiocephalic vein may represent chronic thrombosis. Further evaluation with ultrasound may be helpful.    Multiple subcentimeter cervical lymph nodes, predominantly on the right, with associated right greater than left neck edema, possibly sequela of venous outflow occlusion.    Possible enlarged right axillary lymph node with internal heterogeneity which may represent necrosis.  Further evaluation with contrast enhanced examination or  ultrasound would be helpful.    Additional findings above.    This report was flagged in Epic as abnormal.    CT A/P 7/18/19  Airspace consolidation in the right lower lobe with peripheral patchy opacification, possibly representing pneumonia.  Solid infrahilar mass on the right cannot be excluded.    Multiple enlarged lymph nodes in the right inguinal region, right pelvic sidewall, retroperitoneum and in the root of the mesentery around the celiac artery and bonnie hepatis region, as detailed above.  Limited evaluation of the enlarged lymph nodes in the abdomen in the absence of IV and oral contrast.  This could be related to infectious process, metastasis or lymphoma.    Atrophic native kidneys contains cysts.  Postsurgical changes of kidney transplant with right lower quadrant allograft.    Right lower extremity and buttock soft tissue edema.  Suspect lymphedema given presence of right inguinal and pelvic sidewall lymphadenopathy and absence of DVT on Doppler ultrasound performed earlier same day.    Minimal ascites.  Diffuse haziness/edema throughout the mesenteric fat with some nodularity present that could represent lymph nodes or peritoneal implants.  Peritoneal infection or neoplastic process not excluded.    Other MDs:  Neph Transplant: [facility]     Subjective:   Initial History: Mr. Aguiar is a 39 y.o. male who is here to follow up in oncology clinic for his diagnosis of kaposi's sarcoma.     PMHx of HIV on ART, ESRD previous on iHD s/p transplant with hx of rejection (on chronic immunosuppression), anemia of chronic disease, latent TB s/p treatment, HTN and HLD.    Here today to discussed chemo and plan going forward.    Have staging with a bmbx and PET scan. PET showed known metastatic disease with extensive LAD throughout the body.    Awaiting results of marrow, though not likely to change clinical course at this time.     His complaints stem from his RLE edema and pain. No calf pain. Kaposi lesions  "present on RLE and right abdomen.     Recently followed up with cardiology who rec diuretic, which appear to have minimally worsened his renal function today, though the chemo should no alter that. Since starting, he has also not noticed any improvement in his edema.     Hgb with slightly worse today. Will need repeat prior to chemo next week.     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 Hematoma evacuated.(09/09)    Status post exploratory laparotomy 9/10/2017    Re explored for retroperitoneal hematoma. Hematoma evacuated.(09/09)    TB lung, latent     tx INH 2006     Past Surgical History:   Procedure Laterality Date    BIOPSY Tranplanted Kidney N/A 8/28/2018    Performed by [facility] at [facility] OR [address] [place] [address]    BIOPSY Tranplanted Kidney N/A 1/15/2018    Performed by [facility] at [facility] [place] [address] [place] [address]    BIOPSY Tranplanted Kidney N/A 10/17/2017    Performed by [facility] at [facility] [place] [address] [place] [address]    TSXYEK-VFNILF-OX N/A 10/3/2017    Performed by [facility] at [facility] [place] [address] [place] [address]    EXPLORATORY LAPAROTOMY W/ BOWEL RESECTION      NO BOWEL RESECTION, UNKNOWN DETAILS, "Pancreas Infection"    EXPLORATORY-LAPAROTOMY retroperitoneal washout N/A 9/9/2017    Performed by Wesley Cisneros MD at [facility] OR [address] [place] [address]    KIDNEY TRANSPLANT      peritoneal dialysis catheter placement      TRANSPLANT-KIDNEY N/A 8/18/2017    Performed by Wesley Cisneros MD at [facility] [place] [address] [place] [address]     Family History   Problem Relation Age of Onset    Hyperlipidemia Mother     Cancer Father         lung    Lung cancer " Father     No Known Problems Brother     Diabetes Maternal Aunt     Diabetes Maternal Uncle     Hypertension Maternal Grandmother     Hypertension Paternal Grandmother     Heart disease Paternal Grandmother       reports that he has quit smoking. His smoking use included cigarettes. He smoked 0.50 packs per day. He has never used smokeless tobacco. He reports that he does not drink alcohol or use drugs.  Review of patient's allergies indicates:  No Known Allergies  Current Outpatient Medications   Medication Sig Dispense Refill    atovaquone (MEPRON) 750 mg/5 mL Susp Take 5 mLs (750 mg total) by mouth once daily. 210 mL 11    azithromycin (ZITHROMAX) 600 MG Tab 1,200 mg.      carvedilol (COREG) 6.25 MG tablet Take 2 tablets (12.5 mg total) by mouth 2 (two) times daily with meals. 120 tablet 11    cinacalcet (SENSIPAR) 60 MG Tab Take 1 tablet (60 mg total) by mouth daily with breakfast. 30 tablet 0    clindamycin-benzoyl peroxide (BENZACLIN) gel 1 application once daily. Apply to affected area  1    dolutegravir (TIVICAY) 50 mg Tab Take 1 tablet (50 mg total) by mouth once daily. 30 tablet 5    emtricitabine-tenofovir alafen (DESCOVY) 200-25 mg Tab Take 1 tablet by mouth once daily.      famotidine (PEPCID) 20 MG tablet TAKE 1 TABLET (20MG TOTAL) BY MOUTH EACH EVENING 30 tablet 11    furosemide (LASIX) 20 MG tablet Take 1 tablet (20 mg total) by mouth once daily. 45 tablet 11    ketoconazole (NIZORAL) 200 mg Tab Take 100 mg by mouth.      multivitamin (THERAGRAN) tablet Take 1 tablet by mouth once daily.      NIFEdipine (PROCARDIA-XL) 30 MG (OSM) 24 hr tablet Take 1 tablet (30 mg total) by mouth every morning AND 2 tablets (60 mg total) every evening. 90 tablet 11    patiromer calcium sorbitex (VELTASSA) 25.2 gram PwPk Take 1 packet (25.2 g total) by mouth once daily. 30 packet 10    predniSONE (DELTASONE) 5 MG tablet Take 1 tablet (5 mg total) by mouth once daily. 30 tablet 11    rosuvastatin  "(CRESTOR) 20 MG tablet Take 1 tablet (20 mg total) by mouth once daily. 30 tablet 0    sevelamer carbonate (RENVELA) 800 mg Tab Take 1 tablet (800 mg total) by mouth 3 (three) times daily with meals. 90 tablet 11    sirolimus (RAPAMUNE) 1 MG Tab Take 5 tablets (5 mg total) by mouth once daily. 150 tablet 11    sodium bicarbonate 650 MG tablet Take 3 tablets (1,950 mg total) by mouth 3 (three) times daily. 270 tablet 11    tacrolimus (PROGRAF) 1 MG Cap Take 4 mg by mouth.      tacrolimus (PROGRAF) 5 MG Cap Take 5 mg by mouth.       No current facility-administered medications for this visit.      Review of Systems   Constitutional: Positive for fatigue. Negative for activity change, chills, fever and unexpected weight change.   HENT: Negative for hearing loss and sore throat.    Eyes: Negative for discharge and visual disturbance.   Respiratory: Negative for chest tightness and shortness of breath.    Cardiovascular: Positive for leg swelling. Negative for chest pain.   Gastrointestinal: Negative for abdominal distention, abdominal pain, diarrhea and nausea.   Endocrine: Negative for cold intolerance.   Genitourinary: Negative for decreased urine volume and difficulty urinating.   Allergic/Immunologic: Positive for immunocompromised state.   Hematological: Positive for adenopathy.     ECOG Performance Status: 0   Objective:      Vitals:   Vitals:    08/14/19 0939   BP: (!) 166/87   BP Location: Right arm   Patient Position: Sitting   BP Method: Large (Automatic)   Pulse: 67   Resp: 18   Temp: 98.1 °F (36.7 °C)   TempSrc: Oral   Weight: 81.2 kg (179 lb 0.2 oz)   Height: 5' 8" (1.727 m)     BMI: Body mass index is 27.22 kg/m².    Physical Exam   Constitutional: He is oriented to person, place, and time.   Young AA male, appears well and nervous   HENT:   Head: Normocephalic and atraumatic.   Eyes: Pupils are equal, round, and reactive to light. EOM are normal.   Cardiovascular: Normal rate and regular rhythm. "   Pulmonary/Chest: Effort normal and breath sounds normal.   Abdominal: Bowel sounds are normal. He exhibits mass. There is tenderness. There is no rebound and no guarding.   Right lower quadrant swelling and mass palpated, slightly tender to palpation   Genitourinary: Penis normal.   Musculoskeletal:   +2 RLE edema to the abdomen, RUE/LUE fistulas with soft bruit, LLE with graft in place and soft bruit   Lymphadenopathy:   Large RLE inguinal node palpated, left wnl. No cervical axillary LAD palpated.    Neurological: He is alert and oriented to person, place, and time.   Skin: Skin is warm and dry. Capillary refill takes less than 2 seconds.   Kaposi lesions in the right lower abdomen and RLE (extensive)   Psychiatric: He has a normal mood and affect. His behavior is normal. Thought content normal.   Vitals reviewed.    Laboratory Data:  Lab Visit on 08/14/2019   Component Date Value Ref Range Status    Tacrolimus Lvl 08/14/2019 <1.5* 5.0 - 15.0 ng/mL Final    Comment: Testing performed by Liquid Chromatography-Tandem  Mass Spectrometry (LC-MS/MS).  This test was developed and its performance characteristics  determined by [facility], Department of Pathology  and Laboratory Medicine in a manner consistent with CLIA  requirements. It has not been cleared or approved by the [place]  Food and Drug Administration.  This test is used for clinical   purposes.  It should not be regarded as investigational or for  research.      WBC 08/14/2019 3.03* 3.90 - 12.70 K/uL Final    RBC 08/14/2019 2.33* 4.60 - 6.20 M/uL Final    Hemoglobin 08/14/2019 6.2* 14.0 - 18.0 g/dL Final    Hematocrit 08/14/2019 20.7* 40.0 - 54.0 % Final    Mean Corpuscular Volume 08/14/2019 89  82 - 98 fL Final    Mean Corpuscular Hemoglobin 08/14/2019 26.6* 27.0 - 31.0 pg Final    Mean Corpuscular Hemoglobin Conc 08/14/2019 30.0* 32.0 - 36.0 g/dL Final    RDW 08/14/2019 14.7* 11.5 - 14.5 % Final    Platelets 08/14/2019 120* 150 - 350  K/uL Final    MPV 08/14/2019 10.6  9.2 - 12.9 fL Final    Immature Granulocytes 08/14/2019 0.0  0.0 - 0.5 % Final    Gran # (ANC) 08/14/2019 1.2* 1.8 - 7.7 K/uL Final    Immature Grans (Abs) 08/14/2019 0.00  0.00 - 0.04 K/uL Final    Comment: Mild elevation in immature granulocytes is non specific and   can be seen in a variety of conditions including stress response,   acute inflammation, trauma and pregnancy. Correlation with other   laboratory and clinical findings is essential.      Lymph # 08/14/2019 1.2  1.0 - 4.8 K/uL Final    Mono # 08/14/2019 0.4  0.3 - 1.0 K/uL Final    Eos # 08/14/2019 0.1  0.0 - 0.5 K/uL Final    Baso # 08/14/2019 0.04  0.00 - 0.20 K/uL Final    nRBC 08/14/2019 0  0 /100 WBC Final    Gran% 08/14/2019 41.0  38.0 - 73.0 % Final    Lymph% 08/14/2019 39.9  18.0 - 48.0 % Final    Mono% 08/14/2019 14.2  4.0 - 15.0 % Final    Eosinophil% 08/14/2019 3.6  0.0 - 8.0 % Final    Basophil% 08/14/2019 1.3  0.0 - 1.9 % Final    Differential Method 08/14/2019 Automated   Final    Glucose 08/14/2019 95  70 - 110 mg/dL Final    Sodium 08/14/2019 143  136 - 145 mmol/L Final    Potassium 08/14/2019 4.6  3.5 - 5.1 mmol/L Final    Chloride 08/14/2019 108  95 - 110 mmol/L Final    CO2 08/14/2019 26  23 - 29 mmol/L Final    BUN, Bld 08/14/2019 32* 6 - 20 mg/dL Final    Calcium 08/14/2019 9.0  8.7 - 10.5 mg/dL Final    Creatinine 08/14/2019 3.5* 0.5 - 1.4 mg/dL Final    Albumin 08/14/2019 3.4* 3.5 - 5.2 g/dL Final    Phosphorus 08/14/2019 4.4  2.7 - 4.5 mg/dL Final    eGFR if  08/14/2019 23.8* >60 mL/min/1.73 m^2 Final    eGFR if non African American 08/14/2019 20.6* >60 mL/min/1.73 m^2 Final    Comment: Calculation used to obtain the estimated glomerular filtration  rate (eGFR) is the CKD-EPI equation.       Anion Gap 08/14/2019 9  8 - 16 mmol/L Final    Magnesium 08/14/2019 1.9  1.6 - 2.6 mg/dL Final    Sodium 08/14/2019 143  136 - 145 mmol/L Final    Potassium  08/14/2019 4.6  3.5 - 5.1 mmol/L Final    Chloride 08/14/2019 108  95 - 110 mmol/L Final    CO2 08/14/2019 26  23 - 29 mmol/L Final    Glucose 08/14/2019 95  70 - 110 mg/dL Final    BUN, Bld 08/14/2019 32* 6 - 20 mg/dL Final    Creatinine 08/14/2019 3.5* 0.5 - 1.4 mg/dL Final    Calcium 08/14/2019 9.0  8.7 - 10.5 mg/dL Final    Total Protein 08/14/2019 6.0  6.0 - 8.4 g/dL Final    Albumin 08/14/2019 3.4* 3.5 - 5.2 g/dL Final    Total Bilirubin 08/14/2019 0.4  0.1 - 1.0 mg/dL Final    Comment: For infants and newborns, interpretation of results should be based  on gestational age, weight and in agreement with clinical  observations.  Premature Infant recommended reference ranges:  Up to 24 hours.............<8.0 mg/dL  Up to 48 hours............<12.0 mg/dL  3-5 days..................<15.0 mg/dL  6-29 days.................<15.0 mg/dL      Alkaline Phosphatase 08/14/2019 69  55 - 135 U/L Final    AST 08/14/2019 18  10 - 40 U/L Final    ALT 08/14/2019 10  10 - 44 U/L Final    Anion Gap 08/14/2019 9  8 - 16 mmol/L Final    eGFR if  08/14/2019 23.8* >60 mL/min/1.73 m^2 Final    eGFR if non African American 08/14/2019 20.6* >60 mL/min/1.73 m^2 Final    Comment: Calculation used to obtain the estimated glomerular filtration  rate (eGFR) is the CKD-EPI equation.       WBC 08/14/2019 3.03* 3.90 - 12.70 K/uL Final    RBC 08/14/2019 2.33* 4.60 - 6.20 M/uL Final    Hemoglobin 08/14/2019 6.2* 14.0 - 18.0 g/dL Final    Hematocrit 08/14/2019 20.7* 40.0 - 54.0 % Final    Mean Corpuscular Volume 08/14/2019 89  82 - 98 fL Final    Mean Corpuscular Hemoglobin 08/14/2019 26.6* 27.0 - 31.0 pg Final    Mean Corpuscular Hemoglobin Conc 08/14/2019 30.0* 32.0 - 36.0 g/dL Final    RDW 08/14/2019 14.7* 11.5 - 14.5 % Final    Platelets 08/14/2019 120* 150 - 350 K/uL Final    MPV 08/14/2019 10.6  9.2 - 12.9 fL Final    Gran # (ANC) 08/14/2019 1.2* 1.8 - 7.7 K/uL Final    Immature Grans (Abs) 08/14/2019  0.00  0.00 - 0.04 K/uL Final    Comment: Mild elevation in immature granulocytes is non specific and   can be seen in a variety of conditions including stress response,   acute inflammation, trauma and pregnancy. Correlation with other   laboratory and clinical findings is essential.      Sirolimus Lvl 08/14/2019 3.6* 4.0 - 20.0 ng/mL Final    Comment: Sirolimus therapeutic range for Kidney Transplant: 4.0 - 15.0 ug/ml.  Testing performed by Liquid Chromatography-Tandem  Mass Spectrometry (LC-MS/MS).  This test was developed and its performance characteristics  determined by [facility], Department of Pathology  and Laboratory Medicine in a manner consistent with CLIA  requirements. It has not been cleared or approved by the [place]  Food and Drug Administration.  This test is used for clinical  purposes.  It should not be regarded as investigational or for  research.         Assessment:       1. Kaposi sarcoma         Plan:     Kaposi Sarcoma  Hx of HIV and s/p renal transplant on immunosuppression  July 2019   Leg swelling for ~10 months; hospitalized for further work up, initially concerned for DVT   Found to have a large RLE/groin mass   Biopsy was + for Kaposi/HHV-8 ([facility] in [place])    Tx to [facility] and had mild pre-staging work up: CT CAP showed extensive LAD   Initial consult with oncology/myself for consideration of chemotherapy   Plan for further staging with PET, BMBx and plan for doxorubicin and pre-testing   Echo LVEF 55% and consultation with cardiology   PET confirming metastatic disease and global LAD     -consent signed and auth obtained for doxorubicin   -will have his first cycle Monday 8/19 and then proceed with C2D1 on 9/11/19   -prior to treatment, he will have his port placed   -discussed non-curative intent  -f/u with bone marrow results, though this is unlikely to change plan at this time  -mild worsening of anemia today, no symptoms, will recheck next Monday prior to  treatment and may require transfusion   -restage planning after 3-4 cycles  -OV on day of C2D1  -will advise to hold diuretics at this time as they do not appear to be improving his symptoms    Case discussed with Dr. Kennedy.    Matthieu Amador MD  Hematology/Medical Oncology Fellow  [phone] (cell)  [phone] (page)        ATTENDING NOTE, ONCOLOGY INPATIENT TEAM    As above.  Patient seen and examined, chart reviewed.  Appears comfortable, in NAD.  Lungs are clear to auscultation.  Abdomen is soft, nontender. A transplanted kidney is palpated in the RLQ.  There is 3 (+) edema of the RLE, where he has multiple (too many to count) KS lesions.  More KS lesions are seen on his abdominal wall, in the RLQ.  Labs reviewed.    PLAN  Given his right lower extremity edema and discomfort, it is reasonable to offer chemotherapy with single agent doxil.  The pros and cons of such an approach were explained to him.  His lifetime dose should not exceed 450 mg/m2.  In regards to his anemia, we will wait for the results of his bone marrow biopsy.  If unremarkable, given his renal insufficiency, we will offer erythropoietin.  Consent form signed.  RTC on 9/11/2019 for cycle 2 (at his request).  His multiple questions were answered to his satisfaction.      Santi Kennedy MD

## 2019-08-14 NOTE — Clinical Note
Asad Aguiar needs the followin. Follow up with me on 19 for C2D1 of doxo2. Lab visit for CBC/CMP/mag prior to visitThNorm

## 2019-08-15 ENCOUNTER — PATIENT MESSAGE (OUTPATIENT)
Dept: TRANSPLANT | Facility: CLINIC | Age: 39
End: 2019-08-15

## 2019-08-15 LAB
CHROM BANDING METHOD: NORMAL
CHROMOSOME ANALYSIS BM ADDITIONAL INFORMATION: NORMAL
CHROMOSOME ANALYSIS BM RELEASED BY: NORMAL
CHROMOSOME ANALYSIS BM RESULT SUMMARY: NORMAL
CLINICAL CYTOGENETICIST REVIEW: NORMAL
KARYOTYP MAR: NORMAL
REASON FOR REFERRAL (NARRATIVE): NORMAL
REF LAB TEST METHOD: NORMAL
SPECIMEN SOURCE: NORMAL
SPECIMEN: NORMAL

## 2019-08-16 ENCOUNTER — PATIENT MESSAGE (OUTPATIENT)
Dept: TRANSPLANT | Facility: CLINIC | Age: 39
End: 2019-08-16

## 2019-08-16 ENCOUNTER — TELEPHONE (OUTPATIENT)
Dept: SURGERY | Facility: CLINIC | Age: 39
End: 2019-08-16

## 2019-08-16 ENCOUNTER — ANESTHESIA EVENT (OUTPATIENT)
Dept: SURGERY | Facility: HOSPITAL | Age: 39
End: 2019-08-16
Payer: MEDICARE

## 2019-08-16 NOTE — TELEPHONE ENCOUNTER
Explained to pt npo after midnight for sx his sx arrival time wash with antibacterial soap and where to arrive

## 2019-08-16 NOTE — ANESTHESIA PREPROCEDURE EVALUATION
[facility]  Anesthesia Pre-Operative Evaluation         Patient Name: Demetrio Aguiar  YOB: 1980  MRN: [id number]    SUBJECTIVE:     Pre-operative evaluation for Procedure(s) (LRB):  [id number] (N/A)     2019    Demetrio Aguiar is a 39 y.o. male w/ a significant PMHx of HTN (on Coreg and nifedipine), HIV on ART, latent TB, ESRD s/p kidney transplant (on immunosuppression), now CKD3.  Pt recently diagnosed with Kaposi sarcoma.  Plan for port placement for chemotherapy.  He has had multiple prior central lines bilaterally.     Patient now presents for the above procedure(s).      LDA: None documented.       Hemodialysis AV Fistula 19 08 Left upper arm (Active)   Site Assessment Intact 2019  9:00 AM   Patency Absent ;Thrill;Bruit 2019  8:02 AM   Site Condition No complications 2019  7:20 PM   Dressing Open to air (None) 2019  7:20 PM   Number of days: 28       Prev airway:   Placement Date: 17; Placement Time: ; Method of Intubation: Direct laryngoscopy; Inserted by: CRNA; Airway Device: Endotracheal Tube; Mask Ventilation: Easy; Intubated: Postinduction; Blade: Loera #2; Airway Device Size: 7.5; Style: Cuffed; Cuff Inflation: Minimal occlusive pressure; Inflation Amount: 6; Placement Verified By: Auscultation; Grade: Grade I; Complicating Factors: None; Intubation Findings: Positive EtCO2, Bilateral breath sounds, Atraumatic/Condition of teeth unchanged;  Depth of Insertion: 22; Securment: Lips; Complications: None; Breath Sounds: Equal Bilateral; Insertion Attempts: 1    Drips: None documented.      Patient Active Problem List   Diagnosis    Hyperlipidemia    TB lung, latent    HIV (human immunodeficiency virus infection)    At risk for opportunistic infections    Need for prophylactic immunotherapy    Anemia in stage 3 chronic kidney disease     donor kidney transplant 2017    Prophylactic immunotherapy     Acute rejection of kidney transplant    Long-term use of immunosuppressant medication    Hyponatremia    Immunosuppression    Antibody mediated rejection of kidney transplant    Complication of transplanted kidney    Unexplained episode of dysfunction of kidney transplant    Pingueculitis, right eye    Insufficiency of tear film of both eyes    Corneal abrasion, right, subsequent encounter    Acute kidney injury superimposed on chronic kidney disease stage III    C. difficile colitis    Cytomegalovirus (CMV) viremia    LEFTY (acute kidney injury)    Hyperkalemia    Lymphadenopathy, inguinal    Kaposi sarcoma    Renovascular hypertension    Regional lymph node metastasis present    Generalized edema       Review of patient's allergies indicates:  No Known Allergies    Current Inpatient Medications:      No current facility-administered medications on file prior to encounter.      Current Outpatient Medications on File Prior to Encounter   Medication Sig Dispense Refill    atovaquone (MEPRON) 750 mg/5 mL Susp Take 5 mLs (750 mg total) by mouth once daily. 210 mL 11    carvedilol (COREG) 6.25 MG tablet Take 2 tablets (12.5 mg total) by mouth 2 (two) times daily with meals. 120 tablet 11    cinacalcet (SENSIPAR) 60 MG Tab Take 1 tablet (60 mg total) by mouth daily with breakfast. 30 tablet 0    dolutegravir (TIVICAY) 50 mg Tab Take 1 tablet (50 mg total) by mouth once daily. 30 tablet 5    emtricitabine-tenofovir alafen (DESCOVY) 200-25 mg Tab Take 1 tablet by mouth once daily.      famotidine (PEPCID) 20 MG tablet TAKE 1 TABLET (20MG TOTAL) BY MOUTH EACH EVENING 30 tablet 11    furosemide (LASIX) 20 MG tablet Take 1 tablet (20 mg total) by mouth once daily. 45 tablet 11    multivitamin (THERAGRAN) tablet Take 1 tablet by mouth once daily.      NIFEdipine (PROCARDIA-XL) 30 MG (OSM) 24 hr tablet Take 1 tablet (30 mg total) by mouth every morning AND 2 tablets (60 mg total) every evening. 90  "tablet 11    patiromer calcium sorbitex (VELTASSA) 25.2 gram PwPk Take 1 packet (25.2 g total) by mouth once daily. 30 packet 10    predniSONE (DELTASONE) 5 MG tablet Take 1 tablet (5 mg total) by mouth once daily. 30 tablet 11    rosuvastatin (CRESTOR) 20 MG tablet Take 1 tablet (20 mg total) by mouth once daily. 30 tablet 0    sirolimus (RAPAMUNE) 1 MG Tab Take 5 tablets (5 mg total) by mouth once daily. 150 tablet 11    sodium bicarbonate 650 MG tablet Take 3 tablets (1,950 mg total) by mouth 3 (three) times daily. 270 tablet 11    clindamycin-benzoyl peroxide (BENZACLIN) gel 1 application once daily. Apply to affected area  1    ketoconazole (NIZORAL) 200 mg Tab Take 100 mg by mouth.      sevelamer carbonate (RENVELA) 800 mg Tab Take 1 tablet (800 mg total) by mouth 3 (three) times daily with meals. 90 tablet 11       Past Surgical History:   Procedure Laterality Date    BIOPSY Tranplanted Kidney N/A 8/28/2018    Performed by [facility] Diagnostic Provider at [facility] OR [address] [place] [address]    BIOPSY Tranplanted Kidney N/A 1/15/2018    Performed by [facility] Diagnostic Provider at [facility] OR [address] [place] [address]    BIOPSY Tranplanted Kidney N/A 10/17/2017    Performed by [facility] Diagnostic Provider at [facility] OR [address] [place] [address]    [id number] N/A 10/3/2017    Performed by [facility] Diagnostic Provider at [facility] OR [address] [place] [address]    EXPLORATORY LAPAROTOMY W/ BOWEL RESECTION      NO BOWEL RESECTION, UNKNOWN DETAILS, "Pancreas Infection"    EXPLORATORY-LAPAROTOMY retroperitoneal washout N/A 9/9/2017    Performed by Wesley Cisneros MD at [facility] OR [address] [place] [address]    KIDNEY TRANSPLANT      peritoneal dialysis catheter placement      TRANSPLANT-KIDNEY N/A 8/18/2017    Performed by Wesley Cisneros MD at [facility] OR [address] [place] [address]       Social History     Socioeconomic History    Marital status: Single     Spouse name: Not on file    Number of children: Not on file    Years of education: Not on file    Highest education level: Not on file   Occupational History    Not on file "   Social Needs    Financial resource strain: Not on file    Food insecurity:     Worry: Not on file     Inability: Not on file    Transportation needs:     Medical: Not on file     Non-medical: Not on file   Tobacco Use    Smoking status: Former Smoker     Packs/day: 0.50     Types: Cigarettes    Smokeless tobacco: Never Used    Tobacco comment: quit smoking 5 years ago   Substance and Sexual Activity    Alcohol use: No    Drug use: No     Types: Cocaine, Marijuana     Comment: last use for cocaine at age 17; 2 years ago last use for THC    Sexual activity: Not Currently   Lifestyle    Physical activity:     Days per week: Not on file     Minutes per session: Not on file    Stress: Not on file   Relationships    Social connections:     Talks on phone: Not on file     Gets together: Not on file     Attends [employer] service: Not on file     Active member of club or organization: Not on file     Attends meetings of clubs or organizations: Not on file     Relationship status: Not on file   Other Topics Concern    Not on file   Social History Narrative    Not on file       OBJECTIVE:     Vital Signs Range (Last 24H):         Significant Labs:  Lab Results   Component Value Date    WBC 3.03 (L) 08/14/2019    WBC 3.03 (L) 08/14/2019    HGB 6.2 (L) 08/14/2019    HGB 6.2 (L) 08/14/2019    HCT 20.7 (L) 08/14/2019    HCT 20.7 (L) 08/14/2019     (L) 08/14/2019     (L) 08/14/2019    CHOL 73 (L) 07/19/2019    TRIG 129 07/19/2019    HDL 26 (L) 07/19/2019    ALT 10 08/14/2019    AST 18 08/14/2019     08/14/2019     08/14/2019    K 4.6 08/14/2019    K 4.6 08/14/2019     08/14/2019     08/14/2019    CREATININE 3.5 (H) 08/14/2019    CREATININE 3.5 (H) 08/14/2019    BUN 32 (H) 08/14/2019    BUN 32 (H) 08/14/2019    CO2 26 08/14/2019    CO2 26 08/14/2019    INR 1.0 08/28/2018       Diagnostic Studies: No relevant studies.    EKG:   Results for orders placed or performed during the  hospital encounter of 07/17/19   EKG 12-lead    Collection Time: 07/18/19  7:41 AM    Narrative    Test Reason : E87.5,    Vent. Rate : 075 BPM     Atrial Rate : 075 BPM     P-R Int : 160 ms          QRS Dur : 092 ms      QT Int : 368 ms       P-R-T Axes : 065 002 057 degrees     QTc Int : 410 ms    Normal sinus rhythm  Normal ECG  When compared with ECG of 17-JUL-2019 15:30,  Premature ventricular complexes are no longer Present  Confirmed by Hector Mejias MD ([id number]) on 7/19/2019 12:35:54 AM    Referred By: [email or web address]   SELF           Confirmed By:Hector Mejias MD       2D ECHO:  TTE:  Results for orders placed or performed during the hospital encounter of 08/07/19   Transthoracic echo (TTE) 2D with Color Flow   Result Value Ref Range    Ascending aorta 2.79 cm    STJ 2.90 cm    AV mean gradient 4 mmHg    Ao peak lobito 1.41 m/s    Ao VTI 30.48 cm    IVRT 0.11 msec    IVS 0.69 0.6 - 1.1 cm    LA size 3.94 cm    Left Atrium Major Axis 5.99 cm    Left Atrium Minor Axis 5.97 cm    LVIDD 6.05 (A) 3.5 - 6.0 cm    LVIDS 4.32 (A) 2.1 - 4.0 cm    LVOT diameter 2.16 cm    LVOT peak VTI 25.78 cm    PW 0.78 0.6 - 1.1 cm    MV Peak A Lobito 0.87 m/s    E wave decelartion time 196.79 msec    MV Peak E Lobito 0.83 m/s    PV Peak D Lobito 0.57 m/s    PV Peak S Lobito 0.46 m/s    RA Major Axis 5.46 cm    RA Width 4.22 cm    RVDD 4.06 cm    Sinus 3.35 cm    TAPSE 2.97 cm    TR Max Lobito 2.81 m/s    TDI LATERAL 0.10 m/s    TDI SEPTAL 0.07 m/s    LA WIDTH 5.06 cm    LV Diastolic Volume 183.23 mL    LV Systolic Volume 83.99 mL    RV S' 15.03 cm/s    LVOT peak lobito 1.35 m/s    LV LATERAL E/E' RATIO 8.30 m/s    LV SEPTAL E/E' RATIO 11.86 m/s    FS 29 %    LA volume 101.34 cm3    LV mass 170.17 g    Left Ventricle Relative Wall Thickness 0.26 cm    AV valve area 3.10 cm2    AV Velocity Ratio 0.96     AV index (prosthetic) 0.85     E/A ratio 0.95     Mean e' 0.09 m/s    Pulm vein S/D ratio 0.81     LVOT area 3.7 cm2    LVOT stroke volume 94.42 cm3    AV peak  gradient 8 mmHg    E/E' ratio 9.76 m/s    LV Systolic Volume Index 43.2 mL/m2    LV Diastolic Volume Index 94.20 mL/m2    LA Volume Index 52.1 mL/m2    LV Mass Index 87 g/m2    Triscuspid Valve Regurgitation Peak Gradient 32 mmHg    BSA 1.97 m2    Right Atrial Pressure (from IVC) 3 mmHg    TV rest pulmonary artery pressure 35 mmHg    Narrative    · Normal left ventricular systolic function. The estimated ejection   fraction is 55%. GLS is low at -15%.  · Indeterminate left ventricular diastolic function.  · Normal right ventricular systolic function.  · Severe left atrial enlargement.  · The estimated PA systolic pressure is 35 mm Hg  · Normal central venous pressure (3 mm Hg).          JINA:  No results found. However, due to the size of the patient record, not all encounters were searched. Please check Results Review for a complete set of results.    ASSESSMENT/PLAN:                                                                                                                  08/16/2019  Demetrio Aguiar is a 39 y.o., male.    Anesthesia Evaluation    I have reviewed the Patient Summary Reports.    I have reviewed the Nursing Notes.   I have reviewed the Medications.     Review of Systems  Anesthesia Hx:  No problems with previous Anesthesia  History of prior surgery of interest to airway management or planning: Denies Family Hx of Anesthesia complications.   Denies Personal Hx of Anesthesia complications.   Social:  Former Smoker, No Alcohol Use    Hematology/Oncology:     Oncology Normal    -- Anemia:  -- Thrombocytopenia: Secondary to: HIV   Hematology Comments: HIV    EENT/Dental:EENT/Dental Normal   Cardiovascular:   Hypertension, poorly controlled Denies MI.   Denies CABG/stent.  Walks 4 miles without difficulty   Pulmonary:  Pulmonary Normal    Renal/:   Chronic Renal Disease, ESRD, Dialysis S/p transplant -  treated with PLEX x6, SMP x3, and IVIG x2 d/t concern of rejection.    Hepatic/GI:  Hepatic/GI  Normal    Musculoskeletal:  Musculoskeletal Normal    Neurological:  Neurology Normal    Endocrine:  Endocrine Normal    Psych:  Psychiatric Normal           Physical Exam  General:  Well nourished    Airway/Jaw/Neck:  Airway Findings: Mouth Opening: Normal Tongue: Normal  General Airway Assessment: Possible difficult intubation  Mallampati: III  Improves to II with phonation.  TM Distance: Normal, at least 6 cm  Jaw/Neck Findings:  Neck ROM: Normal ROM      Dental:  Dental Findings: In tact   Chest/Lungs:  Chest/Lungs Findings: Clear to auscultation, Normal Respiratory Rate     Heart/Vascular:  Heart Findings:       Mental Status:  Mental Status Findings:  Cooperative, Alert and Oriented         Anesthesia Plan  Type of Anesthesia, risks & benefits discussed:  Anesthesia Type:  MAC, general  Patient's Preference:   Intra-op Monitoring Plan: standard ASA monitors  Intra-op Monitoring Plan Comments:   Post Op Pain Control Plan: per primary service following discharge from PACU, IV/PO Opioids PRN and multimodal analgesia  Post Op Pain Control Plan Comments:   Induction:   IV  Beta Blocker:  Patient is not currently on a Beta-Blocker (No further documentation required).       Informed Consent: Patient understands risks and agrees with Anesthesia plan.  Questions answered. Anesthesia consent signed with patient.  ASA Score: 3     Day of Surgery Review of History & Physical:    H&P update referred to the surgeon.         Ready For Surgery From Anesthesia Perspective.

## 2019-08-19 ENCOUNTER — INFUSION (OUTPATIENT)
Dept: INFUSION THERAPY | Facility: HOSPITAL | Age: 39
End: 2019-08-19
Attending: INTERNAL MEDICINE
Payer: MEDICARE

## 2019-08-19 ENCOUNTER — CLINICAL SUPPORT (OUTPATIENT)
Dept: HEMATOLOGY/ONCOLOGY | Facility: CLINIC | Age: 39
End: 2019-08-19
Payer: MEDICARE

## 2019-08-19 ENCOUNTER — ANESTHESIA (OUTPATIENT)
Dept: SURGERY | Facility: HOSPITAL | Age: 39
End: 2019-08-19
Payer: MEDICARE

## 2019-08-19 VITALS
HEART RATE: 64 BPM | SYSTOLIC BLOOD PRESSURE: 141 MMHG | RESPIRATION RATE: 16 BRPM | TEMPERATURE: 98 F | DIASTOLIC BLOOD PRESSURE: 70 MMHG

## 2019-08-19 DIAGNOSIS — Z45.2 ENCOUNTER FOR CARE RELATED TO VASCULAR ACCESS PORT: ICD-10-CM

## 2019-08-19 DIAGNOSIS — T82.9XXA VASCULAR PORT COMPLICATION, INITIAL ENCOUNTER: Primary | ICD-10-CM

## 2019-08-19 DIAGNOSIS — C46.9 KAPOSI SARCOMA: Primary | ICD-10-CM

## 2019-08-19 DIAGNOSIS — Z95.828 PORT-A-CATH IN PLACE: ICD-10-CM

## 2019-08-19 PROCEDURE — 96415 CHEMO IV INFUSION ADDL HR: CPT

## 2019-08-19 PROCEDURE — 63600175 PHARM REV CODE 636 W HCPCS: Performed by: INTERNAL MEDICINE

## 2019-08-19 PROCEDURE — 96413 CHEMO IV INFUSION 1 HR: CPT

## 2019-08-19 PROCEDURE — 25000003 PHARM REV CODE 250: Performed by: INTERNAL MEDICINE

## 2019-08-19 PROCEDURE — A4216 STERILE WATER/SALINE, 10 ML: HCPCS | Performed by: INTERNAL MEDICINE

## 2019-08-19 PROCEDURE — D9220A PRA ANESTHESIA: ICD-10-PCS | Mod: ,,, | Performed by: ANESTHESIOLOGY

## 2019-08-19 PROCEDURE — D9220A PRA ANESTHESIA: Mod: ,,, | Performed by: ANESTHESIOLOGY

## 2019-08-19 PROCEDURE — 63600175 PHARM REV CODE 636 W HCPCS: Performed by: STUDENT IN AN ORGANIZED HEALTH CARE EDUCATION/TRAINING PROGRAM

## 2019-08-19 PROCEDURE — 25000003 PHARM REV CODE 250: Performed by: STUDENT IN AN ORGANIZED HEALTH CARE EDUCATION/TRAINING PROGRAM

## 2019-08-19 PROCEDURE — 96367 TX/PROPH/DG ADDL SEQ IV INF: CPT

## 2019-08-19 PROCEDURE — 63600175 PHARM REV CODE 636 W HCPCS: Performed by: SURGERY

## 2019-08-19 RX ORDER — SODIUM CHLORIDE 0.9 % (FLUSH) 0.9 %
10 SYRINGE (ML) INJECTION
Status: DISCONTINUED | OUTPATIENT
Start: 2019-08-19 | End: 2019-08-19 | Stop reason: HOSPADM

## 2019-08-19 RX ORDER — MIDAZOLAM HYDROCHLORIDE 1 MG/ML
INJECTION, SOLUTION INTRAMUSCULAR; INTRAVENOUS
Status: DISCONTINUED | OUTPATIENT
Start: 2019-08-19 | End: 2019-08-19

## 2019-08-19 RX ORDER — PROPOFOL 10 MG/ML
VIAL (ML) INTRAVENOUS CONTINUOUS PRN
Status: DISCONTINUED | OUTPATIENT
Start: 2019-08-19 | End: 2019-08-19

## 2019-08-19 RX ORDER — KETAMINE HCL IN 0.9 % NACL 50 MG/5 ML
SYRINGE (ML) INTRAVENOUS
Status: DISCONTINUED | OUTPATIENT
Start: 2019-08-19 | End: 2019-08-19

## 2019-08-19 RX ORDER — LIDOCAINE HCL/PF 100 MG/5ML
SYRINGE (ML) INTRAVENOUS
Status: DISCONTINUED | OUTPATIENT
Start: 2019-08-19 | End: 2019-08-19

## 2019-08-19 RX ORDER — FENTANYL CITRATE 50 UG/ML
INJECTION, SOLUTION INTRAMUSCULAR; INTRAVENOUS
Status: DISCONTINUED | OUTPATIENT
Start: 2019-08-19 | End: 2019-08-19

## 2019-08-19 RX ORDER — PROPOFOL 10 MG/ML
VIAL (ML) INTRAVENOUS
Status: DISCONTINUED | OUTPATIENT
Start: 2019-08-19 | End: 2019-08-19

## 2019-08-19 RX ORDER — HEPARIN 100 UNIT/ML
500 SYRINGE INTRAVENOUS
Status: DISCONTINUED | OUTPATIENT
Start: 2019-08-19 | End: 2019-08-19 | Stop reason: HOSPADM

## 2019-08-19 RX ADMIN — PROPOFOL 75 MCG/KG/MIN: 10 INJECTION, EMULSION INTRAVENOUS at 07:08

## 2019-08-19 RX ADMIN — DEXAMETHASONE SODIUM PHOSPHATE: 4 INJECTION, SOLUTION INTRA-ARTICULAR; INTRALESIONAL; INTRAMUSCULAR; INTRAVENOUS; SOFT TISSUE at 03:08

## 2019-08-19 RX ADMIN — MIDAZOLAM HYDROCHLORIDE 2 MG: 1 INJECTION, SOLUTION INTRAMUSCULAR; INTRAVENOUS at 07:08

## 2019-08-19 RX ADMIN — Medication 10 ML: at 06:08

## 2019-08-19 RX ADMIN — Medication 30 MG: at 07:08

## 2019-08-19 RX ADMIN — SODIUM CHLORIDE: 0.9 INJECTION, SOLUTION INTRAVENOUS at 07:08

## 2019-08-19 RX ADMIN — DOXORUBICIN HYDROCHLORIDE 38 MG: 2 INJECTION, SUSPENSION, LIPOSOMAL INTRAVENOUS at 04:08

## 2019-08-19 RX ADMIN — FENTANYL CITRATE 50 MCG: 50 INJECTION, SOLUTION INTRAMUSCULAR; INTRAVENOUS at 07:08

## 2019-08-19 RX ADMIN — PROPOFOL 30 MG: 10 INJECTION, EMULSION INTRAVENOUS at 07:08

## 2019-08-19 RX ADMIN — LIDOCAINE HYDROCHLORIDE 60 MG: 20 INJECTION, SOLUTION INTRAVENOUS at 07:08

## 2019-08-19 RX ADMIN — DEXTROSE: 50 INJECTION, SOLUTION INTRAVENOUS at 04:08

## 2019-08-19 RX ADMIN — CEFAZOLIN 2 G: 330 INJECTION, POWDER, FOR SOLUTION INTRAMUSCULAR; INTRAVENOUS at 07:08

## 2019-08-19 NOTE — PROGRESS NOTES
Patient and mother attended chemo class:  Viewed video presentation, received binder that has medication information specific to the patient, participated in Q&A.    Navigator had one-on-one discussion of patient's treatment regimen.

## 2019-08-19 NOTE — TRANSFER OF CARE
"Anesthesia Transfer of Care Note    Patient: Demetrio Aguiar    Procedure(s) Performed: Procedure(s) (LRB):  [id number] (Left)    Patient location: PACU    Anesthesia Type: general    Transport from OR: Transported from OR on 100% O2 by closed face mask with adequate spontaneous ventilation    Post pain: adequate analgesia    Post assessment: no apparent anesthetic complications    Post vital signs: stable    Level of consciousness: awake    Nausea/Vomiting: no nausea/vomiting    Complications: none    Transfer of care protocol was followed      Last vitals:   Visit Vitals  BP (!) 166/73 (BP Location: Right arm, Patient Position: Lying)   Pulse 72   Temp 36.7 °C (98.1 °F) (Oral)   Resp 18   Ht 5' 8" (1.727 m)   Wt 80.7 kg (178 lb)   SpO2 100%   BMI 27.06 kg/m²     "

## 2019-08-19 NOTE — ANESTHESIA POSTPROCEDURE EVALUATION
Anesthesia Post Evaluation    Patient: Demetrio Aguiar    Procedure(s) Performed: Procedure(s) (LRB):  [id number] (Left)    Final Anesthesia Type: general  Patient location during evaluation: PACU  Patient participation: Yes- Able to Participate  Level of consciousness: awake and alert and oriented  Post-procedure vital signs: reviewed and stable  Pain management: adequate  Airway patency: patent  PONV status at discharge: No PONV  Anesthetic complications: no      Cardiovascular status: hemodynamically stable  Respiratory status: unassisted, spontaneous ventilation and room air  Hydration status: euvolemic  Follow-up not needed.          Vitals Value Taken Time   /95 8/19/2019  9:31 AM   Temp 36.5 °C (97.7 °F) 8/19/2019  8:54 AM   Pulse 60 8/19/2019  9:34 AM   Resp 13 8/19/2019  9:34 AM   SpO2 100 % 8/19/2019  9:34 AM   Vitals shown include unvalidated device data.      No case tracking events are documented in the log.      Pain/Johanna Score: Pain Rating Prior to Med Admin: 7 (8/19/2019  9:14 AM)

## 2019-08-19 NOTE — PLAN OF CARE
Problem: Adult Inpatient Plan of Care  Goal: Plan of Care Review  Tolerated infusion well. AVS provided, VS stable and discharged to home

## 2019-08-20 ENCOUNTER — PATIENT MESSAGE (OUTPATIENT)
Dept: INFECTIOUS DISEASES | Facility: CLINIC | Age: 39
End: 2019-08-20

## 2019-08-20 ENCOUNTER — PATIENT MESSAGE (OUTPATIENT)
Dept: HEMATOLOGY/ONCOLOGY | Facility: CLINIC | Age: 39
End: 2019-08-20

## 2019-08-20 NOTE — TELEPHONE ENCOUNTER
Patient scheduled for a test called a port study. Basically they will access the port and inject a contrast dye under a fluoroscopic image in order to determine where the flush is going and make sure there is no issues with the line and the site is patent.     If they have to do the test for safety purposes and they are going to use only a few CC's I am fine with the study. They can call me to discuss Please provide my cell number to them    Jase

## 2019-08-21 ENCOUNTER — HOSPITAL ENCOUNTER (OUTPATIENT)
Dept: INTERVENTIONAL RADIOLOGY/VASCULAR | Facility: HOSPITAL | Age: 39
Discharge: HOME | End: 2019-08-21
Attending: STUDENT IN AN ORGANIZED HEALTH CARE EDUCATION/TRAINING PROGRAM

## 2019-08-21 ENCOUNTER — PATIENT MESSAGE (OUTPATIENT)
Dept: SURGERY | Facility: CLINIC | Age: 39
End: 2019-08-21

## 2019-08-21 DIAGNOSIS — Z45.2 ENCOUNTER FOR CARE RELATED TO VASCULAR ACCESS PORT: ICD-10-CM

## 2019-08-21 DIAGNOSIS — T82.9XXA VASCULAR PORT COMPLICATION, INITIAL ENCOUNTER: ICD-10-CM

## 2019-08-21 DIAGNOSIS — Z95.828 PORT-A-CATH IN PLACE: Primary | ICD-10-CM

## 2019-08-22 ENCOUNTER — PATIENT MESSAGE (OUTPATIENT)
Dept: HEMATOLOGY/ONCOLOGY | Facility: CLINIC | Age: 39
End: 2019-08-22

## 2019-08-22 DIAGNOSIS — Z94.0 KIDNEY REPLACED BY TRANSPLANT: Primary | ICD-10-CM

## 2019-08-26 ENCOUNTER — HOSPITAL ENCOUNTER (OUTPATIENT)
Dept: INTERVENTIONAL RADIOLOGY/VASCULAR | Facility: HOSPITAL | Age: 39
Discharge: HOME | End: 2019-08-26
Attending: STUDENT IN AN ORGANIZED HEALTH CARE EDUCATION/TRAINING PROGRAM
Payer: MEDICARE

## 2019-08-26 ENCOUNTER — OFFICE VISIT (OUTPATIENT)
Dept: TRANSPLANT | Facility: CLINIC | Age: 39
End: 2019-08-26
Payer: MEDICARE

## 2019-08-26 ENCOUNTER — TELEPHONE (OUTPATIENT)
Dept: PHARMACY | Facility: CLINIC | Age: 39
End: 2019-08-26

## 2019-08-26 VITALS
DIASTOLIC BLOOD PRESSURE: 85 MMHG | BODY MASS INDEX: 26.6 KG/M2 | HEART RATE: 84 BPM | HEIGHT: 68 IN | SYSTOLIC BLOOD PRESSURE: 140 MMHG | TEMPERATURE: 98 F | OXYGEN SATURATION: 100 % | RESPIRATION RATE: 16 BRPM | WEIGHT: 175.5 LBS

## 2019-08-26 VITALS
RESPIRATION RATE: 14 BRPM | HEART RATE: 71 BPM | DIASTOLIC BLOOD PRESSURE: 99 MMHG | SYSTOLIC BLOOD PRESSURE: 161 MMHG | OXYGEN SATURATION: 98 %

## 2019-08-26 DIAGNOSIS — Z94.0 IMMUNOSUPPRESSIVE MANAGEMENT ENCOUNTER FOLLOWING KIDNEY TRANSPLANT: ICD-10-CM

## 2019-08-26 DIAGNOSIS — E87.5 HYPERKALEMIA: ICD-10-CM

## 2019-08-26 DIAGNOSIS — F43.21 SITUATIONAL DEPRESSION: ICD-10-CM

## 2019-08-26 DIAGNOSIS — Z29.89 PROPHYLACTIC IMMUNOTHERAPY: ICD-10-CM

## 2019-08-26 DIAGNOSIS — N18.30 CHRONIC KIDNEY DISEASE (CKD), STAGE III (MODERATE): Primary | ICD-10-CM

## 2019-08-26 DIAGNOSIS — Z94.0 DECEASED-DONOR KIDNEY TRANSPLANT: ICD-10-CM

## 2019-08-26 DIAGNOSIS — T82.9XXA VASCULAR PORT COMPLICATION, INITIAL ENCOUNTER: ICD-10-CM

## 2019-08-26 DIAGNOSIS — Z79.899 IMMUNOSUPPRESSIVE MANAGEMENT ENCOUNTER FOLLOWING KIDNEY TRANSPLANT: ICD-10-CM

## 2019-08-26 DIAGNOSIS — Z45.2 ENCOUNTER FOR CARE RELATED TO VASCULAR ACCESS PORT: ICD-10-CM

## 2019-08-26 DIAGNOSIS — Z95.828 PORT-A-CATH IN PLACE: ICD-10-CM

## 2019-08-26 DIAGNOSIS — C46.9 KAPOSI SARCOMA: ICD-10-CM

## 2019-08-26 PROCEDURE — 99213 OFFICE O/P EST LOW 20 MIN: CPT | Mod: PBBFAC | Performed by: INTERNAL MEDICINE

## 2019-08-26 PROCEDURE — 99999 PR PBB SHADOW E&M-EST. PATIENT-LVL III: CPT | Mod: PBBFAC,,, | Performed by: INTERNAL MEDICINE

## 2019-08-26 PROCEDURE — 36598 IR CATHETER DECLOT THROMBOLYTIC: ICD-10-PCS | Mod: ,,, | Performed by: RADIOLOGY

## 2019-08-26 PROCEDURE — 99215 OFFICE O/P EST HI 40 MIN: CPT | Mod: S$PBB,,, | Performed by: INTERNAL MEDICINE

## 2019-08-26 PROCEDURE — 99999 PR PBB SHADOW E&M-EST. PATIENT-LVL III: ICD-10-PCS | Mod: PBBFAC,,, | Performed by: INTERNAL MEDICINE

## 2019-08-26 PROCEDURE — 36598 INJ W/FLUOR EVAL CV DEVICE: CPT | Mod: ,,, | Performed by: RADIOLOGY

## 2019-08-26 PROCEDURE — 99215 PR OFFICE/OUTPT VISIT, EST, LEVL V, 40-54 MIN: ICD-10-PCS | Mod: S$PBB,,, | Performed by: INTERNAL MEDICINE

## 2019-08-26 PROCEDURE — 36598 INJ W/FLUOR EVAL CV DEVICE: CPT

## 2019-08-26 NOTE — H&P
"Radiology History & Physical      SUBJECTIVE:     Chief Complaint: Kaposi sarcoma    History of Present Illness:  Demetrio Aguiar is a 39 y.o. male with latent TB, HIV, and Kaposi sarcoma who is scheduled to start chemotherapy. He has had multiple prior central lines bilaterally. CT neck/chest on 7/18/19 was concerning for possible SVC and left brachiocephalic thrombosis. US confirmed occlusive thrombus of the left cephalic vein and LUE AVF. He underwent port-a-cath placement on 8/19/19 per general surgery. He presents today for port check.    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 Hematoma evacuated.(09/09)    Status post exploratory laparotomy 9/10/2017    Re explored for retroperitoneal hematoma. Hematoma evacuated.(09/09)    TB lung, latent     tx INH 2006     Past Surgical History:   Procedure Laterality Date    BIOPSY Tranplanted Kidney N/A 8/28/2018    Performed by [facility] Diagnostic Provider at [facility] OR 2ND FLR    BIOPSY Tranplanted Kidney N/A 1/15/2018    Performed by [facility] Diagnostic Provider at [facility] OR 2ND FLR    BIOPSY Tranplanted Kidney N/A 10/17/2017    Performed by [facility] Diagnostic Provider at [facility] OR 2ND FLR    [id number]-[id number]-FQ N/A 10/3/2017    [facility]    EXPLORATORY LAPAROTOMY W/ BOWEL RESECTION      NO BOWEL RESECTION, UNKNOWN DETAILS, "Pancreas Infection"    EXPLORATORY-LAPAROTOMY retroperitoneal washout N/A 9/9/2017    Performed by Wesley Cisneros MD at [facility] OR 2ND FLR    [id number]-[id number]-T-CATH Left 8/19/2019    Performed by Megha Monreal MD " at [facility] OR [address] [place] [address]    KIDNEY TRANSPLANT      peritoneal dialysis catheter placement      TRANSPLANT-KIDNEY N/A 8/18/2017    Performed by Wesley Cis[facility]os MD at [facility] OR [address] [place] [address]       Home Meds:   Prior to Admission medications    Medication Sig Start Date End Date Taking? Authorizing Provider   atovaquone (MEPRON) 750 mg/5 mL Susp Take 5 mLs (750 mg total) by mouth once daily. 6/24/19   Lucía Polk MD   carvedilol (COREG) 6.25 MG tablet Take 2 tablets (12.5 mg total) by mouth 2 (two) times daily with meals. 7/22/19 7/21/20  Tae Qiu MD   cinacalcet (SENSIPAR) 60 MG Tab Take 1 tablet (60 mg total) by mouth daily with breakfast. 3/11/19   Lucía Polk MD   clindamycin-benzoyl peroxide (BENZACLIN) gel 1 application once daily. Apply to affected area 6/6/19   Historical Provider, MD   dolutegravir (TIVICAY) 50 mg Tab Take 1 tablet (50 mg total) by mouth once daily. 8/23/18   Jase Hines MD   emtricitabine-tenofovir alafen (DESCOVY) 200-25 mg Tab Take 1 tablet by mouth once daily. 7/22/19   Tae Qiu MD   famotidine (PEPCID) 20 MG tablet TAKE 1 TABLET (20MG TOTAL) BY MOUTH EACH EVENING 6/24/19   Lucía Polk MD   furosemide (LASIX) 20 MG tablet Take 1 tablet (20 mg total) by mouth once daily. 8/7/19   Hector Mejias MD   HYDROcodone-acetaminophen (NORCO) 5-325 mg per tablet Take 1 tablet by mouth every 4 (four) hours as needed for Pain. 8/19/19   Harjeet Edwards MD   ketoconazole (NIZORAL) 200 mg Tab Take 100 mg by mouth.    Historical Provider, MD   multivitamin (THERAGRAN) tablet Take 1 tablet by mouth once daily. 8/18/18   Sayda Gross MD   NIFEdipine (PROCARDIA-XL) 30 MG (OSM) 24 hr tablet Take 1 tablet (30 mg total) by mouth every morning AND 2 tablets (60 mg total) every evening. 8/23/18 8/23/19  Jase Hines MD   patiromer calcium sorbitex (VELTASSA) 25.2 gram PwPk Take 1 packet (25.2 g total) by mouth once daily. 7/22/19  6/16/20  Tae Qiu MD   predniSONE (DELTASONE) 5 MG tablet Take 1 tablet (5 mg total) by mouth once daily. 12/12/18   Sayda Gross MD   rosuvastatin (CRESTOR) 20 MG tablet Take 1 tablet (20 mg total) by mouth once daily. 8/23/18   Jase Hines MD   sevelamer carbonate (RENVELA) 800 mg Tab Take 1 tablet (800 mg total) by mouth 3 (three) times daily with meals. 8/2/19 8/1/20  Lucía Polk MD   sirolimus (RAPAMUNE) 1 MG Tab Take 5 tablets (5 mg total) by mouth once daily. 8/1/19 7/31/20  Lucía Polk MD   sodium bicarbonate 650 MG tablet Take 3 tablets (1,950 mg total) by mouth 3 (three) times daily. 8/23/18 8/23/19  Jase Hines MD     Anticoagulants/Antiplatelets: no anticoagulation    Allergies: Review of patient's allergies indicates:  No Known Allergies  Sedation History:  no adverse reactions    Review of Systems:   Hematological: no known coagulopathies  Respiratory: no shortness of breath  Cardiovascular: no chest pain  Gastrointestinal: no abdominal pain  Genito-Urinary: no dysuria  Musculoskeletal: negative  Neurological: no TIA or stroke symptoms         OBJECTIVE:     Vital Signs (Most Recent)  Pulse: 71 (08/26/19 1050)  Resp: 14 (08/26/19 1050)  BP: (!) 161/99 (08/26/19 1050)  SpO2: 98 % (08/26/19 1050)    Physical Exam:  ASA: I  Mallampati: II    General: no acute distress  Mental Status: alert and oriented to person, place and time  HEENT: normocephalic, atraumatic  Chest: unlabored breathing  Heart: regular heart rate  Abdomen: nondistended  Extremity: moves all extremities    Laboratory  Lab Results   Component Value Date    INR 1.0 08/28/2018       Lab Results   Component Value Date    WBC 3.78 (L) 08/19/2019    HGB 6.6 (L) 08/19/2019    HCT 22.5 (L) 08/19/2019    MCV 91 08/19/2019     (L) 08/19/2019      Lab Results   Component Value Date     (H) 08/19/2019     08/19/2019    K 4.7 08/19/2019     08/19/2019    CO2 22 (L)  08/19/2019    BUN 26 (H) 08/19/2019    CREATININE 2.6 (H) 08/19/2019    CALCIUM 9.1 08/19/2019    MG 1.9 08/14/2019    ALT 8 (L) 08/19/2019    AST 14 08/19/2019    ALBUMIN 3.4 (L) 08/19/2019    BILITOT 0.4 08/19/2019    BILIDIR 0.4 (H) 05/14/2018       ASSESSMENT/PLAN:     Sedation Plan: local  Patient will undergo port check.    Jus Irving MD PGY-II  Interventional Radiology  [facility]

## 2019-08-26 NOTE — LETTER
August 26, 2019        Alexa Holliday  [facility] [place] [address]  Phone: [phone]  Fax: [phone]             [facility]- Transplant  [address]  [facility] [address]  Phone: [phone]   Patient: Demetrio Aguiar   MR Number: [id number]   YOB: 1980   Date of Visit: 8/26/2019       Dear Dr. Alexa Holliday    Thank you for referring Demetrio Aguiar to me for evaluation. Attached you will find relevant portions of my assessment and plan of care.    If you have questions, please do not hesitate to call me. I look forward to following Demetrio Aguiar along with you.    Sincerely,    Lucía Polk MD    Enclosure    If you would like to receive this communication electronically, please contact [email or web address] or [phone] to request [employer] access.    [employer] is a tool which provides read-only access to select patient information with whom you have a relationship. Its easy to use and provides real time access to review your patients record including encounter summaries, notes, results, and demographic information.    If you feel you have received this communication in error or would no longer like to receive these types of communications, please e-mail [email or web address]

## 2019-08-26 NOTE — TELEPHONE ENCOUNTER
Refill readiness for Veltassa confirmed with patient; name/ confirmed; no missed doses; no new medications; no side effects noted; patient to  at [facility] on .     RHODA Rivers.Ph., AAHIVP  Clinical Pharmacist, HIV/HCV  [facility]  Phone: [phone]

## 2019-08-26 NOTE — PROGRESS NOTES
Post-Transplant Assessment    Referring Physician:   Current Nephrologist: Alexa Holliday    ORGAN: RIGHT KIDNEY  Donor Type:  - brain death  PHS Increased Risk: no  Cold Ischemia: 422 mins  Induction Medications: thymoglobulin    Subjective:     CC:  Reassessment of renal allograft function and management of chronic immunosuppression.    HPI:  Mr. Aguiar is a 39 y.o. year old Black or  male who received a  - brain death kidney transplant on 17.  He has CKD stage 3 - GFR 30-59 and his baseline creatinine is between 1.8-2.1. He takes prednisone and sirolimus for maintenance immunosuppression.         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   - Biopsy proven ABMR with weakly positive class I DSA's on 17. Pt treated with SMP x 3 (-9/3). PLEX x6 (completed ) and IVIG x 2 (-).    - Kidney US on 17 showed a large collection located along the anterior inferior margin of the renal transplant measuring 13.4 x 7 x 11.9. taken back to the OR for retroperitoneal bleed and washout.   -Biopsy 10/17/17 (LEFTY): 14 glomeruli, <5% fibrosis, no MC severo..  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 Less than 10 % fibrosis.   -Biopsy (for DGF) 17: good sample with 24 glomeruli (only 1 sclerosed) and evidence just meeting criteria for ABMR. + diffuse ATI. Minimal fibrosis, no ACR or AVR, but + CD4 (>50% diffusely + stain) with mild glomerulitis and focal peritubular capillaritis. Also, + ca-oxalate (3) and ca-phos (2) crystals.  -18 WEAK DSA DETECTED: CW5(6455)      PCP PROPHYLAXIS: Bactrim LIFELONG--> changed to Mepron ~ 2018  CMV PROPHYLAXIS: Valcyte until 17  FUNGAL PROPHYLAXIS: Nystatin until 9/15/17    Demetrio arteaga  "recently hospitalized with RLE edema and multiple lesions, founf to be kaposi sarcoma. PET showed multiple lymph nodes axillary, mesenteric, and right inguinal regions. He received his first chemotherapy Aug 19th which he tolerated well. He has noted worsening edema or LLE and RUE, along with worsening of the RLE edema.  He also repots he is "having moments" when he gets depressed and wants to be alone. He remains hopeful and is ready to fight, and he denies suicidal thought/intent.  He remains on Veltassa for hyperkalemia. His dose was increased while he was hospitalized.  He also experienced LEFTY while inpt, etiology uncertain.      Review of Systems   Respiratory: Negative for shortness of breath.    Cardiovascular: Positive for leg swelling. Negative for chest pain.   Gastrointestinal: Negative for abdominal pain, nausea and vomiting.   Genitourinary: Negative for difficulty urinating.   Skin: Negative for rash.   Allergic/Immunologic: Positive for immunocompromised state.     Objective:     Blood pressure (!) 140/85, pulse 84, temperature 97.9 °F (36.6 °C), temperature source Oral, resp. rate 16, height 5' 8" (1.727 m), weight 79.6 kg (175 lb 7.8 oz), SpO2 100 %.body mass index is 26.68 kg/m².    Physical Exam   Constitutional: He is oriented to person, place, and time. He appears well-developed and well-nourished.   HENT:   Head: Normocephalic.   Eyes: Conjunctivae are normal.   Cardiovascular: Normal rate and regular rhythm.   Pulmonary/Chest: Effort normal and breath sounds normal.   Abdominal: Soft. He exhibits no mass. There is no tenderness.   Musculoskeletal: He exhibits no edema (2+RUE, 4+RLE, 1+ LLE (none in LUE)).   Neurological: He is alert and oriented to person, place, and time.   Skin: Skin is dry. No rash noted.   Multiple KS lesions over skin of RLQ and well as RLE below knee.   Psychiatric: He has a normal mood and affect. Judgment normal.     Labs:  Recent Labs   Lab 08/18/17  1439  " 09/18/17  1016  12/27/17  1627 12/28/17  0815  01/09/18  1607  02/05/18  0756 05/14/18  0808  07/19/19  1121  07/22/19  0325 08/07/19  0735 08/14/19  0842 08/19/19  1316 08/26/19  0904 08/26/19  0957   WBC 5.24   < > 4.73   < >  --  3.2 L   < >  --    < > 2.9 L  --    < >  --    < > 3.57 L 3.49 L 3.03 L  3.03 L 3.78 L  --  4.10   Hemoglobin 11.2 L   < > 9.4 L   < >  --  10.3 L   < >  --    < > 12.5 L  --    < >  --    < > 7.8 L 7.6 L 6.2 L  6.2 L 6.6 L  --  6.3 L   POC Hematocrit  --    < >  --   --   --   --   --   --   --   --   --    < >  --   --   --   --   --   --   --   --    Hematocrit 34.9 L   < > 28.6 L   < >  --  31.0 L   < >  --    < > 39.2  --    < >  --    < > 24.8 L 26.6 L 20.7 L  20.7 L 22.5 L  --  21.2 L   Sodium 139   < > 141   < >  --  136   < > 139   < > 142  --    < >  --    < > 136 143 143  143 143  --  143   Potassium 5.6 H   < > 4.8   < >  --  4.3   < > 5.3 H   < > 5.8 H  --    < >  --    < > 5.0 4.4 4.6  4.6 4.7  --  4.4   Chloride 97   < > 112 H   < >  --  99   < > 101   < > 102  --    < >  --    < > 101 107 108  108 110  --  108   CO2 32 H   < > 22 L   < >  --  22   < > 20   < > 21  --    < >  --    < > 24 25 26  26 22 L  --  26   BUN, Bld 32 H   < > 19   < >  --  22 H   < > 19   < > 25 H  --    < >  --    < > 38 H 37 H 32 H  32 H 26 H  --  18   Creatinine 9.4 H   < > 2.1 H   < >  --  2.08 H   < > 2.04 H   < > 2.08 H  --    < >  --    < > 3.1 H 3.2 H 3.5 H  3.5 H 2.6 H  --  2.2 H   eGFR if non  6.3 A   < > 38.8 A   < >  --  39 L   < > 40 L   < > 39 L  --    < >  --    < > 23.9 A 23.0 A 20.6 A  20.6 A 29.5 A  --  36.1 A   eGFR if   --   --   --    < >  --  45 L  --  46 L  --  45 L  --   --   --   --   --   --   --   --   --   --    Calcium 8.9   < > 9.3   < >  --  9.5   < > 9.1   < > 9.4  --    < >  --    < > 9.3 9.7 9.0  9.0 9.1  --  8.7   Phosphorus 2.6 L   < > 2.1 L   < >  --  4.4   < > 4.4   < > 5.2 H  --    < >  --    < > 5.5 H 4.1 4.4  --   --   3.2   Magnesium  --    < > 2.3   < >  --  2.0   < >  --    < > 1.9  --    < >  --    < > 1.6 2.2 1.9  --   --  1.5 L   Albumin 3.9   < > 3.9  3.9   < >  --  4.3   < > 4.6   < > 4.5  --    < >  --   --   --  3.7 3.4 L  3.4 L 3.4 L  --  3.5  3.5   AST 21   < > 19   < >  --   --   --   --   --  22  --    < >  --   --   --   --  18 14  --  15   ALT 15   < > 10   < >  --   --   --   --   --  27  --    < >  --   --   --   --  10 8 L  --  8 L   Prot/Creat Ratio, Ur  --   --   --    < >  --   --   --   --   --   --  0.13  --  0.15  --   --   --   --   --  0.27 H  --    PTH, Intact 208.0 H  --  328.0 H  --  443.0 H  --   --   --   --   --   --   --   --   --   --   --   --   --   --   --    Tacrolimus Lvl  --    < > 4.2 L   < >  --  6.8   < > 7.3   < > 10.6  --    < >  --    < > <1.5 L <1.5 L <1.5 L  --   --   --    Sirolimus Lvl  --   --   --   --   --   --   --   --   --   --   --   --   --    < > 3.2 L 6.6 3.6 L  --   --  2.2 L    < > = values in this interval not displayed.       Recent Labs   Lab 02/19/18 05/21/18  0754 08/06/18  0758 11/08/18  0751 02/05/19 07/25/19  0800 07/25/19  0951 07/29/19  0756   EXT ANC  --   --  1,974  --   --   --   --   --   --   --   --    EXT WBC 4.7   < > 4.2   < > 3.7 L   < > 4.4 4.9 4.9 4.9 4.3   EXT SEGS% 75.8   < > 47.2   < > 61.4   < > 61.6 68.5  --  73.4 69.2   EXT Platelets 140   < > 145   < > 148   < > 171 169 145 A 145 A 119 A   EXT Hemoglobin 12.5   < > 10.6   < > 11.2   < > 9.3 8.9 8.3 A 8.3 A 8.0 A   EXT Hematocrit 37.3   < > 31.3   < > 32.9   < > 27.3 27.5 25.1 A 25.1 A 24.5 A   EXT Creatinine 2.0   < >  --    < >  --    < > 2.35 2.33 3.33 A 3.33 A 2.87 A   EXT Sodium 136   < >  --    < >  --    < > 139 137 134 134 137   EXT Potassium 5.0   < >  --    < >  --    < > 4.9 5.3 4.1 4.1 3.8   EXT BUN 24   < >  --    < >  --    < > 28 30 39 A 39 A 33 A   EXT CO2 25   < >  --    < >  --    < > 24 24 26 26 28   EXT Calcium 9.2   < >  --    < >  --    < > 9.6 9.5 9.2 9.2 9.0    EXT Phosphorus 5.2 H   < >  --    < > 5.1 H   < > 4.3 4.2  --   --  3.5   EXT Glucose 107   < >  --    < >  --    < > 111 118  --  121 A 114 A   EXT Albumin 4.6   < >  --    < > 4.9   < > 4.5 4.6  --   --  4.0   EXT AST 27  --   --   --  17  --   --  14  --   --   --    EXT ALT 28  --   --   --  14  --   --  8  --   --   --    EXT BilirubiN Total 1.0  --   --   --  0.60  --   --  0.50  --   --   --     < > = values in this interval not displayed.       Recent Labs   Lab 17  07518  0751 19  0800 19  0951 19  0756   EXT Sirolimus Lvl  --   --   --   --   --   --   --   --   --   --   --   --   --  3.6  --  none detected   EXT Tacrolimus Lvl 8.5   < > 6.8   < > 12.7 H   < > *p*   < > 7.3 7.7   < > 6.5 5.6  --  3.6  --    EXT PROT/CREAT Ratio UR 0.16  --  0.23  --   --   --   --   --  0.122  --   --   --  0.11  --   --   --    EXT PTH, Intact  --   --   --   --   --   --   --   --   --  170  --   --   --   --   --   --    EXT Protein UA negative  --   --   --  neg  --   --   --  NEG  --   --   --   --   --   --   --    EXT WBC UA negative  --   --   --  negative  --   --   --   --   --   --   --   --   --   --   --    EXT RBC UA negative  --   --   --  negative  --   --   --   --   --   --   --   --   --   --   --     < > = values in this interval not displayed.       Labs were reviewed with the patient.    Assessment:     1. Chronic kidney disease (CKD), stage III (moderate)    2. -donor kidney transplant    3. Prophylactic immunotherapy    4. Kaposi sarcoma    5. Immunosuppressive management encounter following kidney transplant    6. Situational depression    7. Hyperkalemia      Plan:   LEFTY has resolved, now back to stage III CKD.  His kidney function is currently stable posttransplant.    Immunosuppression:  Converted to sirolimus because of KS.  Continue it at low doses plus prednisone while on chemotherapy.   Will check Immuknow cylex with next labs 09/11/2019. Will trend immunosuppression levels. Must watch for drug toxicity and med-related side effects.      Kaposi sarcoma, metastatic: chemotherapy as per Oncology.  I believe his situational depression is appropriate, he is not suicidal, and at this in juncture medication is of unproven benefit.  Transplant  spoke to patient about this issue and I did is well.  We both recommended that he should contact us if he starts to become more symptomatic/depressed/hopeless.  I also sent message to his oncologist to see if he oncology psych team can work with him.    Chronic hyperkalemia:  Continue Veltassa.    Follow-up:   Clinic: return to transplant clinic weekly for the first month after transplant; every 2 weeks during months 2-3; then at 6-, 9-, 12-, 18-, 24-, and 36- months post-transplant to reassess for complications from immunosuppression toxicity and monitor for rejection.  Annually thereafter.    Labs: since patient remains at high risk for rejection and drug-related complications that warrant close monitoring, labs will be ordered as follows: continue twice weekly CBC, renal panel, and drug level for first month; then same labs once weekly through 3rd month post-transplant.  Urine for UA and protein/creatinine ratio monthly.  Serum BK - PCR at 1-, 3-, 6-, 9-, 12-, 18-, 24-, 36- 48-, and 60 months post-transplant.  Hepatic panel at 1-, 2-, 3-, 6-, 9-, 12-, 18-, 24-, and 36- months post-transplant.    Lucía Polk MD       [facility] Patient Status  Functional Status: 50% - Requires considerable assistance and frequent medical care  Physical Capacity: No Limitations

## 2019-08-27 ENCOUNTER — PATIENT MESSAGE (OUTPATIENT)
Dept: HEMATOLOGY/ONCOLOGY | Facility: CLINIC | Age: 39
End: 2019-08-27

## 2019-08-27 DIAGNOSIS — Z94.0 KIDNEY REPLACED BY TRANSPLANT: Primary | ICD-10-CM

## 2019-08-28 ENCOUNTER — PATIENT MESSAGE (OUTPATIENT)
Dept: TRANSPLANT | Facility: CLINIC | Age: 39
End: 2019-08-28

## 2019-08-28 DIAGNOSIS — C46.9 KAPOSI SARCOMA: Primary | ICD-10-CM

## 2019-08-29 ENCOUNTER — PATIENT MESSAGE (OUTPATIENT)
Dept: HEMATOLOGY/ONCOLOGY | Facility: CLINIC | Age: 39
End: 2019-08-29

## 2019-08-29 ENCOUNTER — DOCUMENTATION ONLY (OUTPATIENT)
Dept: HEMATOLOGY/ONCOLOGY | Facility: CLINIC | Age: 39
End: 2019-08-29

## 2019-08-29 NOTE — PROGRESS NOTES
Received referral from the clinic that patient was requesting assistance with the [facility]. Patient requesting check in of 9/10 and check out 9/12. Referral sent to the [facility].

## 2019-08-30 NOTE — PROCEDURES
"Radiology Post Procedure Note:     Procedure: Port check    (s): Heriberto    Blood Loss: Minimal    Specimen: None    Findings:   Patient came to IR and under imaging guidance attempted to have port accessed and was unable to place needle into port. Imaging confirms likely "flipped port"      Juan Luis LÓPEZ M.D. personally reviewed and agree with the above dictated note.   "

## 2019-08-30 NOTE — DISCHARGE SUMMARY
Radiology Discharge Summary      Hospital Course: No complications    Admit Date: 8/26/2019  Discharge Date: 08/30/2019     Instructions Given to Patient: Yes  Diet: Resume prior diet  Activity: activity as tolerated    Description of Condition on Discharge: Stable  Vital Signs (Most Recent): Pulse: 71 (08/26/19 1050)  Resp: 14 (08/26/19 1050)  BP: (!) 161/99 (08/26/19 1050)  SpO2: 98 % (08/26/19 1050)    Discharge Disposition: Home    Discharge Diagnosis: Port Malfunction     Follow-up: with referring    .Juan Luis LÓPEZ M.D. personally reviewed and agree with the above dictated note.

## 2019-09-03 ENCOUNTER — DOCUMENTATION ONLY (OUTPATIENT)
Dept: TRANSPLANT | Facility: CLINIC | Age: 39
End: 2019-09-03

## 2019-09-03 DIAGNOSIS — C46.9 KAPOSI SARCOMA: Primary | ICD-10-CM

## 2019-09-03 DIAGNOSIS — Z51.11 ENCOUNTER FOR ANTINEOPLASTIC CHEMOTHERAPY: Primary | ICD-10-CM

## 2019-09-03 LAB — EXT ALLOSURE: 3.1

## 2019-09-04 ENCOUNTER — TELEPHONE (OUTPATIENT)
Dept: INTERVENTIONAL RADIOLOGY/VASCULAR | Facility: HOSPITAL | Age: 39
End: 2019-09-04

## 2019-09-05 DIAGNOSIS — C46.9 KAPOSI SARCOMA: Primary | ICD-10-CM

## 2019-09-11 ENCOUNTER — PATIENT MESSAGE (OUTPATIENT)
Dept: HEMATOLOGY/ONCOLOGY | Facility: CLINIC | Age: 39
End: 2019-09-11

## 2019-09-11 ENCOUNTER — PATIENT MESSAGE (OUTPATIENT)
Dept: CARDIOLOGY | Facility: CLINIC | Age: 39
End: 2019-09-11

## 2019-09-11 ENCOUNTER — OFFICE VISIT (OUTPATIENT)
Dept: HEMATOLOGY/ONCOLOGY | Facility: CLINIC | Age: 39
End: 2019-09-11
Payer: MEDICARE

## 2019-09-11 ENCOUNTER — OFFICE VISIT (OUTPATIENT)
Dept: INFECTIOUS DISEASES | Facility: CLINIC | Age: 39
End: 2019-09-11
Payer: MEDICARE

## 2019-09-11 ENCOUNTER — TELEPHONE (OUTPATIENT)
Dept: INTERVENTIONAL RADIOLOGY/VASCULAR | Facility: HOSPITAL | Age: 39
End: 2019-09-11

## 2019-09-11 ENCOUNTER — INFUSION (OUTPATIENT)
Dept: INFUSION THERAPY | Facility: HOSPITAL | Age: 39
End: 2019-09-11
Attending: SURGERY
Payer: MEDICARE

## 2019-09-11 ENCOUNTER — CLINICAL SUPPORT (OUTPATIENT)
Dept: INFECTIOUS DISEASES | Facility: CLINIC | Age: 39
End: 2019-09-11
Payer: MEDICARE

## 2019-09-11 VITALS
WEIGHT: 165.13 LBS | RESPIRATION RATE: 16 BRPM | DIASTOLIC BLOOD PRESSURE: 77 MMHG | BODY MASS INDEX: 25.03 KG/M2 | TEMPERATURE: 98 F | SYSTOLIC BLOOD PRESSURE: 156 MMHG | HEART RATE: 72 BPM | OXYGEN SATURATION: 99 % | HEIGHT: 68 IN

## 2019-09-11 VITALS
DIASTOLIC BLOOD PRESSURE: 94 MMHG | TEMPERATURE: 98 F | HEIGHT: 68 IN | WEIGHT: 164.25 LBS | SYSTOLIC BLOOD PRESSURE: 150 MMHG | HEART RATE: 73 BPM | BODY MASS INDEX: 24.89 KG/M2

## 2019-09-11 DIAGNOSIS — Z94.0 S/P KIDNEY TRANSPLANT: ICD-10-CM

## 2019-09-11 DIAGNOSIS — Z79.60 LONG-TERM USE OF IMMUNOSUPPRESSANT MEDICATION: ICD-10-CM

## 2019-09-11 DIAGNOSIS — C46.9 KAPOSI SARCOMA: Primary | ICD-10-CM

## 2019-09-11 DIAGNOSIS — Z91.89 AT RISK FOR OPPORTUNISTIC INFECTIONS: ICD-10-CM

## 2019-09-11 DIAGNOSIS — B20 HIV (HUMAN IMMUNODEFICIENCY VIRUS INFECTION): Primary | ICD-10-CM

## 2019-09-11 DIAGNOSIS — C46.9 KAPOSI SARCOMA: ICD-10-CM

## 2019-09-11 DIAGNOSIS — C77.9 REGIONAL LYMPH NODE METASTASIS PRESENT: ICD-10-CM

## 2019-09-11 PROCEDURE — 96367 TX/PROPH/DG ADDL SEQ IV INF: CPT

## 2019-09-11 PROCEDURE — 99214 OFFICE O/P EST MOD 30 MIN: CPT | Mod: PBBFAC,25

## 2019-09-11 PROCEDURE — 96413 CHEMO IV INFUSION 1 HR: CPT

## 2019-09-11 PROCEDURE — 99215 OFFICE O/P EST HI 40 MIN: CPT | Mod: S$PBB,,, | Performed by: INTERNAL MEDICINE

## 2019-09-11 PROCEDURE — 99999 PR PBB SHADOW E&M-EST. PATIENT-LVL III: ICD-10-PCS | Mod: PBBFAC,,, | Performed by: INTERNAL MEDICINE

## 2019-09-11 PROCEDURE — 90662 IIV NO PRSV INCREASED AG IM: CPT | Mod: PBBFAC

## 2019-09-11 PROCEDURE — 99213 OFFICE O/P EST LOW 20 MIN: CPT | Mod: PBBFAC,25,27 | Performed by: INTERNAL MEDICINE

## 2019-09-11 PROCEDURE — 99215 OFFICE O/P EST HI 40 MIN: CPT | Mod: S$PBB,GC,,

## 2019-09-11 PROCEDURE — 99999 PR PBB SHADOW E&M-EST. PATIENT-LVL IV: CPT | Mod: PBBFAC,GC,,

## 2019-09-11 PROCEDURE — 99215 PR OFFICE/OUTPT VISIT, EST, LEVL V, 40-54 MIN: ICD-10-PCS | Mod: S$PBB,GC,,

## 2019-09-11 PROCEDURE — 99999 PR PBB SHADOW E&M-EST. PATIENT-LVL IV: ICD-10-PCS | Mod: PBBFAC,GC,,

## 2019-09-11 PROCEDURE — 99215 PR OFFICE/OUTPT VISIT, EST, LEVL V, 40-54 MIN: ICD-10-PCS | Mod: S$PBB,,, | Performed by: INTERNAL MEDICINE

## 2019-09-11 PROCEDURE — 63600175 PHARM REV CODE 636 W HCPCS: Mod: JG | Performed by: INTERNAL MEDICINE

## 2019-09-11 PROCEDURE — 99999 PR PBB SHADOW E&M-EST. PATIENT-LVL III: CPT | Mod: PBBFAC,,, | Performed by: INTERNAL MEDICINE

## 2019-09-11 RX ORDER — HEPARIN 100 UNIT/ML
500 SYRINGE INTRAVENOUS
Status: CANCELLED | OUTPATIENT
Start: 2019-09-11

## 2019-09-11 RX ORDER — HEPARIN 100 UNIT/ML
500 SYRINGE INTRAVENOUS
Status: DISCONTINUED | OUTPATIENT
Start: 2019-09-11 | End: 2019-09-11 | Stop reason: HOSPADM

## 2019-09-11 RX ORDER — SODIUM CHLORIDE 0.9 % (FLUSH) 0.9 %
10 SYRINGE (ML) INJECTION
Status: CANCELLED | OUTPATIENT
Start: 2019-09-11

## 2019-09-11 RX ORDER — SODIUM CHLORIDE 0.9 % (FLUSH) 0.9 %
10 SYRINGE (ML) INJECTION
Status: DISCONTINUED | OUTPATIENT
Start: 2019-09-11 | End: 2019-09-11 | Stop reason: HOSPADM

## 2019-09-11 RX ADMIN — DEXTROSE: 50 INJECTION, SOLUTION INTRAVENOUS at 11:09

## 2019-09-11 RX ADMIN — DOXORUBICIN HYDROCHLORIDE 38 MG: 2 INJECTION, SUSPENSION, LIPOSOMAL INTRAVENOUS at 11:09

## 2019-09-11 RX ADMIN — DEXAMETHASONE SODIUM PHOSPHATE: 4 INJECTION, SOLUTION INTRAMUSCULAR; INTRAVENOUS at 11:09

## 2019-09-11 NOTE — PLAN OF CARE
Problem: Adult Inpatient Plan of Care  Goal: Plan of Care Review  Outcome: Ongoing (interventions implemented as appropriate)  Pt tolerated doxil well.  No s/s of reaction. Vitals stable, NAD.  Erythropoietin could not be given, waiting on auth.  Pt will return tomorrow pending auth.

## 2019-09-11 NOTE — Clinical Note
Kay Aguiar needs:1. Lab visit with cbc/cmp on 10/2 with an office visit with me2. Make sure his C3 on 10/2 for doxorubicin infusion with darbopoetin (orders in)3. If possible, can we fax his labs he had today to [phone] to attn: Dr. Romero

## 2019-09-11 NOTE — PROGRESS NOTES
[facility]  [facility]  Hematology/Medical Oncology Clinic      PATIENT: Demetrio Aguiar  MRN: [id number]  DATE: 9/11/2019    Diagnosis:   1. Kaposi sarcoma        Chief Complaint: Consult for newly diagnosed Kaposi's sarcoma  a and malignancy.    Other MDs:  Neph Transplant: [facility]     Subjective:   Initial History: Mr. Aguiar is a 39 y.o. male who is here to follow up in oncology clinic for his diagnosis of kaposi's sarcoma.     PMHx of HIV on ART, ESRD previous on iHD s/p transplant with hx of rejection (on chronic immunosuppression), anemia of chronic disease, latent TB s/p treatment, HTN and HLD.    He is here today for OV for his C2 of doxo for symptomatic metastatic kaposi.    He tolerated the first cycle well without obvious complaints.     He reports the swelling is decreasing though does notice that it gets during the more he stands and throughout the day.     No complaints today.    He continues to be anemic.     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 Hematoma evacuated.(09/09)    Status post exploratory laparotomy 9/10/2017    Re explored for retroperitoneal hematoma. Hematoma evacuated.(09/09)    TB lung, latent     tx INH 2006     Past Surgical History:   Procedure Laterality Date    BIOPSY Tranplanted Kidney N/A 8/28/2018    Performed by [facility] at [facility] OR 2ND FLR    BIOPSY Tranplanted Kidney N/A 1/15/2018    Performed by [facility] at [facility] OR 2ND FLR    BIOPSY Tranplanted Kidney N/A 10/17/2017    Performed by [facility]  "at [facility] OR 2ND FLR    XBDLOE-NLYDDL-RE N/A 10/3/2017    Performed by [facility] Diagnostic Provider at [facility] OR 2ND FLR    EXPLORATORY LAPAROTOMY W/ BOWEL RESECTION      NO BOWEL RESECTION, UNKNOWN DETAILS, "Pancreas Infection"    EXPLORATORY-LAPAROTOMY retroperitoneal washout N/A 9/9/2017    Performed by Wesley Cisneros MD at [facility] OR 2ND FLR    KAOVVIOQG-ZMNY-W-CATH Left 8/19/2019    Performed by Megha Monreal MD at [facility] OR [facility] FLR    KIDNEY TRANSPLANT      peritoneal dialysis catheter placement      TRANSPLANT-KIDNEY N/A 8/18/2017    Performed by Wesley Cisneros MD at [facility] OR 2ND FLR     Family History   Problem Relation Age of Onset    Hyperlipidemia Mother     Cancer Father         lung    Lung cancer Father     No Known Problems Brother     Diabetes Maternal Aunt     Diabetes Maternal Uncle     Hypertension Maternal Grandmother     Hypertension Paternal Grandmother     Heart disease Paternal Grandmother       reports that he has quit smoking. His smoking use included cigarettes. He smoked 0.50 packs per day. He has never used smokeless tobacco. He reports that he does not drink alcohol or use drugs.  Review of patient's allergies indicates:  No Known Allergies  Current Outpatient Medications   Medication Sig Dispense Refill    atovaquone (MEPRON) 750 mg/5 mL Susp Take 5 mLs (750 mg total) by mouth once daily. 210 mL 11    carvedilol (COREG) 6.25 MG tablet Take 2 tablets (12.5 mg total) by mouth 2 (two) times daily with meals. 120 tablet 11    cinacalcet (SENSIPAR) 60 MG Tab Take 1 tablet (60 mg total) by mouth daily with breakfast. 30 tablet 0    dolutegravir (TIVICAY) 50 mg Tab Take 1 tablet (50 mg total) by mouth once daily. 30 tablet 5    emtricitabine-tenofovir alafen (DESCOVY) 200-25 mg Tab Take 1 tablet by mouth once daily.      famotidine (PEPCID) 20 MG tablet TAKE 1 TABLET (20MG TOTAL) BY MOUTH EACH EVENING 30 tablet 11    multivitamin (THERAGRAN) tablet Take 1 tablet by " mouth once daily.      patiromer calcium sorbitex (VELTASSA) 25.2 gram PwPk Take 1 packet (25.2 g total) by mouth once daily. 30 packet 10    predniSONE (DELTASONE) 5 MG tablet Take 1 tablet (5 mg total) by mouth once daily. 30 tablet 11    rosuvastatin (CRESTOR) 20 MG tablet Take 1 tablet (20 mg total) by mouth once daily. 30 tablet 0    sevelamer carbonate (RENVELA) 800 mg Tab Take 1 tablet (800 mg total) by mouth 3 (three) times daily with meals. 90 tablet 11    sirolimus (RAPAMUNE) 1 MG Tab Take 5 tablets (5 mg total) by mouth once daily. 150 tablet 11    HYDROcodone-acetaminophen (NORCO) 5-325 mg per tablet Take 1 tablet by mouth every 4 (four) hours as needed for Pain. 20 tablet 0    NIFEdipine (PROCARDIA-XL) 30 MG (OSM) 24 hr tablet Take 1 tablet (30 mg total) by mouth every morning AND 2 tablets (60 mg total) every evening. 90 tablet 11    sodium bicarbonate 650 MG tablet Take 3 tablets (1,950 mg total) by mouth 3 (three) times daily. 270 tablet 11     No current facility-administered medications for this visit.      Review of Systems   Constitutional: Positive for fatigue. Negative for activity change, chills, fever and unexpected weight change.   HENT: Negative for hearing loss and sore throat.    Eyes: Negative for discharge and visual disturbance.   Respiratory: Negative for chest tightness and shortness of breath.    Cardiovascular: Positive for leg swelling. Negative for chest pain.   Gastrointestinal: Negative for abdominal distention, abdominal pain, diarrhea and nausea.   Endocrine: Negative for cold intolerance.   Genitourinary: Negative for decreased urine volume and difficulty urinating.   Allergic/Immunologic: Positive for immunocompromised state.   Hematological: Positive for adenopathy.     ECOG Performance Status: 0   Objective:      Vitals:   Vitals:    09/11/19 1004   BP: (!) 156/77   BP Location: Right arm   Patient Position: Sitting   BP Method: Medium (Automatic)   Pulse: 72  "  Resp: 16   Temp: 98 °F (36.7 °C)   TempSrc: Oral   SpO2: 99%   Weight: 74.9 kg (165 lb 2 oz)   Height: 5' 8" (1.727 m)     BMI: Body mass index is 25.11 kg/m².    Physical Exam   Constitutional: He is oriented to person, place, and time.   Young AA male, appears well and nervous   HENT:   Head: Normocephalic and atraumatic.   Eyes: Pupils are equal, round, and reactive to light. EOM are normal.   Cardiovascular: Normal rate and regular rhythm.   Pulmonary/Chest: Effort normal and breath sounds normal.   Abdominal: Bowel sounds are normal. He exhibits mass. There is tenderness. There is no rebound and no guarding.   Right lower quadrant swelling and mass palpated, slightly tender to palpation   Genitourinary: Penis normal.   Musculoskeletal:   +2 RLE edema to the abdomen, RUE/LUE fistulas with soft bruit, LLE with graft in place and soft bruit   Lymphadenopathy:   Large RLE inguinal node palpated, left wnl. No cervical axillary LAD palpated.    Neurological: He is alert and oriented to person, place, and time.   Skin: Skin is warm and dry. Capillary refill takes less than 2 seconds.   Kaposi lesions in the right lower abdomen and RLE (extensive)   Psychiatric: He has a normal mood and affect. His behavior is normal. Thought content normal.   Vitals reviewed.    Laboratory Data:  Lab Visit on 09/11/2019   Component Date Value Ref Range Status    WBC 09/11/2019 3.79* 3.90 - 12.70 K/uL Final    RBC 09/11/2019 2.61* 4.60 - 6.20 M/uL Final    Hemoglobin 09/11/2019 6.7* 14.0 - 18.0 g/dL Final    Hematocrit 09/11/2019 22.5* 40.0 - 54.0 % Final    Mean Corpuscular Volume 09/11/2019 86  82 - 98 fL Final    Mean Corpuscular Hemoglobin 09/11/2019 25.7* 27.0 - 31.0 pg Final    Mean Corpuscular Hemoglobin Conc 09/11/2019 29.8* 32.0 - 36.0 g/dL Final    RDW 09/11/2019 15.2* 11.5 - 14.5 % Final    Platelets 09/11/2019 178  150 - 350 K/uL Final    MPV 09/11/2019 10.3  9.2 - 12.9 fL Final    Immature Granulocytes " 09/11/2019 0.3  0.0 - 0.5 % Final    Gran # (ANC) 09/11/2019 2.3  1.8 - 7.7 K/uL Final    Immature Grans (Abs) 09/11/2019 0.01  0.00 - 0.04 K/uL Final    Comment: Mild elevation in immature granulocytes is non specific and   can be seen in a variety of conditions including stress response,   acute inflammation, trauma and pregnancy. Correlation with other   laboratory and clinical findings is essential.      Lymph # 09/11/2019 1.0  1.0 - 4.8 K/uL Final    Mono # 09/11/2019 0.4  0.3 - 1.0 K/uL Final    Eos # 09/11/2019 0.1  0.0 - 0.5 K/uL Final    Baso # 09/11/2019 0.03  0.00 - 0.20 K/uL Final    nRBC 09/11/2019 0  0 /100 WBC Final    Gran% 09/11/2019 59.3  38.0 - 73.0 % Final    Lymph% 09/11/2019 26.9  18.0 - 48.0 % Final    Mono% 09/11/2019 9.5  4.0 - 15.0 % Final    Eosinophil% 09/11/2019 3.2  0.0 - 8.0 % Final    Basophil% 09/11/2019 0.8  0.0 - 1.9 % Final    Differential Method 09/11/2019 Automated   Final    Glucose 09/11/2019 103  70 - 110 mg/dL Final    Sodium 09/11/2019 141  136 - 145 mmol/L Final    Potassium 09/11/2019 3.6  3.5 - 5.1 mmol/L Final    Chloride 09/11/2019 107  95 - 110 mmol/L Final    CO2 09/11/2019 25  23 - 29 mmol/L Final    BUN, Bld 09/11/2019 22* 6 - 20 mg/dL Final    Calcium 09/11/2019 8.3* 8.7 - 10.5 mg/dL Final    Creatinine 09/11/2019 2.1* 0.5 - 1.4 mg/dL Final    Albumin 09/11/2019 4.1  3.5 - 5.2 g/dL Final    Phosphorus 09/11/2019 3.3  2.7 - 4.5 mg/dL Final    eGFR if  09/11/2019 44.2* >60 mL/min/1.73 m^2 Final    eGFR if non  09/11/2019 38.2* >60 mL/min/1.73 m^2 Final    Comment: Calculation used to obtain the estimated glomerular filtration  rate (eGFR) is the CKD-EPI equation.       Anion Gap 09/11/2019 9  8 - 16 mmol/L Final    Magnesium 09/11/2019 1.6  1.6 - 2.6 mg/dL Final    WBC 09/11/2019 3.79* 3.90 - 12.70 K/uL Final    RBC 09/11/2019 2.61* 4.60 - 6.20 M/uL Final    Hemoglobin 09/11/2019 6.7* 14.0 - 18.0 g/dL  Final    Hematocrit 09/11/2019 22.5* 40.0 - 54.0 % Final    Mean Corpuscular Volume 09/11/2019 86  82 - 98 fL Final    Mean Corpuscular Hemoglobin 09/11/2019 25.7* 27.0 - 31.0 pg Final    Mean Corpuscular Hemoglobin Conc 09/11/2019 29.8* 32.0 - 36.0 g/dL Final    RDW 09/11/2019 15.2* 11.5 - 14.5 % Final    Platelets 09/11/2019 178  150 - 350 K/uL Final    MPV 09/11/2019 10.3  9.2 - 12.9 fL Final    Gran # (ANC) 09/11/2019 2.3  1.8 - 7.7 K/uL Final    Immature Grans (Abs) 09/11/2019 0.01  0.00 - 0.04 K/uL Final    Comment: Mild elevation in immature granulocytes is non specific and   can be seen in a variety of conditions including stress response,   acute inflammation, trauma and pregnancy. Correlation with other   laboratory and clinical findings is essential.      Sodium 09/11/2019 141  136 - 145 mmol/L Final    Potassium 09/11/2019 3.6  3.5 - 5.1 mmol/L Final    Chloride 09/11/2019 107  95 - 110 mmol/L Final    CO2 09/11/2019 25  23 - 29 mmol/L Final    Glucose 09/11/2019 103  70 - 110 mg/dL Final    BUN, Bld 09/11/2019 22* 6 - 20 mg/dL Final    Creatinine 09/11/2019 2.1* 0.5 - 1.4 mg/dL Final    Calcium 09/11/2019 8.3* 8.7 - 10.5 mg/dL Final    Total Protein 09/11/2019 7.0  6.0 - 8.4 g/dL Final    Albumin 09/11/2019 4.1  3.5 - 5.2 g/dL Final    Total Bilirubin 09/11/2019 0.4  0.1 - 1.0 mg/dL Final    Comment: For infants and newborns, interpretation of results should be based  on gestational age, weight and in agreement with clinical  observations.  Premature Infant recommended reference ranges:  Up to 24 hours.............<8.0 mg/dL  Up to 48 hours............<12.0 mg/dL  3-5 days..................<15.0 mg/dL  6-29 days.................<15.0 mg/dL      Alkaline Phosphatase 09/11/2019 99  55 - 135 U/L Final    AST 09/11/2019 17  10 - 40 U/L Final    ALT 09/11/2019 12  10 - 44 U/L Final    Anion Gap 09/11/2019 9  8 - 16 mmol/L Final    eGFR if  09/11/2019 44.2* >60  mL/min/1.73 m^2 Final    eGFR if non  09/11/2019 38.2* >60 mL/min/1.73 m^2 Final    Comment: Calculation used to obtain the estimated glomerular filtration  rate (eGFR) is the CKD-EPI equation.       Magnesium 09/11/2019 1.6  1.6 - 2.6 mg/dL Final       Assessment:       1. Kaposi sarcoma         Oncology History:     Kaposi Sarcoma  Hx of HIV and s/p renal transplant on immunosuppression  July 2019   Leg swelling for ~10 months; hospitalized for further work up, initially concerned for DVT   Found to have a large RLE/groin mass   Biopsy was + for Kaposi/HHV-8 ([facility] in [place]) - Spindled cell lesion with slit-like spaces, minimal pleomorphism, and extravasated RBCs. Postive for CD34, 31 and HHV-8, negative for s-100, SMA and pancytokeratin. Findings consistent with Kaposi sarcoma.    Tx to [facility] and had mild pre-staging work up: CT CAP showed extensive LAD   Initial consult with oncology/myself for consideration of chemotherapy   Plan for further staging with PET, BMBx and plan for doxorubicin and pre-testing   Echo LVEF 55% and consultation with cardiology   PET confirming metastatic disease and global LAD   August 2019 8/14 C1 of Doxorubicin  September 2019 9/11 C2 of doxrubin, 1st dose of darbopoetin    Plan:     Mr. Aguiar presents to the clinic today for C2 of doxorubicin for his symptomatic, metastatic Kaposi sarcoma. He labs look good today though continued anemia noted, likely 2/2 to kidney disease. As previously charted and discussed with his renal transplant, will receive darbo every 3 weeks, with his chemotherapy. He appears well today and will likely continue to tolerate this treatment well. We will assess for a possible GI source of anemia as well. Tbili good today.     -ok for C2 of doxorubicin today  -set to get first dose of darbopoetin on a q3 week basis  -follow up in clinic in 3 weeks for labs and OV (appt in)  -gave stool cards to bring in to next  appointment  -knows to call with any questions or concerns  -lab results to be faxed to nephrologist at number requested    Case discussed with Dr. Kennedy.    Matthieu Amador MD  Hematology/Medical Oncology Fellow  [phone] (cell)  [phone] (page)            ATTENDING NOTE, ONCOLOGY CLINIC    As above.  Patient seen and examined, chart reviewed.  Appears comfortable, in NAD.  Lungs are clear to auscultation.  Abdomen is soft, nontender.  He continues to have 2 (+) edema in the RLE, where he has multiple KS lesions, too numerous to count  Labs reviewed.    PLAN  Proceed with cycle 2 of doxil today.  RTC 3 weeks with repeat labs for cycle 3.  His multiple questions were answered to his satisfaction.      Santi Kennedy MD

## 2019-09-11 NOTE — PROGRESS NOTES
Subjective:      Patient ID: Demetrio Aguiar is a 39 y.o. male.    Chief Complaint: Follow-up for HIV    History of Present Illness  39-year-old male with history of HIV on TAF/FTC+DTG c/b HIVAN s/p DBDKT 8/18/2017 (CMV D-R+ thymo induction) c/b ABMR, c/b RP hematoma s/p washout 9/8/2017, latent TB s/p treatment, recent diagnosis of visceral KS, on doxorubicin, presents for follow-up of HIV.    Patient was hospitalized 7/2019 for cervical, axillary, R inguinal LAD with subsequent development of purple plaques over RLE and RLQ over kidney graft. Biopsy right inguinal LN with Kaposi sarcoma.  PET with metastatic disease and global LAD.  Patient was started on doxorubicin 8/14/2019, plans for chemo q3 weeks.    Patient was switched to sirolimus in setting of new diagnosis of KS.    Patient reports lesions on right leg and abdomen persist.  Swelling of right leg has decreased.  Noted appetite has improved.  He is still on TAF/FTC and DTG.      Review of Systems   Constitution: Positive for chills, malaise/fatigue and night sweats. Negative for decreased appetite, fever, weight gain and weight loss.   HENT: Negative for congestion, ear pain, hearing loss, hoarse voice, sore throat and tinnitus.    Eyes: Positive for blurred vision. Negative for redness and visual disturbance.   Cardiovascular: Negative for chest pain, leg swelling and palpitations.   Respiratory: Negative for cough, hemoptysis, shortness of breath and sputum production.    Hematologic/Lymphatic: Positive for adenopathy. Does not bruise/bleed easily.   Skin: Negative for dry skin, itching, rash and suspicious lesions.   Musculoskeletal: Negative for back pain, joint pain, myalgias and neck pain.   Gastrointestinal: Negative for abdominal pain, constipation, diarrhea, heartburn, nausea and vomiting.   Genitourinary: Negative for dysuria, flank pain, frequency, hematuria, hesitancy and urgency.   Neurological: Negative for dizziness, headaches, numbness,  paresthesias and weakness.   Psychiatric/Behavioral: Negative for depression and memory loss. The patient does not have insomnia and is not nervous/anxious.      Objective:   Physical Exam   Constitutional: He is oriented to person, place, and time. He appears well-developed and well-nourished. No distress.   HENT:   Head: Normocephalic and atraumatic.   Eyes: Conjunctivae and EOM are normal.   Neck: Normal range of motion. Neck supple.   Cardiovascular: Normal rate, regular rhythm and normal heart sounds.   Pulmonary/Chest: Effort normal and breath sounds normal. No respiratory distress. He has no wheezes. He has no rales.   Abdominal: Soft. Bowel sounds are normal. He exhibits no distension and no mass. There is no guarding.   Musculoskeletal: Normal range of motion. He exhibits no edema.   Neurological: He is alert and oriented to person, place, and time.   Skin: Skin is warm and dry. No rash noted. He is not diaphoretic. No erythema.   Multiple 1-2 cm brown macules over his right abdomen over kidney allograft and right leg   Psychiatric: He has a normal mood and affect. His behavior is normal.   Vitals reviewed.    Significant results reviewed:     Ref Range & Units 08:30   WBC 3.90 - 12.70 K/uL 3.79Low     RBC 4.60 - 6.20 M/uL 2.61Low     Hemoglobin 14.0 - 18.0 g/dL 6.7Low     Hematocrit 40.0 - 54.0 % 22.5Low     Mean Corpuscular Volume 82 - 98 fL 86    Mean Corpuscular Hemoglobin 27.0 - 31.0 pg 25.7Low     Mean Corpuscular Hemoglobin Conc 32.0 - 36.0 g/dL 29.8Low     RDW 11.5 - 14.5 % 15.2High     Platelets 150 - 350 K/uL 178    MPV 9.2 - 12.9 fL 10.3    Gran # (ANC) 1.8 - 7.7 K/uL 2.3    Immature Grans (Abs) 0.00 - 0.04 K/uL 0.01       Ref Range & Units 08:30   Sodium 136 - 145 mmol/L 141    Potassium 3.5 - 5.1 mmol/L 3.6    Chloride 95 - 110 mmol/L 107    CO2 23 - 29 mmol/L 25    Glucose 70 - 110 mg/dL 103    BUN, Bld 6 - 20 mg/dL 22High     Creatinine 0.5 - 1.4 mg/dL 2.1High     Calcium 8.7 - 10.5 mg/dL  8.3Low     Total Protein 6.0 - 8.4 g/dL 7.0    Albumin 3.5 - 5.2 g/dL 4.1    Total Bilirubin 0.1 - 1.0 mg/dL 0.4    Comment: For infants and newborns, interpretation of results should be based   on gestational age, weight and in agreement with clinical   observations.   Premature Infant recommended reference ranges:   Up to 24 hours.............<8.0 mg/dL   Up to 48 hours............<12.0 mg/dL   3-5 days..................<15.0 mg/dL   6-29 days.................<15.0 mg/dL    Alkaline Phosphatase 55 - 135 U/L 99    AST 10 - 40 U/L 17    ALT 10 - 44 U/L 12    Anion Gap 8 - 16 mmol/L 9    eGFR if African American >60 mL/min/1.73 m^2 44.2Abnormal     eGFR if non African American >60 mL/min/1.73 m^2 38.2Abnormal     Comment: Calculation used to obtain the estimated glomerular filtration   rate (eGFR) is the CKD-EPI equation.      STI Screening  RPR neg 7/2019  Chlamydia and gonorrhea 7/2019    PET Scan 7/31/2019  Nodular increased uptake within the subcutaneous and soft tissue of the right knee and significant associated soft tissue edema with moderate right knee joint effusion.  Lymphadenopathy with increased uptake noted within the level 1 right axillary, mesenteric, and right inguinal regions.  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  Marixa lymphoma could have a similar appearance.    Persistent airspace consolidation within the right lower lobe which appears similar in overall size and configuration when compared to prior examination and demonstrates mild uptake.  This likely represent infectious/inflammatory process however low-grade neoplasm not excluded.              Assessment:   39-year-old male with history of HIV on TAF/FTC+DTG c/b HIVAN s/p DBDKT 8/18/2017 (CMV D-R+ thymo induction, now on sirolimus) c/b ABMR, c/b RP hematoma s/p washout 9/8/2017, latent TB s/p treatment, recent diagnosis of visceral KS, on doxorubicin, presents for follow-up of HIV.    Kidney function has improved,  CrCl ar 40 - okay to continue current regimen, however may need to switch to DTG/RPV if CrCl decreases below 30.      Plan:   - Continue TAF/FTC + DTG  The patient has been counseled regarding the importance of compliance with every dose of HIV medications. Possible side effects have been reviewed and the patients questions have been answered.    Labs:  HIV viral load  CD4 cell count  Genosure Archive to assess for any resistance to rilpivirine    Vaccines:  Influenza today 9/2019  Tdap every 10 years, due  PCV13 (prevnar), due  PPSV23 (Pneumovax) (2 months after PCV13), followed by booster at 5 years, due  Meningococcal (2 doses at 0, 2 months, but a month after all PCV), due  Hepatitis A serologies sent  Hepatitis B immune        Health Monitoring  Hemoglobin A1C every 6-12 months  Fasting lipid profile every 6-12 months  Tobacco use  Bone densitometry (postmenopausal women and men => 50 years old)  Aortic aneurysm screening (men 65-75 who have ever smoked)  Colonoscopy at 50 years  Mammography annually 50-74    RTC 3 months    Rita Lee MD MPH  [facility]

## 2019-09-12 ENCOUNTER — PATIENT MESSAGE (OUTPATIENT)
Dept: TRANSPLANT | Facility: CLINIC | Age: 39
End: 2019-09-12

## 2019-09-12 ENCOUNTER — INFUSION (OUTPATIENT)
Dept: INFUSION THERAPY | Facility: HOSPITAL | Age: 39
End: 2019-09-12
Attending: SURGERY
Payer: MEDICARE

## 2019-09-12 DIAGNOSIS — N18.9 ACUTE KIDNEY INJURY SUPERIMPOSED ON CHRONIC KIDNEY DISEASE: ICD-10-CM

## 2019-09-12 DIAGNOSIS — C46.9 KAPOSI SARCOMA: Primary | ICD-10-CM

## 2019-09-12 DIAGNOSIS — N17.9 ACUTE KIDNEY INJURY SUPERIMPOSED ON CHRONIC KIDNEY DISEASE: ICD-10-CM

## 2019-09-12 PROCEDURE — 96372 THER/PROPH/DIAG INJ SC/IM: CPT | Mod: 59

## 2019-09-12 PROCEDURE — 63600175 PHARM REV CODE 636 W HCPCS: Mod: JG | Performed by: INTERNAL MEDICINE

## 2019-09-12 RX ADMIN — DARBEPOETIN ALFA 300 MCG: 300 INJECTION, SOLUTION INTRAVENOUS; SUBCUTANEOUS at 11:09

## 2019-09-13 RX ORDER — CALCIUM CARBONATE 200(500)MG
1 TABLET,CHEWABLE ORAL DAILY
Qty: 30 TABLET | Refills: 11 | COMMUNITY
Start: 2019-09-13 | End: 2020-09-12

## 2019-09-18 ENCOUNTER — PATIENT MESSAGE (OUTPATIENT)
Dept: CARDIOLOGY | Facility: CLINIC | Age: 39
End: 2019-09-18

## 2019-09-18 ENCOUNTER — DOCUMENTATION ONLY (OUTPATIENT)
Dept: HEMATOLOGY/ONCOLOGY | Facility: CLINIC | Age: 39
End: 2019-09-18

## 2019-09-18 NOTE — PROGRESS NOTES
Received call from patient. He is requesting assistance with referral to the [facility]. Requesting check in date of 10/1 and check out of 10/3. Completed application and faxed to the [facility].

## 2019-09-19 ENCOUNTER — TELEPHONE (OUTPATIENT)
Dept: PHARMACY | Facility: CLINIC | Age: 39
End: 2019-09-19

## 2019-09-19 NOTE — TELEPHONE ENCOUNTER
Refill and follow-up:  Veltassa refill confirmed. Patient will pickup  Veltassa refill on 10/1 from [facility]  $0.00 copay- [id number]. Confirmed 2 patient identifiers - name and .     Follow-up with patient to clarify where he is obtaining Veltassa from, patient reported he was not filling Velt[facility], patient was aware [facility] can not ship to where he is currently located in [place], but patient consented to picking up medication when he comes to his provider appointment on 10/1.  Patient not at home to give on hand counts but reports he is administering 1 packet (25.9g) by mouth daily.  Patient reports he experiencing some constipation off and on and using colace as needed, reinforced impotanceof drinking plenty of water and administering Veltassa with a meal  from other medication. No missed doses, no new medications, no new allergies or health conditions reported at this time. All questions answered and addressed to patient's satisfaction. Patient verbalized understanding.

## 2019-09-20 NOTE — PLAN OF CARE
Needs ref. To Hepatology CLINIC ASAP!!!   Problem: Patient Care Overview  Goal: Plan of Care Review  Outcome: Ongoing (interventions implemented as appropriate)  Patient aaox4. Bed kept locked, lowest position with 2x side rails Up while in bed. nonslip footwear when out of bed. Ambulates independently and frequently. Call bell and personal items within reach. Renal diet. RLQ incision with staples, patient patient with betadine. Left thigh graft +/+, dressing cdi. Received PLEX 3/6 today, possible 4/6 tomorrow. Plan for IVIG after plex, possible thymo depending on Creatinine response. Regular HD schedule MWF, per PA hold dialysis today. Given lasix 60mg IV.  1x complaint of nausea relieved by prn medication. Prograf held this afternoon , 13.9 with morning labs. Standard precautions maintained.

## 2019-10-01 NOTE — PROGRESS NOTES
Mr. Aguiar is a patient of Dr. Mejias and was last seen in [facility] 8/7/2019.    Subjective:   Patient ID:  Demetrio Aguiar is a 39 y.o. male who presents for follow-up of Hyperlipidemia and Hypertension    Problems:  HIV  Kaposi Sarcoma dx 7/2019  -Doxyrobicin cycle 1 on 8/14; c 2 on 9/11/19  ESRD s/p transplant on 8/18/17 with h/o rejection  Former smoker    HPI  Mr. Aguiar is in clinic today for follow up peripheral edema.  He had an elevated BNP at 439 but CVP by TTE on the same day was 3 mm; started on lasix.  He has stopped taking the lasix because his nephrologist instructed him to take it as needed.  His RLE edema has improved.  Reports MARY with walking upstairs and walking any distances.  His H/H is pretty low at 6.7/21 which is the most likely cause of his MARY.  His home BP runs 120-140s/80s.  He did not take his medication for his BP yet because he took his veltassa which he to takes 3 hours before anything else.     Review of Systems   Constitution: Negative for decreased appetite, diaphoresis, malaise/fatigue, weight gain and weight loss.   Eyes: Negative for visual disturbance.   Cardiovascular: Positive for dyspnea on exertion and leg swelling. Negative for chest pain, claudication, irregular heartbeat, near-syncope, orthopnea, palpitations, paroxysmal nocturnal dyspnea and syncope.        Denies chest pressure   Respiratory: Negative for cough, hemoptysis, shortness of breath, sleep disturbances due to breathing and snoring.    Endocrine: Negative for cold intolerance and heat intolerance.   Hematologic/Lymphatic: Negative for bleeding problem. Does not bruise/bleed easily.   Musculoskeletal: Negative for myalgias.   Gastrointestinal: Negative for bloating, abdominal pain, anorexia, change in bowel habit, constipation, diarrhea, nausea and vomiting.   Neurological: Negative for difficulty with concentration, disturbances in coordination, excessive daytime sleepiness, dizziness, headaches,  light-headedness, loss of balance, numbness and weakness.   Psychiatric/Behavioral: The patient does not have insomnia.        Allergies and current medications updated and reviewed:  Review of patient's allergies indicates:  No Known Allergies  Current Outpatient Medications   Medication Sig    atovaquone (MEPRON) 750 mg/5 mL Susp Take 5 mLs (750 mg total) by mouth once daily.    calcium carbonate (TUMS) 200 mg calcium (500 mg) chewable tablet Take 1 tablet (500 mg total) by mouth once daily.    carvedilol (COREG) 6.25 MG tablet Take 2 tablets (12.5 mg total) by mouth 2 (two) times daily with meals.    cinacalcet (SENSIPAR) 60 MG Tab Take 1 tablet (60 mg total) by mouth daily with breakfast.    dolutegravir (TIVICAY) 50 mg Tab Take 1 tablet (50 mg total) by mouth once daily.    emtricitabine-tenofovir alafen (DESCOVY) 200-25 mg Tab Take 1 tablet by mouth once daily.    famotidine (PEPCID) 20 MG tablet TAKE 1 TABLET (20MG TOTAL) BY MOUTH EACH EVENING    HYDROcodone-acetaminophen (NORCO) 5-325 mg per tablet Take 1 tablet by mouth every 4 (four) hours as needed for Pain.    multivitamin (THERAGRAN) tablet Take 1 tablet by mouth once daily.    patiromer calcium sorbitex (VELTASSA) 25.2 gram PwPk Take 1 packet (25.2 g total) by mouth once daily.    predniSONE (DELTASONE) 5 MG tablet Take 1 tablet (5 mg total) by mouth once daily.    rosuvastatin (CRESTOR) 20 MG tablet Take 1 tablet (20 mg total) by mouth once daily.    sevelamer carbonate (RENVELA) 800 mg Tab Take 1 tablet (800 mg total) by mouth 3 (three) times daily with meals.    sirolimus (RAPAMUNE) 1 MG Tab Take 5 tablets (5 mg total) by mouth once daily.    NIFEdipine (PROCARDIA-XL) 30 MG (OSM) 24 hr tablet Take 1 tablet (30 mg total) by mouth every morning AND 2 tablets (60 mg total) every evening.    sodium bicarbonate 650 MG tablet Take 3 tablets (1,950 mg total) by mouth 3 (three) times daily.     No current facility-administered medications  "for this visit.      Facility-Administered Medications Ordered in Other Visits   Medication    alteplase injection 2 mg    darbepoetin erika in polysorbat injection 300 mcg    dextrose 5 % 250 mL flush bag    DOXOrubicin liposome (DOXIL) 38 mg in dextrose 5 % 250 mL chemo infusion    heparin, porcine (PF) 100 unit/mL injection flush 500 Units    palonosetron 0.25mg/dexamethasone 20mg IVPB    sodium chloride 0.9% flush 10 mL       Objective:     Left Arm BP - Sittin/96 (10/02/19 0748)    BP (!) 150/96   Pulse 81   Ht 5' 8" (1.727 m)   Wt 72.5 kg (159 lb 13.3 oz)   SpO2 97%   BMI 24.30 kg/m²       Physical Exam   Constitutional: He is oriented to person, place, and time. Vital signs are normal. He appears well-developed and well-nourished. He is active. No distress.   HENT:   Head: Normocephalic and atraumatic.   Eyes: Conjunctivae and lids are normal. No scleral icterus.   Neck: Neck supple. Normal carotid pulses, no hepatojugular reflux and no JVD present. Carotid bruit is not present.   Cardiovascular: Normal rate, regular rhythm, S1 normal, S2 normal and intact distal pulses. PMI is not displaced. Exam reveals no gallop and no friction rub.   No murmur heard.  Pulses:       Carotid pulses are 2+ on the right side, and 2+ on the left side.       Radial pulses are 2+ on the right side, and 2+ on the left side.        Dorsalis pedis pulses are 2+ on the right side, and 2+ on the left side.        Posterior tibial pulses are 1+ on the right side, and 1+ on the left side.   Pulmonary/Chest: Effort normal and breath sounds normal. No respiratory distress. He has no decreased breath sounds. He has no wheezes. He has no rhonchi. He has no rales. He exhibits no tenderness.   Abdominal: Soft. Normal appearance and bowel sounds are normal. He exhibits no distension, no fluid wave, no abdominal bruit, no ascites and no pulsatile midline mass. There is no hepatosplenomegaly. There is no tenderness. "   Musculoskeletal: He exhibits edema (RLE tight edema to knee, +1 pitting thigh; LLE +1 pitting  to knee. No presacral).   Neurological: He is alert and oriented to person, place, and time. Gait normal.   Skin: Skin is warm, dry and intact. No rash noted. He is not diaphoretic. Nails show no clubbing.   Psychiatric: He has a normal mood and affect. His speech is normal and behavior is normal. Judgment and thought content normal. Cognition and memory are normal.   Nursing note and vitals reviewed.      Chemistry        Component Value Date/Time     10/02/2019 0920     10/02/2019 0920    K 4.0 10/02/2019 0920    K 4.0 10/02/2019 0920     10/02/2019 0920     10/02/2019 0920    CO2 24 10/02/2019 0920    CO2 24 10/02/2019 0920    BUN 19 10/02/2019 0920    BUN 19 10/02/2019 0920    CREATININE 1.9 (H) 10/02/2019 0920    CREATININE 1.9 (H) 10/02/2019 0920    GLU 99 10/02/2019 0920    GLU 99 10/02/2019 0920        Component Value Date/Time    CALCIUM 9.3 10/02/2019 0920    CALCIUM 9.3 10/02/2019 0920    ALKPHOS 104 10/02/2019 0920    ALKPHOS 104 10/02/2019 0920    AST 21 10/02/2019 0920    AST 21 10/02/2019 0920    ALT 21 10/02/2019 0920    ALT 21 10/02/2019 0920    BILITOT 0.5 10/02/2019 0920    BILITOT 0.5 10/02/2019 0920    ESTGFRAFRICA 49.9 (A) 10/02/2019 0920    ESTGFRAFRICA 49.9 (A) 10/02/2019 0920    EGFRNONAA 43.1 (A) 10/02/2019 0920    EGFRNONAA 43.1 (A) 10/02/2019 0920        No results found for: LABA1C, HGBA1C    Recent Labs   Lab 08/07/19  0914  08/26/19  0957  10/02/19  0920   WBC  --    < > 4.10   < > 4.04   Hemoglobin  --    < > 6.3 L   < > 8.4 L   Hematocrit  --    < > 21.2 L   < > 29.5 L   Mean Corpuscular Volume  --    < > 89   < > 86   Platelets  --    < > 143 L   < > 209    H  --   --   --   --    Cholesterol  --   --  103 L  --   --    HDL  --   --  43  --   --    LDL Cholesterol  --   --  40.4 L  --   --    Triglycerides  --   --  98  --   --    Hdl/Cholesterol Ratio  --    --  41.7  --   --     < > = values in this interval not displayed.       Recent Labs   Lab 09/10/17  0959 10/17/17  1029 01/15/18  0910 08/28/18  0940   INR 1.1 1.0 1.0 1.0        Test(s) Reviewed  I have reviewed the following in detail:  [] Stress test   [] Angiography   [x] Echocardiogram   [x] Labs   [] Other:         Assessment/Plan:   1. Hyperlipidemia, unspecified hyperlipidemia type  LDL at goal <100. No changes      2. Renovascular hypertension  BP not at goal <130/80 today. Continue current regimen.  Requested 2 week bp log.  If BP remains above goal, would increase carvedilol to 12.5 mg BID for cardioprotection from the doxorubicin and for HTN.       3. HIV (human immunodeficiency virus infection)      4. Kaposi sarcoma  He should have a repeat TTE with GLS after he reaches 250 mg/m2 of doxyrubicin.  By documentation, it appears that he has had 2 cycles of 38 mg each.  Repeat TTE with GLS entered with anticipation of need for TTE in about 3 months.  Will defer to Oncology as to when he reaches 250 mg/m2.  His baseline TTE shows a GLS that is already low, -15%.  He is on a beta blocker.  Hyperkalemia complicates potential for starting ARB.  Would like to start him on an ARB for cardioprotection but he is already needing a potassium binder to keep his potassium level normal.     - Echo; Future    5. Chemotherapy follow-up examination    - Echo; Future    The patient was discussed and examined by Dr. Mejias      Follow up in about 2 months (around 12/2/2019).

## 2019-10-02 ENCOUNTER — OFFICE VISIT (OUTPATIENT)
Dept: HEMATOLOGY/ONCOLOGY | Facility: CLINIC | Age: 39
End: 2019-10-02
Payer: MEDICARE

## 2019-10-02 ENCOUNTER — OFFICE VISIT (OUTPATIENT)
Dept: CARDIOLOGY | Facility: CLINIC | Age: 39
End: 2019-10-02
Payer: MEDICARE

## 2019-10-02 ENCOUNTER — INFUSION (OUTPATIENT)
Dept: INFUSION THERAPY | Facility: HOSPITAL | Age: 39
End: 2019-10-02
Attending: SURGERY
Payer: MEDICARE

## 2019-10-02 VITALS
OXYGEN SATURATION: 97 % | WEIGHT: 159.81 LBS | SYSTOLIC BLOOD PRESSURE: 150 MMHG | DIASTOLIC BLOOD PRESSURE: 96 MMHG | HEIGHT: 68 IN | HEART RATE: 81 BPM | BODY MASS INDEX: 24.22 KG/M2

## 2019-10-02 VITALS
SYSTOLIC BLOOD PRESSURE: 170 MMHG | HEART RATE: 75 BPM | DIASTOLIC BLOOD PRESSURE: 110 MMHG | OXYGEN SATURATION: 100 % | RESPIRATION RATE: 20 BRPM | BODY MASS INDEX: 24.34 KG/M2 | WEIGHT: 160.06 LBS

## 2019-10-02 DIAGNOSIS — T86.12 KIDNEY TRANSPLANT FAILURE: ICD-10-CM

## 2019-10-02 DIAGNOSIS — C46.9 KAPOSI SARCOMA: Primary | ICD-10-CM

## 2019-10-02 DIAGNOSIS — C77.9 REGIONAL LYMPH NODE METASTASIS PRESENT: ICD-10-CM

## 2019-10-02 DIAGNOSIS — N18.30 ANEMIA IN STAGE 3 CHRONIC KIDNEY DISEASE: ICD-10-CM

## 2019-10-02 DIAGNOSIS — C46.3 KAPOSI'S SARCOMA OF LYMPH NODES: ICD-10-CM

## 2019-10-02 DIAGNOSIS — Z94.0 S/P KIDNEY TRANSPLANT: ICD-10-CM

## 2019-10-02 DIAGNOSIS — E78.5 HYPERLIPIDEMIA, UNSPECIFIED HYPERLIPIDEMIA TYPE: Primary | Chronic | ICD-10-CM

## 2019-10-02 DIAGNOSIS — Z09 CHEMOTHERAPY FOLLOW-UP EXAMINATION: ICD-10-CM

## 2019-10-02 DIAGNOSIS — I15.0 RENOVASCULAR HYPERTENSION: ICD-10-CM

## 2019-10-02 DIAGNOSIS — Z51.11 ENCOUNTER FOR ANTINEOPLASTIC CHEMOTHERAPY: ICD-10-CM

## 2019-10-02 DIAGNOSIS — D63.1 ANEMIA IN STAGE 3 CHRONIC KIDNEY DISEASE: ICD-10-CM

## 2019-10-02 DIAGNOSIS — B20 HIV (HUMAN IMMUNODEFICIENCY VIRUS INFECTION): Chronic | ICD-10-CM

## 2019-10-02 DIAGNOSIS — C46.9 KAPOSI SARCOMA: ICD-10-CM

## 2019-10-02 PROCEDURE — 99214 OFFICE O/P EST MOD 30 MIN: CPT | Mod: S$PBB,,, | Performed by: NURSE PRACTITIONER

## 2019-10-02 PROCEDURE — 99999 PR PBB SHADOW E&M-EST. PATIENT-LVL IV: CPT | Mod: PBBFAC,,, | Performed by: NURSE PRACTITIONER

## 2019-10-02 PROCEDURE — 99999 PR PBB SHADOW E&M-EST. PATIENT-LVL V: ICD-10-PCS | Mod: PBBFAC,GC,,

## 2019-10-02 PROCEDURE — 99214 PR OFFICE/OUTPT VISIT, EST, LEVL IV, 30-39 MIN: ICD-10-PCS | Mod: S$PBB,,, | Performed by: NURSE PRACTITIONER

## 2019-10-02 PROCEDURE — 63600175 PHARM REV CODE 636 W HCPCS: Performed by: INTERNAL MEDICINE

## 2019-10-02 PROCEDURE — 63600175 PHARM REV CODE 636 W HCPCS: Mod: JG,EC

## 2019-10-02 PROCEDURE — 99214 OFFICE O/P EST MOD 30 MIN: CPT | Mod: PBBFAC,27,25 | Performed by: NURSE PRACTITIONER

## 2019-10-02 PROCEDURE — 36593 DECLOT VASCULAR DEVICE: CPT

## 2019-10-02 PROCEDURE — 99214 PR OFFICE/OUTPT VISIT, EST, LEVL IV, 30-39 MIN: ICD-10-PCS | Mod: S$PBB,GC,,

## 2019-10-02 PROCEDURE — 99999 PR PBB SHADOW E&M-EST. PATIENT-LVL IV: ICD-10-PCS | Mod: PBBFAC,,, | Performed by: NURSE PRACTITIONER

## 2019-10-02 PROCEDURE — 99999 PR PBB SHADOW E&M-EST. PATIENT-LVL V: CPT | Mod: PBBFAC,GC,,

## 2019-10-02 PROCEDURE — 99215 OFFICE O/P EST HI 40 MIN: CPT | Mod: PBBFAC,25

## 2019-10-02 PROCEDURE — 96372 THER/PROPH/DIAG INJ SC/IM: CPT

## 2019-10-02 PROCEDURE — 96413 CHEMO IV INFUSION 1 HR: CPT

## 2019-10-02 PROCEDURE — 99214 OFFICE O/P EST MOD 30 MIN: CPT | Mod: S$PBB,GC,,

## 2019-10-02 PROCEDURE — 96367 TX/PROPH/DG ADDL SEQ IV INF: CPT

## 2019-10-02 RX ORDER — SODIUM CHLORIDE 0.9 % (FLUSH) 0.9 %
10 SYRINGE (ML) INJECTION
Status: CANCELLED | OUTPATIENT
Start: 2019-10-02

## 2019-10-02 RX ORDER — SODIUM CHLORIDE 0.9 % (FLUSH) 0.9 %
10 SYRINGE (ML) INJECTION
Status: DISCONTINUED | OUTPATIENT
Start: 2019-10-02 | End: 2019-10-02 | Stop reason: HOSPADM

## 2019-10-02 RX ORDER — HEPARIN 100 UNIT/ML
500 SYRINGE INTRAVENOUS
Status: CANCELLED | OUTPATIENT
Start: 2019-10-02

## 2019-10-02 RX ORDER — HEPARIN 100 UNIT/ML
500 SYRINGE INTRAVENOUS
Status: DISCONTINUED | OUTPATIENT
Start: 2019-10-02 | End: 2019-10-02 | Stop reason: HOSPADM

## 2019-10-02 RX ADMIN — DARBEPOETIN ALFA 300 MCG: 300 INJECTION, SOLUTION INTRAVENOUS; SUBCUTANEOUS at 12:10

## 2019-10-02 RX ADMIN — DOXORUBICIN HYDROCHLORIDE 38 MG: 2 INJECTION, SUSPENSION, LIPOSOMAL INTRAVENOUS at 12:10

## 2019-10-02 RX ADMIN — DEXAMETHASONE SODIUM PHOSPHATE: 4 INJECTION, SOLUTION INTRA-ARTICULAR; INTRALESIONAL; INTRAMUSCULAR; INTRAVENOUS; SOFT TISSUE at 11:10

## 2019-10-02 RX ADMIN — ALTEPLASE 2 MG: 2.2 INJECTION, POWDER, LYOPHILIZED, FOR SOLUTION INTRAVENOUS at 11:10

## 2019-10-02 NOTE — PROGRESS NOTES
[facility]  [facility]  Hematology/Medical Oncology Clinic      PATIENT: Demetrio Aguiar  MRN: [id number]  DATE: 10/2/2019    Diagnosis:   1. Kaposi sarcoma    2. Encounter for antineoplastic chemotherapy    3. Regional lymph node metastasis present    4. Kaposi's sarcoma of lymph nodes       Chief Complaint: Follow up for Kaposi receiving systemic chemotherapy     Other MDs:  Neph Transplant: [facility]     Subjective:   Initial History: Mr. Aguiar is a 39 y.o. male who is here to follow up in oncology clinic for his diagnosis of kaposi's sarcoma.     PMHx of HIV on ART, ESRD previous on iHD s/p transplant with hx of rejection (on chronic immunosuppression), anemia of chronic disease, latent TB s/p treatment, HTN and HLD.    He is here today for OV for his C3 of doxo for symptomatic metastatic kaposi.    He tolerated the first 2 cycles well without obvious complaints. He believes he is improving and feels much better.    His RLE swelling is markedly improving though certainly still present. No calf tenderness.    Starting darbo with his last cycle with notable improvement in his functional status and Hgb.    Labs look great today. WBC and plt stable, Hgb improving. Creatinine stable.    He does report mild intermittent constipation that has improved with colace and miralax.     He lives in [place] and travel here for his chemo. He is accompanied by his mother today.    Stool cards from previous exam brought in and were negative for occult blood.     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  "Hematoma evacuated.(09/09)    Status post exploratory laparotomy 9/10/2017    Re explored for retroperitoneal hematoma. Hematoma evacuated.(09/09)    TB lung, latent     tx INH 2006     Past Surgical History:   Procedure Laterality Date    BIOPSY N/A 8/28/2018    Procedure: BIOPSY Tranplanted Kidney;  Surgeon: [facility] Diagnostic Provider;  Location: [facility] OR [facility] FLR;  Service: Radiology;  Laterality: N/A;    EXPLORATORY LAPAROTOMY W/ BOWEL RESECTION      NO BOWEL RESECTION, UNKNOWN DETAILS, "Pancreas Infection"    INSERTION OF TUNNELED CENTRAL VENOUS CATHETER (CVC) WITH SUBCUTANEOUS PORT Left 8/19/2019    Procedure: [id number];  Surgeon: Megha Monreal MD;  Location: [facility] OR 2ND FLR;  Service: General;  Laterality: Left;    INSERTION OF TUNNELED CENTRAL VENOUS CATHETER (CVC) WITH SUBCUTANEOUS PORT N/A 9/12/2019    Procedure: [id number];  Surgeon: [facility] Diagnostic Provider;  Location: [facility] OR 2ND FLR;  Service: Radiology;  Laterality: N/A;  189  port removal and port placement    KIDNEY TRANSPLANT      peritoneal dialysis catheter placement       Family History   Problem Relation Age of Onset    Hyperlipidemia Mother     Cancer Father         lung    Lung cancer Father     No Known Problems Brother     Diabetes Maternal Aunt     Diabetes Maternal Uncle     Hypertension Maternal Grandmother     Hypertension Paternal Grandmother     Heart disease Paternal Grandmother       reports that he has quit smoking. His smoking use included cigarettes. He smoked 0.50 packs per day. He has never used smokeless tobacco. He reports that he does not drink alcohol or use drugs.  Review of patient's allergies indicates:  No Known Allergies  Current Outpatient Medications   Medication Sig Dispense Refill    atovaquone (MEPRON) 750 mg/5 mL Susp Take 5 mLs (750 mg total) by mouth once daily. 210 mL 11    calcium carbonate (TUMS) 200 mg calcium (500 mg) chewable tablet Take 1 tablet (500 mg " total) by mouth once daily. 30 tablet 11    carvedilol (COREG) 6.25 MG tablet Take 2 tablets (12.5 mg total) by mouth 2 (two) times daily with meals. 120 tablet 11    cinacalcet (SENSIPAR) 60 MG Tab Take 1 tablet (60 mg total) by mouth daily with breakfast. 30 tablet 0    dolutegravir (TIVICAY) 50 mg Tab Take 1 tablet (50 mg total) by mouth once daily. 30 tablet 5    emtricitabine-tenofovir alafen (DESCOVY) 200-25 mg Tab Take 1 tablet by mouth once daily.      famotidine (PEPCID) 20 MG tablet TAKE 1 TABLET (20MG TOTAL) BY MOUTH EACH EVENING 30 tablet 11    HYDROcodone-acetaminophen (NORCO) 5-325 mg per tablet Take 1 tablet by mouth every 4 (four) hours as needed for Pain. 20 tablet 0    multivitamin (THERAGRAN) tablet Take 1 tablet by mouth once daily.      NIFEdipine (PROCARDIA-XL) 30 MG (OSM) 24 hr tablet Take 1 tablet (30 mg total) by mouth every morning AND 2 tablets (60 mg total) every evening. 90 tablet 11    patiromer calcium sorbitex (VELTASSA) 25.2 gram PwPk Take 1 packet (25.2 g total) by mouth once daily. 30 packet 10    predniSONE (DELTASONE) 5 MG tablet Take 1 tablet (5 mg total) by mouth once daily. 30 tablet 11    rosuvastatin (CRESTOR) 20 MG tablet Take 1 tablet (20 mg total) by mouth once daily. 30 tablet 0    sevelamer carbonate (RENVELA) 800 mg Tab Take 1 tablet (800 mg total) by mouth 3 (three) times daily with meals. 90 tablet 11    sirolimus (RAPAMUNE) 1 MG Tab Take 5 tablets (5 mg total) by mouth once daily. 150 tablet 11    sodium bicarbonate 650 MG tablet Take 3 tablets (1,950 mg total) by mouth 3 (three) times daily. 270 tablet 11     No current facility-administered medications for this visit.      Review of Systems   Constitutional: Negative for activity change, chills, fever and unexpected weight change.   HENT: Negative for hearing loss and sore throat.    Eyes: Negative for discharge and visual disturbance.   Respiratory: Negative for chest tightness and shortness of  breath.    Cardiovascular: Positive for leg swelling. Negative for chest pain.   Gastrointestinal: Positive for constipation. Negative for abdominal distention, abdominal pain, diarrhea and nausea.   Endocrine: Negative for cold intolerance.   Genitourinary: Negative for decreased urine volume and difficulty urinating.   Allergic/Immunologic: Positive for immunocompromised state.   Hematological: Positive for adenopathy.     ECOG Performance Status: 0   Objective:      Vitals:   There were no vitals filed for this visit.  BMI: There is no height or weight on file to calculate BMI.    Physical Exam   Constitutional: He is oriented to person, place, and time.   Young AA male, happier and less stressed appearing today   HENT:   Head: Normocephalic and atraumatic.   Eyes: Pupils are equal, round, and reactive to light. EOM are normal.   Neck: Neck supple.   Cardiovascular: Normal rate and regular rhythm.   Pulmonary/Chest: Effort normal and breath sounds normal.   Abdominal: Bowel sounds are normal. There is no rebound and no guarding.   Right lower quadrant swelling and mass palpated (tx kidney), without ttp   Genitourinary: Penis normal.   Musculoskeletal:   +2 RLE edema to the abdomen, RUE/LUE fistulas with soft bruit, LLE with graft in place and soft bruit   Lymphadenopathy:     He has no cervical adenopathy.   Large RLE inguinal node conglomeration palpated (feels decreased in bulk today), left wnl. No cervical axillary LAD palpated.    Neurological: He is alert and oriented to person, place, and time.   Skin: Skin is warm and dry. Capillary refill takes less than 2 seconds.   Kaposi lesions in the right lower abdomen and RLE (extensive)   Psychiatric: He has a normal mood and affect. His behavior is normal. Thought content normal.   Vitals reviewed.    Laboratory Data:  No visits with results within 1 Week(s) from this visit.   Latest known visit with results is:   Lab Visit on 09/11/2019   Component Date Value  Ref Range Status    WBC 09/11/2019 3.79* 3.90 - 12.70 K/uL Final    RBC 09/11/2019 2.61* 4.60 - 6.20 M/uL Final    Hemoglobin 09/11/2019 6.7* 14.0 - 18.0 g/dL Final    Hematocrit 09/11/2019 22.5* 40.0 - 54.0 % Final    Mean Corpuscular Volume 09/11/2019 86  82 - 98 fL Final    Mean Corpuscular Hemoglobin 09/11/2019 25.7* 27.0 - 31.0 pg Final    Mean Corpuscular Hemoglobin Conc 09/11/2019 29.8* 32.0 - 36.0 g/dL Final    RDW 09/11/2019 15.2* 11.5 - 14.5 % Final    Platelets 09/11/2019 178  150 - 350 K/uL Final    MPV 09/11/2019 10.3  9.2 - 12.9 fL Final    Immature Granulocytes 09/11/2019 0.3  0.0 - 0.5 % Final    Gran # (ANC) 09/11/2019 2.3  1.8 - 7.7 K/uL Final    Immature Grans (Abs) 09/11/2019 0.01  0.00 - 0.04 K/uL Final    Comment: Mild elevation in immature granulocytes is non specific and   can be seen in a variety of conditions including stress response,   acute inflammation, trauma and pregnancy. Correlation with other   laboratory and clinical findings is essential.      Lymph # 09/11/2019 1.0  1.0 - 4.8 K/uL Final    Mono # 09/11/2019 0.4  0.3 - 1.0 K/uL Final    Eos # 09/11/2019 0.1  0.0 - 0.5 K/uL Final    Baso # 09/11/2019 0.03  0.00 - 0.20 K/uL Final    nRBC 09/11/2019 0  0 /100 WBC Final    Gran% 09/11/2019 59.3  38.0 - 73.0 % Final    Lymph% 09/11/2019 26.9  18.0 - 48.0 % Final    Mono% 09/11/2019 9.5  4.0 - 15.0 % Final    Eosinophil% 09/11/2019 3.2  0.0 - 8.0 % Final    Basophil% 09/11/2019 0.8  0.0 - 1.9 % Final    Differential Method 09/11/2019 Automated   Final    Glucose 09/11/2019 103  70 - 110 mg/dL Final    Sodium 09/11/2019 141  136 - 145 mmol/L Final    Potassium 09/11/2019 3.6  3.5 - 5.1 mmol/L Final    Chloride 09/11/2019 107  95 - 110 mmol/L Final    CO2 09/11/2019 25  23 - 29 mmol/L Final    BUN, Bld 09/11/2019 22* 6 - 20 mg/dL Final    Calcium 09/11/2019 8.3* 8.7 - 10.5 mg/dL Final    Creatinine 09/11/2019 2.1* 0.5 - 1.4 mg/dL Final    Albumin  09/11/2019 4.1  3.5 - 5.2 g/dL Final    Phosphorus 09/11/2019 3.3  2.7 - 4.5 mg/dL Final    eGFR if  09/11/2019 44.2* >60 mL/min/1.73 m^2 Final    eGFR if non  09/11/2019 38.2* >60 mL/min/1.73 m^2 Final    Comment: Calculation used to obtain the estimated glomerular filtration  rate (eGFR) is the CKD-EPI equation.       Anion Gap 09/11/2019 9  8 - 16 mmol/L Final    Magnesium 09/11/2019 1.6  1.6 - 2.6 mg/dL Final    Sirolimus Lvl 09/11/2019 6.0  4.0 - 20.0 ng/mL Final    Comment: Sirolimus therapeutic range for Kidney Transplant: 4.0 - 15.0 ug/ml.  Testing performed by Liquid Chromatography-Tandem  Mass Spectrometry (LC-MS/MS).  This test was developed and its performance characteristics  determined by [facility], Department of Pathology  and Laboratory Medicine in a manner consistent with CLIA  requirements. It has not been cleared or approved by the [place]  Food and Drug Administration.  This test is used for clinical  purposes.  It should not be regarded as investigational or for  research.      WBC 09/11/2019 3.79* 3.90 - 12.70 K/uL Final    RBC 09/11/2019 2.61* 4.60 - 6.20 M/uL Final    Hemoglobin 09/11/2019 6.7* 14.0 - 18.0 g/dL Final    Hematocrit 09/11/2019 22.5* 40.0 - 54.0 % Final    Mean Corpuscular Volume 09/11/2019 86  82 - 98 fL Final    Mean Corpuscular Hemoglobin 09/11/2019 25.7* 27.0 - 31.0 pg Final    Mean Corpuscular Hemoglobin Conc 09/11/2019 29.8* 32.0 - 36.0 g/dL Final    RDW 09/11/2019 15.2* 11.5 - 14.5 % Final    Platelets 09/11/2019 178  150 - 350 K/uL Final    MPV 09/11/2019 10.3  9.2 - 12.9 fL Final    Gran # (ANC) 09/11/2019 2.3  1.8 - 7.7 K/uL Final    Immature Grans (Abs) 09/11/2019 0.01  0.00 - 0.04 K/uL Final    Comment: Mild elevation in immature granulocytes is non specific and   can be seen in a variety of conditions including stress response,   acute inflammation, trauma and pregnancy. Correlation with other   laboratory  and clinical findings is essential.      Sodium 09/11/2019 141  136 - 145 mmol/L Final    Potassium 09/11/2019 3.6  3.5 - 5.1 mmol/L Final    Chloride 09/11/2019 107  95 - 110 mmol/L Final    CO2 09/11/2019 25  23 - 29 mmol/L Final    Glucose 09/11/2019 103  70 - 110 mg/dL Final    BUN, Bld 09/11/2019 22* 6 - 20 mg/dL Final    Creatinine 09/11/2019 2.1* 0.5 - 1.4 mg/dL Final    Calcium 09/11/2019 8.3* 8.7 - 10.5 mg/dL Final    Total Protein 09/11/2019 7.0  6.0 - 8.4 g/dL Final    Albumin 09/11/2019 4.1  3.5 - 5.2 g/dL Final    Total Bilirubin 09/11/2019 0.4  0.1 - 1.0 mg/dL Final    Comment: For infants and newborns, interpretation of results should be based  on gestational age, weight and in agreement with clinical  observations.  Premature Infant recommended reference ranges:  Up to 24 hours.............<8.0 mg/dL  Up to 48 hours............<12.0 mg/dL  3-5 days..................<15.0 mg/dL  6-29 days.................<15.0 mg/dL      Alkaline Phosphatase 09/11/2019 99  55 - 135 U/L Final    AST 09/11/2019 17  10 - 40 U/L Final    ALT 09/11/2019 12  10 - 44 U/L Final    Anion Gap 09/11/2019 9  8 - 16 mmol/L Final    eGFR if  09/11/2019 44.2* >60 mL/min/1.73 m^2 Final    eGFR if non  09/11/2019 38.2* >60 mL/min/1.73 m^2 Final    Comment: Calculation used to obtain the estimated glomerular filtration  rate (eGFR) is the CKD-EPI equation.       Magnesium 09/11/2019 1.6  1.6 - 2.6 mg/dL Final    ImmunKnow (Stimulated) 09/11/2019 121* 226 - 524 ng/mL Final    Comment: Assay Range:  Low Immune Cell Response <=225 ATP Level ng/mL  Moderate Immune Cell Response 226-524 ATP Level ng/mL  Strong Immune Cell Response: >=525 ATP Level ng/mL  NOTE: This is a qualitative assay.  Therefore the result does not directly quantify the level  of immunosuppression.  These results should be used in conjunction with clinical  presentation, medical history, and other clinical   indicators  when establishing the immune status of a  patient. This test has been cleared or approved for  diagnostic use by the U.S. Food and Drug Administration.  [employer]Know (R) is a registered trademark.         Assessment:       1. Kaposi sarcoma    2. Encounter for antineoplastic chemotherapy    3. Regional lymph node metastasis present    4. Kaposi's sarcoma of lymph nodes       Oncology History:     Kaposi Sarcoma  Hx of HIV and s/p renal transplant on immunosuppression  July 2019   Leg swelling for ~10 months; hospitalized for further work up, initially concerned for DVT   Found to have a large RLE/groin mass   Biopsy was + for Kaposi/HHV-8 ([facility]) - Spindled cell lesion with slit-like spaces, minimal pleomorphism, and extravasated RBCs. Postive for CD34, 31 and HHV-8, negative for s-100, SMA and pancytokeratin. Findings consistent with Kaposi sarcoma.    Tx to [facility] and had mild pre-staging work up: CT CAP showed extensive LAD   Initial consult with oncology/myself for consideration of chemotherapy   Plan for further staging with PET, BMBx and plan for doxorubicin and pre-testing   Echo LVEF 55% and consultation with cardiology   PET confirming metastatic disease and global LAD   August 2019 8/14 C1 of Doxorubicin  September 2019 9/11 C2 of doxrubin, 1st dose of darbopoetin  October 2019   10/2 C3 of Doxil, 2nd dose of darbo    Plan:     Mr. Aguiar presents to the clinic today for C3 of doxorubicin for his symptomatic, metastatic Kaposi sarcoma. Labs look good today. Improving Hgb with darbo, plt and WBC stable, Tbili good. Cr holding steady as well. I feels much better with less fatigue and MARY with his mildly improved Hgb. He is good to continue C3 of Doxil today. We will rescan a    -ok for C3 of doxorubicin today  -c/w darbopoetin on a q3 week basis - 2nd dose today (plan for 6 cycles)  -follow up in clinic in 3 weeks for labs and OV (appt in)  -PET scan prior to OV and C4 of Doxil  -cycle C4  doxil to be scheduled on 10/23, C5 11/13 and C6 12/4, labs with CBC/CMP prior    Case discussed with Dr. Hardy.    Matthieu Amador MD  Hematology/Medical Oncology Fellow  [phone] (cell)  [phone] (page)

## 2019-10-02 NOTE — Clinical Note
Kay Stefania needs some things1. PET scan to be scheduled on the day of next OV in the AM (10/23), would give him the 1130 patient slot for f/u with me that day and try to see if he can have the PET done early in the AM2. C4, C5 and C6 of Doxil scheduled in the infusion clinic with darbopoetin to be given on the same days, dates belowC4 - 10/23C5 - 11/13C6 - 12/43. These dates he will also need follow ups in the clinic prior to with labs -  CBC, CMP (orders should be in if not, please let me know) for 10/23, 11/13 and 12/4 respectively. Thanks!Dustin

## 2019-10-02 NOTE — PATIENT INSTRUCTIONS
Please take your blood pressure over the next 2 weeks and email the results to Dr. Mejias in 2 weeks.

## 2019-10-03 RX ORDER — SIROLIMUS 1 MG/1
4 TABLET, FILM COATED ORAL DAILY
Qty: 120 TABLET | Refills: 11 | Status: ON HOLD | OUTPATIENT
Start: 2019-10-03 | End: 2019-11-19 | Stop reason: HOSPADM

## 2019-10-03 NOTE — TELEPHONE ENCOUNTER
----- Message from Lucía Polk MD sent at 10/2/2019  1:04 PM CDT -----  The following message sent to patient via [facility]:  Please decrease sirolimus to 4 mg daily from 5 mg a day.  Keep us posted when chemotherapy is completed, since we may need to increase your immunosuppression a bit Continue to monitor immuknow cylex monthly.

## 2019-10-07 ENCOUNTER — PATIENT MESSAGE (OUTPATIENT)
Dept: CARDIOLOGY | Facility: CLINIC | Age: 39
End: 2019-10-07

## 2019-10-07 DIAGNOSIS — Z79.60 LONG-TERM USE OF IMMUNOSUPPRESSANT MEDICATION: ICD-10-CM

## 2019-10-07 RX ORDER — NIFEDIPINE 30 MG/1
60 TABLET, EXTENDED RELEASE ORAL EVERY 12 HOURS
Qty: 120 TABLET | Refills: 11 | Status: ON HOLD | OUTPATIENT
Start: 2019-10-07 | End: 2019-11-19 | Stop reason: HOSPADM

## 2019-10-09 ENCOUNTER — DOCUMENTATION ONLY (OUTPATIENT)
Dept: HEMATOLOGY/ONCOLOGY | Facility: CLINIC | Age: 39
End: 2019-10-09

## 2019-10-09 NOTE — PROGRESS NOTES
Received call from patient requesting assistance with referral to the [facility]. Patient requesting check in date of 10/22 and check out of 10/24. Referral form faxed in.

## 2019-10-23 ENCOUNTER — OFFICE VISIT (OUTPATIENT)
Dept: HEMATOLOGY/ONCOLOGY | Facility: CLINIC | Age: 39
End: 2019-10-23
Payer: MEDICARE

## 2019-10-23 ENCOUNTER — INFUSION (OUTPATIENT)
Dept: INFUSION THERAPY | Facility: HOSPITAL | Age: 39
End: 2019-10-23
Attending: SURGERY
Payer: MEDICARE

## 2019-10-23 ENCOUNTER — HOSPITAL ENCOUNTER (OUTPATIENT)
Dept: RADIOLOGY | Facility: HOSPITAL | Age: 39
Discharge: HOME | End: 2019-10-23
Payer: MEDICARE

## 2019-10-23 VITALS
WEIGHT: 162 LBS | BODY MASS INDEX: 24.55 KG/M2 | SYSTOLIC BLOOD PRESSURE: 153 MMHG | TEMPERATURE: 98 F | DIASTOLIC BLOOD PRESSURE: 92 MMHG | HEART RATE: 77 BPM | RESPIRATION RATE: 18 BRPM | HEIGHT: 68 IN

## 2019-10-23 VITALS
RESPIRATION RATE: 16 BRPM | HEIGHT: 68 IN | DIASTOLIC BLOOD PRESSURE: 107 MMHG | HEART RATE: 76 BPM | TEMPERATURE: 98 F | WEIGHT: 162.06 LBS | BODY MASS INDEX: 24.56 KG/M2 | SYSTOLIC BLOOD PRESSURE: 169 MMHG | OXYGEN SATURATION: 100 %

## 2019-10-23 DIAGNOSIS — Z51.11 ENCOUNTER FOR ANTINEOPLASTIC CHEMOTHERAPY: ICD-10-CM

## 2019-10-23 DIAGNOSIS — C77.9 REGIONAL LYMPH NODE METASTASIS PRESENT: ICD-10-CM

## 2019-10-23 DIAGNOSIS — C46.9 KAPOSI SARCOMA: Primary | ICD-10-CM

## 2019-10-23 DIAGNOSIS — C46.3 KAPOSI'S SARCOMA OF LYMPH NODES: ICD-10-CM

## 2019-10-23 DIAGNOSIS — C46.9 KAPOSI SARCOMA: ICD-10-CM

## 2019-10-23 LAB — POCT GLUCOSE: 81 MG/DL (ref 70–110)

## 2019-10-23 PROCEDURE — 96413 CHEMO IV INFUSION 1 HR: CPT

## 2019-10-23 PROCEDURE — 99999 PR PBB SHADOW E&M-EST. PATIENT-LVL III: CPT | Mod: PBBFAC,GC,,

## 2019-10-23 PROCEDURE — 99215 OFFICE O/P EST HI 40 MIN: CPT | Mod: S$PBB,GC,,

## 2019-10-23 PROCEDURE — 25000003 PHARM REV CODE 250: Performed by: INTERNAL MEDICINE

## 2019-10-23 PROCEDURE — 99215 PR OFFICE/OUTPT VISIT, EST, LEVL V, 40-54 MIN: ICD-10-PCS | Mod: S$PBB,GC,,

## 2019-10-23 PROCEDURE — 63600175 PHARM REV CODE 636 W HCPCS: Performed by: INTERNAL MEDICINE

## 2019-10-23 PROCEDURE — 78816 NM PET CT WHOLE BODY: ICD-10-PCS | Mod: 26,PS,, | Performed by: RADIOLOGY

## 2019-10-23 PROCEDURE — 78816 PET IMAGE W/CT FULL BODY: CPT | Mod: 26,PS,, | Performed by: RADIOLOGY

## 2019-10-23 PROCEDURE — 78816 PET IMAGE W/CT FULL BODY: CPT | Mod: TC,PS

## 2019-10-23 PROCEDURE — A4216 STERILE WATER/SALINE, 10 ML: HCPCS | Performed by: INTERNAL MEDICINE

## 2019-10-23 PROCEDURE — 96367 TX/PROPH/DG ADDL SEQ IV INF: CPT

## 2019-10-23 PROCEDURE — 99213 OFFICE O/P EST LOW 20 MIN: CPT | Mod: PBBFAC,25

## 2019-10-23 PROCEDURE — 99999 PR PBB SHADOW E&M-EST. PATIENT-LVL III: ICD-10-PCS | Mod: PBBFAC,GC,,

## 2019-10-23 RX ORDER — SODIUM CHLORIDE 0.9 % (FLUSH) 0.9 %
10 SYRINGE (ML) INJECTION
Status: CANCELLED | OUTPATIENT
Start: 2019-10-23

## 2019-10-23 RX ORDER — SODIUM CHLORIDE 0.9 % (FLUSH) 0.9 %
10 SYRINGE (ML) INJECTION
Status: DISCONTINUED | OUTPATIENT
Start: 2019-10-23 | End: 2019-10-23 | Stop reason: HOSPADM

## 2019-10-23 RX ORDER — HEPARIN 100 UNIT/ML
500 SYRINGE INTRAVENOUS
Status: DISCONTINUED | OUTPATIENT
Start: 2019-10-23 | End: 2019-10-23 | Stop reason: HOSPADM

## 2019-10-23 RX ORDER — HEPARIN 100 UNIT/ML
500 SYRINGE INTRAVENOUS
Status: CANCELLED | OUTPATIENT
Start: 2019-10-23

## 2019-10-23 RX ADMIN — HEPARIN SODIUM (PORCINE) LOCK FLUSH IV SOLN 100 UNIT/ML 500 UNITS: 100 SOLUTION at 03:10

## 2019-10-23 RX ADMIN — ALTEPLASE 2 MG: 2.2 INJECTION, POWDER, LYOPHILIZED, FOR SOLUTION INTRAVENOUS at 01:10

## 2019-10-23 RX ADMIN — DEXAMETHASONE SODIUM PHOSPHATE: 4 INJECTION, SOLUTION INTRA-ARTICULAR; INTRALESIONAL; INTRAMUSCULAR; INTRAVENOUS; SOFT TISSUE at 01:10

## 2019-10-23 RX ADMIN — DEXTROSE: 50 INJECTION, SOLUTION INTRAVENOUS at 01:10

## 2019-10-23 RX ADMIN — Medication 10 ML: at 03:10

## 2019-10-23 RX ADMIN — DOXORUBICIN HYDROCHLORIDE 38 MG: 2 INJECTABLE, LIPOSOMAL INTRAVENOUS at 02:10

## 2019-10-23 NOTE — PROGRESS NOTES
[facility]  [facility]  Hematology/Medical Oncology Clinic      PATIENT: Demetroi Aguiar  MRN: [id number]  DATE: 10/23/2019    Diagnosis:   No diagnosis found.  Chief Complaint: Follow up for Kaposi receiving systemic chemotherapy     Other MDs:  Neph Transplant: [facility]     Subjective:   Initial History: Mr. Aguiar is a 39 y.o. male who is here to follow up in oncology clinic for his diagnosis of kaposi's sarcoma.     PMHx of HIV on ART, ESRD previous on iHD s/p transplant with hx of rejection (on chronic immunosuppression), anemia of chronic disease, latent TB s/p treatment, HTN and HLD.    He is here today for OV for his C4 of doxo for symptomatic metastatic kaposi.    He tolerated the first 3 cycles well without obvious complaints. He believes he is improving and feels much better. RLE swelling has decreased and he has improved QOL.    PET scan prior to visit today shows decrease in tumor, LAD bulk though it also shows new cardiomegaly and effusion, which is somewhat concerning.     On a symptoms basis, he feels fine, has mild fatigue though denies new SOB or MARY.     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 Hematoma evacuated.(09/09)    Status post exploratory laparotomy 9/10/2017    Re explored for retroperitoneal hematoma. Hematoma evacuated.(09/09)    TB lung, latent     tx INH 2006     Past Surgical History:   Procedure Laterality Date    BIOPSY N/A 8/28/2018    Procedure: BIOPSY Tranplanted Kidney;  Surgeon: Charly Diagnostic Provider;  Location: [facility] OR [address] [place] [address];  Service:  "Radiology;  Laterality: N/A;    EXPLORATORY LAPAROTOMY W/ BOWEL RESECTION      NO BOWEL RESECTION, UNKNOWN DETAILS, "Pancreas Infection"    INSERTION OF TUNNELED CENTRAL VENOUS CATHETER (CVC) WITH SUBCUTANEOUS PORT Left 8/19/2019    Procedure: [id number];  Surgeon: Megha Monreal MD;  Location: [facility] OR [address] [place] [address];  Service: General;  Laterality: Left;    INSERTION OF TUNNELED CENTRAL VENOUS CATHETER (CVC) WITH SUBCUTANEOUS PORT N/A 9/12/2019    Procedure: [id number];  Surgeon: Charly Diagnostic Provider;  Location: [facility] OR [address] [place] [address];  Service: Radiology;  Laterality: N/A;  189  port removal and port placement    KIDNEY TRANSPLANT      peritoneal dialysis catheter placement       Family History   Problem Relation Age of Onset    Hyperlipidemia Mother     Cancer Father         lung    Lung cancer Father     No Known Problems Brother     Diabetes Maternal Aunt     Diabetes Maternal Uncle     Hypertension Maternal Grandmother     Hypertension Paternal Grandmother     Heart disease Paternal Grandmother       reports that he has quit smoking. His smoking use included cigarettes. He smoked 0.50 packs per day. He has never used smokeless tobacco. He reports that he does not drink alcohol or use drugs.  Review of patient's allergies indicates:  No Known Allergies  Current Outpatient Medications   Medication Sig Dispense Refill    atovaquone (MEPRON) 750 mg/5 mL Susp Take 5 mLs (750 mg total) by mouth once daily. 210 mL 11    calcium carbonate (TUMS) 200 mg calcium (500 mg) chewable tablet Take 1 tablet (500 mg total) by mouth once daily. 30 tablet 11    carvedilol (COREG) 6.25 MG tablet Take 2 tablets (12.5 mg total) by mouth 2 (two) times daily with meals. 120 tablet 11    cinacalcet (SENSIPAR) 60 MG Tab Take 1 tablet (60 mg total) by mouth daily with breakfast. 30 tablet 0    dolutegravir (TIVICAY) 50 mg Tab Take 1 tablet (50 mg total) by mouth once daily. 30 tablet 5    " emtricitabine-tenofovir alafen (DESCOVY) 200-25 mg Tab Take 1 tablet by mouth once daily.      famotidine (PEPCID) 20 MG tablet TAKE 1 TABLET (20MG TOTAL) BY MOUTH EACH EVENING 30 tablet 11    HYDROcodone-acetaminophen (NORCO) 5-325 mg per tablet Take 1 tablet by mouth every 4 (four) hours as needed for Pain. 20 tablet 0    multivitamin (THERAGRAN) tablet Take 1 tablet by mouth once daily.      NIFEdipine (PROCARDIA-XL) 30 MG (OSM) 24 hr tablet Take 2 tablets (60 mg total) by mouth every 12 (twelve) hours. 120 tablet 11    patiromer calcium sorbitex (VELTASSA) 25.2 gram PwPk Take 1 packet (25.2 g total) by mouth once daily. 30 packet 10    predniSONE (DELTASONE) 5 MG tablet Take 1 tablet (5 mg total) by mouth once daily. 30 tablet 11    rosuvastatin (CRESTOR) 20 MG tablet Take 1 tablet (20 mg total) by mouth once daily. 30 tablet 0    sevelamer carbonate (RENVELA) 800 mg Tab Take 1 tablet (800 mg total) by mouth 3 (three) times daily with meals. 90 tablet 11    sirolimus (RAPAMUNE) 1 MG Tab Take 4 tablets (4 mg total) by mouth once daily. 120 tablet 11    sodium bicarbonate 650 MG tablet Take 3 tablets (1,950 mg total) by mouth 3 (three) times daily. 270 tablet 11     No current facility-administered medications for this visit.      Facility-Administered Medications Ordered in Other Visits   Medication Dose Route Frequency Provider Last Rate Last Dose    alteplase injection 2 mg  2 mg Intra-Catheter PRN Santi Kennedy MD   2 mg at 10/23/19 1315    dextrose 5 % 250 mL flush bag   Intravenous 1 time in Clinic/HOD Santi Kennedy MD        DOXOrubicin liposome (DOXIL) 38 mg in dextrose 5 % 250 mL chemo infusion  20 mg/m2 (Treatment Plan Recorded) Intravenous 1 time in Clinic/HOD Santi Kennedy MD        heparin, porcine (PF) 100 unit/mL injection flush 500 Units  500 Units Intravenous PRN Santi Kennedy MD        palonosetron 0.25mg/dexamethasone 20mg IVPB   Intravenous 1 time in  "Clinic/HOD Santi Kennedy MD        sodium chloride 0.9% flush 10 mL  10 mL Intravenous PRN Santi Kennedy MD         Review of Systems   Constitutional: Positive for fatigue. Negative for activity change, chills, fever and unexpected weight change.   HENT: Negative for hearing loss and sore throat.    Eyes: Negative for discharge and visual disturbance.   Respiratory: Negative for chest tightness and shortness of breath.    Cardiovascular: Positive for leg swelling. Negative for chest pain.   Gastrointestinal: Negative for abdominal distention, abdominal pain, constipation, diarrhea and nausea.   Endocrine: Negative for cold intolerance.   Genitourinary: Negative for decreased urine volume and difficulty urinating.   Allergic/Immunologic: Positive for immunocompromised state.   Hematological: Positive for adenopathy.     ECOG Performance Status: 0  Objective:      Vitals:   Vitals:    10/23/19 1137   BP: (!) 169/107   BP Location: Left arm   Patient Position: Sitting   Pulse: 76   Resp: 16   Temp: 98 °F (36.7 °C)   TempSrc: Oral   SpO2: 100%   Weight: 73.5 kg (162 lb 0.6 oz)   Height: 5' 8" (1.727 m)     BMI: Body mass index is 24.64 kg/m².    Physical Exam   Constitutional: He is oriented to person, place, and time.   Young AA male, happier and less stressed appearing today   HENT:   Head: Normocephalic and atraumatic.   Eyes: Pupils are equal, round, and reactive to light. EOM are normal.   Neck: Neck supple.   Cardiovascular: Normal rate and regular rhythm.   Pulmonary/Chest: Effort normal and breath sounds normal.   Abdominal: Bowel sounds are normal. There is no rebound and no guarding.   Right lower quadrant swelling and mass palpated (tx kidney), without ttp   Genitourinary: Penis normal.   Musculoskeletal:   +2 RLE edema to the abdomen (improvement noted since beignning of treatment), RUE/LUE fistulas with soft bruit, LLE with graft in place and soft bruit   Lymphadenopathy:     He has no cervical " adenopathy.   Large RLE inguinal node conglomeration palpated (continues to decrease in bulk), left wnl. No cervical axillary LAD palpated.    Neurological: He is alert and oriented to person, place, and time. A sensory deficit is present.   Skin: Skin is warm and dry. Capillary refill takes less than 2 seconds.   Kaposi lesions in the right lower abdomen and RLE (extensive)   Psychiatric: He has a normal mood and affect. His behavior is normal. Thought content normal.   Vitals reviewed.    Laboratory Data:  Lab Visit on 10/23/2019   Component Date Value Ref Range Status    WBC 10/23/2019 6.21  3.90 - 12.70 K/uL Final    RBC 10/23/2019 3.88* 4.60 - 6.20 M/uL Final    Hemoglobin 10/23/2019 9.5* 14.0 - 18.0 g/dL Final    Hematocrit 10/23/2019 31.5* 40.0 - 54.0 % Final    Mean Corpuscular Volume 10/23/2019 81* 82 - 98 fL Final    Mean Corpuscular Hemoglobin 10/23/2019 24.5* 27.0 - 31.0 pg Final    Mean Corpuscular Hemoglobin Conc 10/23/2019 30.2* 32.0 - 36.0 g/dL Final    RDW 10/23/2019 17.0* 11.5 - 14.5 % Final    Platelets 10/23/2019 151  150 - 350 K/uL Final    MPV 10/23/2019 10.8  9.2 - 12.9 fL Final    Gran # (ANC) 10/23/2019 4.1  1.8 - 7.7 K/uL Final    Comment: The ANC is based on a white cell differential from an   automated cell counter. It has not been microscopically   reviewed for the presence of abnormal cells. Clinical   correlation is required.      Immature Grans (Abs) 10/23/2019 0.03  0.00 - 0.04 K/uL Final    Comment: Mild elevation in immature granulocytes is non specific and   can be seen in a variety of conditions including stress response,   acute inflammation, trauma and pregnancy. Correlation with other   laboratory and clinical findings is essential.      Sodium 10/23/2019 139  136 - 145 mmol/L Final    Potassium 10/23/2019 4.5  3.5 - 5.1 mmol/L Final    Chloride 10/23/2019 107  95 - 110 mmol/L Final    CO2 10/23/2019 26  23 - 29 mmol/L Final    Glucose 10/23/2019 93  70 - 110  mg/dL Final    BUN, Bld 10/23/2019 26* 6 - 20 mg/dL Final    Creatinine 10/23/2019 2.1* 0.5 - 1.4 mg/dL Final    Calcium 10/23/2019 8.0* 8.7 - 10.5 mg/dL Final    Total Protein 10/23/2019 5.8* 6.0 - 8.4 g/dL Final    Albumin 10/23/2019 3.3* 3.5 - 5.2 g/dL Final    Total Bilirubin 10/23/2019 0.3  0.1 - 1.0 mg/dL Final    Comment: For infants and newborns, interpretation of results should be based  on gestational age, weight and in agreement with clinical  observations.  Premature Infant recommended reference ranges:  Up to 24 hours.............<8.0 mg/dL  Up to 48 hours............<12.0 mg/dL  3-5 days..................<15.0 mg/dL  6-29 days.................<15.0 mg/dL      Alkaline Phosphatase 10/23/2019 79  55 - 135 U/L Final    AST 10/23/2019 19  10 - 40 U/L Final    ALT 10/23/2019 11  10 - 44 U/L Final    Anion Gap 10/23/2019 6* 8 - 16 mmol/L Final    eGFR if  10/23/2019 44.2* >60 mL/min/1.73 m^2 Final    eGFR if non  10/23/2019 38.2* >60 mL/min/1.73 m^2 Final    Comment: Calculation used to obtain the estimated glomerular filtration  rate (eGFR) is the CKD-EPI equation.      Hospital Outpatient Visit on 10/23/2019   Component Date Value Ref Range Status    POCT Glucose 10/23/2019 81  70 - 110 mg/dL Final       Assessment:       No diagnosis found.  Oncology History:     Kaposi Sarcoma  Hx of HIV and s/p renal transplant on immunosuppression  July 2019   Leg swelling for ~10 months; hospitalized for further work up, initially concerned for DVT   Found to have a large RLE/groin mass   Biopsy was + for Kaposi/HHV-8 ([facility]) - Spindled cell lesion with slit-like spaces, minimal pleomorphism, and extravasated RBCs. Postive for CD34, 31 and HHV-8, negative for s-100, SMA and pancytokeratin. Findings consistent with Kaposi sarcoma.    Tx to [facility] and had mild pre-staging work up: CT CAP showed extensive LAD   Initial consult with oncology/myself for  consideration of chemotherapy   Plan for further staging with PET, BMBx and plan for doxorubicin and pre-testing   Echo LVEF 55% and consultation with cardiology   PET confirming metastatic disease and global LAD   August 2019 8/14 C1 of Doxorubicin  September 2019 9/11 C2 of doxrubin, 1st dose of darbopoetin  October 2019   10/2 C3 of Doxil, 2nd dose of darbo    Plan:     Mr. Aguiar presents to the clinic today for C4 of doxorubicin for his symptomatic, metastatic Kaposi sarcoma. PET scan showing improvement in tumor bulk though does show possible new onset cardiomegaly. This is to be unlikely 2/2 to his doxorubicin though it is possible. He has only received 60 mg/m2 to date. Previous EF normal. He is asymptomatic to date. Feels better since the start of chemo. Labs look good today and okay to proceed with C4.     -okay for C4 of doxorubicin today  -c/w darbopoetin on a q3 week basis - 2nd dose today (plan for 6 cycles) - PRN  -follow up in clinic in 3 weeks for labs and OV (appt in)  -PET scan prior to cycle 7  -further cycles already scheduled  -will need him to follow up with cardiology for repeat echo     Case discussed with Dr. Kennedy.    Matthieu Amador MD  Hematology/Medical Oncology Fellow  [phone] (cell)  [phone] (page)          ATTENDING NOTE, ONCOLOGY CLINIC    As above.  Patient seen and examined, chart reviewed.  Appears comfortable, in NAD.  He has no symptoms suggestive of CHF.  Lungs are clear to auscultation.  Abdomen is soft, nontender.  Labs reviewed. Ct scans reviewed.    PLAN  I see no reason to hold his chemotherapy.  We will proceed with an echocardiogram and ask Dr. Borjas to evaluate him.  His multiple questions were answered to his satisfaction.      Santi Kennedy MD

## 2019-10-23 NOTE — PLAN OF CARE
1300 pt here for doxil infusion D1C4, labs, hx, meds, allergies reviewed, pt with no complaints at this time, recline din chair, warm blanket provided, continue to monitor

## 2019-10-23 NOTE — NURSING
1315 2 ml cath aníbal instilled to left chest wall port , will monitor    1350 4 ml blood aspirated from port, flushed easily , will start tx

## 2019-10-24 ENCOUNTER — PATIENT MESSAGE (OUTPATIENT)
Dept: INFECTIOUS DISEASES | Facility: CLINIC | Age: 39
End: 2019-10-24

## 2019-10-24 ENCOUNTER — PATIENT MESSAGE (OUTPATIENT)
Dept: TRANSPLANT | Facility: CLINIC | Age: 39
End: 2019-10-24

## 2019-10-24 ENCOUNTER — PATIENT MESSAGE (OUTPATIENT)
Dept: HEMATOLOGY/ONCOLOGY | Facility: CLINIC | Age: 39
End: 2019-10-24

## 2019-10-29 ENCOUNTER — DOCUMENTATION ONLY (OUTPATIENT)
Dept: HEMATOLOGY/ONCOLOGY | Facility: CLINIC | Age: 39
End: 2019-10-29

## 2019-10-29 NOTE — PROGRESS NOTES
Received request from patient to assist with referral to the [facility]. Requesting a check in date of 11/12 and check out of 11/14. Referral request sent in to the [facility].

## 2019-11-04 ENCOUNTER — PATIENT MESSAGE (OUTPATIENT)
Dept: TRANSPLANT | Facility: CLINIC | Age: 39
End: 2019-11-04

## 2019-11-05 ENCOUNTER — PATIENT MESSAGE (OUTPATIENT)
Dept: TRANSPLANT | Facility: CLINIC | Age: 39
End: 2019-11-05

## 2019-11-05 DIAGNOSIS — Z94.0 KIDNEY REPLACED BY TRANSPLANT: ICD-10-CM

## 2019-11-05 RX ORDER — PREDNISONE 5 MG/1
TABLET ORAL
Qty: 30 TABLET | Refills: 11 | Status: SHIPPED | OUTPATIENT
Start: 2019-11-05 | End: 2020-02-28 | Stop reason: SDUPTHER

## 2019-11-06 ENCOUNTER — PATIENT MESSAGE (OUTPATIENT)
Dept: TRANSPLANT | Facility: CLINIC | Age: 39
End: 2019-11-06

## 2019-11-08 LAB — CD4 ATP IMMUNE RESPONSE BLD-MCNC: 95 NG/ML ATP

## 2019-11-12 ENCOUNTER — TELEPHONE (OUTPATIENT)
Dept: HEMATOLOGY/ONCOLOGY | Facility: CLINIC | Age: 39
End: 2019-11-12

## 2019-11-12 ENCOUNTER — HOSPITAL ENCOUNTER (INPATIENT)
Facility: HOSPITAL | Age: 39
LOS: 7 days | Discharge: HOME | DRG: 975 | End: 2019-11-19
Attending: EMERGENCY MEDICINE | Admitting: INTERNAL MEDICINE
Payer: MEDICARE

## 2019-11-12 ENCOUNTER — PATIENT MESSAGE (OUTPATIENT)
Dept: TRANSPLANT | Facility: CLINIC | Age: 39
End: 2019-11-12

## 2019-11-12 DIAGNOSIS — D84.9 IMMUNOSUPPRESSION: Chronic | ICD-10-CM

## 2019-11-12 DIAGNOSIS — N17.9 ACUTE KIDNEY INJURY SUPERIMPOSED ON CHRONIC KIDNEY DISEASE: ICD-10-CM

## 2019-11-12 DIAGNOSIS — J18.9 PNEUMONIA OF RIGHT LOWER LOBE DUE TO INFECTIOUS ORGANISM: Primary | ICD-10-CM

## 2019-11-12 DIAGNOSIS — B20 SYMPTOMATIC HIV INFECTION: Chronic | ICD-10-CM

## 2019-11-12 DIAGNOSIS — N18.9 ACUTE KIDNEY INJURY SUPERIMPOSED ON CHRONIC KIDNEY DISEASE: ICD-10-CM

## 2019-11-12 DIAGNOSIS — C46.9 KAPOSI SARCOMA: ICD-10-CM

## 2019-11-12 DIAGNOSIS — J18.9 PNEUMONIA OF RIGHT MIDDLE LOBE DUE TO INFECTIOUS ORGANISM: ICD-10-CM

## 2019-11-12 DIAGNOSIS — R06.02 SOB (SHORTNESS OF BREATH): ICD-10-CM

## 2019-11-12 DIAGNOSIS — B20 HIV INFECTION, UNSPECIFIED SYMPTOM STATUS: Chronic | ICD-10-CM

## 2019-11-12 DIAGNOSIS — R78.81 BACTEREMIA: ICD-10-CM

## 2019-11-12 DIAGNOSIS — E78.5 HYPERLIPIDEMIA, UNSPECIFIED HYPERLIPIDEMIA TYPE: Chronic | ICD-10-CM

## 2019-11-12 DIAGNOSIS — I38 ENDOCARDITIS: ICD-10-CM

## 2019-11-12 DIAGNOSIS — J18.9 PNEUMONIA DUE TO INFECTIOUS ORGANISM: ICD-10-CM

## 2019-11-12 DIAGNOSIS — I15.0 RENOVASCULAR HYPERTENSION: ICD-10-CM

## 2019-11-12 DIAGNOSIS — N17.9 AKI (ACUTE KIDNEY INJURY): ICD-10-CM

## 2019-11-12 DIAGNOSIS — R00.0 TACHYCARDIA: ICD-10-CM

## 2019-11-12 DIAGNOSIS — I82.409 DVT (DEEP VENOUS THROMBOSIS): ICD-10-CM

## 2019-11-12 DIAGNOSIS — A41.9 SEPSIS, DUE TO UNSPECIFIED ORGANISM, UNSPECIFIED WHETHER ACUTE ORGAN DYSFUNCTION PRESENT: ICD-10-CM

## 2019-11-12 DIAGNOSIS — D63.1 ANEMIA IN STAGE 3 CHRONIC KIDNEY DISEASE: Chronic | ICD-10-CM

## 2019-11-12 DIAGNOSIS — Z91.89 AT RISK FOR OPPORTUNISTIC INFECTIONS: ICD-10-CM

## 2019-11-12 DIAGNOSIS — Z79.60 LONG-TERM USE OF IMMUNOSUPPRESSANT MEDICATION: ICD-10-CM

## 2019-11-12 DIAGNOSIS — N18.30 ANEMIA IN STAGE 3 CHRONIC KIDNEY DISEASE: Chronic | ICD-10-CM

## 2019-11-12 DIAGNOSIS — Z94.0 S/P KIDNEY TRANSPLANT: ICD-10-CM

## 2019-11-12 DIAGNOSIS — N18.30 CHRONIC KIDNEY DISEASE, STAGE 3 (MODERATE): ICD-10-CM

## 2019-11-12 LAB
ADENOVIRUS: NOT DETECTED
ALBUMIN SERPL BCP-MCNC: 2.5 G/DL (ref 3.5–5.2)
ALP SERPL-CCNC: 70 U/L (ref 55–135)
ALT SERPL W/O P-5'-P-CCNC: 11 U/L (ref 10–44)
ANION GAP SERPL CALC-SCNC: 7 MMOL/L (ref 8–16)
AST SERPL-CCNC: 19 U/L (ref 10–40)
BACTERIA #/AREA URNS AUTO: ABNORMAL /HPF
BASOPHILS # BLD AUTO: 0.01 K/UL (ref 0–0.2)
BASOPHILS NFR BLD: 0.2 % (ref 0–1.9)
BILIRUB SERPL-MCNC: 0.4 MG/DL (ref 0.1–1)
BILIRUB UR QL STRIP: NEGATIVE
BORDETELLA PARAPERTUSSIS (IS1001): NOT DETECTED
BORDETELLA PERTUSSIS (PTXP): NOT DETECTED
BUN SERPL-MCNC: 24 MG/DL (ref 6–30)
BUN SERPL-MCNC: 25 MG/DL (ref 6–20)
CALCIUM SERPL-MCNC: 7.4 MG/DL (ref 8.7–10.5)
CHLAMYDIA PNEUMONIAE: NOT DETECTED
CHLORIDE SERPL-SCNC: 104 MMOL/L (ref 95–110)
CHLORIDE SERPL-SCNC: 109 MMOL/L (ref 95–110)
CLARITY UR REFRACT.AUTO: ABNORMAL
CO2 SERPL-SCNC: 25 MMOL/L (ref 23–29)
COLOR UR AUTO: ABNORMAL
CORONAVIRUS 229E, COMMON COLD VIRUS: NOT DETECTED
CORONAVIRUS HKU1, COMMON COLD VIRUS: NOT DETECTED
CORONAVIRUS NL63, COMMON COLD VIRUS: NOT DETECTED
CORONAVIRUS OC43, COMMON COLD VIRUS: NOT DETECTED
CREAT SERPL-MCNC: 2.2 MG/DL (ref 0.5–1.4)
CREAT SERPL-MCNC: 2.5 MG/DL (ref 0.5–1.4)
CTP QC/QA: YES
DIFFERENTIAL METHOD: ABNORMAL
EOSINOPHIL # BLD AUTO: 0 K/UL (ref 0–0.5)
EOSINOPHIL NFR BLD: 0.3 % (ref 0–8)
ERYTHROCYTE [DISTWIDTH] IN BLOOD BY AUTOMATED COUNT: 17.2 % (ref 11.5–14.5)
EST. GFR  (AFRICAN AMERICAN): 41.8 ML/MIN/1.73 M^2
EST. GFR  (NON AFRICAN AMERICAN): 36.1 ML/MIN/1.73 M^2
FLUBV RNA NPH QL NAA+NON-PROBE: NOT DETECTED
GLUCOSE SERPL-MCNC: 116 MG/DL (ref 70–110)
GLUCOSE SERPL-MCNC: 118 MG/DL (ref 70–110)
GLUCOSE UR QL STRIP: NEGATIVE
HCT VFR BLD AUTO: 26 % (ref 40–54)
HCT VFR BLD CALC: 22 %PCV (ref 36–54)
HGB BLD-MCNC: 7.6 G/DL (ref 14–18)
HGB UR QL STRIP: NEGATIVE
HPIV1 RNA NPH QL NAA+NON-PROBE: NOT DETECTED
HPIV2 RNA NPH QL NAA+NON-PROBE: NOT DETECTED
HPIV3 RNA NPH QL NAA+NON-PROBE: NOT DETECTED
HPIV4 RNA NPH QL NAA+NON-PROBE: NOT DETECTED
HUMAN METAPNEUMOVIRUS: NOT DETECTED
HYALINE CASTS UR QL AUTO: 0 /LPF
IMM GRANULOCYTES # BLD AUTO: 0.02 K/UL (ref 0–0.04)
IMM GRANULOCYTES NFR BLD AUTO: 0.3 % (ref 0–0.5)
INFLUENZA A (SUBTYPES H1,H1-2009,H3): NOT DETECTED
KETONES UR QL STRIP: NEGATIVE
LACTATE SERPL-SCNC: 0.8 MMOL/L (ref 0.5–2.2)
LACTATE SERPL-SCNC: 1.3 MMOL/L (ref 0.5–2.2)
LEUKOCYTE ESTERASE UR QL STRIP: NEGATIVE
LYMPHOCYTES # BLD AUTO: 0.7 K/UL (ref 1–4.8)
LYMPHOCYTES NFR BLD: 12.1 % (ref 18–48)
MAGNESIUM SERPL-MCNC: 1.2 MG/DL (ref 1.6–2.6)
MCH RBC QN AUTO: 23.9 PG (ref 27–31)
MCHC RBC AUTO-ENTMCNC: 29.2 G/DL (ref 32–36)
MCV RBC AUTO: 82 FL (ref 82–98)
MICROSCOPIC COMMENT: ABNORMAL
MONOCYTES # BLD AUTO: 0.4 K/UL (ref 0.3–1)
MONOCYTES NFR BLD: 6 % (ref 4–15)
MYCOPLASMA PNEUMONIAE: NOT DETECTED
NEUTROPHILS # BLD AUTO: 4.8 K/UL (ref 1.8–7.7)
NEUTROPHILS NFR BLD: 81.1 % (ref 38–73)
NITRITE UR QL STRIP: NEGATIVE
NRBC BLD-RTO: 0 /100 WBC
PH UR STRIP: 5 [PH] (ref 5–8)
PHOSPHATE SERPL-MCNC: 2.2 MG/DL (ref 2.7–4.5)
PLATELET # BLD AUTO: 156 K/UL (ref 150–350)
PMV BLD AUTO: 10 FL (ref 9.2–12.9)
POC IONIZED CALCIUM: 1.04 MMOL/L (ref 1.06–1.42)
POC MOLECULAR INFLUENZA A AGN: NEGATIVE
POC MOLECULAR INFLUENZA B AGN: NEGATIVE
POC TCO2 (MEASURED): 23 MMOL/L (ref 23–29)
POTASSIUM BLD-SCNC: 3.4 MMOL/L (ref 3.5–5.1)
POTASSIUM SERPL-SCNC: 3.4 MMOL/L (ref 3.5–5.1)
PROT SERPL-MCNC: 5.2 G/DL (ref 6–8.4)
PROT UR QL STRIP: ABNORMAL
RBC # BLD AUTO: 3.18 M/UL (ref 4.6–6.2)
RBC #/AREA URNS AUTO: 1 /HPF (ref 0–4)
RESPIRATORY INFECTION PANEL SOURCE: NORMAL
RSV RNA NPH QL NAA+NON-PROBE: NOT DETECTED
RV+EV RNA NPH QL NAA+NON-PROBE: NOT DETECTED
SAMPLE: ABNORMAL
SODIUM BLD-SCNC: 141 MMOL/L (ref 136–145)
SODIUM SERPL-SCNC: 141 MMOL/L (ref 136–145)
SP GR UR STRIP: 1.02 (ref 1–1.03)
TROPONIN I SERPL DL<=0.01 NG/ML-MCNC: 0.07 NG/ML (ref 0–0.03)
TROPONIN I SERPL DL<=0.01 NG/ML-MCNC: 0.08 NG/ML (ref 0–0.03)
URN SPEC COLLECT METH UR: ABNORMAL
WBC # BLD AUTO: 5.96 K/UL (ref 3.9–12.7)
WBC #/AREA URNS AUTO: 4 /HPF (ref 0–5)

## 2019-11-12 PROCEDURE — 85025 COMPLETE CBC W/AUTO DIFF WBC: CPT

## 2019-11-12 PROCEDURE — 63600175 PHARM REV CODE 636 W HCPCS: Performed by: PHYSICIAN ASSISTANT

## 2019-11-12 PROCEDURE — 63600175 PHARM REV CODE 636 W HCPCS

## 2019-11-12 PROCEDURE — 99223 PR INITIAL HOSPITAL CARE,LEVL III: ICD-10-PCS | Mod: AI,,, | Performed by: INTERNAL MEDICINE

## 2019-11-12 PROCEDURE — 76937 US GUIDE VASCULAR ACCESS: CPT

## 2019-11-12 PROCEDURE — 83605 ASSAY OF LACTIC ACID: CPT | Mod: 91

## 2019-11-12 PROCEDURE — 36410 VNPNXR 3YR/> PHY/QHP DX/THER: CPT

## 2019-11-12 PROCEDURE — 27000221 HC OXYGEN, UP TO 24 HOURS

## 2019-11-12 PROCEDURE — 87798 DETECT AGENT NOS DNA AMP: CPT

## 2019-11-12 PROCEDURE — 20600001 HC STEP DOWN PRIVATE ROOM

## 2019-11-12 PROCEDURE — C1751 CATH, INF, PER/CENT/MIDLINE: HCPCS

## 2019-11-12 PROCEDURE — 80195 ASSAY OF SIROLIMUS: CPT

## 2019-11-12 PROCEDURE — 94761 N-INVAS EAR/PLS OXIMETRY MLT: CPT

## 2019-11-12 PROCEDURE — 96372 THER/PROPH/DIAG INJ SC/IM: CPT

## 2019-11-12 PROCEDURE — 99223 1ST HOSP IP/OBS HIGH 75: CPT | Mod: AI,,, | Performed by: INTERNAL MEDICINE

## 2019-11-12 PROCEDURE — 96375 TX/PRO/DX INJ NEW DRUG ADDON: CPT | Mod: 59

## 2019-11-12 PROCEDURE — 83735 ASSAY OF MAGNESIUM: CPT

## 2019-11-12 PROCEDURE — 99900035 HC TECH TIME PER 15 MIN (STAT)

## 2019-11-12 PROCEDURE — 99291 PR CRITICAL CARE, E/M 30-74 MINUTES: ICD-10-PCS | Mod: ,,, | Performed by: PHYSICIAN ASSISTANT

## 2019-11-12 PROCEDURE — 81001 URINALYSIS AUTO W/SCOPE: CPT

## 2019-11-12 PROCEDURE — 63600175 PHARM REV CODE 636 W HCPCS: Performed by: STUDENT IN AN ORGANIZED HEALTH CARE EDUCATION/TRAINING PROGRAM

## 2019-11-12 PROCEDURE — 87502 INFLUENZA DNA AMP PROBE: CPT

## 2019-11-12 PROCEDURE — 25000003 PHARM REV CODE 250

## 2019-11-12 PROCEDURE — 99285 EMERGENCY DEPT VISIT HI MDM: CPT | Mod: 25

## 2019-11-12 PROCEDURE — 94640 AIRWAY INHALATION TREATMENT: CPT

## 2019-11-12 PROCEDURE — 80053 COMPREHEN METABOLIC PANEL: CPT

## 2019-11-12 PROCEDURE — 25000242 PHARM REV CODE 250 ALT 637 W/ HCPCS: Performed by: STUDENT IN AN ORGANIZED HEALTH CARE EDUCATION/TRAINING PROGRAM

## 2019-11-12 PROCEDURE — 87076 CULTURE ANAEROBE IDENT EACH: CPT

## 2019-11-12 PROCEDURE — 25000003 PHARM REV CODE 250: Performed by: PHYSICIAN ASSISTANT

## 2019-11-12 PROCEDURE — 93010 ELECTROCARDIOGRAM REPORT: CPT | Mod: ,,, | Performed by: INTERNAL MEDICINE

## 2019-11-12 PROCEDURE — 96374 THER/PROPH/DIAG INJ IV PUSH: CPT

## 2019-11-12 PROCEDURE — 84484 ASSAY OF TROPONIN QUANT: CPT | Mod: 91

## 2019-11-12 PROCEDURE — 93005 ELECTROCARDIOGRAM TRACING: CPT

## 2019-11-12 PROCEDURE — 93010 EKG 12-LEAD: ICD-10-PCS | Mod: ,,, | Performed by: INTERNAL MEDICINE

## 2019-11-12 PROCEDURE — 84100 ASSAY OF PHOSPHORUS: CPT

## 2019-11-12 PROCEDURE — 87040 BLOOD CULTURE FOR BACTERIA: CPT

## 2019-11-12 PROCEDURE — 99291 CRITICAL CARE FIRST HOUR: CPT | Mod: ,,, | Performed by: PHYSICIAN ASSISTANT

## 2019-11-12 RX ORDER — HYDROCODONE BITARTRATE AND ACETAMINOPHEN 5; 325 MG/1; MG/1
1 TABLET ORAL EVERY 4 HOURS PRN
Status: DISCONTINUED | OUTPATIENT
Start: 2019-11-12 | End: 2019-11-15

## 2019-11-12 RX ORDER — NIFEDIPINE 30 MG/1
60 TABLET, EXTENDED RELEASE ORAL EVERY 12 HOURS
Status: DISCONTINUED | OUTPATIENT
Start: 2019-11-12 | End: 2019-11-19 | Stop reason: HOSPADM

## 2019-11-12 RX ORDER — GLUCAGON 1 MG
1 KIT INJECTION
Status: DISCONTINUED | OUTPATIENT
Start: 2019-11-12 | End: 2019-11-19 | Stop reason: HOSPADM

## 2019-11-12 RX ORDER — SEVELAMER CARBONATE 800 MG/1
800 TABLET, FILM COATED ORAL
Status: DISCONTINUED | OUTPATIENT
Start: 2019-11-12 | End: 2019-11-12

## 2019-11-12 RX ORDER — SODIUM CHLORIDE 0.9 % (FLUSH) 0.9 %
10 SYRINGE (ML) INJECTION
Status: DISCONTINUED | OUTPATIENT
Start: 2019-11-12 | End: 2019-11-19 | Stop reason: HOSPADM

## 2019-11-12 RX ORDER — FAMOTIDINE 20 MG/1
20 TABLET, FILM COATED ORAL DAILY
Status: DISCONTINUED | OUTPATIENT
Start: 2019-11-12 | End: 2019-11-19 | Stop reason: HOSPADM

## 2019-11-12 RX ORDER — SODIUM CHLORIDE 9 MG/ML
INJECTION, SOLUTION INTRAVENOUS CONTINUOUS
Status: DISCONTINUED | OUTPATIENT
Start: 2019-11-12 | End: 2019-11-12

## 2019-11-12 RX ORDER — IBUPROFEN 200 MG
16 TABLET ORAL
Status: DISCONTINUED | OUTPATIENT
Start: 2019-11-12 | End: 2019-11-19 | Stop reason: HOSPADM

## 2019-11-12 RX ORDER — ACETAMINOPHEN 325 MG/1
650 TABLET ORAL EVERY 6 HOURS PRN
Status: DISCONTINUED | OUTPATIENT
Start: 2019-11-12 | End: 2019-11-12

## 2019-11-12 RX ORDER — ACETAMINOPHEN 500 MG
1000 TABLET ORAL
Status: COMPLETED | OUTPATIENT
Start: 2019-11-12 | End: 2019-11-12

## 2019-11-12 RX ORDER — PREDNISONE 5 MG/1
5 TABLET ORAL DAILY
Status: DISCONTINUED | OUTPATIENT
Start: 2019-11-12 | End: 2019-11-19 | Stop reason: HOSPADM

## 2019-11-12 RX ORDER — ROSUVASTATIN CALCIUM 10 MG/1
20 TABLET, COATED ORAL DAILY
Status: DISCONTINUED | OUTPATIENT
Start: 2019-11-12 | End: 2019-11-19 | Stop reason: HOSPADM

## 2019-11-12 RX ORDER — CEFEPIME HYDROCHLORIDE 1 G/1
1 INJECTION, POWDER, FOR SOLUTION INTRAMUSCULAR; INTRAVENOUS
Status: DISCONTINUED | OUTPATIENT
Start: 2019-11-12 | End: 2019-11-13

## 2019-11-12 RX ORDER — CALCIUM CARBONATE 200(500)MG
1 TABLET,CHEWABLE ORAL DAILY
Status: DISCONTINUED | OUTPATIENT
Start: 2019-11-12 | End: 2019-11-19 | Stop reason: HOSPADM

## 2019-11-12 RX ORDER — MAGNESIUM SULFATE HEPTAHYDRATE 40 MG/ML
2 INJECTION, SOLUTION INTRAVENOUS ONCE
Status: COMPLETED | OUTPATIENT
Start: 2019-11-12 | End: 2019-11-12

## 2019-11-12 RX ORDER — CARVEDILOL 12.5 MG/1
12.5 TABLET ORAL 2 TIMES DAILY WITH MEALS
Status: DISCONTINUED | OUTPATIENT
Start: 2019-11-12 | End: 2019-11-16

## 2019-11-12 RX ORDER — CEFEPIME HYDROCHLORIDE 2 G/1
2 INJECTION, POWDER, FOR SOLUTION INTRAVENOUS
Status: COMPLETED | OUTPATIENT
Start: 2019-11-12 | End: 2019-11-12

## 2019-11-12 RX ORDER — SODIUM BICARBONATE 650 MG/1
1950 TABLET ORAL 3 TIMES DAILY
Status: DISCONTINUED | OUTPATIENT
Start: 2019-11-12 | End: 2019-11-19 | Stop reason: HOSPADM

## 2019-11-12 RX ORDER — SIROLIMUS 1 MG/1
4 TABLET, FILM COATED ORAL DAILY
Status: DISCONTINUED | OUTPATIENT
Start: 2019-11-12 | End: 2019-11-16

## 2019-11-12 RX ORDER — ATOVAQUONE 750 MG/5ML
750 SUSPENSION ORAL DAILY
Status: DISCONTINUED | OUTPATIENT
Start: 2019-11-12 | End: 2019-11-14

## 2019-11-12 RX ORDER — DEXTROSE MONOHYDRATE 100 MG/ML
12.5 INJECTION, SOLUTION INTRAVENOUS
Status: DISCONTINUED | OUTPATIENT
Start: 2019-11-12 | End: 2019-11-19 | Stop reason: HOSPADM

## 2019-11-12 RX ORDER — IBUPROFEN 200 MG
24 TABLET ORAL
Status: DISCONTINUED | OUTPATIENT
Start: 2019-11-12 | End: 2019-11-19 | Stop reason: HOSPADM

## 2019-11-12 RX ORDER — IPRATROPIUM BROMIDE AND ALBUTEROL SULFATE 2.5; .5 MG/3ML; MG/3ML
3 SOLUTION RESPIRATORY (INHALATION)
Status: DISCONTINUED | OUTPATIENT
Start: 2019-11-12 | End: 2019-11-13

## 2019-11-12 RX ORDER — DEXTROSE MONOHYDRATE 100 MG/ML
25 INJECTION, SOLUTION INTRAVENOUS
Status: DISCONTINUED | OUTPATIENT
Start: 2019-11-12 | End: 2019-11-19 | Stop reason: HOSPADM

## 2019-11-12 RX ORDER — HEPARIN SODIUM 5000 [USP'U]/ML
5000 INJECTION, SOLUTION INTRAVENOUS; SUBCUTANEOUS EVERY 8 HOURS
Status: DISCONTINUED | OUTPATIENT
Start: 2019-11-12 | End: 2019-11-15

## 2019-11-12 RX ADMIN — SODIUM BICARBONATE 650 MG TABLET 1950 MG: at 03:11

## 2019-11-12 RX ADMIN — VANCOMYCIN HYDROCHLORIDE 1250 MG: 1.25 INJECTION, POWDER, LYOPHILIZED, FOR SOLUTION INTRAVENOUS at 05:11

## 2019-11-12 RX ADMIN — CARVEDILOL 12.5 MG: 12.5 TABLET, FILM COATED ORAL at 06:11

## 2019-11-12 RX ADMIN — MAGNESIUM SULFATE HEPTAHYDRATE 1 G: 500 INJECTION, SOLUTION INTRAMUSCULAR; INTRAVENOUS at 12:11

## 2019-11-12 RX ADMIN — HEPARIN SODIUM 5000 UNITS: 5000 INJECTION, SOLUTION INTRAVENOUS; SUBCUTANEOUS at 09:11

## 2019-11-12 RX ADMIN — SODIUM CHLORIDE 1000 ML: 0.9 INJECTION, SOLUTION INTRAVENOUS at 05:11

## 2019-11-12 RX ADMIN — SODIUM BICARBONATE 650 MG TABLET 1950 MG: at 12:11

## 2019-11-12 RX ADMIN — HEPARIN SODIUM 5000 UNITS: 5000 INJECTION, SOLUTION INTRAVENOUS; SUBCUTANEOUS at 06:11

## 2019-11-12 RX ADMIN — DOLUTEGRAVIR SODIUM 50 MG: 50 TABLET, FILM COATED ORAL at 12:11

## 2019-11-12 RX ADMIN — SEVELAMER CARBONATE 800 MG: 800 TABLET, FILM COATED ORAL at 12:11

## 2019-11-12 RX ADMIN — ROSUVASTATIN CALCIUM 20 MG: 10 TABLET, FILM COATED ORAL at 12:11

## 2019-11-12 RX ADMIN — NIFEDIPINE 60 MG: 30 TABLET, FILM COATED, EXTENDED RELEASE ORAL at 09:11

## 2019-11-12 RX ADMIN — ATOVAQUONE 750 MG: 750 SUSPENSION ORAL at 12:11

## 2019-11-12 RX ADMIN — FAMOTIDINE 20 MG: 20 TABLET ORAL at 12:11

## 2019-11-12 RX ADMIN — CEFEPIME 1 G: 1 INJECTION, POWDER, FOR SOLUTION INTRAMUSCULAR; INTRAVENOUS at 06:11

## 2019-11-12 RX ADMIN — SODIUM BICARBONATE 650 MG TABLET 1950 MG: at 09:11

## 2019-11-12 RX ADMIN — IPRATROPIUM BROMIDE AND ALBUTEROL SULFATE 3 ML: .5; 3 SOLUTION RESPIRATORY (INHALATION) at 08:11

## 2019-11-12 RX ADMIN — EMTRICITABINE AND TENOFOVIR ALAFENAMIDE 1 TABLET: 200; 25 TABLET ORAL at 01:11

## 2019-11-12 RX ADMIN — SIROLIMUS 4 MG: 1 TABLET ORAL at 12:11

## 2019-11-12 RX ADMIN — MAGNESIUM SULFATE IN WATER 2 G: 40 INJECTION, SOLUTION INTRAVENOUS at 05:11

## 2019-11-12 RX ADMIN — CEFEPIME 2 G: 2 INJECTION, POWDER, FOR SOLUTION INTRAVENOUS at 05:11

## 2019-11-12 RX ADMIN — PREDNISONE 5 MG: 5 TABLET ORAL at 12:11

## 2019-11-12 RX ADMIN — SODIUM CHLORIDE, SODIUM LACTATE, POTASSIUM CHLORIDE, AND CALCIUM CHLORIDE 1000 ML: .6; .31; .03; .02 INJECTION, SOLUTION INTRAVENOUS at 01:11

## 2019-11-12 RX ADMIN — ACETAMINOPHEN 1000 MG: 500 TABLET ORAL at 05:11

## 2019-11-12 RX ADMIN — IPRATROPIUM BROMIDE AND ALBUTEROL SULFATE 3 ML: .5; 3 SOLUTION RESPIRATORY (INHALATION) at 04:11

## 2019-11-12 RX ADMIN — CARVEDILOL 12.5 MG: 12.5 TABLET, FILM COATED ORAL at 12:11

## 2019-11-12 RX ADMIN — NIFEDIPINE 60 MG: 30 TABLET, FILM COATED, EXTENDED RELEASE ORAL at 01:11

## 2019-11-12 NOTE — ASSESSMENT & PLAN NOTE
Controlled, last viral load was undetectable   Will continue with home Tivicay and Descovy (Descovy is non formulary however this gentleman with renal dysfunction is unable to tolerate Truvada which is on the formulary).

## 2019-11-12 NOTE — ASSESSMENT & PLAN NOTE
Crt stable   C/w Home Sirolimus and Prednisone at 5 mg daily   Check a Sirolimus level  Monitor CMP

## 2019-11-12 NOTE — NURSING
Arrived to [id number] via stretcher from ER accompanied by wife, see doc flowsheet for VS/ assessment, made comfortable and oriented to surroundings, admit orders reviewed.

## 2019-11-12 NOTE — SUBJECTIVE & OBJECTIVE
"Oncology Treatment Plan:   OP SARCOMA DOXORUBICIN LIPOSOMAL Q3W    Medications:  Continuous Infusions:   sodium chloride 0.9%       Scheduled Meds:   atovaquone  750 mg Oral Daily    calcium carbonate  1 tablet Oral Daily    ceFEPime (MAXIPIME) IVPB  1 g Intravenous Q12H    dolutegravir  50 mg Oral Daily    emtricitabine-tenofovir alafen  1 tablet Oral Daily    famotidine  20 mg Oral Daily    heparin (porcine)  5,000 Units Subcutaneous Q8H    lactated ringers  1,000 mL Intravenous Once    predniSONE  5 mg Oral Daily    rosuvastatin  20 mg Oral Daily    sevelamer carbonate  800 mg Oral TID WM    sirolimus  4 mg Oral Daily    sodium bicarbonate  1,950 mg Oral TID     PRN Meds:acetaminophen, dextrose 10 % in water (D10W), dextrose 10 % in water (D10W), glucagon (human recombinant), glucose, glucose, HYDROcodone-acetaminophen, sodium chloride 0.9%     Review of patient's allergies indicates:  No Known Allergies     Past Medical History:   Diagnosis Date    Anemia     At risk for opportunistic infections     Delayed graft function of kidney     ESRD secondary to HIVAN s/p DDRT 8/18/17     HIV infection     HIV nephropathy     Hyperlipidemia     Hypertension     Need for prophylactic immunotherapy     S/P kidney transplant 8/18/2017 8/28/2017    Status post exploratory laparotomy 9/10/2017    Re explored for retroperitoneal hematoma. Hematoma evacuated.(09/09)    Status post exploratory laparotomy 9/10/2017    Re explored for retroperitoneal hematoma. Hematoma evacuated.(09/09)    TB lung, latent     tx INH 2006     Past Surgical History:   Procedure Laterality Date    BIOPSY N/A 8/28/2018    Procedure: BIOPSY Tranplanted Kidney;  Surgeon: Charly Diagnostic Provider;  Location: [facility] OR [address] [place] [address];  Service: Radiology;  Laterality: N/A;    EXPLORATORY LAPAROTOMY W/ BOWEL RESECTION      NO BOWEL RESECTION, UNKNOWN DETAILS, "Pancreas Infection"    INSERTION OF TUNNELED CENTRAL VENOUS CATHETER (CVC) " WITH SUBCUTANEOUS PORT Left 8/19/2019    Procedure: [id number];  Surgeon: Megha Monreal MD;  Location: [facility] OR 2ND FLR;  Service: General;  Laterality: Left;    INSERTION OF TUNNELED CENTRAL VENOUS CATHETER (CVC) WITH SUBCUTANEOUS PORT N/A 9/12/2019    Procedure: [id number];  Surgeon: Charly Diagnostic Provider;  Location: [facility] OR 2ND FLR;  Service: Radiology;  Laterality: N/A;  189  port removal and port placement    KIDNEY TRANSPLANT      peritoneal dialysis catheter placement       Family History     Problem Relation (Age of Onset)    Cancer Father    Diabetes Maternal Aunt, Maternal Uncle    Heart disease Paternal Grandmother    Hyperlipidemia Mother    Hypertension Maternal Grandmother, Paternal Grandmother    Lung cancer Father    No Known Problems Brother        Tobacco Use    Smoking status: Former Smoker     Packs/day: 0.50     Types: Cigarettes    Smokeless tobacco: Never Used    Tobacco comment: quit smoking 5 years ago   Substance and Sexual Activity    Alcohol use: No    Drug use: No     Types: Cocaine, Marijuana     Comment: last use for cocaine at age 17; 2 years ago last use for THC    Sexual activity: Not Currently       Review of Systems   Constitutional: Positive for chills, fatigue and fever.   HENT: Negative for congestion.    Respiratory: Positive for cough and shortness of breath.    Cardiovascular: Positive for leg swelling. Negative for chest pain.   Gastrointestinal: Negative for abdominal pain, diarrhea, nausea and vomiting.   Genitourinary: Negative for dysuria and hematuria.   Neurological: Negative for headaches.     Objective:     Vital Signs (Most Recent):  Temp: 100.1 °F (37.8 °C) (11/12/19 0622)  Pulse: 89 (11/12/19 0901)  Resp: 20 (11/12/19 0409)  BP: (!) 150/80 (11/12/19 0901)  SpO2: (!) 92 % (11/12/19 0631) Vital Signs (24h Range):  Temp:  [100.1 °F (37.8 °C)-101.1 °F (38.4 °C)] 100.1 °F (37.8 °C)  Pulse:  [] 89  Resp:  [20]  20  SpO2:  [92 %-96 %] 92 %  BP: (144-185)/(80-97) 150/80     Weight: 68 kg (150 lb)  Body mass index is 22.81 kg/m².  Body surface area is 1.81 meters squared.    No intake or output data in the 24 hours ending 11/12/19 0915    Physical Exam    Significant Labs:   CBC:   Recent Labs   Lab 11/12/19  0458 11/12/19  0459   WBC 5.96  --    HGB 7.6*  --    HCT 26.0* 22*     --     and CMP:   Recent Labs   Lab 11/12/19  0601      K 3.4*      CO2 25   *   BUN 25*   CREATININE 2.2*   CALCIUM 7.4*   PROT 5.2*   ALBUMIN 2.5*   BILITOT 0.4   ALKPHOS 70   AST 19   ALT 11   ANIONGAP 7*   EGFRNONAA 36.1*       Diagnostic Results:  X-ray Chest Ap Portable    Result Date: 11/12/2019  EXAMINATION: XR CHEST AP PORTABLE CLINICAL HISTORY: Sepsis; TECHNIQUE: Single frontal view of the chest was performed. COMPARISON: August 19, 2019 FINDINGS: Single chest view is submitted.  There is a left-sided subcutaneous central venous catheter noted, the distal tip is seen just to the right of midline, projected slightly superiorly, the distal aspect of the catheter appearing in a somewhat curved configuration, convex inferiorly, this likely courses along the left brachiocephalic vein with the distal tip likely within the superior vena cava in a near horizontal position, with slight superior upturned, as above. The patient is rotated.  When accounting for rotation the cardiomediastinal silhouette is thought stable exaggerated by rotation.  There is mild infiltrate at the left infrahilar left lung base, however there is somewhat more prominent opacity seen on the right, with right perihilar infiltrate and abnormal opacity involving the mid to inferior right hemithorax thought likely representative of pulmonary infiltrates/airspace disease and potential component of pleural fluid.  There is no evidence for pneumothorax.  The osseous structures appear intact.     Abnormal opacity involving the mid to inferior right  hemithorax likely relates to pulmonary infiltrate/airspace disease and suspected component of pleural fluid, with mild infiltrates seen at the left lung base. Central venous catheter noted, as above. Electronically signed by: Tanvir Garces Date:    11/12/2019 Time:    05:23

## 2019-11-12 NOTE — ED TRIAGE NOTES
Pt complains of fever, chills, body aches that started last night. Pt has a hx of lung CA where he is receiving chemo. Pt states that he is stated to have chemo Wednesday. Also pt has a hx of kidney transplant and HIV

## 2019-11-12 NOTE — ED NOTES
Patient identifiers for Demetrio Aguiar checked and correct.  LOC: The patient is awake, alert and aware of environment with an appropriate affect, the patient is oriented x 3 and speaking appropriately.  APPEARANCE: Patient is acutely distress, patient is clean and well groomed, patient's clothing are properly fastened. Pt complains of back pain and SOB  SKIN: The skin is warm and dry, patient has age appropriate skin turgor and moist mucus membranes, skin intact, no breakdown or brusing noted.  MUSKULOSKELETAL: Patient moving all extremities well.  RESPIRATORY: Airway is open and patent, respirations are spontaneous, pt is labored, coarse breaths sounds ascultated  CARDIAC: Patient is tachycardic, swelling noted to right upper and lower extremity capillary refill < 3 seconds.  ABDOMEN: firm and non tender to palpation,  distention noted.  Normoactive bowel sounds x4.  NEUROLOGIC: PERRL, facial expression is symmetrical, bilateral hand grasp equal and even, normal sensation in all extremities when touched with a finger.

## 2019-11-12 NOTE — ASSESSMENT & PLAN NOTE
HIV related   Currently on HAART and Adriamycin Q3W for symptomatic metastatic KS  He is due for Adriamycin tomorrow however this is going to be postponed given acute illness

## 2019-11-12 NOTE — ASSESSMENT & PLAN NOTE
Controlled, last viral load was undetectable   Will continue with home Tivicay and Descovy (Descovy is non formulary however this gentleman with renal dysfunction is unable to tolerate Truvada which the formulary alternative).   Check CD4 count and consult ID for co management

## 2019-11-12 NOTE — ASSESSMENT & PLAN NOTE
Patient is presenting with fever, tachycardia and Tachypnea   CXR with likely right mid and lower lobe pneumonia   Follow blood cultures and respiratory panel  Volume expansion with 2 liter bolus of LR and 100 cc/hr of NS for 24 hours   Received Vanc/Cefepime in the ER, will continue Cefepime inpatient

## 2019-11-12 NOTE — CONSULTS
Midline placed  in left brachial size [id number] Lot# [id number] Removal date on or before 12/11/19. Needle advanced into vessel with real time ultrasound guidance. Image recorded and saved.

## 2019-11-12 NOTE — H&P
[facility]  Hematology/Oncology  H&P    Patient Name: Demetrio Aguiar  MRN: [id number]  Admission Date: 2019  Code Status: Full Code   Attending Provider: Stephan Avina MD  Primary Care Physician: Primary Doctor No  Principal Problem:Pneumonia of right lower lobe due to infectious organism    Subjective:     HPI:   Oncologic Hx from chart:    Demetrio Aguiar is a 39 y.o. male with PMHx of HIV on ART, Kaposi sarcoma, ESRD previous on iHD s/p transplant with hx of rejection (on chronic immunosuppression), anemia of chronic disease, latent TB s/p treatment, HTN and HLD.     He is s/p 4 cycles of doxo for symptomatic metastatic kaposi.     He tolerated the first  4 cycles well without obvious complaints. RLE swelling has decreased and he has improved QOL.     PET scan in October shows decrease in tumor, LAD bulk though it also shows new cardiomegaly and effusion, which is somewhat concerning.     HPI:  Patient is presenting with complaints of fevers, chills, dyspnea and cough. He denies any diarrhea, abdominal pain or dysuria. Vital signs in the ER revealed fever, tachycardia and tachypnea. CBC revealed no neutropenia. CMP revealed stable renal function. CXR with opacity in right mid to inferior hemithorax potentially related to pneumonia and pleural effusion. He received 1 liter bolus and IV Vancomycin and Cefepime.         No new subjective & objective note has been filed under this hospital service since the last note was generated.    Assessment/Plan:     Active Hospital Problems    Diagnosis  POA    *Pneumonia of right lower lobe due to infectious organism [J18.1]  Yes    Sepsis [A41.9]  Yes    Kaposi sarcoma [C46.9]  Yes    Renovascular hypertension [I15.0]  Yes    Immunosuppression [D89.9]  Yes     Chronic    Long-term use of immunosuppressant medication [Z79.899]  Not Applicable     donor kidney transplant 2017 [Z94.0]  Not Applicable     -Biopsy (for DGF) 17:  good sample with 24 glomeruli (only 1 sclerosed) and evidence just meeting criteria for ABMR. + diffuse ATI. Minimal fibrosis, no ACR or AVR, but + CD4 (>50% diffusely + stain) with mild glomerulitis and focal peritubular capillaritis. Also, biopsy shows ca-oxalate (3) and ca-phos (2) crystals.  -17 DSA + for weak class I.    Biopsy (LEFTY) 10/17/2017 14 glomeruli, <5% fibrosis, no MC changes, No ACR or AMR, C4d negative    Biopsy (LEFTY) 1/15/18:  No ACR, AVR, ABMR, CD4 neg. Less than 10 % fibrosis.         Anemia in stage 3 chronic kidney disease [N18.3, D63.1]  Yes     Chronic    At risk for opportunistic infections [Z91.89]  Yes    Hyperlipidemia [E78.5]  Yes     Chronic    HIV (human immunodeficiency virus infection) [B20]  Yes     Chronic          Sepsis  Patient is presenting with fever, tachycardia and Tachypnea   CXR with likely right mid and lower lobe pneumonia   Follow blood cultures and respiratory panel  Volume expansion with 2 liter bolus of LR and 100 cc/hr of NS for 24 hours   Received Vanc/Cefepime in the ER, will continue Cefepime inpatient     Renovascular hypertension  C/w home Coreg and Nifedipine     Kaposi sarcoma  HIV related   Currently on HAART and Adriamycin Q3W for symptomatic metastatic KS  He is due for Adriamycin tomorrow however this is going to be postponed given acute illness     Immunosuppression  Due to HIV infection, anti rejection meds and chemotherapy   C/w Atovaquone for PCP PPx     donor kidney transplant 2017  Crt stable   C/w Home Sirolimus and Prednisone at 5 mg daily   Check a Sirolimus level  Monitor CMP    Anemia in stage 3 chronic kidney disease  H/H stable, monitor CBC    HIV (human immunodeficiency virus infection)  Controlled, last viral load was undetectable   Will continue with home Tivicay and Descovy (Descovy is non formulary however this gentleman with renal dysfunction is unable to tolerate Truvada which the formulary alternative).      Hyperlipidemia  C/w Ishanor      Omer Og MD  Hematology/Oncology  [facility]      I have reviewed the notes, assessments, and/or procedures performed by the housestaff, as above.  I have personally interviewed and examined the patient at the beside, and rounded with the daytime housestaff. I concur with her/his assessment and plan and the documentation of Demetrio Aguiar.  I, Dr. Stephan Avina, personally spent more than 70 mins during this encounter, greater than 50% was spent in direct counseling and/or coordination of care.     Stephan Avina M.D., M.S., F.A.C.P.  Hematology/Oncology Attending  [facility]

## 2019-11-12 NOTE — HPI
Oncologic Hx from chart:    Demetrio Aguiar is a 39 y.o. male with PMHx of HIV on ART, Kaposi sarcoma, ESRD previous on iHD s/p transplant with hx of rejection (on chronic immunosuppression), anemia of chronic disease, latent TB s/p treatment, HTN and HLD.     He is s/p 4 cycles of doxo for symptomatic metastatic kaposi.     He tolerated the first 4 cycles well without obvious complaints. RLE swelling has decreased and he has improved QOL.     PET scan in October shows decrease in tumor, LAD bulk though it also shows new cardiomegaly and effusion, which is somewhat concerning.     HPI:  Patient is presenting with complaints of fevers, chills, dyspnea and cough. He denies any diarrhea, abdominal pain or dysuria. Vital signs in the ER revealed fever, tachycardia and tachypnea. CBC revealed no neutropenia. CMP revealed stable renal function. CXR with opacity in right mid to inferior hemithorax potentially related to pneumonia and pleural effusion. He received 1 liter bolus and IV Vancomycin and Cefepime.

## 2019-11-12 NOTE — ED PROVIDER NOTES
Encounter Date: 11/12/2019       History     Chief Complaint   Patient presents with    Fever     Pt c/o SOB, body aches and chills starting yesterday. Hx of Kaposi Sarcoma; last chemo 3 weeks ago. Hx of kidney transplant Aug 2017.     39 year old male with medical history of HIV, latent TB, Kaposi sarcoma, Hx of Kidney Tx 2017 2/2 ESRD presenting to the ED with the chief complaint of fever. Patient reports waking up last night with subjective fever, chills, diaphoresis. He has associated SOB and had to sleep upright in a recliner during the day yesterday. He feels his abdomen is more distended than normal. He has been having 2-3 loose, non-bloody stools per day. He is prescribed a PRN diuretic that he has not used recently. He is on chemotherapy and receives infusions 1 time every 3 weeks. He is scheduled for his next chemotherapy session tomorrow. He did not take Tylenol or other medications prior to arrival. He denies headache, sore throat, chest pain, abdominal pain, nausea, vomiting, dysuria, hematuria, melena, hematochezia. He received his season flu vaccine.         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 Hematoma evacuated.(09/09)    Status post exploratory laparotomy 9/10/2017    Re explored for retroperitoneal hematoma. Hematoma evacuated.(09/09)    TB lung, latent     tx INH 2006     Past Surgical History:   Procedure Laterality Date    BIOPSY N/A 8/28/2018    Procedure: BIOPSY Tranplanted Kidney;  Surgeon: [facility] Diagnostic Provider;  Location: [facility] OR  "2ND FLR;  Service: Radiology;  Laterality: N/A;    EXPLORATORY LAPAROTOMY W/ BOWEL RESECTION      NO BOWEL RESECTION, UNKNOWN DETAILS, "Pancreas Infection"    INSERTION OF TUNNELED CENTRAL VENOUS CATHETER (CVC) WITH SUBCUTANEOUS PORT Left 8/19/2019    Procedure: [id number];  Surgeon: Megha Monreal MD;  Location: [facility] OR 2ND FLR;  Service: General;  Laterality: Left;    INSERTION OF TUNNELED CENTRAL VENOUS CATHETER (CVC) WITH SUBCUTANEOUS PORT N/A 9/12/2019    Procedure: [id number];  Surgeon: [facility] Diagnostic Provider;  Location: [facility] OR 2ND FLR;  Service: Radiology;  Laterality: N/A;  189  port removal and port placement    KIDNEY TRANSPLANT      peritoneal dialysis catheter placement       Family History   Problem Relation Age of Onset    Hyperlipidemia Mother     Cancer Father         lung    Lung cancer Father     No Known Problems Brother     Diabetes Maternal Aunt     Diabetes Maternal Uncle     Hypertension Maternal Grandmother     Hypertension Paternal Grandmother     Heart disease Paternal Grandmother      Social History     Tobacco Use    Smoking status: Former Smoker     Packs/day: 0.50     Types: Cigarettes    Smokeless tobacco: Never Used    Tobacco comment: quit smoking 5 years ago   Substance Use Topics    Alcohol use: No    Drug use: No     Types: Cocaine, Marijuana     Comment: last use for cocaine at age 17; 2 years ago last use for THC     Review of Systems   Constitutional: Positive for chills and fever (subjective).   HENT: Negative for congestion, sore throat and trouble swallowing.    Eyes: Negative for pain and redness.   Respiratory: Positive for shortness of breath.    Cardiovascular: Negative for chest pain.   Gastrointestinal: Positive for abdominal distention and diarrhea. Negative for abdominal pain, nausea and vomiting.   Genitourinary: Negative for dysuria and hematuria.   Musculoskeletal: Negative for neck pain and neck stiffness. "   Skin: Negative for rash and wound.   Neurological: Positive for weakness (generalized). Negative for light-headedness and headaches.       Physical Exam     Initial Vitals [11/12/19 0409]   BP Pulse Resp Temp SpO2   (!) 185/89 110 20 (!) 101.1 °F (38.4 °C) (!) 92 %      MAP       --         Physical Exam    Constitutional: He appears well-developed and well-nourished. He is not diaphoretic. No distress.   HENT:   Head: Normocephalic and atraumatic.   Pale mucus membranes   Eyes: EOM are normal. Pupils are equal, round, and reactive to light.   Neck: Normal range of motion. Neck supple.   Cardiovascular: Regular rhythm and intact distal pulses. Tachycardia present.    +2 edema RUE and RLE,   Pulmonary/Chest: He has rales.   Crackles left lung fields and lower right lung fields   Abdominal: Soft. He exhibits distension. There is no tenderness.   Musculoskeletal: Normal range of motion. He exhibits no tenderness.   Neurological: He is alert and oriented to person, place, and time. He has normal strength. No cranial nerve deficit or sensory deficit.   Skin: Skin is warm and dry. No erythema.        ED Course   Procedures  Labs Reviewed   CBC W/ AUTO DIFFERENTIAL - Abnormal; Notable for the following components:       Result Value    RBC 3.18 (*)     Hemoglobin 7.6 (*)     Hematocrit 26.0 (*)     Mean Corpuscular Hemoglobin 23.9 (*)     Mean Corpuscular Hemoglobin Conc 29.2 (*)     RDW 17.2 (*)     Lymph # 0.7 (*)     Gran% 81.1 (*)     Lymph% 12.1 (*)     All other components within normal limits   URINALYSIS, REFLEX TO URINE CULTURE - Abnormal; Notable for the following components:    Appearance, UA Hazy (*)     Protein, UA 3+ (*)     All other components within normal limits    Narrative:     Preferred Collection Type->Urine, Clean Catch   URINALYSIS MICROSCOPIC - Abnormal; Notable for the following components:    Bacteria Few (*)     All other components within normal limits    Narrative:     Preferred Collection  Type->Urine, Clean Catch   ISTAT PROCEDURE - Abnormal; Notable for the following components:    POC Glucose 118 (*)     POC Creatinine 2.5 (*)     POC Potassium 3.4 (*)     POC Ionized Calcium 1.04 (*)     POC Hematocrit 22 (*)     All other components within normal limits   CULTURE, BLOOD   CULTURE, BLOOD   LACTIC ACID, PLASMA   COMPREHENSIVE METABOLIC PANEL   MAGNESIUM   PHOSPHORUS   POCT INFLUENZA A/B MOLECULAR          Imaging Results          X-Ray Chest AP Portable (Final result)  Result time 11/12/19 05:23:30    Final result by Tanvir Garces MD (11/12/19 05:23:30)                 Impression:      Abnormal opacity involving the mid to inferior right hemithorax likely relates to pulmonary infiltrate/airspace disease and suspected component of pleural fluid, with mild infiltrates seen at the left lung base.    Central venous catheter noted, as above.      Electronically signed by: Tanvir Garces  Date:    11/12/2019  Time:    05:23             Narrative:    EXAMINATION:  XR CHEST AP PORTABLE    CLINICAL HISTORY:  Sepsis;    TECHNIQUE:  Single frontal view of the chest was performed.    COMPARISON:  August 19, 2019    FINDINGS:  Single chest view is submitted.  There is a left-sided subcutaneous central venous catheter noted, the distal tip is seen just to the right of midline, projected slightly superiorly, the distal aspect of the catheter appearing in a somewhat curved configuration, convex inferiorly, this likely courses along the left brachiocephalic vein with the distal tip likely within the superior vena cava in a near horizontal position, with slight superior upturned, as above.    The patient is rotated.  When accounting for rotation the cardiomediastinal silhouette is thought stable exaggerated by rotation.  There is mild infiltrate at the left infrahilar left lung base, however there is somewhat more prominent opacity seen on the right, with right perihilar infiltrate and abnormal opacity  involving the mid to inferior right hemithorax thought likely representative of pulmonary infiltrates/airspace disease and potential component of pleural fluid.  There is no evidence for pneumothorax.  The osseous structures appear intact.                                 Medical Decision Making:   History:   Old Medical Records: I decided to obtain old medical records.  Old Records Summarized: records from clinic visits and records from previous admission(s).  Clinical Tests:   Lab Tests: Ordered and Reviewed  Radiological Study: Ordered and Reviewed  Medical Tests: Ordered and Reviewed  Sepsis Perfusion Assessment: I attest, a sepsis perfusion exam was performed within 6 hours of Septic Shock presentation, following fluid resuscitation.  Other:   I have discussed this case with another health care provider.       <> Summary of the Discussion: Hem-Onc       APC / Resident Notes:   39 year old male with medical history of HIV, latent TB, Kaposi sarcoma, Hx of Kidney Tx 2017 2/2 ESRD presenting to the ED c/o subjective fever, diaphoresis, chills, SOB, abdominal distention for 1 day. DDx includes but not limited to sepsis, pneumonia, influenza, viral syndrome, hypervolemia, pulmonary edema, CHF, respiratory failure, electrolyte disturbance, transplant rejection    Work-up shows WBC 5.9, H/H 7.6/26 (BL 6.7-9.5/22-31), Lactate 1.3, Influenza negative. Chem8 Crt 2.5 (BL 3), K 3.4  UA negative for UTI  CXR shows mid to inferior right hemithorax opacity likely relating to pulmonary infiltrate/airspace disease and suspected component of pleural fluid. Mild infiltrates seen at the left lung base.    Etiology most consistent with sepsis with PNA as suspected infectious source. Cefepime and Vanc given for coverage. Gentle IVF given as patient is hypoxic and do not want to volume overload. Discussed with hem-onc and they will admit the patient to their service for further management. Patient expresses understanding and agreeable  to the plan. I have discussed the care of this patient with my supervising physician.                             Clinical Impression:       ICD-10-CM ICD-9-CM   1. Pneumonia of right lower lobe due to infectious organism J18.1 486   2. SOB (shortness of breath) R06.02 786.05   3. Tachycardia R00.0 785.0   4. Kaposi sarcoma C46.9 176.9         Disposition:   Disposition: Admitted  Condition: Serious                     Lucio Pool PA-C  11/12/19 0617

## 2019-11-13 ENCOUNTER — PATIENT MESSAGE (OUTPATIENT)
Dept: TRANSPLANT | Facility: CLINIC | Age: 39
End: 2019-11-13

## 2019-11-13 LAB
ALBUMIN SERPL BCP-MCNC: 2.3 G/DL (ref 3.5–5.2)
ALP SERPL-CCNC: 78 U/L (ref 55–135)
ALT SERPL W/O P-5'-P-CCNC: 10 U/L (ref 10–44)
ANION GAP SERPL CALC-SCNC: 11 MMOL/L (ref 8–16)
AST SERPL-CCNC: 19 U/L (ref 10–40)
BASOPHILS # BLD AUTO: 0.01 K/UL (ref 0–0.2)
BASOPHILS NFR BLD: 0.1 % (ref 0–1.9)
BILIRUB SERPL-MCNC: 0.4 MG/DL (ref 0.1–1)
BUN SERPL-MCNC: 30 MG/DL (ref 6–20)
CALCIUM SERPL-MCNC: 8.5 MG/DL (ref 8.7–10.5)
CHLORIDE SERPL-SCNC: 107 MMOL/L (ref 95–110)
CO2 SERPL-SCNC: 20 MMOL/L (ref 23–29)
CREAT SERPL-MCNC: 2.4 MG/DL (ref 0.5–1.4)
CREAT UR-MCNC: 217 MG/DL (ref 23–375)
DIFFERENTIAL METHOD: ABNORMAL
EOSINOPHIL # BLD AUTO: 0 K/UL (ref 0–0.5)
EOSINOPHIL NFR BLD: 0.3 % (ref 0–8)
ERYTHROCYTE [DISTWIDTH] IN BLOOD BY AUTOMATED COUNT: 17.4 % (ref 11.5–14.5)
EST. GFR  (AFRICAN AMERICAN): 37.6 ML/MIN/1.73 M^2
EST. GFR  (NON AFRICAN AMERICAN): 32.5 ML/MIN/1.73 M^2
GLUCOSE SERPL-MCNC: 176 MG/DL (ref 70–110)
HCT VFR BLD AUTO: 25.7 % (ref 40–54)
HGB BLD-MCNC: 8 G/DL (ref 14–18)
IMM GRANULOCYTES # BLD AUTO: 0.05 K/UL (ref 0–0.04)
IMM GRANULOCYTES NFR BLD AUTO: 0.6 % (ref 0–0.5)
LYMPHOCYTES # BLD AUTO: 0.7 K/UL (ref 1–4.8)
LYMPHOCYTES NFR BLD: 8.1 % (ref 18–48)
MCH RBC QN AUTO: 24.2 PG (ref 27–31)
MCHC RBC AUTO-ENTMCNC: 31.1 G/DL (ref 32–36)
MCV RBC AUTO: 78 FL (ref 82–98)
MONOCYTES # BLD AUTO: 0.5 K/UL (ref 0.3–1)
MONOCYTES NFR BLD: 5.4 % (ref 4–15)
NEUTROPHILS # BLD AUTO: 7.6 K/UL (ref 1.8–7.7)
NEUTROPHILS NFR BLD: 85.5 % (ref 38–73)
NRBC BLD-RTO: 0 /100 WBC
PLATELET # BLD AUTO: 174 K/UL (ref 150–350)
PMV BLD AUTO: 10.5 FL (ref 9.2–12.9)
POTASSIUM SERPL-SCNC: 4.2 MMOL/L (ref 3.5–5.1)
PROT SERPL-MCNC: 5.6 G/DL (ref 6–8.4)
PROT UR-MCNC: 185 MG/DL (ref 0–15)
PROT/CREAT UR: 0.85 MG/G{CREAT} (ref 0–0.2)
RBC # BLD AUTO: 3.3 M/UL (ref 4.6–6.2)
SIROLIMUS BLD-MCNC: 3 NG/ML (ref 4–20)
SODIUM SERPL-SCNC: 138 MMOL/L (ref 136–145)
WBC # BLD AUTO: 8.88 K/UL (ref 3.9–12.7)

## 2019-11-13 PROCEDURE — 20600001 HC STEP DOWN PRIVATE ROOM

## 2019-11-13 PROCEDURE — 99233 PR SUBSEQUENT HOSPITAL CARE,LEVL III: ICD-10-PCS | Mod: ,,, | Performed by: INTERNAL MEDICINE

## 2019-11-13 PROCEDURE — 86361 T CELL ABSOLUTE COUNT: CPT

## 2019-11-13 PROCEDURE — 25000003 PHARM REV CODE 250: Performed by: STUDENT IN AN ORGANIZED HEALTH CARE EDUCATION/TRAINING PROGRAM

## 2019-11-13 PROCEDURE — 36415 COLL VENOUS BLD VENIPUNCTURE: CPT

## 2019-11-13 PROCEDURE — 63600175 PHARM REV CODE 636 W HCPCS

## 2019-11-13 PROCEDURE — 63600175 PHARM REV CODE 636 W HCPCS: Performed by: INTERNAL MEDICINE

## 2019-11-13 PROCEDURE — 94761 N-INVAS EAR/PLS OXIMETRY MLT: CPT

## 2019-11-13 PROCEDURE — 63600175 PHARM REV CODE 636 W HCPCS: Performed by: STUDENT IN AN ORGANIZED HEALTH CARE EDUCATION/TRAINING PROGRAM

## 2019-11-13 PROCEDURE — 25000242 PHARM REV CODE 250 ALT 637 W/ HCPCS: Performed by: STUDENT IN AN ORGANIZED HEALTH CARE EDUCATION/TRAINING PROGRAM

## 2019-11-13 PROCEDURE — 80053 COMPREHEN METABOLIC PANEL: CPT

## 2019-11-13 PROCEDURE — 99223 1ST HOSP IP/OBS HIGH 75: CPT | Mod: GC,,, | Performed by: INTERNAL MEDICINE

## 2019-11-13 PROCEDURE — 99223 PR INITIAL HOSPITAL CARE,LEVL III: ICD-10-PCS | Mod: GC,,, | Performed by: INTERNAL MEDICINE

## 2019-11-13 PROCEDURE — 99223 PR INITIAL HOSPITAL CARE,LEVL III: ICD-10-PCS | Mod: ,,, | Performed by: INTERNAL MEDICINE

## 2019-11-13 PROCEDURE — 27000221 HC OXYGEN, UP TO 24 HOURS

## 2019-11-13 PROCEDURE — 99223 1ST HOSP IP/OBS HIGH 75: CPT | Mod: ,,, | Performed by: INTERNAL MEDICINE

## 2019-11-13 PROCEDURE — 87086 URINE CULTURE/COLONY COUNT: CPT

## 2019-11-13 PROCEDURE — 99900035 HC TECH TIME PER 15 MIN (STAT)

## 2019-11-13 PROCEDURE — 94640 AIRWAY INHALATION TREATMENT: CPT

## 2019-11-13 PROCEDURE — 25000003 PHARM REV CODE 250

## 2019-11-13 PROCEDURE — 84156 ASSAY OF PROTEIN URINE: CPT

## 2019-11-13 PROCEDURE — 99233 SBSQ HOSP IP/OBS HIGH 50: CPT | Mod: ,,, | Performed by: INTERNAL MEDICINE

## 2019-11-13 PROCEDURE — 85025 COMPLETE CBC W/AUTO DIFF WBC: CPT

## 2019-11-13 RX ORDER — IPRATROPIUM BROMIDE AND ALBUTEROL SULFATE 2.5; .5 MG/3ML; MG/3ML
3 SOLUTION RESPIRATORY (INHALATION) EVERY 4 HOURS
Status: DISCONTINUED | OUTPATIENT
Start: 2019-11-13 | End: 2019-11-19 | Stop reason: HOSPADM

## 2019-11-13 RX ORDER — ACETAMINOPHEN 325 MG/1
650 TABLET ORAL EVERY 6 HOURS PRN
Status: DISCONTINUED | OUTPATIENT
Start: 2019-11-13 | End: 2019-11-19 | Stop reason: HOSPADM

## 2019-11-13 RX ADMIN — IPRATROPIUM BROMIDE AND ALBUTEROL SULFATE 3 ML: .5; 3 SOLUTION RESPIRATORY (INHALATION) at 08:11

## 2019-11-13 RX ADMIN — FAMOTIDINE 20 MG: 20 TABLET ORAL at 09:11

## 2019-11-13 RX ADMIN — ACETAMINOPHEN 650 MG: 325 TABLET ORAL at 05:11

## 2019-11-13 RX ADMIN — PIPERACILLIN AND TAZOBACTAM 4.5 G: 4; .5 INJECTION, POWDER, FOR SOLUTION INTRAVENOUS at 09:11

## 2019-11-13 RX ADMIN — CEFEPIME 1 G: 1 INJECTION, POWDER, FOR SOLUTION INTRAMUSCULAR; INTRAVENOUS at 04:11

## 2019-11-13 RX ADMIN — CALCIUM CARBONATE (ANTACID) CHEW TAB 500 MG 500 MG: 500 CHEW TAB at 09:11

## 2019-11-13 RX ADMIN — EMTRICITABINE AND TENOFOVIR ALAFENAMIDE 1 TABLET: 200; 25 TABLET ORAL at 02:11

## 2019-11-13 RX ADMIN — SODIUM BICARBONATE 650 MG TABLET 1950 MG: at 09:11

## 2019-11-13 RX ADMIN — DOLUTEGRAVIR SODIUM 50 MG: 50 TABLET, FILM COATED ORAL at 09:11

## 2019-11-13 RX ADMIN — IPRATROPIUM BROMIDE AND ALBUTEROL SULFATE 3 ML: .5; 3 SOLUTION RESPIRATORY (INHALATION) at 04:11

## 2019-11-13 RX ADMIN — IPRATROPIUM BROMIDE AND ALBUTEROL SULFATE 3 ML: .5; 3 SOLUTION RESPIRATORY (INHALATION) at 12:11

## 2019-11-13 RX ADMIN — ATOVAQUONE 750 MG: 750 SUSPENSION ORAL at 09:11

## 2019-11-13 RX ADMIN — NIFEDIPINE 60 MG: 30 TABLET, FILM COATED, EXTENDED RELEASE ORAL at 09:11

## 2019-11-13 RX ADMIN — IPRATROPIUM BROMIDE AND ALBUTEROL SULFATE 3 ML: .5; 3 SOLUTION RESPIRATORY (INHALATION) at 11:11

## 2019-11-13 RX ADMIN — CARVEDILOL 12.5 MG: 12.5 TABLET, FILM COATED ORAL at 09:11

## 2019-11-13 RX ADMIN — CARVEDILOL 12.5 MG: 12.5 TABLET, FILM COATED ORAL at 04:11

## 2019-11-13 RX ADMIN — SODIUM BICARBONATE 650 MG TABLET 1950 MG: at 02:11

## 2019-11-13 RX ADMIN — ROSUVASTATIN CALCIUM 20 MG: 10 TABLET, FILM COATED ORAL at 09:11

## 2019-11-13 RX ADMIN — IPRATROPIUM BROMIDE AND ALBUTEROL SULFATE 3 ML: .5; 3 SOLUTION RESPIRATORY (INHALATION) at 07:11

## 2019-11-13 RX ADMIN — SIROLIMUS 4 MG: 1 TABLET ORAL at 09:11

## 2019-11-13 RX ADMIN — PIPERACILLIN AND TAZOBACTAM 4.5 G: 4; .5 INJECTION, POWDER, FOR SOLUTION INTRAVENOUS at 01:11

## 2019-11-13 RX ADMIN — PREDNISONE 5 MG: 5 TABLET ORAL at 09:11

## 2019-11-13 RX ADMIN — HEPARIN SODIUM 5000 UNITS: 5000 INJECTION, SOLUTION INTRAVENOUS; SUBCUTANEOUS at 02:11

## 2019-11-13 RX ADMIN — HEPARIN SODIUM 5000 UNITS: 5000 INJECTION, SOLUTION INTRAVENOUS; SUBCUTANEOUS at 09:11

## 2019-11-13 RX ADMIN — HEPARIN SODIUM 5000 UNITS: 5000 INJECTION, SOLUTION INTRAVENOUS; SUBCUTANEOUS at 05:11

## 2019-11-13 NOTE — NURSING
Hem onc notified of coarse breath sounds, patient on Lasix prn at home and requesting dose as feels fluid overload, LR nearly complete, dcd  Order for NS, patient also inquiring about being seen by kidney tx service

## 2019-11-13 NOTE — PLAN OF CARE
- AAOx4. VSS. Afebrile.  - 1L NC sats> 92%.  - L UA midline, CDI.  - Denies pain/discomfort.  - Blood/urine cx NGTD. Lactic WNL.  - CXR concerning for possible PNA.  - Abx transitioned to IV zosyn q8h.  - Cr trending up. Holding lasix.  - KTS/nephrology consulted.  - Resp tx continued. Awaiting sputum sample.  - Renal diet. Appetite adequate.  - Up independently ad sy. In NAD. WCTM.

## 2019-11-13 NOTE — ASSESSMENT & PLAN NOTE
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  He is now presenting with fevers, cough and SOB at home x 3 days, here found to be febrile to 101, with RLL opacity on CXR with possible pleural effusion. He feels signficantly better since admit with IVF and abx  - suspect typical bacterial process (strep pneumo), less likely MDRO less likely fungal or KS lung involvement  - can stop vancomycin, unlikely MRSA pneumonia  - continue cefepime for now and monitor response  - try to get sputum culture if productive cough  - would discuss with pulm if pleural effusion consider thoracentesis given complicated history, may need noncon CT chest  - would also recommend TTE given pericardial effusion described on PET from 10/23  - will follow

## 2019-11-13 NOTE — PLAN OF CARE
Pt is AAOx3.Pt is ambulatory and independent.Pt is in good spirits.  Pt denies pian and/or discomfort at this time.No breakdown noted, po fluids encouraged.Standard precautions maintained.  Pt turns independently, pt is aware of bony area and pressure reduction positions.  Tmax 101.6 prn given. Resp tx Q4 breath sound coarse. 2L O2 via NC. Pt remains injury and fall free, non skid footwear donned, call light within reach, personal items within reach, bed in low/locked position, pt able to voice needs all needs voiced have been met at this time.

## 2019-11-13 NOTE — ASSESSMENT & PLAN NOTE
Patient is presenting with fever, tachycardia and Tachypnea   CXR with likely right mid and lower lobe pneumonia   Follow blood cultures and respiratory panel  Received Vanc/Cefepime in the ER, Cefepime inpatient   He spiked a fever last night and although this is expected as defervescence can take up to 48 hours with antibiotic therapy we will change antibiotic therapy to Vanc and Zosyn to add MRSA and anaerobic coverage since he is immunocompromised

## 2019-11-13 NOTE — ASSESSMENT & PLAN NOTE
Crt stable   C/w Home Sirolimus and Prednisone at 5 mg daily   Check a Sirolimus level and consult transplant nephrology given decreased UOP  Monitor CMP

## 2019-11-13 NOTE — SUBJECTIVE & OBJECTIVE
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 Hematoma evacuated.(09/09)    Status post exploratory laparotomy 9/10/2017    Re explored for retroperitoneal hematoma. Hematoma evacuated.(09/09)    TB lung, latent     tx INH 2006       Past Surgical History:   Procedure Laterality Date    BIOPSY N/A 8/28/2018    Procedure: BIOPSY Tranplanted Kidney;  Surgeon: [facility] Diagnostic Provider;  Location: [facility] OR 2ND FLR;  Service: Radiology;  Laterality: N/A;    EXPLORATORY LAPAROTOMY W/ BOWEL RESECTION      NO BOWEL RESECTION, UNKNOWN DETAILS, "Pancreas Infection"    INSERTION OF TUNNELED CENTRAL VENOUS CATHETER (CVC) WITH SUBCUTANEOUS PORT Left 8/19/2019    Procedure: [id number];  Surgeon: Megha Monreal MD;  [facility];  Service: General;  Laterality: Left;    INSERTION OF TUNNELED CENTRAL VENOUS CATHETER (CVC) WITH SUBCUTANEOUS PORT N/A 9/12/2019    Procedure: [id number];  Surgeon: [facility] Diagnostic Provider;  Location: [facility] OR 2ND FLR;  Service: Radiology;  Laterality: N/A;  189  port removal and port placement    KIDNEY TRANSPLANT      peritoneal dialysis catheter placement         Review of patient's allergies indicates:  No Known Allergies    Medications:  Medications Prior to Admission   Medication Sig    atovaquone (MEPRON) 750 mg/5 mL Susp Take 5 mLs (750 mg total) by mouth once daily.    carvedilol (COREG) 6.25 MG tablet Take 2 tablets (12.5 mg total) by mouth 2 (two) times daily with meals.    cinacalcet (SENSIPAR) 60 MG Tab Take 1 tablet (60 mg total) by mouth daily with " breakfast.    dolutegravir (TIVICAY) 50 mg Tab Take 1 tablet (50 mg total) by mouth once daily.    emtricitabine-tenofovir alafen (DESCOVY) 200-25 mg Tab Take 1 tablet by mouth once daily.    famotidine (PEPCID) 20 MG tablet TAKE 1 TABLET (20MG TOTAL) BY MOUTH EACH EVENING    multivitamin (THERAGRAN) tablet Take 1 tablet by mouth once daily.    patiromer calcium sorbitex (VELTASSA) 25.2 gram PwPk Take 1 packet (25.2 g total) by mouth once daily.    predniSONE (DELTASONE) 5 MG tablet TAKE ONE TABLET BY MOUTH EVERY DAY    rosuvastatin (CRESTOR) 20 MG tablet Take 1 tablet (20 mg total) by mouth once daily.    sevelamer carbonate (RENVELA) 800 mg Tab Take 1 tablet (800 mg total) by mouth 3 (three) times daily with meals.    sirolimus (RAPAMUNE) 1 MG Tab Take 4 tablets (4 mg total) by mouth once daily.    sodium bicarbonate 650 MG tablet Take 3 tablets (1,950 mg total) by mouth 3 (three) times daily.    calcium carbonate (TUMS) 200 mg calcium (500 mg) chewable tablet Take 1 tablet (500 mg total) by mouth once daily.    HYDROcodone-acetaminophen (NORCO) 5-325 mg per tablet Take 1 tablet by mouth every 4 (four) hours as needed for Pain.    NIFEdipine (PROCARDIA-XL) 30 MG (OSM) 24 hr tablet Take 2 tablets (60 mg total) by mouth every 12 (twelve) hours.     Antibiotics (From admission, onward)    Start     Stop Route Frequency Ordered    11/13/19 1230  piperacillin-tazobactam 4.5 g in sodium chloride 0.9% 100 mL IVPB (ready to mix system)      -- IV Every 8 hours (non-standard times) 11/13/19 1132    11/13/19 1200  vancomycin 750 mg in dextrose 5 % 250 mL IVPB (ready to mix system)      -- IV Every 24 hours (non-standard times) 11/13/19 1132        Antifungals (From admission, onward)    None        Antivirals (From admission, onward)        Stop Route Frequency     emtricitabine-tenofovir alafen      -- Oral Daily     dolutegravir      -- Oral Daily           Immunization History   Administered Date(s)  Administered    Influenza - High Dose - PF (65 years and older) 09/11/2019       Family History     Problem Relation (Age of Onset)    Cancer Father    Diabetes Maternal Aunt, Maternal Uncle    Heart disease Paternal Grandmother    Hyperlipidemia Mother    Hypertension Maternal Grandmother, Paternal Grandmother    Lung cancer Father    No Known Problems Brother        Social History     Socioeconomic History    Marital status: Single     Spouse name: Not on file    Number of children: Not on file    Years of education: Not on file    Highest education level: Not on file   Occupational History    Not on file   Social Needs    Financial resource strain: Not on file    Food insecurity:     Worry: Not on file     Inability: Not on file    Transportation needs:     Medical: Not on file     Non-medical: Not on file   Tobacco Use    Smoking status: Former Smoker     Packs/day: 0.50     Types: Cigarettes    Smokeless tobacco: Never Used    Tobacco comment: quit smoking 5 years ago   Substance and Sexual Activity    Alcohol use: No    Drug use: No     Types: Cocaine, Marijuana     Comment: last use for cocaine at age 17; 2 years ago last use for THC    Sexual activity: Not Currently   Lifestyle    Physical activity:     Days per week: Not on file     Minutes per session: Not on file    Stress: Not on file   Relationships    Social connections:     Talks on phone: Not on file     Gets together: Not on file     Attends Roman Catholic service: Not on file     Active member of club or organization: Not on file     Attends meetings of clubs or organizations: Not on file     Relationship status: Not on file   Other Topics Concern    Not on file   Social History Narrative    Not on file     Review of Systems   Constitutional: Negative for activity change, appetite change, chills, fatigue and fever.   HENT: Negative for congestion, dental problem, mouth sores and sinus pressure.    Eyes: Negative for pain, redness and  visual disturbance.   Respiratory: Positive for cough and shortness of breath. Negative for wheezing.    Cardiovascular: Negative for chest pain and leg swelling.   Gastrointestinal: Negative for abdominal distention, abdominal pain, diarrhea, nausea and vomiting.   Endocrine: Negative for polyuria.   Genitourinary: Negative for decreased urine volume, dysuria and frequency.   Musculoskeletal: Negative for joint swelling.   Skin: Negative for color change.   Allergic/Immunologic: Negative for food allergies.   Neurological: Negative for dizziness, weakness and headaches.   Hematological: Negative for adenopathy.   Psychiatric/Behavioral: Negative for agitation and confusion. The patient is not nervous/anxious.      Objective:     Vital Signs (Most Recent):  Temp: 99.9 °F (37.7 °C) (11/13/19 1253)  Pulse: 84 (11/13/19 1253)  Resp: 18 (11/13/19 1148)  BP: (!) 142/91 (11/13/19 1253)  SpO2: 96 % (11/13/19 1253) Vital Signs (24h Range):  Temp:  [98.3 °F (36.8 °C)-101.6 °F (38.7 °C)] 99.9 °F (37.7 °C)  Pulse:  [] 84  Resp:  [18-29] 18  SpO2:  [94 %-99 %] 96 %  BP: (133-181)/() 142/91     Weight: 68 kg (150 lb)  Body mass index is 22.81 kg/m².    Estimated Creatinine Clearance: 39.4 mL/min (A) (based on SCr of 2.4 mg/dL (H)).    Physical Exam   Constitutional: He is oriented to person, place, and time. He appears well-developed and well-nourished.   HENT:   Head: Normocephalic and atraumatic.   Mouth/Throat: Oropharynx is clear and moist.   Eyes: Conjunctivae are normal.   Neck: Neck supple.   Cardiovascular: Normal rate, regular rhythm and normal heart sounds.   No murmur heard.  Pulmonary/Chest: Effort normal. No respiratory distress. He has no wheezes. He has rales.   Abdominal: Soft. Bowel sounds are normal. He exhibits no distension. There is no tenderness.   Musculoskeletal: Normal range of motion. He exhibits no edema or tenderness.   Lymphadenopathy:     He has no cervical adenopathy.   Neurological: He  is alert and oriented to person, place, and time. Coordination normal.   Skin: Skin is warm and dry. No rash noted.   Psychiatric: He has a normal mood and affect. His behavior is normal.       Significant Labs:   CBC:   Recent Labs   Lab 11/12/19  0458 11/12/19  0459 11/13/19  0622   WBC 5.96  --  8.88   HGB 7.6*  --  8.0*   HCT 26.0* 22* 25.7*     --  174     CMP:   Recent Labs   Lab 11/12/19  0601 11/13/19  0951    138   K 3.4* 4.2    107   CO2 25 20*   * 176*   BUN 25* 30*   CREATININE 2.2* 2.4*   CALCIUM 7.4* 8.5*   PROT 5.2* 5.6*   ALBUMIN 2.5* 2.3*   BILITOT 0.4 0.4   ALKPHOS 70 78   AST 19 19   ALT 11 10   ANIONGAP 7* 11   EGFRNONAA 36.1* 32.5*       Significant Imaging: I have reviewed all pertinent imaging results/findings within the past 24 hours.

## 2019-11-13 NOTE — PLAN OF CARE
MDRs completed with Dr. Avina and the team. Patient of Dr. Amador with a history of kaposi's sarcoma. Patient also has a PMHx of HIV on ART, ESRD previous on iHD s/p transplant with hx of rejection (on chronic immunosuppression), anemia of chronic disease, latent TB s/p treatment. Patient admitted on 11/12/19 with c/o fevers, chills, dyspnea, and cough. Patient worked up for sepsis. Cultures pending. CXR shows likely pneumonia. VSS. T max 101.1. O2 sats 92-96% on 4L per nasal cannula. Patient maintained on Cefepime 1 g IV every 12 hours. Patient received a IVF boluses and IV antibiotics in the ED. Patient also received lactated ringers at 100 mL/hr x 24c hours for fluid resuscitation. Labs stable. CBC and CMP daily. MD discussed the plan of care with the patient. Discharge needs and expected discharge date to be determined. Patient verbalized understanding. CM to continue to follow with the team.    Nessa Garcia, RN, BSN, CM  [facility]  Nurse - Med Onc/Gyn Onc

## 2019-11-13 NOTE — CONSULTS
[facility]  Infectious Disease  Consult Note    Patient Name: Demetrio Aguiar  MRN: [id number]  Admission Date: 11/12/2019  Hospital Length of Stay: 1 days  Attending Physician: Stephan Avina MD  Primary Care Provider: Primary Doctor No     Isolation Status: No active isolations    Patient information was obtained from patient and past medical records.      Inpatient consult to Infectious Diseases  Consult performed by: Otf Miramontes MD  Consult ordered by: Omer Og MD  Reason for consult: pneumonia        Assessment/Plan:     * Pneumonia due to infectious organism  38 y/o M h/o HIV (CD4 193, VL UD) on TAF/FTC+DTG c/b HIVAN s/p DBDKT 8/18/2017 (CMV D-R+ thymo induction, now on sirolimus) c/b ABMR, c/b RP hematoma s/p washout 9/8/2017, latent TB s/p treatment, recent diagnosis of visceral KS s/p 4 cycles of doxo tolerated the first 4 cycles well tolerated with good response RLE swelling has decreased and PET scan in October shows decrease in tumor, LAD bulk though it also shows new cardiomegaly and effusion.  He is now presenting with fevers, cough and SOB at home x 3 days, here found to be febrile to 101, with RLL opacity on CXR with possible pleural effusion. He feels signficantly better since admit with IVF and abx  - suspect typical bacterial process (strep pneumo), less likely MDRO less likely fungal or KS lung involvement  - can stop vancomycin, unlikely MRSA pneumonia  - continue cefepime for now and monitor response  - try to get sputum culture if productive cough  - would discuss with pulm if pleural effusion consider thoracentesis given complicated history, may need noncon CT chest  - would also recommend TTE given pericardial effusion described on PET from 10/23  - will follow        Thank you for your consult. I will follow-up with patient. Please contact us if you have any additional questions.    Otf Miramontes MD  Infectious Disease  [facility]    Subjective:     Principal Problem: Pneumonia due to infectious organism    HPI: 40 y/o M h/o HIV (CD4 193, VL UD) on TAF/FTC+DTG c/b HIVAN s/p DBDKT 8/18/2017 (CMV D-R+ thymo induction, now on sirolimus) c/b ABMR, c/b RP hematoma s/p washout 9/8/2017, latent TB s/p treatment, recent diagnosis of visceral KS s/p 4 cycles of doxo tolerated the first 4 cycles well tolerated with good response RLE swelling has decreased and PET scan in October shows decrease in tumor, LAD bulk though it also shows new cardiomegaly and effusion.  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 Hematoma evacuated.(09/09)    Status post exploratory laparotomy 9/10/2017    Re explored for retroperitoneal hematoma. Hematoma evacuated.(09/09)    TB lung, latent     tx INH 2006       Past Surgical History:   Procedure Laterality Date    BIOPSY N/A 8/28/2018    Procedure: BIOPSY Tranplanted Kidney;  Surgeon: [facility];  Location: [facility] OR [address] [place] [address];  Service: Radiology;  Laterality: N/A;    EXPLORATORY LAPAROTOMY W/ BOWEL RESECTION      NO BOWEL RESECTION, UNKNOWN DETAILS, "Pancreas Infection"    INSERTION OF TUNNELED CENTRAL VENOUS CATHETER (CVC) WITH SUBCUTANEOUS PORT Left 8/19/2019    Procedure: [id number];  Surgeon: Megha Monreal MD;  Location: [facility] OR [address] [place] [address];  Service: General;  Laterality: Left;    " INSERTION OF TUNNELED CENTRAL VENOUS CATHETER (CVC) WITH SUBCUTANEOUS PORT N/A 9/12/2019    Procedure: [id number];  Surgeon: [facility] Provider;  Location: [facility] OR [address] [place] [address];  Service: Radiology;  Laterality: N/A;  189  port removal and port placement    KIDNEY TRANSPLANT      peritoneal dialysis catheter placement         Review of patient's allergies indicates:  No Known Allergies    Medications:  Medications Prior to Admission   Medication Sig    atovaquone (MEPRON) 750 mg/5 mL Susp Take 5 mLs (750 mg total) by mouth once daily.    carvedilol (COREG) 6.25 MG tablet Take 2 tablets (12.5 mg total) by mouth 2 (two) times daily with meals.    cinacalcet (SENSIPAR) 60 MG Tab Take 1 tablet (60 mg total) by mouth daily with breakfast.    dolutegravir (TIVICAY) 50 mg Tab Take 1 tablet (50 mg total) by mouth once daily.    emtricitabine-tenofovir alafen (DESCOVY) 200-25 mg Tab Take 1 tablet by mouth once daily.    famotidine (PEPCID) 20 MG tablet TAKE 1 TABLET (20MG TOTAL) BY MOUTH EACH EVENING    multivitamin (THERAGRAN) tablet Take 1 tablet by mouth once daily.    patiromer calcium sorbitex (VELTASSA) 25.2 gram PwPk Take 1 packet (25.2 g total) by mouth once daily.    predniSONE (DELTASONE) 5 MG tablet TAKE ONE TABLET BY MOUTH EVERY DAY    rosuvastatin (CRESTOR) 20 MG tablet Take 1 tablet (20 mg total) by mouth once daily.    sevelamer carbonate (RENVELA) 800 mg Tab Take 1 tablet (800 mg total) by mouth 3 (three) times daily with meals.    sirolimus (RAPAMUNE) 1 MG Tab Take 4 tablets (4 mg total) by mouth once daily.    sodium bicarbonate 650 MG tablet Take 3 tablets (1,950 mg total) by mouth 3 (three) times daily.    calcium carbonate (TUMS) 200 mg calcium (500 mg) chewable tablet Take 1 tablet (500 mg total) by mouth once daily.    HYDROcodone-acetaminophen (NORCO) 5-325 mg per tablet Take 1 tablet by mouth every 4 (four) hours as needed for Pain.    NIFEdipine (PROCARDIA-XL) 30 MG  (OSM) 24 hr tablet Take 2 tablets (60 mg total) by mouth every 12 (twelve) hours.     Antibiotics (From admission, onward)    Start     Stop Route Frequency Ordered    11/13/19 1230  piperacillin-tazobactam 4.5 g in sodium chloride 0.9% 100 mL IVPB (ready to mix system)      -- IV Every 8 hours (non-standard times) 11/13/19 1132    11/13/19 1200  vancomycin 750 mg in dextrose 5 % 250 mL IVPB (ready to mix system)      -- IV Every 24 hours (non-standard times) 11/13/19 1132        Antifungals (From admission, onward)    None        Antivirals (From admission, onward)        Stop Route Frequency     emtricitabine-tenofovir alafen      -- Oral Daily     dolutegravir      -- Oral Daily           Immunization History   Administered Date(s) Administered    Influenza - High Dose - PF (65 years and older) 09/11/2019       Family History     Problem Relation (Age of Onset)    Cancer Father    Diabetes Maternal Aunt, Maternal Uncle    Heart disease Paternal Grandmother    Hyperlipidemia Mother    Hypertension Maternal Grandmother, Paternal Grandmother    Lung cancer Father    No Known Problems Brother        Social History     Socioeconomic History    Marital status: Single     Spouse name: Not on file    Number of children: Not on file    Years of education: Not on file    Highest education level: Not on file   Occupational History    Not on file   Social Needs    Financial resource strain: Not on file    Food insecurity:     Worry: Not on file     Inability: Not on file    Transportation needs:     Medical: Not on file     Non-medical: Not on file   Tobacco Use    Smoking status: Former Smoker     Packs/day: 0.50     Types: Cigarettes    Smokeless tobacco: Never Used    Tobacco comment: quit smoking 5 years ago   Substance and Sexual Activity    Alcohol use: No    Drug use: No     Types: Cocaine, Marijuana     Comment: last use for cocaine at age 17; 2 years ago last use for THC    Sexual activity: Not  Currently   Lifestyle    Physical activity:     Days per week: Not on file     Minutes per session: Not on file    Stress: Not on file   Relationships    Social connections:     Talks on phone: Not on file     Gets together: Not on file     Attends Jain service: Not on file     Active member of club or organization: Not on file     Attends meetings of clubs or organizations: Not on file     Relationship status: Not on file   Other Topics Concern    Not on file   Social History Narrative    Not on file     Review of Systems   Constitutional: Negative for activity change, appetite change, chills, fatigue and fever.   HENT: Negative for congestion, dental problem, mouth sores and sinus pressure.    Eyes: Negative for pain, redness and visual disturbance.   Respiratory: Positive for cough and shortness of breath. Negative for wheezing.    Cardiovascular: Negative for chest pain and leg swelling.   Gastrointestinal: Negative for abdominal distention, abdominal pain, diarrhea, nausea and vomiting.   Endocrine: Negative for polyuria.   Genitourinary: Negative for decreased urine volume, dysuria and frequency.   Musculoskeletal: Negative for joint swelling.   Skin: Negative for color change.   Allergic/Immunologic: Negative for food allergies.   Neurological: Negative for dizziness, weakness and headaches.   Hematological: Negative for adenopathy.   Psychiatric/Behavioral: Negative for agitation and confusion. The patient is not nervous/anxious.      Objective:     Vital Signs (Most Recent):  Temp: 99.9 °F (37.7 °C) (11/13/19 1253)  Pulse: 84 (11/13/19 1253)  Resp: 18 (11/13/19 1148)  BP: (!) 142/91 (11/13/19 1253)  SpO2: 96 % (11/13/19 1253) Vital Signs (24h Range):  Temp:  [98.3 °F (36.8 °C)-101.6 °F (38.7 °C)] 99.9 °F (37.7 °C)  Pulse:  [] 84  Resp:  [18-29] 18  SpO2:  [94 %-99 %] 96 %  BP: (133-181)/() 142/91     Weight: 68 kg (150 lb)  Body mass index is 22.81 kg/m².    Estimated Creatinine  Clearance: 39.4 mL/min (A) (based on SCr of 2.4 mg/dL (H)).    Physical Exam   Constitutional: He is oriented to person, place, and time. He appears well-developed and well-nourished.   HENT:   Head: Normocephalic and atraumatic.   Mouth/Throat: Oropharynx is clear and moist.   Eyes: Conjunctivae are normal.   Neck: Neck supple.   Cardiovascular: Normal rate, regular rhythm and normal heart sounds.   No murmur heard.  Pulmonary/Chest: Effort normal. No respiratory distress. He has no wheezes. He has rales.   Abdominal: Soft. Bowel sounds are normal. He exhibits no distension. There is no tenderness.   Musculoskeletal: Normal range of motion. He exhibits no edema or tenderness.   Lymphadenopathy:     He has no cervical adenopathy.   Neurological: He is alert and oriented to person, place, and time. Coordination normal.   Skin: Skin is warm and dry. No rash noted.   Psychiatric: He has a normal mood and affect. His behavior is normal.       Significant Labs:   CBC:   Recent Labs   Lab 11/12/19  0458 11/12/19  0459 11/13/19  0622   WBC 5.96  --  8.88   HGB 7.6*  --  8.0*   HCT 26.0* 22* 25.7*     --  174     CMP:   Recent Labs   Lab 11/12/19  0601 11/13/19  0951    138   K 3.4* 4.2    107   CO2 25 20*   * 176*   BUN 25* 30*   CREATININE 2.2* 2.4*   CALCIUM 7.4* 8.5*   PROT 5.2* 5.6*   ALBUMIN 2.5* 2.3*   BILITOT 0.4 0.4   ALKPHOS 70 78   AST 19 19   ALT 11 10   ANIONGAP 7* 11   EGFRNONAA 36.1* 32.5*       Significant Imaging: I have reviewed all pertinent imaging results/findings within the past 24 hours.

## 2019-11-13 NOTE — SUBJECTIVE & OBJECTIVE
Interval History:   Seen and examined today. Feels somewhat better. Still spiking fevers. Complains of decreased UOP and generalized edema.     Oncology Treatment Plan:   OP SARCOMA DOXORUBICIN LIPOSOMAL Q3W    Medications:  Continuous Infusions:  Scheduled Meds:   albuterol-ipratropium  3 mL Nebulization Q4H    atovaquone  750 mg Oral Daily    calcium carbonate  1 tablet Oral Daily    carvedilol  12.5 mg Oral BID WM    dolutegravir  50 mg Oral Daily    emtricitabine-tenofovir alafen  1 tablet Oral Daily    famotidine  20 mg Oral Daily    heparin (porcine)  5,000 Units Subcutaneous Q8H    NIFEdipine  60 mg Oral Q12H    piperacillin-tazobactam (ZOSYN) IVPB  4.5 g Intravenous Q8H    predniSONE  5 mg Oral Daily    rosuvastatin  20 mg Oral Daily    sirolimus  4 mg Oral Daily    sodium bicarbonate  1,950 mg Oral TID    vancomycin (VANCOCIN) IVPB  750 mg Intravenous Q24H     PRN Meds:acetaminophen, dextrose 10 % in water (D10W), dextrose 10 % in water (D10W), glucagon (human recombinant), glucose, glucose, HYDROcodone-acetaminophen, sodium chloride 0.9%     Review of Systems   Constitutional: Positive for appetite change, fatigue and fever. Negative for activity change, chills and diaphoresis.   Respiratory: Positive for shortness of breath. Negative for cough.    Cardiovascular: Positive for leg swelling. Negative for chest pain.   Gastrointestinal: Negative for abdominal pain, blood in stool, diarrhea, nausea and vomiting.   Genitourinary: Positive for decreased urine volume. Negative for dysuria.   Neurological: Negative for light-headedness.     Objective:     Vital Signs (Most Recent):  Temp: 99.9 °F (37.7 °C) (11/13/19 1253)  Pulse: 84 (11/13/19 1253)  Resp: 18 (11/13/19 1148)  BP: (!) 142/91 (11/13/19 1253)  SpO2: 96 % (11/13/19 1253) Vital Signs (24h Range):  Temp:  [98.3 °F (36.8 °C)-101.6 °F (38.7 °C)] 99.9 °F (37.7 °C)  Pulse:  [] 84  Resp:  [18-29] 18  SpO2:  [94 %-99 %] 96 %  BP:  (133-181)/() 142/91     Weight: 68 kg (150 lb)  Body mass index is 22.81 kg/m².  Body surface area is 1.81 meters squared.      Intake/Output Summary (Last 24 hours) at 11/13/2019 1356  Last data filed at 11/13/2019 1310  Gross per 24 hour   Intake 1350 ml   Output 850 ml   Net 500 ml       Physical Exam   Constitutional: He appears well-developed and well-nourished. No distress.   HENT:   Head: Normocephalic and atraumatic.   Mouth/Throat: Oropharynx is clear and moist. No oropharyngeal exudate.   Eyes: Conjunctivae are normal. Right eye exhibits no discharge. Left eye exhibits no discharge. No scleral icterus.   Neck: No JVD present.   Cardiovascular: Normal rate, regular rhythm, normal heart sounds and intact distal pulses. Exam reveals no gallop and no friction rub.   No murmur heard.  Pulmonary/Chest: Effort normal and breath sounds normal. No stridor. No respiratory distress. He has no wheezes. He has no rales.   Abdominal: Soft. Bowel sounds are normal. He exhibits no distension and no mass. There is no tenderness. There is no guarding.   Musculoskeletal: He exhibits edema.   Skin lesions noted on lower extremities    Lymphadenopathy:     He has no cervical adenopathy.   Skin: He is not diaphoretic.   Nursing note and vitals reviewed.      Significant Labs:   CBC:   Recent Labs   Lab 11/12/19  0458 11/12/19  0459 11/13/19  0622   WBC 5.96  --  8.88   HGB 7.6*  --  8.0*   HCT 26.0* 22* 25.7*     --  174    and CMP:   Recent Labs   Lab 11/12/19  0601 11/13/19  0951    138   K 3.4* 4.2    107   CO2 25 20*   * 176*   BUN 25* 30*   CREATININE 2.2* 2.4*   CALCIUM 7.4* 8.5*   PROT 5.2* 5.6*   ALBUMIN 2.5* 2.3*   BILITOT 0.4 0.4   ALKPHOS 70 78   AST 19 19   ALT 11 10   ANIONGAP 7* 11   EGFRNONAA 36.1* 32.5*

## 2019-11-13 NOTE — PROGRESS NOTES
[facility]  Hematology/Oncology  Progress Note    Patient Name: Demetrio Aguiar  Admission Date: 11/12/2019  Hospital Length of Stay: 1 days  Code Status: Full Code     Subjective:     HPI:    Oncologic Hx from chart:    Demetrio Aguiar is a 39 y.o. male with PMHx of HIV on ART, Kaposi sarcoma, ESRD previous on iHD s/p transplant with hx of rejection (on chronic immunosuppression), anemia of chronic disease, latent TB s/p treatment, HTN and HLD.     He is s/p 4 cycles of doxo for symptomatic metastatic kaposi.     He tolerated the first  4 cycles well without obvious complaints. RLE swelling has decreased and he has improved QOL.     PET scan in October shows decrease in tumor, LAD bulk though it also shows new cardiomegaly and effusion, which is somewhat concerning.     HPI:  Patient is presenting with complaints of fevers, chills, dyspnea and cough. He denies any diarrhea, abdominal pain or dysuria. Vital signs in the ER revealed fever, tachycardia and tachypnea. CBC revealed no neutropenia. CMP revealed stable renal function. CXR with opacity in right mid to inferior hemithorax potentially related to pneumonia and pleural effusion. He received 1 liter bolus and IV Vancomycin and Cefepime.        Interval History:   Seen and examined today. Feels somewhat better. Still spiking fevers. Complains of decreased UOP and generalized edema.     Oncology Treatment Plan:   OP SARCOMA DOXORUBICIN LIPOSOMAL Q3W    Medications:  Continuous Infusions:  Scheduled Meds:   albuterol-ipratropium  3 mL Nebulization Q4H    atovaquone  750 mg Oral Daily    calcium carbonate  1 tablet Oral Daily    carvedilol  12.5 mg Oral BID WM    dolutegravir  50 mg Oral Daily    emtricitabine-tenofovir alafen  1 tablet Oral Daily    famotidine  20 mg Oral Daily    heparin (porcine)  5,000 Units Subcutaneous Q8H    NIFEdipine  60 mg Oral Q12H    piperacillin-tazobactam (ZOSYN) IVPB  4.5 g Intravenous Q8H     predniSONE  5 mg Oral Daily    rosuvastatin  20 mg Oral Daily    sirolimus  4 mg Oral Daily    sodium bicarbonate  1,950 mg Oral TID    vancomycin (VANCOCIN) IVPB  750 mg Intravenous Q24H     PRN Meds:acetaminophen, dextrose 10 % in water (D10W), dextrose 10 % in water (D10W), glucagon (human recombinant), glucose, glucose, HYDROcodone-acetaminophen, sodium chloride 0.9%     Review of Systems   Constitutional: Positive for appetite change, fatigue and fever. Negative for activity change, chills and diaphoresis.   Respiratory: Positive for shortness of breath. Negative for cough.    Cardiovascular: Positive for leg swelling. Negative for chest pain.   Gastrointestinal: Negative for abdominal pain, blood in stool, diarrhea, nausea and vomiting.   Genitourinary: Positive for decreased urine volume. Negative for dysuria.   Neurological: Negative for light-headedness.     Objective:     Vital Signs (Most Recent):  Temp: 99.9 °F (37.7 °C) (11/13/19 1253)  Pulse: 84 (11/13/19 1253)  Resp: 18 (11/13/19 1148)  BP: (!) 142/91 (11/13/19 1253)  SpO2: 96 % (11/13/19 1253) Vital Signs (24h Range):  Temp:  [98.3 °F (36.8 °C)-101.6 °F (38.7 °C)] 99.9 °F (37.7 °C)  Pulse:  [] 84  Resp:  [18-29] 18  SpO2:  [94 %-99 %] 96 %  BP: (133-181)/() 142/91     Weight: 68 kg (150 lb)  Body mass index is 22.81 kg/m².  Body surface area is 1.81 meters squared.      Intake/Output Summary (Last 24 hours) at 11/13/2019 1356  Last data filed at 11/13/2019 1310  Gross per 24 hour   Intake 1350 ml   Output 850 ml   Net 500 ml       Physical Exam   Constitutional: He appears well-developed and well-nourished. No distress.   HENT:   Head: Normocephalic and atraumatic.   Mouth/Throat: Oropharynx is clear and moist. No oropharyngeal exudate.   Eyes: Conjunctivae are normal. Right eye exhibits no discharge. Left eye exhibits no discharge. No scleral icterus.   Neck: No JVD present.   Cardiovascular: Normal rate, regular rhythm, normal heart  sounds and intact distal pulses. Exam reveals no gallop and no friction rub.   No murmur heard.  Pulmonary/Chest: Effort normal and breath sounds normal. No stridor. No respiratory distress. He has no wheezes. He has no rales.   Abdominal: Soft. Bowel sounds are normal. He exhibits no distension and no mass. There is no tenderness. There is no guarding.   Musculoskeletal: He exhibits edema.   Skin lesions noted on lower extremities    Lymphadenopathy:     He has no cervical adenopathy.   Skin: He is not diaphoretic.   Nursing note and vitals reviewed.      Significant Labs:   CBC:   Recent Labs   Lab 19  0458 19  0459 19  0622   WBC 5.96  --  8.88   HGB 7.6*  --  8.0*   HCT 26.0* 22* 25.7*     --  174    and CMP:   Recent Labs   Lab 19  0601 19  0951    138   K 3.4* 4.2    107   CO2 25 20*   * 176*   BUN 25* 30*   CREATININE 2.2* 2.4*   CALCIUM 7.4* 8.5*   PROT 5.2* 5.6*   ALBUMIN 2.5* 2.3*   BILITOT 0.4 0.4   ALKPHOS 70 78   AST 19 19   ALT 11 10   ANIONGAP 7* 11   EGFRNONAA 36.1* 32.5*     Assessment/Plan:     Sepsis  Patient is presenting with fever, tachycardia and Tachypnea   CXR with likely right mid and lower lobe pneumonia   Follow blood cultures and respiratory panel  Received Vanc/Cefepime in the ER, Cefepime inpatient   He spiked a fever last night and although this is expected as defervescence can take up to 48 hours with antibiotic therapy we will change antibiotic therapy to Vanc and Zosyn to add MRSA and anaerobic coverage since he is immunocompromised     Renovascular hypertension  C/w home Coreg and Nifedipine     Kaposi sarcoma  HIV related   Currently on HAART and Adriamycin Q3W for symptomatic metastatic KS  He is due for Adriamycin tomorrow however this is going to be postponed given acute illness     Immunosuppression  Due to HIV infection, anti rejection meds and chemotherapy   C/w Atovaquone for PCP PPx     donor kidney  transplant 8/18/2017  Crt stable   C/w Home Sirolimus and Prednisone at 5 mg daily   Check a Sirolimus level and consult transplant nephrology given decreased UOP  Monitor CMP    Anemia in stage 3 chronic kidney disease  H/H stable, monitor CBC    HIV (human immunodeficiency virus infection)  Controlled, last viral load was undetectable   Will continue with home Tivicay and Descovy (Descovy is non formulary however this gentleman with renal dysfunction is unable to tolerate Truvada which the formulary alternative).   Check CD4 count and consult ID for co management     Hyperlipidemia  C/w Porfirio Og MD  Hematology/Oncology  [facility]      I have reviewed the notes, assessments, and/or procedures performed by the housestaff, as above.  I have personally interviewed and examined the patient at the beside, and rounded with the housestaff. I concur with her/his assessment and plan and the documentation of Demetrio Aguiar.  I, Dr. Stephan Avina, personally spent more than  35 mins during this encounter, greater than 50% was spent in direct counseling and/or coordination of care.     Stephan Avina M.D., M.S., F.A.C.P.  Hematology/Oncology Attending  [facility]

## 2019-11-13 NOTE — PROGRESS NOTES
Therapy with vanconycin complete and/or consult discontinued by provider.  Pharmacy will sign off, please re-consult as needed.

## 2019-11-14 LAB
ALBUMIN SERPL BCP-MCNC: 2.1 G/DL (ref 3.5–5.2)
ALP SERPL-CCNC: 90 U/L (ref 55–135)
ALT SERPL W/O P-5'-P-CCNC: 9 U/L (ref 10–44)
ANION GAP SERPL CALC-SCNC: 8 MMOL/L (ref 8–16)
ASCENDING AORTA: 3.16 CM
AST SERPL-CCNC: 16 U/L (ref 10–40)
AV INDEX (PROSTH): 0.77
AV MEAN GRADIENT: 6 MMHG
AV PEAK GRADIENT: 10 MMHG
AV VALVE AREA: 3.03 CM2
AV VELOCITY RATIO: 0.83
BASOPHILS # BLD AUTO: 0.01 K/UL (ref 0–0.2)
BASOPHILS NFR BLD: 0.1 % (ref 0–1.9)
BILIRUB SERPL-MCNC: 0.5 MG/DL (ref 0.1–1)
BSA FOR ECHO PROCEDURE: 1.81 M2
BUN SERPL-MCNC: 32 MG/DL (ref 6–20)
CALCIUM SERPL-MCNC: 8.9 MG/DL (ref 8.7–10.5)
CD3+CD4+ CELLS # BLD: 65 CELLS/UL (ref 300–1400)
CD3+CD4+ CELLS NFR BLD: 9.7 % (ref 28–57)
CHLORIDE SERPL-SCNC: 106 MMOL/L (ref 95–110)
CO2 SERPL-SCNC: 24 MMOL/L (ref 23–29)
CREAT SERPL-MCNC: 2.5 MG/DL (ref 0.5–1.4)
CV ECHO LV RWT: 0.29 CM
DIFFERENTIAL METHOD: ABNORMAL
DOP CALC AO PEAK VEL: 1.62 M/S
DOP CALC AO VTI: 31.28 CM
DOP CALC LVOT AREA: 3.9 CM2
DOP CALC LVOT DIAMETER: 2.24 CM
DOP CALC LVOT PEAK VEL: 1.34 M/S
DOP CALC LVOT STROKE VOLUME: 94.65 CM3
DOP CALCLVOT PEAK VEL VTI: 24.03 CM
E WAVE DECELERATION TIME: 151.53 MSEC
E/A RATIO: 1.4
E/E' RATIO: 11.33 M/S
ECHO LV POSTERIOR WALL: 0.9 CM (ref 0.6–1.1)
EOSINOPHIL # BLD AUTO: 0.1 K/UL (ref 0–0.5)
EOSINOPHIL NFR BLD: 1.3 % (ref 0–8)
ERYTHROCYTE [DISTWIDTH] IN BLOOD BY AUTOMATED COUNT: 17.4 % (ref 11.5–14.5)
EST. GFR  (AFRICAN AMERICAN): 35.8 ML/MIN/1.73 M^2
EST. GFR  (NON AFRICAN AMERICAN): 31 ML/MIN/1.73 M^2
FRACTIONAL SHORTENING: 32 % (ref 28–44)
GLUCOSE SERPL-MCNC: 123 MG/DL (ref 70–110)
HCT VFR BLD AUTO: 23.8 % (ref 40–54)
HGB BLD-MCNC: 7.3 G/DL (ref 14–18)
IMM GRANULOCYTES # BLD AUTO: 0.05 K/UL (ref 0–0.04)
IMM GRANULOCYTES NFR BLD AUTO: 0.6 % (ref 0–0.5)
INTERVENTRICULAR SEPTUM: 1 CM (ref 0.6–1.1)
IVRT: 0.06 MSEC
LA MAJOR: 6.77 CM
LA MINOR: 6.68 CM
LA WIDTH: 4.9 CM
LEFT ATRIUM SIZE: 4.03 CM
LEFT ATRIUM VOLUME INDEX: 62.4 ML/M2
LEFT ATRIUM VOLUME: 112.87 CM3
LEFT INTERNAL DIMENSION IN SYSTOLE: 4.3 CM (ref 2.1–4)
LEFT VENTRICLE DIASTOLIC VOLUME INDEX: 89.49 ML/M2
LEFT VENTRICLE DIASTOLIC VOLUME: 161.86 ML
LEFT VENTRICLE MASS INDEX: 139 G/M2
LEFT VENTRICLE SYSTOLIC VOLUME INDEX: 46 ML/M2
LEFT VENTRICLE SYSTOLIC VOLUME: 83.13 ML
LEFT VENTRICULAR INTERNAL DIMENSION IN DIASTOLE: 6.3 CM (ref 3.5–6)
LEFT VENTRICULAR MASS: 251.3 G
LV LATERAL E/E' RATIO: 10.2 M/S
LV SEPTAL E/E' RATIO: 12.75 M/S
LYMPHOCYTES # BLD AUTO: 0.7 K/UL (ref 1–4.8)
LYMPHOCYTES NFR BLD: 8.9 % (ref 18–48)
MAGNESIUM SERPL-MCNC: 2.1 MG/DL (ref 1.6–2.6)
MCH RBC QN AUTO: 24.2 PG (ref 27–31)
MCHC RBC AUTO-ENTMCNC: 30.7 G/DL (ref 32–36)
MCV RBC AUTO: 79 FL (ref 82–98)
MONOCYTES # BLD AUTO: 0.6 K/UL (ref 0.3–1)
MONOCYTES NFR BLD: 7.2 % (ref 4–15)
MV PEAK A VEL: 0.73 M/S
MV PEAK E VEL: 1.02 M/S
NEUTROPHILS # BLD AUTO: 6.5 K/UL (ref 1.8–7.7)
NEUTROPHILS NFR BLD: 81.9 % (ref 38–73)
NRBC BLD-RTO: 0 /100 WBC
PHOSPHATE SERPL-MCNC: 3.3 MG/DL (ref 2.7–4.5)
PISA TR MAX VEL: 3.49 M/S
PLATELET # BLD AUTO: 165 K/UL (ref 150–350)
PMV BLD AUTO: 10.4 FL (ref 9.2–12.9)
POTASSIUM SERPL-SCNC: 3.9 MMOL/L (ref 3.5–5.1)
PROT SERPL-MCNC: 5.9 G/DL (ref 6–8.4)
PULM VEIN S/D RATIO: 0.49
PV PEAK D VEL: 0.79 M/S
PV PEAK S VEL: 0.39 M/S
RA MAJOR: 5.55 CM
RA PRESSURE: 3 MMHG
RA WIDTH: 4.56 CM
RBC # BLD AUTO: 3.02 M/UL (ref 4.6–6.2)
RIGHT VENTRICULAR END-DIASTOLIC DIMENSION: 4.53 CM
RV TISSUE DOPPLER FREE WALL SYSTOLIC VELOCITY 1 (APICAL 4 CHAMBER VIEW): 15.42 CM/S
SINUS: 3.36 CM
SIROLIMUS BLD-MCNC: 3 NG/ML (ref 4–20)
SODIUM SERPL-SCNC: 138 MMOL/L (ref 136–145)
STJ: 2.52 CM
TDI LATERAL: 0.1 M/S
TDI SEPTAL: 0.08 M/S
TDI: 0.09 M/S
TR MAX PG: 49 MMHG
TRICUSPID ANNULAR PLANE SYSTOLIC EXCURSION: 2.95 CM
TV REST PULMONARY ARTERY PRESSURE: 52 MMHG
WBC # BLD AUTO: 7.95 K/UL (ref 3.9–12.7)

## 2019-11-14 PROCEDURE — 83735 ASSAY OF MAGNESIUM: CPT

## 2019-11-14 PROCEDURE — 99223 PR INITIAL HOSPITAL CARE,LEVL III: ICD-10-PCS | Mod: GC,,, | Performed by: EMERGENCY MEDICINE

## 2019-11-14 PROCEDURE — 25000242 PHARM REV CODE 250 ALT 637 W/ HCPCS: Performed by: STUDENT IN AN ORGANIZED HEALTH CARE EDUCATION/TRAINING PROGRAM

## 2019-11-14 PROCEDURE — 94640 AIRWAY INHALATION TREATMENT: CPT

## 2019-11-14 PROCEDURE — 94761 N-INVAS EAR/PLS OXIMETRY MLT: CPT

## 2019-11-14 PROCEDURE — 36415 COLL VENOUS BLD VENIPUNCTURE: CPT

## 2019-11-14 PROCEDURE — 99233 SBSQ HOSP IP/OBS HIGH 50: CPT | Mod: ,,, | Performed by: INTERNAL MEDICINE

## 2019-11-14 PROCEDURE — 84100 ASSAY OF PHOSPHORUS: CPT

## 2019-11-14 PROCEDURE — 99233 PR SUBSEQUENT HOSPITAL CARE,LEVL III: ICD-10-PCS | Mod: ,,, | Performed by: INTERNAL MEDICINE

## 2019-11-14 PROCEDURE — 80053 COMPREHEN METABOLIC PANEL: CPT

## 2019-11-14 PROCEDURE — 80195 ASSAY OF SIROLIMUS: CPT

## 2019-11-14 PROCEDURE — 20600001 HC STEP DOWN PRIVATE ROOM

## 2019-11-14 PROCEDURE — 63600175 PHARM REV CODE 636 W HCPCS: Performed by: INTERNAL MEDICINE

## 2019-11-14 PROCEDURE — 63600175 PHARM REV CODE 636 W HCPCS

## 2019-11-14 PROCEDURE — 25000003 PHARM REV CODE 250

## 2019-11-14 PROCEDURE — 85025 COMPLETE CBC W/AUTO DIFF WBC: CPT

## 2019-11-14 PROCEDURE — 63600175 PHARM REV CODE 636 W HCPCS: Performed by: STUDENT IN AN ORGANIZED HEALTH CARE EDUCATION/TRAINING PROGRAM

## 2019-11-14 PROCEDURE — 99223 1ST HOSP IP/OBS HIGH 75: CPT | Mod: GC,,, | Performed by: EMERGENCY MEDICINE

## 2019-11-14 RX ORDER — FUROSEMIDE 10 MG/ML
80 INJECTION INTRAMUSCULAR; INTRAVENOUS ONCE
Status: COMPLETED | OUTPATIENT
Start: 2019-11-14 | End: 2019-11-14

## 2019-11-14 RX ORDER — ATOVAQUONE 750 MG/5ML
1500 SUSPENSION ORAL DAILY
Status: DISCONTINUED | OUTPATIENT
Start: 2019-11-15 | End: 2019-11-19 | Stop reason: HOSPADM

## 2019-11-14 RX ORDER — SODIUM,POTASSIUM PHOSPHATES 280-250MG
1 POWDER IN PACKET (EA) ORAL ONCE
Status: DISCONTINUED | OUTPATIENT
Start: 2019-11-14 | End: 2019-11-14

## 2019-11-14 RX ORDER — MAGNESIUM SULFATE HEPTAHYDRATE 40 MG/ML
2 INJECTION, SOLUTION INTRAVENOUS
Status: DISCONTINUED | OUTPATIENT
Start: 2019-11-14 | End: 2019-11-14

## 2019-11-14 RX ADMIN — PIPERACILLIN AND TAZOBACTAM 4.5 G: 4; .5 INJECTION, POWDER, FOR SOLUTION INTRAVENOUS at 12:11

## 2019-11-14 RX ADMIN — HEPARIN SODIUM 5000 UNITS: 5000 INJECTION, SOLUTION INTRAVENOUS; SUBCUTANEOUS at 08:11

## 2019-11-14 RX ADMIN — NIFEDIPINE 60 MG: 30 TABLET, FILM COATED, EXTENDED RELEASE ORAL at 08:11

## 2019-11-14 RX ADMIN — CEFTRIAXONE 2 G: 2 INJECTION, SOLUTION INTRAVENOUS at 08:11

## 2019-11-14 RX ADMIN — CARVEDILOL 12.5 MG: 12.5 TABLET, FILM COATED ORAL at 08:11

## 2019-11-14 RX ADMIN — SODIUM BICARBONATE 650 MG TABLET 1950 MG: at 04:11

## 2019-11-14 RX ADMIN — CARVEDILOL 12.5 MG: 12.5 TABLET, FILM COATED ORAL at 05:11

## 2019-11-14 RX ADMIN — IPRATROPIUM BROMIDE AND ALBUTEROL SULFATE 3 ML: .5; 3 SOLUTION RESPIRATORY (INHALATION) at 12:11

## 2019-11-14 RX ADMIN — IPRATROPIUM BROMIDE AND ALBUTEROL SULFATE 3 ML: .5; 3 SOLUTION RESPIRATORY (INHALATION) at 11:11

## 2019-11-14 RX ADMIN — VANCOMYCIN HYDROCHLORIDE 750 MG: 750 INJECTION, POWDER, LYOPHILIZED, FOR SOLUTION INTRAVENOUS at 10:11

## 2019-11-14 RX ADMIN — PREDNISONE 5 MG: 5 TABLET ORAL at 08:11

## 2019-11-14 RX ADMIN — HYDROCODONE BITARTRATE AND ACETAMINOPHEN 1 TABLET: 5; 325 TABLET ORAL at 08:11

## 2019-11-14 RX ADMIN — SODIUM BICARBONATE 650 MG TABLET 1950 MG: at 08:11

## 2019-11-14 RX ADMIN — ATOVAQUONE 750 MG: 750 SUSPENSION ORAL at 08:11

## 2019-11-14 RX ADMIN — FAMOTIDINE 20 MG: 20 TABLET ORAL at 08:11

## 2019-11-14 RX ADMIN — IPRATROPIUM BROMIDE AND ALBUTEROL SULFATE 3 ML: .5; 3 SOLUTION RESPIRATORY (INHALATION) at 07:11

## 2019-11-14 RX ADMIN — IPRATROPIUM BROMIDE AND ALBUTEROL SULFATE 3 ML: .5; 3 SOLUTION RESPIRATORY (INHALATION) at 08:11

## 2019-11-14 RX ADMIN — EMTRICITABINE AND TENOFOVIR ALAFENAMIDE 1 TABLET: 200; 25 TABLET ORAL at 08:11

## 2019-11-14 RX ADMIN — SIROLIMUS 4 MG: 1 TABLET ORAL at 08:11

## 2019-11-14 RX ADMIN — IPRATROPIUM BROMIDE AND ALBUTEROL SULFATE 3 ML: .5; 3 SOLUTION RESPIRATORY (INHALATION) at 04:11

## 2019-11-14 RX ADMIN — ROSUVASTATIN CALCIUM 20 MG: 10 TABLET, FILM COATED ORAL at 08:11

## 2019-11-14 RX ADMIN — CALCIUM CARBONATE (ANTACID) CHEW TAB 500 MG 500 MG: 500 CHEW TAB at 08:11

## 2019-11-14 RX ADMIN — DOLUTEGRAVIR SODIUM 50 MG: 50 TABLET, FILM COATED ORAL at 08:11

## 2019-11-14 RX ADMIN — PIPERACILLIN AND TAZOBACTAM 4.5 G: 4; .5 INJECTION, POWDER, FOR SOLUTION INTRAVENOUS at 04:11

## 2019-11-14 RX ADMIN — HEPARIN SODIUM 5000 UNITS: 5000 INJECTION, SOLUTION INTRAVENOUS; SUBCUTANEOUS at 04:11

## 2019-11-14 RX ADMIN — FUROSEMIDE 80 MG: 10 INJECTION, SOLUTION INTRAMUSCULAR; INTRAVENOUS at 10:11

## 2019-11-14 NOTE — HPI
Mr. Demetrio Aguiar is a 39 y.o. year old Black or  male who received a  - brain death kidney transplant on 17.  He has CKD stage 3 - GFR 30-59 and his baseline creatinine is between 1.8-2.1. He takes prednisone and sirolimus for maintenance immunosuppression.          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   - Biopsy proven ABMR with weakly positive class I DSA's on 17. Pt treated with SMP x 3 (-9/3). PLEX x6 (completed ) and IVIG x 2 (-).    - Kidney US on 17 showed a large collection located along the anterior inferior margin of the renal transplant measuring 13.4 x 7 x 11.9. taken back to the OR for retroperitoneal bleed and washout.   -Biopsy 10/17/17 (LEFTY): 14 glomeruli, <5% fibrosis, no MC severo..  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 Amador for Kaposi's Sarcoma and has been treated with Doxorubicin x 4 doses. Has been tolerated well with improved in Tumor scottie according to oncology is note dated 2019.  In addition it also showed cardiomegaly with new evidence of pleural effusion.     -Biopsy 1/15/18: No ACR, AVR, ABMR, CD4 neg. Less than 10 % fibrosis.   -Biopsy (for DGF) 17: good sample with 24 glomeruli (only 1 sclerosed) and evidence just meeting criteria for ABMR. + diffuse ATI. Minimal fibrosis, no ACR or AVR, but + CD4 (>50% diffusely + stain) with mild glomerulitis and focal peritubular capillaritis. Also, + ca-oxalate (3) and ca-phos (2) crystals.  -18 WEAK DSA DETECTED: CW5(3923)      PCP PROPHYLAXIS: Bactrim LIFELONG--> changed to Mepron ~ 2018  CMV PROPHYLAXIS: Valcyte until 17  FUNGAL PROPHYLAXIS: Nystatin until 9/15/17    He presented to the emergency room  complaining of fever documented 101.6 had degrees F in the emergency room.  He was admitted to Medical Oncology secondary to fever of unknown source.  He still had fever spike last night has been covered with broad-spectrum antibiotics vancomycin and cefepime since admission and was transition to vancomycin and Zosyn today.  He has been pan culture urine culture was added today secondary to high risk.  Mr. Aguiar complains of difficulty breathing and evidence of volume overload with significant edema chest x-ray shows pleural effusion.  In addition, he also complains of decreased urinary output.  His serum creatinine slowly rising prior to admission was1.9 and today is 2.4.

## 2019-11-14 NOTE — ASSESSMENT & PLAN NOTE
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  He is now presenting with fevers, cough and SOB at home x 3 days, here found to be febrile to 101, with RLL opacity on CXR with possible pleural effusion. He feels signficantly better since admit with IVF and abx  - suspect typical bacterial process (strep pneumo), less likely MDRO less likely fungal or KS lung involvement  - recommend transition pip/tazo to ceftriaxone  - try to get sputum culture if productive cough  - would discuss with pulm if pleural effusion consider thoracentesis given complicated history, may need noncon CT chest  - would also recommend TTE given pericardial effusion described on PET from 10/23  - continue lifelong PJP ppx per protocol - would consider rechallenging tmp/smx as is drug of choice  - continue current antiretroviral therapy  - will follow

## 2019-11-14 NOTE — ASSESSMENT & PLAN NOTE
Likely a component of cardiorenal   TTE with decreased EF  We will challenge him with IV Lasix as above

## 2019-11-14 NOTE — SUBJECTIVE & OBJECTIVE
Subjective:     History of Present Illness:   Mr. Demetrio Aguiar is a 39 y.o. year old Black or  male who received a  - brain death kidney transplant on 17.  He has CKD stage 3 - GFR 30-59 and his baseline creatinine is between 1.8-2.1. He takes prednisone and sirolimus for maintenance immunosuppression.          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   - Biopsy proven ABMR with weakly positive class I DSA's on 17. Pt treated with SMP x 3 (-9/3). PLEX x6 (completed ) and IVIG x 2 (-).    - Kidney US on 17 showed a large collection located along the anterior inferior margin of the renal transplant measuring 13.4 x 7 x 11.9. taken back to the OR for retroperitoneal bleed and washout.   -Biopsy 10/17/17 (LEFTY): 14 glomeruli, <5% fibrosis, no MC severo..  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 Amador for Kaposi's Sarcoma and has been treated with Doxorubicin x 4 doses. Has been tolerated well with improved in Tumor scottie according to oncology is note dated 2019.  In addition it also showed cardiomegaly with new evidence of pleural effusion.     -Biopsy 1/15/18: No ACR, AVR, ABMR, CD4 neg. Less than 10 % fibrosis.   -Biopsy (for DGF) 17: good sample with 24 glomeruli (only 1 sclerosed) and evidence just meeting criteria for ABMR. + diffuse ATI. Minimal fibrosis, no ACR or AVR, but + CD4 (>50% diffusely + stain) with mild glomerulitis and focal peritubular capillaritis. Also, + ca-oxalate (3) and ca-phos (2) crystals.  -18 WEAK DSA DETECTED: CW5(1119)      PCP PROPHYLAXIS: Bactrim LIFELONG--> changed to Mepron ~ 2018  CMV PROPHYLAXIS: Valcyte until 17  FUNGAL PROPHYLAXIS: Nystatin until  9/15/17    He presented to the emergency room complaining of fever documented 101.6 had degrees F in the emergency room.  He was admitted to Medical Oncology secondary to fever of unknown source.  He still had fever spike last night has been covered with broad-spectrum antibiotics vancomycin and cefepime since admission and was transition to vancomycin and Zosyn today.  He has been pan culture urine culture was added today secondary to high risk.  Mr. Aguiar complains of difficulty breathing and evidence of volume overload with significant edema chest x-ray shows pleural effusion.  In addition, he also complains of decreased urinary output.  His serum creatinine slowly rising prior to admission was1.9 and today is 2.4.     Mr. Aguiar is a 39 y.o. year old male who is status post Kidney Transplant - 8/18/2017  (#1).    He received induction therapy with Thymoglobulin and his maintenance immunosuppression consists of:   Immunosuppressants (From admission, onward)    Start     Stop Route Frequency Ordered    11/12/19 1000  sirolimus tablet 4 mg      -- Oral Daily 11/12/19 0856          Past Medical and Surgical History: Mr. Aguiar has a past medical history of Anemia, At risk for opportunistic infections, Delayed graft function of kidney, ESRD secondary to HIVAN s/p DDRT 8/18/17, HIV infection, HIV nephropathy, Hyperlipidemia, Hypertension, Need for prophylactic immunotherapy, S/P kidney transplant 8/18/2017 (8/28/2017), Status post exploratory laparotomy (9/10/2017), Status post exploratory laparotomy (9/10/2017), and TB lung, latent.  He has a past surgical history that includes peritoneal dialysis catheter placement; Exploratory laparotomy w/ bowel resection; Kidney transplant; Biopsy (N/A, 8/28/2018); Insertion of tunneled central venous catheter (CVC) with subcutaneous port (Left, 8/19/2019); and Insertion of tunneled central venous catheter (CVC) with subcutaneous port (N/A, 9/12/2019).    Past Social and Family  History: Mr. Aguiar reports that he has quit smoking. His smoking use included cigarettes. He smoked 0.50 packs per day. He has never used smokeless tobacco. He reports that he does not drink alcohol or use drugs.His family history includes Cancer in his father; Diabetes in his maternal aunt and maternal uncle; Heart disease in his paternal grandmother; Hyperlipidemia in his mother; Hypertension in his maternal grandmother and paternal grandmother; Lung cancer in his father; No Known Problems in his brother.    Intake/Output - Last 3 Shifts       11/11 0700 - 11/12 0659 11/12 0700 - 11/13 0659 11/13 0700 - 11/14 0659    P.O.  1410 1680    I.V. (mL/kg)  100 (1.5)     IV Piggyback  900 100    Total Intake(mL/kg)  2410 (35.4) 1780 (26.2)    Urine (mL/kg/hr)  550 (0.3) 480 (0.5)    Emesis/NG output   0    Other   0    Stool  0 0    Blood   0    Total Output  550 480    Net  +1860 +1300           Urine Occurrence  3 x 0 x    Stool Occurrence  0 x 1 x    Emesis Occurrence   0 x           Review of Systems   Constitutional: Positive for activity change, appetite change, fatigue, fever and unexpected weight change (gain).   HENT: Negative for facial swelling, hearing loss and tinnitus.    Eyes: Negative for pain and visual disturbance.   Respiratory: Positive for chest tightness and shortness of breath.    Cardiovascular: Positive for leg swelling. Negative for chest pain.   Gastrointestinal: Negative for abdominal pain, nausea and vomiting.   Genitourinary: Positive for decreased urine volume. Negative for difficulty urinating, dysuria and flank pain.   Musculoskeletal: Negative for arthralgias and myalgias.   Skin: Negative for color change.   Allergic/Immunologic: Positive for immunocompromised state.   Neurological: Negative for dizziness, syncope, weakness, light-headedness and headaches.   Psychiatric/Behavioral: Negative for behavioral problems and confusion.     Objective:     Vital Signs (Most Recent):  Temp: 99.5  "°F (37.5 °C) (11/13/19 1642)  Pulse: 74 (11/13/19 1915)  Resp: 18 (11/13/19 1915)  BP: (!) 140/83 (11/13/19 1642)  SpO2: (!) 92 % (11/13/19 1915) Vital Signs (24h Range):  Temp:  [98.3 °F (36.8 °C)-101.6 °F (38.7 °C)] 99.5 °F (37.5 °C)  Pulse:  [] 74  Resp:  [18-22] 18  SpO2:  [92 %-99 %] 92 %  BP: (140-166)/(81-91) 140/83     Weight: 68 kg (150 lb)  Height: 5' 8" (172.7 cm)  Body mass index is 22.81 kg/m².    Physical Exam   Constitutional: He is oriented to person, place, and time. He appears well-developed and well-nourished. No distress. Nasal cannula in place.   Significant edema noted compatible with anasarca upper extremities and lower extremities.   HENT:   Head: Normocephalic and atraumatic.   Eyes: Pupils are equal, round, and reactive to light. Conjunctivae are normal.   Neck: Trachea normal. Neck supple. No JVD present.   Cardiovascular: Normal rate, regular rhythm, S1 normal, S2 normal, normal heart sounds and normal pulses. Exam reveals no gallop and no friction rub.   No murmur heard.  Bilateral lower extremity edema pitting 1+   Pulmonary/Chest: Effort normal. He has decreased breath sounds in the right lower field and the left lower field. He has rales in the right lower field and the left lower field.   Poor air movement   Abdominal: Soft. Bowel sounds are normal.   Musculoskeletal: Normal range of motion. He exhibits edema.   Neurological: He is alert and oriented to person, place, and time.   Skin: Skin is warm and dry.   Psychiatric: He has a normal mood and affect. His behavior is normal. Thought content normal.   Vitals reviewed.      Significant Labs:  CBC:   Recent Labs   Lab 11/12/19  0458 11/12/19  0459 11/13/19  0622   WBC 5.96  --  8.88   RBC 3.18*  --  3.30*   HGB 7.6*  --  8.0*   HCT 26.0* 22* 25.7*     --  174   MCV 82  --  78*   MCH 23.9*  --  24.2*   MCHC 29.2*  --  31.1*     BMP:   Recent Labs   Lab 11/12/19  0601 11/13/19  0951   * 176*    138   K 3.4* " 4.2    107   CO2 25 20*   BUN 25* 30*   CREATININE 2.2* 2.4*   CALCIUM 7.4* 8.5*     CMP:   Recent Labs   Lab 11/12/19  0601 11/13/19  0951   * 176*   CALCIUM 7.4* 8.5*   ALBUMIN 2.5* 2.3*   PROT 5.2* 5.6*    138   K 3.4* 4.2   CO2 25 20*    107   BUN 25* 30*   CREATININE 2.2* 2.4*   ALKPHOS 70 78   ALT 11 10   AST 19 19     Coagulation: No results for input(s): PT, APTT in the last 168 hours.  Cardiac Markers: No results for input(s): CKMB, TROPONINT, MYOGLOBIN in the last 168 hours.  Labs within the past 24 hours have been reviewed.    Diagnostics:  US - Kidney:   Results for orders placed during the hospital encounter of 07/17/19   US Transplant Kidney With Doppler    Narrative EXAMINATION:  US TRANSPLANT KIDNEY WITH DOPPLER    CLINICAL HISTORY:  LEFTY;    TECHNIQUE:  Transplant renal ultrasound of the right lower quadrant with color-flow Doppler and duplex analysis was performed.    COMPARISON:  Ultrasound transplant kidney 6/20/2    FINDINGS:  The renal transplant demonstrates no focal parenchymal abnormality. There is stable, mild transplant hydronephrosis.  There is minimal free fluid in the right lower quadrant with no focal collection surrounding the renal transplant.    Renal arterial resistive indices are as follows: Interlobar 0.72, segmental Up 0.72, segmental Mid 0.76, segmental Low 0.75.  There is no evidence of a tardus parvus waveform. The maximum velocity in the main renal artery is 155 cm/sec, with renal artery to iliac artery ratio of 1.1.    The renal transplant venous system is unremarkable.      Impression Satisfactory Doppler evaluation of the renal allograft.    Stable mild transplant hydronephrosis.    Minimal free fluid in the right lower quadrant with no focal collection surrounding the renal transplant.    Electronically signed by resident: Gasper Gutierrez  Date:    07/17/2019  Time:    18:28    Electronically signed by: Cristóbal Chaney  MD  Date:    07/17/2019  Time:    18:42

## 2019-11-14 NOTE — PLAN OF CARE
- AAOx4. VSS. Afebrile.  - RA sats > 92%.  - Norco given this am for back pain.  - L UA midline, CDI.  - Denies pain/discomfort.  - Blood cx w gram pos cocci from 11/12.  - Urine cx NGTD. Lactic WNL.  - CXR concerning for possible PNA.  - Abx transitioned from IV zosyn to vanc/rocephin.  - Cr trending up. 80 mg lasix given. UO improved.  - KTS/nephrology following.  - Echo done with EF 40-45%. US of BLE done. See flow sheets.  - Renal diet. Appetite adequate.  - Up independently ad sy. In NAD. WCTM.

## 2019-11-14 NOTE — SUBJECTIVE & OBJECTIVE
Interval History: afebrile, SOB improved, still with cough and significant edema b/l UE and b/l LE    Review of Systems   Constitutional: Negative for activity change, appetite change, chills, fatigue and fever.   HENT: Negative for congestion, dental problem, mouth sores and sinus pressure.    Eyes: Negative for pain, redness and visual disturbance.   Respiratory: Positive for cough and shortness of breath. Negative for wheezing.    Cardiovascular: Positive for leg swelling. Negative for chest pain.   Gastrointestinal: Negative for abdominal distention, abdominal pain, diarrhea, nausea and vomiting.   Endocrine: Negative for polyuria.   Genitourinary: Negative for decreased urine volume, dysuria and frequency.   Musculoskeletal: Negative for joint swelling.   Skin: Negative for color change.   Allergic/Immunologic: Negative for food allergies.   Neurological: Negative for dizziness, weakness and headaches.   Hematological: Positive for adenopathy.   Psychiatric/Behavioral: Negative for agitation and confusion. The patient is not nervous/anxious.      Objective:     Vital Signs (Most Recent):  Temp: 98.5 °F (36.9 °C) (11/14/19 0451)  Pulse: 86 (11/14/19 0729)  Resp: 17 (11/14/19 0729)  BP: (!) 152/88 (11/14/19 0451)  SpO2: (!) 94 % (11/14/19 0729) Vital Signs (24h Range):  Temp:  [98.2 °F (36.8 °C)-99.9 °F (37.7 °C)] 98.5 °F (36.9 °C)  Pulse:  [74-96] 86  Resp:  [16-20] 17  SpO2:  [92 %-99 %] 94 %  BP: (136-159)/(73-91) 152/88     Weight: 68 kg (150 lb)  Body mass index is 22.81 kg/m².    Estimated Creatinine Clearance: 37.8 mL/min (A) (based on SCr of 2.5 mg/dL (H)).    Physical Exam   Constitutional: He is oriented to person, place, and time. He appears well-developed and well-nourished.   HENT:   Head: Normocephalic and atraumatic.   Mouth/Throat: Oropharynx is clear and moist.   Eyes: Conjunctivae are normal.   Neck: Neck supple.   Cardiovascular: Normal rate, regular rhythm and normal heart sounds.   No murmur  heard.  Pulmonary/Chest: Effort normal and breath sounds normal. No respiratory distress. He has no wheezes.   Abdominal: Soft. Bowel sounds are normal. He exhibits no distension. There is no tenderness.   Musculoskeletal: Normal range of motion. He exhibits edema. He exhibits no tenderness.   B/l UE and LE edema, RLE > LLE, RUE>LUE   Lymphadenopathy:     He has no cervical adenopathy.   Neurological: He is alert and oriented to person, place, and time. Coordination normal.   Skin: Skin is warm and dry. No rash noted.   Psychiatric: He has a normal mood and affect. His behavior is normal.       Significant Labs:   CBC:   Recent Labs   Lab 11/13/19  0622 11/14/19  0624   WBC 8.88 7.95   HGB 8.0* 7.3*   HCT 25.7* 23.8*    165     CMP:   Recent Labs   Lab 11/13/19  0951 11/14/19  0624    138   K 4.2 3.9    106   CO2 20* 24   * 123*   BUN 30* 32*   CREATININE 2.4* 2.5*   CALCIUM 8.5* 8.9   PROT 5.6* 5.9*   ALBUMIN 2.3* 2.1*   BILITOT 0.4 0.5   ALKPHOS 78 90   AST 19 16   ALT 10 9*   ANIONGAP 11 8   EGFRNONAA 32.5* 31.0*       Significant Imaging: I have reviewed all pertinent imaging results/findings within the past 24 hours.

## 2019-11-14 NOTE — SUBJECTIVE & OBJECTIVE
Interval History:   Seen and examined at the bedside. Feels better today. He was weaned off oxygen. No fevers overnight.     Oncology Treatment Plan:   OP SARCOMA DOXORUBICIN LIPOSOMAL Q3W    Medications:  Continuous Infusions:  Scheduled Meds:   albuterol-ipratropium  3 mL Nebulization Q4H    [START ON 11/15/2019] atovaquone  1,500 mg Oral Daily    calcium carbonate  1 tablet Oral Daily    carvedilol  12.5 mg Oral BID WM    dolutegravir  50 mg Oral Daily    emtricitabine-tenofovir alafen  1 tablet Oral Daily    famotidine  20 mg Oral Daily    heparin (porcine)  5,000 Units Subcutaneous Q8H    NIFEdipine  60 mg Oral Q12H    piperacillin-tazobactam (ZOSYN) IVPB  4.5 g Intravenous Q8H    predniSONE  5 mg Oral Daily    rosuvastatin  20 mg Oral Daily    sirolimus  4 mg Oral Daily    sodium bicarbonate  1,950 mg Oral TID    vancomycin (VANCOCIN) IVPB  750 mg Intravenous Daily     PRN Meds:acetaminophen, dextrose 10 % in water (D10W), dextrose 10 % in water (D10W), glucagon (human recombinant), glucose, glucose, HYDROcodone-acetaminophen, sodium chloride 0.9%     Review of Systems   Constitutional: Negative for activity change, appetite change, chills, diaphoresis, fatigue and fever.   Respiratory: Negative for cough and shortness of breath.    Cardiovascular: Positive for leg swelling. Negative for chest pain.   Gastrointestinal: Negative for abdominal pain, blood in stool, diarrhea, nausea and vomiting.   Genitourinary: Positive for decreased urine volume. Negative for dysuria.   Neurological: Negative for light-headedness.     Objective:     Vital Signs (Most Recent):  Temp: 97.6 °F (36.4 °C) (11/14/19 1139)  Pulse: 84 (11/14/19 1139)  Resp: 18 (11/14/19 1139)  BP: 133/73 (11/14/19 1139)  SpO2: (!) 94 % (11/14/19 1139) Vital Signs (24h Range):  Temp:  [97.6 °F (36.4 °C)-99.5 °F (37.5 °C)] 97.6 °F (36.4 °C)  Pulse:  [74-96] 84  Resp:  [12-18] 18  SpO2:  [92 %-99 %] 94 %  BP: (131-156)/(65-88) 133/73      Weight: 68 kg (150 lb)  Body mass index is 22.81 kg/m².  Body surface area is 1.81 meters squared.      Intake/Output Summary (Last 24 hours) at 11/14/2019 1538  Last data filed at 11/14/2019 1248  Gross per 24 hour   Intake 2260 ml   Output 1680 ml   Net 580 ml       Physical Exam   Constitutional: He appears well-developed and well-nourished. No distress.   HENT:   Head: Normocephalic and atraumatic.   Mouth/Throat: Oropharynx is clear and moist. No oropharyngeal exudate.   Eyes: Conjunctivae are normal. Right eye exhibits no discharge. Left eye exhibits no discharge. No scleral icterus.   Neck: No JVD present.   Cardiovascular: Normal rate, regular rhythm, normal heart sounds and intact distal pulses. Exam reveals no gallop and no friction rub.   No murmur heard.  Pulmonary/Chest: Effort normal and breath sounds normal. No stridor. No respiratory distress. He has no wheezes. He has no rales.   Abdominal: Soft. Bowel sounds are normal. He exhibits no distension and no mass. There is no tenderness. There is no guarding.   Musculoskeletal: He exhibits edema.   Skin lesions noted on lower extremities    Lymphadenopathy:     He has no cervical adenopathy.   Skin: He is not diaphoretic.   Nursing note and vitals reviewed.      Significant Labs:   CBC:   Recent Labs   Lab 11/13/19  0622 11/14/19  0624   WBC 8.88 7.95   HGB 8.0* 7.3*   HCT 25.7* 23.8*    165    and CMP:   Recent Labs   Lab 11/13/19  0951 11/14/19  0624    138   K 4.2 3.9    106   CO2 20* 24   * 123*   BUN 30* 32*   CREATININE 2.4* 2.5*   CALCIUM 8.5* 8.9   PROT 5.6* 5.9*   ALBUMIN 2.3* 2.1*   BILITOT 0.4 0.5   ALKPHOS 78 90   AST 19 16   ALT 10 9*   ANIONGAP 11 8   EGFRNONAA 32.5* 31.0*

## 2019-11-14 NOTE — ASSESSMENT & PLAN NOTE
Controlled, last viral load was undetectable   Will continue with home Tivicay and Descovy (Descovy is non formulary however this gentleman with renal dysfunction is unable to tolerate Truvada which the formulary alternative).   CD4 count pending, appreciate ID consult

## 2019-11-14 NOTE — ASSESSMENT & PLAN NOTE
Continue sirolimus 4 mg daily  Monitor sirolimus levels daily, goal level 3-4  MMF has been on hold since diagnosis of Kaposi sarcoma

## 2019-11-14 NOTE — PROGRESS NOTES
Admit Assessment    Patient Identification  Demetrio Aguiar   :  1980  Admit Date:  2019  Attending Provider:  Stephan Avina MD              Referral:   Pt was admitted to  with a diagnosis of Pneumonia due to infectious organism, and was admitted this hospital stay due to Kaposi sarcoma [C46.9]  SOB (shortness of breath) [R06.02]  Tachycardia [R00.0]  Chronic kidney disease, stage 3 (moderate) [N18.3]  Pneumonia of right lower lobe due to infectious organism [J18.1]  HIV infection, unspecified symptom status [B20].       is involved was referred to the Social Work Department via routine referral.  Patient presents as a 39 y.o. year old  male.    Persons interviewed : Patient. His best friend was present but sleeping.     Living Situation:      Resides at [address] [place], [place] [id number] [place] [id number], phone: [phone] (home).  Patient stated that he lives alone. His mother lives next door. He reports having a very supportive group of friends and Christian community. He has one brother who lives over 2 hours away.         Current or Past Agencies and Description of Services/Supplies    DME: No previous need for equipment. He does not feel he will need any equipment post discharge.          Home Health: Patient is not currently active on home health services. He does not feel he will need the service post discharge. He is independent of all ADL's. He is not experiencing any decline in his strength or endurance.       IV Infusion: N/A      Nutrition: Oral     Outpatient Pharmacy:     [facility] [address] [address]  Phone: [phone] Fax: [phone]    [address] [address]  Phone: [phone] Fax: [phone]    [id number] [place] - [address] [address]  Phone: [phone] Fax: [phone]    [facility] [place] [place] [facility] [address]  [address]  [place] [address]  Phone: [phone] Fax: [phone]    [facility] [place] - [address]  [address] [place] [address]  Phone: [phone] Fax: [phone]      Patient Preference of agencies include: Patient did not express a preference     Patient/Caregiver informed of right to choose providers or agencies.  Patient provides permission to release any necessary information to [facility] and to Non[facility] agencies as needed to facilitate patient care, treatment planning, and patient discharge planning.  Written and verbal resources provided.      Coping: " I have been dealing with for a while." He says his best coping mechanism is prayer.           Adjustment to Diagnosis and Treatment: Appropriate    Emotional/Behavioral/Cognitive Issues: None observed or noted             History/Current Symptoms of Anxiety/Depression: No:   History/Current Substance Use:   Social History     Tobacco Use    Smoking status: Former Smoker     Packs/day: 0.50     Types: Cigarettes    Smokeless tobacco: Never Used    Tobacco comment: quit smoking 5 years ago   Substance and Sexual Activity    Alcohol use: No    Drug use: No     Types: Cocaine, Marijuana     Comment: last use for cocaine at age 17; 2 years ago last use for THC    Sexual activity: Not Currently       Indications of Abuse/Neglect: No:   Abuse Screen (yes response referral indicated)  Feels Unsafe at Home or Work/School: no  Feels Threatened by Someone: no  Does Anyone Try to Keep You From Having Contact with Others or Doing Things Outside Your Home?: no    Financial:  Payor/Plan Subscr  Sex Relation Sub. Ins. ID Effective Group Num   1. MEDICARE - ME* STEVEN MILLER 1980 Male Self [id number]                                    [address]   2. GENERIC OUT O* STEVEN MILLER 1980 Male Self [id number]* 16            "                         [address], [place] [address]                            Other identified concerns/needs: None at this time    Plan: Return home. Will continue to follow along to see if patient will need oral or IV antibiotics post discharge    Interventions/Referrals: None at this time  Patient/caregiver engaged in treatment planning process.     providing psychosocial and supportive counseling, resources, education, assistance and discharge planning as appropriate.  Patient/caregiver state understanding of  available resources,  following, remains available.

## 2019-11-14 NOTE — ASSESSMENT & PLAN NOTE
HIV related   Currently on HAART and Adriamycin Q3W for symptomatic metastatic KS  He was due for Adriamycin however this is going to be postponed given acute illness

## 2019-11-14 NOTE — ASSESSMENT & PLAN NOTE
Patient is presenting with fever, tachycardia and Tachypnea   CXR with likely right mid and lower lobe pneumonia   Follow blood cultures and respiratory panel  Received Vanc/Cefepime in the ER, Cefepime inpatient and later Zosyn   Will continue with Vanc/Zosyn today and consider de escalation tomorrow given GPCs growing in blood (could be a contaminant however given his tenuous clinical status we will await until the culture is finalized)   TTE without pericardial effusion.   US showed no drainable pleural effusion per pulm

## 2019-11-14 NOTE — ASSESSMENT & PLAN NOTE
LEFTY superimpose on CKD stage III serum creatinine still close to his baseline but trending up increased creatinine by 0.3 mg according to AKIN criteria meets definition of LEFTY.  Suspect due to physiologic pattern and evidence of volume overload in setting of doxorubicin possible congestion and cardio renal physiology.  Previous echo August 2019 shows heart failure preserved ejection fraction in addition prerenal due to extrarenal fluid losses from fever could also have cause increase in creatinine but patient was resuscitated with fluids and no evidence of improved and renal function.    · UA with Cx high risk  · Check UPCr   · Check US transplant Kidney allograft  · Acid-Base:  CO2 20 suspect secondary to renal function non gap acidosis  · Electrolytes:  Stable will replace as needed  · No evidence for emergent hemodialysis  · Recommend diuresis with furosemide 80 mg assess response on urine output and can be challenged 6 hr later if hemodynamically stable.  · Maintain MAP > 65  · Hg >7 if Hg < 7 please transfuse.   · continue to monitor strict I/O's, daily weights, renally dose medications, and avoid nephrotoxic agents

## 2019-11-14 NOTE — PROGRESS NOTES
[facility]  Hematology/Oncology  Progress Note    Patient Name: Demetrio Aguiar  Admission Date: 11/12/2019  Hospital Length of Stay: 2 days  Code Status: Full Code     Subjective:     HPI:    Oncologic Hx from chart:    Demetrio Aguiar is a 39 y.o. male with PMHx of HIV on ART, Kaposi sarcoma, ESRD previous on iHD s/p transplant with hx of rejection (on chronic immunosuppression), anemia of chronic disease, latent TB s/p treatment, HTN and HLD.     He is s/p 4 cycles of doxo for symptomatic metastatic kaposi.     He tolerated the first  4 cycles well without obvious complaints. RLE swelling has decreased and he has improved QOL.     PET scan in October shows decrease in tumor, LAD bulk though it also shows new cardiomegaly and effusion, which is somewhat concerning.     HPI:  Patient is presenting with complaints of fevers, chills, dyspnea and cough. He denies any diarrhea, abdominal pain or dysuria. Vital signs in the ER revealed fever, tachycardia and tachypnea. CBC revealed no neutropenia. CMP revealed stable renal function. CXR with opacity in right mid to inferior hemithorax potentially related to pneumonia and pleural effusion. He received 1 liter bolus and IV Vancomycin and Cefepime.        Interval History:   Seen and examined at the bedside. Feels better today. He was weaned off oxygen. No fevers overnight.     Oncology Treatment Plan:   OP SARCOMA DOXORUBICIN LIPOSOMAL Q3W    Medications:  Continuous Infusions:  Scheduled Meds:   albuterol-ipratropium  3 mL Nebulization Q4H    [START ON 11/15/2019] atovaquone  1,500 mg Oral Daily    calcium carbonate  1 tablet Oral Daily    carvedilol  12.5 mg Oral BID WM    dolutegravir  50 mg Oral Daily    emtricitabine-tenofovir alafen  1 tablet Oral Daily    famotidine  20 mg Oral Daily    heparin (porcine)  5,000 Units Subcutaneous Q8H    NIFEdipine  60 mg Oral Q12H    piperacillin-tazobactam (ZOSYN) IVPB  4.5 g Intravenous Q8H     predniSONE  5 mg Oral Daily    rosuvastatin  20 mg Oral Daily    sirolimus  4 mg Oral Daily    sodium bicarbonate  1,950 mg Oral TID    vancomycin (VANCOCIN) IVPB  750 mg Intravenous Daily     PRN Meds:acetaminophen, dextrose 10 % in water (D10W), dextrose 10 % in water (D10W), glucagon (human recombinant), glucose, glucose, HYDROcodone-acetaminophen, sodium chloride 0.9%     Review of Systems   Constitutional: Negative for activity change, appetite change, chills, diaphoresis, fatigue and fever.   Respiratory: Negative for cough and shortness of breath.    Cardiovascular: Positive for leg swelling. Negative for chest pain.   Gastrointestinal: Negative for abdominal pain, blood in stool, diarrhea, nausea and vomiting.   Genitourinary: Positive for decreased urine volume. Negative for dysuria.   Neurological: Negative for light-headedness.     Objective:     Vital Signs (Most Recent):  Temp: 97.6 °F (36.4 °C) (11/14/19 1139)  Pulse: 84 (11/14/19 1139)  Resp: 18 (11/14/19 1139)  BP: 133/73 (11/14/19 1139)  SpO2: (!) 94 % (11/14/19 1139) Vital Signs (24h Range):  Temp:  [97.6 °F (36.4 °C)-99.5 °F (37.5 °C)] 97.6 °F (36.4 °C)  Pulse:  [74-96] 84  Resp:  [12-18] 18  SpO2:  [92 %-99 %] 94 %  BP: (131-156)/(65-88) 133/73     Weight: 68 kg (150 lb)  Body mass index is 22.81 kg/m².  Body surface area is 1.81 meters squared.      Intake/Output Summary (Last 24 hours) at 11/14/2019 1538  Last data filed at 11/14/2019 1248  Gross per 24 hour   Intake 2260 ml   Output 1680 ml   Net 580 ml       Physical Exam   Constitutional: He appears well-developed and well-nourished. No distress.   HENT:   Head: Normocephalic and atraumatic.   Mouth/Throat: Oropharynx is clear and moist. No oropharyngeal exudate.   Eyes: Conjunctivae are normal. Right eye exhibits no discharge. Left eye exhibits no discharge. No scleral icterus.   Neck: No JVD present.   Cardiovascular: Normal rate, regular rhythm, normal heart sounds and intact distal  pulses. Exam reveals no gallop and no friction rub.   No murmur heard.  Pulmonary/Chest: Effort normal and breath sounds normal. No stridor. No respiratory distress. He has no wheezes. He has no rales.   Abdominal: Soft. Bowel sounds are normal. He exhibits no distension and no mass. There is no tenderness. There is no guarding.   Musculoskeletal: He exhibits edema.   Skin lesions noted on lower extremities    Lymphadenopathy:     He has no cervical adenopathy.   Skin: He is not diaphoretic.   Nursing note and vitals reviewed.      Significant Labs:   CBC:   Recent Labs   Lab 19  0622 19  0624   WBC 8.88 7.95   HGB 8.0* 7.3*   HCT 25.7* 23.8*    165    and CMP:   Recent Labs   Lab 19  0951 19  0624    138   K 4.2 3.9    106   CO2 20* 24   * 123*   BUN 30* 32*   CREATININE 2.4* 2.5*   CALCIUM 8.5* 8.9   PROT 5.6* 5.9*   ALBUMIN 2.3* 2.1*   BILITOT 0.4 0.5   ALKPHOS 78 90   AST 19 16   ALT 10 9*   ANIONGAP 11 8   EGFRNONAA 32.5* 31.0*           Assessment/Plan:     Active Hospital Problems    Diagnosis  POA    *Pneumonia due to infectious organism [J18.9]  Yes    Chronic kidney disease, stage 3 (moderate) [N18.3]  Yes    SOB (shortness of breath) [R06.02]  Yes    Tachycardia [R00.0]  Yes    Sepsis [A41.9]  Unknown    Kaposi sarcoma [C46.9]  Yes    Renovascular hypertension [I15.0]  Yes    Acute kidney injury superimposed on chronic kidney disease stage III [N17.9, N18.9]  Yes    Immunosuppression [D89.9]  Yes     Chronic    Long-term use of immunosuppressant medication [Z79.899]  Not Applicable     donor kidney transplant 2017 [Z94.0]  Not Applicable     -Biopsy (for DGF) 17: good sample with 24 glomeruli (only 1 sclerosed) and evidence just meeting criteria for ABMR. + diffuse ATI. Minimal fibrosis, no ACR or AVR, but + CD4 (>50% diffusely + stain) with mild glomerulitis and focal peritubular capillaritis. Also, biopsy shows ca-oxalate (3)  and ca-phos (2) crystals.  -8/25/17 DSA + for weak class I.    Biopsy (LEFTY) 10/17/2017 14 glomeruli, <5% fibrosis, no MC changes, No ACR or AMR, C4d negative    Biopsy (LEFTY) 1/15/18:  No ACR, AVR, ABMR, CD4 neg. Less than 10 % fibrosis.         Anemia in stage 3 chronic kidney disease [N18.3, D63.1]  Yes     Chronic    At risk for opportunistic infections [Z91.89]  Yes    Hyperlipidemia [E78.5]  Yes     Chronic    HIV (human immunodeficiency virus infection) [B20]  Yes     Chronic          * Pneumonia due to infectious organism  Off oxygen   Will continue with Vanc/Zosyn today and consider de escalation tomorrow given GPCs growing in blood (could be a contaminant however given his tenuous clinical status we will await until the culture is finalized)   TTE without pericardial effusion.   US showed no drainable pleural effusion per pulm     Sepsis  Patient is presenting with fever, tachycardia and Tachypnea   CXR with likely right mid and lower lobe pneumonia   Follow blood cultures and respiratory panel  Received Vanc/Cefepime in the ER, Cefepime inpatient and later Zosyn   Will continue with Vanc/Zosyn today and consider de escalation tomorrow given GPCs growing in blood (could be a contaminant however given his tenuous clinical status we will await until the culture is finalized)   TTE without pericardial effusion.   US showed no drainable pleural effusion per pulm       Renovascular hypertension  C/w home Coreg and Nifedipine     Kaposi sarcoma  HIV related   Currently on HAART and Adriamycin Q3W for symptomatic metastatic KS  He was due for Adriamycin however this is going to be postponed given acute illness     Acute kidney injury superimposed on chronic kidney disease stage III  Likely a component of cardiorenal   TTE with decreased EF  We will challenge him with IV Lasix as above    Immunosuppression  Due to HIV infection, anti rejection meds and chemotherapy   C/w Atovaquone for PCP PPx, PCP ppx needs to  be lifelong per ID  We will consider re challenging him with bactrim after discussing this with nephrology      donor kidney transplant 2017  Crt stable   C/w Home Sirolimus and Prednisone at 5 mg daily   Check a Sirolimus level   Monitor CMP  Appreciate nephrology consult, we will challenge him with 80 mg of IV Lasix today     Anemia in stage 3 chronic kidney disease  H/H stable, monitor CBC    HIV (human immunodeficiency virus infection)  Controlled, last viral load was undetectable   Will continue with home Tivicay and Descovy (Descovy is non formulary however this gentleman with renal dysfunction is unable to tolerate Truvada which the formulary alternative).   CD4 count pending, appreciate ID consult       Hyperlipidemia  C/w Porfirio Og MD  Hematology/Oncology  [facility]        I have reviewed the notes, assessments, and/or procedures performed by the housestaff, as above.  I have personally interviewed and examined the patient at the beside, and rounded with the housestaff. I concur with her/his assessment and plan and the documentation of Demetrio Aguiar.  I, Dr. Stephan Avina, personally spent more than  35 mins during this encounter, greater than 50% was spent in direct counseling and/or coordination of care.     Stephan Avina M.D., M.S., F.A.C.P.  Hematology/Oncology Attending  [facility]

## 2019-11-14 NOTE — PROGRESS NOTES
[facility]  Infectious Disease  Progress Note    Patient Name: Demetrio Aguiar  MRN: [id number]  Admission Date: 11/12/2019  Length of Stay: 2 days  Attending Physician: Stephan Avina MD  Primary Care Provider: Primary Doctor No    Isolation Status: No active isolations  Assessment/Plan:      * Pneumonia due to infectious organism  38 y/o M h/o HIV (CD4 193, VL UD) on TAF/FTC+DTG c/b HIVAN s/p DBDKT 8/18/2017 (CMV D-R+ thymo induction, now on sirolimus) c/b ABMR, c/b RP hematoma s/p washout 9/8/2017, latent TB s/p treatment, recent diagnosis of visceral KS s/p 4 cycles of doxo tolerated the first 4 cycles well tolerated with good response RLE swelling has decreased and PET scan in October shows decrease in tumor, LAD bulk though it also shows new cardiomegaly and effusion.  He is now presenting with fevers, cough and SOB at home x 3 days, here found to be febrile to 101, with RLL opacity on CXR with possible pleural effusion. He feels signficantly better since admit with IVF and abx  - suspect typical bacterial process (strep pneumo), less likely MDRO less likely fungal or KS lung involvement  - recommend transition pip/tazo to ceftriaxone  - try to get sputum culture if productive cough  - would discuss with pulm if pleural effusion consider thoracentesis given complicated history, may need noncon CT chest  - would also recommend TTE given pericardial effusion described on PET from 10/23  - continue lifelong PJP ppx per protocol - would consider rechallenging tmp/smx as is drug of choice  - continue current antiretroviral therapy  - will follow        Anticipated Disposition: pending    Thank you for your consult. I will follow-up with patient. Please contact us if you have any additional questions.    Otf Miramontes MD  Infectious Disease  [facility]    Subjective:     Principal Problem:Pneumonia due to infectious organism    HPI: 38 y/o M h/o HIV (CD4 193, VL UD) on  TAF/FTC+DTG c/b HIVAN s/p DBDKT 8/18/2017 (CMV D-R+ thymo induction, now on sirolimus) c/b ABMR, c/b RP hematoma s/p washout 9/8/2017, latent TB s/p treatment, recent diagnosis of visceral KS s/p 4 cycles of doxo tolerated the first 4 cycles well tolerated with good response RLE swelling has decreased and PET scan in October shows decrease in tumor, LAD bulk though it also shows new cardiomegaly and effusion.  He is now presenting with fevers, cough and SOB at home x 3 days, here found to be febrile to 101, with RLL opacity on CXR    No new sick contacts or travel ,good appetite, + diffuse swelling worsening  Interval History: afebrile, SOB improved, still with cough and significant edema b/l UE and b/l LE    Review of Systems   Constitutional: Negative for activity change, appetite change, chills, fatigue and fever.   HENT: Negative for congestion, dental problem, mouth sores and sinus pressure.    Eyes: Negative for pain, redness and visual disturbance.   Respiratory: Positive for cough and shortness of breath. Negative for wheezing.    Cardiovascular: Positive for leg swelling. Negative for chest pain.   Gastrointestinal: Negative for abdominal distention, abdominal pain, diarrhea, nausea and vomiting.   Endocrine: Negative for polyuria.   Genitourinary: Negative for decreased urine volume, dysuria and frequency.   Musculoskeletal: Negative for joint swelling.   Skin: Negative for color change.   Allergic/Immunologic: Negative for food allergies.   Neurological: Negative for dizziness, weakness and headaches.   Hematological: Positive for adenopathy.   Psychiatric/Behavioral: Negative for agitation and confusion. The patient is not nervous/anxious.      Objective:     Vital Signs (Most Recent):  Temp: 98.5 °F (36.9 °C) (11/14/19 0451)  Pulse: 86 (11/14/19 0729)  Resp: 17 (11/14/19 0729)  BP: (!) 152/88 (11/14/19 0451)  SpO2: (!) 94 % (11/14/19 0729) Vital Signs (24h Range):  Temp:  [98.2 °F (36.8 °C)-99.9 °F  (37.7 °C)] 98.5 °F (36.9 °C)  Pulse:  [74-96] 86  Resp:  [16-20] 17  SpO2:  [92 %-99 %] 94 %  BP: (136-159)/(73-91) 152/88     Weight: 68 kg (150 lb)  Body mass index is 22.81 kg/m².    Estimated Creatinine Clearance: 37.8 mL/min (A) (based on SCr of 2.5 mg/dL (H)).    Physical Exam   Constitutional: He is oriented to person, place, and time. He appears well-developed and well-nourished.   HENT:   Head: Normocephalic and atraumatic.   Mouth/Throat: Oropharynx is clear and moist.   Eyes: Conjunctivae are normal.   Neck: Neck supple.   Cardiovascular: Normal rate, regular rhythm and normal heart sounds.   No murmur heard.  Pulmonary/Chest: Effort normal and breath sounds normal. No respiratory distress. He has no wheezes.   Abdominal: Soft. Bowel sounds are normal. He exhibits no distension. There is no tenderness.   Musculoskeletal: Normal range of motion. He exhibits edema. He exhibits no tenderness.   B/l UE and LE edema, RLE > LLE, RUE>LUE   Lymphadenopathy:     He has no cervical adenopathy.   Neurological: He is alert and oriented to person, place, and time. Coordination normal.   Skin: Skin is warm and dry. No rash noted.   Psychiatric: He has a normal mood and affect. His behavior is normal.       Significant Labs:   CBC:   Recent Labs   Lab 11/13/19  0622 11/14/19  0624   WBC 8.88 7.95   HGB 8.0* 7.3*   HCT 25.7* 23.8*    165     CMP:   Recent Labs   Lab 11/13/19  0951 11/14/19  0624    138   K 4.2 3.9    106   CO2 20* 24   * 123*   BUN 30* 32*   CREATININE 2.4* 2.5*   CALCIUM 8.5* 8.9   PROT 5.6* 5.9*   ALBUMIN 2.3* 2.1*   BILITOT 0.4 0.5   ALKPHOS 78 90   AST 19 16   ALT 10 9*   ANIONGAP 11 8   EGFRNONAA 32.5* 31.0*       Significant Imaging: I have reviewed all pertinent imaging results/findings within the past 24 hours.

## 2019-11-14 NOTE — ASSESSMENT & PLAN NOTE
Due to HIV infection, anti rejection meds and chemotherapy   C/w Atovaquone for PCP PPx, PCP ppx needs to be lifelong per ID  We will consider re challenging him with bactrim after discussing this with nephrology

## 2019-11-14 NOTE — CONSULTS
[facility] Consult Note    Primary Team Admitting:  Fabrice  Consultant Team Attending:  Babar   Consultant Team Fellow: Bradley    Date of Consult: 11/14/2019    REASON FOR CONSULT:     Eval for thoracentesis    SUBJECTIVE:     History of Present Illness:  Demetrio Aguiar is a 39 y.o. male with PMH HIV (CD4 193), hx LTBI tx, recent diagnosis of visceral KS and ESRD s/p kidney transplant presenting with 3 days of worsening SOB, cough and fevers. On admit, found to be febrile and mildly tachy cardiac with a CXR showing RLL opacity. He denies recent illness or sick contacts and has not travelled recently.      Past Medical History:  Past Medical History:   Diagnosis Date    Anemia     At risk for opportunistic infections     Delayed graft function of kidney     ESRD secondary to HIVAN s/p DDRT 8/18/17     HIV infection     HIV nephropathy     Hyperlipidemia     Hypertension     Need for prophylactic immunotherapy     S/P kidney transplant 8/18/2017 8/28/2017    Status post exploratory laparotomy 9/10/2017    Re explored for retroperitoneal hematoma. Hematoma evacuated.(09/09)    Status post exploratory laparotomy 9/10/2017    Re explored for retroperitoneal hematoma. Hematoma evacuated.(09/09)    TB lung, latent     tx INH 2006       Allergies:  Review of patient's allergies indicates:  No Known Allergies    Home Medications:  Prior to Admission medications    Medication Sig Start Date End Date Taking? Authorizing Provider   atovaquone (MEPRON) 750 mg/5 mL Susp Take 5 mLs (750 mg total) by mouth once daily. 6/24/19  Yes Lucía Polk MD   carvedilol (COREG) 6.25 MG tablet Take 2 tablets (12.5 mg total) by mouth 2 (two) times daily with meals. 7/22/19 7/21/20 Yes Tae Qiu MD   cinacalcet (SENSIPAR) 60 MG Tab Take 1 tablet (60 mg total) by mouth daily with breakfast. 3/11/19  Yes Lucía Polk MD   dolutegravir (TIVICAY) 50 mg Tab Take 1 tablet  (50 mg total) by mouth once daily. 18  Yes Jase Hines MD   emtricitabine-tenofovir alafen (DESCOVY) 200-25 mg Tab Take 1 tablet by mouth once daily. 19  Yes Tae Qiu MD   famotidine (PEPCID) 20 MG tablet TAKE 1 TABLET (20MG TOTAL) BY MOUTH EACH EVENING 19  Yes Lcuía Polk MD   multivitamin (THERAGRAN) tablet Take 1 tablet by mouth once daily. 18  Yes Sayda Gross MD   patiromer calcium sorbitex (VELTASSA) 25.2 gram PwPk Take 1 packet (25.2 g total) by mouth once daily. 19 Yes Tae Qiu MD   predniSONE (DELTASONE) 5 MG tablet TAKE ONE TABLET BY MOUTH EVERY DAY 19  Yes Sayda Gross MD   rosuvastatin (CRESTOR) 20 MG tablet Take 1 tablet (20 mg total) by mouth once daily. 18  Yes Jase Hines MD   sevelamer carbonate (RENVELA) 800 mg Tab Take 1 tablet (800 mg total) by mouth 3 (three) times daily with meals. 19 Yes Lucía Polk MD   sirolimus (RAPAMUNE) 1 MG Tab Take 4 tablets (4 mg total) by mouth once daily. 10/3/19 10/2/20 Yes Lucía Polk MD   sodium bicarbonate 650 MG tablet Take 3 tablets (1,950 mg total) by mouth 3 (three) times daily. 18 Yes Jase Hines MD   calcium carbonate (TUMS) 200 mg calcium (500 mg) chewable tablet Take 1 tablet (500 mg total) by mouth once daily. 19  Jase Hines MD   HYDROcodone-acetaminophen (NORCO) 5-325 mg per tablet Take 1 tablet by mouth every 4 (four) hours as needed for Pain. 19   Harjeet Edwards MD   NIFEdipine (PROCARDIA-XL) 30 MG (OSM) 24 hr tablet Take 2 tablets (60 mg total) by mouth every 12 (twelve) hours. 10/7/19 10/6/20  Hector YUKO Mejias MD          OBJECTIVE:     Vital Signs:  BP  Min: 131/65  Max: 159/87  Temp  Av.9 °F (37.2 °C)  Min: 98.2 °F (36.8 °C)  Max: 99.9 °F (37.7 °C)  Pulse  Av.2  Min: 74  Max: 96  Resp  Av.8  Min: 16  Max: 18  SpO2  Av.6 %  Min: 92 %   Max: 99 %  I/O last 3 completed shifts:  In: 3210 [P.O.:2910; IV Piggyback:300]  Out: 1230 [Urine:1230]    Physical Examination:  General: Alert and awake.  Comfortable.  HENT:  NCAT; anicteric sclera with EOMi; OP clear with MMM  Cardio:  Regular rate and rhythm with normal S1 and S2; no murmurs  Resp:  Unlabored; BS decreased in right base. no wheezes or rhonchi  Abdom: Soft, NTND with normoactive bowel sounds  Extrem: Warm and dry.  No edema  Skin:  No rashes, lesions, or color changes  Neuro:  AAOx3; cooperative and pleasant with no focal deficits      Laboratory:  Lab Results   Component Value Date    WBC 7.95 11/14/2019    HGB 7.3 (L) 11/14/2019    HCT 23.8 (L) 11/14/2019    MCV 79 (L) 11/14/2019     11/14/2019         Lab Results   Component Value Date    CREATININE 2.5 (H) 11/14/2019    BUN 32 (H) 11/14/2019     11/14/2019    K 3.9 11/14/2019     11/14/2019    CO2 24 11/14/2019     Lab Results   Component Value Date    ALT 9 (L) 11/14/2019    AST 16 11/14/2019    ALKPHOS 90 11/14/2019    BILITOT 0.5 11/14/2019         ASSESSMENT:     39 y.o.male with PMH HIV (CD4 193), hx LTBI tx, recent diagnosis of visceral KS and ESRD s/p kidney transplant presenting febrile with RLL opacity on CXR and suspected effusion.     PLAN:      Trace right pleural fluid present (~1cm) in right base. No loculations visualized on bedside ultrasound. Not amenable to bedside thora   Reasonable to obtain non-contrast CT chest   ABx recs per ID    Thank you for the consult. Please feel free to contact us with any questions or concerns regarding the care of this patient.  .  Gregg Huerta MD  [facility], -V  [facility] Consult Note

## 2019-11-14 NOTE — ASSESSMENT & PLAN NOTE
Crt stable   C/w Home Sirolimus and Prednisone at 5 mg daily   Check a Sirolimus level   Monitor CMP  Appreciate nephrology consult, we will challenge him with 80 mg of IV Lasix today

## 2019-11-14 NOTE — ASSESSMENT & PLAN NOTE
Status post  brain dead kidney transplant 2017  -Biopsy (for DGF) 17: good sample with 24 glomeruli (only 1 sclerosed) and evidence just meeting criteria for ABMR. + diffuse ATI. Minimal fibrosis, no ACR or AVR, but + CD4 (>50% diffusely + stain) with mild glomerulitis and focal peritubular capillaritis. Also, biopsy shows ca-oxalate (3) and ca-phos (2) crystals.  -17 DSA + for weak class I.    Biopsy (LEFTY) 10/17/2017 14 glomeruli, <5% fibrosis, no MC changes, No ACR or AMR, C4d negative    Biopsy (LEFTY) 1/15/18:  No ACR, AVR, ABMR, CD4 neg. Less than 10 % fibrosis.

## 2019-11-14 NOTE — PLAN OF CARE
Pt AAOx4, VSS, in bed with upper siderails raised x2, bed in lowest/locked position, call light/personal belongings within reach. Pt instructed to call for assistance. Pt verbalizes understanding.  Pt afebrile at this time.  Proper hand hygiene performed before and after pt care.      Afebrile so far this shift. Zosyn continued. WBC stable. Cx NGTD.  Weaned to room air. O2 sat >92%. Respiratory treatments continued q4h. No productive cough noted.   US ordered for R extremities d/t increased swelling.  Echo ordered.   Cr trending up. See flowsheet for UOP so far this shift.   Nephrology and ID following.   Up independently.     Will continue to monitor patient.

## 2019-11-14 NOTE — CONSULTS
Consult to Pulmonology received, patient evaluated. Please see full consult note written by Dr. Huerta.     Raphael Alvarado MD  Internal Medicine, PGY-3  Pulmonology Consults

## 2019-11-14 NOTE — CONSULTS
[facility]  Kidney Transplant  Consult Note    Inpatient consult to Kidney/Pancreas Transplant Medicine  Consult performed by: Harjeet Boswell MD  Consult ordered by: Bill Coto DO  Reason for consult: Kidney allograft reassessment and IS comanagement            Subjective:     History of Present Illness:   Mr. Demetrio Aguiar is a 39 y.o. year old Black or  male who received a  - brain death kidney transplant on 17.  He has CKD stage 3 - GFR 30-59 and his baseline creatinine is between 1.8-2.1. He takes prednisone and sirolimus for maintenance immunosuppression.          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   - Biopsy proven ABMR with weakly positive class I DSA's on 17. Pt treated with SMP x 3 (-9/3). PLEX x6 (completed ) and IVIG x 2 (-).    - Kidney US on 17 showed a large collection located along the anterior inferior margin of the renal transplant measuring 13.4 x 7 x 11.9. taken back to the OR for retroperitoneal bleed and washout.   -Biopsy 10/17/17 (LEFTY): 14 glomeruli, <5% fibrosis, no MC severo..  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 Amador for Kaposi's Sarcoma and has been treated with Doxorubicin x 4 doses. Has been tolerated well with improved in Tumor scottie according to oncology is note dated 2019.  In addition it also showed cardiomegaly with new evidence of pleural effusion.     -Biopsy 1/15/18: No ACR, AVR, ABMR, CD4 neg. Less than 10 % fibrosis.   -Biopsy (for DGF) 17: good sample with 24 glomeruli (only 1 sclerosed) and evidence just meeting criteria for ABMR. + diffuse ATI. Minimal fibrosis, no ACR or AVR, but + CD4 (>50% diffusely + stain) with  mild glomerulitis and focal peritubular capillaritis. Also, + ca-oxalate (3) and ca-phos (2) crystals.  -7/9/18 WEAK DSA DETECTED: CW5(4203)      PCP PROPHYLAXIS: Bactrim LIFELONG--> changed to Mepron ~ 6/2018  CMV PROPHYLAXIS: Valcyte until 11/19/17  FUNGAL PROPHYLAXIS: Nystatin until 9/15/17    He presented to the emergency room complaining of fever documented 101.6 had degrees F in the emergency room.  He was admitted to Medical Oncology secondary to fever of unknown source.  He still had fever spike last night has been covered with broad-spectrum antibiotics vancomycin and cefepime since admission and was transition to vancomycin and Zosyn today.  He has been pan culture urine culture was added today secondary to high risk.  Mr. Aguiar complains of difficulty breathing and evidence of volume overload with significant edema chest x-ray shows pleural effusion.  In addition, he also complains of decreased urinary output.  His serum creatinine slowly rising prior to admission was1.9 and today is 2.4.     Mr. Aguiar is a 39 y.o. year old male who is status post Kidney Transplant - 8/18/2017  (#1).    He received induction therapy with Thymoglobulin and his maintenance immunosuppression consists of:   Immunosuppressants (From admission, onward)    Start     Stop Route Frequency Ordered    11/12/19 1000  sirolimus tablet 4 mg      -- Oral Daily 11/12/19 0856          Past Medical and Surgical History: Mr. Aguiar has a past medical history of Anemia, At risk for opportunistic infections, Delayed graft function of kidney, ESRD secondary to HIVAN s/p DDRT 8/18/17, HIV infection, HIV nephropathy, Hyperlipidemia, Hypertension, Need for prophylactic immunotherapy, S/P kidney transplant 8/18/2017 (8/28/2017), Status post exploratory laparotomy (9/10/2017), Status post exploratory laparotomy (9/10/2017), and TB lung, latent.  He has a past surgical history that includes peritoneal dialysis catheter placement; Exploratory  laparotomy w/ bowel resection; Kidney transplant; Biopsy (N/A, 8/28/2018); Insertion of tunneled central venous catheter (CVC) with subcutaneous port (Left, 8/19/2019); and Insertion of tunneled central venous catheter (CVC) with subcutaneous port (N/A, 9/12/2019).    Past Social and Family History: Mr. Aguiar reports that he has quit smoking. His smoking use included cigarettes. He smoked 0.50 packs per day. He has never used smokeless tobacco. He reports that he does not drink alcohol or use drugs.His family history includes Cancer in his father; Diabetes in his maternal aunt and maternal uncle; Heart disease in his paternal grandmother; Hyperlipidemia in his mother; Hypertension in his maternal grandmother and paternal grandmother; Lung cancer in his father; No Known Problems in his brother.    Intake/Output - Last 3 Shifts       11/11 0700 - 11/12 0659 11/12 0700 - 11/13 0659 11/13 0700 - 11/14 0659    P.O.  1410 1680    I.V. (mL/kg)  100 (1.5)     IV Piggyback  900 100    Total Intake(mL/kg)  2410 (35.4) 1780 (26.2)    Urine (mL/kg/hr)  550 (0.3) 480 (0.5)    Emesis/NG output   0    Other   0    Stool  0 0    Blood   0    Total Output  550 480    Net  +1860 +1300           Urine Occurrence  3 x 0 x    Stool Occurrence  0 x 1 x    Emesis Occurrence   0 x           Review of Systems   Constitutional: Positive for activity change, appetite change, fatigue, fever and unexpected weight change (gain).   HENT: Negative for facial swelling, hearing loss and tinnitus.    Eyes: Negative for pain and visual disturbance.   Respiratory: Positive for chest tightness and shortness of breath.    Cardiovascular: Positive for leg swelling. Negative for chest pain.   Gastrointestinal: Negative for abdominal pain, nausea and vomiting.   Genitourinary: Positive for decreased urine volume. Negative for difficulty urinating, dysuria and flank pain.   Musculoskeletal: Negative for arthralgias and myalgias.   Skin: Negative for color  "change.   Allergic/Immunologic: Positive for immunocompromised state.   Neurological: Negative for dizziness, syncope, weakness, light-headedness and headaches.   Psychiatric/Behavioral: Negative for behavioral problems and confusion.     Objective:     Vital Signs (Most Recent):  Temp: 99.5 °F (37.5 °C) (11/13/19 1642)  Pulse: 74 (11/13/19 1915)  Resp: 18 (11/13/19 1915)  BP: (!) 140/83 (11/13/19 1642)  SpO2: (!) 92 % (11/13/19 1915) Vital Signs (24h Range):  Temp:  [98.3 °F (36.8 °C)-101.6 °F (38.7 °C)] 99.5 °F (37.5 °C)  Pulse:  [] 74  Resp:  [18-22] 18  SpO2:  [92 %-99 %] 92 %  BP: (140-166)/(81-91) 140/83     Weight: 68 kg (150 lb)  Height: 5' 8" (172.7 cm)  Body mass index is 22.81 kg/m².    Physical Exam   Constitutional: He is oriented to person, place, and time. He appears well-developed and well-nourished. No distress. Nasal cannula in place.   Significant edema noted compatible with anasarca upper extremities and lower extremities.   HENT:   Head: Normocephalic and atraumatic.   Eyes: Pupils are equal, round, and reactive to light. Conjunctivae are normal.   Neck: Trachea normal. Neck supple. No JVD present.   Cardiovascular: Normal rate, regular rhythm, S1 normal, S2 normal, normal heart sounds and normal pulses. Exam reveals no gallop and no friction rub.   No murmur heard.  Bilateral lower extremity edema pitting 1+   Pulmonary/Chest: Effort normal. He has decreased breath sounds in the right lower field and the left lower field. He has rales in the right lower field and the left lower field.   Poor air movement   Abdominal: Soft. Bowel sounds are normal.   Musculoskeletal: Normal range of motion. He exhibits edema.   Neurological: He is alert and oriented to person, place, and time.   Skin: Skin is warm and dry.   Psychiatric: He has a normal mood and affect. His behavior is normal. Thought content normal.   Vitals reviewed.      Significant Labs:  CBC:   Recent Labs   Lab 11/12/19  2252 " 11/12/19  0459 11/13/19  0622   WBC 5.96  --  8.88   RBC 3.18*  --  3.30*   HGB 7.6*  --  8.0*   HCT 26.0* 22* 25.7*     --  174   MCV 82  --  78*   MCH 23.9*  --  24.2*   MCHC 29.2*  --  31.1*     BMP:   Recent Labs   Lab 11/12/19  0601 11/13/19  0951   * 176*    138   K 3.4* 4.2    107   CO2 25 20*   BUN 25* 30*   CREATININE 2.2* 2.4*   CALCIUM 7.4* 8.5*     CMP:   Recent Labs   Lab 11/12/19  0601 11/13/19  0951   * 176*   CALCIUM 7.4* 8.5*   ALBUMIN 2.5* 2.3*   PROT 5.2* 5.6*    138   K 3.4* 4.2   CO2 25 20*    107   BUN 25* 30*   CREATININE 2.2* 2.4*   ALKPHOS 70 78   ALT 11 10   AST 19 19     Coagulation: No results for input(s): PT, APTT in the last 168 hours.  Cardiac Markers: No results for input(s): CKMB, TROPONINT, MYOGLOBIN in the last 168 hours.  Labs within the past 24 hours have been reviewed.    Diagnostics:  US - Kidney:   Results for orders placed during the hospital encounter of 07/17/19   US Transplant Kidney With Doppler    Narrative EXAMINATION:  US TRANSPLANT KIDNEY WITH DOPPLER    CLINICAL HISTORY:  LEFTY;    TECHNIQUE:  Transplant renal ultrasound of the right lower quadrant with color-flow Doppler and duplex analysis was performed.    COMPARISON:  Ultrasound transplant kidney 6/20/2    FINDINGS:  The renal transplant demonstrates no focal parenchymal abnormality. There is stable, mild transplant hydronephrosis.  There is minimal free fluid in the right lower quadrant with no focal collection surrounding the renal transplant.    Renal arterial resistive indices are as follows: Interlobar 0.72, segmental Up 0.72, segmental Mid 0.76, segmental Low 0.75.  There is no evidence of a tardus parvus waveform. The maximum velocity in the main renal artery is 155 cm/sec, with renal artery to iliac artery ratio of 1.1.    The renal transplant venous system is unremarkable.      Impression Satisfactory Doppler evaluation of the renal allograft.    Stable mild  transplant hydronephrosis.    Minimal free fluid in the right lower quadrant with no focal collection surrounding the renal transplant.    Electronically signed by resident: Gasper Gutierrez  Date:    2019  Time:    18:28    Electronically signed by: Cristóbal Chaney MD  Date:    2019  Time:    18:42     Assessment/Plan:     * Pneumonia due to infectious organism  As per primary team      Acute kidney injury superimposed on chronic kidney disease stage III  LEFTY superimpose on CKD stage III serum creatinine still close to his baseline but trending up increased creatinine by 0.3 mg according to AKIN criteria meets definition of LEFTY.  Suspect due to physiologic pattern and evidence of volume overload in setting of doxorubicin possible congestion and cardio renal physiology.  Previous echo 2019 shows heart failure preserved ejection fraction in addition prerenal due to extrarenal fluid losses from fever could also have cause increase in creatinine but patient was resuscitated with fluids and no evidence of improved and renal function.    · UA with Cx high risk  · Check UPCr   · Check US transplant Kidney allograft  · Acid-Base:  CO2 20 suspect secondary to renal function non gap acidosis  · Electrolytes:  Stable will replace as needed  · No evidence for emergent hemodialysis  · Recommend diuresis with furosemide 80 mg assess response on urine output and can be challenged 6 hr later if hemodynamically stable.  · Maintain MAP > 65  · Hg >7 if Hg < 7 please transfuse.   · continue to monitor strict I/O's, daily weights, renally dose medications, and avoid nephrotoxic agents        Chronic kidney disease, stage 3 (moderate)  Please see LEFTY on CKD       donor kidney transplant 2017  Status post  brain dead kidney transplant 2017  -Biopsy (for DGF) 17: good sample with 24 glomeruli (only 1 sclerosed) and evidence just meeting criteria for ABMR. + diffuse ATI. Minimal fibrosis, no  ACR or AVR, but + CD4 (>50% diffusely + stain) with mild glomerulitis and focal peritubular capillaritis. Also, biopsy shows ca-oxalate (3) and ca-phos (2) crystals.  -8/25/17 DSA + for weak class I.    Biopsy (LEFTY) 10/17/2017 14 glomeruli, <5% fibrosis, no MC changes, No ACR or AMR, C4d negative    Biopsy (LEFTY) 1/15/18:  No ACR, AVR, ABMR, CD4 neg. Less than 10 % fibrosis.         Long-term use of immunosuppressant medication  Continue sirolimus 4 mg daily  Monitor sirolimus levels daily, goal level 3-4  MMF has been on hold since diagnosis of Kaposi sarcoma      HIV (human immunodeficiency virus infection)  As per transplant ID   Currently on HAART Therapy      Anemia in stage 3 chronic kidney disease  As per Hematology      Kaposi sarcoma  As per primary team          Discharge Planning:  As per primary team      Harjeet Boswell MD  Kidney Transplant  [facility]

## 2019-11-14 NOTE — ASSESSMENT & PLAN NOTE
Off oxygen   Will continue with Vanc/Zosyn today and consider de escalation tomorrow given GPCs growing in blood (could be a contaminant however given his tenuous clinical status we will await until the culture is finalized)   TTE without pericardial effusion.   US showed no drainable pleural effusion per pulm

## 2019-11-15 ENCOUNTER — TELEPHONE (OUTPATIENT)
Dept: TRANSPLANT | Facility: CLINIC | Age: 39
End: 2019-11-15

## 2019-11-15 LAB
ALBUMIN SERPL BCP-MCNC: 1.9 G/DL (ref 3.5–5.2)
ALP SERPL-CCNC: 96 U/L (ref 55–135)
ALT SERPL W/O P-5'-P-CCNC: 8 U/L (ref 10–44)
ANION GAP SERPL CALC-SCNC: 9 MMOL/L (ref 8–16)
APTT BLDCRRT: 25.3 SEC (ref 21–32)
APTT BLDCRRT: 38 SEC (ref 21–32)
AST SERPL-CCNC: 14 U/L (ref 10–40)
BACTERIA UR CULT: NO GROWTH
BASOPHILS # BLD AUTO: 0.01 K/UL (ref 0–0.2)
BASOPHILS NFR BLD: 0.1 % (ref 0–1.9)
BILIRUB SERPL-MCNC: 0.3 MG/DL (ref 0.1–1)
BUN SERPL-MCNC: 30 MG/DL (ref 6–20)
CALCIUM SERPL-MCNC: 8.9 MG/DL (ref 8.7–10.5)
CHLORIDE SERPL-SCNC: 106 MMOL/L (ref 95–110)
CO2 SERPL-SCNC: 24 MMOL/L (ref 23–29)
CREAT SERPL-MCNC: 2.7 MG/DL (ref 0.5–1.4)
DIFFERENTIAL METHOD: ABNORMAL
EOSINOPHIL # BLD AUTO: 0.1 K/UL (ref 0–0.5)
EOSINOPHIL NFR BLD: 1.4 % (ref 0–8)
ERYTHROCYTE [DISTWIDTH] IN BLOOD BY AUTOMATED COUNT: 17.2 % (ref 11.5–14.5)
EST. GFR  (AFRICAN AMERICAN): 32.6 ML/MIN/1.73 M^2
EST. GFR  (NON AFRICAN AMERICAN): 28.2 ML/MIN/1.73 M^2
GLUCOSE SERPL-MCNC: 157 MG/DL (ref 70–110)
HCT VFR BLD AUTO: 29.4 % (ref 40–54)
HGB BLD-MCNC: 8.9 G/DL (ref 14–18)
IMM GRANULOCYTES # BLD AUTO: 0.05 K/UL (ref 0–0.04)
IMM GRANULOCYTES NFR BLD AUTO: 0.6 % (ref 0–0.5)
INR PPP: 0.9 (ref 0.8–1.2)
LYMPHOCYTES # BLD AUTO: 0.7 K/UL (ref 1–4.8)
LYMPHOCYTES NFR BLD: 8.9 % (ref 18–48)
MCH RBC QN AUTO: 24.2 PG (ref 27–31)
MCHC RBC AUTO-ENTMCNC: 30.3 G/DL (ref 32–36)
MCV RBC AUTO: 80 FL (ref 82–98)
MONOCYTES # BLD AUTO: 0.5 K/UL (ref 0.3–1)
MONOCYTES NFR BLD: 6 % (ref 4–15)
NEUTROPHILS # BLD AUTO: 6.4 K/UL (ref 1.8–7.7)
NEUTROPHILS NFR BLD: 83 % (ref 38–73)
NRBC BLD-RTO: 0 /100 WBC
PLATELET # BLD AUTO: 346 K/UL (ref 150–350)
PMV BLD AUTO: 10.3 FL (ref 9.2–12.9)
POTASSIUM SERPL-SCNC: 3.9 MMOL/L (ref 3.5–5.1)
PROT SERPL-MCNC: 5.6 G/DL (ref 6–8.4)
PROTHROMBIN TIME: 9.3 SEC (ref 9–12.5)
RBC # BLD AUTO: 3.68 M/UL (ref 4.6–6.2)
SIROLIMUS BLD-MCNC: 4.8 NG/ML (ref 4–20)
SODIUM SERPL-SCNC: 139 MMOL/L (ref 136–145)
VANCOMYCIN SERPL-MCNC: 8.6 UG/ML
WBC # BLD AUTO: 7.77 K/UL (ref 3.9–12.7)

## 2019-11-15 PROCEDURE — 87449 NOS EACH ORGANISM AG IA: CPT | Mod: 91

## 2019-11-15 PROCEDURE — 99233 SBSQ HOSP IP/OBS HIGH 50: CPT | Mod: GC,,, | Performed by: EMERGENCY MEDICINE

## 2019-11-15 PROCEDURE — 99233 PR SUBSEQUENT HOSPITAL CARE,LEVL III: ICD-10-PCS | Mod: ,,, | Performed by: INTERNAL MEDICINE

## 2019-11-15 PROCEDURE — 85610 PROTHROMBIN TIME: CPT

## 2019-11-15 PROCEDURE — 25000003 PHARM REV CODE 250: Performed by: HOSPITALIST

## 2019-11-15 PROCEDURE — 25000003 PHARM REV CODE 250: Performed by: STUDENT IN AN ORGANIZED HEALTH CARE EDUCATION/TRAINING PROGRAM

## 2019-11-15 PROCEDURE — 87449 NOS EACH ORGANISM AG IA: CPT

## 2019-11-15 PROCEDURE — 63600175 PHARM REV CODE 636 W HCPCS

## 2019-11-15 PROCEDURE — 63600175 PHARM REV CODE 636 W HCPCS: Performed by: STUDENT IN AN ORGANIZED HEALTH CARE EDUCATION/TRAINING PROGRAM

## 2019-11-15 PROCEDURE — 85730 THROMBOPLASTIN TIME PARTIAL: CPT

## 2019-11-15 PROCEDURE — 85730 THROMBOPLASTIN TIME PARTIAL: CPT | Mod: 91

## 2019-11-15 PROCEDURE — 85025 COMPLETE CBC W/AUTO DIFF WBC: CPT

## 2019-11-15 PROCEDURE — 87040 BLOOD CULTURE FOR BACTERIA: CPT

## 2019-11-15 PROCEDURE — 20600001 HC STEP DOWN PRIVATE ROOM

## 2019-11-15 PROCEDURE — 99233 SBSQ HOSP IP/OBS HIGH 50: CPT | Mod: ,,, | Performed by: INTERNAL MEDICINE

## 2019-11-15 PROCEDURE — 80195 ASSAY OF SIROLIMUS: CPT

## 2019-11-15 PROCEDURE — 25000003 PHARM REV CODE 250

## 2019-11-15 PROCEDURE — 80048 BASIC METABOLIC PNL TOTAL CA: CPT

## 2019-11-15 PROCEDURE — 25000242 PHARM REV CODE 250 ALT 637 W/ HCPCS: Performed by: STUDENT IN AN ORGANIZED HEALTH CARE EDUCATION/TRAINING PROGRAM

## 2019-11-15 PROCEDURE — 80053 COMPREHEN METABOLIC PANEL: CPT

## 2019-11-15 PROCEDURE — 94640 AIRWAY INHALATION TREATMENT: CPT

## 2019-11-15 PROCEDURE — 36415 COLL VENOUS BLD VENIPUNCTURE: CPT

## 2019-11-15 PROCEDURE — 80202 ASSAY OF VANCOMYCIN: CPT

## 2019-11-15 PROCEDURE — 99233 PR SUBSEQUENT HOSPITAL CARE,LEVL III: ICD-10-PCS | Mod: GC,,, | Performed by: EMERGENCY MEDICINE

## 2019-11-15 PROCEDURE — 94761 N-INVAS EAR/PLS OXIMETRY MLT: CPT

## 2019-11-15 RX ORDER — SODIUM CHLORIDE 9 MG/ML
INJECTION, SOLUTION INTRAVENOUS CONTINUOUS
Status: DISCONTINUED | OUTPATIENT
Start: 2019-11-15 | End: 2019-11-18

## 2019-11-15 RX ORDER — OXYCODONE HYDROCHLORIDE 5 MG/1
5 TABLET ORAL EVERY 4 HOURS PRN
Status: DISCONTINUED | OUTPATIENT
Start: 2019-11-15 | End: 2019-11-19 | Stop reason: HOSPADM

## 2019-11-15 RX ORDER — HEPARIN SODIUM,PORCINE/D5W 25000/250
18 INTRAVENOUS SOLUTION INTRAVENOUS CONTINUOUS
Status: DISCONTINUED | OUTPATIENT
Start: 2019-11-15 | End: 2019-11-18

## 2019-11-15 RX ORDER — POLYETHYLENE GLYCOL 3350 17 G/17G
17 POWDER, FOR SOLUTION ORAL 2 TIMES DAILY
Status: DISCONTINUED | OUTPATIENT
Start: 2019-11-15 | End: 2019-11-15

## 2019-11-15 RX ADMIN — SODIUM BICARBONATE 650 MG TABLET 1950 MG: at 04:11

## 2019-11-15 RX ADMIN — ROSUVASTATIN CALCIUM 20 MG: 10 TABLET, FILM COATED ORAL at 08:11

## 2019-11-15 RX ADMIN — VANCOMYCIN HYDROCHLORIDE 750 MG: 750 INJECTION, POWDER, LYOPHILIZED, FOR SOLUTION INTRAVENOUS at 12:11

## 2019-11-15 RX ADMIN — PREDNISONE 5 MG: 5 TABLET ORAL at 08:11

## 2019-11-15 RX ADMIN — SODIUM CHLORIDE 500 ML: 0.9 INJECTION, SOLUTION INTRAVENOUS at 01:11

## 2019-11-15 RX ADMIN — CALCIUM CARBONATE (ANTACID) CHEW TAB 500 MG 500 MG: 500 CHEW TAB at 08:11

## 2019-11-15 RX ADMIN — HEPARIN SODIUM 5000 UNITS: 5000 INJECTION, SOLUTION INTRAVENOUS; SUBCUTANEOUS at 05:11

## 2019-11-15 RX ADMIN — HEPARIN SODIUM AND DEXTROSE 18 UNITS/KG/HR: 10000; 5 INJECTION INTRAVENOUS at 12:11

## 2019-11-15 RX ADMIN — IPRATROPIUM BROMIDE AND ALBUTEROL SULFATE 3 ML: .5; 3 SOLUTION RESPIRATORY (INHALATION) at 08:11

## 2019-11-15 RX ADMIN — EMTRICITABINE AND TENOFOVIR ALAFENAMIDE 1 TABLET: 200; 25 TABLET ORAL at 08:11

## 2019-11-15 RX ADMIN — NIFEDIPINE 60 MG: 30 TABLET, FILM COATED, EXTENDED RELEASE ORAL at 08:11

## 2019-11-15 RX ADMIN — SODIUM CHLORIDE: 0.9 INJECTION, SOLUTION INTRAVENOUS at 01:11

## 2019-11-15 RX ADMIN — HYDROCODONE BITARTRATE AND ACETAMINOPHEN 1 TABLET: 5; 325 TABLET ORAL at 01:11

## 2019-11-15 RX ADMIN — SODIUM BICARBONATE 650 MG TABLET 1950 MG: at 08:11

## 2019-11-15 RX ADMIN — CARVEDILOL 12.5 MG: 12.5 TABLET, FILM COATED ORAL at 05:11

## 2019-11-15 RX ADMIN — FAMOTIDINE 20 MG: 20 TABLET ORAL at 08:11

## 2019-11-15 RX ADMIN — CARVEDILOL 12.5 MG: 12.5 TABLET, FILM COATED ORAL at 08:11

## 2019-11-15 RX ADMIN — IPRATROPIUM BROMIDE AND ALBUTEROL SULFATE 3 ML: .5; 3 SOLUTION RESPIRATORY (INHALATION) at 04:11

## 2019-11-15 RX ADMIN — OXYCODONE HYDROCHLORIDE 5 MG: 5 TABLET ORAL at 05:11

## 2019-11-15 RX ADMIN — OXYCODONE HYDROCHLORIDE 5 MG: 5 TABLET ORAL at 09:11

## 2019-11-15 RX ADMIN — HYDROCODONE BITARTRATE AND ACETAMINOPHEN 1 TABLET: 5; 325 TABLET ORAL at 12:11

## 2019-11-15 RX ADMIN — ATOVAQUONE 1500 MG: 750 SUSPENSION ORAL at 08:11

## 2019-11-15 RX ADMIN — IPRATROPIUM BROMIDE AND ALBUTEROL SULFATE 3 ML: .5; 3 SOLUTION RESPIRATORY (INHALATION) at 12:11

## 2019-11-15 RX ADMIN — DOLUTEGRAVIR SODIUM 50 MG: 50 TABLET, FILM COATED ORAL at 08:11

## 2019-11-15 RX ADMIN — CEFTRIAXONE 2 G: 2 INJECTION, SOLUTION INTRAVENOUS at 08:11

## 2019-11-15 RX ADMIN — SIROLIMUS 4 MG: 1 TABLET ORAL at 08:11

## 2019-11-15 RX ADMIN — ALUMINUM HYDROXIDE, MAGNESIUM HYDROXIDE, AND SIMETHICONE 50 ML: 200; 200; 20 SUSPENSION ORAL at 03:11

## 2019-11-15 NOTE — ASSESSMENT & PLAN NOTE
38yo man w/ ahistory of HIV (RT1=549, VL UD on TAF/FTC+DTG) and ESRD/HIVAN (s/p DBDKT 8/18/2017, CMV D-/R+ thymo induction, on maintenance sirolimus; c/b ABMR, RP hematoma s/p washout 9/8/2017, LTBI s/p treatment, recent diagnosis of visceral KS s/p doxil x4 cycles with good response clinically and per 10/2019 PET in bulky LAD although with new cardiomegaly/effusion) who was admitted on 11/12/2019 with fevers, cough and SOB due to RLL pneumonia (most likely CAP given prompt response to typical coverage) with associated small pleural effusion. Course c/b late growth of GPCC from 1/2 blood cultures -- possibly a contaminant.    - would continue CTX for now for CAP -- plan 5 day total antibiotics course assuming continued response  - would continue empiric vancomycin for now while awaiting speciation from blood cx -- if CoNS, can stop; if S.aureus, will continue and evaluate further  - if fails to full respond, can try to get sputum culture if cough is productive  - await non-contrast CT chest to determine utility of thoracentesis -- appreciate pulmonary assistance  - small effusion noted on echo -- additional management per primary team  - would continue lifelong PJP ppx per protocol - would consider rechallenging tmp/smx as is drug of choice  - continue current home HAART regimen

## 2019-11-15 NOTE — PROGRESS NOTES
"Pt c/o abdominal discomfort. Last bm 1h ago, normal in appearance per pt. Abdomen distended and tender upon palpation to L side of abdomen. Slight firmness noted. Pt denies back pain, but states that he "just feels very uncomfortable." VSS ex SBP elevated in 160's. Notified MD Rich. Will come to floor to see pt     New order: abd xray, gi cocktail   "

## 2019-11-15 NOTE — ASSESSMENT & PLAN NOTE
Off oxygen   Will continue with Vanc/CTX today and consider de escalation tomorrow given GPCs growing in blood (could be a contaminant however given his tenuous clinical status we will await until the culture is finalized)   TTE without pericardial effusion.   US showed no drainable pleural effusion per pulm

## 2019-11-15 NOTE — PROGRESS NOTES
Notified MD Rich of US results. According to MD, results relayed during day to team. Will relay again in the AM. No new orders for now. Gave nightly SQ Heparin, pt denies any pain in RUQ. Will continue to monitor patient

## 2019-11-15 NOTE — SUBJECTIVE & OBJECTIVE
Interval History:       Seen and examined at the bedside. Feels better since last night when he developed abdominal pain. Received GI cocktail and was better. Had 6 loose stools. No fevers overnight.     Oncology Treatment Plan:   OP SARCOMA DOXORUBICIN LIPOSOMAL Q3W    Medications:  Continuous Infusions:  Scheduled Meds:   albuterol-ipratropium  3 mL Nebulization Q4H    atovaquone  1,500 mg Oral Daily    calcium carbonate  1 tablet Oral Daily    carvedilol  12.5 mg Oral BID WM    cefTRIAXone (ROCEPHIN) IVPB  2 g Intravenous Q24H    dolutegravir  50 mg Oral Daily    emtricitabine-tenofovir alafen  1 tablet Oral Daily    famotidine  20 mg Oral Daily    heparin (porcine)  5,000 Units Subcutaneous Q8H    NIFEdipine  60 mg Oral Q12H    predniSONE  5 mg Oral Daily    rosuvastatin  20 mg Oral Daily    sirolimus  4 mg Oral Daily    sodium bicarbonate  1,950 mg Oral TID    vancomycin (VANCOCIN) IVPB  750 mg Intravenous Q24H     PRN Meds:acetaminophen, dextrose 10 % in water (D10W), dextrose 10 % in water (D10W), glucagon (human recombinant), glucose, glucose, HYDROcodone-acetaminophen, sodium chloride 0.9%     Review of Systems   Constitutional: Negative for activity change, appetite change, chills, diaphoresis, fatigue and fever.   Respiratory: Negative for cough and shortness of breath.    Cardiovascular: Positive for leg swelling (R>L ). Negative for chest pain.   Gastrointestinal: Negative for abdominal pain, blood in stool, diarrhea, nausea and vomiting.   Genitourinary: Negative for decreased urine volume and dysuria.   Neurological: Negative for light-headedness.     Objective:     Vital Signs (Most Recent):  Temp: 98.8 °F (37.1 °C) (11/15/19 0545)  Pulse: 84 (11/15/19 0837)  Resp: 18 (11/15/19 0545)  BP: (!) 178/108 (11/15/19 0837)  SpO2: 97 % (11/15/19 0545) Vital Signs (24h Range):  Temp:  [97.6 °F (36.4 °C)-98.8 °F (37.1 °C)] 98.8 °F (37.1 °C)  Pulse:  [82-93] 84  Resp:  [12-20] 18  SpO2:  [93 %-98  %] 97 %  BP: (133-178)/() 178/108     Weight: 68 kg (150 lb)  Body mass index is 22.81 kg/m².  Body surface area is 1.81 meters squared.      Intake/Output Summary (Last 24 hours) at 11/15/2019 0838  Last data filed at 11/15/2019 0500  Gross per 24 hour   Intake 2000 ml   Output 3400 ml   Net -1400 ml       Physical Exam   Constitutional: He appears well-developed and well-nourished. No distress.   HENT:   Head: Normocephalic and atraumatic.   Mouth/Throat: Oropharynx is clear and moist. No oropharyngeal exudate.   Eyes: Conjunctivae are normal. Right eye exhibits no discharge. Left eye exhibits no discharge. No scleral icterus.   Neck: No JVD present.   Cardiovascular: Normal rate, regular rhythm, normal heart sounds and intact distal pulses. Exam reveals no gallop and no friction rub.   No murmur heard.  Pulmonary/Chest: Effort normal and breath sounds normal. No stridor. No respiratory distress. He has no wheezes. He has no rales.   Abdominal: Soft. Bowel sounds are normal. He exhibits no distension and no mass. There is no tenderness. There is no guarding.   Musculoskeletal: He exhibits edema (RUE and LLE).   Skin lesions noted on lower extremities    Lymphadenopathy:     He has no cervical adenopathy.   Skin: He is not diaphoretic.   Nursing note and vitals reviewed.      Significant Labs:   CBC:   Recent Labs   Lab 11/14/19  0624 11/15/19  0620   WBC 7.95 7.77   HGB 7.3* 8.9*   HCT 23.8* 29.4*    346    and CMP:   Recent Labs   Lab 11/13/19  0951 11/14/19  0624 11/15/19  0620    138 139   K 4.2 3.9 3.9    106 106   CO2 20* 24 24   * 123* 157*   BUN 30* 32* 30*   CREATININE 2.4* 2.5* 2.7*   CALCIUM 8.5* 8.9 8.9   PROT 5.6* 5.9* 5.6*   ALBUMIN 2.3* 2.1* 1.9*   BILITOT 0.4 0.5 0.3   ALKPHOS 78 90 96   AST 19 16 14   ALT 10 9* 8*   ANIONGAP 11 8 9   EGFRNONAA 32.5* 31.0* 28.2*

## 2019-11-15 NOTE — ASSESSMENT & PLAN NOTE
Patient is presenting with fever, tachycardia and Tachypnea   CXR with likely right mid and lower lobe pneumonia   Follow blood cultures and respiratory panel  Received Vanc/Cefepime in the ER, Cefepime inpatient and later Zosyn   Will continue with Vanc/CTX today and consider de escalation tomorrow given GPCs growing in blood (could be a contaminant however given his tenuous clinical status we will await until the culture is finalized)   TTE without pericardial effusion.   US showed no drainable pleural effusion per pulm

## 2019-11-15 NOTE — TELEPHONE ENCOUNTER
Returned call, no answer, voicemail not set up yet.    ----- Message from Rhoda Garay sent at 11/15/2019 10:02 AM CST -----  Contact: Randa ( Mother)  Pt mother is trying to get in contact with the nurse; pt was admitted to the hospital on the 11th fl.  Mother needs to discuss information about her son's health. She can be reached at  [phone]

## 2019-11-15 NOTE — PLAN OF CARE
- Initiated heparin infusion for DVT.  Follow high intensity nomogram - next aPTT at 1830  -  mL bolus completed, continuous NS @ 100 mL/hr  - Watery stools - sent C-diff sample  - Hat placed in toilet to collect stools due to continued watery stool  - Independently mobile, turns and repositions  - Swelling of right arm r/t RSC DVT, elevate as tolerated, heparin gtt  - Swelling of right leg due to KS, per patient no changes noted  - Repeat BMP at 1830

## 2019-11-15 NOTE — SUBJECTIVE & OBJECTIVE
Interval History: Afebrile on antibiotics. Cough/SOB improving. DVT in R subclavian on U/S. RVP negative. Repeat blood cx NGTD (GPCC in admit, 1/2 bottles suspicious for contaminant). Await CT chest.    Review of Systems   Constitutional: Negative for activity change, appetite change, chills, fatigue and fever.   HENT: Negative for congestion, dental problem, mouth sores and sinus pressure.    Eyes: Negative for pain, redness and visual disturbance.   Respiratory: Positive for cough and shortness of breath. Negative for wheezing.    Cardiovascular: Positive for leg swelling. Negative for chest pain.   Gastrointestinal: Negative for abdominal distention, abdominal pain, diarrhea, nausea and vomiting.   Endocrine: Negative for polyuria.   Genitourinary: Negative for decreased urine volume, dysuria and frequency.   Musculoskeletal: Negative for joint swelling.   Skin: Negative for color change.   Allergic/Immunologic: Negative for food allergies.   Neurological: Negative for dizziness, weakness and headaches.   Hematological: Positive for adenopathy.   Psychiatric/Behavioral: Negative for agitation and confusion. The patient is not nervous/anxious.      Objective:     Vital Signs (Most Recent):  Temp: 98.8 °F (37.1 °C) (11/15/19 0837)  Pulse: 82 (11/15/19 0850)  Resp: 16 (11/15/19 0850)  BP: (!) 178/108 (11/15/19 0837)  SpO2: 97 % (11/15/19 0850) Vital Signs (24h Range):  Temp:  [97.6 °F (36.4 °C)-98.8 °F (37.1 °C)] 98.8 °F (37.1 °C)  Pulse:  [82-93] 82  Resp:  [12-20] 16  SpO2:  [93 %-98 %] 97 %  BP: (133-178)/() 178/108     Weight: 68 kg (150 lb)  Body mass index is 22.81 kg/m².    Estimated Creatinine Clearance: 35 mL/min (A) (based on SCr of 2.7 mg/dL (H)).    Physical Exam   Constitutional: He is oriented to person, place, and time. He appears well-developed and well-nourished.   HENT:   Head: Normocephalic and atraumatic.   Mouth/Throat: Oropharynx is clear and moist.   Eyes: Conjunctivae are normal.    Neck: Neck supple.   Cardiovascular: Normal rate, regular rhythm and normal heart sounds.   No murmur heard.  Pulmonary/Chest: Effort normal and breath sounds normal. No respiratory distress. He has no wheezes.   Abdominal: Soft. Bowel sounds are normal. He exhibits no distension. There is no tenderness.   Musculoskeletal: Normal range of motion. He exhibits edema. He exhibits no tenderness.   B/l UE and LE edema, RLE > LLE, RUE>LUE   Lymphadenopathy:     He has no cervical adenopathy.   Neurological: He is alert and oriented to person, place, and time. Coordination normal.   Skin: Skin is warm and dry. No rash noted.   Psychiatric: He has a normal mood and affect. His behavior is normal.       Significant Labs:   CBC:   Recent Labs   Lab 11/14/19  0624 11/15/19  0620   WBC 7.95 7.77   HGB 7.3* 8.9*   HCT 23.8* 29.4*    346     CMP:   Recent Labs   Lab 11/14/19  0624 11/15/19  0620    139   K 3.9 3.9    106   CO2 24 24   * 157*   BUN 32* 30*   CREATININE 2.5* 2.7*   CALCIUM 8.9 8.9   PROT 5.9* 5.6*   ALBUMIN 2.1* 1.9*   BILITOT 0.5 0.3   ALKPHOS 90 96   AST 16 14   ALT 9* 8*   ANIONGAP 8 9   EGFRNONAA 31.0* 28.2*       Significant Imaging: I have reviewed all pertinent imaging results/findings within the past 24 hours.

## 2019-11-15 NOTE — SUBJECTIVE & OBJECTIVE
Interval History: Patient reports improvement in SOB and cough.     Objective:     Vital Signs (Most Recent):  Temp: 97.7 °F (36.5 °C) (11/15/19 1132)  Pulse: 80 (11/15/19 1313)  Resp: 16 (11/15/19 1215)  BP: (!) 160/96 (11/15/19 1313)  SpO2: 95 % (11/15/19 1215) Vital Signs (24h Range):  Temp:  [97.7 °F (36.5 °C)-98.8 °F (37.1 °C)] 97.7 °F (36.5 °C)  Pulse:  [80-92] 80  Resp:  [16-20] 16  SpO2:  [93 %-98 %] 95 %  BP: (148-178)/() 160/96     Weight: 68 kg (150 lb)  Body mass index is 22.81 kg/m².      Intake/Output Summary (Last 24 hours) at 11/15/2019 1529  Last data filed at 11/15/2019 1417  Gross per 24 hour   Intake 1517.17 ml   Output 2700 ml   Net -1182.83 ml       Physical Exam   Constitutional: He is oriented to person, place, and time. He appears well-developed and well-nourished.   HENT:   Head: Normocephalic and atraumatic.   Mouth/Throat: Oropharynx is clear and moist.   Eyes: Conjunctivae are normal.   Neck: Neck supple.   Cardiovascular: Normal rate, regular rhythm and normal heart sounds.   No murmur heard.  Pulmonary/Chest: Effort normal and breath sounds normal. No respiratory distress. He has no wheezes.   Abdominal: Soft. Bowel sounds are normal. He exhibits no distension. There is no tenderness.   Musculoskeletal: Normal range of motion. He exhibits edema. He exhibits no tenderness.   B/l UE and LE edema, RLE > LLE, RUE>LUE   Lymphadenopathy:     He has no cervical adenopathy.   Neurological: He is alert and oriented to person, place, and time. Coordination normal.   Skin: Skin is warm and dry. No rash noted.   Psychiatric: He has a normal mood and affect. His behavior is normal.   Nursing note and vitals reviewed.      Vents:  Oxygen Concentration (%): 28 (11/14/19 0448)    Lines/Drains/Airways     Central Venous Catheter Line                 PowerPort A Cath Single Lumen 08/19/19 0749 left subclavian 88 days          Drain                 Hemodialysis AV Fistula 07/19/19 0823 Left upper  arm 119 days          Peripheral Intravenous Line                 Midline Catheter Insertion/Assessment  - Single Lumen 11/12/19 1215 Left brachial vein 18g x 8cm 3 days                Significant Labs:    CBC/Anemia Profile:  Recent Labs   Lab 11/14/19  0624 11/15/19  0620   WBC 7.95 7.77   HGB 7.3* 8.9*   HCT 23.8* 29.4*    346   MCV 79* 80*   RDW 17.4* 17.2*        Chemistries:  Recent Labs   Lab 11/14/19 0624 11/14/19  1642 11/15/19  0620     --  139   K 3.9  --  3.9     --  106   CO2 24  --  24   BUN 32*  --  30*   CREATININE 2.5*  --  2.7*   CALCIUM 8.9  --  8.9   ALBUMIN 2.1*  --  1.9*   PROT 5.9*  --  5.6*   BILITOT 0.5  --  0.3   ALKPHOS 90  --  96   ALT 9*  --  8*   AST 16  --  14   MG  --  2.1  --    PHOS  --  3.3  --      Significant Imaging:  I have reviewed and interpreted all pertinent imaging results/findings within the past 24 hours.

## 2019-11-15 NOTE — PROGRESS NOTES
[facility]  Pulmonology  Progress Note    Patient Name: Demetrio Aguiar  MRN: [id number]  Admission Date: 11/12/2019  Hospital Length of Stay: 3 days  Code Status: Full Code  Attending Provider: Santi Kennedy MD  Primary Care Provider: Primary Doctor No   Principal Problem: Pneumonia due to infectious organism    Subjective:     Interval History: Patient reports improvement in SOB and cough.     Objective:     Vital Signs (Most Recent):  Temp: 97.7 °F (36.5 °C) (11/15/19 1132)  Pulse: 80 (11/15/19 1313)  Resp: 16 (11/15/19 1215)  BP: (!) 160/96 (11/15/19 1313)  SpO2: 95 % (11/15/19 1215) Vital Signs (24h Range):  Temp:  [97.7 °F (36.5 °C)-98.8 °F (37.1 °C)] 97.7 °F (36.5 °C)  Pulse:  [80-92] 80  Resp:  [16-20] 16  SpO2:  [93 %-98 %] 95 %  BP: (148-178)/() 160/96     Weight: 68 kg (150 lb)  Body mass index is 22.81 kg/m².      Intake/Output Summary (Last 24 hours) at 11/15/2019 1529  Last data filed at 11/15/2019 1417  Gross per 24 hour   Intake 1517.17 ml   Output 2700 ml   Net -1182.83 ml       Physical Exam   Constitutional: He is oriented to person, place, and time. He appears well-developed and well-nourished.   HENT:   Head: Normocephalic and atraumatic.   Mouth/Throat: Oropharynx is clear and moist.   Eyes: Conjunctivae are normal.   Neck: Neck supple.   Cardiovascular: Normal rate, regular rhythm and normal heart sounds.   No murmur heard.  Pulmonary/Chest: Effort normal and breath sounds normal. No respiratory distress. He has no wheezes.   Abdominal: Soft. Bowel sounds are normal. He exhibits no distension. There is no tenderness.   Musculoskeletal: Normal range of motion. He exhibits edema. He exhibits no tenderness.   B/l UE and LE edema, RLE > LLE, RUE>LUE   Lymphadenopathy:     He has no cervical adenopathy.   Neurological: He is alert and oriented to person, place, and time. Coordination normal.   Skin: Skin is warm and dry. No rash noted.   Psychiatric: He has a normal  mood and affect. His behavior is normal.   Nursing note and vitals reviewed.      Vents:  Oxygen Concentration (%): 28 (11/14/19 0448)    Lines/Drains/Airways     Central Venous Catheter Line                 PowerPort A Cath Single Lumen 08/19/19 0749 left subclavian 88 days          Drain                 Hemodialysis AV Fistula 07/19/19 0823 Left upper arm 119 days          Peripheral Intravenous Line                 Midline Catheter Insertion/Assessment  - Single Lumen 11/12/19 1215 Left brachial vein 18g x 8cm 3 days                Significant Labs:    CBC/Anemia Profile:  Recent Labs   Lab 11/14/19  0624 11/15/19  0620   WBC 7.95 7.77   HGB 7.3* 8.9*   HCT 23.8* 29.4*    346   MCV 79* 80*   RDW 17.4* 17.2*        Chemistries:  Recent Labs   Lab 11/14/19  0624 11/14/19  1642 11/15/19  0620     --  139   K 3.9  --  3.9     --  106   CO2 24  --  24   BUN 32*  --  30*   CREATININE 2.5*  --  2.7*   CALCIUM 8.9  --  8.9   ALBUMIN 2.1*  --  1.9*   PROT 5.9*  --  5.6*   BILITOT 0.5  --  0.3   ALKPHOS 90  --  96   ALT 9*  --  8*   AST 16  --  14   MG  --  2.1  --    PHOS  --  3.3  --      Significant Imaging:  I have reviewed and interpreted all pertinent imaging results/findings within the past 24 hours.    Assessment/Plan:     * Pneumonia due to infectious organism  39 y.o.male with PMH HIV (CD4 193), hx LTBI tx, recent diagnosis of visceral KS and ESRD s/p kidney transplant presenting febrile with RLL opacity on CXR and suspected effusion.    - Trace effusion on bedside ultrasound, not amenable to bedside thoracentesis.  - Recommend repeat CXR  - Antibiotics per ID  - Continue diuresis as tolerated  - UE u/s with DVT, does raise suspicion for PE, but with patient's renal function would avoid CTA. Treatment of DVT per primary.     Pulmonology will sign off. Please call with any further questions.           Raphael Alvarado MD  Pulmonology  [facility]

## 2019-11-15 NOTE — PLAN OF CARE
Pt AAOx4, VSS, in bed with upper siderails raised x2, bed in lowest/locked position, call light/personal belongings within reach. Pt instructed to call for assistance. Pt verbalizes understanding.  Pt afebrile at this time.  Proper hand hygiene performed before and after pt care.      See progress notes from overnight.   US +DVT in RUE. Limb alert bracelet placed.  Echo done (11/14) with EF 40-45%.   Blood cx +. Afebrile. Transitioned to Vanc + Rocephin, ID following. WBC stable.   Respiratory treatments continued. No productive cough noted. Room air.  Cr trending up, Nephrology following. Lasix challenge done (11/14) with good UOP response. UOP measured via urinal, see flowsheet.  Up independently. Ambulated benito x1.       Will continue to monitor patient.

## 2019-11-15 NOTE — CLINICAL REVIEW
KTM Chart Check    Went to bedside to see patient, but presently off floor.  Spoke to his friend, who states he had no acute issues today.   Chart reviewed, no fever noted.  Excellent urine output of 3 L. Echocardiogram done today showed EF in the 40s.    Vitals:    11/14/19 1631 11/14/19 1654 11/14/19 2002 11/14/19 2007   BP: (!) 150/84  (!) 165/87    BP Location: Left arm      Patient Position: Sitting      Pulse: 89 91 92 90   Resp: 18 18 18 17   Temp: 98.6 °F (37 °C)   98.7 °F (37.1 °C)   TempSrc: Oral   Oral   SpO2: 97% (!) 93%  (!) 94%   Weight:       Height:           Recent Labs   Lab 11/12/19  0601 11/13/19  0951 11/14/19  0624 11/14/19  1642    138 138  --    K 3.4* 4.2 3.9  --     107 106  --    CO2 25 20* 24  --    BUN 25* 30* 32*  --    CREATININE 2.2* 2.4* 2.5*  --    CALCIUM 7.4* 8.5* 8.9  --    PHOS 2.2*  --   --  3.3     Encounter for Monitoring Immunosuppression post Transplant  Recent Labs   Lab 07/22/19  0325 08/07/19  0735 08/14/19  0842  10/02/19  0920 11/12/19  1023 11/14/19  0624   Tacrolimus Lvl <1.5 L <1.5 L <1.5 L  --   --   --   --    Sirolimus Lvl 3.2 L 6.6 3.6 L   < > 6.8 3.0 L 3.0 L    < > = values in this interval not displayed.     -Target SRL level for Demetrio is 3-4  -Recheck as per guidelines.  -Monitor for side effects and toxicities, given narrow therapeutic window and significant risk of AE     No acute issues identified. Please call KTM as needed; Will continue to follow.

## 2019-11-15 NOTE — ASSESSMENT & PLAN NOTE
39 y.o.male with PMH HIV (CD4 193), hx LTBI tx, recent diagnosis of visceral KS and ESRD s/p kidney transplant presenting febrile with RLL opacity on CXR and suspected effusion.    - Trace effusion on bedside ultrasound, not amenable to bedside thoracentesis.  - Recommend repeat CXR  - Antibiotics per ID  - Continue diuresis as tolerated  - UE u/s with DVT, does raise suspicion for PE, but with patient's renal function would avoid CTA. Treatment of DVT per primary.     Pulmonology will sign off. Please call with any further questions.

## 2019-11-15 NOTE — PROGRESS NOTES
[facility]  Infectious Disease  Progress Note    Patient Name: Demetrio Aguiar  MRN: [id number]  Admission Date: 11/12/2019  Length of Stay: 3 days  Attending Physician: Santi Kennedy MD  Primary Care Provider: Primary Doctor No    Isolation Status: Special Contact  Assessment/Plan:      * Pneumonia due to infectious organism  40yo man w/ ahistory of HIV (IY4=360, VL UD on TAF/FTC+DTG) and ESRD/HIVAN (s/p DBDKT 8/18/2017, CMV D-/R+ thymo induction, on maintenance sirolimus; c/b ABMR, RP hematoma s/p washout 9/8/2017, LTBI s/p treatment, recent diagnosis of visceral KS s/p doxil x4 cycles with good response clinically and per 10/2019 PET in bulky LAD although with new cardiomegaly/effusion) who was admitted on 11/12/2019 with fevers, cough and SOB due to RLL pneumonia (most likely CAP given prompt response to typical coverage) with associated small pleural effusion. Course c/b late growth of GPCC from 1/2 blood cultures -- possibly a contaminant.    - would continue CTX for now for CAP -- plan 5 day total antibiotics course assuming continued response  - would continue empiric vancomycin for now while awaiting speciation from blood cx -- if CoNS, can stop; if S.aureus, will continue and evaluate further  - if fails to full respond, can try to get sputum culture if cough is productive  - await non-contrast CT chest to determine utility of thoracentesis -- appreciate pulmonary assistance  - small effusion noted on echo -- additional management per primary team  - would continue lifelong PJP ppx per protocol - would consider rechallenging tmp/smx as is drug of choice  - continue current home HAART regimen        Anticipated Disposition: pending improvement    Thank you for your consult. I will follow-up with patient. Please contact us if you have any additional questions.     Annabelle Ivan MD  Transplant ID Attending  [phone]    Annabelle Ivan MD  Infectious Disease  [facility]    Subjective:     Principal Problem:Pneumonia due to infectious organism    HPI: No notes on file  Interval History: Afebrile on antibiotics. Cough/SOB improving. DVT in R subclavian on U/S. RVP negative. Repeat blood cx NGTD (GPCC in admit, 1/2 bottles suspicious for contaminant). Await CT chest.    Review of Systems   Constitutional: Negative for activity change, appetite change, chills, fatigue and fever.   HENT: Negative for congestion, dental problem, mouth sores and sinus pressure.    Eyes: Negative for pain, redness and visual disturbance.   Respiratory: Positive for cough and shortness of breath. Negative for wheezing.    Cardiovascular: Positive for leg swelling. Negative for chest pain.   Gastrointestinal: Negative for abdominal distention, abdominal pain, diarrhea, nausea and vomiting.   Endocrine: Negative for polyuria.   Genitourinary: Negative for decreased urine volume, dysuria and frequency.   Musculoskeletal: Negative for joint swelling.   Skin: Negative for color change.   Allergic/Immunologic: Negative for food allergies.   Neurological: Negative for dizziness, weakness and headaches.   Hematological: Positive for adenopathy.   Psychiatric/Behavioral: Negative for agitation and confusion. The patient is not nervous/anxious.      Objective:     Vital Signs (Most Recent):  Temp: 98.8 °F (37.1 °C) (11/15/19 0837)  Pulse: 82 (11/15/19 0850)  Resp: 16 (11/15/19 0850)  BP: (!) 178/108 (11/15/19 0837)  SpO2: 97 % (11/15/19 0850) Vital Signs (24h Range):  Temp:  [97.6 °F (36.4 °C)-98.8 °F (37.1 °C)] 98.8 °F (37.1 °C)  Pulse:  [82-93] 82  Resp:  [12-20] 16  SpO2:  [93 %-98 %] 97 %  BP: (133-178)/() 178/108     Weight: 68 kg (150 lb)  Body mass index is 22.81 kg/m².    Estimated Creatinine Clearance: 35 mL/min (A) (based on SCr of 2.7 mg/dL (H)).    Physical Exam   Constitutional: He is oriented to person, place, and time. He appears well-developed and well-nourished.   HENT:   Head:  Normocephalic and atraumatic.   Mouth/Throat: Oropharynx is clear and moist.   Eyes: Conjunctivae are normal.   Neck: Neck supple.   Cardiovascular: Normal rate, regular rhythm and normal heart sounds.   No murmur heard.  Pulmonary/Chest: Effort normal and breath sounds normal. No respiratory distress. He has no wheezes.   Abdominal: Soft. Bowel sounds are normal. He exhibits no distension. There is no tenderness.   Musculoskeletal: Normal range of motion. He exhibits edema. He exhibits no tenderness.   B/l UE and LE edema, RLE > LLE, RUE>LUE   Lymphadenopathy:     He has no cervical adenopathy.   Neurological: He is alert and oriented to person, place, and time. Coordination normal.   Skin: Skin is warm and dry. No rash noted.   Psychiatric: He has a normal mood and affect. His behavior is normal.       Significant Labs:   CBC:   Recent Labs   Lab 11/14/19  0624 11/15/19  0620   WBC 7.95 7.77   HGB 7.3* 8.9*   HCT 23.8* 29.4*    346     CMP:   Recent Labs   Lab 11/14/19 0624 11/15/19  0620    139   K 3.9 3.9    106   CO2 24 24   * 157*   BUN 32* 30*   CREATININE 2.5* 2.7*   CALCIUM 8.9 8.9   PROT 5.9* 5.6*   ALBUMIN 2.1* 1.9*   BILITOT 0.5 0.3   ALKPHOS 90 96   AST 16 14   ALT 9* 8*   ANIONGAP 8 9   EGFRNONAA 31.0* 28.2*       Significant Imaging: I have reviewed all pertinent imaging results/findings within the past 24 hours.

## 2019-11-15 NOTE — NURSING
Attempted to notify med onc team of pt abnormal BUE ultrasound results. Impression states DVT in R arm. Unable to reach MD. SOW.

## 2019-11-15 NOTE — PROGRESS NOTES
[facility]  Hematology/Oncology  Progress Note    Patient Name: Demetrio Aguiar  Admission Date: 11/12/2019  Hospital Length of Stay: 3 days  Code Status: Full Code     Subjective:     HPI:    Oncologic Hx from chart:    Demetrio Aguiar is a 39 y.o. male with PMHx of HIV on ART, Kaposi sarcoma, ESRD previous on iHD s/p transplant with hx of rejection (on chronic immunosuppression), anemia of chronic disease, latent TB s/p treatment, HTN and HLD.     He is s/p 4 cycles of doxo for symptomatic metastatic kaposi.     He tolerated the first  4 cycles well without obvious complaints. RLE swelling has decreased and he has improved QOL.     PET scan in October shows decrease in tumor, LAD bulk though it also shows new cardiomegaly and effusion, which is somewhat concerning.     HPI:  Patient is presenting with complaints of fevers, chills, dyspnea and cough. He denies any diarrhea, abdominal pain or dysuria. Vital signs in the ER revealed fever, tachycardia and tachypnea. CBC revealed no neutropenia. CMP revealed stable renal function. CXR with opacity in right mid to inferior hemithorax potentially related to pneumonia and pleural effusion. He received 1 liter bolus and IV Vancomycin and Cefepime.        Interval History:       Seen and examined at the bedside. Feels better since last night when he developed abdominal pain. Received GI cocktail and was better. Had 6 loose stools. No fevers overnight.     Oncology Treatment Plan:   OP SARCOMA DOXORUBICIN LIPOSOMAL Q3W    Medications:  Continuous Infusions:  Scheduled Meds:   albuterol-ipratropium  3 mL Nebulization Q4H    atovaquone  1,500 mg Oral Daily    calcium carbonate  1 tablet Oral Daily    carvedilol  12.5 mg Oral BID WM    cefTRIAXone (ROCEPHIN) IVPB  2 g Intravenous Q24H    dolutegravir  50 mg Oral Daily    emtricitabine-tenofovir alafen  1 tablet Oral Daily    famotidine  20 mg Oral Daily    heparin (porcine)  5,000 Units  Subcutaneous Q8H    NIFEdipine  60 mg Oral Q12H    predniSONE  5 mg Oral Daily    rosuvastatin  20 mg Oral Daily    sirolimus  4 mg Oral Daily    sodium bicarbonate  1,950 mg Oral TID    vancomycin (VANCOCIN) IVPB  750 mg Intravenous Q24H     PRN Meds:acetaminophen, dextrose 10 % in water (D10W), dextrose 10 % in water (D10W), glucagon (human recombinant), glucose, glucose, HYDROcodone-acetaminophen, sodium chloride 0.9%     Review of Systems   Constitutional: Negative for activity change, appetite change, chills, diaphoresis, fatigue and fever.   Respiratory: Negative for cough and shortness of breath.    Cardiovascular: Positive for leg swelling (R>L ). Negative for chest pain.   Gastrointestinal: Negative for abdominal pain, blood in stool, diarrhea, nausea and vomiting.   Genitourinary: Negative for decreased urine volume and dysuria.   Neurological: Negative for light-headedness.     Objective:     Vital Signs (Most Recent):  Temp: 98.8 °F (37.1 °C) (11/15/19 0545)  Pulse: 84 (11/15/19 0837)  Resp: 18 (11/15/19 0545)  BP: (!) 178/108 (11/15/19 0837)  SpO2: 97 % (11/15/19 0545) Vital Signs (24h Range):  Temp:  [97.6 °F (36.4 °C)-98.8 °F (37.1 °C)] 98.8 °F (37.1 °C)  Pulse:  [82-93] 84  Resp:  [12-20] 18  SpO2:  [93 %-98 %] 97 %  BP: (133-178)/() 178/108     Weight: 68 kg (150 lb)  Body mass index is 22.81 kg/m².  Body surface area is 1.81 meters squared.      Intake/Output Summary (Last 24 hours) at 11/15/2019 0838  Last data filed at 11/15/2019 0500  Gross per 24 hour   Intake 2000 ml   Output 3400 ml   Net -1400 ml       Physical Exam   Constitutional: He appears well-developed and well-nourished. No distress.   HENT:   Head: Normocephalic and atraumatic.   Mouth/Throat: Oropharynx is clear and moist. No oropharyngeal exudate.   Eyes: Conjunctivae are normal. Right eye exhibits no discharge. Left eye exhibits no discharge. No scleral icterus.   Neck: No JVD present.   Cardiovascular: Normal rate,  regular rhythm, normal heart sounds and intact distal pulses. Exam reveals no gallop and no friction rub.   No murmur heard.  Pulmonary/Chest: Effort normal and breath sounds normal. No stridor. No respiratory distress. He has no wheezes. He has no rales.   Abdominal: Soft. Bowel sounds are normal. He exhibits no distension and no mass. There is no tenderness. There is no guarding.   Musculoskeletal: He exhibits edema (RUE and LLE).   Skin lesions noted on lower extremities    Lymphadenopathy:     He has no cervical adenopathy.   Skin: He is not diaphoretic.   Nursing note and vitals reviewed.      Significant Labs:   CBC:   Recent Labs   Lab 11/14/19  0624 11/15/19  0620   WBC 7.95 7.77   HGB 7.3* 8.9*   HCT 23.8* 29.4*    346    and CMP:   Recent Labs   Lab 11/13/19  0951 11/14/19  0624 11/15/19  0620    138 139   K 4.2 3.9 3.9    106 106   CO2 20* 24 24   * 123* 157*   BUN 30* 32* 30*   CREATININE 2.4* 2.5* 2.7*   CALCIUM 8.5* 8.9 8.9   PROT 5.6* 5.9* 5.6*   ALBUMIN 2.3* 2.1* 1.9*   BILITOT 0.4 0.5 0.3   ALKPHOS 78 90 96   AST 19 16 14   ALT 10 9* 8*   ANIONGAP 11 8 9   EGFRNONAA 32.5* 31.0* 28.2*           Assessment/Plan:     * Pneumonia due to infectious organism  Off oxygen   Will continue with Vanc/CTX today and consider de escalation tomorrow given GPCs growing in blood (could be a contaminant however given his tenuous clinical status we will await until the culture is finalized)   TTE without pericardial effusion.   US showed no drainable pleural effusion per pulm     Sepsis  Patient is presenting with fever, tachycardia and Tachypnea   CXR with likely right mid and lower lobe pneumonia   Follow blood cultures and respiratory panel  Received Vanc/Cefepime in the ER, Cefepime inpatient and later Zosyn   Will continue with Vanc/CTX today and consider de escalation tomorrow given GPCs growing in blood (could be a contaminant however given his tenuous clinical status we will await  until the culture is finalized)   TTE without pericardial effusion.   US showed no drainable pleural effusion per pulm       Renovascular hypertension  C/w home Coreg and Nifedipine     Kaposi sarcoma  HIV related   Currently on HAART and Adriamycin Q3W for symptomatic metastatic KS  He was due for Adriamycin however this is going to be postponed given acute illness     Acute kidney injury superimposed on chronic kidney disease stage III  Likely a component of cardiorenal   TTE with decreased EF      Immunosuppression  Due to HIV infection, anti rejection meds and chemotherapy   C/w Atovaquone for PCP PPx, PCP ppx needs to be lifelong per ID  We will consider re challenging him with bactrim after discussing this with nephrology      donor kidney transplant 2017  Crt stable   C/w Home Sirolimus and Prednisone at 5 mg daily   Check a Sirolimus level   Monitor CMP  Appreciate nephrology consult    Anemia in stage 3 chronic kidney disease  H/H stable, monitor CBC    HIV (human immunodeficiency virus infection)  Controlled, last viral load was undetectable   Will continue with home Tivicay and Descovy (Descovy is non formulary however this gentleman with renal dysfunction is unable to tolerate Truvada which the formulary alternative).   CD4 count pending, appreciate ID consult       Hyperlipidemia  C/w Porfirio Coto,   Hematology/Oncology  [facility]        ATTENDING NOTE, ONCOLOGY INPATIENT TEAM    As above; events of last 24 hours noted.  Patient seen and examined, chart reviewed.  Appears comfortable, in NAD but complains of LLQ pain.  He states that he has had watery diarrhea since yesterday.  Lungs are clear to auscultation on the left with crackles on the right  Abdomen is soft, nontender.  Labs reviewed.    PLAN  Follow blood cultures.  Follow electrolytes daily.  Repeat chest x ray.  Check stools for c dif.  Continue ceftriaxone.  Hold vancomycin and check  level given his ARF.  Will  Discuss volume status with transplant Service; I am concerned that he is intravascularly depleted and that would explain his worsening renal dysfunction.  Heparin drip for his DVT for now.    We will ask the KTM whether they would be willing to become the primary Service since his problems do not appear to be cancer or chemotherapy related, at least not directly.    We will follow.        Santi Kennedy MD

## 2019-11-16 LAB
ALBUMIN SERPL BCP-MCNC: 1.7 G/DL (ref 3.5–5.2)
ALP SERPL-CCNC: 66 U/L (ref 55–135)
ALT SERPL W/O P-5'-P-CCNC: 6 U/L (ref 10–44)
ANION GAP SERPL CALC-SCNC: 6 MMOL/L (ref 8–16)
ANION GAP SERPL CALC-SCNC: 9 MMOL/L (ref 8–16)
APTT BLDCRRT: 42.5 SEC (ref 21–32)
APTT BLDCRRT: 48.7 SEC (ref 21–32)
AST SERPL-CCNC: 11 U/L (ref 10–40)
BACTERIA BLD CULT: ABNORMAL
BASOPHILS # BLD AUTO: 0.03 K/UL (ref 0–0.2)
BASOPHILS NFR BLD: 0.6 % (ref 0–1.9)
BILIRUB SERPL-MCNC: 0.2 MG/DL (ref 0.1–1)
BUN SERPL-MCNC: 30 MG/DL (ref 6–20)
BUN SERPL-MCNC: 34 MG/DL (ref 6–20)
C DIFF GDH STL QL: NEGATIVE
C DIFF TOX A+B STL QL IA: NEGATIVE
CALCIUM SERPL-MCNC: 8.3 MG/DL (ref 8.7–10.5)
CALCIUM SERPL-MCNC: 8.3 MG/DL (ref 8.7–10.5)
CHLORIDE SERPL-SCNC: 104 MMOL/L (ref 95–110)
CHLORIDE SERPL-SCNC: 107 MMOL/L (ref 95–110)
CO2 SERPL-SCNC: 20 MMOL/L (ref 23–29)
CO2 SERPL-SCNC: 24 MMOL/L (ref 23–29)
CREAT SERPL-MCNC: 2.5 MG/DL (ref 0.5–1.4)
CREAT SERPL-MCNC: 2.7 MG/DL (ref 0.5–1.4)
DIFFERENTIAL METHOD: ABNORMAL
EOSINOPHIL # BLD AUTO: 0.1 K/UL (ref 0–0.5)
EOSINOPHIL NFR BLD: 2.6 % (ref 0–8)
ERYTHROCYTE [DISTWIDTH] IN BLOOD BY AUTOMATED COUNT: 17 % (ref 11.5–14.5)
EST. GFR  (AFRICAN AMERICAN): 32.6 ML/MIN/1.73 M^2
EST. GFR  (AFRICAN AMERICAN): 35.8 ML/MIN/1.73 M^2
EST. GFR  (NON AFRICAN AMERICAN): 28.2 ML/MIN/1.73 M^2
EST. GFR  (NON AFRICAN AMERICAN): 31 ML/MIN/1.73 M^2
GLUCOSE SERPL-MCNC: 115 MG/DL (ref 70–110)
GLUCOSE SERPL-MCNC: 135 MG/DL (ref 70–110)
HCT VFR BLD AUTO: 27.4 % (ref 40–54)
HGB BLD-MCNC: 8.3 G/DL (ref 14–18)
IMM GRANULOCYTES # BLD AUTO: 0.05 K/UL (ref 0–0.04)
IMM GRANULOCYTES NFR BLD AUTO: 0.9 % (ref 0–0.5)
LYMPHOCYTES # BLD AUTO: 1 K/UL (ref 1–4.8)
LYMPHOCYTES NFR BLD: 18.3 % (ref 18–48)
MCH RBC QN AUTO: 24.1 PG (ref 27–31)
MCHC RBC AUTO-ENTMCNC: 30.3 G/DL (ref 32–36)
MCV RBC AUTO: 80 FL (ref 82–98)
MONOCYTES # BLD AUTO: 0.5 K/UL (ref 0.3–1)
MONOCYTES NFR BLD: 10 % (ref 4–15)
NEUTROPHILS # BLD AUTO: 3.7 K/UL (ref 1.8–7.7)
NEUTROPHILS NFR BLD: 67.6 % (ref 38–73)
NRBC BLD-RTO: 0 /100 WBC
PLATELET # BLD AUTO: 233 K/UL (ref 150–350)
PMV BLD AUTO: 10.7 FL (ref 9.2–12.9)
POTASSIUM SERPL-SCNC: 4.2 MMOL/L (ref 3.5–5.1)
POTASSIUM SERPL-SCNC: 4.2 MMOL/L (ref 3.5–5.1)
PROT SERPL-MCNC: 4.6 G/DL (ref 6–8.4)
RBC # BLD AUTO: 3.44 M/UL (ref 4.6–6.2)
SIROLIMUS BLD-MCNC: 9.8 NG/ML (ref 4–20)
SODIUM SERPL-SCNC: 134 MMOL/L (ref 136–145)
SODIUM SERPL-SCNC: 136 MMOL/L (ref 136–145)
WBC # BLD AUTO: 5.4 K/UL (ref 3.9–12.7)

## 2019-11-16 PROCEDURE — 80053 COMPREHEN METABOLIC PANEL: CPT

## 2019-11-16 PROCEDURE — 99232 PR SUBSEQUENT HOSPITAL CARE,LEVL II: ICD-10-PCS | Mod: GC,,, | Performed by: INTERNAL MEDICINE

## 2019-11-16 PROCEDURE — 80195 ASSAY OF SIROLIMUS: CPT

## 2019-11-16 PROCEDURE — 99233 PR SUBSEQUENT HOSPITAL CARE,LEVL III: ICD-10-PCS | Mod: ,,, | Performed by: INTERNAL MEDICINE

## 2019-11-16 PROCEDURE — 25000003 PHARM REV CODE 250: Performed by: HOSPITALIST

## 2019-11-16 PROCEDURE — 94760 N-INVAS EAR/PLS OXIMETRY 1: CPT

## 2019-11-16 PROCEDURE — 85025 COMPLETE CBC W/AUTO DIFF WBC: CPT

## 2019-11-16 PROCEDURE — 85730 THROMBOPLASTIN TIME PARTIAL: CPT

## 2019-11-16 PROCEDURE — 20600001 HC STEP DOWN PRIVATE ROOM

## 2019-11-16 PROCEDURE — 99233 SBSQ HOSP IP/OBS HIGH 50: CPT | Mod: ,,, | Performed by: INTERNAL MEDICINE

## 2019-11-16 PROCEDURE — 25000003 PHARM REV CODE 250: Performed by: STUDENT IN AN ORGANIZED HEALTH CARE EDUCATION/TRAINING PROGRAM

## 2019-11-16 PROCEDURE — 63600175 PHARM REV CODE 636 W HCPCS: Performed by: STUDENT IN AN ORGANIZED HEALTH CARE EDUCATION/TRAINING PROGRAM

## 2019-11-16 PROCEDURE — 94761 N-INVAS EAR/PLS OXIMETRY MLT: CPT

## 2019-11-16 PROCEDURE — 25000242 PHARM REV CODE 250 ALT 637 W/ HCPCS: Performed by: STUDENT IN AN ORGANIZED HEALTH CARE EDUCATION/TRAINING PROGRAM

## 2019-11-16 PROCEDURE — 99232 SBSQ HOSP IP/OBS MODERATE 35: CPT | Mod: GC,,, | Performed by: INTERNAL MEDICINE

## 2019-11-16 PROCEDURE — 25000003 PHARM REV CODE 250

## 2019-11-16 PROCEDURE — 36415 COLL VENOUS BLD VENIPUNCTURE: CPT

## 2019-11-16 PROCEDURE — 94640 AIRWAY INHALATION TREATMENT: CPT

## 2019-11-16 PROCEDURE — 63600175 PHARM REV CODE 636 W HCPCS

## 2019-11-16 PROCEDURE — 85730 THROMBOPLASTIN TIME PARTIAL: CPT | Mod: 91

## 2019-11-16 RX ORDER — CARVEDILOL 12.5 MG/1
12.5 TABLET ORAL 2 TIMES DAILY WITH MEALS
Status: DISCONTINUED | OUTPATIENT
Start: 2019-11-16 | End: 2019-11-16

## 2019-11-16 RX ORDER — SIROLIMUS 1 MG/1
2 TABLET, FILM COATED ORAL DAILY
Status: DISCONTINUED | OUTPATIENT
Start: 2019-11-17 | End: 2019-11-19 | Stop reason: HOSPADM

## 2019-11-16 RX ORDER — CARVEDILOL 25 MG/1
25 TABLET ORAL 2 TIMES DAILY WITH MEALS
Status: DISCONTINUED | OUTPATIENT
Start: 2019-11-16 | End: 2019-11-16

## 2019-11-16 RX ORDER — CARVEDILOL 25 MG/1
25 TABLET ORAL 2 TIMES DAILY WITH MEALS
Status: DISCONTINUED | OUTPATIENT
Start: 2019-11-16 | End: 2019-11-19 | Stop reason: HOSPADM

## 2019-11-16 RX ADMIN — PREDNISONE 5 MG: 5 TABLET ORAL at 09:11

## 2019-11-16 RX ADMIN — SIROLIMUS 4 MG: 1 TABLET ORAL at 09:11

## 2019-11-16 RX ADMIN — SODIUM BICARBONATE 650 MG TABLET 1950 MG: at 04:11

## 2019-11-16 RX ADMIN — OXYCODONE HYDROCHLORIDE 5 MG: 5 TABLET ORAL at 04:11

## 2019-11-16 RX ADMIN — CARVEDILOL 25 MG: 25 TABLET, FILM COATED ORAL at 05:11

## 2019-11-16 RX ADMIN — IPRATROPIUM BROMIDE AND ALBUTEROL SULFATE 3 ML: .5; 3 SOLUTION RESPIRATORY (INHALATION) at 08:11

## 2019-11-16 RX ADMIN — CEFTRIAXONE 2 G: 2 INJECTION, SOLUTION INTRAVENOUS at 08:11

## 2019-11-16 RX ADMIN — FAMOTIDINE 20 MG: 20 TABLET ORAL at 09:11

## 2019-11-16 RX ADMIN — HEPARIN SODIUM AND DEXTROSE 20 UNITS/KG/HR: 10000; 5 INJECTION INTRAVENOUS at 12:11

## 2019-11-16 RX ADMIN — SODIUM BICARBONATE 650 MG TABLET 1950 MG: at 09:11

## 2019-11-16 RX ADMIN — SODIUM BICARBONATE 650 MG TABLET 1950 MG: at 08:11

## 2019-11-16 RX ADMIN — SODIUM CHLORIDE: 0.9 INJECTION, SOLUTION INTRAVENOUS at 03:11

## 2019-11-16 RX ADMIN — CALCIUM CARBONATE (ANTACID) CHEW TAB 500 MG 500 MG: 500 CHEW TAB at 09:11

## 2019-11-16 RX ADMIN — EMTRICITABINE AND TENOFOVIR ALAFENAMIDE 1 TABLET: 200; 25 TABLET ORAL at 09:11

## 2019-11-16 RX ADMIN — CARVEDILOL 12.5 MG: 12.5 TABLET, FILM COATED ORAL at 09:11

## 2019-11-16 RX ADMIN — IPRATROPIUM BROMIDE AND ALBUTEROL SULFATE 3 ML: .5; 3 SOLUTION RESPIRATORY (INHALATION) at 12:11

## 2019-11-16 RX ADMIN — NIFEDIPINE 60 MG: 30 TABLET, FILM COATED, EXTENDED RELEASE ORAL at 09:11

## 2019-11-16 RX ADMIN — NIFEDIPINE 60 MG: 30 TABLET, FILM COATED, EXTENDED RELEASE ORAL at 08:11

## 2019-11-16 RX ADMIN — DOLUTEGRAVIR SODIUM 50 MG: 50 TABLET, FILM COATED ORAL at 09:11

## 2019-11-16 RX ADMIN — IPRATROPIUM BROMIDE AND ALBUTEROL SULFATE 3 ML: .5; 3 SOLUTION RESPIRATORY (INHALATION) at 04:11

## 2019-11-16 RX ADMIN — ROSUVASTATIN CALCIUM 20 MG: 10 TABLET, FILM COATED ORAL at 09:11

## 2019-11-16 RX ADMIN — OXYCODONE HYDROCHLORIDE 5 MG: 5 TABLET ORAL at 11:11

## 2019-11-16 RX ADMIN — ATOVAQUONE 1500 MG: 750 SUSPENSION ORAL at 09:11

## 2019-11-16 RX ADMIN — HEPARIN SODIUM AND DEXTROSE 20 UNITS/KG/HR: 10000; 5 INJECTION INTRAVENOUS at 05:11

## 2019-11-16 RX ADMIN — IPRATROPIUM BROMIDE AND ALBUTEROL SULFATE 3 ML: .5; 3 SOLUTION RESPIRATORY (INHALATION) at 03:11

## 2019-11-16 NOTE — SUBJECTIVE & OBJECTIVE
Interval History: Afebrile on antibiotics. Cough/SOB improving. GPC growing in anaerobic bottle only a likely Peptostreptococcus of unlikely significance (probably contaminant given presentation).    Review of Systems   Constitutional: Negative for activity change, appetite change, chills, fatigue and fever.   HENT: Negative for congestion, dental problem, mouth sores and sinus pressure.    Eyes: Negative for pain, redness and visual disturbance.   Respiratory: Negative for cough, shortness of breath and wheezing.    Cardiovascular: Positive for leg swelling. Negative for chest pain.   Gastrointestinal: Negative for abdominal distention, abdominal pain, diarrhea, nausea and vomiting.   Endocrine: Negative for polyuria.   Genitourinary: Negative for decreased urine volume, dysuria and frequency.   Musculoskeletal: Negative for joint swelling.   Skin: Negative for color change.   Allergic/Immunologic: Negative for food allergies.   Neurological: Negative for dizziness, weakness and headaches.   Hematological: Positive for adenopathy.   Psychiatric/Behavioral: Negative for agitation and confusion. The patient is not nervous/anxious.      Objective:     Vital Signs (Most Recent):  Temp: 98.8 °F (37.1 °C) (11/16/19 1129)  Pulse: 87 (11/16/19 1129)  Resp: 18 (11/16/19 1129)  BP: (!) 170/101 (11/16/19 1129)  SpO2: 98 % (11/16/19 1129) Vital Signs (24h Range):  Temp:  [97.9 °F (36.6 °C)-98.8 °F (37.1 °C)] 98.8 °F (37.1 °C)  Pulse:  [80-98] 87  Resp:  [16-20] 18  SpO2:  [94 %-100 %] 98 %  BP: (107-170)/() 170/101     Weight: 77.4 kg (170 lb 10.2 oz)  Body mass index is 25.95 kg/m².    Estimated Creatinine Clearance: 38 mL/min (A) (based on SCr of 2.5 mg/dL (H)).    Physical Exam   Constitutional: He is oriented to person, place, and time. He appears well-developed and well-nourished.   HENT:   Head: Normocephalic and atraumatic.   Mouth/Throat: Oropharynx is clear and moist.   Eyes: Conjunctivae are normal.   Neck:  Neck supple.   Cardiovascular: Normal rate, regular rhythm and normal heart sounds.   No murmur heard.  Pulmonary/Chest: Effort normal and breath sounds normal. No respiratory distress. He has no wheezes.   Abdominal: Soft. Bowel sounds are normal. He exhibits no distension. There is no tenderness.   Musculoskeletal: Normal range of motion. He exhibits edema. He exhibits no tenderness.   B/l UE and LE edema, RLE > LLE, RUE>LUE   Lymphadenopathy:     He has no cervical adenopathy.   Neurological: He is alert and oriented to person, place, and time. Coordination normal.   Skin: Skin is warm and dry. No rash noted.   Psychiatric: He has a normal mood and affect. His behavior is normal.       Significant Labs:   CBC:   Recent Labs   Lab 11/15/19  0620 11/16/19  0558   WBC 7.77 5.40   HGB 8.9* 8.3*   HCT 29.4* 27.4*    233     CMP:   Recent Labs   Lab 11/15/19  0620 11/15/19  2320 11/16/19  0558    134* 136   K 3.9 4.2 4.2    104 107   CO2 24 24 20*   * 135* 115*   BUN 30* 34* 30*   CREATININE 2.7* 2.7* 2.5*   CALCIUM 8.9 8.3* 8.3*   PROT 5.6*  --  4.6*   ALBUMIN 1.9*  --  1.7*   BILITOT 0.3  --  0.2   ALKPHOS 96  --  66   AST 14  --  11   ALT 8*  --  6*   ANIONGAP 9 6* 9   EGFRNONAA 28.2* 28.2* 31.0*       Significant Imaging: I have reviewed all pertinent imaging results/findings within the past 24 hours.

## 2019-11-16 NOTE — PLAN OF CARE
AAOx3, afebrile, RA, c/o pain. PRN pain medication given. BMP last night. CR still 2.7 from morning labs. NS @100cc/hr continuous. IV antibiotics continued. Urine for Legionella sent- awaiting the sputum culture. Heparin gtt started yesterday. Heparin increased to 20units/kg/hr, DW 68kg, 13.6cc/hr. Next aptt @0550.  Cdiff precautions maintained- results pending. Pt able to position self independently.  Pt in lowest position, side rails up x2, non-skid foot wear in place, call light within reach, pt verbalized understanding to call RN when needed.  Hand hygiene practiced per protocol.  Will continue to monitor.

## 2019-11-16 NOTE — ASSESSMENT & PLAN NOTE
Continue sirolimus 4 mg daily  Encounter for Monitoring Immunosuppression post Transplant  Recent Labs   Lab 07/22/19  0325 08/07/19  0735 08/14/19  0842  11/14/19  0624 11/15/19  0620 11/16/19  0558   Tacrolimus Lvl <1.5 L <1.5 L <1.5 L  --   --   --   --    Sirolimus Lvl 3.2 L 6.6 3.6 L   < > 3.0 L 4.8 9.8    < > = values in this interval not displayed.   - Target SRL level for Demetrio is 3-4 while on chemo.  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

## 2019-11-16 NOTE — ASSESSMENT & PLAN NOTE
Controlled, last viral load was undetectable   Will continue with home Tivicay and Descovy (Descovy is non formulary however this gentleman with renal dysfunction is unable to tolerate Truvada which the formulary alternative).   CD4 count at 65, appreciate ID consult     Per ID:      - would continue CTX for now for CAP -- plan 5 day total antibiotics course through 11/17 and if doing well can stop after tomorrow's dose  - CT without significant effusion for tap -- no further action needed  - small pericardial effusion noted on echo -- additional management per primary team  - would continue lifelong PJP ppx per protocol - would consider rechallenging tmp/smx as is drug of choice  - continue current home HAART regimen

## 2019-11-16 NOTE — NURSING
Notified the MD on call regarding pt's legs being swollen R>L. Explained to MD that pt was started on a heparin gtt yesterday for a DVT in his arm. MD stated she will let the day team know. Will continue to monitor.

## 2019-11-16 NOTE — PROGRESS NOTES
[facility]  Hematology/Oncology  Progress Note    Patient Name: Demetrio Aguiar  Admission Date: 11/12/2019  Hospital Length of Stay: 4 days  Code Status: Full Code     Subjective:     HPI:    Oncologic Hx from chart:    Demetrio Aguiar is a 39 y.o. male with PMHx of HIV on ART, Kaposi sarcoma, ESRD previous on iHD s/p transplant with hx of rejection (on chronic immunosuppression), anemia of chronic disease, latent TB s/p treatment, HTN and HLD.     He is s/p 4 cycles of doxo for symptomatic metastatic kaposi.     He tolerated the first  4 cycles well without obvious complaints. RLE swelling has decreased and he has improved QOL.     PET scan in October shows decrease in tumor, LAD bulk though it also shows new cardiomegaly and effusion, which is somewhat concerning.     HPI:  Patient is presenting with complaints of fevers, chills, dyspnea and cough. He denies any diarrhea, abdominal pain or dysuria. Vital signs in the ER revealed fever, tachycardia and tachypnea. CBC revealed no neutropenia. CMP revealed stable renal function. CXR with opacity in right mid to inferior hemithorax potentially related to pneumonia and pleural effusion. He received 1 liter bolus and IV Vancomycin and Cefepime.        Interval History:     Seen and examined at the bedside. No fevers in the past 24 hours. States that his last episode of diarrhea was minutes ago, but is improved in frequency since yesterday. States he did not see blood, but that his stools appear orange. Was able to provide a sampled for c diff testing. He states he has some abdominal pain in the LLQ, but otherwise is doing alright. He has no other questions or concerns.     Oncology Treatment Plan:   OP SARCOMA DOXORUBICIN LIPOSOMAL Q3W    Medications:  Continuous Infusions:   sodium chloride 0.9% 75 mL/hr at 11/16/19 1252    heparin (porcine) in D5W 20 Units/kg/hr (11/16/19 0031)     Scheduled Meds:   albuterol-ipratropium  3 mL Nebulization  Q4H    atovaquone  1,500 mg Oral Daily    calcium carbonate  1 tablet Oral Daily    carvedilol  12.5 mg Oral BID WM    cefTRIAXone (ROCEPHIN) IVPB  2 g Intravenous Q24H    dolutegravir  50 mg Oral Daily    emtricitabine-tenofovir alafen  1 tablet Oral Daily    famotidine  20 mg Oral Daily    NIFEdipine  60 mg Oral Q12H    predniSONE  5 mg Oral Daily    rosuvastatin  20 mg Oral Daily    sirolimus  4 mg Oral Daily    sodium bicarbonate  1,950 mg Oral TID     PRN Meds:acetaminophen, dextrose 10 % in water (D10W), dextrose 10 % in water (D10W), glucagon (human recombinant), glucose, glucose, heparin (PORCINE), heparin (PORCINE), oxyCODONE, sodium chloride 0.9%     Review of Systems   Constitutional: Negative for activity change, appetite change, chills, diaphoresis, fatigue and fever.   Respiratory: Negative for cough and shortness of breath.    Cardiovascular: Positive for leg swelling (R>L ). Negative for chest pain.   Gastrointestinal: Positive for abdominal pain (LLQ) and diarrhea (improving ). Negative for blood in stool, nausea and vomiting.   Genitourinary: Negative for difficulty urinating, dysuria and frequency.   Musculoskeletal: Negative for arthralgias and myalgias.   Neurological: Negative for dizziness, light-headedness and headaches.     Objective:     Vital Signs (Most Recent):  Temp: 98.8 °F (37.1 °C) (11/16/19 1129)  Pulse: 87 (11/16/19 1256)  Resp: 17 (11/16/19 1256)  BP: (!) 170/101 (11/16/19 1129)  SpO2: 98 % (11/16/19 1256) Vital Signs (24h Range):  Temp:  [97.9 °F (36.6 °C)-98.8 °F (37.1 °C)] 98.8 °F (37.1 °C)  Pulse:  [80-98] 87  Resp:  [16-20] 17  SpO2:  [94 %-100 %] 98 %  BP: (107-170)/() 170/101     Weight: 77.4 kg (170 lb 10.2 oz)  Body mass index is 25.95 kg/m².  Body surface area is 1.93 meters squared.      Intake/Output Summary (Last 24 hours) at 11/16/2019 1423  Last data filed at 11/16/2019 1143  Gross per 24 hour   Intake 3723.19 ml   Output 230 ml   Net 3493.19 ml        Physical Exam   Constitutional: He is oriented to person, place, and time. He appears well-developed and well-nourished. No distress.   HENT:   Head: Normocephalic and atraumatic.   Right Ear: External ear normal.   Left Ear: External ear normal.   Eyes: Pupils are equal, round, and reactive to light. Right eye exhibits no discharge. Left eye exhibits no discharge. No scleral icterus.   Neck: Normal range of motion. Neck supple.   Cardiovascular: Normal rate, regular rhythm, normal heart sounds and intact distal pulses. Exam reveals no gallop.   No murmur heard.  Pulmonary/Chest: Effort normal and breath sounds normal. No stridor. No respiratory distress. He has no wheezes. He has no rales.   Breathing RA, no accessory muscles of respiration used     Abdominal: Soft. Bowel sounds are normal. He exhibits no distension. There is tenderness (LLQ- mild). There is no guarding.   Musculoskeletal: He exhibits edema (RUE and RLE, pitting in the RLE- 2+). He exhibits no tenderness or deformity.   Skin lesions noted on lower extremities    Neurological: He is alert and oriented to person, place, and time.   Skin: Skin is warm and dry. He is not diaphoretic. No erythema. No pallor.   Nursing note and vitals reviewed.      Significant Labs:   CBC:   Recent Labs   Lab 11/15/19  0620 11/16/19  0558   WBC 7.77 5.40   HGB 8.9* 8.3*   HCT 29.4* 27.4*    233    and CMP:   Recent Labs   Lab 11/15/19  0620 11/15/19  2320 11/16/19  0558    134* 136   K 3.9 4.2 4.2    104 107   CO2 24 24 20*   * 135* 115*   BUN 30* 34* 30*   CREATININE 2.7* 2.7* 2.5*   CALCIUM 8.9 8.3* 8.3*   PROT 5.6*  --  4.6*   ALBUMIN 1.9*  --  1.7*   BILITOT 0.3  --  0.2   ALKPHOS 96  --  66   AST 14  --  11   ALT 8*  --  6*   ANIONGAP 9 6* 9   EGFRNONAA 28.2* 28.2* 31.0*           Assessment/Plan:     * Pneumonia due to infectious organism  Off oxygen   Will continue with Vanc/CTX today and consider de escalation tomorrow given GPCs  growing in blood (could be a contaminant however given his tenuous clinical status we will await until the culture is finalized)   TTE without pericardial effusion.   US showed no drainable pleural effusion per pulm     - Repeat Blood Cx's- NGTD, continue with vancomycin and ceftriaxone  - Repeat CXR on 11/15 showing improving right lower lobe PNA     Sepsis  Patient is presenting with fever, tachycardia and Tachypnea   CXR with likely right mid and lower lobe pneumonia   Received Vanc/Cefepime in the ER, Cefepime inpatient and later Zosyn   Will continue with Vanc/CTX and consider de escalation tomorrow given GPCs growing in blood (could be a contaminant however given his tenuous clinical status we will await until the culture is finalized)   TTE without pericardial effusion.   US showed no drainable pleural effusion per pulm       Renovascular hypertension  C/w home Coreg and Nifedipine     Kaposi sarcoma  HIV related   Currently on HAART and Adriamycin Q3W for symptomatic metastatic KS  He was due for Adriamycin however this is going to be postponed given acute illness     Acute kidney injury superimposed on chronic kidney disease stage III  Likely a component of cardiorenal   TTE with decreased EF from prior  Continue IVF's at 75 cc's/hr     Immunosuppression  Due to HIV infection, anti rejection meds and chemotherapy   C/w Atovaquone for PCP PPx, PCP ppx needs to be lifelong per ID  We will consider re challenging him with bactrim after discussing this with nephrology      donor kidney transplant 2017  C/w Home Sirolimus and Prednisone at 5 mg daily   Monitor Sirolimus level   Monitor CMP      Anemia in stage 3 chronic kidney disease  H/H stable, monitor CBC    HIV (human immunodeficiency virus infection)  Controlled, last viral load was undetectable   Will continue with home Tivicay and Descovy (Descovy is non formulary however this gentleman with renal dysfunction is unable to tolerate Truvada  which the formulary alternative).   CD4 count at 65, appreciate ID consult     Per ID:      - would continue CTX for now for CAP -- plan 5 day total antibiotics course through 11/17 and if doing well can stop after tomorrow's dose  - CT without significant effusion for tap -- no further action needed  - small pericardial effusion noted on echo -- additional management per primary team  - would continue lifelong PJP ppx per protocol - would consider rechallenging tmp/smx as is drug of choice  - continue current home HAART regimen    Hyperlipidemia  C/w Maykelor             Bill Coto DO  Hematology/Oncology  [facility]    Attending Addendum:  The patient was seen, examined, and discussed on rounds with the team.  I agree with the assessment and plan as outlined for Demetrio Aguiar.      Augusto Ugalde DO, FACP  Hematology & Oncology  [address] [place] [id number]  ph. [phone]  Fax. [phone]

## 2019-11-16 NOTE — ASSESSMENT & PLAN NOTE
Patient is presenting with fever, tachycardia and Tachypnea   CXR with likely right mid and lower lobe pneumonia   Received Vanc/Cefepime in the ER, Cefepime inpatient and later Zosyn   Will continue with Vanc/CTX and consider de escalation tomorrow given GPCs growing in blood (could be a contaminant however given his tenuous clinical status we will await until the culture is finalized)   TTE without pericardial effusion.   US showed no drainable pleural effusion per pulm

## 2019-11-16 NOTE — SUBJECTIVE & OBJECTIVE
Interval History:     Seen and examined at the bedside. No fevers in the past 24 hours. States that his last episode of diarrhea was minutes ago, but is improved in frequency since yesterday. States he did not see blood, but that his stools appear orange. Was able to provide a sampled for c diff testing. He states he has some abdominal pain in the LLQ, but otherwise is doing alright. He has no other questions or concerns.     Oncology Treatment Plan:   OP SARCOMA DOXORUBICIN LIPOSOMAL Q3W    Medications:  Continuous Infusions:   sodium chloride 0.9% 75 mL/hr at 11/16/19 1252    heparin (porcine) in D5W 20 Units/kg/hr (11/16/19 0031)     Scheduled Meds:   albuterol-ipratropium  3 mL Nebulization Q4H    atovaquone  1,500 mg Oral Daily    calcium carbonate  1 tablet Oral Daily    carvedilol  12.5 mg Oral BID WM    cefTRIAXone (ROCEPHIN) IVPB  2 g Intravenous Q24H    dolutegravir  50 mg Oral Daily    emtricitabine-tenofovir alafen  1 tablet Oral Daily    famotidine  20 mg Oral Daily    NIFEdipine  60 mg Oral Q12H    predniSONE  5 mg Oral Daily    rosuvastatin  20 mg Oral Daily    sirolimus  4 mg Oral Daily    sodium bicarbonate  1,950 mg Oral TID     PRN Meds:acetaminophen, dextrose 10 % in water (D10W), dextrose 10 % in water (D10W), glucagon (human recombinant), glucose, glucose, heparin (PORCINE), heparin (PORCINE), oxyCODONE, sodium chloride 0.9%     Review of Systems   Constitutional: Negative for activity change, appetite change, chills, diaphoresis, fatigue and fever.   Respiratory: Negative for cough and shortness of breath.    Cardiovascular: Positive for leg swelling (R>L ). Negative for chest pain.   Gastrointestinal: Positive for abdominal pain (LLQ) and diarrhea (improving ). Negative for blood in stool, nausea and vomiting.   Genitourinary: Negative for difficulty urinating, dysuria and frequency.   Musculoskeletal: Negative for arthralgias and myalgias.   Neurological: Negative for  dizziness, light-headedness and headaches.     Objective:     Vital Signs (Most Recent):  Temp: 98.8 °F (37.1 °C) (11/16/19 1129)  Pulse: 87 (11/16/19 1256)  Resp: 17 (11/16/19 1256)  BP: (!) 170/101 (11/16/19 1129)  SpO2: 98 % (11/16/19 1256) Vital Signs (24h Range):  Temp:  [97.9 °F (36.6 °C)-98.8 °F (37.1 °C)] 98.8 °F (37.1 °C)  Pulse:  [80-98] 87  Resp:  [16-20] 17  SpO2:  [94 %-100 %] 98 %  BP: (107-170)/() 170/101     Weight: 77.4 kg (170 lb 10.2 oz)  Body mass index is 25.95 kg/m².  Body surface area is 1.93 meters squared.      Intake/Output Summary (Last 24 hours) at 11/16/2019 1423  Last data filed at 11/16/2019 1143  Gross per 24 hour   Intake 3723.19 ml   Output 230 ml   Net 3493.19 ml       Physical Exam   Constitutional: He is oriented to person, place, and time. He appears well-developed and well-nourished. No distress.   HENT:   Head: Normocephalic and atraumatic.   Right Ear: External ear normal.   Left Ear: External ear normal.   Eyes: Pupils are equal, round, and reactive to light. Right eye exhibits no discharge. Left eye exhibits no discharge. No scleral icterus.   Neck: Normal range of motion. Neck supple.   Cardiovascular: Normal rate, regular rhythm, normal heart sounds and intact distal pulses. Exam reveals no gallop.   No murmur heard.  Pulmonary/Chest: Effort normal and breath sounds normal. No stridor. No respiratory distress. He has no wheezes. He has no rales.   Breathing RA, no accessory muscles of respiration used     Abdominal: Soft. Bowel sounds are normal. He exhibits no distension. There is tenderness (LLQ- mild). There is no guarding.   Musculoskeletal: He exhibits edema (RUE and RLE, pitting in the RLE- 2+). He exhibits no tenderness or deformity.   Skin lesions noted on lower extremities    Neurological: He is alert and oriented to person, place, and time.   Skin: Skin is warm and dry. He is not diaphoretic. No erythema. No pallor.   Nursing note and vitals  reviewed.      Significant Labs:   CBC:   Recent Labs   Lab 11/15/19  0620 11/16/19  0558   WBC 7.77 5.40   HGB 8.9* 8.3*   HCT 29.4* 27.4*    233    and CMP:   Recent Labs   Lab 11/15/19  0620 11/15/19  2320 11/16/19  0558    134* 136   K 3.9 4.2 4.2    104 107   CO2 24 24 20*   * 135* 115*   BUN 30* 34* 30*   CREATININE 2.7* 2.7* 2.5*   CALCIUM 8.9 8.3* 8.3*   PROT 5.6*  --  4.6*   ALBUMIN 1.9*  --  1.7*   BILITOT 0.3  --  0.2   ALKPHOS 96  --  66   AST 14  --  11   ALT 8*  --  6*   ANIONGAP 9 6* 9   EGFRNONAA 28.2* 28.2* 31.0*

## 2019-11-16 NOTE — ASSESSMENT & PLAN NOTE
Likely a component of cardiorenal   TTE with decreased EF from prior  Continue IVF's at 75 cc's/hr

## 2019-11-16 NOTE — ASSESSMENT & PLAN NOTE
38yo man w/ ahistory of HIV (VQ1=103, VL UD on TAF/FTC+DTG) and ESRD/HIVAN (s/p DBDKT 8/18/2017, CMV D-/R+ thymo induction, on maintenance sirolimus; c/b ABMR, RP hematoma s/p washout 9/8/2017, LTBI s/p treatment, recent diagnosis of visceral KS s/p doxil x4 cycles with good response clinically and per 10/2019 PET in bulky LAD although with new cardiomegaly/effusion) who was admitted on 11/12/2019 with fevers, cough and SOB due to RLL pneumonia (most likely CAP given prompt response to typical coverage) with associated small pleural effusion. Course c/b late growth of GPCC from 1/2 blood cultures that is likely Peptostreptococcus (a likely contaminant but should be susceptible to CTX he is receiving).    - would continue CTX for now for CAP -- plan 5 day total antibiotics course through 11/17 and if doing well can stop after tomorrow's dose  - CT without significant effusion for tap -- no further action needed  - small pericardial effusion noted on echo -- additional management per primary team  - would continue lifelong PJP ppx per protocol - would consider rechallenging tmp/smx as is drug of choice  - continue current home HAART regimen

## 2019-11-16 NOTE — ASSESSMENT & PLAN NOTE
Off oxygen   Will continue with Vanc/CTX today and consider de escalation tomorrow given GPCs growing in blood (could be a contaminant however given his tenuous clinical status we will await until the culture is finalized)   TTE without pericardial effusion.   US showed no drainable pleural effusion per pulm     - Repeat Blood Cx's- NGTD, continue with vancomycin and ceftriaxone  - Repeat CXR on 11/15 showing improving right lower lobe PNA

## 2019-11-16 NOTE — PROGRESS NOTES
[facility]  Kidney Transplant  Progress Note      Reason for Follow-up: Reassessment of Kidney Transplant - 2017  (#1) recipient and management of immunosuppression.    ORGAN:  RIGHT KIDNEY   Donor Type:  Donation after Brain Death    Subjective:   History of Present Illness:  Mr. Demetrio Aguiar is a 39 y.o. year old Black or  male who received a  - brain death kidney transplant on 17.  He has CKD stage 3 - GFR 30-59 and his baseline creatinine is between 1.8-2.1. He takes prednisone and sirolimus for maintenance immunosuppression.          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   - Biopsy proven ABMR with weakly positive class I DSA's on 17. Pt treated with SMP x 3 (-9/3). PLEX x6 (completed ) and IVIG x 2 (-).    - Kidney US on 17 showed a large collection located along the anterior inferior margin of the renal transplant measuring 13.4 x 7 x 11.9. taken back to the OR for retroperitoneal bleed and washout.   -Biopsy 10/17/17 (LEFTY): 14 glomeruli, <5% fibrosis, no MC severo..  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 Amador for Kaposi's Sarcoma and has been treated with Doxorubicin x 4 doses. Has been tolerated well with improved in Tumor scottie according to oncology is note dated 2019.  In addition it also showed cardiomegaly with new evidence of pleural effusion.     -Biopsy 1/15/18: No ACR, AVR, ABMR, CD4 neg. Less than 10 % fibrosis.   -Biopsy (for DGF) 17: good sample with 24 glomeruli (only 1 sclerosed) and evidence just meeting criteria for ABMR. + diffuse ATI. Minimal fibrosis, no ACR or AVR, but + CD4 (>50% diffusely + stain) with mild glomerulitis and focal  peritubular capillaritis. Also, + ca-oxalate (3) and ca-phos (2) crystals.  -7/9/18 WEAK DSA DETECTED: CW5(1076)      PCP PROPHYLAXIS: Bactrim LIFELONG--> changed to Mepron ~ 6/2018  CMV PROPHYLAXIS: Valcyte until 11/19/17  FUNGAL PROPHYLAXIS: Nystatin until 9/15/17    He presented to the emergency room complaining of fever documented 101.6 had degrees F in the emergency room.  He was admitted to Medical Oncology secondary to fever of unknown source.  He still had fever spike last night has been covered with broad-spectrum antibiotics vancomycin and cefepime since admission and was transition to vancomycin and Zosyn today.  He has been pan culture urine culture was added today secondary to high risk.  Mr. Aguiar complains of difficulty breathing and evidence of volume overload with significant edema chest x-ray shows pleural effusion.  In addition, he also complains of decreased urinary output.  His serum creatinine slowly rising prior to admission was1.9 and today is 2.4.     Hospital Course:   Initially diuresed, but discussed with Hematology who thought he was intravascularly volume depleted.  Thus, IV fluids initiated.    Echocardiogram this admission showed EF 40-45%.  CVP 3.    Interval History:  On IVF for INTRAvascular volume depletion. Feels swollen.  Denies shortness of breath.  Believes he is doing better overall.  Reports urinating without problems.  Denies nausea or vomiting, but started with diarrhea 2 days ago.  States his appetite is so-so.  Reports being able to walk in room without problems.  No fevers overnight.    Past Medical, Surgical, Family, and Social History:   Unchanged from H&P.    Scheduled Meds:   albuterol-ipratropium  3 mL Nebulization Q4H    atovaquone  1,500 mg Oral Daily    calcium carbonate  1 tablet Oral Daily    carvedilol  12.5 mg Oral BID WM    cefTRIAXone (ROCEPHIN) IVPB  2 g Intravenous Q24H    dolutegravir  50 mg Oral Daily    emtricitabine-tenofovir alafen  1 tablet  Oral Daily    famotidine  20 mg Oral Daily    NIFEdipine  60 mg Oral Q12H    predniSONE  5 mg Oral Daily    rosuvastatin  20 mg Oral Daily    sirolimus  4 mg Oral Daily    sodium bicarbonate  1,950 mg Oral TID     Continuous Infusions:   sodium chloride 0.9% 75 mL/hr at 11/16/19 1411    heparin (porcine) in D5W 20 Units/kg/hr (11/16/19 0031)     PRN Meds:acetaminophen, dextrose 10 % in water (D10W), dextrose 10 % in water (D10W), glucagon (human recombinant), glucose, glucose, heparin (PORCINE), heparin (PORCINE), oxyCODONE, sodium chloride 0.9%    Intake/Output - Last 3 Shifts       11/14 0700 - 11/15 0659 11/15 0700 - 11/16 0659 11/16 0700 - 11/17 0659    P.O. 1650 1020 180    I.V. (mL/kg)  1569.3 (20.3) 876.6 (11.3)    Other  0     IV Piggyback 350 904.4     Total Intake(mL/kg) 2000 (29.4) 3493.7 (45.1) 1056.6 (13.7)    Urine (mL/kg/hr) 3400 (2.1) 130 (0.1) 100 (0.2)    Emesis/NG output 0 0 0    Other 0 0     Stool 0 400 0    Blood 0 0     Total Output 3400 530 100    Net -1400 +2963.7 +956.6           Urine Occurrence 0 x 1 x 2 x    Stool Occurrence 4 x 2 x 3 x    Emesis Occurrence 0 x 0 x 0 x         Review of Systems   Constitutional: Negative for fatigue and fever.   Respiratory: Negative for cough and shortness of breath.    Cardiovascular: Positive for leg swelling (R>L ). Negative for chest pain.   Gastrointestinal: Positive for abdominal pain (LLQ) and diarrhea (improving ). Negative for blood in stool, nausea and vomiting.   Genitourinary: Positive for frequency. Negative for difficulty urinating.   Musculoskeletal:        + UE edema   Allergic/Immunologic: Positive for immunocompromised state.   Neurological: Negative for dizziness and headaches.      Objective:     Vital Signs (Most Recent):  Temp: 98.8 °F (37.1 °C) (11/16/19 1129)  Pulse: 83 (11/16/19 1448)  Resp: 18 (11/16/19 1448)  BP: (!) 156/88 (11/16/19 1448)  SpO2: 98 % (11/16/19 1448) Vital Signs (24h Range):  Temp:  [97.9 °F (36.6  "°C)-98.8 °F (37.1 °C)] 98.8 °F (37.1 °C)  Pulse:  [80-98] 83  Resp:  [14-20] 18  SpO2:  [94 %-100 %] 98 %  BP: (107-199)/() 156/88     Weight: 77.4 kg (170 lb 10.2 oz)  Height: 5' 8" (172.7 cm)  Body mass index is 25.95 kg/m².    Physical Exam   Constitutional: He is oriented to person, place, and time. No distress.   Cardiovascular: Normal rate and regular rhythm.   No murmur heard.  Pulmonary/Chest: Effort normal.   Breathing RA, no accessory muscles of respiration used.  Coarse breath sounds with rare wheeze.   Abdominal: Soft. Bowel sounds are normal. He exhibits no distension. There is tenderness (LLQ- mild). There is no guarding.   Musculoskeletal: He exhibits edema (RUE and RLE, pitting in the RLE- 2+). He exhibits no tenderness.   Skin lesions noted on lower extremities    Neurological: He is alert and oriented to person, place, and time.   Skin: Skin is warm and dry. He is not diaphoretic.     Laboratory:  Reviewed     Assessment/Plan:     * Pneumonia due to infectious organism  As per primary team      Acute kidney injury superimposed on chronic kidney disease stage III  LEFTY superimposed on CKD stage III [b/l creat 1.9-2.1] complicating renal transplant  - Creatinine labile, still with LEFTY, likely culprit intravascular volume depletion and low EF.  -Follow with strict I/Os, daily weights, BP (goal <140/90), daily labs.    -Avoid nephrotoxic agents (NSAIDs, IV contrast dye, ACEI/ARB anti-HTN medications, Aminoglycoside-containing antibiotics)  -Renally dose all appropriate medications, including antibiotics      Kaposi sarcoma  As per oncology       donor kidney transplant 2017  Status post  brain dead kidney transplant 2017. See LEFTY        Long-term use of immunosuppressant medication  Continue sirolimus 4 mg daily  Encounter for Monitoring Immunosuppression post Transplant  Recent Labs   Lab 19  0325 19  0735 19  0842  19  0624 11/15/19  0620 " 11/16/19  0558   Tacrolimus Lvl <1.5 L <1.5 L <1.5 L  --   --   --   --    Sirolimus Lvl 3.2 L 6.6 3.6 L   < > 3.0 L 4.8 9.8    < > = values in this interval not displayed.   - Target SRL level for Demetrio is 3-4 while on chemo.  Decrease dose from 4 mg, down to 2 mg daily starting 11/17 - MMF stopped when he developed Kaposi sarcoma [[place]]  - Converted from tacrolimus to sirolimus based on KS diagnosis  - Monitor for side effects and toxicities, given narrow therapeutic window and significant risk of AE    HIV (human immunodeficiency virus infection)  As per transplant ID   Currently on HAART Therapy      Anemia in stage 3 chronic kidney disease  As per Hematology      Chronic kidney disease, stage 3 (moderate)  Please see LEFTY on CKD      Lucía Polk MD  Kidney Transplant  [facility]

## 2019-11-16 NOTE — ASSESSMENT & PLAN NOTE
LEFTY superimposed on CKD stage III [b/l creat 1.9-2.1] complicating renal transplant  - Creatinine labile, still with LEFTY, likely culprit intravascular volume depletion and low EF.  -Follow with strict I/Os, daily weights, BP (goal <140/90), daily labs.    -Avoid nephrotoxic agents (NSAIDs, IV contrast dye, ACEI/ARB anti-HTN medications, Aminoglycoside-containing antibiotics)  -Renally dose all appropriate medications, including antibiotics

## 2019-11-16 NOTE — PROGRESS NOTES
[facility]  Infectious Disease  Progress Note    Patient Name: Demetrio Aguiar  MRN: [id number]  Admission Date: 11/12/2019  Length of Stay: 4 days  Attending Physician: Santi Kennedy MD  Primary Care Provider: Primary Doctor No    Isolation Status: No active isolations  Assessment/Plan:      * Pneumonia due to infectious organism  38yo man w/ ahistory of HIV (AE0=588, VL UD on TAF/FTC+DTG) and ESRD/HIVAN (s/p DBDKT 8/18/2017, CMV D-/R+ thymo induction, on maintenance sirolimus; c/b ABMR, RP hematoma s/p washout 9/8/2017, LTBI s/p treatment, recent diagnosis of visceral KS s/p doxil x4 cycles with good response clinically and per 10/2019 PET in bulky LAD although with new cardiomegaly/effusion) who was admitted on 11/12/2019 with fevers, cough and SOB due to RLL pneumonia (most likely CAP given prompt response to typical coverage) with associated small pleural effusion. Course c/b late growth of GPCC from 1/2 blood cultures that is likely Peptostreptococcus (a likely contaminant but should be susceptible to CTX he is receiving).    - would continue CTX for now for CAP -- plan 5 day total antibiotics course through 11/17 and if doing well can stop after tomorrow's dose  - CT without significant effusion for tap -- no further action needed  - small pericardial effusion noted on echo -- additional management per primary team  - would continue lifelong PJP ppx per protocol - would consider rechallenging tmp/smx as is drug of choice  - continue current home HAART regimen        Anticipated Disposition: pending improvement    Thank you for your consult. I will follow-up with patient. Please contact us if you have any additional questions.     Annabelle Ivan MD  Transplant ID Attending  [phone]    Annabelle Ivan MD  Infectious Disease  [facility]    Subjective:     Principal Problem:Pneumonia due to infectious organism    HPI: No notes on file  Interval History: Afebrile on  antibiotics. Cough/SOB improving. GPC growing in anaerobic bottle only a likely Peptostreptococcus of unlikely significance (probably contaminant given presentation).    Review of Systems   Constitutional: Negative for activity change, appetite change, chills, fatigue and fever.   HENT: Negative for congestion, dental problem, mouth sores and sinus pressure.    Eyes: Negative for pain, redness and visual disturbance.   Respiratory: Negative for cough, shortness of breath and wheezing.    Cardiovascular: Positive for leg swelling. Negative for chest pain.   Gastrointestinal: Negative for abdominal distention, abdominal pain, diarrhea, nausea and vomiting.   Endocrine: Negative for polyuria.   Genitourinary: Negative for decreased urine volume, dysuria and frequency.   Musculoskeletal: Negative for joint swelling.   Skin: Negative for color change.   Allergic/Immunologic: Negative for food allergies.   Neurological: Negative for dizziness, weakness and headaches.   Hematological: Positive for adenopathy.   Psychiatric/Behavioral: Negative for agitation and confusion. The patient is not nervous/anxious.      Objective:     Vital Signs (Most Recent):  Temp: 98.8 °F (37.1 °C) (11/16/19 1129)  Pulse: 87 (11/16/19 1129)  Resp: 18 (11/16/19 1129)  BP: (!) 170/101 (11/16/19 1129)  SpO2: 98 % (11/16/19 1129) Vital Signs (24h Range):  Temp:  [97.9 °F (36.6 °C)-98.8 °F (37.1 °C)] 98.8 °F (37.1 °C)  Pulse:  [80-98] 87  Resp:  [16-20] 18  SpO2:  [94 %-100 %] 98 %  BP: (107-170)/() 170/101     Weight: 77.4 kg (170 lb 10.2 oz)  Body mass index is 25.95 kg/m².    Estimated Creatinine Clearance: 38 mL/min (A) (based on SCr of 2.5 mg/dL (H)).    Physical Exam   Constitutional: He is oriented to person, place, and time. He appears well-developed and well-nourished.   HENT:   Head: Normocephalic and atraumatic.   Mouth/Throat: Oropharynx is clear and moist.   Eyes: Conjunctivae are normal.   Neck: Neck supple.   Cardiovascular:  Normal rate, regular rhythm and normal heart sounds.   No murmur heard.  Pulmonary/Chest: Effort normal and breath sounds normal. No respiratory distress. He has no wheezes.   Abdominal: Soft. Bowel sounds are normal. He exhibits no distension. There is no tenderness.   Musculoskeletal: Normal range of motion. He exhibits edema. He exhibits no tenderness.   B/l UE and LE edema, RLE > LLE, RUE>LUE   Lymphadenopathy:     He has no cervical adenopathy.   Neurological: He is alert and oriented to person, place, and time. Coordination normal.   Skin: Skin is warm and dry. No rash noted.   Psychiatric: He has a normal mood and affect. His behavior is normal.       Significant Labs:   CBC:   Recent Labs   Lab 11/15/19  0620 11/16/19  0558   WBC 7.77 5.40   HGB 8.9* 8.3*   HCT 29.4* 27.4*    233     CMP:   Recent Labs   Lab 11/15/19  0620 11/15/19  2320 11/16/19  0558    134* 136   K 3.9 4.2 4.2    104 107   CO2 24 24 20*   * 135* 115*   BUN 30* 34* 30*   CREATININE 2.7* 2.7* 2.5*   CALCIUM 8.9 8.3* 8.3*   PROT 5.6*  --  4.6*   ALBUMIN 1.9*  --  1.7*   BILITOT 0.3  --  0.2   ALKPHOS 96  --  66   AST 14  --  11   ALT 8*  --  6*   ANIONGAP 9 6* 9   EGFRNONAA 28.2* 28.2* 31.0*       Significant Imaging: I have reviewed all pertinent imaging results/findings within the past 24 hours.

## 2019-11-16 NOTE — SUBJECTIVE & OBJECTIVE
Subjective:   History of Present Illness:  Mr. Demetrio Aguiar is a 39 y.o. year old Black or  male who received a  - brain death kidney transplant on 17.  He has CKD stage 3 - GFR 30-59 and his baseline creatinine is between 1.8-2.1. He takes prednisone and sirolimus for maintenance immunosuppression.          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   - Biopsy proven ABMR with weakly positive class I DSA's on 17. Pt treated with SMP x 3 (-9/3). PLEX x6 (completed ) and IVIG x 2 (-).    - Kidney US on 17 showed a large collection located along the anterior inferior margin of the renal transplant measuring 13.4 x 7 x 11.9. taken back to the OR for retroperitoneal bleed and washout.   -Biopsy 10/17/17 (LEFTY): 14 glomeruli, <5% fibrosis, no MC severo..  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 Amador for Kaposi's Sarcoma and has been treated with Doxorubicin x 4 doses. Has been tolerated well with improved in Tumor scottie according to oncology is note dated 2019.  In addition it also showed cardiomegaly with new evidence of pleural effusion.     -Biopsy 1/15/18: No ACR, AVR, ABMR, CD4 neg. Less than 10 % fibrosis.   -Biopsy (for DGF) 17: good sample with 24 glomeruli (only 1 sclerosed) and evidence just meeting criteria for ABMR. + diffuse ATI. Minimal fibrosis, no ACR or AVR, but + CD4 (>50% diffusely + stain) with mild glomerulitis and focal peritubular capillaritis. Also, + ca-oxalate (3) and ca-phos (2) crystals.  -18 WEAK DSA DETECTED: CW5(2868)      PCP PROPHYLAXIS: Bactrim LIFELONG--> changed to Mepron ~ 2018  CMV PROPHYLAXIS: Valcyte until 17  FUNGAL PROPHYLAXIS: Nystatin until  9/15/17    He presented to the emergency room complaining of fever documented 101.6 had degrees F in the emergency room.  He was admitted to Medical Oncology secondary to fever of unknown source.  He still had fever spike last night has been covered with broad-spectrum antibiotics vancomycin and cefepime since admission and was transition to vancomycin and Zosyn today.  He has been pan culture urine culture was added today secondary to high risk.  Mr. Aguiar complains of difficulty breathing and evidence of volume overload with significant edema chest x-ray shows pleural effusion.  In addition, he also complains of decreased urinary output.  His serum creatinine slowly rising prior to admission was1.9 and today is 2.4.     Hospital Course:   Initially diuresed, but discussed with Hematology who thought he was intravascularly volume depleted.  Thus, IV fluids initiated.    Echocardiogram this admission showed EF 40-45%.  CVP 3.    Interval History:  On IVF for INTRAvascular volume depletion. Feels swollen.  Denies shortness of breath.  Believes he is doing better overall.  Reports urinating without problems.  Denies nausea or vomiting, but started with diarrhea 2 days ago.  States his appetite is so-so.  Reports being able to walk in room without problems.  No fevers overnight.    Past Medical, Surgical, Family, and Social History:   Unchanged from H&P.    Scheduled Meds:   albuterol-ipratropium  3 mL Nebulization Q4H    atovaquone  1,500 mg Oral Daily    calcium carbonate  1 tablet Oral Daily    carvedilol  12.5 mg Oral BID WM    cefTRIAXone (ROCEPHIN) IVPB  2 g Intravenous Q24H    dolutegravir  50 mg Oral Daily    emtricitabine-tenofovir alafen  1 tablet Oral Daily    famotidine  20 mg Oral Daily    NIFEdipine  60 mg Oral Q12H    predniSONE  5 mg Oral Daily    rosuvastatin  20 mg Oral Daily    sirolimus  4 mg Oral Daily    sodium bicarbonate  1,950 mg Oral TID     Continuous Infusions:   sodium  "chloride 0.9% 75 mL/hr at 11/16/19 1411    heparin (porcine) in D5W 20 Units/kg/hr (11/16/19 0031)     PRN Meds:acetaminophen, dextrose 10 % in water (D10W), dextrose 10 % in water (D10W), glucagon (human recombinant), glucose, glucose, heparin (PORCINE), heparin (PORCINE), oxyCODONE, sodium chloride 0.9%    Intake/Output - Last 3 Shifts       11/14 0700 - 11/15 0659 11/15 0700 - 11/16 0659 11/16 0700 - 11/17 0659    P.O. 1650 1020 180    I.V. (mL/kg)  1569.3 (20.3) 876.6 (11.3)    Other  0     IV Piggyback 350 904.4     Total Intake(mL/kg) 2000 (29.4) 3493.7 (45.1) 1056.6 (13.7)    Urine (mL/kg/hr) 3400 (2.1) 130 (0.1) 100 (0.2)    Emesis/NG output 0 0 0    Other 0 0     Stool 0 400 0    Blood 0 0     Total Output 3400 530 100    Net -1400 +2963.7 +956.6           Urine Occurrence 0 x 1 x 2 x    Stool Occurrence 4 x 2 x 3 x    Emesis Occurrence 0 x 0 x 0 x         Review of Systems   Constitutional: Negative for fatigue and fever.   Respiratory: Negative for cough and shortness of breath.    Cardiovascular: Positive for leg swelling (R>L ). Negative for chest pain.   Gastrointestinal: Positive for abdominal pain (LLQ) and diarrhea (improving ). Negative for blood in stool, nausea and vomiting.   Genitourinary: Positive for frequency. Negative for difficulty urinating.   Musculoskeletal:        + UE edema   Allergic/Immunologic: Positive for immunocompromised state.   Neurological: Negative for dizziness and headaches.      Objective:     Vital Signs (Most Recent):  Temp: 98.8 °F (37.1 °C) (11/16/19 1129)  Pulse: 83 (11/16/19 1448)  Resp: 18 (11/16/19 1448)  BP: (!) 156/88 (11/16/19 1448)  SpO2: 98 % (11/16/19 1448) Vital Signs (24h Range):  Temp:  [97.9 °F (36.6 °C)-98.8 °F (37.1 °C)] 98.8 °F (37.1 °C)  Pulse:  [80-98] 83  Resp:  [14-20] 18  SpO2:  [94 %-100 %] 98 %  BP: (107-199)/() 156/88     Weight: 77.4 kg (170 lb 10.2 oz)  Height: 5' 8" (172.7 cm)  Body mass index is 25.95 kg/m².    Physical Exam "   Constitutional: He is oriented to person, place, and time. No distress.   Cardiovascular: Normal rate and regular rhythm.   No murmur heard.  Pulmonary/Chest: Effort normal.   Breathing RA, no accessory muscles of respiration used.  Coarse breath sounds with rare wheeze.   Abdominal: Soft. Bowel sounds are normal. He exhibits no distension. There is tenderness (LLQ- mild). There is no guarding.   Musculoskeletal: He exhibits edema (RUE and RLE, pitting in the RLE- 2+). He exhibits no tenderness.   Skin lesions noted on lower extremities    Neurological: He is alert and oriented to person, place, and time.   Skin: Skin is warm and dry. He is not diaphoretic.     Laboratory:  Reviewed

## 2019-11-16 NOTE — HOSPITAL COURSE
Initially diuresed, but discussed with Hematology who thought he was intravascularly volume depleted.  Thus, IV fluids initiated.    Echocardiogram this admission showed EF 40-45%.  CVP 3.

## 2019-11-16 NOTE — NURSING
Elevated BP, notified MD, suspect that it is related to uncontrolled pain. Discussed pain management and assessment process. IVF will continue and no new orders for PRN antihypertensives.

## 2019-11-17 PROBLEM — I82.409 ACUTE DVT (DEEP VENOUS THROMBOSIS): Status: ACTIVE | Noted: 2019-11-17

## 2019-11-17 LAB
ALBUMIN SERPL BCP-MCNC: 1.9 G/DL (ref 3.5–5.2)
ALP SERPL-CCNC: 76 U/L (ref 55–135)
ALT SERPL W/O P-5'-P-CCNC: 8 U/L (ref 10–44)
ANION GAP SERPL CALC-SCNC: 6 MMOL/L (ref 8–16)
APTT BLDCRRT: 45.5 SEC (ref 21–32)
AST SERPL-CCNC: 14 U/L (ref 10–40)
BACTERIA BLD CULT: NORMAL
BASOPHILS # BLD AUTO: 0.02 K/UL (ref 0–0.2)
BASOPHILS NFR BLD: 0.4 % (ref 0–1.9)
BILIRUB SERPL-MCNC: 0.3 MG/DL (ref 0.1–1)
BUN SERPL-MCNC: 26 MG/DL (ref 6–20)
CALCIUM SERPL-MCNC: 8.4 MG/DL (ref 8.7–10.5)
CHLORIDE SERPL-SCNC: 105 MMOL/L (ref 95–110)
CO2 SERPL-SCNC: 23 MMOL/L (ref 23–29)
CREAT SERPL-MCNC: 2.1 MG/DL (ref 0.5–1.4)
DIFFERENTIAL METHOD: ABNORMAL
EOSINOPHIL # BLD AUTO: 0.1 K/UL (ref 0–0.5)
EOSINOPHIL NFR BLD: 2.4 % (ref 0–8)
ERYTHROCYTE [DISTWIDTH] IN BLOOD BY AUTOMATED COUNT: 16.7 % (ref 11.5–14.5)
EST. GFR  (AFRICAN AMERICAN): 44.2 ML/MIN/1.73 M^2
EST. GFR  (NON AFRICAN AMERICAN): 38.2 ML/MIN/1.73 M^2
GLUCOSE SERPL-MCNC: 118 MG/DL (ref 70–110)
HCT VFR BLD AUTO: 26.6 % (ref 40–54)
HGB BLD-MCNC: 7.8 G/DL (ref 14–18)
IMM GRANULOCYTES # BLD AUTO: 0.04 K/UL (ref 0–0.04)
IMM GRANULOCYTES NFR BLD AUTO: 0.9 % (ref 0–0.5)
INR PPP: 0.9 (ref 0.8–1.2)
LYMPHOCYTES # BLD AUTO: 0.8 K/UL (ref 1–4.8)
LYMPHOCYTES NFR BLD: 17 % (ref 18–48)
MCH RBC QN AUTO: 23.5 PG (ref 27–31)
MCHC RBC AUTO-ENTMCNC: 29.3 G/DL (ref 32–36)
MCV RBC AUTO: 80 FL (ref 82–98)
MONOCYTES # BLD AUTO: 0.6 K/UL (ref 0.3–1)
MONOCYTES NFR BLD: 12.6 % (ref 4–15)
NEUTROPHILS # BLD AUTO: 3 K/UL (ref 1.8–7.7)
NEUTROPHILS NFR BLD: 66.7 % (ref 38–73)
NRBC BLD-RTO: 0 /100 WBC
PLATELET # BLD AUTO: 177 K/UL (ref 150–350)
PMV BLD AUTO: 9.9 FL (ref 9.2–12.9)
POTASSIUM SERPL-SCNC: 4.1 MMOL/L (ref 3.5–5.1)
PROT SERPL-MCNC: 5.1 G/DL (ref 6–8.4)
PROTHROMBIN TIME: 9.5 SEC (ref 9–12.5)
RBC # BLD AUTO: 3.32 M/UL (ref 4.6–6.2)
SIROLIMUS BLD-MCNC: 5.3 NG/ML (ref 4–20)
SODIUM SERPL-SCNC: 134 MMOL/L (ref 136–145)
WBC # BLD AUTO: 4.52 K/UL (ref 3.9–12.7)

## 2019-11-17 PROCEDURE — 20600001 HC STEP DOWN PRIVATE ROOM

## 2019-11-17 PROCEDURE — 85025 COMPLETE CBC W/AUTO DIFF WBC: CPT

## 2019-11-17 PROCEDURE — 63600175 PHARM REV CODE 636 W HCPCS: Performed by: INTERNAL MEDICINE

## 2019-11-17 PROCEDURE — 99900035 HC TECH TIME PER 15 MIN (STAT)

## 2019-11-17 PROCEDURE — 99233 PR SUBSEQUENT HOSPITAL CARE,LEVL III: ICD-10-PCS | Mod: ,,, | Performed by: INTERNAL MEDICINE

## 2019-11-17 PROCEDURE — 80053 COMPREHEN METABOLIC PANEL: CPT

## 2019-11-17 PROCEDURE — 63600175 PHARM REV CODE 636 W HCPCS: Performed by: STUDENT IN AN ORGANIZED HEALTH CARE EDUCATION/TRAINING PROGRAM

## 2019-11-17 PROCEDURE — 85610 PROTHROMBIN TIME: CPT

## 2019-11-17 PROCEDURE — 63600175 PHARM REV CODE 636 W HCPCS

## 2019-11-17 PROCEDURE — 25000003 PHARM REV CODE 250: Performed by: STUDENT IN AN ORGANIZED HEALTH CARE EDUCATION/TRAINING PROGRAM

## 2019-11-17 PROCEDURE — 25000003 PHARM REV CODE 250

## 2019-11-17 PROCEDURE — 99232 SBSQ HOSP IP/OBS MODERATE 35: CPT | Mod: ,,, | Performed by: INTERNAL MEDICINE

## 2019-11-17 PROCEDURE — 94761 N-INVAS EAR/PLS OXIMETRY MLT: CPT

## 2019-11-17 PROCEDURE — 25000242 PHARM REV CODE 250 ALT 637 W/ HCPCS: Performed by: STUDENT IN AN ORGANIZED HEALTH CARE EDUCATION/TRAINING PROGRAM

## 2019-11-17 PROCEDURE — 99232 PR SUBSEQUENT HOSPITAL CARE,LEVL II: ICD-10-PCS | Mod: ,,, | Performed by: INTERNAL MEDICINE

## 2019-11-17 PROCEDURE — 36415 COLL VENOUS BLD VENIPUNCTURE: CPT

## 2019-11-17 PROCEDURE — 80195 ASSAY OF SIROLIMUS: CPT

## 2019-11-17 PROCEDURE — 99233 SBSQ HOSP IP/OBS HIGH 50: CPT | Mod: ,,, | Performed by: INTERNAL MEDICINE

## 2019-11-17 PROCEDURE — 99232 PR SUBSEQUENT HOSPITAL CARE,LEVL II: ICD-10-PCS | Mod: GC,,, | Performed by: INTERNAL MEDICINE

## 2019-11-17 PROCEDURE — 94640 AIRWAY INHALATION TREATMENT: CPT

## 2019-11-17 PROCEDURE — 85730 THROMBOPLASTIN TIME PARTIAL: CPT

## 2019-11-17 PROCEDURE — 99232 SBSQ HOSP IP/OBS MODERATE 35: CPT | Mod: GC,,, | Performed by: INTERNAL MEDICINE

## 2019-11-17 PROCEDURE — 25000003 PHARM REV CODE 250: Performed by: HOSPITALIST

## 2019-11-17 RX ORDER — WARFARIN 2.5 MG/1
2.5 TABLET ORAL DAILY
Status: DISCONTINUED | OUTPATIENT
Start: 2019-11-17 | End: 2019-11-18

## 2019-11-17 RX ORDER — HYDRALAZINE HYDROCHLORIDE 25 MG/1
25 TABLET, FILM COATED ORAL EVERY 8 HOURS
Status: DISCONTINUED | OUTPATIENT
Start: 2019-11-17 | End: 2019-11-19 | Stop reason: HOSPADM

## 2019-11-17 RX ADMIN — HYDRALAZINE HYDROCHLORIDE 25 MG: 25 TABLET, FILM COATED ORAL at 09:11

## 2019-11-17 RX ADMIN — EMTRICITABINE AND TENOFOVIR ALAFENAMIDE 1 TABLET: 200; 25 TABLET ORAL at 08:11

## 2019-11-17 RX ADMIN — HYDRALAZINE HYDROCHLORIDE 25 MG: 25 TABLET, FILM COATED ORAL at 08:11

## 2019-11-17 RX ADMIN — IPRATROPIUM BROMIDE AND ALBUTEROL SULFATE 3 ML: .5; 3 SOLUTION RESPIRATORY (INHALATION) at 12:11

## 2019-11-17 RX ADMIN — CARVEDILOL 25 MG: 25 TABLET, FILM COATED ORAL at 08:11

## 2019-11-17 RX ADMIN — PREDNISONE 5 MG: 5 TABLET ORAL at 08:11

## 2019-11-17 RX ADMIN — WARFARIN SODIUM 2.5 MG: 2.5 TABLET ORAL at 06:11

## 2019-11-17 RX ADMIN — CARVEDILOL 25 MG: 25 TABLET, FILM COATED ORAL at 05:11

## 2019-11-17 RX ADMIN — CALCIUM CARBONATE (ANTACID) CHEW TAB 500 MG 500 MG: 500 CHEW TAB at 08:11

## 2019-11-17 RX ADMIN — ROSUVASTATIN CALCIUM 20 MG: 10 TABLET, FILM COATED ORAL at 08:11

## 2019-11-17 RX ADMIN — HYDRALAZINE HYDROCHLORIDE 25 MG: 25 TABLET, FILM COATED ORAL at 02:11

## 2019-11-17 RX ADMIN — SODIUM BICARBONATE 650 MG TABLET 1950 MG: at 02:11

## 2019-11-17 RX ADMIN — NIFEDIPINE 60 MG: 30 TABLET, FILM COATED, EXTENDED RELEASE ORAL at 09:11

## 2019-11-17 RX ADMIN — ATOVAQUONE 1500 MG: 750 SUSPENSION ORAL at 08:11

## 2019-11-17 RX ADMIN — CEFTRIAXONE 2 G: 2 INJECTION, SOLUTION INTRAVENOUS at 09:11

## 2019-11-17 RX ADMIN — NIFEDIPINE 60 MG: 30 TABLET, FILM COATED, EXTENDED RELEASE ORAL at 08:11

## 2019-11-17 RX ADMIN — SODIUM CHLORIDE: 0.9 INJECTION, SOLUTION INTRAVENOUS at 02:11

## 2019-11-17 RX ADMIN — IPRATROPIUM BROMIDE AND ALBUTEROL SULFATE 3 ML: .5; 3 SOLUTION RESPIRATORY (INHALATION) at 05:11

## 2019-11-17 RX ADMIN — SODIUM BICARBONATE 650 MG TABLET 1950 MG: at 08:11

## 2019-11-17 RX ADMIN — DOLUTEGRAVIR SODIUM 50 MG: 50 TABLET, FILM COATED ORAL at 08:11

## 2019-11-17 RX ADMIN — OXYCODONE HYDROCHLORIDE 5 MG: 5 TABLET ORAL at 09:11

## 2019-11-17 RX ADMIN — IPRATROPIUM BROMIDE AND ALBUTEROL SULFATE 3 ML: .5; 3 SOLUTION RESPIRATORY (INHALATION) at 07:11

## 2019-11-17 RX ADMIN — FAMOTIDINE 20 MG: 20 TABLET ORAL at 08:11

## 2019-11-17 RX ADMIN — IPRATROPIUM BROMIDE AND ALBUTEROL SULFATE 3 ML: .5; 3 SOLUTION RESPIRATORY (INHALATION) at 04:11

## 2019-11-17 RX ADMIN — IPRATROPIUM BROMIDE AND ALBUTEROL SULFATE 3 ML: .5; 3 SOLUTION RESPIRATORY (INHALATION) at 08:11

## 2019-11-17 RX ADMIN — SIROLIMUS 2 MG: 1 TABLET ORAL at 08:11

## 2019-11-17 RX ADMIN — SODIUM BICARBONATE 650 MG TABLET 1950 MG: at 09:11

## 2019-11-17 RX ADMIN — IPRATROPIUM BROMIDE AND ALBUTEROL SULFATE 3 ML: .5; 3 SOLUTION RESPIRATORY (INHALATION) at 01:11

## 2019-11-17 NOTE — PROGRESS NOTES
[facility]  Hematology/Oncology  Progress Note    Patient Name: Demetrio Aguiar  Admission Date: 11/12/2019  Hospital Length of Stay: 5 days  Code Status: Full Code     Subjective:     HPI:    Oncologic Hx from chart:    Demetrio Aguiar is a 39 y.o. male with PMHx of HIV on ART, Kaposi sarcoma, ESRD previous on iHD s/p transplant with hx of rejection (on chronic immunosuppression), anemia of chronic disease, latent TB s/p treatment, HTN and HLD.     He is s/p 4 cycles of doxo for symptomatic metastatic kaposi.     He tolerated the first  4 cycles well without obvious complaints. RLE swelling has decreased and he has improved QOL.     PET scan in October shows decrease in tumor, LAD bulk though it also shows new cardiomegaly and effusion, which is somewhat concerning.     HPI:  Patient is presenting with complaints of fevers, chills, dyspnea and cough. He denies any diarrhea, abdominal pain or dysuria. Vital signs in the ER revealed fever, tachycardia and tachypnea. CBC revealed no neutropenia. CMP revealed stable renal function. CXR with opacity in right mid to inferior hemithorax potentially related to pneumonia and pleural effusion. He received 1 liter bolus and IV Vancomycin and Cefepime.        Interval History:     Seen and examined at the bedside. No fevers in the past 24 hours. He states he feels he is doing well and is sitting up and moving around the room more. He says his right leg is swelling, but that this generally happens when he is up and moving around as opposed to being in bed. Updated the his LEFTY is improving. Updated that antibiotics will run through today and then the main thing to address is switching from his heparin gtt to coumadin. He has not other questions or concerns at this time.     Oncology Treatment Plan:   OP SARCOMA DOXORUBICIN LIPOSOMAL Q3W    Medications:  Continuous Infusions:   sodium chloride 0.9% 75 mL/hr at 11/17/19 0218    heparin (porcine) in D5W 20  Units/kg/hr (11/16/19 1751)     Scheduled Meds:   albuterol-ipratropium  3 mL Nebulization Q4H    atovaquone  1,500 mg Oral Daily    calcium carbonate  1 tablet Oral Daily    carvedilol  25 mg Oral BID WM    cefTRIAXone (ROCEPHIN) IVPB  2 g Intravenous Q24H    dolutegravir  50 mg Oral Daily    emtricitabine-tenofovir alafen  1 tablet Oral Daily    famotidine  20 mg Oral Daily    hydrALAZINE  25 mg Oral Q8H    NIFEdipine  60 mg Oral Q12H    predniSONE  5 mg Oral Daily    rosuvastatin  20 mg Oral Daily    sirolimus  2 mg Oral Daily    sodium bicarbonate  1,950 mg Oral TID     PRN Meds:acetaminophen, dextrose 10 % in water (D10W), dextrose 10 % in water (D10W), glucagon (human recombinant), glucose, glucose, heparin (PORCINE), heparin (PORCINE), oxyCODONE, sodium chloride 0.9%     Review of Systems   Constitutional: Negative for activity change, appetite change, chills, diaphoresis, fatigue and fever.   Respiratory: Negative for cough and shortness of breath.    Cardiovascular: Positive for leg swelling (R>L). Negative for chest pain.   Gastrointestinal: Positive for abdominal pain (LLQ). Negative for blood in stool, diarrhea, nausea and vomiting.   Genitourinary: Negative for difficulty urinating, dysuria and frequency.   Musculoskeletal: Negative for arthralgias and myalgias.   Skin: Negative for pallor and rash.   Neurological: Negative for dizziness, light-headedness and headaches.     Objective:     Vital Signs (Most Recent):  Temp: 98.2 °F (36.8 °C) (11/17/19 1230)  Pulse: 74 (11/17/19 1230)  Resp: 18 (11/17/19 1230)  BP: (!) 153/86 (11/17/19 1230)  SpO2: 97 % (11/17/19 1230) Vital Signs (24h Range):  Temp:  [98.1 °F (36.7 °C)-98.8 °F (37.1 °C)] 98.2 °F (36.8 °C)  Pulse:  [63-86] 74  Resp:  [16-20] 18  SpO2:  [92 %-98 %] 97 %  BP: (116-153)/(73-91) 153/86     Weight: 79.8 kg (175 lb 14.8 oz)  Body mass index is 26.75 kg/m².  Body surface area is 1.96 meters squared.      Intake/Output Summary (Last  24 hours) at 11/17/2019 1531  Last data filed at 11/17/2019 1200  Gross per 24 hour   Intake 4030.25 ml   Output 1600 ml   Net 2430.25 ml       Physical Exam   Constitutional: He is oriented to person, place, and time. He appears well-developed and well-nourished. No distress.   HENT:   Head: Normocephalic and atraumatic.   Right Ear: External ear normal.   Left Ear: External ear normal.   Eyes: Pupils are equal, round, and reactive to light. Right eye exhibits no discharge. Left eye exhibits no discharge. No scleral icterus.   Neck: Normal range of motion. Neck supple.   Cardiovascular: Normal rate, regular rhythm, normal heart sounds and intact distal pulses. Exam reveals no gallop.   No murmur heard.  Pulmonary/Chest: Effort normal. No stridor. No respiratory distress. He has wheezes. He has rales.   Breathing RA, no accessory muscles of respiration used     Abdominal: Soft. Bowel sounds are normal. He exhibits no distension. There is tenderness (LLQ- mild). There is no guarding.   Musculoskeletal: He exhibits edema (RUE and RLE, pitting in the RLE- 2+). He exhibits no tenderness or deformity.   Skin lesions noted on lower extremities    Neurological: He is alert and oriented to person, place, and time.   Skin: Skin is warm and dry. He is not diaphoretic. No erythema. No pallor.   Nursing note and vitals reviewed.      Significant Labs:   CBC:   Recent Labs   Lab 11/16/19  0558 11/17/19  0700   WBC 5.40 4.52   HGB 8.3* 7.8*   HCT 27.4* 26.6*    177    and CMP:   Recent Labs   Lab 11/15/19  2320 11/16/19  0558 11/17/19  0701   * 136 134*   K 4.2 4.2 4.1    107 105   CO2 24 20* 23   * 115* 118*   BUN 34* 30* 26*   CREATININE 2.7* 2.5* 2.1*   CALCIUM 8.3* 8.3* 8.4*   PROT  --  4.6* 5.1*   ALBUMIN  --  1.7* 1.9*   BILITOT  --  0.2 0.3   ALKPHOS  --  66 76   AST  --  11 14   ALT  --  6* 8*   ANIONGAP 6* 9 6*   EGFRNONAA 28.2* 31.0* 38.2*           Assessment/Plan:     * Pneumonia due to  infectious organism  - Off oxygen   - Repeat Blood Cx's- NGTD, continue with ceftriaxone  - Repeat CXR on 11/15 showing improving right lower lobe PNA     Acute DVT (deep venous thrombosis)  - DVT of the right subclavian vein noted on US of the UE  - Patient on heparin gtt- plan to transition to coumadin       Chronic kidney disease, stage 3 (moderate)  See LEFTY       Sepsis  Patient presented with fever, tachycardia and tachypnea   CXR with likely right mid and lower lobe pneumonia     - See PNA due to infectious organism       Renovascular hypertension  - Continue home nifedipine  - Coreg home dose of 12.5 BID increased to 25 mg BID for goal BP <140/90 per KTM on   - Not at goal on , added Hydralazine 25 mg TID- will touch base with KTM to see if they have additional recs     Kaposi sarcoma  HIV related   Currently on HAART and Adriamycin Q3W for symptomatic metastatic KS  He was due for Adriamycin however this is going to be postponed given acute illness     Acute kidney injury superimposed on chronic kidney disease stage III  Continue IVF's at 75 cc's/hr  Resolved as of - back at baseline Cr of 2.1     Immunosuppression  Due to HIV infection, anti rejection meds and chemotherapy   C/w Atovaquone for PCP PPx, PCP ppx needs to be lifelong per ID       donor kidney transplant 2017  C/w Home Sirolimus and Prednisone at 5 mg daily   Monitor Sirolimus level   Monitor CMP      Anemia in stage 3 chronic kidney disease  H/H stable, monitor CBC    HIV (human immunodeficiency virus infection)  Controlled, last viral load was undetectable   Will continue with home Tivicay and Descovy (Descovy is non formulary however this gentleman with renal dysfunction is unable to tolerate Truvada which the formulary alternative).   CD4 count at 65, appreciate ID consult     Per ID:      - would continue CTX for now for CAP -- plan 5 day total antibiotics course through    - CT without significant effusion  for tap -- no further action needed  - small pericardial effusion noted on echo -- additional management per primary team  - would continue lifelong PJP ppx per protocol - would consider rechallenging tmp/smx as is drug of choice  - continue current home HAART regimen    Hyperlipidemia  C/w Maykelor             Bill Coto DO  Hematology/Oncology  [facility]    Attending Addendum:  The patient was seen, examined, and discussed on rounds with the team.  I agree with the assessment and plan as outlined for Demetrio Aguiar.      Augusto Ugalde DO, FACP  Hematology & Oncology  [facility] [place] [address]  ph. [phone]  Fax. [phone]

## 2019-11-17 NOTE — ASSESSMENT & PLAN NOTE
- Continue home nifedipine  - Coreg home dose of 12.5 BID increased to 25 mg BID for goal BP <140/90 per KTM on 11/16  - Not at goal on 11/17, added Hydralazine 25 mg TID- will touch base with KTM to see if they have additional recs

## 2019-11-17 NOTE — ASSESSMENT & PLAN NOTE
40yo man w/a history of HIV (MK9=332, VL UD on TAF/FTC+DTG) and ESRD/HIVAN (s/p DBDKT 8/18/2017, CMV D-/R+ thymo induction, on maintenance sirolimus; c/b ABMR, RP hematoma s/p washout 9/8/2017, LTBI s/p treatment, recent diagnosis of visceral KS s/p doxil x4 cycles with good response clinically and per 10/2019 PET in bulky LAD although with new cardiomegaly/effusion) who was admitted on 11/12/2019 with fevers, cough and SOB due to RLL pneumonia (most likely CAP given prompt response to typical coverage) with associated small pleural effusion. Course c/b late growth of GPCC from 1/2 blood cultures that is Peptostreptococcus (a likely contaminant in this setting).    - would complete 5 day course of CTX for CAP through 11/17   - CT without significant effusion for tap -- no further action needed  - small pericardial effusion noted on echo -- additional management per primary team  - would continue lifelong PJP ppx per protocol - would consider rechallenging tmp/smx as is drug of choice at some point  - continue current home HAART regimen  - follow-up per primary team

## 2019-11-17 NOTE — ASSESSMENT & PLAN NOTE
- DVT of the right subclavian vein noted on US of the UE  - Patient on heparin gtt- plan to transition to coumadin

## 2019-11-17 NOTE — SUBJECTIVE & OBJECTIVE
Interval History:     Seen and examined at the bedside. No fevers in the past 24 hours. He states he feels he is doing well and is sitting up and moving around the room more. He says his right leg is swelling, but that this generally happens when he is up and moving around as opposed to being in bed. Updated the his LEFTY is improving. Updated that antibiotics will run through today and then the main thing to address is switching from his heparin gtt to coumadin. He has not other questions or concerns at this time.     Oncology Treatment Plan:   OP SARCOMA DOXORUBICIN LIPOSOMAL Q3W    Medications:  Continuous Infusions:   sodium chloride 0.9% 75 mL/hr at 11/17/19 0218    heparin (porcine) in D5W 20 Units/kg/hr (11/16/19 1751)     Scheduled Meds:   albuterol-ipratropium  3 mL Nebulization Q4H    atovaquone  1,500 mg Oral Daily    calcium carbonate  1 tablet Oral Daily    carvedilol  25 mg Oral BID WM    cefTRIAXone (ROCEPHIN) IVPB  2 g Intravenous Q24H    dolutegravir  50 mg Oral Daily    emtricitabine-tenofovir alafen  1 tablet Oral Daily    famotidine  20 mg Oral Daily    hydrALAZINE  25 mg Oral Q8H    NIFEdipine  60 mg Oral Q12H    predniSONE  5 mg Oral Daily    rosuvastatin  20 mg Oral Daily    sirolimus  2 mg Oral Daily    sodium bicarbonate  1,950 mg Oral TID     PRN Meds:acetaminophen, dextrose 10 % in water (D10W), dextrose 10 % in water (D10W), glucagon (human recombinant), glucose, glucose, heparin (PORCINE), heparin (PORCINE), oxyCODONE, sodium chloride 0.9%     Review of Systems   Constitutional: Negative for activity change, appetite change, chills, diaphoresis, fatigue and fever.   Respiratory: Negative for cough and shortness of breath.    Cardiovascular: Positive for leg swelling (R>L). Negative for chest pain.   Gastrointestinal: Positive for abdominal pain (LLQ). Negative for blood in stool, diarrhea, nausea and vomiting.   Genitourinary: Negative for difficulty urinating, dysuria and  frequency.   Musculoskeletal: Negative for arthralgias and myalgias.   Skin: Negative for pallor and rash.   Neurological: Negative for dizziness, light-headedness and headaches.     Objective:     Vital Signs (Most Recent):  Temp: 98.2 °F (36.8 °C) (11/17/19 1230)  Pulse: 74 (11/17/19 1230)  Resp: 18 (11/17/19 1230)  BP: (!) 153/86 (11/17/19 1230)  SpO2: 97 % (11/17/19 1230) Vital Signs (24h Range):  Temp:  [98.1 °F (36.7 °C)-98.8 °F (37.1 °C)] 98.2 °F (36.8 °C)  Pulse:  [63-86] 74  Resp:  [16-20] 18  SpO2:  [92 %-98 %] 97 %  BP: (116-153)/(73-91) 153/86     Weight: 79.8 kg (175 lb 14.8 oz)  Body mass index is 26.75 kg/m².  Body surface area is 1.96 meters squared.      Intake/Output Summary (Last 24 hours) at 11/17/2019 1531  Last data filed at 11/17/2019 1200  Gross per 24 hour   Intake 4030.25 ml   Output 1600 ml   Net 2430.25 ml       Physical Exam   Constitutional: He is oriented to person, place, and time. He appears well-developed and well-nourished. No distress.   HENT:   Head: Normocephalic and atraumatic.   Right Ear: External ear normal.   Left Ear: External ear normal.   Eyes: Pupils are equal, round, and reactive to light. Right eye exhibits no discharge. Left eye exhibits no discharge. No scleral icterus.   Neck: Normal range of motion. Neck supple.   Cardiovascular: Normal rate, regular rhythm, normal heart sounds and intact distal pulses. Exam reveals no gallop.   No murmur heard.  Pulmonary/Chest: Effort normal. No stridor. No respiratory distress. He has wheezes. He has rales.   Breathing RA, no accessory muscles of respiration used     Abdominal: Soft. Bowel sounds are normal. He exhibits no distension. There is tenderness (LLQ- mild). There is no guarding.   Musculoskeletal: He exhibits edema (RUE and RLE, pitting in the RLE- 2+). He exhibits no tenderness or deformity.   Skin lesions noted on lower extremities    Neurological: He is alert and oriented to person, place, and time.   Skin: Skin is  warm and dry. He is not diaphoretic. No erythema. No pallor.   Nursing note and vitals reviewed.      Significant Labs:   CBC:   Recent Labs   Lab 11/16/19  0558 11/17/19  0700   WBC 5.40 4.52   HGB 8.3* 7.8*   HCT 27.4* 26.6*    177    and CMP:   Recent Labs   Lab 11/15/19  2320 11/16/19  0558 11/17/19  0701   * 136 134*   K 4.2 4.2 4.1    107 105   CO2 24 20* 23   * 115* 118*   BUN 34* 30* 26*   CREATININE 2.7* 2.5* 2.1*   CALCIUM 8.3* 8.3* 8.4*   PROT  --  4.6* 5.1*   ALBUMIN  --  1.7* 1.9*   BILITOT  --  0.2 0.3   ALKPHOS  --  66 76   AST  --  11 14   ALT  --  6* 8*   ANIONGAP 6* 9 6*   EGFRNONAA 28.2* 31.0* 38.2*

## 2019-11-17 NOTE — ASSESSMENT & PLAN NOTE
Patient presented with fever, tachycardia and tachypnea   CXR with likely right mid and lower lobe pneumonia     - See PNA due to infectious organism

## 2019-11-17 NOTE — ASSESSMENT & PLAN NOTE
- Off oxygen   - Repeat Blood Cx's- NGTD, continue with ceftriaxone  - Repeat CXR on 11/15 showing improving right lower lobe PNA

## 2019-11-17 NOTE — ASSESSMENT & PLAN NOTE
Controlled, last viral load was undetectable   Will continue with home Tivicay and Descovy (Descovy is non formulary however this gentleman with renal dysfunction is unable to tolerate Truvada which the formulary alternative).   CD4 count at 65, appreciate ID consult     Per ID:      - would continue CTX for now for CAP -- plan 5 day total antibiotics course through 11/17   - CT without significant effusion for tap -- no further action needed  - small pericardial effusion noted on echo -- additional management per primary team  - would continue lifelong PJP ppx per protocol - would consider rechallenging tmp/smx as is drug of choice  - continue current home HAART regimen

## 2019-11-17 NOTE — ASSESSMENT & PLAN NOTE
Due to HIV infection, anti rejection meds and chemotherapy   C/w Atovaquone for PCP PPx, PCP ppx needs to be lifelong per ID

## 2019-11-17 NOTE — PLAN OF CARE
AAOx3, afebrile, c/o pain. Pt did not want pain medication thur far this shift. NS @75cc/hr. Heparin gtt @20 units/kg/hr, DW 68kg, 13.6cc/hr. Next aptt with morning labs. IV antibiotics continued. Pt able to position self independently.  Pt in lowest position, side rails up x2, non-skid foot wear in place, call light within reach, pt verbalized understanding to call RN when needed.  Hand hygiene practiced per protocol.  Will continue to monitor.

## 2019-11-17 NOTE — SUBJECTIVE & OBJECTIVE
Interval History: Doing well on CTX with improved symptoms of pneumonia. Afebrile. Peptostreptococcus confirmed in only 1/2 admit blood cx with repeats negative. Suspect contamination.    Review of Systems   Constitutional: Negative for activity change, appetite change, chills, fatigue and fever.   HENT: Negative for congestion, dental problem, mouth sores and sinus pressure.    Eyes: Negative for pain, redness and visual disturbance.   Respiratory: Negative for cough, shortness of breath and wheezing.    Cardiovascular: Positive for leg swelling. Negative for chest pain.   Gastrointestinal: Negative for abdominal distention, abdominal pain, diarrhea, nausea and vomiting.   Endocrine: Negative for polyuria.   Genitourinary: Negative for decreased urine volume, dysuria and frequency.   Musculoskeletal: Negative for joint swelling.   Skin: Negative for color change.   Allergic/Immunologic: Negative for food allergies.   Neurological: Negative for dizziness, weakness and headaches.   Hematological: Positive for adenopathy.   Psychiatric/Behavioral: Negative for agitation and confusion. The patient is not nervous/anxious.      Objective:     Vital Signs (Most Recent):  Temp: 98.8 °F (37.1 °C) (11/17/19 0753)  Pulse: 72 (11/17/19 0753)  Resp: 18 (11/17/19 0753)  BP: (!) 150/85 (11/17/19 0753)  SpO2: 98 % (11/17/19 0753) Vital Signs (24h Range):  Temp:  [98.1 °F (36.7 °C)-98.8 °F (37.1 °C)] 98.8 °F (37.1 °C)  Pulse:  [63-87] 72  Resp:  [14-20] 18  SpO2:  [92 %-98 %] 98 %  BP: (116-199)/() 150/85     Weight: 79.8 kg (175 lb 14.8 oz)  Body mass index is 26.75 kg/m².    Estimated Creatinine Clearance: 45.2 mL/min (A) (based on SCr of 2.1 mg/dL (H)).    Physical Exam   Constitutional: He is oriented to person, place, and time. He appears well-developed and well-nourished.   HENT:   Head: Normocephalic and atraumatic.   Mouth/Throat: Oropharynx is clear and moist.   Eyes: Conjunctivae are normal.   Neck: Neck supple.    Cardiovascular: Normal rate, regular rhythm and normal heart sounds.   No murmur heard.  Pulmonary/Chest: Effort normal and breath sounds normal. No respiratory distress. He has no wheezes.   Abdominal: Soft. Bowel sounds are normal. He exhibits no distension. There is no tenderness.   Musculoskeletal: Normal range of motion. He exhibits edema. He exhibits no tenderness.   B/l UE and LE edema, RLE > LLE, RUE>LUE   Lymphadenopathy:     He has no cervical adenopathy.   Neurological: He is alert and oriented to person, place, and time. Coordination normal.   Skin: Skin is warm and dry. No rash noted.   Psychiatric: He has a normal mood and affect. His behavior is normal.       Significant Labs:   CBC:   Recent Labs   Lab 11/16/19  0558 11/17/19  0700   WBC 5.40 4.52   HGB 8.3* 7.8*   HCT 27.4* 26.6*    177     CMP:   Recent Labs   Lab 11/15/19  2320 11/16/19  0558 11/17/19  0701   * 136 134*   K 4.2 4.2 4.1    107 105   CO2 24 20* 23   * 115* 118*   BUN 34* 30* 26*   CREATININE 2.7* 2.5* 2.1*   CALCIUM 8.3* 8.3* 8.4*   PROT  --  4.6* 5.1*   ALBUMIN  --  1.7* 1.9*   BILITOT  --  0.2 0.3   ALKPHOS  --  66 76   AST  --  11 14   ALT  --  6* 8*   ANIONGAP 6* 9 6*   EGFRNONAA 28.2* 31.0* 38.2*       Significant Imaging: I have reviewed all pertinent imaging results/findings within the past 24 hours.

## 2019-11-17 NOTE — PROGRESS NOTES
[facility]  Infectious Disease  Progress Note    Patient Name: Demetrio Aguiar  MRN: [id number]  Admission Date: 11/12/2019  Length of Stay: 5 days  Attending Physician: Santi Kennedy MD  Primary Care Provider: Primary Doctor No    Isolation Status: No active isolations  Assessment/Plan:      * Pneumonia due to infectious organism  40yo man w/a history of HIV (CA9=632, VL UD on TAF/FTC+DTG) and ESRD/HIVAN (s/p DBDKT 8/18/2017, CMV D-/R+ thymo induction, on maintenance sirolimus; c/b ABMR, RP hematoma s/p washout 9/8/2017, LTBI s/p treatment, recent diagnosis of visceral KS s/p doxil x4 cycles with good response clinically and per 10/2019 PET in bulky LAD although with new cardiomegaly/effusion) who was admitted on 11/12/2019 with fevers, cough and SOB due to RLL pneumonia (most likely CAP given prompt response to typical coverage) with associated small pleural effusion. Course c/b late growth of GPCC from 1/2 blood cultures that is Peptostreptococcus (a likely contaminant in this setting).    - would complete 5 day course of CTX for CAP through 11/17   - CT without significant effusion for tap -- no further action needed  - small pericardial effusion noted on echo -- additional management per primary team  - would continue lifelong PJP ppx per protocol - would consider rechallenging tmp/smx as is drug of choice at some point  - continue current home HAART regimen  - follow-up per primary team        Anticipated Disposition: per primary team    Thank you for your consult. I will sign off. Please contact us if you have any additional questions.     Annabelle Ivan MD  Transplant ID Attending  [id number]    Annabelle Ivan MD  Infectious Disease  [facility]    Subjective:     Principal Problem:Pneumonia due to infectious organism    HPI: No notes on file  Interval History: Doing well on CTX with improved symptoms of pneumonia. Afebrile. Peptostreptococcus confirmed in only  1/2 admit blood cx with repeats negative. Suspect contamination.    Review of Systems   Constitutional: Negative for activity change, appetite change, chills, fatigue and fever.   HENT: Negative for congestion, dental problem, mouth sores and sinus pressure.    Eyes: Negative for pain, redness and visual disturbance.   Respiratory: Negative for cough, shortness of breath and wheezing.    Cardiovascular: Positive for leg swelling. Negative for chest pain.   Gastrointestinal: Negative for abdominal distention, abdominal pain, diarrhea, nausea and vomiting.   Endocrine: Negative for polyuria.   Genitourinary: Negative for decreased urine volume, dysuria and frequency.   Musculoskeletal: Negative for joint swelling.   Skin: Negative for color change.   Allergic/Immunologic: Negative for food allergies.   Neurological: Negative for dizziness, weakness and headaches.   Hematological: Positive for adenopathy.   Psychiatric/Behavioral: Negative for agitation and confusion. The patient is not nervous/anxious.      Objective:     Vital Signs (Most Recent):  Temp: 98.8 °F (37.1 °C) (11/17/19 0753)  Pulse: 72 (11/17/19 0753)  Resp: 18 (11/17/19 0753)  BP: (!) 150/85 (11/17/19 0753)  SpO2: 98 % (11/17/19 0753) Vital Signs (24h Range):  Temp:  [98.1 °F (36.7 °C)-98.8 °F (37.1 °C)] 98.8 °F (37.1 °C)  Pulse:  [63-87] 72  Resp:  [14-20] 18  SpO2:  [92 %-98 %] 98 %  BP: (116-199)/() 150/85     Weight: 79.8 kg (175 lb 14.8 oz)  Body mass index is 26.75 kg/m².    Estimated Creatinine Clearance: 45.2 mL/min (A) (based on SCr of 2.1 mg/dL (H)).    Physical Exam   Constitutional: He is oriented to person, place, and time. He appears well-developed and well-nourished.   HENT:   Head: Normocephalic and atraumatic.   Mouth/Throat: Oropharynx is clear and moist.   Eyes: Conjunctivae are normal.   Neck: Neck supple.   Cardiovascular: Normal rate, regular rhythm and normal heart sounds.   No murmur heard.  Pulmonary/Chest: Effort normal  and breath sounds normal. No respiratory distress. He has no wheezes.   Abdominal: Soft. Bowel sounds are normal. He exhibits no distension. There is no tenderness.   Musculoskeletal: Normal range of motion. He exhibits edema. He exhibits no tenderness.   B/l UE and LE edema, RLE > LLE, RUE>LUE   Lymphadenopathy:     He has no cervical adenopathy.   Neurological: He is alert and oriented to person, place, and time. Coordination normal.   Skin: Skin is warm and dry. No rash noted.   Psychiatric: He has a normal mood and affect. His behavior is normal.       Significant Labs:   CBC:   Recent Labs   Lab 11/16/19  0558 11/17/19  0700   WBC 5.40 4.52   HGB 8.3* 7.8*   HCT 27.4* 26.6*    177     CMP:   Recent Labs   Lab 11/15/19  2320 11/16/19  0558 11/17/19  0701   * 136 134*   K 4.2 4.2 4.1    107 105   CO2 24 20* 23   * 115* 118*   BUN 34* 30* 26*   CREATININE 2.7* 2.5* 2.1*   CALCIUM 8.3* 8.3* 8.4*   PROT  --  4.6* 5.1*   ALBUMIN  --  1.7* 1.9*   BILITOT  --  0.2 0.3   ALKPHOS  --  66 76   AST  --  11 14   ALT  --  6* 8*   ANIONGAP 6* 9 6*   EGFRNONAA 28.2* 31.0* 38.2*       Significant Imaging: I have reviewed all pertinent imaging results/findings within the past 24 hours.

## 2019-11-18 ENCOUNTER — DOCUMENTATION ONLY (OUTPATIENT)
Dept: TRANSPLANT | Facility: CLINIC | Age: 39
End: 2019-11-18

## 2019-11-18 LAB
ALBUMIN SERPL BCP-MCNC: 2.1 G/DL (ref 3.5–5.2)
ALP SERPL-CCNC: 85 U/L (ref 55–135)
ALT SERPL W/O P-5'-P-CCNC: 27 U/L (ref 10–44)
ANION GAP SERPL CALC-SCNC: 6 MMOL/L (ref 8–16)
APTT BLDCRRT: 49.4 SEC (ref 21–32)
AST SERPL-CCNC: 37 U/L (ref 10–40)
BILIRUB SERPL-MCNC: 0.2 MG/DL (ref 0.1–1)
BUN SERPL-MCNC: 19 MG/DL (ref 6–20)
CALCIUM SERPL-MCNC: 8.7 MG/DL (ref 8.7–10.5)
CHLORIDE SERPL-SCNC: 107 MMOL/L (ref 95–110)
CO2 SERPL-SCNC: 25 MMOL/L (ref 23–29)
CREAT SERPL-MCNC: 1.8 MG/DL (ref 0.5–1.4)
ERYTHROCYTE [DISTWIDTH] IN BLOOD BY AUTOMATED COUNT: 16.7 % (ref 11.5–14.5)
EST. GFR  (AFRICAN AMERICAN): 53.2 ML/MIN/1.73 M^2
EST. GFR  (NON AFRICAN AMERICAN): 46.1 ML/MIN/1.73 M^2
EXT ALBUMIN: 3.1 (ref 3.5–5.5)
EXT BUN: 29 (ref 5–22)
EXT CALCIUM: 7.8 (ref 8.7–10.5)
EXT CHLORIDE: 108 (ref 101–111)
EXT CO2: 27 (ref 20–31)
EXT CREATININE: 2.4 MG/DL (ref 0.6–1.2)
EXT EOSINOPHIL%: 1.2 (ref 0.8–8.1)
EXT GFR MDRD AF AMER: 32
EXT GLUCOSE: 104 (ref 65–110)
EXT IMMUNKNOW (STIMULATED): 95
EXT LYMPH%: 13.7 (ref 15.2–43.3)
EXT MAGNESIUM: 1.6 (ref 1.7–2.8)
EXT MONOCYTES%: 8.1 (ref 5.5–13.7)
EXT PHOSPHORUS: 3.3 (ref 2.5–4.5)
EXT PLATELETS: 152 (ref 152–348)
EXT POTASSIUM: 3.8 (ref 3.2–5.2)
EXT SEGS%: 76.2 (ref 43.5–73.5)
EXT SIROLIMUS LVL: 6.4 (ref 3–20)
EXT SODIUM: 139 MMOL/L (ref 134–144)
GLUCOSE SERPL-MCNC: 128 MG/DL (ref 70–110)
HCT VFR BLD AUTO: 26.5 % (ref 40–54)
HGB BLD-MCNC: 8 G/DL (ref 14–18)
IMM GRANULOCYTES # BLD AUTO: 0.13 K/UL (ref 0–0.04)
INR PPP: 0.9 (ref 0.8–1.2)
L PNEUMO AG UR QL IA: NOT DETECTED
MCH RBC QN AUTO: 24.2 PG (ref 27–31)
MCHC RBC AUTO-ENTMCNC: 30.2 G/DL (ref 32–36)
MCV RBC AUTO: 80 FL (ref 82–98)
NEUTROPHILS # BLD AUTO: 3.4 K/UL (ref 1.8–7.7)
PLATELET # BLD AUTO: 180 K/UL (ref 150–350)
PMV BLD AUTO: 10.4 FL (ref 9.2–12.9)
POTASSIUM SERPL-SCNC: 4.2 MMOL/L (ref 3.5–5.1)
PROT SERPL-MCNC: 5.4 G/DL (ref 6–8.4)
PROTHROMBIN TIME: 9.4 SEC (ref 9–12.5)
RBC # BLD AUTO: 3.3 M/UL (ref 4.6–6.2)
SIROLIMUS BLD-MCNC: 7.2 NG/ML (ref 4–20)
SODIUM SERPL-SCNC: 138 MMOL/L (ref 136–145)
WBC # BLD AUTO: 4.75 K/UL (ref 3.9–12.7)

## 2019-11-18 PROCEDURE — 63600175 PHARM REV CODE 636 W HCPCS

## 2019-11-18 PROCEDURE — 63600175 PHARM REV CODE 636 W HCPCS: Performed by: INTERNAL MEDICINE

## 2019-11-18 PROCEDURE — 36415 COLL VENOUS BLD VENIPUNCTURE: CPT

## 2019-11-18 PROCEDURE — 25000003 PHARM REV CODE 250: Performed by: INTERNAL MEDICINE

## 2019-11-18 PROCEDURE — 25000242 PHARM REV CODE 250 ALT 637 W/ HCPCS: Performed by: STUDENT IN AN ORGANIZED HEALTH CARE EDUCATION/TRAINING PROGRAM

## 2019-11-18 PROCEDURE — 25000003 PHARM REV CODE 250: Performed by: STUDENT IN AN ORGANIZED HEALTH CARE EDUCATION/TRAINING PROGRAM

## 2019-11-18 PROCEDURE — 99233 PR SUBSEQUENT HOSPITAL CARE,LEVL III: ICD-10-PCS | Mod: ,,, | Performed by: INTERNAL MEDICINE

## 2019-11-18 PROCEDURE — 63600175 PHARM REV CODE 636 W HCPCS: Performed by: STUDENT IN AN ORGANIZED HEALTH CARE EDUCATION/TRAINING PROGRAM

## 2019-11-18 PROCEDURE — 94761 N-INVAS EAR/PLS OXIMETRY MLT: CPT

## 2019-11-18 PROCEDURE — 99233 SBSQ HOSP IP/OBS HIGH 50: CPT | Mod: ,,, | Performed by: INTERNAL MEDICINE

## 2019-11-18 PROCEDURE — 25000003 PHARM REV CODE 250

## 2019-11-18 PROCEDURE — 85610 PROTHROMBIN TIME: CPT

## 2019-11-18 PROCEDURE — 94640 AIRWAY INHALATION TREATMENT: CPT

## 2019-11-18 PROCEDURE — 87798 DETECT AGENT NOS DNA AMP: CPT

## 2019-11-18 PROCEDURE — 99232 SBSQ HOSP IP/OBS MODERATE 35: CPT | Mod: ,,, | Performed by: INTERNAL MEDICINE

## 2019-11-18 PROCEDURE — 80053 COMPREHEN METABOLIC PANEL: CPT

## 2019-11-18 PROCEDURE — 80195 ASSAY OF SIROLIMUS: CPT

## 2019-11-18 PROCEDURE — 25000003 PHARM REV CODE 250: Performed by: HOSPITALIST

## 2019-11-18 PROCEDURE — 20600001 HC STEP DOWN PRIVATE ROOM

## 2019-11-18 PROCEDURE — 85730 THROMBOPLASTIN TIME PARTIAL: CPT

## 2019-11-18 PROCEDURE — 99232 PR SUBSEQUENT HOSPITAL CARE,LEVL II: ICD-10-PCS | Mod: ,,, | Performed by: INTERNAL MEDICINE

## 2019-11-18 PROCEDURE — 97802 MEDICAL NUTRITION INDIV IN: CPT

## 2019-11-18 PROCEDURE — 85027 COMPLETE CBC AUTOMATED: CPT

## 2019-11-18 RX ORDER — HYDRALAZINE HYDROCHLORIDE 10 MG/1
10 TABLET, FILM COATED ORAL EVERY 8 HOURS PRN
Status: DISCONTINUED | OUTPATIENT
Start: 2019-11-19 | End: 2019-11-19 | Stop reason: HOSPADM

## 2019-11-18 RX ORDER — WARFARIN SODIUM 5 MG/1
5 TABLET ORAL DAILY
Status: DISCONTINUED | OUTPATIENT
Start: 2019-11-18 | End: 2019-11-18

## 2019-11-18 RX ORDER — HYDRALAZINE HYDROCHLORIDE 10 MG/1
10 TABLET, FILM COATED ORAL EVERY 8 HOURS PRN
Status: DISCONTINUED | OUTPATIENT
Start: 2019-11-19 | End: 2019-11-18

## 2019-11-18 RX ADMIN — ATOVAQUONE 1500 MG: 750 SUSPENSION ORAL at 08:11

## 2019-11-18 RX ADMIN — IPRATROPIUM BROMIDE AND ALBUTEROL SULFATE 3 ML: .5; 3 SOLUTION RESPIRATORY (INHALATION) at 08:11

## 2019-11-18 RX ADMIN — HYDRALAZINE HYDROCHLORIDE 25 MG: 25 TABLET, FILM COATED ORAL at 08:11

## 2019-11-18 RX ADMIN — CALCIUM CARBONATE (ANTACID) CHEW TAB 500 MG 500 MG: 500 CHEW TAB at 08:11

## 2019-11-18 RX ADMIN — FAMOTIDINE 20 MG: 20 TABLET ORAL at 08:11

## 2019-11-18 RX ADMIN — EMTRICITABINE AND TENOFOVIR ALAFENAMIDE 1 TABLET: 200; 25 TABLET ORAL at 08:11

## 2019-11-18 RX ADMIN — IPRATROPIUM BROMIDE AND ALBUTEROL SULFATE 3 ML: .5; 3 SOLUTION RESPIRATORY (INHALATION) at 11:11

## 2019-11-18 RX ADMIN — ROSUVASTATIN CALCIUM 20 MG: 10 TABLET, FILM COATED ORAL at 08:11

## 2019-11-18 RX ADMIN — IPRATROPIUM BROMIDE AND ALBUTEROL SULFATE 3 ML: .5; 3 SOLUTION RESPIRATORY (INHALATION) at 03:11

## 2019-11-18 RX ADMIN — SODIUM BICARBONATE 650 MG TABLET 1950 MG: at 03:11

## 2019-11-18 RX ADMIN — SIROLIMUS 2 MG: 1 TABLET ORAL at 08:11

## 2019-11-18 RX ADMIN — OXYCODONE HYDROCHLORIDE 5 MG: 5 TABLET ORAL at 06:11

## 2019-11-18 RX ADMIN — CARVEDILOL 25 MG: 25 TABLET, FILM COATED ORAL at 05:11

## 2019-11-18 RX ADMIN — NIFEDIPINE 60 MG: 30 TABLET, FILM COATED, EXTENDED RELEASE ORAL at 08:11

## 2019-11-18 RX ADMIN — IPRATROPIUM BROMIDE AND ALBUTEROL SULFATE 3 ML: .5; 3 SOLUTION RESPIRATORY (INHALATION) at 12:11

## 2019-11-18 RX ADMIN — SODIUM BICARBONATE 650 MG TABLET 1950 MG: at 08:11

## 2019-11-18 RX ADMIN — HYDRALAZINE HYDROCHLORIDE 25 MG: 25 TABLET, FILM COATED ORAL at 03:11

## 2019-11-18 RX ADMIN — HYDRALAZINE HYDROCHLORIDE 25 MG: 25 TABLET, FILM COATED ORAL at 05:11

## 2019-11-18 RX ADMIN — SODIUM CHLORIDE: 0.9 INJECTION, SOLUTION INTRAVENOUS at 04:11

## 2019-11-18 RX ADMIN — DOLUTEGRAVIR SODIUM 50 MG: 50 TABLET, FILM COATED ORAL at 08:11

## 2019-11-18 RX ADMIN — APIXABAN 10 MG: 5 TABLET, FILM COATED ORAL at 08:11

## 2019-11-18 RX ADMIN — CARVEDILOL 25 MG: 25 TABLET, FILM COATED ORAL at 08:11

## 2019-11-18 RX ADMIN — PREDNISONE 5 MG: 5 TABLET ORAL at 08:11

## 2019-11-18 RX ADMIN — HEPARIN SODIUM AND DEXTROSE 20 UNITS/KG/HR: 10000; 5 INJECTION INTRAVENOUS at 04:11

## 2019-11-18 NOTE — PROGRESS NOTES
ATTENDING NOTE, ONCOLOGY INPATIENT TEAM    Events of last 72 hours noted.  Patient seen and examined, chart reviewed.  Appears comfortable, in NAD.  Lungs are clear to auscultation.  Abdomen is soft, nontender.  Lower extremity edema is unchanged.  He has multiple KS lesions in the LRE, too many to count.  Labs from 11/17 were reviewed.  hsi renal fucntion ahs imrpoved.  Nine lbs weight gain noted.    PLAN  Will discuss with Dr. Guallpa whether he can be switched to DOACs instead of warfarin.  No need for a RLE ultrasound.    Continue antiretrovirals, discontinue IV hydration and encourage oral hydration.  If we continue warfarin, we will increase the dose to 5 mg.  Encouraged patient to remain ambulatory.  The Kidney transplant Service to make recommendations re: his sirolimus dose.  If we can switch him to eliquis, he may be discahred tomorrow. Otherwise, it will probably take 3-4 days for his INR to become therapeutic.      Santi Kennedy MD

## 2019-11-18 NOTE — CONSULTS
Food & Nutrition  Education    Diet Education: Vitamin K   Time Spent: 5 minutes  Learners: Pt    Nutrition Education provided with handouts:  Vitamin K & medications    Comments: Educated pt on Vitamin K containing foods and recommended daily amount. Encouraged compliance with diet. Pt verbalized understanding. Handouts provided to bedside.     All questions and concerns answered. Dietitian's contact information provided.     Follow-Up: 11/22/19    Please Re-consult as needed    Thanks!

## 2019-11-18 NOTE — PROGRESS NOTES
"TRANSPLANT DAILY PROGRESS NOTE    Asked to follow patient for monitoring of transplant relates issues, allograft function, and immunosuppression.    Interval history:   Patient seen and examined at bedside, ambulatory.     I/O last 3 completed shifts:  In: 5528.6 [P.O.:2520; I.V.:2958.6; IV Piggyback:50]  Out: 3000 [Urine:3000]    VITALS:  height is 5' 8" (1.727 m) and weight is 81.2 kg (179 lb 0.2 oz). His oral temperature is 98.3 °F (36.8 °C). His blood pressure is 142/87 (abnormal) and his pulse is 76. His respiration is 16 and oxygen saturation is 97%.       A&O x3, NAD.  Lungs CTA, unlabored.  Heart regular, no rub.  Abdomen soft, nontender, no masses.  Allograft nontender.  Edema: none.    Recent Labs   Lab 11/16/19  0558 11/17/19  0700 11/18/19  1022   WBC 5.40 4.52 4.75   HGB 8.3* 7.8* 8.0*   HCT 27.4* 26.6* 26.5*    177 180       Recent Labs   Lab 11/12/19  0601  11/14/19  1642  11/16/19  0558 11/17/19  0701 11/18/19  1022      < >  --    < > 136 134* 138   K 3.4*   < >  --    < > 4.2 4.1 4.2      < >  --    < > 107 105 107   CO2 25   < >  --    < > 20* 23 25   BUN 25*   < >  --    < > 30* 26* 19   CREATININE 2.2*   < >  --    < > 2.5* 2.1* 1.8*   CALCIUM 7.4*   < >  --    < > 8.3* 8.4* 8.7   PHOS 2.2*  --  3.3  --   --   --   --     < > = values in this interval not displayed.         ASSESSMENT/PLAN:    CKD post kidney transplant  Recent Labs   Lab 11/16/19  0558 11/17/19  0701 11/18/19  1022   Creatinine 2.5 H 2.1 H 1.8 H   eGFR if non  31.0 A 38.2 A 46.1 A   eGFR if  35.8 A 44.2 A 53.2 A     -Follow with strict I/Os, daily weights, BP (goal <140/90), daily labs.    -Avoid nephrotoxic agents when feasible (NSAIDs, IV contrast dye, Aminoglycoside-containing antibiotics)  -Renally dose all appropriate medications, including antibiotics      Encounter for Monitoring Immunosuppression post Transplant  Recent Labs   Lab 07/22/19  0325 08/07/19  0735 " 08/14/19  0842  11/16/19  0558 11/17/19  0701 11/18/19  0629   Tacrolimus Lvl <1.5 L <1.5 L <1.5 L  --   --   --   --    Sirolimus Lvl 3.2 L 6.6 3.6 L   < > 9.8 5.3 7.2    < > = values in this interval not displayed.     -Target level for Demetrio is    < > = values in this interval not displayed.   -Target level for Demetrio is 7.2  D/t    -Dose lowered from 4-->2 on 11/17. Watch trend. Now suspect 9,8 on 16th may be in error since improved prior to dose change.  -Will trend immunosuppression levels daily. Also monitor for med-related side effects or drug toxicity given immunosuppressant med with narrow therapeutic window.      RLL pneumonia  - responding to atb     Kaposi sarcoma, on chemo per hem onc     DVT right subclavian vein - on heparin gtt, to transition to warfarin/NOAC as per Heme-Onc

## 2019-11-18 NOTE — PLAN OF CARE
MDRs completed with Dr. Jackson and the team. Vital signs are stable. T max 98.9. O2 sats are WNL on room air. Labs stable. Continuous IVFs discontinued. MD encouraged the patient to increase his PO intake of fluids. Heparin gtt discontinued and patient transitioned to apixaban 10 mg PO BID. Kidney Transplant Service continuing to follow. MD encouraged the patient to remain ambulatory. MD discussed the plan of care with the patient. Discharge date to be determined at this time. Discharge needs to be determined at this time. CM to continue to follow with the team.    Nessa Garcia, RN, BSN, CM  [facility]  Nurse - Med Onc/Gyn Onc

## 2019-11-18 NOTE — PLAN OF CARE
AAOx3, afebrile, w/o c/o pain. Sputum samples sent for Legionella and Pneumocystis. NS @75CC/HR. Heparin gtt @20 units/kg/hr, DW 68kg, 13.6cc/hr. Next aptt with morning labs. Coumadin started yesterday. Pt able to position self independently.  Pt in lowest position, side rails up x2, non-skid foot wear in place, call light within reach, pt verbalized understanding to call RN when needed.  Hand hygiene practiced per protocol.  Will continue to monitor.

## 2019-11-18 NOTE — ASSESSMENT & PLAN NOTE
- DVT of the right subclavian vein noted on US of the UE  - Patient on heparin gtt- plan to transition to coumadin   -possibly switch to lovenox instead of coumadin

## 2019-11-18 NOTE — PROGRESS NOTES
[facility]  Hematology/Oncology  Progress Note    Patient Name: Demetrio Aguiar  Admission Date: 11/12/2019  Hospital Length of Stay: 6 days  Code Status: Full Code     Subjective:     HPI:    Oncologic Hx from chart:    Demetrio Aguiar is a 39 y.o. male with PMHx of HIV on ART, Kaposi sarcoma, ESRD previous on iHD s/p transplant with hx of rejection (on chronic immunosuppression), anemia of chronic disease, latent TB s/p treatment, HTN and HLD.     He is s/p 4 cycles of doxo for symptomatic metastatic kaposi.     He tolerated the first  4 cycles well without obvious complaints. RLE swelling has decreased and he has improved QOL.     PET scan in October shows decrease in tumor, LAD bulk though it also shows new cardiomegaly and effusion, which is somewhat concerning.     HPI:  Patient is presenting with complaints of fevers, chills, dyspnea and cough. He denies any diarrhea, abdominal pain or dysuria. Vital signs in the ER revealed fever, tachycardia and tachypnea. CBC revealed no neutropenia. CMP revealed stable renal function. CXR with opacity in right mid to inferior hemithorax potentially related to pneumonia and pleural effusion. He received 1 liter bolus and IV Vancomycin and Cefepime.        Interval History: NAEON, improving LEFTY now at baseline, still showing L leg swelling.    Oncology Treatment Plan:   OP SARCOMA DOXORUBICIN LIPOSOMAL Q3W    Medications:  Continuous Infusions:   heparin (porcine) in D5W 20 Units/kg/hr (11/18/19 0413)     Scheduled Meds:   albuterol-ipratropium  3 mL Nebulization Q4H    atovaquone  1,500 mg Oral Daily    calcium carbonate  1 tablet Oral Daily    carvedilol  25 mg Oral BID WM    dolutegravir  50 mg Oral Daily    emtricitabine-tenofovir alafen  1 tablet Oral Daily    famotidine  20 mg Oral Daily    hydrALAZINE  25 mg Oral Q8H    NIFEdipine  60 mg Oral Q12H    predniSONE  5 mg Oral Daily    rosuvastatin  20 mg Oral Daily    sirolimus  2 mg  Oral Daily    sodium bicarbonate  1,950 mg Oral TID    warfarin  5 mg Oral Daily     PRN Meds:acetaminophen, dextrose 10 % in water (D10W), dextrose 10 % in water (D10W), glucagon (human recombinant), glucose, glucose, heparin (PORCINE), heparin (PORCINE), oxyCODONE, sodium chloride 0.9%     Review of Systems   Constitutional: Negative for activity change, appetite change, chills, diaphoresis, fatigue and fever.   Respiratory: Negative for cough and shortness of breath.    Cardiovascular: Positive for leg swelling (R>L). Negative for chest pain.   Gastrointestinal: Negative for blood in stool, diarrhea, nausea and vomiting. Abdominal pain: LLQ.   Genitourinary: Negative for difficulty urinating, dysuria and frequency.   Musculoskeletal: Negative for arthralgias and myalgias.   Skin: Negative for pallor and rash.   Neurological: Negative for dizziness, light-headedness and headaches.     Objective:     Vital Signs (Most Recent):  Temp: 98.3 °F (36.8 °C) (11/18/19 1143)  Pulse: 76 (11/18/19 1155)  Resp: 16 (11/18/19 1155)  BP: (!) 142/87 (11/18/19 1143)  SpO2: 97 % (11/18/19 1155) Vital Signs (24h Range):  Temp:  [98 °F (36.7 °C)-98.9 °F (37.2 °C)] 98.3 °F (36.8 °C)  Pulse:  [74-79] 76  Resp:  [16-20] 16  SpO2:  [95 %-100 %] 97 %  BP: (130-158)/(77-89) 142/87     Weight: 81.2 kg (179 lb 0.2 oz)  Body mass index is 27.22 kg/m².  Body surface area is 1.97 meters squared.      Intake/Output Summary (Last 24 hours) at 11/18/2019 1338  Last data filed at 11/18/2019 0800  Gross per 24 hour   Intake 3092 ml   Output 2050 ml   Net 1042 ml       Physical Exam   Constitutional: He is oriented to person, place, and time. He appears well-developed and well-nourished. No distress.   HENT:   Head: Normocephalic and atraumatic.   Right Ear: External ear normal.   Left Ear: External ear normal.   Eyes: Pupils are equal, round, and reactive to light. Right eye exhibits no discharge. Left eye exhibits no discharge. No scleral  icterus.   Neck: Normal range of motion. Neck supple.   Cardiovascular: Normal rate, regular rhythm, normal heart sounds and intact distal pulses. Exam reveals no gallop.   No murmur heard.  Pulmonary/Chest: Effort normal. No stridor. No respiratory distress. He has wheezes. He has rales.   Breathing RA, no accessory muscles of respiration used     Abdominal: Soft. Bowel sounds are normal. He exhibits no distension. There is tenderness (LLQ- mild). There is no guarding.   Musculoskeletal: He exhibits edema (RUE and RLE, pitting in the RLE- 2+). He exhibits no tenderness or deformity.   Skin lesions noted on lower extremities    Neurological: He is alert and oriented to person, place, and time.   Skin: Skin is warm and dry. He is not diaphoretic. No erythema. No pallor.   Nursing note and vitals reviewed.      Significant Labs:   All pertinent labs from the last 24 hours have been reviewed.    Diagnostic Results:  I have reviewed all pertinent imaging results/findings within the past 24 hours.    Assessment/Plan:     * Pneumonia due to infectious organism  - Off oxygen   - Repeat Blood Cx's- NGTD, continue with ceftriaxone  - Repeat CXR on 11/15 showing improving right lower lobe PNA     Acute DVT (deep venous thrombosis)  - DVT of the right subclavian vein noted on US of the UE  - Patient on heparin gtt- plan to transition to coumadin   -possibly switch to lovenox instead of coumadin      Chronic kidney disease, stage 3 (moderate)  See LEFTY       Sepsis  Patient presented with fever, tachycardia and tachypnea   CXR with likely right mid and lower lobe pneumonia     - See PNA due to infectious organism       Renovascular hypertension  - Continue home nifedipine  - Coreg home dose of 12.5 BID increased to 25 mg BID for goal BP <140/90 per KTM on 11/16  - Not at goal on 11/17, added Hydralazine 25 mg TID- will touch base with KTM to see if they have additional recs     Kaposi sarcoma  HIV related   Currently on HAART  and Adriamycin Q3W for symptomatic metastatic KS  He was due for Adriamycin however this is going to be postponed given acute illness     Acute kidney injury superimposed on chronic kidney disease stage III  Continue IVF's at 75 cc's/hr  Resolved as of - back at baseline Cr of 2.1     Immunosuppression  Due to HIV infection, anti rejection meds and chemotherapy   C/w Atovaquone for PCP PPx, PCP ppx needs to be lifelong per ID       donor kidney transplant 2017  C/w Home Sirolimus and Prednisone at 5 mg daily   Monitor Sirolimus level   Monitor CMP      Anemia in stage 3 chronic kidney disease  H/H stable, monitor CBC    HIV (human immunodeficiency virus infection)  Controlled, last viral load was undetectable   Will continue with home Tivicay and Descovy (Descovy is non formulary however this gentleman with renal dysfunction is unable to tolerate Truvada which the formulary alternative).   CD4 count at 65, appreciate ID consult     Per ID:      - would continue CTX for now for CAP -- plan 5 day total antibiotics course through    - CT without significant effusion for tap -- no further action needed  - small pericardial effusion noted on echo -- additional management per primary team  - would continue lifelong PJP ppx per protocol - would consider rechallenging tmp/smx as is drug of choice  - continue current home HAART regimen    Hyperlipidemia  C/w Maykelor             Jerica Ryan MD  Hematology/Oncology  [facility]            ATTENDING NOTE, ONCOLOGY INPATIENT TEAM    As above; please refer to my note of same day for our assessment and plan    Santi Kennedy MD

## 2019-11-18 NOTE — PLAN OF CARE
Patient is AAOx4, VSS, remains afebrile. No chills. C/o pain, controlled on current regimen, reinforced teaching about when to ask for pain medication. IVF continues @ 75 ml/hr. This am aPTT is 45.5, Hep drip continues at same rate 13.6 ml/hr, 20 units/kg/hr. New order to start coumadin this evening, first dose given. New order for hydralazine, first dose given this afternoon. Adequate urine output, 1 loose BM. Diet changed from renal to regular, encouraged patient to eat more protein. Ambulated in benito x2

## 2019-11-18 NOTE — SUBJECTIVE & OBJECTIVE
Interval History: NAEON, improving LEFTY now at baseline, still showing L leg swelling.    Oncology Treatment Plan:   OP SARCOMA DOXORUBICIN LIPOSOMAL Q3W    Medications:  Continuous Infusions:   heparin (porcine) in D5W 20 Units/kg/hr (11/18/19 0413)     Scheduled Meds:   albuterol-ipratropium  3 mL Nebulization Q4H    atovaquone  1,500 mg Oral Daily    calcium carbonate  1 tablet Oral Daily    carvedilol  25 mg Oral BID WM    dolutegravir  50 mg Oral Daily    emtricitabine-tenofovir alafen  1 tablet Oral Daily    famotidine  20 mg Oral Daily    hydrALAZINE  25 mg Oral Q8H    NIFEdipine  60 mg Oral Q12H    predniSONE  5 mg Oral Daily    rosuvastatin  20 mg Oral Daily    sirolimus  2 mg Oral Daily    sodium bicarbonate  1,950 mg Oral TID    warfarin  5 mg Oral Daily     PRN Meds:acetaminophen, dextrose 10 % in water (D10W), dextrose 10 % in water (D10W), glucagon (human recombinant), glucose, glucose, heparin (PORCINE), heparin (PORCINE), oxyCODONE, sodium chloride 0.9%     Review of Systems   Constitutional: Negative for activity change, appetite change, chills, diaphoresis, fatigue and fever.   Respiratory: Negative for cough and shortness of breath.    Cardiovascular: Positive for leg swelling (R>L). Negative for chest pain.   Gastrointestinal: Negative for blood in stool, diarrhea, nausea and vomiting. Abdominal pain: LLQ.   Genitourinary: Negative for difficulty urinating, dysuria and frequency.   Musculoskeletal: Negative for arthralgias and myalgias.   Skin: Negative for pallor and rash.   Neurological: Negative for dizziness, light-headedness and headaches.     Objective:     Vital Signs (Most Recent):  Temp: 98.3 °F (36.8 °C) (11/18/19 1143)  Pulse: 76 (11/18/19 1155)  Resp: 16 (11/18/19 1155)  BP: (!) 142/87 (11/18/19 1143)  SpO2: 97 % (11/18/19 1155) Vital Signs (24h Range):  Temp:  [98 °F (36.7 °C)-98.9 °F (37.2 °C)] 98.3 °F (36.8 °C)  Pulse:  [74-79] 76  Resp:  [16-20] 16  SpO2:  [95 %-100  %] 97 %  BP: (130-158)/(77-89) 142/87     Weight: 81.2 kg (179 lb 0.2 oz)  Body mass index is 27.22 kg/m².  Body surface area is 1.97 meters squared.      Intake/Output Summary (Last 24 hours) at 11/18/2019 1338  Last data filed at 11/18/2019 0800  Gross per 24 hour   Intake 3092 ml   Output 2050 ml   Net 1042 ml       Physical Exam   Constitutional: He is oriented to person, place, and time. He appears well-developed and well-nourished. No distress.   HENT:   Head: Normocephalic and atraumatic.   Right Ear: External ear normal.   Left Ear: External ear normal.   Eyes: Pupils are equal, round, and reactive to light. Right eye exhibits no discharge. Left eye exhibits no discharge. No scleral icterus.   Neck: Normal range of motion. Neck supple.   Cardiovascular: Normal rate, regular rhythm, normal heart sounds and intact distal pulses. Exam reveals no gallop.   No murmur heard.  Pulmonary/Chest: Effort normal. No stridor. No respiratory distress. He has wheezes. He has rales.   Breathing RA, no accessory muscles of respiration used     Abdominal: Soft. Bowel sounds are normal. He exhibits no distension. There is tenderness (LLQ- mild). There is no guarding.   Musculoskeletal: He exhibits edema (RUE and RLE, pitting in the RLE- 2+). He exhibits no tenderness or deformity.   Skin lesions noted on lower extremities    Neurological: He is alert and oriented to person, place, and time.   Skin: Skin is warm and dry. He is not diaphoretic. No erythema. No pallor.   Nursing note and vitals reviewed.      Significant Labs:   All pertinent labs from the last 24 hours have been reviewed.    Diagnostic Results:  I have reviewed all pertinent imaging results/findings within the past 24 hours.

## 2019-11-18 NOTE — PROGRESS NOTES
"TRANSPLANT DAILY PROGRESS NOTE    Asked to follow patient for monitoring of transplant relates issues, allograft function, and immunosuppression.    Interval history:     Up walking in [place].  Overall feels better.  Still has swelling of right arm and right leg, but feels they are manageable.  Denies pain over his kidney transplant or LUTS.  Reports no shortness of breath  with ambulation.  Past medical, surgical, family, social history reviewed & unchanged since admission.     I/O last 3 completed shifts:  In: 6069.9 [P.O.:2520; I.V.:3499.9; IV Piggyback:50]  Out: 2050 [Urine:2050]    VITALS:  height is 5' 8" (1.727 m) and weight is 79.8 kg (175 lb 14.8 oz). His oral temperature is 98.5 °F (36.9 °C). His blood pressure is 135/82 and his pulse is 79. His respiration is 18 and oxygen saturation is 98%.       A&O x3, NAD.  Lungs CTA, unlabored.  Heart regular, no rub.  Abdomen soft, nontender, no masses.  Allograft nontender.  Edema: none.    Recent Labs   Lab 11/15/19  0620 11/16/19  0558 11/17/19  0700   WBC 7.77 5.40 4.52   HGB 8.9* 8.3* 7.8*   HCT 29.4* 27.4* 26.6*    233 177       Recent Labs   Lab 11/12/19  0601  11/14/19  1642  11/15/19  2320 11/16/19  0558 11/17/19  0701      < >  --    < > 134* 136 134*   K 3.4*   < >  --    < > 4.2 4.2 4.1      < >  --    < > 104 107 105   CO2 25   < >  --    < > 24 20* 23   BUN 25*   < >  --    < > 34* 30* 26*   CREATININE 2.2*   < >  --    < > 2.7* 2.5* 2.1*   CALCIUM 7.4*   < >  --    < > 8.3* 8.3* 8.4*   PHOS 2.2*  --  3.3  --   --   --   --     < > = values in this interval not displayed.         ASSESSMENT/PLAN:    CKD III with superimposed LEFTY complicating kidney transplant  -Responded nicely to IVF. Watch for 3rd spacing and volume overload  Recent Labs   Lab 11/15/19  2320 11/16/19  0558 11/17/19  0701   Creatinine 2.7 H 2.5 H 2.1 H   eGFR if non  28.2 A 31.0 A 38.2 A   eGFR if  32.6 A 35.8 A 44.2 A   -Follow with " strict I/Os, daily weights, BP (goal <140/90), daily labs.    -Avoid nephrotoxic agents when feasible (NSAIDs, IV contrast dye, Aminoglycoside-containing antibiotics)  -Renally dose all appropriate medications, including antibiotics      Encounter for Monitoring Immunosuppression post Transplant  Recent Labs   Lab 07/22/19  0325 08/07/19  0735 08/14/19  0842  11/15/19  0620 11/16/19  0558 11/17/19  0701   Tacrolimus Lvl <1.5 L <1.5 L <1.5 L  --   --   --   --    Sirolimus Lvl 3.2 L 6.6 3.6 L   < > 4.8 9.8 5.3    < > = values in this interval not displayed.   -Target level for Demetrio is 3-4  D/t chemorx'  -Dose lowered from 4-->2 this Am. Watch trend. Now suspect 9,8 on 16th may be in error since improved prior to dose change.  -Will trend immunosuppression levels daily. Also monitor for med-related side effects or drug toxicity given immunosuppressant med with narrow therapeutic window.      RLL penumonia  - responding to atb    Kaposi sarcoma, on chemo per hem onc    DVT right subclavian vein - on heparin gtt, to transition to warfarin    Recommend lower or D/C IVF as near discharge. Advised pt to focus on nutrition and try to increase protein in his diet.    Lucía Polk MD

## 2019-11-19 ENCOUNTER — TELEPHONE (OUTPATIENT)
Dept: TRANSPLANT | Facility: CLINIC | Age: 39
End: 2019-11-19

## 2019-11-19 ENCOUNTER — TELEPHONE (OUTPATIENT)
Dept: NEPHROLOGY | Facility: CLINIC | Age: 39
End: 2019-11-19

## 2019-11-19 VITALS
HEIGHT: 68 IN | WEIGHT: 180.31 LBS | OXYGEN SATURATION: 97 % | DIASTOLIC BLOOD PRESSURE: 79 MMHG | BODY MASS INDEX: 27.33 KG/M2 | TEMPERATURE: 99 F | RESPIRATION RATE: 18 BRPM | SYSTOLIC BLOOD PRESSURE: 132 MMHG | HEART RATE: 77 BPM

## 2019-11-19 DIAGNOSIS — C46.9 KAPOSI SARCOMA: ICD-10-CM

## 2019-11-19 DIAGNOSIS — Z51.11 ENCOUNTER FOR ANTINEOPLASTIC CHEMOTHERAPY: Primary | ICD-10-CM

## 2019-11-19 LAB
ALBUMIN SERPL BCP-MCNC: 2.2 G/DL (ref 3.5–5.2)
ALP SERPL-CCNC: 84 U/L (ref 55–135)
ALT SERPL W/O P-5'-P-CCNC: 37 U/L (ref 10–44)
ANION GAP SERPL CALC-SCNC: 4 MMOL/L (ref 8–16)
AST SERPL-CCNC: 46 U/L (ref 10–40)
BILIRUB SERPL-MCNC: 0.2 MG/DL (ref 0.1–1)
BUN SERPL-MCNC: 18 MG/DL (ref 6–20)
CALCIUM SERPL-MCNC: 8.8 MG/DL (ref 8.7–10.5)
CHLORIDE SERPL-SCNC: 109 MMOL/L (ref 95–110)
CO2 SERPL-SCNC: 28 MMOL/L (ref 23–29)
CREAT SERPL-MCNC: 1.7 MG/DL (ref 0.5–1.4)
CREAT UR-MCNC: 25 MG/DL (ref 23–375)
ERYTHROCYTE [DISTWIDTH] IN BLOOD BY AUTOMATED COUNT: 16.7 % (ref 11.5–14.5)
EST. GFR  (AFRICAN AMERICAN): 57 ML/MIN/1.73 M^2
EST. GFR  (NON AFRICAN AMERICAN): 49.3 ML/MIN/1.73 M^2
GLUCOSE SERPL-MCNC: 101 MG/DL (ref 70–110)
HCT VFR BLD AUTO: 23 % (ref 40–54)
HGB BLD-MCNC: 6.9 G/DL (ref 14–18)
IMM GRANULOCYTES # BLD AUTO: 0.19 K/UL (ref 0–0.04)
INR PPP: 0.9 (ref 0.8–1.2)
MAGNESIUM SERPL-MCNC: 1.9 MG/DL (ref 1.6–2.6)
MCH RBC QN AUTO: 23.8 PG (ref 27–31)
MCHC RBC AUTO-ENTMCNC: 30 G/DL (ref 32–36)
MCV RBC AUTO: 79 FL (ref 82–98)
NEUTROPHILS # BLD AUTO: 3 K/UL (ref 1.8–7.7)
PHOSPHATE SERPL-MCNC: 3.1 MG/DL (ref 2.7–4.5)
PLATELET # BLD AUTO: 179 K/UL (ref 150–350)
PMV BLD AUTO: 10.2 FL (ref 9.2–12.9)
POTASSIUM SERPL-SCNC: 4.5 MMOL/L (ref 3.5–5.1)
PROT SERPL-MCNC: 5.2 G/DL (ref 6–8.4)
PROT UR-MCNC: 11 MG/DL (ref 0–15)
PROT/CREAT UR: 0.44 MG/G{CREAT} (ref 0–0.2)
PROTHROMBIN TIME: 9.7 SEC (ref 9–12.5)
RBC # BLD AUTO: 2.9 M/UL (ref 4.6–6.2)
SIROLIMUS BLD-MCNC: 6.2 NG/ML (ref 4–20)
SODIUM SERPL-SCNC: 141 MMOL/L (ref 136–145)
WBC # BLD AUTO: 4.49 K/UL (ref 3.9–12.7)

## 2019-11-19 PROCEDURE — 25000003 PHARM REV CODE 250

## 2019-11-19 PROCEDURE — 63600175 PHARM REV CODE 636 W HCPCS

## 2019-11-19 PROCEDURE — 84156 ASSAY OF PROTEIN URINE: CPT

## 2019-11-19 PROCEDURE — 25000003 PHARM REV CODE 250: Performed by: HOSPITALIST

## 2019-11-19 PROCEDURE — 85027 COMPLETE CBC AUTOMATED: CPT

## 2019-11-19 PROCEDURE — 63600175 PHARM REV CODE 636 W HCPCS: Performed by: INTERNAL MEDICINE

## 2019-11-19 PROCEDURE — 25000003 PHARM REV CODE 250: Performed by: INTERNAL MEDICINE

## 2019-11-19 PROCEDURE — 36415 COLL VENOUS BLD VENIPUNCTURE: CPT

## 2019-11-19 PROCEDURE — 25000003 PHARM REV CODE 250: Performed by: STUDENT IN AN ORGANIZED HEALTH CARE EDUCATION/TRAINING PROGRAM

## 2019-11-19 PROCEDURE — 80195 ASSAY OF SIROLIMUS: CPT

## 2019-11-19 PROCEDURE — 94640 AIRWAY INHALATION TREATMENT: CPT

## 2019-11-19 PROCEDURE — 80053 COMPREHEN METABOLIC PANEL: CPT

## 2019-11-19 PROCEDURE — 25000242 PHARM REV CODE 250 ALT 637 W/ HCPCS: Performed by: STUDENT IN AN ORGANIZED HEALTH CARE EDUCATION/TRAINING PROGRAM

## 2019-11-19 PROCEDURE — 94761 N-INVAS EAR/PLS OXIMETRY MLT: CPT

## 2019-11-19 PROCEDURE — 99238 HOSP IP/OBS DSCHRG MGMT 30/<: CPT | Mod: ,,, | Performed by: INTERNAL MEDICINE

## 2019-11-19 PROCEDURE — 85610 PROTHROMBIN TIME: CPT

## 2019-11-19 PROCEDURE — 83735 ASSAY OF MAGNESIUM: CPT

## 2019-11-19 PROCEDURE — 99238 PR HOSPITAL DISCHARGE DAY,<30 MIN: ICD-10-PCS | Mod: ,,, | Performed by: INTERNAL MEDICINE

## 2019-11-19 PROCEDURE — 84100 ASSAY OF PHOSPHORUS: CPT

## 2019-11-19 RX ORDER — SEVELAMER CARBONATE 800 MG/1
800 TABLET, FILM COATED ORAL
Qty: 90 TABLET | Refills: 11 | Status: SHIPPED | OUTPATIENT
Start: 2019-11-19 | End: 2020-11-18

## 2019-11-19 RX ORDER — ATOVAQUONE 750 MG/5ML
1500 SUSPENSION ORAL DAILY
Qty: 300 ML | Refills: 0 | Status: ON HOLD | OUTPATIENT
Start: 2019-11-20 | End: 2019-12-17 | Stop reason: SDUPTHER

## 2019-11-19 RX ORDER — NIFEDIPINE 60 MG/1
60 TABLET, EXTENDED RELEASE ORAL EVERY 12 HOURS
Qty: 60 TABLET | Refills: 0 | Status: ON HOLD | OUTPATIENT
Start: 2019-11-19 | End: 2019-12-17 | Stop reason: HOSPADM

## 2019-11-19 RX ORDER — HYDRALAZINE HYDROCHLORIDE 25 MG/1
25 TABLET, FILM COATED ORAL EVERY 8 HOURS
Qty: 90 TABLET | Refills: 0 | Status: ON HOLD | OUTPATIENT
Start: 2019-11-19 | End: 2019-12-17 | Stop reason: HOSPADM

## 2019-11-19 RX ORDER — SIROLIMUS 2 MG/1
2 TABLET, FILM COATED ORAL DAILY
Qty: 30 TABLET | Refills: 11 | Status: SHIPPED | OUTPATIENT
Start: 2019-11-20 | End: 2019-12-19 | Stop reason: SDUPTHER

## 2019-11-19 RX ORDER — CARVEDILOL 25 MG/1
25 TABLET ORAL 2 TIMES DAILY WITH MEALS
Qty: 60 TABLET | Refills: 0 | Status: ON HOLD | OUTPATIENT
Start: 2019-11-19 | End: 2019-12-17 | Stop reason: SDUPTHER

## 2019-11-19 RX ADMIN — ROSUVASTATIN CALCIUM 20 MG: 10 TABLET, FILM COATED ORAL at 08:11

## 2019-11-19 RX ADMIN — CALCIUM CARBONATE (ANTACID) CHEW TAB 500 MG 500 MG: 500 CHEW TAB at 08:11

## 2019-11-19 RX ADMIN — DOLUTEGRAVIR SODIUM 50 MG: 50 TABLET, FILM COATED ORAL at 08:11

## 2019-11-19 RX ADMIN — IPRATROPIUM BROMIDE AND ALBUTEROL SULFATE 3 ML: .5; 3 SOLUTION RESPIRATORY (INHALATION) at 11:11

## 2019-11-19 RX ADMIN — SODIUM BICARBONATE 650 MG TABLET 1950 MG: at 08:11

## 2019-11-19 RX ADMIN — IPRATROPIUM BROMIDE AND ALBUTEROL SULFATE 3 ML: .5; 3 SOLUTION RESPIRATORY (INHALATION) at 12:11

## 2019-11-19 RX ADMIN — PREDNISONE 5 MG: 5 TABLET ORAL at 08:11

## 2019-11-19 RX ADMIN — HYDRALAZINE HYDROCHLORIDE 25 MG: 25 TABLET, FILM COATED ORAL at 02:11

## 2019-11-19 RX ADMIN — IPRATROPIUM BROMIDE AND ALBUTEROL SULFATE 3 ML: .5; 3 SOLUTION RESPIRATORY (INHALATION) at 04:11

## 2019-11-19 RX ADMIN — SIROLIMUS 2 MG: 1 TABLET ORAL at 08:11

## 2019-11-19 RX ADMIN — ATOVAQUONE 1500 MG: 750 SUSPENSION ORAL at 08:11

## 2019-11-19 RX ADMIN — HYDRALAZINE HYDROCHLORIDE 25 MG: 25 TABLET, FILM COATED ORAL at 05:11

## 2019-11-19 RX ADMIN — APIXABAN 10 MG: 5 TABLET, FILM COATED ORAL at 08:11

## 2019-11-19 RX ADMIN — NIFEDIPINE 60 MG: 30 TABLET, FILM COATED, EXTENDED RELEASE ORAL at 08:11

## 2019-11-19 RX ADMIN — SODIUM BICARBONATE 650 MG TABLET 1950 MG: at 02:11

## 2019-11-19 RX ADMIN — CARVEDILOL 25 MG: 25 TABLET, FILM COATED ORAL at 08:11

## 2019-11-19 RX ADMIN — EMTRICITABINE AND TENOFOVIR ALAFENAMIDE 1 TABLET: 200; 25 TABLET ORAL at 08:11

## 2019-11-19 RX ADMIN — FAMOTIDINE 20 MG: 20 TABLET ORAL at 08:11

## 2019-11-19 RX ADMIN — IPRATROPIUM BROMIDE AND ALBUTEROL SULFATE 3 ML: .5; 3 SOLUTION RESPIRATORY (INHALATION) at 08:11

## 2019-11-19 NOTE — PROGRESS NOTES
[facility]  Kidney Transplant  Progress Note      Reason for Follow-up: Reassessment of Kidney Transplant - 2017  (#1) recipient and management of immunosuppression.    ORGAN:  RIGHT KIDNEY   Donor Type:  Donation after Brain Death       Subjective:   History of Present Illness:  Mr. Demetrio Aguiar is a 39 y.o. year old Black or  male who received a  - brain death kidney transplant on 17.  He has CKD stage 3 - GFR 30-59 and his baseline creatinine is between 1.8-2.1. He takes prednisone and sirolimus for maintenance immunosuppression.          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   - Biopsy proven ABMR with weakly positive class I DSA's on 17. Pt treated with SMP x 3 (-9/3). PLEX x6 (completed ) and IVIG x 2 (-).    - Kidney US on 17 showed a large collection located along the anterior inferior margin of the renal transplant measuring 13.4 x 7 x 11.9. taken back to the OR for retroperitoneal bleed and washout.   -Biopsy 10/17/17 (LEFTY): 14 glomeruli, <5% fibrosis, no MC severo..  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 Amador for Kaposi's Sarcoma and has been treated with Doxorubicin x 4 doses. Has been tolerated well with improved in Tumor scottie according to oncology is note dated 2019.  In addition it also showed cardiomegaly with new evidence of pleural effusion.     -Biopsy 1/15/18: No ACR, AVR, ABMR, CD4 neg. Less than 10 % fibrosis.   -Biopsy (for DGF) 17: good sample with 24 glomeruli (only 1 sclerosed) and evidence just meeting criteria for ABMR. + diffuse ATI. Minimal fibrosis, no ACR or AVR, but + CD4 (>50% diffusely + stain) with mild glomerulitis and focal  peritubular capillaritis. Also, + ca-oxalate (3) and ca-phos (2) crystals.  -7/9/18 WEAK DSA DETECTED: CW5(9522)      PCP PROPHYLAXIS: Bactrim LIFELONG--> changed to Mepron ~ 6/2018  CMV PROPHYLAXIS: Valcyte until 11/19/17  FUNGAL PROPHYLAXIS: Nystatin until 9/15/17    He presented to the emergency room complaining of fever documented 101.6 had degrees F in the emergency room.  He was admitted to Medical Oncology secondary to fever of unknown source.  He still had fever spike last night has been covered with broad-spectrum antibiotics vancomycin and cefepime since admission and was transition to vancomycin and Zosyn today.  He has been pan culture urine culture was added today secondary to high risk.  Mr. Aguiar complains of difficulty breathing and evidence of volume overload with significant edema chest x-ray shows pleural effusion.  In addition, he also complains of decreased urinary output.  His serum creatinine slowly rising prior to admission was1.9 and today is 2.4.     Mr. Aguiar is a 39 y.o. year old male who is status post Kidney Transplant - 8/18/2017  (#1).    His maintenance immunosuppression consists of:   Immunosuppressants (From admission, onward)    Start     Stop Route Frequency Ordered    11/17/19 0900  sirolimus tablet 2 mg      -- Oral Daily 11/16/19 1546          Hospital Course:   Initially diuresed, but discussed with Hematology who thought he was intravascularly volume depleted.  Thus, IV fluids initiated.    Echocardiogram this admission showed EF 40-45%.  CVP 3.    Interval History:  Patient creatinine continues to improve. 2.8 liters of urine documented overnight. Saturating well on room air.     Past Medical, Surgical, Family, and Social History:   Unchanged from H&P.    Scheduled Meds:   albuterol-ipratropium  3 mL Nebulization Q4H    apixaban  10 mg Oral BID    atovaquone  1,500 mg Oral Daily    calcium carbonate  1 tablet Oral Daily    carvedilol  25 mg Oral BID WM    dolutegravir   50 mg Oral Daily    emtricitabine-tenofovir alafen  1 tablet Oral Daily    famotidine  20 mg Oral Daily    hydrALAZINE  25 mg Oral Q8H    NIFEdipine  60 mg Oral Q12H    predniSONE  5 mg Oral Daily    rosuvastatin  20 mg Oral Daily    sirolimus  2 mg Oral Daily    sodium bicarbonate  1,950 mg Oral TID     Continuous Infusions:  PRN Meds:acetaminophen, dextrose 10 % in water (D10W), dextrose 10 % in water (D10W), glucagon (human recombinant), glucose, glucose, oxyCODONE, sodium chloride 0.9%    Intake/Output - Last 3 Shifts       11/16 0700 - 11/17 0659 11/17 0700 - 11/18 0659 11/18 0700 - 11/19 0659    P.O. 1620 1800 1020    I.V. (mL/kg) 2259.5 (28.3) 2234.2 (27.5) 628.2 (7.7)    Other 0 0     IV Piggyback 50      Total Intake(mL/kg) 3929.5 (49.2) 4034.2 (49.7) 1648.2 (20.3)    Urine (mL/kg/hr) 450 (0.2) 2850 (1.5) 1100 (1.2)    Emesis/NG output 0 0 0    Other 0 0     Stool 0 0 0    Blood 0 0     Total Output 450 2850 1100    Net +3479.5 +1184.2 +548.2           Urine Occurrence 3 x 0 x     Stool Occurrence 4 x 2 x 0 x    Emesis Occurrence 0 x 0 x 0 x           Review of Systems   Constitutional: Negative for activity change, appetite change, chills, diaphoresis, fatigue and fever.   Respiratory: Negative for cough and shortness of breath.    Cardiovascular: Positive for leg swelling (R>L). Negative for chest pain.   Gastrointestinal: Negative for blood in stool, diarrhea, nausea and vomiting. Abdominal pain: LLQ.   Genitourinary: Negative for difficulty urinating, dysuria and frequency.   Musculoskeletal: Negative for arthralgias and myalgias.   Skin: Negative for pallor and rash.   Neurological: Negative for dizziness, light-headedness and headaches.      Objective:     Vital Signs (Most Recent):  Temp: 98.5 °F (36.9 °C) (11/18/19 1642)  Pulse: 76 (11/18/19 1642)  Resp: 18 (11/18/19 1642)  BP: (!) 165/87 (11/18/19 1642)  SpO2: 100 % (11/18/19 1642) Vital Signs (24h Range):  Temp:  [98 °F (36.7 °C)-98.9 °F  "(37.2 °C)] 98.5 °F (36.9 °C)  Pulse:  [74-79] 76  Resp:  [16-20] 18  SpO2:  [95 %-100 %] 100 %  BP: (134-165)/(81-89) 165/87     Weight: 81.2 kg (179 lb 0.2 oz)  Height: 5' 8" (172.7 cm)  Body mass index is 27.22 kg/m².    Physical Exam   Constitutional: He is oriented to person, place, and time. He appears well-developed and well-nourished. No distress.   HENT:   Head: Normocephalic and atraumatic.   Right Ear: External ear normal.   Left Ear: External ear normal.   Eyes: Pupils are equal, round, and reactive to light. Right eye exhibits no discharge. Left eye exhibits no discharge. No scleral icterus.   Neck: Normal range of motion. Neck supple.   Cardiovascular: Normal rate, regular rhythm, normal heart sounds and intact distal pulses. Exam reveals no gallop.   No murmur heard.  Pulmonary/Chest: Effort normal. No stridor. No respiratory distress. He has wheezes. He has rales.   Breathing RA, no accessory muscles of respiration used     Abdominal: Soft. Bowel sounds are normal. He exhibits no distension. There is tenderness (LLQ- mild). There is no guarding.   Musculoskeletal: He exhibits edema (RUE and RLE, pitting in the RLE- 2+). He exhibits no tenderness or deformity.   Skin lesions noted on lower extremities    Neurological: He is alert and oriented to person, place, and time.   Skin: Skin is warm and dry. He is not diaphoretic. No erythema. No pallor.   Nursing note and vitals reviewed.      Laboratory:  BMP:   Recent Labs   Lab 11/16/19  0558 11/17/19  0701 11/18/19  1022   * 118* 128*    134* 138   K 4.2 4.1 4.2    105 107   CO2 20* 23 25   BUN 30* 26* 19   CREATININE 2.5* 2.1* 1.8*   CALCIUM 8.3* 8.4* 8.7     CMP:   Recent Labs   Lab 11/16/19  0558 11/17/19  0701 11/18/19  1022   * 118* 128*   CALCIUM 8.3* 8.4* 8.7   ALBUMIN 1.7* 1.9* 2.1*   PROT 4.6* 5.1* 5.4*    134* 138   K 4.2 4.1 4.2   CO2 20* 23 25    105 107   BUN 30* 26* 19   CREATININE 2.5* 2.1* 1.8* "   ALKPHOS 66 76 85   ALT 6* 8* 27   AST 11 14 37       Diagnostic Results:  Chest X-Ray: No results found. However, due to the size of the patient record, not all encounters were searched. Please check Results Review for a complete set of results.  Echo: No results found. However, due to the size of the patient record, not all encounters were searched. Please check Results Review for a complete set of results.    Assessment/Plan:     * Pneumonia due to infectious organism  As per primary team      Chronic kidney disease, stage 3 (moderate)  Please see LEFTY on CKD      Renovascular hypertension  Cont coreg, nifedipine and hydralazine at current inpatient dose       Kaposi sarcoma  As per oncology      Acute kidney injury superimposed on chronic kidney disease stage III  LEFTY superimposed on CKD stage III [b/l creat 1.9-2.1] complicating renal transplant  - Creatinine down back to baseline at 1.7 from 2.7 on 11/15  - UO improving at 2.8 liters last 24 hours  - Follow with strict I/Os, daily weights, BP (goal <140/90), daily labs.    - Avoid nephrotoxic agents (NSAIDs, IV contrast dye, ACEI/ARB anti-HTN medications, Aminoglycoside-containing antibiotics)  - Renally dose all appropriate medications, including antibiotics      Long-term use of immunosuppressant medication  Continue sirolimus 4 mg daily  Encounter for Monitoring Immunosuppression post Transplant  Recent Labs   Lab 07/22/19  0325 08/07/19  0735 08/14/19  0842  11/17/19  0701 11/18/19  0629 11/19/19  0620   Tacrolimus Lvl <1.5 L <1.5 L <1.5 L  --   --   --   --    Sirolimus Lvl 3.2 L 6.6 3.6 L   < > 5.3 7.2 6.2    < > = values in this interval not displayed.   - Target SRL level for Demetrio is 3-4 while on chemo.  Decrease dose from 4 mg, down to 2 mg daily starting 11/17 - MMF stopped when he developed Kaposi sarcoma [KS]  - Converted from tacrolimus to sirolimus based on KS diagnosis  - Monitor for side effects and toxicities, given narrow therapeutic  window and significant risk of AE  - communications sent to transplant nephrologist     donor kidney transplant 2017  Status post  brain dead kidney transplant 2017. See LEFTY        Anemia in stage 3 chronic kidney disease  As per Hematology      HIV (human immunodeficiency virus infection)  As per transplant ID   Currently on HAART Therapy          Discharge Planning:  Today as per primary team    Kevin Weeks MD  Kidney Transplant  [facility]

## 2019-11-19 NOTE — NURSING
Pt concerned of bp of 162/91 (118) with midnight vitals. Notified Dr. Alvarado and she stated she was not concerned unless the bp was greater than 180 sbp. Md stated she will place prn orders for sbp >180. Will continue to monitor.

## 2019-11-19 NOTE — PLAN OF CARE
Patient is AAOx4, VSS, Remains afebrile, no chills. BP controlled on current regimen. Denies any pain. Stool is now formed. UOP adequate, clear yellow. This am aPTT 49.4. Heparin drip discontinued. Coumadin discontinued. New order for Eliquis BID to start tonight. IVF discontinued. Patient ambulated in benito x3, up to chair for hours. C/o not having much appetite but is drinking supplements, encouraged pt to increase protein intake. Sputum sample sent this am completed one requisition but both could not be obtained from a single sample. Lab cancelled the pneuomcystis, requisition was re-ordered. Still need to collect sputum, supplies in room. Pt aware. Need urine sample in am for protein/creatine. Supplies in room

## 2019-11-19 NOTE — PLAN OF CARE
Plans for the patient to discharge home today. Medications to be delivered to the patient's bedside prior to discharge. Echo scheduled for 11/22/19, Cardiology follow-up scheduled for 12/2/19, and Hematology Oncology follow-up with repeat labs scheduled for 11/27/19. All follow-up and tests added to the patient's AVS. CM requested a follow-up appointment with Kidney Transplant Service, appointment pending scheduling by the clinic. Discharge and follow-up instructions to be completed by the bedside nurse.    Future Appointments   Date Time Provider Department Center   11/22/2019  2:30 PM ECHO, [facility] ECHOLAB M[facility]   11/27/2019  8:50 AM [facility] LAB HO [facility]   11/27/2019 10:00 AM Matthieu Amador MD [facility] HEM ONC [facility]   12/2/2019  1:30 PM Hector Mejias MD [facility] CARDIO [facility]        11/19/19 1429   Final Note   Assessment Type Final Discharge Note   Anticipated Discharge Disposition Home   What phone number can be called within the next 1-3 days to see how you are doing after discharge?   ([phone])   Hospital Follow Up  Appt(s) scheduled? Yes   Discharge plans and expectations educations in teach back method with documentation complete? Yes  (per the bedside nurse)

## 2019-11-19 NOTE — PLAN OF CARE
AAOx3, afebrile, w/o c/o pain. Heparin gtt d/c yesterday. 1st dose of eliquis given. Sputum sample sent. Fluids d/c yesterday since cr was 1.8- encouraging fluids. Protein/cr urine sample will be collected in the morning. See previous note regarding elevated bp. Pt able to position self independently.  Pt in lowest position, side rails up x2, non-skid foot wear in place, call light within reach, pt verbalized understanding to call RN when needed.  Hand hygiene practiced per protocol.  Will continue to monitor.

## 2019-11-19 NOTE — TELEPHONE ENCOUNTER
----- Message from Nessa Garcia RN sent at 11/19/2019 12:13 PM CST -----  Patient seen inpatient by Dr. Weeks and Dr. Hines. Patient is discharging home today and needs follow-up in the clinic with someone from this team in 1-2 weeks please. Thank you!

## 2019-11-19 NOTE — ASSESSMENT & PLAN NOTE
Continue sirolimus 4 mg daily  Encounter for Monitoring Immunosuppression post Transplant  Recent Labs   Lab 07/22/19  0325 08/07/19  0735 08/14/19  0842  11/17/19  0701 11/18/19  0629 11/19/19  0620   Tacrolimus Lvl <1.5 L <1.5 L <1.5 L  --   --   --   --    Sirolimus Lvl 3.2 L 6.6 3.6 L   < > 5.3 7.2 6.2    < > = values in this interval not displayed.   - Target SRL level for Demetrio is 3-4 while on chemo.  Decrease dose from 4 mg, down to 2 mg daily starting 11/17 - MMF stopped when he developed Kaposi sarcoma [KS]  - Converted from tacrolimus to sirolimus based on KS diagnosis  - Monitor for side effects and toxicities, given narrow therapeutic window and significant risk of AE  - communications sent to transplant nephrologist

## 2019-11-19 NOTE — SUBJECTIVE & OBJECTIVE
Interval History: NAEON. Patient doing well today. Was transitoned to apixaban yesterday. Possible discharge today. LEFTY improved more     Oncology Treatment Plan:   OP SARCOMA DOXORUBICIN LIPOSOMAL Q3W    Medications:  Continuous Infusions:  Scheduled Meds:   albuterol-ipratropium  3 mL Nebulization Q4H    apixaban  10 mg Oral BID    atovaquone  1,500 mg Oral Daily    calcium carbonate  1 tablet Oral Daily    carvedilol  25 mg Oral BID WM    dolutegravir  50 mg Oral Daily    emtricitabine-tenofovir alafen  1 tablet Oral Daily    famotidine  20 mg Oral Daily    hydrALAZINE  25 mg Oral Q8H    NIFEdipine  60 mg Oral Q12H    predniSONE  5 mg Oral Daily    rosuvastatin  20 mg Oral Daily    sirolimus  2 mg Oral Daily    sodium bicarbonate  1,950 mg Oral TID     PRN Meds:acetaminophen, dextrose 10 % in water (D10W), dextrose 10 % in water (D10W), glucagon (human recombinant), glucose, glucose, hydrALAZINE, oxyCODONE, sodium chloride 0.9%     Review of Systems   Constitutional: Negative for activity change, appetite change, chills, diaphoresis, fatigue and fever.   Respiratory: Negative for cough and shortness of breath.    Cardiovascular: Positive for leg swelling (R>L). Negative for chest pain.   Gastrointestinal: Negative for blood in stool, diarrhea, nausea and vomiting. Abdominal pain: LLQ.   Genitourinary: Negative for difficulty urinating, dysuria and frequency.   Musculoskeletal: Negative for arthralgias and myalgias.   Skin: Negative for pallor and rash.   Neurological: Negative for dizziness, light-headedness and headaches.     Objective:     Vital Signs (Most Recent):  Temp: 98.6 °F (37 °C) (11/19/19 0444)  Pulse: 72 (11/19/19 0444)  Resp: 18 (11/19/19 0444)  BP: (!) 141/86 (11/19/19 0444)  SpO2: 99 % (11/19/19 0444) Vital Signs (24h Range):  Temp:  [97.8 °F (36.6 °C)-98.6 °F (37 °C)] 98.6 °F (37 °C)  Pulse:  [70-92] 72  Resp:  [16-18] 18  SpO2:  [96 %-100 %] 99 %  BP: (134-165)/(83-91) 141/86      Weight: 81.8 kg (180 lb 5.4 oz)  Body mass index is 27.42 kg/m².  Body surface area is 1.98 meters squared.      Intake/Output Summary (Last 24 hours) at 11/19/2019 0811  Last data filed at 11/19/2019 0400  Gross per 24 hour   Intake 2213 ml   Output 2350 ml   Net -137 ml       Physical Exam   Constitutional: He is oriented to person, place, and time. He appears well-developed and well-nourished. No distress.   HENT:   Head: Normocephalic and atraumatic.   Right Ear: External ear normal.   Left Ear: External ear normal.   Eyes: Pupils are equal, round, and reactive to light. Right eye exhibits no discharge. Left eye exhibits no discharge. No scleral icterus.   Neck: Normal range of motion. Neck supple.   Cardiovascular: Normal rate, regular rhythm, normal heart sounds and intact distal pulses. Exam reveals no gallop.   No murmur heard.  Pulmonary/Chest: Effort normal. No stridor. No respiratory distress. He has wheezes. He has rales.   Breathing RA, no accessory muscles of respiration used     Abdominal: Soft. Bowel sounds are normal. He exhibits no distension. There is tenderness (LLQ- mild). There is no guarding.   Musculoskeletal: He exhibits edema (RUE and RLE, pitting in the RLE- 2+). He exhibits no tenderness or deformity.   Skin lesions noted on lower extremities    Neurological: He is alert and oriented to person, place, and time.   Skin: Skin is warm and dry. He is not diaphoretic. No erythema. No pallor.   Nursing note and vitals reviewed.      Significant Labs:   Recent Results (from the past 48 hour(s))   Protime-INR    Collection Time: 11/17/19  5:09 PM   Result Value Ref Range    Prothrombin Time 9.5 9.0 - 12.5 sec    INR 0.9 0.8 - 1.2   Sirolimus level    Collection Time: 11/18/19  6:29 AM   Result Value Ref Range    Sirolimus Lvl 7.2 4.0 - 20.0 ng/mL   APTT    Collection Time: 11/18/19  6:29 AM   Result Value Ref Range    aPTT 49.4 (H) 21.0 - 32.0 sec   Protime-INR    Collection Time: 11/18/19   6:29 AM   Result Value Ref Range    Prothrombin Time 9.4 9.0 - 12.5 sec    INR 0.9 0.8 - 1.2   Comprehensive metabolic panel    Collection Time: 11/18/19 10:22 AM   Result Value Ref Range    Sodium 138 136 - 145 mmol/L    Potassium 4.2 3.5 - 5.1 mmol/L    Chloride 107 95 - 110 mmol/L    CO2 25 23 - 29 mmol/L    Glucose 128 (H) 70 - 110 mg/dL    BUN, Bld 19 6 - 20 mg/dL    Creatinine 1.8 (H) 0.5 - 1.4 mg/dL    Calcium 8.7 8.7 - 10.5 mg/dL    Total Protein 5.4 (L) 6.0 - 8.4 g/dL    Albumin 2.1 (L) 3.5 - 5.2 g/dL    Total Bilirubin 0.2 0.1 - 1.0 mg/dL    Alkaline Phosphatase 85 55 - 135 U/L    AST 37 10 - 40 U/L    ALT 27 10 - 44 U/L    Anion Gap 6 (L) 8 - 16 mmol/L    eGFR if African American 53.2 (A) >60 mL/min/1.73 m^2    eGFR if non  46.1 (A) >60 mL/min/1.73 m^2   CBC Oncology    Collection Time: 11/18/19 10:22 AM   Result Value Ref Range    WBC 4.75 3.90 - 12.70 K/uL    RBC 3.30 (L) 4.60 - 6.20 M/uL    Hemoglobin 8.0 (L) 14.0 - 18.0 g/dL    Hematocrit 26.5 (L) 40.0 - 54.0 %    Mean Corpuscular Volume 80 (L) 82 - 98 fL    Mean Corpuscular Hemoglobin 24.2 (L) 27.0 - 31.0 pg    Mean Corpuscular Hemoglobin Conc 30.2 (L) 32.0 - 36.0 g/dL    RDW 16.7 (H) 11.5 - 14.5 %    Platelets 180 150 - 350 K/uL    MPV 10.4 9.2 - 12.9 fL    Gran # (ANC) 3.4 1.8 - 7.7 K/uL    Immature Grans (Abs) 0.13 (H) 0.00 - 0.04 K/uL   Pneumocystis jiroveii by PCR    Collection Time: 11/18/19  8:37 PM   Result Value Ref Range    Pneumocystis Source 1    Protein / creatinine ratio, urine    Collection Time: 11/19/19  4:39 AM   Result Value Ref Range    Protein, Urine Random 11 0 - 15 mg/dL    Creatinine, Random Ur 25.0 23.0 - 375.0 mg/dL    Prot/Creat Ratio, Ur 0.44 (H) 0.00 - 0.20   Protime-INR    Collection Time: 11/19/19  6:20 AM   Result Value Ref Range    Prothrombin Time 9.7 9.0 - 12.5 sec    INR 0.9 0.8 - 1.2   Comprehensive metabolic panel    Collection Time: 11/19/19  6:20 AM   Result Value Ref Range    Sodium 141 136 -  145 mmol/L    Potassium 4.5 3.5 - 5.1 mmol/L    Chloride 109 95 - 110 mmol/L    CO2 28 23 - 29 mmol/L    Glucose 101 70 - 110 mg/dL    BUN, Bld 18 6 - 20 mg/dL    Creatinine 1.7 (H) 0.5 - 1.4 mg/dL    Calcium 8.8 8.7 - 10.5 mg/dL    Total Protein 5.2 (L) 6.0 - 8.4 g/dL    Albumin 2.2 (L) 3.5 - 5.2 g/dL    Total Bilirubin 0.2 0.1 - 1.0 mg/dL    Alkaline Phosphatase 84 55 - 135 U/L    AST 46 (H) 10 - 40 U/L    ALT 37 10 - 44 U/L    Anion Gap 4 (L) 8 - 16 mmol/L    eGFR if African American 57.0 (A) >60 mL/min/1.73 m^2    eGFR if non  49.3 (A) >60 mL/min/1.73 m^2   ;

## 2019-11-19 NOTE — PLAN OF CARE
Future Appointments   Date Time Provider Department Center   11/22/2019  2:30 PM ECHO, [facility] ECHOLAB Manny Reddy   11/27/2019  8:50 AM LAB, HEMON CANCER DG [facility] LAB HO Patel Zev   11/27/2019 10:00 AM Matthieu Amador MD [facility] HEM ONC Jorge Brothers   12/2/2019  1:30 PM Hector Mejias MD [facility], BSN, CM  [facility]  Nurse - Med Onc/Gyn Onc

## 2019-11-19 NOTE — PLAN OF CARE
CARLOS sent a message to Dr. Amador's clinic and requested a echo to be scheduled end of this week vs early next week. CARLOS also requested a one week clinic follow-up with the MD with repeat CBC and CMP.    CARLOS sent a message to Dr. Weeks's and Dr. Hines's clinic and requested KTS follow-up in 1-2 weeks.    Nessa Garcia, RN, BSN, CM [facility]  Nurse - Med Onc/Gyn Onc     Patient called. States he had tattoo on Saturday from same person that normally does his tattoos. No redness or warmth to area but it smells like \"feet\".     Informed him he should go to walk in to have it evaluated. Verbalized understanding agrees with plan of care.

## 2019-11-19 NOTE — SUBJECTIVE & OBJECTIVE
Subjective:   History of Present Illness:  Mr. Demetrio Aguiar is a 39 y.o. year old Black or  male who received a  - brain death kidney transplant on 17.  He has CKD stage 3 - GFR 30-59 and his baseline creatinine is between 1.8-2.1. He takes prednisone and sirolimus for maintenance immunosuppression.          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   - Biopsy proven ABMR with weakly positive class I DSA's on 17. Pt treated with SMP x 3 (-9/3). PLEX x6 (completed ) and IVIG x 2 (-).    - Kidney US on 17 showed a large collection located along the anterior inferior margin of the renal transplant measuring 13.4 x 7 x 11.9. taken back to the OR for retroperitoneal bleed and washout.   -Biopsy 10/17/17 (LEFTY): 14 glomeruli, <5% fibrosis, no MC severo..  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 Amador for Kaposi's Sarcoma and has been treated with Doxorubicin x 4 doses. Has been tolerated well with improved in Tumor scottie according to oncology is note dated 2019.  In addition it also showed cardiomegaly with new evidence of pleural effusion.     -Biopsy 1/15/18: No ACR, AVR, ABMR, CD4 neg. Less than 10 % fibrosis.   -Biopsy (for DGF) 17: good sample with 24 glomeruli (only 1 sclerosed) and evidence just meeting criteria for ABMR. + diffuse ATI. Minimal fibrosis, no ACR or AVR, but + CD4 (>50% diffusely + stain) with mild glomerulitis and focal peritubular capillaritis. Also, + ca-oxalate (3) and ca-phos (2) crystals.  -18 WEAK DSA DETECTED: CW5(4442)      PCP PROPHYLAXIS: Bactrim LIFELONG--> changed to Mepron ~ 2018  CMV PROPHYLAXIS: Valcyte until 17  FUNGAL PROPHYLAXIS: Nystatin until  9/15/17    He presented to the emergency room complaining of fever documented 101.6 had degrees F in the emergency room.  He was admitted to Medical Oncology secondary to fever of unknown source.  He still had fever spike last night has been covered with broad-spectrum antibiotics vancomycin and cefepime since admission and was transition to vancomycin and Zosyn today.  He has been pan culture urine culture was added today secondary to high risk.  Mr. Aguiar complains of difficulty breathing and evidence of volume overload with significant edema chest x-ray shows pleural effusion.  In addition, he also complains of decreased urinary output.  His serum creatinine slowly rising prior to admission was1.9 and today is 2.4.     Mr. Aguiar is a 39 y.o. year old male who is status post Kidney Transplant - 8/18/2017  (#1).    His maintenance immunosuppression consists of:   Immunosuppressants (From admission, onward)    Start     Stop Route Frequency Ordered    11/17/19 0900  sirolimus tablet 2 mg      -- Oral Daily 11/16/19 1546          Hospital Course:   Initially diuresed, but discussed with Hematology who thought he was intravascularly volume depleted.  Thus, IV fluids initiated.    Echocardiogram this admission showed EF 40-45%.  CVP 3.    Interval History:  Patient creatinine continues to improve. 2.8 liters of urine documented overnight. Saturating well on room air.     Past Medical, Surgical, Family, and Social History:   Unchanged from H&P.    Scheduled Meds:   albuterol-ipratropium  3 mL Nebulization Q4H    apixaban  10 mg Oral BID    atovaquone  1,500 mg Oral Daily    calcium carbonate  1 tablet Oral Daily    carvedilol  25 mg Oral BID WM    dolutegravir  50 mg Oral Daily    emtricitabine-tenofovir alafen  1 tablet Oral Daily    famotidine  20 mg Oral Daily    hydrALAZINE  25 mg Oral Q8H    NIFEdipine  60 mg Oral Q12H    predniSONE  5 mg Oral Daily    rosuvastatin  20 mg Oral Daily    sirolimus  2 mg  "Oral Daily    sodium bicarbonate  1,950 mg Oral TID     Continuous Infusions:  PRN Meds:acetaminophen, dextrose 10 % in water (D10W), dextrose 10 % in water (D10W), glucagon (human recombinant), glucose, glucose, oxyCODONE, sodium chloride 0.9%    Intake/Output - Last 3 Shifts       11/16 0700 - 11/17 0659 11/17 0700 - 11/18 0659 11/18 0700 - 11/19 0659    P.O. 1620 1800 1020    I.V. (mL/kg) 2259.5 (28.3) 2234.2 (27.5) 628.2 (7.7)    Other 0 0     IV Piggyback 50      Total Intake(mL/kg) 3929.5 (49.2) 4034.2 (49.7) 1648.2 (20.3)    Urine (mL/kg/hr) 450 (0.2) 2850 (1.5) 1100 (1.2)    Emesis/NG output 0 0 0    Other 0 0     Stool 0 0 0    Blood 0 0     Total Output 450 2850 1100    Net +3479.5 +1184.2 +548.2           Urine Occurrence 3 x 0 x     Stool Occurrence 4 x 2 x 0 x    Emesis Occurrence 0 x 0 x 0 x           Review of Systems   Constitutional: Negative for activity change, appetite change, chills, diaphoresis, fatigue and fever.   Respiratory: Negative for cough and shortness of breath.    Cardiovascular: Positive for leg swelling (R>L). Negative for chest pain.   Gastrointestinal: Negative for blood in stool, diarrhea, nausea and vomiting. Abdominal pain: LLQ.   Genitourinary: Negative for difficulty urinating, dysuria and frequency.   Musculoskeletal: Negative for arthralgias and myalgias.   Skin: Negative for pallor and rash.   Neurological: Negative for dizziness, light-headedness and headaches.      Objective:     Vital Signs (Most Recent):  Temp: 98.5 °F (36.9 °C) (11/18/19 1642)  Pulse: 76 (11/18/19 1642)  Resp: 18 (11/18/19 1642)  BP: (!) 165/87 (11/18/19 1642)  SpO2: 100 % (11/18/19 1642) Vital Signs (24h Range):  Temp:  [98 °F (36.7 °C)-98.9 °F (37.2 °C)] 98.5 °F (36.9 °C)  Pulse:  [74-79] 76  Resp:  [16-20] 18  SpO2:  [95 %-100 %] 100 %  BP: (134-165)/(81-89) 165/87     Weight: 81.2 kg (179 lb 0.2 oz)  Height: 5' 8" (172.7 cm)  Body mass index is 27.22 kg/m².    Physical Exam   Constitutional: He " is oriented to person, place, and time. He appears well-developed and well-nourished. No distress.   HENT:   Head: Normocephalic and atraumatic.   Right Ear: External ear normal.   Left Ear: External ear normal.   Eyes: Pupils are equal, round, and reactive to light. Right eye exhibits no discharge. Left eye exhibits no discharge. No scleral icterus.   Neck: Normal range of motion. Neck supple.   Cardiovascular: Normal rate, regular rhythm, normal heart sounds and intact distal pulses. Exam reveals no gallop.   No murmur heard.  Pulmonary/Chest: Effort normal. No stridor. No respiratory distress. He has wheezes. He has rales.   Breathing RA, no accessory muscles of respiration used     Abdominal: Soft. Bowel sounds are normal. He exhibits no distension. There is tenderness (LLQ- mild). There is no guarding.   Musculoskeletal: He exhibits edema (RUE and RLE, pitting in the RLE- 2+). He exhibits no tenderness or deformity.   Skin lesions noted on lower extremities    Neurological: He is alert and oriented to person, place, and time.   Skin: Skin is warm and dry. He is not diaphoretic. No erythema. No pallor.   Nursing note and vitals reviewed.      Laboratory:  BMP:   Recent Labs   Lab 11/16/19  0558 11/17/19  0701 11/18/19  1022   * 118* 128*    134* 138   K 4.2 4.1 4.2    105 107   CO2 20* 23 25   BUN 30* 26* 19   CREATININE 2.5* 2.1* 1.8*   CALCIUM 8.3* 8.4* 8.7     CMP:   Recent Labs   Lab 11/16/19  0558 11/17/19  0701 11/18/19  1022   * 118* 128*   CALCIUM 8.3* 8.4* 8.7   ALBUMIN 1.7* 1.9* 2.1*   PROT 4.6* 5.1* 5.4*    134* 138   K 4.2 4.1 4.2   CO2 20* 23 25    105 107   BUN 30* 26* 19   CREATININE 2.5* 2.1* 1.8*   ALKPHOS 66 76 85   ALT 6* 8* 27   AST 11 14 37       Diagnostic Results:  Chest X-Ray: No results found. However, due to the size of the patient record, not all encounters were searched. Please check Results Review for a complete set of results.  Echo: No results  found. However, due to the size of the patient record, not all encounters were searched. Please check Results Review for a complete set of results.

## 2019-11-19 NOTE — PROGRESS NOTES
[facility]  Hematology/Oncology  Progress Note    Patient Name: Demetrio Aguiar  Admission Date: 11/12/2019  Hospital Length of Stay: 7 days  Code Status: Full Code     Subjective:     HPI:    Oncologic Hx from chart:    Demetrio Aguiar is a 39 y.o. male with PMHx of HIV on ART, Kaposi sarcoma, ESRD previous on iHD s/p transplant with hx of rejection (on chronic immunosuppression), anemia of chronic disease, latent TB s/p treatment, HTN and HLD.     He is s/p 4 cycles of doxo for symptomatic metastatic kaposi.     He tolerated the first  4 cycles well without obvious complaints. RLE swelling has decreased and he has improved QOL.     PET scan in October shows decrease in tumor, LAD bulk though it also shows new cardiomegaly and effusion, which is somewhat concerning.     HPI:  Patient is presenting with complaints of fevers, chills, dyspnea and cough. He denies any diarrhea, abdominal pain or dysuria. Vital signs in the ER revealed fever, tachycardia and tachypnea. CBC revealed no neutropenia. CMP revealed stable renal function. CXR with opacity in right mid to inferior hemithorax potentially related to pneumonia and pleural effusion. He received 1 liter bolus and IV Vancomycin and Cefepime.        Interval History: NAEON. Patient doing well today. Was transitoned to apixaban yesterday. Possible discharge today. LEFTY improved more     Oncology Treatment Plan:   OP SARCOMA DOXORUBICIN LIPOSOMAL Q3W    Medications:  Continuous Infusions:  Scheduled Meds:   albuterol-ipratropium  3 mL Nebulization Q4H    apixaban  10 mg Oral BID    atovaquone  1,500 mg Oral Daily    calcium carbonate  1 tablet Oral Daily    carvedilol  25 mg Oral BID WM    dolutegravir  50 mg Oral Daily    emtricitabine-tenofovir alafen  1 tablet Oral Daily    famotidine  20 mg Oral Daily    hydrALAZINE  25 mg Oral Q8H    NIFEdipine  60 mg Oral Q12H    predniSONE  5 mg Oral Daily    rosuvastatin  20 mg Oral Daily     sirolimus  2 mg Oral Daily    sodium bicarbonate  1,950 mg Oral TID     PRN Meds:acetaminophen, dextrose 10 % in water (D10W), dextrose 10 % in water (D10W), glucagon (human recombinant), glucose, glucose, hydrALAZINE, oxyCODONE, sodium chloride 0.9%     Review of Systems   Constitutional: Negative for activity change, appetite change, chills, diaphoresis, fatigue and fever.   Respiratory: Negative for cough and shortness of breath.    Cardiovascular: Positive for leg swelling (R>L). Negative for chest pain.   Gastrointestinal: Negative for blood in stool, diarrhea, nausea and vomiting. Abdominal pain: LLQ.   Genitourinary: Negative for difficulty urinating, dysuria and frequency.   Musculoskeletal: Negative for arthralgias and myalgias.   Skin: Negative for pallor and rash.   Neurological: Negative for dizziness, light-headedness and headaches.     Objective:     Vital Signs (Most Recent):  Temp: 98.6 °F (37 °C) (11/19/19 0444)  Pulse: 72 (11/19/19 0444)  Resp: 18 (11/19/19 0444)  BP: (!) 141/86 (11/19/19 0444)  SpO2: 99 % (11/19/19 0444) Vital Signs (24h Range):  Temp:  [97.8 °F (36.6 °C)-98.6 °F (37 °C)] 98.6 °F (37 °C)  Pulse:  [70-92] 72  Resp:  [16-18] 18  SpO2:  [96 %-100 %] 99 %  BP: (134-165)/(83-91) 141/86     Weight: 81.8 kg (180 lb 5.4 oz)  Body mass index is 27.42 kg/m².  Body surface area is 1.98 meters squared.      Intake/Output Summary (Last 24 hours) at 11/19/2019 0811  Last data filed at 11/19/2019 0400  Gross per 24 hour   Intake 2213 ml   Output 2350 ml   Net -137 ml       Physical Exam   Constitutional: He is oriented to person, place, and time. He appears well-developed and well-nourished. No distress.   HENT:   Head: Normocephalic and atraumatic.   Right Ear: External ear normal.   Left Ear: External ear normal.   Eyes: Pupils are equal, round, and reactive to light. Right eye exhibits no discharge. Left eye exhibits no discharge. No scleral icterus.   Neck: Normal range of motion. Neck  supple.   Cardiovascular: Normal rate, regular rhythm, normal heart sounds and intact distal pulses. Exam reveals no gallop.   No murmur heard.  Pulmonary/Chest: Effort normal. No stridor. No respiratory distress. He has wheezes. He has rales.   Breathing RA, no accessory muscles of respiration used     Abdominal: Soft. Bowel sounds are normal. He exhibits no distension. There is tenderness (LLQ- mild). There is no guarding.   Musculoskeletal: He exhibits edema (RUE and RLE, pitting in the RLE- 2+). He exhibits no tenderness or deformity.   Skin lesions noted on lower extremities    Neurological: He is alert and oriented to person, place, and time.   Skin: Skin is warm and dry. He is not diaphoretic. No erythema. No pallor.   Nursing note and vitals reviewed.      Significant Labs:   Recent Results (from the past 48 hour(s))   Protime-INR    Collection Time: 11/17/19  5:09 PM   Result Value Ref Range    Prothrombin Time 9.5 9.0 - 12.5 sec    INR 0.9 0.8 - 1.2   Sirolimus level    Collection Time: 11/18/19  6:29 AM   Result Value Ref Range    Sirolimus Lvl 7.2 4.0 - 20.0 ng/mL   APTT    Collection Time: 11/18/19  6:29 AM   Result Value Ref Range    aPTT 49.4 (H) 21.0 - 32.0 sec   Protime-INR    Collection Time: 11/18/19  6:29 AM   Result Value Ref Range    Prothrombin Time 9.4 9.0 - 12.5 sec    INR 0.9 0.8 - 1.2   Comprehensive metabolic panel    Collection Time: 11/18/19 10:22 AM   Result Value Ref Range    Sodium 138 136 - 145 mmol/L    Potassium 4.2 3.5 - 5.1 mmol/L    Chloride 107 95 - 110 mmol/L    CO2 25 23 - 29 mmol/L    Glucose 128 (H) 70 - 110 mg/dL    BUN, Bld 19 6 - 20 mg/dL    Creatinine 1.8 (H) 0.5 - 1.4 mg/dL    Calcium 8.7 8.7 - 10.5 mg/dL    Total Protein 5.4 (L) 6.0 - 8.4 g/dL    Albumin 2.1 (L) 3.5 - 5.2 g/dL    Total Bilirubin 0.2 0.1 - 1.0 mg/dL    Alkaline Phosphatase 85 55 - 135 U/L    AST 37 10 - 40 U/L    ALT 27 10 - 44 U/L    Anion Gap 6 (L) 8 - 16 mmol/L    eGFR if African American 53.2 (A)  >60 mL/min/1.73 m^2    eGFR if non  46.1 (A) >60 mL/min/1.73 m^2   CBC Oncology    Collection Time: 11/18/19 10:22 AM   Result Value Ref Range    WBC 4.75 3.90 - 12.70 K/uL    RBC 3.30 (L) 4.60 - 6.20 M/uL    Hemoglobin 8.0 (L) 14.0 - 18.0 g/dL    Hematocrit 26.5 (L) 40.0 - 54.0 %    Mean Corpuscular Volume 80 (L) 82 - 98 fL    Mean Corpuscular Hemoglobin 24.2 (L) 27.0 - 31.0 pg    Mean Corpuscular Hemoglobin Conc 30.2 (L) 32.0 - 36.0 g/dL    RDW 16.7 (H) 11.5 - 14.5 %    Platelets 180 150 - 350 K/uL    MPV 10.4 9.2 - 12.9 fL    Gran # (ANC) 3.4 1.8 - 7.7 K/uL    Immature Grans (Abs) 0.13 (H) 0.00 - 0.04 K/uL   Pneumocystis jiroveii by PCR    Collection Time: 11/18/19  8:37 PM   Result Value Ref Range    Pneumocystis Source 1    Protein / creatinine ratio, urine    Collection Time: 11/19/19  4:39 AM   Result Value Ref Range    Protein, Urine Random 11 0 - 15 mg/dL    Creatinine, Random Ur 25.0 23.0 - 375.0 mg/dL    Prot/Creat Ratio, Ur 0.44 (H) 0.00 - 0.20   Protime-INR    Collection Time: 11/19/19  6:20 AM   Result Value Ref Range    Prothrombin Time 9.7 9.0 - 12.5 sec    INR 0.9 0.8 - 1.2   Comprehensive metabolic panel    Collection Time: 11/19/19  6:20 AM   Result Value Ref Range    Sodium 141 136 - 145 mmol/L    Potassium 4.5 3.5 - 5.1 mmol/L    Chloride 109 95 - 110 mmol/L    CO2 28 23 - 29 mmol/L    Glucose 101 70 - 110 mg/dL    BUN, Bld 18 6 - 20 mg/dL    Creatinine 1.7 (H) 0.5 - 1.4 mg/dL    Calcium 8.8 8.7 - 10.5 mg/dL    Total Protein 5.2 (L) 6.0 - 8.4 g/dL    Albumin 2.2 (L) 3.5 - 5.2 g/dL    Total Bilirubin 0.2 0.1 - 1.0 mg/dL    Alkaline Phosphatase 84 55 - 135 U/L    AST 46 (H) 10 - 40 U/L    ALT 37 10 - 44 U/L    Anion Gap 4 (L) 8 - 16 mmol/L    eGFR if African American 57.0 (A) >60 mL/min/1.73 m^2    eGFR if non  49.3 (A) >60 mL/min/1.73 m^2   ;        Assessment/Plan:     * Pneumonia due to infectious organism  - Off oxygen   - Repeat Blood Cx's- NGTD, continue with  ceftriaxone  - Repeat CXR on 11/15 showing improving right lower lobe PNA     Acute DVT (deep venous thrombosis)  - DVT of the right subclavian vein noted on US of the UE  -Switched to apixaban       Chronic kidney disease, stage 3 (moderate)  See LEFTY       Sepsis  Patient presented with fever, tachycardia and tachypnea   CXR with likely right mid and lower lobe pneumonia     - See PNA due to infectious organism       Renovascular hypertension  - Continue home nifedipine  - Coreg home dose of 12.5 BID increased to 25 mg BID for goal BP <140/90 per KTM on   - Not at goal on , added Hydralazine 25 mg TID- will touch base with KTM to see if they have additional recs     Kaposi sarcoma  HIV related   Currently on HAART and Adriamycin Q3W for symptomatic metastatic KS  He was due for Adriamycin however this is going to be postponed given acute illness     Acute kidney injury superimposed on chronic kidney disease stage III  Continue IVF's at 75 cc's/hr  Resolved as of - back at baseline Cr of 2.1     Immunosuppression  Due to HIV infection, anti rejection meds and chemotherapy   C/w Atovaquone for PCP PPx, PCP ppx needs to be lifelong per ID       donor kidney transplant 2017  C/w Home Sirolimus and Prednisone at 5 mg daily   Monitor Sirolimus level   Monitor CMP      Anemia in stage 3 chronic kidney disease  H/H stable, monitor CBC    HIV (human immunodeficiency virus infection)  Controlled, last viral load was undetectable   Will continue with home Tivicay and Descovy (Descovy is non formulary however this gentleman with renal dysfunction is unable to tolerate Truvada which the formulary alternative).   CD4 count at 65, appreciate ID consult     Per ID:      - would continue CTX for now for CAP -- plan 5 day total antibiotics course through    - CT without significant effusion for tap -- no further action needed  - small pericardial effusion noted on echo -- additional management  per primary team  - would continue lifelong PJP ppx per protocol - would consider rechallenging tmp/smx as is drug of choice  - continue current home HAART regimen    Hyperlipidemia  C/w Crestor             Tacho Cline MD  Hematology/Oncology  [facility]      ATTENDING NOTE, ONCOLOGY INPATIENT TEAM    As above; events of last 24 hours noted.  Patient seen and examined, chart reviewed.  Appears comfortable, in NAD.  Lungs are clear to auscultation.  Abdomen is soft, nontender.  Labs reviewed.  Creatinine is 1.7 mg/dl.  His Hg is 6.9 gr/dl.    PLAN  We will discharge home today.  He will remain on Eliquis and see Dr. Amador in 8 days with a repeat echocardiogram.  .[facility] nephrology Service to advise re: the dose of sirolimus.      Santi Kennedy MD

## 2019-11-19 NOTE — DISCHARGE SUMMARY
[facility]  Hematology/Oncology  Discharge Summary      Patient Name: Demetrio Aguiar   MRN: [id number]  Admission Date: 11/12/2019  Hospital Length of Stay: 7 days  Discharge Date and Time:  11/19/2019 2:16 PM  Attending Physician: Santi Kennedy MD   Discharging Provider: Tacho Cline MD  Primary Care Provider: Primary Doctor No    HPI:   Oncologic Hx from chart:    Demetrio Aguiar is a 39 y.o. male with PMHx of HIV on ART, Kaposi sarcoma, ESRD previous on iHD s/p transplant with hx of rejection (on chronic immunosuppression), anemia of chronic disease, latent TB s/p treatment, HTN and HLD.     He is s/p 4 cycles of doxo for symptomatic metastatic kaposi.     He tolerated the first  4 cycles well without obvious complaints. RLE swelling has decreased and he has improved QOL.     PET scan in October shows decrease in tumor, LAD bulk though it also shows new cardiomegaly and effusion, which is somewhat concerning.     HPI:  Patient is presenting with complaints of fevers, chills, dyspnea and cough. He denies any diarrhea, abdominal pain or dysuria. Vital signs in the ER revealed fever, tachycardia and tachypnea. CBC revealed no neutropenia. CMP revealed stable renal function. CXR with opacity in right mid to inferior hemithorax potentially related to pneumonia and pleural effusion. He received 1 liter bolus and IV Vancomycin and Cefepime.        * No surgery found *     Hospital Course: Patient admitted on 11/12 with suspected pneumonia. He was treated for a total course of 5 days for CAP. Patient doing significantly better and was afebrile most of his stay. He had an LEFTY but it resolved with fluids. Today on discharge patient is back to his baseline. While inpatient patient had a DVT. Being discharged on apixaban. Was seen by KTM while inpatient and they adjusted his sirolimus and blood pressure medicines    Consults:   Consults (From admission, onward)        Status Ordering Provider      Inpatient consult to Hematology  Once     Provider:  (Not yet assigned)    Completed DAVON VELÁZQUEZ     Inpatient consult to Infectious Diseases  Once     Provider:  (Not yet assigned)    Completed DAVON VELÁZQUEZ     Inpatient consult to Kidney/Pancreas Transplant Medicine  Once     Provider:  (Not yet assigned)    Completed JODI ALEXIS     Inpatient consult to PICC team ([facility]ospit[facility] in [place])  Once     Provider:  (Not yet assigned)    Completed MICAH WHIPPLE     Inpatient consult to Pulmonology  Once     Provider:  (Not yet assigned)    Completed JODI ALEXIS     Inpatient consult to Registered Dietitian/Nutritionist  Once     Provider:  (Not yet assigned)    Completed [address] Diagnostic Studies:   Recent Results (from the past 48 hour(s))   Protime-INR    Collection Time: 11/17/19  5:09 PM   Result Value Ref Range    Prothrombin Time 9.5 9.0 - 12.5 sec    INR 0.9 0.8 - 1.2   Sirolimus level    Collection Time: 11/18/19  6:29 AM   Result Value Ref Range    Sirolimus Lvl 7.2 4.0 - 20.0 ng/mL   APTT    Collection Time: 11/18/19  6:29 AM   Result Value Ref Range    aPTT 49.4 (H) 21.0 - 32.0 sec   Protime-INR    Collection Time: 11/18/19  6:29 AM   Result Value Ref Range    Prothrombin Time 9.4 9.0 - 12.5 sec    INR 0.9 0.8 - 1.2   Comprehensive metabolic panel    Collection Time: 11/18/19 10:22 AM   Result Value Ref Range    Sodium 138 136 - 145 mmol/L    Potassium 4.2 3.5 - 5.1 mmol/L    Chloride 107 95 - 110 mmol/L    CO2 25 23 - 29 mmol/L    Glucose 128 (H) 70 - 110 mg/dL    BUN, Bld 19 6 - 20 mg/dL    Creatinine 1.8 (H) 0.5 - 1.4 mg/dL    Calcium 8.7 8.7 - 10.5 mg/dL    Total Protein 5.4 (L) 6.0 - 8.4 g/dL    Albumin 2.1 (L) 3.5 - 5.2 g/dL    Total Bilirubin 0.2 0.1 - 1.0 mg/dL    Alkaline Phosphatase 85 55 - 135 U/L    AST 37 10 - 40 U/L    ALT 27 10 - 44 U/L    Anion Gap 6 (L) 8 - 16 mmol/L    eGFR if African American 53.2 (A) >60 mL/min/1.73 m^2    eGFR if non African  American 46.1 (A) >60 mL/min/1.73 m^2   CBC Oncology    Collection Time: 11/18/19 10:22 AM   Result Value Ref Range    WBC 4.75 3.90 - 12.70 K/uL    RBC 3.30 (L) 4.60 - 6.20 M/uL    Hemoglobin 8.0 (L) 14.0 - 18.0 g/dL    Hematocrit 26.5 (L) 40.0 - 54.0 %    Mean Corpuscular Volume 80 (L) 82 - 98 fL    Mean Corpuscular Hemoglobin 24.2 (L) 27.0 - 31.0 pg    Mean Corpuscular Hemoglobin Conc 30.2 (L) 32.0 - 36.0 g/dL    RDW 16.7 (H) 11.5 - 14.5 %    Platelets 180 150 - 350 K/uL    MPV 10.4 9.2 - 12.9 fL    Gran # (ANC) 3.4 1.8 - 7.7 K/uL    Immature Grans (Abs) 0.13 (H) 0.00 - 0.04 K/uL   Pneumocystis jiroveii by PCR    Collection Time: 11/18/19  8:37 PM   Result Value Ref Range    Pneumocystis Source 1    Protein / creatinine ratio, urine    Collection Time: 11/19/19  4:39 AM   Result Value Ref Range    Protein, Urine Random 11 0 - 15 mg/dL    Creatinine, Random Ur 25.0 23.0 - 375.0 mg/dL    Prot/Creat Ratio, Ur 0.44 (H) 0.00 - 0.20   Sirolimus level    Collection Time: 11/19/19  6:20 AM   Result Value Ref Range    Sirolimus Lvl 6.2 4.0 - 20.0 ng/mL   Protime-INR    Collection Time: 11/19/19  6:20 AM   Result Value Ref Range    Prothrombin Time 9.7 9.0 - 12.5 sec    INR 0.9 0.8 - 1.2   Comprehensive metabolic panel    Collection Time: 11/19/19  6:20 AM   Result Value Ref Range    Sodium 141 136 - 145 mmol/L    Potassium 4.5 3.5 - 5.1 mmol/L    Chloride 109 95 - 110 mmol/L    CO2 28 23 - 29 mmol/L    Glucose 101 70 - 110 mg/dL    BUN, Bld 18 6 - 20 mg/dL    Creatinine 1.7 (H) 0.5 - 1.4 mg/dL    Calcium 8.8 8.7 - 10.5 mg/dL    Total Protein 5.2 (L) 6.0 - 8.4 g/dL    Albumin 2.2 (L) 3.5 - 5.2 g/dL    Total Bilirubin 0.2 0.1 - 1.0 mg/dL    Alkaline Phosphatase 84 55 - 135 U/L    AST 46 (H) 10 - 40 U/L    ALT 37 10 - 44 U/L    Anion Gap 4 (L) 8 - 16 mmol/L    eGFR if African American 57.0 (A) >60 mL/min/1.73 m^2    eGFR if non  49.3 (A) >60 mL/min/1.73 m^2   CBC Oncology    Collection Time: 11/19/19  6:20 AM    Result Value Ref Range    WBC 4.49 3.90 - 12.70 K/uL    RBC 2.90 (L) 4.60 - 6.20 M/uL    Hemoglobin 6.9 (L) 14.0 - 18.0 g/dL    Hematocrit 23.0 (L) 40.0 - 54.0 %    Mean Corpuscular Volume 79 (L) 82 - 98 fL    Mean Corpuscular Hemoglobin 23.8 (L) 27.0 - 31.0 pg    Mean Corpuscular Hemoglobin Conc 30.0 (L) 32.0 - 36.0 g/dL    RDW 16.7 (H) 11.5 - 14.5 %    Platelets 179 150 - 350 K/uL    MPV 10.2 9.2 - 12.9 fL    Gran # (ANC) 3.0 1.8 - 7.7 K/uL    Immature Grans (Abs) 0.19 (H) 0.00 - 0.04 K/uL   Magnesium    Collection Time: 19  6:20 AM   Result Value Ref Range    Magnesium 1.9 1.6 - 2.6 mg/dL   Phosphorus    Collection Time: 19  6:20 AM   Result Value Ref Range    Phosphorus 3.1 2.7 - 4.5 mg/dL         Pending Diagnostic Studies:     Procedure Component Value Units Date/Time    Legionella culture Sputum, Expectorated [[id number]] Collected:  19    Order Status:  Sent Lab Status:  In process Updated:  19 120    Specimen:  Sputum Expectorated         Final Active Diagnoses:    Diagnosis Date Noted POA    PRINCIPAL PROBLEM:  Pneumonia due to infectious organism [J18.9] 2019 Yes    Acute DVT (deep venous thrombosis) [I82.409] 2019 Unknown    Chronic kidney disease, stage 3 (moderate) [N18.3]  Yes    Sepsis [A41.9] 2019 Unknown    Kaposi sarcoma [C46.9] 2019 Yes    Renovascular hypertension [I15.0] 2019 Yes    Acute kidney injury superimposed on chronic kidney disease stage III [N17.9, N18.9] 2017 Yes    Immunosuppression [D89.9]  Yes     Chronic    Long-term use of immunosuppressant medication [Z79.899] 2017 Not Applicable     donor kidney transplant 2017 [Z94.0] 2017 Not Applicable    Anemia in stage 3 chronic kidney disease [N18.3, D63.1] 2017 Yes     Chronic    At risk for opportunistic infections [Z91.89] 2017 Yes    Hyperlipidemia [E78.5] 2016 Yes     Chronic    HIV (human immunodeficiency  virus infection) [B20] 11/30/2016 Yes     Chronic      Problems Resolved During this Admission:    Diagnosis Date Noted Date Resolved POA    SOB (shortness of breath) [R06.02]  11/17/2019 Yes    Tachycardia [R00.0]  11/17/2019 Yes      Discharged Condition: fair    Disposition: Home or Self Care    Follow Up:  Follow-up Information     Matthieu Amador MD.    Specialty:  Hematology and Oncology  Why:  Follow-Up Appointment as scheduled by the clinic  Contact information:  Bridget Reddy  [facility] [id number] [phone]                 Patient Instructions:   No discharge procedures on file.  Medications:  Reconciled Home Medications:      Medication List      START taking these medications    apixaban 5 mg (74 tabs) Dspk  Commonly known as:  Eliquis  For the first 7 days take two 5 mg tablets twice daily.  After 7 days take one 5 mg tablet twice daily.     hydrALAZINE 25 MG tablet  Commonly known as:  APRESOLINE  Take 1 tablet (25 mg total) by mouth every 8 (eight) hours.        CHANGE how you take these medications    atovaquone 750 mg/5 mL Susp  Commonly known as:  MEPRON  Take 10 mLs (1,500 mg total) by mouth once daily.  Start taking on:  November 20, 2019  What changed:  how much to take     carvedilol 25 MG tablet  Commonly known as:  COREG  Take 1 tablet (25 mg total) by mouth 2 (two) times daily with meals.  What changed:    · medication strength  · how much to take     NIFEdipine 60 MG (OSM) 24 hr tablet  Commonly known as:  PROCARDIA-XL  Take 1 tablet (60 mg total) by mouth every 12 (twelve) hours.  What changed:  medication strength     sirolimus 2 MG tablet  Commonly known as:  RAPAMUNE  Take 1 tablet (2 mg total) by mouth once daily.  Start taking on:  November 20, 2019  What changed:    · medication strength  · how much to take        CONTINUE taking these medications    calcium carbonate 200 mg calcium (500 mg) chewable tablet  Commonly known as:  Tums  Take 1 tablet (500 mg total) by mouth  once daily.     cinacalcet 60 MG Tab  Commonly known as:  SENSIPAR  Take 1 tablet (60 mg total) by mouth daily with breakfast.     dolutegravir 50 mg Tab  Commonly known as:  Tivicay  Take 1 tablet (50 mg total) by mouth once daily.     emtricitabine-tenofovir alafen 200-25 mg Tab  Commonly known as:  Descovy  Take 1 tablet by mouth once daily.     famotidine 20 MG tablet  Commonly known as:  PEPCID  TAKE 1 TABLET (20MG TOTAL) BY MOUTH EACH EVENING     HYDROcodone-acetaminophen 5-325 mg per tablet  Commonly known as:  NORCO  Take 1 tablet by mouth every 4 (four) hours as needed for Pain.     multivitamin tablet  Commonly known as:  THERAGRAN  Take 1 tablet by mouth once daily.     patiromer calcium sorbitex 25.2 gram Pwpk  Commonly known as:  VELTASSA  Take 1 packet (25.2 g total) by mouth once daily.     predniSONE 5 MG tablet  Commonly known as:  DELTASONE  TAKE ONE TABLET BY MOUTH EVERY DAY     rosuvastatin 20 MG tablet  Commonly known as:  CRESTOR  Take 1 tablet (20 mg total) by mouth once daily.     sevelamer carbonate 800 mg Tab  Commonly known as:  RENVELA  Take 1 tablet (800 mg total) by mouth 3 (three) times daily with meals.     sodium bicarbonate 650 MG tablet  Take 3 tablets (1,950 mg total) by mouth 3 (three) times daily.            Tacho Cline MD  Hematology/Oncology  [facility]

## 2019-11-19 NOTE — PROGRESS NOTES
Discharge instructions reviewed with the patient. Patient verbalized his understanding and denies any questions or concerns at this time. Left FA PIV and UA midline removed. Patient preparing for discharge. Pharmacy at the bedside for teaching. Patient would prefer to  his meds from the pharmacy downstairs. Patient would also prefer to walk off the unit by himself to meet his ride.

## 2019-11-19 NOTE — TELEPHONE ENCOUNTER
----- Message from Jase Hines MD sent at 11/19/2019  2:00 PM CST -----      Patient was discharged today. His creatinine is down to 1.7  His last dose of sirolimus was 2 mg today (reduced during the weekend from 4 to 2). His level today was 6.2. He is s/p 4 cycles of doxorubicin due to kaposi Sarcoma. Sue states that the target level is 3 to 4 ng/ml.  Also has a variable BP readings  from 120 to 150 on 4 different meds. Suggest to follow up with the patient home BP readings  Please follow up the level and labs     Jase Hines MD

## 2019-11-19 NOTE — ASSESSMENT & PLAN NOTE
LEFTY superimposed on CKD stage III [b/l creat 1.9-2.1] complicating renal transplant  - Creatinine down back to baseline at 1.7 from 2.7 on 11/15  - UO improving at 2.8 liters last 24 hours  - Follow with strict I/Os, daily weights, BP (goal <140/90), daily labs.    - Avoid nephrotoxic agents (NSAIDs, IV contrast dye, ACEI/ARB anti-HTN medications, Aminoglycoside-containing antibiotics)  - Renally dose all appropriate medications, including antibiotics

## 2019-11-19 NOTE — HOSPITAL COURSE
Patient admitted on 11/12 with suspected pneumonia. He was treated for a total course of 5 days for CAP. Patient doing significantly better and was afebrile most of his stay. He had an LEFTY but it resolved with fluids. Today on discharge patient is back to his baseline. While inpatient patient had a DVT. Being discharged on apixaban. Was seen by KTM while inpatient and they adjusted his sirolimus and blood pressure medicines

## 2019-11-20 ENCOUNTER — PATIENT MESSAGE (OUTPATIENT)
Dept: CARDIOLOGY | Facility: CLINIC | Age: 39
End: 2019-11-20

## 2019-11-20 ENCOUNTER — DOCUMENTATION ONLY (OUTPATIENT)
Dept: HEMATOLOGY/ONCOLOGY | Facility: CLINIC | Age: 39
End: 2019-11-20

## 2019-11-20 ENCOUNTER — PATIENT MESSAGE (OUTPATIENT)
Dept: TRANSPLANT | Facility: CLINIC | Age: 39
End: 2019-11-20

## 2019-11-20 ENCOUNTER — PATIENT MESSAGE (OUTPATIENT)
Dept: HEMATOLOGY/ONCOLOGY | Facility: CLINIC | Age: 39
End: 2019-11-20

## 2019-11-20 ENCOUNTER — TELEPHONE (OUTPATIENT)
Dept: HEMATOLOGY/ONCOLOGY | Facility: CLINIC | Age: 39
End: 2019-11-20

## 2019-11-20 LAB
BACTERIA BLD CULT: NORMAL
BACTERIA BLD CULT: NORMAL

## 2019-11-20 NOTE — TELEPHONE ENCOUNTER
----- Message from Lidia Guidry RN sent at 11/20/2019  8:19 AM CST -----  Contact: pt  Can you please contact him and assist with rescheduling his echo?    ----- Message -----  From: Estephania Cervantes  Sent: 11/20/2019   8:14 AM CST  To: [facility] Staff    Pt would like to be called back regarding his Echo appt needs to r/s    Pt can be reached at [phone]

## 2019-11-20 NOTE — TELEPHONE ENCOUNTER
Called pt regarding moving his echo. Pt wanted his echo same day as his dr appt but there were no available slots. Echo moved to the afternoon before his appt.

## 2019-11-20 NOTE — PROGRESS NOTES
Received call from the patient requesting assistance with referral to the [facility] for an upcoming appointment. Completed the referral form and faxed it to the [facility].

## 2019-11-26 ENCOUNTER — TELEPHONE (OUTPATIENT)
Dept: HEMATOLOGY/ONCOLOGY | Facility: CLINIC | Age: 39
End: 2019-11-26

## 2019-11-26 ENCOUNTER — HOSPITAL ENCOUNTER (OUTPATIENT)
Dept: CARDIOLOGY | Facility: CLINIC | Age: 39
Discharge: HOME | End: 2019-11-26
Attending: INTERNAL MEDICINE
Payer: MEDICARE

## 2019-11-26 ENCOUNTER — PATIENT MESSAGE (OUTPATIENT)
Dept: HEMATOLOGY/ONCOLOGY | Facility: CLINIC | Age: 39
End: 2019-11-26

## 2019-11-26 VITALS
WEIGHT: 180 LBS | HEART RATE: 77 BPM | HEIGHT: 68 IN | BODY MASS INDEX: 27.28 KG/M2 | DIASTOLIC BLOOD PRESSURE: 79 MMHG | SYSTOLIC BLOOD PRESSURE: 132 MMHG

## 2019-11-26 DIAGNOSIS — Z51.11 ENCOUNTER FOR ANTINEOPLASTIC CHEMOTHERAPY: ICD-10-CM

## 2019-11-26 DIAGNOSIS — C46.9 KAPOSI SARCOMA: ICD-10-CM

## 2019-11-26 LAB
ANNOTATION COMMENT IMP: NORMAL
ASCENDING AORTA: 3.08 CM
AV INDEX (PROSTH): 0.89
AV MEAN GRADIENT: 6 MMHG
AV PEAK GRADIENT: 10 MMHG
AV VALVE AREA: 3.49 CM2
AV VELOCITY RATIO: 0.86
BSA FOR ECHO PROCEDURE: 1.98 M2
CV ECHO LV RWT: 0.29 CM
DOP CALC AO PEAK VEL: 1.59 M/S
DOP CALC AO VTI: 29.36 CM
DOP CALC LVOT AREA: 3.9 CM2
DOP CALC LVOT DIAMETER: 2.23 CM
DOP CALC LVOT PEAK VEL: 1.36 M/S
DOP CALC LVOT STROKE VOLUME: 102.39 CM3
DOP CALCLVOT PEAK VEL VTI: 26.23 CM
ECHO LV POSTERIOR WALL: 0.9 CM (ref 0.6–1.1)
FRACTIONAL SHORTENING: 18 % (ref 28–44)
INTERVENTRICULAR SEPTUM: 1 CM (ref 0.6–1.1)
LA MAJOR: 5.77 CM
LA MINOR: 5.52 CM
LA WIDTH: 4.81 CM
LEFT ATRIUM SIZE: 4.62 CM
LEFT ATRIUM VOLUME INDEX: 54.5 ML/M2
LEFT ATRIUM VOLUME: 106.58 CM3
LEFT INTERNAL DIMENSION IN SYSTOLE: 5.1 CM (ref 2.1–4)
LEFT VENTRICLE DIASTOLIC VOLUME INDEX: 87.73 ML/M2
LEFT VENTRICLE DIASTOLIC VOLUME: 171.45 ML
LEFT VENTRICLE MASS INDEX: 125 G/M2
LEFT VENTRICLE SYSTOLIC VOLUME INDEX: 45.6 ML/M2
LEFT VENTRICLE SYSTOLIC VOLUME: 89.16 ML
LEFT VENTRICULAR INTERNAL DIMENSION IN DIASTOLE: 6.2 CM (ref 3.5–6)
LEFT VENTRICULAR MASS: 244.47 G
P JIROVECII DNA L RESP QL NAA+NON-PROBE: NORMAL
PISA TR MAX VEL: 3.62 M/S
PNEUMOCYSTIS REPORT STATUS: NORMAL
RA MAJOR: 5.16 CM
RA PRESSURE: 3 MMHG
RA WIDTH: 3.44 CM
RIGHT VENTRICULAR END-DIASTOLIC DIMENSION: 3.99 CM
RV TISSUE DOPPLER FREE WALL SYSTOLIC VELOCITY 1 (APICAL 4 CHAMBER VIEW): 18.32 CM/S
SINUS: 3.08 CM
SPECIMEN SOURCE: NORMAL
STJ: 3.18 CM
TDI LATERAL: 0.08 M/S
TDI SEPTAL: 0.12 M/S
TDI: 0.1 M/S
TR MAX PG: 52 MMHG
TRICUSPID ANNULAR PLANE SYSTOLIC EXCURSION: 1.82 CM
TV REST PULMONARY ARTERY PRESSURE: 55 MMHG

## 2019-11-26 PROCEDURE — 93306 ECHO (CUPID ONLY): ICD-10-PCS | Mod: 26,S$PBB,, | Performed by: INTERNAL MEDICINE

## 2019-11-26 PROCEDURE — 93306 TTE W/DOPPLER COMPLETE: CPT | Mod: PBBFAC | Performed by: INTERNAL MEDICINE

## 2019-11-26 NOTE — TELEPHONE ENCOUNTER
----- Message from Kia Escudero sent at 11/26/2019  4:35 PM CST -----  Pt is requesting that he would like to send his labs to his home hospital [facility] because he's going back Home     [facility] [phone]      Pt contact [phone]

## 2019-11-26 NOTE — TELEPHONE ENCOUNTER
Contacted patient to discuss plan of care. Dr Amador called patient to communicate his echo results.   Dr Amador agreeable to placing a plan of care and attempting to coordinate visit and plan of care on same day next week.  Patient would like to return home to [place] for the holiday.  Patient was rescheduled for Wednesday with Dr Amador.

## 2019-11-27 ENCOUNTER — PATIENT MESSAGE (OUTPATIENT)
Dept: CARDIOLOGY | Facility: CLINIC | Age: 39
End: 2019-11-27

## 2019-11-27 ENCOUNTER — TELEPHONE (OUTPATIENT)
Dept: HEMATOLOGY/ONCOLOGY | Facility: CLINIC | Age: 39
End: 2019-11-27

## 2019-11-27 ENCOUNTER — PATIENT MESSAGE (OUTPATIENT)
Dept: HEMATOLOGY/ONCOLOGY | Facility: CLINIC | Age: 39
End: 2019-11-27

## 2019-11-27 NOTE — TELEPHONE ENCOUNTER
Late note.    Call from 11/26/2019 around [phone].    Called to discuss results of TTE. EF remains suppressed and he has follow up with us tomorrow that will be rescheduled till after cardiology. He will go home for the weekend and return to the clinic next Wednesday.    Plan is to likely chem chem holiday and re-scan in a couple months (3-4).    Mr. Aguiar is okay with this plan and will return to clinic next week.

## 2019-11-29 ENCOUNTER — PATIENT MESSAGE (OUTPATIENT)
Dept: HEMATOLOGY/ONCOLOGY | Facility: CLINIC | Age: 39
End: 2019-11-29

## 2019-12-02 ENCOUNTER — PATIENT MESSAGE (OUTPATIENT)
Dept: TRANSPLANT | Facility: CLINIC | Age: 39
End: 2019-12-02

## 2019-12-02 ENCOUNTER — PATIENT MESSAGE (OUTPATIENT)
Dept: HEMATOLOGY/ONCOLOGY | Facility: CLINIC | Age: 39
End: 2019-12-02

## 2019-12-02 ENCOUNTER — TELEPHONE (OUTPATIENT)
Dept: PHARMACY | Facility: CLINIC | Age: 39
End: 2019-12-02

## 2019-12-02 ENCOUNTER — OFFICE VISIT (OUTPATIENT)
Dept: CARDIOLOGY | Facility: CLINIC | Age: 39
End: 2019-12-02
Payer: MEDICARE

## 2019-12-02 VITALS
DIASTOLIC BLOOD PRESSURE: 91 MMHG | BODY MASS INDEX: 26.06 KG/M2 | WEIGHT: 171.94 LBS | HEIGHT: 68 IN | HEART RATE: 85 BPM | SYSTOLIC BLOOD PRESSURE: 156 MMHG

## 2019-12-02 DIAGNOSIS — D63.1 ANEMIA IN STAGE 3 CHRONIC KIDNEY DISEASE: Chronic | ICD-10-CM

## 2019-12-02 DIAGNOSIS — R60.1 GENERALIZED EDEMA: Primary | ICD-10-CM

## 2019-12-02 DIAGNOSIS — I15.0 RENOVASCULAR HYPERTENSION: ICD-10-CM

## 2019-12-02 DIAGNOSIS — T86.11 ANTIBODY MEDIATED REJECTION OF KIDNEY TRANSPLANT: ICD-10-CM

## 2019-12-02 DIAGNOSIS — B20 HIV INFECTION, UNSPECIFIED SYMPTOM STATUS: Chronic | ICD-10-CM

## 2019-12-02 DIAGNOSIS — E87.5 HYPERKALEMIA: ICD-10-CM

## 2019-12-02 DIAGNOSIS — N18.30 ANEMIA IN STAGE 3 CHRONIC KIDNEY DISEASE: Chronic | ICD-10-CM

## 2019-12-02 DIAGNOSIS — C77.9 REGIONAL LYMPH NODE METASTASIS PRESENT: ICD-10-CM

## 2019-12-02 DIAGNOSIS — C46.9 KAPOSI SARCOMA: ICD-10-CM

## 2019-12-02 DIAGNOSIS — E78.5 HYPERLIPIDEMIA, UNSPECIFIED HYPERLIPIDEMIA TYPE: Chronic | ICD-10-CM

## 2019-12-02 PROCEDURE — 99214 OFFICE O/P EST MOD 30 MIN: CPT | Mod: S$PBB,,, | Performed by: INTERNAL MEDICINE

## 2019-12-02 PROCEDURE — 99999 PR PBB SHADOW E&M-EST. PATIENT-LVL III: CPT | Mod: PBBFAC,,, | Performed by: INTERNAL MEDICINE

## 2019-12-02 PROCEDURE — 99999 PR PBB SHADOW E&M-EST. PATIENT-LVL III: ICD-10-PCS | Mod: PBBFAC,,, | Performed by: INTERNAL MEDICINE

## 2019-12-02 PROCEDURE — 99214 PR OFFICE/OUTPT VISIT, EST, LEVL IV, 30-39 MIN: ICD-10-PCS | Mod: S$PBB,,, | Performed by: INTERNAL MEDICINE

## 2019-12-02 PROCEDURE — 99213 OFFICE O/P EST LOW 20 MIN: CPT | Mod: PBBFAC | Performed by: INTERNAL MEDICINE

## 2019-12-02 RX ORDER — FUROSEMIDE 20 MG/1
20 TABLET ORAL DAILY
Qty: 60 TABLET | Refills: 11 | Status: ON HOLD | OUTPATIENT
Start: 2019-12-02 | End: 2019-12-17 | Stop reason: SDUPTHER

## 2019-12-02 NOTE — PATIENT INSTRUCTIONS
Weigh yourself first thing in the morning after emptying your bladder (urinating) and record weight every day.  Over the next week, your weight should decrease by 5-10 pounds. After that happens, If your weight increases by 3 pounds in 1 day or by 5 pounds in 2 days or over a week, increase diuretic (furosemide [Lasix]) until weight has returned to prior value.      If weight does not decrease 5 pounds by Friday, let Dr Mejias know

## 2019-12-02 NOTE — TELEPHONE ENCOUNTER
Refill readiness for Veltassa confirmed with patient; name/ confirmed; no missed doses; no new medications; no side effects noted; patient to  .     RHODA Rivers.Ph., AAHIVP  Clinical Pharmacist, HIV/HCV  [facility]  Phone: [phone]

## 2019-12-02 NOTE — PROGRESS NOTES
Chart has been dictated using voice recognition software.  It is not been reviewed carefully for any transcriptional errors due to this technology.   Subjective:   Patient ID:  Demetrio Aguiar is a 39 y.o. male who presents for follow-up of Kaposi sarcoma      HPI: Patient s/p renal transplant 2017, HIV positive with Kaposi sarcoma on chemotherapy including doxarubicin, hypertension, and hyperlipidemia. Patient hospitalized 12-19-Nov-2019 for CAP and LEFTY (due to dehydrartion).  Patient had a DVT in the hospital and was started on apixaban.    Transthoracic echocardiogram (26-Nov-2019):  · Mildly decreased left ventricular systolic function. The estimated ejection fraction is 40%. QEF 40%. Global hypokinetic wall motion. (EF 55% in Aug-2019)  · The left ventricular global longitudinal strain is -13.4%. Strain pattern shows reduced strain at the base with apical sparing. Consider infiltrative cardiomyopathy. (LV strain -15% in Aug-2019)  · Left ventricular diastolic dysfunction.  · Mild left ventricular enlargement.  · Eccentric left ventricular hypertrophy.  · Normal right ventricular systolic function.  · Mild tricuspid regurgitation.  · The estimated PA systolic pressure is 55 mm Hg  · Normal central venous pressure (3 mm Hg).  · Small circumferential pericardial effusion.  · Severe left atrial enlargement.    Has intermittent orthopnea (2 pillow).  No PND.  Will have occasional MARY after 4-5 blocks. Patient denies any palpitations or syncope. Will get lightheaded when stands up since adjustment of BP meds.  Past Medical History:   Diagnosis Date    Anemia     At risk for opportunistic infections     Delayed graft function of kidney     ESRD secondary to HIVAN s/p DDRT 8/18/17     HIV infection     HIV nephropathy     Hyperlipidemia     Hypertension     Need for prophylactic immunotherapy     S/P kidney transplant 8/18/2017 8/28/2017    Status post exploratory laparotomy 9/10/2017    Re explored for  retroperitoneal hematoma. Hematoma evacuated.(09/09)    Status post exploratory laparotomy 9/10/2017    Re explored for retroperitoneal hematoma. Hematoma evacuated.(09/09)    TB lung, latent     tx INH 2006        Outpatient Medications Prior to Visit   Medication Sig Dispense Refill    apixaban (ELIQUIS) 5 mg (74 tabs) DsPk For the first 7 days take two 5 mg tablets twice daily.  After 7 days take one 5 mg tablet twice daily. 74 tablet 0    atovaquone (MEPRON) 750 mg/5 mL Susp Take 10 mLs (1,500 mg total) by mouth once daily. 300 mL 0    calcium carbonate (TUMS) 200 mg calcium (500 mg) chewable tablet Take 1 tablet (500 mg total) by mouth once daily. 30 tablet 11    carvedilol (COREG) 25 MG tablet Take 1 tablet (25 mg total) by mouth 2 (two) times daily with meals. 60 tablet 0    cinacalcet (SENSIPAR) 60 MG Tab Take 1 tablet (60 mg total) by mouth daily with breakfast. 30 tablet 0    dolutegravir (TIVICAY) 50 mg Tab Take 1 tablet (50 mg total) by mouth once daily. 30 tablet 5    emtricitabine-tenofovir alafen (DESCOVY) 200-25 mg Tab Take 1 tablet by mouth once daily.      famotidine (PEPCID) 20 MG tablet TAKE 1 TABLET (20MG TOTAL) BY MOUTH EACH EVENING 30 tablet 11    hydrALAZINE (APRESOLINE) 25 MG tablet Take 1 tablet (25 mg total) by mouth every 8 (eight) hours. 90 tablet 0    HYDROcodone-acetaminophen (NORCO) 5-325 mg per tablet Take 1 tablet by mouth every 4 (four) hours as needed for Pain. 20 tablet 0    multivitamin (THERAGRAN) tablet Take 1 tablet by mouth once daily.      NIFEdipine (PROCARDIA-XL) 60 MG (OSM) 24 hr tablet Take 1 tablet (60 mg total) by mouth every 12 (twelve) hours. 60 tablet 0    patiromer calcium sorbitex (VELTASSA) 25.2 gram PwPk Take 1 packet (25.2 g total) by mouth once daily. 30 packet 10    predniSONE (DELTASONE) 5 MG tablet TAKE ONE TABLET BY MOUTH EVERY DAY 30 tablet 11    rosuvastatin (CRESTOR) 20 MG tablet Take 1 tablet (20 mg total) by mouth once daily. 30  "tablet 0    sevelamer carbonate (RENVELA) 800 mg Tab Take 1 tablet (800 mg total) by mouth 3 (three) times daily with meals. 90 tablet 11    sirolimus (RAPAMUNE) 2 MG tablet Take 1 tablet (2 mg total) by mouth once daily. 30 tablet 11    sodium bicarbonate 650 MG tablet Take 3 tablets (1,950 mg total) by mouth 3 (three) times daily. 270 tablet 11     No facility-administered medications prior to visit.        ROS - No change from prior visit in neurologic, respiratory, endocrine, GI, hematologic, or constitutional complaints   Objective:   Physical Exam   Constitutional: He is oriented to person, place, and time. He appears well-developed and well-nourished.   BP (!) 156/91 (BP Location: Left arm, Patient Position: Sitting, BP Method: Medium (Automatic))   Pulse 85   Ht 5' 8" (1.727 m)   Wt 78 kg (171 lb 15.3 oz) (Increase by 5.5 kg since last visit)   BMI 26.15 kg/m²      Neck: Neck supple. Hepatojugular reflux and JVD (to mid neck at 45 degrees) present. Carotid bruit is not present.   Cardiovascular: Normal rate, regular rhythm and intact distal pulses.  Occasional extrasystoles are present. Exam reveals gallop and S4. Exam reveals no S3 and no friction rub.   Murmur heard.   Blowing holosystolic murmur is present with a grade of 2/6 at the lower left sternal border radiating to the lower right sternal border and apex.  Pulmonary/Chest: Effort normal and breath sounds normal. He has no wheezes. He has no rales.   Abdominal: Soft. Bowel sounds are normal. He exhibits no abdominal bruit. There is no hepatomegaly. There is no tenderness.   Musculoskeletal: He exhibits edema (4+ right ankle edema extending into thigh; 3+ edema left ankle edema extending into thigh; 1-2+ presacral edema).   Neurological: He is alert and oriented to person, place, and time. He has normal strength.   Skin: No cyanosis. Nails show no clubbing.         Lab Results   Component Value Date     11/19/2019    K 4.5 11/19/2019    " BUN 18 11/19/2019    CREATININE 1.7 (H) 11/19/2019     11/19/2019    CHOL 103 (L) 08/26/2019    HDL 43 08/26/2019    LDLCALC 40.4 (L) 08/26/2019    TRIG 98 08/26/2019    CHOLHDL 41.7 08/26/2019    HGB 6.9 (L) 11/19/2019    HCT 23.0 (L) 11/19/2019    HCT 22 (L) 11/12/2019     11/19/2019    INR 0.9 11/19/2019       Assessment:     1. Generalized edema    2. Kaposi sarcoma    3. Regional lymph node metastasis present    4. Antibody mediated rejection of kidney transplant    5. Anemia in stage 3 chronic kidney disease    6. Hyperkalemia    7. Hyperlipidemia, unspecified hyperlipidemia type    8. Renovascular hypertension    9. HIV infection, unspecified symptom status      Patient has had a decrease in his left ventricular function since starting chemotherapy.  He has significant edema (as he did before) extending into his presacral area.  He now has elevated jugular decide venous distension as well as positive hepatic jugular reflux.  Given his reduced LV function and signs of right heart failure, patient will be started on diuretic therapy and a low-dose of furosemide (20 mg q.d.).  Patient will also be sent for a BNP today as well as a BMP in 1 week, which he will get locally.  This may help alleviate the patient's intermittent orthopnea as well as some his mild dyspnea on exertion.  There should be strong consideration of using chemotherapy other than an anthracycline (i.e. doxorubicin) given this reduction in left ventricular function as well as evidence of congestive heart failure.  Patient will return for re-evaluation on the same day as his next oncology visit.  Plan:     Demetrio was seen today for kaposi sarcoma.    Diagnoses and all orders for this visit:    Generalized edema  -     Brain natriuretic peptide; Future; Expected date: 12/02/2019  -     Basic metabolic panel; Future; Expected date: 12/09/2019    Kaposi sarcoma    Regional lymph node metastasis present    Antibody mediated rejection  of kidney transplant    Anemia in stage 3 chronic kidney disease    Hyperkalemia    Hyperlipidemia, unspecified hyperlipidemia type    Renovascular hypertension  -     Brain natriuretic peptide; Future; Expected date: 12/02/2019  -     Basic metabolic panel; Future; Expected date: 12/09/2019    HIV infection, unspecified symptom status    Other orders  -     furosemide (LASIX) 20 MG tablet; Take 1 tablet (20 mg total) by mouth once daily. May need to increase based on weight change.          Hector Mejias MD  Consultative Cardiology

## 2019-12-02 NOTE — Clinical Note
Thank you for referring Demetrio Aguiar for follow-up of decreased left ventricular function. Please see my note for details of this encounter. If you have any questions, please contact me.  Thank you again for the referral.

## 2019-12-03 ENCOUNTER — DOCUMENTATION ONLY (OUTPATIENT)
Dept: TRANSPLANT | Facility: CLINIC | Age: 39
End: 2019-12-03

## 2019-12-06 ENCOUNTER — PATIENT MESSAGE (OUTPATIENT)
Dept: CARDIOLOGY | Facility: CLINIC | Age: 39
End: 2019-12-06

## 2019-12-06 ENCOUNTER — PATIENT MESSAGE (OUTPATIENT)
Dept: TRANSPLANT | Facility: CLINIC | Age: 39
End: 2019-12-06

## 2019-12-06 ENCOUNTER — PATIENT MESSAGE (OUTPATIENT)
Dept: HEMATOLOGY/ONCOLOGY | Facility: CLINIC | Age: 39
End: 2019-12-06

## 2019-12-09 ENCOUNTER — DOCUMENTATION ONLY (OUTPATIENT)
Dept: TRANSPLANT | Facility: CLINIC | Age: 39
End: 2019-12-09

## 2019-12-09 LAB
EXT ALBUMIN: 3.9 (ref 3.5–5.5)
EXT BUN: 30 (ref 5–22)
EXT CALCIUM: 8.6 (ref 8.7–10.5)
EXT CHLORIDE: 111 (ref 101–111)
EXT CO2: 24 (ref 20–31)
EXT CREATININE: 2.48 MG/DL (ref 0.6–1.2)
EXT EOSINOPHIL%: 2.1 (ref 0.8–8.1)
EXT GFR MDRD AF AMER: 37
EXT GLUCOSE: 104 (ref 65–110)
EXT HEMATOCRIT: 23.8 (ref 36.7–47.1)
EXT HEMOGLOBIN: 7.8 (ref 12.5–14.5)
EXT LYMPH%: 16.8 (ref 15.2–43.3)
EXT MAGNESIUM: 1.7 (ref 1.7–2.8)
EXT MONOCYTES%: 7.6 (ref 5.5–13.7)
EXT PHOSPHORUS: 4.6 (ref 2.5–4.5)
EXT PLATELETS: 247 (ref 152–348)
EXT POTASSIUM: 4.9 (ref 3.2–5.2)
EXT SEGS%: 72.2 (ref 43.5–73.5)
EXT SIROLIMUS LVL: 2 (ref 3–20)
EXT SODIUM: 143 MMOL/L (ref 134–144)
EXT WBC: 4.9 (ref 3.8–11.8)

## 2019-12-09 NOTE — ASSESSMENT & PLAN NOTE
HIV related   Currently on HAART and Adriamycin Q3W for symptomatic metastatic KS  He was due for Adriamycin however this is going to be postponed given acute illness    93026 Exp Problem Focused - Mod. Complex

## 2019-12-10 ENCOUNTER — HOSPITAL ENCOUNTER (INPATIENT)
Facility: HOSPITAL | Age: 39
LOS: 7 days | Discharge: HOME | DRG: 291 | End: 2019-12-17
Attending: EMERGENCY MEDICINE | Admitting: HOSPITALIST
Payer: MEDICARE

## 2019-12-10 DIAGNOSIS — B20 AIDS (ACQUIRED IMMUNODEFICIENCY SYNDROME), CD4 <=200: ICD-10-CM

## 2019-12-10 DIAGNOSIS — N17.9 AKI (ACUTE KIDNEY INJURY): ICD-10-CM

## 2019-12-10 DIAGNOSIS — R60.0 BILATERAL LOWER EXTREMITY EDEMA: Primary | ICD-10-CM

## 2019-12-10 DIAGNOSIS — B20 HIV INFECTION, UNSPECIFIED SYMPTOM STATUS: ICD-10-CM

## 2019-12-10 DIAGNOSIS — Z94.0 S/P KIDNEY TRANSPLANT: ICD-10-CM

## 2019-12-10 DIAGNOSIS — Z85.9 HISTORY OF KAPOSI'S SARCOMA: ICD-10-CM

## 2019-12-10 DIAGNOSIS — I50.23 ACUTE ON CHRONIC SYSTOLIC HEART FAILURE: ICD-10-CM

## 2019-12-10 DIAGNOSIS — I15.0 RENOVASCULAR HYPERTENSION: ICD-10-CM

## 2019-12-10 DIAGNOSIS — E78.5 HYPERLIPIDEMIA, UNSPECIFIED HYPERLIPIDEMIA TYPE: ICD-10-CM

## 2019-12-10 DIAGNOSIS — Z94.0 KIDNEY REPLACED BY TRANSPLANT: ICD-10-CM

## 2019-12-10 DIAGNOSIS — N18.30 ANEMIA IN STAGE 3 CHRONIC KIDNEY DISEASE: Chronic | ICD-10-CM

## 2019-12-10 DIAGNOSIS — D63.1 ANEMIA IN STAGE 3 CHRONIC KIDNEY DISEASE: Chronic | ICD-10-CM

## 2019-12-10 DIAGNOSIS — M89.8X9 METABOLIC BONE DISEASE: ICD-10-CM

## 2019-12-10 DIAGNOSIS — I50.9 CONGESTIVE HEART FAILURE, UNSPECIFIED HF CHRONICITY, UNSPECIFIED HEART FAILURE TYPE: ICD-10-CM

## 2019-12-10 DIAGNOSIS — J18.9 PNEUMONIA OF RIGHT LOWER LOBE DUE TO INFECTIOUS ORGANISM: ICD-10-CM

## 2019-12-10 DIAGNOSIS — I50.9 CHF (CONGESTIVE HEART FAILURE): ICD-10-CM

## 2019-12-10 DIAGNOSIS — I50.9 ACUTE DECOMPENSATED HEART FAILURE: ICD-10-CM

## 2019-12-10 DIAGNOSIS — R06.02 SHORTNESS OF BREATH: ICD-10-CM

## 2019-12-10 PROBLEM — Z75.8 DISCHARGE PLANNING ISSUES: Status: ACTIVE | Noted: 2019-12-10

## 2019-12-10 PROBLEM — E87.1 HYPONATREMIA: Status: RESOLVED | Noted: 2017-09-01 | Resolved: 2019-12-10

## 2019-12-10 PROBLEM — T86.19 UNEXPLAINED EPISODE OF DYSFUNCTION OF KIDNEY TRANSPLANT: Status: RESOLVED | Noted: 2017-10-17 | Resolved: 2019-12-10

## 2019-12-10 PROBLEM — E87.5 HYPERKALEMIA: Status: RESOLVED | Noted: 2019-07-17 | Resolved: 2019-12-10

## 2019-12-10 PROBLEM — A04.72 C. DIFFICILE COLITIS: Status: RESOLVED | Noted: 2017-12-29 | Resolved: 2019-12-10

## 2019-12-10 PROBLEM — H04.123 INSUFFICIENCY OF TEAR FILM OF BOTH EYES: Status: RESOLVED | Noted: 2017-11-08 | Resolved: 2019-12-10

## 2019-12-10 PROBLEM — H10.811 PINGUECULITIS, RIGHT EYE: Status: RESOLVED | Noted: 2017-11-08 | Resolved: 2019-12-10

## 2019-12-10 LAB
ALBUMIN SERPL BCP-MCNC: 3.4 G/DL (ref 3.5–5.2)
ALP SERPL-CCNC: 120 U/L (ref 55–135)
ALT SERPL W/O P-5'-P-CCNC: 36 U/L (ref 10–44)
ANION GAP SERPL CALC-SCNC: 9 MMOL/L (ref 8–16)
AST SERPL-CCNC: 31 U/L (ref 10–40)
BACTERIA #/AREA URNS AUTO: ABNORMAL /HPF
BASOPHILS # BLD AUTO: 0.01 K/UL (ref 0–0.2)
BASOPHILS NFR BLD: 0.2 % (ref 0–1.9)
BILIRUB SERPL-MCNC: 0.5 MG/DL (ref 0.1–1)
BILIRUB UR QL STRIP: NEGATIVE
BNP SERPL-MCNC: 1074 PG/ML (ref 0–99)
BUN SERPL-MCNC: 48 MG/DL (ref 6–20)
CALCIUM SERPL-MCNC: 8.4 MG/DL (ref 8.7–10.5)
CHLORIDE SERPL-SCNC: 109 MMOL/L (ref 95–110)
CLARITY UR REFRACT.AUTO: CLEAR
CO2 SERPL-SCNC: 25 MMOL/L (ref 23–29)
COLOR UR AUTO: ABNORMAL
CREAT SERPL-MCNC: 2.9 MG/DL (ref 0.5–1.4)
DIFFERENTIAL METHOD: ABNORMAL
EOSINOPHIL # BLD AUTO: 0.1 K/UL (ref 0–0.5)
EOSINOPHIL NFR BLD: 2.1 % (ref 0–8)
ERYTHROCYTE [DISTWIDTH] IN BLOOD BY AUTOMATED COUNT: 19.3 % (ref 11.5–14.5)
EST. GFR  (AFRICAN AMERICAN): 29.9 ML/MIN/1.73 M^2
EST. GFR  (NON AFRICAN AMERICAN): 25.9 ML/MIN/1.73 M^2
GLUCOSE SERPL-MCNC: 98 MG/DL (ref 70–110)
GLUCOSE UR QL STRIP: NEGATIVE
HCT VFR BLD AUTO: 25.1 % (ref 40–54)
HGB BLD-MCNC: 7.6 G/DL (ref 14–18)
HGB UR QL STRIP: ABNORMAL
IMM GRANULOCYTES # BLD AUTO: 0.01 K/UL (ref 0–0.04)
IMM GRANULOCYTES NFR BLD AUTO: 0.2 % (ref 0–0.5)
INR PPP: 1.1 (ref 0.8–1.2)
KETONES UR QL STRIP: NEGATIVE
LEUKOCYTE ESTERASE UR QL STRIP: NEGATIVE
LYMPHOCYTES # BLD AUTO: 0.8 K/UL (ref 1–4.8)
LYMPHOCYTES NFR BLD: 15.4 % (ref 18–48)
MAGNESIUM SERPL-MCNC: 1.9 MG/DL (ref 1.6–2.6)
MCH RBC QN AUTO: 24.4 PG (ref 27–31)
MCHC RBC AUTO-ENTMCNC: 30.3 G/DL (ref 32–36)
MCV RBC AUTO: 80 FL (ref 82–98)
MICROSCOPIC COMMENT: ABNORMAL
MONOCYTES # BLD AUTO: 0.4 K/UL (ref 0.3–1)
MONOCYTES NFR BLD: 7.6 % (ref 4–15)
NEUTROPHILS # BLD AUTO: 3.9 K/UL (ref 1.8–7.7)
NEUTROPHILS NFR BLD: 74.5 % (ref 38–73)
NITRITE UR QL STRIP: NEGATIVE
NRBC BLD-RTO: 0 /100 WBC
PH UR STRIP: 6 [PH] (ref 5–8)
PLATELET # BLD AUTO: 144 K/UL (ref 150–350)
PMV BLD AUTO: 10.3 FL (ref 9.2–12.9)
POCT GLUCOSE: 115 MG/DL (ref 70–110)
POCT GLUCOSE: 117 MG/DL (ref 70–110)
POTASSIUM SERPL-SCNC: 4 MMOL/L (ref 3.5–5.1)
PROT SERPL-MCNC: 6.3 G/DL (ref 6–8.4)
PROT UR QL STRIP: NEGATIVE
PROTHROMBIN TIME: 10.8 SEC (ref 9–12.5)
RBC # BLD AUTO: 3.12 M/UL (ref 4.6–6.2)
RBC #/AREA URNS AUTO: 7 /HPF (ref 0–4)
SODIUM SERPL-SCNC: 143 MMOL/L (ref 136–145)
SP GR UR STRIP: 1 (ref 1–1.03)
TROPONIN I SERPL DL<=0.01 NG/ML-MCNC: 0.02 NG/ML (ref 0–0.03)
URN SPEC COLLECT METH UR: ABNORMAL
WBC # BLD AUTO: 5.26 K/UL (ref 3.9–12.7)
WBC #/AREA URNS AUTO: 0 /HPF (ref 0–5)

## 2019-12-10 PROCEDURE — 81001 URINALYSIS AUTO W/SCOPE: CPT

## 2019-12-10 PROCEDURE — 85610 PROTHROMBIN TIME: CPT

## 2019-12-10 PROCEDURE — 99223 1ST HOSP IP/OBS HIGH 75: CPT | Mod: AI,GC,, | Performed by: HOSPITALIST

## 2019-12-10 PROCEDURE — 99285 EMERGENCY DEPT VISIT HI MDM: CPT | Mod: ,,, | Performed by: EMERGENCY MEDICINE

## 2019-12-10 PROCEDURE — 80053 COMPREHEN METABOLIC PANEL: CPT

## 2019-12-10 PROCEDURE — 63600175 PHARM REV CODE 636 W HCPCS: Performed by: STUDENT IN AN ORGANIZED HEALTH CARE EDUCATION/TRAINING PROGRAM

## 2019-12-10 PROCEDURE — 83735 ASSAY OF MAGNESIUM: CPT

## 2019-12-10 PROCEDURE — 80197 ASSAY OF TACROLIMUS: CPT

## 2019-12-10 PROCEDURE — 99223 PR INITIAL HOSPITAL CARE,LEVL III: ICD-10-PCS | Mod: AI,GC,, | Performed by: HOSPITALIST

## 2019-12-10 PROCEDURE — 96374 THER/PROPH/DIAG INJ IV PUSH: CPT

## 2019-12-10 PROCEDURE — 82962 GLUCOSE BLOOD TEST: CPT

## 2019-12-10 PROCEDURE — 25000003 PHARM REV CODE 250: Performed by: STUDENT IN AN ORGANIZED HEALTH CARE EDUCATION/TRAINING PROGRAM

## 2019-12-10 PROCEDURE — 85025 COMPLETE CBC W/AUTO DIFF WBC: CPT

## 2019-12-10 PROCEDURE — 20600001 HC STEP DOWN PRIVATE ROOM

## 2019-12-10 PROCEDURE — 63600175 PHARM REV CODE 636 W HCPCS: Performed by: EMERGENCY MEDICINE

## 2019-12-10 PROCEDURE — 83615 LACTATE (LD) (LDH) ENZYME: CPT

## 2019-12-10 PROCEDURE — 93005 ELECTROCARDIOGRAM TRACING: CPT

## 2019-12-10 PROCEDURE — 83880 ASSAY OF NATRIURETIC PEPTIDE: CPT

## 2019-12-10 PROCEDURE — 84484 ASSAY OF TROPONIN QUANT: CPT

## 2019-12-10 PROCEDURE — 99285 PR EMERGENCY DEPT VISIT,LEVEL V: ICD-10-PCS | Mod: ,,, | Performed by: EMERGENCY MEDICINE

## 2019-12-10 PROCEDURE — 93010 ELECTROCARDIOGRAM REPORT: CPT | Mod: ,,, | Performed by: INTERNAL MEDICINE

## 2019-12-10 PROCEDURE — 93010 EKG 12-LEAD: ICD-10-PCS | Mod: ,,, | Performed by: INTERNAL MEDICINE

## 2019-12-10 PROCEDURE — 99285 EMERGENCY DEPT VISIT HI MDM: CPT | Mod: 25

## 2019-12-10 RX ORDER — CARVEDILOL 25 MG/1
25 TABLET ORAL 2 TIMES DAILY WITH MEALS
Status: DISCONTINUED | OUTPATIENT
Start: 2019-12-10 | End: 2019-12-17 | Stop reason: HOSPADM

## 2019-12-10 RX ORDER — SODIUM BICARBONATE 650 MG/1
1950 TABLET ORAL 3 TIMES DAILY
Status: DISCONTINUED | OUTPATIENT
Start: 2019-12-10 | End: 2019-12-12

## 2019-12-10 RX ORDER — FUROSEMIDE 10 MG/ML
40 INJECTION INTRAMUSCULAR; INTRAVENOUS ONCE
Status: DISCONTINUED | OUTPATIENT
Start: 2019-12-10 | End: 2019-12-10

## 2019-12-10 RX ORDER — PREDNISONE 5 MG/1
5 TABLET ORAL DAILY
Status: DISCONTINUED | OUTPATIENT
Start: 2019-12-11 | End: 2019-12-17 | Stop reason: HOSPADM

## 2019-12-10 RX ORDER — CALCIUM CARBONATE 200(500)MG
1 TABLET,CHEWABLE ORAL DAILY
Status: DISCONTINUED | OUTPATIENT
Start: 2019-12-11 | End: 2019-12-16

## 2019-12-10 RX ORDER — FUROSEMIDE 10 MG/ML
40 INJECTION INTRAMUSCULAR; INTRAVENOUS
Status: COMPLETED | OUTPATIENT
Start: 2019-12-10 | End: 2019-12-10

## 2019-12-10 RX ORDER — GLUCAGON 1 MG
1 KIT INJECTION
Status: DISCONTINUED | OUTPATIENT
Start: 2019-12-10 | End: 2019-12-17 | Stop reason: HOSPADM

## 2019-12-10 RX ORDER — HYDROCODONE BITARTRATE AND ACETAMINOPHEN 5; 325 MG/1; MG/1
1 TABLET ORAL EVERY 4 HOURS PRN
Status: DISCONTINUED | OUTPATIENT
Start: 2019-12-10 | End: 2019-12-17 | Stop reason: HOSPADM

## 2019-12-10 RX ORDER — FUROSEMIDE 10 MG/ML
40 INJECTION INTRAMUSCULAR; INTRAVENOUS 2 TIMES DAILY
Status: DISCONTINUED | OUTPATIENT
Start: 2019-12-10 | End: 2019-12-11

## 2019-12-10 RX ORDER — NIFEDIPINE 30 MG/1
60 TABLET, EXTENDED RELEASE ORAL EVERY 12 HOURS
Status: DISCONTINUED | OUTPATIENT
Start: 2019-12-10 | End: 2019-12-12

## 2019-12-10 RX ORDER — SODIUM CHLORIDE 0.9 % (FLUSH) 0.9 %
10 SYRINGE (ML) INJECTION
Status: DISCONTINUED | OUTPATIENT
Start: 2019-12-10 | End: 2019-12-17 | Stop reason: HOSPADM

## 2019-12-10 RX ORDER — SEVELAMER CARBONATE 800 MG/1
800 TABLET, FILM COATED ORAL
Status: DISCONTINUED | OUTPATIENT
Start: 2019-12-10 | End: 2019-12-12

## 2019-12-10 RX ORDER — SIROLIMUS 1 MG/1
2 TABLET, FILM COATED ORAL DAILY
Status: DISCONTINUED | OUTPATIENT
Start: 2019-12-11 | End: 2019-12-17 | Stop reason: HOSPADM

## 2019-12-10 RX ORDER — ROSUVASTATIN CALCIUM 20 MG/1
20 TABLET, COATED ORAL DAILY
Status: DISCONTINUED | OUTPATIENT
Start: 2019-12-11 | End: 2019-12-17 | Stop reason: HOSPADM

## 2019-12-10 RX ORDER — CINACALCET 30 MG/1
60 TABLET, FILM COATED ORAL
Status: DISCONTINUED | OUTPATIENT
Start: 2019-12-11 | End: 2019-12-17 | Stop reason: HOSPADM

## 2019-12-10 RX ORDER — IBUPROFEN 200 MG
24 TABLET ORAL
Status: DISCONTINUED | OUTPATIENT
Start: 2019-12-10 | End: 2019-12-17 | Stop reason: HOSPADM

## 2019-12-10 RX ORDER — ATOVAQUONE 750 MG/5ML
1500 SUSPENSION ORAL DAILY
Status: DISCONTINUED | OUTPATIENT
Start: 2019-12-11 | End: 2019-12-17 | Stop reason: HOSPADM

## 2019-12-10 RX ORDER — IBUPROFEN 200 MG
16 TABLET ORAL
Status: DISCONTINUED | OUTPATIENT
Start: 2019-12-10 | End: 2019-12-17 | Stop reason: HOSPADM

## 2019-12-10 RX ADMIN — NIFEDIPINE 60 MG: 30 TABLET, FILM COATED, EXTENDED RELEASE ORAL at 08:12

## 2019-12-10 RX ADMIN — APIXABAN 5 MG: 5 TABLET, FILM COATED ORAL at 08:12

## 2019-12-10 RX ADMIN — FUROSEMIDE 40 MG: 10 INJECTION, SOLUTION INTRAMUSCULAR; INTRAVENOUS at 02:12

## 2019-12-10 RX ADMIN — FUROSEMIDE 40 MG: 10 INJECTION, SOLUTION INTRAMUSCULAR; INTRAVENOUS at 06:12

## 2019-12-10 RX ADMIN — SEVELAMER CARBONATE 800 MG: 800 TABLET, FILM COATED ORAL at 04:12

## 2019-12-10 RX ADMIN — SODIUM BICARBONATE 650 MG TABLET 1950 MG: at 08:12

## 2019-12-10 RX ADMIN — CARVEDILOL 25 MG: 25 TABLET, FILM COATED ORAL at 04:12

## 2019-12-10 NOTE — PLAN OF CARE
12/10/19 1552   Post-Acute Status   Post-Acute Authorization Placement;Other   Post-Acute Placement Status Awaiting Internal Medical Clearance   Other Status See Comments     Patient is not medically ready at this time, Post acute needs to be determined. SW will continue to follow up.    Cordelia Mathew LMSW  [facility]   [id number]

## 2019-12-10 NOTE — ASSESSMENT & PLAN NOTE
- KTM consulted. Recs appreciated  - Will continue Sirolimus 2mg BID with levels daily  - Prednisone 5mg Daily.

## 2019-12-10 NOTE — SUBJECTIVE & OBJECTIVE
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 Hematoma evacuated.(09/09)    Status post exploratory laparotomy 9/10/2017    Re explored for retroperitoneal hematoma. Hematoma evacuated.(09/09)    TB lung, latent     tx INH 2006       Past Surgical History:   Procedure Laterality Date    BIOPSY N/A 8/28/2018    Procedure: BIOPSY Tranplanted Kidney;  Surgeon: [facility] Diagnostic Provider;  [facility];  Service: Radiology;  Laterality: N/A;    EXPLORATORY LAPAROTOMY W/ BOWEL RESECTION      NO BOWEL RESECTION, UNKNOWN DETAILS, "Pancreas Infection"    INSERTION OF TUNNELED CENTRAL VENOUS CATHETER (CVC) WITH SUBCUTANEOUS PORT Left 8/19/2019    Procedure: [id number];  Surgeon: Megha Monreal MD;  [facility];  Service: General;  Laterality: Left;    INSERTION OF TUNNELED CENTRAL VENOUS CATHETER (CVC) WITH SUBCUTANEOUS PORT N/A 9/12/2019    Procedure: [id number];  Surgeon: [facility] Diagnostic Provider;  Location: [facility] OR [facility]ND [facility];  Service: Radiology;  Laterality: N/A;  189  port removal and port placement    KIDNEY TRANSPLANT      peritoneal dialysis catheter placement         Review of patient's allergies indicates:  No Known Allergies    No current facility-administered medications on file prior to encounter.      Current Outpatient Medications on File Prior to Encounter   Medication Sig    apixaban (ELIQUIS) 5 mg (74 tabs) DsPk For the first 7 days take two 5 mg tablets twice daily.  After 7 days take one 5 mg tablet twice daily.    atovaquone (MEPRON) 750 mg/5 mL Susp Take " 10 mLs (1,500 mg total) by mouth once daily.    calcium carbonate (TUMS) 200 mg calcium (500 mg) chewable tablet Take 1 tablet (500 mg total) by mouth once daily.    carvedilol (COREG) 25 MG tablet Take 1 tablet (25 mg total) by mouth 2 (two) times daily with meals.    cinacalcet (SENSIPAR) 60 MG Tab Take 1 tablet (60 mg total) by mouth daily with breakfast.    dolutegravir (TIVICAY) 50 mg Tab Take 1 tablet (50 mg total) by mouth once daily.    emtricitabine-tenofovir alafen (DESCOVY) 200-25 mg Tab Take 1 tablet by mouth once daily.    famotidine (PEPCID) 20 MG tablet TAKE 1 TABLET (20MG TOTAL) BY MOUTH EACH EVENING    furosemide (LASIX) 20 MG tablet Take 1 tablet (20 mg total) by mouth once daily. May need to increase based on weight change.    hydrALAZINE (APRESOLINE) 25 MG tablet Take 1 tablet (25 mg total) by mouth every 8 (eight) hours.    HYDROcodone-acetaminophen (NORCO) 5-325 mg per tablet Take 1 tablet by mouth every 4 (four) hours as needed for Pain.    multivitamin (THERAGRAN) tablet Take 1 tablet by mouth once daily.    NIFEdipine (PROCARDIA-XL) 60 MG (OSM) 24 hr tablet Take 1 tablet (60 mg total) by mouth every 12 (twelve) hours.    patiromer calcium sorbitex (VELTASSA) 25.2 gram PwPk Take 1 packet (25.2 g total) by mouth once daily.    predniSONE (DELTASONE) 5 MG tablet TAKE ONE TABLET BY MOUTH EVERY DAY    rosuvastatin (CRESTOR) 20 MG tablet Take 1 tablet (20 mg total) by mouth once daily.    sevelamer carbonate (RENVELA) 800 mg Tab Take 1 tablet (800 mg total) by mouth 3 (three) times daily with meals.    sirolimus (RAPAMUNE) 2 MG tablet Take 1 tablet (2 mg total) by mouth once daily.    sodium bicarbonate 650 MG tablet Take 3 tablets (1,950 mg total) by mouth 3 (three) times daily.     Family History     Problem Relation (Age of Onset)    Cancer Father    Diabetes Maternal Aunt, Maternal Uncle    Heart disease Paternal Grandmother    Hyperlipidemia Mother    Hypertension Maternal  Grandmother, Paternal Grandmother    Lung cancer Father    No Known Problems Brother        Tobacco Use    Smoking status: Former Smoker     Packs/day: 0.50     Types: Cigarettes    Smokeless tobacco: Never Used    Tobacco comment: quit smoking 5 years ago   Substance and Sexual Activity    Alcohol use: No    Drug use: No     Types: Cocaine, Marijuana     Comment: last use for cocaine at age 17; 2 years ago last use for THC    Sexual activity: Not Currently     Review of Systems   Constitutional: Negative for activity change, appetite change, chills, diaphoresis, fatigue and fever.   Respiratory: Positive for shortness of breath. Negative for cough, choking, chest tightness and stridor.    Cardiovascular: Positive for leg swelling (R>L (due to cancer on the right side)). Negative for chest pain.   Gastrointestinal: Negative for blood in stool, diarrhea, nausea and vomiting. Abdominal pain: LLQ.   Genitourinary: Negative for difficulty urinating, dysuria and frequency.   Musculoskeletal: Negative for arthralgias and myalgias.   Skin: Negative for pallor and rash.   Neurological: Negative for dizziness, light-headedness and headaches.     Objective:     Vital Signs (Most Recent):  Temp: 97.5 °F (36.4 °C) (12/10/19 1211)  Pulse: 86 (12/10/19 1312)  Resp: 20 (12/10/19 1211)  BP: 139/84 (12/10/19 1312)  SpO2: 99 % (12/10/19 1312) Vital Signs (24h Range):  Temp:  [97.5 °F (36.4 °C)] 97.5 °F (36.4 °C)  Pulse:  [80-86] 86  Resp:  [20] 20  SpO2:  [99 %] 99 %  BP: (139-184)/() 139/84     Weight: 81.6 kg (179 lb 14.3 oz)  Body mass index is 27.35 kg/m².    Physical Exam   Constitutional: He is oriented to person, place, and time. He appears well-developed and well-nourished.   HENT:   Head: Normocephalic and atraumatic.   Elevated JVD    Eyes: Pupils are equal, round, and reactive to light. EOM are normal.   Neck: Normal range of motion. Neck supple. No tracheal deviation present. No thyromegaly present.    Cardiovascular: Normal rate and regular rhythm. Exam reveals no friction rub.   No murmur heard.  Pulmonary/Chest: Effort normal and breath sounds normal. No respiratory distress. He has no wheezes. He exhibits no tenderness.   Course crackles bilateral lower lobes   Abdominal: Soft. Bowel sounds are normal. He exhibits distension. There is no tenderness.   Musculoskeletal: Normal range of motion. He exhibits no edema.   Neurological: He is alert and oriented to person, place, and time.   Skin: Skin is warm and dry. Capillary refill takes less than 2 seconds. No rash noted. No erythema.   3+ Pitting edema up to the hip bilaterally. 2+ pitting edema of the upper extremities bilaterally.          CRANIAL NERVES     CN III, IV, VI   Pupils are equal, round, and reactive to light.  Extraocular motions are normal.      Significant Labs:   BMP:   Recent Labs   Lab 12/10/19  1245   GLU 98      K 4.0      CO2 25   BUN 48*   CREATININE 2.9*   CALCIUM 8.4*   MG 1.9     CBC:   Recent Labs   Lab 12/10/19  1245   WBC 5.26   HGB 7.6*   HCT 25.1*   *     Significant Imaging: CXR: I have reviewed all pertinent results/findings within the past 24 hours and my personal findings are:  Bilateral pulmonary edema

## 2019-12-10 NOTE — HPI
Demetrio Aguiar is a 39 y.o. 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 Reports he was recently in the hospital where he was treated for pneumonia. During that admission, the patient received fluids for his infection as well as antibiotics. He reports since his pneumonia has been properly treated and his cough resolved, however he noticed worsening lower extremity swelling. Reports over the past week, he's gained weight (dry weight ~155-160) and is now up to 180lbs. He has not been compliant with a low salt diet. He reports this morning his swelling worsened to his upper extremities and he began complaining of MARY. However, he was able to walk from the parking garage to the ER before complaining of SOB. He denies any cough, fevers, chills, n/v, headaches, vision changes or weakness.    He was recently seen by Dr. Mejias (cardiology) in clinic on 12/2 where he noticed significant signs of HF on exam including 4+ pitting edema of the lower extremities and elevated JVD. He was started on lasix 20mg during that time.     Had a 2D echo performed on 11/26 which showed mildly decreased left ventricular systolic function. The estimated ejection fraction is 40%. QEF 40%. Global hypokinetic wall motion. (EF 55% in Aug-2019).     In the ED, the patient was found to have an elevated BNP and pulmonary edema on CXR.

## 2019-12-10 NOTE — ASSESSMENT & PLAN NOTE
Patient presenting with progressively worsening lower extremity swelling, MARY and inability to lay flat. Recent diagnosis of HF over the past few months possibility 2/2 chemo. Seen by Cardiology who recently started the patient on Lasix.     PLAN:   - lasix 40mg IV BID.   - Will continue patient's BB which he has been taking at home.   - Strict I/O   - Fluid restriction 1500ml

## 2019-12-10 NOTE — H&P
"[facility] Medicine  History & Physical    Patient Name: Demetrio Aguiar  MRN: [id number]  Admission Date: 12/10/2019  Attending Physician: Shira Guillen MD   Primary Care Provider: Primary Doctor [facility] Medicine Team: [facility] HOSP MED  Otf Ward MD     Patient information was obtained from patient and ER records.     Subjective:     Principal Problem:Acute on chronic systolic heart failure    Chief Complaint:   Chief Complaint   Patient presents with    Leg Swelling     Pt c/o "having alot of swelling on me".  States unable to lay flat.   Pt had kidney tx 2017.         HPI: Demetrio Aguiar is a 39 y.o. 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 Reports he was recently in the hospital where he was treated for pneumonia. During that admission, the patient received fluids for his infection as well as antibiotics. He reports since his pneumonia has been properly treated and his cough resolved, however he noticed worsening lower extremity swelling. Reports over the past week, he's gained weight (dry weight ~155-160) and is now up to 180lbs. He has not been compliant with a low salt diet. He reports this morning his swelling worsened to his upper extremities and he began complaining of MARY. However, he was able to walk from the parking garage to the ER before complaining of SOB. He denies any cough, fevers, chills, n/v, headaches, vision changes or weakness.    He was recently seen by Dr. Mejias (cardiology) in clinic on 12/2 where he noticed significant signs of HF on exam including 4+ pitting edema of the lower extremities and elevated JVD. He was started on lasix 20mg during that time.     Had a 2D echo performed on 11/26 which showed mildly " "decreased left ventricular systolic function. The estimated ejection fraction is 40%. QEF 40%. Global hypokinetic wall motion. (EF 55% in Aug-2019).     In the ED, the patient was found to have an elevated BNP and pulmonary edema on CXR.     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 Hematoma evacuated.(09/09)    Status post exploratory laparotomy 9/10/2017    Re explored for retroperitoneal hematoma. Hematoma evacuated.(09/09)    TB lung, latent     tx INH 2006       Past Surgical History:   Procedure Laterality Date    BIOPSY N/A 8/28/2018    Procedure: BIOPSY Tranplanted Kidney;  Surgeon: Charly Diagnostic Provider;  Location: [facility] OR [address] [place] [address];  Service: Radiology;  Laterality: N/A;    EXPLORATORY LAPAROTOMY W/ BOWEL RESECTION      NO BOWEL RESECTION, UNKNOWN DETAILS, "Pancreas Infection"    INSERTION OF TUNNELED CENTRAL VENOUS CATHETER (CVC) WITH SUBCUTANEOUS PORT Left 8/19/2019    Procedure: [id number];  Surgeon: Megha Monreal MD;  Location: [facility] OR [address] [place] [address];  Service: General;  Laterality: Left;    INSERTION OF TUNNELED CENTRAL VENOUS CATHETER (CVC) WITH SUBCUTANEOUS PORT N/A 9/12/2019    Procedure: [id number];  Surgeon: Charly Diagnostic Provider;  [facility];  Service: Radiology;  Laterality: N/A;  189  port removal and port placement    KIDNEY TRANSPLANT      peritoneal dialysis catheter placement         Review of patient's allergies indicates:  No Known Allergies    No current facility-administered medications on file prior to encounter.      Current Outpatient " Medications on File Prior to Encounter   Medication Sig    apixaban (ELIQUIS) 5 mg (74 tabs) DsPk For the first 7 days take two 5 mg tablets twice daily.  After 7 days take one 5 mg tablet twice daily.    atovaquone (MEPRON) 750 mg/5 mL Susp Take 10 mLs (1,500 mg total) by mouth once daily.    calcium carbonate (TUMS) 200 mg calcium (500 mg) chewable tablet Take 1 tablet (500 mg total) by mouth once daily.    carvedilol (COREG) 25 MG tablet Take 1 tablet (25 mg total) by mouth 2 (two) times daily with meals.    cinacalcet (SENSIPAR) 60 MG Tab Take 1 tablet (60 mg total) by mouth daily with breakfast.    dolutegravir (TIVICAY) 50 mg Tab Take 1 tablet (50 mg total) by mouth once daily.    emtricitabine-tenofovir alafen (DESCOVY) 200-25 mg Tab Take 1 tablet by mouth once daily.    famotidine (PEPCID) 20 MG tablet TAKE 1 TABLET (20MG TOTAL) BY MOUTH EACH EVENING    furosemide (LASIX) 20 MG tablet Take 1 tablet (20 mg total) by mouth once daily. May need to increase based on weight change.    hydrALAZINE (APRESOLINE) 25 MG tablet Take 1 tablet (25 mg total) by mouth every 8 (eight) hours.    HYDROcodone-acetaminophen (NORCO) 5-325 mg per tablet Take 1 tablet by mouth every 4 (four) hours as needed for Pain.    multivitamin (THERAGRAN) tablet Take 1 tablet by mouth once daily.    NIFEdipine (PROCARDIA-XL) 60 MG (OSM) 24 hr tablet Take 1 tablet (60 mg total) by mouth every 12 (twelve) hours.    patiromer calcium sorbitex (VELTASSA) 25.2 gram PwPk Take 1 packet (25.2 g total) by mouth once daily.    predniSONE (DELTASONE) 5 MG tablet TAKE ONE TABLET BY MOUTH EVERY DAY    rosuvastatin (CRESTOR) 20 MG tablet Take 1 tablet (20 mg total) by mouth once daily.    sevelamer carbonate (RENVELA) 800 mg Tab Take 1 tablet (800 mg total) by mouth 3 (three) times daily with meals.    sirolimus (RAPAMUNE) 2 MG tablet Take 1 tablet (2 mg total) by mouth once daily.    sodium bicarbonate 650 MG tablet Take 3 tablets  (1,950 mg total) by mouth 3 (three) times daily.     Family History     Problem Relation (Age of Onset)    Cancer Father    Diabetes Maternal Aunt, Maternal Uncle    Heart disease Paternal Grandmother    Hyperlipidemia Mother    Hypertension Maternal Grandmother, Paternal Grandmother    Lung cancer Father    No Known Problems Brother        Tobacco Use    Smoking status: Former Smoker     Packs/day: 0.50     Types: Cigarettes    Smokeless tobacco: Never Used    Tobacco comment: quit smoking 5 years ago   Substance and Sexual Activity    Alcohol use: No    Drug use: No     Types: Cocaine, Marijuana     Comment: last use for cocaine at age 17; 2 years ago last use for THC    Sexual activity: Not Currently     Review of Systems   Constitutional: Negative for activity change, appetite change, chills, diaphoresis, fatigue and fever.   Respiratory: Positive for shortness of breath. Negative for cough, choking, chest tightness and stridor.    Cardiovascular: Positive for leg swelling (R>L (due to cancer on the right side)). Negative for chest pain.   Gastrointestinal: Negative for blood in stool, diarrhea, nausea and vomiting. Abdominal pain: LLQ.   Genitourinary: Negative for difficulty urinating, dysuria and frequency.   Musculoskeletal: Negative for arthralgias and myalgias.   Skin: Negative for pallor and rash.   Neurological: Negative for dizziness, light-headedness and headaches.     Objective:     Vital Signs (Most Recent):  Temp: 97.5 °F (36.4 °C) (12/10/19 1211)  Pulse: 86 (12/10/19 1312)  Resp: 20 (12/10/19 1211)  BP: 139/84 (12/10/19 1312)  SpO2: 99 % (12/10/19 1312) Vital Signs (24h Range):  Temp:  [97.5 °F (36.4 °C)] 97.5 °F (36.4 °C)  Pulse:  [80-86] 86  Resp:  [20] 20  SpO2:  [99 %] 99 %  BP: (139-184)/() 139/84     Weight: 81.6 kg (179 lb 14.3 oz)  Body mass index is 27.35 kg/m².    Physical Exam   Constitutional: He is oriented to person, place, and time. He appears well-developed and  well-nourished.   HENT:   Head: Normocephalic and atraumatic.   Elevated JVD    Eyes: Pupils are equal, round, and reactive to light. EOM are normal.   Neck: Normal range of motion. Neck supple. No tracheal deviation present. No thyromegaly present.   Cardiovascular: Normal rate and regular rhythm. Exam reveals no friction rub.   No murmur heard.  Pulmonary/Chest: Effort normal and breath sounds normal. No respiratory distress. He has no wheezes. He exhibits no tenderness.   Course crackles bilateral lower lobes   Abdominal: Soft. Bowel sounds are normal. He exhibits distension. There is no tenderness.   Musculoskeletal: Normal range of motion. He exhibits no edema.   Neurological: He is alert and oriented to person, place, and time.   Skin: Skin is warm and dry. Capillary refill takes less than 2 seconds. No rash noted. No erythema.   3+ Pitting edema up to the hip bilaterally. 2+ pitting edema of the upper extremities bilaterally.          CRANIAL NERVES     CN III, IV, VI   Pupils are equal, round, and reactive to light.  Extraocular motions are normal.      Significant Labs:   BMP:   Recent Labs   Lab 12/10/19  1245   GLU 98      K 4.0      CO2 25   BUN 48*   CREATININE 2.9*   CALCIUM 8.4*   MG 1.9     CBC:   Recent Labs   Lab 12/10/19  1245   WBC 5.26   HGB 7.6*   HCT 25.1*   *     Significant Imaging: CXR: I have reviewed all pertinent results/findings within the past 24 hours and my personal findings are:  Bilateral pulmonary edema    Assessment/Plan:     * Acute on chronic systolic heart failure  Patient presenting with progressively worsening lower extremity swelling, MARY and inability to lay flat. Recent diagnosis of HF over the past few months possibility 2/2 chemo. Seen by Cardiology who recently started the patient on Lasix.     PLAN:   - lasix 40mg IV BID.   - Will continue patient's BB which he has been taking at home.   - Strict I/O   - Fluid restriction 1500ml     Acute DVT (deep  venous thrombosis)  Patient w/ hx of proximal right subclavian vein dvt found on US during last admission on 2019  - Will continue Eliquis 5mg BID.     Renovascular hypertension  - patient takes Coreg 25mg and Nifedipine 60mg at home   - Will continue and monitor.     Kaposi sarcoma  Hx of Kaposi Sarcoma with treatment with chemo.   - 4x treatment with Doxorubicin.   - Stable. Will monitor.     LEFTY (acute kidney injury)  Patient with hx of renal transplant presenting with LEFTY. Ddx include cardiorenal syndrome vs nephrotic syndrome vs ATN vs transplant rejection. Cr baseline 1.8-2.2 but presented to the ED with Cr of 2.9. With recent weight gain, elevated JVD and lower extremity swelling, highly suggestive of HF. However, unable to rule out secondary causes.     PLAN:   - UA to monitor for proteins/RBC in urine  - Holding nephrotoxic medications  - Lasix 40mg BID.       Prophylactic immunotherapy  Patient's last CD4 count on  at 63.   - Started on Atovaquone for ppx.   - Will continue inpatient.        donor kidney transplant 2017  - KTM consulted. Recs appreciated  - Will continue Sirolimus 2mg BID with levels daily  - Prednisone 5mg Daily.     HIV (human immunodeficiency virus infection)  - Will hold HAART medications while inpatient.     Hyperlipidemia  - Continue Crestor 20mg Daily.       VTE Risk Mitigation (From admission, onward)         Ordered     apixaban tablet 5 mg  2 times daily      12/10/19 1619     IP VTE LOW RISK PATIENT  Once      12/10/19 1537              Otf Ward MD  Department of Hospital Medicine   [facility]

## 2019-12-10 NOTE — ED NOTES
Demetrio Aguiar, a 39 y.o. male presents to the ED via personal transporation with CC patient is complaining of SOB.  States he has gained 15 lbs in the past week.  Patient states that he is unable to lay flat due to difficulty breathing.  History of kidney transplant in 2016, cancer, and states he was recently diagnosed with a blood clot to the right arm         Patient identifiers verified verbally with patient and correct for Demetrio Aguiar.    LOC/ APPEARANCE: The patient is AAOx4. Pt is speaking appropriately, no slurred speech. Pt changed into hospital gown. Continuous cardiac monitor, cont pulse ox, and auto BP cuff applied to patient. Pt is clean and well groomed. No JVD visible. Pt reports pain level of 0. Pt updated on POC. Bed low and locked with side rails up x2, call bell in pt reach.  SKIN: Skin is warm dry and intact, and color is consistent with ethnicity. Capillary refill <3 seconds. No breakdown or brusing visible. Mucus membranes moist, acyanotic.  RESPIRATORY: Airway is open and patent. Crackles noted to the right lung on inspiration No accessory muscle use noted. Lungs clear to auscultation in all fields bilaterally anterior and posterior.   CARDIAC: Patient has regular heart rate.  +2 edema to lower legs.   ABDOMEN: Soft and non-tender to palpation with no distention noted. Normoactive bowel sounds x4 quadrants. Pt has no complaints of abnormal bowel movements, denies blood in stool. Pt reports normal appetite.   NEUROLOGIC: Eyes open spontaneously and facial expression symmetrical. Pt behavior appropriate to situation, and pt follows commands. Pt reports sensation present in all extremities when touched with a finger, denies any numbness or tingling. PERRLA  MUSCULOSKELETAL: Spontaneous movement noted to all extremities. Hand  equal and leg strength strong +5 bilaterally.   : No complaints of frequency, burning, urgency or blood in the urine. No complaints of incontinence.

## 2019-12-10 NOTE — ASSESSMENT & PLAN NOTE
Hx of Kaposi Sarcoma with treatment with chemo.   - 4x treatment with Doxorubicin.   - Stable. Will monitor.

## 2019-12-10 NOTE — ASSESSMENT & PLAN NOTE
Patient's last CD4 count on 11/13 at 62.   - Started on Atovaquone for ppx.   - Will continue inpatient.

## 2019-12-10 NOTE — ASSESSMENT & PLAN NOTE
Patient w/ hx of proximal right subclavian vein dvt found on US during last admission on 11/13/2019  - Will continue Eliquis 5mg BID.

## 2019-12-10 NOTE — ED PROVIDER NOTES
"Encounter Date: 12/10/2019       History     Chief Complaint   Patient presents with    Leg Swelling     Pt c/o "having alot of swelling on me".  States unable to lay flat.   Pt had kidney tx 2017.      39-year-old male with a history of kidney transplant presents with bilateral lower extremity swelling and shortness of breath. Symptoms worse over the last week.  He saw cardiology last week was started on Lasix.  He takes 20 mg once a day.  He has not been formally diagnosed with congestive heart failure.  He says his transplant has been working adequately.  He denies nausea, vomiting, diarrhea, fever, cough, chest pain, abdominal pain, or dysuria.  A ten point review of systems was completed and is negative except as documented above.  Patient denies any other acute medical complaint.  The patients available PMH, PSH, Social History, medications, allergies, and triage vital signs were reviewed just prior to their medical evaluation.        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 Hematoma evacuated.(09/09)    Status post exploratory laparotomy 9/10/2017    Re explored for retroperitoneal hematoma. Hematoma evacuated.(09/09)    TB lung, latent     tx INH 2006     Past Surgical History:   Procedure Laterality Date    BIOPSY N/A 8/28/2018    Procedure: BIOPSY Tranplanted Kidney;  Surgeon: Charly Diagnostic Provider;  Location: [facility] OR [address] [place] [address];  Service: Radiology;  Laterality: N/A;    EXPLORATORY LAPAROTOMY W/ BOWEL RESECTION      NO BOWEL RESECTION, UNKNOWN DETAILS, " ""Pancreas Infection"    INSERTION OF TUNNELED CENTRAL VENOUS CATHETER (CVC) WITH SUBCUTANEOUS PORT Left 8/19/2019    Procedure: [id number];  Surgeon: Megha Monreal MD;  Location: [facility] OR [address] [place] [address];  Service: General;  Laterality: Left;    INSERTION OF TUNNELED CENTRAL VENOUS CATHETER (CVC) WITH SUBCUTANEOUS PORT N/A 9/12/2019    Procedure: [id number];  Surgeon: Charly Diagnostic Provider;  Location: [facility] OR [address] [place] [address];  Service: Radiology;  Laterality: N/A;  189  port removal and port placement    KIDNEY TRANSPLANT      peritoneal dialysis catheter placement       Family History   Problem Relation Age of Onset    Hyperlipidemia Mother     Cancer Father         lung    Lung cancer Father     No Known Problems Brother     Diabetes Maternal Aunt     Diabetes Maternal Uncle     Hypertension Maternal Grandmother     Hypertension Paternal Grandmother     Heart disease Paternal Grandmother      Social History     Tobacco Use    Smoking status: Former Smoker     Packs/day: 0.50     Types: Cigarettes    Smokeless tobacco: Never Used    Tobacco comment: quit smoking 5 years ago   Substance Use Topics    Alcohol use: No    Drug use: No     Types: Cocaine, Marijuana     Comment: last use for cocaine at age 17; 2 years ago last use for THC     Review of Systems   Constitutional: Negative for fever.   HENT: Negative for sore throat.    Eyes: Negative for visual disturbance.   Respiratory: Positive for shortness of breath. Negative for cough.    Cardiovascular: Positive for leg swelling. Negative for chest pain.   Gastrointestinal: Negative for abdominal pain, diarrhea, nausea and vomiting.   Genitourinary: Negative for dysuria.   Musculoskeletal: Negative for neck pain.   Skin: Negative for rash and wound.   Allergic/Immunologic: Negative for immunocompromised state.   Neurological: Negative for syncope.   Psychiatric/Behavioral: Negative for confusion.   All other systems reviewed and " are negative.      Physical Exam     Initial Vitals [12/10/19 1211]   BP Pulse Resp Temp SpO2   (!) 184/103 80 20 97.5 °F (36.4 °C) 99 %      MAP       --         Physical Exam    Nursing note and vitals reviewed.  Constitutional: He appears well-developed and well-nourished. He is not diaphoretic. No distress.   HENT:   Head: Normocephalic and atraumatic.   Nose: Nose normal.   Eyes: Conjunctivae are normal. Right eye exhibits no discharge. Left eye exhibits no discharge.   Neck: Normal range of motion. Neck supple.   Cardiovascular: Normal rate, regular rhythm and normal heart sounds. Exam reveals no gallop and no friction rub.    No murmur heard.  Pulmonary/Chest: No respiratory distress. He has no wheezes. He has rhonchi. He has rales.   Diffuse crackles   Abdominal: Soft. He exhibits no distension. There is no tenderness. There is no rebound and no guarding.   Musculoskeletal: Normal range of motion. He exhibits edema. He exhibits no tenderness.   2+ BLE edema   Neurological: He is alert and oriented to person, place, and time. He has normal strength. GCS score is 15. GCS eye subscore is 4. GCS verbal subscore is 5. GCS motor subscore is 6.   Skin: Skin is warm and dry. No rash noted. No erythema.   Psychiatric: He has a normal mood and affect. His behavior is normal. Judgment and thought content normal.         ED Course   Procedures  Labs Reviewed   CBC W/ AUTO DIFFERENTIAL - Abnormal; Notable for the following components:       Result Value    RBC 3.12 (*)     Hemoglobin 7.6 (*)     Hematocrit 25.1 (*)     Mean Corpuscular Volume 80 (*)     Mean Corpuscular Hemoglobin 24.4 (*)     Mean Corpuscular Hemoglobin Conc 30.3 (*)     RDW 19.3 (*)     Platelets 144 (*)     Lymph # 0.8 (*)     Gran% 74.5 (*)     Lymph% 15.4 (*)     All other components within normal limits   COMPREHENSIVE METABOLIC PANEL - Abnormal; Notable for the following components:    BUN, Bld 48 (*)     Creatinine 2.9 (*)     Calcium 8.4 (*)      Albumin 3.4 (*)     eGFR if  29.9 (*)     eGFR if non  25.9 (*)     All other components within normal limits   B-TYPE NATRIURETIC PEPTIDE - Abnormal; Notable for the following components:    BNP 1,074 (*)     All other components within normal limits   TROPONIN I   PROTIME-INR   MAGNESIUM   TACROLIMUS LEVEL   HEMOGLOBIN A1C   URINALYSIS   POCT GLUCOSE, HAND-HELD DEVICE     EKG Readings: (Independently Interpreted)   Initial Reading: No STEMI. Rhythm: Normal Sinus Rhythm. Heart Rate: 83. Ectopy: No Ectopy. ST Segments: Normal ST Segments.   LAE, IRBBB, nonspecific T wave changes     ECG Results          EKG 12-lead (Final result)  Result time 12/10/19 16:22:36    Final result by Interface, Lab In [facility] (12/10/19 16:22:36)                 Narrative:    Test Reason : R06.02,    Vent. Rate : 083 BPM     Atrial Rate : 083 BPM     P-R Int : 174 ms          QRS Dur : 094 ms      QT Int : 342 ms       P-R-T Axes : 053 016 084 degrees     QTc Int : 401 ms      Normal sinus rhythm  Possible Left atrial enlargement  Incomplete right bundle branch block  Nonspecific T wave abnormality  Abnormal ECG  When compared with ECG of 12-NOV-2019 04:32,  Nonspecific T wave abnormality, worse in Lateral leads  Confirmed by SHE FAULKNER MD ([id number]) on 12/10/2019 4:22:27 PM    Referred By:             Confirmed By:SHE FAULKNER MD                            Imaging Results          X-Ray Chest AP Portable (Final result)  Result time 12/10/19 13:02:06    Final result by Sima Harding MD (12/10/19 13:02:06)                 Impression:      Abnormal chest radiograph with persistent opacification of the perihilar portion of the right lung.  Please see discussion above.      Electronically signed by: Sima Harding MD  Date:    12/10/2019  Time:    13:02             Narrative:    EXAMINATION:  XR CHEST AP PORTABLE    CLINICAL HISTORY:  CHF;    TECHNIQUE:  Single frontal view of the chest was  performed.    COMPARISON:  Chest radiographs: 11/15/2019.  11/12/2019.  08/19/2019.  07/17/2019.  11/30/2016.    PET/CT: 07/31/2019.    CT of the neck and chest: 07/18/2019.    FINDINGS:  Port in the left anterior chest wall has attached catheter that extends to overlie the junction of left and right innominate veins.    Mediastinal structures are midline.  Cardiomediastinal silhouette appears similar to 11/15/2019.  CT of the chest and neck performed 07/18/2019 innumerable prominent mediastinal lymph nodes some of which appeared mildly enlarged.    The left lung appears clear on today's study and on prior examinations dated 11/15/2019, 11/12/2019, 08/19/2019 and 07/17/2019.    However there is patchy heterogeneous subsegmental opacity in the right lung on all of those examinations.  The extent of parenchymal opacification was greatest on 11/12/2019 where increased attenuation obscure detail of pulmonary vascular markings in the right mid and lower lung zones.  However there is persistent patchy heterogeneous opacity on today's study.  Prior examinations including CT of 07/18/2019 showed perihilar soft tissue attenuation not excluded on the current examination.  Asymmetry of distribution is atypical for pulmonary edema although it is not excluded especially in patients with mitral insufficiency if there is asymmetric jet.  Persistence of lung disease over a period of 4 months argues against aspiration or pneumonia.  Right hilar and perihilar malignancy is not excluded.    On this supine examination I detect no convincing evidence of pleural fluid and no pneumothorax, pneumomediastinum, pneumoperitoneum or significant osseous abnormality.                              X-Rays:   Independently Interpreted Readings:   Other Readings:  Reviewed CXR image    Medical Decision Making:   History:   Old Medical Records: I decided to obtain old medical records.  Independently Interpreted Test(s):   I have ordered and  independently interpreted X-rays - see prior notes.  I have ordered and independently interpreted EKG Reading(s) - see prior notes  Clinical Tests:   Lab Tests: Ordered and Reviewed  Radiological Study: Ordered and Reviewed  Medical Tests: Ordered and Reviewed  ED Management:  39-year-old male with a history of kidney transplant presents with bilateral lower extremity edema and shortness of breath.  Vitals with hypertension.  Physical exam as above.  Labs with elevated BNP and anemia.  No indication for transfusion in ED.  Treated with lasix.  Suspect CHF exacerbation.  D/W Onc.  Admitted to IM.  Did bedside teaching.  All questions answered.  Patient acknowledges understanding.  Other:   I have discussed this case with another health care provider.       <> Summary of the Discussion: Onc.  IM, admission                                 Clinical Impression:   Final diagnoses:  [R06.02] Shortness of breath  [R60.0] Bilateral lower extremity edema (Primary)  [Z85.9] History of Kaposi's sarcoma  [B20] HIV infection, unspecified symptom status  [I50.23] Acute on chronic systolic heart failure      Disposition:   Disposition: Admitted  Condition: Stable      Level of Complexity:  High, level 5.               Enrique Monreal MD  12/10/19 0690

## 2019-12-11 DIAGNOSIS — I15.0 RENOVASCULAR HYPERTENSION: ICD-10-CM

## 2019-12-11 DIAGNOSIS — E78.5 HYPERLIPIDEMIA, UNSPECIFIED HYPERLIPIDEMIA TYPE: ICD-10-CM

## 2019-12-11 DIAGNOSIS — Z94.0 KIDNEY REPLACED BY TRANSPLANT: ICD-10-CM

## 2019-12-11 PROBLEM — D50.9 MICROCYTIC ANEMIA: Status: ACTIVE | Noted: 2019-12-11

## 2019-12-11 LAB
ANION GAP SERPL CALC-SCNC: 11 MMOL/L (ref 8–16)
ANION GAP SERPL CALC-SCNC: 8 MMOL/L (ref 8–16)
BASOPHILS # BLD AUTO: 0.01 K/UL (ref 0–0.2)
BASOPHILS NFR BLD: 0.2 % (ref 0–1.9)
BUN SERPL-MCNC: 44 MG/DL (ref 6–20)
BUN SERPL-MCNC: 44 MG/DL (ref 6–20)
CALCIUM SERPL-MCNC: 8 MG/DL (ref 8.7–10.5)
CALCIUM SERPL-MCNC: 8 MG/DL (ref 8.7–10.5)
CHLORIDE SERPL-SCNC: 104 MMOL/L (ref 95–110)
CHLORIDE SERPL-SCNC: 109 MMOL/L (ref 95–110)
CO2 SERPL-SCNC: 26 MMOL/L (ref 23–29)
CO2 SERPL-SCNC: 31 MMOL/L (ref 23–29)
CREAT SERPL-MCNC: 2.8 MG/DL (ref 0.5–1.4)
CREAT SERPL-MCNC: 3 MG/DL (ref 0.5–1.4)
DIFFERENTIAL METHOD: ABNORMAL
EOSINOPHIL # BLD AUTO: 0.1 K/UL (ref 0–0.5)
EOSINOPHIL NFR BLD: 1.6 % (ref 0–8)
ERYTHROCYTE [DISTWIDTH] IN BLOOD BY AUTOMATED COUNT: 19.3 % (ref 11.5–14.5)
EST. GFR  (AFRICAN AMERICAN): 28.7 ML/MIN/1.73 M^2
EST. GFR  (AFRICAN AMERICAN): 31.2 ML/MIN/1.73 M^2
EST. GFR  (NON AFRICAN AMERICAN): 24.8 ML/MIN/1.73 M^2
EST. GFR  (NON AFRICAN AMERICAN): 27 ML/MIN/1.73 M^2
ESTIMATED AVG GLUCOSE: 123 MG/DL (ref 68–131)
FERRITIN SERPL-MCNC: 585 NG/ML (ref 20–300)
GLUCOSE SERPL-MCNC: 165 MG/DL (ref 70–110)
GLUCOSE SERPL-MCNC: 93 MG/DL (ref 70–110)
HAPTOGLOB SERPL-MCNC: 159 MG/DL (ref 30–250)
HBA1C MFR BLD HPLC: 5.9 % (ref 4–5.6)
HCT VFR BLD AUTO: 23.2 % (ref 40–54)
HGB BLD-MCNC: 6.9 G/DL (ref 14–18)
IMM GRANULOCYTES # BLD AUTO: 0.01 K/UL (ref 0–0.04)
IMM GRANULOCYTES NFR BLD AUTO: 0.2 % (ref 0–0.5)
IRON SERPL-MCNC: 42 UG/DL (ref 45–160)
LDH SERPL L TO P-CCNC: 328 U/L (ref 110–260)
LYMPHOCYTES # BLD AUTO: 0.9 K/UL (ref 1–4.8)
LYMPHOCYTES NFR BLD: 17.9 % (ref 18–48)
MAGNESIUM SERPL-MCNC: 1.8 MG/DL (ref 1.6–2.6)
MCH RBC QN AUTO: 24.2 PG (ref 27–31)
MCHC RBC AUTO-ENTMCNC: 29.7 G/DL (ref 32–36)
MCV RBC AUTO: 81 FL (ref 82–98)
MONOCYTES # BLD AUTO: 0.5 K/UL (ref 0.3–1)
MONOCYTES NFR BLD: 9 % (ref 4–15)
NEUTROPHILS # BLD AUTO: 3.6 K/UL (ref 1.8–7.7)
NEUTROPHILS NFR BLD: 71.1 % (ref 38–73)
NRBC BLD-RTO: 0 /100 WBC
PHOSPHATE SERPL-MCNC: 4.5 MG/DL (ref 2.7–4.5)
PLATELET # BLD AUTO: 146 K/UL (ref 150–350)
PMV BLD AUTO: 10.4 FL (ref 9.2–12.9)
POCT GLUCOSE: 119 MG/DL (ref 70–110)
POCT GLUCOSE: 127 MG/DL (ref 70–110)
POCT GLUCOSE: 132 MG/DL (ref 70–110)
POCT GLUCOSE: 180 MG/DL (ref 70–110)
POTASSIUM SERPL-SCNC: 3.7 MMOL/L (ref 3.5–5.1)
POTASSIUM SERPL-SCNC: 3.7 MMOL/L (ref 3.5–5.1)
RBC # BLD AUTO: 2.85 M/UL (ref 4.6–6.2)
RETICS/RBC NFR AUTO: 2 % (ref 0.4–2)
SATURATED IRON: 15 % (ref 20–50)
SIROLIMUS BLD-MCNC: 4 NG/ML (ref 4–20)
SODIUM SERPL-SCNC: 143 MMOL/L (ref 136–145)
SODIUM SERPL-SCNC: 146 MMOL/L (ref 136–145)
TACROLIMUS BLD-MCNC: <1.5 NG/ML (ref 5–15)
TOTAL IRON BINDING CAPACITY: 271 UG/DL (ref 250–450)
TRANSFERRIN SERPL-MCNC: 183 MG/DL (ref 200–375)
WBC # BLD AUTO: 5.09 K/UL (ref 3.9–12.7)

## 2019-12-11 PROCEDURE — 20600001 HC STEP DOWN PRIVATE ROOM

## 2019-12-11 PROCEDURE — 83036 HEMOGLOBIN GLYCOSYLATED A1C: CPT

## 2019-12-11 PROCEDURE — 83540 ASSAY OF IRON: CPT

## 2019-12-11 PROCEDURE — 36415 COLL VENOUS BLD VENIPUNCTURE: CPT

## 2019-12-11 PROCEDURE — 25000003 PHARM REV CODE 250: Performed by: PHYSICIAN ASSISTANT

## 2019-12-11 PROCEDURE — 99233 SBSQ HOSP IP/OBS HIGH 50: CPT | Mod: GC,,, | Performed by: INTERNAL MEDICINE

## 2019-12-11 PROCEDURE — 82728 ASSAY OF FERRITIN: CPT

## 2019-12-11 PROCEDURE — 83010 ASSAY OF HAPTOGLOBIN QUANT: CPT

## 2019-12-11 PROCEDURE — 99233 SBSQ HOSP IP/OBS HIGH 50: CPT | Mod: GC,,, | Performed by: HOSPITALIST

## 2019-12-11 PROCEDURE — 85025 COMPLETE CBC W/AUTO DIFF WBC: CPT

## 2019-12-11 PROCEDURE — 80048 BASIC METABOLIC PNL TOTAL CA: CPT

## 2019-12-11 PROCEDURE — 83735 ASSAY OF MAGNESIUM: CPT

## 2019-12-11 PROCEDURE — 84100 ASSAY OF PHOSPHORUS: CPT

## 2019-12-11 PROCEDURE — 63600175 PHARM REV CODE 636 W HCPCS: Performed by: STUDENT IN AN ORGANIZED HEALTH CARE EDUCATION/TRAINING PROGRAM

## 2019-12-11 PROCEDURE — 99233 PR SUBSEQUENT HOSPITAL CARE,LEVL III: ICD-10-PCS | Mod: GC,,, | Performed by: HOSPITALIST

## 2019-12-11 PROCEDURE — 25000003 PHARM REV CODE 250: Performed by: STUDENT IN AN ORGANIZED HEALTH CARE EDUCATION/TRAINING PROGRAM

## 2019-12-11 PROCEDURE — 99233 PR SUBSEQUENT HOSPITAL CARE,LEVL III: ICD-10-PCS | Mod: GC,,, | Performed by: INTERNAL MEDICINE

## 2019-12-11 PROCEDURE — 80195 ASSAY OF SIROLIMUS: CPT

## 2019-12-11 PROCEDURE — 85045 AUTOMATED RETICULOCYTE COUNT: CPT

## 2019-12-11 RX ORDER — FUROSEMIDE 10 MG/ML
80 INJECTION INTRAMUSCULAR; INTRAVENOUS 2 TIMES DAILY
Status: DISCONTINUED | OUTPATIENT
Start: 2019-12-11 | End: 2019-12-11

## 2019-12-11 RX ORDER — CARVEDILOL 25 MG/1
25 TABLET ORAL 2 TIMES DAILY WITH MEALS
Qty: 60 TABLET | Refills: 0 | OUTPATIENT
Start: 2019-12-11

## 2019-12-11 RX ORDER — FUROSEMIDE 10 MG/ML
80 INJECTION INTRAMUSCULAR; INTRAVENOUS 2 TIMES DAILY
Status: DISCONTINUED | OUTPATIENT
Start: 2019-12-12 | End: 2019-12-13

## 2019-12-11 RX ORDER — HYDRALAZINE HYDROCHLORIDE 25 MG/1
25 TABLET, FILM COATED ORAL EVERY 8 HOURS PRN
Status: DISCONTINUED | OUTPATIENT
Start: 2019-12-11 | End: 2019-12-16

## 2019-12-11 RX ORDER — FUROSEMIDE 10 MG/ML
80 INJECTION INTRAMUSCULAR; INTRAVENOUS 3 TIMES DAILY
Status: DISCONTINUED | OUTPATIENT
Start: 2019-12-11 | End: 2019-12-11

## 2019-12-11 RX ORDER — HYDRALAZINE HYDROCHLORIDE 50 MG/1
50 TABLET, FILM COATED ORAL EVERY 8 HOURS
Status: DISCONTINUED | OUTPATIENT
Start: 2019-12-11 | End: 2019-12-13

## 2019-12-11 RX ORDER — FERROUS SULFATE 325(65) MG
325 TABLET, DELAYED RELEASE (ENTERIC COATED) ORAL 2 TIMES DAILY
Status: DISCONTINUED | OUTPATIENT
Start: 2019-12-11 | End: 2019-12-17 | Stop reason: HOSPADM

## 2019-12-11 RX ORDER — HYDRALAZINE HYDROCHLORIDE 25 MG/1
25 TABLET, FILM COATED ORAL EVERY 8 HOURS
Qty: 90 TABLET | Refills: 0 | OUTPATIENT
Start: 2019-12-11

## 2019-12-11 RX ORDER — NIFEDIPINE 60 MG/1
60 TABLET, EXTENDED RELEASE ORAL EVERY 12 HOURS
Qty: 60 TABLET | Refills: 0 | OUTPATIENT
Start: 2019-12-11

## 2019-12-11 RX ORDER — POTASSIUM CHLORIDE 750 MG/1
30 CAPSULE, EXTENDED RELEASE ORAL ONCE
Status: COMPLETED | OUTPATIENT
Start: 2019-12-11 | End: 2019-12-11

## 2019-12-11 RX ORDER — FUROSEMIDE 20 MG/1
20 TABLET ORAL DAILY
Qty: 60 TABLET | Refills: 11 | OUTPATIENT
Start: 2019-12-11 | End: 2021-11-30

## 2019-12-11 RX ORDER — ROSUVASTATIN CALCIUM 20 MG/1
20 TABLET, COATED ORAL DAILY
Qty: 30 TABLET | Refills: 0 | OUTPATIENT
Start: 2019-12-11

## 2019-12-11 RX ADMIN — APIXABAN 5 MG: 5 TABLET, FILM COATED ORAL at 09:12

## 2019-12-11 RX ADMIN — SEVELAMER CARBONATE 800 MG: 800 TABLET, FILM COATED ORAL at 05:12

## 2019-12-11 RX ADMIN — CARVEDILOL 25 MG: 25 TABLET, FILM COATED ORAL at 05:12

## 2019-12-11 RX ADMIN — HYDRALAZINE HYDROCHLORIDE 50 MG: 50 TABLET ORAL at 09:12

## 2019-12-11 RX ADMIN — FERROUS SULFATE TAB EC 325 MG (65 MG FE EQUIVALENT) 325 MG: 325 (65 FE) TABLET DELAYED RESPONSE at 09:12

## 2019-12-11 RX ADMIN — SODIUM BICARBONATE 650 MG TABLET 1950 MG: at 03:12

## 2019-12-11 RX ADMIN — FUROSEMIDE 80 MG: 10 INJECTION, SOLUTION INTRAMUSCULAR; INTRAVENOUS at 03:12

## 2019-12-11 RX ADMIN — CALCIUM CARBONATE (ANTACID) CHEW TAB 500 MG 500 MG: 500 CHEW TAB at 09:12

## 2019-12-11 RX ADMIN — HYDRALAZINE HYDROCHLORIDE 50 MG: 50 TABLET ORAL at 01:12

## 2019-12-11 RX ADMIN — SODIUM BICARBONATE 650 MG TABLET 1950 MG: at 09:12

## 2019-12-11 RX ADMIN — HYDRALAZINE HYDROCHLORIDE 25 MG: 25 TABLET, FILM COATED ORAL at 12:12

## 2019-12-11 RX ADMIN — SEVELAMER CARBONATE 800 MG: 800 TABLET, FILM COATED ORAL at 11:12

## 2019-12-11 RX ADMIN — ATOVAQUONE 1500 MG: 750 SUSPENSION ORAL at 09:12

## 2019-12-11 RX ADMIN — SIROLIMUS 2 MG: 1 TABLET ORAL at 09:12

## 2019-12-11 RX ADMIN — PREDNISONE 5 MG: 5 TABLET ORAL at 09:12

## 2019-12-11 RX ADMIN — THERA TABS 1 TABLET: TAB at 09:12

## 2019-12-11 RX ADMIN — NIFEDIPINE 60 MG: 30 TABLET, FILM COATED, EXTENDED RELEASE ORAL at 09:12

## 2019-12-11 RX ADMIN — SEVELAMER CARBONATE 800 MG: 800 TABLET, FILM COATED ORAL at 09:12

## 2019-12-11 RX ADMIN — HYDRALAZINE HYDROCHLORIDE 25 MG: 25 TABLET, FILM COATED ORAL at 06:12

## 2019-12-11 RX ADMIN — CINACALCET HYDROCHLORIDE 60 MG: 30 TABLET, FILM COATED ORAL at 11:12

## 2019-12-11 RX ADMIN — FUROSEMIDE 80 MG: 10 INJECTION, SOLUTION INTRAMUSCULAR; INTRAVENOUS at 09:12

## 2019-12-11 RX ADMIN — ROSUVASTATIN CALCIUM 20 MG: 20 TABLET, FILM COATED ORAL at 09:12

## 2019-12-11 RX ADMIN — POTASSIUM CHLORIDE 30 MEQ: 750 CAPSULE, EXTENDED RELEASE ORAL at 09:12

## 2019-12-11 NOTE — ASSESSMENT & PLAN NOTE
LEFTY superimposed on CKD stage III [b/l creat 1.9-2.1] complicating renal transplant  - Creatinine  Was at 1.7 three weeks ago, now at 2.8, most likely cardiorenal  - UO 2.0 liters during last shift  - on Lasix IV 80 mg BID  - Follow with strict I/Os, daily weights, BP (goal <140/90), daily labs.    - Avoid nephrotoxic agents (NSAIDs, IV contrast dye, ACEI/ARB anti-HTN medications, Aminoglycoside-containing antibiotics)  - Renally dose all appropriate medications, including antibiotics

## 2019-12-11 NOTE — ASSESSMENT & PLAN NOTE
Patient w/ hx of proximal right subclavian vein dvt found on US during last admission on 11/13/2019  -on Eliquis 5mg BID.

## 2019-12-11 NOTE — HPI
39 y.o. year old Black or  male who received a  - brain death kidney transplant on 17.  He has CKD stage 3 - GFR 30-59 and his baseline creatinine is between 1.8-2.1. He takes prednisone and sirolimus for maintenance immunosuppression.          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   - Biopsy proven ABMR with weakly positive class I DSA's on 17. Pt treated with SMP x 3 (-9/3). PLEX x6 (completed ) and IVIG x 2 (-).    - Kidney US on 17 showed a large collection located along the anterior inferior margin of the renal transplant measuring 13.4 x 7 x 11.9. taken back to the OR for retroperitoneal bleed and washout.   -Biopsy 10/17/17 (LEFTY): 14 glomeruli, <5% fibrosis, no MC severo..  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 Amador for Kaposi's Sarcoma and has been treated with Doxorubicin x 4 doses. Has been tolerated well with improved in Tumor scottie according to oncology is note dated 2019.  In addition it also showed cardiomegaly with new evidence of pleural effusion.     -Biopsy 1/15/18: No ACR, AVR, ABMR, CD4 neg. Less than 10 % fibrosis.   -Biopsy (for DGF) 17: good sample with 24 glomeruli (only 1 sclerosed) and evidence just meeting criteria for ABMR. + diffuse ATI. Minimal fibrosis, no ACR or AVR, but + CD4 (>50% diffusely + stain) with mild glomerulitis and focal peritubular capillaritis. Also, + ca-oxalate (3) and ca-phos (2) crystals.  -18 WEAK DSA DETECTED: CW5(4560)      PCP PROPHYLAXIS: Bactrim LIFELONG--> changed to Mepron ~ 2018  CMV PROPHYLAXIS: Valcyte until 17  FUNGAL PROPHYLAXIS: Nystatin until 9/15/17         He presented to the ER with SOB and lower extremity swelling  Reports he was recently in the hospital where he was treated for pneumonia. During that admission, the patient received fluids for his infection as well as antibiotics. He reports since his pneumonia has been properly treated and his cough resolved, however he noticed worsening lower extremity swelling. Reports over the past week, he's gained weight (dry weight ~155-160) and is now up to 180lbs. He has not been compliant with a low salt diet. He reports this morning his swelling worsened to his upper extremities and he began complaining of MARY. However, he was able to walk from the parking garage to the ER before complaining of SOB. He denies any cough, fevers, chills, n/v, headaches, vision changes or weakness.     He was recently seen by Dr. Mejias (cardiology) in clinic on 12/2 where he noticed significant signs of HF on exam including 4+ pitting edema of the lower extremities and elevated JVD. He was started on lasix 20mg during that time.      Had a 2D echo performed on 11/26 which showed mildly decreased left ventricular systolic function. The estimated ejection fraction is 40%. QEF 40%. Global hypokinetic wall motion. (EF 55% in Aug-2019).      In the ED, the patient was found to have an elevated BNP and pulmonary edema on CXR.

## 2019-12-11 NOTE — PLAN OF CARE
Patient remained free of falls, trauma, injury, and skin breakdown.  Pt hypertensive; MD notified and hydralazine administered.  No patient complaints at this time. Glucose monitored; insulin not indicated.  Fall precautions maintained.  Plan of care reviewed; patient verbalized understanding. All questions and concerns addressed; will continue to monitor.

## 2019-12-11 NOTE — ASSESSMENT & PLAN NOTE
Patient presenting with progressively worsening lower extremity swelling, MARY and inability to lay flat. Recent diagnosis of HF over the past few months possibility 2/2 chemo. Seen by Cardiology who recently started the patient on Lasix.     PLAN:   - Patient only ~-500ml since yesterday. Will increase dose to Lasix 80mg IV BID.   - Will continue patient's BB which he has been taking at home.   - Strict I/O   - Fluid restriction 1500ml

## 2019-12-11 NOTE — ASSESSMENT & PLAN NOTE
Status post  brain dead kidney transplant 2017  -Biopsy (for DGF) 17: good sample with 24 glomeruli (only 1 sclerosed) and evidence just meeting criteria for ABMR. + diffuse ATI. Minimal fibrosis, no ACR or AVR, but + CD4 (>50% diffusely + stain) with mild glomerulitis and focal peritubular capillaritis. Also, biopsy shows ca-oxalate (3) and ca-phos (2) crystals.  -17 DSA + for weak class I.    Biopsy (LEFTY) 10/17/2017 14 glomeruli, <5% fibrosis, no MC changes, No ACR or AMR, C4d negative    Biopsy (LEFTY) 1/15/18:  No ACR, AVR, ABMR, CD4 neg. Less than 10 % fibrosis.     C/w Home Sirolimus and Prednisone at 5 mg daily   Monitor Sirolimus level, currently at 4.0  Monitor CMP

## 2019-12-11 NOTE — SUBJECTIVE & OBJECTIVE
Interval History:     NAEON. Patient with about 2L UOP but only net -500. No change in daily weight and continues to be edematous. Anemic this AM but asymptomatic. Tolerating diet with no complaints of CP, n/v, fevers, headaches or vision changes. No SOB laying but reports while moving around. Continues to have diffuse edema and elevated JVD.     Review of Systems   Constitutional: Negative for activity change, appetite change, chills, diaphoresis, fatigue and fever.   Respiratory: Positive for shortness of breath. Negative for cough, choking, chest tightness and stridor.    Cardiovascular: Positive for leg swelling (R>L (due to cancer on the right side)). Negative for chest pain.   Gastrointestinal: Negative for blood in stool, diarrhea, nausea and vomiting. Abdominal pain: LLQ.   Genitourinary: Negative for difficulty urinating, dysuria and frequency.   Musculoskeletal: Negative for arthralgias and myalgias.   Skin: Negative for pallor and rash.   Neurological: Negative for dizziness, light-headedness and headaches.     Objective:     Vital Signs (Most Recent):  Temp: 96.2 °F (35.7 °C) (12/11/19 0741)  Pulse: 77 (12/11/19 0741)  Resp: 18 (12/11/19 0741)  BP: (!) 177/97 (12/11/19 0741)  SpO2: 95 % (12/11/19 0741) Vital Signs (24h Range):  Temp:  [96.2 °F (35.7 °C)-98.2 °F (36.8 °C)] 96.2 °F (35.7 °C)  Pulse:  [72-86] 77  Resp:  [16-20] 18  SpO2:  [92 %-99 %] 95 %  BP: (139-196)/() 177/97     Weight: 78.5 kg (173 lb 1 oz)  Body mass index is 26.31 kg/m².    Intake/Output Summary (Last 24 hours) at 12/11/2019 0850  Last data filed at 12/11/2019 0500  Gross per 24 hour   Intake 960 ml   Output 2000 ml   Net -1040 ml      Physical Exam   Constitutional: He is oriented to person, place, and time. He appears well-developed and well-nourished.   HENT:   Head: Normocephalic and atraumatic.   Elevated JVD    Eyes: Pupils are equal, round, and reactive to light. EOM are normal.   Neck: Normal range of motion. Neck  supple. No tracheal deviation present. No thyromegaly present.   Cardiovascular: Normal rate and regular rhythm. Exam reveals no friction rub.   No murmur heard.  Pulmonary/Chest: Effort normal and breath sounds normal. No respiratory distress. He has no wheezes. He exhibits no tenderness.   Course crackles bilateral lower lobes   Abdominal: Soft. Bowel sounds are normal. He exhibits distension. There is no tenderness.   Musculoskeletal: Normal range of motion. He exhibits no edema.   Neurological: He is alert and oriented to person, place, and time.   Skin: Skin is warm and dry. Capillary refill takes less than 2 seconds. No rash noted. No erythema.   3+ Pitting edema up to the hip bilaterally. 2+ pitting edema of the upper extremities bilaterally.      Significant Labs:   BMP:   Recent Labs   Lab 12/11/19  0519   GLU 93   *   K 3.7      CO2 26   BUN 44*   CREATININE 2.8*   CALCIUM 8.0*   MG 1.8     CBC:   Recent Labs   Lab 12/10/19  1245 12/11/19  0519   WBC 5.26 5.09   HGB 7.6* 6.9*   HCT 25.1* 23.2*   * 146*

## 2019-12-11 NOTE — ASSESSMENT & PLAN NOTE
Patient Hemoglobin decreased today with microcytic anemia showing MCV 80.   During patient's last admission had similar results. Iron panel workup showed anemia of chronic disease.   Denies any GI bleeding. Could also be from chronic renal disease.     PLAN:   - low iron saturation, ferritin at >500  - If symptomatic or Hemoglobin drops, recommend transfusing one unit of PRBCs

## 2019-12-11 NOTE — ASSESSMENT & PLAN NOTE
Patient Hemoglobin decreased today with microcytic anemia showing MCV 80.   During patient's last admission had similar results. Iron panel workup showed anemia of chronic disease.   Denies any GI bleeding. Could also be from chronic renal disease.     PLAN:   - Will monitor for now without transfusion.   - Iron panel, Haptoglobin, Retc, LDH ordered  - If symptomatic or Hemoglobin drops, will transfuse 1 unit.

## 2019-12-11 NOTE — PROGRESS NOTES
RN contacted MD Worrel regarding HTN.  MD stated for RN to administer pt's scheduled coreg now.  MD stated to check BP in an hour and if still elevated to give PRN hydralazine.  No patient complaints at this time; will continue to monitor.

## 2019-12-11 NOTE — SUBJECTIVE & OBJECTIVE
Subjective:     History of Present Illness:   39 y.o. year old Black or  male who received a  - brain death kidney transplant on 17.  He has CKD stage 3 - GFR 30-59 and his baseline creatinine is between 1.8-2.1. He takes prednisone and sirolimus for maintenance immunosuppression.          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   - Biopsy proven ABMR with weakly positive class I DSA's on 17. Pt treated with SMP x 3 (-9/3). PLEX x6 (completed ) and IVIG x 2 (-).    - Kidney US on 17 showed a large collection located along the anterior inferior margin of the renal transplant measuring 13.4 x 7 x 11.9. taken back to the OR for retroperitoneal bleed and washout.   -Biopsy 10/17/17 (LEFTY): 14 glomeruli, <5% fibrosis, no MC severo..  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 Amador for Kaposi's Sarcoma and has been treated with Doxorubicin x 4 doses. Has been tolerated well with improved in Tumor scottie according to oncology is note dated 2019.  In addition it also showed cardiomegaly with new evidence of pleural effusion.     -Biopsy 1/15/18: No ACR, AVR, ABMR, CD4 neg. Less than 10 % fibrosis.   -Biopsy (for DGF) 17: good sample with 24 glomeruli (only 1 sclerosed) and evidence just meeting criteria for ABMR. + diffuse ATI. Minimal fibrosis, no ACR or AVR, but + CD4 (>50% diffusely + stain) with mild glomerulitis and focal peritubular capillaritis. Also, + ca-oxalate (3) and ca-phos (2) crystals.  -18 WEAK DSA DETECTED: CW5(0593)      PCP PROPHYLAXIS: Bactrim LIFELONG--> changed to Mepron ~ 2018  CMV PROPHYLAXIS: Valcyte until 17  FUNGAL PROPHYLAXIS: Nystatin until 9/15/17         He  presented to the ER with SOB and lower extremity swelling Reports he was recently in the hospital where he was treated for pneumonia. During that admission, the patient received fluids for his infection as well as antibiotics. He reports since his pneumonia has been properly treated and his cough resolved, however he noticed worsening lower extremity swelling. Reports over the past week, he's gained weight (dry weight ~155-160) and is now up to 180lbs. He has not been compliant with a low salt diet. He reports this morning his swelling worsened to his upper extremities and he began complaining of MARY. However, he was able to walk from the parking garage to the ER before complaining of SOB. He denies any cough, fevers, chills, n/v, headaches, vision changes or weakness.     He was recently seen by Dr. Mejias (cardiology) in clinic on 12/2 where he noticed significant signs of HF on exam including 4+ pitting edema of the lower extremities and elevated JVD. He was started on lasix 20mg during that time.      Had a 2D echo performed on 11/26 which showed mildly decreased left ventricular systolic function. The estimated ejection fraction is 40%. QEF 40%. Global hypokinetic wall motion. (EF 55% in Aug-2019).      In the ED, the patient was found to have an elevated BNP and pulmonary edema on CXR.     Mr. Aguiar is a 39 y.o. year old male who is status post Kidney Transplant - 8/18/2017  (#1).    He received induction therapy with Thymoglobulin and his maintenance immunosuppression consists of:   Immunosuppressants (From admission, onward)    Start     Stop Route Frequency Ordered    12/11/19 0900  sirolimus tablet 2 mg      -- Oral Daily 12/10/19 1619          Interval History:  [place] [facility] [place] of [place]. Presents with worsening SOB and inability to lie flat. Admitted for CHF exacerbation.     Past Medical and Surgical History: Mr. Aguiar has a past medical history of Anemia, At risk for  opportunistic infections, Delayed graft function of kidney, ESRD secondary to HIVAN s/p DDRT 8/18/17, HIV infection, HIV nephropathy, Hyperlipidemia, Hypertension, Need for prophylactic immunotherapy, S/P kidney transplant 8/18/2017 (8/28/2017), Status post exploratory laparotomy (9/10/2017), Status post exploratory laparotomy (9/10/2017), and TB lung, latent.  He has a past surgical history that includes peritoneal dialysis catheter placement; Exploratory laparotomy w/ bowel resection; Kidney transplant; Biopsy (N/A, 8/28/2018); Insertion of tunneled central venous catheter (CVC) with subcutaneous port (Left, 8/19/2019); and Insertion of tunneled central venous catheter (CVC) with subcutaneous port (N/A, 9/12/2019).    Past Social and Family History: Mr. Aguiar reports that he has quit smoking. His smoking use included cigarettes. He smoked 0.50 packs per day. He has never used smokeless tobacco. He reports that he does not drink alcohol or use drugs.His family history includes Cancer in his father; Diabetes in his maternal aunt and maternal uncle; Heart disease in his paternal grandmother; Hyperlipidemia in his mother; Hypertension in his maternal grandmother and paternal grandmother; Lung cancer in his father; No Known Problems in his brother.    Intake/Output - Last 3 Shifts       12/09 0700 - 12/10 0659 12/10 0700 - 12/11 0659 12/11 0700 - 12/12 0659    P.O.  960 360    Total Intake(mL/kg)  960 (12.1) 360 (4.6)    Urine (mL/kg/hr)  2000 500 (1)    Total Output  2000 500    Net  -1040 -140                  Review of Systems   Constitutional: Negative for activity change, appetite change, chills, diaphoresis, fatigue and fever.   Respiratory: Positive for shortness of breath. Negative for cough, choking, chest tightness and stridor.    Cardiovascular: Positive for leg swelling (R>L (due to cancer on the right side)). Negative for chest pain.   Gastrointestinal: Negative for blood in stool, diarrhea, nausea and  "vomiting. Abdominal pain: LLQ.   Genitourinary: Negative for difficulty urinating, dysuria and frequency.   Musculoskeletal: Negative for arthralgias and myalgias.   Skin: Negative for pallor and rash.   Neurological: Negative for dizziness, light-headedness and headaches.     Objective:     Vital Signs (Most Recent):  Temp: 96.2 °F (35.7 °C) (12/11/19 1123)  Pulse: 76 (12/11/19 1123)  Resp: 18 (12/11/19 1123)  BP: (!) 183/91 (12/11/19 1123)  SpO2: (!) 94 % (12/11/19 1123) Vital Signs (24h Range):  Temp:  [96.2 °F (35.7 °C)-98.2 °F (36.8 °C)] 96.2 °F (35.7 °C)  Pulse:  [72-86] 76  Resp:  [16-18] 18  SpO2:  [92 %-99 %] 94 %  BP: (142-196)/() 183/91     Weight: 78.5 kg (173 lb 1 oz)  Height: 5' 8" (172.7 cm)  Body mass index is 26.31 kg/m².    Physical Exam   Constitutional: He is oriented to person, place, and time. He appears well-developed and well-nourished.   HENT:   Head: Normocephalic and atraumatic.   Elevated JVD    Eyes: Pupils are equal, round, and reactive to light. EOM are normal.   Neck: Normal range of motion. Neck supple. No tracheal deviation present. No thyromegaly present.   Cardiovascular: Normal rate and regular rhythm. Exam reveals no friction rub.   No murmur heard.  Pulmonary/Chest: Effort normal and breath sounds normal. No respiratory distress. He has no wheezes. He exhibits no tenderness.   Course crackles bilateral lower lobes   Abdominal: Soft. Bowel sounds are normal. He exhibits distension. There is no tenderness.   Musculoskeletal: Normal range of motion. He exhibits no edema.   Neurological: He is alert and oriented to person, place, and time.   Skin: Skin is warm and dry. Capillary refill takes less than 2 seconds. No rash noted. No erythema.   3+ Pitting edema up to the hip bilaterally. 2+ pitting edema of the upper extremities bilaterally.        Significant Labs:  CBC:   Recent Labs   Lab 12/10/19  1245 12/11/19  0519   WBC 5.26 5.09   RBC 3.12* 2.85*   HGB 7.6* 6.9*   HCT " 25.1* 23.2*   * 146*   MCV 80* 81*   MCH 24.4* 24.2*   MCHC 30.3* 29.7*     CMP:   Recent Labs   Lab 12/10/19  1245 12/11/19  0519   GLU 98 93   CALCIUM 8.4* 8.0*   ALBUMIN 3.4*  --    PROT 6.3  --     146*   K 4.0 3.7   CO2 25 26    109   BUN 48* 44*   CREATININE 2.9* 2.8*   ALKPHOS 120  --    ALT 36  --    AST 31  --        Diagnostics:  None

## 2019-12-11 NOTE — CONSULTS
[facility]  Kidney Transplant  Consult Note    Inpatient consult to Kidney/Pancreas Transplant Medicine  Consult performed by: Kevin Weeks MD  Consult ordered by: Otf Ward MD  Reason for consult: lefty, kidney transplant, CHF Exacerbation             Subjective:     History of Present Illness:   39 y.o. year old Black or  male who received a  - brain death kidney transplant on 17.  He has CKD stage 3 - GFR 30-59 and his baseline creatinine is between 1.8-2.1. He takes prednisone and sirolimus for maintenance immunosuppression.          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   - Biopsy proven ABMR with weakly positive class I DSA's on 17. Pt treated with SMP x 3 (-9/3). PLEX x6 (completed ) and IVIG x 2 (-).    - Kidney US on 17 showed a large collection located along the anterior inferior margin of the renal transplant measuring 13.4 x 7 x 11.9. taken back to the OR for retroperitoneal bleed and washout.   -Biopsy 10/17/17 (LEFTY): 14 glomeruli, <5% fibrosis, no MC severo..  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 Amador for Kaposi's Sarcoma and has been treated with Doxorubicin x 4 doses. Has been tolerated well with improved in Tumor scottie according to oncology is note dated 2019.  In addition it also showed cardiomegaly with new evidence of pleural effusion.     -Biopsy 1/15/18: No ACR, AVR, ABMR, CD4 neg. Less than 10 % fibrosis.   -Biopsy (for DGF) 17: good sample with 24 glomeruli (only 1 sclerosed) and evidence just meeting criteria for ABMR. + diffuse ATI. Minimal fibrosis, no ACR or AVR, but + CD4 (>50% diffusely + stain) with mild glomerulitis and focal  peritubular capillaritis. Also, + ca-oxalate (3) and ca-phos (2) crystals.  -7/9/18 WEAK DSA DETECTED: CW5(4888)      PCP PROPHYLAXIS: Bactrim LIFELONG--> changed to Mepron ~ 6/2018  CMV PROPHYLAXIS: Valcyte until 11/19/17  FUNGAL PROPHYLAXIS: Nystatin until 9/15/17         He presented to the ER with SOB and lower extremity swelling Reports he was recently in the hospital where he was treated for pneumonia. During that admission, the patient received fluids for his infection as well as antibiotics. He reports since his pneumonia has been properly treated and his cough resolved, however he noticed worsening lower extremity swelling. Reports over the past week, he's gained weight (dry weight ~155-160) and is now up to 180lbs. He has not been compliant with a low salt diet. He reports this morning his swelling worsened to his upper extremities and he began complaining of MARY. However, he was able to walk from the parking garage to the ER before complaining of SOB. He denies any cough, fevers, chills, n/v, headaches, vision changes or weakness.     He was recently seen by Dr. Mejias (cardiology) in clinic on 12/2 where he noticed significant signs of HF on exam including 4+ pitting edema of the lower extremities and elevated JVD. He was started on lasix 20mg during that time.      Had a 2D echo performed on 11/26 which showed mildly decreased left ventricular systolic function. The estimated ejection fraction is 40%. QEF 40%. Global hypokinetic wall motion. (EF 55% in Aug-2019).      In the ED, the patient was found to have an elevated BNP and pulmonary edema on CXR.     Mr. Aguiar is a 39 y.o. year old male who is status post Kidney Transplant - 8/18/2017  (#1).    He received induction therapy with Thymoglobulin and his maintenance immunosuppression consists of:   Immunosuppressants (From admission, onward)    Start     Stop Route Frequency Ordered    12/11/19 0900  sirolimus tablet 2 mg      -- Oral Daily 12/10/19  1619          Interval History:  Patient recently discharged [place] [facility] [place] for treatment of PNA. Presents with worsening SOB and inability to lie flat. Admitted for CHF exacerbation.     Past Medical and Surgical History: Mr. Aguiar has a past medical history of Anemia, At risk for opportunistic infections, Delayed graft function of kidney, ESRD secondary to HIVAN s/p DDRT 8/18/17, HIV infection, HIV nephropathy, Hyperlipidemia, Hypertension, Need for prophylactic immunotherapy, S/P kidney transplant 8/18/2017 (8/28/2017), Status post exploratory laparotomy (9/10/2017), Status post exploratory laparotomy (9/10/2017), and TB lung, latent.  He has a past surgical history that includes peritoneal dialysis catheter placement; Exploratory laparotomy w/ bowel resection; Kidney transplant; Biopsy (N/A, 8/28/2018); Insertion of tunneled central venous catheter (CVC) with subcutaneous port (Left, 8/19/2019); and Insertion of tunneled central venous catheter (CVC) with subcutaneous port (N/A, 9/12/2019).    Past Social and Family History: Mr. Aguiar reports that he has quit smoking. His smoking use included cigarettes. He smoked 0.50 packs per day. He has never used smokeless tobacco. He reports that he does not drink alcohol or use drugs.His family history includes Cancer in his father; Diabetes in his maternal aunt and maternal uncle; Heart disease in his paternal grandmother; Hyperlipidemia in his mother; Hypertension in his maternal grandmother and paternal grandmother; Lung cancer in his father; No Known Problems in his brother.    Intake/Output - Last 3 Shifts       12/09 0700 - 12/10 0659 12/10 0700 - 12/11 0659 12/11 0700 - 12/12 0659    P.O.  960 360    Total Intake(mL/kg)  960 (12.1) 360 (4.6)    Urine (mL/kg/hr)  2000 500 (1)    Total Output  2000 500    Net  -1040 -140                  Review of Systems   Constitutional: Negative for activity change, appetite change, chills, diaphoresis, fatigue and fever.  "  Respiratory: Positive for shortness of breath. Negative for cough, choking, chest tightness and stridor.    Cardiovascular: Positive for leg swelling (R>L (due to cancer on the right side)). Negative for chest pain.   Gastrointestinal: Negative for blood in stool, diarrhea, nausea and vomiting. Abdominal pain: LLQ.   Genitourinary: Negative for difficulty urinating, dysuria and frequency.   Musculoskeletal: Negative for arthralgias and myalgias.   Skin: Negative for pallor and rash.   Neurological: Negative for dizziness, light-headedness and headaches.     Objective:     Vital Signs (Most Recent):  Temp: 96.2 °F (35.7 °C) (12/11/19 1123)  Pulse: 76 (12/11/19 1123)  Resp: 18 (12/11/19 1123)  BP: (!) 183/91 (12/11/19 1123)  SpO2: (!) 94 % (12/11/19 1123) Vital Signs (24h Range):  Temp:  [96.2 °F (35.7 °C)-98.2 °F (36.8 °C)] 96.2 °F (35.7 °C)  Pulse:  [72-86] 76  Resp:  [16-18] 18  SpO2:  [92 %-99 %] 94 %  BP: (142-196)/() 183/91     Weight: 78.5 kg (173 lb 1 oz)  Height: 5' 8" (172.7 cm)  Body mass index is 26.31 kg/m².    Physical Exam   Constitutional: He is oriented to person, place, and time. He appears well-developed and well-nourished.   HENT:   Head: Normocephalic and atraumatic.   Elevated JVD    Eyes: Pupils are equal, round, and reactive to light. EOM are normal.   Neck: Normal range of motion. Neck supple. No tracheal deviation present. No thyromegaly present.   Cardiovascular: Normal rate and regular rhythm. Exam reveals no friction rub.   No murmur heard.  Pulmonary/Chest: Effort normal and breath sounds normal. No respiratory distress. He has no wheezes. He exhibits no tenderness.   Course crackles bilateral lower lobes   Abdominal: Soft. Bowel sounds are normal. He exhibits distension. There is no tenderness.   Musculoskeletal: Normal range of motion. He exhibits no edema.   Neurological: He is alert and oriented to person, place, and time.   Skin: Skin is warm and dry. Capillary refill takes " less than 2 seconds. No rash noted. No erythema.   3+ Pitting edema up to the hip bilaterally. 2+ pitting edema of the upper extremities bilaterally.        Significant Labs:  CBC:   Recent Labs   Lab 12/10/19  1245 19  0519   WBC 5.26 5.09   RBC 3.12* 2.85*   HGB 7.6* 6.9*   HCT 25.1* 23.2*   * 146*   MCV 80* 81*   MCH 24.4* 24.2*   MCHC 30.3* 29.7*     CMP:   Recent Labs   Lab 12/10/19  1245 19  0519   GLU 98 93   CALCIUM 8.4* 8.0*   ALBUMIN 3.4*  --    PROT 6.3  --     146*   K 4.0 3.7   CO2 25 26    109   BUN 48* 44*   CREATININE 2.9* 2.8*   ALKPHOS 120  --    ALT 36  --    AST 31  --        Diagnostics:  None    Assessment/Plan:     LEFTY (acute kidney injury)  LEFTY superimposed on CKD stage III [b/l creat 1.9-2.1] complicating renal transplant  - Creatinine  Was at 1.7 three weeks ago, now at 2.8, most likely cardiorenal  - UO 2.0 liters during last shift  - on Lasix IV 80 mg BID  - Follow with strict I/Os, daily weights, BP (goal <140/90), daily labs.    - Avoid nephrotoxic agents (NSAIDs, IV contrast dye, ACEI/ARB anti-HTN medications, Aminoglycoside-containing antibiotics)  - Renally dose all appropriate medications, including antibiotics       donor kidney transplant 2017  Status post  brain dead kidney transplant 2017  -Biopsy (for DGF) 17: good sample with 24 glomeruli (only 1 sclerosed) and evidence just meeting criteria for ABMR. + diffuse ATI. Minimal fibrosis, no ACR or AVR, but + CD4 (>50% diffusely + stain) with mild glomerulitis and focal peritubular capillaritis. Also, biopsy shows ca-oxalate (3) and ca-phos (2) crystals.  -17 DSA + for weak class I.    Biopsy (LEFTY) 10/17/2017 14 glomeruli, <5% fibrosis, no MC changes, No ACR or AMR, C4d negative    Biopsy (LEFTY) 1/15/18:  No ACR, AVR, ABMR, CD4 neg. Less than 10 % fibrosis.     C/w Home Sirolimus and Prednisone at 5 mg daily   Monitor Sirolimus level, currently at 4.0  Monitor  CMP    Microcytic anemia  Patient Hemoglobin decreased today with microcytic anemia showing MCV 80.   During patient's last admission had similar results. Iron panel workup showed anemia of chronic disease.   Denies any GI bleeding. Could also be from chronic renal disease.     PLAN:   - low iron saturation, ferritin at >500, recommend IV iron supplementation if no contraindications   - If symptomatic or Hemoglobin drops, recommend transfusing one unit of PRBCs      Acute DVT (deep venous thrombosis)  Patient w/ hx of proximal right subclavian vein dvt found on US during last admission on 11/13/2019  -on Eliquis 5mg BID.     Renovascular hypertension  - patient takes Coreg 25mg and Nifedipine 60mg at home     Kaposi sarcoma  HIV related   Currently on HAART and Adriamycin Q3W for symptomatic metastatic KS    AIDS (acquired immunodeficiency syndrome), CD4 <=200  As per transplant ID   Currently on HAART Therapy          Discharge Planning:  Pending clinical improvement and euvolemia       Kevin Weeks MD  Kidney Transplant  [facility]

## 2019-12-11 NOTE — ASSESSMENT & PLAN NOTE
Patient with hx of renal transplant presenting with LEFTY. Ddx include cardiorenal syndrome vs nephrotic syndrome vs ATN vs transplant rejection. Cr baseline 1.8-2.2 but presented to the ED with Cr of 2.9. With recent weight gain, elevated JVD and lower extremity swelling, highly suggestive of HF. However, unable to rule out secondary causes.     PLAN:   - UA without significant abnormalities. No protein or blood making nephrotic/nephritic causes less likely.   - Mild improvement in Cr with diuresis. However patient continues to be overloaded.   - Holding nephrotoxic medications

## 2019-12-11 NOTE — NURSING
Patient admitted to CSU. Patient arrived to floor from ED. no evidence of distress; patient AAO x4 at this time. Patient placed on tele. Vital signs obtained. Patient voices no complaints at this time. Plan of care initiated with patient. Bed in lowest position, locked, SR up x2, call bell in reach. Will continue to monitor patient.

## 2019-12-11 NOTE — ASSESSMENT & PLAN NOTE
- patient takes Coreg 25mg and Nifedipine 60mg at home.   - Will increase lasix. If BP still not controlled will add hydralazine.   - Will continue and monitor.

## 2019-12-11 NOTE — HOSPITAL COURSE
Mr. Aguiar was admitted to hospital medicine for acute decompensated heart failure. He was diuresed appropriately. Hospital course complicated by LEFTY secondary to intravascular volume depletion/diuresis. There is a concern for decreasing cardiac function with differentials including ischemic cardiomyopathy vs infiltrative diseases vs doxorubicin induced cardiomyopathy. It is unclear at this point. PET stress with global hypokinesis and decreased EF of 26%-30%. There's also a concern for graft rejection, has past history of rejection treated with thymo. KTM is following, do not plan to biopsy kidney. Patient is discharged at around dry weight (162 lb) and stable LEFTY though sCr not at baseline. Needs close follow up with cardiology and KTM as well as oncology for further treatment of Kaposi sarcoma. Ideally, would keep patient for additional day until renal recovery however patient was adamant that he wants to leave the hospital. As such patient was discharged home with clinic follow up.

## 2019-12-11 NOTE — PROGRESS NOTES
RN contacted MD with [employer] regarding pt HTN.  Pt asymptomatic; MD to place orders. No patient complaints at this time; will continue to monitor.

## 2019-12-11 NOTE — PROGRESS NOTES
[facility] Hospital Medicine  Progress Note    Patient Name: Demetrio Aguiar  MRN: [id number]  Patient Class: IP- Inpatient   Admission Date: 12/10/2019  Length of Stay: 1 days  Attending Physician: Shira Guillen MD  Primary Care Provider: Primary Doctor [facility] Medicine Team: [facility] MED V Otf Ward MD    Subjective:     Principal Problem:Acute on chronic systolic heart failure    HPI:  Demetrio Aguiar is a 39 y.o. 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 Reports he was recently in the hospital where he was treated for pneumonia. During that admission, the patient received fluids for his infection as well as antibiotics. He reports since his pneumonia has been properly treated and his cough resolved, however he noticed worsening lower extremity swelling. Reports over the past week, he's gained weight (dry weight ~155-160) and is now up to 180lbs. He has not been compliant with a low salt diet. He reports this morning his swelling worsened to his upper extremities and he began complaining of MARY. However, he was able to walk from the parking garage to the ER before complaining of SOB. He denies any cough, fevers, chills, n/v, headaches, vision changes or weakness.    He was recently seen by Dr. Mejias (cardiology) in clinic on 12/2 where he noticed significant signs of HF on exam including 4+ pitting edema of the lower extremities and elevated JVD. He was started on lasix 20mg during that time.     Had a 2D echo performed on 11/26 which showed mildly decreased left ventricular systolic function. The estimated ejection fraction is 40%. QEF 40%. Global hypokinetic wall motion. (EF 55% in Aug-2019).     In the ED, the patient was found to have an elevated  BNP and pulmonary edema on CXR.     Overview/Hospital Course:  Mr. Aguiar admitted to the hospital for CHF exacerbation. Was started on Lasix 40mg BID however minimal urine output with no change in weight despite fluid restriction. Changed dose to Lasix 80mg BID.     Interval History:     NAEON. Patient with about 2L UOP but only net -500. No change in daily weight and continues to be edematous. Anemic this AM but asymptomatic. Tolerating diet with no complaints of CP, n/v, fevers, headaches or vision changes. No SOB laying but reports while moving around. Continues to have diffuse edema and elevated JVD.     Review of Systems   Constitutional: Negative for activity change, appetite change, chills, diaphoresis, fatigue and fever.   Respiratory: Positive for shortness of breath. Negative for cough, choking, chest tightness and stridor.    Cardiovascular: Positive for leg swelling (R>L (due to cancer on the right side)). Negative for chest pain.   Gastrointestinal: Negative for blood in stool, diarrhea, nausea and vomiting. Abdominal pain: LLQ.   Genitourinary: Negative for difficulty urinating, dysuria and frequency.   Musculoskeletal: Negative for arthralgias and myalgias.   Skin: Negative for pallor and rash.   Neurological: Negative for dizziness, light-headedness and headaches.     Objective:     Vital Signs (Most Recent):  Temp: 96.2 °F (35.7 °C) (12/11/19 0741)  Pulse: 77 (12/11/19 0741)  Resp: 18 (12/11/19 0741)  BP: (!) 177/97 (12/11/19 0741)  SpO2: 95 % (12/11/19 0741) Vital Signs (24h Range):  Temp:  [96.2 °F (35.7 °C)-98.2 °F (36.8 °C)] 96.2 °F (35.7 °C)  Pulse:  [72-86] 77  Resp:  [16-20] 18  SpO2:  [92 %-99 %] 95 %  BP: (139-196)/() 177/97     Weight: 78.5 kg (173 lb 1 oz)  Body mass index is 26.31 kg/m².    Intake/Output Summary (Last 24 hours) at 12/11/2019 0850  Last data filed at 12/11/2019 0500  Gross per 24 hour   Intake 960 ml   Output 2000 ml   Net -1040 ml      Physical Exam    Constitutional: He is oriented to person, place, and time. He appears well-developed and well-nourished.   HENT:   Head: Normocephalic and atraumatic.   Elevated JVD    Eyes: Pupils are equal, round, and reactive to light. EOM are normal.   Neck: Normal range of motion. Neck supple. No tracheal deviation present. No thyromegaly present.   Cardiovascular: Normal rate and regular rhythm. Exam reveals no friction rub.   No murmur heard.  Pulmonary/Chest: Effort normal and breath sounds normal. No respiratory distress. He has no wheezes. He exhibits no tenderness.   Course crackles bilateral lower lobes   Abdominal: Soft. Bowel sounds are normal. He exhibits distension. There is no tenderness.   Musculoskeletal: Normal range of motion. He exhibits no edema.   Neurological: He is alert and oriented to person, place, and time.   Skin: Skin is warm and dry. Capillary refill takes less than 2 seconds. No rash noted. No erythema.   3+ Pitting edema up to the hip bilaterally. 2+ pitting edema of the upper extremities bilaterally.      Significant Labs:   BMP:   Recent Labs   Lab 12/11/19  0519   GLU 93   *   K 3.7      CO2 26   BUN 44*   CREATININE 2.8*   CALCIUM 8.0*   MG 1.8     CBC:   Recent Labs   Lab 12/10/19  1245 12/11/19  0519   WBC 5.26 5.09   HGB 7.6* 6.9*   HCT 25.1* 23.2*   * 146*       Assessment/Plan:      * Acute on chronic systolic heart failure  Patient presenting with progressively worsening lower extremity swelling, MARY and inability to lay flat. Recent diagnosis of HF over the past few months possibility 2/2 chemo. Seen by Cardiology who recently started the patient on Lasix.     PLAN:   - Patient only ~-500ml since yesterday. Will increase dose to Lasix 80mg IV BID.   - Will continue patient's BB which he has been taking at home.   - Strict I/O   - Fluid restriction 1500ml     Microcytic anemia  Patient Hemoglobin decreased today with microcytic anemia showing MCV 80.   During  patient's last admission had similar results. Iron panel workup showed anemia of chronic disease.   Denies any GI bleeding. Could also be from chronic renal disease.     PLAN:   - Will monitor for now without transfusion.   - Iron panel, Haptoglobin, Retc, LDH ordered  - If symptomatic or Hemoglobin drops, will transfuse 1 unit.       Acute DVT (deep venous thrombosis)  Patient w/ hx of proximal right subclavian vein dvt found on US during last admission on 2019  - Will continue Eliquis 5mg BID.     Renovascular hypertension  - patient takes Coreg 25mg and Nifedipine 60mg at home.   - Will increase lasix. If BP still not controlled will add hydralazine.   - Will continue and monitor.     Kaposi sarcoma  Hx of Kaposi Sarcoma with treatment with chemo.   - 4x treatment with Doxorubicin.   - Stable. Will monitor.     LEFTY (acute kidney injury)  Patient with hx of renal transplant presenting with LEFTY. Ddx include cardiorenal syndrome vs nephrotic syndrome vs ATN vs transplant rejection. Cr baseline 1.8-2.2 but presented to the ED with Cr of 2.9. With recent weight gain, elevated JVD and lower extremity swelling, highly suggestive of HF. However, unable to rule out secondary causes.     PLAN:   - UA without significant abnormalities. No protein or blood making nephrotic/nephritic causes less likely.   - Mild improvement in Cr with diuresis. However patient continues to be overloaded.   - Holding nephrotoxic medications      Prophylactic immunotherapy  Patient's last CD4 count on  at 63.   - Started on Atovaquone for ppx.   - Will continue inpatient.        donor kidney transplant 2017  - KTM consulted. Recs appreciated  - Will continue Sirolimus 2mg BID with levels daily  - Prednisone 5mg Daily.     AIDS (acquired immunodeficiency syndrome), CD4 <=200  - Restarted HAART medications while inpatient.   - Patient will need to bring his home medications as some are not on formulary.      Hyperlipidemia  - Continue Crestor 20mg Daily.     VTE Risk Mitigation (From admission, onward)         Ordered     apixaban tablet 5 mg  2 times daily      12/10/19 1619     IP VTE LOW RISK PATIENT  Once      12/10/19 1535                      Otf Ward MD  Department of Hospital Medicine   [facility]

## 2019-12-12 LAB
ANION GAP SERPL CALC-SCNC: 9 MMOL/L (ref 8–16)
BASOPHILS # BLD AUTO: 0.02 K/UL (ref 0–0.2)
BASOPHILS NFR BLD: 0.4 % (ref 0–1.9)
BUN SERPL-MCNC: 44 MG/DL (ref 6–20)
CALCIUM SERPL-MCNC: 8.2 MG/DL (ref 8.7–10.5)
CHLORIDE SERPL-SCNC: 105 MMOL/L (ref 95–110)
CO2 SERPL-SCNC: 30 MMOL/L (ref 23–29)
CREAT SERPL-MCNC: 2.9 MG/DL (ref 0.5–1.4)
DIFFERENTIAL METHOD: ABNORMAL
EOSINOPHIL # BLD AUTO: 0.1 K/UL (ref 0–0.5)
EOSINOPHIL NFR BLD: 1.5 % (ref 0–8)
ERYTHROCYTE [DISTWIDTH] IN BLOOD BY AUTOMATED COUNT: 19 % (ref 11.5–14.5)
EST. GFR  (AFRICAN AMERICAN): 29.9 ML/MIN/1.73 M^2
EST. GFR  (NON AFRICAN AMERICAN): 25.9 ML/MIN/1.73 M^2
GLUCOSE SERPL-MCNC: 111 MG/DL (ref 70–110)
HCT VFR BLD AUTO: 24.7 % (ref 40–54)
HGB BLD-MCNC: 7.5 G/DL (ref 14–18)
IMM GRANULOCYTES # BLD AUTO: 0.01 K/UL (ref 0–0.04)
IMM GRANULOCYTES NFR BLD AUTO: 0.2 % (ref 0–0.5)
LYMPHOCYTES # BLD AUTO: 1 K/UL (ref 1–4.8)
LYMPHOCYTES NFR BLD: 20.4 % (ref 18–48)
MAGNESIUM SERPL-MCNC: 1.8 MG/DL (ref 1.6–2.6)
MCH RBC QN AUTO: 24.4 PG (ref 27–31)
MCHC RBC AUTO-ENTMCNC: 30.4 G/DL (ref 32–36)
MCV RBC AUTO: 81 FL (ref 82–98)
MONOCYTES # BLD AUTO: 0.5 K/UL (ref 0.3–1)
MONOCYTES NFR BLD: 9.6 % (ref 4–15)
NEUTROPHILS # BLD AUTO: 3.2 K/UL (ref 1.8–7.7)
NEUTROPHILS NFR BLD: 67.9 % (ref 38–73)
NRBC BLD-RTO: 0 /100 WBC
PHOSPHATE SERPL-MCNC: 4.5 MG/DL (ref 2.7–4.5)
PLATELET # BLD AUTO: 156 K/UL (ref 150–350)
PMV BLD AUTO: 10.7 FL (ref 9.2–12.9)
POCT GLUCOSE: 115 MG/DL (ref 70–110)
POTASSIUM SERPL-SCNC: 3.8 MMOL/L (ref 3.5–5.1)
RBC # BLD AUTO: 3.07 M/UL (ref 4.6–6.2)
SODIUM SERPL-SCNC: 144 MMOL/L (ref 136–145)
WBC # BLD AUTO: 4.7 K/UL (ref 3.9–12.7)

## 2019-12-12 PROCEDURE — 85025 COMPLETE CBC W/AUTO DIFF WBC: CPT

## 2019-12-12 PROCEDURE — 36415 COLL VENOUS BLD VENIPUNCTURE: CPT

## 2019-12-12 PROCEDURE — 99232 PR SUBSEQUENT HOSPITAL CARE,LEVL II: ICD-10-PCS | Mod: GC,,, | Performed by: HOSPITALIST

## 2019-12-12 PROCEDURE — 99233 PR SUBSEQUENT HOSPITAL CARE,LEVL III: ICD-10-PCS | Mod: GC,,, | Performed by: INTERNAL MEDICINE

## 2019-12-12 PROCEDURE — 99232 SBSQ HOSP IP/OBS MODERATE 35: CPT | Mod: GC,,, | Performed by: HOSPITALIST

## 2019-12-12 PROCEDURE — 25000003 PHARM REV CODE 250: Performed by: STUDENT IN AN ORGANIZED HEALTH CARE EDUCATION/TRAINING PROGRAM

## 2019-12-12 PROCEDURE — 99233 SBSQ HOSP IP/OBS HIGH 50: CPT | Mod: GC,,, | Performed by: INTERNAL MEDICINE

## 2019-12-12 PROCEDURE — 80048 BASIC METABOLIC PNL TOTAL CA: CPT

## 2019-12-12 PROCEDURE — 84100 ASSAY OF PHOSPHORUS: CPT

## 2019-12-12 PROCEDURE — 20600001 HC STEP DOWN PRIVATE ROOM

## 2019-12-12 PROCEDURE — 83735 ASSAY OF MAGNESIUM: CPT

## 2019-12-12 PROCEDURE — 63600175 PHARM REV CODE 636 W HCPCS: Performed by: STUDENT IN AN ORGANIZED HEALTH CARE EDUCATION/TRAINING PROGRAM

## 2019-12-12 RX ORDER — SODIUM BICARBONATE 325 MG/1
1300 TABLET ORAL 2 TIMES DAILY
Status: DISCONTINUED | OUTPATIENT
Start: 2019-12-12 | End: 2019-12-13

## 2019-12-12 RX ORDER — POTASSIUM CHLORIDE 750 MG/1
30 CAPSULE, EXTENDED RELEASE ORAL ONCE
Status: COMPLETED | OUTPATIENT
Start: 2019-12-12 | End: 2019-12-12

## 2019-12-12 RX ADMIN — HYDRALAZINE HYDROCHLORIDE 50 MG: 50 TABLET ORAL at 10:12

## 2019-12-12 RX ADMIN — ROSUVASTATIN CALCIUM 20 MG: 20 TABLET, FILM COATED ORAL at 08:12

## 2019-12-12 RX ADMIN — CINACALCET HYDROCHLORIDE 60 MG: 30 TABLET, FILM COATED ORAL at 08:12

## 2019-12-12 RX ADMIN — DOLUTEGRAVIR SODIUM 50 MG: 50 TABLET, FILM COATED ORAL at 08:12

## 2019-12-12 RX ADMIN — SIROLIMUS 2 MG: 1 TABLET ORAL at 08:12

## 2019-12-12 RX ADMIN — THERA TABS 1 TABLET: TAB at 08:12

## 2019-12-12 RX ADMIN — SEVELAMER CARBONATE 800 MG: 800 TABLET, FILM COATED ORAL at 08:12

## 2019-12-12 RX ADMIN — POTASSIUM CHLORIDE 30 MEQ: 750 CAPSULE, EXTENDED RELEASE ORAL at 08:12

## 2019-12-12 RX ADMIN — PREDNISONE 5 MG: 5 TABLET ORAL at 08:12

## 2019-12-12 RX ADMIN — FUROSEMIDE 80 MG: 10 INJECTION, SOLUTION INTRAMUSCULAR; INTRAVENOUS at 05:12

## 2019-12-12 RX ADMIN — CALCIUM CARBONATE (ANTACID) CHEW TAB 500 MG 500 MG: 500 CHEW TAB at 08:12

## 2019-12-12 RX ADMIN — FERROUS SULFATE TAB EC 325 MG (65 MG FE EQUIVALENT) 325 MG: 325 (65 FE) TABLET DELAYED RESPONSE at 08:12

## 2019-12-12 RX ADMIN — CARVEDILOL 25 MG: 25 TABLET, FILM COATED ORAL at 05:12

## 2019-12-12 RX ADMIN — HYDRALAZINE HYDROCHLORIDE 50 MG: 50 TABLET ORAL at 05:12

## 2019-12-12 RX ADMIN — EMTRICITABINE AND TENOFOVIR ALAFENAMIDE 1 TABLET: 200; 25 TABLET ORAL at 08:12

## 2019-12-12 RX ADMIN — HYDROCODONE BITARTRATE AND ACETAMINOPHEN 1 TABLET: 5; 325 TABLET ORAL at 08:12

## 2019-12-12 RX ADMIN — ATOVAQUONE 1500 MG: 750 SUSPENSION ORAL at 08:12

## 2019-12-12 RX ADMIN — APIXABAN 5 MG: 5 TABLET, FILM COATED ORAL at 08:12

## 2019-12-12 RX ADMIN — HYDRALAZINE HYDROCHLORIDE 50 MG: 50 TABLET ORAL at 02:12

## 2019-12-12 RX ADMIN — CARVEDILOL 25 MG: 25 TABLET, FILM COATED ORAL at 08:12

## 2019-12-12 RX ADMIN — FUROSEMIDE 80 MG: 10 INJECTION, SOLUTION INTRAMUSCULAR; INTRAVENOUS at 08:12

## 2019-12-12 RX ADMIN — SODIUM BICARBONATE TAB 325 MG 1300 MG: 325 TAB at 10:12

## 2019-12-12 RX ADMIN — SODIUM BICARBONATE 650 MG TABLET 1950 MG: at 08:12

## 2019-12-12 RX ADMIN — APIXABAN 5 MG: 5 TABLET, FILM COATED ORAL at 10:12

## 2019-12-12 RX ADMIN — FERROUS SULFATE TAB EC 325 MG (65 MG FE EQUIVALENT) 325 MG: 325 (65 FE) TABLET DELAYED RESPONSE at 10:12

## 2019-12-12 RX ADMIN — SODIUM BICARBONATE TAB 325 MG 1300 MG: 325 TAB at 12:12

## 2019-12-12 NOTE — PROGRESS NOTES
[facility]  Kidney Transplant  Progress Note      Reason for Follow-up: Reassessment of Kidney Transplant - 2017  (#1) recipient and management of immunosuppression.    ORGAN:  RIGHT KIDNEY   Donor Type:  Donation after Brain Death         Subjective:   History of Present Illness:  39 y.o. year old Black or  male who received a  - brain death kidney transplant on 17.  He has CKD stage 3 - GFR 30-59 and his baseline creatinine is between 1.8-2.1. He takes prednisone and sirolimus for maintenance immunosuppression.          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   - Biopsy proven ABMR with weakly positive class I DSA's on 17. Pt treated with SMP x 3 (-9/3). PLEX x6 (completed ) and IVIG x 2 (-).    - Kidney US on 17 showed a large collection located along the anterior inferior margin of the renal transplant measuring 13.4 x 7 x 11.9. taken back to the OR for retroperitoneal bleed and washout.   -Biopsy 10/17/17 (LEFTY): 14 glomeruli, <5% fibrosis, no MC severo..  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 Amador for Kaposi's Sarcoma and has been treated with Doxorubicin x 4 doses. Has been tolerated well with improved in Tumor scottie according to oncology is note dated 2019.  In addition it also showed cardiomegaly with new evidence of pleural effusion.     -Biopsy 1/15/18: No ACR, AVR, ABMR, CD4 neg. Less than 10 % fibrosis.   -Biopsy (for DGF) 17: good sample with 24 glomeruli (only 1 sclerosed) and evidence just meeting criteria for ABMR. + diffuse ATI. Minimal fibrosis, no ACR or AVR, but + CD4 (>50% diffusely + stain) with mild glomerulitis and focal peritubular capillaritis.  Also, + ca-oxalate (3) and ca-phos (2) crystals.  -7/9/18 WEAK DSA DETECTED: CW5(4422)      PCP PROPHYLAXIS: Bactrim LIFELONG--> changed to Mepron ~ 6/2018  CMV PROPHYLAXIS: Valcyte until 11/19/17  FUNGAL PROPHYLAXIS: Nystatin until 9/15/17         He presented to the ER with SOB and lower extremity swelling Reports he was recently in the hospital where he was treated for pneumonia. During that admission, the patient received fluids for his infection as well as antibiotics. He reports since his pneumonia has been properly treated and his cough resolved, however he noticed worsening lower extremity swelling. Reports over the past week, he's gained weight (dry weight ~155-160) and is now up to 180lbs. He has not been compliant with a low salt diet. He reports this morning his swelling worsened to his upper extremities and he began complaining of MARY. However, he was able to walk from the parking garage to the ER before complaining of SOB. He denies any cough, fevers, chills, n/v, headaches, vision changes or weakness.     He was recently seen by Dr. Mejias (cardiology) in clinic on 12/2 where he noticed significant signs of HF on exam including 4+ pitting edema of the lower extremities and elevated JVD. He was started on lasix 20mg during that time.      Had a 2D echo performed on 11/26 which showed mildly decreased left ventricular systolic function. The estimated ejection fraction is 40%. QEF 40%. Global hypokinetic wall motion. (EF 55% in Aug-2019).      In the ED, the patient was found to have an elevated BNP and pulmonary edema on CXR.     Mr. Aguiar is a 39 y.o. year old male who is status post Kidney Transplant - 8/18/2017  (#1).    His maintenance immunosuppression consists of:   Immunosuppressants (From admission, onward)    Start     Stop Route Frequency Ordered    12/11/19 0900  sirolimus tablet 2 mg      -- Oral Daily 12/10/19 8559          Hospital Course:  No notes on file    Interval History:  Patient  seen and examined at bedside, breathing on ambient air, 3.6 liters of urine documented on last shift.     Past Medical, Surgical, Family, and Social History:   Unchanged from H&P.    Scheduled Meds:   apixaban  5 mg Oral BID    atovaquone  1,500 mg Oral Daily    calcium carbonate  1 tablet Oral Daily    carvedilol  25 mg Oral BID WM    cinacalcet  60 mg Oral Daily with breakfast    dolutegravir  50 mg Oral Daily    emtricitabine-tenofovir alafen  1 tablet Oral Daily    ferrous sulfate  325 mg Oral BID    furosemide  80 mg Intravenous BID    hydrALAZINE  50 mg Oral Q8H    multivitamin  1 tablet Oral Daily    predniSONE  5 mg Oral Daily    rosuvastatin  20 mg Oral Daily    sirolimus  2 mg Oral Daily    sodium bicarbonate  1,300 mg Oral BID     Continuous Infusions:  PRN Meds:glucagon (human recombinant), glucose, glucose, hydrALAZINE, HYDROcodone-acetaminophen, sodium chloride 0.9%    Intake/Output - Last 3 Shifts       12/10 0700 - 12/11 0659 12/11 0700 - 12/12 0659 12/12 0700 - 12/13 0659    P.O. 960 1200     Total Intake(mL/kg) 960 (12.1) 1200 (15.3)     Urine (mL/kg/hr) 2000 3675 (2)     Stool  0     Total Output 2000 3675     Net -1040 -2475            Stool Occurrence  0 x            Review of Systems   Constitutional: Negative for activity change, appetite change, chills, diaphoresis, fatigue and fever.   Respiratory: Negative for cough, choking, chest tightness, shortness of breath and stridor.    Cardiovascular: Positive for leg swelling (R>L (due to cancer on the right side)). Negative for chest pain.   Gastrointestinal: Negative for blood in stool, diarrhea, nausea and vomiting. Abdominal pain: LLQ.   Genitourinary: Negative for difficulty urinating, dysuria and frequency.   Musculoskeletal: Negative for arthralgias and myalgias.   Skin: Negative for pallor and rash.   Neurological: Negative for dizziness, light-headedness and headaches.      Objective:     Vital Signs (Most Recent):  Temp:  "96.3 °F (35.7 °C) (12/12/19 1110)  Pulse: 73 (12/12/19 1130)  Resp: 18 (12/12/19 1110)  BP: (!) 165/88 (12/12/19 1110)  SpO2: 95 % (12/12/19 1110) Vital Signs (24h Range):  Temp:  [96.3 °F (35.7 °C)-98.4 °F (36.9 °C)] 96.3 °F (35.7 °C)  Pulse:  [66-88] 73  Resp:  [16-18] 18  SpO2:  [92 %-99 %] 95 %  BP: (134-181)/(83-99) 165/88     Weight: 75.8 kg (167 lb)  Height: 5' 8" (172.7 cm)  Body mass index is 25.39 kg/m².    Physical Exam   Constitutional: He is oriented to person, place, and time. He appears well-developed and well-nourished.   HENT:   Head: Normocephalic and atraumatic.   Elevated JVD    Eyes: Pupils are equal, round, and reactive to light. EOM are normal.   Neck: Normal range of motion. Neck supple. No tracheal deviation present. No thyromegaly present.   Cardiovascular: Normal rate and regular rhythm. Exam reveals no friction rub.   No murmur heard.  Pulmonary/Chest: Effort normal and breath sounds normal. No respiratory distress. He has no wheezes. He exhibits no tenderness.   Course crackles bilateral lower lobes   Abdominal: Soft. Bowel sounds are normal. He exhibits distension. There is no tenderness.   Musculoskeletal: Normal range of motion. He exhibits no edema.   Neurological: He is alert and oriented to person, place, and time.   Skin: Skin is warm and dry. Capillary refill takes less than 2 seconds. No rash noted. No erythema.   2+ Pitting edema up to the hip bilaterally. 2+ pitting edema of the upper extremities bilaterally.        Laboratory:  CBC:   Recent Labs   Lab 12/10/19  1245 12/11/19  0519 12/12/19  0524   WBC 5.26 5.09 4.70   RBC 3.12* 2.85* 3.07*   HGB 7.6* 6.9* 7.5*   HCT 25.1* 23.2* 24.7*   * 146* 156   MCV 80* 81* 81*   MCH 24.4* 24.2* 24.4*   MCHC 30.3* 29.7* 30.4*     CMP:   Recent Labs   Lab 12/10/19  1245 12/11/19  0519 12/11/19 1934 12/12/19 0524   GLU 98 93 165* 111*   CALCIUM 8.4* 8.0* 8.0* 8.2*   ALBUMIN 3.4*  --   --   --    PROT 6.3  --   --   --     " 146* 143 144   K 4.0 3.7 3.7 3.8   CO2 25 26 31* 30*    109 104 105   BUN 48* 44* 44* 44*   CREATININE 2.9* 2.8* 3.0* 2.9*   ALKPHOS 120  --   --   --    ALT 36  --   --   --    AST 31  --   --   --        Diagnostic Results:  None    Assessment/Plan:     *  donor kidney transplant 2017  Status post  brain dead kidney transplant 2017  -Biopsy (for DGF) 17: good sample with 24 glomeruli (only 1 sclerosed) and evidence just meeting criteria for ABMR. + diffuse ATI. Minimal fibrosis, no ACR or AVR, but + CD4 (>50% diffusely + stain) with mild glomerulitis and focal peritubular capillaritis. Also, biopsy shows ca-oxalate (3) and ca-phos (2) crystals.  -17 DSA + for weak class I.    Biopsy (LEFTY) 10/17/2017 14 glomeruli, <5% fibrosis, no MC changes, No ACR or AMR, C4d negative    Biopsy (LEFTY) 1/15/18:  No ACR, AVR, ABMR, CD4 neg. Less than 10 % fibrosis.     C/w Home Sirolimus and Prednisone at 5 mg daily   Monitor Sirolimus level, today's levels pending  Monitor CMP    LEFTY (acute kidney injury)  LEFTY superimposed on CKD stage III [b/l creat 1.9-2.1] complicating renal transplant  - Creatinine  Was at 1.7 three weeks ago, now at 2.9, most likely cardiorenal  - UO 3.6 liters during last shift  - on Lasix IV 80 mg BID  - Follow with strict I/Os, daily weights, BP (goal <140/90), daily labs.    - Avoid nephrotoxic agents (NSAIDs, IV contrast dye, ACEI/ARB anti-HTN medications, Aminoglycoside-containing antibiotics)  - Renally dose all appropriate medications, including antibiotics      Microcytic anemia  Patient Hemoglobin decreased today with microcytic anemia showing MCV 80.   During patient's last admission had similar results. Iron panel workup showed anemia of chronic disease.   Denies any GI bleeding. Could also be from chronic renal disease.     PLAN:   - low iron saturation, ferritin at >500  - received oral iron supplementation on       Acute DVT (deep venous  thrombosis)  Patient w/ hx of proximal right subclavian vein dvt found on US during last admission on 11/13/2019  -on Eliquis 5mg BID.     Renovascular hypertension  - patient takes Coreg 25mg and Nifedipine 60mg at home     Kaposi sarcoma  HIV related   Currently on HAART and Adriamycin Q3W for symptomatic metastatic KS      Metabolic acidosis  Bicarbonate elevated at 30  Reduce to 1300 mg BID      AIDS (acquired immunodeficiency syndrome), CD4 <=200  As per transplant ID   Currently on HAART Therapy        Discharge Planning:  Pending clinical improvement     Kevin Weeks MD  Kidney Transplant  [facility]

## 2019-12-12 NOTE — PLAN OF CARE
Patient request CM to return later to complete assessment. Patient currently has visitors at bedside.       12/11/19 1856   Discharge Assessment   Assessment Type Discharge Planning Assessment

## 2019-12-12 NOTE — PLAN OF CARE
Plan of care discussed with patient. Patient is free of fall/trauma/injury. Denies CP, SOB, or pain/discomfort. Pt is diuresing with IVP lasix   x3 daily per order. All questions addressed. Will continue to monitor

## 2019-12-12 NOTE — PROGRESS NOTES
[facility] Hospital Medicine  Progress Note    Patient Name: Demetrio Aguiar  MRN: [id number]  Patient Class: IP- Inpatient   Admission Date: 12/10/2019  Length of Stay: 2 days  Attending Physician: Shira Guillen MD  Primary Care Provider: [facility] Medicine Team: [facility] HOSP MED V Otf Ward MD    Subjective:     Principal Problem:S/P kidney transplant    HPI:  Demetrio Aguiar is a 39 y.o. 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 Reports he was recently in the hospital where he was treated for pneumonia. During that admission, the patient received fluids for his infection as well as antibiotics. He reports since his pneumonia has been properly treated and his cough resolved, however he noticed worsening lower extremity swelling. Reports over the past week, he's gained weight (dry weight ~155-160) and is now up to 180lbs. He has not been compliant with a low salt diet. He reports this morning his swelling worsened to his upper extremities and he began complaining of MARY. However, he was able to walk from the parking garage to the ER before complaining of SOB. He denies any cough, fevers, chills, n/v, headaches, vision changes or weakness.    He was recently seen by Dr. Mejias (cardiology) in clinic on 12/2 where he noticed significant signs of HF on exam including 4+ pitting edema of the lower extremities and elevated JVD. He was started on lasix 20mg during that time.     Had a 2D echo performed on 11/26 which showed mildly decreased left ventricular systolic function. The estimated ejection fraction is 40%. QEF 40%. Global hypokinetic wall motion. (EF 55% in Aug-2019).     In the ED, the patient was found to have an elevated BNP and pulmonary  edema on CXR.     Overview/Hospital Course:  Mr. Aguiar admitted to the hospital for CHF exacerbation. Was started on Lasix 40mg BID however minimal urine output with no change in weight despite fluid restriction. Changed dose to Lasix 80mg BID. Patient had excellent output with ~3.5L out. Weight from 180lbs on admission to 166lbs. Target dry weight 155-160lbs. Cr. Continues to be elevated above baseline. Overall, improving with no significant complaints.     Interval History:     NAEON. Diuresing well with no complaints of SOB, n/v, headaches, CP, vision changes, tinnitus or diarrhea. Reports he wants to go home and wants faster diuresis but discussed that his Bicarb is mildly elevated today and more lasix may cause worsening.     Review of Systems   Constitutional: Negative for activity change, appetite change, chills, diaphoresis, fatigue and fever.   Respiratory: Negative for cough, choking, chest tightness, shortness of breath and stridor.    Cardiovascular: Positive for leg swelling (R>L (due to cancer on the right side)). Negative for chest pain.   Gastrointestinal: Negative for blood in stool, diarrhea, nausea and vomiting. Abdominal pain: LLQ.   Genitourinary: Negative for difficulty urinating, dysuria and frequency.   Musculoskeletal: Negative for arthralgias and myalgias.   Skin: Negative for pallor and rash.   Neurological: Negative for dizziness, light-headedness and headaches.     Objective:     Vital Signs (Most Recent):  Temp: 97.8 °F (36.6 °C) (12/12/19 0718)  Pulse: 73 (12/12/19 0728)  Resp: 16 (12/12/19 0718)  BP: (!) 151/86 (12/12/19 0718)  SpO2: 98 % (12/12/19 0718) Vital Signs (24h Range):  Temp:  [96.2 °F (35.7 °C)-98.4 °F (36.9 °C)] 97.8 °F (36.6 °C)  Pulse:  [66-88] 73  Resp:  [16-18] 16  SpO2:  [92 %-99 %] 98 %  BP: (134-183)/(83-99) 151/86     Weight: 75.8 kg (167 lb)  Body mass index is 25.39 kg/m².    Intake/Output Summary (Last 24 hours) at 12/12/2019 1000  Last data filed at 12/12/2019  0600  Gross per 24 hour   Intake 720 ml   Output 3500 ml   Net -2780 ml      Physical Exam   Constitutional: He is oriented to person, place, and time. He appears well-developed and well-nourished.   HENT:   Head: Normocephalic and atraumatic.   Elevated JVD    Eyes: Pupils are equal, round, and reactive to light. EOM are normal.   Neck: Normal range of motion. Neck supple. No tracheal deviation present. No thyromegaly present.   Cardiovascular: Normal rate and regular rhythm. Exam reveals no friction rub.   No murmur heard.  Pulmonary/Chest: Effort normal and breath sounds normal. No respiratory distress. He has no wheezes. He exhibits no tenderness.   Course crackles bilateral lower lobes   Abdominal: Soft. Bowel sounds are normal. He exhibits distension. There is no tenderness.   Musculoskeletal: Normal range of motion. He exhibits no edema.   Neurological: He is alert and oriented to person, place, and time.   Skin: Skin is warm and dry. Capillary refill takes less than 2 seconds. No rash noted. No erythema.   2+ Pitting edema up to the hip bilaterally. 2+ pitting edema of the upper extremities bilaterally.      Significant Labs:   BMP:   Recent Labs   Lab 19  0524   *      K 3.8      CO2 30*   BUN 44*   CREATININE 2.9*   CALCIUM 8.2*   MG 1.8     CBC:   Recent Labs   Lab 12/10/19  1245 19  0519 19  0524   WBC 5.26 5.09 4.70   HGB 7.6* 6.9* 7.5*   HCT 25.1* 23.2* 24.7*   * 146* 156           Assessment/Plan:      *  donor kidney transplant 2017  - KTM consulted. Recs appreciated  - Will continue Sirolimus 2mg BID with levels daily  - Prednisone 5mg Daily.     Acute on chronic systolic heart failure  Patient presenting with progressively worsening lower extremity swelling, MARY and inability to lay flat. Recent diagnosis of HF over the past few months possibility 2/2 chemo. Seen by Cardiology who recently started the patient on Lasix.     PLAN:   - Increased  dose to Lasix 80mg IV BID on 12/11. Now with significantly improved UOP.   - patient now 166lbs (from 180). Closer to dry weight of 155-160.   - Discontinued CCB as it can contribute to swelling. Will start Entresto prior to discharges and resolution of LEFTY if ok with KTM.   - Will continue patient's BB which he has been taking at home.   - Strict I/O   - Fluid restriction 1500ml     Microcytic anemia  Patient Hemoglobin decreased today with microcytic anemia showing MCV 80.   During patient's last admission had similar results. Iron panel workup showed anemia of chronic disease.   Denies any GI bleeding. Could also be from chronic renal disease.     PLAN:   - Will monitor for now without transfusion.   - Started on PO iron supplementation.   - H/H Improvement today.     Discharge planning issues  Patient expected discharge in 1-2 days pending adequate diuresis and improvement in GFR.   - Patient reports he has run out of a lot of his blood pressure medications. Will ensure he has an supply prior to discharge  - Will need follow up with PCP or cardiology for BP and medication management post discharge.     Acute DVT (deep venous thrombosis)  Patient w/ hx of proximal right subclavian vein dvt found on US during last admission on 11/13/2019  - Will continue Eliquis 5mg BID.     Renovascular hypertension  - patient takes Coreg 25mg. Discontinued Nifedipine.   - Will continue and monitor.     Kaposi sarcoma  Hx of Kaposi Sarcoma with treatment with chemo.   - 4x treatment with Doxorubicin.   - Stable. Will monitor.     LEFTY (acute kidney injury)  Patient with hx of renal transplant presenting with LEFTY. Ddx include cardiorenal syndrome vs nephrotic syndrome vs ATN vs transplant rejection. Cr baseline 1.8-2.2 but presented to the ED with Cr of 2.9. With recent weight gain, elevated JVD and lower extremity swelling, highly suggestive of HF. However, unable to rule out secondary causes.     PLAN:   - UA without significant  abnormalities. No protein or blood making nephrotic/nephritic causes less likely.   - Mild improvement in Cr with diuresis. However patient continues to be overloaded.   - Holding nephrotoxic medications      Prophylactic immunotherapy  Patient's last CD4 count on 11/13 at 63.   - Started on Atovaquone for ppx.   - Will continue inpatient.       AIDS (acquired immunodeficiency syndrome), CD4 <=200  - Will hold HAART medications while inpatient.     Hyperlipidemia  - Continue Crestor 20mg Daily.       VTE Risk Mitigation (From admission, onward)         Ordered     apixaban tablet 5 mg  2 times daily      12/10/19 1619     IP VTE LOW RISK PATIENT  Once      12/10/19 1537                      Otf Ward MD  Department of Hospital Medicine   [facility]

## 2019-12-12 NOTE — SUBJECTIVE & OBJECTIVE
Subjective:   History of Present Illness:  39 y.o. year old Black or  male who received a  - brain death kidney transplant on 17.  He has CKD stage 3 - GFR 30-59 and his baseline creatinine is between 1.8-2.1. He takes prednisone and sirolimus for maintenance immunosuppression.          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   - Biopsy proven ABMR with weakly positive class I DSA's on 17. Pt treated with SMP x 3 (-9/3). PLEX x6 (completed ) and IVIG x 2 (-).    - Kidney US on 17 showed a large collection located along the anterior inferior margin of the renal transplant measuring 13.4 x 7 x 11.9. taken back to the OR for retroperitoneal bleed and washout.   -Biopsy 10/17/17 (LEFTY): 14 glomeruli, <5% fibrosis, no MC severo..  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 Amador for Kaposi's Sarcoma and has been treated with Doxorubicin x 4 doses. Has been tolerated well with improved in Tumor scottie according to oncology is note dated 2019.  In addition it also showed cardiomegaly with new evidence of pleural effusion.     -Biopsy 1/15/18: No ACR, AVR, ABMR, CD4 neg. Less than 10 % fibrosis.   -Biopsy (for DGF) 17: good sample with 24 glomeruli (only 1 sclerosed) and evidence just meeting criteria for ABMR. + diffuse ATI. Minimal fibrosis, no ACR or AVR, but + CD4 (>50% diffusely + stain) with mild glomerulitis and focal peritubular capillaritis. Also, + ca-oxalate (3) and ca-phos (2) crystals.  -18 WEAK DSA DETECTED: CW5(3133)      PCP PROPHYLAXIS: Bactrim LIFELONG--> changed to Mepron ~ 2018  CMV PROPHYLAXIS: Valcyte until 17  FUNGAL PROPHYLAXIS: Nystatin until 9/15/17         He presented to  the ER with SOB and lower extremity swelling Reports he was recently in the hospital where he was treated for pneumonia. During that admission, the patient received fluids for his infection as well as antibiotics. He reports since his pneumonia has been properly treated and his cough resolved, however he noticed worsening lower extremity swelling. Reports over the past week, he's gained weight (dry weight ~155-160) and is now up to 180lbs. He has not been compliant with a low salt diet. He reports this morning his swelling worsened to his upper extremities and he began complaining of MARY. However, he was able to walk from the parking garage to the ER before complaining of SOB. He denies any cough, fevers, chills, n/v, headaches, vision changes or weakness.     He was recently seen by Dr. Mejias (cardiology) in clinic on 12/2 where he noticed significant signs of HF on exam including 4+ pitting edema of the lower extremities and elevated JVD. He was started on lasix 20mg during that time.      Had a 2D echo performed on 11/26 which showed mildly decreased left ventricular systolic function. The estimated ejection fraction is 40%. QEF 40%. Global hypokinetic wall motion. (EF 55% in Aug-2019).      In the ED, the patient was found to have an elevated BNP and pulmonary edema on CXR.     Mr. Aguiar is a 39 y.o. year old male who is status post Kidney Transplant - 8/18/2017  (#1).    His maintenance immunosuppression consists of:   Immunosuppressants (From admission, onward)    Start     Stop Route Frequency Ordered    12/11/19 0900  sirolimus tablet 2 mg      -- Oral Daily 12/10/19 1619          Hospital Course:  No notes on file    Interval History:  Patient seen and examined at bedside, breathing on ambient air, 3.6 liters of urine documented on last shift.     Past Medical, Surgical, Family, and Social History:   Unchanged from H&P.    Scheduled Meds:   apixaban  5 mg Oral BID    atovaquone  1,500 mg Oral Daily     calcium carbonate  1 tablet Oral Daily    carvedilol  25 mg Oral BID WM    cinacalcet  60 mg Oral Daily with breakfast    dolutegravir  50 mg Oral Daily    emtricitabine-tenofovir alafen  1 tablet Oral Daily    ferrous sulfate  325 mg Oral BID    furosemide  80 mg Intravenous BID    hydrALAZINE  50 mg Oral Q8H    multivitamin  1 tablet Oral Daily    predniSONE  5 mg Oral Daily    rosuvastatin  20 mg Oral Daily    sirolimus  2 mg Oral Daily    sodium bicarbonate  1,300 mg Oral BID     Continuous Infusions:  PRN Meds:glucagon (human recombinant), glucose, glucose, hydrALAZINE, HYDROcodone-acetaminophen, sodium chloride 0.9%    Intake/Output - Last 3 Shifts       12/10 0700 - 12/11 0659 12/11 0700 - 12/12 0659 12/12 0700 - 12/13 0659    P.O. 960 1200     Total Intake(mL/kg) 960 (12.1) 1200 (15.3)     Urine (mL/kg/hr) 2000 3675 (2)     Stool  0     Total Output 2000 3675     Net -1040 -2475            Stool Occurrence  0 x            Review of Systems   Constitutional: Negative for activity change, appetite change, chills, diaphoresis, fatigue and fever.   Respiratory: Negative for cough, choking, chest tightness, shortness of breath and stridor.    Cardiovascular: Positive for leg swelling (R>L (due to cancer on the right side)). Negative for chest pain.   Gastrointestinal: Negative for blood in stool, diarrhea, nausea and vomiting. Abdominal pain: LLQ.   Genitourinary: Negative for difficulty urinating, dysuria and frequency.   Musculoskeletal: Negative for arthralgias and myalgias.   Skin: Negative for pallor and rash.   Neurological: Negative for dizziness, light-headedness and headaches.      Objective:     Vital Signs (Most Recent):  Temp: 96.3 °F (35.7 °C) (12/12/19 1110)  Pulse: 73 (12/12/19 1130)  Resp: 18 (12/12/19 1110)  BP: (!) 165/88 (12/12/19 1110)  SpO2: 95 % (12/12/19 1110) Vital Signs (24h Range):  Temp:  [96.3 °F (35.7 °C)-98.4 °F (36.9 °C)] 96.3 °F (35.7 °C)  Pulse:  [66-88] 73  Resp:   "[16-18] 18  SpO2:  [92 %-99 %] 95 %  BP: (134-181)/(83-99) 165/88     Weight: 75.8 kg (167 lb)  Height: 5' 8" (172.7 cm)  Body mass index is 25.39 kg/m².    Physical Exam   Constitutional: He is oriented to person, place, and time. He appears well-developed and well-nourished.   HENT:   Head: Normocephalic and atraumatic.   Elevated JVD    Eyes: Pupils are equal, round, and reactive to light. EOM are normal.   Neck: Normal range of motion. Neck supple. No tracheal deviation present. No thyromegaly present.   Cardiovascular: Normal rate and regular rhythm. Exam reveals no friction rub.   No murmur heard.  Pulmonary/Chest: Effort normal and breath sounds normal. No respiratory distress. He has no wheezes. He exhibits no tenderness.   Course crackles bilateral lower lobes   Abdominal: Soft. Bowel sounds are normal. He exhibits distension. There is no tenderness.   Musculoskeletal: Normal range of motion. He exhibits no edema.   Neurological: He is alert and oriented to person, place, and time.   Skin: Skin is warm and dry. Capillary refill takes less than 2 seconds. No rash noted. No erythema.   2+ Pitting edema up to the hip bilaterally. 2+ pitting edema of the upper extremities bilaterally.        Laboratory:  CBC:   Recent Labs   Lab 12/10/19  1245 12/11/19 0519 12/12/19 0524   WBC 5.26 5.09 4.70   RBC 3.12* 2.85* 3.07*   HGB 7.6* 6.9* 7.5*   HCT 25.1* 23.2* 24.7*   * 146* 156   MCV 80* 81* 81*   MCH 24.4* 24.2* 24.4*   MCHC 30.3* 29.7* 30.4*     CMP:   Recent Labs   Lab 12/10/19  1245 12/11/19  0519 12/11/19 1934 12/12/19 0524   GLU 98 93 165* 111*   CALCIUM 8.4* 8.0* 8.0* 8.2*   ALBUMIN 3.4*  --   --   --    PROT 6.3  --   --   --     146* 143 144   K 4.0 3.7 3.7 3.8   CO2 25 26 31* 30*    109 104 105   BUN 48* 44* 44* 44*   CREATININE 2.9* 2.8* 3.0* 2.9*   ALKPHOS 120  --   --   --    ALT 36  --   --   --    AST 31  --   --   --        Diagnostic Results:  None  "

## 2019-12-12 NOTE — PLAN OF CARE
Patient remained free of falls, trauma, injury, and skin breakdown. VSS; no patient complaints at this time. Lasix frequency changed. Glucose monitored; insulin not indicated. Fall precautions maintained. Plan of care reviewed; patient verbalized understanding. All questions and concerns addressed; will continue to monitor.

## 2019-12-12 NOTE — NURSING
Spoke with MD Sylvia about pt taking his own antivirals. MD stated he told him to take his own meds  due to one not being in formulary. RN did not give antivirals

## 2019-12-12 NOTE — ASSESSMENT & PLAN NOTE
Patient expected discharge in 1-2 days pending adequate diuresis and improvement in GFR.   - Patient reports he has run out of a lot of his blood pressure medications. Will ensure he has an supply prior to discharge  - Will need follow up with PCP or cardiology for BP and medication management post discharge.

## 2019-12-12 NOTE — PLAN OF CARE
Plan of care discussed with patient. Patient is free of fall/trauma/injury. Pt remains on tele. Denies CP/SOB. HA controlled with PRN med. K+ 3.8; replaced. Pt is diuresing with IVP lasix  80mg x2 daily per order. Possible DC tomorrow. All questions addressed. Will continue to monitor

## 2019-12-12 NOTE — ASSESSMENT & PLAN NOTE
Patient presenting with progressively worsening lower extremity swelling, MARY and inability to lay flat. Recent diagnosis of HF over the past few months possibility 2/2 chemo. Seen by Cardiology who recently started the patient on Lasix.     PLAN:   - Increased dose to Lasix 80mg IV BID on 12/11. Now with significantly improved UOP.   - patient now 166lbs (from 180). Closer to dry weight of 155-160.   - Discontinues CCB as it can contribute to swelling. Will start Entresto prior to discharges and resolution of LEFTY if ok with KTM.   - Will continue patient's BB which he has been taking at home.   - Strict I/O   - Fluid restriction 1500ml

## 2019-12-12 NOTE — PLAN OF CARE
12/12/19 7113   Post-Acute Status   Post-Acute Authorization Placement;Other   Other Status No Post-Acute Service Needs     Patient expected to discharge tomorrow with no needs.      Cordelia Mathew LMSW  [facility]   [id number]

## 2019-12-12 NOTE — ASSESSMENT & PLAN NOTE
Patient Hemoglobin decreased today with microcytic anemia showing MCV 80.   During patient's last admission had similar results. Iron panel workup showed anemia of chronic disease.   Denies any GI bleeding. Could also be from chronic renal disease.     PLAN:   - low iron saturation, ferritin at >500  - received oral iron supplementation on 12/11

## 2019-12-12 NOTE — ASSESSMENT & PLAN NOTE
LEFTY superimposed on CKD stage III [b/l creat 1.9-2.1] complicating renal transplant  - Creatinine  Was at 1.7 three weeks ago, now at 2.9, most likely cardiorenal  - UO 3.6 liters during last shift  - on Lasix IV 80 mg BID  - Follow with strict I/Os, daily weights, BP (goal <140/90), daily labs.    - Avoid nephrotoxic agents (NSAIDs, IV contrast dye, ACEI/ARB anti-HTN medications, Aminoglycoside-containing antibiotics)  - Renally dose all appropriate medications, including antibiotics

## 2019-12-12 NOTE — SUBJECTIVE & OBJECTIVE
Interval History:     NAEON. Diuresing well with no complaints of SOB, n/v, headaches, CP, vision changes, tinnitus or diarrhea. Reports he wants to go home and wants faster diuresis but discussed that his Bicarb is mildly elevated today and more lasix may cause worsening.     Review of Systems   Constitutional: Negative for activity change, appetite change, chills, diaphoresis, fatigue and fever.   Respiratory: Negative for cough, choking, chest tightness, shortness of breath and stridor.    Cardiovascular: Positive for leg swelling (R>L (due to cancer on the right side)). Negative for chest pain.   Gastrointestinal: Negative for blood in stool, diarrhea, nausea and vomiting. Abdominal pain: LLQ.   Genitourinary: Negative for difficulty urinating, dysuria and frequency.   Musculoskeletal: Negative for arthralgias and myalgias.   Skin: Negative for pallor and rash.   Neurological: Negative for dizziness, light-headedness and headaches.     Objective:     Vital Signs (Most Recent):  Temp: 97.8 °F (36.6 °C) (12/12/19 0718)  Pulse: 73 (12/12/19 0728)  Resp: 16 (12/12/19 0718)  BP: (!) 151/86 (12/12/19 0718)  SpO2: 98 % (12/12/19 0718) Vital Signs (24h Range):  Temp:  [96.2 °F (35.7 °C)-98.4 °F (36.9 °C)] 97.8 °F (36.6 °C)  Pulse:  [66-88] 73  Resp:  [16-18] 16  SpO2:  [92 %-99 %] 98 %  BP: (134-183)/(83-99) 151/86     Weight: 75.8 kg (167 lb)  Body mass index is 25.39 kg/m².    Intake/Output Summary (Last 24 hours) at 12/12/2019 1000  Last data filed at 12/12/2019 0600  Gross per 24 hour   Intake 720 ml   Output 3500 ml   Net -2780 ml      Physical Exam   Constitutional: He is oriented to person, place, and time. He appears well-developed and well-nourished.   HENT:   Head: Normocephalic and atraumatic.   Elevated JVD    Eyes: Pupils are equal, round, and reactive to light. EOM are normal.   Neck: Normal range of motion. Neck supple. No tracheal deviation present. No thyromegaly present.   Cardiovascular: Normal rate  and regular rhythm. Exam reveals no friction rub.   No murmur heard.  Pulmonary/Chest: Effort normal and breath sounds normal. No respiratory distress. He has no wheezes. He exhibits no tenderness.   Course crackles bilateral lower lobes   Abdominal: Soft. Bowel sounds are normal. He exhibits distension. There is no tenderness.   Musculoskeletal: Normal range of motion. He exhibits no edema.   Neurological: He is alert and oriented to person, place, and time.   Skin: Skin is warm and dry. Capillary refill takes less than 2 seconds. No rash noted. No erythema.   2+ Pitting edema up to the hip bilaterally. 2+ pitting edema of the upper extremities bilaterally.      Significant Labs:   BMP:   Recent Labs   Lab 12/12/19  0524   *      K 3.8      CO2 30*   BUN 44*   CREATININE 2.9*   CALCIUM 8.2*   MG 1.8     CBC:   Recent Labs   Lab 12/10/19  1245 12/11/19  0519 12/12/19  0524   WBC 5.26 5.09 4.70   HGB 7.6* 6.9* 7.5*   HCT 25.1* 23.2* 24.7*   * 146* 156

## 2019-12-12 NOTE — PLAN OF CARE
Met with pt. and completed assessment. Admitted with CHF exacerbation. PMH ESRD with kidney transplant in 2017. Followed by transplant team. Pt. lives in [place]. Followed by transplant team here at [facility]. Independent and active. When medically stable, pt. will d/c home with no needs.    Payor: MEDICARE / Plan: MEDICARE PART A & B / Product Type: Government /      Primary Doctor No     No future appointments.       [facility] [place]  [address] [address]  Phone: [phone] Fax: [phone]    [facility] - [address] [address]  Phone: [phone] Fax: [phone]    [id number] [place] - [address]  [address] [address]  Phone: [phone] Fax: [phone]    [facility] [place] [address]  [address]  [facility] [place] [address]  Phone: [phone] Fax: [phone]    [facility] [place] - [address]  [address] [address]  Phone: [phone] Fax: [phone]         12/12/19 1000   Discharge Assessment   Assessment Type Discharge Planning Assessment   Confirmed/corrected address and phone number on facesheet? Yes   Assessment information obtained from? Patient   Expected Length of Stay (days) 4   Communicated expected length of stay with patient/caregiver yes   Prior to hospitilization cognitive status: Alert/Oriented   Prior to hospitalization functional status: Independent   Current cognitive status: Alert/Oriented   Current Functional Status: Independent   Facility Arrived From: home   Lives With alone   Able to Return to Prior Arrangements yes   Is patient able to care for self after discharge? Yes   Who are your caregiver(s) and their phone number(s)? Mother Randa [phone]   Patient's perception of discharge disposition home or selfcare   Readmission Within the Last 30 Days current  reason for admission unrelated to previous admission   Patient currently being followed by outpatient case management? No   Patient currently receives any other outside agency services? No   Equipment Currently Used at Home none   Do you have any problems affording any of your prescribed medications? No   Is the patient taking medications as prescribed? yes   Does the patient have transportation home? Yes   Transportation Anticipated family or friend will provide   Does the patient receive services at the [facility]? No   Discharge Plan A Home   Discharge Plan B Home with family;Home   DME Needed Upon Discharge  none   Readmission Questionnaire   At the time of your discharge, did someone talk to you about what your health problems were? Yes   At the time of discharge, did someone talk to you about what to watch out for regarding worsening of your health problem? Yes   At the time of discharge, did someone talk to you about what to do if you experienced worsening of your health problem? Yes   At the time of discharge, did someone talk to you about which medication to take when you left the hospital and which ones to stop taking? Yes   At the time of discharge, did someone talk to you about when and where to follow up with a doctor after you left the hospital? Yes   How often do you need to have someone help you when you read instructions, pamphlets, or other written material from your doctor or pharmacy? Rarely   Do you have problems taking your medications as prescribed? No   Do you have any problems affording any of  your prescribed medications? No   Do you have problems obtaining/receiving your medications? No   Does the patient have transportation to healthcare appointments? Yes   Living Arrangements house   Does the patient have family/friends to help with healtcare needs after discharge? yes   Does your caregiver provide all the help you need? Yes

## 2019-12-12 NOTE — ASSESSMENT & PLAN NOTE
Status post  brain dead kidney transplant 2017  -Biopsy (for DGF) 17: good sample with 24 glomeruli (only 1 sclerosed) and evidence just meeting criteria for ABMR. + diffuse ATI. Minimal fibrosis, no ACR or AVR, but + CD4 (>50% diffusely + stain) with mild glomerulitis and focal peritubular capillaritis. Also, biopsy shows ca-oxalate (3) and ca-phos (2) crystals.  -17 DSA + for weak class I.    Biopsy (LEFTY) 10/17/2017 14 glomeruli, <5% fibrosis, no MC changes, No ACR or AMR, C4d negative    Biopsy (LEFTY) 1/15/18:  No ACR, AVR, ABMR, CD4 neg. Less than 10 % fibrosis.     C/w Home Sirolimus and Prednisone at 5 mg daily   Monitor Sirolimus level, today's levels pending  Monitor CMP

## 2019-12-13 LAB
ANION GAP SERPL CALC-SCNC: 9 MMOL/L (ref 8–16)
BASOPHILS # BLD AUTO: 0.02 K/UL (ref 0–0.2)
BASOPHILS NFR BLD: 0.4 % (ref 0–1.9)
BUN SERPL-MCNC: 44 MG/DL (ref 6–20)
CALCIUM SERPL-MCNC: 8.2 MG/DL (ref 8.7–10.5)
CHLORIDE SERPL-SCNC: 103 MMOL/L (ref 95–110)
CO2 SERPL-SCNC: 32 MMOL/L (ref 23–29)
CREAT SERPL-MCNC: 3.1 MG/DL (ref 0.5–1.4)
DIFFERENTIAL METHOD: ABNORMAL
EOSINOPHIL # BLD AUTO: 0.1 K/UL (ref 0–0.5)
EOSINOPHIL NFR BLD: 1.4 % (ref 0–8)
ERYTHROCYTE [DISTWIDTH] IN BLOOD BY AUTOMATED COUNT: 19.3 % (ref 11.5–14.5)
EST. GFR  (AFRICAN AMERICAN): 27.6 ML/MIN/1.73 M^2
EST. GFR  (NON AFRICAN AMERICAN): 23.9 ML/MIN/1.73 M^2
GLUCOSE SERPL-MCNC: 114 MG/DL (ref 70–110)
HCT VFR BLD AUTO: 23.6 % (ref 40–54)
HGB BLD-MCNC: 7 G/DL (ref 14–18)
IMM GRANULOCYTES # BLD AUTO: 0.01 K/UL (ref 0–0.04)
IMM GRANULOCYTES NFR BLD AUTO: 0.2 % (ref 0–0.5)
LYMPHOCYTES # BLD AUTO: 0.9 K/UL (ref 1–4.8)
LYMPHOCYTES NFR BLD: 18.4 % (ref 18–48)
MAGNESIUM SERPL-MCNC: 2.1 MG/DL (ref 1.6–2.6)
MCH RBC QN AUTO: 24.2 PG (ref 27–31)
MCHC RBC AUTO-ENTMCNC: 29.7 G/DL (ref 32–36)
MCV RBC AUTO: 82 FL (ref 82–98)
MONOCYTES # BLD AUTO: 0.5 K/UL (ref 0.3–1)
MONOCYTES NFR BLD: 10 % (ref 4–15)
NEUTROPHILS # BLD AUTO: 3.6 K/UL (ref 1.8–7.7)
NEUTROPHILS NFR BLD: 69.6 % (ref 38–73)
NRBC BLD-RTO: 0 /100 WBC
PHOSPHATE SERPL-MCNC: 4.8 MG/DL (ref 2.7–4.5)
PLATELET # BLD AUTO: 140 K/UL (ref 150–350)
PMV BLD AUTO: 10.6 FL (ref 9.2–12.9)
POTASSIUM SERPL-SCNC: 4.2 MMOL/L (ref 3.5–5.1)
PTH-INTACT SERPL-MCNC: 549 PG/ML (ref 9–77)
RBC # BLD AUTO: 2.89 M/UL (ref 4.6–6.2)
SIROLIMUS BLD-MCNC: 4.8 NG/ML (ref 4–20)
SODIUM SERPL-SCNC: 144 MMOL/L (ref 136–145)
WBC # BLD AUTO: 5.11 K/UL (ref 3.9–12.7)

## 2019-12-13 PROCEDURE — 80195 ASSAY OF SIROLIMUS: CPT

## 2019-12-13 PROCEDURE — 25000003 PHARM REV CODE 250: Performed by: PHYSICIAN ASSISTANT

## 2019-12-13 PROCEDURE — 63600175 PHARM REV CODE 636 W HCPCS: Performed by: STUDENT IN AN ORGANIZED HEALTH CARE EDUCATION/TRAINING PROGRAM

## 2019-12-13 PROCEDURE — 84100 ASSAY OF PHOSPHORUS: CPT

## 2019-12-13 PROCEDURE — 36415 COLL VENOUS BLD VENIPUNCTURE: CPT

## 2019-12-13 PROCEDURE — 83970 ASSAY OF PARATHORMONE: CPT

## 2019-12-13 PROCEDURE — 25000003 PHARM REV CODE 250: Performed by: STUDENT IN AN ORGANIZED HEALTH CARE EDUCATION/TRAINING PROGRAM

## 2019-12-13 PROCEDURE — 85025 COMPLETE CBC W/AUTO DIFF WBC: CPT

## 2019-12-13 PROCEDURE — 80048 BASIC METABOLIC PNL TOTAL CA: CPT

## 2019-12-13 PROCEDURE — 20600001 HC STEP DOWN PRIVATE ROOM

## 2019-12-13 PROCEDURE — 99233 SBSQ HOSP IP/OBS HIGH 50: CPT | Mod: GC,,, | Performed by: INTERNAL MEDICINE

## 2019-12-13 PROCEDURE — 99232 PR SUBSEQUENT HOSPITAL CARE,LEVL II: ICD-10-PCS | Mod: GC,,, | Performed by: HOSPITALIST

## 2019-12-13 PROCEDURE — 99233 PR SUBSEQUENT HOSPITAL CARE,LEVL III: ICD-10-PCS | Mod: GC,,, | Performed by: INTERNAL MEDICINE

## 2019-12-13 PROCEDURE — 99232 SBSQ HOSP IP/OBS MODERATE 35: CPT | Mod: GC,,, | Performed by: HOSPITALIST

## 2019-12-13 PROCEDURE — 83735 ASSAY OF MAGNESIUM: CPT

## 2019-12-13 RX ORDER — HYDRALAZINE HYDROCHLORIDE 50 MG/1
100 TABLET, FILM COATED ORAL EVERY 8 HOURS
Status: DISCONTINUED | OUTPATIENT
Start: 2019-12-13 | End: 2019-12-16

## 2019-12-13 RX ORDER — NIFEDIPINE 30 MG/1
60 TABLET, EXTENDED RELEASE ORAL DAILY
Status: DISCONTINUED | OUTPATIENT
Start: 2019-12-14 | End: 2019-12-16

## 2019-12-13 RX ORDER — FUROSEMIDE 10 MG/ML
80 INJECTION INTRAMUSCULAR; INTRAVENOUS 3 TIMES DAILY
Status: DISCONTINUED | OUTPATIENT
Start: 2019-12-13 | End: 2019-12-14

## 2019-12-13 RX ORDER — HYDRALAZINE HYDROCHLORIDE 50 MG/1
50 TABLET, FILM COATED ORAL ONCE
Status: COMPLETED | OUTPATIENT
Start: 2019-12-13 | End: 2019-12-13

## 2019-12-13 RX ORDER — HYDROXYZINE PAMOATE 25 MG/1
25 CAPSULE ORAL EVERY 8 HOURS PRN
Status: DISCONTINUED | OUTPATIENT
Start: 2019-12-13 | End: 2019-12-17 | Stop reason: HOSPADM

## 2019-12-13 RX ADMIN — CALCIUM CARBONATE (ANTACID) CHEW TAB 500 MG 500 MG: 500 CHEW TAB at 08:12

## 2019-12-13 RX ADMIN — EMTRICITABINE AND TENOFOVIR ALAFENAMIDE 1 TABLET: 200; 25 TABLET ORAL at 10:12

## 2019-12-13 RX ADMIN — SIROLIMUS 2 MG: 1 TABLET ORAL at 08:12

## 2019-12-13 RX ADMIN — HYDRALAZINE HYDROCHLORIDE 75 MG: 50 TABLET, FILM COATED ORAL at 02:12

## 2019-12-13 RX ADMIN — HYDROXYZINE PAMOATE 25 MG: 25 CAPSULE ORAL at 08:12

## 2019-12-13 RX ADMIN — CARVEDILOL 25 MG: 25 TABLET, FILM COATED ORAL at 04:12

## 2019-12-13 RX ADMIN — FERROUS SULFATE TAB EC 325 MG (65 MG FE EQUIVALENT) 325 MG: 325 (65 FE) TABLET DELAYED RESPONSE at 08:12

## 2019-12-13 RX ADMIN — DOLUTEGRAVIR SODIUM 50 MG: 50 TABLET, FILM COATED ORAL at 08:12

## 2019-12-13 RX ADMIN — PREDNISONE 5 MG: 5 TABLET ORAL at 08:12

## 2019-12-13 RX ADMIN — CINACALCET HYDROCHLORIDE 60 MG: 30 TABLET, FILM COATED ORAL at 08:12

## 2019-12-13 RX ADMIN — ROSUVASTATIN CALCIUM 20 MG: 20 TABLET, FILM COATED ORAL at 08:12

## 2019-12-13 RX ADMIN — CARVEDILOL 25 MG: 25 TABLET, FILM COATED ORAL at 07:12

## 2019-12-13 RX ADMIN — FUROSEMIDE 80 MG: 10 INJECTION, SOLUTION INTRAMUSCULAR; INTRAVENOUS at 08:12

## 2019-12-13 RX ADMIN — HYDRALAZINE HYDROCHLORIDE 50 MG: 50 TABLET ORAL at 05:12

## 2019-12-13 RX ADMIN — THERA TABS 1 TABLET: TAB at 08:12

## 2019-12-13 RX ADMIN — ATOVAQUONE 1500 MG: 750 SUSPENSION ORAL at 08:12

## 2019-12-13 RX ADMIN — HYDRALAZINE HYDROCHLORIDE 100 MG: 50 TABLET, FILM COATED ORAL at 08:12

## 2019-12-13 RX ADMIN — HYDRALAZINE HYDROCHLORIDE 50 MG: 50 TABLET, FILM COATED ORAL at 06:12

## 2019-12-13 RX ADMIN — FUROSEMIDE 80 MG: 10 INJECTION, SOLUTION INTRAMUSCULAR; INTRAVENOUS at 02:12

## 2019-12-13 RX ADMIN — HYDROCODONE BITARTRATE AND ACETAMINOPHEN 1 TABLET: 5; 325 TABLET ORAL at 11:12

## 2019-12-13 RX ADMIN — APIXABAN 5 MG: 5 TABLET, FILM COATED ORAL at 08:12

## 2019-12-13 RX ADMIN — HYDRALAZINE HYDROCHLORIDE 25 MG: 25 TABLET, FILM COATED ORAL at 11:12

## 2019-12-13 NOTE — SUBJECTIVE & OBJECTIVE
Subjective:   History of Present Illness:  39 y.o. year old Black or  male who received a  - brain death kidney transplant on 17.  He has CKD stage 3 - GFR 30-59 and his baseline creatinine is between 1.8-2.1. He takes prednisone and sirolimus for maintenance immunosuppression.          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   - Biopsy proven ABMR with weakly positive class I DSA's on 17. Pt treated with SMP x 3 (-9/3). PLEX x6 (completed ) and IVIG x 2 (-).    - Kidney US on 17 showed a large collection located along the anterior inferior margin of the renal transplant measuring 13.4 x 7 x 11.9. taken back to the OR for retroperitoneal bleed and washout.   -Biopsy 10/17/17 (LEFTY): 14 glomeruli, <5% fibrosis, no MC severo..  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 Amador for Kaposi's Sarcoma and has been treated with Doxorubicin x 4 doses. Has been tolerated well with improved in Tumor scottie according to oncology is note dated 2019.  In addition it also showed cardiomegaly with new evidence of pleural effusion.     -Biopsy 1/15/18: No ACR, AVR, ABMR, CD4 neg. Less than 10 % fibrosis.   -Biopsy (for DGF) 17: good sample with 24 glomeruli (only 1 sclerosed) and evidence just meeting criteria for ABMR. + diffuse ATI. Minimal fibrosis, no ACR or AVR, but + CD4 (>50% diffusely + stain) with mild glomerulitis and focal peritubular capillaritis. Also, + ca-oxalate (3) and ca-phos (2) crystals.  -18 WEAK DSA DETECTED: CW5(3313)      PCP PROPHYLAXIS: Bactrim LIFELONG--> changed to Mepron ~ 2018  CMV PROPHYLAXIS: Valcyte until 17  FUNGAL PROPHYLAXIS: Nystatin until 9/15/17         He presented to  the ER with SOB and lower extremity swelling Reports he was recently in the hospital where he was treated for pneumonia. During that admission, the patient received fluids for his infection as well as antibiotics. He reports since his pneumonia has been properly treated and his cough resolved, however he noticed worsening lower extremity swelling. Reports over the past week, he's gained weight (dry weight ~155-160) and is now up to 180lbs. He has not been compliant with a low salt diet. He reports this morning his swelling worsened to his upper extremities and he began complaining of MARY. However, he was able to walk from the parking garage to the ER before complaining of SOB. He denies any cough, fevers, chills, n/v, headaches, vision changes or weakness.     He was recently seen by Dr. Mejias (cardiology) in clinic on 12/2 where he noticed significant signs of HF on exam including 4+ pitting edema of the lower extremities and elevated JVD. He was started on lasix 20mg during that time.      Had a 2D echo performed on 11/26 which showed mildly decreased left ventricular systolic function. The estimated ejection fraction is 40%. QEF 40%. Global hypokinetic wall motion. (EF 55% in Aug-2019).      In the ED, the patient was found to have an elevated BNP and pulmonary edema on CXR.     Mr. Aguiar is a 39 y.o. year old male who is status post Kidney Transplant - 8/18/2017  (#1).    His maintenance immunosuppression consists of:   Immunosuppressants (From admission, onward)    Start     Stop Route Frequency Ordered    12/11/19 0900  sirolimus tablet 2 mg      -- Oral Daily 12/10/19 1617          Hospital Course:  No notes on file    Interval History:  Patient with stable creatinine albeit mildly up trending. On room air, 1.9 liters of urine documented overnight. Sirolimus levels at 4.8    Past Medical, Surgical, Family, and Social History:   Unchanged from H&P.    Scheduled Meds:   apixaban  5 mg Oral BID    atovaquone   1,500 mg Oral Daily    calcium carbonate  1 tablet Oral Daily    carvedilol  25 mg Oral BID WM    cinacalcet  60 mg Oral Daily with breakfast    dolutegravir  50 mg Oral Daily    emtricitabine-tenofovir alafen  1 tablet Oral Daily    ferrous sulfate  325 mg Oral BID    furosemide  80 mg Intravenous TID    hydrALAZINE  75 mg Oral Q8H    multivitamin  1 tablet Oral Daily    predniSONE  5 mg Oral Daily    rosuvastatin  20 mg Oral Daily    sirolimus  2 mg Oral Daily     Continuous Infusions:  PRN Meds:glucagon (human recombinant), glucose, glucose, hydrALAZINE, HYDROcodone-acetaminophen, sodium chloride 0.9%    Intake/Output - Last 3 Shifts       12/11 0700 - 12/12 0659 12/12 0700 - 12/13 0659 12/13 0700 - 12/14 0659    P.O. 1200 1496 180    Total Intake(mL/kg) 1200 (15.3) 1496 (19.8) 180 (2.4)    Urine (mL/kg/hr) 3675 (2) 1960 (1.1) 1400 (2.3)    Stool 0 0 1    Total Output 3675 1960 1401    Net -2475 -464 -1221           Stool Occurrence 0 x 1 x            Review of Systems   Constitutional: Negative for activity change, appetite change, chills, diaphoresis, fatigue and fever.   Respiratory: Negative for cough, choking, chest tightness, shortness of breath and stridor.    Cardiovascular: Positive for leg swelling (R>L (due to cancer on the right side)). Negative for chest pain.   Gastrointestinal: Negative for blood in stool, diarrhea, nausea and vomiting. Abdominal pain: LLQ.   Genitourinary: Negative for difficulty urinating, dysuria and frequency.   Musculoskeletal: Negative for arthralgias and myalgias.   Skin: Negative for pallor and rash.   Neurological: Negative for dizziness, light-headedness and headaches.      Objective:     Vital Signs (Most Recent):  Temp: 98.2 °F (36.8 °C) (12/13/19 1134)  Pulse: 72 (12/13/19 1435)  Resp: 17 (12/13/19 1134)  BP: (!) 173/101 (12/13/19 1435)  SpO2: 95 % (12/13/19 1134) Vital Signs (24h Range):  Temp:  [96.3 °F (35.7 °C)-98.9 °F (37.2 °C)] 98.2 °F (36.8  "°C)  Pulse:  [70-82] 72  Resp:  [16-18] 17  SpO2:  [91 %-98 %] 95 %  BP: (161-177)/() 173/101     Weight: 74.4 kg (164 lb 0.4 oz)  Height: 5' 8" (172.7 cm)  Body mass index is 24.94 kg/m².    Physical Exam   Constitutional: He is oriented to person, place, and time. He appears well-developed and well-nourished.   HENT:   Head: Normocephalic and atraumatic.   Elevated JVD    Eyes: Pupils are equal, round, and reactive to light. EOM are normal.   Neck: Normal range of motion. Neck supple. No tracheal deviation present. No thyromegaly present.   Cardiovascular: Normal rate and regular rhythm. Exam reveals no friction rub.   No murmur heard.  Pulmonary/Chest: Effort normal and breath sounds normal. No respiratory distress. He has no wheezes. He exhibits no tenderness.   Course crackles bilateral lower lobes. Improved but still present.    Abdominal: Soft. Bowel sounds are normal. He exhibits distension. There is no tenderness.   Musculoskeletal: Normal range of motion. He exhibits no edema.   Neurological: He is alert and oriented to person, place, and time.   Skin: Skin is warm and dry. Capillary refill takes less than 2 seconds. No rash noted. No erythema.   2+ Pitting edema up to the hip bilaterally. 2+ pitting edema of the upper extremities bilaterally.        Laboratory:  CBC:   Recent Labs   Lab 12/11/19  0519 12/12/19  0524 12/13/19 0429   WBC 5.09 4.70 5.11   RBC 2.85* 3.07* 2.89*   HGB 6.9* 7.5* 7.0*   HCT 23.2* 24.7* 23.6*   * 156 140*   MCV 81* 81* 82   MCH 24.2* 24.4* 24.2*   MCHC 29.7* 30.4* 29.7*     CMP:   Recent Labs   Lab 12/10/19  1245  12/11/19  1934 12/12/19  0524 12/13/19  0429   GLU 98   < > 165* 111* 114*   CALCIUM 8.4*   < > 8.0* 8.2* 8.2*   ALBUMIN 3.4*  --   --   --   --    PROT 6.3  --   --   --   --       < > 143 144 144   K 4.0   < > 3.7 3.8 4.2   CO2 25   < > 31* 30* 32*      < > 104 105 103   BUN 48*   < > 44* 44* 44*   CREATININE 2.9*   < > 3.0* 2.9* 3.1* "   ALKPHOS 120  --   --   --   --    ALT 36  --   --   --   --    AST 31  --   --   --   --     < > = values in this interval not displayed.       Diagnostic Results:  None

## 2019-12-13 NOTE — ASSESSMENT & PLAN NOTE
Patient with hx of renal transplant presenting with LEFTY. Ddx include cardiorenal syndrome vs nephrotic syndrome vs ATN vs transplant rejection. Cr baseline 1.8-2.2 but presented to the ED with Cr of 2.9. With recent weight gain, elevated JVD and lower extremity swelling, highly suggestive of HF. However, unable to rule out secondary causes.     PLAN:   - UA without significant abnormalities. No protein or blood making nephrotic/nephritic causes less likely.   - Mild improvement in Cr with diuresis. However patient continues to be overloaded. Continue with diuresis.   - Holding nephrotoxic medications

## 2019-12-13 NOTE — ASSESSMENT & PLAN NOTE
Patient presenting with progressively worsening lower extremity swelling, MARY and inability to lay flat. Recent diagnosis of HF over the past few months possibility 2/2 chemo. Seen by Cardiology who recently started the patient on Lasix.     PLAN:   - Increased dose to Lasix 80mg IV TID as UOP decreases yesterday.   - patient now 164lbs (from 180). Closer to dry weight of 155-160.   - Discontinues CCB as it can contribute to swelling. Will start Entresto prior to discharges and resolution of LEFTY if ok with KTM.   - Will continue patient's BB which he has been taking at home. Hydralazine now until LEFTY improves.   - Strict I/O   - Fluid restriction 1500ml

## 2019-12-13 NOTE — ASSESSMENT & PLAN NOTE
- patient takes Coreg 25mg and Nifedipine 60mg at home. D/c Nifedipine as it may contribute to edema.   - Will increase lasix. If BP still not controlled will add hydralazine.   - Will continue and monitor.

## 2019-12-13 NOTE — ASSESSMENT & PLAN NOTE
Status post  brain dead kidney transplant 2017  -Biopsy (for DGF) 17: good sample with 24 glomeruli (only 1 sclerosed) and evidence just meeting criteria for ABMR. + diffuse ATI. Minimal fibrosis, no ACR or AVR, but + CD4 (>50% diffusely + stain) with mild glomerulitis and focal peritubular capillaritis. Also, biopsy shows ca-oxalate (3) and ca-phos (2) crystals.  -17 DSA + for weak class I.    Biopsy (LEFTY) 10/17/2017 14 glomeruli, <5% fibrosis, no MC changes, No ACR or AMR, C4d negative    Biopsy (LEFTY) 1/15/18:  No ACR, AVR, ABMR, CD4 neg. Less than 10 % fibrosis.     C/w Home Sirolimus and Prednisone at 5 mg daily   Monitor Sirolimus level, today's 4.8  Monitor CMP

## 2019-12-13 NOTE — SUBJECTIVE & OBJECTIVE
Interval History:     NAEON. Patient reports that he is worried about his blood pressures and Kidney function as this is all new to him. He reports his swelling has significantly improved.     Review of Systems   Constitutional: Negative for activity change, appetite change, chills, diaphoresis, fatigue and fever.   Respiratory: Negative for cough, choking, chest tightness, shortness of breath and stridor.    Cardiovascular: Positive for leg swelling (R>L (due to cancer on the right side)). Negative for chest pain.   Gastrointestinal: Negative for blood in stool, diarrhea, nausea and vomiting. Abdominal pain: LLQ.   Genitourinary: Negative for difficulty urinating, dysuria and frequency.   Musculoskeletal: Negative for arthralgias and myalgias.   Skin: Negative for pallor and rash.   Neurological: Negative for dizziness, light-headedness and headaches.     Objective:     Vital Signs (Most Recent):  Temp: 98.5 °F (36.9 °C) (12/13/19 0755)  Pulse: 74 (12/13/19 0857)  Resp: 16 (12/13/19 0755)  BP: (!) 168/93 (12/13/19 0857)  SpO2: 98 % (12/13/19 0755) Vital Signs (24h Range):  Temp:  [96.3 °F (35.7 °C)-98.9 °F (37.2 °C)] 98.5 °F (36.9 °C)  Pulse:  [70-82] 74  Resp:  [16-18] 16  SpO2:  [91 %-98 %] 98 %  BP: (161-177)/(88-98) 168/93     Weight: 74.4 kg (164 lb 0.4 oz)  Body mass index is 24.94 kg/m².    Intake/Output Summary (Last 24 hours) at 12/13/2019 0953  Last data filed at 12/13/2019 0800  Gross per 24 hour   Intake 1436 ml   Output 2160 ml   Net -724 ml      Physical Exam   Constitutional: He is oriented to person, place, and time. He appears well-developed and well-nourished.   HENT:   Head: Normocephalic and atraumatic.   Elevated JVD    Eyes: Pupils are equal, round, and reactive to light. EOM are normal.   Neck: Normal range of motion. Neck supple. No tracheal deviation present. No thyromegaly present.   Cardiovascular: Normal rate and regular rhythm. Exam reveals no friction rub.   No murmur  heard.  Pulmonary/Chest: Effort normal and breath sounds normal. No respiratory distress. He has no wheezes. He exhibits no tenderness.   Course crackles bilateral lower lobes. Improved but still present.    Abdominal: Soft. Bowel sounds are normal. He exhibits distension. There is no tenderness.   Musculoskeletal: Normal range of motion. He exhibits no edema.   Neurological: He is alert and oriented to person, place, and time.   Skin: Skin is warm and dry. Capillary refill takes less than 2 seconds. No rash noted. No erythema.   2+ Pitting edema up to the hip bilaterally. 2+ pitting edema of the upper extremities bilaterally.      Significant Labs:   CBC:   Recent Labs   Lab 12/12/19  0524 12/13/19  0429   WBC 4.70 5.11   HGB 7.5* 7.0*   HCT 24.7* 23.6*    140*     CMP:   Recent Labs   Lab 12/11/19 1934 12/12/19  0524 12/13/19  0429    144 144   K 3.7 3.8 4.2    105 103   CO2 31* 30* 32*   * 111* 114*   BUN 44* 44* 44*   CREATININE 3.0* 2.9* 3.1*   CALCIUM 8.0* 8.2* 8.2*   ANIONGAP 8 9 9   EGFRNONAA 24.8* 25.9* 23.9*

## 2019-12-13 NOTE — PLAN OF CARE
Pt free of falls/trauma/injuries.  Denies c/o SOB, CP, or discomfort.  Generalized skin remains CDI; No edema noted.  Pt being diuresed with IVP Lasix; diuresing well.   Wt trending down.  Electrolytes replaced as ordered.  Possible D/C in AM.  Pt tolerating plan of care.

## 2019-12-13 NOTE — ASSESSMENT & PLAN NOTE
LEFTY superimposed on CKD stage III [b/l creat 1.9-2.1] complicating renal transplant  - Creatinine  Was at 1.7 three weeks ago, now at 2.9, most likely cardiorenal  - UO 3.6 liters during last shift  - on Lasix IV 80 mg TID, consider adding Diamox or reducing dose due to worsening metabolic alkalosis   - Follow with strict I/Os, daily weights, BP (goal <140/90), daily labs.    - Avoid nephrotoxic agents (NSAIDs, IV contrast dye, ACEI/ARB anti-HTN medications, Aminoglycoside-containing antibiotics)  - Renally dose all appropriate medications, including antibiotics

## 2019-12-13 NOTE — ASSESSMENT & PLAN NOTE
Patient Hemoglobin decreased today with microcytic anemia showing MCV 80.   During patient's last admission had similar results. Iron panel workup showed anemia of chronic disease.   Denies any GI bleeding. Could also be from chronic renal disease.     PLAN:   - low iron saturation, ferritin at >500  - received oral iron supplementation on 12/1, recommend IV iron to improve contractility of CHF

## 2019-12-13 NOTE — ASSESSMENT & PLAN NOTE
Bicarbonate elevated at 32  Consider holding bicarbonate supplements for now   Consider reducing lasix IV Dose or adding diamox due to metabolic alkalosis from iatrogenic sources

## 2019-12-13 NOTE — PROGRESS NOTES
[facility] Hospital Medicine  Progress Note    Patient Name: Demetrio Aguiar  MRN: [id number]  Patient Class: IP- Inpatient   Admission Date: 12/10/2019  Length of Stay: 3 days  Attending Physician: Shira Guillen MD  Primary Care Provider: Primary [facility] Medicine Team: [facility] HOSP MED V Otf Ward MD    Subjective:     Principal Problem:Acute on chronic systolic heart failure    HPI:  Demetrio Aguiar is a 39 y.o. 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 Reports he was recently in the hospital where he was treated for pneumonia. During that admission, the patient received fluids for his infection as well as antibiotics. He reports since his pneumonia has been properly treated and his cough resolved, however he noticed worsening lower extremity swelling. Reports over the past week, he's gained weight (dry weight ~155-160) and is now up to 180lbs. He has not been compliant with a low salt diet. He reports this morning his swelling worsened to his upper extremities and he began complaining of MARY. However, he was able to walk from the parking garage to the ER before complaining of SOB. He denies any cough, fevers, chills, n/v, headaches, vision changes or weakness.    He was recently seen by Dr. Mejias (cardiology) in clinic on 12/2 where he noticed significant signs of HF on exam including 4+ pitting edema of the lower extremities and elevated JVD. He was started on lasix 20mg during that time.     Had a 2D echo performed on 11/26 which showed mildly decreased left ventricular systolic function. The estimated ejection fraction is 40%. QEF 40%. Global hypokinetic wall motion. (EF 55% in Aug-2019).     In the ED, the patient was found to have an elevated  BNP and pulmonary edema on CXR.     Overview/Hospital Course:  Mr. Aguiar admitted to the hospital for CHF exacerbation. Was started on Lasix 40mg BID however minimal urine output with no change in weight despite fluid restriction. Changed dose to Lasix 80mg BID. Patient had excellent output with ~3.5L out. Weight from 180lbs on admission to 166lbs. Target dry weight 155-160lbs. Cr. Continues to be elevated above baseline. Overall, improving with no significant complaints. Weight 164 today from 166. Increased Lasix to 80mg TID. Cr continues to be about the same 2.9-3.1. Will continue to aggressively diurese. KTM following.     Interval History:     NAEON. Patient reports that he is worried about his blood pressures and Kidney function as this is all new to him. He reports his swelling has significantly improved.     Review of Systems   Constitutional: Negative for activity change, appetite change, chills, diaphoresis, fatigue and fever.   Respiratory: Negative for cough, choking, chest tightness, shortness of breath and stridor.    Cardiovascular: Positive for leg swelling (R>L (due to cancer on the right side)). Negative for chest pain.   Gastrointestinal: Negative for blood in stool, diarrhea, nausea and vomiting. Abdominal pain: LLQ.   Genitourinary: Negative for difficulty urinating, dysuria and frequency.   Musculoskeletal: Negative for arthralgias and myalgias.   Skin: Negative for pallor and rash.   Neurological: Negative for dizziness, light-headedness and headaches.     Objective:     Vital Signs (Most Recent):  Temp: 98.5 °F (36.9 °C) (12/13/19 0755)  Pulse: 74 (12/13/19 0857)  Resp: 16 (12/13/19 0755)  BP: (!) 168/93 (12/13/19 0857)  SpO2: 98 % (12/13/19 0755) Vital Signs (24h Range):  Temp:  [96.3 °F (35.7 °C)-98.9 °F (37.2 °C)] 98.5 °F (36.9 °C)  Pulse:  [70-82] 74  Resp:  [16-18] 16  SpO2:  [91 %-98 %] 98 %  BP: (161-177)/(88-98) 168/93     Weight: 74.4 kg (164 lb 0.4 oz)  Body mass index is 24.94  kg/m².    Intake/Output Summary (Last 24 hours) at 12/13/2019 0953  Last data filed at 12/13/2019 0800  Gross per 24 hour   Intake 1436 ml   Output 2160 ml   Net -724 ml      Physical Exam   Constitutional: He is oriented to person, place, and time. He appears well-developed and well-nourished.   HENT:   Head: Normocephalic and atraumatic.   Elevated JVD    Eyes: Pupils are equal, round, and reactive to light. EOM are normal.   Neck: Normal range of motion. Neck supple. No tracheal deviation present. No thyromegaly present.   Cardiovascular: Normal rate and regular rhythm. Exam reveals no friction rub.   No murmur heard.  Pulmonary/Chest: Effort normal and breath sounds normal. No respiratory distress. He has no wheezes. He exhibits no tenderness.   Course crackles bilateral lower lobes. Improved but still present.    Abdominal: Soft. Bowel sounds are normal. He exhibits distension. There is no tenderness.   Musculoskeletal: Normal range of motion. He exhibits no edema.   Neurological: He is alert and oriented to person, place, and time.   Skin: Skin is warm and dry. Capillary refill takes less than 2 seconds. No rash noted. No erythema.   2+ Pitting edema up to the hip bilaterally. 2+ pitting edema of the upper extremities bilaterally.      Significant Labs:   CBC:   Recent Labs   Lab 12/12/19  0524 12/13/19  0429   WBC 4.70 5.11   HGB 7.5* 7.0*   HCT 24.7* 23.6*    140*     CMP:   Recent Labs   Lab 12/11/19  1934 12/12/19  0524 12/13/19  0429    144 144   K 3.7 3.8 4.2    105 103   CO2 31* 30* 32*   * 111* 114*   BUN 44* 44* 44*   CREATININE 3.0* 2.9* 3.1*   CALCIUM 8.0* 8.2* 8.2*   ANIONGAP 8 9 9   EGFRNONAA 24.8* 25.9* 23.9*           Assessment/Plan:      * Acute on chronic systolic heart failure  Patient presenting with progressively worsening lower extremity swelling, MARY and inability to lay flat. Recent diagnosis of HF over the past few months possibility 2/2 chemo. Seen by  Cardiology who recently started the patient on Lasix.     PLAN:   - Increased dose to Lasix 80mg IV TID as UOP decreases yesterday.   - patient now 164lbs (from 180). Closer to dry weight of 155-160.   - Discontinues CCB as it can contribute to swelling. Will start Entresto prior to discharges and resolution of LEFTY if ok with KTM.   - Will continue patient's BB which he has been taking at home. Hydralazine now until LEFTY improves.   - Strict I/O   - Fluid restriction 1500ml     Microcytic anemia  Patient Hemoglobin decreased today with microcytic anemia showing MCV 80.   During patient's last admission had similar results. Iron panel workup showed anemia of chronic disease.   Denies any GI bleeding. Could also be from chronic renal disease.     PLAN:   - Will monitor for now without transfusion.   - Iron panel, Haptoglobin, Retc, LDH ordered  - If symptomatic or Hemoglobin drops, will transfuse 1 unit.       Discharge planning issues  Patient expected discharge in 1-2 days pending adequate diuresis and improvement in GFR.   - Patient reports he has run out of a lot of his blood pressure medications. Will ensure he has an supply prior to discharge  - Will need follow up with PCP or cardiology for BP and medication management post discharge.     Acute DVT (deep venous thrombosis)  Patient w/ hx of proximal right subclavian vein dvt found on US during last admission on 11/13/2019  - Will continue Eliquis 5mg BID.     Renovascular hypertension  - patient takes Coreg 25mg and Nifedipine 60mg at home. D/c Nifedipine as it may contribute to edema.   - Will increase lasix. If BP still not controlled will add hydralazine.   - Will continue and monitor.     Kaposi sarcoma  Hx of Kaposi Sarcoma with treatment with chemo.   - 4x treatment with Doxorubicin.   - Stable. Will monitor.     LEFTY (acute kidney injury)  Patient with hx of renal transplant presenting with LEFTY. Ddx include cardiorenal syndrome vs nephrotic syndrome vs ATN  vs transplant rejection. Cr baseline 1.8-2.2 but presented to the ED with Cr of 2.9. With recent weight gain, elevated JVD and lower extremity swelling, highly suggestive of HF. However, unable to rule out secondary causes.     PLAN:   - UA without significant abnormalities. No protein or blood making nephrotic/nephritic causes less likely.   - Mild improvement in Cr with diuresis. However patient continues to be overloaded. Continue with diuresis.   - Holding nephrotoxic medications      Prophylactic immunotherapy  Patient's last CD4 count on  at 63.   - Started on Atovaquone for ppx.   - Will continue inpatient.        donor kidney transplant 2017  - KTM consulted. Recs appreciated  - Will continue Sirolimus 2mg BID with levels daily  - Prednisone 5mg Daily.     AIDS (acquired immunodeficiency syndrome), CD4 <=200  - Will hold HAART medications while inpatient.     Hyperlipidemia  - Continue Crestor 20mg Daily.     VTE Risk Mitigation (From admission, onward)         Ordered     apixaban tablet 5 mg  2 times daily      12/10/19 1619     IP VTE LOW RISK PATIENT  Once      12/10/19 1537              Otf Ward MD  Department of Hospital Medicine   [facility]

## 2019-12-13 NOTE — PROGRESS NOTES
[facility]  Kidney Transplant  Progress Note      Reason for Follow-up: Reassessment of Kidney Transplant - 2017  (#1) recipient and management of immunosuppression.    ORGAN:  RIGHT KIDNEY   Donor Type:  Donation after Brain Death        Subjective:   History of Present Illness:  39 y.o. year old Black or  male who received a  - brain death kidney transplant on 17.  He has CKD stage 3 - GFR 30-59 and his baseline creatinine is between 1.8-2.1. He takes prednisone and sirolimus for maintenance immunosuppression.          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   - Biopsy proven ABMR with weakly positive class I DSA's on 17. Pt treated with SMP x 3 (-9/3). PLEX x6 (completed ) and IVIG x 2 (-).    - Kidney US on 17 showed a large collection located along the anterior inferior margin of the renal transplant measuring 13.4 x 7 x 11.9. taken back to the OR for retroperitoneal bleed and washout.   -Biopsy 10/17/17 (LEFTY): 14 glomeruli, <5% fibrosis, no MC severo..  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 Amador for Kaposi's Sarcoma and has been treated with Doxorubicin x 4 doses. Has been tolerated well with improved in Tumor scottie according to oncology is note dated 2019.  In addition it also showed cardiomegaly with new evidence of pleural effusion.     -Biopsy 1/15/18: No ACR, AVR, ABMR, CD4 neg. Less than 10 % fibrosis.   -Biopsy (for DGF) 17: good sample with 24 glomeruli (only 1 sclerosed) and evidence just meeting criteria for ABMR. + diffuse ATI. Minimal fibrosis, no ACR or AVR, but + CD4 (>50% diffusely + stain) with mild glomerulitis and focal peritubular capillaritis.  Also, + ca-oxalate (3) and ca-phos (2) crystals.  -7/9/18 WEAK DSA DETECTED: CW5(5606)      PCP PROPHYLAXIS: Bactrim LIFELONG--> changed to Mepron ~ 6/2018  CMV PROPHYLAXIS: Valcyte until 11/19/17  FUNGAL PROPHYLAXIS: Nystatin until 9/15/17         He presented to the ER with SOB and lower extremity swelling Reports he was recently in the hospital where he was treated for pneumonia. During that admission, the patient received fluids for his infection as well as antibiotics. He reports since his pneumonia has been properly treated and his cough resolved, however he noticed worsening lower extremity swelling. Reports over the past week, he's gained weight (dry weight ~155-160) and is now up to 180lbs. He has not been compliant with a low salt diet. He reports this morning his swelling worsened to his upper extremities and he began complaining of MARY. However, he was able to walk from the parking garage to the ER before complaining of SOB. He denies any cough, fevers, chills, n/v, headaches, vision changes or weakness.     He was recently seen by Dr. Mejias (cardiology) in clinic on 12/2 where he noticed significant signs of HF on exam including 4+ pitting edema of the lower extremities and elevated JVD. He was started on lasix 20mg during that time.      Had a 2D echo performed on 11/26 which showed mildly decreased left ventricular systolic function. The estimated ejection fraction is 40%. QEF 40%. Global hypokinetic wall motion. (EF 55% in Aug-2019).      In the ED, the patient was found to have an elevated BNP and pulmonary edema on CXR.     Mr. Aguiar is a 39 y.o. year old male who is status post Kidney Transplant - 8/18/2017  (#1).    His maintenance immunosuppression consists of:   Immunosuppressants (From admission, onward)    Start     Stop Route Frequency Ordered    12/11/19 0900  sirolimus tablet 2 mg      -- Oral Daily 12/10/19 9339          Hospital Course:  No notes on file    Interval History:  Patient  with stable creatinine albeit mildly up trending. On room air, 1.9 liters of urine documented overnight. Sirolimus levels at 4.8    Past Medical, Surgical, Family, and Social History:   Unchanged from H&P.    Scheduled Meds:   apixaban  5 mg Oral BID    atovaquone  1,500 mg Oral Daily    calcium carbonate  1 tablet Oral Daily    carvedilol  25 mg Oral BID WM    cinacalcet  60 mg Oral Daily with breakfast    dolutegravir  50 mg Oral Daily    emtricitabine-tenofovir alafen  1 tablet Oral Daily    ferrous sulfate  325 mg Oral BID    furosemide  80 mg Intravenous TID    hydrALAZINE  75 mg Oral Q8H    multivitamin  1 tablet Oral Daily    predniSONE  5 mg Oral Daily    rosuvastatin  20 mg Oral Daily    sirolimus  2 mg Oral Daily     Continuous Infusions:  PRN Meds:glucagon (human recombinant), glucose, glucose, hydrALAZINE, HYDROcodone-acetaminophen, sodium chloride 0.9%    Intake/Output - Last 3 Shifts       12/11 0700 - 12/12 0659 12/12 0700 - 12/13 0659 12/13 0700 - 12/14 0659    P.O. 1200 1496 180    Total Intake(mL/kg) 1200 (15.3) 1496 (19.8) 180 (2.4)    Urine (mL/kg/hr) 3675 (2) 1960 (1.1) 1400 (2.3)    Stool 0 0 1    Total Output 3675 1960 1401    Net -2475 -464 -1221           Stool Occurrence 0 x 1 x            Review of Systems   Constitutional: Negative for activity change, appetite change, chills, diaphoresis, fatigue and fever.   Respiratory: Negative for cough, choking, chest tightness, shortness of breath and stridor.    Cardiovascular: Positive for leg swelling (R>L (due to cancer on the right side)). Negative for chest pain.   Gastrointestinal: Negative for blood in stool, diarrhea, nausea and vomiting. Abdominal pain: LLQ.   Genitourinary: Negative for difficulty urinating, dysuria and frequency.   Musculoskeletal: Negative for arthralgias and myalgias.   Skin: Negative for pallor and rash.   Neurological: Negative for dizziness, light-headedness and headaches.      Objective:     Vital  "Signs (Most Recent):  Temp: 98.2 °F (36.8 °C) (12/13/19 1134)  Pulse: 72 (12/13/19 1435)  Resp: 17 (12/13/19 1134)  BP: (!) 173/101 (12/13/19 1435)  SpO2: 95 % (12/13/19 1134) Vital Signs (24h Range):  Temp:  [96.3 °F (35.7 °C)-98.9 °F (37.2 °C)] 98.2 °F (36.8 °C)  Pulse:  [70-82] 72  Resp:  [16-18] 17  SpO2:  [91 %-98 %] 95 %  BP: (161-177)/() 173/101     Weight: 74.4 kg (164 lb 0.4 oz)  Height: 5' 8" (172.7 cm)  Body mass index is 24.94 kg/m².    Physical Exam   Constitutional: He is oriented to person, place, and time. He appears well-developed and well-nourished.   HENT:   Head: Normocephalic and atraumatic.   Elevated JVD    Eyes: Pupils are equal, round, and reactive to light. EOM are normal.   Neck: Normal range of motion. Neck supple. No tracheal deviation present. No thyromegaly present.   Cardiovascular: Normal rate and regular rhythm. Exam reveals no friction rub.   No murmur heard.  Pulmonary/Chest: Effort normal and breath sounds normal. No respiratory distress. He has no wheezes. He exhibits no tenderness.   Course crackles bilateral lower lobes. Improved but still present.    Abdominal: Soft. Bowel sounds are normal. He exhibits distension. There is no tenderness.   Musculoskeletal: Normal range of motion. He exhibits no edema.   Neurological: He is alert and oriented to person, place, and time.   Skin: Skin is warm and dry. Capillary refill takes less than 2 seconds. No rash noted. No erythema.   2+ Pitting edema up to the hip bilaterally. 2+ pitting edema of the upper extremities bilaterally.        Laboratory:  CBC:   Recent Labs   Lab 12/11/19  0519 12/12/19  0524 12/13/19  0429   WBC 5.09 4.70 5.11   RBC 2.85* 3.07* 2.89*   HGB 6.9* 7.5* 7.0*   HCT 23.2* 24.7* 23.6*   * 156 140*   MCV 81* 81* 82   MCH 24.2* 24.4* 24.2*   MCHC 29.7* 30.4* 29.7*     CMP:   Recent Labs   Lab 12/10/19  1245  12/11/19  1934 12/12/19  0524 12/13/19  0429   GLU 98   < > 165* 111* 114*   CALCIUM 8.4*   < > " 8.0* 8.2* 8.2*   ALBUMIN 3.4*  --   --   --   --    PROT 6.3  --   --   --   --       < > 143 144 144   K 4.0   < > 3.7 3.8 4.2   CO2 25   < > 31* 30* 32*      < > 104 105 103   BUN 48*   < > 44* 44* 44*   CREATININE 2.9*   < > 3.0* 2.9* 3.1*   ALKPHOS 120  --   --   --   --    ALT 36  --   --   --   --    AST 31  --   --   --   --     < > = values in this interval not displayed.       Diagnostic Results:  None    Assessment/Plan:      donor kidney transplant 2017  Status post  brain dead kidney transplant 2017  -Biopsy (for DGF) 17: good sample with 24 glomeruli (only 1 sclerosed) and evidence just meeting criteria for ABMR. + diffuse ATI. Minimal fibrosis, no ACR or AVR, but + CD4 (>50% diffusely + stain) with mild glomerulitis and focal peritubular capillaritis. Also, biopsy shows ca-oxalate (3) and ca-phos (2) crystals.  -17 DSA + for weak class I.    Biopsy (LEFTY) 10/17/2017 14 glomeruli, <5% fibrosis, no MC changes, No ACR or AMR, C4d negative    Biopsy (LEFTY) 1/15/18:  No ACR, AVR, ABMR, CD4 neg. Less than 10 % fibrosis.     C/w Home Sirolimus and Prednisone at 5 mg daily   Monitor Sirolimus level, today's 4.8  Monitor CMP    LEFTY (acute kidney injury)  LEFTY superimposed on CKD stage III [b/l creat 1.9-2.1] complicating renal transplant  - Creatinine  Was at 1.7 three weeks ago, now at 2.9, most likely cardiorenal  - UO 3.6 liters during last shift  - on Lasix IV 80 mg TID, consider adding Diamox or reducing dose due to worsening metabolic alkalosis   - Follow with strict I/Os, daily weights, BP (goal <140/90), daily labs.    - Avoid nephrotoxic agents (NSAIDs, IV contrast dye, ACEI/ARB anti-HTN medications, Aminoglycoside-containing antibiotics)  - Renally dose all appropriate medications, including antibiotics      Microcytic anemia  Patient Hemoglobin decreased today with microcytic anemia showing MCV 80.   During patient's last admission had similar  results. Iron panel workup showed anemia of chronic disease.   Denies any GI bleeding. Could also be from chronic renal disease.     PLAN:   - low iron saturation, ferritin at >500  - received oral iron supplementation on 12/1, recommend IV iron to improve contractility of CHF      Acute DVT (deep venous thrombosis)  Patient w/ hx of proximal right subclavian vein dvt found on US during last admission on 11/13/2019  -on Eliquis 5mg BID.     Renovascular hypertension  - patient takes Coreg 25mg and Nifedipine 60mg at home     Kaposi sarcoma  HIV related   Currently on HAART and Adriamycin Q3W for symptomatic metastatic KS      Metabolic acidosis  Bicarbonate elevated at 32  Consider holding bicarbonate supplements for now   Consider reducing lasix IV Dose or adding diamox due to metabolic alkalosis from iatrogenic sources      AIDS (acquired immunodeficiency syndrome), CD4 <=200  As per transplant ID   Currently on HAART Therapy          Discharge Planning:  Pending clinical improvement     Kevin Weeks MD  Kidney Transplant  [facility]

## 2019-12-14 ENCOUNTER — PATIENT MESSAGE (OUTPATIENT)
Dept: TRANSPLANT | Facility: CLINIC | Age: 39
End: 2019-12-14

## 2019-12-14 LAB
ANION GAP SERPL CALC-SCNC: 10 MMOL/L (ref 8–16)
BASOPHILS # BLD AUTO: 0.02 K/UL (ref 0–0.2)
BASOPHILS NFR BLD: 0.4 % (ref 0–1.9)
BUN SERPL-MCNC: 44 MG/DL (ref 6–20)
CALCIUM SERPL-MCNC: 8.3 MG/DL (ref 8.7–10.5)
CHLORIDE SERPL-SCNC: 102 MMOL/L (ref 95–110)
CO2 SERPL-SCNC: 31 MMOL/L (ref 23–29)
CREAT SERPL-MCNC: 3.3 MG/DL (ref 0.5–1.4)
DIFFERENTIAL METHOD: ABNORMAL
EOSINOPHIL # BLD AUTO: 0.1 K/UL (ref 0–0.5)
EOSINOPHIL NFR BLD: 1.5 % (ref 0–8)
ERYTHROCYTE [DISTWIDTH] IN BLOOD BY AUTOMATED COUNT: 19.3 % (ref 11.5–14.5)
EST. GFR  (AFRICAN AMERICAN): 25.6 ML/MIN/1.73 M^2
EST. GFR  (NON AFRICAN AMERICAN): 22.1 ML/MIN/1.73 M^2
GLUCOSE SERPL-MCNC: 123 MG/DL (ref 70–110)
HCT VFR BLD AUTO: 22.8 % (ref 40–54)
HGB BLD-MCNC: 7.2 G/DL (ref 14–18)
IMM GRANULOCYTES # BLD AUTO: 0.02 K/UL (ref 0–0.04)
IMM GRANULOCYTES NFR BLD AUTO: 0.4 % (ref 0–0.5)
LYMPHOCYTES # BLD AUTO: 1 K/UL (ref 1–4.8)
LYMPHOCYTES NFR BLD: 20.6 % (ref 18–48)
MAGNESIUM SERPL-MCNC: 1.8 MG/DL (ref 1.6–2.6)
MCH RBC QN AUTO: 25.1 PG (ref 27–31)
MCHC RBC AUTO-ENTMCNC: 31.6 G/DL (ref 32–36)
MCV RBC AUTO: 79 FL (ref 82–98)
MONOCYTES # BLD AUTO: 0.5 K/UL (ref 0.3–1)
MONOCYTES NFR BLD: 10 % (ref 4–15)
NEUTROPHILS # BLD AUTO: 3.2 K/UL (ref 1.8–7.7)
NEUTROPHILS NFR BLD: 67.1 % (ref 38–73)
NRBC BLD-RTO: 0 /100 WBC
PHOSPHATE SERPL-MCNC: 4.9 MG/DL (ref 2.7–4.5)
PLATELET # BLD AUTO: 131 K/UL (ref 150–350)
PMV BLD AUTO: 10.1 FL (ref 9.2–12.9)
POTASSIUM SERPL-SCNC: 4.1 MMOL/L (ref 3.5–5.1)
RBC # BLD AUTO: 2.87 M/UL (ref 4.6–6.2)
SIROLIMUS BLD-MCNC: 4.6 NG/ML (ref 4–20)
SODIUM SERPL-SCNC: 143 MMOL/L (ref 136–145)
WBC # BLD AUTO: 4.8 K/UL (ref 3.9–12.7)

## 2019-12-14 PROCEDURE — 85025 COMPLETE CBC W/AUTO DIFF WBC: CPT

## 2019-12-14 PROCEDURE — 20600001 HC STEP DOWN PRIVATE ROOM

## 2019-12-14 PROCEDURE — 99232 SBSQ HOSP IP/OBS MODERATE 35: CPT | Mod: GC,,, | Performed by: HOSPITALIST

## 2019-12-14 PROCEDURE — 99232 PR SUBSEQUENT HOSPITAL CARE,LEVL II: ICD-10-PCS | Mod: GC,,, | Performed by: HOSPITALIST

## 2019-12-14 PROCEDURE — 63600175 PHARM REV CODE 636 W HCPCS: Performed by: STUDENT IN AN ORGANIZED HEALTH CARE EDUCATION/TRAINING PROGRAM

## 2019-12-14 PROCEDURE — 25000003 PHARM REV CODE 250: Performed by: STUDENT IN AN ORGANIZED HEALTH CARE EDUCATION/TRAINING PROGRAM

## 2019-12-14 PROCEDURE — 83735 ASSAY OF MAGNESIUM: CPT

## 2019-12-14 PROCEDURE — 80048 BASIC METABOLIC PNL TOTAL CA: CPT

## 2019-12-14 PROCEDURE — 94761 N-INVAS EAR/PLS OXIMETRY MLT: CPT

## 2019-12-14 PROCEDURE — 80195 ASSAY OF SIROLIMUS: CPT

## 2019-12-14 PROCEDURE — 84100 ASSAY OF PHOSPHORUS: CPT

## 2019-12-14 RX ORDER — FUROSEMIDE 10 MG/ML
80 INJECTION INTRAMUSCULAR; INTRAVENOUS 2 TIMES DAILY
Status: DISCONTINUED | OUTPATIENT
Start: 2019-12-14 | End: 2019-12-14

## 2019-12-14 RX ORDER — FUROSEMIDE 10 MG/ML
80 INJECTION INTRAMUSCULAR; INTRAVENOUS DAILY
Status: DISCONTINUED | OUTPATIENT
Start: 2019-12-15 | End: 2019-12-15

## 2019-12-14 RX ADMIN — PREDNISONE 5 MG: 5 TABLET ORAL at 08:12

## 2019-12-14 RX ADMIN — APIXABAN 5 MG: 5 TABLET, FILM COATED ORAL at 08:12

## 2019-12-14 RX ADMIN — CINACALCET HYDROCHLORIDE 60 MG: 30 TABLET, FILM COATED ORAL at 07:12

## 2019-12-14 RX ADMIN — CALCIUM CARBONATE (ANTACID) CHEW TAB 500 MG 500 MG: 500 CHEW TAB at 08:12

## 2019-12-14 RX ADMIN — NIFEDIPINE 60 MG: 30 TABLET, FILM COATED, EXTENDED RELEASE ORAL at 08:12

## 2019-12-14 RX ADMIN — HYDRALAZINE HYDROCHLORIDE 100 MG: 50 TABLET, FILM COATED ORAL at 07:12

## 2019-12-14 RX ADMIN — FUROSEMIDE 80 MG: 10 INJECTION, SOLUTION INTRAMUSCULAR; INTRAVENOUS at 08:12

## 2019-12-14 RX ADMIN — HYDRALAZINE HYDROCHLORIDE 100 MG: 50 TABLET, FILM COATED ORAL at 02:12

## 2019-12-14 RX ADMIN — THERA TABS 1 TABLET: TAB at 08:12

## 2019-12-14 RX ADMIN — FERROUS SULFATE TAB EC 325 MG (65 MG FE EQUIVALENT) 325 MG: 325 (65 FE) TABLET DELAYED RESPONSE at 08:12

## 2019-12-14 RX ADMIN — HYDROCODONE BITARTRATE AND ACETAMINOPHEN 1 TABLET: 5; 325 TABLET ORAL at 05:12

## 2019-12-14 RX ADMIN — DOLUTEGRAVIR SODIUM 50 MG: 50 TABLET, FILM COATED ORAL at 08:12

## 2019-12-14 RX ADMIN — ROSUVASTATIN CALCIUM 20 MG: 20 TABLET, FILM COATED ORAL at 08:12

## 2019-12-14 RX ADMIN — EMTRICITABINE AND TENOFOVIR ALAFENAMIDE 1 TABLET: 200; 25 TABLET ORAL at 09:12

## 2019-12-14 RX ADMIN — CARVEDILOL 25 MG: 25 TABLET, FILM COATED ORAL at 07:12

## 2019-12-14 RX ADMIN — CARVEDILOL 25 MG: 25 TABLET, FILM COATED ORAL at 05:12

## 2019-12-14 RX ADMIN — HYDRALAZINE HYDROCHLORIDE 100 MG: 50 TABLET, FILM COATED ORAL at 08:12

## 2019-12-14 RX ADMIN — ATOVAQUONE 1500 MG: 750 SUSPENSION ORAL at 08:12

## 2019-12-14 RX ADMIN — SIROLIMUS 2 MG: 1 TABLET ORAL at 08:12

## 2019-12-14 NOTE — CLINICAL REVIEW
Kidney Transplant Medicine Service Chart Check Note:    Medications:   apixaban  5 mg Oral BID    atovaquone  1,500 mg Oral Daily    calcium carbonate  1 tablet Oral Daily    carvedilol  25 mg Oral BID WM    cinacalcet  60 mg Oral Daily with breakfast    dolutegravir  50 mg Oral Daily    emtricitabine-tenofovir alafen  1 tablet Oral Daily    ferrous sulfate  325 mg Oral BID    [START ON 12/15/2019] furosemide  80 mg Intravenous Daily    hydrALAZINE  100 mg Oral Q8H    multivitamin  1 tablet Oral Daily    NIFEdipine  60 mg Oral Daily    predniSONE  5 mg Oral Daily    rosuvastatin  20 mg Oral Daily    sirolimus  2 mg Oral Daily       Vitals:  Temp:  [97.8 °F (36.6 °C)-98.6 °F (37 °C)] 98.3 °F (36.8 °C)  Pulse:  [66-80] 71  Resp:  [15-20] 16  SpO2:  [95 %-98 %] 96 %  BP: (146-183)/() 146/86    I/Os:  I/O last 3 completed shifts:  In: 1858 [P.O.:1858]  Out: 5086 [Urine:5085; Stool:1]    Labs reviewed  Component      Latest Ref Rng & Units 12/14/2019 12/13/2019 12/12/2019   WBC      3.90 - 12.70 K/uL 4.80 5.11 4.70   RBC      4.60 - 6.20 M/uL 2.87 (L) 2.89 (L) 3.07 (L)   Hemoglobin      14.0 - 18.0 g/dL 7.2 (L) 7.0 (L) 7.5 (L)   Hematocrit      40.0 - 54.0 % 22.8 (L) 23.6 (L) 24.7 (L)   MCV      82 - 98 fL 79 (L) 82 81 (L)   MCH      27.0 - 31.0 pg 25.1 (L) 24.2 (L) 24.4 (L)   MCHC      32.0 - 36.0 g/dL 31.6 (L) 29.7 (L) 30.4 (L)   RDW      11.5 - 14.5 % 19.3 (H) 19.3 (H) 19.0 (H)   Platelets      150 - 350 K/uL 131 (L) 140 (L) 156   MPV      9.2 - 12.9 fL 10.1 10.6 10.7   Immature Granulocytes      0.0 - 0.5 % 0.4 0.2 0.2   Gran # (ANC)      1.8 - 7.7 K/uL 3.2 3.6 3.2   Immature Grans (Abs)      0.00 - 0.04 K/uL 0.02 0.01 0.01   Lymph #      1.0 - 4.8 K/uL 1.0 0.9 (L) 1.0   Mono #      0.3 - 1.0 K/uL 0.5 0.5 0.5   Eos #      0.0 - 0.5 K/uL 0.1 0.1 0.1   Baso #      0.00 - 0.20 K/uL 0.02 0.02 0.02   nRBC      0 /100 WBC 0 0 0   Gran%      38.0 - 73.0 % 67.1 69.6 67.9   Lymph%      18.0 - 48.0 % 20.6  18.4 20.4   Mono%      4.0 - 15.0 % 10.0 10.0 9.6   Eosinophil%      0.0 - 8.0 % 1.5 1.4 1.5   Basophil%      0.0 - 1.9 % 0.4 0.4 0.4   Differential Method       Automated Automated Automated   Sodium      136 - 145 mmol/L 143 144 144   Potassium      3.5 - 5.1 mmol/L 4.1 4.2 3.8   Chloride      95 - 110 mmol/L 102 103 105   CO2      23 - 29 mmol/L 31 (H) 32 (H) 30 (H)   Glucose      70 - 110 mg/dL 123 (H) 114 (H) 111 (H)   BUN, Bld      6 - 20 mg/dL 44 (H) 44 (H) 44 (H)   Creatinine      0.5 - 1.4 mg/dL 3.3 (H) 3.1 (H) 2.9 (H)   Calcium      8.7 - 10.5 mg/dL 8.3 (L) 8.2 (L) 8.2 (L)   Anion Gap      8 - 16 mmol/L 10 9 9   eGFR if African American      >60 mL/min/1.73 m:2 25.6 (A) 27.6 (A) 29.9 (A)   eGFR if non African American      >60 mL/min/1.73 m:2 22.1 (A) 23.9 (A) 25.9 (A)   Phosphorus      2.7 - 4.5 mg/dL 4.9 (H) 4.8 (H) 4.5   Magnesium      1.6 - 2.6 mg/dL 1.8 2.1 1.8   Sirolimus Lvl      4.0 - 20.0 ng/mL 4.6 4.8    PTH      9.0 - 77.0 pg/mL  549.0 (H)        Recommendations:  - Cr increased - could be related to diuresis   - would recommend decreasing diuresis   - sirolimus level at goal - continue sirolimus 2 mg daily   - continue prednisone 5 mg daily   - continue to monitor for toxicities from immunosuppressive medications  - metabolic alkalosis - likely related to diuretics - decrease diuresis +/- consider addition of diamox     Bouchra Javier MD  Renal Transplant Attending

## 2019-12-14 NOTE — PLAN OF CARE
Patient remains free from falls and injuries through out shift. Patient AAO and VSS except elevated BP. Team aware. One time dose of hydralazine ordered for trending elevated BPs.  Patient denies chest pain and SOB.  Patient being diuresed with IVP Lasix; diuresing well. Plan of care reviewed with patient. Patient verbalizes understanding of plan.  Will continue to monitor.

## 2019-12-14 NOTE — PLAN OF CARE
MONITORING BP CLOSELY.  BP REMAINED HIGH IN 180s/100s MOST OF AFTERNOON THROUGH FIRST HALF OF NIGHT SHIFT.  BY O2:00 AM BP DOWN IN 150s/80-90s. PATIENT MEDICATED WITH HYDROXYZINE & NORCO X1 EACH TO CALM & RELAX ANXIETY & RESTLESSNESS.  C/O MINOR HEADACHE X1.

## 2019-12-14 NOTE — SUBJECTIVE & OBJECTIVE
Interval History:     NAEON. Patient is very anxious about his BP and Cr rising. He reports his heart has always been ok and this new diagnosis makes him very anxious. He reports his swelling is significantly improved and near normal. Denies SOB, CP, n/v, headaches or fevers.     Review of Systems   Constitutional: Negative for activity change, appetite change, chills, diaphoresis, fatigue and fever.   Respiratory: Negative for cough, choking, chest tightness, shortness of breath and stridor.    Cardiovascular: Positive for leg swelling (R>L (due to cancer on the right side)). Negative for chest pain.   Gastrointestinal: Negative for blood in stool, diarrhea, nausea and vomiting. Abdominal pain: LLQ.   Genitourinary: Negative for difficulty urinating, dysuria and frequency.   Musculoskeletal: Negative for arthralgias and myalgias.   Skin: Negative for pallor and rash.   Neurological: Negative for dizziness, light-headedness and headaches.     Objective:     Vital Signs (Most Recent):  Temp: 98.4 °F (36.9 °C) (12/14/19 0741)  Pulse: 80 (12/14/19 0741)  Resp: 16 (12/14/19 0741)  BP: (!) 177/99 (12/14/19 0741)  SpO2: 95 % (12/14/19 0741) Vital Signs (24h Range):  Temp:  [97.8 °F (36.6 °C)-98.6 °F (37 °C)] 98.4 °F (36.9 °C)  Pulse:  [67-86] 80  Resp:  [16-20] 16  SpO2:  [95 %-100 %] 95 %  BP: (154-183)/() 177/99     Weight: 72.2 kg (159 lb 2.8 oz)  Body mass index is 24.2 kg/m².    Intake/Output Summary (Last 24 hours) at 12/14/2019 0954  Last data filed at 12/14/2019 0500  Gross per 24 hour   Intake 960 ml   Output 3676 ml   Net -2716 ml      Physical Exam   Constitutional: He is oriented to person, place, and time. He appears well-developed and well-nourished.   HENT:   Head: Normocephalic and atraumatic.   Elevated JVD    Eyes: Pupils are equal, round, and reactive to light. EOM are normal.   Neck: Normal range of motion. Neck supple. No tracheal deviation present. No thyromegaly present.   Cardiovascular:  Normal rate and regular rhythm. Exam reveals no friction rub.   No murmur heard.  Pulmonary/Chest: Effort normal and breath sounds normal. No respiratory distress. He has no wheezes. He exhibits no tenderness.   Abdominal: Soft. Bowel sounds are normal. He exhibits no distension. There is no tenderness.   Musculoskeletal: Normal range of motion. He exhibits no edema.   Neurological: He is alert and oriented to person, place, and time.   Skin: Skin is warm and dry. Capillary refill takes less than 2 seconds. No rash noted. No erythema.   1+ in the ankles only bilateral. Swelling of the right arm (hx of DVT)      Significant Labs:   BMP:   Recent Labs   Lab 12/14/19  0357   *      K 4.1      CO2 31*   BUN 44*   CREATININE 3.3*   CALCIUM 8.3*   MG 1.8     CBC:   Recent Labs   Lab 12/13/19  0429 12/14/19  0357   WBC 5.11 4.80   HGB 7.0* 7.2*   HCT 23.6* 22.8*   * 131*

## 2019-12-14 NOTE — PROGRESS NOTES
[facility] Hospital Medicine  Progress Note    Patient Name: Demetrio Aguiar  MRN: [id number]  Patient Class: IP- Inpatient   Admission Date: 12/10/2019  Length of Stay: 4 days  Attending Physician: Shira Guillen MD  Primary Care Provider: Primary Doctor [facility] Medicine Team: [facility] MED V Otf Ward MD    Subjective:     Principal Problem:Acute on chronic systolic heart failure    HPI:  Demetrio Aguiar is a 39 y.o. 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 Reports he was recently in the hospital where he was treated for pneumonia. During that admission, the patient received fluids for his infection as well as antibiotics. He reports since his pneumonia has been properly treated and his cough resolved, however he noticed worsening lower extremity swelling. Reports over the past week, he's gained weight (dry weight ~155-160) and is now up to 180lbs. He has not been compliant with a low salt diet. He reports this morning his swelling worsened to his upper extremities and he began complaining of MARY. However, he was able to walk from the parking garage to the ER before complaining of SOB. He denies any cough, fevers, chills, n/v, headaches, vision changes or weakness.    He was recently seen by Dr. Mejias (cardiology) in clinic on 12/2 where he noticed significant signs of HF on exam including 4+ pitting edema of the lower extremities and elevated JVD. He was started on lasix 20mg during that time.     Had a 2D echo performed on 11/26 which showed mildly decreased left ventricular systolic function. The estimated ejection fraction is 40%. QEF 40%. Global hypokinetic wall motion. (EF 55% in Aug-2019).     In the ED, the patient was found to have an elevated  BNP and pulmonary edema on CXR.     Overview/Hospital Course:  Mr. Aguiar admitted to the hospital for CHF exacerbation. Was started on Lasix 40mg BID however minimal urine output with no change in weight despite fluid restriction. Changed dose to Lasix 80mg BID. Patient had excellent output with ~3.5L out. Weight from 180lbs on admission to 166lbs. Target dry weight 155-160lbs. Cr. Continues to be elevated above baseline. Overall, improving with no significant complaints. Patient after aggressive diuresis achieved near dry weight of 159lbs (goal 155). However his Cr began to rise to 3.3. Decreased lasix and will monitor renal function.     Interval History:     NAEON. Patient is very anxious about his BP and Cr rising. He reports his heart has always been ok and this new diagnosis makes him very anxious. He reports his swelling is significantly improved and near normal. Denies SOB, CP, n/v, headaches or fevers.     Review of Systems   Constitutional: Negative for activity change, appetite change, chills, diaphoresis, fatigue and fever.   Respiratory: Negative for cough, choking, chest tightness, shortness of breath and stridor.    Cardiovascular: Positive for leg swelling (R>L (due to cancer on the right side)). Negative for chest pain.   Gastrointestinal: Negative for blood in stool, diarrhea, nausea and vomiting. Abdominal pain: LLQ.   Genitourinary: Negative for difficulty urinating, dysuria and frequency.   Musculoskeletal: Negative for arthralgias and myalgias.   Skin: Negative for pallor and rash.   Neurological: Negative for dizziness, light-headedness and headaches.     Objective:     Vital Signs (Most Recent):  Temp: 98.4 °F (36.9 °C) (12/14/19 0741)  Pulse: 80 (12/14/19 0741)  Resp: 16 (12/14/19 0741)  BP: (!) 177/99 (12/14/19 0741)  SpO2: 95 % (12/14/19 0741) Vital Signs (24h Range):  Temp:  [97.8 °F (36.6 °C)-98.6 °F (37 °C)] 98.4 °F (36.9 °C)  Pulse:  [67-86] 80  Resp:  [16-20] 16  SpO2:  [95 %-100 %]  95 %  BP: (154-183)/() 177/99     Weight: 72.2 kg (159 lb 2.8 oz)  Body mass index is 24.2 kg/m².    Intake/Output Summary (Last 24 hours) at 12/14/2019 0954  Last data filed at 12/14/2019 0500  Gross per 24 hour   Intake 960 ml   Output 3676 ml   Net -2716 ml      Physical Exam   Constitutional: He is oriented to person, place, and time. He appears well-developed and well-nourished.   HENT:   Head: Normocephalic and atraumatic.   Elevated JVD    Eyes: Pupils are equal, round, and reactive to light. EOM are normal.   Neck: Normal range of motion. Neck supple. No tracheal deviation present. No thyromegaly present.   Cardiovascular: Normal rate and regular rhythm. Exam reveals no friction rub.   No murmur heard.  Pulmonary/Chest: Effort normal and breath sounds normal. No respiratory distress. He has no wheezes. He exhibits no tenderness.   Abdominal: Soft. Bowel sounds are normal. He exhibits no distension. There is no tenderness.   Musculoskeletal: Normal range of motion. He exhibits no edema.   Neurological: He is alert and oriented to person, place, and time.   Skin: Skin is warm and dry. Capillary refill takes less than 2 seconds. No rash noted. No erythema.   1+ in the ankles only bilateral. Swelling of the right arm (hx of DVT)      Significant Labs:   BMP:   Recent Labs   Lab 12/14/19  0357   *      K 4.1      CO2 31*   BUN 44*   CREATININE 3.3*   CALCIUM 8.3*   MG 1.8     CBC:   Recent Labs   Lab 12/13/19  0429 12/14/19  0357   WBC 5.11 4.80   HGB 7.0* 7.2*   HCT 23.6* 22.8*   * 131*           Assessment/Plan:      * Acute on chronic systolic heart failure  Patient presenting with progressively worsening lower extremity swelling, MARY and inability to lay flat. Recent diagnosis of HF over the past few months possibility 2/2 chemo. Seen by Cardiology who recently started the patient on Lasix.     PLAN:   - Increased dose to Lasix 80mg IV TID as UOP decreases yesterday.   -  patient now 164lbs (from 180). Closer to dry weight of 155-160.   - Discontinues CCB as it can contribute to swelling. Will start Entresto prior to discharges and resolution of LEFTY if ok with KTM.   - Will continue patient's BB which he has been taking at home. Hydralazine now until LEFTY improves.   - Strict I/O   - Fluid restriction 1500ml     Microcytic anemia  Patient Hemoglobin decreased today with microcytic anemia showing MCV 80.   During patient's last admission had similar results. Iron panel workup showed anemia of chronic disease.   Denies any GI bleeding. Could also be from chronic renal disease.     PLAN:   - Will monitor for now without transfusion.   - Iron panel, Haptoglobin, Retc, LDH ordered  - If symptomatic or Hemoglobin drops, will transfuse 1 unit.       Discharge planning issues  Patient expected discharge in 1-2 days pending adequate diuresis and improvement in GFR.   - Patient reports he has run out of a lot of his blood pressure medications. Will ensure he has an supply prior to discharge  - Will need follow up with PCP or cardiology for BP and medication management post discharge.     Acute DVT (deep venous thrombosis)  Patient w/ hx of proximal right subclavian vein dvt found on US during last admission on 11/13/2019  - Will continue Eliquis 5mg BID.     Renovascular hypertension  - patient takes Coreg 25mg and Nifedipine 60mg at home. D/c Nifedipine as it may contribute to edema.   - Will increase lasix. If BP still not controlled will add hydralazine.   - Will continue and monitor.     Kaposi sarcoma  Hx of Kaposi Sarcoma with treatment with chemo.   - 4x treatment with Doxorubicin.   - Stable. Will monitor.     LEFTY (acute kidney injury)  Patient with hx of renal transplant presenting with LEFTY. Ddx include cardiorenal syndrome vs nephrotic syndrome vs ATN vs transplant rejection. Cr baseline 1.8-2.2 but presented to the ED with Cr of 2.9. With recent weight gain, elevated JVD and lower  extremity swelling, highly suggestive of HF. However, unable to rule out secondary causes.     PLAN:   - UA without significant abnormalities. No protein or blood making nephrotic/nephritic causes less likely.   - Mild improvement in Cr with diuresis. However patient continues to be overloaded. Continue with diuresis.   - Holding nephrotoxic medications      Prophylactic immunotherapy  Patient's last CD4 count on  at 63.   - Started on Atovaquone for ppx.   - Will continue inpatient.        donor kidney transplant 2017  - KTM consulted. Recs appreciated  - Will continue Sirolimus 2mg BID with levels daily  - Prednisone 5mg Daily.     AIDS (acquired immunodeficiency syndrome), CD4 <=200  - Will hold HAART medications while inpatient.     Hyperlipidemia  - Continue Crestor 20mg Daily.       VTE Risk Mitigation (From admission, onward)         Ordered     apixaban tablet 5 mg  2 times daily      12/10/19 1619     IP VTE LOW RISK PATIENT  Once      12/10/19 1537              Otf Ward MD  Department of Hospital Medicine   [facility]

## 2019-12-15 LAB
ANION GAP SERPL CALC-SCNC: 10 MMOL/L (ref 8–16)
BACTERIA #/AREA URNS AUTO: NORMAL /HPF
BASOPHILS # BLD AUTO: 0.02 K/UL (ref 0–0.2)
BASOPHILS NFR BLD: 0.3 % (ref 0–1.9)
BILIRUB UR QL STRIP: NEGATIVE
BUN SERPL-MCNC: 48 MG/DL (ref 6–20)
CALCIUM SERPL-MCNC: 8.2 MG/DL (ref 8.7–10.5)
CHLORIDE SERPL-SCNC: 102 MMOL/L (ref 95–110)
CLARITY UR REFRACT.AUTO: CLEAR
CO2 SERPL-SCNC: 29 MMOL/L (ref 23–29)
COLOR UR AUTO: YELLOW
CREAT SERPL-MCNC: 3.7 MG/DL (ref 0.5–1.4)
DIFFERENTIAL METHOD: ABNORMAL
EOSINOPHIL # BLD AUTO: 0.1 K/UL (ref 0–0.5)
EOSINOPHIL NFR BLD: 1.3 % (ref 0–8)
ERYTHROCYTE [DISTWIDTH] IN BLOOD BY AUTOMATED COUNT: 19.4 % (ref 11.5–14.5)
EST. GFR  (AFRICAN AMERICAN): 22.3 ML/MIN/1.73 M^2
EST. GFR  (NON AFRICAN AMERICAN): 19.3 ML/MIN/1.73 M^2
GLUCOSE SERPL-MCNC: 117 MG/DL (ref 70–110)
GLUCOSE UR QL STRIP: NEGATIVE
HCT VFR BLD AUTO: 23.8 % (ref 40–54)
HGB BLD-MCNC: 7.1 G/DL (ref 14–18)
HGB UR QL STRIP: NEGATIVE
HYALINE CASTS UR QL AUTO: 0 /LPF
IMM GRANULOCYTES # BLD AUTO: 0.01 K/UL (ref 0–0.04)
IMM GRANULOCYTES NFR BLD AUTO: 0.2 % (ref 0–0.5)
KETONES UR QL STRIP: NEGATIVE
LEUKOCYTE ESTERASE UR QL STRIP: NEGATIVE
LYMPHOCYTES # BLD AUTO: 1.2 K/UL (ref 1–4.8)
LYMPHOCYTES NFR BLD: 19.4 % (ref 18–48)
MAGNESIUM SERPL-MCNC: 1.8 MG/DL (ref 1.6–2.6)
MCH RBC QN AUTO: 24.2 PG (ref 27–31)
MCHC RBC AUTO-ENTMCNC: 29.8 G/DL (ref 32–36)
MCV RBC AUTO: 81 FL (ref 82–98)
MICROSCOPIC COMMENT: NORMAL
MONOCYTES # BLD AUTO: 0.6 K/UL (ref 0.3–1)
MONOCYTES NFR BLD: 9.8 % (ref 4–15)
NEUTROPHILS # BLD AUTO: 4.2 K/UL (ref 1.8–7.7)
NEUTROPHILS NFR BLD: 69 % (ref 38–73)
NITRITE UR QL STRIP: NEGATIVE
NRBC BLD-RTO: 0 /100 WBC
PH UR STRIP: 6 [PH] (ref 5–8)
PHOSPHATE SERPL-MCNC: 5 MG/DL (ref 2.7–4.5)
PLATELET # BLD AUTO: 128 K/UL (ref 150–350)
PMV BLD AUTO: 10.5 FL (ref 9.2–12.9)
POTASSIUM SERPL-SCNC: 4.3 MMOL/L (ref 3.5–5.1)
PROT UR QL STRIP: ABNORMAL
RBC # BLD AUTO: 2.93 M/UL (ref 4.6–6.2)
RBC #/AREA URNS AUTO: 1 /HPF (ref 0–4)
SIROLIMUS BLD-MCNC: 4.5 NG/ML (ref 4–20)
SODIUM SERPL-SCNC: 141 MMOL/L (ref 136–145)
SP GR UR STRIP: 1.02 (ref 1–1.03)
URN SPEC COLLECT METH UR: ABNORMAL
WBC # BLD AUTO: 6.12 K/UL (ref 3.9–12.7)
WBC #/AREA URNS AUTO: 0 /HPF (ref 0–5)

## 2019-12-15 PROCEDURE — 80195 ASSAY OF SIROLIMUS: CPT

## 2019-12-15 PROCEDURE — 25000003 PHARM REV CODE 250: Performed by: PHYSICIAN ASSISTANT

## 2019-12-15 PROCEDURE — 36415 COLL VENOUS BLD VENIPUNCTURE: CPT

## 2019-12-15 PROCEDURE — 99232 SBSQ HOSP IP/OBS MODERATE 35: CPT | Mod: GC,,, | Performed by: HOSPITALIST

## 2019-12-15 PROCEDURE — 81001 URINALYSIS AUTO W/SCOPE: CPT

## 2019-12-15 PROCEDURE — 80048 BASIC METABOLIC PNL TOTAL CA: CPT

## 2019-12-15 PROCEDURE — 85025 COMPLETE CBC W/AUTO DIFF WBC: CPT

## 2019-12-15 PROCEDURE — 83735 ASSAY OF MAGNESIUM: CPT

## 2019-12-15 PROCEDURE — 99232 PR SUBSEQUENT HOSPITAL CARE,LEVL II: ICD-10-PCS | Mod: GC,,, | Performed by: HOSPITALIST

## 2019-12-15 PROCEDURE — 63600175 PHARM REV CODE 636 W HCPCS: Performed by: STUDENT IN AN ORGANIZED HEALTH CARE EDUCATION/TRAINING PROGRAM

## 2019-12-15 PROCEDURE — 20600001 HC STEP DOWN PRIVATE ROOM

## 2019-12-15 PROCEDURE — 25000003 PHARM REV CODE 250: Performed by: STUDENT IN AN ORGANIZED HEALTH CARE EDUCATION/TRAINING PROGRAM

## 2019-12-15 PROCEDURE — 84100 ASSAY OF PHOSPHORUS: CPT

## 2019-12-15 RX ADMIN — HYDROXYZINE PAMOATE 25 MG: 25 CAPSULE ORAL at 08:12

## 2019-12-15 RX ADMIN — CARVEDILOL 25 MG: 25 TABLET, FILM COATED ORAL at 08:12

## 2019-12-15 RX ADMIN — CINACALCET HYDROCHLORIDE 60 MG: 30 TABLET, FILM COATED ORAL at 08:12

## 2019-12-15 RX ADMIN — CALCIUM CARBONATE (ANTACID) CHEW TAB 500 MG 500 MG: 500 CHEW TAB at 08:12

## 2019-12-15 RX ADMIN — ATOVAQUONE 1500 MG: 750 SUSPENSION ORAL at 08:12

## 2019-12-15 RX ADMIN — APIXABAN 5 MG: 5 TABLET, FILM COATED ORAL at 08:12

## 2019-12-15 RX ADMIN — FERROUS SULFATE TAB EC 325 MG (65 MG FE EQUIVALENT) 325 MG: 325 (65 FE) TABLET DELAYED RESPONSE at 08:12

## 2019-12-15 RX ADMIN — EMTRICITABINE AND TENOFOVIR ALAFENAMIDE 1 TABLET: 200; 25 TABLET ORAL at 08:12

## 2019-12-15 RX ADMIN — CARVEDILOL 25 MG: 25 TABLET, FILM COATED ORAL at 05:12

## 2019-12-15 RX ADMIN — THERA TABS 1 TABLET: TAB at 08:12

## 2019-12-15 RX ADMIN — SIROLIMUS 2 MG: 1 TABLET ORAL at 08:12

## 2019-12-15 RX ADMIN — PREDNISONE 5 MG: 5 TABLET ORAL at 08:12

## 2019-12-15 RX ADMIN — HYDROCODONE BITARTRATE AND ACETAMINOPHEN 1 TABLET: 5; 325 TABLET ORAL at 05:12

## 2019-12-15 RX ADMIN — NIFEDIPINE 60 MG: 30 TABLET, FILM COATED, EXTENDED RELEASE ORAL at 08:12

## 2019-12-15 RX ADMIN — HYDRALAZINE HYDROCHLORIDE 100 MG: 50 TABLET, FILM COATED ORAL at 10:12

## 2019-12-15 RX ADMIN — DOLUTEGRAVIR SODIUM 50 MG: 50 TABLET, FILM COATED ORAL at 08:12

## 2019-12-15 RX ADMIN — HYDROCODONE BITARTRATE AND ACETAMINOPHEN 1 TABLET: 5; 325 TABLET ORAL at 08:12

## 2019-12-15 RX ADMIN — ROSUVASTATIN CALCIUM 20 MG: 20 TABLET, FILM COATED ORAL at 08:12

## 2019-12-15 RX ADMIN — HYDRALAZINE HYDROCHLORIDE 100 MG: 50 TABLET, FILM COATED ORAL at 05:12

## 2019-12-15 RX ADMIN — HYDRALAZINE HYDROCHLORIDE 100 MG: 50 TABLET, FILM COATED ORAL at 03:12

## 2019-12-15 NOTE — ASSESSMENT & PLAN NOTE
Patient presenting with progressively worsening lower extremity swelling, MARY and inability to lay flat. Recent diagnosis of HF over the past few months possibility 2/2 chemo. Seen by Cardiology who recently started the patient on Lasix.     PLAN:   - Discontinued lasix and patient is now at baseline weight of ~160lbs.   - Will start Entresto once LEFTY Resolved. Confirmed with Pharmacy that the medication will be free with insurance.   - Will continue patient's BB which he has been taking at home. Hydralazine now until ELFTY improves.   - Strict I/O   - Fluid restriction 1500ml

## 2019-12-15 NOTE — PLAN OF CARE
Patient remained free of falls, trauma, injury, and skin breakdown. VSS; no patient complaints at this time. Kidney function monitored. Fall precautions maintained.  Plan of care reviewed; patient verbalized understanding.  All questions and concerns addressed; will continue to monitor.

## 2019-12-15 NOTE — PROGRESS NOTES
[facility] Hospital Medicine  Progress Note    Patient Name: Demetrio Aguiar  MRN: [id number]  Patient Class: IP- Inpatient   Admission Date: 12/10/2019  Length of Stay: 5 days  Attending Physician: Shira Guillen MD  Primary Care Provider: [facility] Doctor [facility] Medicine Team: [facility] MED V Otf Ward MD    Subjective:     Principal Problem:Acute on chronic systolic heart failure    HPI:  Demetrio Aguiar is a 39 y.o. 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 Reports he was recently in the hospital where he was treated for pneumonia. During that admission, the patient received fluids for his infection as well as antibiotics. He reports since his pneumonia has been properly treated and his cough resolved, however he noticed worsening lower extremity swelling. Reports over the past week, he's gained weight (dry weight ~155-160) and is now up to 180lbs. He has not been compliant with a low salt diet. He reports this morning his swelling worsened to his upper extremities and he began complaining of MARY. However, he was able to walk from the parking garage to the ER before complaining of SOB. He denies any cough, fevers, chills, n/v, headaches, vision changes or weakness.    He was recently seen by Dr. Mejias (cardiology) in clinic on 12/2 where he noticed significant signs of HF on exam including 4+ pitting edema of the lower extremities and elevated JVD. He was started on lasix 20mg during that time.     Had a 2D echo performed on 11/26 which showed mildly decreased left ventricular systolic function. The estimated ejection fraction is 40%. QEF 40%. Global hypokinetic wall motion. (EF 55% in Aug-2019).     In the ED, the patient was found to have an elevated  BNP and pulmonary edema on CXR.     Overview/Hospital Course:  Mr. Aguiar admitted to the hospital for CHF exacerbation. Was started on Lasix 40mg BID however minimal urine output with no change in weight despite fluid restriction. Changed dose to Lasix 80mg BID. Patient had excellent output with ~3.5L out. Weight from 180lbs on admission to 166lbs. Target dry weight 155-160lbs. Continues to be elevated above baseline. Overall, improving with no significant complaints. Patient after aggressive diuresis achieved near dry weight of 159lbs (goal 155). Patient now at dry weight. Cr increased to 3.7 despite decreasing lasix yesterday. Discontinued today. LEFTY likely 2/2 diuresis.     Interval History:     NAEON. Patient continues to be upset that his Cr is rising and that he wants to go home. He continues to make normal amount of urine off of lasix. Denies SOB, swelling, n/v, headaches, CP or fevers.     Review of Systems   Constitutional: Negative for activity change, appetite change, chills, diaphoresis, fatigue and fever.   Respiratory: Negative for cough, choking, chest tightness, shortness of breath and stridor.    Cardiovascular: Positive for leg swelling. Negative for chest pain.   Gastrointestinal: Negative for blood in stool, diarrhea, nausea and vomiting. Abdominal pain: LLQ.   Genitourinary: Negative for difficulty urinating, dysuria and frequency.   Musculoskeletal: Negative for arthralgias and myalgias.   Skin: Negative for pallor and rash.   Neurological: Negative for dizziness, light-headedness and headaches.     Objective:     Vital Signs (Most Recent):  Temp: 98.2 °F (36.8 °C) (12/15/19 0801)  Pulse: 78 (12/15/19 0801)  Resp: 14 (12/15/19 0801)  BP: (!) 159/95 (12/15/19 0801)  SpO2: 97 % (12/15/19 0801) Vital Signs (24h Range):  Temp:  [97.9 °F (36.6 °C)-98.3 °F (36.8 °C)] 98.2 °F (36.8 °C)  Pulse:  [66-86] 78  Resp:  [14-18] 14  SpO2:  [96 %-98 %] 97 %  BP: (125-160)/(77-97) 159/95     Weight: 72.4 kg  (159 lb 11.6 oz)  Body mass index is 24.29 kg/m².    Intake/Output Summary (Last 24 hours) at 12/15/2019 0911  Last data filed at 12/15/2019 0600  Gross per 24 hour   Intake 630 ml   Output 1750 ml   Net -1120 ml      Physical Exam   Constitutional: He is oriented to person, place, and time. He appears well-developed and well-nourished.   HENT:   Head: Normocephalic and atraumatic.   Elevated JVD    Eyes: Pupils are equal, round, and reactive to light. EOM are normal.   Neck: Normal range of motion. Neck supple. No tracheal deviation present. No thyromegaly present.   Cardiovascular: Normal rate and regular rhythm. Exam reveals no friction rub.   No murmur heard.  Pulmonary/Chest: Effort normal and breath sounds normal. No respiratory distress. He has no wheezes. He exhibits no tenderness.   Abdominal: Soft. Bowel sounds are normal. He exhibits no distension. There is no tenderness.   Musculoskeletal: Normal range of motion. He exhibits no edema.   Neurological: He is alert and oriented to person, place, and time.   Skin: Skin is warm and dry. Capillary refill takes less than 2 seconds. No rash noted. No erythema.   1+ in the ankles only bilateral. Swelling of the right arm (hx of DVT). Right lower extremity chronically edematous 2/2 malignancy.      Significant Labs:   BMP:   Recent Labs   Lab 12/15/19  0349   *      K 4.3      CO2 29   BUN 48*   CREATININE 3.7*   CALCIUM 8.2*   MG 1.8     CBC:   Recent Labs   Lab 12/14/19  0357 12/15/19  0349   WBC 4.80 6.12   HGB 7.2* 7.1*   HCT 22.8* 23.8*   * 128*           Assessment/Plan:      * Acute on chronic systolic heart failure  Patient presenting with progressively worsening lower extremity swelling, MARY and inability to lay flat. Recent diagnosis of HF over the past few months possibility 2/2 chemo. Seen by Cardiology who recently started the patient on Lasix.     PLAN:   - Discontinued lasix and patient is now at baseline weight of ~160lbs.    - Will start Entresto once LEFTY Resolved. Confirmed with Pharmacy that the medication will be free with insurance.   - Will continue patient's BB which he has been taking at home. Hydralazine now until LEFTY improves.   - Strict I/O   - Fluid restriction 1500ml     Microcytic anemia  Patient Hemoglobin decreased today with microcytic anemia showing MCV 80.   During patient's last admission had similar results. Iron panel workup showed anemia of chronic disease.   Denies any GI bleeding. Could also be from chronic renal disease.     PLAN:   - Will monitor for now without transfusion.   - Iron panel, Haptoglobin, Retc, LDH ordered  - If symptomatic or Hemoglobin drops, will transfuse 1 unit.       Discharge planning issues  Patient expected discharge in 1-2 days pending adequate diuresis and improvement in GFR.   - Patient reports he has run out of a lot of his blood pressure medications. Will ensure he has an supply prior to discharge  - Will need follow up with PCP or cardiology for BP and medication management post discharge.     Acute DVT (deep venous thrombosis)  Patient w/ hx of proximal right subclavian vein dvt found on US during last admission on 11/13/2019  - Will continue Eliquis 5mg BID.     Renovascular hypertension  - patient takes Coreg 25mg and Nifedipine 60mg at home. D/c Nifedipine as it may contribute to edema.   - Will increase lasix. If BP still not controlled will add hydralazine.   - Will continue and monitor.     Kaposi sarcoma  Hx of Kaposi Sarcoma with treatment with chemo.   - 4x treatment with Doxorubicin.   - Stable. Will monitor.     LEFTY (acute kidney injury)  Patient with hx of renal transplant presenting with LEFTY. Ddx include cardiorenal syndrome vs nephrotic syndrome vs ATN vs transplant rejection. Cr baseline 1.8-2.2 but presented to the ED with Cr of 2.9. With recent weight gain, elevated JVD and lower extremity swelling, highly suggestive of HF. However, unable to rule out secondary  causes.     PLAN:   - Patient's Cr uptrending to 3.7 likely 2/2 diuresis. Will repeat a UA.   - Will need to trend to ensure it peaks prior to discharge.   - Holding nephrotoxic medications      Prophylactic immunotherapy  Patient's last CD4 count on  at 63.   - Started on Atovaquone for ppx.   - Will continue inpatient.        donor kidney transplant 2017  - KTM consulted. Recs appreciated  - Will continue Sirolimus 2mg BID with levels daily  - Prednisone 5mg Daily.     AIDS (acquired immunodeficiency syndrome), CD4 <=200  - Will hold HAART medications while inpatient.     Hyperlipidemia  - Continue Crestor 20mg Daily.       VTE Risk Mitigation (From admission, onward)         Ordered     apixaban tablet 5 mg  2 times daily      12/10/19 1619     IP VTE LOW RISK PATIENT  Once      12/10/19 1537              Otf Ward MD  Department of Hospital Medicine   [facility]

## 2019-12-15 NOTE — CLINICAL REVIEW
Kidney Transplant Medicine Service Brief Note:    Patient nervous regarding Cr rising. Discussed with patient at length regarding how his Cr has increased over the last several months and had been in the 2-3s. We discussed how currently his rise in Cr seems to be related to dehydration given diuresis for heart failure exacerbation. Explained how his heart function is impaired and that to treat the volume overload he came into the hospital with we had to use diuretics but that now he may have been over-diuresed. Additionally discussed how we would be less inclined to perform renal allograft biopsy at this time given his metastatic kaposi and need for chemotherapy since if we discovered rejection we wouldn't feel it would in his best interests to treat rejection if found at this time - therefore risks of performing biopsy may outweigh the benefits. Stated how this writer will discuss further with Dr. Munoz who is his outpatient transplant nephrologist who has been following him closely.     Medications:   apixaban  5 mg Oral BID    atovaquone  1,500 mg Oral Daily    calcium carbonate  1 tablet Oral Daily    carvedilol  25 mg Oral BID WM    cinacalcet  60 mg Oral Daily with breakfast    dolutegravir  50 mg Oral Daily    emtricitabine-tenofovir alafen  1 tablet Oral Daily    ferrous sulfate  325 mg Oral BID    hydrALAZINE  100 mg Oral Q8H    multivitamin  1 tablet Oral Daily    NIFEdipine  60 mg Oral Daily    predniSONE  5 mg Oral Daily    rosuvastatin  20 mg Oral Daily    sirolimus  2 mg Oral Daily       Vitals:  Temp:  [96 °F (35.6 °C)-98.3 °F (36.8 °C)] 98.3 °F (36.8 °C)  Pulse:  [69-86] 72  Resp:  [14-18] 15  SpO2:  [93 %-98 %] 93 %  BP: (125-160)/(77-97) 149/86    I/Os:  I/O last 3 completed shifts:  In: 1290 [P.O.:1290]  Out: 3025 [Urine:3025]    Labs reviewed  Component      Latest Ref Rng & Units 12/15/2019 12/14/2019 12/13/2019   WBC      3.90 - 12.70 K/uL 6.12 4.80 5.11   RBC      4.60 -  6.20 M/uL 2.93 (L) 2.87 (L) 2.89 (L)   Hemoglobin      14.0 - 18.0 g/dL 7.1 (L) 7.2 (L) 7.0 (L)   Hematocrit      40.0 - 54.0 % 23.8 (L) 22.8 (L) 23.6 (L)   MCV      82 - 98 fL 81 (L) 79 (L) 82   MCH      27.0 - 31.0 pg 24.2 (L) 25.1 (L) 24.2 (L)   MCHC      32.0 - 36.0 g/dL 29.8 (L) 31.6 (L) 29.7 (L)   RDW      11.5 - 14.5 % 19.4 (H) 19.3 (H) 19.3 (H)   Platelets      150 - 350 K/uL 128 (L) 131 (L) 140 (L)   MPV      9.2 - 12.9 fL 10.5 10.1 10.6   Immature Granulocytes      0.0 - 0.5 % 0.2 0.4 0.2   Gran # (ANC)      1.8 - 7.7 K/uL 4.2 3.2 3.6   Immature Grans (Abs)      0.00 - 0.04 K/uL 0.01 0.02 0.01   Lymph #      1.0 - 4.8 K/uL 1.2 1.0 0.9 (L)   Mono #      0.3 - 1.0 K/uL 0.6 0.5 0.5   Eos #      0.0 - 0.5 K/uL 0.1 0.1 0.1   Baso #      0.00 - 0.20 K/uL 0.02 0.02 0.02   nRBC      0 /100 WBC 0 0 0   Gran%      38.0 - 73.0 % 69.0 67.1 69.6   Lymph%      18.0 - 48.0 % 19.4 20.6 18.4   Mono%      4.0 - 15.0 % 9.8 10.0 10.0   Eosinophil%      0.0 - 8.0 % 1.3 1.5 1.4   Basophil%      0.0 - 1.9 % 0.3 0.4 0.4   Differential Method       Automated Automated Automated   Specimen UA       Urine, Clean Catch     Color, UA      Yellow, Straw, Yvette Yellow     Appearance, UA      Clear Clear     pH, UA      5.0 - 8.0 6.0     Specific Gravity, UA      1.005 - 1.030 1.020     Protein, UA      Negative 2+ (A)     Glucose, UA      Negative Negative     Ketones, UA      Negative Negative     Bilirubin (UA)      Negative Negative     Occult Blood UA      Negative Negative     NITRITE UA      Negative Negative     Leukocytes, UA      Negative Negative     Sodium      136 - 145 mmol/L 141 143 144   Potassium      3.5 - 5.1 mmol/L 4.3 4.1 4.2   Chloride      95 - 110 mmol/L 102 102 103   CO2      23 - 29 mmol/L 29 31 (H) 32 (H)   Glucose      70 - 110 mg/dL 117 (H) 123 (H) 114 (H)   BUN, Bld      6 - 20 mg/dL 48 (H) 44 (H) 44 (H)   Creatinine      0.5 - 1.4 mg/dL 3.7 (H) 3.3 (H) 3.1 (H)   Calcium      8.7 - 10.5 mg/dL 8.2 (L) 8.3 (L)  8.2 (L)   Anion Gap      8 - 16 mmol/L 10 10 9   eGFR if African American      >60 mL/min/1.73 m:2 22.3 (A) 25.6 (A) 27.6 (A)   eGFR if non African American      >60 mL/min/1.73 m:2 19.3 (A) 22.1 (A) 23.9 (A)   RBC, UA      0 - 4 /hpf 1     WBC, UA      0 - 5 /hpf 0     Bacteria, UA      None-Occ /hpf None     Hyaline Casts, UA      0-1/lpf /lpf 0     Microscopic Comment       SEE COMMENT     Phosphorus      2.7 - 4.5 mg/dL 5.0 (H) 4.9 (H) 4.8 (H)   Magnesium      1.6 - 2.6 mg/dL 1.8 1.8 2.1   Sirolimus Lvl      4.0 - 20.0 ng/mL 4.5 4.6 4.8   PTH      9.0 - 77.0 pg/mL   549.0 (H)       Recommendations:  - Cr increased - could be related to diuresis   - would recommend holding diuresis for now   - would encourage patient to drink 1-1.5L a day   - sirolimus level at goal - continue sirolimus 2 mg daily   - continue prednisone 5 mg daily   - continue to monitor for toxicities from immunosuppressive medications  - metabolic alkalosis - likely related to diuretics   - would recommend checking orthostatic vitals and if he is hypotension would consider gentle IVF bolus     Bouchra Javier MD  Renal Transplant Attending

## 2019-12-15 NOTE — PLAN OF CARE
Patient remains free from falls and injuries through out shift. Patient AAO and VSS. BP monitored closely. Patient denies chest pain and SOB. Headache treated w/ PRN meds.  Patient being diuresed with IVP Lasix; diuresing well. Possible d/c tomorrow pending kidney function improvement. Plan of care reviewed with patient. Patient verbalizes understanding of plan.  Will continue to monitor.

## 2019-12-15 NOTE — SUBJECTIVE & OBJECTIVE
Interval History:     NAEON. Patient continues to be upset that his Cr is rising and that he wants to go home. He continues to make normal amount of urine off of lasix. Denies SOB, swelling, n/v, headaches, CP or fevers.     Review of Systems   Constitutional: Negative for activity change, appetite change, chills, diaphoresis, fatigue and fever.   Respiratory: Negative for cough, choking, chest tightness, shortness of breath and stridor.    Cardiovascular: Positive for leg swelling. Negative for chest pain.   Gastrointestinal: Negative for blood in stool, diarrhea, nausea and vomiting. Abdominal pain: LLQ.   Genitourinary: Negative for difficulty urinating, dysuria and frequency.   Musculoskeletal: Negative for arthralgias and myalgias.   Skin: Negative for pallor and rash.   Neurological: Negative for dizziness, light-headedness and headaches.     Objective:     Vital Signs (Most Recent):  Temp: 98.2 °F (36.8 °C) (12/15/19 0801)  Pulse: 78 (12/15/19 0801)  Resp: 14 (12/15/19 0801)  BP: (!) 159/95 (12/15/19 0801)  SpO2: 97 % (12/15/19 0801) Vital Signs (24h Range):  Temp:  [97.9 °F (36.6 °C)-98.3 °F (36.8 °C)] 98.2 °F (36.8 °C)  Pulse:  [66-86] 78  Resp:  [14-18] 14  SpO2:  [96 %-98 %] 97 %  BP: (125-160)/(77-97) 159/95     Weight: 72.4 kg (159 lb 11.6 oz)  Body mass index is 24.29 kg/m².    Intake/Output Summary (Last 24 hours) at 12/15/2019 0911  Last data filed at 12/15/2019 0600  Gross per 24 hour   Intake 630 ml   Output 1750 ml   Net -1120 ml      Physical Exam   Constitutional: He is oriented to person, place, and time. He appears well-developed and well-nourished.   HENT:   Head: Normocephalic and atraumatic.   Elevated JVD    Eyes: Pupils are equal, round, and reactive to light. EOM are normal.   Neck: Normal range of motion. Neck supple. No tracheal deviation present. No thyromegaly present.   Cardiovascular: Normal rate and regular rhythm. Exam reveals no friction rub.   No murmur heard.  Pulmonary/Chest:  Effort normal and breath sounds normal. No respiratory distress. He has no wheezes. He exhibits no tenderness.   Abdominal: Soft. Bowel sounds are normal. He exhibits no distension. There is no tenderness.   Musculoskeletal: Normal range of motion. He exhibits no edema.   Neurological: He is alert and oriented to person, place, and time.   Skin: Skin is warm and dry. Capillary refill takes less than 2 seconds. No rash noted. No erythema.   1+ in the ankles only bilateral. Swelling of the right arm (hx of DVT). Right lower extremity chronically edematous 2/2 malignancy.      Significant Labs:   BMP:   Recent Labs   Lab 12/15/19  0349   *      K 4.3      CO2 29   BUN 48*   CREATININE 3.7*   CALCIUM 8.2*   MG 1.8     CBC:   Recent Labs   Lab 12/14/19  0357 12/15/19  0349   WBC 4.80 6.12   HGB 7.2* 7.1*   HCT 22.8* 23.8*   * 128*

## 2019-12-15 NOTE — PLAN OF CARE
Patient remains free from falls and injuries through out shift. Patient AAO and VSS.  Patient denies chest pain and SOB. Kidney function being monitored closely. Patient Cr trending up. UA collected as ordered. Plan for discharge once LEFTY has improved. Plan of care reviewed with patient. Patient verbalizes understanding of plan.  Will continue to monitor.

## 2019-12-15 NOTE — ASSESSMENT & PLAN NOTE
Patient with hx of renal transplant presenting with LEFTY. Ddx include cardiorenal syndrome vs nephrotic syndrome vs ATN vs transplant rejection. Cr baseline 1.8-2.2 but presented to the ED with Cr of 2.9. With recent weight gain, elevated JVD and lower extremity swelling, highly suggestive of HF. However, unable to rule out secondary causes.     PLAN:   - Patient's Cr uptrending to 3.7 likely 2/2 diuresis. Will repeat a UA.   - Will need to trend to ensure it peaks prior to discharge.   - Holding nephrotoxic medications

## 2019-12-16 PROBLEM — I50.9 ACUTE DECOMPENSATED HEART FAILURE: Status: ACTIVE | Noted: 2019-12-16

## 2019-12-16 PROBLEM — I50.21 ACUTE SYSTOLIC HEART FAILURE: Status: ACTIVE | Noted: 2019-12-16

## 2019-12-16 LAB
ANION GAP SERPL CALC-SCNC: 10 MMOL/L (ref 8–16)
BASOPHILS # BLD AUTO: 0.02 K/UL (ref 0–0.2)
BASOPHILS NFR BLD: 0.3 % (ref 0–1.9)
BUN SERPL-MCNC: 45 MG/DL (ref 6–20)
CALCIUM SERPL-MCNC: 8.7 MG/DL (ref 8.7–10.5)
CHLORIDE SERPL-SCNC: 99 MMOL/L (ref 95–110)
CO2 SERPL-SCNC: 25 MMOL/L (ref 23–29)
CREAT SERPL-MCNC: 3.2 MG/DL (ref 0.5–1.4)
CREAT UR-MCNC: 115 MG/DL (ref 23–375)
DIFFERENTIAL METHOD: ABNORMAL
EOSINOPHIL # BLD AUTO: 0.1 K/UL (ref 0–0.5)
EOSINOPHIL NFR BLD: 1.6 % (ref 0–8)
ERYTHROCYTE [DISTWIDTH] IN BLOOD BY AUTOMATED COUNT: 19.1 % (ref 11.5–14.5)
EST. GFR  (AFRICAN AMERICAN): 26.6 ML/MIN/1.73 M^2
EST. GFR  (NON AFRICAN AMERICAN): 23 ML/MIN/1.73 M^2
GLUCOSE SERPL-MCNC: 123 MG/DL (ref 70–110)
HCT VFR BLD AUTO: 26.7 % (ref 40–54)
HGB BLD-MCNC: 8 G/DL (ref 14–18)
IMM GRANULOCYTES # BLD AUTO: 0.02 K/UL (ref 0–0.04)
IMM GRANULOCYTES NFR BLD AUTO: 0.3 % (ref 0–0.5)
LEGIONELLA CULTURE: NORMAL
LYMPHOCYTES # BLD AUTO: 1.5 K/UL (ref 1–4.8)
LYMPHOCYTES NFR BLD: 21.6 % (ref 18–48)
MCH RBC QN AUTO: 24.5 PG (ref 27–31)
MCHC RBC AUTO-ENTMCNC: 30 G/DL (ref 32–36)
MCV RBC AUTO: 82 FL (ref 82–98)
MONOCYTES # BLD AUTO: 0.6 K/UL (ref 0.3–1)
MONOCYTES NFR BLD: 8.2 % (ref 4–15)
NEUTROPHILS # BLD AUTO: 4.6 K/UL (ref 1.8–7.7)
NEUTROPHILS NFR BLD: 68 % (ref 38–73)
NRBC BLD-RTO: 0 /100 WBC
PLATELET # BLD AUTO: 159 K/UL (ref 150–350)
PMV BLD AUTO: 10.3 FL (ref 9.2–12.9)
POTASSIUM SERPL-SCNC: 4.5 MMOL/L (ref 3.5–5.1)
PROT UR-MCNC: 66 MG/DL (ref 0–15)
PROT/CREAT UR: 0.57 MG/G{CREAT} (ref 0–0.2)
RBC # BLD AUTO: 3.26 M/UL (ref 4.6–6.2)
SIROLIMUS BLD-MCNC: 5.3 NG/ML (ref 4–20)
SODIUM SERPL-SCNC: 134 MMOL/L (ref 136–145)
WBC # BLD AUTO: 6.72 K/UL (ref 3.9–12.7)

## 2019-12-16 PROCEDURE — 25000003 PHARM REV CODE 250: Performed by: STUDENT IN AN ORGANIZED HEALTH CARE EDUCATION/TRAINING PROGRAM

## 2019-12-16 PROCEDURE — 36415 COLL VENOUS BLD VENIPUNCTURE: CPT

## 2019-12-16 PROCEDURE — 99222 1ST HOSP IP/OBS MODERATE 55: CPT | Mod: ,,, | Performed by: INTERNAL MEDICINE

## 2019-12-16 PROCEDURE — 85025 COMPLETE CBC W/AUTO DIFF WBC: CPT

## 2019-12-16 PROCEDURE — 99232 PR SUBSEQUENT HOSPITAL CARE,LEVL II: ICD-10-PCS | Mod: GC,,, | Performed by: HOSPITALIST

## 2019-12-16 PROCEDURE — 99232 SBSQ HOSP IP/OBS MODERATE 35: CPT | Mod: GC,,, | Performed by: INTERNAL MEDICINE

## 2019-12-16 PROCEDURE — 80195 ASSAY OF SIROLIMUS: CPT

## 2019-12-16 PROCEDURE — 84156 ASSAY OF PROTEIN URINE: CPT

## 2019-12-16 PROCEDURE — 99232 SBSQ HOSP IP/OBS MODERATE 35: CPT | Mod: GC,,, | Performed by: HOSPITALIST

## 2019-12-16 PROCEDURE — 80048 BASIC METABOLIC PNL TOTAL CA: CPT

## 2019-12-16 PROCEDURE — 20600001 HC STEP DOWN PRIVATE ROOM

## 2019-12-16 PROCEDURE — 99232 PR SUBSEQUENT HOSPITAL CARE,LEVL II: ICD-10-PCS | Mod: GC,,, | Performed by: INTERNAL MEDICINE

## 2019-12-16 PROCEDURE — 63600175 PHARM REV CODE 636 W HCPCS: Performed by: STUDENT IN AN ORGANIZED HEALTH CARE EDUCATION/TRAINING PROGRAM

## 2019-12-16 PROCEDURE — 99222 PR INITIAL HOSPITAL CARE,LEVL II: ICD-10-PCS | Mod: ,,, | Performed by: INTERNAL MEDICINE

## 2019-12-16 RX ORDER — NIFEDIPINE 30 MG/1
90 TABLET, EXTENDED RELEASE ORAL DAILY
Status: DISCONTINUED | OUTPATIENT
Start: 2019-12-17 | End: 2019-12-16

## 2019-12-16 RX ORDER — ISOSORBIDE DINITRATE AND HYDRALAZINE HYDROCHLORIDE 37.5; 2 MG/1; MG/1
1 TABLET ORAL 3 TIMES DAILY
Status: DISCONTINUED | OUTPATIENT
Start: 2019-12-16 | End: 2019-12-17 | Stop reason: HOSPADM

## 2019-12-16 RX ORDER — CALCIUM CARBONATE 200(500)MG
1 TABLET,CHEWABLE ORAL DAILY
Status: DISCONTINUED | OUTPATIENT
Start: 2019-12-16 | End: 2019-12-17 | Stop reason: HOSPADM

## 2019-12-16 RX ORDER — FUROSEMIDE 80 MG/1
80 TABLET ORAL 2 TIMES DAILY
Status: DISCONTINUED | OUTPATIENT
Start: 2019-12-16 | End: 2019-12-17

## 2019-12-16 RX ORDER — AMLODIPINE BESYLATE 10 MG/1
10 TABLET ORAL DAILY
Status: DISCONTINUED | OUTPATIENT
Start: 2019-12-17 | End: 2019-12-17 | Stop reason: HOSPADM

## 2019-12-16 RX ADMIN — ATOVAQUONE 1500 MG: 750 SUSPENSION ORAL at 08:12

## 2019-12-16 RX ADMIN — DOLUTEGRAVIR SODIUM 50 MG: 50 TABLET, FILM COATED ORAL at 08:12

## 2019-12-16 RX ADMIN — FERROUS SULFATE TAB EC 325 MG (65 MG FE EQUIVALENT) 325 MG: 325 (65 FE) TABLET DELAYED RESPONSE at 10:12

## 2019-12-16 RX ADMIN — CINACALCET HYDROCHLORIDE 60 MG: 30 TABLET, FILM COATED ORAL at 08:12

## 2019-12-16 RX ADMIN — CARVEDILOL 25 MG: 25 TABLET, FILM COATED ORAL at 05:12

## 2019-12-16 RX ADMIN — APIXABAN 5 MG: 5 TABLET, FILM COATED ORAL at 10:12

## 2019-12-16 RX ADMIN — NIFEDIPINE 60 MG: 30 TABLET, FILM COATED, EXTENDED RELEASE ORAL at 08:12

## 2019-12-16 RX ADMIN — PREDNISONE 5 MG: 5 TABLET ORAL at 08:12

## 2019-12-16 RX ADMIN — EMTRICITABINE AND TENOFOVIR ALAFENAMIDE 1 TABLET: 200; 25 TABLET ORAL at 08:12

## 2019-12-16 RX ADMIN — APIXABAN 5 MG: 5 TABLET, FILM COATED ORAL at 08:12

## 2019-12-16 RX ADMIN — ROSUVASTATIN CALCIUM 20 MG: 20 TABLET, FILM COATED ORAL at 08:12

## 2019-12-16 RX ADMIN — THERA TABS 1 TABLET: TAB at 08:12

## 2019-12-16 RX ADMIN — CARVEDILOL 25 MG: 25 TABLET, FILM COATED ORAL at 08:12

## 2019-12-16 RX ADMIN — HYDRALAZINE HYDROCHLORIDE 100 MG: 50 TABLET, FILM COATED ORAL at 02:12

## 2019-12-16 RX ADMIN — FERROUS SULFATE TAB EC 325 MG (65 MG FE EQUIVALENT) 325 MG: 325 (65 FE) TABLET DELAYED RESPONSE at 08:12

## 2019-12-16 RX ADMIN — FUROSEMIDE 80 MG: 80 TABLET ORAL at 05:12

## 2019-12-16 RX ADMIN — SIROLIMUS 2 MG: 1 TABLET ORAL at 08:12

## 2019-12-16 RX ADMIN — HYDRALAZINE HYDROCHLORIDE AND ISOSORBIDE DINITRATE 1 TABLET: 37.5; 2 TABLET, FILM COATED ORAL at 10:12

## 2019-12-16 RX ADMIN — CALCIUM CARBONATE (ANTACID) CHEW TAB 500 MG 500 MG: 500 CHEW TAB at 11:12

## 2019-12-16 RX ADMIN — HYDRALAZINE HYDROCHLORIDE 100 MG: 50 TABLET, FILM COATED ORAL at 06:12

## 2019-12-16 NOTE — ASSESSMENT & PLAN NOTE
Patient Hemoglobin decreased today with microcytic anemia showing MCV 80.   During patient's last admission had similar results. Iron panel workup showed anemia of chronic disease.   Denies any GI bleeding. Could also be from chronic renal disease.     PLAN:   - low iron saturation, ferritin at >500  - received oral iron supplementation, IV iron recommended

## 2019-12-16 NOTE — HPI
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 Mejias. He was admitted with a weight of 180 lbs from a dry weight of 155 lbs. He has been diuresed effectively and his weight is now 165 lbs up from 159 (had a lasix holiday due to concern for a pre-renal LEFTY secondary to overdiuresis). He is net negative on admission 8 L.     The patient underwent evaluation with a TTE on this admission and it showed a decrease in his EF from 55% to 40% with the QEF at 40%. Review of his GLS prior to this admission have shown values of 13-15% indicating that he was not a high risk for LVEF decrease. However his strain pattern is concerning for an infiltrative CM.     Currently the patient is on Coreg, and has been switched from nifedpine to amlodipine. He is also on hydralazine as well (increased here).    Denies any SOB with exertion, leg swelling, chest pain currently. No complaints of orthopnea currently.

## 2019-12-16 NOTE — ASSESSMENT & PLAN NOTE
- LEFTY superimposed on CKD stage III [b/l creat 1.9-2.1] complicating renal transplant  - Creatinine  Was at 1.7 three weeks ago, peaked at 3.7 yesterday, most likely cardiorenal  - UO 1 L past 24hours  - diuresis currently held since 12/14 in setting of LEFTY  - cr today improved to 3.2  - Follow with strict I/Os, daily weights, BP (goal <140/90), daily labs.    - Avoid nephrotoxic agents (NSAIDs, IV contrast dye, ACEI/ARB anti-HTN medications, Aminoglycoside-containing antibiotics)  - Renally dose all appropriate medications, including antibiotics

## 2019-12-16 NOTE — SUBJECTIVE & OBJECTIVE
Interval History: Pt feels well today. Still  Has LE edema and bilateral crackles on exam. LEFTY improving. Increased weight today 165 from 159. Getting PET stress for ischemic cardiomyopathy work up. Restart lasix today. Added Bidil for BP.     Review of Systems   Constitutional: Negative for chills, diaphoresis, fatigue and fever.   Eyes: Negative for photophobia and visual disturbance.   Respiratory: Negative for cough, chest tightness, shortness of breath and wheezing.    Cardiovascular: Positive for leg swelling. Negative for chest pain and palpitations.   Gastrointestinal: Negative for abdominal distention, abdominal pain, diarrhea, nausea and vomiting.   Genitourinary: Negative for difficulty urinating, dysuria, frequency and urgency.   Musculoskeletal: Negative for arthralgias, joint swelling and myalgias.   Skin: Negative for pallor and rash.   Neurological: Positive for weakness. Negative for dizziness, tremors, numbness and headaches.     Objective:     Vital Signs (Most Recent):  Temp: 98.2 °F (36.8 °C) (12/16/19 1152)  Pulse: 72 (12/16/19 1530)  Resp: 18 (12/16/19 1152)  BP: (!) 145/88 (12/16/19 1152)  SpO2: 95 % (12/16/19 1152) Vital Signs (24h Range):  Temp:  [97.8 °F (36.6 °C)-98.2 °F (36.8 °C)] 98.2 °F (36.8 °C)  Pulse:  [66-80] 72  Resp:  [16-18] 18  SpO2:  [95 %-99 %] 95 %  BP: (142-167)/(84-96) 145/88     Weight: 75 kg (165 lb 7.3 oz)  Body mass index is 25.16 kg/m².    Intake/Output Summary (Last 24 hours) at 12/16/2019 1543  Last data filed at 12/16/2019 1300  Gross per 24 hour   Intake 960 ml   Output 1450 ml   Net -490 ml      Physical Exam   Constitutional: He is oriented to person, place, and time. He appears well-developed and well-nourished.   HENT:   Head: Normocephalic and atraumatic.   Elevated JVD    Eyes: Pupils are equal, round, and reactive to light. EOM are normal.   Neck: Normal range of motion. Neck supple. No tracheal deviation present. No thyromegaly present.   Cardiovascular:  Normal rate and regular rhythm. Exam reveals no friction rub.   No murmur heard.  Pulmonary/Chest: Effort normal and breath sounds normal. No respiratory distress. He has no wheezes. He exhibits no tenderness.   Abdominal: Soft. Bowel sounds are normal. He exhibits no distension. There is no tenderness.   Musculoskeletal: Normal range of motion. He exhibits no edema.   Neurological: He is alert and oriented to person, place, and time.   Skin: Skin is warm and dry. Capillary refill takes less than 2 seconds. No rash noted. No erythema.   1+ in the ankles only bilateral. Swelling of the right arm (hx of DVT). Right lower extremity chronically edematous 2/2 malignancy.  LE edema R > left.  Dark discoloration of the LE        Significant Labs:   CBC:   Recent Labs   Lab 12/15/19  0349 12/16/19  0820   WBC 6.12 6.72   HGB 7.1* 8.0*   HCT 23.8* 26.7*   * 159     CMP:   Recent Labs   Lab 12/15/19  0349 12/16/19  0820    134*   K 4.3 4.5    99   CO2 29 25   * 123*   BUN 48* 45*   CREATININE 3.7* 3.2*   CALCIUM 8.2* 8.7   ANIONGAP 10 10   EGFRNONAA 19.3* 23.0*

## 2019-12-16 NOTE — SUBJECTIVE & OBJECTIVE
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 Hematoma evacuated.(09/09)    Status post exploratory laparotomy 9/10/2017    Re explored for retroperitoneal hematoma. Hematoma evacuated.(09/09)    TB lung, latent     tx INH 2006       Past Surgical History:   Procedure Laterality Date    BIOPSY N/A 8/28/2018    Procedure: BIOPSY Tranplanted Kidney;  Surgeon: [facility] Diagnostic Provider;  [facility];  Service: Radiology;  Laterality: N/A;    EXPLORATORY LAPAROTOMY W/ BOWEL RESECTION      NO BOWEL RESECTION, UNKNOWN DETAILS, "Pancreas Infection"    INSERTION OF TUNNELED CENTRAL VENOUS CATHETER (CVC) WITH SUBCUTANEOUS PORT Left 8/19/2019    Procedure: [id number];  Surgeon: Megha Monreal MD;  [facility];  Service: General;  Laterality: Left;    INSERTION OF TUNNELED CENTRAL VENOUS CATHETER (CVC) WITH SUBCUTANEOUS PORT N/A 9/12/2019    Procedure: [id number];  Surgeon: [facility] Diagnostic Provider;  Location: [facility] OR [facility]ND [facility]LR;  Service: Radiology;  Laterality: N/A;  189  port removal and port placement    KIDNEY TRANSPLANT      peritoneal dialysis catheter placement         Review of patient's allergies indicates:  No Known Allergies    No current facility-administered medications on file prior to encounter.      Current Outpatient Medications on File Prior to Encounter   Medication Sig    apixaban (ELIQUIS) 5 mg (74 tabs) DsPk For the first 7 days take two 5 mg tablets twice daily.  After 7 days take one 5 mg tablet twice daily.    atovaquone (MEPRON) 750 mg/5 mL Susp Take " 10 mLs (1,500 mg total) by mouth once daily.    calcium carbonate (TUMS) 200 mg calcium (500 mg) chewable tablet Take 1 tablet (500 mg total) by mouth once daily.    carvedilol (COREG) 25 MG tablet Take 1 tablet (25 mg total) by mouth 2 (two) times daily with meals.    cinacalcet (SENSIPAR) 60 MG Tab Take 1 tablet (60 mg total) by mouth daily with breakfast.    dolutegravir (TIVICAY) 50 mg Tab Take 1 tablet (50 mg total) by mouth once daily.    emtricitabine-tenofovir alafen (DESCOVY) 200-25 mg Tab Take 1 tablet by mouth once daily.    famotidine (PEPCID) 20 MG tablet TAKE 1 TABLET (20MG TOTAL) BY MOUTH EACH EVENING    furosemide (LASIX) 20 MG tablet Take 1 tablet (20 mg total) by mouth once daily. May need to increase based on weight change.    hydrALAZINE (APRESOLINE) 25 MG tablet Take 1 tablet (25 mg total) by mouth every 8 (eight) hours.    HYDROcodone-acetaminophen (NORCO) 5-325 mg per tablet Take 1 tablet by mouth every 4 (four) hours as needed for Pain.    multivitamin (THERAGRAN) tablet Take 1 tablet by mouth once daily.    NIFEdipine (PROCARDIA-XL) 60 MG (OSM) 24 hr tablet Take 1 tablet (60 mg total) by mouth every 12 (twelve) hours.    patiromer calcium sorbitex (VELTASSA) 25.2 gram PwPk Take 1 packet (25.2 g total) by mouth once daily.    predniSONE (DELTASONE) 5 MG tablet TAKE ONE TABLET BY MOUTH EVERY DAY    rosuvastatin (CRESTOR) 20 MG tablet Take 1 tablet (20 mg total) by mouth once daily.    sevelamer carbonate (RENVELA) 800 mg Tab Take 1 tablet (800 mg total) by mouth 3 (three) times daily with meals.    sirolimus (RAPAMUNE) 2 MG tablet Take 1 tablet (2 mg total) by mouth once daily.    sodium bicarbonate 650 MG tablet Take 3 tablets (1,950 mg total) by mouth 3 (three) times daily.     Family History     Problem Relation (Age of Onset)    Cancer Father    Diabetes Maternal Aunt, Maternal Uncle    Heart disease Paternal Grandmother    Hyperlipidemia Mother    Hypertension Maternal  Grandmother, Paternal Grandmother    Lung cancer Father    No Known Problems Brother        Tobacco Use    Smoking status: Former Smoker     Packs/day: 0.50     Types: Cigarettes    Smokeless tobacco: Never Used    Tobacco comment: quit smoking 5 years ago   Substance and Sexual Activity    Alcohol use: No    Drug use: No     Types: Cocaine, Marijuana     Comment: last use for cocaine at age 17; 2 years ago last use for THC    Sexual activity: Not Currently     Review of Systems   Constitution: Negative for chills, fever and weight gain.   Cardiovascular: Negative for chest pain, dyspnea on exertion, irregular heartbeat, orthopnea, palpitations and paroxysmal nocturnal dyspnea.   Respiratory: Negative for cough and shortness of breath.    Gastrointestinal: Negative for diarrhea, nausea and vomiting.   Neurological: Negative for weakness.     Objective:     Vital Signs (Most Recent):  Temp: 98.2 °F (36.8 °C) (12/16/19 1152)  Pulse: 74 (12/16/19 1243)  Resp: 18 (12/16/19 1152)  BP: (!) 145/88 (12/16/19 1152)  SpO2: 95 % (12/16/19 1152) Vital Signs (24h Range):  Temp:  [97.8 °F (36.6 °C)-98.3 °F (36.8 °C)] 98.2 °F (36.8 °C)  Pulse:  [66-80] 74  Resp:  [15-18] 18  SpO2:  [93 %-99 %] 95 %  BP: (142-167)/(84-96) 145/88     Weight: 75 kg (165 lb 7.3 oz)  Body mass index is 25.16 kg/m².    SpO2: 95 %  O2 Device (Oxygen Therapy): room air      Intake/Output Summary (Last 24 hours) at 12/16/2019 1306  Last data filed at 12/16/2019 1000  Gross per 24 hour   Intake 420 ml   Output 1150 ml   Net -730 ml       Lines/Drains/Airways     Drain                 Hemodialysis AV Fistula 07/19/19 0823 Left upper arm 150 days          Peripheral Intravenous Line                 Peripheral IV - Single Lumen 12/16/19 0626 20 G Left;Posterior Forearm less than 1 day                Physical Exam   Constitutional: He is oriented to person, place, and time. He appears well-developed and well-nourished.   HENT:   Head: Normocephalic and  atraumatic.   Eyes: Pupils are equal, round, and reactive to light. EOM are normal.   Neck: Normal range of motion. Neck supple. No JVD present.   Cardiovascular: Normal rate, regular rhythm, normal heart sounds and intact distal pulses.   Pulmonary/Chest: Effort normal and breath sounds normal. He has no rales.   Abdominal: Soft. Bowel sounds are normal.   Musculoskeletal: He exhibits edema (chronic R edema. This side he reports has been swollen since his cancer. Left ankle edema 1+).   Neurological: He is alert and oriented to person, place, and time.       Significant Labs:   CMP   Recent Labs   Lab 12/15/19  0349 12/16/19  0820    134*   K 4.3 4.5    99   CO2 29 25   * 123*   BUN 48* 45*   CREATININE 3.7* 3.2*   CALCIUM 8.2* 8.7   ANIONGAP 10 10   ESTGFRAFRICA 22.3* 26.6*   EGFRNONAA 19.3* 23.0*   , CBC   Recent Labs   Lab 12/15/19  0349 12/16/19  0820   WBC 6.12 6.72   HGB 7.1* 8.0*   HCT 23.8* 26.7*   * 159   , Lipid Panel No results for input(s): CHOL, HDL, LDLCALC, TRIG, CHOLHDL in the last 48 hours. and All pertinent lab results from the last 24 hours have been reviewed.    Significant Imaging: Echocardiogram:   Transthoracic echo (TTE) complete (Cupid Only):   Results for orders placed or performed during the hospital encounter of 11/26/19   Echo Color Flow Doppler? Yes   Result Value Ref Range    Ascending aorta 3.08 cm    STJ 3.18 cm    AV mean gradient 6 mmHg    Ao peak lobito 1.59 m/s    Ao VTI 29.36 cm    IVS 1.00 0.6 - 1.1 cm    LA size 4.62 cm    Left Atrium Major Axis 5.77 cm    Left Atrium Minor Axis 5.52 cm    LVIDD 6.20 3.5 - 6.0 cm    LVIDS 5.10 (A) 2.1 - 4.0 cm    LVOT diameter 2.23 cm    LVOT peak VTI 26.23 cm    PW 0.90 (A) 0.6 - 1.1 cm    RA Major Axis 5.16 cm    RA Width 3.44 cm    RVDD 3.99 cm    Sinus 3.08 cm    TAPSE 1.82 cm    TR Max Lobito 3.62 m/s    TDI LATERAL 0.08 m/s    TDI SEPTAL 0.12 m/s    LA WIDTH 4.81 cm    LV Diastolic Volume 171.45 mL    LV Systolic  Volume 89.16 mL    RV S' 18.32 cm/s    LVOT peak panda 1.36 m/s    FS 18 %    LA volume 106.58 cm3    LV mass 244.47 g    Left Ventricle Relative Wall Thickness 0.29 cm    AV valve area 3.49 cm2    AV Velocity Ratio 0.86     AV index (prosthetic) 0.89     Mean e' 0.10 m/s    LVOT area 3.9 cm2    LVOT stroke volume 102.39 cm3    AV peak gradient 10 mmHg    Triscuspid Valve Regurgitation Peak Gradient 52 mmHg    BSA 1.98 m2    LV Systolic Volume Index 45.6 mL/m2    LV Diastolic Volume Index 87.73 mL/m2    LA Volume Index 54.5 mL/m2    LV Mass Index 125 g/m2    Right Atrial Pressure (from IVC) 3 mmHg    TV rest pulmonary artery pressure 55 mmHg    Narrative    · Mildly decreased left ventricular systolic function. The estimated   ejection fraction is 40%. QEF 40%. Global hypokinetic wall motion.  · The left ventricular global longitudinal strain is -13.4%. Strain   pattern shows reduced strain at the base with apical sparing. Consider   infiltrative cardiomyopathy.  · Left ventricular diastolic dysfunction.  · Mild left ventricular enlargement.  · Eccentric left ventricular hypertrophy.  · Normal right ventricular systolic function.  · Mild tricuspid regurgitation.  · The estimated PA systolic pressure is 55 mm Hg  · Normal central venous pressure (3 mm Hg).  · Small circumferential pericardial effusion.  · Severe left atrial enlargement.       and X-Ray: CXR: X-Ray Chest 1 View (CXR): No results found for this visit on 12/10/19.

## 2019-12-16 NOTE — PROGRESS NOTES
"Sponsor: Dr. Anthony Jaimes M.D.    Study Title/IRB Number: Voice Signal Characteristics in Decompensated Heart Failure / 2018.324    Principle Investigator: Anthony Jaimes M.D.    Present for Discussion: Patient and Patient's Mother     Is LAR Consenting for Subject: No    Prior to the Informed Consent (IC) being signed, or any study protocol required data collection, testing, procedure, or intervention being performed, the following was done and/or discussed:   Patient was given a copy of the IC for review    Purpose of the study and qualifications to participate    Study design, Follow up schedule, and tests or procedures done at each visit   Confidentiality and HIPAA Authorization for Release of Medical Records for the research trial/ subject's rights/research related injury   Risk, Benefits, Alternative Treatments, Compensation and Costs   Participation in the research trial is voluntary and patient may withdraw at anytime   Contact information for study related questions    Patient verbalizes understanding of the above: Yes  Contact information for CRC and PI given to patient: Yes  Patient able to adequately summarize: the purpose of the study, the risks associated with the study, and all procedures, testing, and follow-ups associated with the study: Yes    Patient signed the informed consent form for the Voice Signal Characteristics in Decompensated Heart Failure research study with an IRB approval date of 09/11/2018.  Each page of the consent form was reviewed with patient and all questions answered satisfactorily. Patient signed the consent form and received a copy of same. The original consent was scanned into electronic medical records (EPIC) and filed into the subject's research study chart.    Mr. Aguiar was able to complete the following upon entry of the study:    - Subject was outfitted with [employer]'s "ReDS" vest. Height and weight obtained from patient medical history, which " "was input into the "ReDS" interface system. Patient instructed on use, lung water reading obtained: No. Attempted on numerous attempts to capture ReDS Vest reading without success and therefore, makes patient ineligible for this study. Patient instructed on the inability to capture data; verbalized understanding at this time.  - Subject was able to read the following passages, and a voice recording was obtained:  Sustained vowels: Yes  CAPE-V sentences: Yes  [place]: Yes  - Subject was able to stand upright with bare feet on the Bodyport scale for a minimum of 30 seconds: No. Didn't attempt to capture this data due to the ineligibility due to unsuccessful attempts to capture ReDS Vest reading.    Dr. Anthony Jaimes M.D. has reviewed the following inclusion/ exclusion criteria and confirms that subject meets all Inclusion and no Exclusion criteria at this time:      Inclusion-   - Subject is currently admitted with acute decompensated heart failure to [facility].  -  Subject is awake, alert, can speak English without difficulty.  - Subject can stand for =/> 30 seconds without difficulty.  - Subject must be literate.  - Subject must have a BMI of 22 to 38.  - Subject must be between 5'1" and 6'5" in height.  - Subject must be able to provide informed consent.    Exclusion-  - Subject cannot read.  - Subject has difficulty standing and maintaining balance for 30 seconds.  - Subject has a BMI under 22 or over 38.  - Subject is under 5'1" or over 6'5" in height.  - Subject has an LVAD.  - Subject has an implanted device (ie: pacemaker, port) on the right side of chest wall.  - Subject has had a heart transplant within the last 6 weeks.  - Subject has had any surgery or incision in the thoracic region within the last 6 weeks that would cause discomfort during vest readings.  - Subjects that require masked (Venturi or rebreather) oxygen support, or require mechanical support (i.e. left " ventricular assist device, intra-aortic balloon, etc.).

## 2019-12-16 NOTE — ASSESSMENT & PLAN NOTE
Patient expected discharge in 1-2 days pending adequate diuresis and improvement in GFR.   - Patient reports he has run out of a lot of his blood pressure medications. Will ensure he has an supply prior to discharge  - Will need follow up with PCP or cardiology for BP management and HF work up

## 2019-12-16 NOTE — ASSESSMENT & PLAN NOTE
-Coreg 25 mg BID, Norvasc 10 mg daily  -Added Bidil today given HF with low EF  -Plan to add Entresto once LEFTY improvies  -Restart lasix, PO 80 mg BID

## 2019-12-16 NOTE — ASSESSMENT & PLAN NOTE
Patient with hx of renal transplant presenting with LEFTY. Ddx include cardiorenal syndrome vs nephrotic syndrome vs ATN vs transplant rejection. Cr baseline 1.8-2.2 but presented to the ED with Cr of 2.9. With recent weight gain, elevated JVD and lower extremity swelling, highly suggestive of HF. However, unable to rule out secondary causes.     Improved with lasix holiday- likely intravascular volume depletion   Restart PO lasix given evidence of hypervolemia today  Monitor renal function   Avoid nephrotoxins. Appreciate KTM recommendation

## 2019-12-16 NOTE — ASSESSMENT & PLAN NOTE
Patient Hemoglobin decreased today with microcytic anemia showing MCV 80.   During patient's last admission had similar results. Iron panel workup showed anemia of chronic disease.   Denies any GI bleeding. Could also be from chronic renal disease.     PLAN:   - Will monitor for now without transfusion.   - Iron panel with some evidence of Fe deficiency- on Fe tables. No evidence of hemolysis. HB stable.   - If symptomatic or Hemoglobin drops, will transfuse for Hb < 7

## 2019-12-16 NOTE — ASSESSMENT & PLAN NOTE
Acute decompensated heart failure with sob and LE edema  2D echo with EF of 40% with Grade II diastolic HF and a PAP of 55  Differential includes ischemic cardiomyopathy vs infiltrative disease vs Doxorubicin induced cardiomyopathy (less likely given low dose tx)    Plan:  PET stress test for ischemic work up given decreasing EF from previous 2D echo  Work up for infiltrative disease - UA with 2+ protein, will get UPC and serum light chains  Managing BP- GDMT- continue Coreg, holding Entresto in the setting of LEFTY for now, start Bidil  Cards on board, appreciate recommendation

## 2019-12-16 NOTE — ASSESSMENT & PLAN NOTE
Bicarbonate previously elevated in setting of diuresis, now normalized  bicarbonate supplements held for now

## 2019-12-16 NOTE — CONSULTS
[facility]  Cardiology  Consult Note    Patient Name: Demetrio Aguiar  MRN: [id number]  Admission Date: 12/10/2019  Hospital Length of Stay: 6 days  Code Status: Full Code   Attending Provider: Rosalind Caraballo MD   Consulting Provider: Andrea England MD  Primary Care Physician: Primary Doctor No  Principal Problem:Acute systolic heart failure    Patient information was obtained from patient and past medical records.     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 Mejias. He was admitted with a weight of 180 lbs from a dry weight of 155 lbs. He has been diuresed effectively and his weight is now 165 lbs up from 159 (had a lasix holiday due to concern for a pre-renal LEFTY secondary to overdiuresis). He is net negative on admission 8 L.     The patient underwent evaluation with a TTE on this admission and it showed a decrease in his EF from 55% to 40% with the QEF at 40%. Review of his GLS prior to this admission have shown values of 13-15% indicating that he was not a high risk for LVEF decrease. However his strain pattern is concerning for an infiltrative CM.     Currently the patient is on Coreg, and has been switched from nifedpine to amlodipine. He is also on hydralazine as well (increased here).    Denies any sx of SOB with exertion, chest pain, leg swelling, orthopnea, or palpitations      Past Medical History:   Diagnosis Date    Anemia     At risk for opportunistic infections     Delayed graft function of kidney     ESRD secondary to HIVAN s/p DDRT 8/18/17     HIV infection     HIV nephropathy      "Hyperlipidemia     Hypertension     Need for prophylactic immunotherapy     S/P kidney transplant 8/18/2017 8/28/2017    Status post exploratory laparotomy 9/10/2017    Re explored for retroperitoneal hematoma. Hematoma evacuated.(09/09)    Status post exploratory laparotomy 9/10/2017    Re explored for retroperitoneal hematoma. Hematoma evacuated.(09/09)    TB lung, latent     tx INH 2006       Past Surgical History:   Procedure Laterality Date    BIOPSY N/A 8/28/2018    Procedure: BIOPSY Tranplanted Kidney;  Surgeon: [facility] Diagnostic Provider;  [facility];  Service: Radiology;  Laterality: N/A;    EXPLORATORY LAPAROTOMY W/ BOWEL RESECTION      NO BOWEL RESECTION, UNKNOWN DETAILS, "Pancreas Infection"    INSERTION OF TUNNELED CENTRAL VENOUS CATHETER (CVC) WITH SUBCUTANEOUS PORT Left 8/19/2019    Procedure: [id number];  Surgeon: Megha Monreal MD;  [facility];  Service: General;  Laterality: Left;    INSERTION OF TUNNELED CENTRAL VENOUS CATHETER (CVC) WITH SUBCUTANEOUS PORT N/A 9/12/2019    Procedure: [id number];  Surgeon: Live Diagnostic Provider;  [facility];  Service: Radiology;  Laterality: N/A;  189  port removal and port placement    KIDNEY TRANSPLANT      peritoneal dialysis catheter placement         Review of patient's allergies indicates:  No Known Allergies    No current facility-administered medications on file prior to encounter.      Current Outpatient Medications on File Prior to Encounter   Medication Sig    apixaban (ELIQUIS) 5 mg (74 tabs) DsPk For the first 7 days take two 5 mg tablets twice daily.  After 7 days take one 5 mg tablet twice daily.    atovaquone (MEPRON) 750 mg/5 mL Susp Take 10 mLs (1,500 mg total) by mouth once daily.    calcium carbonate (TUMS) 200 mg calcium (500 mg) chewable tablet Take 1 tablet (500 mg total) by mouth once daily.    carvedilol (COREG) 25 MG tablet Take 1 tablet (25 mg " total) by mouth 2 (two) times daily with meals.    cinacalcet (SENSIPAR) 60 MG Tab Take 1 tablet (60 mg total) by mouth daily with breakfast.    dolutegravir (TIVICAY) 50 mg Tab Take 1 tablet (50 mg total) by mouth once daily.    emtricitabine-tenofovir alafen (DESCOVY) 200-25 mg Tab Take 1 tablet by mouth once daily.    famotidine (PEPCID) 20 MG tablet TAKE 1 TABLET (20MG TOTAL) BY MOUTH EACH EVENING    furosemide (LASIX) 20 MG tablet Take 1 tablet (20 mg total) by mouth once daily. May need to increase based on weight change.    hydrALAZINE (APRESOLINE) 25 MG tablet Take 1 tablet (25 mg total) by mouth every 8 (eight) hours.    HYDROcodone-acetaminophen (NORCO) 5-325 mg per tablet Take 1 tablet by mouth every 4 (four) hours as needed for Pain.    multivitamin (THERAGRAN) tablet Take 1 tablet by mouth once daily.    NIFEdipine (PROCARDIA-XL) 60 MG (OSM) 24 hr tablet Take 1 tablet (60 mg total) by mouth every 12 (twelve) hours.    patiromer calcium sorbitex (VELTASSA) 25.2 gram PwPk Take 1 packet (25.2 g total) by mouth once daily.    predniSONE (DELTASONE) 5 MG tablet TAKE ONE TABLET BY MOUTH EVERY DAY    rosuvastatin (CRESTOR) 20 MG tablet Take 1 tablet (20 mg total) by mouth once daily.    sevelamer carbonate (RENVELA) 800 mg Tab Take 1 tablet (800 mg total) by mouth 3 (three) times daily with meals.    sirolimus (RAPAMUNE) 2 MG tablet Take 1 tablet (2 mg total) by mouth once daily.    sodium bicarbonate 650 MG tablet Take 3 tablets (1,950 mg total) by mouth 3 (three) times daily.     Family History     Problem Relation (Age of Onset)    Cancer Father    Diabetes Maternal Aunt, Maternal Uncle    Heart disease Paternal Grandmother    Hyperlipidemia Mother    Hypertension Maternal Grandmother, Paternal Grandmother    Lung cancer Father    No Known Problems Brother        Tobacco Use    Smoking status: Former Smoker     Packs/day: 0.50     Types: Cigarettes    Smokeless tobacco: Never Used     Tobacco comment: quit smoking 5 years ago   Substance and Sexual Activity    Alcohol use: No    Drug use: No     Types: Cocaine, Marijuana     Comment: last use for cocaine at age 17; 2 years ago last use for THC    Sexual activity: Not Currently     Review of Systems   Constitution: Negative for chills, fever and weight gain.   Cardiovascular: Negative for chest pain, dyspnea on exertion, irregular heartbeat, orthopnea, palpitations and paroxysmal nocturnal dyspnea.   Respiratory: Negative for cough and shortness of breath.    Gastrointestinal: Negative for diarrhea, nausea and vomiting.   Neurological: Negative for weakness.     Objective:     Vital Signs (Most Recent):  Temp: 98.2 °F (36.8 °C) (12/16/19 1152)  Pulse: 74 (12/16/19 1243)  Resp: 18 (12/16/19 1152)  BP: (!) 145/88 (12/16/19 1152)  SpO2: 95 % (12/16/19 1152) Vital Signs (24h Range):  Temp:  [97.8 °F (36.6 °C)-98.3 °F (36.8 °C)] 98.2 °F (36.8 °C)  Pulse:  [66-80] 74  Resp:  [15-18] 18  SpO2:  [93 %-99 %] 95 %  BP: (142-167)/(84-96) 145/88     Weight: 75 kg (165 lb 7.3 oz)  Body mass index is 25.16 kg/m².    SpO2: 95 %  O2 Device (Oxygen Therapy): room air      Intake/Output Summary (Last 24 hours) at 12/16/2019 1306  Last data filed at 12/16/2019 1000  Gross per 24 hour   Intake 420 ml   Output 1150 ml   Net -730 ml       Lines/Drains/Airways     Drain                 Hemodialysis AV Fistula 07/19/19 0823 Left upper arm 150 days          Peripheral Intravenous Line                 Peripheral IV - Single Lumen 12/16/19 0626 20 G Left;Posterior Forearm less than 1 day                Physical Exam   Constitutional: He is oriented to person, place, and time. He appears well-developed and well-nourished.   HENT:   Head: Normocephalic and atraumatic.   Eyes: Pupils are equal, round, and reactive to light. EOM are normal.   Neck: Normal range of motion. Neck supple. No JVD present.   Cardiovascular: Normal rate, regular rhythm, normal heart sounds and  intact distal pulses.   Pulmonary/Chest: Effort normal and breath sounds normal. He has no rales.   Abdominal: Soft. Bowel sounds are normal.   Musculoskeletal: He exhibits edema (chronic R edema. This side he reports has been swollen since his cancer. Left ankle edema 1+).   Neurological: He is alert and oriented to person, place, and time.       Significant Labs:   CMP   Recent Labs   Lab 12/15/19  0349 12/16/19  0820    134*   K 4.3 4.5    99   CO2 29 25   * 123*   BUN 48* 45*   CREATININE 3.7* 3.2*   CALCIUM 8.2* 8.7   ANIONGAP 10 10   ESTGFRAFRICA 22.3* 26.6*   EGFRNONAA 19.3* 23.0*   , CBC   Recent Labs   Lab 12/15/19  0349 12/16/19  0820   WBC 6.12 6.72   HGB 7.1* 8.0*   HCT 23.8* 26.7*   * 159   , Lipid Panel No results for input(s): CHOL, HDL, LDLCALC, TRIG, CHOLHDL in the last 48 hours. and All pertinent lab results from the last 24 hours have been reviewed.    Significant Imaging: Echocardiogram:   Transthoracic echo (TTE) complete (Cupid Only):   Results for orders placed or performed during the hospital encounter of 11/26/19   Echo Color Flow Doppler? Yes   Result Value Ref Range    Ascending aorta 3.08 cm    STJ 3.18 cm    AV mean gradient 6 mmHg    Ao peak lobito 1.59 m/s    Ao VTI 29.36 cm    IVS 1.00 0.6 - 1.1 cm    LA size 4.62 cm    Left Atrium Major Axis 5.77 cm    Left Atrium Minor Axis 5.52 cm    LVIDD 6.20 3.5 - 6.0 cm    LVIDS 5.10 (A) 2.1 - 4.0 cm    LVOT diameter 2.23 cm    LVOT peak VTI 26.23 cm    PW 0.90 (A) 0.6 - 1.1 cm    RA Major Axis 5.16 cm    RA Width 3.44 cm    RVDD 3.99 cm    Sinus 3.08 cm    TAPSE 1.82 cm    TR Max Lobito 3.62 m/s    TDI LATERAL 0.08 m/s    TDI SEPTAL 0.12 m/s    LA WIDTH 4.81 cm    LV Diastolic Volume 171.45 mL    LV Systolic Volume 89.16 mL    RV S' 18.32 cm/s    LVOT peak lobito 1.36 m/s    FS 18 %    LA volume 106.58 cm3    LV mass 244.47 g    Left Ventricle Relative Wall Thickness 0.29 cm    AV valve area 3.49 cm2    AV Velocity Ratio 0.86      AV index (prosthetic) 0.89     Mean e' 0.10 m/s    LVOT area 3.9 cm2    LVOT stroke volume 102.39 cm3    AV peak gradient 10 mmHg    Triscuspid Valve Regurgitation Peak Gradient 52 mmHg    BSA 1.98 m2    LV Systolic Volume Index 45.6 mL/m2    LV Diastolic Volume Index 87.73 mL/m2    LA Volume Index 54.5 mL/m2    LV Mass Index 125 g/m2    Right Atrial Pressure (from IVC) 3 mmHg    TV rest pulmonary artery pressure 55 mmHg    Narrative    · Mildly decreased left ventricular systolic function. The estimated   ejection fraction is 40%. QEF 40%. Global hypokinetic wall motion.  · The left ventricular global longitudinal strain is -13.4%. Strain   pattern shows reduced strain at the base with apical sparing. Consider   infiltrative cardiomyopathy.  · Left ventricular diastolic dysfunction.  · Mild left ventricular enlargement.  · Eccentric left ventricular hypertrophy.  · Normal right ventricular systolic function.  · Mild tricuspid regurgitation.  · The estimated PA systolic pressure is 55 mm Hg  · Normal central venous pressure (3 mm Hg).  · Small circumferential pericardial effusion.  · Severe left atrial enlargement.       and X-Ray: CXR: X-Ray Chest 1 View (CXR): No results found for this visit on 12/10/19.    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 However other dx must be r/u to accurately dx this as the etiology behind his HF. DDx includes ischemic CM, amyloidosis (echo suspicious for this in terms of the strain pattern.)    Plan:  - Ordered a PET stress to exclude ischemic etiology.  - Agree with c/w Coreg 25 mg BID  - Would add Bidil 20-37.5 mg BID instead of hydralazine without pre-load  reduction. If patient cannot afford this, would then keep the hydralazine and then do isordil 10 mg TID with uptitration to get BP<130/80.  - Agree with Entresto initiation once okay to start from a renal perspective. Would start on 24-26 mg BID first with uptitration on outpatient.  - Euvolemic on examination. Agree with lasix holiday. Would resume lasix tonight at 80 mg PO BID. Perhaps this Cr is a new baseline for him. (2-3).  - Will follow up PET stress. If negative, as I suspect would entertain TTR SPECT as outpatient to r/o before entertaining the dx of doxorubin CM as the etiology definitively.    - Please order serum light chains to start work up for amyloidosis.    Acute DVT (deep venous thrombosis)  Continue with Eliquis for prox r subclavian venous thrombosis found on 11/13/19.      Renovascular hypertension  See treatment recs under ADHF        VTE Risk Mitigation (From admission, onward)         Ordered     apixaban tablet 5 mg  2 times daily      12/10/19 1619     IP VTE LOW RISK PATIENT  Once      12/10/19 1537                Thank you for your consult. I will follow-up with patient. Please contact us if you have any additional questions.    Andrea England MD  Cardiology   [facility]

## 2019-12-16 NOTE — PROGRESS NOTES
[facility] Hospital Medicine  Progress Note    Patient Name: Demetrio Aguiar  MRN: [id number]  Patient Class: IP- Inpatient   Admission Date: 12/10/2019  Length of Stay: 6 days  Attending Physician: Rosalind Caraballo MD  Primary Care Provider: [facility] Medicine Team: [facility]P MED V Alfredito Simpson MD    Subjective:     Principal Problem:Acute decompensated heart failure    HPI:  Demetrio Aguiar is a 39 y.o. 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 Reports he was recently in the hospital where he was treated for pneumonia. During that admission, the patient received fluids for his infection as well as antibiotics. He reports since his pneumonia has been properly treated and his cough resolved, however he noticed worsening lower extremity swelling. Reports over the past week, he's gained weight (dry weight ~155-160) and is now up to 180lbs. He has not been compliant with a low salt diet. He reports this morning his swelling worsened to his upper extremities and he began complaining of MARY. However, he was able to walk from the parking garage to the ER before complaining of SOB. He denies any cough, fevers, chills, n/v, headaches, vision changes or weakness.    He was recently seen by Dr. Mejias (cardiology) in clinic on 12/2 where he noticed significant signs of HF on exam including 4+ pitting edema of the lower extremities and elevated JVD. He was started on lasix 20mg during that time.     Had a 2D echo performed on 11/26 which showed mildly decreased left ventricular systolic function. The estimated ejection fraction is 40%. QEF 40%. Global hypokinetic wall motion. (EF 55% in Aug-2019).     In the ED, the patient was found to have an elevated BNP  and pulmonary edema on CXR.     Overview/Hospital Course:  Mr. Aguiar admitted to the hospital for CHF exacerbation. Was started on Lasix 40mg BID however minimal urine output with no change in weight despite fluid restriction. Changed dose to Lasix 80mg BID. Patient had excellent output with ~3.5L out. Weight from 180lbs on admission to 166lbs. Target dry weight 155-160lbs. Continues to be elevated above baseline. Overall, improving with no significant complaints. Patient after aggressive diuresis achieved near dry weight of 159lbs (goal 155). Lasix holiday after worsening LEFTY, now with increased weight of 165. LEFTY improving though still far from baseline line. Concern for infiltrative disease vs ischemic cardiomyopathy. Cardiology on board. PET scan for ischemic work up. Will get light chains and UPC for infiltrative disease work up. Restart PO lasix.     Interval History: Pt feels well today. Still  Has LE edema and bilateral crackles on exam. LEFTY improving. Increased weight today 165 from 159. Getting PET stress for ischemic cardiomyopathy work up. Restart lasix today. Added Bidil for BP.     Review of Systems   Constitutional: Negative for chills, diaphoresis, fatigue and fever.   Eyes: Negative for photophobia and visual disturbance.   Respiratory: Negative for cough, chest tightness, shortness of breath and wheezing.    Cardiovascular: Positive for leg swelling. Negative for chest pain and palpitations.   Gastrointestinal: Negative for abdominal distention, abdominal pain, diarrhea, nausea and vomiting.   Genitourinary: Negative for difficulty urinating, dysuria, frequency and urgency.   Musculoskeletal: Negative for arthralgias, joint swelling and myalgias.   Skin: Negative for pallor and rash.   Neurological: Positive for weakness. Negative for dizziness, tremors, numbness and headaches.     Objective:     Vital Signs (Most Recent):  Temp: 98.2 °F (36.8 °C) (12/16/19 1152)  Pulse: 72 (12/16/19 1530)  Resp:  18 (12/16/19 1152)  BP: (!) 145/88 (12/16/19 1152)  SpO2: 95 % (12/16/19 1152) Vital Signs (24h Range):  Temp:  [97.8 °F (36.6 °C)-98.2 °F (36.8 °C)] 98.2 °F (36.8 °C)  Pulse:  [66-80] 72  Resp:  [16-18] 18  SpO2:  [95 %-99 %] 95 %  BP: (142-167)/(84-96) 145/88     Weight: 75 kg (165 lb 7.3 oz)  Body mass index is 25.16 kg/m².    Intake/Output Summary (Last 24 hours) at 12/16/2019 1543  Last data filed at 12/16/2019 1300  Gross per 24 hour   Intake 960 ml   Output 1450 ml   Net -490 ml      Physical Exam   Constitutional: He is oriented to person, place, and time. He appears well-developed and well-nourished.   HENT:   Head: Normocephalic and atraumatic.   Elevated JVD    Eyes: Pupils are equal, round, and reactive to light. EOM are normal.   Neck: Normal range of motion. Neck supple. No tracheal deviation present. No thyromegaly present.   Cardiovascular: Normal rate and regular rhythm. Exam reveals no friction rub.   No murmur heard.  Pulmonary/Chest: Effort normal and breath sounds normal. No respiratory distress. He has no wheezes. He exhibits no tenderness.   Abdominal: Soft. Bowel sounds are normal. He exhibits no distension. There is no tenderness.   Musculoskeletal: Normal range of motion. He exhibits no edema.   Neurological: He is alert and oriented to person, place, and time.   Skin: Skin is warm and dry. Capillary refill takes less than 2 seconds. No rash noted. No erythema.   1+ in the ankles only bilateral. Swelling of the right arm (hx of DVT). Right lower extremity chronically edematous 2/2 malignancy.  LE edema R > left.  Dark discoloration of the LE        Significant Labs:   CBC:   Recent Labs   Lab 12/15/19  0349 12/16/19  0820   WBC 6.12 6.72   HGB 7.1* 8.0*   HCT 23.8* 26.7*   * 159     CMP:   Recent Labs   Lab 12/15/19  0349 12/16/19  0820    134*   K 4.3 4.5    99   CO2 29 25   * 123*   BUN 48* 45*   CREATININE 3.7* 3.2*   CALCIUM 8.2* 8.7   ANIONGAP 10 10    EGFRNONAA 19.3* 23.0*       Assessment/Plan:      * Acute decompensated heart failure  Acute decompensated heart failure with sob and LE edema  2D echo with EF of 40% with Grade II diastolic HF and a PAP of 55  Differential includes ischemic cardiomyopathy vs infiltrative disease vs Doxorubicin induced cardiomyopathy (less likely given low dose tx)    Plan:  PET stress test for ischemic work up given decreasing EF from previous 2D echo  Work up for infiltrative disease - UA with 2+ protein, will get UPC and serum light chains  Managing BP- GDMT- continue Coreg, holding Entresto in the setting of LEFTY for now, start Bidil  Cards on board, appreciate recommendation      Microcytic anemia  Patient Hemoglobin decreased today with microcytic anemia showing MCV 80.   During patient's last admission had similar results. Iron panel workup showed anemia of chronic disease.   Denies any GI bleeding. Could also be from chronic renal disease.     PLAN:   - Will monitor for now without transfusion.   - Iron panel with some evidence of Fe deficiency- on Fe tables. No evidence of hemolysis. HB stable.   - If symptomatic or Hemoglobin drops, will transfuse for Hb < 7     Discharge planning issues  Patient expected discharge in 1-2 days pending adequate diuresis and improvement in GFR.   - Patient reports he has run out of a lot of his blood pressure medications. Will ensure he has an supply prior to discharge  - Will need follow up with PCP or cardiology for BP management and HF work up     Acute DVT (deep venous thrombosis)  Patient w/ hx of proximal right subclavian vein dvt found on US during last admission on 11/13/2019  - Will continue Eliquis 5mg BID.     Renovascular hypertension  -Coreg 25 mg BID, Norvasc 10 mg daily  -Added Bidil today given HF with low EF  -Plan to add Entresto once LEFTY improvies  -Restart lasix, PO 80 mg BID     Kaposi sarcoma  Hx of Kaposi Sarcoma with treatment with chemo.   - 4x treatment with  Doxorubicin.   - Stable.  - follow up with oncology for possible treatment. Radiation vs additional chemo?   - Doxorubicin likely to cause further decline in cardiac function     LEFTY (acute kidney injury)  Patient with hx of renal transplant presenting with LEFTY. Ddx include cardiorenal syndrome vs nephrotic syndrome vs ATN vs transplant rejection. Cr baseline 1.8-2.2 but presented to the ED with Cr of 2.9. With recent weight gain, elevated JVD and lower extremity swelling, highly suggestive of HF. However, unable to rule out secondary causes.     Improved with lasix holiday- likely intravascular volume depletion   Restart PO lasix given evidence of hypervolemia today  Monitor renal function   Avoid nephrotoxins. Appreciate KTM recommendation     Prophylactic immunotherapy  Patient's last CD4 count on  at 63.   - Started on Atovaquone for ppx.   - Will continue inpatient.      donor kidney transplant 2017  - KTM consulted. Recs appreciated  - Will continue Sirolimus 2mg BID with levels daily  - Prednisone 5mg Daily.     AIDS (acquired immunodeficiency syndrome), CD4 <=200  - Continue HAART medications while inpatient.     Hyperlipidemia  - Continue Crestor 20mg Daily.     VTE Risk Mitigation (From admission, onward)         Ordered     apixaban tablet 5 mg  2 times daily      12/10/19 1619     IP VTE LOW RISK PATIENT  Once      12/10/19 1537              Alfredito Simpson MD  Department of Hospital Medicine   [facility]

## 2019-12-16 NOTE — SUBJECTIVE & OBJECTIVE
Subjective:   History of Present Illness:  39 y.o. year old Black or  male who received a  - brain death kidney transplant on 17.  He has CKD stage 3 - GFR 30-59 and his baseline creatinine is between 1.8-2.1. He takes prednisone and sirolimus for maintenance immunosuppression.          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   - Biopsy proven ABMR with weakly positive class I DSA's on 17. Pt treated with SMP x 3 (-9/3). PLEX x6 (completed ) and IVIG x 2 (-).    - Kidney US on 17 showed a large collection located along the anterior inferior margin of the renal transplant measuring 13.4 x 7 x 11.9. taken back to the OR for retroperitoneal bleed and washout.   -Biopsy 10/17/17 (LEFTY): 14 glomeruli, <5% fibrosis, no MC severo..  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 Amador for Kaposi's Sarcoma and has been treated with Doxorubicin x 4 doses. Has been tolerated well with improved in Tumor scottie according to oncology is note dated 2019.  In addition it also showed cardiomegaly with new evidence of pleural effusion.     -Biopsy 1/15/18: No ACR, AVR, ABMR, CD4 neg. Less than 10 % fibrosis.   -Biopsy (for DGF) 17: good sample with 24 glomeruli (only 1 sclerosed) and evidence just meeting criteria for ABMR. + diffuse ATI. Minimal fibrosis, no ACR or AVR, but + CD4 (>50% diffusely + stain) with mild glomerulitis and focal peritubular capillaritis. Also, + ca-oxalate (3) and ca-phos (2) crystals.  -18 WEAK DSA DETECTED: CW5(3133)      PCP PROPHYLAXIS: Bactrim LIFELONG--> changed to Mepron ~ 2018  CMV PROPHYLAXIS: Valcyte until 17  FUNGAL PROPHYLAXIS: Nystatin until 9/15/17         He presented to  the ER with SOB and lower extremity swelling Reports he was recently in the hospital where he was treated for pneumonia. During that admission, the patient received fluids for his infection as well as antibiotics. He reports since his pneumonia has been properly treated and his cough resolved, however he noticed worsening lower extremity swelling. Reports over the past week, he's gained weight (dry weight ~155-160) and is now up to 180lbs. He has not been compliant with a low salt diet. He reports this morning his swelling worsened to his upper extremities and he began complaining of MARY. However, he was able to walk from the parking garage to the ER before complaining of SOB. He denies any cough, fevers, chills, n/v, headaches, vision changes or weakness.     He was recently seen by Dr. Mejias (cardiology) in clinic on 12/2 where he noticed significant signs of HF on exam including 4+ pitting edema of the lower extremities and elevated JVD. He was started on lasix 20mg during that time.      Had a 2D echo performed on 11/26 which showed mildly decreased left ventricular systolic function. The estimated ejection fraction is 40%. QEF 40%. Global hypokinetic wall motion. (EF 55% in Aug-2019).      In the ED, the patient was found to have an elevated BNP and pulmonary edema on CXR.     Mr. Aguiar is a 39 y.o. year old male who is status post Kidney Transplant - 8/18/2017  (#1).    His maintenance immunosuppression consists of:   Immunosuppressants (From admission, onward)    Start     Stop Route Frequency Ordered    12/11/19 0900  sirolimus tablet 2 mg      -- Oral Daily 12/10/19 1619          Interval History:  Pt seen and evaluated at bedside. Cr today improved to 3.2, from 3.7 yesterday and UO of 1L past 24 hours. Diuresis currently being held. Sirolimus level of 5.3.    Past Medical, Surgical, Family, and Social History:   Unchanged from H&P.    Scheduled Meds:   [START ON 12/17/2019] amLODIPine  10 mg Oral Daily     apixaban  5 mg Oral BID    atovaquone  1,500 mg Oral Daily    calcium carbonate  1 tablet Oral Daily    carvedilol  25 mg Oral BID WM    cinacalcet  60 mg Oral Daily with breakfast    dolutegravir  50 mg Oral Daily    emtricitabine-tenofovir alafen  1 tablet Oral Daily    ferrous sulfate  325 mg Oral BID    hydrALAZINE  100 mg Oral Q8H    multivitamin  1 tablet Oral Daily    predniSONE  5 mg Oral Daily    rosuvastatin  20 mg Oral Daily    sirolimus  2 mg Oral Daily     Continuous Infusions:  PRN Meds:glucagon (human recombinant), glucose, glucose, HYDROcodone-acetaminophen, hydrOXYzine pamoate, sodium chloride 0.9%    Intake/Output - Last 3 Shifts       12/14 0700 - 12/15 0659 12/15 0700 - 12/16 0659 12/16 0700 - 12/17 0659    P.O. 990 900     Total Intake(mL/kg) 990 (13.7) 900 (12.4)     Urine (mL/kg/hr) 1750 (1) 1000 (0.6) 400 (1.4)    Stool       Total Output 1750 1000 400    Net -760 -100 -400                  Review of Systems   Constitutional: Negative for activity change, appetite change, chills, diaphoresis, fatigue and fever.   Respiratory: Negative for cough, choking, chest tightness, shortness of breath and stridor.    Cardiovascular: Positive for leg swelling (R>L (due to cancer on the right side)). Negative for chest pain.   Gastrointestinal: Negative for blood in stool, diarrhea, nausea and vomiting.   Genitourinary: Negative for difficulty urinating, dysuria and frequency.   Musculoskeletal: Negative for arthralgias and myalgias.   Skin: Negative for pallor and rash.   Neurological: Negative for dizziness, light-headedness and headaches.      Objective:     Vital Signs (Most Recent):  Temp: 98.2 °F (36.8 °C) (12/16/19 0830)  Pulse: 66 (12/16/19 1010)  Resp: 16 (12/16/19 0830)  BP: (!) 167/96 (12/16/19 0830)  SpO2: 97 % (12/16/19 0830) Vital Signs (24h Range):  Temp:  [96 °F (35.6 °C)-98.3 °F (36.8 °C)] 98.2 °F (36.8 °C)  Pulse:  [66-78] 66  Resp:  [15-18] 16  SpO2:  [93 %-99 %] 97  "%  BP: (142-167)/(84-96) 167/96     Weight: 75 kg (165 lb 7.3 oz)  Height: 5' 8" (172.7 cm)  Body mass index is 25.16 kg/m².    Physical Exam   Constitutional: He is oriented to person, place, and time. He appears well-developed and well-nourished.   HENT:   Head: Normocephalic and atraumatic.   Eyes: Pupils are equal, round, and reactive to light. EOM are normal.   Neck: Normal range of motion. Neck supple. No tracheal deviation present. No thyromegaly present.   Cardiovascular: Normal rate and regular rhythm. Exam reveals no friction rub.   No murmur heard.  Pulmonary/Chest: Effort normal. No respiratory distress. He has no wheezes. He has rales. He exhibits no tenderness.   Abdominal: Soft. Bowel sounds are normal. He exhibits distension. There is no tenderness.   Musculoskeletal: Normal range of motion. He exhibits edema.   Neurological: He is alert and oriented to person, place, and time.   Skin: Skin is warm and dry. Capillary refill takes less than 2 seconds. No rash noted. No erythema.       Laboratory:  CBC:   Recent Labs   Lab 12/14/19  0357 12/15/19  0349 12/16/19  0820   WBC 4.80 6.12 6.72   RBC 2.87* 2.93* 3.26*   HGB 7.2* 7.1* 8.0*   HCT 22.8* 23.8* 26.7*   * 128* 159   MCV 79* 81* 82   MCH 25.1* 24.2* 24.5*   MCHC 31.6* 29.8* 30.0*     CMP:   Recent Labs   Lab 12/10/19  1245  12/14/19  0357 12/15/19  0349 12/16/19  0820   GLU 98   < > 123* 117* 123*   CALCIUM 8.4*   < > 8.3* 8.2* 8.7   ALBUMIN 3.4*  --   --   --   --    PROT 6.3  --   --   --   --       < > 143 141 134*   K 4.0   < > 4.1 4.3 4.5   CO2 25   < > 31* 29 25      < > 102 102 99   BUN 48*   < > 44* 48* 45*   CREATININE 2.9*   < > 3.3* 3.7* 3.2*   ALKPHOS 120  --   --   --   --    ALT 36  --   --   --   --    AST 31  --   --   --   --     < > = values in this interval not displayed.       Diagnostic Results:  None  "

## 2019-12-16 NOTE — ASSESSMENT & PLAN NOTE
Patient is a 40 yo male with a past medical hx of 40 yo male with a pmHx of AIDS (now) on HAART, Kaposi sarcoma s/p 3 cycles of doxorubin, ESRD on intermittent HD, s/p renal transplant with hx of chronic rejection, HTN, HLD, here for ADHF likely secondary to Doxorubin usage in the setting of dietary non-compliance. However other dx must be r/u to accurately dx this as the etiology behind his HF. DDx includes ischemic CM, amyloidosis (echo suspicious for this in terms of the strain pattern.)    Plan:  - Ordered a PET stress to exclude ischemic etiology. Scheduled for 2:30 pm.  - Agree with c/w Coreg 25 mg BID  - Would add Bidil 20-37.5 mg BID instead of hydralazine without pre-load reduction. If patient cannot afford this, would then keep the hydralazine and then do isordil 10 mg TID with uptitration to get BP<130/80.  - Agree with Entresto initiation once okay to start from a renal perspective. Would start on 24-26 mg BID first with uptitration on outpatient.  - Euvolemic on examination. Agree with lasix holiday. Would resume lasix tonight at 80 mg PO BID. Perhaps this Cr is a new baseline for him. (2-3).  - Will follow up PET stress. If negative, as I suspect would entertain TTR SPECT as outpatient to r/o before entertaining the dx of doxorubin CM as the etiology definitively.  - Please order serum light chains for Amyloidosis w/u.

## 2019-12-16 NOTE — PLAN OF CARE
Patient remained free of falls, trauma, injury, and skin breakdown. VSS; pt complaint of anxiety- PRN medications administered. Oral rehydration encouraged. Fall precautions maintained. Plan of care reviewed; patient verbalized understanding.  All questions and concerns addressed; will continue to monitor.

## 2019-12-16 NOTE — PROGRESS NOTES
[facility]  Kidney Transplant  Progress Note      Reason for Follow-up: Reassessment of Kidney Transplant - 2017  (#1) recipient and management of immunosuppression.    ORGAN:  RIGHT KIDNEY   Donor Type:  Donation after Brain Death     Subjective:   History of Present Illness:  39 y.o. year old Black or  male who received a  - brain death kidney transplant on 17.  He has CKD stage 3 - GFR 30-59 and his baseline creatinine is between 1.8-2.1. He takes prednisone and sirolimus for maintenance immunosuppression.          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   - Biopsy proven ABMR with weakly positive class I DSA's on 17. Pt treated with SMP x 3 (-9/3). PLEX x6 (completed ) and IVIG x 2 (-).    - Kidney US on 17 showed a large collection located along the anterior inferior margin of the renal transplant measuring 13.4 x 7 x 11.9. taken back to the OR for retroperitoneal bleed and washout.   -Biopsy 10/17/17 (LEFTY): 14 glomeruli, <5% fibrosis, no MC severo..  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 Amador for Kaposi's Sarcoma and has been treated with Doxorubicin x 4 doses. Has been tolerated well with improved in Tumor scottie according to oncology is note dated 2019.  In addition it also showed cardiomegaly with new evidence of pleural effusion.     -Biopsy 1/15/18: No ACR, AVR, ABMR, CD4 neg. Less than 10 % fibrosis.   -Biopsy (for DGF) 17: good sample with 24 glomeruli (only 1 sclerosed) and evidence just meeting criteria for ABMR. + diffuse ATI. Minimal fibrosis, no ACR or AVR, but + CD4 (>50% diffusely + stain) with mild glomerulitis and focal peritubular capillaritis.  Also, + ca-oxalate (3) and ca-phos (2) crystals.  -7/9/18 WEAK DSA DETECTED: [id number])      PCP PROPHYLAXIS: Bactrim LIFELONG--> changed to Mepron ~ 6/2018  CMV PROPHYLAXIS: Valcyte until 11/19/17  FUNGAL PROPHYLAXIS: Nystatin until 9/15/17         He presented to the ER with SOB and lower extremity swelling Reports he was recently in the hospital where he was treated for pneumonia. During that admission, the patient received fluids for his infection as well as antibiotics. He reports since his pneumonia has been properly treated and his cough resolved, however he noticed worsening lower extremity swelling. Reports over the past week, he's gained weight (dry weight ~155-160) and is now up to 180lbs. He has not been compliant with a low salt diet. He reports this morning his swelling worsened to his upper extremities and he began complaining of MARY. However, he was able to walk from the parking garage to the ER before complaining of SOB. He denies any cough, fevers, chills, n/v, headaches, vision changes or weakness.     He was recently seen by Dr. Mejias (cardiology) in clinic on 12/2 where he noticed significant signs of HF on exam including 4+ pitting edema of the lower extremities and elevated JVD. He was started on lasix 20mg during that time.      Had a 2D echo performed on 11/26 which showed mildly decreased left ventricular systolic function. The estimated ejection fraction is 40%. QEF 40%. Global hypokinetic wall motion. (EF 55% in Aug-2019).      In the ED, the patient was found to have an elevated BNP and pulmonary edema on CXR.     Mr. Aguiar is a 39 y.o. year old male who is status post Kidney Transplant - 8/18/2017  (#1).    His maintenance immunosuppression consists of:   Immunosuppressants (From admission, onward)    Start     Stop Route Frequency Ordered    12/11/19 0900  sirolimus tablet 2 mg      -- Oral Daily 12/10/19 1619          Interval History:  Pt seen and evaluated at bedside. Cr today  improved to 3.2, from 3.7 yesterday and UO of 1L past 24 hours. Pt reports good PO fluid intake. Diuresis currently being held. Sirolimus level of 5.3.     Past Medical, Surgical, Family, and Social History:   Unchanged from H&P.    Scheduled Meds:   [START ON 12/17/2019] amLODIPine  10 mg Oral Daily    apixaban  5 mg Oral BID    atovaquone  1,500 mg Oral Daily    calcium carbonate  1 tablet Oral Daily    carvedilol  25 mg Oral BID WM    cinacalcet  60 mg Oral Daily with breakfast    dolutegravir  50 mg Oral Daily    emtricitabine-tenofovir alafen  1 tablet Oral Daily    ferrous sulfate  325 mg Oral BID    hydrALAZINE  100 mg Oral Q8H    multivitamin  1 tablet Oral Daily    predniSONE  5 mg Oral Daily    rosuvastatin  20 mg Oral Daily    sirolimus  2 mg Oral Daily     Continuous Infusions:  PRN Meds:glucagon (human recombinant), glucose, glucose, HYDROcodone-acetaminophen, hydrOXYzine pamoate, sodium chloride 0.9%    Intake/Output - Last 3 Shifts       12/14 0700 - 12/15 0659 12/15 0700 - 12/16 0659 12/16 0700 - 12/17 0659    P.O. 990 900     Total Intake(mL/kg) 990 (13.7) 900 (12.4)     Urine (mL/kg/hr) 1750 (1) 1000 (0.6) 400 (1.4)    Stool       Total Output 1750 1000 400    Net -760 -100 -400                  Review of Systems   Constitutional: Negative for activity change, appetite change, chills, diaphoresis, fatigue and fever.   Respiratory: Negative for cough, choking, chest tightness, shortness of breath and stridor.    Cardiovascular: Positive for leg swelling (R>L (due to cancer on the right side)). Negative for chest pain.   Gastrointestinal: Negative for blood in stool, diarrhea, nausea and vomiting.   Genitourinary: Negative for difficulty urinating, dysuria and frequency.   Musculoskeletal: Negative for arthralgias and myalgias.   Skin: Negative for pallor and rash.   Neurological: Negative for dizziness, light-headedness and headaches.      Objective:     Vital Signs (Most  "Recent):  Temp: 98.2 °F (36.8 °C) (12/16/19 0830)  Pulse: 66 (12/16/19 1010)  Resp: 16 (12/16/19 0830)  BP: (!) 167/96 (12/16/19 0830)  SpO2: 97 % (12/16/19 0830) Vital Signs (24h Range):  Temp:  [96 °F (35.6 °C)-98.3 °F (36.8 °C)] 98.2 °F (36.8 °C)  Pulse:  [66-78] 66  Resp:  [15-18] 16  SpO2:  [93 %-99 %] 97 %  BP: (142-167)/(84-96) 167/96     Weight: 75 kg (165 lb 7.3 oz)  Height: 5' 8" (172.7 cm)  Body mass index is 25.16 kg/m².    Physical Exam   Constitutional: He is oriented to person, place, and time. He appears well-developed and well-nourished.   HENT:   Head: Normocephalic and atraumatic.   Eyes: Pupils are equal, round, and reactive to light. EOM are normal.   Neck: Normal range of motion. Neck supple. No tracheal deviation present. No thyromegaly present.   Cardiovascular: Normal rate and regular rhythm. Exam reveals no friction rub.   No murmur heard.  Pulmonary/Chest: Effort normal. No respiratory distress. He has no wheezes. He has rales. He exhibits no tenderness.   Abdominal: Soft. Bowel sounds are normal. He exhibits distension. There is no tenderness.   Musculoskeletal: Normal range of motion. He exhibits edema.   Neurological: He is alert and oriented to person, place, and time.   Skin: Skin is warm and dry. Capillary refill takes less than 2 seconds. No rash noted. No erythema.       Laboratory:  CBC:   Recent Labs   Lab 12/14/19  0357 12/15/19  0349 12/16/19  0820   WBC 4.80 6.12 6.72   RBC 2.87* 2.93* 3.26*   HGB 7.2* 7.1* 8.0*   HCT 22.8* 23.8* 26.7*   * 128* 159   MCV 79* 81* 82   MCH 25.1* 24.2* 24.5*   MCHC 31.6* 29.8* 30.0*     CMP:   Recent Labs   Lab 12/10/19  1245  12/14/19  0357 12/15/19  0349 12/16/19  0820   GLU 98   < > 123* 117* 123*   CALCIUM 8.4*   < > 8.3* 8.2* 8.7   ALBUMIN 3.4*  --   --   --   --    PROT 6.3  --   --   --   --       < > 143 141 134*   K 4.0   < > 4.1 4.3 4.5   CO2 25   < > 31* 29 25      < > 102 102 99   BUN 48*   < > 44* 48* 45* "   CREATININE 2.9*   < > 3.3* 3.7* 3.2*   ALKPHOS 120  --   --   --   --    ALT 36  --   --   --   --    AST 31  --   --   --   --     < > = values in this interval not displayed.       Diagnostic Results:  None    Assessment/Plan:     Microcytic anemia  Patient Hemoglobin decreased today with microcytic anemia showing MCV 80.   During patient's last admission had similar results. Iron panel workup showed anemia of chronic disease.   Denies any GI bleeding. Could also be from chronic renal disease.     PLAN:   - low iron saturation, ferritin at >500  - received oral iron supplementation on , recommend IV iron       Acute DVT (deep venous thrombosis)  Patient w/ hx of proximal right subclavian vein dvt found on US during last admission on 2019  -on Eliquis 5mg BID.     Renovascular hypertension  - on Coreg 25mg, amlodipine 10 mg, hydralazine, and entresto    Kaposi sarcoma  HIV related   on HAART and Adriamycin Q3W for symptomatic metastatic KS      LEFTY (acute kidney injury)  - LEFTY superimposed on CKD stage III [b/l creat 1.9-2.1] complicating renal transplant  - Creatinine  Was at 1.7 three weeks ago, peaked at 3.7 yesterday, most likely cardiorenal  - UO 1 L past 24hours  - diuresis currently held since  in setting of LEFTY  - cr today improved to 3.2  - Follow with strict I/Os, daily weights, BP (goal <140/90), daily labs.    - Avoid nephrotoxic agents (NSAIDs, IV contrast dye, ACEI/ARB anti-HTN medications, Aminoglycoside-containing antibiotics)  - Renally dose all appropriate medications, including antibiotics       donor kidney transplant 2017  Status post  brain dead kidney transplant 2017  -Biopsy (for DGF) 17: good sample with 24 glomeruli (only 1 sclerosed) and evidence just meeting criteria for ABMR. + diffuse ATI. Minimal fibrosis, no ACR or AVR, but + CD4 (>50% diffusely + stain) with mild glomerulitis and focal peritubular capillaritis. Also, biopsy shows  ca-oxalate (3) and ca-phos (2) crystals.  -8/25/17 DSA + for weak class I.    Biopsy (LEFTY) 10/17/2017 14 glomeruli, <5% fibrosis, no MC changes, No ACR or AMR, C4d negative    Biopsy (LEFTY) 1/15/18:  No ACR, AVR, ABMR, CD4 neg. Less than 10 % fibrosis.     C/w Home Sirolimus and Prednisone at 5 mg daily   Monitor Sirolimus level, today's 5.6  Monitor CMP    Metabolic acidosis  Bicarbonate previously elevated in setting of diuresis, now normalized  bicarbonate supplements held for now       AIDS (acquired immunodeficiency syndrome), CD4 <=200  As per transplant ID   Currently on HAART Therapy          Dora Sandhu MD  Kidney Transplant  [facility]

## 2019-12-16 NOTE — ASSESSMENT & PLAN NOTE
Status post  brain dead kidney transplant 2017  -Biopsy (for DGF) 17: good sample with 24 glomeruli (only 1 sclerosed) and evidence just meeting criteria for ABMR. + diffuse ATI. Minimal fibrosis, no ACR or AVR, but + CD4 (>50% diffusely + stain) with mild glomerulitis and focal peritubular capillaritis. Also, biopsy shows ca-oxalate (3) and ca-phos (2) crystals.  -17 DSA + for weak class I.    Biopsy (LEFTY) 10/17/2017 14 glomeruli, <5% fibrosis, no MC changes, No ACR or AMR, C4d negative    Biopsy (LEFTY) 1/15/18:  No ACR, AVR, ABMR, CD4 neg. Less than 10 % fibrosis.     C/w Home Sirolimus and Prednisone at 5 mg daily   Monitor Sirolimus level, today's 5.6  Monitor CMP

## 2019-12-16 NOTE — ASSESSMENT & PLAN NOTE
Hx of Kaposi Sarcoma with treatment with chemo.   - 4x treatment with Doxorubicin.   - Stable.  - follow up with oncology for possible treatment. Radiation vs additional chemo?   - Doxorubicin likely to cause further decline in cardiac function

## 2019-12-17 ENCOUNTER — CLINICAL SUPPORT (OUTPATIENT)
Dept: CARDIOLOGY | Facility: CLINIC | Age: 39
DRG: 291 | End: 2019-12-17
Attending: EMERGENCY MEDICINE
Payer: MEDICARE

## 2019-12-17 VITALS
OXYGEN SATURATION: 98 % | HEIGHT: 68 IN | RESPIRATION RATE: 18 BRPM | BODY MASS INDEX: 24.59 KG/M2 | DIASTOLIC BLOOD PRESSURE: 84 MMHG | TEMPERATURE: 98 F | WEIGHT: 162.25 LBS | HEART RATE: 70 BPM | SYSTOLIC BLOOD PRESSURE: 151 MMHG

## 2019-12-17 VITALS — WEIGHT: 162 LBS | BODY MASS INDEX: 24.55 KG/M2 | HEIGHT: 68 IN

## 2019-12-17 DIAGNOSIS — I50.9 ACUTE DECOMPENSATED HEART FAILURE: Primary | ICD-10-CM

## 2019-12-17 PROBLEM — Z94.0 IMMUNOSUPPRESSIVE MANAGEMENT ENCOUNTER FOLLOWING KIDNEY TRANSPLANT: Status: ACTIVE | Noted: 2019-12-17

## 2019-12-17 PROBLEM — E87.20 METABOLIC ACIDOSIS: Status: RESOLVED | Noted: 2017-08-22 | Resolved: 2019-12-17

## 2019-12-17 PROBLEM — Z79.899 IMMUNOSUPPRESSIVE MANAGEMENT ENCOUNTER FOLLOWING KIDNEY TRANSPLANT: Status: ACTIVE | Noted: 2019-12-17

## 2019-12-17 LAB
ABO + RH BLD: NORMAL
ANION GAP SERPL CALC-SCNC: 7 MMOL/L (ref 8–16)
BASOPHILS # BLD AUTO: 0.02 K/UL (ref 0–0.2)
BASOPHILS # BLD AUTO: 0.03 K/UL (ref 0–0.2)
BASOPHILS NFR BLD: 0.3 % (ref 0–1.9)
BASOPHILS NFR BLD: 0.4 % (ref 0–1.9)
BLD GP AB SCN CELLS X3 SERPL QL: NORMAL
BUN SERPL-MCNC: 49 MG/DL (ref 6–20)
CALCIUM SERPL-MCNC: 8.3 MG/DL (ref 8.7–10.5)
CFR FLOW - ANTERIOR: 1.4
CFR FLOW - INFERIOR: 1.57
CFR FLOW - LATERAL: 1.42
CFR FLOW - MAX: 2.16
CFR FLOW - MIN: 1.07
CFR FLOW - SEPTAL: 1.43
CFR FLOW - WHOLE HEART: 1.46
CHLORIDE SERPL-SCNC: 99 MMOL/L (ref 95–110)
CO2 SERPL-SCNC: 29 MMOL/L (ref 23–29)
CREAT SERPL-MCNC: 3.4 MG/DL (ref 0.5–1.4)
CV PHARM DOSE: 42 MG
CV STRESS BASE HR: 66 BPM
DIASTOLIC BLOOD PRESSURE: 100 MMHG
DIFFERENTIAL METHOD: ABNORMAL
DIFFERENTIAL METHOD: ABNORMAL
END DIASTOLIC INDEX-HIGH: 170 ML/M2
END SYSTOLIC INDEX-HIGH: 70 ML/M2
EOSINOPHIL # BLD AUTO: 0.1 K/UL (ref 0–0.5)
EOSINOPHIL # BLD AUTO: 0.1 K/UL (ref 0–0.5)
EOSINOPHIL NFR BLD: 1.1 % (ref 0–8)
EOSINOPHIL NFR BLD: 1.4 % (ref 0–8)
ERYTHROCYTE [DISTWIDTH] IN BLOOD BY AUTOMATED COUNT: 18.8 % (ref 11.5–14.5)
ERYTHROCYTE [DISTWIDTH] IN BLOOD BY AUTOMATED COUNT: 18.8 % (ref 11.5–14.5)
EST. GFR  (AFRICAN AMERICAN): 24.7 ML/MIN/1.73 M^2
EST. GFR  (NON AFRICAN AMERICAN): 21.3 ML/MIN/1.73 M^2
GLUCOSE SERPL-MCNC: 100 MG/DL (ref 70–110)
HCT VFR BLD AUTO: 22.3 % (ref 40–54)
HCT VFR BLD AUTO: 24.1 % (ref 40–54)
HGB BLD-MCNC: 6.7 G/DL (ref 14–18)
HGB BLD-MCNC: 7.5 G/DL (ref 14–18)
IMM GRANULOCYTES # BLD AUTO: 0.02 K/UL (ref 0–0.04)
IMM GRANULOCYTES # BLD AUTO: 0.03 K/UL (ref 0–0.04)
IMM GRANULOCYTES NFR BLD AUTO: 0.3 % (ref 0–0.5)
IMM GRANULOCYTES NFR BLD AUTO: 0.4 % (ref 0–0.5)
KAPPA LC SER QL IA: 6.07 MG/DL (ref 0.33–1.94)
KAPPA LC/LAMBDA SER IA: 3.18 (ref 0.26–1.65)
LAMBDA LC SER QL IA: 1.91 MG/DL (ref 0.57–2.63)
LYMPHOCYTES # BLD AUTO: 1.1 K/UL (ref 1–4.8)
LYMPHOCYTES # BLD AUTO: 1.1 K/UL (ref 1–4.8)
LYMPHOCYTES NFR BLD: 12.2 % (ref 18–48)
LYMPHOCYTES NFR BLD: 21.6 % (ref 18–48)
MCH RBC QN AUTO: 24 PG (ref 27–31)
MCH RBC QN AUTO: 24.7 PG (ref 27–31)
MCHC RBC AUTO-ENTMCNC: 30 G/DL (ref 32–36)
MCHC RBC AUTO-ENTMCNC: 31.1 G/DL (ref 32–36)
MCV RBC AUTO: 79 FL (ref 82–98)
MCV RBC AUTO: 80 FL (ref 82–98)
MONOCYTES # BLD AUTO: 0.5 K/UL (ref 0.3–1)
MONOCYTES # BLD AUTO: 0.6 K/UL (ref 0.3–1)
MONOCYTES NFR BLD: 7.1 % (ref 4–15)
MONOCYTES NFR BLD: 9.2 % (ref 4–15)
NEUTROPHILS # BLD AUTO: 3.4 K/UL (ref 1.8–7.7)
NEUTROPHILS # BLD AUTO: 6.9 K/UL (ref 1.8–7.7)
NEUTROPHILS NFR BLD: 67 % (ref 38–73)
NEUTROPHILS NFR BLD: 79 % (ref 38–73)
NRBC BLD-RTO: 0 /100 WBC
NRBC BLD-RTO: 0 /100 WBC
NUC REST DIASTOLIC VOLUME INDEX: 182
NUC REST EJECTION FRACTION: 26
NUC REST SYSTOLIC VOLUME INDEX: 135
NUC STRESS DIASTOLIC VOLUME INDEX: 170
NUC STRESS EJECTION FRACTION: 33 %
NUC STRESS SYSTOLIC VOLUME INDEX: 114
OHS CV CPX 85 PERCENT MAX PREDICTED HEART RATE MALE: 153
OHS CV CPX MAX PREDICTED HEART RATE: 180
OHS CV CPX PATIENT IS FEMALE: 0
OHS CV CPX PATIENT IS MALE: 1
OHS CV CPX PEAK DIASTOLIC BLOOD PRESSURE: 80 MMHG
OHS CV CPX PEAK HEAR RATE: 73 BPM
OHS CV CPX PEAK RATE PRESSURE PRODUCT: 9636
OHS CV CPX PEAK SYSTOLIC BLOOD PRESSURE: 132 MMHG
OHS CV CPX PERCENT MAX PREDICTED HEART RATE ACHIEVED: 41
OHS CV CPX RATE PRESSURE PRODUCT PRESENTING: 8844
PLATELET # BLD AUTO: 143 K/UL (ref 150–350)
PLATELET # BLD AUTO: 161 K/UL (ref 150–350)
PMV BLD AUTO: 10.9 FL (ref 9.2–12.9)
PMV BLD AUTO: 11 FL (ref 9.2–12.9)
POTASSIUM SERPL-SCNC: 4.7 MMOL/L (ref 3.5–5.1)
RBC # BLD AUTO: 2.79 M/UL (ref 4.6–6.2)
RBC # BLD AUTO: 3.04 M/UL (ref 4.6–6.2)
REST FLOW - ANTERIOR: 1.15 CC/MIN/G
REST FLOW - INFERIOR: 1.22 CC/MIN/G
REST FLOW - LATERAL: 1.14 CC/MIN/G
REST FLOW - MAX: 1.6 CC/MIN/G
REST FLOW - MIN: 0.8 CC/MIN/G
REST FLOW - SEPTAL: 1.13 CC/MIN/G
REST FLOW - WHOLE HEART: 1.16 CC/MIN/G
RETIRED EF AND QEF - SEE NOTES: 51 %
SIROLIMUS BLD-MCNC: 4.7 NG/ML (ref 4–20)
SODIUM SERPL-SCNC: 135 MMOL/L (ref 136–145)
STRESS ECHO TARGET HR: 153 BPM
STRESS FLOW - ANTERIOR: 1.58 CC/MIN/G
STRESS FLOW - INFERIOR: 1.9 CC/MIN/G
STRESS FLOW - LATERAL: 1.62 CC/MIN/G
STRESS FLOW - MAX: 2.3 CC/MIN/G
STRESS FLOW - MIN: 1.1 CC/MIN/G
STRESS FLOW - SEPTAL: 1.61 CC/MIN/G
STRESS FLOW - WHOLE HEART: 1.68 CC/MIN/G
SYSTOLIC BLOOD PRESSURE: 134 MMHG
WBC # BLD AUTO: 5.1 K/UL (ref 3.9–12.7)
WBC # BLD AUTO: 8.79 K/UL (ref 3.9–12.7)

## 2019-12-17 PROCEDURE — 36415 COLL VENOUS BLD VENIPUNCTURE: CPT

## 2019-12-17 PROCEDURE — 25000003 PHARM REV CODE 250: Performed by: STUDENT IN AN ORGANIZED HEALTH CARE EDUCATION/TRAINING PROGRAM

## 2019-12-17 PROCEDURE — 99211 OFF/OP EST MAY X REQ PHY/QHP: CPT | Mod: PBBFAC,25

## 2019-12-17 PROCEDURE — 99239 HOSP IP/OBS DSCHRG MGMT >30: CPT | Mod: GC,,, | Performed by: HOSPITALIST

## 2019-12-17 PROCEDURE — 99999 PR PBB SHADOW E&M-EST. PATIENT-LVL I: ICD-10-PCS | Mod: PBBFAC,,,

## 2019-12-17 PROCEDURE — 83520 IMMUNOASSAY QUANT NOS NONAB: CPT | Mod: 59

## 2019-12-17 PROCEDURE — 78492 MYOCRD IMG PET MLT RST&STRS: CPT

## 2019-12-17 PROCEDURE — 99239 PR HOSPITAL DISCHARGE DAY,>30 MIN: ICD-10-PCS | Mod: GC,,, | Performed by: HOSPITALIST

## 2019-12-17 PROCEDURE — 80195 ASSAY OF SIROLIMUS: CPT

## 2019-12-17 PROCEDURE — 78492 MYOCRD IMG PET MLT RST&STRS: CPT | Mod: 26,,, | Performed by: INTERNAL MEDICINE

## 2019-12-17 PROCEDURE — 86901 BLOOD TYPING SEROLOGIC RH(D): CPT

## 2019-12-17 PROCEDURE — 99999 PR PBB SHADOW E&M-EST. PATIENT-LVL I: CPT | Mod: PBBFAC,,,

## 2019-12-17 PROCEDURE — 93018 CARDIAC PET SCAN STRESS (CUPID ONLY): ICD-10-PCS | Mod: ,,, | Performed by: INTERNAL MEDICINE

## 2019-12-17 PROCEDURE — 93016 CV STRESS TEST SUPVJ ONLY: CPT | Mod: ,,, | Performed by: INTERNAL MEDICINE

## 2019-12-17 PROCEDURE — 85025 COMPLETE CBC W/AUTO DIFF WBC: CPT | Mod: 91

## 2019-12-17 PROCEDURE — 99232 SBSQ HOSP IP/OBS MODERATE 35: CPT | Mod: GC,,, | Performed by: INTERNAL MEDICINE

## 2019-12-17 PROCEDURE — 63600175 PHARM REV CODE 636 W HCPCS: Performed by: STUDENT IN AN ORGANIZED HEALTH CARE EDUCATION/TRAINING PROGRAM

## 2019-12-17 PROCEDURE — 99232 PR SUBSEQUENT HOSPITAL CARE,LEVL II: ICD-10-PCS | Mod: GC,,, | Performed by: INTERNAL MEDICINE

## 2019-12-17 PROCEDURE — 99231 SBSQ HOSP IP/OBS SF/LOW 25: CPT | Mod: ,,, | Performed by: INTERNAL MEDICINE

## 2019-12-17 PROCEDURE — 25000003 PHARM REV CODE 250: Performed by: HOSPITALIST

## 2019-12-17 PROCEDURE — 93018 CV STRESS TEST I&R ONLY: CPT | Mod: ,,, | Performed by: INTERNAL MEDICINE

## 2019-12-17 PROCEDURE — 93016 CARDIAC PET SCAN STRESS (CUPID ONLY): ICD-10-PCS | Mod: ,,, | Performed by: INTERNAL MEDICINE

## 2019-12-17 PROCEDURE — 97802 MEDICAL NUTRITION INDIV IN: CPT

## 2019-12-17 PROCEDURE — 99231 PR SUBSEQUENT HOSPITAL CARE,LEVL I: ICD-10-PCS | Mod: ,,, | Performed by: INTERNAL MEDICINE

## 2019-12-17 PROCEDURE — 80048 BASIC METABOLIC PNL TOTAL CA: CPT

## 2019-12-17 PROCEDURE — 86920 COMPATIBILITY TEST SPIN: CPT

## 2019-12-17 PROCEDURE — 78492 CARDIAC PET SCAN STRESS (CUPID ONLY): ICD-10-PCS | Mod: 26,,, | Performed by: INTERNAL MEDICINE

## 2019-12-17 RX ORDER — CINACALCET 60 MG/1
60 TABLET, FILM COATED ORAL
Qty: 30 TABLET | Refills: 3 | Status: SHIPPED | OUTPATIENT
Start: 2019-12-17

## 2019-12-17 RX ORDER — FUROSEMIDE 40 MG/1
40 TABLET ORAL 2 TIMES DAILY
Status: DISCONTINUED | OUTPATIENT
Start: 2019-12-17 | End: 2019-12-17

## 2019-12-17 RX ORDER — ISOSORBIDE DINITRATE AND HYDRALAZINE HYDROCHLORIDE 37.5; 2 MG/1; MG/1
1 TABLET ORAL 3 TIMES DAILY
Qty: 90 TABLET | Refills: 3 | Status: SHIPPED | OUTPATIENT
Start: 2019-12-17 | End: 2020-12-16

## 2019-12-17 RX ORDER — ATOVAQUONE 750 MG/5ML
1500 SUSPENSION ORAL DAILY
Qty: 300 ML | Refills: 3 | Status: SHIPPED | OUTPATIENT
Start: 2019-12-17 | End: 2020-01-16

## 2019-12-17 RX ORDER — HYDROCODONE BITARTRATE AND ACETAMINOPHEN 500; 5 MG/1; MG/1
TABLET ORAL
Status: DISCONTINUED | OUTPATIENT
Start: 2019-12-17 | End: 2019-12-17 | Stop reason: HOSPADM

## 2019-12-17 RX ORDER — AMINOPHYLLINE 25 MG/ML
75 INJECTION, SOLUTION INTRAVENOUS
Status: COMPLETED | OUTPATIENT
Start: 2019-12-17 | End: 2019-12-17

## 2019-12-17 RX ORDER — FUROSEMIDE 20 MG/1
20 TABLET ORAL DAILY
Qty: 60 TABLET | Refills: 11 | Status: SHIPPED | OUTPATIENT
Start: 2019-12-17 | End: 2019-12-17 | Stop reason: SDUPTHER

## 2019-12-17 RX ORDER — DIPYRIDAMOLE 5 MG/ML
42 INJECTION INTRAVENOUS
Status: DISCONTINUED | OUTPATIENT
Start: 2019-12-17 | End: 2019-12-17 | Stop reason: HOSPADM

## 2019-12-17 RX ORDER — AMLODIPINE BESYLATE 10 MG/1
10 TABLET ORAL DAILY
Qty: 30 TABLET | Refills: 2 | Status: SHIPPED | OUTPATIENT
Start: 2019-12-18 | End: 2020-12-17

## 2019-12-17 RX ORDER — FUROSEMIDE 40 MG/1
40 TABLET ORAL DAILY
Status: DISCONTINUED | OUTPATIENT
Start: 2019-12-17 | End: 2019-12-17

## 2019-12-17 RX ORDER — CARVEDILOL 25 MG/1
25 TABLET ORAL 2 TIMES DAILY WITH MEALS
Qty: 60 TABLET | Refills: 3 | Status: SHIPPED | OUTPATIENT
Start: 2019-12-17

## 2019-12-17 RX ORDER — ROSUVASTATIN CALCIUM 20 MG/1
20 TABLET, COATED ORAL DAILY
Qty: 30 TABLET | Refills: 3 | Status: SHIPPED | OUTPATIENT
Start: 2019-12-17

## 2019-12-17 RX ORDER — FERROUS SULFATE 325(65) MG
325 TABLET ORAL 2 TIMES DAILY
Qty: 60 TABLET | Refills: 2 | Status: SHIPPED | OUTPATIENT
Start: 2019-12-17

## 2019-12-17 RX ORDER — FUROSEMIDE 40 MG/1
20 TABLET ORAL DAILY
Qty: 60 TABLET | Refills: 3 | Status: ON HOLD | OUTPATIENT
Start: 2019-12-17 | End: 2020-01-01 | Stop reason: SDUPTHER

## 2019-12-17 RX ADMIN — HYDRALAZINE HYDROCHLORIDE AND ISOSORBIDE DINITRATE 1 TABLET: 37.5; 2 TABLET, FILM COATED ORAL at 03:12

## 2019-12-17 RX ADMIN — CALCIUM CARBONATE (ANTACID) CHEW TAB 500 MG 500 MG: 500 CHEW TAB at 11:12

## 2019-12-17 RX ADMIN — SIROLIMUS 2 MG: 1 TABLET ORAL at 09:12

## 2019-12-17 RX ADMIN — ROSUVASTATIN CALCIUM 20 MG: 20 TABLET, FILM COATED ORAL at 09:12

## 2019-12-17 RX ADMIN — THERA TABS 1 TABLET: TAB at 11:12

## 2019-12-17 RX ADMIN — AMINOPHYLLINE 75 MG: 25 INJECTION, SOLUTION INTRAVENOUS at 10:12

## 2019-12-17 RX ADMIN — DOLUTEGRAVIR SODIUM 50 MG: 50 TABLET, FILM COATED ORAL at 09:12

## 2019-12-17 RX ADMIN — FERROUS SULFATE TAB EC 325 MG (65 MG FE EQUIVALENT) 325 MG: 325 (65 FE) TABLET DELAYED RESPONSE at 11:12

## 2019-12-17 RX ADMIN — AMLODIPINE BESYLATE 10 MG: 10 TABLET ORAL at 09:12

## 2019-12-17 RX ADMIN — APIXABAN 5 MG: 5 TABLET, FILM COATED ORAL at 09:12

## 2019-12-17 RX ADMIN — DIPYRIDAMOLE 42 MG: 5 INJECTION INTRAVENOUS at 10:12

## 2019-12-17 RX ADMIN — EMTRICITABINE AND TENOFOVIR ALAFENAMIDE 1 TABLET: 200; 25 TABLET ORAL at 09:12

## 2019-12-17 RX ADMIN — PREDNISONE 5 MG: 5 TABLET ORAL at 09:12

## 2019-12-17 RX ADMIN — HYDRALAZINE HYDROCHLORIDE AND ISOSORBIDE DINITRATE 1 TABLET: 37.5; 2 TABLET, FILM COATED ORAL at 09:12

## 2019-12-17 RX ADMIN — CARVEDILOL 25 MG: 25 TABLET, FILM COATED ORAL at 09:12

## 2019-12-17 RX ADMIN — ATOVAQUONE 1500 MG: 750 SUSPENSION ORAL at 09:12

## 2019-12-17 NOTE — ASSESSMENT & PLAN NOTE
Patient expected discharge in 1-2 days pending adequate diuresis and improvement in GFR.   - Patient reports he has run out of a lot of his blood pressure medications. Will ensure he has an supply prior to discharge  - Will need follow up with PCP or cardiology for BP management and HF work up   - Patient expressed frustration with prolonged hospital stay this morning and wanted to leave. Pt agreeable to stay for PET stress test and repeat CBC as Hb dropped (repeat normal). Ideally would keep patient one more day to monitor renal function however patient was adamant about leaving. Discharged home with meds and follow up with cards, KTM and oncology

## 2019-12-17 NOTE — ASSESSMENT & PLAN NOTE
Hx of Kaposi Sarcoma with treatment with chemo.   - 4x treatment with Doxorubicin.   - Stable.  - follow up with oncology for further treatment options   - Doxorubicin likely to cause further decline in cardiac function

## 2019-12-17 NOTE — ASSESSMENT & PLAN NOTE
-Coreg 25 mg BID, Norvasc 10 mg daily  -Added Bidil today given HF with low EF  -Plan to add Entresto once LEFTY improves- medication prescribed, not started yet. Needs follow up with cardiology prior and improved renal function   -Held lasix today as he is appropriately diuresed.   -Discharged home with Lasix 40 mg, PO daily

## 2019-12-17 NOTE — SUBJECTIVE & OBJECTIVE
Interval History: NAEON. Walking halls without O2 requirement, current weight 162. Stress PET today at 1430.     Review of Systems   Constitution: Negative for chills, diaphoresis, fever and weight gain.        Chronic right sided edema, left with trace amounts   HENT: Negative for congestion and sore throat.    Cardiovascular: Negative for chest pain, dyspnea on exertion, irregular heartbeat, orthopnea, palpitations and paroxysmal nocturnal dyspnea.   Respiratory: Negative for cough and shortness of breath.    Musculoskeletal: Negative for back pain and joint pain.   Gastrointestinal: Negative for constipation, diarrhea, nausea and vomiting.   Genitourinary: Negative for dysuria and flank pain.   Neurological: Negative for headaches, numbness and weakness.     Objective:     Vital Signs (Most Recent):  Temp: 98.1 °F (36.7 °C) (12/17/19 0732)  Pulse: 68 (12/17/19 0733)  Resp: 16 (12/17/19 0732)  BP: (!) 156/91 (12/17/19 0732)  SpO2: 99 % (12/17/19 0732) Vital Signs (24h Range):  Temp:  [97.6 °F (36.4 °C)-98.5 °F (36.9 °C)] 98.1 °F (36.7 °C)  Pulse:  [66-80] 68  Resp:  [16-18] 16  SpO2:  [94 %-99 %] 99 %  BP: (140-156)/(82-91) 156/91     Weight: 73.6 kg (162 lb 4.1 oz)  Body mass index is 24.67 kg/m².     SpO2: 99 %  O2 Device (Oxygen Therapy): room air      Intake/Output Summary (Last 24 hours) at 12/17/2019 0836  Last data filed at 12/17/2019 0524  Gross per 24 hour   Intake 540 ml   Output 1925 ml   Net -1385 ml       Lines/Drains/Airways     Drain                 Hemodialysis AV Fistula 07/19/19 0823 Left upper arm 151 days          Peripheral Intravenous Line                 Peripheral IV - Single Lumen 12/16/19 0626 20 G Left;Posterior Forearm 1 day                Physical Exam    Significant Labs:   CMP   Recent Labs   Lab 12/16/19  0820 12/17/19  0456   * 135*   K 4.5 4.7   CL 99 99   CO2 25 29   * 100   BUN 45* 49*   CREATININE 3.2* 3.4*   CALCIUM 8.7 8.3*   ANIONGAP 10 7*   ESTGFRAFRICA 26.6*  24.7*   EGFRNONAA 23.0* 21.3*    and CBC   Recent Labs   Lab 12/16/19  0820 12/17/19  0456   WBC 6.72 5.10   HGB 8.0* 6.7*   HCT 26.7* 22.3*    143*       Significant Imaging:  Imaging Results          X-Ray Chest AP Portable (Final result)  Result time 12/10/19 13:02:06    Final result by Sima Harding MD (12/10/19 13:02:06)                 Impression:      Abnormal chest radiograph with persistent opacification of the perihilar portion of the right lung.  Please see discussion above.      Electronically signed by: Sima Harding MD  Date:    12/10/2019  Time:    13:02             Narrative:    EXAMINATION:  XR CHEST AP PORTABLE    CLINICAL HISTORY:  CHF;    TECHNIQUE:  Single frontal view of the chest was performed.    COMPARISON:  Chest radiographs: 11/15/2019.  11/12/2019.  08/19/2019.  07/17/2019.  11/30/2016.    PET/CT: 07/31/2019.    CT of the neck and chest: 07/18/2019.    FINDINGS:  Port in the left anterior chest wall has attached catheter that extends to overlie the junction of left and right innominate veins.    Mediastinal structures are midline.  Cardiomediastinal silhouette appears similar to 11/15/2019.  CT of the chest and neck performed 07/18/2019 innumerable prominent mediastinal lymph nodes some of which appeared mildly enlarged.    The left lung appears clear on today's study and on prior examinations dated 11/15/2019, 11/12/2019, 08/19/2019 and 07/17/2019.    However there is patchy heterogeneous subsegmental opacity in the right lung on all of those examinations.  The extent of parenchymal opacification was greatest on 11/12/2019 where increased attenuation obscure detail of pulmonary vascular markings in the right mid and lower lung zones.  However there is persistent patchy heterogeneous opacity on today's study.  Prior examinations including CT of 07/18/2019 showed perihilar soft tissue attenuation not excluded on the current examination.  Asymmetry of distribution is atypical  for pulmonary edema although it is not excluded especially in patients with mitral insufficiency if there is asymmetric jet.  Persistence of lung disease over a period of 4 months argues against aspiration or pneumonia.  Right hilar and perihilar malignancy is not excluded.    On this supine examination I detect no convincing evidence of pleural fluid and no pneumothorax, pneumomediastinum, pneumoperitoneum or significant osseous abnormality.

## 2019-12-17 NOTE — PROGRESS NOTES
[facility]  Kidney Transplant  Progress Note      Reason for Follow-up: Reassessment of Kidney Transplant - 2017  (#1) recipient and management of immunosuppression.    ORGAN:  RIGHT KIDNEY   Donor Type:  Donation after Brain Death     Subjective:   History of Present Illness:  39 y.o. year old Black or  male who received a  - brain death kidney transplant on 17.  He has CKD stage 3 - GFR 30-59 and his baseline creatinine is between 1.8-2.1. He takes prednisone and sirolimus for maintenance immunosuppression.          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   - Biopsy proven ABMR with weakly positive class I DSA's on 17. Pt treated with SMP x 3 (-9/3). PLEX x6 (completed ) and IVIG x 2 (-).    - Kidney US on 17 showed a large collection located along the anterior inferior margin of the renal transplant measuring 13.4 x 7 x 11.9. taken back to the OR for retroperitoneal bleed and washout.   -Biopsy 10/17/17 (LEFTY): 14 glomeruli, <5% fibrosis, no MC severo..  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 Amador for Kaposi's Sarcoma and has been treated with Doxorubicin x 4 doses. Has been tolerated well with improved in Tumor scottie according to oncology is note dated 2019.  In addition it also showed cardiomegaly with new evidence of pleural effusion.     -Biopsy 1/15/18: No ACR, AVR, ABMR, CD4 neg. Less than 10 % fibrosis.   -Biopsy (for DGF) 17: good sample with 24 glomeruli (only 1 sclerosed) and evidence just meeting criteria for ABMR. + diffuse ATI. Minimal fibrosis, no ACR or AVR, but + CD4 (>50% diffusely + stain) with mild glomerulitis and focal peritubular capillaritis.  Also, + ca-oxalate (3) and ca-phos (2) crystals.  -7/9/18 WEAK DSA DETECTED: CW5(2112)      PCP PROPHYLAXIS: Bactrim LIFELONG--> changed to Mepron ~ 6/2018  CMV PROPHYLAXIS: Valcyte until 11/19/17  FUNGAL PROPHYLAXIS: Nystatin until 9/15/17         He presented to the ER with SOB and lower extremity swelling Reports he was recently in the hospital where he was treated for pneumonia. During that admission, the patient received fluids for his infection as well as antibiotics. He reports since his pneumonia has been properly treated and his cough resolved, however he noticed worsening lower extremity swelling. Reports over the past week, he's gained weight (dry weight ~155-160) and is now up to 180lbs. He has not been compliant with a low salt diet. He reports this morning his swelling worsened to his upper extremities and he began complaining of MARY. However, he was able to walk from the parking garage to the ER before complaining of SOB. He denies any cough, fevers, chills, n/v, headaches, vision changes or weakness.     He was recently seen by Dr. Mejias (cardiology) in clinic on 12/2 where he noticed significant signs of HF on exam including 4+ pitting edema of the lower extremities and elevated JVD. He was started on lasix 20mg during that time.      Had a 2D echo performed on 11/26 which showed mildly decreased left ventricular systolic function. The estimated ejection fraction is 40%. QEF 40%. Global hypokinetic wall motion. (EF 55% in Aug-2019).      In the ED, the patient was found to have an elevated BNP and pulmonary edema on CXR.     Mr. Aguiar is a 39 y.o. year old male who is status post Kidney Transplant - 8/18/2017  (#1).    His maintenance immunosuppression consists of:   Immunosuppressants (From admission, onward)    Start     Stop Route Frequency Ordered    12/11/19 0900  sirolimus tablet 2 mg      -- Oral Daily 12/10/19 1619          Interval History:  Pt seen and evaluated at bedside. Cr today 3.4  from 3.2 yesterday. UO of 1.9 L past 24 hours, net negative 1.1 L. Diuresis held since 12/14. Pt anemic with hb of 6.7, received 1 u pRBCs today.    Past Medical, Surgical, Family, and Social History:   Unchanged from H&P.    Scheduled Meds:   amLODIPine  10 mg Oral Daily    apixaban  5 mg Oral BID    atovaquone  1,500 mg Oral Daily    calcium carbonate  1 tablet Oral Daily    carvedilol  25 mg Oral BID WM    cinacalcet  60 mg Oral Daily with breakfast    dolutegravir  50 mg Oral Daily    emtricitabine-tenofovir alafen  1 tablet Oral Daily    ferrous sulfate  325 mg Oral BID    isosorbide-hydrALAZINE 20-37.5 mg  1 tablet Oral TID    multivitamin  1 tablet Oral Daily    predniSONE  5 mg Oral Daily    rosuvastatin  20 mg Oral Daily    sirolimus  2 mg Oral Daily     Continuous Infusions:  PRN Meds:sodium chloride, glucagon (human recombinant), glucose, glucose, HYDROcodone-acetaminophen, hydrOXYzine pamoate, sodium chloride 0.9%    Intake/Output - Last 3 Shifts       12/15 0700 - 12/16 0659 12/16 0700 - 12/17 0659 12/17 0700 - 12/18 0659    P.O. 900 780     Total Intake(mL/kg) 900 (12.4) 780 (10.4)     Urine (mL/kg/hr) 1000 (0.6) 1925 (1.1)     Total Output 1000 1925     Net -100 -1145                   Review of Systems   Constitutional: Negative for activity change, appetite change, chills, diaphoresis, fatigue and fever.   Respiratory: Negative for cough, choking, chest tightness, shortness of breath and stridor.    Cardiovascular: Positive for leg swelling (R>L (due to cancer on the right side)). Negative for chest pain.   Gastrointestinal: Negative for blood in stool, diarrhea, nausea and vomiting.   Genitourinary: Negative for difficulty urinating, dysuria and frequency.   Musculoskeletal: Negative for arthralgias and myalgias.   Skin: Negative for pallor and rash.   Neurological: Negative for dizziness, light-headedness and headaches.      Objective:     Vital Signs (Most Recent):  Temp: 98.1 °F  "(36.7 °C) (12/17/19 0732)  Pulse: 68 (12/17/19 0733)  Resp: 16 (12/17/19 0732)  BP: (!) 156/91 (12/17/19 0732)  SpO2: 99 % (12/17/19 0732) Vital Signs (24h Range):  Temp:  [97.6 °F (36.4 °C)-98.5 °F (36.9 °C)] 98.1 °F (36.7 °C)  Pulse:  [66-80] 68  Resp:  [16-18] 16  SpO2:  [94 %-99 %] 99 %  BP: (140-156)/(82-91) 156/91     Weight: 73.6 kg (162 lb 4.1 oz)  Height: 5' 8" (172.7 cm)  Body mass index is 24.67 kg/m².    Physical Exam   Constitutional: He is oriented to person, place, and time. He appears well-developed and well-nourished.   HENT:   Head: Normocephalic and atraumatic.   Eyes: Pupils are equal, round, and reactive to light. EOM are normal.   Neck: Normal range of motion. Neck supple. No tracheal deviation present. No thyromegaly present.   Cardiovascular: Normal rate and regular rhythm. Exam reveals no friction rub.   No murmur heard.  Pulmonary/Chest: Effort normal. No respiratory distress. He has no wheezes. He has rales. He exhibits no tenderness.   Abdominal: Soft. Bowel sounds are normal. He exhibits distension. There is no tenderness.   Musculoskeletal: Normal range of motion. He exhibits edema (R>L).   Neurological: He is alert and oriented to person, place, and time.   Skin: Skin is warm and dry. Capillary refill takes less than 2 seconds. No rash noted. No erythema.       Laboratory:  CBC:   Recent Labs   Lab 12/15/19  0349 12/16/19  0820 12/17/19  0456   WBC 6.12 6.72 5.10   RBC 2.93* 3.26* 2.79*   HGB 7.1* 8.0* 6.7*   HCT 23.8* 26.7* 22.3*   * 159 143*   MCV 81* 82 80*   MCH 24.2* 24.5* 24.0*   MCHC 29.8* 30.0* 30.0*     CMP:   Recent Labs   Lab 12/10/19  1245  12/15/19  0349 12/16/19  0820 12/17/19  0456   GLU 98   < > 117* 123* 100   CALCIUM 8.4*   < > 8.2* 8.7 8.3*   ALBUMIN 3.4*  --   --   --   --    PROT 6.3  --   --   --   --       < > 141 134* 135*   K 4.0   < > 4.3 4.5 4.7   CO2 25   < > 29 25 29      < > 102 99 99   BUN 48*   < > 48* 45* 49*   CREATININE 2.9*   < > " 3.7* 3.2* 3.4*   ALKPHOS 120  --   --   --   --    ALT 36  --   --   --   --    AST 31  --   --   --   --     < > = values in this interval not displayed.       Diagnostic Results:  None    Assessment/Plan:     Immunosuppressive management encounter following kidney transplant  -on siroliums and prednisone  -monitored for toxicities while in-pt      Microcytic anemia  Hb today of 6.7, received 1 u pRBCs  During patient's last admission had similar results. Iron panel workup showed anemia of chronic disease.   Denies any GI bleeding. Could also be from chronic renal disease.     PLAN:   - low iron saturation, ferritin at >500  - received oral iron supplementation, IV iron recommended      Acute DVT (deep venous thrombosis)  Patient w/ hx of proximal right subclavian vein dvt found on US during last admission on 2019  -on Eliquis 5mg BID.     Renovascular hypertension  - on Coreg 25mg, amlodipine 10 mg, hydralazine, and entresto    Kaposi sarcoma  HIV related   on HAART and Adriamycin Q3W for symptomatic metastatic KS      LEFTY (acute kidney injury)  - LETFY superimposed on CKD stage III [b/l creat 1.9-2.1] complicating renal transplant  - Creatinine was at 1.7 three weeks ago, peaked at 3.7 on 12/15, most likely cardiorenal  - Good UO L past 24hours  - diuresis held since  in setting of LEFTY  - cr today 3.4, was 3.2 yesterday   - possibly in setting of anemia with hb of 6.7 requiring 1 u pRBC  -pt discharging today, recommend increase home lasix to 40 mg qd, and pt should measure weights daily at home and call KTM physician for fluctuations in weight for diuretics dose adjustment     donor kidney transplant 2017  Status post  brain dead kidney transplant 2017  -Biopsy (for DGF) 17: good sample with 24 glomeruli (only 1 sclerosed) and evidence just meeting criteria for ABMR. + diffuse ATI. Minimal fibrosis, no ACR or AVR, but + CD4 (>50% diffusely + stain) with mild  glomerulitis and focal peritubular capillaritis. Also, biopsy shows ca-oxalate (3) and ca-phos (2) crystals.  -8/25/17 DSA + for weak class I.    Biopsy (LEFTY) 10/17/2017 14 glomeruli, <5% fibrosis, no MC changes, No ACR or AMR, C4d negative    Biopsy (LEFTY) 1/15/18:  No ACR, AVR, ABMR, CD4 neg. Less than 10 % fibrosis.     C/w Home Sirolimus and Prednisone at 5 mg daily   Monitor Sirolimus level, today's 5.6  Monitor CMP    AIDS (acquired immunodeficiency syndrome), CD4 <=200  As per transplant ID   Currently on HAART Therapy        Dora Sandhu MD  Kidney Transplant  [facility]

## 2019-12-17 NOTE — PLAN OF CARE
Plan of care discussed with patient. Patient is free of fall/trauma/injury. Denies CP, SOB, or pain/discomfort. Pt completed stress test. Rechecking CBC as H/H 6.7/22.3 in AM labs. All questions addressed. Will continue to monitor.

## 2019-12-17 NOTE — PROGRESS NOTES
Patient is ready for discharge. Patient stable alert and oriented. IV removed. No complaints of pain. Discussed discharge plan. Reviewed medications, appointments, and answered questions with patient and family. Pt to  medications from downstairs pharmacy.

## 2019-12-17 NOTE — SUBJECTIVE & OBJECTIVE
Interval History: Pt walking around in the [address] today. Frustrated with prolonged LOS. PET stress with decreased -33%. LEFTY with sCr increased from yesterday. Holding diuresis today given relative hypovolemia and LEFTY. Will touch base with KTM for discharge planning. Need close follow up with cardiology, KTM and oncology.     Review of Systems   Constitutional: Negative for chills, diaphoresis, fatigue and fever.   Eyes: Negative for photophobia and visual disturbance.   Respiratory: Negative for cough, chest tightness, shortness of breath and wheezing.    Cardiovascular: Positive for leg swelling. Negative for chest pain and palpitations.   Gastrointestinal: Negative for abdominal distention, abdominal pain, diarrhea, nausea and vomiting.   Genitourinary: Negative for difficulty urinating, dysuria, frequency and urgency.   Musculoskeletal: Negative for arthralgias, joint swelling and myalgias.   Skin: Negative for pallor and rash.   Neurological: Positive for weakness. Negative for dizziness, tremors, numbness and headaches.     Objective:     Vital Signs (Most Recent):  Temp: 97.4 °F (36.3 °C) (12/17/19 1132)  Pulse: 65 (12/17/19 1132)  Resp: 18 (12/17/19 1132)  BP: (!) 153/86 (12/17/19 1132)  SpO2: 98 % (12/17/19 1132) Vital Signs (24h Range):  Temp:  [97.4 °F (36.3 °C)-98.5 °F (36.9 °C)] 97.4 °F (36.3 °C)  Pulse:  [65-80] 65  Resp:  [16-18] 18  SpO2:  [94 %-99 %] 98 %  BP: (140-156)/(82-91) 153/86     Weight: 73.6 kg (162 lb 4.1 oz)  Body mass index is 24.67 kg/m².    Intake/Output Summary (Last 24 hours) at 12/17/2019 1342  Last data filed at 12/17/2019 0524  Gross per 24 hour   Intake 240 ml   Output 1225 ml   Net -985 ml      Physical Exam   Constitutional: He is oriented to person, place, and time. He appears well-developed and well-nourished.   HENT:   Head: Normocephalic and atraumatic.   Elevated JVD    Eyes: Pupils are equal, round, and reactive to light. EOM are normal.   Neck: Normal range of  motion. Neck supple. No tracheal deviation present. No thyromegaly present.   Cardiovascular: Normal rate and regular rhythm. Exam reveals no friction rub.   No murmur heard.  Pulmonary/Chest: Effort normal and breath sounds normal. No respiratory distress. He has no wheezes. He exhibits no tenderness.   Abdominal: Soft. Bowel sounds are normal. He exhibits no distension. There is no tenderness.   Musculoskeletal: Normal range of motion. He exhibits no edema.   Neurological: He is alert and oriented to person, place, and time.   Skin: Skin is warm and dry. Capillary refill takes less than 2 seconds. No rash noted. No erythema.   1+ in the ankles only bilateral. Swelling of the right arm (hx of DVT). Right lower extremity chronically edematous 2/2 malignancy.  LE edema R > left.  Dark discoloration of the LE        Significant Labs:   CBC:   Recent Labs   Lab 12/16/19  0820 12/17/19  0456 12/17/19  1251   WBC 6.72 5.10 8.79   HGB 8.0* 6.7* 7.5*   HCT 26.7* 22.3* 24.1*    143* 161     CMP:   Recent Labs   Lab 12/16/19  0820 12/17/19  0456   * 135*   K 4.5 4.7   CL 99 99   CO2 25 29   * 100   BUN 45* 49*   CREATININE 3.2* 3.4*   CALCIUM 8.7 8.3*   ANIONGAP 10 7*   EGFRNONAA 23.0* 21.3*       Significant Imaging: I have reviewed all pertinent imaging results/findings within the past 24 hours.

## 2019-12-17 NOTE — DISCHARGE SUMMARY
[facility] Hospital Medicine  Discharge Summary      Patient Name: Demetrio Aguiar  MRN: [id number]  Admission Date: 12/10/2019  Hospital Length of Stay: 7 days  Discharge Date and Time:  12/17/2019 3:51 PM  Attending Physician: Rosalind Caraballo MD   Discharging Provider: Alfredito Simpson MD  Primary Care Provider: Primary Doctor [facility] Medicine Team: [facility]  Alfredito Simpson MD    HPI:   Demetrio Aguiar is a 39 y.o. 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 Reports he was recently in the hospital where he was treated for pneumonia. During that admission, the patient received fluids for his infection as well as antibiotics. He reports since his pneumonia has been properly treated and his cough resolved, however he noticed worsening lower extremity swelling. Reports over the past week, he's gained weight (dry weight ~155-160) and is now up to 180lbs. He has not been compliant with a low salt diet. He reports this morning his swelling worsened to his upper extremities and he began complaining of MARY. However, he was able to walk from the parking garage to the ER before complaining of SOB. He denies any cough, fevers, chills, n/v, headaches, vision changes or weakness.    He was recently seen by Dr. Mejias (cardiology) in clinic on 12/2 where he noticed significant signs of HF on exam including 4+ pitting edema of the lower extremities and elevated JVD. He was started on lasix 20mg during that time.     Had a 2D echo performed on 11/26 which showed mildly decreased left ventricular systolic function. The estimated ejection fraction is 40%. QEF 40%. Global hypokinetic wall motion. (EF 55% in Aug-2019).     In the ED, the patient was found to have an elevated  BNP and pulmonary edema on CXR.     * No surgery found *      Hospital Course:   Mr. Aguiar was admitted to hospital medicine for acute decompensated heart failure. He was diuresed appropriately. Hospital course complicated by LEFTY secondary to intravascular volume depletion/diuresis. There is a concern for decreasing cardiac function with differentials including ischemic cardiomyopathy vs infiltrative diseases vs doxorubicin induced cardiomyopathy. It is unclear at this point. PET stress with global hypokinesis and decreased EF of 26%-30%. There's also a concern for graft rejection, has past history of rejection treated with thymo. KTM is following, do not plan to biopsy kidney. Patient is discharged at around dry weight (162 lb) and stable LEFTY though sCr not at baseline. Needs close follow up with cardiology and KTM as well as oncology for further treatment of Kaposi sarcoma. Ideally, would keep patient for additional day until renal recovery however patient was adamant that he wants to leave the hospital. As such patient was discharged home with clinic follow up.          Review of Systems   Constitutional: Negative for chills, diaphoresis, fatigue and fever.   Eyes: Negative for photophobia and visual disturbance.   Respiratory: Negative for cough, chest tightness, shortness of breath and wheezing.    Cardiovascular: Positive for leg swelling. Negative for chest pain and palpitations.   Gastrointestinal: Negative for abdominal distention, abdominal pain, diarrhea, nausea and vomiting.   Genitourinary: Negative for difficulty urinating, dysuria, frequency and urgency.   Musculoskeletal: Negative for arthralgias, joint swelling and myalgias.   Skin: Negative for pallor and rash.   Neurological: Positive for weakness. Negative for dizziness, tremors, numbness and headaches.     Consults:   Consults (From admission, onward)        Status Ordering Provider     Inpatient consult to Cardiology  Once     Provider:  (Not  yet assigned)    Completed DG ALFARO     Inpatient consult to Kidney/Pancreas Transplant Medicine  Once     Provider:  (Not yet assigned)    Completed DAVID ABDULLAHI     Inpatient consult to Registered Dietitian/Nutritionist  Once     Provider:  (Not yet assigned)    Completed DG ALFARO        Vitals:    12/17/19 1515   BP:    Pulse: 70   Resp:    Temp:      Physical Exam   Constitutional: He is oriented to person, place, and time. He appears well-developed and well-nourished.   HENT:   Head: Normocephalic and atraumatic.   Eyes: Pupils are equal, round, and reactive to light. EOM are normal.   Neck: Normal range of motion. Neck supple. No tracheal deviation present. No thyromegaly present.   Cardiovascular: Normal rate and regular rhythm. Exam reveals no friction rub.   No murmur heard.  Pulmonary/Chest: Effort normal and breath sounds normal. No respiratory distress. He has no wheezes. He exhibits no tenderness.   Abdominal: Soft. Bowel sounds are normal. He exhibits no distension. There is no tenderness.   Musculoskeletal: Normal range of motion. He exhibits no edema.   Neurological: He is alert and oriented to person, place, and time.   Skin: Skin is warm and dry. Capillary refill takes less than 2 seconds. No rash noted. No erythema.   1+ in the ankles only bilateral. Swelling of the right arm (hx of DVT). Right lower extremity chronically edematous 2/2 malignancy.  LE edema R > left.  Dark discoloration of the LE       * Acute decompensated heart failure  Please see acute on chronic systolic heart failure      Microcytic anemia  Patient Hemoglobin decreased today with microcytic anemia showing MCV 80.   During patient's last admission had similar results. Iron panel workup showed anemia of chronic disease.   Denies any GI bleeding. Could also be from chronic renal disease.     PLAN:   - Will monitor for now without transfusion.   - Iron panel with some evidence of Fe deficiency- on Fe tables.  No evidence of hemolysis. HB stable.   - If symptomatic or Hemoglobin drops, will transfuse for Hb < 7       Discharge planning issues  Patient expected discharge in 1-2 days pending adequate diuresis and improvement in GFR.   - Patient reports he has run out of a lot of his blood pressure medications. Will ensure he has an supply prior to discharge  - Will need follow up with PCP or cardiology for BP management and HF work up   - Patient expressed frustration with prolonged hospital stay this morning and wanted to leave. Pt agreeable to stay for PET stress test and repeat CBC as Hb dropped (repeat normal). Ideally would keep patient one more day to monitor renal function however patient was adamant about leaving. Discharged home with meds and follow up with cards, KTM and oncology     Acute on chronic systolic heart failure  Acute on chronic systolic heart failure with some degree of diastolic heart failure  Unclear etiology at this point. New EF on 2D echo is 40% from previous 55%  Differential includes ischemic etiology vs infiltrative disease vs Doxorubicin induced (though low dosage)  PET stress during current hospitalization with EF of 26 to 30% with global hypokinesis   Diuresed well with sequale of LEFTY   Holding lasix in the setting of LEFTY for now  Discharge home with PO lasix, Needs close follow up with cardiology and KTM  Further treatment per oncology for metastatic Kaposi sarcoma   Continue Coreg. Started Bidil. Hold starting Entresto until renal function improves     Acute DVT (deep venous thrombosis)  Patient w/ hx of proximal right subclavian vein dvt found on US during last admission on 11/13/2019  - Will continue Eliquis 5mg BID.     Renovascular hypertension  -Coreg 25 mg BID, Norvasc 10 mg daily  -Added Bidil today given HF with low EF  -Plan to add Entresto once LEFTY improves- medication prescribed, not started yet. Needs follow up with cardiology prior and improved renal function   -Held lasix  today as he is appropriately diuresed.   -Discharged home with Lasix 40 mg, PO daily     Kaposi sarcoma  Hx of Kaposi Sarcoma with treatment with chemo.   - 4x treatment with Doxorubicin.   - Stable.  - follow up with oncology for further treatment options   - Doxorubicin likely to cause further decline in cardiac function     LEFTY (acute kidney injury)  Patient with hx of renal transplant presenting with LEFTY. Ddx include cardiorenal syndrome vs nephrotic syndrome vs ATN vs transplant rejection. Cr baseline 1.8-2.2 but presented to the ED with Cr of 2.9. With recent weight gain, elevated JVD and lower extremity swelling, highly suggestive of HF. However, unable to rule out secondary causes.     Improved with lasix holiday- likely intravascular volume depletion   Monitor renal function   Avoid nephrotoxins. Appreciate KTM recommendation   Discharged home with PO lasix, pt should weigh himself and contact KTM for any weight changes     Prophylactic immunotherapy  Patient's last CD4 count on  at 63.   - Started on Atovaquone for ppx.   - Will continue inpatient.      donor kidney transplant 2017  - KTM consulted. Recs appreciated  - Will continue Sirolimus 2mg BID with levels daily  - Prednisone 5mg Daily.   - LEFTY stable with sCr at 3.4 today.   - Clinic follow up with KTM    AIDS (acquired immunodeficiency syndrome), CD4 <=200  - Current CD 4 count of 65 though patient remains compliant with HAART  - Atorvaquin for PCP Ppx   -Likely contributing to the complexity of patients presentation of heart failure and renal failure.     Hyperlipidemia  - Continue Crestor 20mg Daily.       Final Active Diagnoses:    Diagnosis Date Noted POA    PRINCIPAL PROBLEM:  Acute decompensated heart failure [I50.9] 2019 Yes    Immunosuppressive management encounter following kidney transplant [Z79.899, Z94.0] 2019 Not Applicable    Microcytic anemia [D50.9] 2019 Unknown    Discharge planning issues  [Z02.9] 12/10/2019 Not Applicable    Acute DVT (deep venous thrombosis) [I82.409] 2019 Yes    Renovascular hypertension [I15.0] 2019 Yes    Kaposi sarcoma [C46.9] 2019 Yes    LEFTY (acute kidney injury) [N17.9] 2018 Yes    Prophylactic immunotherapy [Z29.8] 2017 Not Applicable     donor kidney transplant 2017 [Z94.0] 2017 Not Applicable    AIDS (acquired immunodeficiency syndrome), CD4 <=200 [B20] 2016 Yes    Hyperlipidemia [E78.5] 2016 Yes     Chronic      Problems Resolved During this Admission:    Diagnosis Date Noted Date Resolved POA    Unexplained episode of dysfunction of kidney transplant [T86.19] 10/17/2017 12/10/2019 Yes    Metabolic acidosis [E87.2] 2017 Yes       Discharged Condition: stable    Disposition: Home or Self Care    Follow Up:  Follow-up Information     [facility] - Cardiology In 1 week.    Specialty:  Cardiology  Contact information:  [address] as[address]  [facility] [address] [phone]  Additional information:  3rd floor           [facility]- Transplant.    Specialty:  Transplant  Contact information:  [address]  [facility] [address] [phone]  Additional information:  [facility] - 1st Floor, Located by [facility] - Hematology Oncology.    Specialty:  Hematology and Oncology  Contact information:  Bridget Maharaj [facility] [address] [phone]  Additional information:  [facility] - 3rd Floor               Patient Instructions:   No discharge procedures on file.    Significant Diagnostic Studies: Labs:   CMP   Recent Labs   Lab 19  0820 19  0456   * 135*   K 4.5 4.7   CL 99 99   CO2 25 29   * 100   BUN 45* 49*   CREATININE 3.2* 3.4*   CALCIUM 8.7 8.3*   ANIONGAP 10 7*   ESTGFRAFRICA 26.6* 24.7*   EGFRNONAA 23.0* 21.3*    and CBC   Recent Labs   Lab 19  08  12/17/19  0456 12/17/19  1251   WBC 6.72 5.10 8.79   HGB 8.0* 6.7* 7.5*   HCT 26.7* 22.3* 24.1*    143* 161       Pending Diagnostic Studies:     None         Medications:  Reconciled Home Medications:      Medication List      START taking these medications    amLODIPine 10 MG tablet  Commonly known as:  NORVASC  Take 1 tablet (10 mg total) by mouth once daily.  Start taking on:  December 18, 2019     apixaban 5 mg Tab  Commonly known as:  ELIQUIS  Take 1 tablet (5 mg total) by mouth 2 (two) times daily.  Replaces:  Eliquis 5 mg (74 tabs) Dspk     BiDil 20-37.5 mg Tab  Generic drug:  isosorbide-hydrALAZINE 20-37.5 mg  Take 1 tablet by mouth 3 (three) times daily.     Entresto 24-26 mg per tablet  Generic drug:  sacubitril-valsartan  Take 1 tablet by mouth 2 (two) times daily.     ferrous sulfate 325 mg (65 mg iron) Tab tablet  Commonly known as:  FEOSOL  Take 1 tablet (325 mg total) by mouth 2 (two) times daily.     furosemide 40 MG tablet  Commonly known as:  LASIX  Take 0.5 tablets (20 mg total) by mouth once daily. Please take once daily. Weigh yourself and if any weight changes, please contact your transplant doctors. Thank you        CONTINUE taking these medications    atovaquone 750 mg/5 mL Susp  Commonly known as:  MEPRON  Take 10 mLs (1,500 mg total) by mouth once daily.     calcium carbonate 200 mg calcium (500 mg) chewable tablet  Commonly known as:  Tums  Take 1 tablet (500 mg total) by mouth once daily.     carvedilol 25 MG tablet  Commonly known as:  COREG  Take 1 tablet (25 mg total) by mouth 2 (two) times daily with meals.     cinacalcet 60 MG Tab  Commonly known as:  SENSIPAR  Take 1 tablet (60 mg total) by mouth daily with breakfast.     dolutegravir 50 mg Tab  Commonly known as:  Tivicay  Take 1 tablet (50 mg total) by mouth once daily.     emtricitabine-tenofovir alafen 200-25 mg Tab  Commonly known as:  Descovy  Take 1 tablet by mouth once daily.     famotidine 20 MG tablet  Commonly  known as:  PEPCID  TAKE 1 TABLET (20MG TOTAL) BY MOUTH EACH EVENING     HYDROcodone-acetaminophen 5-325 mg per tablet  Commonly known as:  NORCO  Take 1 tablet by mouth every 4 (four) hours as needed for Pain.     multivitamin tablet  Commonly known as:  THERAGRAN  Take 1 tablet by mouth once daily.     patiromer calcium sorbitex 25.2 gram Pwpk  Commonly known as:  VELTASSA  Take 1 packet (25.2 g total) by mouth once daily.     predniSONE 5 MG tablet  Commonly known as:  DELTASONE  TAKE ONE TABLET BY MOUTH EVERY DAY     rosuvastatin 20 MG tablet  Commonly known as:  CRESTOR  Take 1 tablet (20 mg total) by mouth once daily.     sevelamer carbonate 800 mg Tab  Commonly known as:  RENVELA  Take 1 tablet (800 mg total) by mouth 3 (three) times daily with meals.     sirolimus 2 MG tablet  Commonly known as:  RAPAMUNE  Take 1 tablet (2 mg total) by mouth once daily.     sodium bicarbonate 650 MG tablet  Take 3 tablets (1,950 mg total) by mouth 3 (three) times daily.        STOP taking these medications    Eliquis 5 mg (74 tabs) Dspk  Generic drug:  apixaban  Replaced by:  apixaban 5 mg Tab     hydrALAZINE 25 MG tablet  Commonly known as:  APRESOLINE     NIFEdipine 60 MG (OSM) 24 hr tablet  Commonly known as:  PROCARDIA-XL          Indwelling Lines/Drains at time of discharge:   Lines/Drains/Airways     Drain                 Hemodialysis AV Fistula 07/19/19 0823 Left upper arm 151 days              Time spent on the discharge of patient: 45 minutes  Patient was seen and examined on the date of discharge and determined to be suitable for discharge.    Alfredito Simpson MD  Department of Hospital Medicine  [facility]

## 2019-12-17 NOTE — PROGRESS NOTES
[facility]  Cardiology  Progress Note    Patient Name: Demetrio Aguiar  MRN: [id number]  Admission Date: 12/10/2019  Hospital Length of Stay: 7 days  Code Status: Full Code   Attending Physician: Rosalind Caraballo MD   Primary Care Physician: Primary Doctor No  Expected Discharge Date: 12/17/2019  Principal Problem:Acute decompensated heart failure    Subjective:     Hospital Course:   No notes on file    Interval History: NAEON. Walking halls without O2 requirement, current weight 162. Stress PET today at 1430.     Review of Systems   Constitution: Negative for chills, diaphoresis, fever and weight gain.        Chronic right sided edema, left with trace amounts   HENT: Negative for congestion and sore throat.    Cardiovascular: Negative for chest pain, dyspnea on exertion, irregular heartbeat, orthopnea, palpitations and paroxysmal nocturnal dyspnea.   Respiratory: Negative for cough and shortness of breath.    Musculoskeletal: Negative for back pain and joint pain.   Gastrointestinal: Negative for constipation, diarrhea, nausea and vomiting.   Genitourinary: Negative for dysuria and flank pain.   Neurological: Negative for headaches, numbness and weakness.     Objective:     Vital Signs (Most Recent):  Temp: 98.1 °F (36.7 °C) (12/17/19 0732)  Pulse: 68 (12/17/19 0733)  Resp: 16 (12/17/19 0732)  BP: (!) 156/91 (12/17/19 0732)  SpO2: 99 % (12/17/19 0732) Vital Signs (24h Range):  Temp:  [97.6 °F (36.4 °C)-98.5 °F (36.9 °C)] 98.1 °F (36.7 °C)  Pulse:  [66-80] 68  Resp:  [16-18] 16  SpO2:  [94 %-99 %] 99 %  BP: (140-156)/(82-91) 156/91     Weight: 73.6 kg (162 lb 4.1 oz)  Body mass index is 24.67 kg/m².     SpO2: 99 %  O2 Device (Oxygen Therapy): room air      Intake/Output Summary (Last 24 hours) at 12/17/2019 0836  Last data filed at 12/17/2019 0524  Gross per 24 hour   Intake 540 ml   Output 1925 ml   Net -1385 ml       Lines/Drains/Airways     Drain                 Hemodialysis AV Fistula  07/19/19 0823 Left upper arm 151 days          Peripheral Intravenous Line                 Peripheral IV - Single Lumen 12/16/19 0626 20 G Left;Posterior Forearm 1 day                Physical Exam    Significant Labs:   CMP   Recent Labs   Lab 12/16/19  0820 12/17/19 0456   * 135*   K 4.5 4.7   CL 99 99   CO2 25 29   * 100   BUN 45* 49*   CREATININE 3.2* 3.4*   CALCIUM 8.7 8.3*   ANIONGAP 10 7*   ESTGFRAFRICA 26.6* 24.7*   EGFRNONAA 23.0* 21.3*    and CBC   Recent Labs   Lab 12/16/19 0820 12/17/19 0456   WBC 6.72 5.10   HGB 8.0* 6.7*   HCT 26.7* 22.3*    143*       Significant Imaging:  Imaging Results          X-Ray Chest AP Portable (Final result)  Result time 12/10/19 13:02:06    Final result by Sima Harding MD (12/10/19 13:02:06)                 Impression:      Abnormal chest radiograph with persistent opacification of the perihilar portion of the right lung.  Please see discussion above.      Electronically signed by: Sima Harding MD  Date:    12/10/2019  Time:    13:02             Narrative:    EXAMINATION:  XR CHEST AP PORTABLE    CLINICAL HISTORY:  CHF;    TECHNIQUE:  Single frontal view of the chest was performed.    COMPARISON:  Chest radiographs: 11/15/2019.  11/12/2019.  08/19/2019.  07/17/2019.  11/30/2016.    PET/CT: 07/31/2019.    CT of the neck and chest: 07/18/2019.    FINDINGS:  Port in the left anterior chest wall has attached catheter that extends to overlie the junction of left and right innominate veins.    Mediastinal structures are midline.  Cardiomediastinal silhouette appears similar to 11/15/2019.  CT of the chest and neck performed 07/18/2019 innumerable prominent mediastinal lymph nodes some of which appeared mildly enlarged.    The left lung appears clear on today's study and on prior examinations dated 11/15/2019, 11/12/2019, 08/19/2019 and 07/17/2019.    However there is patchy heterogeneous subsegmental opacity in the right lung on all of those  examinations.  The extent of parenchymal opacification was greatest on 11/12/2019 where increased attenuation obscure detail of pulmonary vascular markings in the right mid and lower lung zones.  However there is persistent patchy heterogeneous opacity on today's study.  Prior examinations including CT of 07/18/2019 showed perihilar soft tissue attenuation not excluded on the current examination.  Asymmetry of distribution is atypical for pulmonary edema although it is not excluded especially in patients with mitral insufficiency if there is asymmetric jet.  Persistence of lung disease over a period of 4 months argues against aspiration or pneumonia.  Right hilar and perihilar malignancy is not excluded.    On this supine examination I detect no convincing evidence of pleural fluid and no pneumothorax, pneumomediastinum, pneumoperitoneum or significant osseous abnormality.                                  Assessment and Plan:         * Acute decompensated heart failure  Patient is a 40 yo male with a past medical hx of 40 yo male with a pmHx of AIDS (now) on HAART, Kaposi sarcoma s/p 3 cycles of doxorubin, ESRD on intermittent HD, s/p renal transplant with hx of chronic rejection, HTN, HLD, here for ADHF likely secondary to Doxorubin usage in the setting of dietary non-compliance. However other dx must be r/u to accurately dx this as the etiology behind his HF. DDx includes ischemic CM, amyloidosis (echo suspicious for this in terms of the strain pattern.)    Plan:  - Ordered a PET stress to exclude ischemic etiology. Scheduled for 1430 12/17.  - Agree with c/w Coreg 25 mg BID  - Would add Bidil 20-37.5 mg BID instead of hydralazine without pre-load reduction. If patient cannot afford this, would then keep the hydralazine and then do isordil 10 mg TID with uptitration to get BP<130/80.  - Agree with Entresto initiation once okay to start from a renal perspective. Would start on 24-26 mg BID first with uptitration on  outpatient.  - Euvolemic on examination. Agree with lasix holiday as patient current weight is near baseline, does not require any more fluid off at this point in time  - Will follow up PET stress. If negative, as I suspect would entertain TTR SPECT as outpatient to r/o before entertaining the dx of doxorubin CM as the etiology definitively.  - Please order serum light chains for Amyloidosis w/u.      Acute DVT (deep venous thrombosis)  Continue with Eliquis for prox r subclavian venous thrombosis found on 11/13/19.      Renovascular hypertension  See treatment recs under ADHF        VTE Risk Mitigation (From admission, onward)         Ordered     apixaban tablet 5 mg  2 times daily      12/10/19 1619     IP VTE LOW RISK PATIENT  Once      12/10/19 1537                Tex Paredes MD  Cardiology  [facility]

## 2019-12-17 NOTE — CONSULTS
Food & Nutrition  Education    Diet Education: Heart Failure  Time Spent: 15 minutes  Learners: Pt    Nutrition Education provided with handouts: Heart Failure Nutrition Therapy    Comments: Reviewed sodium restriction, foods high in sodium, and encouraged pt to flavor foods with herbs/spices/salt-free seasonings instead of salt. Reviewed fluid restriction and foods considered fluids (ice cream, popsicles, Jell-O, etc). Encouraged pt to monitor weights daily.    All questions and concerns answered. Dietitian's contact information provided.     Follow-Up: 12/20    Please Re-consult as needed  Thanks!

## 2019-12-17 NOTE — ASSESSMENT & PLAN NOTE
Patient is a 38 yo male with a past medical hx of 38 yo male with a pmHx of AIDS (now) on HAART, Kaposi sarcoma s/p 3 cycles of doxorubin, ESRD on intermittent HD, s/p renal transplant with hx of chronic rejection, HTN, HLD, here for ADHF likely secondary to Doxorubin usage in the setting of dietary non-compliance. However other dx must be r/u to accurately dx this as the etiology behind his HF. DDx includes ischemic CM, amyloidosis (echo suspicious for this in terms of the strain pattern.)    Plan:  - Ordered a PET stress to exclude ischemic etiology. Scheduled for 1430 12/17.  - Agree with c/w Coreg 25 mg BID  - Would add Bidil 20-37.5 mg BID instead of hydralazine without pre-load reduction. If patient cannot afford this, would then keep the hydralazine and then do isordil 10 mg TID with uptitration to get BP<130/80.  - Agree with Entresto initiation once okay to start from a renal perspective. Would start on 24-26 mg BID first with uptitration on outpatient.  - Euvolemic on examination. Agree with lasix holiday as patient current weight is near baseline, does not require any more fluid off at this point in time  - Will follow up PET stress. If negative, as I suspect would entertain TTR SPECT as outpatient to r/o before entertaining the dx of doxorubin CM as the etiology definitively.  - Please order serum light chains for Amyloidosis w/u.

## 2019-12-17 NOTE — PROGRESS NOTES
"Pt very anxious about leaving, he is "ready to go" notified pt that they want to recheck he blood counts before he leaves. Called venipuncture to come draw lab and notified IM team pt is more than ready to go and for an MD to come speak with him.  "

## 2019-12-17 NOTE — ASSESSMENT & PLAN NOTE
- Current CD 4 count of 65 though patient remains compliant with HAART  - Atorvaquin for PCP Ppx   -Likely contributing to the complexity of patients presentation of heart failure and renal failure.

## 2019-12-17 NOTE — ASSESSMENT & PLAN NOTE
Acute on chronic systolic heart failure with some degree of diastolic heart failure  Unclear etiology at this point. New EF on 2D echo is 40% from previous 55%  Differential includes ischemic etiology vs infiltrative disease vs Doxorubicin induced (though low dosage)  PET stress during current hospitalization with EF of 26 to 30% with global hypokinesis   Diuresed well with sequale of LEFTY   Holding lasix in the setting of LEFTY for now  Discharge home with PO lasix, Needs close follow up with cardiology and KTM  Further treatment per oncology for metastatic Kaposi sarcoma   Continue Coreg. Started Bidil. Hold starting Entresto until renal function improves

## 2019-12-17 NOTE — ASSESSMENT & PLAN NOTE
Hb today of 6.7, received 1 u pRBCs  During patient's last admission had similar results. Iron panel workup showed anemia of chronic disease.   Denies any GI bleeding. Could also be from chronic renal disease.     PLAN:   - low iron saturation, ferritin at >500  - received oral iron supplementation, IV iron recommended

## 2019-12-17 NOTE — PROGRESS NOTES
Pt taken to stress test. Pt AAOx4. Pt stable. No complaints of pain or signs of distress. Left per wheelchair with transport.    1055 Pt returned to room. Pt AAOx4. Pt stable. No complaints of pain or signs of distress. Will continue to monitor pt.

## 2019-12-17 NOTE — ASSESSMENT & PLAN NOTE
- KTM consulted. Recs appreciated  - Will continue Sirolimus 2mg BID with levels daily  - Prednisone 5mg Daily.   - LEFTY stable with sCr at 3.4 today.   - Clinic follow up with KTM

## 2019-12-17 NOTE — PLAN OF CARE
Patient remained free of falls, trauma, injury, and skin breakdown. VSS; no patient complaints at this time. Pt NPO for anticipated stress test. Fall precautions maintained. Plan of care reviewed; patient verbalized understanding. All questions and concerns addressed; will continue to monitor.

## 2019-12-17 NOTE — ASSESSMENT & PLAN NOTE
Patient with hx of renal transplant presenting with LEFTY. Ddx include cardiorenal syndrome vs nephrotic syndrome vs ATN vs transplant rejection. Cr baseline 1.8-2.2 but presented to the ED with Cr of 2.9. With recent weight gain, elevated JVD and lower extremity swelling, highly suggestive of HF. However, unable to rule out secondary causes.     Improved with lasix holiday- likely intravascular volume depletion   Monitor renal function   Avoid nephrotoxins. Appreciate KTM recommendation   Discharged home with PO lasix, pt should weigh himself and contact KTM for any weight changes

## 2019-12-17 NOTE — ASSESSMENT & PLAN NOTE
- LEFTY superimposed on CKD stage III [b/l creat 1.9-2.1] complicating renal transplant  - Creatinine was at 1.7 three weeks ago, peaked at 3.7 on 12/15, most likely cardiorenal  - Good UO L past 24hours  - diuresis held since 12/14 in setting of LEFTY  - cr today 3.4, was 3.2 yesterday   - possibly in setting of anemia with hb of 6.7 requiring 1 u pRBC  -pt discharging today, recommend increase home lasix to 40 mg qd, and pt should measure weights daily at home and call KTM physician for fluctuations in weight for diuretics dose adjustment

## 2019-12-17 NOTE — SUBJECTIVE & OBJECTIVE
Subjective:   History of Present Illness:  39 y.o. year old Black or  male who received a  - brain death kidney transplant on 17.  He has CKD stage 3 - GFR 30-59 and his baseline creatinine is between 1.8-2.1. He takes prednisone and sirolimus for maintenance immunosuppression.          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   - Biopsy proven ABMR with weakly positive class I DSA's on 17. Pt treated with SMP x 3 (-9/3). PLEX x6 (completed ) and IVIG x 2 (-).    - Kidney US on 17 showed a large collection located along the anterior inferior margin of the renal transplant measuring 13.4 x 7 x 11.9. taken back to the OR for retroperitoneal bleed and washout.   -Biopsy 10/17/17 (LEFTY): 14 glomeruli, <5% fibrosis, no MC severo..  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 Amador for Kaposi's Sarcoma and has been treated with Doxorubicin x 4 doses. Has been tolerated well with improved in Tumor scottie according to oncology is note dated 2019.  In addition it also showed cardiomegaly with new evidence of pleural effusion.     -Biopsy 1/15/18: No ACR, AVR, ABMR, CD4 neg. Less than 10 % fibrosis.   -Biopsy (for DGF) 17: good sample with 24 glomeruli (only 1 sclerosed) and evidence just meeting criteria for ABMR. + diffuse ATI. Minimal fibrosis, no ACR or AVR, but + CD4 (>50% diffusely + stain) with mild glomerulitis and focal peritubular capillaritis. Also, + ca-oxalate (3) and ca-phos (2) crystals.  -18 WEAK DSA DETECTED: CW5(8803)      PCP PROPHYLAXIS: Bactrim LIFELONG--> changed to Mepron ~ 2018  CMV PROPHYLAXIS: Valcyte until 17  FUNGAL PROPHYLAXIS: Nystatin until 9/15/17         He presented to  the ER with SOB and lower extremity swelling Reports he was recently in the hospital where he was treated for pneumonia. During that admission, the patient received fluids for his infection as well as antibiotics. He reports since his pneumonia has been properly treated and his cough resolved, however he noticed worsening lower extremity swelling. Reports over the past week, he's gained weight (dry weight ~155-160) and is now up to 180lbs. He has not been compliant with a low salt diet. He reports this morning his swelling worsened to his upper extremities and he began complaining of MARY. However, he was able to walk from the parking garage to the ER before complaining of SOB. He denies any cough, fevers, chills, n/v, headaches, vision changes or weakness.     He was recently seen by Dr. Mejias (cardiology) in clinic on 12/2 where he noticed significant signs of HF on exam including 4+ pitting edema of the lower extremities and elevated JVD. He was started on lasix 20mg during that time.      Had a 2D echo performed on 11/26 which showed mildly decreased left ventricular systolic function. The estimated ejection fraction is 40%. QEF 40%. Global hypokinetic wall motion. (EF 55% in Aug-2019).      In the ED, the patient was found to have an elevated BNP and pulmonary edema on CXR.     Mr. Aguiar is a 39 y.o. year old male who is status post Kidney Transplant - 8/18/2017  (#1).    His maintenance immunosuppression consists of:   Immunosuppressants (From admission, onward)    Start     Stop Route Frequency Ordered    12/11/19 0900  sirolimus tablet 2 mg      -- Oral Daily 12/10/19 1619          Interval History:  Pt seen and evaluated at bedside. Cr today 3.4 from 3.2 yesterday. UO of 1.9 L past 24 hours, net negative 1.1 L. Diuresis held since 12/14. Pt anemic with hb of 6.7, received 1 u pRBCs today.    Past Medical, Surgical, Family, and Social History:   Unchanged from H&P.    Scheduled Meds:   amLODIPine  10 mg  Oral Daily    apixaban  5 mg Oral BID    atovaquone  1,500 mg Oral Daily    calcium carbonate  1 tablet Oral Daily    carvedilol  25 mg Oral BID WM    cinacalcet  60 mg Oral Daily with breakfast    dolutegravir  50 mg Oral Daily    emtricitabine-tenofovir alafen  1 tablet Oral Daily    ferrous sulfate  325 mg Oral BID    isosorbide-hydrALAZINE 20-37.5 mg  1 tablet Oral TID    multivitamin  1 tablet Oral Daily    predniSONE  5 mg Oral Daily    rosuvastatin  20 mg Oral Daily    sirolimus  2 mg Oral Daily     Continuous Infusions:  PRN Meds:sodium chloride, glucagon (human recombinant), glucose, glucose, HYDROcodone-acetaminophen, hydrOXYzine pamoate, sodium chloride 0.9%    Intake/Output - Last 3 Shifts       12/15 0700 - 12/16 0659 12/16 0700 - 12/17 0659 12/17 0700 - 12/18 0659    P.O. 900 780     Total Intake(mL/kg) 900 (12.4) 780 (10.4)     Urine (mL/kg/hr) 1000 (0.6) 1925 (1.1)     Total Output 1000 1925     Net -100 -1145                   Review of Systems   Constitutional: Negative for activity change, appetite change, chills, diaphoresis, fatigue and fever.   Respiratory: Negative for cough, choking, chest tightness, shortness of breath and stridor.    Cardiovascular: Positive for leg swelling (R>L (due to cancer on the right side)). Negative for chest pain.   Gastrointestinal: Negative for blood in stool, diarrhea, nausea and vomiting.   Genitourinary: Negative for difficulty urinating, dysuria and frequency.   Musculoskeletal: Negative for arthralgias and myalgias.   Skin: Negative for pallor and rash.   Neurological: Negative for dizziness, light-headedness and headaches.      Objective:     Vital Signs (Most Recent):  Temp: 98.1 °F (36.7 °C) (12/17/19 0732)  Pulse: 68 (12/17/19 0733)  Resp: 16 (12/17/19 0732)  BP: (!) 156/91 (12/17/19 0732)  SpO2: 99 % (12/17/19 0732) Vital Signs (24h Range):  Temp:  [97.6 °F (36.4 °C)-98.5 °F (36.9 °C)] 98.1 °F (36.7 °C)  Pulse:  [66-80] 68  Resp:  [16-18]  "16  SpO2:  [94 %-99 %] 99 %  BP: (140-156)/(82-91) 156/91     Weight: 73.6 kg (162 lb 4.1 oz)  Height: 5' 8" (172.7 cm)  Body mass index is 24.67 kg/m².    Physical Exam   Constitutional: He is oriented to person, place, and time. He appears well-developed and well-nourished.   HENT:   Head: Normocephalic and atraumatic.   Eyes: Pupils are equal, round, and reactive to light. EOM are normal.   Neck: Normal range of motion. Neck supple. No tracheal deviation present. No thyromegaly present.   Cardiovascular: Normal rate and regular rhythm. Exam reveals no friction rub.   No murmur heard.  Pulmonary/Chest: Effort normal. No respiratory distress. He has no wheezes. He has rales. He exhibits no tenderness.   Abdominal: Soft. Bowel sounds are normal. He exhibits distension. There is no tenderness.   Musculoskeletal: Normal range of motion. He exhibits edema (R>L).   Neurological: He is alert and oriented to person, place, and time.   Skin: Skin is warm and dry. Capillary refill takes less than 2 seconds. No rash noted. No erythema.       Laboratory:  CBC:   Recent Labs   Lab 12/15/19  0349 12/16/19  0820 12/17/19  0456   WBC 6.12 6.72 5.10   RBC 2.93* 3.26* 2.79*   HGB 7.1* 8.0* 6.7*   HCT 23.8* 26.7* 22.3*   * 159 143*   MCV 81* 82 80*   MCH 24.2* 24.5* 24.0*   MCHC 29.8* 30.0* 30.0*     CMP:   Recent Labs   Lab 12/10/19  1245  12/15/19  0349 12/16/19  0820 12/17/19  0456   GLU 98   < > 117* 123* 100   CALCIUM 8.4*   < > 8.2* 8.7 8.3*   ALBUMIN 3.4*  --   --   --   --    PROT 6.3  --   --   --   --       < > 141 134* 135*   K 4.0   < > 4.3 4.5 4.7   CO2 25   < > 29 25 29      < > 102 99 99   BUN 48*   < > 48* 45* 49*   CREATININE 2.9*   < > 3.7* 3.2* 3.4*   ALKPHOS 120  --   --   --   --    ALT 36  --   --   --   --    AST 31  --   --   --   --     < > = values in this interval not displayed.       Diagnostic Results:  None  "

## 2019-12-18 ENCOUNTER — PATIENT MESSAGE (OUTPATIENT)
Dept: TRANSPLANT | Facility: CLINIC | Age: 39
End: 2019-12-18

## 2019-12-18 ENCOUNTER — PATIENT MESSAGE (OUTPATIENT)
Dept: HEMATOLOGY/ONCOLOGY | Facility: CLINIC | Age: 39
End: 2019-12-18

## 2019-12-19 DIAGNOSIS — B20 HIV INFECTION, UNSPECIFIED SYMPTOM STATUS: Chronic | ICD-10-CM

## 2019-12-19 DIAGNOSIS — J18.9 PNEUMONIA OF RIGHT LOWER LOBE DUE TO INFECTIOUS ORGANISM: ICD-10-CM

## 2019-12-19 RX ORDER — SIROLIMUS 2 MG/1
2 TABLET, FILM COATED ORAL DAILY
Qty: 30 TABLET | Refills: 11 | Status: SHIPPED | OUTPATIENT
Start: 2019-12-19 | End: 2019-12-27

## 2019-12-19 NOTE — TELEPHONE ENCOUNTER
----- Message from Amaris Bates, Mike sent at 12/18/2019 11:37 AM CST -----  Regarding: [employer]   Asad Hoyt!    Pt is requesting Rapamune RX be sent to local pharmacy in [place]. I cannot transfer RX to [facility] due to Medicare guidelines.    Please send new E-RX to [facility] #[id number] on file.     Thanks   Amaris Bates, PharmD  [facility] - Transplant   [phone]  Ext [phone]

## 2019-12-20 LAB
BLD PROD TYP BPU: NORMAL
BLOOD UNIT EXPIRATION DATE: NORMAL
BLOOD UNIT TYPE CODE: 6200
BLOOD UNIT TYPE: NORMAL
CODING SYSTEM: NORMAL
DISPENSE STATUS: NORMAL
NUM UNITS TRANS PACKED RBC: NORMAL

## 2019-12-24 ENCOUNTER — TELEPHONE (OUTPATIENT)
Dept: CARDIOLOGY | Facility: CLINIC | Age: 39
End: 2019-12-24

## 2019-12-24 ENCOUNTER — PATIENT MESSAGE (OUTPATIENT)
Dept: CARDIOLOGY | Facility: CLINIC | Age: 39
End: 2019-12-24

## 2019-12-24 NOTE — TELEPHONE ENCOUNTER
----- Message from Maricarmen Tao MA sent at 12/24/2019 10:43 AM CST -----  Contact: [phone]      ----- Message -----  From: Avelina Shah  Sent: 12/24/2019  10:38 AM CST  To: [facility] Staff    Pt called in to inform staff that his weight has increased. Please call back [phone]

## 2019-12-24 NOTE — TELEPHONE ENCOUNTER
Advised as per Dr. Mejias to increase Lasix to 80mg qd. Patient following up in office on Friday.

## 2019-12-24 NOTE — TELEPHONE ENCOUNTER
Reports LE edema R>L. 168lb today, states weight usually 162lb, reports does not have weight for yesterday. Currently takes Lasix 40mg daily. Reports mild orthopnea and minor cough that improved when sat upright. Message routed to Dr. Mejias for review.

## 2019-12-26 ENCOUNTER — PATIENT MESSAGE (OUTPATIENT)
Dept: TRANSPLANT | Facility: CLINIC | Age: 39
End: 2019-12-26

## 2019-12-27 ENCOUNTER — OFFICE VISIT (OUTPATIENT)
Dept: CARDIOLOGY | Facility: CLINIC | Age: 39
DRG: 291 | End: 2019-12-27
Payer: MEDICARE

## 2019-12-27 ENCOUNTER — TELEPHONE (OUTPATIENT)
Dept: TRANSPLANT | Facility: CLINIC | Age: 39
End: 2019-12-27

## 2019-12-27 VITALS
OXYGEN SATURATION: 98 % | BODY MASS INDEX: 26.23 KG/M2 | DIASTOLIC BLOOD PRESSURE: 80 MMHG | SYSTOLIC BLOOD PRESSURE: 137 MMHG | WEIGHT: 173.06 LBS | HEIGHT: 68 IN | HEART RATE: 82 BPM

## 2019-12-27 DIAGNOSIS — B20 AIDS (ACQUIRED IMMUNODEFICIENCY SYNDROME), CD4 <=200: ICD-10-CM

## 2019-12-27 DIAGNOSIS — C46.9 KAPOSI SARCOMA: ICD-10-CM

## 2019-12-27 DIAGNOSIS — I15.0 RENOVASCULAR HYPERTENSION: ICD-10-CM

## 2019-12-27 DIAGNOSIS — C77.9 REGIONAL LYMPH NODE METASTASIS PRESENT: ICD-10-CM

## 2019-12-27 DIAGNOSIS — I50.814 RIGHT HEART FAILURE SECONDARY TO LEFT HEART FAILURE: Primary | ICD-10-CM

## 2019-12-27 DIAGNOSIS — E78.5 HYPERLIPIDEMIA, UNSPECIFIED HYPERLIPIDEMIA TYPE: Chronic | ICD-10-CM

## 2019-12-27 DIAGNOSIS — J18.9 PNEUMONIA OF RIGHT LOWER LOBE DUE TO INFECTIOUS ORGANISM: ICD-10-CM

## 2019-12-27 DIAGNOSIS — B20 HIV INFECTION, UNSPECIFIED SYMPTOM STATUS: Chronic | ICD-10-CM

## 2019-12-27 DIAGNOSIS — R89.9 ABNORMAL LABORATORY TEST RESULT: Primary | ICD-10-CM

## 2019-12-27 DIAGNOSIS — D63.1 ANEMIA IN STAGE 3 CHRONIC KIDNEY DISEASE: Chronic | ICD-10-CM

## 2019-12-27 DIAGNOSIS — T86.11 ANTIBODY MEDIATED REJECTION OF KIDNEY TRANSPLANT: ICD-10-CM

## 2019-12-27 DIAGNOSIS — N18.30 ANEMIA IN STAGE 3 CHRONIC KIDNEY DISEASE: Chronic | ICD-10-CM

## 2019-12-27 DIAGNOSIS — E87.5 HYPERKALEMIA: ICD-10-CM

## 2019-12-27 PROCEDURE — 99214 PR OFFICE/OUTPT VISIT, EST, LEVL IV, 30-39 MIN: ICD-10-PCS | Mod: S$PBB,,, | Performed by: INTERNAL MEDICINE

## 2019-12-27 PROCEDURE — 99999 PR PBB SHADOW E&M-EST. PATIENT-LVL IV: ICD-10-PCS | Mod: PBBFAC,,, | Performed by: INTERNAL MEDICINE

## 2019-12-27 PROCEDURE — 99214 OFFICE O/P EST MOD 30 MIN: CPT | Mod: PBBFAC | Performed by: INTERNAL MEDICINE

## 2019-12-27 PROCEDURE — 99214 OFFICE O/P EST MOD 30 MIN: CPT | Mod: S$PBB,,, | Performed by: INTERNAL MEDICINE

## 2019-12-27 PROCEDURE — 99999 PR PBB SHADOW E&M-EST. PATIENT-LVL IV: CPT | Mod: PBBFAC,,, | Performed by: INTERNAL MEDICINE

## 2019-12-27 RX ORDER — SIROLIMUS 2 MG/1
2 TABLET, FILM COATED ORAL DAILY
Qty: 30 TABLET | Refills: 11 | Status: SHIPPED | OUTPATIENT
Start: 2019-12-27 | End: 2020-02-11

## 2019-12-27 NOTE — PROGRESS NOTES
Chart has been dictated using voice recognition software.  It is not been reviewed carefully for any transcriptional errors due to this technology.   Subjective:   Patient ID:  Demetrio Aguiar is a 39 y.o. male who presents for follow-up of Acute decompensated heart failure (Hospital f/u 12/10/19)      HPI: Patient s/p renal transplant 2017, HIV positive with Kaposi sarcoma on chemotherapy including doxarubicin, hypertension, and hyperlipidemia. Patient hospitalized 12-19-Nov-2019 for CAP and LEFTY (due to dehydrartion).  Patient had a DVT in the hospital and was started on apixaban.  Patient subsequently hospitalized 10-17-December-2019 for heart failure with significant fluid overload.  LVEF was decreased to 40% with global hypokinesis and decreased strain. PET stress showed no ischemic changes.  He was diuresed to an 11 pound weight loss.    Patient had gained 8 pounds at home and was advised to take 80 mg qd, but has been taking 40 mg bid.  Weight is stable.  Will get SOB walking a look distance but able to walk up 2nd floor ramp without dyspnea, although he does get tired. Will get some dizziness after taking medications. No palpitations or syncope.  Has not started sacubitril/valsartan.      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 Hematoma evacuated.(09/09)    Status post exploratory laparotomy 9/10/2017    Re explored for retroperitoneal hematoma. Hematoma evacuated.(09/09)    TB lung, latent     tx INH 2006       Outpatient Medications Prior to Visit   Medication Sig Dispense Refill    amLODIPine (NORVASC) 10 MG  tablet Take 1 tablet (10 mg total) by mouth once daily. 30 tablet 2    apixaban (ELIQUIS) 5 mg Tab Take 1 tablet (5 mg total) by mouth 2 (two) times daily. 60 tablet 3    atovaquone (MEPRON) 750 mg/5 mL Susp Take 10 mLs (1,500 mg total) by mouth once daily. 300 mL 3    calcium carbonate (TUMS) 200 mg calcium (500 mg) chewable tablet Take 1 tablet (500 mg total) by mouth once daily. 30 tablet 11    carvedilol (COREG) 25 MG tablet Take 1 tablet (25 mg total) by mouth 2 (two) times daily with meals. 60 tablet 3    cinacalcet (SENSIPAR) 60 MG Tab Take 1 tablet (60 mg total) by mouth daily with breakfast. 30 tablet 3    dolutegravir (TIVICAY) 50 mg Tab Take 1 tablet (50 mg total) by mouth once daily. 30 tablet 5    emtricitabine-tenofovir alafen (DESCOVY) 200-25 mg Tab Take 1 tablet by mouth once daily.      famotidine (PEPCID) 20 MG tablet TAKE 1 TABLET (20MG TOTAL) BY MOUTH EACH EVENING 30 tablet 11    ferrous sulfate (FEOSOL) 325 mg (65 mg iron) Tab tablet Take 1 tablet (325 mg total) by mouth 2 (two) times daily. 60 tablet 2    furosemide (LASIX) 40 MG tablet Take 0.5 tablets (20 mg total) by mouth once daily. Please take once daily. Weigh yourself and if any weight changes, please contact your transplant doctors. Thank you 60 tablet 3    HYDROcodone-acetaminophen (NORCO) 5-325 mg per tablet Take 1 tablet by mouth every 4 (four) hours as needed for Pain. 20 tablet 0    isosorbide-hydrALAZINE 20-37.5 mg (BIDIL) 20-37.5 mg Tab Take 1 tablet by mouth 3 (three) times daily. 90 tablet 3    multivitamin (THERAGRAN) tablet Take 1 tablet by mouth once daily.      patiromer calcium sorbitex (VELTASSA) 25.2 gram PwPk Take 1 packet (25.2 g total) by mouth once daily. 30 packet 10    predniSONE (DELTASONE) 5 MG tablet TAKE ONE TABLET BY MOUTH EVERY DAY 30 tablet 11    rosuvastatin (CRESTOR) 20 MG tablet Take 1 tablet (20 mg total) by mouth once daily. 30 tablet 3    sacubitril-valsartan (ENTRESTO) 24-26 mg per  "tablet Take 1 tablet by mouth 2 (two) times daily. 30 tablet 2    sevelamer carbonate (RENVELA) 800 mg Tab Take 1 tablet (800 mg total) by mouth 3 (three) times daily with meals. 90 tablet 11    sirolimus (RAPAMUNE) 2 MG tablet Take 1 tablet (2 mg total) by mouth once daily. 30 tablet 11    sodium bicarbonate 650 MG tablet Take 3 tablets (1,950 mg total) by mouth 3 (three) times daily. 270 tablet 11     No facility-administered medications prior to visit.        ROS - No change from prior visit in neurologic, respiratory, endocrine, GI, hematologic, or constitutional complaints   Objective:   Physical Exam   Constitutional: He is oriented to person, place, and time. He appears well-developed and well-nourished.   /80 (BP Location: Left arm, Patient Position: Sitting, BP Method: Medium (Automatic))   Pulse 82   Ht 5' 8" (1.727 m)   Wt 78.5 kg (173 lb 1 oz)   SpO2 98%   BMI 26.31 kg/m²    Eyes: Pupils are equal, round, and reactive to light.   Neck: Neck supple. No JVD present. Carotid bruit is not present. No thyromegaly present.   Cardiovascular: Normal rate, regular rhythm and intact distal pulses.  No extrasystoles are present. PMI is not displaced. Exam reveals gallop and S4. Exam reveals no friction rub.   Murmur heard.   Harsh crescendo-decrescendo midsystolic murmur is present with a grade of 2/6 at the apex.  Pulmonary/Chest: Effort normal and breath sounds normal. He has no wheezes. He has no rales.   Abdominal: Soft. Bowel sounds are normal. There is no hepatosplenomegaly. There is no tenderness.   Musculoskeletal: He exhibits edema (4+edema into thigh on right; 2-3+ edema into thigh on left; no presacral edema).   Neurological: He is alert and oriented to person, place, and time. He has normal strength.   Skin: No cyanosis. Nails show no clubbing.         Lab Results   Component Value Date     12/27/2019    K 4.7 12/27/2019    BUN 55 (H) 12/27/2019    CREATININE 4.3 (H) 12/27/2019    "  12/27/2019    HGBA1C 5.9 (H) 12/11/2019    CHOL 103 (L) 08/26/2019    HDL 43 08/26/2019    LDLCALC 40.4 (L) 08/26/2019    TRIG 98 08/26/2019    CHOLHDL 41.7 08/26/2019    HGB 6.6 (L) 12/27/2019    HCT 22.4 (L) 12/27/2019    HCT 22 (L) 11/12/2019     12/27/2019    INR 1.1 12/10/2019       Assessment:     1. Right heart failure secondary to left heart failure    2. Kaposi sarcoma    3. Regional lymph node metastasis present    4. Antibody mediated rejection of kidney transplant    5. Anemia in stage 3 chronic kidney disease    6. Hyperkalemia    7. Hyperlipidemia, unspecified hyperlipidemia type    8. AIDS (acquired immunodeficiency syndrome), CD4 <=200    9. Renovascular hypertension      Patient's heart failure is mildly worse since he was in the hospital.  He has not increased his furosemide to the recommended 80 mg a day does miss understanding it and increasing it to 40 mg b.i.d..  He has been told to increase furosemide to 80 mg once a day the next 3 days.  His weight does not start coming down, then he needs to increase to 80 mg twice a day.  The patient will be sent for repeat BMP next week.  Once the patient's weight is down her on 160 lb, the patient should go back to 40 mg each day.  Additionally, the patient was told that he should actually take the sacubitril/valsartan , which he has not been taking even though as prescribed from discharge.Unless there are intervening problems, patient should return for re-evaluation in 2 months.   Plan:     Demetrio was seen today for acute decompensated heart failure.    Diagnoses and all orders for this visit:    Right heart failure secondary to left heart failure    Kaposi sarcoma    Regional lymph node metastasis present    Antibody mediated rejection of kidney transplant    Anemia in stage 3 chronic kidney disease    Hyperkalemia    Hyperlipidemia, unspecified hyperlipidemia type    AIDS (acquired immunodeficiency syndrome), CD4 <=200    Renovascular  hypertension          Hector Mejias MD  Consultative Cardiology

## 2019-12-27 NOTE — PATIENT INSTRUCTIONS
Take furosemide (Lasix) 80 mg each morning for next 3 days.  If your weight is not decreasing, increase to 80 mg twice a day.  Once your weight is down to about 160 pounds, cut back to 40 mg each day.    Start taking Entresto twice a day.    Weigh yourself first thing in the morning after emptying your bladder (urinating) and record weight every day.  If weight increases by 3 pounds in 1 day or by 5 pounds in 2 days or over a week, increase diuretic until weight has returned to prior value.      If you have any further questions, please let me know.

## 2019-12-27 NOTE — TELEPHONE ENCOUNTER
Call placed, labs reviewed with Dr. Gross. Severe Anemia and LEFTY. Patient in fluid overload. Advised to report to local ED for blood transfusion and stabilization of hemodynamics. Transplant Team remains available for transfer. Patient states understanding.

## 2019-12-28 ENCOUNTER — HOSPITAL ENCOUNTER (INPATIENT)
Facility: HOSPITAL | Age: 39
LOS: 4 days | Discharge: HOME | DRG: 291 | End: 2020-01-01
Attending: EMERGENCY MEDICINE | Admitting: EMERGENCY MEDICINE
Payer: MEDICARE

## 2019-12-28 DIAGNOSIS — Z79.60 LONG-TERM USE OF IMMUNOSUPPRESSANT MEDICATION: ICD-10-CM

## 2019-12-28 DIAGNOSIS — I50.9 ACUTE ON CHRONIC CONGESTIVE HEART FAILURE: ICD-10-CM

## 2019-12-28 DIAGNOSIS — Z94.0 S/P KIDNEY TRANSPLANT: ICD-10-CM

## 2019-12-28 DIAGNOSIS — I50.9 CHF (CONGESTIVE HEART FAILURE): ICD-10-CM

## 2019-12-28 DIAGNOSIS — R06.02 SHORTNESS OF BREATH: ICD-10-CM

## 2019-12-28 DIAGNOSIS — N17.9 AKI (ACUTE KIDNEY INJURY): ICD-10-CM

## 2019-12-28 DIAGNOSIS — I50.9 ACUTE ON CHRONIC CONGESTIVE HEART FAILURE, UNSPECIFIED HEART FAILURE TYPE: Primary | ICD-10-CM

## 2019-12-28 PROBLEM — I50.814: Chronic | Status: ACTIVE | Noted: 2019-08-07

## 2019-12-28 PROBLEM — C46.9 KAPOSI SARCOMA: Chronic | Status: ACTIVE | Noted: 2019-07-18

## 2019-12-28 PROBLEM — B20 HIV DISEASE: Status: ACTIVE | Noted: 2019-12-28

## 2019-12-28 PROBLEM — I13.0 CARDIORENAL SYNDROME, STAGE 1-4 OR UNSPECIFIED CHRONIC KIDNEY DISEASE, WITH HEART FAILURE: Status: ACTIVE | Noted: 2019-12-28

## 2019-12-28 PROBLEM — I15.0 RENOVASCULAR HYPERTENSION: Chronic | Status: ACTIVE | Noted: 2019-07-18

## 2019-12-28 PROBLEM — R79.89 ELEVATED SERUM CREATININE: Status: ACTIVE | Noted: 2019-12-28

## 2019-12-28 PROBLEM — D50.9 MICROCYTIC ANEMIA: Chronic | Status: ACTIVE | Noted: 2019-12-11

## 2019-12-28 PROBLEM — I82.409 ACUTE DVT (DEEP VENOUS THROMBOSIS): Chronic | Status: ACTIVE | Noted: 2019-11-17

## 2019-12-28 LAB
ABO + RH BLD: NORMAL
ALBUMIN SERPL BCP-MCNC: 3.7 G/DL (ref 3.5–5.2)
ALP SERPL-CCNC: 138 U/L (ref 55–135)
ALT SERPL W/O P-5'-P-CCNC: 47 U/L (ref 10–44)
ANION GAP SERPL CALC-SCNC: 11 MMOL/L (ref 8–16)
AST SERPL-CCNC: 38 U/L (ref 10–40)
BASOPHILS # BLD AUTO: 0.02 K/UL (ref 0–0.2)
BASOPHILS NFR BLD: 0.3 % (ref 0–1.9)
BILIRUB SERPL-MCNC: 1 MG/DL (ref 0.1–1)
BLD GP AB SCN CELLS X3 SERPL QL: NORMAL
BNP SERPL-MCNC: 1266 PG/ML (ref 0–99)
BUN SERPL-MCNC: 52 MG/DL (ref 6–20)
CALCIUM SERPL-MCNC: 8.8 MG/DL (ref 8.7–10.5)
CHLORIDE SERPL-SCNC: 108 MMOL/L (ref 95–110)
CO2 SERPL-SCNC: 25 MMOL/L (ref 23–29)
CREAT SERPL-MCNC: 4.3 MG/DL (ref 0.5–1.4)
DIFFERENTIAL METHOD: ABNORMAL
EOSINOPHIL # BLD AUTO: 0 K/UL (ref 0–0.5)
EOSINOPHIL NFR BLD: 0.4 % (ref 0–8)
ERYTHROCYTE [DISTWIDTH] IN BLOOD BY AUTOMATED COUNT: 18.3 % (ref 11.5–14.5)
EST. GFR  (AFRICAN AMERICAN): 18.6 ML/MIN/1.73 M^2
EST. GFR  (NON AFRICAN AMERICAN): 16.1 ML/MIN/1.73 M^2
GLUCOSE SERPL-MCNC: 97 MG/DL (ref 70–110)
HCT VFR BLD AUTO: 30.7 % (ref 40–54)
HGB BLD-MCNC: 9.4 G/DL (ref 14–18)
IMM GRANULOCYTES # BLD AUTO: 0.02 K/UL (ref 0–0.04)
IMM GRANULOCYTES NFR BLD AUTO: 0.3 % (ref 0–0.5)
INR PPP: 1.1 (ref 0.8–1.2)
LYMPHOCYTES # BLD AUTO: 0.9 K/UL (ref 1–4.8)
LYMPHOCYTES NFR BLD: 12.7 % (ref 18–48)
MCH RBC QN AUTO: 25.1 PG (ref 27–31)
MCHC RBC AUTO-ENTMCNC: 30.6 G/DL (ref 32–36)
MCV RBC AUTO: 82 FL (ref 82–98)
MONOCYTES # BLD AUTO: 0.3 K/UL (ref 0.3–1)
MONOCYTES NFR BLD: 4.7 % (ref 4–15)
NEUTROPHILS # BLD AUTO: 5.5 K/UL (ref 1.8–7.7)
NEUTROPHILS NFR BLD: 81.6 % (ref 38–73)
NRBC BLD-RTO: 0 /100 WBC
PLATELET # BLD AUTO: 192 K/UL (ref 150–350)
PMV BLD AUTO: 10.1 FL (ref 9.2–12.9)
POTASSIUM SERPL-SCNC: 5.6 MMOL/L (ref 3.5–5.1)
PROT SERPL-MCNC: 6.7 G/DL (ref 6–8.4)
PROTHROMBIN TIME: 11.1 SEC (ref 9–12.5)
RBC # BLD AUTO: 3.75 M/UL (ref 4.6–6.2)
SODIUM SERPL-SCNC: 144 MMOL/L (ref 136–145)
TROPONIN I SERPL DL<=0.01 NG/ML-MCNC: 0.02 NG/ML (ref 0–0.03)
WBC # BLD AUTO: 6.77 K/UL (ref 3.9–12.7)

## 2019-12-28 PROCEDURE — 25000003 PHARM REV CODE 250: Performed by: STUDENT IN AN ORGANIZED HEALTH CARE EDUCATION/TRAINING PROGRAM

## 2019-12-28 PROCEDURE — 99291 PR CRITICAL CARE, E/M 30-74 MINUTES: ICD-10-PCS | Mod: ,,, | Performed by: EMERGENCY MEDICINE

## 2019-12-28 PROCEDURE — 93010 EKG 12-LEAD: ICD-10-PCS | Mod: ,,, | Performed by: INTERNAL MEDICINE

## 2019-12-28 PROCEDURE — 86850 RBC ANTIBODY SCREEN: CPT

## 2019-12-28 PROCEDURE — 85025 COMPLETE CBC W/AUTO DIFF WBC: CPT

## 2019-12-28 PROCEDURE — 85610 PROTHROMBIN TIME: CPT

## 2019-12-28 PROCEDURE — 63600175 PHARM REV CODE 636 W HCPCS: Performed by: EMERGENCY MEDICINE

## 2019-12-28 PROCEDURE — 93005 ELECTROCARDIOGRAM TRACING: CPT

## 2019-12-28 PROCEDURE — 96374 THER/PROPH/DIAG INJ IV PUSH: CPT

## 2019-12-28 PROCEDURE — 11000001 HC ACUTE MED/SURG PRIVATE ROOM

## 2019-12-28 PROCEDURE — 84484 ASSAY OF TROPONIN QUANT: CPT

## 2019-12-28 PROCEDURE — 93010 ELECTROCARDIOGRAM REPORT: CPT | Mod: ,,, | Performed by: INTERNAL MEDICINE

## 2019-12-28 PROCEDURE — 25000003 PHARM REV CODE 250: Performed by: EMERGENCY MEDICINE

## 2019-12-28 PROCEDURE — 99291 CRITICAL CARE FIRST HOUR: CPT | Mod: ,,, | Performed by: EMERGENCY MEDICINE

## 2019-12-28 PROCEDURE — 80053 COMPREHEN METABOLIC PANEL: CPT

## 2019-12-28 PROCEDURE — 99291 CRITICAL CARE FIRST HOUR: CPT | Mod: 25

## 2019-12-28 PROCEDURE — 83880 ASSAY OF NATRIURETIC PEPTIDE: CPT

## 2019-12-28 RX ORDER — ISOSORBIDE DINITRATE AND HYDRALAZINE HYDROCHLORIDE 37.5; 2 MG/1; MG/1
1 TABLET ORAL 3 TIMES DAILY
Status: DISCONTINUED | OUTPATIENT
Start: 2019-12-28 | End: 2019-12-28

## 2019-12-28 RX ORDER — PREDNISONE 5 MG/1
5 TABLET ORAL DAILY
Status: DISCONTINUED | OUTPATIENT
Start: 2019-12-29 | End: 2020-01-01 | Stop reason: HOSPADM

## 2019-12-28 RX ORDER — FAMOTIDINE 20 MG/1
20 TABLET, FILM COATED ORAL DAILY
Status: DISCONTINUED | OUTPATIENT
Start: 2019-12-29 | End: 2020-01-01

## 2019-12-28 RX ORDER — CARVEDILOL 12.5 MG/1
25 TABLET ORAL 2 TIMES DAILY WITH MEALS
Status: DISCONTINUED | OUTPATIENT
Start: 2019-12-28 | End: 2019-12-28

## 2019-12-28 RX ORDER — INDOMETHACIN 25 MG/1
50 CAPSULE ORAL
Status: COMPLETED | OUTPATIENT
Start: 2019-12-28 | End: 2019-12-28

## 2019-12-28 RX ORDER — IBUPROFEN 200 MG
24 TABLET ORAL
Status: DISCONTINUED | OUTPATIENT
Start: 2019-12-28 | End: 2020-01-01 | Stop reason: HOSPADM

## 2019-12-28 RX ORDER — ISOSORBIDE DINITRATE AND HYDRALAZINE HYDROCHLORIDE 37.5; 2 MG/1; MG/1
1 TABLET ORAL 3 TIMES DAILY
Status: DISCONTINUED | OUTPATIENT
Start: 2019-12-28 | End: 2020-01-01 | Stop reason: HOSPADM

## 2019-12-28 RX ORDER — SODIUM CHLORIDE 0.9 % (FLUSH) 0.9 %
10 SYRINGE (ML) INJECTION
Status: DISCONTINUED | OUTPATIENT
Start: 2019-12-28 | End: 2020-01-01 | Stop reason: HOSPADM

## 2019-12-28 RX ORDER — AMLODIPINE BESYLATE 10 MG/1
10 TABLET ORAL DAILY
Status: DISCONTINUED | OUTPATIENT
Start: 2019-12-29 | End: 2020-01-01 | Stop reason: HOSPADM

## 2019-12-28 RX ORDER — ISOSORBIDE DINITRATE AND HYDRALAZINE HYDROCHLORIDE 37.5; 2 MG/1; MG/1
1 TABLET ORAL 3 TIMES DAILY
Status: DISCONTINUED | OUTPATIENT
Start: 2019-12-29 | End: 2019-12-28

## 2019-12-28 RX ORDER — ATOVAQUONE 750 MG/5ML
1500 SUSPENSION ORAL DAILY
Status: DISCONTINUED | OUTPATIENT
Start: 2019-12-29 | End: 2020-01-01 | Stop reason: HOSPADM

## 2019-12-28 RX ORDER — SEVELAMER CARBONATE 800 MG/1
800 TABLET, FILM COATED ORAL
Status: DISCONTINUED | OUTPATIENT
Start: 2019-12-29 | End: 2020-01-01 | Stop reason: HOSPADM

## 2019-12-28 RX ORDER — IBUPROFEN 200 MG
16 TABLET ORAL
Status: DISCONTINUED | OUTPATIENT
Start: 2019-12-28 | End: 2020-01-01 | Stop reason: HOSPADM

## 2019-12-28 RX ORDER — GLUCAGON 1 MG
1 KIT INJECTION
Status: DISCONTINUED | OUTPATIENT
Start: 2019-12-28 | End: 2020-01-01 | Stop reason: HOSPADM

## 2019-12-28 RX ORDER — FUROSEMIDE 10 MG/ML
80 INJECTION INTRAMUSCULAR; INTRAVENOUS
Status: COMPLETED | OUTPATIENT
Start: 2019-12-28 | End: 2019-12-28

## 2019-12-28 RX ORDER — FUROSEMIDE 10 MG/ML
100 INJECTION INTRAMUSCULAR; INTRAVENOUS ONCE
Status: DISCONTINUED | OUTPATIENT
Start: 2019-12-28 | End: 2019-12-28

## 2019-12-28 RX ORDER — SODIUM BICARBONATE 650 MG/1
1950 TABLET ORAL 3 TIMES DAILY
Status: DISCONTINUED | OUTPATIENT
Start: 2019-12-29 | End: 2020-01-01 | Stop reason: HOSPADM

## 2019-12-28 RX ORDER — ROSUVASTATIN CALCIUM 10 MG/1
10 TABLET, COATED ORAL DAILY
Status: DISCONTINUED | OUTPATIENT
Start: 2019-12-29 | End: 2020-01-01 | Stop reason: HOSPADM

## 2019-12-28 RX ORDER — CINACALCET 30 MG/1
60 TABLET, FILM COATED ORAL
Status: DISCONTINUED | OUTPATIENT
Start: 2019-12-29 | End: 2020-01-01 | Stop reason: HOSPADM

## 2019-12-28 RX ADMIN — SODIUM POLYSTYRENE SULFONATE 30 G: 15 SUSPENSION ORAL; RECTAL at 07:12

## 2019-12-28 RX ADMIN — SODIUM BICARBONATE 50 MEQ: 84 INJECTION, SOLUTION INTRAVENOUS at 07:12

## 2019-12-28 RX ADMIN — HYDRALAZINE HYDROCHLORIDE AND ISOSORBIDE DINITRATE 1 TABLET: 37.5; 2 TABLET, FILM COATED ORAL at 10:12

## 2019-12-28 RX ADMIN — APIXABAN 5 MG: 5 TABLET, FILM COATED ORAL at 10:12

## 2019-12-28 RX ADMIN — FUROSEMIDE 80 MG: 10 INJECTION, SOLUTION INTRAMUSCULAR; INTRAVENOUS at 06:12

## 2019-12-28 NOTE — ED TRIAGE NOTES
Demetrio Aguiar, a 39 y.o. male presents to the ED via personal transportation with CC SOB onset yesterday since getting a blood transfusion, reports received 2 units of blood yesterday. Patient also c/o pressure to center of chest, non radiating, rates pain 6/10 at this time.    Patient identifiers verified verbally with patient and correct for Demetrio Aguiar.    LOC/ APPEARANCE: The patient is AAOx4. Pt is speaking appropriately, no slurred speech. Pt changed into hospital gown. Continuous cardiac monitor, cont pulse ox, and auto BP cuff applied to patient. Pt is clean and well groomed. No JVD visible.  Pt updated on POC. Bed low and locked with side rails up x2, call bell in pt reach.  SKIN: Skin is warm dry and intact, and color is consistent with ethnicity. Capillary refill <3 seconds. No breakdown or brusing visible. Mucus membranes moist, acyanotic.  RESPIRATORY: Airway is open and patent. Respirations-spontaneous, unlabored, regular rate, equal bilaterally on inspiration and expiration. No accessory muscle use noted. Crackles throughout  CARDIAC: Patient has regular heart rate. Pt c/o chest pressure to center of chest. +4 pitting edema to RLE, +3 edema to LLE. Peripheral pulses present equal and strong throughout.  ABDOMEN: Soft and non-tender to palpation with no distention noted. Normoactive bowel sounds x4 quadrants. Pt has no complaints of abnormal bowel movements, denies blood in stool. Pt reports normal appetite.   NEUROLOGIC: Eyes open spontaneously and facial expression symmetrical. Pt behavior appropriate to situation, and pt follows commands. Pt reports sensation present in all extremities when touched with a finger, denies any numbness or tingling. PERRLA  MUSCULOSKELETAL: Spontaneous movement noted to all extremities. Hand  equal and leg strength strong +5 bilaterally. Non pitting edema noted to RUE. Dialysis fistula noted to LUE, no longer in use, thrill not present.  : No complaints  of frequency, burning, urgency or blood in the urine. No complaints of incontinence. Kidney transplant patient 10/2/2017.

## 2019-12-29 LAB
ALBUMIN SERPL BCP-MCNC: 3.4 G/DL (ref 3.5–5.2)
ALP SERPL-CCNC: 129 U/L (ref 55–135)
ALT SERPL W/O P-5'-P-CCNC: 36 U/L (ref 10–44)
ANION GAP SERPL CALC-SCNC: 11 MMOL/L (ref 8–16)
AST SERPL-CCNC: 27 U/L (ref 10–40)
BASOPHILS # BLD AUTO: 0.02 K/UL (ref 0–0.2)
BASOPHILS NFR BLD: 0.3 % (ref 0–1.9)
BILIRUB SERPL-MCNC: 1 MG/DL (ref 0.1–1)
BUN SERPL-MCNC: 51 MG/DL (ref 6–20)
CALCIUM SERPL-MCNC: 8.9 MG/DL (ref 8.7–10.5)
CHLORIDE SERPL-SCNC: 107 MMOL/L (ref 95–110)
CO2 SERPL-SCNC: 28 MMOL/L (ref 23–29)
CREAT SERPL-MCNC: 4.1 MG/DL (ref 0.5–1.4)
DIFFERENTIAL METHOD: ABNORMAL
EOSINOPHIL # BLD AUTO: 0.1 K/UL (ref 0–0.5)
EOSINOPHIL NFR BLD: 1.2 % (ref 0–8)
ERYTHROCYTE [DISTWIDTH] IN BLOOD BY AUTOMATED COUNT: 18 % (ref 11.5–14.5)
EST. GFR  (AFRICAN AMERICAN): 19.7 ML/MIN/1.73 M^2
EST. GFR  (NON AFRICAN AMERICAN): 17 ML/MIN/1.73 M^2
GLUCOSE SERPL-MCNC: 89 MG/DL (ref 70–110)
HCT VFR BLD AUTO: 30.1 % (ref 40–54)
HGB BLD-MCNC: 9 G/DL (ref 14–18)
IMM GRANULOCYTES # BLD AUTO: 0.01 K/UL (ref 0–0.04)
IMM GRANULOCYTES NFR BLD AUTO: 0.2 % (ref 0–0.5)
LYMPHOCYTES # BLD AUTO: 1 K/UL (ref 1–4.8)
LYMPHOCYTES NFR BLD: 16.4 % (ref 18–48)
MAGNESIUM SERPL-MCNC: 2.1 MG/DL (ref 1.6–2.6)
MCH RBC QN AUTO: 24.6 PG (ref 27–31)
MCHC RBC AUTO-ENTMCNC: 29.9 G/DL (ref 32–36)
MCV RBC AUTO: 82 FL (ref 82–98)
MONOCYTES # BLD AUTO: 0.4 K/UL (ref 0.3–1)
MONOCYTES NFR BLD: 6.8 % (ref 4–15)
NEUTROPHILS # BLD AUTO: 4.4 K/UL (ref 1.8–7.7)
NEUTROPHILS NFR BLD: 75.1 % (ref 38–73)
NRBC BLD-RTO: 0 /100 WBC
PHOSPHATE SERPL-MCNC: 4.7 MG/DL (ref 2.7–4.5)
PLATELET # BLD AUTO: 166 K/UL (ref 150–350)
PMV BLD AUTO: 9.9 FL (ref 9.2–12.9)
POTASSIUM SERPL-SCNC: 4.7 MMOL/L (ref 3.5–5.1)
PROT SERPL-MCNC: 6.3 G/DL (ref 6–8.4)
RBC # BLD AUTO: 3.66 M/UL (ref 4.6–6.2)
SIROLIMUS BLD-MCNC: 4.4 NG/ML (ref 4–20)
SODIUM SERPL-SCNC: 146 MMOL/L (ref 136–145)
WBC # BLD AUTO: 5.85 K/UL (ref 3.9–12.7)

## 2019-12-29 PROCEDURE — 99223 1ST HOSP IP/OBS HIGH 75: CPT | Mod: AI,GC,, | Performed by: HOSPITALIST

## 2019-12-29 PROCEDURE — 84100 ASSAY OF PHOSPHORUS: CPT

## 2019-12-29 PROCEDURE — 63600175 PHARM REV CODE 636 W HCPCS: Performed by: HOSPITALIST

## 2019-12-29 PROCEDURE — 25000003 PHARM REV CODE 250: Performed by: STUDENT IN AN ORGANIZED HEALTH CARE EDUCATION/TRAINING PROGRAM

## 2019-12-29 PROCEDURE — 83735 ASSAY OF MAGNESIUM: CPT

## 2019-12-29 PROCEDURE — 36415 COLL VENOUS BLD VENIPUNCTURE: CPT

## 2019-12-29 PROCEDURE — 80195 ASSAY OF SIROLIMUS: CPT

## 2019-12-29 PROCEDURE — 99223 PR INITIAL HOSPITAL CARE,LEVL III: ICD-10-PCS | Mod: AI,GC,, | Performed by: HOSPITALIST

## 2019-12-29 PROCEDURE — 11000001 HC ACUTE MED/SURG PRIVATE ROOM

## 2019-12-29 PROCEDURE — 63600175 PHARM REV CODE 636 W HCPCS: Performed by: STUDENT IN AN ORGANIZED HEALTH CARE EDUCATION/TRAINING PROGRAM

## 2019-12-29 PROCEDURE — 80053 COMPREHEN METABOLIC PANEL: CPT

## 2019-12-29 PROCEDURE — 85025 COMPLETE CBC W/AUTO DIFF WBC: CPT

## 2019-12-29 RX ORDER — ACETAMINOPHEN 325 MG/1
650 TABLET ORAL EVERY 6 HOURS PRN
Status: DISCONTINUED | OUTPATIENT
Start: 2019-12-29 | End: 2020-01-01 | Stop reason: HOSPADM

## 2019-12-29 RX ORDER — SIROLIMUS 1 MG/1
2 TABLET, FILM COATED ORAL DAILY
Status: DISCONTINUED | OUTPATIENT
Start: 2019-12-29 | End: 2020-01-01 | Stop reason: HOSPADM

## 2019-12-29 RX ORDER — FUROSEMIDE 10 MG/ML
80 INJECTION INTRAMUSCULAR; INTRAVENOUS ONCE
Status: COMPLETED | OUTPATIENT
Start: 2019-12-29 | End: 2019-12-29

## 2019-12-29 RX ORDER — FUROSEMIDE 10 MG/ML
80 INJECTION INTRAMUSCULAR; INTRAVENOUS 2 TIMES DAILY
Status: DISCONTINUED | OUTPATIENT
Start: 2019-12-29 | End: 2019-12-30

## 2019-12-29 RX ADMIN — SODIUM BICARBONATE 650 MG TABLET 1950 MG: at 09:12

## 2019-12-29 RX ADMIN — FUROSEMIDE 80 MG: 10 INJECTION, SOLUTION INTRAMUSCULAR; INTRAVENOUS at 06:12

## 2019-12-29 RX ADMIN — CINACALCET HYDROCHLORIDE 60 MG: 30 TABLET, FILM COATED ORAL at 07:12

## 2019-12-29 RX ADMIN — ATOVAQUONE 1500 MG: 750 SUSPENSION ORAL at 09:12

## 2019-12-29 RX ADMIN — FAMOTIDINE 20 MG: 20 TABLET ORAL at 09:12

## 2019-12-29 RX ADMIN — APIXABAN 5 MG: 5 TABLET, FILM COATED ORAL at 09:12

## 2019-12-29 RX ADMIN — SEVELAMER CARBONATE 800 MG: 800 TABLET, FILM COATED ORAL at 07:12

## 2019-12-29 RX ADMIN — THERA TABS 1 TABLET: TAB at 09:12

## 2019-12-29 RX ADMIN — FUROSEMIDE 80 MG: 10 INJECTION, SOLUTION INTRAMUSCULAR; INTRAVENOUS at 10:12

## 2019-12-29 RX ADMIN — SODIUM BICARBONATE 650 MG TABLET 1950 MG: at 02:12

## 2019-12-29 RX ADMIN — EMTRICITABINE AND TENOFOVIR ALAFENAMIDE 1 TABLET: 200; 25 TABLET ORAL at 02:12

## 2019-12-29 RX ADMIN — HYDRALAZINE HYDROCHLORIDE AND ISOSORBIDE DINITRATE 1 TABLET: 37.5; 2 TABLET, FILM COATED ORAL at 02:12

## 2019-12-29 RX ADMIN — SIROLIMUS 2 MG: 1 TABLET ORAL at 03:12

## 2019-12-29 RX ADMIN — AMLODIPINE BESYLATE 10 MG: 10 TABLET ORAL at 09:12

## 2019-12-29 RX ADMIN — SACUBITRIL AND VALSARTAN 1 TABLET: 24; 26 TABLET, FILM COATED ORAL at 09:12

## 2019-12-29 RX ADMIN — FUROSEMIDE 80 MG: 10 INJECTION, SOLUTION INTRAMUSCULAR; INTRAVENOUS at 02:12

## 2019-12-29 RX ADMIN — SEVELAMER CARBONATE 800 MG: 800 TABLET, FILM COATED ORAL at 06:12

## 2019-12-29 RX ADMIN — ACETAMINOPHEN 650 MG: 325 TABLET ORAL at 04:12

## 2019-12-29 RX ADMIN — HYDRALAZINE HYDROCHLORIDE AND ISOSORBIDE DINITRATE 1 TABLET: 37.5; 2 TABLET, FILM COATED ORAL at 09:12

## 2019-12-29 RX ADMIN — PREDNISONE 5 MG: 5 TABLET ORAL at 09:12

## 2019-12-29 RX ADMIN — SEVELAMER CARBONATE 800 MG: 800 TABLET, FILM COATED ORAL at 12:12

## 2019-12-29 RX ADMIN — ROSUVASTATIN CALCIUM 10 MG: 10 TABLET, FILM COATED ORAL at 09:12

## 2019-12-29 RX ADMIN — DOLUTEGRAVIR SODIUM 50 MG: 50 TABLET, FILM COATED ORAL at 09:12

## 2019-12-29 NOTE — ASSESSMENT & PLAN NOTE
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 Parnell w/ Heme/Onc) s/p doxorubicin x 4 now on Adriamycin q3w (for symptomatic metastatic disease), prox R. subclavian DVT 11/13/19 on Eliquis, and Renovascular HTN who presents with acute on chronic respiratory failure 2/2 volume overload following a 2 unit blood transfusion 12/27/19 (for Hb 6.7) in the setting of progressive, subacute LEFTY on CKD 3 in addition to HFpEF (40% with LV dysfunction, PET stress negative).    Assessment:  1. CHF exacerbation HFpEF- BNP 1266 (not currently taking entresto), on room air.   2. Progressive subacute LEFTY on CKD3- making urine w/ lasix  3. Volume overload 2/2 RBC transfusion/TACO- on room air, appears hypervolemic  4. Immune compromised due to HIV on HAART- No fevers, no evidence of infection. See #5.  5. Kaposi Sarcoma- Chronic, outpatient management  6. Renovascular HTN- Elevated but stable and no end organ damage  7. Prox R. subclavian DVT 11/13/19 on Eliquis      Plan:  - Cautious diuresis. Lasix 80mg IVP given in ED with 500 cc UOP so far, with some improvement in symptoms. Still orthopneic, significant crackles, JVD and with significant lower extrem edema, but on room air. Will give another 80 IVP lasix with reevaluation in the AM.   - Hold beta blocker. Continue entresto and ISDN/hydralazine.   - Last ECHO assessment was 1 month ago. No new symptoms to suggest preceding cardiac event, will hold off on ECHO as do not think will provide additional information at this time.   - KTM consulted, for immunesuppression and transplant patient. Hold sirolimus, levels in AM. Continue prednisone.   - Anemia likely chronic and related to CKD and anemic chronic disease. Will discuss if needs EPO, already on iron supp at home.

## 2019-12-29 NOTE — SUBJECTIVE & OBJECTIVE
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 Hematoma evacuated.(09/09)    Status post exploratory laparotomy 9/10/2017    Re explored for retroperitoneal hematoma. Hematoma evacuated.(09/09)    TB lung, latent     tx INH 2006       Past Surgical History:   Procedure Laterality Date    BIOPSY N/A 8/28/2018    Procedure: BIOPSY Tranplanted Kidney;  Surgeon: [facility] Diagnostic Provider;  [facility];  Service: Radiology;  Laterality: N/A;    EXPLORATORY LAPAROTOMY W/ BOWEL RESECTION      NO BOWEL RESECTION, UNKNOWN DETAILS, "Pancreas Infection"    INSERTION OF TUNNELED CENTRAL VENOUS CATHETER (CVC) WITH SUBCUTANEOUS PORT Left 8/19/2019    Procedure: XNCACQGQH-DGVL-U-CATH;  Surgeon: Megha Monreal MD;  [facility];  Service: General;  Laterality: Left;    INSERTION OF TUNNELED CENTRAL VENOUS CATHETER (CVC) WITH SUBCUTANEOUS PORT N/A 9/12/2019    Procedure: [id number]-CATH;  Surgeon: [facility] Diagnostic Provider;  Location: [facility] OR 2ND FLR;  Service: Radiology;  Laterality: N/A;  189  port removal and port placement    KIDNEY TRANSPLANT      peritoneal dialysis catheter placement         Review of patient's allergies indicates:  No Known Allergies    No current facility-administered medications on file prior to encounter.      Current Outpatient Medications on File Prior to Encounter   Medication Sig    amLODIPine (NORVASC) 10 MG tablet Take 1 tablet (10 mg total) by mouth once daily.    apixaban (ELIQUIS) 5 mg Tab Take 1 tablet (5 mg total) by mouth 2 (two) times daily.    atovaquone " (MEPRON) 750 mg/5 mL Susp Take 10 mLs (1,500 mg total) by mouth once daily.    calcium carbonate (TUMS) 200 mg calcium (500 mg) chewable tablet Take 1 tablet (500 mg total) by mouth once daily.    carvedilol (COREG) 25 MG tablet Take 1 tablet (25 mg total) by mouth 2 (two) times daily with meals.    cinacalcet (SENSIPAR) 60 MG Tab Take 1 tablet (60 mg total) by mouth daily with breakfast.    dolutegravir (TIVICAY) 50 mg Tab Take 1 tablet (50 mg total) by mouth once daily.    emtricitabine-tenofovir alafen (DESCOVY) 200-25 mg Tab Take 1 tablet by mouth once daily.    famotidine (PEPCID) 20 MG tablet TAKE 1 TABLET (20MG TOTAL) BY MOUTH EACH EVENING    ferrous sulfate (FEOSOL) 325 mg (65 mg iron) Tab tablet Take 1 tablet (325 mg total) by mouth 2 (two) times daily.    furosemide (LASIX) 40 MG tablet Take 0.5 tablets (20 mg total) by mouth once daily. Please take once daily. Weigh yourself and if any weight changes, please contact your transplant doctors. Thank you    HYDROcodone-acetaminophen (NORCO) 5-325 mg per tablet Take 1 tablet by mouth every 4 (four) hours as needed for Pain.    isosorbide-hydrALAZINE 20-37.5 mg (BIDIL) 20-37.5 mg Tab Take 1 tablet by mouth 3 (three) times daily.    multivitamin (THERAGRAN) tablet Take 1 tablet by mouth once daily.    patiromer calcium sorbitex (VELTASSA) 25.2 gram PwPk Take 1 packet (25.2 g total) by mouth once daily.    predniSONE (DELTASONE) 5 MG tablet TAKE ONE TABLET BY MOUTH EVERY DAY    rosuvastatin (CRESTOR) 20 MG tablet Take 1 tablet (20 mg total) by mouth once daily.    sacubitril-valsartan (ENTRESTO) 24-26 mg per tablet Take 1 tablet by mouth 2 (two) times daily.    sevelamer carbonate (RENVELA) 800 mg Tab Take 1 tablet (800 mg total) by mouth 3 (three) times daily with meals.    sirolimus (RAPAMUNE) 2 MG tablet Take 1 tablet (2 mg total) by mouth once daily.    sodium bicarbonate 650 MG tablet Take 3 tablets (1,950 mg total) by mouth 3 (three) times  daily.     Family History     Problem Relation (Age of Onset)    Cancer Father    Diabetes Maternal Aunt, Maternal Uncle    Heart disease Paternal Grandmother    Hyperlipidemia Mother    Hypertension Maternal Grandmother, Paternal Grandmother    Lung cancer Father    No Known Problems Brother        Tobacco Use    Smoking status: Former Smoker     Packs/day: 0.50     Types: Cigarettes    Smokeless tobacco: Never Used    Tobacco comment: quit smoking 5 years ago   Substance and Sexual Activity    Alcohol use: No    Drug use: No     Types: Cocaine, Marijuana     Comment: last use for cocaine at age 17; 2 years ago last use for THC    Sexual activity: Not Currently     Review of Systems   Constitutional: Negative for fever.   Eyes: Negative for visual disturbance.   Respiratory: Positive for shortness of breath. Negative for cough and wheezing.    Cardiovascular: Positive for leg swelling. Negative for chest pain.   Gastrointestinal: Negative for abdominal pain.   Genitourinary: Negative for difficulty urinating.   Musculoskeletal: Negative for arthralgias.   Skin: Negative for rash.   Allergic/Immunologic: Positive for immunocompromised state.   Neurological: Negative for syncope.   Hematological: Does not bruise/bleed easily.     Objective:     Vital Signs (Most Recent):  Temp: 98.2 °F (36.8 °C) (12/28/19 1735)  Pulse: 83 (12/28/19 2200)  Resp: (!) 26 (12/28/19 2030)  BP: (!) 172/96 (12/28/19 2200)  SpO2: 95 % (12/28/19 2200) Vital Signs (24h Range):  Temp:  [98.2 °F (36.8 °C)] 98.2 °F (36.8 °C)  Pulse:  [81-86] 83  Resp:  [15-26] 26  SpO2:  [94 %-97 %] 95 %  BP: (155-178)/() 172/96     Weight: 77.8 kg (171 lb 8.3 oz)  Body mass index is 26.08 kg/m².    Physical Exam   Constitutional: He is oriented to person, place, and time. No distress.   Thin appearing     HENT:   Head: Atraumatic.   Eyes: Pupils are equal, round, and reactive to light. EOM are normal.   Neck: JVD present.   Cardiovascular: Normal  rate, regular rhythm and normal heart sounds.   No murmur heard.  Pulmonary/Chest: Effort normal. He has rales.   Abdominal: Soft. Bowel sounds are normal. There is no tenderness.   Musculoskeletal: Normal range of motion. He exhibits edema. He exhibits no deformity.   Neurological: He is alert and oriented to person, place, and time. No cranial nerve deficit. Coordination normal.   Skin: Skin is warm and dry. Rash noted.   Kaposi rash on lower right abdominal quadrant, extends to inguinal canal and thigh.    Psychiatric: He has a normal mood and affect.   Nursing note and vitals reviewed.        CRANIAL NERVES     CN III, IV, VI   Pupils are equal, round, and reactive to light.  Extraocular motions are normal.        Significant Labs:   CBC:   Recent Labs   Lab 12/27/19  0727 12/28/19  1759   WBC 6.95 6.77   HGB 6.6* 9.4*   HCT 22.4* 30.7*    192     CMP:   Recent Labs   Lab 12/27/19  0727 12/28/19  1759    144   K 4.7 5.6*    108   CO2 30* 25    97   BUN 55* 52*   CREATININE 4.3* 4.3*   CALCIUM 8.8 8.8   PROT  --  6.7   ALBUMIN 3.3* 3.7   BILITOT  --  1.0   ALKPHOS  --  138*   AST  --  38   ALT  --  47*   ANIONGAP 8 11   EGFRNONAA 16.1* 16.1*       Significant Imaging: I have reviewed and interpreted all pertinent imaging results/findings within the past 24 hours.

## 2019-12-29 NOTE — HOSPITAL COURSE
Admitted to Hospital Medicine for evaluation and management of LEFTY and CHF exacerbation. Started diuresis with lasix 80mg IV BID, with good UOP. KTM consulted and initially agreed with continued diuresis. Switched to PO lasix on 12/31; however, he continued to have worsening Cr (4.4 with baseline of 3). Per KTM, volume status was unclear; however, he had no improvement of Cr after holding lasix . Repeat BNP improved from 1200 -->200s, FeUrea consistent with intrinsic renal disease, and TTE unchanged from prior (EF40% with constrictive cardiomyopathy). Upon reeveal by KTM - may be patients new baseline Cr. He was then discharged home. Told to follow up with cardiology for lasix management and possible RHC. Will also have close follow up with PCP and KTM.

## 2019-12-29 NOTE — PLAN OF CARE
Pt AAOX4, complaints of headache managed with tylenol. Free of falls and injuries. Pt received IV Lasixs per orders. Tele monitoring D/c per orders. See previous notes, will continue to monitor.     Problem: Fall Injury Risk  Goal: Absence of Fall and Fall-Related Injury  Outcome: Ongoing, Progressing  Intervention: Identify and Manage Contributors to Fall Injury Risk  Flowsheets (Taken 12/29/2019 1719)  Self-Care Promotion: independence encouraged; BADL personal objects within reach; BADL personal routines maintained  Medication Review/Management: medications reviewed  Intervention: Promote Injury-Free Environment  Flowsheets (Taken 12/29/2019 1719)  Safety Promotion/Fall Prevention: assistive device/personal item within reach; bed alarm set; commode/urinal/bedpan at bedside; diversional activities provided; Fall Risk reviewed with patient/family; Fall Risk signage in place; family to remain at bedside; lighting adjusted; medications reviewed; nonskid shoes/socks when out of bed; side rails raised x 2  Environmental Safety Modification: assistive device/personal items within reach; clutter free environment maintained; lighting adjusted; room organization consistent     Problem: Adult Inpatient Plan of Care  Goal: Plan of Care Review  Outcome: Ongoing, Progressing  Flowsheets (Taken 12/29/2019 1719)  Plan of Care Reviewed With: patient  Goal: Patient-Specific Goal (Individualization)  Outcome: Ongoing, Progressing  Goal: Absence of Hospital-Acquired Illness or Injury  Outcome: Ongoing, Progressing  Intervention: Identify and Manage Fall Risk  Flowsheets (Taken 12/29/2019 1719)  Safety Promotion/Fall Prevention: assistive device/personal item within reach; bed alarm set; commode/urinal/bedpan at bedside; diversional activities provided; Fall Risk reviewed with patient/family; Fall Risk signage in place; family to remain at bedside; lighting adjusted; medications reviewed; nonskid shoes/socks when out of bed; side  rails raised x 2  Intervention: Prevent VTE (venous thromboembolism)  Flowsheets (Taken 12/29/2019 1719)  VTE Prevention/Management: ambulation promoted; bleeding precautions maintained; bleeding risk assessed; bleeding risk factor(s) identified, provider notified  Goal: Optimal Comfort and Wellbeing  Outcome: Ongoing, Progressing  Intervention: Provide Person-Centered Care  Flowsheets (Taken 12/29/2019 1719)  Trust Relationship/Rapport: care explained; choices provided; emotional support provided; questions encouraged; questions answered; thoughts/feelings acknowledged; empathic listening provided; reassurance provided  Goal: Readiness for Transition of Care  Outcome: Ongoing, Progressing  Goal: Rounds/Family Conference  Outcome: Ongoing, Progressing     Problem: Infection  Goal: Infection Symptom Resolution  Outcome: Ongoing, Progressing  Intervention: Prevent or Manage Infection  Flowsheets (Taken 12/29/2019 1719)  Fever Reduction/Comfort Measures: lightweight bedding; lightweight clothing  Infection Management: aseptic technique maintained  Isolation Precautions: protective environment maintained

## 2019-12-29 NOTE — ED PROVIDER NOTES
"Encounter Date: 12/28/2019       History     Chief Complaint   Patient presents with    Shortness of Breath     Hx of CHF and kidney transplant. Received 2 units of PRBCs yesterday and now reports SOB.      40 yo m, h/o CHF, AIDS, renal transplant, HTN, c/o SOB x 1 day, moderate-severe, s/p transfusion of 2 U PRBCs yesterday (had labs drawn yesterday, Hgb 6.6, creatinine 4.3).  Reports taking lasix 80 mg daily but has not taken dose today.  Increased LE edema.    The history is provided by the patient.     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 Hematoma evacuated.(09/09)    Status post exploratory laparotomy 9/10/2017    Re explored for retroperitoneal hematoma. Hematoma evacuated.(09/09)    TB lung, latent     tx INH 2006     Past Surgical History:   Procedure Laterality Date    BIOPSY N/A 8/28/2018    Procedure: BIOPSY Tranplanted Kidney;  Surgeon: [facility] Diagnostic Provider;  Location: [facility] OR [address] [place] [address];  Service: Radiology;  Laterality: N/A;    EXPLORATORY LAPAROTOMY W/ BOWEL RESECTION      NO BOWEL RESECTION, UNKNOWN DETAILS, "Pancreas Infection"    INSERTION OF TUNNELED CENTRAL VENOUS CATHETER (CVC) WITH SUBCUTANEOUS PORT Left 8/19/2019    Procedure: [id number];  Surgeon: Megha Monreal MD;  Location: [facility] OR [address] [place] [address];  Service: General;  Laterality: Left;    INSERTION OF TUNNELED CENTRAL VENOUS CATHETER (CVC) WITH SUBCUTANEOUS PORT N/A 9/12/2019    Procedure: [id number];  Surgeon: [facility] Diagnostic Provider;  Location: [facility] OR " 2ND FLR;  Service: Radiology;  Laterality: N/A;  189  port removal and port placement    KIDNEY TRANSPLANT      peritoneal dialysis catheter placement       Family History   Problem Relation Age of Onset    Hyperlipidemia Mother     Cancer Father         lung    Lung cancer Father     No Known Problems Brother     Diabetes Maternal Aunt     Diabetes Maternal Uncle     Hypertension Maternal Grandmother     Hypertension Paternal Grandmother     Heart disease Paternal Grandmother      Social History     Tobacco Use    Smoking status: Former Smoker     Packs/day: 0.50     Types: Cigarettes    Smokeless tobacco: Never Used    Tobacco comment: quit smoking 5 years ago   Substance Use Topics    Alcohol use: No    Drug use: No     Types: Cocaine, Marijuana     Comment: last use for cocaine at age 17; 2 years ago last use for THC     Review of Systems   Constitutional: Negative for chills and fever.   Respiratory: Positive for shortness of breath. Negative for chest tightness.    Cardiovascular: Positive for leg swelling. Negative for chest pain.   Neurological: Negative for headaches.   All other systems reviewed and are negative.      Physical Exam     Initial Vitals [12/28/19 1735]   BP Pulse Resp Temp SpO2   (!) 155/88 84 16 98.2 °F (36.8 °C) (!) 94 %      MAP       --         Physical Exam    Nursing note and vitals reviewed.  Constitutional: He appears well-developed and well-nourished.  Non-toxic appearance. He does not appear ill. No distress.   HENT:   Mouth/Throat: Oropharynx is clear and moist.   Neck: Normal range of motion. Neck supple.   Cardiovascular: Normal rate and regular rhythm.   No murmur heard.  Pulmonary/Chest: Effort normal. He has decreased breath sounds. He has rales in the right middle field, the right lower field, the left middle field and the left lower field.   Abdominal: Soft. He exhibits no distension. There is no tenderness.   Musculoskeletal:        Right lower leg: He  exhibits edema.        Left lower leg: He exhibits edema.   Neurological: He is alert.   Skin: Skin is warm and dry.   Psychiatric: He has a normal mood and affect. His behavior is normal.         ED Course   Procedures  Labs Reviewed   CBC W/ AUTO DIFFERENTIAL - Abnormal; Notable for the following components:       Result Value    RBC 3.75 (*)     Hemoglobin 9.4 (*)     Hematocrit 30.7 (*)     Mean Corpuscular Hemoglobin 25.1 (*)     Mean Corpuscular Hemoglobin Conc 30.6 (*)     RDW 18.3 (*)     Lymph # 0.9 (*)     Gran% 81.6 (*)     Lymph% 12.7 (*)     All other components within normal limits   COMPREHENSIVE METABOLIC PANEL - Abnormal; Notable for the following components:    Potassium 5.6 (*)     BUN, Bld 52 (*)     Creatinine 4.3 (*)     Alkaline Phosphatase 138 (*)     ALT 47 (*)     eGFR if  18.6 (*)     eGFR if non  16.1 (*)     All other components within normal limits   B-TYPE NATRIURETIC PEPTIDE - Abnormal; Notable for the following components:    BNP 1,266 (*)     All other components within normal limits   TROPONIN I   PROTIME-INR   POCT GLUCOSE, HAND-HELD DEVICE   TYPE & SCREEN        ECG Results          EKG 12-lead (Final result)  Result time 12/29/19 13:06:08    Final result by Interface, Lab In [facility] (12/29/19 13:06:08)                 Narrative:    Test Reason : R06.02,    Vent. Rate : 085 BPM     Atrial Rate : 085 BPM     P-R Int : 164 ms          QRS Dur : 090 ms      QT Int : 386 ms       P-R-T Axes : 049 -01 053 degrees     QTc Int : 459 ms    Normal sinus rhythm  Possible Left atrial enlargement  Borderline Abnormal ECG  When compared with ECG of 17-DEC-2019 10:10,  No significant change was found  Confirmed by Cyrus Hi MD ([id number]) on 12/29/2019 1:05:59 PM    Referred By: JAMAL   SELF           Confirmed By:Cyrus Hi MD                            Imaging Results          US Transplant Kidney With Doppler (Final result)  Result time 12/29/19  03:46:27    Final result by Gorge Royal MD (12/29/19 03:46:27)                 Impression:      Satisfactory sonographic appearance of the renal transplant.    Minimal transplant hydronephrosis, though improved when compared with the prior study.    Trace free fluid within the pelvis.    Electronically signed by resident: Veronica Pal  Date:    12/29/2019  Time:    02:07    Electronically signed by: Gorge Royal MD  Date:    12/29/2019  Time:    03:46             Narrative:    EXAMINATION:  US TRANSPLANT KIDNEY WITH DOPPLER    CLINICAL HISTORY:  worsening renal function;    TECHNIQUE:  Transplant renal ultrasound of the right lower quadrant with color flow doppler and duplex analysis.    COMPARISON:  Ultrasound kidney transplant 07/17/2019.    FINDINGS:  The right lower quadrant renal allograft measures 12.4 and demonstrates no focal parenchymal abnormality. Minimal transplant hydronephrosis, though improved when compared with the prior study.  No peritransplant fluid.    Renal arterial resistive indices are as follows: Interlobar 0.69, segmental Up 0.69, segmental Mid 0.73, segmental Low 0.71.  There is no evidence of a tardus parvus waveform.  There are 2 renal arteries with maximum velocities measuring 182 and 138  cm/sec.  Renal artery to iliac ratio is 0.96.    The renal transplant venous system is unremarkable.  Trace free fluid noted within the right lower quadrant.                               X-Ray Chest AP Portable (Final result)  Result time 12/28/19 18:26:54    Final result by Vinny Jane MD (12/28/19 18:26:54)                 Impression:      1. Findings suggesting edema, right greater than left.  Developing right mid lung zone to lower lung zone consolidation not entirely excluded, correlation is advised.      Electronically signed by: Vinny Jane MD  Date:    12/28/2019  Time:    18:26             Narrative:    EXAMINATION:  XR CHEST AP PORTABLE    CLINICAL  HISTORY:  CHF;    TECHNIQUE:  Single frontal view of the chest was performed.    COMPARISON:  12/10/2019    FINDINGS:  Left chest wall port catheter tip stable.  The cardiomediastinal silhouette is enlarged, similar to the previous exam.  There is no pleural effusion.  The trachea is midline.  The lungs are symmetrically expanded bilaterally with prominent interstitial attenuation bilaterally, right greater than left.  No large focal consolidation seen.  There is no pneumothorax.  The osseous structures are unremarkable.                                 Medical Decision Making:   History:   Old Medical Records: I decided to obtain old medical records.  Initial Assessment:   40 yo m, complex PMH as above, now here with increasing SOB/MARY, mild hypoxia, diffuse rales on lung exam, s/p transfuion yesterday      Differential Diagnosis:   Suspect volume overload 2/2 CHF exacerbation in setting of transfusion    In addition, pt with rising creatinine yesterday in setting of heart failure and renal transplant  Clinical Tests:   Lab Tests: Ordered and Reviewed  Radiological Study: Reviewed and Ordered  Medical Tests: Ordered and Reviewed  ED Management:  Repeat labs  Lasix 80 mg  CXR  Likely renal ultrasound  admission              Attending Attestation:         Attending Critical Care:   Critical Care Times:   ==============================================================  · Total Critical Care Time - exclusive of procedural time: 30 minutes.  ==============================================================  Critical care was necessary to treat or prevent imminent or life-threatening deterioration of the following conditions: congestive heart failure and renal failure.   Critical care was time spent personally by me on the following activities: obtaining history from patient or relative, examination of patient, review of x-rays / CT sent with the patient, review of old charts, ordering lab, x-rays, and/or EKG, development of  treatment plan with patient or relative, ordering and performing treatments and interventions, evaluation of patient's response to treatment and re-evaluation of patient's conition.   Critical Care Condition: potentially life-threatening                             Clinical Impression:       ICD-10-CM ICD-9-CM   1. Acute on chronic congestive heart failure, unspecified heart failure type I50.9 428.0   2. Shortness of breath R06.02 786.05                             Halie Mario MD  12/30/19 0707

## 2019-12-29 NOTE — NURSING
Upon arrival from the ED I attempted to do an assessment on a rash around his genitals  and edema on the right leg. Pt refused to remove his sweat pants. Mother at the bedside. Pt has a documented rash and edema. Tried to reassure the Pt but he refused.

## 2019-12-29 NOTE — NURSING
Report called to receiving nurse.     Tele box [id number] applied to pt. tech in war room states able to see pt on monitor, rhythm NSR with HR 94.

## 2019-12-29 NOTE — H&P
[facility] Hospital Medicine  History & Physical    Patient Name: Demetrio Aguiar  MRN: [id number]  Admission Date: 12/28/2019  Attending Physician: Uzair Vicente MD   Primary Care Provider: Primary Doctor [facility] Medicine Team: [facility] HOSP MED 1 Tacho Osorio MD     Patient information was obtained from patient and ER records.     Subjective:     Principal Problem:Acute on chronic congestive heart failure    Chief Complaint:   Chief Complaint   Patient presents with    Shortness of Breath     Hx of CHF and kidney transplant. Received 2 units of PRBCs yesterday and now reports SOB.         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 Parnell w/ Heme/Onc) s/p doxorubicin x 4 now on Adriamycin q3w (for symptomatic metastatic disease), prox R. subclavian DVT 11/13/19 on Eliquis, and Renovascular HTN who presents with acute on chronic SOB and volume overload following a 2 unit blood transfusion 12/27/19 (for Hb 6.7) in the setting of progressive LEFTY on CKD 3 since August 2019 and HFpEF (55 to 40% with LV dysfunction, PET stress negative).    The patient reports he has chronic SOB and issues with volume management since August. On review of his records, he has been seen by nephrology during inpatient admissions and he his noted to have progression of LEFTY on CKD3 for several months. He does make urine, and initially was taking 40 mg of po lasix twice a day. Due to ongoing volume management issues, he was seen by Dr Mejias (cardiology) and was advised to optimize goal directed therapy (Beta blocker, Entresto, and ISDN-hydralazine) in addition to increase lasix to 80mg by mouth twice a day. The patient ended up not taking the entresto and continued to take the lasix 40mg twice a day.     The patient then was seen by KT on [facility]  "for follow up and on his way back home to [place], [place], he was called by nephrology coordinator to report to the ED immediately due to a Hb of 6.7. He went to an ED in [place], where he was tranfused 2 U pRBC. He was seen by nephrology at that time in [place], and was apparently told that "his kidney's are failing" and he needs to come to [facility].     Following the blood transfusion, the patient reports an acute increase in SOB and orthopnea. Because of the acute change in breathing, and advise of nephrologist in [place], he presented to [facility] [place].     The patient is on HAART therapy, he reports compliance. Seen by Dr. Miramontes outpatient.   He denies any chest pain, palpitations, syncope. On Coreg 25mg, amlodipine 10 mg, hydralazine, and entresto at home (he has not been taking entresto).     He does make urine. No recent blood in stool or melena.   No fevers, no new cough, no sick contacts. He reports other than chronic volume issues, he has had no new symptoms.   Seen by Dr. Mejias outpatient, goal weight is about 160 lbs.       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 Hematoma evacuated.(09/09)    Status post exploratory laparotomy 9/10/2017    Re explored for retroperitoneal hematoma. Hematoma evacuated.(09/09)    TB lung, latent     tx INH 2006       Past Surgical History:   Procedure Laterality Date    BIOPSY N/A 8/28/2018    Procedure: BIOPSY Tranplanted Kidney;  Surgeon: Charly Diagnostic Provider;  Location: [facility] OR [address] [place] [address];  Service: Radiology;  Laterality: N/A;    EXPLORATORY LAPAROTOMY W/ BOWEL RESECTION      NO BOWEL " "RESECTION, UNKNOWN DETAILS, "Pancreas Infection"    INSERTION OF TUNNELED CENTRAL VENOUS CATHETER (CVC) WITH SUBCUTANEOUS PORT Left 8/19/2019    Procedure: [id number];  Surgeon: Megha Monreal MD;  Location: [facility] OR [address] [place] [address];  Service: General;  Laterality: Left;    INSERTION OF TUNNELED CENTRAL VENOUS CATHETER (CVC) WITH SUBCUTANEOUS PORT N/A 9/12/2019    Procedure: [id number];  Surgeon: Charly Diagnostic Provider;  Location: [facility] OR [address] [place] [address];  Service: Radiology;  Laterality: N/A;  189  port removal and port placement    KIDNEY TRANSPLANT      peritoneal dialysis catheter placement         Review of patient's allergies indicates:  No Known Allergies    No current facility-administered medications on file prior to encounter.      Current Outpatient Medications on File Prior to Encounter   Medication Sig    amLODIPine (NORVASC) 10 MG tablet Take 1 tablet (10 mg total) by mouth once daily.    apixaban (ELIQUIS) 5 mg Tab Take 1 tablet (5 mg total) by mouth 2 (two) times daily.    atovaquone (MEPRON) 750 mg/5 mL Susp Take 10 mLs (1,500 mg total) by mouth once daily.    calcium carbonate (TUMS) 200 mg calcium (500 mg) chewable tablet Take 1 tablet (500 mg total) by mouth once daily.    carvedilol (COREG) 25 MG tablet Take 1 tablet (25 mg total) by mouth 2 (two) times daily with meals.    cinacalcet (SENSIPAR) 60 MG Tab Take 1 tablet (60 mg total) by mouth daily with breakfast.    dolutegravir (TIVICAY) 50 mg Tab Take 1 tablet (50 mg total) by mouth once daily.    emtricitabine-tenofovir alafen (DESCOVY) 200-25 mg Tab Take 1 tablet by mouth once daily.    famotidine (PEPCID) 20 MG tablet TAKE 1 TABLET (20MG TOTAL) BY MOUTH EACH EVENING    ferrous sulfate (FEOSOL) 325 mg (65 mg iron) Tab tablet Take 1 tablet (325 mg total) by mouth 2 (two) times daily.    furosemide (LASIX) 40 MG tablet Take 0.5 tablets (20 mg total) by mouth once daily. Please take once daily. Weigh yourself " and if any weight changes, please contact your transplant doctors. Thank you    HYDROcodone-acetaminophen (NORCO) 5-325 mg per tablet Take 1 tablet by mouth every 4 (four) hours as needed for Pain.    isosorbide-hydrALAZINE 20-37.5 mg (BIDIL) 20-37.5 mg Tab Take 1 tablet by mouth 3 (three) times daily.    multivitamin (THERAGRAN) tablet Take 1 tablet by mouth once daily.    patiromer calcium sorbitex (VELTASSA) 25.2 gram PwPk Take 1 packet (25.2 g total) by mouth once daily.    predniSONE (DELTASONE) 5 MG tablet TAKE ONE TABLET BY MOUTH EVERY DAY    rosuvastatin (CRESTOR) 20 MG tablet Take 1 tablet (20 mg total) by mouth once daily.    sacubitril-valsartan (ENTRESTO) 24-26 mg per tablet Take 1 tablet by mouth 2 (two) times daily.    sevelamer carbonate (RENVELA) 800 mg Tab Take 1 tablet (800 mg total) by mouth 3 (three) times daily with meals.    sirolimus (RAPAMUNE) 2 MG tablet Take 1 tablet (2 mg total) by mouth once daily.    sodium bicarbonate 650 MG tablet Take 3 tablets (1,950 mg total) by mouth 3 (three) times daily.     Family History     Problem Relation (Age of Onset)    Cancer Father    Diabetes Maternal Aunt, Maternal Uncle    Heart disease Paternal Grandmother    Hyperlipidemia Mother    Hypertension Maternal Grandmother, Paternal Grandmother    Lung cancer Father    No Known Problems Brother        Tobacco Use    Smoking status: Former Smoker     Packs/day: 0.50     Types: Cigarettes    Smokeless tobacco: Never Used    Tobacco comment: quit smoking 5 years ago   Substance and Sexual Activity    Alcohol use: No    Drug use: No     Types: Cocaine, Marijuana     Comment: last use for cocaine at age 17; 2 years ago last use for THC    Sexual activity: Not Currently     Review of Systems   Constitutional: Negative for fever.   Eyes: Negative for visual disturbance.   Respiratory: Positive for shortness of breath. Negative for cough and wheezing.    Cardiovascular: Positive for leg swelling.  Negative for chest pain.   Gastrointestinal: Negative for abdominal pain.   Genitourinary: Negative for difficulty urinating.   Musculoskeletal: Negative for arthralgias.   Skin: Negative for rash.   Allergic/Immunologic: Positive for immunocompromised state.   Neurological: Negative for syncope.   Hematological: Does not bruise/bleed easily.     Objective:     Vital Signs (Most Recent):  Temp: 98.2 °F (36.8 °C) (12/28/19 1735)  Pulse: 83 (12/28/19 2200)  Resp: (!) 26 (12/28/19 2030)  BP: (!) 172/96 (12/28/19 2200)  SpO2: 95 % (12/28/19 2200) Vital Signs (24h Range):  Temp:  [98.2 °F (36.8 °C)] 98.2 °F (36.8 °C)  Pulse:  [81-86] 83  Resp:  [15-26] 26  SpO2:  [94 %-97 %] 95 %  BP: (155-178)/() 172/96     Weight: 77.8 kg (171 lb 8.3 oz)  Body mass index is 26.08 kg/m².    Physical Exam   Constitutional: He is oriented to person, place, and time. No distress.   Thin appearing     HENT:   Head: Atraumatic.   Eyes: Pupils are equal, round, and reactive to light. EOM are normal.   Neck: JVD present.   Cardiovascular: Normal rate, regular rhythm and normal heart sounds.   No murmur heard.  Pulmonary/Chest: Effort normal. He has rales.   Abdominal: Soft. Bowel sounds are normal. There is no tenderness.   Musculoskeletal: Normal range of motion. He exhibits edema. He exhibits no deformity.   Neurological: He is alert and oriented to person, place, and time. No cranial nerve deficit. Coordination normal.   Skin: Skin is warm and dry. Rash noted.   Kaposi rash on lower right abdominal quadrant, extends to inguinal canal and thigh.    Psychiatric: He has a normal mood and affect.   Nursing note and vitals reviewed.        CRANIAL NERVES     CN III, IV, VI   Pupils are equal, round, and reactive to light.  Extraocular motions are normal.        Significant Labs:   CBC:   Recent Labs   Lab 12/27/19  0727 12/28/19  1759   WBC 6.95 6.77   HGB 6.6* 9.4*   HCT 22.4* 30.7*    192     CMP:   Recent Labs   Lab  12/27/19  0727 12/28/19  1759    144   K 4.7 5.6*    108   CO2 30* 25    97   BUN 55* 52*   CREATININE 4.3* 4.3*   CALCIUM 8.8 8.8   PROT  --  6.7   ALBUMIN 3.3* 3.7   BILITOT  --  1.0   ALKPHOS  --  138*   AST  --  38   ALT  --  47*   ANIONGAP 8 11   EGFRNONAA 16.1* 16.1*       Significant Imaging: I have reviewed and interpreted all pertinent imaging results/findings within the past 24 hours.    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 Parnell w/ Heme/Onc) s/p doxorubicin x 4 now on Adriamycin q3w (for symptomatic metastatic disease), prox R. subclavian DVT 11/13/19 on Eliquis, and Renovascular HTN who presents with acute on chronic respiratory failure 2/2 volume overload following a 2 unit blood transfusion 12/27/19 (for Hb 6.7) in the setting of progressive, subacute LEFTY on CKD 3 in addition to HFpEF (40% with LV dysfunction, PET stress negative).    Assessment:  1. CHF exacerbation HFpEF- BNP 1266 (not currently taking entresto), on room air.   2. Progressive subacute LEFTY on CKD3- making urine w/ lasix  3. Volume overload 2/2 RBC transfusion/TACO- on room air, appears hypervolemic  4. Immune compromised due to HIV on HAART- No fevers, no evidence of infection. See #5.  5. Kaposi Sarcoma- Chronic, outpatient management  6. Renovascular HTN- Elevated but stable and no end organ damage  7. Prox R. subclavian DVT 11/13/19 on Eliquis      Plan:  - Cautious diuresis. Lasix 80mg IVP given in ED with 500 cc UOP so far, with some improvement in symptoms. Still orthopneic, significant crackles, JVD and with significant lower extrem edema, but on room air. Will give another 80 IVP lasix with reevaluation in the AM.   - Hold beta blocker. Continue entresto and ISDN/hydralazine.   - Last ECHO  assessment was 1 month ago. No new symptoms to suggest preceding cardiac event, will hold off on ECHO as do not think will provide additional information at this time.   - KTM consulted, for immunesuppression and transplant patient. Hold sirolimus, levels in AM. Continue prednisone.   - Anemia likely chronic and related to CKD and anemic chronic disease. Will discuss if needs EPO, already on iron supp at home.       Acute rejection of kidney transplant  US transplanted kidney pending        Acute DVT (deep venous thrombosis)  Continue Eliquis       Renovascular hypertension  Continue ISDN/hydralazine and entresto for now  Cautious diuresis       donor kidney transplant 2017  Continue prednisone  Sirolimus level pending in AM    Anemia in stage 3 chronic kidney disease  Chronic    AIDS (acquired immunodeficiency syndrome), CD4 <=200  Continue HAART therapy  Continue atovaquone for PCP ppx      VTE Risk Mitigation (From admission, onward)         Ordered     apixaban tablet 5 mg  2 times daily      19     IP VTE HIGH RISK PATIENT  Once      19     Place GEETA hose  Until discontinued      19                   Tacho Osorio MD  Department of Hospital Medicine   [facility]

## 2019-12-29 NOTE — CONSULTS
Demetrio Aguiar is a 39 y.o. male for whom KTM consult has been requested to evaluate and give opinion.     HPI:    39 yr old male with a kidney transplant 8/2017, HIV and Kaposi Sarcoma. He is on sirolimus for immunosuppression. Patient is also on chemotherapy including doxarubicin for KS followed by hematology. Also had a DVT on Apixiban and systolic heart failure with ER 40% with global hypokinesis. Recent PET scan showed no ischemia. Patient saw cardiology on 12/27 with some wt gain leg edema and some dyspnea with mild exertion. Also was found with severe anemia. Our coordinator called patient on Friday and directed him to go to a local ER. Patient went to a local hospital where he was transfused and sent to [facility] for further management.   Today I saw him his has some dyspnea but feels better after the blood transfusion, lower extremeties edema and basilar crackles.     PAST MEDICAL HISTORY:  He  has a past medical history of Anemia, At risk for opportunistic infections, Delayed graft function of kidney, ESRD secondary to HIVAN s/p DDRT 8/18/17, HIV infection, HIV nephropathy, Hyperlipidemia, Hypertension, Need for prophylactic immunotherapy, S/P kidney transplant 8/18/2017 (8/28/2017), Status post exploratory laparotomy (9/10/2017), Status post exploratory laparotomy (9/10/2017), and TB lung, latent.    PAST SURGICAL HISTORY:  He  has a past surgical history that includes peritoneal dialysis catheter placement; Exploratory laparotomy w/ bowel resection; Kidney transplant; Biopsy (N/A, 8/28/2018); Insertion of tunneled central venous catheter (CVC) with subcutaneous port (Left, 8/19/2019); and Insertion of tunneled central venous catheter (CVC) with subcutaneous port (N/A, 9/12/2019).    SOCIAL HISTORY:  He  reports that he has quit smoking. His smoking use included cigarettes. He smoked 0.50 packs per day. He has never used smokeless tobacco. He reports that he does not drink alcohol or use drugs.    FAMILY  MEDICAL HISTORY:  His family history includes Cancer in his father; Diabetes in his maternal aunt and maternal uncle; Heart disease in his paternal grandmother; Hyperlipidemia in his mother; Hypertension in his maternal grandmother and paternal grandmother; Lung cancer in his father; No Known Problems in his brother.    Review of patient's allergies indicates:  No Known Allergies     amLODIPine  10 mg Oral Daily    apixaban  5 mg Oral BID    atovaquone  1,500 mg Oral Daily    cinacalcet  60 mg Oral Daily with breakfast    dolutegravir  50 mg Oral Daily    emtricitabine-tenofovir alafen  1 tablet Oral Daily    famotidine  20 mg Oral Daily    furosemide  80 mg Intravenous Once    isosorbide-hydrALAZINE 20-37.5 mg  1 tablet Oral TID    multivitamin  1 tablet Oral Daily    predniSONE  5 mg Oral Daily    rosuvastatin  10 mg Oral Daily    sacubitril-valsartan  1 tablet Oral BID    sevelamer carbonate  800 mg Oral TID WM    sodium bicarbonate  1,950 mg Oral TID       Prior to Admission medications    Medication Sig Start Date End Date Taking? Authorizing Provider   amLODIPine (NORVASC) 10 MG tablet Take 1 tablet (10 mg total) by mouth once daily. 12/18/19 12/17/20 Yes Alfredito Simpson MD   apixaban (ELIQUIS) 5 mg Tab Take 1 tablet (5 mg total) by mouth 2 (two) times daily. 12/17/19  Yes Alfredito Simpson MD   atovaquone (MEPRON) 750 mg/5 mL Susp Take 10 mLs (1,500 mg total) by mouth once daily. 12/17/19 1/16/20 Yes Alfredito Simpson MD   calcium carbonate (TUMS) 200 mg calcium (500 mg) chewable tablet Take 1 tablet (500 mg total) by mouth once daily. 9/13/19 9/12/20 Yes Jase Hines MD   carvedilol (COREG) 25 MG tablet Take 1 tablet (25 mg total) by mouth 2 (two) times daily with meals. 12/17/19  Yes Alfredito Simpson MD   cinacalcet (SENSIPAR) 60 MG Tab Take 1 tablet (60 mg total) by mouth daily with breakfast. 12/17/19  Yes Alfredito Simpson MD   dolutegravir (TIVICAY) 50 mg Tab Take 1 tablet (50 mg  total) by mouth once daily. 8/23/18  Yes Jase Hines MD   emtricitabine-tenofovir alafen (DESCOVY) 200-25 mg Tab Take 1 tablet by mouth once daily. 7/22/19  Yes Tae Qiu MD   famotidine (PEPCID) 20 MG tablet TAKE 1 TABLET (20MG TOTAL) BY MOUTH EACH EVENING 6/24/19  Yes Lucía Polk MD   ferrous sulfate (FEOSOL) 325 mg (65 mg iron) Tab tablet Take 1 tablet (325 mg total) by mouth 2 (two) times daily. 12/17/19  Yes Alfredito Simpson MD   furosemide (LASIX) 40 MG tablet Take 0.5 tablets (20 mg total) by mouth once daily. Please take once daily. Weigh yourself and if any weight changes, please contact your transplant doctors. Thank you 12/17/19  Yes Alfredito Simpson MD   HYDROcodone-acetaminophen (NORCO) 5-325 mg per tablet Take 1 tablet by mouth every 4 (four) hours as needed for Pain. 8/19/19  Yes Harjeet Edwards MD   isosorbide-hydrALAZINE 20-37.5 mg (BIDIL) 20-37.5 mg Tab Take 1 tablet by mouth 3 (three) times daily. 12/17/19 12/16/20 Yes Alfredito Simpson MD   multivitamin (THERAGRAN) tablet Take 1 tablet by mouth once daily. 8/18/18  Yes Sayda Gross MD   patiromer calcium sorbitex (VELTASSA) 25.2 gram PwPk Take 1 packet (25.2 g total) by mouth once daily. 11/19/19 10/14/20 Yes Tacho Cline MD   predniSONE (DELTASONE) 5 MG tablet TAKE ONE TABLET BY MOUTH EVERY DAY 11/5/19  Yes Sayda Gross MD   rosuvastatin (CRESTOR) 20 MG tablet Take 1 tablet (20 mg total) by mouth once daily. 12/17/19  Yes Alfredito Simpson MD   sacubitril-valsartan (ENTRESTO) 24-26 mg per tablet Take 1 tablet by mouth 2 (two) times daily. 12/17/19  Yes Alfredito Simpson MD   sevelamer carbonate (RENVELA) 800 mg Tab Take 1 tablet (800 mg total) by mouth 3 (three) times daily with meals. 11/19/19 11/18/20 Yes Tacho Cline MD   sirolimus (RAPAMUNE) 2 MG tablet Take 1 tablet (2 mg total) by mouth once daily. 12/27/19 12/26/20 Yes Sayda Gross MD   sodium bicarbonate 650 MG tablet  "Take 3 tablets (1,950 mg total) by mouth 3 (three) times daily. 8/23/18 11/12/19  Jase Hines MD        REVIEW OF SYSTEMS:  Patient has no fever, fatigue, visual changes, chest pain, edema, cough, dyspnea, nausea, vomiting, constipation, diarrhea, arthralgias, pruritis, dizziness, weakness, depression, confusion.      Intake/Output Summary (Last 24 hours) at 12/29/2019 0927  Last data filed at 12/28/2019 2230  Gross per 24 hour   Intake 250 ml   Output 580 ml   Net -330 ml       PHYSICAL EXAM:   height is 5' 8" (1.727 m) and weight is 77.8 kg (171 lb 8.3 oz). His oral temperature is 97.3 °F (36.3 °C). His blood pressure is 162/100 (abnormal) and his pulse is 82. His respiration is 18 and oxygen saturation is 98%.   Gen: WDWN male in no apparent distress  Psych: Normal mood and affect  Skin: No rashes or ulcers  Eyes: Normal conjunctiva and lids, PERRLA  ENT: Normal hearing with no oropharyngeal lesions  Neck: No JVD  Chest: Clear with no rales, rhonchi, wheezing with normal effort  CV: Regular with no murmurs, gallops or rubs  Abd: Soft, nontender, no distension, positive bowel sounds  Ext: No cyanosis, clubbing or edema    Recent Labs   Lab 12/27/19  0727 12/28/19  1759    144   K 4.7 5.6*    108   CO2 30* 25   BUN 55* 52*   CREATININE 4.3* 4.3*   CALCIUM 8.8 8.8   PHOS 4.2  --      Recent Labs   Lab 12/27/19  0727 12/28/19  1759   WBC 6.95 6.77   HGB 6.6* 9.4*   HCT 22.4* 30.7*    192       IMPRESSION AND RECOMMENDATIONS:    ckd stage 3  LEFTY  HIV  Immunosuppressed  Kaposi Sarcoma as per hematology a  High risk immunosuppressive medication needing close monitoring  Systolic heart failure associated to chemotherapy  Acute anemia, s/p a blood transfusion        Suggest aggressive diuresis  Strict I/O  Daily labs  Continue with sirolimus  Pr/cr ratio  Sirolimus level   Kidney US reviewed. No alarming findings that need immediate intervention  Will suggest to order a Chest CT to clarify findings " suggestive of edema versus consolidation or both    I spoke with patient and caregiver at bedside and exlpained care plan     Jase Hines MD  Transplant Nephrology

## 2019-12-29 NOTE — PLAN OF CARE
Plan of care discussed with the Pt and his mom. Tried to explain to the Pt. The importance of a good physical exam and he still refused to let me examine him. All questions answered and encouraged. Pt has been free from injury and falls.

## 2019-12-29 NOTE — HPI
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 Parnell w/ Heme/Onc) s/p doxorubicin x 4 now on Adriamycin q3w (for symptomatic metastatic disease), prox R. subclavian DVT 11/13/19 on Eliquis, and Renovascular HTN who presents with acute on chronic SOB and volume overload following a 2 unit blood transfusion 12/27/19 (for Hb 6.7) in the setting of progressive LEFTY on CKD 3 since August 2019 and HFpEF (55 to 40% with LV dysfunction, PET stress negative).    The patient reports he has chronic SOB and issues with volume management since August. On review of his records, he has been seen by nephrology during inpatient admissions and he his noted to have progression of LEFTY on CKD3 for several months. He does make urine, and initially was taking 40 mg of po lasix twice a day. Due to ongoing volume management issues, he was seen by Dr Mejias (cardiology) and was advised to optimize goal directed therapy (Beta blocker, Entresto, and ISDN-hydralazine) in addition to increase lasix to 80mg by mouth twice a day. The patient ended up not taking the entresto and continued to take the lasix 40mg twice a day.     The patient then was seen by KTM on [facility] for follow up and on his way back home to [place], [place], he was called by nephrology coordinator to report to the ED immediately due to a Hb of 6.7. He went to an ED in [place], where he was tranfused 2 U pRBC. He was seen by nephrology at that time in [place], and was apparently told that "his kidney's are failing" and he needs to come to [facility].     Following the blood transfusion, the patient reports an acute increase in SOB and orthopnea. Because of the acute change in breathing, and advise of nephrologist in [place], he [place] [facility] [place].     The patient is on HAART therapy, he reports compliance. Seen by Dr. Miramontes " outpatient.   He denies any chest pain, palpitations, syncope. On Coreg 25mg, amlodipine 10 mg, hydralazine, and entresto at home (he has not been taking entresto).     He does make urine. No recent blood in stool or melena.   No fevers, no new cough, no sick contacts. He reports other than chronic volume issues, he has had no new symptoms.   Seen by Dr. Mejias outpatient, goal weight is about 160 lbs.

## 2019-12-30 LAB
ALBUMIN SERPL BCP-MCNC: 3.1 G/DL (ref 3.5–5.2)
ALP SERPL-CCNC: 110 U/L (ref 55–135)
ALT SERPL W/O P-5'-P-CCNC: 25 U/L (ref 10–44)
ANION GAP SERPL CALC-SCNC: 10 MMOL/L (ref 8–16)
AST SERPL-CCNC: 18 U/L (ref 10–40)
BASOPHILS # BLD AUTO: 0.01 K/UL (ref 0–0.2)
BASOPHILS NFR BLD: 0.2 % (ref 0–1.9)
BILIRUB SERPL-MCNC: 0.7 MG/DL (ref 0.1–1)
BUN SERPL-MCNC: 50 MG/DL (ref 6–20)
CALCIUM SERPL-MCNC: 8.4 MG/DL (ref 8.7–10.5)
CHLORIDE SERPL-SCNC: 105 MMOL/L (ref 95–110)
CO2 SERPL-SCNC: 27 MMOL/L (ref 23–29)
CREAT SERPL-MCNC: 4.3 MG/DL (ref 0.5–1.4)
DIFFERENTIAL METHOD: ABNORMAL
EOSINOPHIL # BLD AUTO: 0.1 K/UL (ref 0–0.5)
EOSINOPHIL NFR BLD: 1.6 % (ref 0–8)
ERYTHROCYTE [DISTWIDTH] IN BLOOD BY AUTOMATED COUNT: 17.7 % (ref 11.5–14.5)
EST. GFR  (AFRICAN AMERICAN): 18.6 ML/MIN/1.73 M^2
EST. GFR  (NON AFRICAN AMERICAN): 16.1 ML/MIN/1.73 M^2
GLUCOSE SERPL-MCNC: 116 MG/DL (ref 70–110)
HCT VFR BLD AUTO: 31 % (ref 40–54)
HGB BLD-MCNC: 9.6 G/DL (ref 14–18)
IMM GRANULOCYTES # BLD AUTO: 0.02 K/UL (ref 0–0.04)
IMM GRANULOCYTES NFR BLD AUTO: 0.4 % (ref 0–0.5)
LYMPHOCYTES # BLD AUTO: 1 K/UL (ref 1–4.8)
LYMPHOCYTES NFR BLD: 19.9 % (ref 18–48)
MAGNESIUM SERPL-MCNC: 1.9 MG/DL (ref 1.6–2.6)
MCH RBC QN AUTO: 25.3 PG (ref 27–31)
MCHC RBC AUTO-ENTMCNC: 31 G/DL (ref 32–36)
MCV RBC AUTO: 82 FL (ref 82–98)
MONOCYTES # BLD AUTO: 0.3 K/UL (ref 0.3–1)
MONOCYTES NFR BLD: 6.6 % (ref 4–15)
NEUTROPHILS # BLD AUTO: 3.5 K/UL (ref 1.8–7.7)
NEUTROPHILS NFR BLD: 71.3 % (ref 38–73)
NRBC BLD-RTO: 0 /100 WBC
PHOSPHATE SERPL-MCNC: 4.3 MG/DL (ref 2.7–4.5)
PLATELET # BLD AUTO: 165 K/UL (ref 150–350)
PMV BLD AUTO: 10.8 FL (ref 9.2–12.9)
POTASSIUM SERPL-SCNC: 4.6 MMOL/L (ref 3.5–5.1)
PROT SERPL-MCNC: 5.8 G/DL (ref 6–8.4)
RBC # BLD AUTO: 3.8 M/UL (ref 4.6–6.2)
SIROLIMUS BLD-MCNC: 6.7 NG/ML (ref 4–20)
SODIUM SERPL-SCNC: 142 MMOL/L (ref 136–145)
WBC # BLD AUTO: 4.87 K/UL (ref 3.9–12.7)

## 2019-12-30 PROCEDURE — 83735 ASSAY OF MAGNESIUM: CPT

## 2019-12-30 PROCEDURE — 99232 PR SUBSEQUENT HOSPITAL CARE,LEVL II: ICD-10-PCS | Mod: GC,,, | Performed by: INTERNAL MEDICINE

## 2019-12-30 PROCEDURE — 11000001 HC ACUTE MED/SURG PRIVATE ROOM

## 2019-12-30 PROCEDURE — 80195 ASSAY OF SIROLIMUS: CPT

## 2019-12-30 PROCEDURE — 99232 SBSQ HOSP IP/OBS MODERATE 35: CPT | Mod: GC,,, | Performed by: INTERNAL MEDICINE

## 2019-12-30 PROCEDURE — 25000003 PHARM REV CODE 250: Performed by: STUDENT IN AN ORGANIZED HEALTH CARE EDUCATION/TRAINING PROGRAM

## 2019-12-30 PROCEDURE — 63600175 PHARM REV CODE 636 W HCPCS: Performed by: STUDENT IN AN ORGANIZED HEALTH CARE EDUCATION/TRAINING PROGRAM

## 2019-12-30 PROCEDURE — 85025 COMPLETE CBC W/AUTO DIFF WBC: CPT

## 2019-12-30 PROCEDURE — 80053 COMPREHEN METABOLIC PANEL: CPT

## 2019-12-30 PROCEDURE — 84100 ASSAY OF PHOSPHORUS: CPT

## 2019-12-30 PROCEDURE — 36415 COLL VENOUS BLD VENIPUNCTURE: CPT

## 2019-12-30 PROCEDURE — 99232 PR SUBSEQUENT HOSPITAL CARE,LEVL II: ICD-10-PCS | Mod: GC,,, | Performed by: HOSPITALIST

## 2019-12-30 PROCEDURE — 99232 SBSQ HOSP IP/OBS MODERATE 35: CPT | Mod: GC,,, | Performed by: HOSPITALIST

## 2019-12-30 PROCEDURE — 63600175 PHARM REV CODE 636 W HCPCS: Performed by: HOSPITALIST

## 2019-12-30 RX ORDER — FUROSEMIDE 40 MG/1
80 TABLET ORAL 2 TIMES DAILY
Status: DISCONTINUED | OUTPATIENT
Start: 2019-12-30 | End: 2019-12-31

## 2019-12-30 RX ADMIN — HYDRALAZINE HYDROCHLORIDE AND ISOSORBIDE DINITRATE 1 TABLET: 37.5; 2 TABLET, FILM COATED ORAL at 10:12

## 2019-12-30 RX ADMIN — SODIUM BICARBONATE 650 MG TABLET 1950 MG: at 10:12

## 2019-12-30 RX ADMIN — APIXABAN 5 MG: 5 TABLET, FILM COATED ORAL at 10:12

## 2019-12-30 RX ADMIN — THERA TABS 1 TABLET: TAB at 08:12

## 2019-12-30 RX ADMIN — ROSUVASTATIN CALCIUM 10 MG: 10 TABLET, FILM COATED ORAL at 08:12

## 2019-12-30 RX ADMIN — ATOVAQUONE 1500 MG: 750 SUSPENSION ORAL at 08:12

## 2019-12-30 RX ADMIN — AMLODIPINE BESYLATE 10 MG: 10 TABLET ORAL at 08:12

## 2019-12-30 RX ADMIN — PREDNISONE 5 MG: 5 TABLET ORAL at 08:12

## 2019-12-30 RX ADMIN — DOLUTEGRAVIR SODIUM 50 MG: 50 TABLET, FILM COATED ORAL at 08:12

## 2019-12-30 RX ADMIN — CINACALCET HYDROCHLORIDE 60 MG: 30 TABLET, FILM COATED ORAL at 08:12

## 2019-12-30 RX ADMIN — SIROLIMUS 2 MG: 1 TABLET ORAL at 08:12

## 2019-12-30 RX ADMIN — FAMOTIDINE 20 MG: 20 TABLET ORAL at 08:12

## 2019-12-30 RX ADMIN — FUROSEMIDE 80 MG: 10 INJECTION, SOLUTION INTRAMUSCULAR; INTRAVENOUS at 08:12

## 2019-12-30 RX ADMIN — SODIUM BICARBONATE 650 MG TABLET 1950 MG: at 08:12

## 2019-12-30 RX ADMIN — APIXABAN 5 MG: 5 TABLET, FILM COATED ORAL at 08:12

## 2019-12-30 RX ADMIN — SACUBITRIL AND VALSARTAN 1 TABLET: 24; 26 TABLET, FILM COATED ORAL at 10:12

## 2019-12-30 RX ADMIN — SACUBITRIL AND VALSARTAN 1 TABLET: 24; 26 TABLET, FILM COATED ORAL at 08:12

## 2019-12-30 RX ADMIN — FUROSEMIDE 80 MG: 40 TABLET ORAL at 05:12

## 2019-12-30 RX ADMIN — HYDRALAZINE HYDROCHLORIDE AND ISOSORBIDE DINITRATE 1 TABLET: 37.5; 2 TABLET, FILM COATED ORAL at 08:12

## 2019-12-30 RX ADMIN — HYDRALAZINE HYDROCHLORIDE AND ISOSORBIDE DINITRATE 1 TABLET: 37.5; 2 TABLET, FILM COATED ORAL at 03:12

## 2019-12-30 RX ADMIN — EMTRICITABINE AND TENOFOVIR ALAFENAMIDE 1 TABLET: 200; 25 TABLET ORAL at 11:12

## 2019-12-30 RX ADMIN — SODIUM BICARBONATE 650 MG TABLET 1950 MG: at 03:12

## 2019-12-30 RX ADMIN — SEVELAMER CARBONATE 800 MG: 800 TABLET, FILM COATED ORAL at 11:12

## 2019-12-30 RX ADMIN — SEVELAMER CARBONATE 800 MG: 800 TABLET, FILM COATED ORAL at 08:12

## 2019-12-30 RX ADMIN — SEVELAMER CARBONATE 800 MG: 800 TABLET, FILM COATED ORAL at 05:12

## 2019-12-30 NOTE — HPI
39 yr old male with a kidney transplant 8/2017, HIV and Kaposi Sarcoma. He is on sirolimus for immunosuppression. Patient is also on chemotherapy including doxarubicin for KS followed by hematology. Also had a DVT on Apixiban and systolic heart failure with ER 40% with global hypokinesis. Recent PET scan showed no ischemia. Patient saw cardiology on 12/27 with some wt gain leg edema and some dyspnea with mild exertion. Also was found with severe anemia. Our coordinator called patient on Friday and directed him to go to a local ER. Patient went to a local hospital where he was transfused and sent to [facility] for further management.   Today I saw him his has some dyspnea but feels better after the blood transfusion, lower extremeties edema and basilar crackles

## 2019-12-30 NOTE — PROGRESS NOTES
[facility] Hospital Medicine  Progress Note    Patient Name: Demetrio Aguiar  MRN: [id number]  Patient Class: IP- Inpatient   Admission Date: 12/28/2019  Length of Stay: 2 days  Attending Physician: Uzair Vicente MD  Primary Care Provider: [place] 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 Parnell w/ Heme/Onc) s/p doxorubicin x 4 now on Adriamycin q3w (for symptomatic metastatic disease), prox R. subclavian DVT 11/13/19 on Eliquis, and Renovascular HTN who presents with acute on chronic SOB and volume overload following a 2 unit blood transfusion 12/27/19 (for Hb 6.7) in the setting of progressive LEFTY on CKD 3 since August 2019 and HFpEF (55 to 40% with LV dysfunction, PET stress negative).    The patient reports he has chronic SOB and issues with volume management since August. On review of his records, he has been seen by nephrology during inpatient admissions and he his noted to have progression of LEFTY on CKD3 for several months. He does make urine, and initially was taking 40 mg of po lasix twice a day. Due to ongoing volume management issues, he was seen by Dr Mejias (cardiology) and was advised to optimize goal directed therapy (Beta blocker, Entresto, and ISDN-hydralazine) in addition to increase lasix to 80mg by mouth twice a day. The patient ended up not taking the entresto and continued to take the lasix 40mg twice a day.     The patient then was seen by [facility] on [facility] for follow up and on his way back home to [place], [place], he was called by nephrology coordinator to report to the ED immediately due to a Hb of 6.7. He went to an ED in [place], where he was tranfused 2 U  "pRBC. He was seen by nephrology at that time in [place], and was apparently told that "his kidney's are failing" and he needs to come to [facility].     Following the blood transfusion, the patient reports an acute increase in SOB and orthopnea. Because of the acute change in breathing, and advise of nephrologist in [place], he presented to [facility] today.     The patient is on HAART therapy, he reports compliance. Seen by Dr. Miramontes outpatient.   He denies any chest pain, palpitations, syncope. On Coreg 25mg, amlodipine 10 mg, hydralazine, and entresto at home (he has not been taking entresto).     He does make urine. No recent blood in stool or melena.   No fevers, no new cough, no sick contacts. He reports other than chronic volume issues, he has had no new symptoms.   Seen by Dr. Mejias outpatient, goal weight is about 160 lbs.       Overview/Hospital Course:  Admitted to Hospital Medicine for evaluation and management of LEFTY and SOB. Started diuresis with lasix 80mg IV BID, good UOP. KTM consulted and agrees with continued diuresis. Switched to lasix 80mg PO BID. Unclear whether or not LEFTY is prerenal. Will continue to trend kidney function     Interval History:   Please see above. 2.8L UOP overnight. SOB and edema improved.     Review of Systems   Constitutional: Negative for chills and fever.   HENT: Negative for congestion and rhinorrhea.    Eyes: Negative for pain and visual disturbance.   Respiratory: Negative for cough and shortness of breath.    Cardiovascular: Positive for leg swelling. Negative for chest pain.   Gastrointestinal: Negative for abdominal pain and diarrhea.   Endocrine: Negative for polydipsia and polyuria.   Genitourinary: Negative for dysuria and hematuria.   Skin: Negative for rash and wound.   Neurological: Negative for dizziness, weakness and headaches.   Psychiatric/Behavioral: Negative for agitation and behavioral problems.     Objective:     Vital Signs (Most Recent):  Temp: 98.4 °F (36.9 °C) " (12/30/19 1534)  Pulse: 83 (12/30/19 1534)  Resp: 14 (12/30/19 0726)  BP: (!) 138/91 (12/30/19 1534)  SpO2: 99 % (12/30/19 1534) Vital Signs (24h Range):  Temp:  [97.1 °F (36.2 °C)-98.4 °F (36.9 °C)] 98.4 °F (36.9 °C)  Pulse:  [75-88] 83  Resp:  [14-18] 14  SpO2:  [94 %-99 %] 99 %  BP: (126-162)/() 138/91     Weight: 77.8 kg (171 lb 8.3 oz)  Body mass index is 26.08 kg/m².    Intake/Output Summary (Last 24 hours) at 12/30/2019 1551  Last data filed at 12/30/2019 1352  Gross per 24 hour   Intake 1290 ml   Output 3125 ml   Net -1835 ml      Physical Exam   Constitutional: He is oriented to person, place, and time. He appears well-developed and well-nourished.   HENT:   Head: Normocephalic and atraumatic.   Eyes: Pupils are equal, round, and reactive to light. EOM are normal.   Neck: Normal range of motion.   Cardiovascular: Normal rate, regular rhythm and intact distal pulses.   No murmur heard.  Pulmonary/Chest: Effort normal. No respiratory distress.   Abdominal: Soft. He exhibits no distension. There is no tenderness.   Musculoskeletal: He exhibits edema (1+ LE R >L). He exhibits no deformity.   Lymphadenopathy:     He has no cervical adenopathy.   Neurological: He is alert and oriented to person, place, and time.   Skin: Skin is warm and dry.       Significant Labs:   BMP:   Recent Labs   Lab 12/30/19  0450   *      K 4.6      CO2 27   BUN 50*   CREATININE 4.3*   CALCIUM 8.4*   MG 1.9     CBC:   Recent Labs   Lab 12/28/19  1759 12/29/19  0803 12/30/19  0450   WBC 6.77 5.85 4.87   HGB 9.4* 9.0* 9.6*   HCT 30.7* 30.1* 31.0*    166 165     CMP:   Recent Labs   Lab 12/28/19  1759 12/29/19  0803 12/30/19  0450    146* 142   K 5.6* 4.7 4.6    107 105   CO2 25 28 27   GLU 97 89 116*   BUN 52* 51* 50*   CREATININE 4.3* 4.1* 4.3*   CALCIUM 8.8 8.9 8.4*   PROT 6.7 6.3 5.8*   ALBUMIN 3.7 3.4* 3.1*   BILITOT 1.0 1.0 0.7   ALKPHOS 138* 129 110   AST 38 27 18   ALT 47* 36 25    ANIONGAP 11 11 10   EGFRNONAA 16.1* 17.0* 16.1*     All pertinent labs within the past 24 hours have been reviewed.    Significant Imaging: I have reviewed and interpreted all pertinent imaging results/findings within the past 24 hours.      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 Parnell w/ Heme/Onc) s/p doxorubicin x 4 now on Adriamycin q3w (for symptomatic metastatic disease), prox R. subclavian DVT 11/13/19 on Eliquis, and Renovascular HTN who presents with acute on chronic respiratory failure 2/2 volume overload following a 2 unit blood transfusion 12/27/19 (for Hb 6.7) in the setting of progressive, subacute LEFTY on CKD 3 in addition to HFpEF (40% with LV dysfunction, PET stress negative).    Assessment:  1. CHF exacerbation HFpEF- BNP 1266 (not currently taking entresto), on room air.   2. Progressive subacute LEFTY on CKD3- making urine w/ lasix  3. Volume overload 2/2 RBC transfusion/TACO- on room air, appears hypervolemic  4. Immune compromised due to HIV on HAART- No fevers, no evidence of infection. See #5.  5. Kaposi Sarcoma- Chronic, outpatient management  6. Renovascular HTN- Elevated but stable and no end organ damage  7. Prox R. subclavian DVT 11/13/19 on Eliquis      Plan:  - good UOP with lasix 80mg IV BID yesterday. switched to lasix 80mg PO BID  - Hold beta blocker. Continue entresto and ISDN/hydralazine.   - Last ECHO assessment was 1 month ago. No new symptoms to suggest preceding cardiac event, will hold off on ECHO as do not think will provide additional information at this time.   - KTM consulted, for immunesuppression and transplant patient. Hold sirolimus, levels in AM. Continue prednisone. Unclear if LEFTY prerenal. Recommended continued diuresis and Cr monitoring   - Anemia  likely chronic and related to CKD and anemic chronic disease.    Acute DVT (deep venous thrombosis)  Continue Eliquis       Renovascular hypertension  Continue ISDN/hydralazine and entresto for now  Cautious diuresis      Acute rejection of kidney transplant  US transplanted kidney - unremarkable         donor kidney transplant 2017  Continue prednisone  Sirolimus level pending in AM    Anemia in stage 3 chronic kidney disease  Chronic    AIDS (acquired immunodeficiency syndrome), CD4 <=200  Continue HAART therapy  Continue atovaquone for PCP ppx      VTE Risk Mitigation (From admission, onward)         Ordered     apixaban tablet 5 mg  2 times daily      19     IP VTE HIGH RISK PATIENT  Once      19                      Nghia Garcia MD  Department of Hospital Medicine   [facility]

## 2019-12-30 NOTE — PROGRESS NOTES
[facility]  Kidney Transplant  Progress Note      Reason for Follow-up: Reassessment of Kidney Transplant - 8/18/2017  (#1) recipient and management of immunosuppression.    ORGAN:  RIGHT KIDNEY   Donor Type:  Donation after Brain Death     Subjective:   History of Present Illness:  39 yr old male with a kidney transplant 8/2017, HIV and Kaposi Sarcoma. He is on sirolimus for immunosuppression. Patient is also on chemotherapy including doxarubicin for KS followed by hematology. Also had a DVT on Apixiban and systolic heart failure with ER 40% with global hypokinesis. Recent PET scan showed no ischemia. Patient saw cardiology on 12/27 with some wt gain leg edema and some dyspnea with mild exertion. Also was found with severe anemia. Our coordinator called patient on Friday and directed him to go to a local ER. Patient went to a local hospital where he was transfused and sent to [facility] for further management.   Today I saw him his has some dyspnea but feels better after the blood transfusion, lower extremeties edema and basilar crackles    Interval History:  Pt seen and examined at bedside. Reports SOB improved since receiving blood transfusion and LE significantly improved with diuresis. UO of 2.9 L past 24 hours, net negative 2.4 L. Cr today stable at 4.3.    Past Medical, Surgical, Family, and Social History:   Unchanged from H&P.    Scheduled Meds:   amLODIPine  10 mg Oral Daily    apixaban  5 mg Oral BID    atovaquone  1,500 mg Oral Daily    cinacalcet  60 mg Oral Daily with breakfast    dolutegravir  50 mg Oral Daily    emtricitabine-tenofovir alafen  1 tablet Oral Daily    famotidine  20 mg Oral Daily    furosemide  80 mg Intravenous BID    isosorbide-hydrALAZINE 20-37.5 mg  1 tablet Oral TID    multivitamin  1 tablet Oral Daily    predniSONE  5 mg Oral Daily    rosuvastatin  10 mg Oral Daily    sacubitril-valsartan  1 tablet Oral BID    sevelamer carbonate  800 mg Oral TID WM  "   sirolimus  2 mg Oral Daily    sodium bicarbonate  1,950 mg Oral TID     Continuous Infusions:  PRN Meds:acetaminophen, Dextrose 10% Bolus, Dextrose 10% Bolus, glucagon (human recombinant), glucose, glucose, sodium chloride 0.9%    Intake/Output - Last 3 Shifts       12/28 0700 - 12/29 0659 12/29 0700 - 12/30 0659 12/30 0700 - 12/31 0659    P.O. 250 450     Total Intake(mL/kg) 250 (3.2) 450 (5.8)     Urine (mL/kg/hr) 580 2900 (1.6)     Stool 0      Total Output 580 2900     Net -330 -2450            Stool Occurrence 2 x             Review of Systems   Constitutional: Negative for chills and fever.   HENT: Negative for congestion and rhinorrhea.    Eyes: Negative for pain and visual disturbance.   Respiratory: Negative for cough and shortness of breath.    Cardiovascular: Positive for leg swelling. Negative for chest pain.   Gastrointestinal: Negative for abdominal pain and diarrhea.   Endocrine: Negative for polydipsia and polyuria.   Genitourinary: Negative for dysuria and hematuria.   Skin: Negative for rash and wound.   Neurological: Negative for dizziness, weakness and headaches.   Psychiatric/Behavioral: Negative for agitation and behavioral problems.      Objective:     Vital Signs (Most Recent):  Temp: 98 °F (36.7 °C) (12/30/19 0726)  Pulse: 84 (12/30/19 0726)  Resp: 14 (12/30/19 0726)  BP: (!) 162/104 (12/30/19 0726)  SpO2: (!) 94 % (12/30/19 0726) Vital Signs (24h Range):  Temp:  [97.1 °F (36.2 °C)-98.3 °F (36.8 °C)] 98 °F (36.7 °C)  Pulse:  [75-88] 84  Resp:  [14-18] 14  SpO2:  [94 %-98 %] 94 %  BP: (126-162)/() 162/104     Weight: 77.8 kg (171 lb 8.3 oz)  Height: 5' 8" (172.7 cm)  Body mass index is 26.08 kg/m².    Physical Exam   Constitutional: He is oriented to person, place, and time. He appears well-developed and well-nourished.   HENT:   Head: Normocephalic and atraumatic.   Eyes: Pupils are equal, round, and reactive to light. EOM are normal.   Neck: Normal range of motion. "   Cardiovascular: Normal rate, regular rhythm and intact distal pulses.   No murmur heard.  Pulmonary/Chest: Effort normal. No respiratory distress.   Abdominal: Soft. He exhibits no distension. There is no tenderness.   Musculoskeletal: He exhibits edema (1+ LE R >L). He exhibits no deformity.   Lymphadenopathy:     He has no cervical adenopathy.   Neurological: He is alert and oriented to person, place, and time.   Skin: Skin is warm and dry.       Laboratory:  CBC:   Recent Labs   Lab 12/28/19 1759 12/29/19  0803 12/30/19  0450   WBC 6.77 5.85 4.87   RBC 3.75* 3.66* 3.80*   HGB 9.4* 9.0* 9.6*   HCT 30.7* 30.1* 31.0*    166 165   MCV 82 82 82   MCH 25.1* 24.6* 25.3*   MCHC 30.6* 29.9* 31.0*     CMP:   Recent Labs   Lab 12/28/19 1759 12/29/19  0803 12/30/19  0450   GLU 97 89 116*   CALCIUM 8.8 8.9 8.4*   ALBUMIN 3.7 3.4* 3.1*   PROT 6.7 6.3 5.8*    146* 142   K 5.6* 4.7 4.6   CO2 25 28 27    107 105   BUN 52* 51* 50*   CREATININE 4.3* 4.1* 4.3*   ALKPHOS 138* 129 110   ALT 47* 36 25   AST 38 27 18     Assessment/Plan:     LEFTY (acute kidney injury)  39 yr old male with a kidney transplant 8/2017, HIV and Kaposi Sarcoma. He is on sirolimus for immunosuppression. Patient is also on chemotherapy including doxarubicin. Admitted to [facility] for anemia seen on lab work, and was also found to have LEFTY.     Baseline cr 2-3  Cr on admission 4.3, today stable at 4.3  Good UO over past 24 hours, 2.9 L, net negative 2.4 L  UA 2+ Protein, u p/cr with ~0.5 g protein, not changed much from prior  US RP shows no etiology of LEFTY  LEFTY possibly in setting of anemia with Hb of 6.6, however would expect improvement in Cr if injury were pre-renal  Very difficult to ascertain patient's volume status to guide diuretics therapy as he has chronic lymphedema  Agree with transitioning from IV lasix 80 BID, to lasix PO 80 BID to avoid over diuresis   Recommend continue to follow RFPs to determine Cr trend  Chart I/Oss, renally  dose medications, avoid nephrotoxic agents          Long-term use of immunosuppressant medication  -continue home sirolimus 2 mg and prednisone   -daily sirolimus levels  -will monitor for toxicities associated with immunosuppressive medications     donor kidney transplant 2017  -please see immunosuppressive medication section         Dora Sandhu MD  Kidney Transplant  [facility]

## 2019-12-30 NOTE — ASSESSMENT & PLAN NOTE
-continue home sirolimus 2 mg and prednisone   -daily sirolimus levels  -will monitor for toxicities associated with immunosuppressive medications

## 2019-12-30 NOTE — SUBJECTIVE & OBJECTIVE
Subjective:   History of Present Illness:  39 yr old male with a kidney transplant 8/2017, HIV and Kaposi Sarcoma. He is on sirolimus for immunosuppression. Patient is also on chemotherapy including doxarubicin for KS followed by hematology. Also had a DVT on Apixiban and systolic heart failure with ER 40% with global hypokinesis. Recent PET scan showed no ischemia. Patient saw cardiology on 12/27 with some wt gain leg edema and some dyspnea with mild exertion. Also was found with severe anemia. Our coordinator called patient on Friday and directed him to go to a local ER. Patient went to a local hospital where he was transfused and sent to [facility] for further management.   Today I saw him his has some dyspnea but feels better after the blood transfusion, lower extremeties edema and basilar crackles    Interval History:  Pt seen and examined at bedside. Reports SOB improved since receiving blood transfusion and LE significantly improved with diuresis. UO of 2.9 L past 24 hours, net negative 2.4 L. Cr today stable at 4.3.    Past Medical, Surgical, Family, and Social History:   Unchanged from H&P.    Scheduled Meds:   amLODIPine  10 mg Oral Daily    apixaban  5 mg Oral BID    atovaquone  1,500 mg Oral Daily    cinacalcet  60 mg Oral Daily with breakfast    dolutegravir  50 mg Oral Daily    emtricitabine-tenofovir alafen  1 tablet Oral Daily    famotidine  20 mg Oral Daily    furosemide  80 mg Intravenous BID    isosorbide-hydrALAZINE 20-37.5 mg  1 tablet Oral TID    multivitamin  1 tablet Oral Daily    predniSONE  5 mg Oral Daily    rosuvastatin  10 mg Oral Daily    sacubitril-valsartan  1 tablet Oral BID    sevelamer carbonate  800 mg Oral TID WM    sirolimus  2 mg Oral Daily    sodium bicarbonate  1,950 mg Oral TID     Continuous Infusions:  PRN Meds:acetaminophen, Dextrose 10% Bolus, Dextrose 10% Bolus, glucagon (human recombinant), glucose, glucose, sodium chloride 0.9%    Intake/Output - Last 3  "Shifts       12/28 0700 - 12/29 0659 12/29 0700 - 12/30 0659 12/30 0700 - 12/31 0659    P.O. 250 450     Total Intake(mL/kg) 250 (3.2) 450 (5.8)     Urine (mL/kg/hr) 580 2900 (1.6)     Stool 0      Total Output 580 2900     Net -330 -2450            Stool Occurrence 2 x             Review of Systems   Constitutional: Negative for chills and fever.   HENT: Negative for congestion and rhinorrhea.    Eyes: Negative for pain and visual disturbance.   Respiratory: Negative for cough and shortness of breath.    Cardiovascular: Positive for leg swelling. Negative for chest pain.   Gastrointestinal: Negative for abdominal pain and diarrhea.   Endocrine: Negative for polydipsia and polyuria.   Genitourinary: Negative for dysuria and hematuria.   Skin: Negative for rash and wound.   Neurological: Negative for dizziness, weakness and headaches.   Psychiatric/Behavioral: Negative for agitation and behavioral problems.      Objective:     Vital Signs (Most Recent):  Temp: 98 °F (36.7 °C) (12/30/19 0726)  Pulse: 84 (12/30/19 0726)  Resp: 14 (12/30/19 0726)  BP: (!) 162/104 (12/30/19 0726)  SpO2: (!) 94 % (12/30/19 0726) Vital Signs (24h Range):  Temp:  [97.1 °F (36.2 °C)-98.3 °F (36.8 °C)] 98 °F (36.7 °C)  Pulse:  [75-88] 84  Resp:  [14-18] 14  SpO2:  [94 %-98 %] 94 %  BP: (126-162)/() 162/104     Weight: 77.8 kg (171 lb 8.3 oz)  Height: 5' 8" (172.7 cm)  Body mass index is 26.08 kg/m².    Physical Exam   Constitutional: He is oriented to person, place, and time. He appears well-developed and well-nourished.   HENT:   Head: Normocephalic and atraumatic.   Eyes: Pupils are equal, round, and reactive to light. EOM are normal.   Neck: Normal range of motion.   Cardiovascular: Normal rate, regular rhythm and intact distal pulses.   No murmur heard.  Pulmonary/Chest: Effort normal. No respiratory distress.   Abdominal: Soft. He exhibits no distension. There is no tenderness.   Musculoskeletal: He exhibits edema (1+ LE R >L). He " exhibits no deformity.   Lymphadenopathy:     He has no cervical adenopathy.   Neurological: He is alert and oriented to person, place, and time.   Skin: Skin is warm and dry.       Laboratory:  CBC:   Recent Labs   Lab 12/28/19 1759 12/29/19  0803 12/30/19  0450   WBC 6.77 5.85 4.87   RBC 3.75* 3.66* 3.80*   HGB 9.4* 9.0* 9.6*   HCT 30.7* 30.1* 31.0*    166 165   MCV 82 82 82   MCH 25.1* 24.6* 25.3*   MCHC 30.6* 29.9* 31.0*     CMP:   Recent Labs   Lab 12/28/19 1759 12/29/19  0803 12/30/19  0450   GLU 97 89 116*   CALCIUM 8.8 8.9 8.4*   ALBUMIN 3.7 3.4* 3.1*   PROT 6.7 6.3 5.8*    146* 142   K 5.6* 4.7 4.6   CO2 25 28 27    107 105   BUN 52* 51* 50*   CREATININE 4.3* 4.1* 4.3*   ALKPHOS 138* 129 110   ALT 47* 36 25   AST 38 27 18

## 2019-12-30 NOTE — SUBJECTIVE & OBJECTIVE
Interval History:   Please see above. 2.8L UOP overnight. SOB and edema improved.     Review of Systems   Constitutional: Negative for chills and fever.   HENT: Negative for congestion and rhinorrhea.    Eyes: Negative for pain and visual disturbance.   Respiratory: Negative for cough and shortness of breath.    Cardiovascular: Positive for leg swelling. Negative for chest pain.   Gastrointestinal: Negative for abdominal pain and diarrhea.   Endocrine: Negative for polydipsia and polyuria.   Genitourinary: Negative for dysuria and hematuria.   Skin: Negative for rash and wound.   Neurological: Negative for dizziness, weakness and headaches.   Psychiatric/Behavioral: Negative for agitation and behavioral problems.     Objective:     Vital Signs (Most Recent):  Temp: 98.4 °F (36.9 °C) (12/30/19 1534)  Pulse: 83 (12/30/19 1534)  Resp: 14 (12/30/19 0726)  BP: (!) 138/91 (12/30/19 1534)  SpO2: 99 % (12/30/19 1534) Vital Signs (24h Range):  Temp:  [97.1 °F (36.2 °C)-98.4 °F (36.9 °C)] 98.4 °F (36.9 °C)  Pulse:  [75-88] 83  Resp:  [14-18] 14  SpO2:  [94 %-99 %] 99 %  BP: (126-162)/() 138/91     Weight: 77.8 kg (171 lb 8.3 oz)  Body mass index is 26.08 kg/m².    Intake/Output Summary (Last 24 hours) at 12/30/2019 1551  Last data filed at 12/30/2019 1352  Gross per 24 hour   Intake 1290 ml   Output 3125 ml   Net -1835 ml      Physical Exam   Constitutional: He is oriented to person, place, and time. He appears well-developed and well-nourished.   HENT:   Head: Normocephalic and atraumatic.   Eyes: Pupils are equal, round, and reactive to light. EOM are normal.   Neck: Normal range of motion.   Cardiovascular: Normal rate, regular rhythm and intact distal pulses.   No murmur heard.  Pulmonary/Chest: Effort normal. No respiratory distress.   Abdominal: Soft. He exhibits no distension. There is no tenderness.   Musculoskeletal: He exhibits edema (1+ LE R >L). He exhibits no deformity.   Lymphadenopathy:     He has no  cervical adenopathy.   Neurological: He is alert and oriented to person, place, and time.   Skin: Skin is warm and dry.       Significant Labs:   BMP:   Recent Labs   Lab 12/30/19  0450   *      K 4.6      CO2 27   BUN 50*   CREATININE 4.3*   CALCIUM 8.4*   MG 1.9     CBC:   Recent Labs   Lab 12/28/19  1759 12/29/19  0803 12/30/19  0450   WBC 6.77 5.85 4.87   HGB 9.4* 9.0* 9.6*   HCT 30.7* 30.1* 31.0*    166 165     CMP:   Recent Labs   Lab 12/28/19  1759 12/29/19  0803 12/30/19  0450    146* 142   K 5.6* 4.7 4.6    107 105   CO2 25 28 27   GLU 97 89 116*   BUN 52* 51* 50*   CREATININE 4.3* 4.1* 4.3*   CALCIUM 8.8 8.9 8.4*   PROT 6.7 6.3 5.8*   ALBUMIN 3.7 3.4* 3.1*   BILITOT 1.0 1.0 0.7   ALKPHOS 138* 129 110   AST 38 27 18   ALT 47* 36 25   ANIONGAP 11 11 10   EGFRNONAA 16.1* 17.0* 16.1*     All pertinent labs within the past 24 hours have been reviewed.    Significant Imaging: I have reviewed and interpreted all pertinent imaging results/findings within the past 24 hours.

## 2019-12-30 NOTE — ASSESSMENT & PLAN NOTE
39 yr old male with a kidney transplant 8/2017, HIV and Kaposi Sarcoma. He is on sirolimus for immunosuppression. Patient is also on chemotherapy including doxarubicin. Admitted to [facility] for anemia seen on lab work, and was also found to have LEFTY.     Baseline cr 2-3  Cr on admission 4.3, today stable at 4.3  Good UO over past 24 hours, 2.9 L, net negative 2.4 L  UA 2+ Protein, u p/cr with ~0.5 g protein, not changed much from prior  US RP shows no etiology of LEFTY  LEFTY possibly in setting of anemia with Hb of 6.6, however would expect improvement in Cr if injury were pre-renal  Very difficult to ascertain patient's volume status to guide diuretics therapy as he has chronic lymphedema  Agree with transitioning from IV lasix 80 BID, to lasix PO 80 BID to avoid over diuresis   Recommend continue to follow RFPs to determine Cr trend  Chart I/Oss, renally dose medications, avoid nephrotoxic agents

## 2019-12-31 PROBLEM — D63.1 ANEMIA IN STAGE 3 CHRONIC KIDNEY DISEASE: Status: ACTIVE | Noted: 2017-08-22

## 2019-12-31 PROBLEM — N18.30 ANEMIA IN STAGE 3 CHRONIC KIDNEY DISEASE: Status: ACTIVE | Noted: 2017-08-22

## 2019-12-31 LAB
ALBUMIN SERPL BCP-MCNC: 3.1 G/DL (ref 3.5–5.2)
ALP SERPL-CCNC: 107 U/L (ref 55–135)
ALT SERPL W/O P-5'-P-CCNC: 22 U/L (ref 10–44)
ANION GAP SERPL CALC-SCNC: 7 MMOL/L (ref 8–16)
ANION GAP SERPL CALC-SCNC: 9 MMOL/L (ref 8–16)
ASCENDING AORTA: 3.34 CM
AST SERPL-CCNC: 17 U/L (ref 10–40)
AV INDEX (PROSTH): 0.94
AV MEAN GRADIENT: 6 MMHG
AV PEAK GRADIENT: 8 MMHG
AV VALVE AREA: 3.42 CM2
AV VELOCITY RATIO: 0.88
BASOPHILS # BLD AUTO: 0.01 K/UL (ref 0–0.2)
BASOPHILS NFR BLD: 0.2 % (ref 0–1.9)
BILIRUB SERPL-MCNC: 0.6 MG/DL (ref 0.1–1)
BNP SERPL-MCNC: 274 PG/ML (ref 0–99)
BNP SERPL-MCNC: 282 PG/ML (ref 0–99)
BSA FOR ECHO PROCEDURE: 1.9 M2
BUN SERPL-MCNC: 48 MG/DL (ref 6–20)
BUN SERPL-MCNC: 50 MG/DL (ref 6–20)
CALCIUM SERPL-MCNC: 8.3 MG/DL (ref 8.7–10.5)
CALCIUM SERPL-MCNC: 8.4 MG/DL (ref 8.7–10.5)
CHLORIDE SERPL-SCNC: 102 MMOL/L (ref 95–110)
CHLORIDE SERPL-SCNC: 102 MMOL/L (ref 95–110)
CO2 SERPL-SCNC: 28 MMOL/L (ref 23–29)
CO2 SERPL-SCNC: 30 MMOL/L (ref 23–29)
CREAT SERPL-MCNC: 4.4 MG/DL (ref 0.5–1.4)
CREAT SERPL-MCNC: 4.5 MG/DL (ref 0.5–1.4)
CREAT UR-MCNC: 101 MG/DL (ref 23–375)
CV ECHO LV RWT: 0.32 CM
DIFFERENTIAL METHOD: ABNORMAL
DOP CALC AO PEAK VEL: 1.45 M/S
DOP CALC AO VTI: 26.72 CM
DOP CALC LVOT AREA: 3.6 CM2
DOP CALC LVOT DIAMETER: 2.15 CM
DOP CALC LVOT PEAK VEL: 1.27 M/S
DOP CALC LVOT STROKE VOLUME: 91.44 CM3
DOP CALCLVOT PEAK VEL VTI: 25.2 CM
ECHO LV POSTERIOR WALL: 0.9 CM (ref 0.6–1.1)
EOSINOPHIL # BLD AUTO: 0.1 K/UL (ref 0–0.5)
EOSINOPHIL NFR BLD: 1.5 % (ref 0–8)
ERYTHROCYTE [DISTWIDTH] IN BLOOD BY AUTOMATED COUNT: 17.5 % (ref 11.5–14.5)
EST. GFR  (AFRICAN AMERICAN): 17.6 ML/MIN/1.73 M^2
EST. GFR  (AFRICAN AMERICAN): 18.1 ML/MIN/1.73 M^2
EST. GFR  (NON AFRICAN AMERICAN): 15.2 ML/MIN/1.73 M^2
EST. GFR  (NON AFRICAN AMERICAN): 15.6 ML/MIN/1.73 M^2
FRACTIONAL SHORTENING: 21 % (ref 28–44)
GLUCOSE SERPL-MCNC: 123 MG/DL (ref 70–110)
GLUCOSE SERPL-MCNC: 141 MG/DL (ref 70–110)
HCT VFR BLD AUTO: 29.6 % (ref 40–54)
HGB BLD-MCNC: 9 G/DL (ref 14–18)
IMM GRANULOCYTES # BLD AUTO: 0.01 K/UL (ref 0–0.04)
IMM GRANULOCYTES NFR BLD AUTO: 0.2 % (ref 0–0.5)
INTERVENTRICULAR SEPTUM: 0.9 CM (ref 0.6–1.1)
LA MAJOR: 6.28 CM
LA MINOR: 5.57 CM
LA WIDTH: 3.02 CM
LEFT ATRIUM SIZE: 3.82 CM
LEFT ATRIUM VOLUME INDEX: 30.5 ML/M2
LEFT ATRIUM VOLUME: 57.89 CM3
LEFT INTERNAL DIMENSION IN SYSTOLE: 4.45 CM (ref 2.1–4)
LEFT VENTRICLE DIASTOLIC VOLUME INDEX: 68.42 ML/M2
LEFT VENTRICLE DIASTOLIC VOLUME: 129.9 ML
LEFT VENTRICLE MASS INDEX: 101 G/M2
LEFT VENTRICLE SYSTOLIC VOLUME INDEX: 47.4 ML/M2
LEFT VENTRICLE SYSTOLIC VOLUME: 89.99 ML
LEFT VENTRICULAR INTERNAL DIMENSION IN DIASTOLE: 5.6 CM (ref 3.5–6)
LEFT VENTRICULAR MASS: 191.63 G
LYMPHOCYTES # BLD AUTO: 1 K/UL (ref 1–4.8)
LYMPHOCYTES NFR BLD: 18.3 % (ref 18–48)
MAGNESIUM SERPL-MCNC: 1.9 MG/DL (ref 1.6–2.6)
MCH RBC QN AUTO: 25 PG (ref 27–31)
MCHC RBC AUTO-ENTMCNC: 30.4 G/DL (ref 32–36)
MCV RBC AUTO: 82 FL (ref 82–98)
MONOCYTES # BLD AUTO: 0.4 K/UL (ref 0.3–1)
MONOCYTES NFR BLD: 7.3 % (ref 4–15)
NEUTROPHILS # BLD AUTO: 3.9 K/UL (ref 1.8–7.7)
NEUTROPHILS NFR BLD: 72.5 % (ref 38–73)
NRBC BLD-RTO: 0 /100 WBC
PHOSPHATE SERPL-MCNC: 4.6 MG/DL (ref 2.7–4.5)
PLATELET # BLD AUTO: 162 K/UL (ref 150–350)
PMV BLD AUTO: 9.8 FL (ref 9.2–12.9)
POTASSIUM SERPL-SCNC: 4.8 MMOL/L (ref 3.5–5.1)
POTASSIUM SERPL-SCNC: 4.9 MMOL/L (ref 3.5–5.1)
PROT SERPL-MCNC: 5.7 G/DL (ref 6–8.4)
RA MAJOR: 5.07 CM
RA PRESSURE: 3 MMHG
RA WIDTH: 3.62 CM
RBC # BLD AUTO: 3.6 M/UL (ref 4.6–6.2)
RIGHT VENTRICULAR END-DIASTOLIC DIMENSION: 3.37 CM
SINUS: 3.36 CM
SIROLIMUS BLD-MCNC: 6.6 NG/ML (ref 4–20)
SODIUM SERPL-SCNC: 139 MMOL/L (ref 136–145)
SODIUM SERPL-SCNC: 139 MMOL/L (ref 136–145)
STJ: 3.13 CM
TDI LATERAL: 0.08 M/S
TDI SEPTAL: 0.06 M/S
TDI: 0.07 M/S
TRICUSPID ANNULAR PLANE SYSTOLIC EXCURSION: 2.76 CM
UUN UR-MCNC: 506 MG/DL (ref 140–1050)
WBC # BLD AUTO: 5.35 K/UL (ref 3.9–12.7)

## 2019-12-31 PROCEDURE — 99232 PR SUBSEQUENT HOSPITAL CARE,LEVL II: ICD-10-PCS | Mod: GC,,, | Performed by: INTERNAL MEDICINE

## 2019-12-31 PROCEDURE — 82570 ASSAY OF URINE CREATININE: CPT

## 2019-12-31 PROCEDURE — 84540 ASSAY OF URINE/UREA-N: CPT

## 2019-12-31 PROCEDURE — 83880 ASSAY OF NATRIURETIC PEPTIDE: CPT

## 2019-12-31 PROCEDURE — 80048 BASIC METABOLIC PNL TOTAL CA: CPT

## 2019-12-31 PROCEDURE — 25000003 PHARM REV CODE 250: Performed by: STUDENT IN AN ORGANIZED HEALTH CARE EDUCATION/TRAINING PROGRAM

## 2019-12-31 PROCEDURE — 99233 PR SUBSEQUENT HOSPITAL CARE,LEVL III: ICD-10-PCS | Mod: ,,, | Performed by: HOSPITALIST

## 2019-12-31 PROCEDURE — 63600175 PHARM REV CODE 636 W HCPCS: Performed by: STUDENT IN AN ORGANIZED HEALTH CARE EDUCATION/TRAINING PROGRAM

## 2019-12-31 PROCEDURE — 80053 COMPREHEN METABOLIC PANEL: CPT

## 2019-12-31 PROCEDURE — 99233 SBSQ HOSP IP/OBS HIGH 50: CPT | Mod: ,,, | Performed by: HOSPITALIST

## 2019-12-31 PROCEDURE — 11000001 HC ACUTE MED/SURG PRIVATE ROOM

## 2019-12-31 PROCEDURE — 83735 ASSAY OF MAGNESIUM: CPT

## 2019-12-31 PROCEDURE — 36415 COLL VENOUS BLD VENIPUNCTURE: CPT

## 2019-12-31 PROCEDURE — 84100 ASSAY OF PHOSPHORUS: CPT

## 2019-12-31 PROCEDURE — 85025 COMPLETE CBC W/AUTO DIFF WBC: CPT

## 2019-12-31 PROCEDURE — 80195 ASSAY OF SIROLIMUS: CPT

## 2019-12-31 PROCEDURE — 83880 ASSAY OF NATRIURETIC PEPTIDE: CPT | Mod: 91

## 2019-12-31 PROCEDURE — 99232 SBSQ HOSP IP/OBS MODERATE 35: CPT | Mod: GC,,, | Performed by: INTERNAL MEDICINE

## 2019-12-31 RX ORDER — FUROSEMIDE 10 MG/ML
80 INJECTION INTRAMUSCULAR; INTRAVENOUS 2 TIMES DAILY
Status: DISCONTINUED | OUTPATIENT
Start: 2019-12-31 | End: 2019-12-31

## 2019-12-31 RX ADMIN — SIROLIMUS 2 MG: 1 TABLET ORAL at 08:12

## 2019-12-31 RX ADMIN — ATOVAQUONE 1500 MG: 750 SUSPENSION ORAL at 08:12

## 2019-12-31 RX ADMIN — PREDNISONE 5 MG: 5 TABLET ORAL at 08:12

## 2019-12-31 RX ADMIN — APIXABAN 5 MG: 5 TABLET, FILM COATED ORAL at 08:12

## 2019-12-31 RX ADMIN — APIXABAN 5 MG: 5 TABLET, FILM COATED ORAL at 10:12

## 2019-12-31 RX ADMIN — EMTRICITABINE AND TENOFOVIR ALAFENAMIDE 1 TABLET: 200; 25 TABLET ORAL at 08:12

## 2019-12-31 RX ADMIN — HYDRALAZINE HYDROCHLORIDE AND ISOSORBIDE DINITRATE 1 TABLET: 37.5; 2 TABLET, FILM COATED ORAL at 08:12

## 2019-12-31 RX ADMIN — SEVELAMER CARBONATE 800 MG: 800 TABLET, FILM COATED ORAL at 08:12

## 2019-12-31 RX ADMIN — ROSUVASTATIN CALCIUM 10 MG: 10 TABLET, FILM COATED ORAL at 08:12

## 2019-12-31 RX ADMIN — SEVELAMER CARBONATE 800 MG: 800 TABLET, FILM COATED ORAL at 05:12

## 2019-12-31 RX ADMIN — FUROSEMIDE 80 MG: 40 TABLET ORAL at 08:12

## 2019-12-31 RX ADMIN — HYDRALAZINE HYDROCHLORIDE AND ISOSORBIDE DINITRATE 1 TABLET: 37.5; 2 TABLET, FILM COATED ORAL at 10:12

## 2019-12-31 RX ADMIN — AMLODIPINE BESYLATE 10 MG: 10 TABLET ORAL at 08:12

## 2019-12-31 RX ADMIN — SODIUM BICARBONATE 650 MG TABLET 1950 MG: at 10:12

## 2019-12-31 RX ADMIN — HYDRALAZINE HYDROCHLORIDE AND ISOSORBIDE DINITRATE 1 TABLET: 37.5; 2 TABLET, FILM COATED ORAL at 03:12

## 2019-12-31 RX ADMIN — SODIUM BICARBONATE 650 MG TABLET 1950 MG: at 08:12

## 2019-12-31 RX ADMIN — FAMOTIDINE 20 MG: 20 TABLET ORAL at 08:12

## 2019-12-31 RX ADMIN — CINACALCET HYDROCHLORIDE 60 MG: 30 TABLET, FILM COATED ORAL at 08:12

## 2019-12-31 RX ADMIN — SACUBITRIL AND VALSARTAN 1 TABLET: 24; 26 TABLET, FILM COATED ORAL at 08:12

## 2019-12-31 RX ADMIN — DOLUTEGRAVIR SODIUM 50 MG: 50 TABLET, FILM COATED ORAL at 08:12

## 2019-12-31 RX ADMIN — SEVELAMER CARBONATE 800 MG: 800 TABLET, FILM COATED ORAL at 11:12

## 2019-12-31 RX ADMIN — THERA TABS 1 TABLET: TAB at 08:12

## 2019-12-31 RX ADMIN — SODIUM BICARBONATE 650 MG TABLET 1950 MG: at 03:12

## 2019-12-31 NOTE — PLAN OF CARE
Problem: Fall Injury Risk  Goal: Absence of Fall and Fall-Related Injury  Outcome: Ongoing, Progressing     Problem: Adult Inpatient Plan of Care  Goal: Plan of Care Review  Outcome: Ongoing, Progressing  Flowsheets (Taken 12/31/2019 0438)  Plan of Care Reviewed With: patient  Goal: Absence of Hospital-Acquired Illness or Injury  Outcome: Ongoing, Progressing  Goal: Optimal Comfort and Wellbeing  Outcome: Ongoing, Progressing     Problem: Infection  Goal: Infection Symptom Resolution  Outcome: Ongoing, Progressing     Problem: Adjustment to Illness (Heart Failure)  Goal: Optimal Coping  Outcome: Ongoing, Progressing     Problem: Cardiac Output Decreased (Heart Failure)  Goal: Optimal Cardiac Output  Outcome: Ongoing, Progressing     Problem: Fluid Imbalance (Heart Failure)  Goal: Fluid Balance  Outcome: Ongoing, Progressing     Problem: Functional Ability Impaired (Heart Failure)  Goal: Optimal Functional Ability  Outcome: Ongoing, Progressing   Plan of care reviewed with pt. Pt voiced understanding. Pt AAO X 4. No c/o during the night. Pt ambulated around the nursing stations several times. No apparent distress noted. Bed in lowest position, locked, call light within reach. Side rails up x's 2 with slip resistant socks on.

## 2019-12-31 NOTE — SUBJECTIVE & OBJECTIVE
Subjective:   History of Present Illness:  39 yr old male with a kidney transplant 8/2017, HIV and Kaposi Sarcoma. He is on sirolimus for immunosuppression. Patient is also on chemotherapy including doxarubicin for [place] followed by hematology. Also had a DVT on Apixiban and systolic heart failure with ER 40% with global hypokinesis. Recent PET scan showed no ischemia. Patient saw cardiology on 12/27 with some wt gain leg edema and some dyspnea with mild exertion. Also was found with severe anemia. Our coordinator called patient on Friday and directed him to go to a local ER. Patient went to a local hospital where he was transfused and sent to [facility] for further management.   Today I saw him his has some dyspnea but feels better after the blood transfusion, lower extremeties edema and basilar crackles    Interval History:  Pt seen and examined at bedside. Pt reports feeling thirsty and has not been hydrating well PO. Cr today 4.4 from 4.3 yesterday. UO of 2.2 L past 24 hours, net negative 700 cc. Transitioned from lasix IV 80 mg to PO 80 mg yesterday.    Past Medical, Surgical, Family, and Social History:   Unchanged from H&P.    Scheduled Meds:   amLODIPine  10 mg Oral Daily    apixaban  5 mg Oral BID    atovaquone  1,500 mg Oral Daily    cinacalcet  60 mg Oral Daily with breakfast    dolutegravir  50 mg Oral Daily    emtricitabine-tenofovir alafen  1 tablet Oral Daily    famotidine  20 mg Oral Daily    furosemide  80 mg Intravenous BID    isosorbide-hydrALAZINE 20-37.5 mg  1 tablet Oral TID    multivitamin  1 tablet Oral Daily    predniSONE  5 mg Oral Daily    rosuvastatin  10 mg Oral Daily    sacubitril-valsartan  1 tablet Oral BID    sevelamer carbonate  800 mg Oral TID WM    sirolimus  2 mg Oral Daily    sodium bicarbonate  1,950 mg Oral TID     Continuous Infusions:  PRN Meds:acetaminophen, Dextrose 10% Bolus, Dextrose 10% Bolus, glucagon (human recombinant), glucose, glucose, sodium chloride  "0.9%    Intake/Output - Last 3 Shifts       12/29 0700 - 12/30 0659 12/30 0700 - 12/31 0659 12/31 0700 - 01/01 0659    P.O. 450 1440 240    Total Intake(mL/kg) 450 (5.8) 1440 (19.3) 240 (3.2)    Urine (mL/kg/hr) 2900 (1.6) 2200 (1.2) 150 (0.5)    Stool  0 0    Total Output 2900 2200 150    Net -2450 -760 +90           Stool Occurrence  1 x 1 x           Review of Systems   Constitutional: Negative for chills and fever.   HENT: Negative for congestion and rhinorrhea.    Eyes: Negative for pain and visual disturbance.   Respiratory: Negative for cough and shortness of breath.    Cardiovascular: Positive for leg swelling. Negative for chest pain.   Gastrointestinal: Negative for abdominal pain and diarrhea.   Endocrine: Negative for polydipsia and polyuria.   Genitourinary: Negative for dysuria and hematuria.   Skin: Negative for rash and wound.   Neurological: Negative for dizziness, weakness and headaches.   Psychiatric/Behavioral: Negative for agitation and behavioral problems.      Objective:     Vital Signs (Most Recent):  Temp: 97.5 °F (36.4 °C) (12/31/19 0810)  Pulse: 84 (12/31/19 0810)  Resp: 16 (12/31/19 0810)  BP: (!) 156/98 (12/31/19 0810)  SpO2: 100 % (12/31/19 0810) Vital Signs (24h Range):  Temp:  [97.5 °F (36.4 °C)-98.4 °F (36.9 °C)] 97.5 °F (36.4 °C)  Pulse:  [73-84] 84  Resp:  [16-20] 16  SpO2:  [96 %-100 %] 100 %  BP: (134-156)/(78-98) 156/98     Weight: 74.6 kg (164 lb 5.7 oz)  Height: 5' 8" (172.7 cm)  Body mass index is 24.99 kg/m².    Physical Exam   Constitutional: He is oriented to person, place, and time. He appears well-developed and well-nourished.   HENT:   Head: Normocephalic and atraumatic.   Eyes: Pupils are equal, round, and reactive to light. EOM are normal.   Neck: Normal range of motion.   Cardiovascular: Normal rate, regular rhythm and intact distal pulses.   No murmur heard.  Pulmonary/Chest: Effort normal. No respiratory distress.   Abdominal: Soft. He exhibits no distension. There " is no tenderness.   Musculoskeletal: He exhibits edema (1+ LE R >L). He exhibits no deformity.   Lymphadenopathy:     He has no cervical adenopathy.   Neurological: He is alert and oriented to person, place, and time.   Skin: Skin is warm and dry.       Laboratory:  CBC:   Recent Labs   Lab 12/29/19  0803 12/30/19  0450 12/31/19  0350   WBC 5.85 4.87 5.35   RBC 3.66* 3.80* 3.60*   HGB 9.0* 9.6* 9.0*   HCT 30.1* 31.0* 29.6*    165 162   MCV 82 82 82   MCH 24.6* 25.3* 25.0*   MCHC 29.9* 31.0* 30.4*     CMP:   Recent Labs   Lab 12/29/19  0803 12/30/19 0450 12/31/19  0350   GLU 89 116* 141*   CALCIUM 8.9 8.4* 8.4*   ALBUMIN 3.4* 3.1* 3.1*   PROT 6.3 5.8* 5.7*   * 142 139   K 4.7 4.6 4.8   CO2 28 27 30*    105 102   BUN 51* 50* 48*   CREATININE 4.1* 4.3* 4.4*   ALKPHOS 129 110 107   ALT 36 25 22   AST 27 18 17

## 2019-12-31 NOTE — SUBJECTIVE & OBJECTIVE
Interval History:   Patient with net 760ml out yesterday. 2.2L UOP total - after switching to PO lasix. Still appears volume overloaded on exam but no SOB or chest pain.     Review of Systems   Constitutional: Negative for chills and fever.   HENT: Negative for congestion and rhinorrhea.    Eyes: Negative for pain and visual disturbance.   Respiratory: Negative for cough and shortness of breath.    Cardiovascular: Positive for leg swelling. Negative for chest pain.   Gastrointestinal: Negative for abdominal pain and diarrhea.   Endocrine: Negative for polydipsia and polyuria.   Genitourinary: Negative for dysuria and hematuria.   Skin: Negative for rash and wound.   Neurological: Negative for dizziness, weakness and headaches.   Psychiatric/Behavioral: Negative for agitation and behavioral problems.     Objective:     Vital Signs (Most Recent):  Temp: 98.4 °F (36.9 °C) (12/31/19 1253)  Pulse: 89 (12/31/19 1253)  Resp: 16 (12/31/19 1253)  BP: (!) 148/93 (12/31/19 1253)  SpO2: 97 % (12/31/19 1253) Vital Signs (24h Range):  Temp:  [97.5 °F (36.4 °C)-98.4 °F (36.9 °C)] 98.4 °F (36.9 °C)  Pulse:  [73-89] 89  Resp:  [16-20] 16  SpO2:  [96 %-100 %] 97 %  BP: (134-156)/(78-98) 148/93     Weight: 74.6 kg (164 lb 5.7 oz)  Body mass index is 24.99 kg/m².    Intake/Output Summary (Last 24 hours) at 12/31/2019 1335  Last data filed at 12/31/2019 1300  Gross per 24 hour   Intake 840 ml   Output 1600 ml   Net -760 ml      Physical Exam   Constitutional: He is oriented to person, place, and time. He appears well-developed and well-nourished.   HENT:   Head: Normocephalic and atraumatic.   Eyes: Pupils are equal, round, and reactive to light. EOM are normal.   Neck: Normal range of motion.   Cardiovascular: Normal rate, regular rhythm and intact distal pulses.   No murmur heard.  Pulmonary/Chest: Effort normal. No respiratory distress.   Abdominal: Soft. He exhibits no distension. There is no tenderness.   Musculoskeletal: He  exhibits edema (1+ LE R >L). He exhibits no deformity.   Lymphadenopathy:     He has no cervical adenopathy.   Neurological: He is alert and oriented to person, place, and time.   Skin: Skin is warm and dry.       Significant Labs:   CBC:   Recent Labs   Lab 12/30/19 0450 12/31/19  0350   WBC 4.87 5.35   HGB 9.6* 9.0*   HCT 31.0* 29.6*    162     CMP:   Recent Labs   Lab 12/30/19 0450 12/31/19  0350    139   K 4.6 4.8    102   CO2 27 30*   * 141*   BUN 50* 48*   CREATININE 4.3* 4.4*   CALCIUM 8.4* 8.4*   PROT 5.8* 5.7*   ALBUMIN 3.1* 3.1*   BILITOT 0.7 0.6   ALKPHOS 110 107   AST 18 17   ALT 25 22   ANIONGAP 10 7*   EGFRNONAA 16.1* 15.6*     All pertinent labs within the past 24 hours have been reviewed.    Significant Imaging: I have reviewed all pertinent imaging results/findings within the past 24 hours.

## 2019-12-31 NOTE — PROGRESS NOTES
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 Parnell w/ Heme/Onc) s/p doxorubicin x 4 now on Adriamycin q3w (for symptomatic metastatic disease), prox R. subclavian DVT 11/13/19 on Eliquis, and Renovascular HTN who presents with acute on chronic SOB and volume overload following a 2 unit blood transfusion 12/27/19 (for Hb 6.7) in the setting of progressive LEFTY on CKD 3 since August 2019 and HFpEF (55 to 40% with LV dysfunction, PET stress negative).    The patient reports he has chronic SOB and issues with volume management since August. On review of his records, he has been seen by nephrology during inpatient admissions and he his noted to have progression of LEFTY on CKD3 for several months. He does make urine, and initially was taking 40 mg of po lasix twice a day. Due to ongoing volume management issues, he was seen by Dr Mejias (cardiology) and was advised to optimize goal directed therapy (Beta blocker, Entresto, and ISDN-hydralazine) in addition to increase lasix to 80mg by mouth twice a day. The patient ended up not taking the entresto and continued to take the lasix 40mg twice a day.     The patient then was seen by BONG on [facility] for follow up and on his way back home to [place], [place], he was called by nephrology coordinator to report to the ED immediately due to a Hb of 6.7. He went to an ED in [place], where he was tranfused 2 U  "pRBC. He was seen by nephrology at that time in [place], and was apparently told that "his kidney's are failing" and he needs to come to [facility].     Following the blood transfusion, the patient reports an acute increase in SOB and orthopnea. Because of the acute change in breathing, and advise of nephrologist in [place], he presented to [place] today.     The patient is on HAART therapy, he reports compliance. Seen by Dr. Miramontes outpatient.   He denies any chest pain, palpitations, syncope. On Coreg 25mg, amlodipine 10 mg, hydralazine, and entresto at home (he has not been taking entresto).     He does make urine. No recent blood in stool or melena.   No fevers, no new cough, no sick contacts. He reports other than chronic volume issues, he has had no new symptoms.   Seen by Dr. Mejias outpatient, goal weight is about 160 lbs.       Overview/Hospital Course:  Admitted to Hospital Medicine for evaluation and management of LEFTY and CHF exacerbation. Started diuresis with lasix 80mg IV BID, with good UOP. KTM consulted and initially agreed with continued diuresis. Switched to PO lasix on 12/31; however, he continued to have worsening Cr. Per KTM, currently unclear volume status so will hold lasix for now. Repeat BNP, urine studies, and TTE pending for further volume assessment.     Interval History:   Patient with net 760ml out yesterday. 2.2L UOP total - after switching to PO lasix. Still appears volume overloaded on exam but no SOB or chest pain.     Review of Systems   Constitutional: Negative for chills and fever.   HENT: Negative for congestion and rhinorrhea.    Eyes: Negative for pain and visual disturbance.   Respiratory: Negative for cough and shortness of breath.    Cardiovascular: Positive for leg swelling. Negative for chest pain.   Gastrointestinal: Negative for abdominal pain and diarrhea.   Endocrine: Negative for polydipsia and polyuria.   Genitourinary: Negative for dysuria and hematuria.   Skin: Negative for " rash and wound.   Neurological: Negative for dizziness, weakness and headaches.   Psychiatric/Behavioral: Negative for agitation and behavioral problems.     Objective:     Vital Signs (Most Recent):  Temp: 98.4 °F (36.9 °C) (12/31/19 1253)  Pulse: 89 (12/31/19 1253)  Resp: 16 (12/31/19 1253)  BP: (!) 148/93 (12/31/19 1253)  SpO2: 97 % (12/31/19 1253) Vital Signs (24h Range):  Temp:  [97.5 °F (36.4 °C)-98.4 °F (36.9 °C)] 98.4 °F (36.9 °C)  Pulse:  [73-89] 89  Resp:  [16-20] 16  SpO2:  [96 %-100 %] 97 %  BP: (134-156)/(78-98) 148/93     Weight: 74.6 kg (164 lb 5.7 oz)  Body mass index is 24.99 kg/m².    Intake/Output Summary (Last 24 hours) at 12/31/2019 1335  Last data filed at 12/31/2019 1300  Gross per 24 hour   Intake 840 ml   Output 1600 ml   Net -760 ml      Physical Exam   Constitutional: He is oriented to person, place, and time. He appears well-developed and well-nourished.   HENT:   Head: Normocephalic and atraumatic.   Eyes: Pupils are equal, round, and reactive to light. EOM are normal.   Neck: Normal range of motion.   Cardiovascular: Normal rate, regular rhythm and intact distal pulses.   No murmur heard.  Pulmonary/Chest: Effort normal. No respiratory distress.   Abdominal: Soft. He exhibits no distension. There is no tenderness.   Musculoskeletal: He exhibits edema (1+ LE R >L). He exhibits no deformity.   Lymphadenopathy:     He has no cervical adenopathy.   Neurological: He is alert and oriented to person, place, and time.   Skin: Skin is warm and dry.       Significant Labs:   CBC:   Recent Labs   Lab 12/30/19 0450 12/31/19  0350   WBC 4.87 5.35   HGB 9.6* 9.0*   HCT 31.0* 29.6*    162     CMP:   Recent Labs   Lab 12/30/19  0450 12/31/19  0350    139   K 4.6 4.8    102   CO2 27 30*   * 141*   BUN 50* 48*   CREATININE 4.3* 4.4*   CALCIUM 8.4* 8.4*   PROT 5.8* 5.7*   ALBUMIN 3.1* 3.1*   BILITOT 0.7 0.6   ALKPHOS 110 107   AST 18 17   ALT 25 22   ANIONGAP 10 7*   EGFRNONAA  16.1* 15.6*     All pertinent labs within the past 24 hours have been reviewed.    Significant Imaging: I have reviewed all pertinent imaging results/findings within the past 24 hours.      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 Parnell w/ Heme/Onc) s/p doxorubicin x 4 now on Adriamycin q3w (for symptomatic metastatic disease), prox R. subclavian DVT 11/13/19 on Eliquis, and Renovascular HTN who presents with acute on chronic respiratory failure 2/2 volume overload following a 2 unit blood transfusion 12/27/19 (for Hb 6.7) in the setting of progressive, subacute LEFTY on CKD 3 in addition to HFpEF (40% with LV dysfunction, PET stress negative).    Patient initially diuresing well with lasix. After switching from IV to PO lasix patient Cr continued to trend up. Overall unclear if patient is volume overloaded or dehydrated. KTM recs stopping lasix and contining to trend kidney function.     Plan:  - repeat TTE: pending   - continue trending kidney function and UOP  - follow up on urine FeUrea   - water restriction   - daily weights.   - Holding beta blocker. Continue entresto and ISDN/hydralazine.     LEFTY (acute kidney injury)  Started diuresis with lasix 80mg IV BID, with good UOP. KTM consulted and initially agreed with continued diuresis. Switched to PO lasix on 12/31; however, he continued to have worsening LEFTY.     - Per KTM:  currently unclear volume status, possibly volume depleted. will hold lasix for now.   - Repeat BNP, urine studies, and TTE pending for further volume assessment.   - see above       Acute DVT (deep venous thrombosis)  Continue Eliquis       Renovascular hypertension  Continue ISDN/hydralazine and entresto for now      Acute rejection of kidney transplant  US transplanted kidney-  unremarkable          donor kidney transplant 2017  Continue prednisone  Sirolimus levels - WNL    Anemia in stage 3 chronic kidney disease  Chronic    AIDS (acquired immunodeficiency syndrome), CD4 <=200  Continue HAART therapy  Continue atovaquone for PCP ppx      VTE Risk Mitigation (From admission, onward)         Ordered     apixaban tablet 5 mg  2 times daily      19     IP VTE HIGH RISK PATIENT  Once      19                      Nghia Garcia MD  Department of Hospital Medicine   [facility]                    2019                             STAFF PHYSICIAN NOTE                                   Attending Attestation for Rounds with Resident  I have reviewed and concur with the resident's history, physical, assessment, and plan.  I have personally interviewed and examined the patient at bedside and agree with the resident's findings.                                     Silvia Pollock MD  Senior Hospitalist  [id number], [phone]

## 2019-12-31 NOTE — ASSESSMENT & PLAN NOTE
Started diuresis with lasix 80mg IV BID, with good UOP. KTM consulted and initially agreed with continued diuresis. Switched to PO lasix on 12/31; however, he continued to have worsening LEFTY.    - Per KTM:  currently unclear volume status, possibly volume depleted. will hold lasix for now.   - Repeat BNP, urine studies, and TTE pending for further volume assessment.   - see above

## 2019-12-31 NOTE — ASSESSMENT & PLAN NOTE
39 yr old male with a kidney transplant 8/2017, HIV and Kaposi Sarcoma. He is on sirolimus for immunosuppression. Patient is also on chemotherapy including doxarubicin. Admitted to [facility] for anemia seen on lab work, and was also found to have LEFTY.     -Baseline cr 2-3  -Cr on admission 4.3, today 4.4  -UA 2+ Protein, u p/cr with ~0.5 g protein, not changed much from prior  -US RP shows no etiology of LEFTY  -LEFTY possibly in setting of anemia with Hb of 6.6, however would expect improvement in Cr if injury were pre-renal  -difficult to ascertain patient's volume status as he has chronic lymphedema  -Pt reports feeling thirsty/dehydrated, reports LE edema at baseline  -UO of 2.2 L past 24 hours, net negative 700 cc.   -Transitioned from lasix IV 80 mg to PO 80 mg yesterday. Recommend holding diuretics today and continue to follow RFPs to determine Cr trend  Chart I/Oss, renally dose medications, avoid nephrotoxic agents

## 2019-12-31 NOTE — ASSESSMENT & PLAN NOTE
Admitted to Hospital Medicine for evaluation and management of LEFTY and CHF exacerbation. Started diuresis with lasix 80mg IV BID, with good UOP. KTM consulted and initially agreed with continued diuresis. Switched to PO lasix on 12/31; however, he continued to have worsening LEFTY. Per KTM, currently unclear volume status so will hold lasix for now. Repeat BNP and TTE pending for further volume assessment.

## 2019-12-31 NOTE — PROGRESS NOTES
[facility]  Kidney Transplant  Progress Note      Reason for Follow-up: Reassessment of Kidney Transplant - 8/18/2017  (#1) recipient and management of immunosuppression.    ORGAN:  RIGHT KIDNEY   Donor Type:  Donation after Brain Death     Subjective:   History of Present Illness:  39 yr old male with a kidney transplant 8/2017, HIV and Kaposi Sarcoma. He is on sirolimus for immunosuppression. Patient is also on chemotherapy including doxarubicin for KS followed by hematology. Also had a DVT on Apixiban and systolic heart failure with ER 40% with global hypokinesis. Recent PET scan showed no ischemia. Patient saw cardiology on 12/27 with some wt gain leg edema and some dyspnea with mild exertion. Also was found with severe anemia. Our coordinator called patient on Friday and directed him to go to a local ER. Patient went to a local hospital where he was transfused and sent to [facility] for further management.   Today I saw him his has some dyspnea but feels better after the blood transfusion, lower extremeties edema and basilar crackles    Interval History:  Pt seen and examined at bedside. Pt reports feeling thirsty and has not been hydrating well PO. Cr today 4.4 from 4.3 yesterday. UO of 2.2 L past 24 hours, net negative 700 cc. Transitioned from lasix IV 80 mg to PO 80 mg yesterday.    Past Medical, Surgical, Family, and Social History:   Unchanged from H&P.    Scheduled Meds:   amLODIPine  10 mg Oral Daily    apixaban  5 mg Oral BID    atovaquone  1,500 mg Oral Daily    cinacalcet  60 mg Oral Daily with breakfast    dolutegravir  50 mg Oral Daily    emtricitabine-tenofovir alafen  1 tablet Oral Daily    famotidine  20 mg Oral Daily    furosemide  80 mg Intravenous BID    isosorbide-hydrALAZINE 20-37.5 mg  1 tablet Oral TID    multivitamin  1 tablet Oral Daily    predniSONE  5 mg Oral Daily    rosuvastatin  10 mg Oral Daily    sacubitril-valsartan  1 tablet Oral BID    sevelamer  "carbonate  800 mg Oral TID WM    sirolimus  2 mg Oral Daily    sodium bicarbonate  1,950 mg Oral TID     Continuous Infusions:  PRN Meds:acetaminophen, Dextrose 10% Bolus, Dextrose 10% Bolus, glucagon (human recombinant), glucose, glucose, sodium chloride 0.9%    Intake/Output - Last 3 Shifts       12/29 0700 - 12/30 0659 12/30 0700 - 12/31 0659 12/31 0700 - 01/01 0659    P.O. 450 1440 240    Total Intake(mL/kg) 450 (5.8) 1440 (19.3) 240 (3.2)    Urine (mL/kg/hr) 2900 (1.6) 2200 (1.2) 150 (0.5)    Stool  0 0    Total Output 2900 2200 150    Net -2450 -760 +90           Stool Occurrence  1 x 1 x           Review of Systems   Constitutional: Negative for chills and fever.   HENT: Negative for congestion and rhinorrhea.    Eyes: Negative for pain and visual disturbance.   Respiratory: Negative for cough and shortness of breath.    Cardiovascular: Positive for leg swelling. Negative for chest pain.   Gastrointestinal: Negative for abdominal pain and diarrhea.   Endocrine: Negative for polydipsia and polyuria.   Genitourinary: Negative for dysuria and hematuria.   Skin: Negative for rash and wound.   Neurological: Negative for dizziness, weakness and headaches.   Psychiatric/Behavioral: Negative for agitation and behavioral problems.      Objective:     Vital Signs (Most Recent):  Temp: 97.5 °F (36.4 °C) (12/31/19 0810)  Pulse: 84 (12/31/19 0810)  Resp: 16 (12/31/19 0810)  BP: (!) 156/98 (12/31/19 0810)  SpO2: 100 % (12/31/19 0810) Vital Signs (24h Range):  Temp:  [97.5 °F (36.4 °C)-98.4 °F (36.9 °C)] 97.5 °F (36.4 °C)  Pulse:  [73-84] 84  Resp:  [16-20] 16  SpO2:  [96 %-100 %] 100 %  BP: (134-156)/(78-98) 156/98     Weight: 74.6 kg (164 lb 5.7 oz)  Height: 5' 8" (172.7 cm)  Body mass index is 24.99 kg/m².    Physical Exam   Constitutional: He is oriented to person, place, and time. He appears well-developed and well-nourished.   HENT:   Head: Normocephalic and atraumatic.   Eyes: Pupils are equal, round, and reactive " to light. EOM are normal.   Neck: Normal range of motion.   Cardiovascular: Normal rate, regular rhythm and intact distal pulses.   No murmur heard.  Pulmonary/Chest: Effort normal. No respiratory distress.   Abdominal: Soft. He exhibits no distension. There is no tenderness.   Musculoskeletal: He exhibits edema (1+ LE R >L). He exhibits no deformity.   Lymphadenopathy:     He has no cervical adenopathy.   Neurological: He is alert and oriented to person, place, and time.   Skin: Skin is warm and dry.       Laboratory:  CBC:   Recent Labs   Lab 12/29/19  0803 12/30/19  0450 12/31/19  0350   WBC 5.85 4.87 5.35   RBC 3.66* 3.80* 3.60*   HGB 9.0* 9.6* 9.0*   HCT 30.1* 31.0* 29.6*    165 162   MCV 82 82 82   MCH 24.6* 25.3* 25.0*   MCHC 29.9* 31.0* 30.4*     CMP:   Recent Labs   Lab 12/29/19 0803 12/30/19 0450 12/31/19  0350   GLU 89 116* 141*   CALCIUM 8.9 8.4* 8.4*   ALBUMIN 3.4* 3.1* 3.1*   PROT 6.3 5.8* 5.7*   * 142 139   K 4.7 4.6 4.8   CO2 28 27 30*    105 102   BUN 51* 50* 48*   CREATININE 4.1* 4.3* 4.4*   ALKPHOS 129 110 107   ALT 36 25 22   AST 27 18 17     Assessment/Plan:     LEFTY (acute kidney injury)  39 yr old male with a kidney transplant 8/2017, HIV and Kaposi Sarcoma. He is on sirolimus for immunosuppression. Patient is also on chemotherapy including doxarubicin. Admitted to [facility] for anemia seen on lab work, and was also found to have LEFTY.     -Baseline cr 2-3  -Cr on admission 4.3, today 4.4  -UA 2+ Protein, u p/cr with ~0.5 g protein, not changed much from prior  -US RP shows no etiology of LEFTY  -LEFTY possibly in setting of anemia with Hb of 6.6, however would expect improvement in Cr if injury were pre-renal  -difficult to ascertain patient's volume status as he has chronic lymphedema  -Pt reports feeling thirsty/dehydrated, reports LE edema at baseline  -UO of 2.2 L past 24 hours, net negative 700 cc.   -Transitioned from lasix IV 80 mg to PO 80 mg yesterday. Recommend holding  diuretics today and continue to follow RFPs to determine Cr trend  Chart I/Oss, renally dose medications, avoid nephrotoxic agents          Long-term use of immunosuppressant medication  -continue home sirolimus 2 mg and prednisone   -daily sirolimus levels  -will monitor for toxicities associated with immunosuppressive medications     donor kidney transplant 2017  -please see immunosuppressive medication section         Dora Sandhu MD  Kidney Transplant  [facility]

## 2019-12-31 NOTE — ASSESSMENT & PLAN NOTE
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 Parnell w/ Heme/Onc) s/p doxorubicin x 4 now on Adriamycin q3w (for symptomatic metastatic disease), prox R. subclavian DVT 11/13/19 on Eliquis, and Renovascular HTN who presents with acute on chronic respiratory failure 2/2 volume overload following a 2 unit blood transfusion 12/27/19 (for Hb 6.7) in the setting of progressive, subacute LEFTY on CKD 3 in addition to HFpEF (40% with LV dysfunction, PET stress negative).    Patient initially diuresing well with lasix. After switching from IV to PO lasix patient Cr continued to trend up. Overall unclear if patient is volume overloaded or dehydrated. KTM recs stopping lasix and contining to trend kidney function.     Plan:  - repeat TTE: pending   - continue trending kidney function and UOP  - follow up on urine FeUrea   - water restriction   - daily weights.   - Holding beta blocker. Continue entresto and ISDN/hydralazine.

## 2020-01-01 VITALS
WEIGHT: 164 LBS | TEMPERATURE: 98 F | DIASTOLIC BLOOD PRESSURE: 91 MMHG | HEIGHT: 69 IN | SYSTOLIC BLOOD PRESSURE: 136 MMHG | HEART RATE: 85 BPM | BODY MASS INDEX: 24.29 KG/M2 | OXYGEN SATURATION: 100 % | RESPIRATION RATE: 18 BRPM

## 2020-01-01 LAB
ALBUMIN SERPL BCP-MCNC: 2.9 G/DL (ref 3.5–5.2)
ALP SERPL-CCNC: 99 U/L (ref 55–135)
ALT SERPL W/O P-5'-P-CCNC: 16 U/L (ref 10–44)
ANION GAP SERPL CALC-SCNC: 9 MMOL/L (ref 8–16)
AST SERPL-CCNC: 18 U/L (ref 10–40)
BASOPHILS # BLD AUTO: 0.02 K/UL (ref 0–0.2)
BASOPHILS NFR BLD: 0.3 % (ref 0–1.9)
BILIRUB SERPL-MCNC: 0.5 MG/DL (ref 0.1–1)
BUN SERPL-MCNC: 49 MG/DL (ref 6–20)
C DIFF GDH STL QL: NEGATIVE
C DIFF TOX A+B STL QL IA: NEGATIVE
CALCIUM SERPL-MCNC: 8 MG/DL (ref 8.7–10.5)
CHLORIDE SERPL-SCNC: 103 MMOL/L (ref 95–110)
CO2 SERPL-SCNC: 26 MMOL/L (ref 23–29)
CREAT SERPL-MCNC: 4.4 MG/DL (ref 0.5–1.4)
DIFFERENTIAL METHOD: ABNORMAL
EOSINOPHIL # BLD AUTO: 0.1 K/UL (ref 0–0.5)
EOSINOPHIL NFR BLD: 1.1 % (ref 0–8)
ERYTHROCYTE [DISTWIDTH] IN BLOOD BY AUTOMATED COUNT: 17.5 % (ref 11.5–14.5)
EST. GFR  (AFRICAN AMERICAN): 18.1 ML/MIN/1.73 M^2
EST. GFR  (NON AFRICAN AMERICAN): 15.6 ML/MIN/1.73 M^2
GLUCOSE SERPL-MCNC: 147 MG/DL (ref 70–110)
HCT VFR BLD AUTO: 30.8 % (ref 40–54)
HGB BLD-MCNC: 9.7 G/DL (ref 14–18)
IMM GRANULOCYTES # BLD AUTO: 0.03 K/UL (ref 0–0.04)
IMM GRANULOCYTES NFR BLD AUTO: 0.4 % (ref 0–0.5)
LYMPHOCYTES # BLD AUTO: 1 K/UL (ref 1–4.8)
LYMPHOCYTES NFR BLD: 14.1 % (ref 18–48)
MAGNESIUM SERPL-MCNC: 1.8 MG/DL (ref 1.6–2.6)
MCH RBC QN AUTO: 25.7 PG (ref 27–31)
MCHC RBC AUTO-ENTMCNC: 31.5 G/DL (ref 32–36)
MCV RBC AUTO: 82 FL (ref 82–98)
MONOCYTES # BLD AUTO: 0.5 K/UL (ref 0.3–1)
MONOCYTES NFR BLD: 7.5 % (ref 4–15)
NEUTROPHILS # BLD AUTO: 5.5 K/UL (ref 1.8–7.7)
NEUTROPHILS NFR BLD: 76.6 % (ref 38–73)
NRBC BLD-RTO: 0 /100 WBC
PHOSPHATE SERPL-MCNC: 4.6 MG/DL (ref 2.7–4.5)
PLATELET # BLD AUTO: 175 K/UL (ref 150–350)
PMV BLD AUTO: 10.1 FL (ref 9.2–12.9)
POTASSIUM SERPL-SCNC: 5.3 MMOL/L (ref 3.5–5.1)
PROT SERPL-MCNC: 5.3 G/DL (ref 6–8.4)
RBC # BLD AUTO: 3.77 M/UL (ref 4.6–6.2)
SIROLIMUS BLD-MCNC: 6 NG/ML (ref 4–20)
SODIUM SERPL-SCNC: 138 MMOL/L (ref 136–145)
WBC # BLD AUTO: 7.18 K/UL (ref 3.9–12.7)
WBC #/AREA STL HPF: NORMAL /[HPF]

## 2020-01-01 PROCEDURE — 80053 COMPREHEN METABOLIC PANEL: CPT

## 2020-01-01 PROCEDURE — 87045 FECES CULTURE AEROBIC BACT: CPT

## 2020-01-01 PROCEDURE — 85025 COMPLETE CBC W/AUTO DIFF WBC: CPT

## 2020-01-01 PROCEDURE — 86361 T CELL ABSOLUTE COUNT: CPT

## 2020-01-01 PROCEDURE — 87046 STOOL CULTR AEROBIC BACT EA: CPT

## 2020-01-01 PROCEDURE — 99239 PR HOSPITAL DISCHARGE DAY,>30 MIN: ICD-10-PCS | Mod: ,,, | Performed by: HOSPITALIST

## 2020-01-01 PROCEDURE — 89055 LEUKOCYTE ASSESSMENT FECAL: CPT

## 2020-01-01 PROCEDURE — 25000003 PHARM REV CODE 250: Performed by: STUDENT IN AN ORGANIZED HEALTH CARE EDUCATION/TRAINING PROGRAM

## 2020-01-01 PROCEDURE — 84100 ASSAY OF PHOSPHORUS: CPT

## 2020-01-01 PROCEDURE — 87324 CLOSTRIDIUM AG IA: CPT

## 2020-01-01 PROCEDURE — 99239 HOSP IP/OBS DSCHRG MGMT >30: CPT | Mod: ,,, | Performed by: HOSPITALIST

## 2020-01-01 PROCEDURE — 83735 ASSAY OF MAGNESIUM: CPT

## 2020-01-01 PROCEDURE — 97802 MEDICAL NUTRITION INDIV IN: CPT

## 2020-01-01 PROCEDURE — 99232 PR SUBSEQUENT HOSPITAL CARE,LEVL II: ICD-10-PCS | Mod: ,,, | Performed by: INTERNAL MEDICINE

## 2020-01-01 PROCEDURE — 87536 HIV-1 QUANT&REVRSE TRNSCRPJ: CPT

## 2020-01-01 PROCEDURE — 99232 SBSQ HOSP IP/OBS MODERATE 35: CPT | Mod: ,,, | Performed by: INTERNAL MEDICINE

## 2020-01-01 PROCEDURE — 36415 COLL VENOUS BLD VENIPUNCTURE: CPT

## 2020-01-01 PROCEDURE — 63600175 PHARM REV CODE 636 W HCPCS: Performed by: STUDENT IN AN ORGANIZED HEALTH CARE EDUCATION/TRAINING PROGRAM

## 2020-01-01 PROCEDURE — 80195 ASSAY OF SIROLIMUS: CPT

## 2020-01-01 PROCEDURE — 87209 SMEAR COMPLEX STAIN: CPT

## 2020-01-01 PROCEDURE — 87427 SHIGA-LIKE TOXIN AG IA: CPT | Mod: 59

## 2020-01-01 PROCEDURE — 87449 NOS EACH ORGANISM AG IA: CPT

## 2020-01-01 RX ORDER — FAMOTIDINE 40 MG/1
40 TABLET, FILM COATED ORAL DAILY
Qty: 30 TABLET | Refills: 11 | Status: SHIPPED | OUTPATIENT
Start: 2020-01-02 | End: 2020-01-08 | Stop reason: SDUPTHER

## 2020-01-01 RX ORDER — CALCIUM CARBONATE 200(500)MG
500 TABLET,CHEWABLE ORAL DAILY PRN
Status: DISCONTINUED | OUTPATIENT
Start: 2020-01-01 | End: 2020-01-01 | Stop reason: HOSPADM

## 2020-01-01 RX ORDER — FAMOTIDINE 20 MG/1
40 TABLET, FILM COATED ORAL DAILY
Status: DISCONTINUED | OUTPATIENT
Start: 2020-01-01 | End: 2020-01-01 | Stop reason: HOSPADM

## 2020-01-01 RX ORDER — FUROSEMIDE 40 MG/1
80 TABLET ORAL 2 TIMES DAILY
Qty: 60 TABLET | Refills: 3 | Status: SHIPPED | OUTPATIENT
Start: 2020-01-01

## 2020-01-01 RX ADMIN — AMLODIPINE BESYLATE 10 MG: 10 TABLET ORAL at 09:01

## 2020-01-01 RX ADMIN — THERA TABS 1 TABLET: TAB at 09:01

## 2020-01-01 RX ADMIN — FAMOTIDINE 40 MG: 20 TABLET ORAL at 09:01

## 2020-01-01 RX ADMIN — DOLUTEGRAVIR SODIUM 50 MG: 50 TABLET, FILM COATED ORAL at 09:01

## 2020-01-01 RX ADMIN — SODIUM BICARBONATE 650 MG TABLET 1950 MG: at 09:01

## 2020-01-01 RX ADMIN — ALUMINUM HYDROXIDE, MAGNESIUM HYDROXIDE, AND SIMETHICONE 50 ML: 200; 200; 20 SUSPENSION ORAL at 11:01

## 2020-01-01 RX ADMIN — ROSUVASTATIN CALCIUM 10 MG: 10 TABLET, FILM COATED ORAL at 11:01

## 2020-01-01 RX ADMIN — EMTRICITABINE AND TENOFOVIR ALAFENAMIDE 1 TABLET: 200; 25 TABLET ORAL at 12:01

## 2020-01-01 RX ADMIN — SIROLIMUS 2 MG: 1 TABLET ORAL at 11:01

## 2020-01-01 RX ADMIN — HYDRALAZINE HYDROCHLORIDE AND ISOSORBIDE DINITRATE 1 TABLET: 37.5; 2 TABLET, FILM COATED ORAL at 09:01

## 2020-01-01 RX ADMIN — APIXABAN 5 MG: 5 TABLET, FILM COATED ORAL at 09:01

## 2020-01-01 RX ADMIN — ATOVAQUONE 1500 MG: 750 SUSPENSION ORAL at 11:01

## 2020-01-01 RX ADMIN — SEVELAMER CARBONATE 800 MG: 800 TABLET, FILM COATED ORAL at 12:01

## 2020-01-01 RX ADMIN — SEVELAMER CARBONATE 800 MG: 800 TABLET, FILM COATED ORAL at 09:01

## 2020-01-01 RX ADMIN — PREDNISONE 5 MG: 5 TABLET ORAL at 09:01

## 2020-01-01 NOTE — CONSULTS
Food & Nutrition  Education    Diet Education: low sodium, carb consistent, low potassium  Time Spent: 20 minutes  Learners: patient      Nutrition Education provided with handouts:  My Plate method, low sodium diet, list of foods and their potassium content      Comments: Pt with h/o kidney transplant and worsening CKD; A1c 5.7% also indicates prediabetes. Provided education on low sodium diet. Pt eats out about 50% of the time, focused on healthier options to order while dining out. Pt verbally acknowledged.      All questions and concerns answered. Dietitian's contact information provided.       Follow-Up: 1/08/20    Please Re-consult as needed    Huyen Smith RD  Ext [phone]      Thanks!

## 2020-01-01 NOTE — DISCHARGE INSTRUCTIONS
Please follow up closely with your PCP, kidney transplant medicine, and cardiology. Do not take entresto until cleared by KTM or cardiology

## 2020-01-01 NOTE — PROGRESS NOTES
"TRANSPLANT DAILY PROGRESS NOTE    Asked to follow patient for monitoring of transplant relates issues, allograft function, and immunosuppression.    Interval history:   Feels well today w/o SOB. Edema at baseline. Ambulating w/o MARY. Asking to go home. demies LUTS  Past medical, surgical, family, social history reviewed & unchanged since admission.      I/O last 3 completed shifts:  In: 480 [P.O.:480]  Out: 900 [Urine:900]    VITALS:  height is 5' 9" (1.753 m) and weight is 74.4 kg (164 lb). His oral temperature is 97.5 °F (36.4 °C). His blood pressure is 136/91 (abnormal) and his pulse is 85. His respiration is 18 and oxygen saturation is 100%.       A&O x3, NAD.  Lungs CTA all fields post, unlabored.  Heart regular, no rub.  Abdomen soft, nontender, no masses.  Allograft nontender.  Edema: 2-3+ RLE, 2+ RUE, 1+ LLE (same as yesterday)    Recent Labs   Lab 12/30/19  0450 12/31/19  0350 01/01/20  0335   WBC 4.87 5.35 7.18   HGB 9.6* 9.0* 9.7*   HCT 31.0* 29.6* 30.8*    162 175       Recent Labs   Lab 12/30/19  0450 12/31/19  0350 12/31/19  1332 01/01/20  0335    139 139 138   K 4.6 4.8 4.9 5.3*    102 102 103   CO2 27 30* 28 26   BUN 50* 48* 50* 49*   CREATININE 4.3* 4.4* 4.5* 4.4*   CALCIUM 8.4* 8.4* 8.3* 8.0*   PHOS 4.3 4.6*  --  4.6*         ASSESSMENT/PLAN:    LEFTY on CKD post kidney transplant stable at current values  Recent Labs   Lab 12/31/19  0350 12/31/19  1332 01/01/20  0335   Creatinine 4.4 H 4.5 H 4.4 H   eGFR if non  15.6 A 15.2 A 15.6 A   eGFR if  18.1 A 17.6 A 18.1 A   -d/w hosp med team; ok to d/c with plan to f/u with lab weekly and see HTS in very near future  -Patient aware of need to see HTS/heart failure and possibly have RHC at their discretion    Encounter for Monitoring Immunosuppression post Transplant  Recent Labs   Lab 08/07/19  0735 08/14/19  0842  12/10/19  1245  12/30/19  0450 12/31/19  0350 01/01/20  0335   Tacrolimus Lvl <1.5 L <1.5 L  " --  <1.5 L  --   --   --   --    Sirolimus Lvl 6.6 3.6 L   < >  --    < > 6.7 6.6 6.0    < > = values in this interval not displayed.     -Target level for Demetrio is 5; no change  -Will trend immunosuppression levels daily. Also monitor for med-related side effects or drug toxicity given immunosuppressant med with narrow therapeutic window.      Mssgs sent to RN coordinator to arrange qweekly labs + Dr. Kumar to schedule f/u with heart failure team. Plan reviewed with Demetrio.

## 2020-01-01 NOTE — PLAN OF CARE
Problem: Fall Injury Risk  Goal: Absence of Fall and Fall-Related Injury  Outcome: Ongoing, Progressing     Problem: Adult Inpatient Plan of Care  Goal: Plan of Care Review  Outcome: Ongoing, Progressing  Goal: Patient-Specific Goal (Individualization)  Outcome: Ongoing, Progressing  Goal: Absence of Hospital-Acquired Illness or Injury  Outcome: Ongoing, Progressing  Goal: Optimal Comfort and Wellbeing  Outcome: Ongoing, Progressing  Goal: Readiness for Transition of Care  Outcome: Ongoing, Progressing  Goal: Rounds/Family Conference  Outcome: Ongoing, Progressing     Problem: Infection  Goal: Infection Symptom Resolution  Outcome: Ongoing, Progressing     Problem: Adjustment to Illness (Heart Failure)  Goal: Optimal Coping  Outcome: Ongoing, Progressing     Problem: Cardiac Output Decreased (Heart Failure)  Goal: Optimal Cardiac Output  Outcome: Ongoing, Progressing     Problem: Fluid Imbalance (Heart Failure)  Goal: Fluid Balance  Outcome: Ongoing, Progressing     Problem: Functional Ability Impaired (Heart Failure)  Goal: Optimal Functional Ability  Outcome: Ongoing, Progressing

## 2020-01-02 ENCOUNTER — PATIENT MESSAGE (OUTPATIENT)
Dept: CARDIOLOGY | Facility: CLINIC | Age: 40
End: 2020-01-02

## 2020-01-02 LAB
CD3+CD4+ CELLS # BLD: 165 CELLS/UL (ref 300–1400)
CD3+CD4+ CELLS NFR BLD: 16 % (ref 28–57)
E COLI SXT1 STL QL IA: NEGATIVE
E COLI SXT2 STL QL IA: NEGATIVE
O+P STL TRI STN: NORMAL

## 2020-01-03 ENCOUNTER — PATIENT MESSAGE (OUTPATIENT)
Dept: INFECTIOUS DISEASES | Facility: CLINIC | Age: 40
End: 2020-01-03

## 2020-01-03 NOTE — ASSESSMENT & PLAN NOTE
Patient with hx of renal transplant presenting with LEFTY. Ddx include cardiorenal syndrome vs nephrotic syndrome vs ATN vs transplant rejection. Cr baseline 1.8-2.2 but presented to the ED with Cr of 2.9. With recent weight gain, elevated JVD and lower extremity swelling, highly suggestive of HF. However, unable to rule out secondary causes.     PLAN:   - UA to monitor for proteins/RBC in urine  - Holding nephrotoxic medications  - Lasix 40mg BID.      no

## 2020-01-04 LAB — BACTERIA STL CULT: NORMAL

## 2020-01-06 ENCOUNTER — PATIENT MESSAGE (OUTPATIENT)
Dept: HEMATOLOGY/ONCOLOGY | Facility: CLINIC | Age: 40
End: 2020-01-06

## 2020-01-08 ENCOUNTER — OFFICE VISIT (OUTPATIENT)
Dept: CARDIOLOGY | Facility: CLINIC | Age: 40
End: 2020-01-08
Payer: MEDICARE

## 2020-01-08 ENCOUNTER — HOSPITAL ENCOUNTER (OUTPATIENT)
Dept: CARDIOLOGY | Facility: CLINIC | Age: 40
Discharge: HOME | End: 2020-01-08
Attending: NURSE PRACTITIONER
Payer: MEDICARE

## 2020-01-08 ENCOUNTER — OFFICE VISIT (OUTPATIENT)
Dept: TRANSPLANT | Facility: CLINIC | Age: 40
End: 2020-01-08
Payer: MEDICARE

## 2020-01-08 VITALS
SYSTOLIC BLOOD PRESSURE: 138 MMHG | HEIGHT: 69 IN | BODY MASS INDEX: 24.29 KG/M2 | WEIGHT: 164 LBS | HEART RATE: 65 BPM | DIASTOLIC BLOOD PRESSURE: 90 MMHG

## 2020-01-08 VITALS
DIASTOLIC BLOOD PRESSURE: 79 MMHG | TEMPERATURE: 97 F | SYSTOLIC BLOOD PRESSURE: 141 MMHG | HEART RATE: 75 BPM | WEIGHT: 167.56 LBS | HEIGHT: 68 IN | BODY MASS INDEX: 25.39 KG/M2 | OXYGEN SATURATION: 99 % | RESPIRATION RATE: 16 BRPM

## 2020-01-08 VITALS
WEIGHT: 167.56 LBS | SYSTOLIC BLOOD PRESSURE: 134 MMHG | HEART RATE: 74 BPM | HEIGHT: 68 IN | BODY MASS INDEX: 25.39 KG/M2 | DIASTOLIC BLOOD PRESSURE: 79 MMHG

## 2020-01-08 DIAGNOSIS — E78.5 HYPERLIPIDEMIA, UNSPECIFIED HYPERLIPIDEMIA TYPE: Chronic | ICD-10-CM

## 2020-01-08 DIAGNOSIS — C46.9 KAPOSI SARCOMA: Chronic | ICD-10-CM

## 2020-01-08 DIAGNOSIS — Z94.0 S/P KIDNEY TRANSPLANT: ICD-10-CM

## 2020-01-08 DIAGNOSIS — N18.9 ACUTE KIDNEY INJURY SUPERIMPOSED ON CHRONIC KIDNEY DISEASE: ICD-10-CM

## 2020-01-08 DIAGNOSIS — D63.1 ANEMIA IN STAGE 3 CHRONIC KIDNEY DISEASE: ICD-10-CM

## 2020-01-08 DIAGNOSIS — T86.19 OTHER COMPLICATION OF KIDNEY TRANSPLANT: Primary | ICD-10-CM

## 2020-01-08 DIAGNOSIS — T86.11 ANTIBODY MEDIATED REJECTION OF KIDNEY TRANSPLANT: ICD-10-CM

## 2020-01-08 DIAGNOSIS — Z79.899 IMMUNOSUPPRESSIVE MANAGEMENT ENCOUNTER FOLLOWING KIDNEY TRANSPLANT: ICD-10-CM

## 2020-01-08 DIAGNOSIS — I50.22 CHRONIC SYSTOLIC HEART FAILURE: Primary | ICD-10-CM

## 2020-01-08 DIAGNOSIS — B20 AIDS (ACQUIRED IMMUNODEFICIENCY SYNDROME), CD4 <=200: ICD-10-CM

## 2020-01-08 DIAGNOSIS — C46.9 KAPOSI SARCOMA: ICD-10-CM

## 2020-01-08 DIAGNOSIS — N18.30 ANEMIA IN STAGE 3 CHRONIC KIDNEY DISEASE: ICD-10-CM

## 2020-01-08 DIAGNOSIS — N17.9 ACUTE KIDNEY INJURY SUPERIMPOSED ON CHRONIC KIDNEY DISEASE: ICD-10-CM

## 2020-01-08 DIAGNOSIS — Z94.0 IMMUNOSUPPRESSIVE MANAGEMENT ENCOUNTER FOLLOWING KIDNEY TRANSPLANT: ICD-10-CM

## 2020-01-08 DIAGNOSIS — I15.0 RENOVASCULAR HYPERTENSION: ICD-10-CM

## 2020-01-08 DIAGNOSIS — Z09 CHEMOTHERAPY FOLLOW-UP EXAMINATION: ICD-10-CM

## 2020-01-08 DIAGNOSIS — E87.5 HYPERKALEMIA: ICD-10-CM

## 2020-01-08 DIAGNOSIS — C77.9 REGIONAL LYMPH NODE METASTASIS PRESENT: ICD-10-CM

## 2020-01-08 DIAGNOSIS — E83.39 HYPERPHOSPHATEMIA: ICD-10-CM

## 2020-01-08 PROBLEM — S05.01XD CORNEAL ABRASION, RIGHT, SUBSEQUENT ENCOUNTER: Status: RESOLVED | Noted: 2017-11-27 | Resolved: 2020-01-08

## 2020-01-08 PROBLEM — I50.20 HEART FAILURE WITH REDUCED EJECTION FRACTION: Status: ACTIVE | Noted: 2020-01-08

## 2020-01-08 PROBLEM — R79.89 ELEVATED SERUM CREATININE: Status: RESOLVED | Noted: 2019-12-28 | Resolved: 2020-01-08

## 2020-01-08 PROBLEM — Z75.8 DISCHARGE PLANNING ISSUES: Status: RESOLVED | Noted: 2019-12-10 | Resolved: 2020-01-08

## 2020-01-08 PROBLEM — I50.9 ACUTE ON CHRONIC CONGESTIVE HEART FAILURE: Status: RESOLVED | Noted: 2019-12-28 | Resolved: 2020-01-08

## 2020-01-08 LAB
ASCENDING AORTA: 3.18 CM
AV INDEX (PROSTH): 0.87
AV MEAN GRADIENT: 5 MMHG
AV PEAK GRADIENT: 11 MMHG
AV VALVE AREA: 3.91 CM2
AV VELOCITY RATIO: 0.91
BSA FOR ECHO PROCEDURE: 1.9 M2
CV ECHO LV RWT: 0.34 CM
DOP CALC AO PEAK VEL: 1.65 M/S
DOP CALC AO VTI: 33.32 CM
DOP CALC LVOT AREA: 4.5 CM2
DOP CALC LVOT DIAMETER: 2.4 CM
DOP CALC LVOT PEAK VEL: 1.5 M/S
DOP CALC LVOT STROKE VOLUME: 130.36 CM3
DOP CALCLVOT PEAK VEL VTI: 28.83 CM
E WAVE DECELERATION TIME: 140.89 MSEC
E/A RATIO: 1.89
ECHO LV POSTERIOR WALL: 0.98 CM (ref 0.6–1.1)
FRACTIONAL SHORTENING: 19 % (ref 28–44)
INTERVENTRICULAR SEPTUM: 0.64 CM (ref 0.6–1.1)
IVRT: 0.07 MSEC
LA MAJOR: 6.29 CM
LA MINOR: 6.52 CM
LA WIDTH: 5.68 CM
LEFT ATRIUM SIZE: 5.07 CM
LEFT ATRIUM VOLUME INDEX: 82.6 ML/M2
LEFT ATRIUM VOLUME: 156.73 CM3
LEFT INTERNAL DIMENSION IN SYSTOLE: 4.72 CM (ref 2.1–4)
LEFT VENTRICLE DIASTOLIC VOLUME INDEX: 89.29 ML/M2
LEFT VENTRICLE DIASTOLIC VOLUME: 169.52 ML
LEFT VENTRICLE MASS INDEX: 95 G/M2
LEFT VENTRICLE SYSTOLIC VOLUME INDEX: 54.4 ML/M2
LEFT VENTRICLE SYSTOLIC VOLUME: 103.2 ML
LEFT VENTRICULAR INTERNAL DIMENSION IN DIASTOLE: 5.84 CM (ref 3.5–6)
LEFT VENTRICULAR MASS: 180.3 G
MV PEAK A VEL: 0.54 M/S
MV PEAK E VEL: 1.02 M/S
PISA TR MAX VEL: 3.38 M/S
PULM VEIN S/D RATIO: 0.88
PV PEAK D VEL: 0.76 M/S
PV PEAK S VEL: 0.67 M/S
RA MAJOR: 4.88 CM
RA PRESSURE: 3 MMHG
RA WIDTH: 4.59 CM
RETIRED EF AND QEF - SEE NOTES: 37 %
RIGHT VENTRICULAR END-DIASTOLIC DIMENSION: 4.56 CM
SINUS: 3.29 CM
STJ: 2.86 CM
TR MAX PG: 46 MMHG
TRICUSPID ANNULAR PLANE SYSTOLIC EXCURSION: 3.35 CM
TV REST PULMONARY ARTERY PRESSURE: 49 MMHG

## 2020-01-08 PROCEDURE — 99999 PR PBB SHADOW E&M-EST. PATIENT-LVL III: ICD-10-PCS | Mod: PBBFAC,,, | Performed by: INTERNAL MEDICINE

## 2020-01-08 PROCEDURE — 93356 PR IMG, MYOCARDIAL STRAIN, SPECKLE TRACKING: ICD-10-PCS | Mod: S$PBB,,, | Performed by: INTERNAL MEDICINE

## 2020-01-08 PROCEDURE — 99213 OFFICE O/P EST LOW 20 MIN: CPT | Mod: PBBFAC,25 | Performed by: INTERNAL MEDICINE

## 2020-01-08 PROCEDURE — 93306 ECHO (CUPID ONLY): ICD-10-PCS | Mod: 26,S$PBB,, | Performed by: INTERNAL MEDICINE

## 2020-01-08 PROCEDURE — 99999 PR PBB SHADOW E&M-EST. PATIENT-LVL III: CPT | Mod: PBBFAC,,, | Performed by: INTERNAL MEDICINE

## 2020-01-08 PROCEDURE — 99214 PR OFFICE/OUTPT VISIT, EST, LEVL IV, 30-39 MIN: ICD-10-PCS | Mod: S$PBB,,, | Performed by: INTERNAL MEDICINE

## 2020-01-08 PROCEDURE — 93356 MYOCRD STRAIN IMG SPCKL TRCK: CPT | Mod: S$PBB,,, | Performed by: INTERNAL MEDICINE

## 2020-01-08 PROCEDURE — 99215 OFFICE O/P EST HI 40 MIN: CPT | Mod: S$PBB,,, | Performed by: INTERNAL MEDICINE

## 2020-01-08 PROCEDURE — 99213 OFFICE O/P EST LOW 20 MIN: CPT | Mod: PBBFAC,25,27 | Performed by: INTERNAL MEDICINE

## 2020-01-08 PROCEDURE — 93306 TTE W/DOPPLER COMPLETE: CPT | Mod: PBBFAC | Performed by: INTERNAL MEDICINE

## 2020-01-08 PROCEDURE — 99214 OFFICE O/P EST MOD 30 MIN: CPT | Mod: S$PBB,,, | Performed by: INTERNAL MEDICINE

## 2020-01-08 PROCEDURE — 93356 MYOCRD STRAIN IMG SPCKL TRCK: CPT

## 2020-01-08 PROCEDURE — 99215 PR OFFICE/OUTPT VISIT, EST, LEVL V, 40-54 MIN: ICD-10-PCS | Mod: S$PBB,,, | Performed by: INTERNAL MEDICINE

## 2020-01-08 RX ORDER — FAMOTIDINE 40 MG/1
40 TABLET, FILM COATED ORAL DAILY
Qty: 30 TABLET | Refills: 11 | Status: SHIPPED | OUTPATIENT
Start: 2020-01-08 | End: 2021-01-07

## 2020-01-08 NOTE — Clinical Note
Thank you for referring Demetrio Aguiar for follow-up of heart failure. Please see my note for details of this encounter. If you have any questions, please contact me.  Thank you again for the referral.

## 2020-01-08 NOTE — LETTER
January 8, 2020                     [address]  [address]  [facility] [address]  Phone: [phone]   Patient: Demetrio Aguiar   MR Number: [id number]   YOB: 1980   Date of Visit: 1/8/2020       Dear      Thank you for referring Demetrio Aguiar to me for evaluation. Attached you will find relevant portions of my assessment and plan of care.    If you have questions, please do not hesitate to call me. I look forward to following Demetrio Aguiar along with you.    Sincerely,    Lucía Polk MD    Enclosure    If you would like to receive this communication electronically, please contact [email or web address] or [phone] to request sezmi Link access.    sezmi Link is a tool which provides read-only access to select patient information with whom you have a relationship. Its easy to use and provides real time access to review your patients record including encounter summaries, notes, results, and demographic information.    If you feel you have received this communication in error or would no longer like to receive these types of communications, please e-mail [email or web address]

## 2020-01-08 NOTE — PATIENT INSTRUCTIONS
Weigh yourself first thing in the morning after emptying your bladder (urinating) and record weight every day.  If weight increases by 3 pounds in 1 day or by 5 pounds in 2 days or over a week,double the dose of the morning furosemide until weight has returned to prior value.

## 2020-01-08 NOTE — Clinical Note
DVT hx in this kid txp: Eliquis with worsened renal function... May be going on dialysis soon. OK to continue?

## 2020-01-08 NOTE — PROGRESS NOTES
This  Chart has been dictated using voice recognition software.  It is not been reviewed carefully for any transcriptional errors due to this technology.   Subjective:   Patient ID:  Demetrio Aguiar is a 39 y.o. male who presents for follow-up of Acute on chronic congestive heart failure (hospital discharge 01/01/19)      HPI: Patient s/p renal transplant 2017, HIV positive with Kaposi sarcoma on chemotherapy including doxarubicin, hypertension, and hyperlipidemia. Patient hospitalized 12-19-Nov-2019 for CAP and LEFTY (due to dehydrartion).  Patient had a DVT in the hospital and was started on apixaban.  Patient subsequently hospitalized 10-17-December-2019 for heart failure with significant fluid overload.  LVEF was decreased to 40% with global hypokinesis and decreased strain. PET stress showed no ischemic changes.  He was diuresed to an 11 pound weight loss.  Patient was seen in clinic by me on 27-December-2019 and was advised to increase his diuresis at home.  However, on his way home the patient became short of breath and was once again hospitalized with acute kidney injury and heart failure exacerbation.  The patient responded well to a furosemide 80 mg IV b.i.d. although his renal function did decline with increased diuresis.      Since leaving the hospital the patient has been relatively stable.  He denies any orthopnea or PND.  He states he is not getting short of breath when he does his normal routine activities.  He does note that he had increased water day before yesterday with an increase in his weight by slightly more in 3 lb the following day.  However, he did not increase his diuretic dose.  He notes that he is trying to adhere to the fluid and sodium intake restrictions.  He denies any chest pain, palpitations, or syncope.  He does note occasional lightheadedness.  Past Medical History:   Diagnosis Date    Anemia     At risk for opportunistic infections     Delayed graft function of kidney      ESRD secondary to HIVAN s/p DDRT 8/18/17     HIV infection     HIV nephropathy     Hyperlipidemia     Hypertension     Need for prophylactic immunotherapy     S/P kidney transplant 8/18/2017 8/28/2017    Status post exploratory laparotomy 9/10/2017    Re explored for retroperitoneal hematoma. Hematoma evacuated.(09/09)    Status post exploratory laparotomy 9/10/2017    Re explored for retroperitoneal hematoma. Hematoma evacuated.(09/09)    TB lung, latent     tx INH 2006       Outpatient Medications Prior to Visit   Medication Sig Dispense Refill    amLODIPine (NORVASC) 10 MG tablet Take 1 tablet (10 mg total) by mouth once daily. 30 tablet 2    apixaban (ELIQUIS) 5 mg Tab Take 1 tablet (5 mg total) by mouth 2 (two) times daily. 60 tablet 3    atovaquone (MEPRON) 750 mg/5 mL Susp Take 10 mLs (1,500 mg total) by mouth once daily. 300 mL 3    calcium carbonate (TUMS) 200 mg calcium (500 mg) chewable tablet Take 1 tablet (500 mg total) by mouth once daily. 30 tablet 11    carvedilol (COREG) 25 MG tablet Take 1 tablet (25 mg total) by mouth 2 (two) times daily with meals. 60 tablet 3    cinacalcet (SENSIPAR) 60 MG Tab Take 1 tablet (60 mg total) by mouth daily with breakfast. 30 tablet 3    dolutegravir (TIVICAY) 50 mg Tab Take 1 tablet (50 mg total) by mouth once daily. 30 tablet 5    emtricitabine-tenofovir alafen (DESCOVY) 200-25 mg Tab Take 1 tablet by mouth once daily.      ferrous sulfate (FEOSOL) 325 mg (65 mg iron) Tab tablet Take 1 tablet (325 mg total) by mouth 2 (two) times daily. 60 tablet 2    furosemide (LASIX) 40 MG tablet Take 2 tablets (80 mg total) by mouth 2 (two) times daily. Please take once daily. Weigh yourself and if any weight changes, please contact your transplant doctors. Thank you 60 tablet 3    HYDROcodone-acetaminophen (NORCO) 5-325 mg per tablet Take 1 tablet by mouth every 4 (four) hours as needed for Pain. 20 tablet 0    isosorbide-hydrALAZINE 20-37.5 mg (BIDIL)  "20-37.5 mg Tab Take 1 tablet by mouth 3 (three) times daily. 90 tablet 3    multivitamin (THERAGRAN) tablet Take 1 tablet by mouth once daily.      patiromer calcium sorbitex (VELTASSA) 25.2 gram PwPk Take 1 packet (25.2 g total) by mouth once daily. 30 packet 10    predniSONE (DELTASONE) 5 MG tablet TAKE ONE TABLET BY MOUTH EVERY DAY 30 tablet 11    rosuvastatin (CRESTOR) 20 MG tablet Take 1 tablet (20 mg total) by mouth once daily. 30 tablet 3    sevelamer carbonate (RENVELA) 800 mg Tab Take 1 tablet (800 mg total) by mouth 3 (three) times daily with meals. 90 tablet 11    sirolimus (RAPAMUNE) 2 MG tablet Take 1 tablet (2 mg total) by mouth once daily. 30 tablet 11    sodium bicarbonate 650 MG tablet Take 3 tablets (1,950 mg total) by mouth 3 (three) times daily. 270 tablet 11    famotidine (PEPCID) 40 MG tablet Take 1 tablet (40 mg total) by mouth once daily. 30 tablet 11     No facility-administered medications prior to visit.        ROS No change from prior visit in neurologic, respiratory, endocrine, GI, hematologic, or constitutional complaints   Objective:   Physical Exam   Constitutional: He is oriented to person, place, and time. He appears well-developed and well-nourished.   /79 (BP Location: Left arm, Patient Position: Sitting, BP Method: Large (Automatic))   Pulse 74   Ht 5' 8" (1.727 m)   Wt 76 kg (167 lb 8.8 oz)   BMI 25.48 kg/m²    Eyes: Pupils are equal, round, and reactive to light.   Neck: Neck supple. No JVD present. Carotid bruit is not present. No thyromegaly present.   Cardiovascular: Normal rate, regular rhythm and intact distal pulses.  No extrasystoles are present. PMI is not displaced. Exam reveals gallop and S4. Exam reveals no friction rub.   No murmur heard.  Pulmonary/Chest: Effort normal and breath sounds normal. He has no wheezes. He has no rales.   Abdominal: Soft. Bowel sounds are normal. There is no hepatosplenomegaly. There is no tenderness.   Musculoskeletal: " He exhibits edema (2-3 + pretibial edema on the right; 1-2 + edema on left.).   Neurological: He is alert and oriented to person, place, and time. He has normal strength.   Skin: No cyanosis. Nails show no clubbing.         Lab Results   Component Value Date     01/08/2020    K 5.3 (H) 01/08/2020    BUN 54 (H) 01/08/2020    CREATININE 5.9 (H) 01/08/2020    GLU 95 01/08/2020    HGBA1C 5.9 (H) 12/11/2019    CHOL 103 (L) 08/26/2019    HDL 43 08/26/2019    LDLCALC 40.4 (L) 08/26/2019    TRIG 98 08/26/2019    CHOLHDL 41.7 08/26/2019    HGB 7.7 (L) 01/08/2020    HCT 25.2 (L) 01/08/2020    HCT 22 (L) 11/12/2019     (L) 01/08/2020    INR 1.1 12/28/2019       Assessment:     1. Chronic systolic heart failure    2. Kaposi sarcoma    3. Regional lymph node metastasis present    4. Antibody mediated rejection of kidney transplant    5. Anemia in stage 3 chronic kidney disease    6. Hyperlipidemia, unspecified hyperlipidemia type    7. Renovascular hypertension    8. AIDS (acquired immunodeficiency syndrome), CD4 <=200      The patient is relatively stable with evidence of class 2 heart failure at this time.  His echocardiogram shows his EF to be stable and that he is not fluid overloaded.  Patient was advised to weigh himself at home and should for target this to 3 lb lower than where he is.  He is advised that if his weight goes up by 1 3 lb in a day or 5 lb in 5-7 days that he should increase his a.m. diuretic by doubling the dose until his weight is back down.  The patient may be a candidate for clinical use of CardioMEMS.  Will discuss with Dr. Kumar.  The patient is not a candidate for the CardioMEMS study.    Patient's renal function continues to decline with his creatinine today being 5 and his potassium being 5.3.  Discussed with Dr. Munoz.  Will hold off on starting ACEI/ARB/ARNI at this time.  Will continue his current medical regimen.  Patient should return for re-evaluation in 1 month.  Plan:      Demetrio was seen today for acute on chronic congestive heart failure.    Diagnoses and all orders for this visit:    Chronic systolic heart failure    Kaposi sarcoma    Regional lymph node metastasis present    Antibody mediated rejection of kidney transplant    Anemia in stage 3 chronic kidney disease    Hyperlipidemia, unspecified hyperlipidemia type    Renovascular hypertension    AIDS (acquired immunodeficiency syndrome), CD4 <=200    Other orders  -     famotidine (PEPCID) 40 MG tablet; Take 1 tablet (40 mg total) by mouth once daily.          Hector Mejias MD  Consultative Cardiology

## 2020-01-08 NOTE — Clinical Note
Evelina & Mercedes,Please Fax a copy of my progress note, Cardiology note from today, 2D echocardiogram, most recent discharge summary, recent labs to patient's nephrologist, Dr. Daniel [[phone]]. Also please add her # to our referral system and his current nephrologist. Thanks!

## 2020-01-09 NOTE — TELEPHONE ENCOUNTER
Please lower Eliquis to 2.5 mg b.i.d..  Let patient know he will be on a reduced dose because of his lower kidney function level.

## 2020-01-09 NOTE — TELEPHONE ENCOUNTER
----- Message from Stephanie Gusman PharmD sent at 1/9/2020  9:09 AM CST -----  Okay to continue, but the dose should be 2.5 mg BID. She is currently on 5 mg BID, but her clearance is low so should be 2.5 mg BID. There is not much data for use in HD (but I still see it used and better option than other DOACs available) so patient should monitor for signs/symptoms of bleeding and hold before procedures per protocol.       ----- Message -----  From: Lucía Polk MD  Sent: 1/8/2020   6:34 PM CST  To: Abdominal Transplant Pharmacists    DVT hx in this kid txp: Eliquis with worsened renal function... May be going on dialysis soon. OK to continue?

## 2020-01-09 NOTE — PROGRESS NOTES
Post-Transplant Assessment    Referring Physician:   Current Nephrologist: Alexa Holliday    ORGAN: RIGHT KIDNEY  Donor Type:  - brain death  PHS Increased Risk: no  Cold Ischemia: 422 mins  Induction Medications: thymoglobulin    Subjective:     CC:  Reassessment of renal allograft function and management of chronic immunosuppression.    HPI:  Mr. Aguiar is a 39 y.o. year old Black or  male who received a  - brain death kidney transplant on 17.  He has CKD stage 3 - GFR 30-59 and his baseline creatinine is between 1.8-2.1. He takes prednisone and sirolimus for maintenance immunosuppression.         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   - Biopsy proven ABMR with weakly positive class I DSA's on 17. Pt treated with SMP x 3 (-9/3). PLEX x6 (completed ) and IVIG x 2 (-).    - Kidney US on 17 showed a large collection located along the anterior inferior margin of the renal transplant measuring 13.4 x 7 x 11.9. taken back to the OR for retroperitoneal bleed and washout.   -Biopsy 10/17/17 (LEFTY): 14 glomeruli, <5% fibrosis, no MC severo..  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 Less than 10 % fibrosis.   -Biopsy (for DGF) 17: good sample with 24 glomeruli (only 1 sclerosed) and evidence just meeting criteria for ABMR. + diffuse ATI. Minimal fibrosis, no ACR or AVR, but + CD4 (>50% diffusely + stain) with mild glomerulitis and focal peritubular capillaritis. Also, + ca-oxalate (3) and ca-phos (2) crystals.  -18 WEAK DSA DETECTED: CW5(6071)      PCP PROPHYLAXIS: Bactrim LIFELONG--> changed to Mepron ~ 2018  CMV PROPHYLAXIS: Valcyte until 17  FUNGAL PROPHYLAXIS: Nystatin until 9/15/17    Demetrio horta  "been getting chemo [ doxorubicin x4 doses + carbo] for metastatic kaposi sarcoma.  Over the last several months, he has developed worsening renal function and issues with volume overload.  He was also found to have a new cardiomyopathy [echo EF 40% 11/26/19] ,   Anemia: He has been getting some PAPO [darbepoetin] by Oncology when got chemo, but I am uncertain of last dose given CHF has caused chemo to go on hold.  Received some blood transfusions during recent hospitalizations.  Also developed DVT in November, started on apixaban.    He saw Cardiology earlier today and was aware his creatinine was worsening.  He denies any current problems with chest pain, shortness of breath.    Review of Systems   Respiratory: Negative for shortness of breath.    Cardiovascular: Positive for leg swelling. Negative for chest pain.   Gastrointestinal: Negative for abdominal pain, nausea and vomiting.   Genitourinary: Negative for difficulty urinating.   Skin: Negative for rash.   Allergic/Immunologic: Positive for immunocompromised state.     Objective:     Blood pressure (!) 141/79, pulse 75, temperature 96.9 °F (36.1 °C), temperature source Oral, resp. rate 16, height 5' 8" (1.727 m), weight 76 kg (167 lb 8.8 oz), SpO2 99 %.body mass index is 25.48 kg/m².    Physical Exam   Constitutional: He is oriented to person, place, and time. He appears well-developed and well-nourished.   HENT:   Head: Normocephalic.   Eyes: Conjunctivae are normal.   Cardiovascular: Normal rate and regular rhythm.   Pulmonary/Chest: Effort normal and breath sounds normal.   Abdominal: Soft. He exhibits no mass. There is no tenderness.   Musculoskeletal: He exhibits no edema (2+RUE, 4+RLE, 1+ LLE (none in LUE)).   Neurological: He is alert and oriented to person, place, and time.   Skin: Skin is dry. No rash noted.   Multiple KS lesions over skin of RLQ and well as RLE below knee.   Psychiatric: He has a normal mood and affect. Judgment normal.     External " labs; also see A/P  Recent Labs   Lab 02/19/18 05/21/18  0754  08/06/18  0758  02/05/19 07/25/19  0951 07/29/19  0756 11/06/19 12/03/19  1041   EXT ANC  --   --  1,974  --   --   --   --   --   --   --   --   --    EXT WBC 4.7   < > 4.2   < > 3.7 L   < > 4.9   < > 4.9 4.3  --  4.9   EXT SEGS% 75.8   < > 47.2   < > 61.4   < > 68.5  --  73.4 69.2 76.2 A 72.2   EXT Platelets 140   < > 145   < > 148   < > 169   < > 145 A 119 A 152 247   EXT Hemoglobin 12.5   < > 10.6   < > 11.2   < > 8.9   < > 8.3 A 8.0 A  --  7.8 A   EXT Hematocrit 37.3   < > 31.3   < > 32.9   < > 27.5   < > 25.1 A 24.5 A  --  23.8 A   EXT Creatinine 2.0   < >  --    < >  --    < > 2.33   < > 3.33 A 2.87 A 2.40 A 2.48 A   EXT Sodium 136   < >  --    < >  --    < > 137   < > 134 137 139 143   EXT Potassium 5.0   < >  --    < >  --    < > 5.3   < > 4.1 3.8 3.8 4.9   EXT BUN 24   < >  --    < >  --    < > 30   < > 39 A 33 A 29 A 30 A   EXT CO2 25   < >  --    < >  --    < > 24   < > 26 28 27 24   EXT Calcium 9.2   < >  --    < >  --    < > 9.5   < > 9.2 9.0 7.8 A 8.6 A   EXT Phosphorus 5.2 H   < >  --    < > 5.1 H   < > 4.2  --   --  3.5 3.3 4.6 A   EXT Glucose 107   < >  --    < >  --    < > 118  --  121 A 114 A 104 104   EXT Albumin 4.6   < >  --    < > 4.9   < > 4.6  --   --  4.0 3.1 A 3.9   EXT AST 27  --   --   --  17  --  14  --   --   --   --   --    EXT ALT 28  --   --   --  14  --  8  --   --   --   --   --    EXT BilirubiN Total 1.0  --   --   --  0.60  --  0.50  --   --   --   --   --     < > = values in this interval not displayed.       Recent Labs   Lab 11/20/17  0905  12/28/17 02/19/18 04/02/18 08/06/18 0758 08/16/18 11/08/18  0751 02/05/19 07/25/19  0951 07/29/19  0756 11/06/19 12/03/19  1041   EXT Sirolimus Lvl  --   --   --   --   --   --   --   --   --   --   --   --   --    < >  --  none detected 6.4 2.0 L   EXT Tacrolimus Lvl 8.5   < > 6.8   < > 12.7 H   < > *p*   < > 7.3 7.7   < > 6.5 5.6  --  3.6  --   --   --    EXT  PROT/CREAT Ratio UR 0.16  --  0.23  --   --   --   --   --  0.122  --   --   --  0.11  --   --   --   --   --    EXT PTH, Intact  --   --   --   --   --   --   --   --   --  170  --   --   --   --   --   --   --   --    EXT Protein UA negative  --   --   --  neg  --   --   --  NEG  --   --   --   --   --   --   --   --   --    EXT WBC UA negative  --   --   --  negative  --   --   --   --   --   --   --   --   --   --   --   --   --    EXT RBC UA negative  --   --   --  negative  --   --   --   --   --   --   --   --   --   --   --   --   --     < > = values in this interval not displayed.       Labs were reviewed with the patient.    Assessment:     1. Other complication of kidney transplant    2. Acute kidney injury superimposed on chronic kidney disease stage III    3. Kaposi sarcoma    4.  donor kidney transplant 2017    5. Immunosuppressive management encounter following kidney transplant    6. Hyperkalemia    7. Hyperphosphatemia      Plan:     Allograft function assessment:  Progressive  LEFTY superimposed on CKD, approaching need for dialysis.  Now CKD 5.  Recent Labs   Lab 19  1332 20  0335 20  0912   Creatinine 4.5 H 4.4 H 5.9 H   eGFR if non  15.2 A 15.6 A 11.0 A   eGFR if  17.6 A 18.1 A 12.7 A   - etiology is unclear: Could be secondary to over diuresis, though exam still shows some reason to suspect volume overload but could have intravascular volume depletion.  He has history of ABMR, and had to undergo reduction of immunosuppression related to his KS.  Unfortunately, biopsy or pursuing treatment for rejection or reversible causes of renal failure is not really an option given his complex situation.    Encounter for Monitoring Immunosuppression post Transplant  Converted to sirolimus because of KS.  Continue it at low doses plus prednisone while on chemotherapy.       Recent Labs   Lab 19  0735 19  0842  12/10/19  124   12/31/19  0350 01/01/20  0335 01/08/20  0912   Tacrolimus Lvl <1.5 L <1.5 L  --  <1.5 L  --   --   --   --    Sirolimus Lvl 6.6 3.6 L   < >  --    < > 6.6 6.0 3.8 L    < > = values in this interval not displayed.   -Target level for Demetrio is 4-5.   If he restart dialysis, he will need to be weaned off sirolimus.  -Monitor for side effects and toxicities, given narrow therapeutic window and significant risk of AE  -  Follow levels with routine labs.    Evaluation for bone marrow suppression (r/t immunosuppression toxicity or infection)   Anemia of CKD: suspect needs PAPO. Hopefully can resume at home with his nephrologist  Lab Results   Component Value Date    WBC 3.94 01/08/2020    HGB 7.7 (L) 01/08/2020    HCT 25.2 (L) 01/08/2020     (L) 01/08/2020     Metabolic bone disease    Recent Labs   Lab 09/18/17  1016  12/27/17  1627  12/13/19  0429  12/31/19  0350 12/31/19  1332 01/01/20  0335 01/08/20  0912   Albumin 3.9  3.9   < >  --    < >  --    < > 3.1 L  --  2.9 L 3.1 L   Calcium 9.3   < >  --    < > 8.2 L   < > 8.4 L 8.3 L 8.0 L 8.7   Phosphorus 2.1 L   < >  --    < > 4.8 H   < > 4.6 H  --  4.6 H 5.2 H   Magnesium 2.3   < >  --    < > 2.1   < > 1.9  --  1.8 2.2   PTH, Intact 328.0 H  --  443.0 H  --  549.0 H  --   --   --   --   --     < > = values in this interval not displayed.   - labs look reasonable.   Mild hyperphosphatemia.  Reminded to follow a low phosphorus diet.    Assessment of electrolytes  Lab Results   Component Value Date     01/08/2020    K 5.3 (H) 01/08/2020     01/08/2020    CO2 30 (H) 01/08/2020   -  Labs suggest contraction alkalosis.  Will follow closely and may need to back off diuretics if worsens.  Very challenging situation in that he needs diuretics to avoid symptomatic CHF, and his physical exam is misleading given his lymphedema    Screening for BK infection to prevent allograft dysfunction  Lab Results   Component Value Date    BKVIRUSPCRQB <125 08/26/2019     -Neg as desired  Continue blood PCR screening as per program guidelines to prevent BK viremia and nephropathy leading to allograft dysfunction and potential graft failure    Screening for proteinuria  Lab Results   Component Value Date    PROTEINURINE 66 (H) 12/16/2019    CREATRANDUR 101.0 12/31/2019    UTPCR 0.57 (H) 12/16/2019   -Negligible, monitor    Kaposi sarcoma, metastatic: chemotherapy as per Oncology.      Chronic hyperkalemia:  Continue Veltassa.  Importance of low-potassium diet reviewed.  NKF of handout on high/low potassium foods provided.    DVT:   Will query pharmacist regarding appropriate dose of Eliquis with worsened renal function.    Follow-up of metastatic kaposi sarcoma per Oncology     I personally spoke with Dr. Pooja Daniel [[phone]] and updated her on the worsened renal failure    Follow-up:   Clinic: return to transplant clinic weekly for the first month after transplant; every 2 weeks during months 2-3; then at 6-, 9-, 12-, 18-, 24-, and 36- months post-transplant to reassess for complications from immunosuppression toxicity and monitor for rejection.  Annually thereafter.    Labs: since patient remains at high risk for rejection and drug-related complications that warrant close monitoring, labs will be ordered as follows: continue twice weekly CBC, renal panel, and drug level for first month; then same labs once weekly through 3rd month post-transplant.  Urine for UA and protein/creatinine ratio monthly.  Serum BK - PCR at 1-, 3-, 6-, 9-, 12-, 18-, 24-, 36- 48-, and 60 months post-transplant.  Hepatic panel at 1-, 2-, 3-, 6-, 9-, 12-, 18-, 24-, and 36- months post-transplant.    MD СВЕТЛАНА Banuelos Patient Status  Functional Status: 50% - Requires considerable assistance and frequent medical care  Physical Capacity: No Limitations

## 2020-01-13 ENCOUNTER — TELEPHONE (OUTPATIENT)
Dept: TRANSPLANT | Facility: CLINIC | Age: 40
End: 2020-01-13

## 2020-01-13 ENCOUNTER — PATIENT MESSAGE (OUTPATIENT)
Dept: TRANSPLANT | Facility: CLINIC | Age: 40
End: 2020-01-13

## 2020-01-13 NOTE — TELEPHONE ENCOUNTER
Critical lab results received from [facility] Hgb 7.7 . Discussed with Dr. Tello. Will send labs to Nephrologist and notify Hem/Onc of H/H for follow up.

## 2020-01-30 ENCOUNTER — PATIENT MESSAGE (OUTPATIENT)
Dept: HEMATOLOGY/ONCOLOGY | Facility: CLINIC | Age: 40
End: 2020-01-30

## 2020-01-31 NOTE — PLAN OF CARE
Problem: Patient Care Overview  Goal: Plan of Care Review  Outcome: Ongoing (interventions implemented as appropriate)  * AAO x 4  * Scant yellow/pink urine output noted patient is oliguric and on hemodialysis    * Left thigh fistula +/+  * Blood glucose monitoring AC & HS. Blood glucose reading 128. See chart for all blood glucose readings.  * Diet: regular tolerating well  * No edema noted   * Bed at lowest position and locked, call light within reach, non-slip socks on, and side rails up x 2.  Encouraged patient to call for assistance when needed this RN visualized patient ambulating in halls gait and balance WNL  * Incision to the right lower quadrant with staples clean dry and intact no signs or symptoms of infection noted.   * LIJ trialysis (8/18/17) patent, dressing clean dry and intact no signs or symptoms of infection noted. Dressing to be changed on 8/28/17  * Oxygen saturation 99 % on room air.  Respirations even and unlabored no signs or symptoms of distress noted.   * Patient has complaints of pain to the abdomen PRN pain medications administered.    * Skin assessment preformed no breakdown noted   * Standard precautions maintained  * Continuous telemetry monitoring continued SR 60-70's no ectopy noted  * Patient able to turn self no assistance needed.      
Problem: Patient Care Overview  Goal: Plan of Care Review  Outcome: Ongoing (interventions implemented as appropriate)  1. Continue current regular diet; encourage adequate PO intake as tolerated.   2. Add Boost Plus ONS to aid in caloric intake.   3. Post transplant nutrition education provided.   4. RD to monitor & follow-up.      
Problem: Patient Care Overview  Goal: Plan of Care Review  Outcome: Ongoing (interventions implemented as appropriate)  3.5 hours dialysis complete.  Blood rinsed back.  Needles pulled from left thigh graft.  Pressure held x 10 minutes.  Hemostasis achieved.  Covered with gauze and paper tape.  +thrill +bruit.  Net UF 2.3L.  Tolerated well.      
Problem: Patient Care Overview  Goal: Plan of Care Review  Outcome: Ongoing (interventions implemented as appropriate)  HD treatment complete. Duration of treatment 3.5 hours and 4 L removed. Treatment was tolerated well and no complications with access to left thigh. Needles removed and hemostasis achieved. Dressing intact and no drainage noted. Thrill and bruit present.       
Problem: Patient Care Overview  Goal: Plan of Care Review  Outcome: Ongoing (interventions implemented as appropriate)  Patient POD #2 today. OOB to chair all day, ambulated in hallway with RN this afternoon. Activity encouraged. Had one episode of emesis when getting OOB, relieved with Zofran. Patient passing gas, tolerated lunch without difficulty. PO intake encouraged as tolerated. D51/2NS still infusing at 50cc/hr. BG WNL - noon BG not checked as patient had already eaten lunch. Thymo given - tolerated without difficulty. Dopamine gtt d/c'd - VS continue to be checked Q2H after gtt weaned off to ensure adequate BP. Orosco to gravity - UOP 65cc this shift. Lasix 100mg IV given x1 with no response. Creatinine 7.2 today (up from 6 yesterday), K 4.7. Compliant with low potassium diet. Dilaudid PCA d/c'd; transitioned to PO Dilaudid. Patient reports better pain control with PO meds.     Self-meds taught to patient and cousin. Blue card and med box explained and stressed importance of having RN verify meds prior to patient taking them. Patient correctly pulled 1800 meds. Also demonstrated how to paint incision with betadine and instructed to do so TID. Patient verbalized understanding.       
Problem: Patient Care Overview  Goal: Plan of Care Review  Outcome: Ongoing (interventions implemented as appropriate)  Patient is pod #0 from kidney transplant. He is aao x3. Call bell within reach. Mother and cousin at bedside. He has hypoactive bowel sounds. He declined any food today but is drinking well. Orosco cath intact draining very small amout of pink tinged urine. Urine output has been 5-20ml/hr. Kidney ultrasound done today. Orosco to stay in place for 4 days per surgeons note. Pt has rlq surgical dressing intact with no drainage. rlq madai drain draining sersousangious fluid. Pain well controlled with diluadid pca pump. Pt required dialysis today due to K of 7.6, 1500ml of fluid removed. Self medications reviewed with pt's mother and cousin. Both verbalized understanding. Will continue to monitor.       
Problem: Patient Care Overview  Goal: Plan of Care Review  Outcome: Ongoing (interventions implemented as appropriate)  Plan of care reviewed with patient. Pt verbalized understanding. Pt AAOX4. Pt free from falls, injuries and trauma.  Pt free from skin breakdown.  Pt VSS and in NAD.  Pt afebrile.  Pt had no complaints of SOB, N/V or CP.  Pts pain controlled with dilaudid PCA. Dressing to surgical site CDI.  Orosco intact. Ashley care completed by pt. Scant UOP this shift and continues to be red tinged. JESSICA intact with minimal output. Dopamine gtt and D5 1/2 NS continuous per order. Pt had uneventful evening. Pt in low locked bed with personal items and call light within reach. Fall precautions maintained.       
Problem: Patient Care Overview  Goal: Plan of Care Review  Outcome: Ongoing (interventions implemented as appropriate)  Plan of care reviewed with patient. Pt verbalized understanding. Pt AAOX4. Pt free from falls, injuries and trauma.  Pt free from skin breakdown.  Pt VSS and in NAD.  Pt afebrile.  Pt had no complaints of SOB, N/V or CP.  Pts pain moderately controlled with prn pain medications. Orosco intact and toya care done by patient. Scant UOP this shift. + flatus no BM this shift. JESSICA remains intact. 00% on self meds. Pt had uneventful evening. Pt in low locked bed with personal items and call light within reach. Fall precautions maintained. Family member at bedside.       
Problem: Patient Care Overview  Goal: Plan of Care Review  Outcome: Ongoing (interventions implemented as appropriate)  Plan of care reviewed with patient. Pt verbalized understanding. Pt AAOX4. Pt free from falls, injuries and trauma.  Pt free from skin breakdown.  Pt VSS and in NAD.  Pt afebrile.  Pt had no complaints of SOB, N/V or CP.  Pts pain moderately controlled with prn pain medications. Scant UOP this shift. No BM. Pt ambulated in hallway with standby assistance. Pt had uneventful evening. Pt in low locked bed with personal items and call light within reach. Fall precautions maintained.       
Problem: Patient Care Overview  Goal: Plan of Care Review  Outcome: Ongoing (interventions implemented as appropriate)  Plan of care reviewed with patient. Pt verbalized understanding. Pt AAOX4. Pt free from falls, injuries and trauma.  Pt free from skin breakdown.  Pt VSS and in NAD.  Pt afebrile.  Pt had no complaints of SOB, N/V or CP. Pt has complaints of numbness to rt heel. Elevated heel off mattress with blanket. Pt's pain is controlled with PCA, see mar for settings. Orosco intact. Scant UOP since he returned to the floor from recovery. JESSICA drain intact with scant output. Rt lower quad incision dressing CDI. Pt in low locked bed with personal items and call light within reach. Fall precautions maintained. Family at bedside.      
Problem: Patient Care Overview  Goal: Plan of Care Review  Outcome: Ongoing (interventions implemented as appropriate)  Pt aao x 4. VSS. Bed in low locked pos, call light within reach. Pt remains anuric, HD in am. RLQ Gerardo collecting minimal bloody drainage. BS monitored ac/hs. Prn dilaudid controlling incisional pain. Pt's caregiver pulling self meds.       
Problem: Patient Care Overview  Goal: Plan of Care Review  Outcome: Ongoing (interventions implemented as appropriate)  Pt aao x 4. VSS. Bed in low locked pos, call light within reach.HD in am, creat 9.3. Urine more clear than yesterday, 100 cc overnight. RLQ Gerardo pulled during day.  Incision gabrielle w/ staples. Thymo dose # 3 given.       
Problem: Patient Care Overview  Goal: Plan of Care Review  Outcome: Ongoing (interventions implemented as appropriate)  Pt aao x 4. VSS. Bed in low locked pos, call light within reach.HD w/ 4 L removed. Pt voiding clear red urine. HIV meds given as ordered. Prn dilaudid controls incisional pain. Pt ambulates in halls w/o assistance. Incision gabrielle w/ staples.       
Pt scheduled for kidney transplant at 2000. Hibiclens shower, EKG, CXR, PIV, consents, and labs done. He is AAOx4 with stable vs. TEDs and SCDs on. He ambulates independently.  
VSS and afebrile. Bed in lower and locked position, call light within reach and non-skid socks on when ambulating. Pt verbalized to call for assistance. Pt is free from falls/injury thus far. 1U RBC given today and Thymo infusing now. No adverse reactions thus far.  Care on going. Will continue to monitor.   
VSS and afebrile. Bed in lower and locked position, call light within reach and non-skid socks on when ambulating. Pt verbalized to call for assistance. Pt is free from falls/injury thus far. HD today pt still no back to the floor.  H & H 7.6 & 21.8. Care on going. Will continue to monitor.   
0 = understands/communicates without difficulty

## 2020-02-11 ENCOUNTER — TELEPHONE (OUTPATIENT)
Dept: TRANSPLANT | Facility: CLINIC | Age: 40
End: 2020-02-11

## 2020-02-11 DIAGNOSIS — J18.9 PNEUMONIA OF RIGHT LOWER LOBE DUE TO INFECTIOUS ORGANISM: ICD-10-CM

## 2020-02-11 DIAGNOSIS — B20 HIV INFECTION, UNSPECIFIED SYMPTOM STATUS: Chronic | ICD-10-CM

## 2020-02-11 RX ORDER — SIROLIMUS 1 MG/1
1 TABLET, FILM COATED ORAL DAILY
Qty: 30 TABLET | Refills: 11 | Status: SHIPPED | OUTPATIENT
Start: 2020-02-11 | End: 2020-02-28 | Stop reason: ALTCHOICE

## 2020-02-11 NOTE — TELEPHONE ENCOUNTER
I called Dr. Archuleta from [facility], [place], [place] after being notified by Dr. Miramontes that patient was hospitalized at this facility for initiation of dialysis.    He is currently on sirolimus [SRL] 2 mg daily and prednisone 5 mg daily.  I advised decreasing sirolimus to 1 mg daily in continuing prednisone.  I will cc transplant coordinator to contact patient make sure he is aware to stay on low-dose sirolimus and prednisone for now.  Plan will be updated in a month, based on his status at that time.    Will cc oncology, in case they have any preference regarding use of SRL.

## 2020-02-25 NOTE — ASSESSMENT & PLAN NOTE
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  Plan to repeat US 8/21/17 w increased arterial RIs segemental and lobular arteries.  Plan to repeat US on Thursday 8/24/17.  - pt dialyzed on POD 1 for hyperkalemia (7.6) and POD #3 for elevated Cr/anuric.   - UOP increased to 520 ml/24hr POD #5, decreased again on POD #6 150 ml+/24 hr.  - continue to monitor renal function and UOP   - 4 day espinoza removed 8/22/17.  JESSICA drain removed POD #5  - plan for HD today.       -T4 wnl  -T3 LOW 53  -f/u endo regarding liothyronine  -c/w synthroid 87.5 mg IV -T4 wnl  -T3 LOW 53  -c/w synthroid 87.5 mg IV, per endo no change in meds, outpatient f/u

## 2020-02-28 DIAGNOSIS — Z94.0 KIDNEY REPLACED BY TRANSPLANT: ICD-10-CM

## 2020-02-28 RX ORDER — PREDNISONE 5 MG/1
5 TABLET ORAL DAILY
Qty: 30 TABLET | Refills: 11 | Status: SHIPPED | OUTPATIENT
Start: 2020-02-28 | End: 2020-10-26

## 2020-02-28 NOTE — TELEPHONE ENCOUNTER
VORB Restart Prednisone 5mg QD.  Ok to remain off Sirolimus .  Reviewed with Dr Gross after receiving email from Dr Tello stating patient IS meds were D/C by his local PCP after he restarted HD.  _________________  Patient repeated back and voice a understanding of orders and states he would like a new RX for prednisone sent to his local [facility].  Patient also states he restarted HD on 2/8/20 at [facility] on T/Th/Sat and is followed by Dr MAGALY Bower. Patient is requesting to be re valuated for a re transplant.

## 2020-03-24 DIAGNOSIS — I15.0 RENOVASCULAR HYPERTENSION: ICD-10-CM

## 2020-03-26 RX ORDER — FERROUS SULFATE 325(65) MG
TABLET ORAL
Qty: 60 TABLET | Refills: 11 | OUTPATIENT
Start: 2020-03-26

## 2020-03-26 RX ORDER — CARVEDILOL 25 MG/1
TABLET ORAL
Qty: 60 TABLET | Refills: 11 | OUTPATIENT
Start: 2020-03-26

## 2020-03-26 RX ORDER — APIXABAN 5 MG/1
TABLET, FILM COATED ORAL
Qty: 60 TABLET | Refills: 11 | OUTPATIENT
Start: 2020-03-26

## 2020-04-16 DIAGNOSIS — I15.0 RENOVASCULAR HYPERTENSION: ICD-10-CM

## 2020-04-20 ENCOUNTER — TELEPHONE (OUTPATIENT)
Dept: HEPATOLOGY | Facility: CLINIC | Age: 40
End: 2020-04-20

## 2020-04-20 NOTE — TELEPHONE ENCOUNTER
Returned pharmacy call. These medications were written during patient's December 2019 admission.  He need to get refills from following MD or PCP.

## 2020-04-20 NOTE — TELEPHONE ENCOUNTER
----- Message from Alyssa Golden MA sent at 4/20/2020  9:39 AM CDT -----  Contact: trena BUCIO [phone]  This patient was seen in the hospital when prescribed this medication. This patient hasn't been seen in [facility].  ----- Message -----  From: Annabelle Hooks  Sent: 4/20/2020   9:30 AM CDT  To: [facility] Glaser Staff    Pt needs refills on     carvedilol (COREG) 25 MG tablet  apixaban (ELIQUIS) 2.5 mg Tab  ferrous sulfate (FEOSOL) 325 mg (65 mg iron) Tab tablet

## 2020-04-23 RX ORDER — FERROUS SULFATE 325(65) MG
TABLET ORAL
Qty: 60 TABLET | Refills: 5 | OUTPATIENT
Start: 2020-04-23

## 2020-04-23 RX ORDER — APIXABAN 5 MG/1
TABLET, FILM COATED ORAL
Qty: 60 TABLET | Refills: 5 | OUTPATIENT
Start: 2020-04-23

## 2020-04-23 RX ORDER — CARVEDILOL 25 MG/1
TABLET ORAL
Qty: 60 TABLET | Refills: 5 | OUTPATIENT
Start: 2020-04-23

## 2020-05-04 NOTE — TELEPHONE ENCOUNTER
OK to taper off prednisone, with monitoring by nephrologist [for s/s adrenal insufficiency]:  Prednisone taper should be ~ 1 mg per month, with monitoring for signs/symptoms adrenal insufficiency:  4 mg x 1 mo  3 mg x 1 mo  2 mg x 1 mo  1 mg x 1 mo then stop    -Remind him to notify us if he develops unexplained symptoms that may signal rejection: pain over failed allograft, fever w/o source, hematuria.  - He should also notify his nephrologist if he develops lower blood pressures than he is used to, notices abdominal pain, or other new symptoms.  These could be related to tapering the prednisone more quickly than his body can tolerate.  - Please share taper plan with his nephrologist and dialysis unit.  - No further need for transplant follow-up unless he develops issues and contact us.

## 2020-08-20 NOTE — TELEPHONE ENCOUNTER
isavuconazonium sulfate 186 mg Cap dose and length of therapy reviewed with Pharm D. 4wk therapy only for mold Prophylaxis.    MED

## 2020-10-06 NOTE — DISCHARGE SUMMARY
[facility] Hospital Medicine  Discharge Summary      Patient Name: Demetrio Aguiar  MRN: [id number]  Admission Date: 12/28/2019  Hospital Length of Stay: 4 days  Discharge Date and Time:  01/01/2020 11:57 AM  Attending Physician: Silvia Pollock MD   Discharging Provider: Nghia Garcia MD  Primary Care Provider: Primary Doctor 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 Parnell w/ Heme/Onc) s/p doxorubicin x 4 now on Adriamycin q3w (for symptomatic metastatic disease), prox R. subclavian DVT 11/13/19 on Eliquis, and Renovascular HTN who presents with acute on chronic SOB and volume overload following a 2 unit blood transfusion 12/27/19 (for Hb 6.7) in the setting of progressive LEFTY on CKD 3 since August 2019 and HFpEF (55 to 40% with LV dysfunction, PET stress negative).    The patient reports he has chronic SOB and issues with volume management since August. On review of his records, he has been seen by nephrology during inpatient admissions and he his noted to have progression of LEFTY on CKD3 for several months. He does make urine, and initially was taking 40 mg of po lasix twice a day. Due to ongoing volume management issues, he was seen by Dr Mejias (cardiology) and was advised to optimize goal directed therapy (Beta blocker, Entresto, and ISDN-hydralazine) in addition to increase lasix to 80mg by mouth twice a day. The patient ended up not taking the entresto and continued to take the lasix 40mg twice a day.     The patient then was seen by [facility] on [facility] for follow up and on his way back home to [place], [place], he was called by nephrology coordinator to report to the ED immediately due to a Hb of 6.7. He went to an ED in [place], where he was tranfused 2 U pRBC. He  "was seen by nephrology at that time in [place], and was apparently told that "his kidney's are failing" and he needs to come to [facility].     Following the blood transfusion, the patient reports an acute increase in SOB and orthopnea. Because of the acute change in breathing, and advise of nephrologist in [place], he presented to [facility] today.     The patient is on HAART therapy, he reports compliance. Seen by Dr. Miramontes outpatient.   He denies any chest pain, palpitations, syncope. On Coreg 25mg, amlodipine 10 mg, hydralazine, and entresto at home (he has not been taking entresto).     He does make urine. No recent blood in stool or melena.   No fevers, no new cough, no sick contacts. He reports other than chronic volume issues, he has had no new symptoms.   Seen by Dr. Mejias outpatient, goal weight is about 160 lbs.       * No surgery found *      Hospital Course:   Admitted to Hospital Medicine for evaluation and management of LEFTY and CHF exacerbation. Started diuresis with lasix 80mg IV BID, with good UOP. KTM consulted and initially agreed with continued diuresis. Switched to PO lasix on 12/31; however, he continued to have worsening Cr (4.4 with baseline of 3). Per KTM, volume status was unclear; however, he had no improvement of Cr after holding lasix . Repeat BNP improved from 1200 -->200s, FeUrea consistent with intrinsic renal disease, and TTE unchanged from prior (EF40% with constrictive cardiomyopathy). Upon reeveal by KTM - may be patients new baseline Cr. He was then discharged home. Told to follow up with cardiology for lasix management and possible RHC. Will also have close follow up with PCP and KTM.        Vital Signs (Most Recent):  Temp: 97.1 °F (36.2 °C) (01/01/20 0856)  Pulse: 81 (01/01/20 0856)  Resp: 18 (01/01/20 0856)  BP: (!) 165/103 (01/01/20 0856)  SpO2: 100 % (01/01/20 0856) Vital Signs (24h Range):  Temp:  [96.7 °F (35.9 °C)-98.4 °F (36.9 °C)] 97.1 °F (36.2 °C)  Pulse:  [70-89] 81  Resp:  " [16-20] 18  SpO2:  [96 %-100 %] 100 %  BP: (137-165)/() 165/103     Weight: 74.4 kg (164 lb)  Body mass index is 24.22 kg/m².    Intake/Output Summary (Last 24 hours) at 2020 1156  Last data filed at 2019 2200  Gross per 24 hour   Intake --   Output 300 ml   Net -300 ml      Physical Exam   Constitutional: He is oriented to person, place, and time. He appears well-developed and well-nourished.   HENT:   Head: Normocephalic and atraumatic.   Eyes: Pupils are equal, round, and reactive to light. EOM are normal.   Neck: Normal range of motion.   Cardiovascular: Normal rate, regular rhythm and intact distal pulses.   No murmur heard.  Pulmonary/Chest: Effort normal. No respiratory distress.   Abdominal: Soft. He exhibits no distension. There is no tenderness.   Musculoskeletal: He exhibits edema (1+ LE R >L). He exhibits no deformity.   Lymphadenopathy:     He has no cervical adenopathy.   Neurological: He is alert and oriented to person, place, and time.   Skin: Skin is warm and dry.       Consults:   Consults (From admission, onward)        Status Ordering Provider     Inpatient consult to Kidney/Pancreas Transplant Medicine  Once     Provider:  (Not yet assigned)    Completed ALESSANDRA MELENDREZ     Inpatient consult to Registered Dietitian/Nutritionist  Once     Provider:  (Not yet assigned)    Acknowledged CAIN CORTES              Final Active Diagnoses:    Diagnosis Date Noted POA    PRINCIPAL PROBLEM:  Acute on chronic congestive heart failure [I50.9] 2019 Yes    LEFTY (acute kidney injury) [N17.9] 2018 Yes    Discharge planning issues [Z02.9] 12/10/2019 Not Applicable    Acute DVT (deep venous thrombosis) [I82.409] 2019 Yes    Renovascular hypertension [I15.0] 2019 Yes    Long-term use of immunosuppressant medication [Z79.899] 2017 Not Applicable    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           Manny Reddy- Transplant In 1 week.    Specialty:  Transplant  Contact information:  [address]rid[address] Reddy  [facility] [address] [phone]  Additional information:  [facility] - 1st Floor, Located by clinic tower elevators           Manny Reddy - Cardiology In 1 week.    Specialty:  Cardiology  Contact information:  [address] Reddy  [facility] [address] [phone]  Additional information:  3rd floor               Patient Instructions:   Following up with KTM, PCP, and cards for possible RHC      Pending Diagnostic Studies:     Procedure Component Value Units Date/Time    CD4 T-[place] Cells [[id number]]     Order Status:  Sent Lab Status:  No result     Specimen:  Blood     HIV RNA, quantitative, PCR [[id number]]     Order Status:  Sent Lab Status:  No result     Specimen:  Blood     Stool Exam-Ova,Cysts,Parasites [[id number]] Collected:  01/01/20 1119    Order Status:  Sent Lab Status:  In process Updated:  01/01/20 1120    Specimen:  Stool     WBC, Stool [[id number]] Collected:  01/01/20 1119    Order Status:  Sent Lab Status:  In process Updated:  01/01/20 1120    Specimen:  Stool          Medications:  Reconciled Home Medications:      Medication List      CHANGE how you take these medications    famotidine 40 MG tablet  Commonly known as:  PEPCID  Take 1 tablet (40 mg total) by mouth once daily.  Start taking on:  January 2, 2020  What changed:    · medication strength  · how much to take  · how to take this  · when to take this  · additional instructions     furosemide 40 MG tablet  Commonly known  as:  LASIX  Take 2 tablets (80 mg total) by mouth 2 (two) times daily. Please take once daily. Weigh yourself and if any weight changes, please contact your transplant doctors. Thank you  What changed:    · how much to take  · when to take this        CONTINUE taking these medications    amLODIPine 10 MG tablet  Commonly known as:  NORVASC  Take 1 tablet (10 mg total) by mouth once daily.     apixaban 5 mg Tab  Commonly known as:  ELIQUIS  Take 1 tablet (5 mg total) by mouth 2 (two) times daily.     atovaquone 750 mg/5 mL Susp  Commonly known as:  MEPRON  Take 10 mLs (1,500 mg total) by mouth once daily.     BiDil 20-37.5 mg Tab  Generic drug:  isosorbide-hydrALAZINE 20-37.5 mg  Take 1 tablet by mouth 3 (three) times daily.     calcium carbonate 200 mg calcium (500 mg) chewable tablet  Commonly known as:  Tums  Take 1 tablet (500 mg total) by mouth once daily.     carvedilol 25 MG tablet  Commonly known as:  COREG  Take 1 tablet (25 mg total) by mouth 2 (two) times daily with meals.     cinacalcet 60 MG Tab  Commonly known as:  SENSIPAR  Take 1 tablet (60 mg total) by mouth daily with breakfast.     dolutegravir 50 mg Tab  Commonly known as:  Tivicay  Take 1 tablet (50 mg total) by mouth once daily.     emtricitabine-tenofovir alafen 200-25 mg Tab  Commonly known as:  Descovy  Take 1 tablet by mouth once daily.     Entresto 24-26 mg per tablet  Generic drug:  sacubitril-valsartan  Take 1 tablet by mouth 2 (two) times daily.     ferrous sulfate 325 mg (65 mg iron) Tab tablet  Commonly known as:  FEOSOL  Take 1 tablet (325 mg total) by mouth 2 (two) times daily.     HYDROcodone-acetaminophen 5-325 mg per tablet  Commonly known as:  NORCO  Take 1 tablet by mouth every 4 (four) hours as needed for Pain.     multivitamin tablet  Commonly known as:  THERAGRAN  Take 1 tablet by mouth once daily.     patiromer calcium sorbitex 25.2 gram Pwpk  Commonly known as:  VELTASSA  Take 1 packet (25.2 g total) by mouth once daily.      predniSONE 5 MG tablet  Commonly known as:  DELTASONE  TAKE ONE TABLET BY MOUTH EVERY DAY     rosuvastatin 20 MG tablet  Commonly known as:  CRESTOR  Take 1 tablet (20 mg total) by mouth once daily.     sevelamer carbonate 800 mg Tab  Commonly known as:  RENVELA  Take 1 tablet (800 mg total) by mouth 3 (three) times daily with meals.     sirolimus 2 MG tablet  Commonly known as:  RAPAMUNE  Take 1 tablet (2 mg total) by mouth once daily.     sodium bicarbonate 650 MG tablet  Take 3 tablets (1,950 mg total) by mouth 3 (three) times daily.            Indwelling Lines/Drains at time of discharge:   Lines/Drains/Airways     None                 Time spent on the discharge of patient: 35 minutes  Patient was seen and examined on the date of discharge and determined to be suitable for discharge.         Nghia Garcia MD  Department of Hospital Medicine  [facility]                    01/02/2020                             STAFF PHYSICIAN NOTE                                   Attending Attestation for Rounds with Resident  I have reviewed and concur with the resident's history, physical, assessment, and plan.  I have personally interviewed and examined the patient at bedside and agree with the resident's findings.                                     Silvia Pollock MD  Senior Hospitalist  [id number], [phone]         Detail Level: Simple

## 2020-11-13 NOTE — ASSESSMENT & PLAN NOTE
- bicarb corrected after dialysis and today is actually slightly elevated  - continue to monitor      Surgeon/Pathologist Verbiage (Will Incorporate Name Of Surgeon From Intro If Not Blank): operated in two distinct and integrated capacities as the surgeon and pathologist.

## 2021-03-22 NOTE — PLAN OF CARE
Margarette Kent  : 1965  Primary: [facility]*  Secondary:  [facility]  [address], [address]  [phone]   [phone]         OUTPATIENT PHYSICAL THERAPY:Daily Note 3/22/2021      TREATMENT:   PT Patient Time In/Time Out  Time In: 0800  Time Out: 0845      Total Time: 45min  Visit Count:  5     ICD-10: Treatment Diagnosis: M17.11, M25.561  Medication Last Reviewed: 21      TREATMENT PLAN  Effective Dates: 3/8/2021 TO 2021 (90 days). Frequency/Duration: 2 times a week for 90 Day(s)         Subjective: Pt states his knee is doing fine   Pain:     Objective: Therapeutic Exercise: (45min) Done in order to improve strength, ROM and understanding of current condition. Date:  3/11 Date:  3/15 Date:  3/18 Date:  3/22   Activity/Exercise       Education       NuStep x10min lvl 2-3 x10min lvl 4-5 x10min lvl 4 x10min lvl 4   SLR 3x5 B 4x10 B 3x10 3lbs 3x15   Bridges 3x10 3x10 · 3x10 normal  · x20 w/ SL march 3x10   Standing Hip ABD 2x10 25lbs 3x10 25lbs 3x10 25lbs 3x10   Hip Flexion 2x10 25lbs 3x10 25lbs 3x10 25lbs    Sidestepping 3x35ft green TB      Knee Extension Machine 3x10 35lbs      Heel Slides x4min      Clams   3x10-15 B green TB     Squats  3x10  3x10   Step Ups  3x5 8\"     SL Balance   x6min on foam    Hip Extension   3x10 25lbs 3x10   4-Way Walkouts    x10 30lbs   Bike    x5min                   Access Code: [email or web address]  URL: [email or web address]  Date: 2021  Prepared by:  Florin Rivera    Exercises  Supine Bridge  Supine Active Straight Leg Raise  Standing Hip Abduction    Manual Therapy: (0min) Done in order to improve joint and soft tissue mobility,reduce muscle guarding, and decrease muscle tone   Date:  3/8 Date:  3/11 Date:   Date:     Type Parameters      Joint Mobilization Knee: A-P, P-A, traction grades I-III Knee: A-P, P-A grades I-III     Soft Tissue Mobilization Patient AAOx4, Remains afebrile. VSS except elevated BP, coreg increased to 25 mg BID. C/o abdominal pain, controlled on current regimen. Encouraged patient to call for pain medicine more often, is available Q4. 2nd therapeutic aPTT value at noon, no changes to heparin. Drip continues at 20 units/kg/hr, 13.6 ml/hr.  Cr 2.5, IVF continued, rate decreased to 75 ml/hr.  Ambulated in benito x2. No cough, unable to collect sputum sample. New order for resp treatments Q4 while awake.    Modalities: (-) Done in order to reduce swelling and pain    Assessment: Pt had some pain with squats and fatigue when doing SLR at end of session, but able to complete all exercises    Plan: continue per POC    Future Appointments   Date Time Provider Lisa Tanya   3/24/2021  8:00 AM Ridgway Haste, PT [facility]   3/29/2021  8:00 AM Ridgway Haste, PT [facility]   4/1/2021  8:00 AM Felipe Haste, PT [facility]   4/5/2021  8:00 AM Felipe Haste, PT [facility]   4/8/2021  8:00 AM Ridgway Haste, PT [facility]   4/12/2021  8:00 AM Ridgway Haste, PT [facility]   4/15/2021  8:00 AM Felipe Haste, PT [facility]       [place] Time:  Units: 3TE      Veronica Kai, PT, DPT, OCS    Visit Approval Visit # Therapist initials Date A NS / Cx < 24 hr >24 hr Cx Comments    1 WJ 3/8 [x]  [] [] Initial evaluation    2 WJ 3/11 [x] [] []     3 WJ 3/15 [x] [] []     4 WJ 3/18 [x] [] []     5 WJ 3/22 [x] [] []     6   [] [] []     7   [] [] []     8   [] [] []     9   [] [] []     10   [] [] []        [] [] []        [] [] []        [] [] []        [] [] []        [] [] []        [] [] []        [] [] []        [] [] []

## 2021-07-01 ENCOUNTER — TELEPHONE (OUTPATIENT)
Dept: TRANSPLANT | Facility: CLINIC | Age: 41
End: 2021-07-01

## 2021-08-26 NOTE — ASSESSMENT & PLAN NOTE
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     Nurse informed on identifiable vm that pt is allergic to shellfish, egg, peanut, soy, tree nuts, wheat and dairy. Advised to contact office with any further questions.

## 2021-11-15 ENCOUNTER — TELEPHONE (OUTPATIENT)
Dept: TRANSPLANT | Facility: CLINIC | Age: 41
End: 2021-11-15
Payer: MEDICARE

## 2021-11-22 ENCOUNTER — TELEPHONE (OUTPATIENT)
Dept: TRANSPLANT | Facility: CLINIC | Age: 41
End: 2021-11-22
Payer: MEDICARE

## 2022-04-28 ENCOUNTER — TELEPHONE (OUTPATIENT)
Dept: TRANSPLANT | Facility: CLINIC | Age: 42
End: 2022-04-28
Payer: MEDICARE

## 2022-04-28 NOTE — TELEPHONE ENCOUNTER
"Received document addressed to cc: Dr. Gross from provider Austin Perez, [facility]. Dated April 27, 2022. Provider note reviews a retrospective study that supports only "two of eight patients having KS recurrence" with immusuppression, and then states in conclusion, "I do not think this history of KS should factor into whether or not to list Mr. Aguiar for a transplant."     Record routed to Dr. Tello and Ginny for review. Sent for scanning to patient chart. No active referral on patient noted.      "

## 2022-05-11 ENCOUNTER — PATIENT MESSAGE (OUTPATIENT)
Dept: RESEARCH | Facility: CLINIC | Age: 42
End: 2022-05-11
Payer: MEDICARE

## 2022-07-22 NOTE — PLAN OF CARE
Plan of care discussed with patient. Patient is free of fall or injury. Denies CP, SOB, or pain. BP medications adjusted. Continuing to monitor kidney function. Blood to be drawn tomorrow for amyloidosis w/u. PET stress test ordered. All questions addressed; will continue to monitor.    no

## 2022-08-02 NOTE — TELEPHONE ENCOUNTER
Call returned.   Subjective   Patient ID: Kathy is a 28 year old female.    Kathy Accepted a chaperone.    Chief Complaint   Patient presents with   • Initial Prenatal Visit     Pt here for pregnancy confirmation, LMP:unknown, G:3 P:2 (), Last Pap:2021 sat./neg. -HPV, Delivered baby 2022.     HPI this is 28 years old female  3 para 2-0-0-2 with history of previous to  last  was on 2022 last menstrual period was unknown here today for pregnancy confirmation.    Patient started feeling nausea but no vomiting.  Patient denies any bleeding or spotting.  Patient is not breast-feeding the baby and patient had a baby 2022 which is almost 5 months ago.  So considering patient had a nausea in the breast pain patient came here for confirmation of pregnancy.    Patient denies any vaginal bleeding or spotting.  Patient denies any menstrual cycle after the  in 2022.    Patient denies any family history of birth defect.    Patient had a history of previous to  last  on 2022.    Patient denies any symptoms of depression.    Past Medical History:   Diagnosis Date   • Anemia    • Anxiety    • Depressive disorder    • DVT (deep vein thrombosis) in pregnancy    • History of dysmenorrhea     as teenager   • Hyperlipemia    • Maternal congenital cardiac anomaly complicating pregnancy    • Palpitation    • Severe preeclampsia 2016    32wks [Care Everywhere: [place]].   • Severe preeclampsia 2022    34wks.   • STD (sexually transmitted disease)     chlamydia, tx   • Tetralogy of Fallot      Family History   Problem Relation Age of Onset   • Other Mother         prediabetic   • Hypertension Father    • Patient is unaware of any medical problems Sister    • Diabetes Brother    • Patient is unaware of any medical problems Brother    • Depression Maternal Grandmother    • Patient is unaware of any medical problems Maternal  Grandfather    • Hypertension Paternal Grandmother    • Diabetes Paternal Grandmother    • Hyperlipidemia Paternal Grandmother    • Hypertension Paternal Grandfather    • Heart disease Neg Hx      Past Surgical History:   Procedure Laterality Date   • Cardiac catherization  2002    [facility]: angioplasty and stent of LPA   • Cardiac catherization  10/23/2006    [facility]: Ballooning of LPA stent-unsuccessful   •  section, low transverse  2016    32wks. ([facility]).   •  section, low transverse  2022    34wks.   • Tetralogy of fallot repair  1994    CM: median sternotomy, pulmonic valvotomy, autologous pericardial patch arterioplasty of MPA stenosis, autologous pericardial patch arterioplasty of proximal LPA stenosis, transatrial resection of obstructive right ventricular infundibular muscle and Dacron patch closure of VSD, and suture closure of patent foramen ovale     Social History     Tobacco Use   • Smoking status: Never Smoker   • Smokeless tobacco: Never Used   Substance Use Topics   • Alcohol use: Never   • Drug use: Never     Current Outpatient Medications   Medication Sig Dispense Refill   • folic acid (FOLATE) 1 MG tablet Take 1 tablet by mouth daily. 90 tablet 1   • aspirin (EQ Aspirin Low Dose) 81 MG EC tablet Take 1 tablet by mouth daily. H/O abortions. 30 tablet 3   • acetaminophen (Tylenol) 325 MG tablet Take 2 tablets by mouth every 4 hours as needed for Pain. 40 tablet 0   • docusate sodium (Colace) 100 MG capsule Take 1 capsule by mouth 2 times daily as needed for Constipation. 40 capsule 2   • Ferrous Sulfate (Iron) 325 (65 Fe) MG Tab Take 1 tablet by mouth daily. 30 tablet 3   • PRENATAL MULTIVIT-MIN-FE-FA PO Take 1 tablet by mouth daily.     • hydrOXYzine (ATARAX) 25 MG tablet Take 25 mg by mouth at bedtime as needed.     • sertraline (ZOLOFT) 50 MG tablet Take 1 tablet by mouth daily. 90 tablet 2   • sertraline (ZOLOFT) 25 MG tablet Take 1 tablet by mouth daily.  (Patient taking differently: Take 25 mg by mouth daily. Take with 50 mg for total dose 75 mg) 90 tablet 2   • folic acid (FOLATE) 1 MG tablet Take 1 tablet by mouth daily. 90 tablet 1     No current facility-administered medications for this visit.       Review of Systems   Constitutional: Negative.    Respiratory: Negative.    Cardiovascular: Negative.    Gastrointestinal: Negative.    Endocrine: Negative.    Genitourinary: Negative.    Skin: Negative.    Allergic/Immunologic: Negative.    Neurological: Negative.    Psychiatric/Behavioral: Negative.        Objective   OBGyn Exam   Vital Signs:   Visit Vitals  /68 (BP Location: LUE - Left upper extremity, Patient Position: Sitting, Cuff Size: Regular)   Pulse 78   Temp 97.8 °F (36.6 °C) (Temporal)   Wt 89.8 kg (198 lb)   LMP  (LMP Unknown)   BMI 32.95 kg/m²      General Appearance: Well groomed.   Neuropsychiatric: Mood and affect appropriate.    Skin: No rashes.   Neck: No masses. No enlargement of the thyroid.   Respiratory: Respiratory effort normal. Breath sounds are normal.  Cardiovascular: Regular rate and rhythm. No leg swelling or varicosities.   Breasts: Symmetrical without masses. No skin changes. No axillary lymphadenopathy.   Abdomen: No masses or tenderness. No evidence of hernia. No hepatomegaly or splenomegaly.   Pelvic:  External genitalia: Normal.  Urethral Meatus: Normal.  Urethra: No masses or tenderness.  Bladder: No cystocele.  Vagina: Normal appearance.  No discharge.   Cervix: Normal appearance.  No discharge.  GC chlamydia  performed  Uterus: Anteverted about 8 weeks size soft nontender.  Adnexa: No masses or tenderness.   Anus/perineum: Normal.      Assessment This is 28 years old female  3 para 2-0-0-2 with history of previous to , last  was in 2022 and last menstrual period is unknown and here today for pregnancy confirmation.    Patient denies having any menstrual cycle after the delivery.   Patient did not use any birth control and patient surprised to get pregnant.  Urine pregnancy test is positive so pregnancy reassurance given to the patient.    Patient denies any symptoms of depression.    Problem List Items Addressed This Visit        Gravid and     Pregnancy - Primary    Relevant Medications    folic acid (FOLATE) 1 MG tablet    aspirin (EQ Aspirin Low Dose) 81 MG EC tablet    Other Relevant Orders    ABO GROUP/RH /SCREEN OUTPATIENT (Completed)    RUBELLA ANTIBODY IGG (Completed)    RPR (RAPID PLASMA REAGIN) TRADITIONAL SYPHILIS SCREEN ALGORITHM (Completed)    SERVICE TO GENETIC COUNSELING    CHLAMYDIA/GONORRHEA BY NUCLEIC ACID AMPLIFICATION (Completed)    CBC WITH DIFFERENTIAL (Completed)    SERVICE TO MATERNAL FETAL MEDICINE    HEPATITIS B SURFACE ANTIGEN (Completed)    HEPATITIS C ANTIBODY WITH REFLEX (Completed)    HIV ANTIGEN/ANTIBODY SCREEN (Completed)    URINALYSIS & REFLEX MICROSCOPY (Completed)    URINE, BACTERIAL CULTURE (Completed)    VARICELLA ZOSTER IMMUNITY IGG (Completed)    US OB LESS THAN 14 WEEKS FIRST TRIMESTER SINGLE GESTATION    US OB NUCHAL TRANSLUCENCY SINGLE GESTATION    POCT URINE DIP NON-AUTO (Completed)    POCT URINE PREGNANCY (Completed)    SERVICE TO GENETIC COUNSELING      Other Visit Diagnoses     History of  delivery, currently pregnant        Relevant Medications    folic acid (FOLATE) 1 MG tablet    aspirin (EQ Aspirin Low Dose) 81 MG EC tablet    Other Relevant Orders    SERVICE TO MATERNAL FETAL MEDICINE          Plan   this is 28 years old female  3 para 2-0-0-2 with history of previous to  last  was on 2022 last menstrual period was unknown here today for pregnancy confirmation.    Patient started feeling nausea but no vomiting.  Patient denies any bleeding or spotting.  Patient is not breast-feeding the baby and patient had a baby 2022 which is almost 5 months ago.  So considering patient had  a nausea in the breast pain patient came here for confirmation of pregnancy.    Patient denies any vaginal bleeding or spotting.  Patient denies any menstrual cycle after the  in 2022.    Patient denies any family history of birth defect.    Patient had a history of previous to  last  on 2022.    Patient denies any symptoms of depression.    Vital signs were stable.    Breast is soft no pathological mass palpable.  Tender on palpation.    Abdomen soft nontender.    Pelvic examination confirmed there is no lesion on the vulva or vagina cervix is nulliparous normal no cervical motion tenderness.  Uterus anteverted about 8 weeks size soft nontender and no adnexal mass palpable.    Considering urine pregnancy test positive patient is pregnant informed to the patient.  Beta-hCG quantitative ordered for the patient.    Prenatal ultrasound less than 14 weeks for confirmation of gestational age ordered for the patient.  Advised to take the prenatal vitamin and folic acid every day 1 tablet.    Prenatal labs ordered for the patient.    Considering patient had a recently pregnancy possible risk of intermittent quick pregnancy after  discussed with the patient.  Patient verbalized understanding okay.  Follow-up back in 4 weeks.      Precautions:  Reviewed COVID-19 precautions:  Instructed to practice frequent hand washing and social distancing.

## 2023-01-01 NOTE — ASSESSMENT & PLAN NOTE
- Continue Valcyte for CMV prophylaxis  - Continue Bactrim for PCP prophylaxis  - Continue nystatin for fungal prophylaxis     Statement Selected

## 2023-06-30 NOTE — PLAN OF CARE
Plan of care reviewed with the pt. All questions answered and encouraged. Pt ambulated in the [place] for a brief period. Still refuses to allow me to do a physical exam. Pt has been free from injury and falls.   Normal

## 2023-10-23 NOTE — LETTER
[facility] - Ophthalmology  [address]  [facility] [address]  Phone: [phone]  Fax: [phone]   November 27, 2017    Megha Hahn, OD  [address]  [facility] [address]    Patient: Demetrio Aguiar   MR Number: [id number]   YOB: 1980   Date of Visit: 11/27/2017       Dear Dr. Hahn:    Thank you for referring Demetrio Aguiar to me for evaluation. Here is my assessment and plan of care:    Assessment:   /Plan     For exam results, see Encounter Report.    Corneal abrasion, right, subsequent encounter      The lesion does not look infected. I ordered a bandage contact lens for his right eye. I will try to have him set up for follow up at the eye clinic at the [facility] this week. If not possible, he will return here for follow up.          Plan:       For exam results, see Encounter Report.    Corneal abrasion, right, subsequent encounter      The lesion does not look infected. I ordered a bandage contact lens for his right eye. I will try to have him set up for follow up at the eye clinic at the [facility] this week. If not possible, he will return here for follow up.            Below you will find my full exam findings. If you have questions, please do not hesitate to call me. I look forward to following Mr. Demetrio Aguiar along with you.    Sincerely,          Devan Gallego MD       CC  No Recipients             Base Eye Exam     Visual Acuity (Snellen - Linear)       Right Left    Dist sc 20/70 -1 20/40    OD tearing which interferes with him trying to read letters.           Neuro/Psych     Oriented x3:  Yes    Mood/Affect:  Normal            Slit Lamp and Fundus Exam     Slit Lamp Exam       Right Left    Lids/Lashes Intermittent aberrant lash position inferior central lid that sometimes rubs cornea.     Conjunctiva/Sclera Injection     Cornea Area of fragild looking epithelium supeiorly with no overt ulceration. No infiltrate.      Anterior Chamber Deep and quiet     Iris Round and reactive     Lens Clear                 same name as above Negative

## 2023-11-03 NOTE — ASSESSMENT & PLAN NOTE
Continue HAART therapy  Continue atovaquone for PCP ppx   53 year old male light smoker, hx of HTN, DM, ESRD (MWF), CHF (EF 33%), cirrhosis, recurrent ascites presented with sudden SOB after vomiting multiple times and now has small amount of hemoptysis. Patient is Afebrile, hemodynamically stable, and saturating well on RA. CXR showed new RLL opacities. RVP negative. Concern for possible pneumonitis    # Hemoptysis  # Aspiration Pneumonitis    Recommendations:  CT chest w/o contrast to r/o other causes of  lung pathology  Monitor quantity of hemoptysis will need to d/c AC if it continues to worsen  Tessalon perles to suppress cough to prevent irritation  Aspiration precautions

## 2023-11-12 NOTE — ASSESSMENT & PLAN NOTE
- will need to restart Crestor once able    Cc: Gait dysfunction    HPI: Patient with no new medical issues today. Slept well overnight. Continues to have abdominal discomfort, mildly improved with maalox. Noted to be tachycardic between 108-125 yesterday. Denies SOB, dizziness, lightheadedness, blurry vision or CP.   Pain controlled, no chest pain, no N/V, no Fevers/Chills. No other new ROS  Has been tolerating rehabilitation program.    acetaminophen     Tablet .. 650 milliGRAM(s) Oral every 6 hours PRN  aluminum hydroxide/magnesium hydroxide/simethicone Suspension 30 milliLiter(s) Oral every 4 hours PRN  artificial tears (preservative free) Ophthalmic Solution 1 Drop(s) Both EYES three times a day  atorvastatin 40 milliGRAM(s) Oral at bedtime  dexAMETHasone     Tablet 2 milliGRAM(s) Oral two times a day  dextrose 5%. 1000 milliLiter(s) IV Continuous <Continuous>  dextrose 5%. 1000 milliLiter(s) IV Continuous <Continuous>  dextrose 50% Injectable 25 Gram(s) IV Push once  dextrose 50% Injectable 12.5 Gram(s) IV Push once  dextrose 50% Injectable 25 Gram(s) IV Push once  dextrose Oral Gel 15 Gram(s) Oral once PRN  divalproex  milliGRAM(s) Oral every 12 hours  enoxaparin Injectable 40 milliGRAM(s) SubCutaneous every 24 hours  glucagon  Injectable 1 milliGRAM(s) IntraMuscular once  insulin lispro (ADMELOG) corrective regimen sliding scale   SubCutaneous three times a day before meals  melatonin 3 milliGRAM(s) Oral at bedtime PRN  metFORMIN 500 milliGRAM(s) Oral two times a day  pantoprazole    Tablet 40 milliGRAM(s) Oral before breakfast  polyethylene glycol 3350 17 Gram(s) Oral daily  senna 2 Tablet(s) Oral at bedtime PRN          T(C): 37.1 (11-11-23 @ 21:53), Max: 37.1 (11-11-23 @ 21:53)  T(F): 98.7 (11-11-23 @ 21:53), Max: 98.7 (11-11-23 @ 21:53)  HR: 111 (11-11-23 @ 21:53) (111 - 125)  BP: 110/72 (11-11-23 @ 21:53) (106/82 - 110/72)  RR: 16 (11-11-23 @ 21:53) (16 - 16)  SpO2: 95% (11-11-23 @ 21:53) (95% - 100%)                In NAD  HEENT- +R crani site C/D/I  Heart- RRR, S1S2  Lungs- CTA bl.  Abd- +mild TTP   Ext- No calf pain  Neuro- Exam unchanged          Imp: Patient with diagnosis of    Malignant neoplasm with metastasis to brain      admitted for comprehensive acute rehabilitation.    Plan:  - Continue PT/OT/SLP  - DVT prophylaxis - lovenox   - Skin- Turn q2h, check skin daily  - Continue current medications; patient medically stable.   - Abd pain - continue maalox and PPI  - Tachycardia - stable, continue to monitor  - Patient is stable to continue current rehabilitation program.

## 2024-03-05 NOTE — PROGRESS NOTES
[facility]  Kidney Transplant  Progress Note      Reason for Follow-up: Reassessment of Kidney Transplant - 2017  (#1) recipient and management of immunosuppression.    ORGAN:  RIGHT KIDNEY   Donor Type:   - Brain Death   PHS Increased Risk: no   Cold Ischemia:   Induction Medications: Thymoglobulin      Subjective:     Demetrio Aguiar is a 37 y.o. 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 His maintenance immunosuppression includes a steroid taper per protocol to 5mg daily, Prograf, and Cellcept maintenance. His post op course has been complicated by DGF requiring HD (last HD 17) which prompted need for kidney biopsy on 17. He was then admitted for biopsy proven ABMR with weakly positive class I DSA's on 17. Biopsy 17: good sample with 24 glomeruli (only 1 sclerosed) and evidence of criteria for ABMR. + diffuse ATI; Minimal fibrosis, no ACR or AVR, but + CD4 (>50% diffusely + stain) with mild glomerulitis and focal peritubular capillaritis; also, biopsy shows ca-oxalate (3) and ca-phos (2) crystals. Since transplant, he has made minimal uop daily. He denies any current illnesses or sick contacts. Complains of expected post-op pain to RLQ incision. Plan is for PLEX 3-6 treatments.     Interval History: Pt tolerated PLEX #4 well via left femoral AV fistula yesterday.  First dose of SM yesterday. UOP increasing (3.5L in the last 24 h) and Cr decreased.  Plan for SM #2 today.         Past Medical, Surgical, Family, and Social History:   Unchanged from H&P.    Review of patient's allergies indicates:  No Known Allergies    Scheduled Meds:   calcium gluconate IVPB  2,000 mg  Would like a referral to see Dr. Maloney, unless you have another recommendation.  Her left knee is bothering going up and down the steps at her house.  It clicks.  Would you need to see her first or what do you suggest?    Went to [facility] in [place] last Saturday.  They told her it was a virus and had a little fluid behind the ear.  They told her to stay on the Claritin but ear is still not completely cleared out, and still a lot of drainage from the nose.  Should she stay on the Claritin or does she need something different?  She cannot take a decongestant due to her heart.   Intravenous Once    docusate sodium  100 mg Oral TID    dolutegravir  50 mg Oral Daily    [START ON 9/4/2017] epoetin erika  10,000 Units Subcutaneous Every Mon    famotidine  20 mg Oral QHS    furosemide  40 mg Intravenous TID    heparin (porcine)  5,000 Units Subcutaneous Q8H    ketoconazole  100 mg Oral Daily    metOLazone  2.5 mg Oral Daily    multivitamin  1 tablet Oral Daily    mycophenolate  1,000 mg Oral BID    nystatin  500,000 Units Oral QID (PC + HS)    predniSONE  20 mg Oral Daily    rilpivirine  25 mg Oral Daily    rosuvastatin  20 mg Oral Daily    sevelamer carbonate  800 mg Oral TID WM    sodium chloride 0.9%  3 mL Intravenous Q8H    sulfamethoxazole-trimethoprim 400-80mg  1 tablet Oral Every Mon, Wed, Fri    tacrolimus  6 mg Oral BID    valganciclovir  450 mg Oral Every Mon, Wed, Fri     Continuous Infusions:   PRN Meds:bisacodyl, bisacodyl, ondansetron, oxycodone-acetaminophen, oxycodone-acetaminophen    Intake/Output - Last 3 Shifts       08/31 0700 - 09/01 0659 09/01 0700 - 09/02 0659 09/02 0700 - 09/03 0659    P.O. 1500 1280 340    Blood 5444 5210     Total Intake(mL/kg) 6944 (85.3) 6490 (90) 340 (4.7)    Urine (mL/kg/hr) 1250 (0.6) 3550 (2.1) 825 (1.9)    Emesis/NG output   0 (0)    Other   0 (0)    Stool 0 (0) 0 (0) 0 (0)    Blood 5111 (2.6) 4968 (2.9)     Total Output 6361 8518 825    Net +583 -2028 -485           Urine Occurrence   0 x    Stool Occurrence 0 x 1 x 0 x    Emesis Occurrence   0 x           Review of Systems   Constitutional: Negative for chills, fatigue and fever.   HENT: Negative.    Eyes: Negative.    Respiratory: Negative for cough, shortness of breath and wheezing.    Cardiovascular: Negative for chest pain, palpitations and leg swelling.   Gastrointestinal: Negative for abdominal distention, abdominal pain, constipation, diarrhea, nausea and vomiting.   Genitourinary: Negative for decreased urine volume, dysuria and flank pain.   Musculoskeletal: Negative  "for gait problem.   Allergic/Immunologic: Positive for immunocompromised state.   Neurological: Negative for tremors, light-headedness and headaches.   Psychiatric/Behavioral: Negative for behavioral problems and dysphoric mood.      Objective:     Vital Signs (Most Recent):  Temp: 97.7 °F (36.5 °C) (09/02/17 1136)  Pulse: 80 (09/02/17 1136)  Resp: 18 (09/02/17 1136)  BP: 130/61 (09/02/17 1136)  SpO2: 99 % (09/02/17 1136) Vital Signs (24h Range):  Temp:  [97.7 °F (36.5 °C)-98.4 °F (36.9 °C)] 97.7 °F (36.5 °C)  Pulse:  [70-81] 80  Resp:  [16-20] 18  SpO2:  [97 %-100 %] 99 %  BP: (114-130)/(61-82) 130/61     Weight: 72.1 kg (159 lb)  Height: 5' 7" (170.2 cm)  Body mass index is 24.9 kg/m².    Physical Exam   Constitutional: He is oriented to person, place, and time. He appears well-nourished.   Cardiovascular: Normal rate and regular rhythm.    Pulmonary/Chest: Effort normal and breath sounds normal. No respiratory distress. He has no rales.   Abdominal: Soft. Bowel sounds are normal. He exhibits no distension. There is no tenderness.   Neurological: He is alert and oriented to person, place, and time.   Vitals reviewed.      Laboratory:  CBC:   Recent Labs  Lab 09/02/17  0530   WBC 7.97   RBC 2.72*   HGB 8.1*   HCT 23.9*      MCV 88   MCH 29.8   MCHC 33.9     BMP:   Recent Labs  Lab 09/02/17  0530   *   *   K 4.3      CO2 18*   BUN 54*   CREATININE 8.5*   CALCIUM 8.8     Tacrolimus Lvl   Date Value Ref Range Status   09/02/2017 11.7 5.0 - 15.0 ng/mL Final     Comment:     Testing performed by Liquid Chromatography-Tandem  Mass Spectrometry (LC-MS/MS).  This test was developed and its performance characteristics  determined by [facility], Department of Pathology  and Laboratory Medicine in a manner consistent with CLIA  requirements. It has not been cleared or approved by the [place]  Food and Drug Administration.  This test is used for clinical   purposes.  It should not be regarded " as investigational or for  research.     09/01/2017 7.4 5.0 - 15.0 ng/mL Final     Comment:     Testing performed by Liquid Chromatography-Tandem  Mass Spectrometry (LC-MS/MS).  This test was developed and its performance characteristics  determined by [facility], Department of Pathology  and Laboratory Medicine in a manner consistent with CLIA  requirements. It has not been cleared or approved by the [place]  Food and Drug Administration.  This test is used for clinical   purposes.  It should not be regarded as investigational or for  research.     08/31/2017 13.9 5.0 - 15.0 ng/mL Final     Comment:     Testing performed by Liquid Chromatography-Tandem  Mass Spectrometry (LC-MS/MS).  This test was developed and its performance characteristics  determined by [facility], Department of Pathology  and Laboratory Medicine in a manner consistent with CLIA  requirements. It has not been cleared or approved by the [place]  Food and Drug Administration.  This test is used for clinical   purposes.  It should not be regarded as investigational or for  research.     08/30/2017 5.9 5.0 - 15.0 ng/mL Final     Comment:     Testing performed by Liquid Chromatography-Tandem  Mass Spectrometry (LC-MS/MS).  This test was developed and its performance characteristics  determined by [facility], Department of Pathology  and Laboratory Medicine in a manner consistent with CLIA  requirements. It has not been cleared or approved by the [place]  Food and Drug Administration.  This test is used for clinical   purposes.  It should not be regarded as investigational or for  research.       Labs within the past 24 hours have been reviewed.    Diagnostic Results:  Labs: Reviewed  X-Ray: Reviewed    Assessment/Plan:     Hyperphosphatemia    - Continue Renvela.  Monitor.          Hyponatremia    - Na 135.  Monitor.          Nausea & vomiting    - Improved with Zofran.  - Pt with constipation- had a BM yesterday.          Long-term use of immunosuppressant medication    - Maintenance IS with prograf, MMF, and prednisone taper.   - Of note, Cellcept initially held today when thymo planned- re-started yesterday   - Will monitor for signs of toxic side effects, check daily Prograf troughs, and change meds accordingly.         donor kidney transplant 2017    - Allograft function improving.    - See ABMR and DGF.            Hypocalcemia    - Ion Ca 1.17.    - Continue to monitor        Delayed graft function of kidney    - Scheduled for HD MWF, last HD outpt .    - No HD today as UOP has significantly increased and Cr is coming down.   - Lasix 40 mg tid and Zaroxolyn 2.5mg today.          Need for prophylactic immunotherapy    - See long term use of IS            At risk for opportunistic infections    - Continue Valcyte for CMV prophylaxis  - Continue Bactrim for PCP prophylaxis  - Continue nystatin for fungal prophylaxis          HIV (human immunodeficiency virus infection)    - Last CD4 count 7 () and HIV PCR undetected < 40 copies/ml ().  - Continue HAART with dolutegravir (Tivicay) and rilpivirine (Edurant).    - HIV meds to be given after PLEX- meds highly protein bond and can be removed w plasmapheresis.  - Plan for HIV RNA and Immune cyclex to further assess immune system.           Anemia in ESRD (end-stage renal disease)    - H/H stable at 8.1/23.9  - Fe panel with elevated ferritin and low Fe stores.  - Continue weekly epo.  - Monitor.          * Antibody mediated rejection of kidney transplant    - Biopsy (for DGF) 17: good sample with 24 glomeruli (only 1 sclerosed) and evidence just meeting criteria for ABMR. + diffuse ATI. Minimal fibrosis, no ACR or AVR, but + CD4 (>50% diffusely + stain) with mild glomerulitis and focal peritubular capillaritis. Also, biopsy shows ca-oxalate (3) and ca-phos (2) crystals.  -17 DSA + for weak class I. Repeat pre-PLEX Class I DSA DETECTED:  A2(3894),B44(1480),B62(7118),    Class II DSA DETECTED BELOW CUTOFF: DR53(1265)  - Tolerated PLEX #1 8/29, #2 8/30, #3 8/31, #4 9?1.  Plan for PLEX #4 9/3 and #5 9/4.  Plan fot total of 6 tentatively.    - Plan for IVIG after PLEX.    - Plan for SM x 3, first dose yesterday. Of note, original plan for thymo cancelled secondary to difficulty with line placement due to pt anatomy.  Cellcept originally held- will re-start.  - UOP increasing and deltra Cr decreasing with tx.              Discharge Planning:  [address]      Stephanie Tai MD  Kidney Transplant  [facility]

## 2024-11-11 NOTE — PROGRESS NOTES
Faxed back cl rx. .   JUAN met with pt and family members to discuss plan of care and overall check on pt's well being. Pt reports that he is doing well and still excited about his transplant. SW informed pt that his family could check into [facility] today. Family confirmed. Pt and family denied any other concerns at this time. SW remains available.
